# Patient Record
Sex: FEMALE | Race: WHITE | Employment: OTHER | URBAN - METROPOLITAN AREA
[De-identification: names, ages, dates, MRNs, and addresses within clinical notes are randomized per-mention and may not be internally consistent; named-entity substitution may affect disease eponyms.]

---

## 2017-01-03 PROBLEM — G62.9 PERIPHERAL POLYNEUROPATHY: Status: ACTIVE | Noted: 2017-01-03

## 2017-06-06 PROBLEM — I82.409 DEEP VEIN THROMBOSIS (DVT) OF LOWER EXTREMITY (HCC): Status: ACTIVE | Noted: 2017-06-06

## 2017-10-24 PROBLEM — N17.9 AKI (ACUTE KIDNEY INJURY) (HCC): Status: ACTIVE | Noted: 2017-10-24

## 2017-10-24 PROBLEM — Z79.01 CHRONIC ANTICOAGULATION: Status: ACTIVE | Noted: 2017-10-24

## 2017-10-24 PROBLEM — N39.0 COMPLICATED UTI (URINARY TRACT INFECTION): Chronic | Status: ACTIVE | Noted: 2017-10-24

## 2017-10-24 PROBLEM — N39.0 COMPLICATED UTI (URINARY TRACT INFECTION): Status: ACTIVE | Noted: 2017-10-24

## 2017-10-24 PROBLEM — R19.7 DIARRHEA WITH DEHYDRATION: Status: ACTIVE | Noted: 2017-10-24

## 2017-10-24 PROBLEM — E87.5 HYPERKALEMIA: Status: ACTIVE | Noted: 2017-10-24

## 2018-01-16 LAB — INR BLD: 2.1

## 2018-03-11 ENCOUNTER — HOSPITAL ENCOUNTER (OUTPATIENT)
Age: 73
Discharge: HOME | End: 2018-03-13
Payer: COMMERCIAL

## 2018-03-11 ENCOUNTER — HOSPITAL ENCOUNTER (OUTPATIENT)
Dept: GENERAL RADIOLOGY | Age: 73
Discharge: HOME | End: 2018-03-13
Payer: COMMERCIAL

## 2018-03-11 DIAGNOSIS — S42.295G OTHER CLOSED NONDISPLACED FRACTURE OF PROXIMAL END OF LEFT HUMERUS WITH DELAYED HEALING, SUBSEQUENT ENCOUNTER: ICD-10-CM

## 2018-03-11 PROCEDURE — 73060 X-RAY EXAM OF HUMERUS: CPT

## 2018-03-12 ENCOUNTER — TELEPHONE (OUTPATIENT)
Dept: FAMILY MEDICINE CLINIC | Age: 73
End: 2018-03-12

## 2018-03-12 DIAGNOSIS — I25.10 CORONARY ARTERY DISEASE INVOLVING NATIVE CORONARY ARTERY OF NATIVE HEART WITHOUT ANGINA PECTORIS: ICD-10-CM

## 2018-03-12 DIAGNOSIS — I82.5Y1 CHRONIC DEEP VEIN THROMBOSIS (DVT) OF PROXIMAL VEIN OF RIGHT LOWER EXTREMITY (HCC): ICD-10-CM

## 2018-03-12 DIAGNOSIS — I82.409 RECURRENT DEEP VEIN THROMBOSIS (DVT) (HCC): ICD-10-CM

## 2018-03-12 DIAGNOSIS — Z79.4 TYPE 2 DIABETES MELLITUS WITHOUT COMPLICATION, WITH LONG-TERM CURRENT USE OF INSULIN (HCC): ICD-10-CM

## 2018-03-12 DIAGNOSIS — E11.9 TYPE 2 DIABETES MELLITUS WITHOUT COMPLICATION, WITH LONG-TERM CURRENT USE OF INSULIN (HCC): ICD-10-CM

## 2018-03-12 DIAGNOSIS — R79.89 ELEVATED TSH: ICD-10-CM

## 2018-03-12 DIAGNOSIS — S42.295K OTHER CLOSED NONDISPLACED FRACTURE OF PROXIMAL END OF LEFT HUMERUS WITH NONUNION, SUBSEQUENT ENCOUNTER: Primary | ICD-10-CM

## 2018-03-12 PROBLEM — S42.202K CLOSED FRACTURE OF PROXIMAL END OF LEFT HUMERUS WITH NONUNION: Status: ACTIVE | Noted: 2018-03-12

## 2018-03-14 ENCOUNTER — OFFICE VISIT (OUTPATIENT)
Dept: ORTHOPEDIC SURGERY | Age: 73
End: 2018-03-14
Payer: COMMERCIAL

## 2018-03-14 VITALS
DIASTOLIC BLOOD PRESSURE: 61 MMHG | BODY MASS INDEX: 35.85 KG/M2 | WEIGHT: 210 LBS | HEART RATE: 74 BPM | HEIGHT: 64 IN | SYSTOLIC BLOOD PRESSURE: 128 MMHG

## 2018-03-14 DIAGNOSIS — S42.295K OTHER CLOSED NONDISPLACED FRACTURE OF PROXIMAL END OF LEFT HUMERUS WITH NONUNION, SUBSEQUENT ENCOUNTER: Primary | ICD-10-CM

## 2018-03-14 PROCEDURE — G8399 PT W/DXA RESULTS DOCUMENT: HCPCS | Performed by: ORTHOPAEDIC SURGERY

## 2018-03-14 PROCEDURE — 1090F PRES/ABSN URINE INCON ASSESS: CPT | Performed by: ORTHOPAEDIC SURGERY

## 2018-03-14 PROCEDURE — 1123F ACP DISCUSS/DSCN MKR DOCD: CPT | Performed by: ORTHOPAEDIC SURGERY

## 2018-03-14 PROCEDURE — G8417 CALC BMI ABV UP PARAM F/U: HCPCS | Performed by: ORTHOPAEDIC SURGERY

## 2018-03-14 PROCEDURE — G8598 ASA/ANTIPLAT THER USED: HCPCS | Performed by: ORTHOPAEDIC SURGERY

## 2018-03-14 PROCEDURE — G8482 FLU IMMUNIZE ORDER/ADMIN: HCPCS | Performed by: ORTHOPAEDIC SURGERY

## 2018-03-14 PROCEDURE — 4040F PNEUMOC VAC/ADMIN/RCVD: CPT | Performed by: ORTHOPAEDIC SURGERY

## 2018-03-14 PROCEDURE — 3014F SCREEN MAMMO DOC REV: CPT | Performed by: ORTHOPAEDIC SURGERY

## 2018-03-14 PROCEDURE — G8427 DOCREV CUR MEDS BY ELIG CLIN: HCPCS | Performed by: ORTHOPAEDIC SURGERY

## 2018-03-14 PROCEDURE — 1036F TOBACCO NON-USER: CPT | Performed by: ORTHOPAEDIC SURGERY

## 2018-03-14 PROCEDURE — 99203 OFFICE O/P NEW LOW 30 MIN: CPT | Performed by: ORTHOPAEDIC SURGERY

## 2018-03-14 PROCEDURE — 3017F COLORECTAL CA SCREEN DOC REV: CPT | Performed by: ORTHOPAEDIC SURGERY

## 2018-03-15 NOTE — PROGRESS NOTES
New Patient      Mian Nickerson is a 67 y.o. female, her YOB: 1945 with the following history as recorded in [facility]:    HPI: Patient is a very pleasant 72-year-old female who comes in with a chief complaint of left shoulder pain. She does have a history of breast cancer status post surgical treatment. She developed shoulder pain the last few weeks and was concerned that she may have a metastatic lesion in her left shoulder. 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 She did not receive any surgical treatment for this. She has had significantly limited range of motion since her recovery at that point in time. Her main concern is that she will develop some new pain and thought that it may be cancer. She denies any trauma or falls that does admit to repetitive range of motion while carrying groceries.       Review of Systems - General ROS: negative for - chills, fatigue, fever, malaise or night sweats  Ophthalmic ROS: negative for - blurry vision, decreased vision, double vision, dry eyes, excessive tearing, eye pain or loss of vision  ENT ROS: negative for - headaches, hearing change, nasal congestion, sinus pain, sore throat, tinnitus or vertigo  Allergy and Immunology ROS: negative for - itchy/watery eyes, nasal congestion, postnasal drip or seasonal allergies  Hematological and Lymphatic ROS: negative for - bleeding problems, blood clots, bruising, fatigue, jaundice, night sweats or swollen lymph nodes  Endocrine ROS: negative for - mood swings, palpitations, polydipsia/polyuria, skin changes or temperature intolerance  Respiratory ROS: no cough, shortness of breath, or wheezing  Cardiovascular ROS: no chest pain or dyspnea on exertion  Gastrointestinal ROS: no abdominal pain, change in bowel habits, or black or bloody stools  Genito-Urinary ROS: no dysuria, trouble voiding, or hematuria  Musculoskeletal ROS: Acute on chronic left shoulder pain  Neurological ROS: no TIA tablet 3    exemestane (AROMASIN) 25 MG chemo tablet Take 1 tablet by mouth every morning (before breakfast)      metFORMIN (GLUCOPHAGE) 1000 MG tablet Take 1 tablet by mouth 2 times daily (with meals) 180 tablet 3    famotidine (PEPCID) 20 MG tablet Take 1 tablet by mouth 2 times daily 180 tablet 3    insulin glargine (LANTUS SOLOSTAR) 100 UNIT/ML injection pen Inject 65 Units into the skin nightly 21 Pen 3    vitamin B-12 (CYANOCOBALAMIN) 1000 MCG tablet Take 1 tablet by mouth daily 90 tablet 3    fluticasone (FLONASE) 50 MCG/ACT nasal spray 1 spray by Nasal route daily 1 Bottle 3    warfarin (COUMADIN) 2 MG tablet Take 2 tablets orally on Sun, Tues, Thurs; and 1 tablet daily on all other days. 180 tablet 3    Multiple Vitamins-Minerals (THERAPEUTIC MULTIVITAMIN-MINERALS) tablet Take 1 tablet by mouth daily 90 tablet 3    trastuzumab (HERCEPTIN) 440 MG injection Infuse  intravenously once a week. On tuesdays       No current facility-administered medications for this visit. Allergies: Macrobid [nitrofurantoin monohydrate macrocrystals]  Past Medical History:   Diagnosis Date    Abscess of abdominal wall     Anemia     Iron deficiency and chronic disease.  Anesthesia     DIFFICULTY WAKING UP    Arthritis     Breast cancer ([facility] 2005    S/P Left Lumpectomy with Lymph Node Dissection, Chemo/Rad. Follows with Dr. Sabino Hernandez. No recurrence to date. Surgeon was Dr. Amaris Archer.  CAD (coronary artery disease) 5/2008    s/p CABG. Follows with [address] [place] [address].  CHF (congestive heart failure) (HCC)     Diabetic neuropathy (HCC)     Diabetic neuropathy (HCC)     DVT (deep venous thrombosis) (HCC)     Recurrent. On lifelong coumadin.  DVT of upper extremity (deep vein thrombosis) ([facility] [address] 4/18/2012    Humerus fracture     Chronic, on the Left.  Hyperlipidemia 8/29/2011    Hypertension     Lymph node enlargement     MI (myocardial infarction)     Nephrolithiasis     Follows with Dr. Jose Alfredo Pisano.     Pericardial effusion     Pulmonary nodule     With mediastinal lymphadenopathy. Stable. Follows with Dr. Norbert Agrawal.  Rib lesion 11/28/11    expansile lesion in one of the right ribs laterally    Sarcoidosis ([address] 07/26/11    per transbronchial needle aspiration    Subclavian vein occlusion, bilateral ([address] 4/18/2012    Type II or unspecified type diabetes mellitus without mention of complication, not stated as uncontrolled      Past Surgical History:   Procedure Laterality Date    APPENDECTOMY      ARTERIAL BYPASS SURGRY      BREAST LUMPECTOMY  9/27/2005    LEFT    CARDIAC SURGERY      CHOLECYSTECTOMY      COLONOSCOPY  12/2007    cecal arteriovenous malformation with hyperplastic polyps    COLONOSCOPY  [id number]    CORONARY ARTERY BYPASS GRAFT  05/2008    triple bypass    ECHO COMPL W DOP COLOR FLOW  10/30/2013         EYE SURGERY      clarissa cataracts    HYSTERECTOMY  1975, 1985    MAYNOR prolapse, benign conditions; BSO later for scar tissues, no CA.      OTHER SURGICAL HISTORY  11/18/11    port removal right chest wall    TOOTH EXTRACTION      Full Dental Extraction.     TUNNELED VENOUS PORT PLACEMENT      removal of port Dec. 2011- Dr. Yaz Garza [place] [address]  [id number]    RIGHT CHEST     Family History   Problem Relation Age of Onset    Adopted: Yes     Social History   Substance Use Topics    Smoking status: Never Smoker    Smokeless tobacco: Never Used    Alcohol use No     Allergies   Allergen Reactions    Macrobid [Nitrofurantoin Monohydrate Macrocrystals] Hives       ROS    Physical Examination:   Vitals:    03/14/18 1410   BP: 128/61   Pulse: 74     Physical Examination: General appearance - alert, well appearing, and in no distress, oriented to person, place, and time and overweight  Mental status - alert, oriented to person, place, and time, normal mood, behavior, speech, dress, motor activity, and thought processes  Eyes - pupils equal and reactive, extraocular eye movements intact  Back exam - full range of motion, no tenderness, palpable spasm or pain on motion, negative straight leg raise bilaterally, sacroiliac joints and sciatic notches nontender, normal reflexes and strength bilateral lower extremities, sensory exam intact bilateral lower extremities  Neurological - alert, oriented, normal speech, no focal findings or movement disorder noted, DTR's normal and symmetric, Babinski sign negative, motor and sensory grossly normal bilaterally, normal muscle tone, no tremors, strength 5/5  Musculoskeletal -   Left upper extremity   Skin intact   Compartments soft and compressible   Fires M U R nerves   2/4 radial pulse   Sensation intact   Capillary refill less than 3 seconds   Mild tenderness to palpation over left proximal humerus   Extremely limited range of motion with pain and stiffness   Patient approximate 20° of abduction 5° of external rotation and 20° of forward elevation    Extremities - peripheral pulses normal, no pedal edema, no clubbing or cyanosis, no pedal edema noted, no edema, redness or tenderness in the calves or thighs, Kamala's sign negative bilaterally, no ulcers, gangrene or atrophic changes  Skin - normal coloration and turgor, no rashes, no suspicious skin lesions noted        XR: Chronic nonunion with what appears to be a pseudoarthrosis at the anatomic neck region of the left proximal humerus, bony changes consistent with a chronic nonunion    DISCUSSION: Talked with her in detail about her chronic nonunion. She states her motion is no worse and has been since 2005 when she broke left proximal humerus. Her main concern was that she does have a history of breast cancer was worried that she had a metastatic lesion in her left shoulder. I told that we would do another x-ray in about 6 weeks to make sure that there is no further resorption at the fracture site.  She states that as she has been feeling better she was is worried because she had developed

## 2018-04-03 ENCOUNTER — OFFICE VISIT (OUTPATIENT)
Dept: FAMILY MEDICINE CLINIC | Age: 73
End: 2018-04-03
Payer: COMMERCIAL

## 2018-04-03 ENCOUNTER — ANTI-COAG VISIT (OUTPATIENT)
Dept: FAMILY MEDICINE CLINIC | Age: 73
End: 2018-04-03

## 2018-04-03 VITALS
HEART RATE: 54 BPM | TEMPERATURE: 98.1 F | OXYGEN SATURATION: 96 % | HEIGHT: 64 IN | SYSTOLIC BLOOD PRESSURE: 128 MMHG | BODY MASS INDEX: 36.02 KG/M2 | WEIGHT: 211 LBS | DIASTOLIC BLOOD PRESSURE: 62 MMHG

## 2018-04-03 DIAGNOSIS — Z79.01 CHRONIC ANTICOAGULATION: Primary | ICD-10-CM

## 2018-04-03 DIAGNOSIS — D63.0 ANEMIA IN NEOPLASTIC DISEASE: ICD-10-CM

## 2018-04-03 DIAGNOSIS — R60.0 BILATERAL EDEMA OF LOWER EXTREMITY: ICD-10-CM

## 2018-04-03 DIAGNOSIS — S42.295K OTHER CLOSED NONDISPLACED FRACTURE OF PROXIMAL END OF LEFT HUMERUS WITH NONUNION, SUBSEQUENT ENCOUNTER: ICD-10-CM

## 2018-04-03 DIAGNOSIS — E11.9 TYPE 2 DIABETES MELLITUS WITHOUT COMPLICATION, WITH LONG-TERM CURRENT USE OF INSULIN (HCC): ICD-10-CM

## 2018-04-03 DIAGNOSIS — C50.919 MALIGNANT NEOPLASM OF FEMALE BREAST, UNSPECIFIED ESTROGEN RECEPTOR STATUS, UNSPECIFIED LATERALITY, UNSPECIFIED SITE OF BREAST (HCC): ICD-10-CM

## 2018-04-03 DIAGNOSIS — I10 ESSENTIAL HYPERTENSION: ICD-10-CM

## 2018-04-03 DIAGNOSIS — Z79.4 TYPE 2 DIABETES MELLITUS WITHOUT COMPLICATION, WITH LONG-TERM CURRENT USE OF INSULIN (HCC): ICD-10-CM

## 2018-04-03 DIAGNOSIS — I82.4Z9 DEEP VEIN THROMBOSIS (DVT) OF DISTAL VEIN OF LOWER EXTREMITY, UNSPECIFIED CHRONICITY, UNSPECIFIED LATERALITY (HCC): ICD-10-CM

## 2018-04-03 DIAGNOSIS — R79.1 SUPRATHERAPEUTIC INR: ICD-10-CM

## 2018-04-03 LAB
INR BLD: 3.3
INTERNATIONAL NORMALIZATION RATIO, POC: 3.3
PROTHROMBIN TIME, POC: 40.1

## 2018-04-03 PROCEDURE — G8417 CALC BMI ABV UP PARAM F/U: HCPCS | Performed by: FAMILY MEDICINE

## 2018-04-03 PROCEDURE — 4040F PNEUMOC VAC/ADMIN/RCVD: CPT | Performed by: FAMILY MEDICINE

## 2018-04-03 PROCEDURE — G8399 PT W/DXA RESULTS DOCUMENT: HCPCS | Performed by: FAMILY MEDICINE

## 2018-04-03 PROCEDURE — 1036F TOBACCO NON-USER: CPT | Performed by: FAMILY MEDICINE

## 2018-04-03 PROCEDURE — G8598 ASA/ANTIPLAT THER USED: HCPCS | Performed by: FAMILY MEDICINE

## 2018-04-03 PROCEDURE — 3017F COLORECTAL CA SCREEN DOC REV: CPT | Performed by: FAMILY MEDICINE

## 2018-04-03 PROCEDURE — 3046F HEMOGLOBIN A1C LEVEL >9.0%: CPT | Performed by: FAMILY MEDICINE

## 2018-04-03 PROCEDURE — 99212 OFFICE O/P EST SF 10 MIN: CPT | Performed by: FAMILY MEDICINE

## 2018-04-03 PROCEDURE — 1123F ACP DISCUSS/DSCN MKR DOCD: CPT | Performed by: FAMILY MEDICINE

## 2018-04-03 PROCEDURE — G8427 DOCREV CUR MEDS BY ELIG CLIN: HCPCS | Performed by: FAMILY MEDICINE

## 2018-04-03 PROCEDURE — 99214 OFFICE O/P EST MOD 30 MIN: CPT | Performed by: FAMILY MEDICINE

## 2018-04-03 PROCEDURE — 85610 PROTHROMBIN TIME: CPT | Performed by: FAMILY MEDICINE

## 2018-04-03 PROCEDURE — 3014F SCREEN MAMMO DOC REV: CPT | Performed by: FAMILY MEDICINE

## 2018-04-03 PROCEDURE — 1090F PRES/ABSN URINE INCON ASSESS: CPT | Performed by: FAMILY MEDICINE

## 2018-04-04 PROBLEM — S42.202K CLOSED FRACTURE OF PROXIMAL END OF LEFT HUMERUS WITH NONUNION: Status: ACTIVE | Noted: 2018-04-04

## 2018-04-06 ENCOUNTER — TELEPHONE (OUTPATIENT)
Dept: FAMILY MEDICINE CLINIC | Age: 73
End: 2018-04-06

## 2018-04-10 ENCOUNTER — HOSPITAL ENCOUNTER (OUTPATIENT)
Age: 73
Discharge: HOME | End: 2018-04-12
Payer: COMMERCIAL

## 2018-04-10 ENCOUNTER — ANTI-COAG VISIT (OUTPATIENT)
Dept: FAMILY MEDICINE CLINIC | Age: 73
End: 2018-04-10

## 2018-04-10 LAB
ALBUMIN SERPL-MCNC: 3.8 G/DL (ref 3.5–5.2)
ALP BLD-CCNC: 80 U/L (ref 35–104)
ALT SERPL-CCNC: 10 U/L (ref 0–32)
ANION GAP SERPL CALCULATED.3IONS-SCNC: 16 MMOL/L (ref 7–16)
AST SERPL-CCNC: 16 U/L (ref 0–31)
BILIRUB SERPL-MCNC: 0.7 MG/DL (ref 0–1.2)
BUN BLDV-MCNC: 27 MG/DL (ref 8–23)
CALCIUM SERPL-MCNC: 9 MG/DL (ref 8.6–10.2)
CHLORIDE BLD-SCNC: 102 MMOL/L (ref 98–107)
CO2: 24 MMOL/L (ref 22–29)
CREAT SERPL-MCNC: 1.3 MG/DL (ref 0.5–1)
GFR AFRICAN AMERICAN: 49
GFR NON-AFRICAN AMERICAN: 40 ML/MIN/1.73
GLUCOSE BLD-MCNC: 137 MG/DL (ref 74–109)
HCT VFR BLD CALC: 31.5 % (ref 34–48)
HEMOGLOBIN: 9.7 G/DL (ref 11.5–15.5)
INR BLD: 1.6
MCH RBC QN AUTO: 29.7 PG (ref 26–35)
MCHC RBC AUTO-ENTMCNC: 30.8 % (ref 32–34.5)
MCV RBC AUTO: 96.3 FL (ref 80–99.9)
PDW BLD-RTO: 16.5 FL (ref 11.5–15)
PLATELET # BLD: 160 E9/L (ref 130–450)
PMV BLD AUTO: 11.8 FL (ref 7–12)
POTASSIUM SERPL-SCNC: 4.9 MMOL/L (ref 3.5–5)
RBC # BLD: 3.27 E12/L (ref 3.5–5.5)
SODIUM BLD-SCNC: 142 MMOL/L (ref 132–146)
TOTAL PROTEIN: 6.2 G/DL (ref 6.4–8.3)
WBC # BLD: 5.3 E9/L (ref 4.5–11.5)

## 2018-04-10 PROCEDURE — 85027 COMPLETE CBC AUTOMATED: CPT

## 2018-04-10 PROCEDURE — 80053 COMPREHEN METABOLIC PANEL: CPT

## 2018-04-17 ENCOUNTER — ANTI-COAG VISIT (OUTPATIENT)
Dept: FAMILY MEDICINE CLINIC | Age: 73
End: 2018-04-17
Payer: COMMERCIAL

## 2018-04-17 DIAGNOSIS — I82.5Y1 CHRONIC DEEP VEIN THROMBOSIS (DVT) OF PROXIMAL VEIN OF RIGHT LOWER EXTREMITY (HCC): ICD-10-CM

## 2018-04-17 LAB — INR BLD: 2.1

## 2018-04-17 PROCEDURE — 93793 ANTICOAG MGMT PT WARFARIN: CPT | Performed by: FAMILY MEDICINE

## 2018-04-24 ENCOUNTER — TELEPHONE (OUTPATIENT)
Dept: FAMILY MEDICINE CLINIC | Age: 73
End: 2018-04-24
Payer: COMMERCIAL

## 2018-04-24 DIAGNOSIS — Z60.2 PERSON LIVING ALONE: ICD-10-CM

## 2018-04-24 DIAGNOSIS — Z91.81 PERSONAL HISTORY OF FALL: ICD-10-CM

## 2018-04-24 DIAGNOSIS — I11.0 HYPERTENSIVE HEART DISEASE WITH CONGESTIVE HEART FAILURE (HCC): ICD-10-CM

## 2018-04-24 DIAGNOSIS — C79.52 SECONDARY MALIGNANT NEOPLASM OF BONE AND BONE MARROW (HCC): ICD-10-CM

## 2018-04-24 DIAGNOSIS — Z79.01 LONG-TERM (CURRENT) USE OF ANTICOAGULANTS: ICD-10-CM

## 2018-04-24 DIAGNOSIS — D63.0 ANEMIA IN NEOPLASTIC DISEASE: ICD-10-CM

## 2018-04-24 DIAGNOSIS — Z79.4 ENCOUNTER FOR LONG-TERM (CURRENT) USE OF INSULIN (HCC): ICD-10-CM

## 2018-04-24 DIAGNOSIS — E11.42 DIABETIC POLYNEUROPATHY ASSOCIATED WITH TYPE 2 DIABETES MELLITUS (HCC): ICD-10-CM

## 2018-04-24 DIAGNOSIS — S42.202K CLOSED FRACTURE OF PROXIMAL END OF LEFT HUMERUS WITH NONUNION, UNSPECIFIED FRACTURE MORPHOLOGY, SUBSEQUENT ENCOUNTER: ICD-10-CM

## 2018-04-24 DIAGNOSIS — M19.90 SENILE ARTHRITIS: ICD-10-CM

## 2018-04-24 DIAGNOSIS — I11.0 BENIGN HYPERTENSIVE HEART DISEASE WITH CONGESTIVE HEART FAILURE (HCC): ICD-10-CM

## 2018-04-24 DIAGNOSIS — C79.51 SECONDARY MALIGNANT NEOPLASM OF BONE AND BONE MARROW (HCC): ICD-10-CM

## 2018-04-24 DIAGNOSIS — I82.4Z1 ACUTE EMBOLISM AND THROMBOSIS OF DEEP VEIN OF RIGHT DISTAL LOWER EXTREMITY (HCC): Primary | ICD-10-CM

## 2018-04-24 DIAGNOSIS — I25.10 ATHEROSCLEROSIS OF NATIVE CORONARY ARTERY OF NATIVE HEART WITHOUT ANGINA PECTORIS: ICD-10-CM

## 2018-04-24 DIAGNOSIS — C50.919 MALIGNANT NEOPLASM OF FEMALE BREAST, UNSPECIFIED ESTROGEN RECEPTOR STATUS, UNSPECIFIED LATERALITY, UNSPECIFIED SITE OF BREAST (HCC): ICD-10-CM

## 2018-04-24 DIAGNOSIS — Z51.81 ENCOUNTER FOR THERAPEUTIC DRUG MONITORING: ICD-10-CM

## 2018-04-24 PROCEDURE — G0180 MD CERTIFICATION HHA PATIENT: HCPCS | Performed by: FAMILY MEDICINE

## 2018-04-24 SDOH — SOCIAL STABILITY - SOCIAL INSECURITY: PROBLEMS RELATED TO LIVING ALONE: Z60.2

## 2018-04-25 ENCOUNTER — ANTI-COAG VISIT (OUTPATIENT)
Dept: FAMILY MEDICINE CLINIC | Age: 73
End: 2018-04-25
Payer: COMMERCIAL

## 2018-04-25 DIAGNOSIS — Z79.01 CHRONIC ANTICOAGULATION: ICD-10-CM

## 2018-04-25 DIAGNOSIS — I82.5Y1 CHRONIC DEEP VEIN THROMBOSIS (DVT) OF PROXIMAL VEIN OF RIGHT LOWER EXTREMITY (HCC): ICD-10-CM

## 2018-04-25 LAB — INR BLD: 3.1

## 2018-04-25 PROCEDURE — 93793 ANTICOAG MGMT PT WARFARIN: CPT | Performed by: FAMILY MEDICINE

## 2018-04-30 ENCOUNTER — ANTI-COAG VISIT (OUTPATIENT)
Dept: FAMILY MEDICINE CLINIC | Age: 73
End: 2018-04-30

## 2018-04-30 ENCOUNTER — ANTI-COAG VISIT (OUTPATIENT)
Dept: FAMILY MEDICINE CLINIC | Age: 73
End: 2018-04-30
Payer: COMMERCIAL

## 2018-04-30 ENCOUNTER — HOSPITAL ENCOUNTER (OUTPATIENT)
Dept: ULTRASOUND IMAGING | Age: 73
Discharge: HOME | End: 2018-05-02
Payer: COMMERCIAL

## 2018-04-30 ENCOUNTER — OFFICE VISIT (OUTPATIENT)
Dept: FAMILY MEDICINE CLINIC | Age: 73
End: 2018-04-30
Payer: COMMERCIAL

## 2018-04-30 VITALS
HEART RATE: 51 BPM | WEIGHT: 204 LBS | HEIGHT: 64 IN | DIASTOLIC BLOOD PRESSURE: 70 MMHG | RESPIRATION RATE: 16 BRPM | TEMPERATURE: 98 F | OXYGEN SATURATION: 97 % | SYSTOLIC BLOOD PRESSURE: 136 MMHG | BODY MASS INDEX: 34.83 KG/M2

## 2018-04-30 DIAGNOSIS — I82.511 CHRONIC DEEP VEIN THROMBOSIS (DVT) OF FEMORAL VEIN OF RIGHT LOWER EXTREMITY (HCC): ICD-10-CM

## 2018-04-30 DIAGNOSIS — Z79.01 CHRONIC ANTICOAGULATION: ICD-10-CM

## 2018-04-30 DIAGNOSIS — M25.571 ACUTE RIGHT ANKLE PAIN: ICD-10-CM

## 2018-04-30 DIAGNOSIS — M79.89 RIGHT LEG SWELLING: Primary | ICD-10-CM

## 2018-04-30 DIAGNOSIS — I82.5Y1 CHRONIC DEEP VEIN THROMBOSIS (DVT) OF PROXIMAL VEIN OF RIGHT LOWER EXTREMITY (HCC): ICD-10-CM

## 2018-04-30 DIAGNOSIS — M79.89 RIGHT LEG SWELLING: ICD-10-CM

## 2018-04-30 LAB
INTERNATIONAL NORMALIZATION RATIO, POC: 2
PROTHROMBIN TIME, POC: 23.9

## 2018-04-30 PROCEDURE — 1036F TOBACCO NON-USER: CPT | Performed by: FAMILY MEDICINE

## 2018-04-30 PROCEDURE — 3017F COLORECTAL CA SCREEN DOC REV: CPT | Performed by: FAMILY MEDICINE

## 2018-04-30 PROCEDURE — 99213 OFFICE O/P EST LOW 20 MIN: CPT | Performed by: FAMILY MEDICINE

## 2018-04-30 PROCEDURE — 93793 ANTICOAG MGMT PT WARFARIN: CPT | Performed by: FAMILY MEDICINE

## 2018-04-30 PROCEDURE — G8399 PT W/DXA RESULTS DOCUMENT: HCPCS | Performed by: FAMILY MEDICINE

## 2018-04-30 PROCEDURE — 4040F PNEUMOC VAC/ADMIN/RCVD: CPT | Performed by: FAMILY MEDICINE

## 2018-04-30 PROCEDURE — G8417 CALC BMI ABV UP PARAM F/U: HCPCS | Performed by: FAMILY MEDICINE

## 2018-04-30 PROCEDURE — 1090F PRES/ABSN URINE INCON ASSESS: CPT | Performed by: FAMILY MEDICINE

## 2018-04-30 PROCEDURE — 99212 OFFICE O/P EST SF 10 MIN: CPT | Performed by: FAMILY MEDICINE

## 2018-04-30 PROCEDURE — 93971 EXTREMITY STUDY: CPT

## 2018-04-30 PROCEDURE — G8427 DOCREV CUR MEDS BY ELIG CLIN: HCPCS | Performed by: FAMILY MEDICINE

## 2018-04-30 PROCEDURE — 85610 PROTHROMBIN TIME: CPT | Performed by: FAMILY MEDICINE

## 2018-04-30 PROCEDURE — 1123F ACP DISCUSS/DSCN MKR DOCD: CPT | Performed by: FAMILY MEDICINE

## 2018-04-30 PROCEDURE — G8598 ASA/ANTIPLAT THER USED: HCPCS | Performed by: FAMILY MEDICINE

## 2018-04-30 RX ORDER — PSYLLIUM HUSK 3.4 G/7G
1 POWDER ORAL DAILY
Refills: 0 | COMMUNITY
Start: 2018-04-19 | End: 2018-04-30 | Stop reason: SDUPTHER

## 2018-04-30 ASSESSMENT — PATIENT HEALTH QUESTIONNAIRE - PHQ9
SUM OF ALL RESPONSES TO PHQ9 QUESTIONS 1 & 2: 0
2. FEELING DOWN, DEPRESSED OR HOPELESS: 0
SUM OF ALL RESPONSES TO PHQ QUESTIONS 1-9: 0
1. LITTLE INTEREST OR PLEASURE IN DOING THINGS: 0

## 2018-05-02 ENCOUNTER — ANTI-COAG VISIT (OUTPATIENT)
Dept: FAMILY MEDICINE CLINIC | Age: 73
End: 2018-05-02
Payer: COMMERCIAL

## 2018-05-02 DIAGNOSIS — I82.4Z9 DEEP VEIN THROMBOSIS (DVT) OF DISTAL VEIN OF LOWER EXTREMITY, UNSPECIFIED CHRONICITY, UNSPECIFIED LATERALITY (HCC): ICD-10-CM

## 2018-05-02 LAB — INR BLD: 2

## 2018-05-02 PROCEDURE — 93793 ANTICOAG MGMT PT WARFARIN: CPT | Performed by: FAMILY MEDICINE

## 2018-05-07 ENCOUNTER — TELEPHONE (OUTPATIENT)
Dept: FAMILY MEDICINE CLINIC | Age: 73
End: 2018-05-07

## 2018-05-11 ENCOUNTER — TELEPHONE (OUTPATIENT)
Dept: FAMILY MEDICINE CLINIC | Age: 73
End: 2018-05-11

## 2018-05-11 DIAGNOSIS — M25.551 RIGHT HIP PAIN: ICD-10-CM

## 2018-05-11 DIAGNOSIS — M79.604 RIGHT LEG PAIN: Primary | ICD-10-CM

## 2018-05-11 DIAGNOSIS — M54.50 ACUTE BILATERAL LOW BACK PAIN WITHOUT SCIATICA: ICD-10-CM

## 2018-05-11 RX ORDER — HYDROCODONE BITARTRATE AND ACETAMINOPHEN 5; 325 MG/1; MG/1
1 TABLET ORAL EVERY 6 HOURS PRN
Qty: 12 TABLET | Refills: 0 | Status: SHIPPED | OUTPATIENT
Start: 2018-05-11 | End: 2018-05-14

## 2018-05-13 ENCOUNTER — HOSPITAL ENCOUNTER (OUTPATIENT)
Age: 73
Discharge: HOME | End: 2018-05-15
Payer: COMMERCIAL

## 2018-05-13 ENCOUNTER — HOSPITAL ENCOUNTER (OUTPATIENT)
Dept: GENERAL RADIOLOGY | Age: 73
Discharge: HOME | End: 2018-05-15
Payer: COMMERCIAL

## 2018-05-13 DIAGNOSIS — M25.551 RIGHT HIP PAIN: ICD-10-CM

## 2018-05-13 DIAGNOSIS — M54.50 ACUTE BILATERAL LOW BACK PAIN WITHOUT SCIATICA: ICD-10-CM

## 2018-05-13 DIAGNOSIS — M79.604 RIGHT LEG PAIN: ICD-10-CM

## 2018-05-13 PROCEDURE — 72110 X-RAY EXAM L-2 SPINE 4/>VWS: CPT

## 2018-05-16 ENCOUNTER — HOSPITAL ENCOUNTER (OUTPATIENT)
Age: 73
Discharge: HOME | End: 2018-05-18
Payer: COMMERCIAL

## 2018-05-16 ENCOUNTER — HOSPITAL ENCOUNTER (OUTPATIENT)
Dept: GENERAL RADIOLOGY | Age: 73
Discharge: HOME | End: 2018-05-18
Payer: COMMERCIAL

## 2018-05-16 ENCOUNTER — HOSPITAL ENCOUNTER (OUTPATIENT)
Age: 73
Discharge: HOME | End: 2018-05-16
Payer: COMMERCIAL

## 2018-05-16 DIAGNOSIS — Z79.4 TYPE 2 DIABETES MELLITUS WITHOUT COMPLICATION, WITH LONG-TERM CURRENT USE OF INSULIN (HCC): ICD-10-CM

## 2018-05-16 DIAGNOSIS — R79.89 ELEVATED TSH: ICD-10-CM

## 2018-05-16 DIAGNOSIS — E11.9 TYPE 2 DIABETES MELLITUS WITHOUT COMPLICATION, WITH LONG-TERM CURRENT USE OF INSULIN (HCC): ICD-10-CM

## 2018-05-16 DIAGNOSIS — M25.571 ACUTE RIGHT ANKLE PAIN: ICD-10-CM

## 2018-05-16 LAB
CHOLESTEROL, TOTAL: 99 MG/DL (ref 0–199)
CREATININE URINE: 219 MG/DL (ref 29–226)
HBA1C MFR BLD: 7.4 % (ref 4.8–5.9)
HDLC SERPL-MCNC: 32 MG/DL
LDL CHOLESTEROL CALCULATED: 44 MG/DL (ref 0–99)
MICROALBUMIN UR-MCNC: 732.2 MG/L
MICROALBUMIN/CREAT UR-RTO: 334.3 (ref 0–30)
T4 FREE: 1 NG/DL (ref 0.93–1.7)
TRIGL SERPL-MCNC: 117 MG/DL (ref 0–149)
TSH SERPL DL<=0.05 MIU/L-ACNC: 4.26 UIU/ML (ref 0.27–4.2)
VLDLC SERPL CALC-MCNC: 23 MG/DL

## 2018-05-16 PROCEDURE — 73610 X-RAY EXAM OF ANKLE: CPT

## 2018-05-16 PROCEDURE — 82570 ASSAY OF URINE CREATININE: CPT

## 2018-05-16 PROCEDURE — 83036 HEMOGLOBIN GLYCOSYLATED A1C: CPT

## 2018-05-16 PROCEDURE — 36415 COLL VENOUS BLD VENIPUNCTURE: CPT

## 2018-05-16 PROCEDURE — 82044 UR ALBUMIN SEMIQUANTITATIVE: CPT

## 2018-05-16 PROCEDURE — 84439 ASSAY OF FREE THYROXINE: CPT

## 2018-05-16 PROCEDURE — 80061 LIPID PANEL: CPT

## 2018-05-16 PROCEDURE — 73502 X-RAY EXAM HIP UNI 2-3 VIEWS: CPT

## 2018-05-16 PROCEDURE — 84443 ASSAY THYROID STIM HORMONE: CPT

## 2018-05-17 ENCOUNTER — TELEPHONE (OUTPATIENT)
Dept: FAMILY MEDICINE CLINIC | Age: 73
End: 2018-05-17

## 2018-05-17 DIAGNOSIS — C50.919 MALIGNANT NEOPLASM OF FEMALE BREAST, UNSPECIFIED ESTROGEN RECEPTOR STATUS, UNSPECIFIED LATERALITY, UNSPECIFIED SITE OF BREAST (HCC): ICD-10-CM

## 2018-05-17 DIAGNOSIS — M79.604 RIGHT LEG PAIN: Primary | ICD-10-CM

## 2018-05-17 DIAGNOSIS — R93.6 ABNORMAL X-RAY OF LOWER EXTREMITY: ICD-10-CM

## 2018-05-23 ENCOUNTER — HOSPITAL ENCOUNTER (OUTPATIENT)
Dept: MRI IMAGING | Age: 73
Discharge: HOME | End: 2018-05-25
Payer: COMMERCIAL

## 2018-05-23 DIAGNOSIS — C50.919 MALIGNANT NEOPLASM OF FEMALE BREAST, UNSPECIFIED ESTROGEN RECEPTOR STATUS, UNSPECIFIED LATERALITY, UNSPECIFIED SITE OF BREAST (HCC): ICD-10-CM

## 2018-05-23 DIAGNOSIS — R93.6 ABNORMAL X-RAY OF LOWER EXTREMITY: ICD-10-CM

## 2018-05-23 DIAGNOSIS — M79.604 RIGHT LEG PAIN: ICD-10-CM

## 2018-05-23 PROCEDURE — 73721 MRI JNT OF LWR EXTRE W/O DYE: CPT

## 2018-05-30 ENCOUNTER — HOSPITAL ENCOUNTER (OUTPATIENT)
Dept: GENERAL RADIOLOGY | Age: 73
Discharge: HOME | End: 2018-06-01
Payer: COMMERCIAL

## 2018-05-30 ENCOUNTER — HOSPITAL ENCOUNTER (OUTPATIENT)
Age: 73
Discharge: HOME | End: 2018-06-01
Payer: COMMERCIAL

## 2018-05-30 ENCOUNTER — OFFICE VISIT (OUTPATIENT)
Dept: FAMILY MEDICINE CLINIC | Age: 73
End: 2018-05-30
Payer: COMMERCIAL

## 2018-05-30 VITALS
HEART RATE: 62 BPM | SYSTOLIC BLOOD PRESSURE: 138 MMHG | TEMPERATURE: 97.6 F | RESPIRATION RATE: 16 BRPM | DIASTOLIC BLOOD PRESSURE: 68 MMHG | BODY MASS INDEX: 34.83 KG/M2 | WEIGHT: 204 LBS | HEIGHT: 64 IN | OXYGEN SATURATION: 96 %

## 2018-05-30 DIAGNOSIS — M25.572 PAIN AND SWELLING OF ANKLE, LEFT: Primary | ICD-10-CM

## 2018-05-30 DIAGNOSIS — M25.572 LEFT ANKLE PAIN, UNSPECIFIED CHRONICITY: ICD-10-CM

## 2018-05-30 DIAGNOSIS — M25.472 PAIN AND SWELLING OF ANKLE, LEFT: Primary | ICD-10-CM

## 2018-05-30 DIAGNOSIS — T14.90XA TRAUMA: ICD-10-CM

## 2018-05-30 PROCEDURE — 99212 OFFICE O/P EST SF 10 MIN: CPT | Performed by: NURSE PRACTITIONER

## 2018-05-30 PROCEDURE — 73600 X-RAY EXAM OF ANKLE: CPT

## 2018-05-30 PROCEDURE — 99214 OFFICE O/P EST MOD 30 MIN: CPT | Performed by: NURSE PRACTITIONER

## 2018-05-30 ASSESSMENT — ENCOUNTER SYMPTOMS
CONSTIPATION: 0
DOUBLE VISION: 0
NAUSEA: 0
VOMITING: 0
DIARRHEA: 0
SHORTNESS OF BREATH: 0
BLURRED VISION: 0
COUGH: 0
WHEEZING: 0

## 2018-06-08 ENCOUNTER — TELEPHONE (OUTPATIENT)
Dept: FAMILY MEDICINE CLINIC | Age: 73
End: 2018-06-08

## 2018-07-14 ENCOUNTER — HOSPITAL ENCOUNTER (EMERGENCY)
Age: 73
Discharge: HOME | End: 2018-07-15
Attending: EMERGENCY MEDICINE
Payer: COMMERCIAL

## 2018-07-14 DIAGNOSIS — S16.1XXA ACUTE STRAIN OF NECK MUSCLE, INITIAL ENCOUNTER: ICD-10-CM

## 2018-07-14 DIAGNOSIS — M62.838 SPASM OF MUSCLE: ICD-10-CM

## 2018-07-14 DIAGNOSIS — S16.1XXA CERVICAL MUSCLE STRAIN, INITIAL ENCOUNTER: Primary | ICD-10-CM

## 2018-07-14 PROCEDURE — 99284 EMERGENCY DEPT VISIT MOD MDM: CPT

## 2018-07-14 RX ORDER — ORPHENADRINE CITRATE 30 MG/ML
60 INJECTION INTRAMUSCULAR; INTRAVENOUS ONCE
Status: COMPLETED | OUTPATIENT
Start: 2018-07-15 | End: 2018-07-15

## 2018-07-14 RX ORDER — OXYCODONE HYDROCHLORIDE AND ACETAMINOPHEN 5; 325 MG/1; MG/1
1 TABLET ORAL ONCE
Status: COMPLETED | OUTPATIENT
Start: 2018-07-15 | End: 2018-07-15

## 2018-07-14 ASSESSMENT — PAIN DESCRIPTION - LOCATION
LOCATION_3: KNEE
LOCATION: HEAD
LOCATION_2: NECK

## 2018-07-14 ASSESSMENT — PAIN DESCRIPTION - DESCRIPTORS
DESCRIPTORS_3: ACHING
DESCRIPTORS_2: ACHING
DESCRIPTORS: THROBBING

## 2018-07-14 ASSESSMENT — PAIN DESCRIPTION - ORIENTATION
ORIENTATION_3: RIGHT
ORIENTATION: POSTERIOR
ORIENTATION_2: POSTERIOR;LEFT

## 2018-07-14 ASSESSMENT — PAIN DESCRIPTION - PAIN TYPE
TYPE: ACUTE PAIN
TYPE_3: ACUTE PAIN

## 2018-07-14 ASSESSMENT — PAIN SCALES - GENERAL: PAINLEVEL_OUTOF10: 8

## 2018-07-14 ASSESSMENT — PAIN DESCRIPTION - INTENSITY
RATING_3: 10
RATING_2: 10

## 2018-07-14 ASSESSMENT — PAIN DESCRIPTION - DURATION: DURATION_2: CONTINUOUS

## 2018-07-15 ENCOUNTER — APPOINTMENT (OUTPATIENT)
Dept: CT IMAGING | Age: 73
End: 2018-07-15
Payer: COMMERCIAL

## 2018-07-15 ENCOUNTER — APPOINTMENT (OUTPATIENT)
Dept: GENERAL RADIOLOGY | Age: 73
End: 2018-07-15
Payer: COMMERCIAL

## 2018-07-15 VITALS
BODY MASS INDEX: 34.66 KG/M2 | RESPIRATION RATE: 16 BRPM | DIASTOLIC BLOOD PRESSURE: 68 MMHG | WEIGHT: 203 LBS | TEMPERATURE: 98.2 F | HEART RATE: 68 BPM | HEIGHT: 64 IN | SYSTOLIC BLOOD PRESSURE: 136 MMHG | OXYGEN SATURATION: 96 %

## 2018-07-15 PROCEDURE — 6360000002 HC RX W HCPCS: Performed by: EMERGENCY MEDICINE

## 2018-07-15 PROCEDURE — 6370000000 HC RX 637 (ALT 250 FOR IP): Performed by: EMERGENCY MEDICINE

## 2018-07-15 PROCEDURE — 96374 THER/PROPH/DIAG INJ IV PUSH: CPT

## 2018-07-15 PROCEDURE — 70450 CT HEAD/BRAIN W/O DYE: CPT

## 2018-07-15 PROCEDURE — 72125 CT NECK SPINE W/O DYE: CPT

## 2018-07-15 PROCEDURE — 73562 X-RAY EXAM OF KNEE 3: CPT

## 2018-07-15 RX ORDER — OXYCODONE HYDROCHLORIDE AND ACETAMINOPHEN 5; 325 MG/1; MG/1
1 TABLET ORAL EVERY 6 HOURS PRN
Qty: 10 TABLET | Refills: 0 | Status: SHIPPED | OUTPATIENT
Start: 2018-07-15 | End: 2018-07-18

## 2018-07-15 RX ORDER — CHLORZOXAZONE 500 MG/1
500 TABLET ORAL 3 TIMES DAILY PRN
Qty: 30 TABLET | Refills: 0 | Status: SHIPPED | OUTPATIENT
Start: 2018-07-15 | End: 2018-07-25

## 2018-07-15 RX ADMIN — OXYCODONE HYDROCHLORIDE AND ACETAMINOPHEN 1 TABLET: 5; 325 TABLET ORAL at 00:09

## 2018-07-15 RX ADMIN — ORPHENADRINE CITRATE 60 MG: 30 INJECTION INTRAMUSCULAR; INTRAVENOUS at 00:09

## 2018-07-15 ASSESSMENT — PAIN SCALES - GENERAL
PAINLEVEL_OUTOF10: 3
PAINLEVEL_OUTOF10: 8

## 2018-07-18 ENCOUNTER — OFFICE VISIT (OUTPATIENT)
Dept: FAMILY MEDICINE CLINIC | Age: 73
End: 2018-07-18
Payer: COMMERCIAL

## 2018-07-18 ENCOUNTER — ANTI-COAG VISIT (OUTPATIENT)
Dept: FAMILY MEDICINE CLINIC | Age: 73
End: 2018-07-18
Payer: COMMERCIAL

## 2018-07-18 VITALS
OXYGEN SATURATION: 97 % | WEIGHT: 200.1 LBS | HEART RATE: 72 BPM | DIASTOLIC BLOOD PRESSURE: 80 MMHG | SYSTOLIC BLOOD PRESSURE: 174 MMHG | BODY MASS INDEX: 34.35 KG/M2

## 2018-07-18 DIAGNOSIS — Z79.01 CHRONIC ANTICOAGULATION: ICD-10-CM

## 2018-07-18 DIAGNOSIS — M54.2 CERVICAL PAIN (NECK): Primary | ICD-10-CM

## 2018-07-18 DIAGNOSIS — S16.1XXD STRAIN OF NECK MUSCLE, SUBSEQUENT ENCOUNTER: ICD-10-CM

## 2018-07-18 LAB
INTERNATIONAL NORMALIZATION RATIO, POC: 2.9
PROTHROMBIN TIME, POC: 35.1

## 2018-07-18 PROCEDURE — 4040F PNEUMOC VAC/ADMIN/RCVD: CPT | Performed by: NURSE PRACTITIONER

## 2018-07-18 PROCEDURE — 99212 OFFICE O/P EST SF 10 MIN: CPT | Performed by: NURSE PRACTITIONER

## 2018-07-18 PROCEDURE — 93793 ANTICOAG MGMT PT WARFARIN: CPT | Performed by: FAMILY MEDICINE

## 2018-07-18 PROCEDURE — 1101F PT FALLS ASSESS-DOCD LE1/YR: CPT | Performed by: NURSE PRACTITIONER

## 2018-07-18 PROCEDURE — 1090F PRES/ABSN URINE INCON ASSESS: CPT | Performed by: NURSE PRACTITIONER

## 2018-07-18 PROCEDURE — 1036F TOBACCO NON-USER: CPT | Performed by: NURSE PRACTITIONER

## 2018-07-18 PROCEDURE — G8399 PT W/DXA RESULTS DOCUMENT: HCPCS | Performed by: NURSE PRACTITIONER

## 2018-07-18 PROCEDURE — G8427 DOCREV CUR MEDS BY ELIG CLIN: HCPCS | Performed by: NURSE PRACTITIONER

## 2018-07-18 PROCEDURE — G8598 ASA/ANTIPLAT THER USED: HCPCS | Performed by: NURSE PRACTITIONER

## 2018-07-18 PROCEDURE — 1123F ACP DISCUSS/DSCN MKR DOCD: CPT | Performed by: NURSE PRACTITIONER

## 2018-07-18 PROCEDURE — 3017F COLORECTAL CA SCREEN DOC REV: CPT | Performed by: NURSE PRACTITIONER

## 2018-07-18 PROCEDURE — 99214 OFFICE O/P EST MOD 30 MIN: CPT | Performed by: NURSE PRACTITIONER

## 2018-07-18 PROCEDURE — 85610 PROTHROMBIN TIME: CPT | Performed by: NURSE PRACTITIONER

## 2018-07-18 PROCEDURE — G8417 CALC BMI ABV UP PARAM F/U: HCPCS | Performed by: NURSE PRACTITIONER

## 2018-07-18 RX ORDER — TRAMADOL HYDROCHLORIDE 50 MG/1
TABLET ORAL
Refills: 0 | COMMUNITY
Start: 2018-06-06 | End: 2018-08-30 | Stop reason: ALTCHOICE

## 2018-07-18 ASSESSMENT — ENCOUNTER SYMPTOMS
CONSTIPATION: 0
BLURRED VISION: 0
DIARRHEA: 0
VOMITING: 0
COUGH: 0
SHORTNESS OF BREATH: 0
DOUBLE VISION: 0
WHEEZING: 0
NAUSEA: 0

## 2018-07-18 NOTE — PROGRESS NOTES
Patient notified to continue 4 mg Farris-Tu and Th and 2 mg all other days and have rechecked in 2 weeks.  States she will call for appointment due to ride issues

## 2018-07-19 NOTE — ED PROVIDER NOTES
HPI:  8/9/18,   Time: 11:59 PM         Conrado Herndon is a 67 y.o. female presenting to the ED for headache beginning more than 1 day ago. The complaint has been persistent, moderate in severity, and worsened by nothing. Patient is also complaining of knee pain. ROS:   Pertinent positives and negatives are stated within HPI, all other systems reviewed and are negative.  --------------------------------------------- PAST HISTORY ---------------------------------------------  Past Medical History:  has a past medical history of Abscess of abdominal wall; Anemia; Anesthesia; Arthritis; Breast cancer ([address] CAD (coronary artery disease); CHF (congestive heart failure) ([address] Diabetic neuropathy ([address] Diabetic neuropathy ([address] DVT (deep venous thrombosis) ([address] DVT of upper extremity (deep vein thrombosis) ([facility] Humerus fracture; Hyperlipidemia; Hypertension; Lymph node enlargement; MI (myocardial infarction) ([address] Nephrolithiasis; Pericardial effusion; Pulmonary nodule; Rib lesion; Sarcoidosis; Subclavian vein occlusion, bilateral (HCC); and Type II or unspecified type diabetes mellitus without mention of complication, not stated as uncontrolled. Past Surgical History:  has a past surgical history that includes Tooth Extraction; Hysterectomy (1975, 1985); Cholecystectomy; Appendectomy; other surgical history (11/18/11); Arterial bypass surgry; Coronary artery bypass graft (05/2008); Tunneled venous port placement; Cardiac surgery; eye surgery; Tunneled venous port placement ([id number]); Breast lumpectomy (9/27/2005); ECHO Compl W Dop Color Flow (10/30/2013); Colonoscopy (12/2007); and Colonoscopy ([id number]). Social History:  reports that she has never smoked. She has never used smokeless tobacco. She reports that she does not drink alcohol or use drugs. Family History: family history is not on file. She was adopted. The patients home medications have been reviewed.     Allergies: Macrobid 5-325 MG per tablet 1 tablet (1 tablet Oral Given 7/15/18 0009)   orphenadrine (NORFLEX) injection 60 mg (60 mg Intramuscular Given 7/15/18 0009)         Medical Decision Making:    Reviewed CT scans of head and neck and review of x-rays    Counseling: The emergency provider has spoken with the patient and discussed todays results, in addition to providing specific details for the plan of care and counseling regarding the diagnosis and prognosis. Questions are answered at this time and they are agreeable with the plan.      --------------------------------- IMPRESSION AND DISPOSITION ---------------------------------    IMPRESSION  1. Cervical muscle strain, initial encounter    2. Spasm of muscle    3.  Acute strain of neck muscle, initial encounter        DISPOSITION  Disposition: Discharge to home  Patient condition is good                 Kim Freeman MD  08/10/18 0001

## 2018-08-22 ENCOUNTER — TELEPHONE (OUTPATIENT)
Dept: ADMINISTRATIVE | Age: 73
End: 2018-08-22

## 2018-08-25 ENCOUNTER — APPOINTMENT (OUTPATIENT)
Dept: GENERAL RADIOLOGY | Age: 73
End: 2018-08-25
Payer: COMMERCIAL

## 2018-08-25 ENCOUNTER — HOSPITAL ENCOUNTER (EMERGENCY)
Age: 73
Discharge: HOME | End: 2018-08-25
Payer: COMMERCIAL

## 2018-08-25 VITALS
SYSTOLIC BLOOD PRESSURE: 172 MMHG | HEART RATE: 78 BPM | HEIGHT: 64 IN | WEIGHT: 205 LBS | RESPIRATION RATE: 18 BRPM | TEMPERATURE: 98.2 F | OXYGEN SATURATION: 96 % | DIASTOLIC BLOOD PRESSURE: 84 MMHG | BODY MASS INDEX: 35 KG/M2

## 2018-08-25 DIAGNOSIS — Z79.01 ANTICOAGULATED ON COUMADIN: ICD-10-CM

## 2018-08-25 DIAGNOSIS — M16.11 OSTEOARTHRITIS OF RIGHT HIP, UNSPECIFIED OSTEOARTHRITIS TYPE: ICD-10-CM
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
## 2018-08-25 DIAGNOSIS — M25.551 PAIN OF RIGHT HIP JOINT PAIN: Primary | ICD-10-CM

## 2018-08-25 DIAGNOSIS — R79.1 ELEVATED INR: ICD-10-CM

## 2018-08-25 LAB
BASOPHILS ABSOLUTE: 0.02 E9/L (ref 0–0.2)
BASOPHILS RELATIVE PERCENT: 0.3 % (ref 0–2)
EOSINOPHILS ABSOLUTE: 0.11 E9/L (ref 0.05–0.5)
EOSINOPHILS RELATIVE PERCENT: 1.4 % (ref 0–6)
HCT VFR BLD CALC: 32.6 % (ref 34–48)
HEMOGLOBIN: 10.4 G/DL (ref 11.5–15.5)
IMMATURE GRANULOCYTES #: 0.03 E9/L
IMMATURE GRANULOCYTES %: 0.4 % (ref 0–5)
INR BLD: 4.8
LYMPHOCYTES ABSOLUTE: 0.96 E9/L (ref 1.5–4)
LYMPHOCYTES RELATIVE PERCENT: 12.6 % (ref 20–42)
MCH RBC QN AUTO: 31 PG (ref 26–35)
MCHC RBC AUTO-ENTMCNC: 31.9 % (ref 32–34.5)
MCV RBC AUTO: 97.3 FL (ref 80–99.9)
MONOCYTES ABSOLUTE: 0.54 E9/L (ref 0.1–0.95)
MONOCYTES RELATIVE PERCENT: 7.1 % (ref 2–12)
NEUTROPHILS ABSOLUTE: 5.97 E9/L (ref 1.8–7.3)
NEUTROPHILS RELATIVE PERCENT: 78.2 % (ref 43–80)
PDW BLD-RTO: 14.4 FL (ref 11.5–15)
PLATELET # BLD: 163 E9/L (ref 130–450)
PMV BLD AUTO: 9.8 FL (ref 7–12)
PROTHROMBIN TIME: 54.1 SEC (ref 9.3–12.4)
RBC # BLD: 3.35 E12/L (ref 3.5–5.5)
WBC # BLD: 7.6 E9/L (ref 4.5–11.5)

## 2018-08-25 PROCEDURE — 99283 EMERGENCY DEPT VISIT LOW MDM: CPT

## 2018-08-25 PROCEDURE — 6370000000 HC RX 637 (ALT 250 FOR IP): Performed by: NURSE PRACTITIONER

## 2018-08-25 PROCEDURE — 85610 PROTHROMBIN TIME: CPT

## 2018-08-25 PROCEDURE — 72170 X-RAY EXAM OF PELVIS: CPT

## 2018-08-25 PROCEDURE — 85025 COMPLETE CBC W/AUTO DIFF WBC: CPT

## 2018-08-25 PROCEDURE — 73502 X-RAY EXAM HIP UNI 2-3 VIEWS: CPT

## 2018-08-25 PROCEDURE — 36415 COLL VENOUS BLD VENIPUNCTURE: CPT

## 2018-08-25 RX ORDER — ACETAMINOPHEN 500 MG
1000 TABLET ORAL ONCE
Status: COMPLETED | OUTPATIENT
Start: 2018-08-25 | End: 2018-08-25

## 2018-08-25 RX ORDER — HYDROCODONE BITARTRATE AND ACETAMINOPHEN 5; 325 MG/1; MG/1
1 TABLET ORAL EVERY 8 HOURS PRN
Qty: 10 TABLET | Refills: 0 | Status: SHIPPED | OUTPATIENT
Start: 2018-08-25 | End: 2018-08-28

## 2018-08-25 RX ADMIN — ACETAMINOPHEN 1000 MG: 500 TABLET, FILM COATED ORAL at 12:33

## 2018-08-25 ASSESSMENT — PAIN SCALES - GENERAL
PAINLEVEL_OUTOF10: 5
PAINLEVEL_OUTOF10: 6
PAINLEVEL_OUTOF10: 8

## 2018-08-25 ASSESSMENT — PAIN DESCRIPTION - PROGRESSION
CLINICAL_PROGRESSION: NOT CHANGED
CLINICAL_PROGRESSION: GRADUALLY IMPROVING

## 2018-08-25 ASSESSMENT — PAIN DESCRIPTION - ORIENTATION
ORIENTATION: RIGHT
ORIENTATION: RIGHT

## 2018-08-25 ASSESSMENT — PAIN DESCRIPTION - FREQUENCY
FREQUENCY: CONTINUOUS
FREQUENCY: CONTINUOUS

## 2018-08-25 ASSESSMENT — PAIN DESCRIPTION - ONSET
ONSET: GRADUAL
ONSET: ON-GOING

## 2018-08-25 ASSESSMENT — PAIN DESCRIPTION - PAIN TYPE
TYPE: ACUTE PAIN
TYPE: ACUTE PAIN

## 2018-08-25 ASSESSMENT — PAIN DESCRIPTION - LOCATION
LOCATION: HIP
LOCATION: HIP

## 2018-08-25 ASSESSMENT — PAIN DESCRIPTION - DESCRIPTORS
DESCRIPTORS: ACHING;DISCOMFORT;SORE
DESCRIPTORS: ACHING

## 2018-08-25 NOTE — ED PROVIDER NOTES
[facility]         Department of Emergency Medicine   ED  Provider Note  Admit Date/RoomTime: 8/25/2018 12:07 PM  ED Room: 10/10   Chief Complaint   Hip Pain (right hip pain x 1 month fell in bathroom )    History of Present Illness   Source of history provided by:  patient. History/Exam Limitations: none. Zach Soria is a 67 y.o. old female who  has a past medical history of Abscess of abdominal wall; Anemia; Anesthesia; Arthritis; Breast cancer ([address] CAD (coronary artery disease); CHF (congestive heart failure) ([address] Diabetic neuropathy ([address] Diabetic neuropathy ([address] DVT (deep venous thrombosis) ([address] DVT of upper extremity (deep vein thrombosis) ([address] Humerus fracture; Hyperlipidemia; Hypertension; Lymph node enlargement; MI (myocardial infarction) ([address] Nephrolithiasis; Pericardial effusion; Pulmonary nodule; Rib lesion; Sarcoidosis; Subclavian vein occlusion, bilateral (HCC); and Type II or unspecified type diabetes mellitus without mention of complication, not stated as uncontrolled. , presents to the emergency department by private vehicle with daughter, for right hip pain which occured 1 month(s) prior to arrival.  Mechanism of injury/pain was result of Falling backwards onto her right hip as she attempted to get off the toilet. She denies any head injury, neck injury, chest pain or abdominal pain. Since onset the symptoms have been moderate in degree and unable to abduct her right leg. Her pain is aggraveated by movement, use and palpation, relieved by nothing and associated with needing assistance to walk. Tetanus Status: unknown. There has been no history of head injury, loss of consciousness, neck pain, chest pain, abdominal pain, numbness or weakness. ROS    Pertinent positives and negatives are stated within HPI, all other systems reviewed and are negative.     Past Surgical History:   Procedure Laterality Date    APPENDECTOMY      ARTERIAL BYPASS SURGRY      BREAST LUMPECTOMY  9/27/2005    LEFT    CARDIAC SURGERY      CHOLECYSTECTOMY      COLONOSCOPY  12/2007    cecal arteriovenous malformation with hyperplastic polyps    COLONOSCOPY  [id number]    CORONARY ARTERY BYPASS GRAFT  05/2008    triple bypass    ECHO COMPL W DOP COLOR FLOW  10/30/2013         EYE SURGERY      clarissa cataracts    HYSTERECTOMY  1975, 1985    MAYNOR prolapse, benign conditions; BSO later for scar tissues, no CA.      OTHER SURGICAL HISTORY  11/18/11    port removal right chest wall    TOOTH EXTRACTION      Full Dental Extraction.  TUNNELED VENOUS PORT PLACEMENT      removal of port Dec. 2011- Dr. Ald[facility]  [id number]    RIGHT CHEST   Social History:  reports that she has never smoked. She has never used smokeless tobacco. She reports that she does not drink alcohol or use drugs. Family History: family history is not on file. She was adopted. Allergies: Macrobid [nitrofurantoin monohydrate macrocrystals]    Physical Exam           ED Triage Vitals [08/25/18 1211]   BP Temp Temp Source Pulse Resp SpO2 Height Weight   (!) 172/84 98.2 °F (36.8 °C) Oral 78 18 96 % 5' 4\" (1.626 m) 205 lb (93 kg)      Oxygen Saturation Interpretation: Normal.    Constitutional:  Alert. Development consistent with age. Head:  Atraumatic, without temporal or scalp tenderness. Eyes:  PERRL, EOMI. No periorbital ecchymoses. Conjunctiva: normal.  Ears:  External ears without lesions. Throat:  Pharynx without lesions. Airway patient. Neck:  Supple. Nontender. Chest:  Symmetrical without visible rash or tenderness. Respiratory:  Clear to auscultation and breath sounds equal.  CV:  Regular rate and rhythm, normal heart sounds, without pathological murmurs. GI:  Abdmen Soft, nontender. No firm or pulsatile mass. No abrasions, ecchymoses, or lacerations. Back:  No costovertebral, paravertebral, intervertebral, or vertebral tenderness or spasm.   Musculoskeletal:  Pelvis:  Bilateral. Result   Moderate degenerative changes        ED Course / Medical Decision Making     Medications   acetaminophen (TYLENOL) tablet 1,000 mg (1,000 mg Oral Given 8/25/18 1233)        Re-examination:  8/25/18       Time: 1324 Discussed results of xray and she is getting minimal relief with the        Consult(s):   None    Procedure(s):   none    MDM:   Xray of pelvis and hip are negative for acute fracture and did reveal DJD. Her INR 4.8 and was elevated and instructed to hold the next 2 doses of coumadin and to call her PCP on Monday to have a re evaluation of INR. She is afebrile; non toxic in appearance and hemodynamically stable. She does have a cane and walker to assist with her ambulation. She had no headaches or history of head trauma. She was ambulatory of discharge. Fall precautions advised due to increased INR and she verbalized an adequate understanding. Counseling: The emergency provider has spoken with the patient and discussed todays results, in addition to providing specific details for the plan of care and counseling regarding the diagnosis and prognosis. Questions are answered at this time and they are agreeable with the plan. Assessment      1. Pain of right hip joint pain    2. Osteoarthritis of right hip, unspecified osteoarthritis type    3. Fall from slip, trip, or stumble, initial encounter    4. Elevated INR    5. Anticoagulated on Coumadin      Plan   Discharge to home  Patient condition is good    New Medications     Discharge Medication List as of 8/25/2018  1:25 PM      START taking these medications    Details   HYDROcodone-acetaminophen (NORCO) 5-325 MG per tablet Take 1 tablet by mouth every 8 hours as needed for Pain for up to 3 days. ., Disp-10 tablet, R-0Print           Electronically signed by FORD Marsh CNP   DD: 8/25/18  **This report was transcribed using voice recognition software.  Every effort was made to ensure accuracy; however, inadvertent computerized transcription errors may be present.   END OF ED PROVIDER NOTE      Lizeth Loco, FORD - EPIFANIO  08/26/18 0132

## 2018-08-27 ENCOUNTER — TELEPHONE (OUTPATIENT)
Dept: FAMILY MEDICINE CLINIC | Age: 73
End: 2018-08-27

## 2018-08-27 DIAGNOSIS — I82.4Z9 DEEP VEIN THROMBOSIS (DVT) OF DISTAL VEIN OF LOWER EXTREMITY, UNSPECIFIED CHRONICITY, UNSPECIFIED LATERALITY (HCC): Primary | ICD-10-CM

## 2018-08-27 NOTE — TELEPHONE ENCOUNTER
Please call Ms. Cassi Parisi. I saw the report from the Emergency Department, and her INR was high. Please come to the office to have this rechecked today, if at all possible. Have there been any changes to her medications recently? I know she saw Janice Georges last month, who prescribed Ultram for her for pain. Has she been taking that? Anything over-the-counter? Her INR was higher than expected, as she has had stable levels for quite some time. Please let me know, and please come for a recheck today. Thank you!

## 2018-08-27 NOTE — TELEPHONE ENCOUNTER
Yes, please get the INR done as soon as possible. If done at [facility] later, we may not get the results until tomorrow, making it impossible to know the ideal dose for her coumadin tonight. I would plan for her to take 2 mg tonight, and then we will await the INR results. Please call with questions or concerns. Thank you!

## 2018-08-29 ENCOUNTER — APPOINTMENT (OUTPATIENT)
Dept: ULTRASOUND IMAGING | Age: 73
End: 2018-08-29
Payer: COMMERCIAL

## 2018-08-29 ENCOUNTER — HOSPITAL ENCOUNTER (EMERGENCY)
Age: 73
Discharge: HOME | End: 2018-08-30
Payer: COMMERCIAL

## 2018-08-29 VITALS
RESPIRATION RATE: 18 BRPM | BODY MASS INDEX: 35.34 KG/M2 | WEIGHT: 207 LBS | OXYGEN SATURATION: 97 % | DIASTOLIC BLOOD PRESSURE: 73 MMHG | SYSTOLIC BLOOD PRESSURE: 177 MMHG | TEMPERATURE: 97.6 F | HEIGHT: 64 IN | HEART RATE: 66 BPM

## 2018-08-29 DIAGNOSIS — Z79.01 SUBTHERAPEUTIC ANTICOAGULATION: ICD-10-CM

## 2018-08-29 DIAGNOSIS — I82.531 CHRONIC DEEP VEIN THROMBOSIS (DVT) OF POPLITEAL VEIN OF RIGHT LOWER EXTREMITY (HCC): ICD-10-CM

## 2018-08-29 DIAGNOSIS — M79.604 RIGHT LEG PAIN: Primary | ICD-10-CM

## 2018-08-29 DIAGNOSIS — Z51.81 SUBTHERAPEUTIC ANTICOAGULATION: ICD-10-CM

## 2018-08-29 LAB
APTT: 32.7 SEC (ref 24.5–35.1)
INR BLD: 1.8
PROTHROMBIN TIME: 20.3 SEC (ref 9.3–12.4)

## 2018-08-29 PROCEDURE — 99283 EMERGENCY DEPT VISIT LOW MDM: CPT

## 2018-08-29 PROCEDURE — 93971 EXTREMITY STUDY: CPT

## 2018-08-29 PROCEDURE — 85730 THROMBOPLASTIN TIME PARTIAL: CPT

## 2018-08-29 PROCEDURE — 85610 PROTHROMBIN TIME: CPT

## 2018-08-29 PROCEDURE — 36415 COLL VENOUS BLD VENIPUNCTURE: CPT

## 2018-08-29 ASSESSMENT — PAIN DESCRIPTION - PAIN TYPE: TYPE: ACUTE PAIN

## 2018-08-29 ASSESSMENT — PAIN DESCRIPTION - ORIENTATION: ORIENTATION: RIGHT

## 2018-08-29 ASSESSMENT — PAIN SCALES - GENERAL: PAINLEVEL_OUTOF10: 9

## 2018-08-29 ASSESSMENT — PAIN DESCRIPTION - LOCATION: LOCATION: LEG

## 2018-08-30 RX ORDER — TRAMADOL HYDROCHLORIDE 50 MG/1
50 TABLET ORAL EVERY 6 HOURS PRN
Qty: 10 TABLET | Refills: 0 | Status: SHIPPED | OUTPATIENT
Start: 2018-08-30 | End: 2018-10-11 | Stop reason: SDUPTHER

## 2018-08-30 NOTE — ED PROVIDER NOTES
thrombus in the popliteal vein, age-indeterminate. Addendum created by Dr. Alex Bustamante MD on 8/30/2018 12:46:00 AM EDT      THIS REPORT CONTAINS FINDINGS THAT MAY BE CRITICAL TO PATIENT CARE. The    findings were verbally communicated via telephone conference with Dr. Mariela Cade at    12:45 AM EDT on 8/30/2018. The findings were acknowledged and understood. This addendum has been electronically signed by Alex Bustamante MD.      Final     Nonocclusive thrombus in the popliteal vein, age-indeterminate. This report has been electronically signed by Alex Bustamante MD.          ------------------------- NURSING NOTES AND VITALS REVIEWED ---------------------------   The nursing notes within the ED encounter and vital signs as below have been reviewed. BP (!) 177/73   Pulse 66   Temp 97.6 °F (36.4 °C) (Temporal)   Resp 18   Ht 5' 4\" (1.626 m)   Wt 207 lb (93.9 kg)   SpO2 97%   BMI 35.53 kg/m²   Oxygen Saturation Interpretation: Normal      ---------------------------------------------------PHYSICAL EXAM--------------------------------------      Constitutional/General: Alert and oriented x3, well appearing, non toxic in NAD  Head: Normocephalic and atraumatic  Eyes: PERRL, EOMI  Mouth: Oropharynx clear, handling secretions, no trismus  Neck: Supple, full ROM,   Pulmonary: Lungs clear to auscultation bilaterally, no wheezes, rales, or rhonchi. Not in respiratory distress  Cardiovascular:  Regular rate and rhythm, no murmurs, gallops, or rubs. 2+ distal pulses  Abdomen: Soft, non tender, non distended,   Extremities: Moves all extremities x 4. Warm and well perfused, 1+ lower extremity edema. 2+ right dorsal pedal pulses. CMS positive. Diffuse tissue discoloration to right lower extremity brown in color which patient is reporting normal and at baseline, patient with PVD.   Skin: warm and dry without rash  Neurologic: GCS 15,  Psych: Normal Affect      ------------------------------ ED COURSE/MEDICAL

## 2018-09-07 DIAGNOSIS — I82.4Z9 DEEP VEIN THROMBOSIS (DVT) OF DISTAL VEIN OF LOWER EXTREMITY, UNSPECIFIED CHRONICITY, UNSPECIFIED LATERALITY (HCC): ICD-10-CM

## 2018-09-07 PROBLEM — E11.42 DIABETIC POLYNEUROPATHY ASSOCIATED WITH TYPE 2 DIABETES MELLITUS (HCC): Status: ACTIVE | Noted: 2018-09-07

## 2018-09-12 ENCOUNTER — TELEPHONE (OUTPATIENT)
Dept: FAMILY MEDICINE CLINIC | Age: 73
End: 2018-09-12

## 2018-09-12 DIAGNOSIS — D63.0 ANEMIA IN NEOPLASTIC DISEASE: ICD-10-CM

## 2018-09-12 DIAGNOSIS — I82.5Y1 CHRONIC DEEP VEIN THROMBOSIS (DVT) OF PROXIMAL VEIN OF RIGHT LOWER EXTREMITY (HCC): ICD-10-CM

## 2018-09-12 DIAGNOSIS — I10 ESSENTIAL HYPERTENSION: ICD-10-CM

## 2018-09-12 DIAGNOSIS — Z79.4 TYPE 2 DIABETES MELLITUS WITHOUT COMPLICATION, WITH LONG-TERM CURRENT USE OF INSULIN (HCC): ICD-10-CM

## 2018-09-12 DIAGNOSIS — I50.9 CONGESTIVE HEART FAILURE, UNSPECIFIED HF CHRONICITY, UNSPECIFIED HEART FAILURE TYPE (HCC): ICD-10-CM

## 2018-09-12 DIAGNOSIS — C50.919 MALIGNANT NEOPLASM OF FEMALE BREAST, UNSPECIFIED ESTROGEN RECEPTOR STATUS, UNSPECIFIED LATERALITY, UNSPECIFIED SITE OF BREAST (HCC): Primary | ICD-10-CM

## 2018-09-12 DIAGNOSIS — E11.9 TYPE 2 DIABETES MELLITUS WITHOUT COMPLICATION, WITH LONG-TERM CURRENT USE OF INSULIN (HCC): ICD-10-CM

## 2018-09-12 DIAGNOSIS — S42.295K OTHER CLOSED NONDISPLACED FRACTURE OF PROXIMAL END OF LEFT HUMERUS WITH NONUNION, SUBSEQUENT ENCOUNTER: ICD-10-CM

## 2018-09-12 DIAGNOSIS — E11.42 DIABETIC POLYNEUROPATHY ASSOCIATED WITH TYPE 2 DIABETES MELLITUS (HCC): ICD-10-CM

## 2018-09-12 DIAGNOSIS — I25.10 CORONARY ARTERY DISEASE INVOLVING NATIVE CORONARY ARTERY OF NATIVE HEART WITHOUT ANGINA PECTORIS: ICD-10-CM

## 2018-09-14 NOTE — TELEPHONE ENCOUNTER
Order signed in [address] Please let us know if she needs anything else! Please get INR checked asap. Thank you!

## 2018-09-17 ENCOUNTER — ANTI-COAG VISIT (OUTPATIENT)
Dept: FAMILY MEDICINE CLINIC | Age: 73
End: 2018-09-17

## 2018-09-17 ENCOUNTER — OFFICE VISIT (OUTPATIENT)
Dept: FAMILY MEDICINE CLINIC | Age: 73
End: 2018-09-17
Payer: COMMERCIAL

## 2018-09-17 VITALS
DIASTOLIC BLOOD PRESSURE: 70 MMHG | WEIGHT: 200 LBS | TEMPERATURE: 98.4 F | HEIGHT: 64 IN | SYSTOLIC BLOOD PRESSURE: 164 MMHG | BODY MASS INDEX: 34.15 KG/M2 | OXYGEN SATURATION: 96 % | HEART RATE: 75 BPM

## 2018-09-17 DIAGNOSIS — Z79.4 TYPE 2 DIABETES MELLITUS WITHOUT COMPLICATION, WITH LONG-TERM CURRENT USE OF INSULIN (HCC): ICD-10-CM

## 2018-09-17 DIAGNOSIS — G89.29 CHRONIC ANKLE PAIN, BILATERAL: ICD-10-CM

## 2018-09-17 DIAGNOSIS — I82.5Y1 CHRONIC DEEP VEIN THROMBOSIS (DVT) OF PROXIMAL VEIN OF RIGHT LOWER EXTREMITY (HCC): ICD-10-CM

## 2018-09-17 DIAGNOSIS — E11.9 TYPE 2 DIABETES MELLITUS WITHOUT COMPLICATION, WITH LONG-TERM CURRENT USE OF INSULIN (HCC): ICD-10-CM

## 2018-09-17 DIAGNOSIS — Z79.01 CHRONIC ANTICOAGULATION: ICD-10-CM

## 2018-09-17 DIAGNOSIS — M25.572 CHRONIC ANKLE PAIN, BILATERAL: ICD-10-CM

## 2018-09-17 DIAGNOSIS — M25.571 CHRONIC ANKLE PAIN, BILATERAL: ICD-10-CM

## 2018-09-17 DIAGNOSIS — R60.0 BILATERAL EDEMA OF LOWER EXTREMITY: Primary | ICD-10-CM

## 2018-09-17 LAB
HBA1C MFR BLD: 7.3 %
INTERNATIONAL NORMALIZATION RATIO, POC: 2.5
PROTHROMBIN TIME, POC: 30.5

## 2018-09-17 PROCEDURE — 1101F PT FALLS ASSESS-DOCD LE1/YR: CPT | Performed by: FAMILY MEDICINE

## 2018-09-17 PROCEDURE — 3017F COLORECTAL CA SCREEN DOC REV: CPT | Performed by: FAMILY MEDICINE

## 2018-09-17 PROCEDURE — 3045F PR MOST RECENT HEMOGLOBIN A1C LEVEL 7.0-9.0%: CPT | Performed by: FAMILY MEDICINE

## 2018-09-17 PROCEDURE — 85610 PROTHROMBIN TIME: CPT | Performed by: FAMILY MEDICINE

## 2018-09-17 PROCEDURE — 83036 HEMOGLOBIN GLYCOSYLATED A1C: CPT | Performed by: FAMILY MEDICINE

## 2018-09-17 PROCEDURE — G8399 PT W/DXA RESULTS DOCUMENT: HCPCS | Performed by: FAMILY MEDICINE

## 2018-09-17 PROCEDURE — G8417 CALC BMI ABV UP PARAM F/U: HCPCS | Performed by: FAMILY MEDICINE

## 2018-09-17 PROCEDURE — 99212 OFFICE O/P EST SF 10 MIN: CPT | Performed by: FAMILY MEDICINE

## 2018-09-17 PROCEDURE — 1090F PRES/ABSN URINE INCON ASSESS: CPT | Performed by: FAMILY MEDICINE

## 2018-09-17 PROCEDURE — G8427 DOCREV CUR MEDS BY ELIG CLIN: HCPCS | Performed by: FAMILY MEDICINE

## 2018-09-17 PROCEDURE — G8598 ASA/ANTIPLAT THER USED: HCPCS | Performed by: FAMILY MEDICINE

## 2018-09-17 PROCEDURE — 1036F TOBACCO NON-USER: CPT | Performed by: FAMILY MEDICINE

## 2018-09-17 PROCEDURE — 1123F ACP DISCUSS/DSCN MKR DOCD: CPT | Performed by: FAMILY MEDICINE

## 2018-09-17 PROCEDURE — 4040F PNEUMOC VAC/ADMIN/RCVD: CPT | Performed by: FAMILY MEDICINE

## 2018-09-17 PROCEDURE — 99213 OFFICE O/P EST LOW 20 MIN: CPT | Performed by: FAMILY MEDICINE

## 2018-09-17 PROCEDURE — 93793 ANTICOAG MGMT PT WARFARIN: CPT | Performed by: FAMILY MEDICINE

## 2018-09-17 PROCEDURE — 2022F DILAT RTA XM EVC RTNOPTHY: CPT | Performed by: FAMILY MEDICINE

## 2018-09-17 NOTE — PROGRESS NOTES
Chronic DVT. Taking Coumadin 4 mg Sun, Tues, Thurs and 2 mg on all other days. INR 2.5 today. No missed doses, no changes. Has stable diet overall. No new bleeding or bruising known. Follows with Heme/Onc weekly for breast CA treatments, and states H&H have been stable per Dr. Elnoria Sever. DM. No hypoglycemia. Feeling well. Has ophtho appointment on Wednesday. Taking the same medications. No side effects. No CP or SOB. No polydipsia or polyuria.       Patient Active Problem List    Diagnosis Date Noted    Diabetic polyneuropathy associated with type 2 diabetes mellitus ([address] 09/07/2018    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 04/04/2018    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 14/94/2107    Complicated UTI (urinary tract infection) 10/24/2017    Hyperkalemia 10/24/2017    KARISHMA (acute kidney injury) ([address] 10/24/2017    Diarrhea with dehydration 10/24/2017    Chronic anticoagulation 10/24/2017    Deep vein thrombosis (DVT) of lower extremity ([address] 06/06/2017    Peripheral polyneuropathy 01/03/2017    Bilateral edema of lower extremity 10/03/2016    Chronic deep vein thrombosis (DVT) of proximal vein of right lower extremity ([address] 09/30/2016    Type 2 diabetes mellitus without complication, with long-term current use of insulin ([address] 09/01/2016    Anemia 09/01/2016    Ventral hernia 05/14/2015    Rectal bleeding 02/23/2015    Anesthesia     Pericardial effusion     MI (myocardial infarction) ([address]     Arthritis     Lymph node enlargement     DVT of upper extremity (deep vein thrombosis) ([address] 04/18/2012    Subclavian vein occlusion, bilateral ([address] 04/18/2012    Rib lesion 02/07/2012    Hyperlipidemia 08/29/2011    Sarcoidosis 07/26/2011    B12 deficiency 06/22/2011    Shoulder pain, left 03/02/2011    Breast cancer ([address]     Hypertension     Coronary artery disease involving native coronary artery of native heart without angina pectoris     Nephrolithiasis     CHF (congestive heart failure) (HCC)     Humerus fracture        Current Outpatient Prescriptions on File Prior to Visit   Medication Sig Dispense Refill    pregabalin (LYRICA) 100 MG capsule Take 1 capsule by mouth 3 times daily for 180 days. . 270 capsule 1    simvastatin (ZOCOR) 20 MG tablet Take 1 tablet by mouth nightly 90 tablet 3    metoprolol tartrate (LOPRESSOR) 50 MG tablet Take 1 tablet by mouth 2 times daily 180 tablet 3    Calcium Citrate-Vitamin D (RA CALCIUM CIT-VIT D-3 PETITES) 200-250 MG-UNIT TABS take 1 tablet by mouth twice a day 180 tablet 3    exemestane (AROMASIN) 25 MG chemo tablet Take 1 tablet by mouth every morning (before breakfast)      metFORMIN (GLUCOPHAGE) 1000 MG tablet Take 1 tablet by mouth 2 times daily (with meals) 180 tablet 3    famotidine (PEPCID) 20 MG tablet Take 1 tablet by mouth 2 times daily 180 tablet 3    insulin glargine (LANTUS SOLOSTAR) 100 UNIT/ML injection pen Inject 65 Units into the skin nightly 21 Pen 3    vitamin B-12 (CYANOCOBALAMIN) 1000 MCG tablet Take 1 tablet by mouth daily 90 tablet 3    warfarin (COUMADIN) 2 MG tablet Take 2 tablets orally on Sun, Tues, Thurs; and 1 tablet daily on all other days. 180 tablet 3    Multiple Vitamins-Minerals (THERAPEUTIC MULTIVITAMIN-MINERALS) tablet Take 1 tablet by mouth daily 90 tablet 3    trastuzumab (HERCEPTIN) 440 MG injection Infuse  intravenously once a week. On tuesdays      fluticasone (FLONASE) 50 MCG/ACT nasal spray 1 spray by Nasal route daily 1 Bottle 3     No current facility-administered medications on file prior to visit.         Allergies   Allergen Reactions    Macrobid [Nitrofurantoin Monohydrate Macrocrystals] Hives       Social History   Substance Use Topics    Smoking status: Never Smoker    Smokeless tobacco: Never Used    Alcohol use No       ROS:   Review of Systems - History obtained from the patient  General ROS: negative for - chills or

## 2018-10-09 DIAGNOSIS — M79.604 RIGHT LEG PAIN: ICD-10-CM

## 2018-10-09 DIAGNOSIS — E11.42 DIABETIC POLYNEUROPATHY ASSOCIATED WITH TYPE 2 DIABETES MELLITUS (HCC): ICD-10-CM

## 2018-10-09 DIAGNOSIS — I82.531 CHRONIC DEEP VEIN THROMBOSIS (DVT) OF POPLITEAL VEIN OF RIGHT LOWER EXTREMITY (HCC): ICD-10-CM

## 2018-10-09 RX ORDER — PREGABALIN 100 MG/1
100 CAPSULE ORAL 3 TIMES DAILY
Qty: 270 CAPSULE | Refills: 1 | Status: CANCELLED | OUTPATIENT
Start: 2018-10-09 | End: 2019-04-07

## 2018-10-11 RX ORDER — TRAMADOL HYDROCHLORIDE 50 MG/1
50 TABLET ORAL EVERY 6 HOURS PRN
Qty: 12 TABLET | Refills: 0 | Status: SHIPPED | OUTPATIENT
Start: 2018-10-11 | End: 2018-10-14

## 2018-10-11 NOTE — TELEPHONE ENCOUNTER
Please let her know that I ordered a limited refill of this medication. It is controlled, so we cannot refill by telephone routinely, and only for a small amount. However, I know she truly needs pain medication, and she has tolerated this medication in the past.  Please get INR done as soon as possible after taking this medication, as it could interfere with her coumadin, giving her higher risk for complications from the blood thinner, such as bleeding. Please follow up with Dr. Katey Goss as scheduled. Thank you!

## 2018-10-15 ENCOUNTER — OFFICE VISIT (OUTPATIENT)
Dept: FAMILY MEDICINE CLINIC | Age: 73
End: 2018-10-15
Payer: COMMERCIAL

## 2018-10-15 ENCOUNTER — ANTI-COAG VISIT (OUTPATIENT)
Dept: FAMILY MEDICINE CLINIC | Age: 73
End: 2018-10-15

## 2018-10-15 VITALS
SYSTOLIC BLOOD PRESSURE: 125 MMHG | DIASTOLIC BLOOD PRESSURE: 67 MMHG | TEMPERATURE: 98 F | HEART RATE: 61 BPM | RESPIRATION RATE: 14 BRPM | OXYGEN SATURATION: 97 % | WEIGHT: 203 LBS | HEIGHT: 64 IN | BODY MASS INDEX: 34.66 KG/M2

## 2018-10-15 DIAGNOSIS — Z23 NEED FOR INFLUENZA VACCINATION: ICD-10-CM

## 2018-10-15 DIAGNOSIS — I82.5Y1 CHRONIC DEEP VEIN THROMBOSIS (DVT) OF PROXIMAL VEIN OF RIGHT LOWER EXTREMITY (HCC): Primary | ICD-10-CM

## 2018-10-15 DIAGNOSIS — M79.89 SWELLING OF BOTH LOWER EXTREMITIES: ICD-10-CM

## 2018-10-15 LAB
INTERNATIONAL NORMALIZATION RATIO, POC: 4.4
PROTHROMBIN TIME, POC: 52.2

## 2018-10-15 PROCEDURE — 1101F PT FALLS ASSESS-DOCD LE1/YR: CPT | Performed by: STUDENT IN AN ORGANIZED HEALTH CARE EDUCATION/TRAINING PROGRAM

## 2018-10-15 PROCEDURE — G8417 CALC BMI ABV UP PARAM F/U: HCPCS | Performed by: STUDENT IN AN ORGANIZED HEALTH CARE EDUCATION/TRAINING PROGRAM

## 2018-10-15 PROCEDURE — 90686 IIV4 VACC NO PRSV 0.5 ML IM: CPT

## 2018-10-15 PROCEDURE — G8482 FLU IMMUNIZE ORDER/ADMIN: HCPCS | Performed by: STUDENT IN AN ORGANIZED HEALTH CARE EDUCATION/TRAINING PROGRAM

## 2018-10-15 PROCEDURE — 1090F PRES/ABSN URINE INCON ASSESS: CPT | Performed by: STUDENT IN AN ORGANIZED HEALTH CARE EDUCATION/TRAINING PROGRAM

## 2018-10-15 PROCEDURE — 1036F TOBACCO NON-USER: CPT | Performed by: STUDENT IN AN ORGANIZED HEALTH CARE EDUCATION/TRAINING PROGRAM

## 2018-10-15 PROCEDURE — G8598 ASA/ANTIPLAT THER USED: HCPCS | Performed by: STUDENT IN AN ORGANIZED HEALTH CARE EDUCATION/TRAINING PROGRAM

## 2018-10-15 PROCEDURE — G8427 DOCREV CUR MEDS BY ELIG CLIN: HCPCS | Performed by: STUDENT IN AN ORGANIZED HEALTH CARE EDUCATION/TRAINING PROGRAM

## 2018-10-15 PROCEDURE — 3017F COLORECTAL CA SCREEN DOC REV: CPT | Performed by: STUDENT IN AN ORGANIZED HEALTH CARE EDUCATION/TRAINING PROGRAM

## 2018-10-15 PROCEDURE — G8399 PT W/DXA RESULTS DOCUMENT: HCPCS | Performed by: STUDENT IN AN ORGANIZED HEALTH CARE EDUCATION/TRAINING PROGRAM

## 2018-10-15 PROCEDURE — G0008 ADMIN INFLUENZA VIRUS VAC: HCPCS

## 2018-10-15 PROCEDURE — 99213 OFFICE O/P EST LOW 20 MIN: CPT | Performed by: STUDENT IN AN ORGANIZED HEALTH CARE EDUCATION/TRAINING PROGRAM

## 2018-10-15 PROCEDURE — 1123F ACP DISCUSS/DSCN MKR DOCD: CPT | Performed by: STUDENT IN AN ORGANIZED HEALTH CARE EDUCATION/TRAINING PROGRAM

## 2018-10-15 PROCEDURE — 4040F PNEUMOC VAC/ADMIN/RCVD: CPT | Performed by: STUDENT IN AN ORGANIZED HEALTH CARE EDUCATION/TRAINING PROGRAM

## 2018-10-15 PROCEDURE — 6360000002 HC RX W HCPCS

## 2018-10-15 PROCEDURE — 99212 OFFICE O/P EST SF 10 MIN: CPT | Performed by: STUDENT IN AN ORGANIZED HEALTH CARE EDUCATION/TRAINING PROGRAM

## 2018-10-15 PROCEDURE — 85610 PROTHROMBIN TIME: CPT | Performed by: STUDENT IN AN ORGANIZED HEALTH CARE EDUCATION/TRAINING PROGRAM

## 2018-10-15 NOTE — PROGRESS NOTES
[address] [facility]  Department of Family Medicine  Family Medicine Residency Program      Patient:  Phu Long 68 y.o. female     Date of Service: 10/15/18      Chief complaint:   Chief Complaint   Patient presents with    Leg Swelling     needs INR         History of Present Illness   The patient is a 68 y.o. female who presented to the clinic with right leg pain and swelling. Hx of DVT multiple times in the R leg. She currently has shooting pain almost every day. Oxycodone helps but not tramadol. At last visit with PCP, patient was referred to lymphedema clinic, however patient reports that she was never called for an appointment. Chronic DVT, on chronic anticoagulation: plan: Coumadin 4 mg Farris-Tu-Th, 2 mg all other days. INR today 4.4. It was 2.5 last time. Past Medical History:      Diagnosis Date    Abscess of abdominal wall     Anemia     Iron deficiency and chronic disease.  Anesthesia     DIFFICULTY WAKING UP    Arthritis     Breast cancer ([facility] 2005    S/P Left Lumpectomy with Lymph Node Dissection, Chemo/Rad. Follows with Dr. Smita Ng. No recurrence to date. Surgeon was Dr. Jose Luis Rome.  CAD (coronary artery disease) 5/2008    s/p CABG. Follows with [address] [place] [address].  CHF (congestive heart failure) (HCC)     Diabetic neuropathy (HCC)     Diabetic neuropathy (HCC)     DVT (deep venous thrombosis) (HCC)     Recurrent. On lifelong coumadin.  DVT of upper extremity (deep vein thrombosis) ([address] 4/18/2012    Humerus fracture     Chronic, on the Left.  Hyperlipidemia 8/29/2011    Hypertension     Lymph node enlargement     MI (myocardial infarction) ([facility]     Nephrolithiasis     Follows with Dr. Edilson Carson.  Pericardial effusion     Pulmonary nodule     With mediastinal lymphadenopathy. Stable. Follows with Dr. Lucho Roche.     Rib lesion 11/28/11    expansile lesion in one of the right ribs laterally    Sarcoidosis 07/26/11    per transbronchial needle aspiration    Subclavian vein occlusion, bilateral ([facility] 4/18/2012    Type II or unspecified type diabetes mellitus without mention of complication, not stated as uncontrolled        Past Surgical History:        Procedure Laterality Date    APPENDECTOMY      ARTERIAL BYPASS SURGRY      BREAST LUMPECTOMY  9/27/2005    LEFT    CARDIAC SURGERY      CHOLECYSTECTOMY      COLONOSCOPY  12/2007    cecal arteriovenous malformation with hyperplastic polyps    COLONOSCOPY  [id number]    CORONARY ARTERY BYPASS GRAFT  05/2008    triple bypass    ECHO COMPL W DOP COLOR FLOW  10/30/2013         EYE SURGERY      clarissa cataracts    HYSTERECTOMY  1975, 1985    MAYNOR prolapse, benign conditions; BSO later for scar tissues, no CA.      OTHER SURGICAL HISTORY  11/18/11    port removal right chest wall    TOOTH EXTRACTION      Full Dental Extraction.  TUNNELED VENOUS PORT PLACEMENT      removal [facility] port Dec. 2011- [facility]  [phone]    RIGHT CHEST       Allergies:    Macrobid [nitrofurantoin monohydrate macrocrystals]    Social History:   Social History     Social History    Marital status: Legally      Spouse name: N/A    Number of children: N/A    Years of education: N/A     Occupational History    Not on file. Social History Main Topics    Smoking status: Never Smoker    Smokeless tobacco: Never Used    Alcohol use No    Drug use: No    Sexual activity: No     Other Topics Concern    Not on file     Social History Narrative    No narrative on file       Family History:   Family History   Problem Relation Age of Onset    Adopted: Yes       Reviewof Systems:   Review of Systems   Respiratory: Negative for cough and shortness of breath. Cardiovascular: Positive for leg swelling. Negative for chest pain.          Physical Exam   Vitals: /67 (Site: Right Upper Arm, Position: Sitting, Cuff Size: Medium Adult)   Pulse 61   Temp 98 °F (36.7 °C) (Oral)   Resp 14   Ht 5' 4\" (1.626 m) Wt 203 lb (92.1 kg)   SpO2 97%   BMI 34.84 kg/m²   General Appearance: alert, oriented, no acute distress  Chest wall/Lung: Clear to auscultation bilaterally,  respirations unlabored. No ronchi/wheezing/rales  Heart: Regular rate and rhythm, S1 and S2 normal, no murmur, rub or gallop. Extremities:  Bilateral 2+ pitting edema. Signs of venous stasis bilaterally. Mildly TTP along legs, right worse than left. Neurologic: alert & oriented       Psychiatric: has a normal mood and affect. Behavior is normal.       Assessment and Plan       1. Chronic deep vein thrombosis (DVT) of proximal vein of right lower extremity (HCC)  INR 4.4 today. Patient instructed to hold her Coumadin dose tonight and resume the regular weekly plan tomorrow. Instructed to check her INR again in 7 days. Patient will go to [facility] to get it checked due to transportation limitations. Prescription for DVT ultrasound of right leg also given. I explained to patient the necessity to get it done as soon as possible, however she insists that she will get it done over the weekend around the same time she will get her INR checked. She is on pain meds by her oncologist.    2. Lower extremity swelling   I will contact the lymphedema clinic to find out the reason for the hold up.     -  POCT INR    3. Need for influenza vaccination  - INFLUENZA, QUADV, 3 YRS AND OLDER, IM, PF, PREFILL SYR OR SDV, 0.5ML (FLUZONE QUADV, PF)    Issues to address in the future appointment/s:    Return to [address] [place] [address] in about 4 weeks (around 11/12/2018) for LE edema follow up . Medication List:    Current Outpatient Prescriptions   Medication Sig Dispense Refill    pregabalin (LYRICA) 100 MG capsule Take 1 capsule by mouth 3 times daily for 180 days. . 270 capsule 1    simvastatin (ZOCOR) 20 MG tablet Take 1 tablet by mouth nightly 90 tablet 3    metoprolol tartrate (LOPRESSOR) 50 MG tablet Take 1 tablet by mouth 2 times daily 180 tablet 3    Calcium Citrate-Vitamin D (RA CALCIUM CIT-VIT D-3 PETITES) 200-250 MG-UNIT TABS take 1 tablet by mouth twice a day 180 tablet 3    exemestane (AROMASIN) 25 MG chemo tablet Take 1 tablet by mouth every morning (before breakfast)      metFORMIN (GLUCOPHAGE) 1000 MG tablet Take 1 tablet by mouth 2 times daily (with meals) 180 tablet 3    famotidine (PEPCID) 20 MG tablet Take 1 tablet by mouth 2 times daily 180 tablet 3    insulin glargine (LANTUS SOLOSTAR) 100 UNIT/ML injection pen Inject 65 Units into the skin nightly 21 Pen 3    vitamin B-12 (CYANOCOBALAMIN) 1000 MCG tablet Take 1 tablet by mouth daily 90 tablet 3    warfarin (COUMADIN) 2 MG tablet Take 2 tablets orally on Sun, Tues, Thurs; and 1 tablet daily on all other days. 180 tablet 3    Multiple Vitamins-Minerals (THERAPEUTIC MULTIVITAMIN-MINERALS) tablet Take 1 tablet by mouth daily 90 tablet 3    trastuzumab (HERCEPTIN) 440 MG injection Infuse  intravenously once a week. On tuesdays       No current facility-administered medications for this visit.          Jennifer Watters MD     Electronically signed by Jennifer Watters MD on 10/17/2018 at 7:03 PM

## 2018-10-15 NOTE — PROGRESS NOTES
Attending Physician Statement    S:   Chief Complaint   Patient presents with    Leg Swelling     needs INR      C/O right leg pain/swelling. History of recurrent DVT. On coumadin chronically. No recent changes in meds  O: Blood pressure 125/67, pulse 61, temperature 98 °F (36.7 °C), temperature source Oral, resp. rate 14, height 5' 4\" (1.626 m), weight 203 lb (92.1 kg), SpO2 97 %, not currently breastfeeding. Exam:   Heart - RRR with 2/6 systolic murmur   Lungs - clear   Ext - 2+ edema. Venous stasis changes, some tenderness in calves bilaterally, worse on right  A: Edema  P:  Symptoms and complaints are chronic without evidence of new changes   Takes narcotics through oncology already   INR elevated - will hold today and then resume at same dose and repeat one week   Should have appointment at lymphedema clinic -will reconsult   Will repeat LE US   Follow-up as ordered    I have discussed the case, including pertinent history and exam findings with the resident. I agree with the documented assessment and plan.

## 2018-10-16 ENCOUNTER — TELEPHONE (OUTPATIENT)
Dept: FAMILY MEDICINE CLINIC | Age: 73
End: 2018-10-16

## 2018-10-16 DIAGNOSIS — M79.89 PAIN AND SWELLING OF LOWER LEG, RIGHT: Primary | ICD-10-CM

## 2018-10-16 DIAGNOSIS — M79.661 PAIN AND SWELLING OF LOWER LEG, RIGHT: Primary | ICD-10-CM

## 2018-10-16 DIAGNOSIS — Z79.01 CHRONIC ANTICOAGULATION: ICD-10-CM

## 2018-10-17 ASSESSMENT — ENCOUNTER SYMPTOMS
COUGH: 0
SHORTNESS OF BREATH: 0

## 2018-10-18 ENCOUNTER — HOSPITAL ENCOUNTER (OUTPATIENT)
Age: 73
Discharge: HOME | End: 2018-10-18
Payer: COMMERCIAL

## 2018-10-18 DIAGNOSIS — I82.4Z9 DEEP VEIN THROMBOSIS (DVT) OF DISTAL VEIN OF LOWER EXTREMITY, UNSPECIFIED CHRONICITY, UNSPECIFIED LATERALITY (HCC): ICD-10-CM

## 2018-10-18 LAB
INR BLD: 2.4
INR BLD: 2.4
PROTHROMBIN TIME: 27.1 SEC (ref 9.3–12.4)

## 2018-10-18 PROCEDURE — 85610 PROTHROMBIN TIME: CPT

## 2018-10-18 PROCEDURE — 36415 COLL VENOUS BLD VENIPUNCTURE: CPT

## 2018-10-19 ENCOUNTER — TELEPHONE (OUTPATIENT)
Dept: FAMILY MEDICINE CLINIC | Age: 73
End: 2018-10-19

## 2018-10-19 ENCOUNTER — ANTI-COAG VISIT (OUTPATIENT)
Dept: FAMILY MEDICINE CLINIC | Age: 73
End: 2018-10-19
Payer: COMMERCIAL

## 2018-10-19 ENCOUNTER — ANTI-COAG VISIT (OUTPATIENT)
Dept: FAMILY MEDICINE CLINIC | Age: 73
End: 2018-10-19

## 2018-10-19 DIAGNOSIS — I82.5Y1 CHRONIC DEEP VEIN THROMBOSIS (DVT) OF PROXIMAL VEIN OF RIGHT LOWER EXTREMITY (HCC): ICD-10-CM

## 2018-10-19 LAB — INR BLD: 2.4

## 2018-10-19 PROCEDURE — 93793 ANTICOAG MGMT PT WARFARIN: CPT | Performed by: FAMILY MEDICINE

## 2018-10-19 NOTE — PROGRESS NOTES
Patient advised and verbalized understanding. She will have her INR drawn at [facility] 10/26/18.

## 2018-10-22 ENCOUNTER — HOSPITAL ENCOUNTER (OUTPATIENT)
Dept: ULTRASOUND IMAGING | Age: 73
Discharge: HOME | End: 2018-10-24
Payer: COMMERCIAL

## 2018-10-22 ENCOUNTER — TELEPHONE (OUTPATIENT)
Dept: FAMILY MEDICINE CLINIC | Age: 73
End: 2018-10-22

## 2018-10-22 ENCOUNTER — HOSPITAL ENCOUNTER (OUTPATIENT)
Age: 73
Discharge: HOME | End: 2018-10-24
Payer: COMMERCIAL

## 2018-10-22 DIAGNOSIS — I82.5Y1 CHRONIC DEEP VEIN THROMBOSIS (DVT) OF PROXIMAL VEIN OF RIGHT LOWER EXTREMITY (HCC): Primary | ICD-10-CM

## 2018-10-22 DIAGNOSIS — C50.919 MALIGNANT NEOPLASM OF FEMALE BREAST, UNSPECIFIED ESTROGEN RECEPTOR STATUS, UNSPECIFIED LATERALITY, UNSPECIFIED SITE OF BREAST (HCC): ICD-10-CM

## 2018-10-22 DIAGNOSIS — I25.10 CORONARY ARTERY DISEASE INVOLVING NATIVE CORONARY ARTERY OF NATIVE HEART WITHOUT ANGINA PECTORIS: ICD-10-CM

## 2018-10-22 DIAGNOSIS — R52 PAIN: ICD-10-CM

## 2018-10-22 DIAGNOSIS — R60.9 SWELLING: ICD-10-CM

## 2018-10-22 DIAGNOSIS — R60.0 BILATERAL EDEMA OF LOWER EXTREMITY: ICD-10-CM

## 2018-10-22 DIAGNOSIS — S42.295K OTHER CLOSED NONDISPLACED FRACTURE OF PROXIMAL END OF LEFT HUMERUS WITH NONUNION, SUBSEQUENT ENCOUNTER: ICD-10-CM

## 2018-10-22 DIAGNOSIS — G62.9 PERIPHERAL POLYNEUROPATHY: ICD-10-CM

## 2018-10-22 DIAGNOSIS — I50.9 CONGESTIVE HEART FAILURE, UNSPECIFIED HF CHRONICITY, UNSPECIFIED HEART FAILURE TYPE (HCC): ICD-10-CM

## 2018-10-22 DIAGNOSIS — E11.42 DIABETIC POLYNEUROPATHY ASSOCIATED WITH TYPE 2 DIABETES MELLITUS (HCC): ICD-10-CM

## 2018-10-22 PROCEDURE — 93971 EXTREMITY STUDY: CPT

## 2018-10-22 NOTE — TELEPHONE ENCOUNTER
Daughter would like a home health care aide for her mother. Is that an option? Daughter said she called insurance and that its covered. I told her I thought they only covered skilled care not aides. She was going to verify that she got the correct answer but would like order placed.

## 2018-10-23 ENCOUNTER — TELEPHONE (OUTPATIENT)
Dept: FAMILY MEDICINE CLINIC | Age: 73
End: 2018-10-23

## 2018-11-07 ENCOUNTER — OFFICE VISIT (OUTPATIENT)
Dept: VASCULAR SURGERY | Age: 73
End: 2018-11-07
Payer: COMMERCIAL

## 2018-11-07 DIAGNOSIS — R09.89 DECREASED DORSALIS PEDIS PULSE: ICD-10-CM

## 2018-11-07 DIAGNOSIS — I89.0 LYMPHEDEMA OF BOTH LOWER EXTREMITIES: ICD-10-CM

## 2018-11-07 DIAGNOSIS — I87.2 CHRONIC VENOUS INSUFFICIENCY: ICD-10-CM

## 2018-11-07 DIAGNOSIS — Z86.718 HISTORY OF DEEP VEIN THROMBOSIS: ICD-10-CM

## 2018-11-07 DIAGNOSIS — M79.89 LEG SWELLING: ICD-10-CM

## 2018-11-07 PROBLEM — I82.409 DEEP VEIN THROMBOSIS (DVT) OF LOWER EXTREMITY (HCC): Status: RESOLVED | Noted: 2017-06-06 | Resolved: 2018-11-07

## 2018-11-07 PROCEDURE — 3017F COLORECTAL CA SCREEN DOC REV: CPT | Performed by: SURGERY

## 2018-11-07 PROCEDURE — 1123F ACP DISCUSS/DSCN MKR DOCD: CPT | Performed by: SURGERY

## 2018-11-07 PROCEDURE — G8427 DOCREV CUR MEDS BY ELIG CLIN: HCPCS | Performed by: SURGERY

## 2018-11-07 PROCEDURE — 1036F TOBACCO NON-USER: CPT | Performed by: SURGERY

## 2018-11-07 PROCEDURE — 1101F PT FALLS ASSESS-DOCD LE1/YR: CPT | Performed by: SURGERY

## 2018-11-07 PROCEDURE — 4040F PNEUMOC VAC/ADMIN/RCVD: CPT | Performed by: SURGERY

## 2018-11-07 PROCEDURE — G8482 FLU IMMUNIZE ORDER/ADMIN: HCPCS | Performed by: SURGERY

## 2018-11-07 PROCEDURE — G8417 CALC BMI ABV UP PARAM F/U: HCPCS | Performed by: SURGERY

## 2018-11-07 PROCEDURE — G8598 ASA/ANTIPLAT THER USED: HCPCS | Performed by: SURGERY

## 2018-11-07 PROCEDURE — 99204 OFFICE O/P NEW MOD 45 MIN: CPT | Performed by: SURGERY

## 2018-11-07 PROCEDURE — 1090F PRES/ABSN URINE INCON ASSESS: CPT | Performed by: SURGERY

## 2018-11-07 PROCEDURE — G8399 PT W/DXA RESULTS DOCUMENT: HCPCS | Performed by: SURGERY

## 2018-11-26 ENCOUNTER — HOSPITAL ENCOUNTER (OUTPATIENT)
Dept: CARDIOLOGY | Age: 73
Discharge: HOME | End: 2018-11-26
Payer: COMMERCIAL

## 2018-11-26 DIAGNOSIS — I89.0 LYMPHEDEMA OF BOTH LOWER EXTREMITIES: ICD-10-CM

## 2018-11-26 DIAGNOSIS — R09.89 DECREASED DORSALIS PEDIS PULSE: ICD-10-CM

## 2018-11-26 PROCEDURE — 93923 UPR/LXTR ART STDY 3+ LVLS: CPT

## 2018-11-28 ENCOUNTER — TELEPHONE (OUTPATIENT)
Dept: VASCULAR SURGERY | Age: 73
End: 2018-11-28

## 2018-11-30 ENCOUNTER — ANTI-COAG VISIT (OUTPATIENT)
Dept: FAMILY MEDICINE CLINIC | Age: 73
End: 2018-11-30

## 2018-11-30 ENCOUNTER — HOSPITAL ENCOUNTER (OUTPATIENT)
Age: 73
Discharge: HOME | End: 2018-12-02
Payer: COMMERCIAL

## 2018-11-30 ENCOUNTER — OFFICE VISIT (OUTPATIENT)
Dept: FAMILY MEDICINE CLINIC | Age: 73
End: 2018-11-30
Payer: COMMERCIAL

## 2018-11-30 VITALS
WEIGHT: 197 LBS | TEMPERATURE: 97.8 F | SYSTOLIC BLOOD PRESSURE: 123 MMHG | OXYGEN SATURATION: 98 % | HEART RATE: 56 BPM | DIASTOLIC BLOOD PRESSURE: 45 MMHG | HEIGHT: 64 IN | BODY MASS INDEX: 33.63 KG/M2

## 2018-11-30 DIAGNOSIS — Z79.4 TYPE 2 DIABETES MELLITUS WITHOUT COMPLICATION, WITH LONG-TERM CURRENT USE OF INSULIN (HCC): ICD-10-CM

## 2018-11-30 DIAGNOSIS — C50.919 MALIGNANT NEOPLASM OF FEMALE BREAST, UNSPECIFIED ESTROGEN RECEPTOR STATUS, UNSPECIFIED LATERALITY, UNSPECIFIED SITE OF BREAST (HCC): ICD-10-CM

## 2018-11-30 DIAGNOSIS — I82.5Y1 CHRONIC DEEP VEIN THROMBOSIS (DVT) OF PROXIMAL VEIN OF RIGHT LOWER EXTREMITY (HCC): ICD-10-CM

## 2018-11-30 DIAGNOSIS — I82.5Y1 CHRONIC DEEP VEIN THROMBOSIS (DVT) OF PROXIMAL VEIN OF RIGHT LOWER EXTREMITY (HCC): Primary | ICD-10-CM

## 2018-11-30 DIAGNOSIS — Z79.01 CHRONIC ANTICOAGULATION: ICD-10-CM

## 2018-11-30 DIAGNOSIS — M79.604 LEG PAIN, POSTERIOR, RIGHT: Primary | ICD-10-CM

## 2018-11-30 DIAGNOSIS — E11.9 TYPE 2 DIABETES MELLITUS WITHOUT COMPLICATION, WITH LONG-TERM CURRENT USE OF INSULIN (HCC): ICD-10-CM

## 2018-11-30 DIAGNOSIS — Z79.4 DIABETES MELLITUS DUE TO UNDERLYING CONDITION WITH HYPOGLYCEMIA WITHOUT COMA, WITH LONG-TERM CURRENT USE OF INSULIN (HCC): ICD-10-CM

## 2018-11-30 DIAGNOSIS — E08.649 DIABETES MELLITUS DUE TO UNDERLYING CONDITION WITH HYPOGLYCEMIA WITHOUT COMA, WITH LONG-TERM CURRENT USE OF INSULIN (HCC): ICD-10-CM

## 2018-11-30 LAB
ALBUMIN SERPL-MCNC: 4.2 G/DL (ref 3.5–5.2)
ALP BLD-CCNC: 108 U/L (ref 35–104)
ALT SERPL-CCNC: 18 U/L (ref 0–32)
ANION GAP SERPL CALCULATED.3IONS-SCNC: 19 MMOL/L (ref 7–16)
AST SERPL-CCNC: 21 U/L (ref 0–31)
BILIRUB SERPL-MCNC: 0.6 MG/DL (ref 0–1.2)
BUN BLDV-MCNC: 24 MG/DL (ref 8–23)
CALCIUM SERPL-MCNC: 9.2 MG/DL (ref 8.6–10.2)
CHLORIDE BLD-SCNC: 105 MMOL/L (ref 98–107)
CHOLESTEROL, TOTAL: 91 MG/DL (ref 0–199)
CO2: 20 MMOL/L (ref 22–29)
CREAT SERPL-MCNC: 1.4 MG/DL (ref 0.5–1)
GFR AFRICAN AMERICAN: 45
GFR NON-AFRICAN AMERICAN: 37 ML/MIN/1.73
GLUCOSE BLD-MCNC: 110 MG/DL (ref 74–99)
HCT VFR BLD CALC: 32.3 % (ref 34–48)
HDLC SERPL-MCNC: 37 MG/DL
HEMOGLOBIN: 10 G/DL (ref 11.5–15.5)
INTERNATIONAL NORMALIZATION RATIO, POC: 5.6
LDL CHOLESTEROL CALCULATED: 37 MG/DL (ref 0–99)
MCH RBC QN AUTO: 30.8 PG (ref 26–35)
MCHC RBC AUTO-ENTMCNC: 31 % (ref 32–34.5)
MCV RBC AUTO: 99.4 FL (ref 80–99.9)
PDW BLD-RTO: 15.1 FL (ref 11.5–15)
PLATELET # BLD: 212 E9/L (ref 130–450)
PMV BLD AUTO: 11.6 FL (ref 7–12)
POTASSIUM SERPL-SCNC: 5.3 MMOL/L (ref 3.5–5)
PROTHROMBIN TIME, POC: 67.6
RBC # BLD: 3.25 E12/L (ref 3.5–5.5)
SODIUM BLD-SCNC: 144 MMOL/L (ref 132–146)
T4 FREE: 0.97 NG/DL (ref 0.93–1.7)
TOTAL PROTEIN: 6.7 G/DL (ref 6.4–8.3)
TRIGL SERPL-MCNC: 84 MG/DL (ref 0–149)
TSH SERPL DL<=0.05 MIU/L-ACNC: 3.54 UIU/ML (ref 0.27–4.2)
VLDLC SERPL CALC-MCNC: 17 MG/DL
WBC # BLD: 4.8 E9/L (ref 4.5–11.5)

## 2018-11-30 PROCEDURE — 2022F DILAT RTA XM EVC RTNOPTHY: CPT | Performed by: STUDENT IN AN ORGANIZED HEALTH CARE EDUCATION/TRAINING PROGRAM

## 2018-11-30 PROCEDURE — G8482 FLU IMMUNIZE ORDER/ADMIN: HCPCS | Performed by: STUDENT IN AN ORGANIZED HEALTH CARE EDUCATION/TRAINING PROGRAM

## 2018-11-30 PROCEDURE — 84439 ASSAY OF FREE THYROXINE: CPT

## 2018-11-30 PROCEDURE — G8399 PT W/DXA RESULTS DOCUMENT: HCPCS | Performed by: STUDENT IN AN ORGANIZED HEALTH CARE EDUCATION/TRAINING PROGRAM

## 2018-11-30 PROCEDURE — 1036F TOBACCO NON-USER: CPT | Performed by: STUDENT IN AN ORGANIZED HEALTH CARE EDUCATION/TRAINING PROGRAM

## 2018-11-30 PROCEDURE — 84443 ASSAY THYROID STIM HORMONE: CPT

## 2018-11-30 PROCEDURE — 4040F PNEUMOC VAC/ADMIN/RCVD: CPT | Performed by: STUDENT IN AN ORGANIZED HEALTH CARE EDUCATION/TRAINING PROGRAM

## 2018-11-30 PROCEDURE — 99213 OFFICE O/P EST LOW 20 MIN: CPT | Performed by: STUDENT IN AN ORGANIZED HEALTH CARE EDUCATION/TRAINING PROGRAM

## 2018-11-30 PROCEDURE — G8427 DOCREV CUR MEDS BY ELIG CLIN: HCPCS | Performed by: STUDENT IN AN ORGANIZED HEALTH CARE EDUCATION/TRAINING PROGRAM

## 2018-11-30 PROCEDURE — 80053 COMPREHEN METABOLIC PANEL: CPT

## 2018-11-30 PROCEDURE — G8417 CALC BMI ABV UP PARAM F/U: HCPCS | Performed by: STUDENT IN AN ORGANIZED HEALTH CARE EDUCATION/TRAINING PROGRAM

## 2018-11-30 PROCEDURE — G8598 ASA/ANTIPLAT THER USED: HCPCS | Performed by: STUDENT IN AN ORGANIZED HEALTH CARE EDUCATION/TRAINING PROGRAM

## 2018-11-30 PROCEDURE — 1090F PRES/ABSN URINE INCON ASSESS: CPT | Performed by: STUDENT IN AN ORGANIZED HEALTH CARE EDUCATION/TRAINING PROGRAM

## 2018-11-30 PROCEDURE — 99212 OFFICE O/P EST SF 10 MIN: CPT | Performed by: STUDENT IN AN ORGANIZED HEALTH CARE EDUCATION/TRAINING PROGRAM

## 2018-11-30 PROCEDURE — 1123F ACP DISCUSS/DSCN MKR DOCD: CPT | Performed by: STUDENT IN AN ORGANIZED HEALTH CARE EDUCATION/TRAINING PROGRAM

## 2018-11-30 PROCEDURE — 3045F PR MOST RECENT HEMOGLOBIN A1C LEVEL 7.0-9.0%: CPT | Performed by: STUDENT IN AN ORGANIZED HEALTH CARE EDUCATION/TRAINING PROGRAM

## 2018-11-30 PROCEDURE — 36415 COLL VENOUS BLD VENIPUNCTURE: CPT

## 2018-11-30 PROCEDURE — 36415 COLL VENOUS BLD VENIPUNCTURE: CPT | Performed by: STUDENT IN AN ORGANIZED HEALTH CARE EDUCATION/TRAINING PROGRAM

## 2018-11-30 PROCEDURE — 3017F COLORECTAL CA SCREEN DOC REV: CPT | Performed by: STUDENT IN AN ORGANIZED HEALTH CARE EDUCATION/TRAINING PROGRAM

## 2018-11-30 PROCEDURE — 1101F PT FALLS ASSESS-DOCD LE1/YR: CPT | Performed by: STUDENT IN AN ORGANIZED HEALTH CARE EDUCATION/TRAINING PROGRAM

## 2018-11-30 PROCEDURE — 85027 COMPLETE CBC AUTOMATED: CPT

## 2018-11-30 PROCEDURE — 80061 LIPID PANEL: CPT

## 2018-11-30 PROCEDURE — 85610 PROTHROMBIN TIME: CPT | Performed by: STUDENT IN AN ORGANIZED HEALTH CARE EDUCATION/TRAINING PROGRAM

## 2018-11-30 RX ORDER — PSYLLIUM HUSK 3.4 G/7G
POWDER ORAL
Refills: 0 | Status: ON HOLD | COMMUNITY
Start: 2018-11-25 | End: 2019-06-14 | Stop reason: HOSPADM

## 2018-11-30 RX ORDER — TRAMADOL HYDROCHLORIDE 50 MG/1
TABLET ORAL
Refills: 0 | COMMUNITY
Start: 2018-10-28 | End: 2019-05-17 | Stop reason: ALTCHOICE

## 2018-11-30 NOTE — PROGRESS NOTES
([address]     DVT (deep venous thrombosis) (HCC)     Recurrent. On lifelong coumadin.  DVT of upper extremity (deep vein thrombosis) ([address] 4/18/2012    History of deep vein thrombosis 11/7/2018    Humerus fracture     Chronic, on the Left.  Hyperlipidemia 8/29/2011    Hypertension     Leg swelling 11/7/2018    Lymph node enlargement     Lymphedema of both lower extremities 11/7/2018    MI (myocardial infarction) ([address]     Nephrolithiasis     Follows with Dr. Ute Varela.  Pericardial effusion     Pulmonary nodule     With mediastinal lymphadenopathy. Stable. Follows with Dr. Antonette Loo.  Rib lesion 11/28/11    expansile lesion in one of the right ribs laterally    Sarcoidosis 07/26/11    per transbronchial needle aspiration    Subclavian vein occlusion, bilateral ([address] 4/18/2012    Type II or unspecified type diabetes mellitus without mention of complication, not stated as uncontrolled        Past Surgical History:        Procedure Laterality Date    APPENDECTOMY      ARTERIAL BYPASS SURGRY      BREAST LUMPECTOMY  9/27/2005    LEFT    CARDIAC SURGERY      CHOLECYSTECTOMY      COLONOSCOPY  12/2007    cecal arteriovenous malformation with hyperplastic polyps    COLONOSCOPY  [id number]    CORONARY ARTERY BYPASS GRAFT  05/2008    triple bypass    ECHO COMPL W DOP COLOR FLOW  10/30/2013         EYE SURGERY      clarissa cataracts    HYSTERECTOMY  1975, 1985    MAYNOR prolapse, benign conditions; BSO later for scar tissues, no CA.      OTHER SURGICAL HISTORY  11/18/11    port removal right chest wall    TOOTH EXTRACTION      Full Dental Extraction.     TUNNELED VENOUS PORT PLACEMENT      removal of port Dec. 2011- Dr. Kelsie Ruggiero [place] [address]  [phone]    RIGHT CHEST       Allergies:    Macrobid [nitrofurantoin monohydrate macrocrystals]    Social History:   Social History     Social History    Marital status: Legally      Spouse name: N/A    Number of children: N/A    Years of education: N/A     Occupational History    Not on file. Social History Main Topics    Smoking status: Never Smoker    Smokeless tobacco: Never Used    Alcohol use No    Drug use: No    Sexual activity: No     Other Topics Concern    Not on file     Social History Narrative    No narrative on file       Family History:   Family History   Problem Relation Age of Onset    Adopted: Yes       Reviewof Systems:   Review of Systems   Constitutional: Negative for fever. Respiratory: Negative for shortness of breath and wheezing. Cardiovascular: Positive for leg swelling. Negative for chest pain and palpitations. Gastrointestinal: Negative for abdominal pain. Physical Exam   Vitals: BP (!) 123/45   Pulse 56   Temp 97.8 °F (36.6 °C) (Oral)   Ht 5' 4\" (1.626 m)   Wt 197 lb (89.4 kg)   SpO2 98%   BMI 33.81 kg/m²   General Appearance: alert, oriented, no acute distress, obese  Chest wall/Lung: Clear to auscultation bilaterally,  respirations unlabored. No ronchi/wheezing/rales  Heart: Regular rate and rhythm, S1 and S2 normal, no murmur, rub or gallop. Extremities:  Bilateral LE with wrapping; tenderness to palpation on the right calf  Neurologic: alert & oriented       Psychiatric: has a normal mood and affect. Behavior is normal.       Assessment and Plan       1. Leg pain, posterior, right  Advised patient to try up a cold remedy. If it does not provide pain relief we can escalate to alternate approach. Recommended to hold off on stronger pain medication due to potential side effects. Continue lymphedema clinic.  - diclofenac sodium 1 % GEL; Apply 4 g topically 4 times daily  Dispense: 4 Tube; Refill: 1    2. Chronic deep vein thrombosis (DVT) of proximal vein of right lower extremity (HCC)  Continue warfarin. INR today supratherapeutic at 5.6.  The plan will be to hold Coumadin for 2 days and afterwards decreased weekly dose by 10-20%: Patient instructed to hold Coumadin for 2 days and just

## 2018-11-30 NOTE — PROGRESS NOTES
Patient notified to hold today and tomorrow, take 4 mg on Sun and 2 mg all other days. Repeat INR in 1 week. Understanding verbalized and appt made.

## 2018-12-03 DIAGNOSIS — E87.5 HYPERKALEMIA: Primary | ICD-10-CM

## 2018-12-06 ASSESSMENT — ENCOUNTER SYMPTOMS
SHORTNESS OF BREATH: 0
WHEEZING: 0
ABDOMINAL PAIN: 0

## 2018-12-07 ENCOUNTER — HOSPITAL ENCOUNTER (OUTPATIENT)
Age: 73
Discharge: HOME | End: 2018-12-09
Payer: COMMERCIAL

## 2018-12-07 ENCOUNTER — ANTI-COAG VISIT (OUTPATIENT)
Dept: FAMILY MEDICINE CLINIC | Age: 73
End: 2018-12-07
Payer: COMMERCIAL

## 2018-12-07 DIAGNOSIS — Z79.01 CHRONIC ANTICOAGULATION: ICD-10-CM

## 2018-12-07 DIAGNOSIS — I82.5Y1 CHRONIC DEEP VEIN THROMBOSIS (DVT) OF PROXIMAL VEIN OF RIGHT LOWER EXTREMITY (HCC): ICD-10-CM

## 2018-12-07 LAB
ALBUMIN SERPL-MCNC: 3.8 G/DL (ref 3.5–5.2)
ALP BLD-CCNC: 107 U/L (ref 35–104)
ALT SERPL-CCNC: 21 U/L (ref 0–32)
ANION GAP SERPL CALCULATED.3IONS-SCNC: 13 MMOL/L (ref 7–16)
AST SERPL-CCNC: 21 U/L (ref 0–31)
BILIRUB SERPL-MCNC: 0.6 MG/DL (ref 0–1.2)
BUN BLDV-MCNC: 24 MG/DL (ref 8–23)
CALCIUM SERPL-MCNC: 8.9 MG/DL (ref 8.6–10.2)
CHLORIDE BLD-SCNC: 105 MMOL/L (ref 98–107)
CO2: 23 MMOL/L (ref 22–29)
CREAT SERPL-MCNC: 1.2 MG/DL (ref 0.5–1)
GFR AFRICAN AMERICAN: 53
GFR NON-AFRICAN AMERICAN: 44 ML/MIN/1.73
GLUCOSE BLD-MCNC: 186 MG/DL (ref 74–99)
INR BLD: 2.1
POTASSIUM SERPL-SCNC: 5.1 MMOL/L (ref 3.5–5)
SODIUM BLD-SCNC: 141 MMOL/L (ref 132–146)
TOTAL PROTEIN: 6.2 G/DL (ref 6.4–8.3)

## 2018-12-07 PROCEDURE — 93793 ANTICOAG MGMT PT WARFARIN: CPT | Performed by: FAMILY MEDICINE

## 2018-12-07 PROCEDURE — 36415 COLL VENOUS BLD VENIPUNCTURE: CPT

## 2018-12-07 PROCEDURE — 80053 COMPREHEN METABOLIC PANEL: CPT

## 2018-12-12 ENCOUNTER — TELEPHONE (OUTPATIENT)
Dept: FAMILY MEDICINE CLINIC | Age: 73
End: 2018-12-12
Payer: COMMERCIAL
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
## 2018-12-12 DIAGNOSIS — Z60.2 PERSON LIVING ALONE: ICD-10-CM
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
## 2018-12-12 DIAGNOSIS — N18.1 ANEMIA IN STAGE 1 CHRONIC KIDNEY DISEASE: ICD-10-CM

## 2018-12-12 DIAGNOSIS — C50.919 MALIGNANT NEOPLASM OF FEMALE BREAST, UNSPECIFIED ESTROGEN RECEPTOR STATUS, UNSPECIFIED LATERALITY, UNSPECIFIED SITE OF BREAST (HCC): Primary | ICD-10-CM

## 2018-12-12 DIAGNOSIS — I82.5Y1 CHRONIC EMBOLISM AND THROMBOSIS OF DEEP VEIN OF RIGHT PROXIMAL LOWER EXTREMITY (HCC): ICD-10-CM

## 2018-12-12 DIAGNOSIS — E11.42 DIABETIC POLYNEUROPATHY ASSOCIATED WITH TYPE 2 DIABETES MELLITUS (HCC): ICD-10-CM

## 2018-12-12 DIAGNOSIS — I25.10 ATHEROSCLEROSIS OF NATIVE CORONARY ARTERY OF NATIVE HEART WITHOUT ANGINA PECTORIS: ICD-10-CM

## 2018-12-12 DIAGNOSIS — Z79.01 LONG TERM (CURRENT) USE OF ANTICOAGULANTS: ICD-10-CM

## 2018-12-12 DIAGNOSIS — Z92.21 STATUS POST CHEMOTHERAPY: ICD-10-CM

## 2018-12-12 DIAGNOSIS — I13.0 MALIGNANT HYPERTENSIVE HEART AND RENAL DISEASE WITH RENAL FAILURE, STAGE 1 THROUGH STAGE 4 OR UNSPECIFIED CHRONIC KIDNEY DISEASE, WITH HEART FAILURE (HCC): ICD-10-CM

## 2018-12-12 DIAGNOSIS — M19.90 SENILE ARTHRITIS: ICD-10-CM

## 2018-12-12 DIAGNOSIS — Z87.440 HISTORY OF URINARY TRACT INFECTION: ICD-10-CM

## 2018-12-12 DIAGNOSIS — M46.1 SACROILIITIS, NOT ELSEWHERE CLASSIFIED (HCC): ICD-10-CM

## 2018-12-12 DIAGNOSIS — I87.2 PERIPHERAL VENOUS INSUFFICIENCY: ICD-10-CM

## 2018-12-12 DIAGNOSIS — S42.295K: ICD-10-CM

## 2018-12-12 DIAGNOSIS — I50.20 SYSTOLIC HEART FAILURE, UNSPECIFIED HF CHRONICITY (HCC): ICD-10-CM

## 2018-12-12 DIAGNOSIS — D63.1 ANEMIA IN STAGE 1 CHRONIC KIDNEY DISEASE: ICD-10-CM

## 2018-12-12 PROCEDURE — G0180 MD CERTIFICATION HHA PATIENT: HCPCS | Performed by: FAMILY MEDICINE

## 2018-12-12 SDOH — SOCIAL STABILITY - SOCIAL INSECURITY: PROBLEMS RELATED TO LIVING ALONE: Z60.2

## 2018-12-14 ENCOUNTER — ANTI-COAG VISIT (OUTPATIENT)
Dept: FAMILY MEDICINE CLINIC | Age: 73
End: 2018-12-14

## 2018-12-14 LAB — INR BLD: 2.6

## 2018-12-29 ENCOUNTER — TELEPHONE (OUTPATIENT)
Dept: FAMILY MEDICINE CLINIC | Age: 73
End: 2018-12-29

## 2018-12-29 DIAGNOSIS — Z86.718 HISTORY OF DEEP VEIN THROMBOSIS: Primary | ICD-10-CM

## 2018-12-31 LAB — INR BLD: 1.8

## 2019-01-02 ENCOUNTER — ANTI-COAG VISIT (OUTPATIENT)
Dept: FAMILY MEDICINE CLINIC | Age: 74
End: 2019-01-02
Payer: COMMERCIAL

## 2019-01-02 DIAGNOSIS — I82.5Y1 CHRONIC DEEP VEIN THROMBOSIS (DVT) OF PROXIMAL VEIN OF RIGHT LOWER EXTREMITY (HCC): ICD-10-CM

## 2019-01-02 PROCEDURE — 93793 ANTICOAG MGMT PT WARFARIN: CPT | Performed by: FAMILY MEDICINE

## 2019-01-07 ENCOUNTER — ANTI-COAG VISIT (OUTPATIENT)
Dept: FAMILY MEDICINE CLINIC | Age: 74
End: 2019-01-07

## 2019-01-07 LAB — INR BLD: 2.6

## 2019-01-14 DIAGNOSIS — I82.4Z9 DEEP VEIN THROMBOSIS (DVT) OF DISTAL VEIN OF LOWER EXTREMITY, UNSPECIFIED CHRONICITY, UNSPECIFIED LATERALITY (HCC): ICD-10-CM

## 2019-01-14 RX ORDER — WARFARIN SODIUM 2 MG/1
TABLET ORAL
Qty: 180 TABLET | Refills: 3 | Status: ON HOLD | OUTPATIENT
Start: 2019-01-14 | End: 2019-04-17 | Stop reason: SDUPTHER

## 2019-01-16 ENCOUNTER — ANTI-COAG VISIT (OUTPATIENT)
Dept: FAMILY MEDICINE CLINIC | Age: 74
End: 2019-01-16

## 2019-01-16 ENCOUNTER — TELEPHONE (OUTPATIENT)
Dept: FAMILY MEDICINE CLINIC | Age: 74
End: 2019-01-16

## 2019-01-18 ENCOUNTER — TELEPHONE (OUTPATIENT)
Dept: FAMILY MEDICINE CLINIC | Age: 74
End: 2019-01-18

## 2019-01-19 NOTE — TELEPHONE ENCOUNTER
Please refer patient to contact her  to request  services for housekeeping and laundry if needed or any other in home support services. Advisable for patient to also request a personal emergency response button from insurance when speaking with , if patient doesn't already have one. Patient has a [employer] plan (dual eligible Medicare/Medicaid) which provides case management services that would address such needs. If patient doesn't know , patient to call member services phone number on back of insurance card to ask for her  or request a  if told she doesn't have an assigned  already. LSW created a separate encounter as unable to reply to PCP message consulting LSW on resources for patient needing  services.

## 2019-01-30 NOTE — TELEPHONE ENCOUNTER
Finally spoke to pt and informed her of information to find out who her  is and how to get the services pt is in need for.   Pt will call us with any other info if she needs anything else from us as well

## 2019-02-12 ENCOUNTER — HOSPITAL ENCOUNTER (OUTPATIENT)
Age: 74
Discharge: HOME | End: 2019-02-14
Payer: COMMERCIAL

## 2019-02-12 DIAGNOSIS — Z79.4 TYPE 2 DIABETES MELLITUS WITHOUT COMPLICATION, WITH LONG-TERM CURRENT USE OF INSULIN (HCC): Primary | ICD-10-CM

## 2019-02-12 DIAGNOSIS — E11.9 TYPE 2 DIABETES MELLITUS WITHOUT COMPLICATION, WITH LONG-TERM CURRENT USE OF INSULIN (HCC): Primary | ICD-10-CM

## 2019-02-12 PROCEDURE — 87186 SC STD MICRODIL/AGAR DIL: CPT

## 2019-02-12 PROCEDURE — 87088 URINE BACTERIA CULTURE: CPT

## 2019-02-12 PROCEDURE — 87077 CULTURE AEROBIC IDENTIFY: CPT

## 2019-02-14 LAB
ORGANISM: ABNORMAL
URINE CULTURE, ROUTINE: ABNORMAL
URINE CULTURE, ROUTINE: ABNORMAL

## 2019-02-27 ENCOUNTER — TELEPHONE (OUTPATIENT)
Dept: FAMILY MEDICINE CLINIC | Age: 74
End: 2019-02-27

## 2019-03-04 ENCOUNTER — TELEPHONE (OUTPATIENT)
Dept: ADMINISTRATIVE | Age: 74
End: 2019-03-04

## 2019-03-04 DIAGNOSIS — I82.5Y1 CHRONIC DEEP VEIN THROMBOSIS (DVT) OF PROXIMAL VEIN OF RIGHT LOWER EXTREMITY (HCC): ICD-10-CM

## 2019-03-04 DIAGNOSIS — Z79.4 TYPE 2 DIABETES MELLITUS WITHOUT COMPLICATION, WITH LONG-TERM CURRENT USE OF INSULIN (HCC): ICD-10-CM

## 2019-03-04 DIAGNOSIS — G62.9 PERIPHERAL POLYNEUROPATHY: ICD-10-CM

## 2019-03-04 DIAGNOSIS — I50.22 CHRONIC SYSTOLIC CONGESTIVE HEART FAILURE (HCC): ICD-10-CM

## 2019-03-04 DIAGNOSIS — I25.10 CORONARY ARTERY DISEASE INVOLVING NATIVE CORONARY ARTERY OF NATIVE HEART WITHOUT ANGINA PECTORIS: ICD-10-CM

## 2019-03-04 DIAGNOSIS — Z79.01 CHRONIC ANTICOAGULATION: ICD-10-CM

## 2019-03-04 DIAGNOSIS — S42.202K CLOSED FRACTURE OF PROXIMAL END OF LEFT HUMERUS WITH NONUNION, UNSPECIFIED FRACTURE MORPHOLOGY, SUBSEQUENT ENCOUNTER: ICD-10-CM

## 2019-03-04 DIAGNOSIS — E11.9 TYPE 2 DIABETES MELLITUS WITHOUT COMPLICATION, WITH LONG-TERM CURRENT USE OF INSULIN (HCC): ICD-10-CM

## 2019-03-04 DIAGNOSIS — R60.0 BILATERAL EDEMA OF LOWER EXTREMITY: ICD-10-CM

## 2019-03-04 DIAGNOSIS — C50.912 MALIGNANT NEOPLASM OF LEFT FEMALE BREAST, UNSPECIFIED ESTROGEN RECEPTOR STATUS, UNSPECIFIED SITE OF BREAST (HCC): Primary | ICD-10-CM

## 2019-03-04 NOTE — TELEPHONE ENCOUNTER
Patient is requesting a script be3 sent to [address] [place] [address] for home health care  14 hours per week  [facility]   Please advise

## 2019-03-13 DIAGNOSIS — E11.42 DIABETIC POLYNEUROPATHY ASSOCIATED WITH TYPE 2 DIABETES MELLITUS (HCC): ICD-10-CM

## 2019-03-13 RX ORDER — PREGABALIN 100 MG/1
100 CAPSULE ORAL 3 TIMES DAILY
Qty: 270 CAPSULE | Refills: 1 | Status: ON HOLD | OUTPATIENT
Start: 2019-03-13 | End: 2019-04-17 | Stop reason: HOSPADM

## 2019-03-18 ENCOUNTER — HOSPITAL ENCOUNTER (OUTPATIENT)
Dept: NON INVASIVE DIAGNOSTICS | Age: 74
Discharge: HOME | End: 2019-03-18
Payer: COMMERCIAL

## 2019-03-18 LAB
LV EF: 60 %
LVEF MODALITY: NORMAL

## 2019-03-18 PROCEDURE — 93306 TTE W/DOPPLER COMPLETE: CPT

## 2019-04-15 ENCOUNTER — APPOINTMENT (OUTPATIENT)
Dept: GENERAL RADIOLOGY | Age: 74
DRG: 074 | End: 2019-04-15
Attending: FAMILY MEDICINE
Payer: COMMERCIAL

## 2019-04-15 ENCOUNTER — ANTI-COAG VISIT (OUTPATIENT)
Dept: FAMILY MEDICINE CLINIC | Age: 74
End: 2019-04-15

## 2019-04-15 ENCOUNTER — OFFICE VISIT (OUTPATIENT)
Dept: FAMILY MEDICINE CLINIC | Age: 74
End: 2019-04-15
Payer: COMMERCIAL

## 2019-04-15 ENCOUNTER — HOSPITAL ENCOUNTER (INPATIENT)
Age: 74
LOS: 1 days | Discharge: NURSING, REHAB OR LONG-TERM CARE | DRG: 074 | End: 2019-04-17
Attending: FAMILY MEDICINE | Admitting: FAMILY MEDICINE
Payer: COMMERCIAL

## 2019-04-15 VITALS
HEIGHT: 64 IN | RESPIRATION RATE: 20 BRPM | OXYGEN SATURATION: 96 % | DIASTOLIC BLOOD PRESSURE: 71 MMHG | TEMPERATURE: 97.7 F | BODY MASS INDEX: 33.8 KG/M2 | HEART RATE: 84 BPM | SYSTOLIC BLOOD PRESSURE: 154 MMHG | WEIGHT: 198 LBS

## 2019-04-15 DIAGNOSIS — Z79.01 CHRONIC ANTICOAGULATION: ICD-10-CM

## 2019-04-15 DIAGNOSIS — I82.4Z9 DEEP VEIN THROMBOSIS (DVT) OF DISTAL VEIN OF LOWER EXTREMITY, UNSPECIFIED CHRONICITY, UNSPECIFIED LATERALITY (HCC): ICD-10-CM

## 2019-04-15 DIAGNOSIS — E11.9 TYPE 2 DIABETES MELLITUS WITHOUT COMPLICATION, WITH LONG-TERM CURRENT USE OF INSULIN (HCC): ICD-10-CM

## 2019-04-15 DIAGNOSIS — I82.4Z9 DEEP VEIN THROMBOSIS (DVT) OF DISTAL VEIN OF LOWER EXTREMITY, UNSPECIFIED CHRONICITY, UNSPECIFIED LATERALITY (HCC): Primary | ICD-10-CM

## 2019-04-15 DIAGNOSIS — I82.5Y1 CHRONIC DEEP VEIN THROMBOSIS (DVT) OF PROXIMAL VEIN OF RIGHT LOWER EXTREMITY (HCC): ICD-10-CM

## 2019-04-15 DIAGNOSIS — G62.9 PERIPHERAL POLYNEUROPATHY: Primary | ICD-10-CM

## 2019-04-15 DIAGNOSIS — Z79.4 TYPE 2 DIABETES MELLITUS WITHOUT COMPLICATION, WITH LONG-TERM CURRENT USE OF INSULIN (HCC): ICD-10-CM

## 2019-04-15 DIAGNOSIS — R26.2 INABILITY TO AMBULATE DUE TO HIP: ICD-10-CM

## 2019-04-15 DIAGNOSIS — M79.604 RIGHT LEG PAIN: ICD-10-CM

## 2019-04-15 LAB
ALBUMIN SERPL-MCNC: 3.9 G/DL (ref 3.5–5.2)
ALP BLD-CCNC: 140 U/L (ref 35–104)
ALT SERPL-CCNC: 24 U/L (ref 0–32)
ANION GAP SERPL CALCULATED.3IONS-SCNC: 15 MMOL/L (ref 7–16)
ANION GAP SERPL CALCULATED.3IONS-SCNC: 8 MMOL/L (ref 7–16)
AST SERPL-CCNC: 28 U/L (ref 0–31)
BASOPHILS ABSOLUTE: 0.02 E9/L (ref 0–0.2)
BASOPHILS RELATIVE PERCENT: 0.4 % (ref 0–2)
BILIRUB SERPL-MCNC: 0.4 MG/DL (ref 0–1.2)
BUN BLDV-MCNC: 25 MG/DL (ref 8–23)
BUN BLDV-MCNC: 28 MG/DL (ref 8–23)
CALCIUM SERPL-MCNC: 10 MG/DL (ref 8.6–10.2)
CALCIUM SERPL-MCNC: 9.6 MG/DL (ref 8.6–10.2)
CHLORIDE BLD-SCNC: 110 MMOL/L (ref 98–107)
CHLORIDE BLD-SCNC: 112 MMOL/L (ref 98–107)
CO2: 17 MMOL/L (ref 22–29)
CO2: 23 MMOL/L (ref 22–29)
CREAT SERPL-MCNC: 1.3 MG/DL (ref 0.5–1)
CREAT SERPL-MCNC: 1.4 MG/DL (ref 0.5–1)
EOSINOPHILS ABSOLUTE: 0.18 E9/L (ref 0.05–0.5)
EOSINOPHILS RELATIVE PERCENT: 3.3 % (ref 0–6)
GFR AFRICAN AMERICAN: 45
GFR AFRICAN AMERICAN: 48
GFR NON-AFRICAN AMERICAN: 37 ML/MIN/1.73
GFR NON-AFRICAN AMERICAN: 40 ML/MIN/1.73
GLUCOSE BLD-MCNC: 113 MG/DL (ref 74–99)
GLUCOSE BLD-MCNC: 99 MG/DL (ref 74–99)
HBA1C MFR BLD: 6.9 %
HCT VFR BLD CALC: 32.1 % (ref 34–48)
HEMOGLOBIN: 9.5 G/DL (ref 11.5–15.5)
IMMATURE GRANULOCYTES #: 0.02 E9/L
IMMATURE GRANULOCYTES %: 0.4 % (ref 0–5)
INR BLD: 4.4
INR BLD: 5.4
LYMPHOCYTES ABSOLUTE: 0.59 E9/L (ref 1.5–4)
LYMPHOCYTES RELATIVE PERCENT: 10.8 % (ref 20–42)
MCH RBC QN AUTO: 28.2 PG (ref 26–35)
MCHC RBC AUTO-ENTMCNC: 29.6 % (ref 32–34.5)
MCV RBC AUTO: 95.3 FL (ref 80–99.9)
METER GLUCOSE: 78 MG/DL (ref 74–99)
METER GLUCOSE: 93 MG/DL (ref 74–99)
MONOCYTES ABSOLUTE: 0.36 E9/L (ref 0.1–0.95)
MONOCYTES RELATIVE PERCENT: 6.6 % (ref 2–12)
NEUTROPHILS ABSOLUTE: 4.28 E9/L (ref 1.8–7.3)
NEUTROPHILS RELATIVE PERCENT: 78.5 % (ref 43–80)
PDW BLD-RTO: 17.2 FL (ref 11.5–15)
PLATELET # BLD: 196 E9/L (ref 130–450)
PMV BLD AUTO: 10.8 FL (ref 7–12)
POTASSIUM REFLEX MAGNESIUM: 5.5 MMOL/L (ref 3.5–5)
POTASSIUM SERPL-SCNC: 5.8 MMOL/L (ref 3.5–5)
PROTHROMBIN TIME: 60.1 SEC (ref 9.3–12.4)
PROTIME: 52.3 SECONDS
RBC # BLD: 3.37 E12/L (ref 3.5–5.5)
SODIUM BLD-SCNC: 142 MMOL/L (ref 132–146)
SODIUM BLD-SCNC: 143 MMOL/L (ref 132–146)
TOTAL PROTEIN: 6.8 G/DL (ref 6.4–8.3)
WBC # BLD: 5.5 E9/L (ref 4.5–11.5)

## 2019-04-15 PROCEDURE — 85610 PROTHROMBIN TIME: CPT

## 2019-04-15 PROCEDURE — G0444 DEPRESSION SCREEN ANNUAL: HCPCS | Performed by: FAMILY MEDICINE

## 2019-04-15 PROCEDURE — 85025 COMPLETE CBC W/AUTO DIFF WBC: CPT

## 2019-04-15 PROCEDURE — G8428 CUR MEDS NOT DOCUMENT: HCPCS | Performed by: FAMILY MEDICINE

## 2019-04-15 PROCEDURE — 6370000000 HC RX 637 (ALT 250 FOR IP): Performed by: STUDENT IN AN ORGANIZED HEALTH CARE EDUCATION/TRAINING PROGRAM

## 2019-04-15 PROCEDURE — G8399 PT W/DXA RESULTS DOCUMENT: HCPCS | Performed by: FAMILY MEDICINE

## 2019-04-15 PROCEDURE — 36415 COLL VENOUS BLD VENIPUNCTURE: CPT

## 2019-04-15 PROCEDURE — 3017F COLORECTAL CA SCREEN DOC REV: CPT | Performed by: FAMILY MEDICINE

## 2019-04-15 PROCEDURE — G8598 ASA/ANTIPLAT THER USED: HCPCS | Performed by: FAMILY MEDICINE

## 2019-04-15 PROCEDURE — 1036F TOBACCO NON-USER: CPT | Performed by: FAMILY MEDICINE

## 2019-04-15 PROCEDURE — 73552 X-RAY EXAM OF FEMUR 2/>: CPT

## 2019-04-15 PROCEDURE — 80048 BASIC METABOLIC PNL TOTAL CA: CPT

## 2019-04-15 PROCEDURE — 1123F ACP DISCUSS/DSCN MKR DOCD: CPT | Performed by: FAMILY MEDICINE

## 2019-04-15 PROCEDURE — G8417 CALC BMI ABV UP PARAM F/U: HCPCS | Performed by: FAMILY MEDICINE

## 2019-04-15 PROCEDURE — G0378 HOSPITAL OBSERVATION PER HR: HCPCS

## 2019-04-15 PROCEDURE — 93793 ANTICOAG MGMT PT WARFARIN: CPT | Performed by: FAMILY MEDICINE

## 2019-04-15 PROCEDURE — 3044F HG A1C LEVEL LT 7.0%: CPT | Performed by: FAMILY MEDICINE

## 2019-04-15 PROCEDURE — 99212 OFFICE O/P EST SF 10 MIN: CPT | Performed by: FAMILY MEDICINE

## 2019-04-15 PROCEDURE — 2022F DILAT RTA XM EVC RTNOPTHY: CPT | Performed by: FAMILY MEDICINE

## 2019-04-15 PROCEDURE — 73502 X-RAY EXAM HIP UNI 2-3 VIEWS: CPT

## 2019-04-15 PROCEDURE — 80053 COMPREHEN METABOLIC PANEL: CPT

## 2019-04-15 PROCEDURE — 83036 HEMOGLOBIN GLYCOSYLATED A1C: CPT | Performed by: FAMILY MEDICINE

## 2019-04-15 PROCEDURE — 82962 GLUCOSE BLOOD TEST: CPT

## 2019-04-15 PROCEDURE — 72110 X-RAY EXAM L-2 SPINE 4/>VWS: CPT

## 2019-04-15 PROCEDURE — 1090F PRES/ABSN URINE INCON ASSESS: CPT | Performed by: FAMILY MEDICINE

## 2019-04-15 PROCEDURE — 4040F PNEUMOC VAC/ADMIN/RCVD: CPT | Performed by: FAMILY MEDICINE

## 2019-04-15 PROCEDURE — 99213 OFFICE O/P EST LOW 20 MIN: CPT | Performed by: FAMILY MEDICINE

## 2019-04-15 RX ORDER — OMEGA-3S/DHA/EPA/FISH OIL/D3 300MG-1000
200 CAPSULE ORAL 2 TIMES DAILY
Status: DISCONTINUED | OUTPATIENT
Start: 2019-04-15 | End: 2019-04-17 | Stop reason: HOSPADM

## 2019-04-15 RX ORDER — WARFARIN SODIUM 2 MG/1
2 TABLET ORAL DAILY
Status: DISCONTINUED | OUTPATIENT
Start: 2019-04-15 | End: 2019-04-15 | Stop reason: DRUGHIGH

## 2019-04-15 RX ORDER — DEXTROSE MONOHYDRATE 25 G/50ML
12.5 INJECTION, SOLUTION INTRAVENOUS PRN
Status: DISCONTINUED | OUTPATIENT
Start: 2019-04-15 | End: 2019-04-17 | Stop reason: HOSPADM

## 2019-04-15 RX ORDER — LANOLIN ALCOHOL/MO/W.PET/CERES
1000 CREAM (GRAM) TOPICAL DAILY
Status: DISCONTINUED | OUTPATIENT
Start: 2019-04-16 | End: 2019-04-17 | Stop reason: HOSPADM

## 2019-04-15 RX ORDER — SODIUM CHLORIDE 0.9 % (FLUSH) 0.9 %
10 SYRINGE (ML) INJECTION EVERY 12 HOURS SCHEDULED
Status: DISCONTINUED | OUTPATIENT
Start: 2019-04-15 | End: 2019-04-17 | Stop reason: HOSPADM

## 2019-04-15 RX ORDER — DEXTROSE MONOHYDRATE 25 G/50ML
25 INJECTION, SOLUTION INTRAVENOUS ONCE
Status: COMPLETED | OUTPATIENT
Start: 2019-04-15 | End: 2019-04-16

## 2019-04-15 RX ORDER — SIMVASTATIN 20 MG
20 TABLET ORAL NIGHTLY
Status: DISCONTINUED | OUTPATIENT
Start: 2019-04-15 | End: 2019-04-16

## 2019-04-15 RX ORDER — NICOTINE POLACRILEX 4 MG
15 LOZENGE BUCCAL PRN
Status: DISCONTINUED | OUTPATIENT
Start: 2019-04-15 | End: 2019-04-16 | Stop reason: SDUPTHER

## 2019-04-15 RX ORDER — M-VIT,TX,IRON,MINS/CALC/FOLIC 27MG-0.4MG
1 TABLET ORAL DAILY
Status: DISCONTINUED | OUTPATIENT
Start: 2019-04-16 | End: 2019-04-16

## 2019-04-15 RX ORDER — PSEUDOEPHEDRINE HCL 30 MG
250 TABLET ORAL 2 TIMES DAILY
Status: DISCONTINUED | OUTPATIENT
Start: 2019-04-15 | End: 2019-04-17 | Stop reason: HOSPADM

## 2019-04-15 RX ORDER — SODIUM POLYSTYRENE SULFONATE 4.1 MEQ/G
15 POWDER, FOR SUSPENSION ORAL; RECTAL ONCE
Status: COMPLETED | OUTPATIENT
Start: 2019-04-15 | End: 2019-04-16

## 2019-04-15 RX ORDER — HYDRALAZINE HYDROCHLORIDE 20 MG/ML
10 INJECTION INTRAMUSCULAR; INTRAVENOUS EVERY 6 HOURS PRN
Status: DISCONTINUED | OUTPATIENT
Start: 2019-04-15 | End: 2019-04-17 | Stop reason: HOSPADM

## 2019-04-15 RX ORDER — SODIUM CHLORIDE 9 MG/ML
INJECTION, SOLUTION INTRAVENOUS CONTINUOUS
Status: DISCONTINUED | OUTPATIENT
Start: 2019-04-15 | End: 2019-04-16

## 2019-04-15 RX ORDER — SODIUM CHLORIDE 0.9 % (FLUSH) 0.9 %
10 SYRINGE (ML) INJECTION PRN
Status: DISCONTINUED | OUTPATIENT
Start: 2019-04-15 | End: 2019-04-17 | Stop reason: HOSPADM

## 2019-04-15 RX ORDER — NICOTINE POLACRILEX 4 MG
15 LOZENGE BUCCAL PRN
Status: DISCONTINUED | OUTPATIENT
Start: 2019-04-15 | End: 2019-04-17 | Stop reason: HOSPADM

## 2019-04-15 RX ORDER — EXEMESTANE 25 MG/1
25 TABLET ORAL
Status: DISCONTINUED | OUTPATIENT
Start: 2019-04-16 | End: 2019-04-17 | Stop reason: HOSPADM

## 2019-04-15 RX ORDER — INSULIN GLARGINE 100 [IU]/ML
65 INJECTION, SOLUTION SUBCUTANEOUS NIGHTLY
Status: DISCONTINUED | OUTPATIENT
Start: 2019-04-15 | End: 2019-04-15

## 2019-04-15 RX ORDER — CALCIUM GLUCONATE 94 MG/ML
1 INJECTION, SOLUTION INTRAVENOUS ONCE
Status: COMPLETED | OUTPATIENT
Start: 2019-04-15 | End: 2019-04-16

## 2019-04-15 RX ORDER — HYDROCODONE BITARTRATE AND ACETAMINOPHEN 5; 325 MG/1; MG/1
1 TABLET ORAL EVERY 6 HOURS PRN
Status: DISCONTINUED | OUTPATIENT
Start: 2019-04-15 | End: 2019-04-17 | Stop reason: HOSPADM

## 2019-04-15 RX ORDER — ONDANSETRON 2 MG/ML
4 INJECTION INTRAMUSCULAR; INTRAVENOUS EVERY 6 HOURS PRN
Status: DISCONTINUED | OUTPATIENT
Start: 2019-04-15 | End: 2019-04-17 | Stop reason: HOSPADM

## 2019-04-15 RX ORDER — INSULIN GLARGINE 100 [IU]/ML
65 INJECTION, SOLUTION SUBCUTANEOUS NIGHTLY
Status: DISCONTINUED | OUTPATIENT
Start: 2019-04-15 | End: 2019-04-16

## 2019-04-15 RX ORDER — DEXTROSE MONOHYDRATE 50 MG/ML
100 INJECTION, SOLUTION INTRAVENOUS PRN
Status: DISCONTINUED | OUTPATIENT
Start: 2019-04-15 | End: 2019-04-17 | Stop reason: HOSPADM

## 2019-04-15 RX ORDER — PREGABALIN 100 MG/1
100 CAPSULE ORAL 3 TIMES DAILY
Status: DISCONTINUED | OUTPATIENT
Start: 2019-04-15 | End: 2019-04-16

## 2019-04-15 RX ORDER — FAMOTIDINE 20 MG/1
20 TABLET, FILM COATED ORAL DAILY
Status: DISCONTINUED | OUTPATIENT
Start: 2019-04-16 | End: 2019-04-17 | Stop reason: HOSPADM

## 2019-04-15 RX ORDER — DEXTROSE MONOHYDRATE 50 MG/ML
100 INJECTION, SOLUTION INTRAVENOUS PRN
Status: DISCONTINUED | OUTPATIENT
Start: 2019-04-15 | End: 2019-04-16 | Stop reason: SDUPTHER

## 2019-04-15 RX ORDER — DEXTROSE MONOHYDRATE 25 G/50ML
12.5 INJECTION, SOLUTION INTRAVENOUS PRN
Status: DISCONTINUED | OUTPATIENT
Start: 2019-04-15 | End: 2019-04-16 | Stop reason: SDUPTHER

## 2019-04-15 RX ADMIN — Medication 250 MG: at 20:37

## 2019-04-15 RX ADMIN — PREGABALIN 100 MG: 100 CAPSULE ORAL at 17:56

## 2019-04-15 RX ADMIN — PREGABALIN 100 MG: 100 CAPSULE ORAL at 20:37

## 2019-04-15 RX ADMIN — SIMVASTATIN 20 MG: 20 TABLET, FILM COATED ORAL at 20:37

## 2019-04-15 RX ADMIN — CHOLECALCIFEROL TAB 10 MCG (400 UNIT) 200 UNITS: 10 TAB at 20:37

## 2019-04-15 RX ADMIN — HYDROCODONE BITARTRATE AND ACETAMINOPHEN 1 TABLET: 5; 325 TABLET ORAL at 17:56

## 2019-04-15 RX ADMIN — METFORMIN HYDROCHLORIDE 1000 MG: 1000 TABLET ORAL at 17:56

## 2019-04-15 ASSESSMENT — PAIN DESCRIPTION - ONSET: ONSET: ON-GOING

## 2019-04-15 ASSESSMENT — PATIENT HEALTH QUESTIONNAIRE - PHQ9
7. TROUBLE CONCENTRATING ON THINGS, SUCH AS READING THE NEWSPAPER OR WATCHING TELEVISION: 0
8. MOVING OR SPEAKING SO SLOWLY THAT OTHER PEOPLE COULD HAVE NOTICED. OR THE OPPOSITE, BEING SO FIGETY OR RESTLESS THAT YOU HAVE BEEN MOVING AROUND A LOT MORE THAN USUAL: 0
4. FEELING TIRED OR HAVING LITTLE ENERGY: 3
2. FEELING DOWN, DEPRESSED OR HOPELESS: 3
SUM OF ALL RESPONSES TO PHQ9 QUESTIONS 1 & 2: 4
1. LITTLE INTEREST OR PLEASURE IN DOING THINGS: 1
6. FEELING BAD ABOUT YOURSELF - OR THAT YOU ARE A FAILURE OR HAVE LET YOURSELF OR YOUR FAMILY DOWN: 0
SUM OF ALL RESPONSES TO PHQ QUESTIONS 1-9: 10
SUM OF ALL RESPONSES TO PHQ QUESTIONS 1-9: 10
3. TROUBLE FALLING OR STAYING ASLEEP: 3
9. THOUGHTS THAT YOU WOULD BE BETTER OFF DEAD, OR OF HURTING YOURSELF: 0
5. POOR APPETITE OR OVEREATING: 0
10. IF YOU CHECKED OFF ANY PROBLEMS, HOW DIFFICULT HAVE THESE PROBLEMS MADE IT FOR YOU TO DO YOUR WORK, TAKE CARE OF THINGS AT HOME, OR GET ALONG WITH OTHER PEOPLE: 0

## 2019-04-15 ASSESSMENT — PAIN DESCRIPTION - PROGRESSION: CLINICAL_PROGRESSION: GRADUALLY WORSENING

## 2019-04-15 ASSESSMENT — PAIN DESCRIPTION - LOCATION: LOCATION: LEG

## 2019-04-15 ASSESSMENT — PAIN DESCRIPTION - FREQUENCY: FREQUENCY: CONTINUOUS

## 2019-04-15 ASSESSMENT — PAIN SCALES - GENERAL
PAINLEVEL_OUTOF10: 5
PAINLEVEL_OUTOF10: 9

## 2019-04-15 ASSESSMENT — PAIN DESCRIPTION - PAIN TYPE: TYPE: ACUTE PAIN

## 2019-04-15 ASSESSMENT — PAIN DESCRIPTION - DESCRIPTORS: DESCRIPTORS: ACHING;CONSTANT;BURNING

## 2019-04-15 ASSESSMENT — PAIN DESCRIPTION - ORIENTATION: ORIENTATION: RIGHT;LEFT

## 2019-04-15 ASSESSMENT — PAIN - FUNCTIONAL ASSESSMENT: PAIN_FUNCTIONAL_ASSESSMENT: PREVENTS OR INTERFERES SOME ACTIVE ACTIVITIES AND ADLS

## 2019-04-15 NOTE — H&P
Hysterectomy (1975, 1985); Cholecystectomy; Appendectomy; other surgical history (11/18/11); Arterial bypass surgry; Coronary artery bypass graft (05/2008); Tunneled venous port placement; Cardiac surgery; eye surgery; Tunneled venous port placement ([id number]); Breast lumpectomy (9/27/2005); ECHO Compl W Dop Color Flow (10/30/2013); Colonoscopy (12/2007); and Colonoscopy ([id number]). FH: family history is not on file. She was adopted. Social:  reports that she has never smoked. She has never used smokeless tobacco. She reports that she does not drink alcohol or use drugs. Allergies: Allergies   Allergen Reactions    Macrobid [Nitrofurantoin Monohydrate Macrocrystals] Hives        Home Medications:   No current facility-administered medications on file prior to encounter. Current Outpatient Medications on File Prior to Encounter   Medication Sig Dispense Refill    pregabalin (LYRICA) 100 MG capsule Take 1 capsule by mouth 3 times daily for 180 days. 270 capsule 1    simvastatin (ZOCOR) 20 MG tablet take 1 tablet by mouth at bedtime 90 tablet 3    metoprolol tartrate (LOPRESSOR) 50 MG tablet take 1 tablet by mouth twice a day 180 tablet 3    warfarin (COUMADIN) 2 MG tablet Take 2 tablets orally on Sun, Tues, Thurs; and 1 tablet daily on all other days.  180 tablet 3    metFORMIN (GLUCOPHAGE) 1000 MG tablet take 1 tablet by mouth twice a day with meals 180 tablet 1    famotidine (PEPCID) 20 MG tablet take 1 tablet by mouth twice a day 180 tablet 3    vitamin B-12 (CYANOCOBALAMIN) 1000 MCG tablet Take 1 tablet by mouth daily 90 tablet 3    Calcium Citrate-Vitamin D (RA CALCIUM CIT-VIT D-3 PETITES) 200-250 MG-UNIT TABS take 1 tablet by mouth twice a day 180 tablet 3    exemestane (AROMASIN) 25 MG chemo tablet Take 1 tablet by mouth every morning (before breakfast)      insulin glargine (LANTUS SOLOSTAR) 100 UNIT/ML injection pen Inject 65 Units into the skin nightly 21 Pen 3    Multiple Vitamins-Minerals (THERAPEUTIC MULTIVITAMIN-MINERALS) tablet Take 1 tablet by mouth daily 90 tablet 3    trastuzumab (HERCEPTIN) 440 MG injection Infuse  intravenously once a week. On tuesdays      traMADol (ULTRAM) 50 MG tablet take 1 tablet by mouth every 6 hours if needed for pain for UP TO. ..  (REFER TO PRESCRIPTION NOTES). 0    RA VITAMIN B-12 TR 1000 MCG TBCR take 1 tablet by mouth once daily  0    diclofenac sodium 1 % GEL Apply 4 g topically 4 times daily 4 Tube 1       ROS:     relevant ROS as mentioned in HPI  Const: No fever, chills, night sweats, no recent unexplained weight gain/loss  HEENT: No blurred vision, double vision; no URI symptoms  Resp: No cough, no sputum, no pleuritic chest pain, no sob  Cardio: No chest pain, no exertional dyspnea, no PND, no orthopnea, no palpitation, no leg swelling. GI: No dysphagia, no reflux; no abdominal pain, no n/v; no c/d. No hematochezia    : No dysuria, no frequency, hesitancy; no hematuria  MSK: + hip pain, no myalgia, Limited ROM in RLE and LUE  Neuro: +weakness, no slurred speech, no double vision, +numbness or tingling in LE b/l  Endo: no heat/cold intolerance, no polyphagia, polydipsia or polyuria  Hem: no increased bleeding, no bruising, no lymphadenopathy  Skin: no skin changes  Psych: no depressed mood, no suicidal ideation    PE:  Blood pressure (!) 177/73, pulse 85, temperature 97.9 °F (36.6 °C), temperature source Temporal, resp. rate 18, not currently breastfeeding. General: Alert, cooperative, no acute distress. HEENT: Normocephalic, atraumatic. PERRLA, conjunctiva/corneas clear, EOM's intact, no pallor or icterus. Neck: Supple, symmetrical, trachea midline, no JVD  Chest: No tenderness or deformity, full & symmetric excursion  Lung: Clear to auscultation bilaterally,  respirations unlabored. No rales/wheezing/rubs  Heart: RRR, S1 and S2 normal, no murmur, rub or gallop.  DP pulses 2/4  Abdomen: SNTND, no masses, no organomegaly, no guarding, rebound or rigidity. Genital/Rectal: deferred  Extremities:  Extremities normal, atraumatic, no cyanosis or edema. Distal pulses equal bilaterally  Skin: Skin color, texture, turgor normal, no rashes or lesions  Musculoskeletal: No joint swelling, no muscle tenderness. Normal ROM in extremities. Neurologic: Alert & Oriented; CNII-XII intact; Power: 3/5 in RLE 4/5 in LLE 3/5 in LUE 5/5 in RUE; DTRs 2+ and symmetric; Sensation reduced in glove and stocking distribution up to ankle in b/l LE    Labs:   No results found for this visit on 04/15/19. Imaging:  No results found. Assessment and Plan  Principal Problem:    Ambulatory dysfunction  Active Problems:    Breast cancer ([facility] [address]    Hypertension    Coronary artery disease involving native coronary artery of native heart without angina pectoris    CHF (congestive heart failure) ([facility])    Type 2 diabetes mellitus without complication, with long-term current use of insulin ([facility])    Chronic deep vein thrombosis (DVT) of proximal vein of right lower extremity ([facility])    Bilateral edema of lower extremity  Resolved Problems:    * No resolved hospital problems.  *      Ambulatory dysfunction   Chronic pains in back and LE   Norco for pain relief   Patient lying very uncomfortably in bed   PT/OT eval and treat   SW for rehab   Xray hip, back, and femur to r/o fracture   Patient is prone to pathological fracture in view of her cancer   Continue home Vit D+ Calcium   Last DEXA in 2011 shows T score of -0.6 for lumbar and 0 for hip (WNL) will likely need outpatient dexa if fracture observed     Breast Cancer   Patient in remission   Continue home medication(s)   IV herceptin once weekly     T2DM   Uses insulin and is well controlled   Last A1c 4/15/2019: 6.9   Continue home medication(s)   Hypoglycemia orders placed     Chronic DVT   On warfarin   Pharmacy to dose   Supratherapuetic   Target 2-3    B/l LE lymphedema   Would benefit from compressions  Not on diuretic      HTN  CAD   Continue home medication(s)   Lopressor 50 mh BID         DVT / GI prophylaxis: Warfarin  and Pepcid    Dispo - PT/OT, SW, Gen floor       Electronically signed by Carita Harada, MD on 4/15/19 at 2:52 PM  This case was discussed with attending physician: Dr. Alex Jones

## 2019-04-15 NOTE — PROGRESS NOTES
CC:  Follow up chronic DVT     HPI:  68 y.o. female presents for follow up. Chronic DVT. Hip arthritis as well. Having severe pain through her entire right lower extremity. Gradually getting worse, for several months. Hardly able to walk any longer. Swelling in RLE has been stable. Has been using compression stocking. Has not been following with podiatry recently, although she had bilateral concerns for ankle fractures and was kept in boot, did not follow up after treatment. Chronic neuropathy bilaterally. Right ankle very swollen, tender, painful. 10/10 pain. Difficulty walking. Possible fracture in ankles, had been following with podiatry but not for several months. Entire leg feels numb at times, comes and goes, with severe pain. History of metastatic breast cancer. Has been following with oncology. Recently was told that her kidney function had worsened. Her oncologist recommended that she see a kidney specialist, but she has not yet had an appointment scheduled. DM.  A1C 6.9 today. INR 4.4 today. Usually in range on the same dose. No change in diet, she states. Has not followed up recently for INR.        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 44/24/5837    Complicated UTI (urinary tract infection) 10/24/2017    Hyperkalemia 10/24/2017    KARISHMA (acute kidney injury) ([facility] [address] 10/24/2017    Diarrhea with dehydration 10/24/2017    Chronic anticoagulation 10/24/2017    Peripheral polyneuropathy 01/03/2017    Bilateral edema of lower extremity 10/03/2016    Chronic deep [facility] 09/30/2016    Type 2 diabetes mellitus without complication, with long-term current use of insulin ([facility] [address] 09/01/2016    Anemia 09/01/2016    Ventral hernia 05/14/2015    Rectal bleeding 02/23/2015    Anesthesia     Pericardial effusion     MI (myocardial infarction) (HCC)     Arthritis     Lymph node enlargement     Subclavian vein occlusion, bilateral ([facility] [address] 04/18/2012    Rib lesion 02/07/2012    Hyperlipidemia 08/29/2011    Sarcoidosis 07/26/2011    B12 deficiency 06/22/2011    Shoulder pain, left 03/02/2011    [facility]     Hypertension     Coronary artery disease involving native coronary artery of native heart without angina pectoris     Nephrolithiasis     CHF [facility]congest[facility] failure) [facility]     Humerus fracture        Current Outpatient Medications on File Prior to Visit   Medication Sig Dispense Refill    pregabalin (LYRICA) 100 MG capsule Take 1 capsule by mouth 3 times daily for 180 days. 270 capsule 1    simvastatin (ZOCOR) 20 MG tablet take 1 tablet by mouth at bedtime 90 tablet 3    metoprolol tartrate (LOPRESSOR) 50 MG tablet take 1 tablet by mouth twice a day 180 tablet 3    warfarin (COUMADIN) 2 MG tablet Take 2 tablets orally on Sun, Tues, Thurs; and 1 tablet daily on all other days.  180 tablet 3    RA VITAMIN B-12 TR 1000 MCG TBCR take 1 tablet by mouth once daily  0    metFORMIN (GLUCOPHAGE) 1000 MG tablet take 1 tablet by mouth twice a day with meals 180 tablet 1    famotidine (PEPCID) 20 MG tablet take 1 tablet by mouth twice a day 180 tablet 3    vitamin B-12 (CYANOCOBALAMIN) 1000 MCG tablet Take 1 tablet by mouth daily 90 tablet 3    Calcium Citrate-Vitamin D (RA CALCIUM CIT-VIT D-3 PETITES) 200-250 MG-UNIT TABS take 1 tablet by mouth twice a day 180 tablet 3    exemestane (AROMASIN) 25 MG chemo tablet Take 1 tablet by mouth every morning (before breakfast)      insulin glargine (LANTUS SOLOSTAR) 100 UNIT/ML injection pen Inject 65 Units into the skin nightly 21 Pen 3    Multiple Vitamins-Minerals (THERAPEUTIC MULTIVITAMIN-MINERALS) tablet Take 1 tablet by mouth daily 90 tablet 3    trastuzumab (HERCEPTIN) 440 MG injection Infuse  intravenously once a week. On tuesdays      traMADol (ULTRAM) 50 MG tablet take 1 tablet by mouth every 6 hours if needed for pain for UP TO. ..  (REFER TO PRESCRIPTION NOTES). 0    diclofenac sodium 1 % GEL Apply 4 g topically 4 times daily 4 Tube 1     No current facility-administered medications on file prior to visit. Allergies   Allergen Reactions    Macrobid [Nitrofurantoin Monohydrate Macrocrystals] Hives       Social History     Tobacco Use    Smoking status: Never Smoker    Smokeless tobacco: Never Used   Substance Use Topics    Alcohol use: No    Drug use: No       ROS:   Review of Systems - as above     Physical Exam:    VS:  Blood pressure (!) 154/71, pulse 84, temperature 97.7 °F (36.5 °C), temperature source Oral, resp. rate 20, height 5' 4\" (1.626 m), weight 198 lb (89.8 kg), SpO2 96 %, not currently breastfeeding. General Appearance:  awake, alert, oriented, in no acute distress, but appears uncomfortable at rest   Skin:  Pallor noted. Erythematous lesions of the skin folds of abdomen with satellite lesions. Area of erythema, blanching, near left buttock. Head/face:  NCAT  Eyes:  EOMI and Sclera nonicteric  Lungs:  Normal expansion. Clear to auscultation. No rales, rhonchi, or wheezing. Heart:  Heart regular rate and rhythm with soft systolic murmur   Abdomen:  Soft, diffusely tender mildly   Extremities: Bilateral LE edema, right more than left, with stasis changes     Most recent labs and imaging reviewed.   Findings include:     Results for POC orders placed in visit on 04/15/19   POCT glycosylated hemoglobin (Hb A1C)   Result Value Ref Range    Hemoglobin A1C 6.9 % Assessments:      Diagnosis Orders   1. Deep vein thrombosis (DVT) of distal vein of lower extremity, unspecified chronicity, unspecified laterality ([facility] [address]  PROTIME-INR   2. Type 2 diabetes mellitus without complication, with long-term current use of insulin (HCC)  POCT glycosylated hemoglobin (Hb A1C)   3. Inability to ambulate due to hip     4. Right leg pain         Plans:    As Above. Please see Patient Instructions for further counseling and information given. Will plan to admit to observation status and evaluate further inpatient. Patient is having severe pain and is no longer able to get along at home or care for herself. She is willing to consider [place]. Concerns for pathologic fractures with history of metastatic breast CA. Concerns for KARISHMA. Appears to be very uncomfortable. Ankle pain, suspect Charcot bilaterally. RTO 1 week or sooner prn. Plan to follow up after hospitalization. Hold coumadin today, recheck INR tomorrow. Admit for urgent labs. Check XR of joints of RLE. Called for bed. Transported by her son to the hospital.      Advised to please be adherent to the treatment plans discussed today, and please call with any questions or concerns, letting the office know of any reasons that the plans may not be followed. The risks of untreated conditions include worsening illness, injury, disability, and possibly, death. Please call if symptoms change in any way, worsen, or fail to completely resolve, as this could necessitate a change to treatment plans. Patient and/or caregiver expressed understanding. Indications and proper use of medication(s) reviewed. Potential side-effects and risks of medication(s) also explained. Patient and/or caregiver was instructed to call if any new symptoms develop prior to next visit.

## 2019-04-15 NOTE — PROGRESS NOTES
Pharmacy Consultation Note  (Anticoagulant Dosing and Monitoring)    Initial consult date:   Consulting physician:    Allergies:  Macrobid [nitrofurantoin monohydrate macrocrystals]    68 y.o. female; 162.6 cm, 89.8 kg    Warfarin Indication Target   INR Range Home Dose  (if applicable) Diet/Feeding Tube   Chronic RLE DVT, Hx DVTs 2-3 2 mg STR, 1 mg MWFS 4/15: carb control     Warfarin drug-drug interactions  Start  Stop Home Med? Comments                          TSH:  3.540 (11-)   Hepatic Function Panel:                            Lab Results   Component Value Date    ALKPHOS 107 12/07/2018    ALT 21 12/07/2018    AST 21 12/07/2018    PROT 6.2 12/07/2018    BILITOT 0.6 12/07/2018    LABALBU 3.8 12/07/2018    LABALBU 4.4 03/25/2012     Date Warfarin Dose INR Heparin or LMWH HBG/HCT PLT Comment   4/15 No Dose 4.4 X                                            Assessment:  · 72 yo/F admitted for worsening back, hip, and RLE pain (over months). · PMH: Breast CA in remission, HTN, CAD, Hx of chronic RLE DVT, Hx of UE DVT, DM, B/L LE lymphedema. · On warfarin PTA for DVT hx.  Home dose 2 mg on STR, 1 mg on MWFS. · 4/15: INR = 4.4    Plan:  · No warfarin tonight d/t supratherapeutic INR. · Daily PT/INR until the INR is stable within the therapeutic range. · Pharmacist will follow and monitor/adjust dosing as necessary.     Naun Choe PharmD  4/15/2019  4:32 PM  Pager: [phone]

## 2019-04-15 NOTE — PATIENT INSTRUCTIONS
you can lose your balance and fall. · Talk to your doctor if you have numbness in your feet. Preventing falls at home  · Remove raised doorway thresholds, throw rugs, and clutter. Repair loose carpet or raised areas in the floor. · Move furniture and electrical cords to keep them out of walking paths. · Use nonskid floor wax, and wipe up spills right away, especially on ceramic tile floors. · If you use a walker or cane, put rubber tips on it. If you use crutches, clean the bottoms of them regularly with an abrasive pad, such as steel wool. · Keep your house well lit, especially [facility], and outside walkways. Use night-lights in areas such as hallways and bathrooms. Add extra light switches or use remote switches (such as switches that go on or off when you clap your hands) to make it easier to turn lights on if you have to get up during the night. · Install sturdy handrails on stairways. · Move items in your cabinets so that the things you use a lot are on the lower shelves (about waist level). · Keep a cordless phone and a flashlight with new batteries by your bed. If possible, put a phone in each of the main rooms of your house, or carry a cell phone in case you fall and cannot reach a phone. Or, you can wear a device around your neck or wrist. You push a button that sends a signal for help. · Wear low-heeled shoes that fit well and give your feet good support. Use footwear with nonskid soles. Check the heels and soles of your shoes for wear. Repair or replace worn heels or soles. · Do not wear socks without shoes on wood floors. · Walk on the grass when the sidewalks are slippery. If you live in an area that gets snow and ice in the winter, sprinkle salt on slippery steps and sidewalks. Preventing falls in the bath  · Install grab bars and nonskid mats inside and outside your shower or tub and near the toilet and sinks. · Use shower chairs and bath benches.   · Use a hand-held shower head that will allow you to sit while showering. · Get into a tub or shower by putting the weaker leg in first. Get out of a tub or shower with your strong side first.  · Repair loose toilet seats and consider installing a raised toilet seat to make getting on and off the toilet easier. · Keep your bathroom door unlocked while you are in the shower. Where can you learn more? Go to https://[employer]. org and sign in to your [employer] account. Enter [phone] in the [employer] box to learn more about \"Preventing Falls: Care Instructions. \"     If you do not have an account, please click on the \"Sign Up Now\" link. Current as of: March 15, 2018  Content Version: 11.9  © 3992-7603 [employer]. Care instructions adapted under license by [facility] If you have questions about a medical condition or this instruction, always ask your healthcare professional. [place] [address] any warranty or liability for your use of this information.

## 2019-04-16 ENCOUNTER — APPOINTMENT (OUTPATIENT)
Dept: GENERAL RADIOLOGY | Age: 74
DRG: 074 | End: 2019-04-16
Attending: FAMILY MEDICINE
Payer: COMMERCIAL

## 2019-04-16 PROBLEM — N18.30 CKD (CHRONIC KIDNEY DISEASE) STAGE 3, GFR 30-59 ML/MIN (HCC): Status: ACTIVE | Noted: 2019-04-16

## 2019-04-16 LAB
ANION GAP SERPL CALCULATED.3IONS-SCNC: 8 MMOL/L (ref 7–16)
BASOPHILS ABSOLUTE: 0.02 E9/L (ref 0–0.2)
BASOPHILS RELATIVE PERCENT: 0.5 % (ref 0–2)
BUN BLDV-MCNC: 24 MG/DL (ref 8–23)
CALCIUM SERPL-MCNC: 9.8 MG/DL (ref 8.6–10.2)
CHLORIDE BLD-SCNC: 111 MMOL/L (ref 98–107)
CO2: 25 MMOL/L (ref 22–29)
CREAT SERPL-MCNC: 1.4 MG/DL (ref 0.5–1)
CREATININE URINE: 49 MG/DL (ref 29–226)
EOSINOPHILS ABSOLUTE: 0.22 E9/L (ref 0.05–0.5)
EOSINOPHILS RELATIVE PERCENT: 5.6 % (ref 0–6)
GFR AFRICAN AMERICAN: 45
GFR NON-AFRICAN AMERICAN: 37 ML/MIN/1.73
GLUCOSE BLD-MCNC: 85 MG/DL (ref 74–99)
HCT VFR BLD CALC: 31.3 % (ref 34–48)
HEMOGLOBIN: 9.2 G/DL (ref 11.5–15.5)
IMMATURE GRANULOCYTES #: 0.01 E9/L
IMMATURE GRANULOCYTES %: 0.3 % (ref 0–5)
INR BLD: 5.9
LYMPHOCYTES ABSOLUTE: 0.86 E9/L (ref 1.5–4)
LYMPHOCYTES RELATIVE PERCENT: 21.8 % (ref 20–42)
MCH RBC QN AUTO: 27.8 PG (ref 26–35)
MCHC RBC AUTO-ENTMCNC: 29.4 % (ref 32–34.5)
MCV RBC AUTO: 94.6 FL (ref 80–99.9)
METER GLUCOSE: 112 MG/DL (ref 74–99)
METER GLUCOSE: 128 MG/DL (ref 74–99)
METER GLUCOSE: 155 MG/DL (ref 74–99)
METER GLUCOSE: 66 MG/DL (ref 74–99)
METER GLUCOSE: 83 MG/DL (ref 74–99)
METER GLUCOSE: 90 MG/DL (ref 74–99)
MONOCYTES ABSOLUTE: 0.32 E9/L (ref 0.1–0.95)
MONOCYTES RELATIVE PERCENT: 8.1 % (ref 2–12)
NEUTROPHILS ABSOLUTE: 2.52 E9/L (ref 1.8–7.3)
NEUTROPHILS RELATIVE PERCENT: 63.7 % (ref 43–80)
PDW BLD-RTO: 17.3 FL (ref 11.5–15)
PLATELET # BLD: 178 E9/L (ref 130–450)
PMV BLD AUTO: 10.9 FL (ref 7–12)
POTASSIUM REFLEX MAGNESIUM: 5 MMOL/L (ref 3.5–5)
POTASSIUM SERPL-SCNC: 5 MMOL/L (ref 3.5–5)
PROTHROMBIN TIME: 65.5 SEC (ref 9.3–12.4)
RBC # BLD: 3.31 E12/L (ref 3.5–5.5)
SODIUM BLD-SCNC: 144 MMOL/L (ref 132–146)
WBC # BLD: 4 E9/L (ref 4.5–11.5)

## 2019-04-16 PROCEDURE — 99219 PR INITIAL OBSERVATION CARE/DAY 50 MINUTES: CPT | Performed by: FAMILY MEDICINE

## 2019-04-16 PROCEDURE — 82962 GLUCOSE BLOOD TEST: CPT

## 2019-04-16 PROCEDURE — 80048 BASIC METABOLIC PNL TOTAL CA: CPT

## 2019-04-16 PROCEDURE — 2580000003 HC RX 258: Performed by: STUDENT IN AN ORGANIZED HEALTH CARE EDUCATION/TRAINING PROGRAM

## 2019-04-16 PROCEDURE — 93005 ELECTROCARDIOGRAM TRACING: CPT

## 2019-04-16 PROCEDURE — 6360000002 HC RX W HCPCS: Performed by: STUDENT IN AN ORGANIZED HEALTH CARE EDUCATION/TRAINING PROGRAM

## 2019-04-16 PROCEDURE — 84132 ASSAY OF SERUM POTASSIUM: CPT

## 2019-04-16 PROCEDURE — 73610 X-RAY EXAM OF ANKLE: CPT

## 2019-04-16 PROCEDURE — 6370000000 HC RX 637 (ALT 250 FOR IP): Performed by: STUDENT IN AN ORGANIZED HEALTH CARE EDUCATION/TRAINING PROGRAM

## 2019-04-16 PROCEDURE — 97530 THERAPEUTIC ACTIVITIES: CPT | Performed by: PHYSICAL THERAPIST

## 2019-04-16 PROCEDURE — 36415 COLL VENOUS BLD VENIPUNCTURE: CPT

## 2019-04-16 PROCEDURE — 96375 TX/PRO/DX INJ NEW DRUG ADDON: CPT

## 2019-04-16 PROCEDURE — G0378 HOSPITAL OBSERVATION PER HR: HCPCS

## 2019-04-16 PROCEDURE — 96374 THER/PROPH/DIAG INJ IV PUSH: CPT

## 2019-04-16 PROCEDURE — 6370000000 HC RX 637 (ALT 250 FOR IP): Performed by: FAMILY MEDICINE

## 2019-04-16 PROCEDURE — 85610 PROTHROMBIN TIME: CPT

## 2019-04-16 PROCEDURE — 2500000003 HC RX 250 WO HCPCS: Performed by: STUDENT IN AN ORGANIZED HEALTH CARE EDUCATION/TRAINING PROGRAM

## 2019-04-16 PROCEDURE — 85025 COMPLETE CBC W/AUTO DIFF WBC: CPT

## 2019-04-16 PROCEDURE — 82570 ASSAY OF URINE CREATININE: CPT

## 2019-04-16 PROCEDURE — 97161 PT EVAL LOW COMPLEX 20 MIN: CPT | Performed by: PHYSICAL THERAPIST

## 2019-04-16 RX ORDER — PREGABALIN 50 MG/1
50 CAPSULE ORAL 3 TIMES DAILY
Status: DISCONTINUED | OUTPATIENT
Start: 2019-04-16 | End: 2019-04-17 | Stop reason: HOSPADM

## 2019-04-16 RX ORDER — ATORVASTATIN CALCIUM 10 MG/1
20 TABLET, FILM COATED ORAL NIGHTLY
Status: DISCONTINUED | OUTPATIENT
Start: 2019-04-16 | End: 2019-04-17 | Stop reason: HOSPADM

## 2019-04-16 RX ADMIN — MICONAZOLE NITRATE: 20 CREAM TOPICAL at 14:08

## 2019-04-16 RX ADMIN — MICONAZOLE NITRATE: 20 CREAM TOPICAL at 20:40

## 2019-04-16 RX ADMIN — CYANOCOBALAMIN TAB 1000 MCG 1000 MCG: 1000 TAB at 09:58

## 2019-04-16 RX ADMIN — Medication 50 MEQ: at 01:52

## 2019-04-16 RX ADMIN — Medication 10 ML: at 20:40

## 2019-04-16 RX ADMIN — PREGABALIN 50 MG: 50 CAPSULE ORAL at 20:40

## 2019-04-16 RX ADMIN — Medication 250 MG: at 20:40

## 2019-04-16 RX ADMIN — CHOLECALCIFEROL TAB 10 MCG (400 UNIT) 200 UNITS: 10 TAB at 09:57

## 2019-04-16 RX ADMIN — SODIUM CHLORIDE: 9 INJECTION, SOLUTION INTRAVENOUS at 00:45

## 2019-04-16 RX ADMIN — ATORVASTATIN CALCIUM 20 MG: 10 TABLET, FILM COATED ORAL at 20:40

## 2019-04-16 RX ADMIN — CALCIUM GLUCONATE 1 G: 98 INJECTION, SOLUTION INTRAVENOUS at 01:48

## 2019-04-16 RX ADMIN — Medication 250 MG: at 09:56

## 2019-04-16 RX ADMIN — DEXTROSE MONOHYDRATE 25 G: 25 INJECTION, SOLUTION INTRAVENOUS at 01:34

## 2019-04-16 RX ADMIN — CHOLECALCIFEROL TAB 10 MCG (400 UNIT) 200 UNITS: 10 TAB at 20:40

## 2019-04-16 RX ADMIN — FAMOTIDINE 20 MG: 20 TABLET ORAL at 09:56

## 2019-04-16 RX ADMIN — HYDROCODONE BITARTRATE AND ACETAMINOPHEN 1 TABLET: 5; 325 TABLET ORAL at 09:56

## 2019-04-16 RX ADMIN — PREGABALIN 50 MG: 50 CAPSULE ORAL at 14:08

## 2019-04-16 RX ADMIN — MULTIPLE VITAMINS W/ MINERALS TAB 1 TABLET: TAB at 09:58

## 2019-04-16 RX ADMIN — METOPROLOL TARTRATE 50 MG: 50 TABLET, FILM COATED ORAL at 20:40

## 2019-04-16 RX ADMIN — PREGABALIN 100 MG: 100 CAPSULE ORAL at 09:59

## 2019-04-16 RX ADMIN — HYDROCODONE BITARTRATE AND ACETAMINOPHEN 1 TABLET: 5; 325 TABLET ORAL at 22:14

## 2019-04-16 RX ADMIN — HYDROCODONE BITARTRATE AND ACETAMINOPHEN 1 TABLET: 5; 325 TABLET ORAL at 00:53

## 2019-04-16 RX ADMIN — INSULIN HUMAN 10 UNITS: 100 INJECTION, SOLUTION PARENTERAL at 01:35

## 2019-04-16 RX ADMIN — Medication 10 ML: at 00:43

## 2019-04-16 RX ADMIN — EXEMESTANE 25 MG: 25 TABLET, FILM COATED ORAL at 06:42

## 2019-04-16 RX ADMIN — METFORMIN HYDROCHLORIDE 500 MG: 500 TABLET ORAL at 17:38

## 2019-04-16 RX ADMIN — SODIUM POLYSTYRENE SULFONATE 15 G: 1 POWDER ORAL; RECTAL at 00:48

## 2019-04-16 ASSESSMENT — PAIN DESCRIPTION - DESCRIPTORS
DESCRIPTORS: ACHING;THROBBING
DESCRIPTORS: ACHING;THROBBING
DESCRIPTORS: ACHING;CONSTANT;DISCOMFORT
DESCRIPTORS: ACHING;THROBBING
DESCRIPTORS: ACHING;CONSTANT;BURNING

## 2019-04-16 ASSESSMENT — PAIN DESCRIPTION - LOCATION
LOCATION: LEG

## 2019-04-16 ASSESSMENT — PAIN DESCRIPTION - ORIENTATION
ORIENTATION: RIGHT;LEFT
ORIENTATION: RIGHT
ORIENTATION: RIGHT;LEFT

## 2019-04-16 ASSESSMENT — PAIN DESCRIPTION - ONSET
ONSET: ON-GOING

## 2019-04-16 ASSESSMENT — PAIN DESCRIPTION - PROGRESSION
CLINICAL_PROGRESSION: GRADUALLY WORSENING

## 2019-04-16 ASSESSMENT — PAIN DESCRIPTION - PAIN TYPE
TYPE: ACUTE PAIN

## 2019-04-16 ASSESSMENT — PAIN DESCRIPTION - FREQUENCY
FREQUENCY: CONTINUOUS

## 2019-04-16 ASSESSMENT — PAIN - FUNCTIONAL ASSESSMENT
PAIN_FUNCTIONAL_ASSESSMENT: PREVENTS OR INTERFERES SOME ACTIVE ACTIVITIES AND ADLS

## 2019-04-16 ASSESSMENT — PAIN SCALES - GENERAL
PAINLEVEL_OUTOF10: 5
PAINLEVEL_OUTOF10: 4
PAINLEVEL_OUTOF10: 8
PAINLEVEL_OUTOF10: 5
PAINLEVEL_OUTOF10: 8
PAINLEVEL_OUTOF10: 6
PAINLEVEL_OUTOF10: 3
PAINLEVEL_OUTOF10: 6
PAINLEVEL_OUTOF10: 6

## 2019-04-16 NOTE — PROGRESS NOTES
Physical Therapy    Facility/Department: [facility] SURG ONC      Flor Lock  : 1945  MRN: [id number]    Date of Service: 2019    PT order received. Chart reviewed. Discussed with RN. Per chart pt has questionable ankle fx's but no xray's completed. Await clarification on activity order and RN to see if doctor is ordering xrays of ankles. Await clarification. Will check later.      Latonya Liu PT, DPT [id number]

## 2019-04-16 NOTE — PROGRESS NOTES
[address] [facility]  Family Medicine Attending    S: 68 y.o. female with inability to ambulate at home due to severe right LE pain, history of chronic DVT, severe bilateral LE edema, bilateral ankle pain, possible Charcot deformity, supratherapeutic INR, KARISHMA on CKD, hyperkalemia, DM-2, history of metastatic breast CA, HTN, CAD. Today, feeling well. Pain is controlled in LE with pain medication. Was able to sleep last night. Somewhat hypoglycemic overnight after receiving 10 units R insulin to treat potassium; upon further questioning, she acknowledges that she has not actually been taking her insulin at home due to discomfort with injections. No CP or SOB. No bowel or bladder symptoms. Bilateral ankle pain; had not followed up as directed outpatient, she states. Previously saw Dr. Jonh Haas, had orthotics. Discussed the potential for [place]; mobility concerns at home, chronic left proximal humerus fracture. O: VS- Blood pressure (!) 157/69, pulse 71, temperature 98.1 °F (36.7 °C), temperature source Temporal, resp. rate 18, SpO2 100 %, not currently breastfeeding.   Exam is as noted by resident with the following changes, additions or corrections:  Gen NAD, A&A, sitting comfortably in bed  CV RRR, soft systolic murmur  Resp CTAB  Ext 3+ edema bilateral LE with stasis changes, tenderness and swelling near bilateral ankles, right more than left     Impressions:   Principal Problem:    Ambulatory dysfunction  Active Problems:    Breast cancer ([facility] [address]    Hypertension    Coronary artery disease involving native coronary artery of native heart without angina pectoris    CHF (congestive heart failure) (HCC)    Type 2 diabetes mellitus without complication, with long-term current use of insulin (HCC)    Chronic deep vein thrombosis (DVT) of proximal vein of right lower extremity (HCC)    Bilateral edema of lower extremity    CKD (chronic kidney disease) stage 3, GFR 30-59 ml/min ([facility])  Resolved Problems:    * No resolved hospital problems. *    Plan:   Hold insulin for now and watch glucose on carb-controlled diet. Decrease metformin due to current GFR. Decrease Lyrica dosing with current renal function. Hold Zocor, start Lipitor, with history of CAD and DM, CKD. Lipids have been well controlled, but would give consideration to titrate toward high-intensity treatment. Will start at 20 mg for now and follow. Topical miconazole to skin folds, wound care for erythema of buttocks present before admit (see office note from 4/15/19). Compression and elevation for LE edema. Hold IVF. Encouraged hydration with plain water. She understands. Watch BP and potassium; resuming metoprolol. Follow K+. Podiatry consult. Ankle XR bilaterally. PT pending initial ankle evaluation with caution for chronic humerus fracture. Follow INR, coumadin held for now, pharmacy management appreciated. Oncology consulted for medication management. DEXA will be needed outpatient. Patient is agreeable to considering [place]. SW consulted; input is appreciated. Attending Physician Statement  I have reviewed the chart and seen the patient with the resident(s). I personally reviewed images, EKG's and similar tests, if present. I personally reviewed and performed key elements of the history and exam.  I have reviewed and confirmed student and/or resident history and exam with changes as indicated above. I agree with the assessment, plan and orders as documented by the resident. Please refer to the resident and/or student note for additional information.       Brian Villeda

## 2019-04-16 NOTE — CARE COORDINATION
Social Work:  Met with patient in room. Discussed social work roles and discharge planning/transition of care. Patient lives in apartment with elevator access to enter. PCP is Dr. Emily Garza and pharmacy is [facility]. Discussed benefits of meds to beds, agreeable. Pharmacy and RN aware. Has DME oral, cane, shower chair. Has Hx HHC and Hx [place], unknown agencies. Discussed benefits of [place] vs H[place] at d/c. Patient is not agreeable to [place]. [place] [id number]. Awaiting PT/OT to assist in dc planning. Dtr to transport at d/c.   Electronically signed by DEYANIRA Menchaca on 4/16/2019 at 10:13 AM

## 2019-04-16 NOTE — PROGRESS NOTES
Spoke with Dr. Deangelo Ashley, to discontinue home med Herceptin as this will need to be ordered by Oncology and entered via a [facility] plan. Patient is seen by [facility] and [facility] and practice has been consulted. Dr. Alexandria Page aware and will reorder if needed while inpatient.     Electronically signed by Luis Malagon, [address] [place] [address] on 4/16/2019 at 9:35 AM

## 2019-04-16 NOTE — PROGRESS NOTES
[facility] - Family Medicine Inpatient   Resident Progress Note    S:  Hospital day: 1   Brief Synopsis: Elsy Carreon is a 68 y.o. female who presented with ambulation dysfunction and hip pain     No acute events overnight. Patient was seen and examined this morning. Cont meds:    dextrose      dextrose       Scheduled meds:    metFORMIN  500 mg Oral BID WC    atorvastatin  20 mg Oral Nightly    pregabalin  50 mg Oral TID    miconazole   Topical BID    exemestane  25 mg Oral QAM AC    famotidine  20 mg Oral Daily    metoprolol tartrate  50 mg Oral BID    vitamin B-12  1,000 mcg Oral Daily    sodium chloride flush  10 mL Intravenous 2 times per day    calcium citrate  250 mg Oral BID    And    vitamin D3  200 Units Oral BID    warfarin (COUMADIN) daily dosing (placeholder)   Other RX Placeholder     PRN meds: sodium chloride flush, magnesium hydroxide, ondansetron, glucose, dextrose, glucagon (rDNA), dextrose, HYDROcodone 5 mg - acetaminophen, hydrALAZINE, glucose, dextrose, glucagon (rDNA), dextrose     I reviewed the patient's past medical and surgical history, Medications and Allergies. O:  BP (!) 157/69   Pulse 71   Temp 98.1 °F (36.7 °C) (Temporal)   Resp 18   SpO2 100%   24 hour I&O: I/O last 3 completed shifts:  In: -   Out: 650 [Urine:650]  No intake/output data recorded. GENERAL: Alert, cooperative, no acute distress. HEENT: Normocephalic, atraumatic. conjunctiva/corneas clear, EOM's intact, no pallor or icterus. NECK: Supple, symmetrical, trachea midline, no cervical LAD. No carotid bruit or JVD  CHEST: No tenderness or deformity, full & symmetric excursion  LUNG: Clear to auscultation bilaterally,  respirations unlabored. No rales/wheezing/rubs  HEART: RRR, S1 and S2 normal, no murmur, rub or gallop. DP pulses 2/4  ABDOMEN: SNTND, no masses, no organomegaly, no guarding, rebound or rigidity.    GENITOURINARY: Urinary cath not present   EXTREMITIES:  Extremities normal, atraumatic, b/l 3+ edema. Distal pulses equal bilaterally  SKIN: Skin color, texture, turgor normal, no rashes or lesions  MUSCULOSKELETAL: No joint swelling, no muscle tenderness. Normal ROM in extremities. NEUROLOGIC: Alert & Oriented; CNII-XII intact; power 3/5 on RLE 4/5 LLE 3/5 on LUE 5/5 in RUE; DTRs 2+ and symmetric; reduced sensation up to ankle in b/l LE. Labs:  Na/K/Cl/CO2:  144/5.0, 5.0/111/25 (04/16 9925)  BUN/Cr/glu/ALT/AST/amyl/lip:  24/1.4/--/--/--/--/-- (04/16 0326)  WBC/Hgb/Hct/Plts:  4.0/9.2/31.3/178 (04/16 0326)  estimated creatinine clearance is 39 mL/min (A) (based on SCr of 1.4 mg/dL (H)). Other pertinent labs as noted below    Radiology:  XR FEMUR RIGHT (MIN 2 VIEWS)   Final Result       NO ACUTE FRACTURE OR DISLOCATION      Tricompartmental osteoarthropathy of the right knee      Atherosclerotic change of the right popliteal artery and superficial   femoral artery with calcified plaque. XR HIP RIGHT (2-3 VIEWS)   Final Result      NO ACUTE FRACTURE OR DISLOCATION RIGHT HIP      Osteoarthropathy of the right hip unchanged from prior study. .      XR LUMBAR SPINE (MIN 4 VIEWS)   Final Result      NO ACUTE FRACTURE OR MALALIGNMENT      Bilateral L5-S1 facet joint arthrosis      Marginal spurs suggesting of osteoarthritic changes. .          XR ANKLE LEFT (MIN 3 VIEWS)    (Results Pending)   XR ANKLE RIGHT (MIN 3 VIEWS)    (Results Pending)       A/P:  Principal Problem:    Ambulatory dysfunction  Active Problems:    Breast cancer ([facility]    Hypertension    Coronary artery disease involving native coronary artery of native heart without angina pectoris    CHF (congestive heart failure) ([facility])    Type 2 diabetes mellitus without complication, with long-term current use of insulin ([facility])    Chronic deep vein thrombosis (DVT) of proximal vein of right lower extremity ([facility])    Bilateral edema of lower extremity    CKD (chronic kidney disease) stage 3, GFR 30-59 ml/min ([facility])  Resolved

## 2019-04-16 NOTE — PROGRESS NOTES
Per Dr. Allison Bailon, Herceptin will be held while patient is inpatient. Patient is to follow-up in office once discharged.

## 2019-04-16 NOTE — CARE COORDINATION
Social work followed up with patient regarding [address]. She reports she would like [place] at [place]. Patient given [place] and [address] list for review. Awaiting PT/OT. If [place] is plan, will need precert. SW/CM will continue to follow.   Electronically signed by DEYANIRA Davis on 4/16/2019 at 3:26 PM

## 2019-04-17 VITALS
HEART RATE: 76 BPM | TEMPERATURE: 97.6 F | DIASTOLIC BLOOD PRESSURE: 56 MMHG | OXYGEN SATURATION: 98 % | SYSTOLIC BLOOD PRESSURE: 129 MMHG | RESPIRATION RATE: 18 BRPM

## 2019-04-17 PROBLEM — E11.610 CHARCOT'S JOINT OF FOOT IN TYPE 2 DIABETES MELLITUS (HCC): Status: ACTIVE | Noted: 2019-04-17

## 2019-04-17 LAB
ANION GAP SERPL CALCULATED.3IONS-SCNC: 10 MMOL/L (ref 7–16)
BASOPHILS ABSOLUTE: 0.01 E9/L (ref 0–0.2)
BASOPHILS RELATIVE PERCENT: 0.3 % (ref 0–2)
BUN BLDV-MCNC: 24 MG/DL (ref 8–23)
CALCIUM SERPL-MCNC: 8.9 MG/DL (ref 8.6–10.2)
CHLORIDE BLD-SCNC: 109 MMOL/L (ref 98–107)
CO2: 23 MMOL/L (ref 22–29)
CREAT SERPL-MCNC: 1.3 MG/DL (ref 0.5–1)
EOSINOPHILS ABSOLUTE: 0.24 E9/L (ref 0.05–0.5)
EOSINOPHILS RELATIVE PERCENT: 6.5 % (ref 0–6)
GFR AFRICAN AMERICAN: 48
GFR NON-AFRICAN AMERICAN: 40 ML/MIN/1.73
GLUCOSE BLD-MCNC: 92 MG/DL (ref 74–99)
HCT VFR BLD CALC: 28.4 % (ref 34–48)
HCT VFR BLD CALC: 29.2 % (ref 34–48)
HEMOGLOBIN: 8.5 G/DL (ref 11.5–15.5)
HEMOGLOBIN: 8.6 G/DL (ref 11.5–15.5)
IMMATURE GRANULOCYTES #: 0 E9/L
IMMATURE GRANULOCYTES %: 0 % (ref 0–5)
INR BLD: 6.3
LYMPHOCYTES ABSOLUTE: 0.74 E9/L (ref 1.5–4)
LYMPHOCYTES RELATIVE PERCENT: 20.1 % (ref 20–42)
MCH RBC QN AUTO: 28.4 PG (ref 26–35)
MCHC RBC AUTO-ENTMCNC: 29.9 % (ref 32–34.5)
MCV RBC AUTO: 95 FL (ref 80–99.9)
METER GLUCOSE: 102 MG/DL (ref 74–99)
METER GLUCOSE: 123 MG/DL (ref 74–99)
METER GLUCOSE: 126 MG/DL (ref 74–99)
MONOCYTES ABSOLUTE: 0.4 E9/L (ref 0.1–0.95)
MONOCYTES RELATIVE PERCENT: 10.9 % (ref 2–12)
NEUTROPHILS ABSOLUTE: 2.29 E9/L (ref 1.8–7.3)
NEUTROPHILS RELATIVE PERCENT: 62.2 % (ref 43–80)
PDW BLD-RTO: 17.2 FL (ref 11.5–15)
PLATELET # BLD: 161 E9/L (ref 130–450)
PMV BLD AUTO: 10.7 FL (ref 7–12)
POTASSIUM REFLEX MAGNESIUM: 5.5 MMOL/L (ref 3.5–5)
PROTHROMBIN TIME: 69.5 SEC (ref 9.3–12.4)
RBC # BLD: 2.99 E12/L (ref 3.5–5.5)
SODIUM BLD-SCNC: 142 MMOL/L (ref 132–146)
WBC # BLD: 3.7 E9/L (ref 4.5–11.5)

## 2019-04-17 PROCEDURE — G0378 HOSPITAL OBSERVATION PER HR: HCPCS

## 2019-04-17 PROCEDURE — 85025 COMPLETE CBC W/AUTO DIFF WBC: CPT

## 2019-04-17 PROCEDURE — 6370000000 HC RX 637 (ALT 250 FOR IP): Performed by: FAMILY MEDICINE

## 2019-04-17 PROCEDURE — 97165 OT EVAL LOW COMPLEX 30 MIN: CPT

## 2019-04-17 PROCEDURE — 80048 BASIC METABOLIC PNL TOTAL CA: CPT

## 2019-04-17 PROCEDURE — 85018 HEMOGLOBIN: CPT

## 2019-04-17 PROCEDURE — 99225 PR SBSQ OBSERVATION CARE/DAY 25 MINUTES: CPT | Performed by: FAMILY MEDICINE

## 2019-04-17 PROCEDURE — 6370000000 HC RX 637 (ALT 250 FOR IP): Performed by: STUDENT IN AN ORGANIZED HEALTH CARE EDUCATION/TRAINING PROGRAM

## 2019-04-17 PROCEDURE — 85610 PROTHROMBIN TIME: CPT

## 2019-04-17 PROCEDURE — 97530 THERAPEUTIC ACTIVITIES: CPT | Performed by: PHYSICAL THERAPIST

## 2019-04-17 PROCEDURE — 1200000000 HC SEMI PRIVATE

## 2019-04-17 PROCEDURE — 85014 HEMATOCRIT: CPT

## 2019-04-17 PROCEDURE — 97530 THERAPEUTIC ACTIVITIES: CPT

## 2019-04-17 PROCEDURE — 97161 PT EVAL LOW COMPLEX 20 MIN: CPT | Performed by: PHYSICAL THERAPIST

## 2019-04-17 PROCEDURE — 36415 COLL VENOUS BLD VENIPUNCTURE: CPT

## 2019-04-17 PROCEDURE — 2580000003 HC RX 258: Performed by: STUDENT IN AN ORGANIZED HEALTH CARE EDUCATION/TRAINING PROGRAM

## 2019-04-17 PROCEDURE — 82962 GLUCOSE BLOOD TEST: CPT

## 2019-04-17 RX ORDER — PREGABALIN 50 MG/1
50 CAPSULE ORAL 3 TIMES DAILY
Qty: 90 CAPSULE | Refills: 3 | Status: ON HOLD | OUTPATIENT
Start: 2019-04-17 | End: 2019-06-14 | Stop reason: HOSPADM

## 2019-04-17 RX ORDER — ATORVASTATIN CALCIUM 20 MG/1
20 TABLET, FILM COATED ORAL NIGHTLY
Qty: 30 TABLET | Refills: 3 | Status: ON HOLD | OUTPATIENT
Start: 2019-04-17 | End: 2019-06-26 | Stop reason: HOSPADM

## 2019-04-17 RX ORDER — WARFARIN SODIUM 2 MG/1
TABLET ORAL
Qty: 180 TABLET | Refills: 3 | Status: ON HOLD | OUTPATIENT
Start: 2019-04-17 | End: 2019-06-14 | Stop reason: HOSPADM

## 2019-04-17 RX ADMIN — CYANOCOBALAMIN TAB 1000 MCG 1000 MCG: 1000 TAB at 09:25

## 2019-04-17 RX ADMIN — HYDROCODONE BITARTRATE AND ACETAMINOPHEN 1 TABLET: 5; 325 TABLET ORAL at 06:41

## 2019-04-17 RX ADMIN — EXEMESTANE 25 MG: 25 TABLET, FILM COATED ORAL at 06:36

## 2019-04-17 RX ADMIN — CHOLECALCIFEROL TAB 10 MCG (400 UNIT) 200 UNITS: 10 TAB at 09:25

## 2019-04-17 RX ADMIN — PREGABALIN 50 MG: 50 CAPSULE ORAL at 13:13

## 2019-04-17 RX ADMIN — PREGABALIN 50 MG: 50 CAPSULE ORAL at 09:24

## 2019-04-17 RX ADMIN — Medication 10 ML: at 09:25

## 2019-04-17 RX ADMIN — METOPROLOL TARTRATE 50 MG: 50 TABLET, FILM COATED ORAL at 09:24

## 2019-04-17 RX ADMIN — MICONAZOLE NITRATE: 20 CREAM TOPICAL at 09:25

## 2019-04-17 RX ADMIN — Medication 250 MG: at 09:24

## 2019-04-17 RX ADMIN — FAMOTIDINE 20 MG: 20 TABLET ORAL at 09:30

## 2019-04-17 RX ADMIN — METFORMIN HYDROCHLORIDE 500 MG: 500 TABLET ORAL at 09:25

## 2019-04-17 RX ADMIN — METFORMIN HYDROCHLORIDE 500 MG: 500 TABLET ORAL at 16:01

## 2019-04-17 ASSESSMENT — PAIN DESCRIPTION - DESCRIPTORS
DESCRIPTORS: ACHING;THROBBING
DESCRIPTORS: ACHING;THROBBING

## 2019-04-17 ASSESSMENT — PAIN DESCRIPTION - ORIENTATION
ORIENTATION: RIGHT
ORIENTATION: RIGHT

## 2019-04-17 ASSESSMENT — PAIN DESCRIPTION - FREQUENCY
FREQUENCY: CONTINUOUS
FREQUENCY: CONTINUOUS

## 2019-04-17 ASSESSMENT — PAIN DESCRIPTION - ONSET
ONSET: ON-GOING
ONSET: ON-GOING

## 2019-04-17 ASSESSMENT — PAIN DESCRIPTION - LOCATION
LOCATION: LEG
LOCATION: LEG

## 2019-04-17 ASSESSMENT — PAIN DESCRIPTION - PAIN TYPE
TYPE: ACUTE PAIN
TYPE: ACUTE PAIN

## 2019-04-17 ASSESSMENT — PAIN SCALES - GENERAL
PAINLEVEL_OUTOF10: 7
PAINLEVEL_OUTOF10: 4
PAINLEVEL_OUTOF10: 0
PAINLEVEL_OUTOF10: 0

## 2019-04-17 ASSESSMENT — PAIN - FUNCTIONAL ASSESSMENT
PAIN_FUNCTIONAL_ASSESSMENT: PREVENTS OR INTERFERES SOME ACTIVE ACTIVITIES AND ADLS
PAIN_FUNCTIONAL_ASSESSMENT: PREVENTS OR INTERFERES SOME ACTIVE ACTIVITIES AND ADLS

## 2019-04-17 ASSESSMENT — PAIN DESCRIPTION - PROGRESSION
CLINICAL_PROGRESSION: NOT CHANGED
CLINICAL_PROGRESSION: NOT CHANGED

## 2019-04-17 NOTE — PLAN OF CARE
Problem: Falls - Risk of:  Goal: Will remain free from falls  Description  Will remain free from falls  4/17/2019 0939 by Lady Candida RN  Outcome: Met This Shift     Problem: Falls - Risk of:  Goal: Absence of physical injury  Description  Absence of physical injury  4/17/2019 0939 by Lady Candida RN  Outcome: Met This Shift     Problem: Mobility - Impaired:  Goal: Mobility will improve  Description  Mobility will improve  Outcome: Met This Shift

## 2019-04-17 NOTE — PROGRESS NOTES
Dr. Trey Odonnell notified via [employer] regarding patient's abnormal labs today of K+ 5.5 and INR 6.3.

## 2019-04-17 NOTE — PROGRESS NOTES
famotidine (PEPCID) tablet 20 mg  20 mg Oral Daily Susanna Newton MD   20 mg at 04/16/19 0956    metoprolol tartrate (LOPRESSOR) tablet 50 mg  50 mg Oral BID Susanna Newton MD   50 mg at 04/16/19 2040    vitamin B-12 (CYANOCOBALAMIN) tablet 1,000 mcg  1,000 mcg Oral Daily Susanna Newton MD   1,000 mcg at 04/16/19 5234    sodium chloride flush 0.9 % injection 10 mL  10 mL Intravenous 2 times per day Susanna Newton MD   10 mL at 04/16/19 2040    sodium chloride flush 0.9 % injection 10 mL  10 mL Intravenous PRN Susanna Newton MD        magnesium hydroxide (MILK OF MAGNESIA) 400 MG/5ML suspension 30 mL  30 mL Oral Daily PRN Susanna Newton MD        ondansetron [facility]) injection 4 mg  4 mg Intravenous Q6H PRN Susanna Newton MD        calcium citrate (CALCITRATE) tablet 250 mg  250 mg Oral BID Susanna Newton MD   250 mg at 04/16/19 2040    And    vitamin D3 (CHOLECALCIFEROL) tablet 200 Units  200 Units Oral BID Susanna Newton MD   200 Units at 04/16/19 2040    HYDROcodone-acetaminophen (NORCO) 5-325 MG per tablet 1 tablet  1 tablet Oral Q6H PRN Susanna Newton MD   1 tablet at 04/17/19 6096    warfarin (COUMADIN) daily dosing (placeholder)   Other RX Taigo Moeller MD        hydrALAZINE (APRESOLINE) injection 10 mg  10 mg Intravenous Q6H PRN Tawnya High MD        glucose (GLUTOSE) 40 % oral gel 15 g  15 g Oral PRN Tawnya High MD        dextrose 50 % solution 12.5 g  12.5 g Intravenous PRN Tawnya High MD        glucagon (rDNA) injection 1 mg  1 mg Intramuscular PRN Tawnya High MD        dextrose 5 % solution  100 mL/hr Intravenous PRN Tawnya High MD             IMPRESSION:  Patient Active Problem List   Diagnosis    [facility]    Hypertension    Coronary artery disease involving native coronary artery of native heart without angina pectoris    Nephrolithiasis    CHF [facility]congestive heart failure) [facility]    Humerus fracture    Shoulder pain, left    B12 deficiency    Hyperlipidemia    Rib lesion    Subclavian vein occlusion, bilateral ([facility])    Pericardial effusion    MI (myocardial infarction) ([facility])    Arthritis    Sarcoidosis    Lymph node enlargement    Anesthesia    Rectal bleeding    Ventral hernia    Type 2 diabetes mellitus without complication, with long-term current use of insulin ([facility])    Anemia    Chronic deep vein thrombosis (DVT) of proximal vein of right lower extremity ([facility])    Bilateral edema of lower extremity    Peripheral polyneuropathy    Complicated UTI (urinary tract infection)    Hyperkalemia    KARISHMA [facility]acute kidney injury) ([facility]    Diarrhea with dehydration    Chronic anticoagulation    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  No evidence of fracture  - Podiatry consulted  - No acute oncologic interventions, will continue outpt Herceptin as scheduled   - Will work up anemia with B12/folate, iron studies and smear      Shawna Ganser, MD

## 2019-04-17 NOTE — PROGRESS NOTES
OCCUPATIONAL THERAPY INITIAL EVALUATION      Date:2019  Patient Name: Laurie Michael  MRN: [id number]  : 1945  Room: [address] [place] [address]      Evaluating OT: Chelita Brownlee OTR/L #[id number]    AM-PAC Daily Activity Raw Score:     Recommended Adaptive Equipment: ww, BSC, shower bench, AE for LE ADLs- To be further assessed      Diagnosis: ambulatory dysfunction       Pertinent Medical History: arthritis, CAD, closed fx L humerus- no surgery, lymphedema BLE     Precautions:  Falls, history of Breast CA with METS, INR 6.3, bed alarm      Home Living: Pt lives alone in an apt with elevator access   Bathroom setup: tub shower with shower chair  Equipment owned: shower chair, ww  Prior Level of Function: Modified [place]  with ADLs , Modified [place]  with IADLs; using ww for ambulation. Driving: no  Occupation: na    Pain Level: 5/10 in R hip at rest, 10/10 with activity,  nursing is aware  Cognition: A&O: 4/4; Follows 2 step directions   Memory:  good    Sequencing:  fair    Problem solving:  fair    Judgement/safety:  poor+     Functional Assessment:   Initial Eval Status  Date: 19 Treatment Status  Date: Short Term Goals  Treatment frequency: PRN    Feeding Stand by Assist   Independent    Grooming Moderate Assist   Supervision    UB Dressing Moderate Assist   Stand by Assist    LB Dressing Moderate Assist   Stand by Assist    Bathing Moderate Assist  Stand by Assist    Toileting Moderate Assist   Stand by Assist    Bed Mobility  Supine to sit: Moderate Assist   Sit to supine:  Moderate Assist   Supine to sit: Stand by Assist   Sit to supine: Stand by Assist    Functional Transfers Sit to stand: Contact Guard Assist   Stand to sit: Minimal Assist   Stand pivot: Minimal Assist   Stand by Assist    Functional Mobility Min A with ww  2 steps forward/back/sidesteps  Supervision with ww  Functional distance with good balance   Balance Sitting:     Static:  Min A    Dynamic:min A  Standing: min A

## 2019-04-17 NOTE — PROGRESS NOTES
[address] [facility]  Family Medicine Attending    S: 68 y.o. female with inability to ambulate at home due to severe right LE pain, history of chronic DVT, severe bilateral LE edema, bilateral ankle pain, possible Charcot deformity, supratherapeutic INR, KARISHMA on CKD, hyperkalemia, DM-2, history of metastatic breast CA, HTN, CAD. Chronic proximal left humerus fracture. Today, feeling well. Pain is controlled in LE with pain medication. No CP or SOB. No bowel or bladder symptoms. No constipation on the pain medication so far. Has had PT evaluation, and podiatry has been to see her yesterday. Pain diffusely in hip, back, and legs; states that this is controlled with the pain medication. O: VS- Blood pressure (!) 124/58, pulse 79, temperature 97.8 °F (36.6 °C), temperature source Temporal, resp. rate 16, SpO2 94 %, not currently breastfeeding. Exam is as noted by resident with the following changes, additions or corrections:  Gen NAD, A&A, sitting comfortably in bed  CV RRR, soft systolic murmur  Resp CTAB  Ext 3+ edema bilateral LE with stasis changes, tenderness and swelling near bilateral ankles, right more than left     Impressions:   Principal Problem:    Ambulatory dysfunction  Active Problems:    Breast cancer ([facility]    Hypertension    Coronary artery disease involving native coronary artery of native heart without angina pectoris    CHF (congestive heart failure) ([facility])    Type 2 diabetes mellitus without complication, with long-term current use of insulin ([facility])    Chronic deep vein thrombosis (DVT) of proximal vein of right lower extremity ([facility])    Bilateral edema of lower extremity    CKD (chronic kidney disease) stage 3, GFR 30-59 ml/min ([facility])  Resolved Problems:    * No resolved hospital problems. *    Plan:   Hold insulin for now and watch glucose on carb-controlled diet. Glucose controlled. Decrease metformin due to current GFR. Decrease Lyrica dosing with current renal function.

## 2019-04-17 NOTE — PROGRESS NOTES
mobility. Patient response to education:   Pt verbalized understanding Pt demonstrated skill Pt requires further education in this area     x     Rehab potential is Good for reaching above PT goals. Pts/ family goals   1. None stated. Patient and or family understand(s) diagnosis, prognosis, and plan of care. PLAN  PT care will be provided in accordance with the objectives noted above. Whenever appropriate, clear delegation orders will be provided for nursing staff. Exercises and functional mobility practice will be used as well as appropriate assistive devices or modalities to obtain goals. Patient and family education will also be administered as needed. Frequency of treatments will be 2-5x/week x 4-6 days.     Time in: 0750  Time out: 0809  Evaluation + 10  minutes tx time    Cely Larson PT, DPT   License number:  [id number]

## 2019-04-17 NOTE — PROGRESS NOTES
[facility] - Family Medicine Inpatient   Resident Progress Note    S:  Hospital day: 0   Brief Synopsis: Celeste Pedroza is a 68 y.o. female who presented with RLE hip and leg pain     No acute events overnight. Patient was seen and examined this morning. Cont meds:    dextrose       Scheduled meds:    metFORMIN  500 mg Oral BID WC    atorvastatin  20 mg Oral Nightly    pregabalin  50 mg Oral TID    miconazole   Topical BID    exemestane  25 mg Oral QAM AC    famotidine  20 mg Oral Daily    metoprolol tartrate  50 mg Oral BID    vitamin B-12  1,000 mcg Oral Daily    sodium chloride flush  10 mL Intravenous 2 times per day    calcium citrate  250 mg Oral BID    And    vitamin D3  200 Units Oral BID    warfarin (COUMADIN) daily dosing (placeholder)   Other RX Placeholder     PRN meds: sodium chloride flush, magnesium hydroxide, ondansetron, HYDROcodone 5 mg - acetaminophen, hydrALAZINE, glucose, dextrose, glucagon (rDNA), dextrose     I reviewed the patient's past medical and surgical history, Medications and Allergies. O:  BP (!) 124/58   Pulse 79   Temp 97.8 °F (36.6 °C) (Temporal)   Resp 16   SpO2 94%   24 hour I&O: No intake/output data recorded. No intake/output data recorded. GENERAL: Alert, cooperative, no acute distress. HEENT: Normocephalic, atraumatic. PERRLA, conjunctiva/corneas clear, EOM's intact, no pallor or icterus. NECK: Supple, symmetrical, trachea midline, no cervical LAD. No carotid bruit or JVD  CHEST: No tenderness or deformity, full & symmetric excursion  LUNG: Clear to auscultation bilaterally,  respirations unlabored. No rales/wheezing/rubs  HEART: RRR, S1 and S2 normal, no murmur, rub or gallop. DP pulses 2/4  ABDOMEN: SNTND, no masses, no organomegaly, no guarding, rebound or rigidity. GENITOURINARY: Urinary cath not present   EXTREMITIES:  Extremities normal, atraumatic, no cyanosis or edema.  Distal pulses equal bilaterally  SKIN: Skin color, texture, turgor normal, no rashes or lesions  MUSCULOSKELETAL: No joint swelling, no muscle tenderness. Normal ROM in extremities. LYMPH NODES: no lymph node enlargement appreciated  NEUROLOGIC: Alert & Oriented; CNII-XII intact; power 3/5 on RLE 4/5 LLE 3/5 on LUE 5/5 in RUE; DTRs 2+ and symmetric; reduced sensation up to ankle in b/l LE          Labs:  Na/K/Cl/CO2:  142/5.5/109/23 (04/17 2561)  BUN/Cr/glu/ALT/AST/amyl/lip:  24/1.3/--/--/--/--/-- (04/17 5148)  WBC/Hgb/Hct/Plts:  3.7/8.5/28.4/161 (04/17 0521)  CrCl cannot be calculated (Unknown ideal weight.). Other pertinent labs as noted below    Radiology:  XR ANKLE LEFT (MIN 3 VIEWS)   Final Result   1. No definitive radiographic evidence of acute displaced bony   fracture. If there is persistent clinical pain or symptomatology, a   return to medical attention within 2-7 days and further imaging is   recommended. .   2. No midfoot collapse. 3. Left ankle joint effusion. 4. Moderate tibiotalar, talonavicular, naviculocuneiform and first   digit TMT osteoarthritic change. 4. Vascular calcifications. XR ANKLE RIGHT (MIN 3 VIEWS)   Final Result   1. No definitive acute fractures seen. If there is persistent clinical   pain or symptomatology, a return to medical attention within 2-7 days   and further imaging is recommended. .   2. No midfoot collapse identified although this study is limited by   lack of standing imaging. 3. Mild to moderate degenerative changes tibiotalar, talonavicular,   naviculocuneiform and first digit TMT joints with osteopenia and   vascular calcifications also noted. 4. Soft tissue swelling/prominence. XR FEMUR RIGHT (MIN 2 VIEWS)   Final Result       NO ACUTE FRACTURE OR DISLOCATION      Tricompartmental osteoarthropathy of the right knee      Atherosclerotic change of the right popliteal artery and superficial   femoral artery with calcified plaque.          XR HIP RIGHT (2-3 VIEWS)   Final Result      NO ACUTE FRACTURE OR DISLOCATION RIGHT HIP      Osteoarthropathy of the right hip unchanged from prior study. .      XR LUMBAR SPINE (MIN 4 VIEWS)   Final Result      NO ACUTE FRACTURE OR MALALIGNMENT      Bilateral L5-S1 facet joint arthrosis      Marginal spurs suggesting of osteoarthritic changes. .              A/P:  Principal Problem:    Ambulatory dysfunction  Active Problems:    Breast cancer ([facility]    Hypertension    Coronary artery disease involving native coronary artery of native heart without angina pectoris    CHF (congestive heart failure) ([facility])    Type 2 diabetes mellitus without complication, with long-term current use of insulin ([facility])    Chronic deep vein thrombosis (DVT) of proximal vein of right lower extremity ([facility])    Bilateral edema of lower extremity    CKD (chronic kidney disease) stage 3, GFR 30-59 ml/min ([facility])  Resolved Problems:    * No resolved hospital problems.  *      Ambulatory dysfunction   Chronic pains in back and LE   Norco for pain relief   Patient lying very uncomfortably in bed   PT/OT eval and treat   SW for rehab   Xray hip, back, and femur to r/o fracture   Patient is prone to pathological fracture in view of her cancer   Continue home Vit D+ Calcium   Last DEXA in 2011 shows T score of -0.6 for lumbar and 0 for hip (WNL) will likely need outpatient dexa if fracture observed      Breast Cancer   Patient in remission   Continue home medication(s)   IV herceptin once weekly   Oncology consulted         CKD stage III  Avoid nephrotoxic meds   Monitor kidney function   Dose adjustment for metformin, Lyrica  Will need OP nephrology   Monitor K+ may need potassium lowering agents if it rises again   For now monitor kidney fx and electrolytes   Avoid orange juice      B/l Charcot's foot with possible charcot's ankle    B/l Ankle pain   No acute fracture   Podiatry: CROW boot for Ankle and leg       T2DM   Uses insulin and is well controlled   Last A1c 4/15/2019: 6.9    Hypoglycemia orders placed   Hold insulin for now and observe sugars   Metformin decreased due to GFR     Chronic DVT   On warfarin   Pharmacy to dose   Supratherapuetic   Target 2-3     B/l LE lymphedema   Would benefit from compressions  Not on diuretic  Stop IVF, encourage PO hydration     Skin folds rash   Miconazole cream      Buttock erythema   Present before admission   Wound care consult     B/l LE neuropathy   Continue pregabalin but reduced dose     HTN  CAD   Continue home medication(s)   Lopressor 50 mh BID   Start Lipitor 20mg would benefit from higher intensity  Watch BP and K           DVT / GI prophylaxis: Warfarin  and Pepcid     Dispo - PT/OT, SW, Gen floor           Electronically signed by Princess René MD on 4/17/2019 at 12:11 PM  This case was discussed with senior resident and attending physician: Dr. Shahnaz Sadler

## 2019-04-17 NOTE — FLOWSHEET NOTE
Inpatient Wound Care(initial evaluation) 8408a    Admit Date: 4/15/2019  1:43 PM    Reason for consult:  Folds, legs    Findings:     04/17/19 1526   Skin Integrity   Skin Integrity Redness;Rash   Location left abdominal fold   Skin Integrity Site 2   Skin Integrity Location 2   (purplish red)   Location 2 medial thighs   Skin Integrity Site 3   Skin Integrity Location 3   (vascular discoloration)    Location 3 BLE     Plan:  Orders reviewed and updated  Antifungal to folds, medial thighs  Will need continued preventative care    Maggie Malone 4/17/2019 3:27 PM

## 2019-04-17 NOTE — CONSULTS
[address]                  [address] [facility],  [facility]                                  CONSULTATION    PATIENT NAME: Sy Trevino                    :        1945  MED REC NO:   [id number]                            ROOM:       [id number]  ACCOUNT NO:   [de-identified]                           ADMIT DATE: 04/15/2019  PROVIDER:     Ju Galvez DPM    CONSULT DATE:  2019    PRIMARY CARE PROVIDER:  Sri Gustafson D.O.    HISTORY OF PRESENT ILLNESS:  This 59-year-old female presents today with  painful bilateral ankles, right greater than left. Consultation again  was done on yesterday, 2019. She had this pain for quite some  time, to see if anything could be done concerning this condition. PAST MEDICAL HISTORY:  Type 2 diabetes including a vein occlusion,  sarcoidosis, rib lesion, pulmonary nodule, MI, lymphedema,  hyperlipidemia, hypertension, DVT, arthritis, anesthesia. PAST FAMILY HISTORY:  Please see chart. PAST SOCIAL HISTORY:  Does not smoke. Does not consume alcohol. MEDICATIONS:  See chart. ALLERGIES:  MACROBID. OBJECTIVE:  VITAL SIGNS:  Temperature is 97.7, respiratory rate 16, pulse 83, blood  pressure 120/62. VASCULAR:  DP and PT are 1/4 bilateral.  CFT is less than 3. Warm to  cold temperature noted. NEUROLOGIC:  Diminished in bilateral feet. DERMATOLOGIC:  Positive edema noted in bilateral ankles and bilateral  feet. No open lesions are noted. MUSCULOSKELETAL:  There is crepitus noted bilateral feet and bilateral  ankles on dorsiflexion, plantarflexion, varus, and valgus rotation. ASSESSMENT:  1. NIDDM neuropathy. 2.  Pain at the bilateral feet and ankles with edema, bilateral legs. 3.  Charcot foot, Charcot ankle, bilateral feet and ankles. PLAN:  1. Evaluation and management. 2.  Educate. 3.  Rest, elevate, offload. 4.  Went over all diabetic education.   5.  We will have the patient

## 2019-04-17 NOTE — CARE COORDINATION
Social work followed up with [facility], able to accept. Precert started. N-17 started. HENS complete. Ambulette form, facesheet, and envelope in soft chart. Went to update patient, sound asleep. Charge RN made aware.   Electronically signed by DEYANIRA Portillo on 4/17/2019 at 2:47 PM

## 2019-04-17 NOTE — DISCHARGE INSTR - COC
Continuity of Care Form    Patient Name: Alex Navas   :  1945  MRN:  [id number]    Admit date:  4/15/2019  Discharge date:  2019    Code Status Order: Full Code   Advance Directives:   Advance Care Flowsheet Documentation     Date/Time Healthcare Directive Type of Healthcare Directive Copy in [address] Agent's Name Healthcare Agent's Phone Number    04/15/19 [phone]  Yes, patient has an advance directive for healthcare treatment  Durable power of  for health care  No, copy requested from family  Adult [address] [facility]  [phone]          Admitting Physician:  Caitlyn Sol DO  PCP: Miladis Monroy DO    Discharging Nurse: [facility] Unit/Room#: 4277/0263-F  Discharging Unit Phone Number: [phone]    Emergency Contact:   Extended Emergency Contact Information  Primary Emergency Contact: Cynthia Koenig  Address: [address] Phone: [phone]  Mobile Phone: [phone]  Relation: Child  Hearing or visual needs: None  Other needs: None  Preferred language: English   needed? No    Past Surgical History:  Past Surgical History:   Procedure Laterality Date    APPENDECTOMY      ARTERIAL BYPASS SURGRY      BREAST LUMPECTOMY  2005    LEFT    CARDIAC SURGERY      CHOLECYSTECTOMY      COLONOSCOPY  2007    cecal arteriovenous malformation with hyperplastic polyps    COLONOSCOPY  [id number]    CORONARY ARTERY BYPASS GRAFT  2008    triple bypass    ECHO COMPL W DOP COLOR FLOW  10/30/2013         EYE SURGERY      clarissa cataracts    HYSTERECTOMY  ,     MAYNOR prolapse, benign conditions; BSO later for scar tissues, no CA.      OTHER SURGICAL HISTORY  11    port removal right chest wall    TOOTH EXTRACTION      Full Dental Extraction.     TUNNELED VENOUS PORT PLACEMENT      removal of port Dec. 2011- Dr. Bruce Carey TUNNELED VENOUS PORT PLACEMENT  [id number]    RIGHT CHEST       Immunization History:   Immunization History   Administered Date(s) Administered    Influenza Virus Vaccine 11/26/2008, 10/28/2011, 11/19/2012, 10/11/2013, 02/23/2015    Influenza, High Dose (Fluzone 65 yrs and older) 10/19/2015, 10/03/2016    Influenza, MDCK Quadv, IM, PF (Flucelvax 4 yrs and older) 10/23/2017    Influenza, Shayne Marshal, 3 yrs and older, IM, PF (Fluzone 3 yrs and older or Afluria 5 yrs and older) 10/15/2018    Pneumococcal 13-valent Conjugate ([id number]) 09/14/2015    Pneumococcal Polysaccharide ([id number]) 10/28/2011    Tdap (Boostrix, Adacel) 12/05/2011       Active Problems:  Patient Active Problem List   Diagnosis Code    Breast cancer ([facility] C50.919    Hypertension I10    Coronary artery disease involving native coronary artery of native heart without angina pectoris I25.10    Nephrolithiasis N20.0    CHF (congestive heart failure) ([facility]) I50.9    Humerus fracture S42.309A    Shoulder pain, left M25.512    B12 deficiency E53.8    Hyperlipidemia E78.5    Rib lesion M89.9    Subclavian vein occlusion, bilateral ([facility] I82. B13    Pericardial effusion I31.3    MI (myocardial infarction) ([facility] I21.9    Arthritis M19.90    Sarcoidosis D86.9    Lymph node enlargement R59.9    Anesthesia R20.0    Rectal bleeding K62.5    Ventral hernia K43.9    Type 2 diabetes mellitus without complication, with long-term current use of insulin ([facility]) E11.9, Z79.4    Anemia D64.9    Chronic deep vein thrombosis (DVT) of proximal vein of right lower extremity ([facility]) I82.5Y1    Bilateral edema of lower extremity R60.0    Peripheral polyneuropathy H75.9    Complicated UTI (urinary tract infection) N39.0    Hyperkalemia E87.5    KARISHMA (acute kidney injury) ([facility] N17.9    Diarrhea with dehydration R19.7    Chronic anticoagulation Z79.01    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 S42.202K    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6 °C) (Temporal)   Resp 16   SpO2 94%     Last documented pain score (0-10 scale): Pain Level: 7  Last Weight:   Wt Readings from Last 1 Encounters:   04/15/19 198 lb (89.8 kg)     Mental Status:  oriented, alert and able to concentrate and follow conversation    IV Access:  - None    Nursing Mobility/ADLs:  Walking   Assisted  Transfer  Assisted  Bathing  Assisted  Dressing  Assisted  Toileting  Assisted  Feeding  Independent  Med Admin  Assisted  Med Delivery   whole    Wound Care Documentation and Therapy:        Elimination:  Continence:   · Bowel: Yes  · Bladder: Yes  Urinary Catheter: None   Colostomy/Ileostomy/Ileal Conduit: No       Date of Last BM: 4/17  No intake or output data in the 24 hours ending 04/17/19 1439  No intake/output data recorded. Safety Concerns: At Risk for Falls    Impairments/Disabilities:      None    Nutrition Therapy:  Current Nutrition Therapy:   - Oral Diet:  Low Sodium (2gm) and low potassium dental soft    Routes of Feeding: Oral  Liquids: No Restrictions  Daily Fluid Restriction: no  Last Modified Barium Swallow with Video (Video Swallowing Test): not done    Treatments at the Time of Hospital Discharge:   Respiratory Treatments: ***  Oxygen Therapy:  is not on home oxygen therapy.   Ventilator:    - ***    Rehab Therapies: {THERAPEUTIC INTERVENTION:7441183018}  Weight Bearing Status/Restrictions: { CC Weight Bearin}  Other Medical Equipment (for information only, NOT a DME order):  {EQUIPMENT:059571138}  Other Treatments: ***    Patient's personal belongings (please select all that are sent with patient):  {CHP DME Belongings:366129391}    RN SIGNATURE:  Electronically signed by Juan C Kothari RN on 19 at 5:16 PM    CASE MANAGEMENT/SOCIAL WORK SECTION    Inpatient Status Date: 19    Readmission Risk Assessment Score:  Readmission Risk              Risk of Unplanned Readmission:        23           Discharging to Facility/ Agency   · Name: [facility]  · Address:  · Phone:  · Fax:    Dialysis Facility (if applicable)   · Name:  · Address:  · Dialysis Schedule:  · Phone:  · Fax:    / signature: Electronically signed by DEYANIRA Pelletier on 19 at 2:39 PM    PHYSICIAN SECTION    Prognosis: Good    Condition at Discharge: Stable    Rehab Potential (if transferring to Rehab): Good    Recommended Labs or Other Treatments After Discharge: Please monitor BMP and INR during [place]. Patient's warfarin is held due to increased INR, also watch for hyperkalemia. Physician Certification: I certify the above information and transfer of Tia Cruz  is necessary for the continuing treatment of the diagnosis listed and that she requires [place] for less 30 days.      Update Admission H&P: No change in H&P    PHYSICIAN SIGNATURE:  Electronically signed by Mu Coombs MD on 19 at 4:44 PM

## 2019-04-17 NOTE — CONSULTS
Podiatry Progress Note  4/17/2019   Cristian Akbar       SUBJECTIVE: This is a 68 y.o. female seen bedside for B/L ankle pain. Patient is known to service and saw Dr. Inessa Vila 6 months ago for B/L ankle fractures, which were treated non-surgically. Patient recently admitted for ambulatory dysfunction, stating she has a recent onset of R hip pain which radiates down her leg. Patient states he ankle pain has not increased since her last visit with podiatry. Patient denies any constitutional symptoms at this time. Patient denies any other pedal complaints. Past Medical History:   Diagnosis Date    Abscess of abdominal wall     Anemia     Iron deficiency and chronic disease.  Anesthesia     DIFFICULTY WAKING UP    Arthritis     Arthritis, hip     Breast cancer ([facility] 2005    S/P Left Lumpectomy with Lymph Node Dissection, Chemo/Rad. Follows with Dr. Swathi Gomez. No recurrence to date. Surgeon was Dr. Lacie Parish.  CAD (coronary artery disease) 5/2008    s/p CABG. Follows with Sachi Ortega.  CHF (congestive heart failure) (HCC)     Chronic venous insufficiency 11/7/2018    Decreased dorsalis pedis pulse 11/7/2018    Diabetic neuropathy (HCC)     Diabetic neuropathy (HCC)     DVT (deep venous thrombosis) (HCC)     Recurrent. On lifelong coumadin.  DVT of upper extremity (deep vein thrombosis) ([facility] 4/18/2012    History of deep vein thrombosis 11/7/2018    Humerus fracture     Chronic, on the Left.  Hyperlipidemia 8/29/2011    Hypertension     Leg swelling 11/7/2018    Lymph node enlargement     Lymphedema of both lower extremities 11/7/2018    MI (myocardial infarction) ([facility]     Nephrolithiasis     Follows with Dr. Clayton Rodriguez.  Pericardial effusion     Pulmonary nodule     With mediastinal lymphadenopathy. Stable. Follows with Dr. Ezekiel Gandara.     Rib lesion 11/28/11    expansile lesion in one of the right ribs laterally    Sarcoidosis 07/26/11    per transbronchial needle aspiration    Subclavian vein occlusion, bilateral ([facility] 4/18/2012    Type II or unspecified type diabetes mellitus without mention of complication, not stated as uncontrolled         Past Surgical History:   Procedure Laterality Date    APPENDECTOMY      ARTERIAL BYPASS SURGRY      BREAST LUMPECTOMY  9/27/2005    LEFT    CARDIAC SURGERY      CHOLECYSTECTOMY      COLONOSCOPY  12/2007    cecal arteriovenous malformation with hyperplastic polyps    COLONOSCOPY  [id number]    CORONARY ARTERY BYPASS GRAFT  05/2008    triple bypass    ECHO COMPL W DOP COLOR FLOW  10/30/2013         EYE SURGERY      clarissa cataracts    HYSTERECTOMY  1975, 1985    MAYNOR prolapse, benign conditions; BSO later for scar tissues, no CA.      OTHER SURGICAL HISTORY  11/18/11    port removal right chest wall    TOOTH EXTRACTION      Full Dental Extraction.  TUNNELED VENOUS PORT PLACEMENT      removal of port Dec. 2011- Dr. Abby Means [place] [id number]  [id number]    RIGHT CHEST         Family History   Adopted: Yes        Social History     Tobacco Use    Smoking status: Never Smoker    Smokeless tobacco: Never Used   Substance Use Topics    Alcohol use: No        Prior to Admission medications    Medication Sig Start Date End Date Taking? Authorizing Provider   pregabalin (LYRICA) 100 MG capsule Take 1 capsule by mouth 3 times daily for 180 days. 3/13/19 9/9/19 Yes Venu Small MD   simvastatin (ZOCOR) 20 MG tablet take 1 tablet by mouth at bedtime 2/21/19  Yes Urszula Vang DO   metoprolol tartrate (LOPRESSOR) 50 MG tablet take 1 tablet by mouth twice a day 2/21/19  Yes Urszula Vang DO   warfarin (COUMADIN) 2 MG tablet Take 2 tablets orally on Sun, Tues, Thurs; and 1 tablet daily on all other days.  1/14/19  Yes Urszula Vang DO   metFORMIN (GLUCOPHAGE) 1000 MG tablet take 1 tablet by mouth twice a day with meals 11/26/18  Yes Urszula Vang DO   famotidine (PEPCID) 20 MG tablet take 1 tablet by mouth twice a day 11/6/18 Yes Trell Pardo DO   vitamin B-12 (CYANOCOBALAMIN) 1000 MCG tablet Take 1 tablet by mouth daily 11/6/18  Yes Trell Pardo DO   Calcium Citrate-Vitamin D (ARVIND CALCIUM CIT-VIT D-3 PETITES) 200-250 MG-UNIT TABS take 1 tablet by mouth twice a day 3/6/18  Yes Trell Pardo DO   exemestane (AROMASIN) 25 MG chemo tablet Take 1 tablet by mouth every morning (before breakfast) 10/10/17  Yes Historical Provider, MD   insulin glargine (LANTUS SOLOSTAR) 100 UNIT/ML injection pen Inject 65 Units into the skin nightly 10/23/17  Yes Trell Pardo DO   Multiple Vitamins-Minerals (THERAPEUTIC MULTIVITAMIN-MINERALS) tablet Take 1 tablet by mouth daily 6/6/17  Yes Trell Pardo DO   trastuzumab (HERCEPTIN) 440 MG injection Infuse  intravenously once a week. On tuesdays   Yes Historical Provider, MD   traMADol (ULTRAM) 50 MG tablet take 1 tablet by mouth every 6 hours if needed for pain for UP TO. ..  (REFER TO PRESCRIPTION NOTES). 10/28/18   Historical Provider, MD SAMUELS VITAMIN B-12 TR 1000 MCG TBCR take 1 tablet by mouth once daily 11/25/18   Historical Provider, MD   diclofenac sodium 1 % GEL Apply 4 g topically 4 times daily 11/30/18   Roro Schmitt MD        Macrobid [nitrofurantoin monohydrate macrocrystals]         OBJECTIVE:        Vitals:    04/17/19 0845   BP: (!) 124/58   Pulse: 79   Resp: 16   Temp: 97.8 °F (36.6 °C)   SpO2: 94%              EXAM:        Pt is AAOx3    Vascular Exam:  DP and PT pulses are +1 on the right . DP and PT pulses are +1 on the left. CFT is <3 seconds bilateral lower extremity. Edema is Present +2 in B/L LE    Neuro Exam:  Light touch sensation is Absent  bilaterally Protective sensation is decreased 5/10 using Barrett-Shaggy monofilament bilaterally    Dermatologic Exam:  Skin is moist and intact with no breaks or lesions    MSK: Muscle strength 3/5 Bilateral. Pain and reduced ROM of R ankle.     Current Facility-Administered Medications   Medication Dose Route Frequency Provider Last Rate Last Dose    metFORMIN (GLUCOPHAGE) tablet 500 mg  500 mg Oral BID WC Dairl Andrew, DO   500 mg at 04/17/19 0925    atorvastatin (LIPITOR) tablet 20 mg  20 mg Oral Nightly Dairl Andrew, DO   20 mg at 04/16/19 2040    pregabalin (LYRICA) capsule 50 mg  50 mg Oral TID Dairl Andrew, DO   50 mg at 04/17/19 0924    miconazole (MICOTIN) 2 % cream   Topical BID Dairl Andrew, DO        exemestane (AROMASIN) tablet 25 mg  25 mg Oral QAM AC Laurie Urrutia MD   25 mg at 04/17/19 0636    famotidine (PEPCID) tablet 20 mg  20 mg Oral Daily Laurie Urrutia MD   20 mg at 04/17/19 0930    metoprolol tartrate (LOPRESSOR) tablet 50 mg  50 mg Oral BID Laurie Urrutia MD   50 mg at 04/17/19 7892    vitamin B-12 (CYANOCOBALAMIN) tablet 1,000 mcg  1,000 mcg Oral Daily Laurie Urrutia MD   1,000 mcg at 04/17/19 9497    sodium chloride flush 0.9 % injection 10 mL  10 mL Intravenous 2 times per day Laurie Urrutia MD   10 mL at 04/17/19 0925    sodium chloride flush 0.9 % injection 10 mL  10 mL Intravenous PRN Laurie Urrutia MD        magnesium hydroxide (MILK OF MAGNESIA) 400 MG/5ML suspension 30 mL  30 mL Oral Daily PRN Laurie Urrutia MD        ondansetron [facility]) injection 4 mg  4 mg Intravenous Q6H PRN Laurie Urrutia MD        calcium citrate (CALCITRATE) tablet 250 mg  250 mg Oral BID Laurie Urrutia MD   250 mg at 04/17/19 0136    And    vitamin D3 (CHOLECALCIFEROL) tablet 200 Units  200 Units Oral BID Laurie Urrutia MD   200 Units at 04/17/19 0925    HYDROcodone-acetaminophen (NORCO) 5-325 MG per tablet 1 tablet  1 tablet Oral Q6H PRN Laurie Urrutia MD   1 tablet at 04/17/19 1851    warfarin (COUMADIN) daily dosing (placeholder)   Other RX Placeholder Laurie Urrutia MD        hydrALAZINE (APRESOLINE) injection 10 mg  10 mg Intravenous Q6H PRN Samuel Lanza MD        glucose (GLUTOSE) 40 % oral gel 15 g  15 g Oral PRN Samuel Lanza MD        dextrose 50 % solution 12.5 g  12.5 g Intravenous PRN Keven Chambers MD        glucagon (rDNA) injection 1 mg  1 mg Intramuscular PRN Keven Chambers MD        dextrose 5 % solution  100 mL/hr Intravenous PRN Keven Chambers MD            Lab Results   Component Value Date    WBC 3.7 (L) 04/17/2019    HCT 28.4 (L) 04/17/2019    HGB 8.5 (L) 04/17/2019     04/17/2019     04/17/2019    K 5.5 (H) 04/17/2019     (H) 04/17/2019    CO2 23 04/17/2019    BUN 24 (H) 04/17/2019    CREATININE 1.3 (H) 04/17/2019    GLUCOSE 92 04/17/2019         Radiographs:    ASSESEMENT:  Patient Active Hospital Problem List:  Previous b/l ankle fracture  R ankle pain  Midfoot arthritis B/L  Ambulatory dysfunction (4/15/2019)  Bilateral edema of lower extremity (10/3/2016)  Charcot Foot/ Ankle B/L          PLAN:  -Evaluation and Management  -Charts and labs reviewed  -Ankle pain stable since previous outpatient evaluation  -B/L foot x-rays taken, read as negative for fracture but diffuse arthritis on midfoot joints present.   -Call placed to [facility] for fitting and dispensing of CROW boot for R ankle and leg.  -Recommend consult to ortho for new onset hip pain.  -Patient discussed with Dr. Garnett Mcardle, JOE Petersen   4/17/2019   11:37 AM

## 2019-04-17 NOTE — CARE COORDINATION
Social work received call from physician for update. SW followed up with [facility], awaiting [place]. If received later today, physician would like made aware via perfect serve, charge RN aware.   Electronically signed by DEYANIRA Lino on 4/17/2019 at 3:00 PM

## 2019-04-17 NOTE — PROGRESS NOTES
Pharmacy Consultation Note  (Anticoagulant Dosing and Monitoring)    Initial consult date: 4-  Consulting physician: Dr. Mooney Fearing:  Jennifer Armenta [nitrofurantoin monohydrate macrocrystals]    68 y.o. female; 162.6 cm, 89.8 kg    Warfarin Indication Target   INR [place] Dose  (if applicable) Diet/Feeding Tube   Chronic RLE DVT, Hx DVTs 2-3 2 mg STR, 1 mg MWFS 4/15: carb control     Warfarin drug-drug interactions  Start  Stop Home Med? Comments                   TSH:  3.540 (11-)   Hepatic Function Panel:                            Lab Results   Component Value Date    ALKPHOS 140 04/15/2019    ALT 24 04/15/2019    AST 28 04/15/2019    PROT 6.8 04/15/2019    BILITOT 0.4 04/15/2019    LABALBU 3.9 04/15/2019    LABALBU 4.4 03/25/2012     Date Warfarin Dose INR Heparin or LMWH HBG/HCT PLT Comment   4/15 No Dose 4.4 - 5.4 X 9.5/32.1 196    4/16 No Dose 5.9 X 9.2/31.3 178    4/17 No Dose 6.3 X 8.5/28.4 161                        Assessment:  · 74 yo/F admitted for worsening back, hip, and RLE pain (over months). · PMH: Breast CA in remission, HTN, CAD, Hx of chronic RLE DVT, Hx of UE DVT, DM, B/L LE lymphedema. · On warfarin PTA for DVT hx.  Home dose 2 mg on STR, 1 mg on MWFS. · 4/17: INR = 6.3; decrease in H/H. D/w FM on rounds; suggested consider PO vitamin K (2.5 mg) x1 dose, but FM will not order at this time d/t no signs/symptoms of active bleeding. Plan:  · No warfarin tonight d/t supratherapeutic INR. · Daily PT/INR until the INR is stable within the therapeutic range. · Pharmacist will follow and monitor/adjust dosing as necessary.     Nirmal Sprague, PharmD  4/17/2019  12:32 PM  Pager: [phone]

## 2019-04-17 NOTE — PROGRESS NOTES
[place] is unable to provide patient with ortotic at this time. Patient currently already has one. Insurance will not cover. Daughter will bring to facility tomorrow. Patient will discharge to [facility]. Dr Dadnre Del Cid was sent perfect serve.

## 2019-04-18 ENCOUNTER — CARE COORDINATION (OUTPATIENT)
Dept: CARE COORDINATION | Age: 74
End: 2019-04-18

## 2019-04-18 NOTE — DISCHARGE SUMMARY
Discharge Summary    Alex Navas  :  1945  MRN:  [id number]    Admit date:  4/15/2019  Discharge date:  2019    Admitting Physician:  Caitlyn Sol DO    Discharge Diagnoses:    Patient Active Problem List   Diagnosis    Breast cancer ([facility]    Hypertension    Coronary artery disease involving native coronary artery of native heart without angina pectoris    Nephrolithiasis    [facility]congest[facility] heart failure) ([facility]    Humerus fracture    Shoulder pain, left    B12 deficiency    Hyperlipidemia    Rib lesion    Subclavian vein occlusion, bilateral (HCC)    Pericardial effusion    MI (myocardial infarction) ([facility]    Arthritis    Sarcoidosis    Lymph node enlargement    Anesthesia    Rectal bleeding    Ventral hernia    Type 2 diabetes mellitus without complication, with long-term current use of insulin ([facility])    Anemia    Chronic deep vein thrombosis (DVT) of proximal vein of right lower extremity ([facility])    Bilateral edema of lower extremity    Peripheral polyneuropathy    Complicated UTI (urinary tract infection)    Hyperkalemia    KARISHMA (acute kidney injury) ([facility]    Diarrhea with dehydration    Chronic anticoagulation    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    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    Diabetic polyneuropathy associated with type 2 diabetes mellitus ([facility]    Leg swelling    History of deep vein thrombosis    Chronic venous insufficiency    Lymphedema of both lower extremities    Decreased dorsalis pedis pulse    Ambulatory dysfunction    CKD (chronic kidney disease) stage 3, GFR 30-59 ml/min ([facility])    Charcot's joint of foot in type 2 diabetes mellitus ([facility]       Admission Condition:  good    Discharged Condition:  good    Hospital Course:  Alex Navas is a 68 y.o. female who presented with right hip and back pain radiating to her RLE also associated with b/l ankle pain, causing debilitations in ADLs and ambulatory dysfunction. Workup has revealed severe osteoarthritis at the right hip joint, and ruled out acute fracture. PT/OT were consulted and recommended Patient needed rehabilitation. Podiatry Were also following up for b/l charcot's foot. Patient was Also found to be in CKD, so her medications were renally dosed. Her INR was supratherapuetic so her warfarin was held. Patient remained stable, recovered and was in a suitable condition to be discharged to [facility]. Discharge Medications:         Medication List      START taking these medications    atorvastatin 20 MG tablet  Commonly known as:  LIPITOR  Take 1 tablet by mouth nightly     miconazole nitrate 2 % Oint  Apply topically 2 times daily        CHANGE how you take these medications    metFORMIN 500 MG tablet  Commonly known as:  GLUCOPHAGE  Take 1 tablet by mouth 2 times daily (with meals)  What changed:    · medication strength  · See the new instructions. pregabalin 50 MG capsule  Commonly known as:  LYRICA  Take 1 capsule by mouth 3 times daily for 120 days. What changed:    · medication strength  · how much to take     warfarin 2 MG tablet  Commonly known as:  COUMADIN  Take as directed. If you are unsure how to take this medication, talk to your nurse or doctor. Original instructions: Take 2 tablets orally on Sun, Tues, Thurs; and 1 tablet daily on all other days. Currently warfarin is held. Please follow INR and dose.   What changed:  additional instructions        CONTINUE taking these medications    Calcium Citrate-Vitamin D 200-250 MG-UNIT Tabs  Commonly known as:  RA CALCIUM CIT-VIT D-3 PETITES  take 1 tablet by mouth twice a day     diclofenac sodium 1 % Gel  Apply 4 g topically 4 times daily     exemestane 25 MG tablet  Commonly known as:  AROMASIN     famotidine 20 MG tablet  Commonly known as:  PEPCID  take 1 tablet by mouth twice a day     insulin glargine 100 UNIT/ML injection pen  Commonly known as:  LANTUS SOLOSTAR  Inject 65 Units into the skin nightly     metoprolol tartrate 50 MG tablet  Commonly known as:  LOPRESSOR  take 1 tablet by mouth twice a day     therapeutic multivitamin-minerals tablet  Take 1 tablet by mouth daily     traMADol 50 MG tablet  Commonly known as:  ULTRAM     trastuzumab 440 MG chemo injection  Commonly known as:  HERCEPTIN     * vitamin B-12 1000 MCG tablet  Commonly known as:  CYANOCOBALAMIN  Take 1 tablet by mouth daily     * RA VITAMIN B-12 TR 1000 MCG Tbcr  Generic drug:  Cyanocobalamin ER         * This list has 2 medication(s) that are the same as other medications prescribed for you. Read the directions carefully, and ask your doctor or other care provider to review them with you. STOP taking these medications    simvastatin 20 MG tablet  Commonly known as:  ZOCOR           Where to Get Your Medications      These medications were sent to [facility] [address] [address]    Phone:  [phone]   · atorvastatin 20 MG tablet  · metFORMIN 500 MG tablet  · miconazole nitrate 2 % Oint  · pregabalin 50 MG capsule  · warfarin 2 MG tablet         Consults:  Podiatry, PT/OT    Significant Diagnostic Studies:  Xray: severe OA at right hip joint. Labs:  Na/K/Cl/CO2:  142/5.5/109/23 (04/17 1229)  BUN/Cr/glu/ALT/AST/amyl/lip:  24/1.3/--/--/--/--/-- (04/17 2934)  WBC/Hgb/Hct/Plts:  --/8.6/29.2/-- (04/17 1600)  [unfilled]  CrCl cannot be calculated (Unknown ideal weight.). New Imaging:  XR ANKLE LEFT (MIN 3 VIEWS)   Final Result   1. No definitive radiographic evidence of acute displaced bony   fracture. If there is persistent clinical pain or symptomatology, a   return to medical attention within 2-7 days and further imaging is   recommended. .   2. No midfoot collapse. 3. Left ankle joint effusion.    4. Moderate tibiotalar, talonavicular, naviculocuneiform and first   digit TMT osteoarthritic change. 4. Vascular calcifications. XR ANKLE RIGHT (MIN 3 VIEWS)   Final Result   1. No definitive acute fractures seen. If there is persistent clinical   pain or symptomatology, a return to medical attention within 2-7 days   and further imaging is recommended. .   2. No midfoot collapse identified although this study is limited by   lack of standing imaging. 3. Mild to moderate degenerative changes tibiotalar, talonavicular,   naviculocuneiform and first digit TMT joints with osteopenia and   vascular calcifications also noted. 4. Soft tissue swelling/prominence. XR FEMUR RIGHT (MIN 2 VIEWS)   Final Result       NO ACUTE FRACTURE OR DISLOCATION      Tricompartmental osteoarthropathy of the right knee      Atherosclerotic change of the right popliteal artery and superficial   femoral artery with calcified plaque. XR HIP RIGHT (2-3 VIEWS)   Final Result      NO ACUTE FRACTURE OR DISLOCATION RIGHT HIP      Osteoarthropathy of the right hip unchanged from prior study. .      XR LUMBAR SPINE (MIN 4 VIEWS)   Final Result      NO ACUTE FRACTURE OR MALALIGNMENT      Bilateral L5-S1 facet joint arthrosis      Marginal spurs suggesting of osteoarthritic changes. .                Treatments:   Analgesia, PT/OT    Discharge Exam:  GENERAL: Alert, cooperative, no acute distress. HEENT: Normocephalic, atraumatic. PERRLA, conjunctiva/corneas clear, EOM's intact, no pallor or icterus. NECK: Supple, symmetrical, trachea midline, no cervical LAD. No carotid bruit or JVD  CHEST: No tenderness or deformity, full & symmetric excursion  LUNG: Clear to auscultation bilaterally,  respirations unlabored. No rales/wheezing/rubs  HEART: RRR, S1 and S2 normal, no murmur, rub or gallop. DP pulses 2/4  ABDOMEN: SNTND, no masses, no organomegaly, no guarding, rebound or rigidity. GENITOURINARY: Urinary cath not present   EXTREMITIES:  Extremities normal, atraumatic, no cyanosis or edema.  Distal pulses equal

## 2019-04-19 LAB
EKG ATRIAL RATE: 73 BPM
EKG P AXIS: 68 DEGREES
EKG P-R INTERVAL: 204 MS
EKG Q-T INTERVAL: 452 MS
EKG QRS DURATION: 150 MS
EKG QTC CALCULATION (BAZETT): 497 MS
EKG R AXIS: -98 DEGREES
EKG T AXIS: 48 DEGREES
EKG VENTRICULAR RATE: 73 BPM

## 2019-05-02 ENCOUNTER — CARE COORDINATION (OUTPATIENT)
Dept: CARE COORDINATION | Age: 74
End: 2019-05-02

## 2019-05-02 NOTE — CARE COORDINATION
[facility] Update Call    2019    Patient: Leyla Potter Patient : 1945   MRN: [id number]  Reason for Admission: [facility] Problem:  Ambulatory Dysfunction  Discharge Date: 19 RARS: Readmission Risk Score: 23    Spoke with Srinivasan Hodges states patient is at the facility. Requested to speak with DEYANIRA Zavala for patient. Srinivasan Hodges informed CARLOS PAUL that Benson Lowe is the patient's LSW. Spoke with Maryann Bailon introduced myself and reason for call. She provided the following information. Care Transitions Post Acute Facility Update    Care Transitions Interventions  Post Acute Facility:  [facility]  Reported Nursing Issues:  No nursing issues. ADLs:  Contact Guard Assist - Hands on patient for balance   Transfer Assistance:  Contact Guard Assist - Hands on patient for balance   Ambulation Assistance:  Contact Guard Assist - Hands on patient for balance   How far (in feet) is the patient ambulating?:  100   Does patient use an assistive device?:  Yes (Comment: Front Wheeled Walker)   Barriers to Discharge:  Balance   Anticipated discharge services:  Medicaid Waiver Program          Patient's discharge goal is to discharge to an AL with Medicaid Waiver Program.  No discharge date at this time    Discussed with DEYANIRA Kerns RN's next tentative progress update call 19. Requested if patient would be discharged prior to this date please call CARLOS PAUL. Fela agreed.

## 2019-05-08 ENCOUNTER — TELEPHONE (OUTPATIENT)
Dept: FAMILY MEDICINE CLINIC | Age: 74
End: 2019-05-08

## 2019-05-08 NOTE — TELEPHONE ENCOUNTER
Yes, I will follow for home care. Please let them know. Please ask Ms. Cesar Pressleyting to come for an appointment as soon as possible with Dr. Alysia Silvestre or myself. She should have transportation available through her insurance, but please let us know if that will be problematic. Thank you!

## 2019-05-09 ENCOUNTER — CARE COORDINATION (OUTPATIENT)
Dept: CARE COORDINATION | Age: 74
End: 2019-05-09

## 2019-05-09 NOTE — CARE COORDINATION
[facility] Update Call    2019    Patient: Sabrina Asif Patient : 1945   MRN: [id number]  Reason for Admission: [facility] Problem:  Ambulatory Dysfunction  Discharge Date: 19 RARS: Readmission Risk Score: 23       Spoke with Piter Arroyo states patient is at the facilty. Left voice message for Angelique Sandoval requested she please return [facility]'s call this week. Provided contact number. Would like progress update on the following:   Nursing reports  Therapy progress   -ADL's   -Bed Mobility   -Transfers   -Ambulation, if so how many feet, if using an assistive device  Discharge plans or needs  Barriers to discharge    RN TCC's next tentative progress update call 19.    Care Transitions Post Acute Facility Update    Care Transitions Interventions  Post Acute Facility:  [facility]

## 2019-05-15 ENCOUNTER — TELEPHONE (OUTPATIENT)
Dept: PRIMARY CARE CLINIC | Age: 74
End: 2019-05-15

## 2019-05-15 NOTE — TELEPHONE ENCOUNTER
Please call the patient and Terressa Mixer from [facility]. Please have them check an INR tomorrow or Friday, if possible. Also, please have her be seen in the next day or two in our office (Dr. Santana Perry has openings) for evaluation of the wound on her leg, etc.  Please make an appointment. Thank you!

## 2019-05-15 NOTE — TELEPHONE ENCOUNTER
Pt cant come till Monday due to transportation issues. She really thinks she ok/safe at home at this time. SHe has more people coming into help. Moved her appt up till next Monday instead of Friday.

## 2019-05-16 ENCOUNTER — CARE COORDINATION (OUTPATIENT)
Dept: CARE COORDINATION | Age: 74
End: 2019-05-16

## 2019-05-16 NOTE — CARE COORDINATION
[facility] Update Call    2019    Patient: Mony Alfred Patient : 1945   MRN: [id number]  Reason for Admission: [address] [place] [address] Problem:  Ambulatory Dysfunction     Discharge Date: 19 RARS: Readmission Risk Score: 23       Spoke with Sahra rice patient discharged to home on 19.

## 2019-05-17 ENCOUNTER — ANTI-COAG VISIT (OUTPATIENT)
Dept: FAMILY MEDICINE CLINIC | Age: 74
End: 2019-05-17
Payer: COMMERCIAL

## 2019-05-17 ENCOUNTER — TELEPHONE (OUTPATIENT)
Dept: FAMILY MEDICINE CLINIC | Age: 74
End: 2019-05-17

## 2019-05-17 DIAGNOSIS — Z86.718 HISTORY OF DEEP VEIN THROMBOSIS: ICD-10-CM

## 2019-05-17 DIAGNOSIS — M79.604 CHRONIC PAIN OF RIGHT LOWER EXTREMITY: Primary | ICD-10-CM

## 2019-05-17 DIAGNOSIS — Z79.01 CHRONIC ANTICOAGULATION: ICD-10-CM

## 2019-05-17 DIAGNOSIS — I82.5Y1 CHRONIC DEEP VEIN THROMBOSIS (DVT) OF PROXIMAL VEIN OF RIGHT LOWER EXTREMITY (HCC): ICD-10-CM

## 2019-05-17 DIAGNOSIS — G89.29 CHRONIC PAIN OF RIGHT LOWER EXTREMITY: Primary | ICD-10-CM

## 2019-05-17 LAB — INR BLD: 6

## 2019-05-17 PROCEDURE — 93793 ANTICOAG MGMT PT WARFARIN: CPT | Performed by: FAMILY MEDICINE

## 2019-05-17 RX ORDER — HYDROCODONE BITARTRATE AND ACETAMINOPHEN 5; 325 MG/1; MG/1
1 TABLET ORAL EVERY 6 HOURS PRN
Qty: 20 TABLET | Refills: 0 | Status: SHIPPED | OUTPATIENT
Start: 2019-05-17 | End: 2019-05-22

## 2019-05-17 NOTE — PROGRESS NOTES
Home Care notified us that this patient is having severe pain in her RLE, chronic pain, currently not well controlled. Was discharged home from [facility] recently. Has appointment on Monday. INR is supratherapeutic; patient had gone back to previous dose upon discharge from [facility]. Plan to hold coumadin x 2 days, then 2 mg, then return for INR on Monday. Pain medication ordered to her pharmacy; had been given [place] in the hospital with good control of symptoms. With any new or worsening symptoms, or any bleeding, must be seen urgently in ED. Advised through our staff to home care.

## 2019-05-17 NOTE — TELEPHONE ENCOUNTER
Ordered [place] to her pharmacy, which she has tolerated before. We will reevaluate at her upcoming appointment on Monday, but please be seen urgently with any new or worsening symptoms over the weekend. Thank you!

## 2019-05-20 ENCOUNTER — ANTI-COAG VISIT (OUTPATIENT)
Dept: FAMILY MEDICINE CLINIC | Age: 74
End: 2019-05-20

## 2019-05-20 ENCOUNTER — OFFICE VISIT (OUTPATIENT)
Dept: FAMILY MEDICINE CLINIC | Age: 74
End: 2019-05-20
Payer: COMMERCIAL

## 2019-05-20 VITALS
HEIGHT: 64 IN | TEMPERATURE: 98.6 F | WEIGHT: 217 LBS | SYSTOLIC BLOOD PRESSURE: 135 MMHG | BODY MASS INDEX: 37.05 KG/M2 | DIASTOLIC BLOOD PRESSURE: 64 MMHG | HEART RATE: 76 BPM | OXYGEN SATURATION: 96 %

## 2019-05-20 DIAGNOSIS — R06.09 DYSPNEA ON EXERTION: ICD-10-CM

## 2019-05-20 DIAGNOSIS — I82.5Y1 CHRONIC DEEP VEIN THROMBOSIS (DVT) OF PROXIMAL VEIN OF RIGHT LOWER EXTREMITY (HCC): Primary | ICD-10-CM

## 2019-05-20 DIAGNOSIS — G89.29 CHRONIC PAIN OF RIGHT LOWER EXTREMITY: ICD-10-CM

## 2019-05-20 DIAGNOSIS — Z79.01 CHRONIC ANTICOAGULATION: ICD-10-CM

## 2019-05-20 DIAGNOSIS — M79.604 CHRONIC PAIN OF RIGHT LOWER EXTREMITY: ICD-10-CM

## 2019-05-20 LAB
INTERNATIONAL NORMALIZATION RATIO, POC: 4.5
PROTHROMBIN TIME, POC: 54.2

## 2019-05-20 PROCEDURE — 4040F PNEUMOC VAC/ADMIN/RCVD: CPT | Performed by: STUDENT IN AN ORGANIZED HEALTH CARE EDUCATION/TRAINING PROGRAM

## 2019-05-20 PROCEDURE — 1090F PRES/ABSN URINE INCON ASSESS: CPT | Performed by: STUDENT IN AN ORGANIZED HEALTH CARE EDUCATION/TRAINING PROGRAM

## 2019-05-20 PROCEDURE — 99212 OFFICE O/P EST SF 10 MIN: CPT | Performed by: STUDENT IN AN ORGANIZED HEALTH CARE EDUCATION/TRAINING PROGRAM

## 2019-05-20 PROCEDURE — 99213 OFFICE O/P EST LOW 20 MIN: CPT | Performed by: STUDENT IN AN ORGANIZED HEALTH CARE EDUCATION/TRAINING PROGRAM

## 2019-05-20 PROCEDURE — 1123F ACP DISCUSS/DSCN MKR DOCD: CPT | Performed by: STUDENT IN AN ORGANIZED HEALTH CARE EDUCATION/TRAINING PROGRAM

## 2019-05-20 PROCEDURE — G8598 ASA/ANTIPLAT THER USED: HCPCS | Performed by: STUDENT IN AN ORGANIZED HEALTH CARE EDUCATION/TRAINING PROGRAM

## 2019-05-20 PROCEDURE — 3017F COLORECTAL CA SCREEN DOC REV: CPT | Performed by: STUDENT IN AN ORGANIZED HEALTH CARE EDUCATION/TRAINING PROGRAM

## 2019-05-20 PROCEDURE — G8399 PT W/DXA RESULTS DOCUMENT: HCPCS | Performed by: STUDENT IN AN ORGANIZED HEALTH CARE EDUCATION/TRAINING PROGRAM

## 2019-05-20 PROCEDURE — G8417 CALC BMI ABV UP PARAM F/U: HCPCS | Performed by: STUDENT IN AN ORGANIZED HEALTH CARE EDUCATION/TRAINING PROGRAM

## 2019-05-20 PROCEDURE — 1036F TOBACCO NON-USER: CPT | Performed by: STUDENT IN AN ORGANIZED HEALTH CARE EDUCATION/TRAINING PROGRAM

## 2019-05-20 PROCEDURE — 85610 PROTHROMBIN TIME: CPT | Performed by: STUDENT IN AN ORGANIZED HEALTH CARE EDUCATION/TRAINING PROGRAM

## 2019-05-20 PROCEDURE — G8427 DOCREV CUR MEDS BY ELIG CLIN: HCPCS | Performed by: STUDENT IN AN ORGANIZED HEALTH CARE EDUCATION/TRAINING PROGRAM

## 2019-05-20 NOTE — PROGRESS NOTES
Patient's INR today remains supratherapeutic, although still elevated at 4.5. She is on Coumadin for DVT. Her previous dose was Coumadin 4 mg on Sun, Tue, Th, and 2 mg all other days. INR 3 days ago was 6.0. She was instructed to hold Coumadin for the subsequent 2 days (Fri and Sat). She took 2 mg yesterday. The plan will be to decrease the total weekly dose by 10% (from 20 to 18 mg). She will therefore continue with Coumadin 4 mg on Sun and Th and 2 mg all other days of the week. Will recheck INR on Friday 05/24/2019.      Jessica Cordoba MD

## 2019-05-20 NOTE — PROGRESS NOTES
Attending Physician Statement    S:   Chief Complaint   Patient presents with    Leg Pain     pain right    Shortness of Breath     with walking    Office Visit for Anticoagulation Management      Chronic DVT on right and lymphedema. Recently discharged from [place] [address]. INR was 6.0 last week. Off coumadin for two days, then took 2 mg yesterday. Today INR is 4.5. Chronic right ankle/leg pain. X-rays shows effusion bilateral ankles. Takes tramadol/lyrica for this, but did not yet  prescription for norco.   Lots of edema with some weeping of legs  O: Blood pressure 135/64, pulse 76, temperature 98.6 °F (37 °C), temperature source Oral, height 5' 4\" (1.626 m), weight 217 lb (98.4 kg), SpO2 96 %, not currently breastfeeding. Exam:   Heart - RRR with systolic murmur   Lungs - clear   Ext- markedly edematous (non-pitting) with seeping wounds. Slight pitting on feet. Mild tenderness to palpation  A: As above  P:  Will decrease coumadin to 18 mg/week (two days of 4 mg and the rest 2 mg)   Refer to lymphedema clinic   Follow-up as ordered    I have discussed the case, including pertinent history and exam findings with the resident. I agree with the documented assessment and plan.

## 2019-05-20 NOTE — PROGRESS NOTES
CABG.  Follows with Sachi Ortega.  CHF (congestive heart failure) (HCC)     Chronic venous insufficiency 11/7/2018    Decreased dorsalis pedis pulse 11/7/2018    Diabetic neuropathy (HCC)     Diabetic neuropathy (HCC)     DVT (deep venous thrombosis) (HCC)     Recurrent. On lifelong coumadin.  DVT of upper extremity (deep vein thrombosis) ([facility] 4/18/2012    History of deep vein thrombosis 11/7/2018    Humerus fracture     Chronic, on the Left.  Hyperlipidemia 8/29/2011    Hypertension     Leg swelling 11/7/2018    Lymph node enlargement     Lymphedema of both lower extremities 11/7/2018    MI (myocardial infarction) ([facility]     Nephrolithiasis     Follows with Dr. Bret Ferreira.  Pericardial effusion     Pulmonary nodule     With mediastinal lymphadenopathy. Stable. Follows with Dr. Mateus Nolasco.  Rib lesion 11/28/11    expansile lesion in one of the right ribs laterally    Sarcoidosis 07/26/11    per transbronchial needle aspiration    Subclavian vein occlusion, bilateral ([facility] 4/18/2012    Type II or unspecified type diabetes mellitus without mention of complication, not stated as uncontrolled        Past Surgical History:        Procedure Laterality Date    APPENDECTOMY      ARTERIAL BYPASS SURGRY      BREAST LUMPECTOMY  9/27/2005    LEFT    CARDIAC SURGERY      CHOLECYSTECTOMY      COLONOSCOPY  12/2007    cecal arteriovenous malformation with hyperplastic polyps    COLONOSCOPY  [id number]    CORONARY ARTERY BYPASS GRAFT  05/2008    triple bypass    ECHO COMPL W DOP COLOR FLOW  10/30/2013         EYE SURGERY      clarissa cataracts    HYSTERECTOMY  1975, 1985    MAYNOR prolapse, benign conditions; BSO later for scar tissues, no CA.      OTHER SURGICAL HISTORY  11/18/11    port removal right chest wall    TOOTH EXTRACTION      Full Dental Extraction.     TUNNELED VENOUS PORT PLACEMENT      removal of port Dec. 2011- Dr. Jose E Elliott [facility]  [phone]    RIGHT CHEST Allergies:    Macrobid [nitrofurantoin monohydrate macrocrystals]    Social History:   Social History     Socioeconomic History    Marital status: Legally      Spouse name: Not on file    Number of children: Not on file    Years of education: Not on file    Highest education level: Not on file   Occupational History    Not on file   Social Needs    Financial resource strain: Not on file    Food insecurity:     Worry: Not on file     Inability: Not on file    Transportation needs:     Medical: Not on file     Non-medical: Not on file   Tobacco Use    Smoking status: Never Smoker    Smokeless tobacco: Never Used   Substance and Sexual Activity    Alcohol use: No    Drug use: No    Sexual activity: Never   Lifestyle    Physical activity:     Days per week: Not on file     Minutes per session: Not on file    Stress: Not on file   Relationships    Social connections:     Talks on phone: Not on file     Gets together: Not on file     Attends Islam service: Not on file     Active member of club or organization: Not on file     Attends meetings of clubs or organizations: Not on file     Relationship status: Not on file    Intimate partner violence:     Fear of current or ex partner: Not on file     Emotionally abused: Not on file     Physically abused: Not on file     Forced sexual activity: Not on file   Other Topics Concern    Not on file   Social History Narrative    Not on file       Family History:       Adopted: Yes       Reviewof Systems:   Review of Systems   Constitutional: Negative for fever. Respiratory: Positive for shortness of breath. Negative for wheezing. Cardiovascular: Positive for leg swelling. Negative for chest pain and palpitations. Gastrointestinal: Negative for abdominal pain.    Musculoskeletal:        Leg pain         Physical Exam   Vitals: /64 (Site: Right Upper Arm, Position: Sitting, Cuff Size: Medium Adult)   Pulse 76   Temp 98.6 °F (37 °C) tablet by mouth 2 times daily (with meals) 60 tablet 3    atorvastatin (LIPITOR) 20 MG tablet Take 1 tablet by mouth nightly 30 tablet 3    miconazole nitrate 2 % OINT Apply topically 2 times daily 2 Tube 1    metoprolol tartrate (LOPRESSOR) 50 MG tablet take 1 tablet by mouth twice a day 180 tablet 3    RA VITAMIN B-12 TR 1000 MCG TBCR take 1 tablet by mouth once daily  0    diclofenac sodium 1 % GEL Apply 4 g topically 4 times daily 4 Tube 1    famotidine (PEPCID) 20 MG tablet take 1 tablet by mouth twice a day 180 tablet 3    vitamin B-12 (CYANOCOBALAMIN) 1000 MCG tablet Take 1 tablet by mouth daily 90 tablet 3    Calcium Citrate-Vitamin D (RA CALCIUM CIT-VIT D-3 PETITES) 200-250 MG-UNIT TABS take 1 tablet by mouth twice a day 180 tablet 3    exemestane (AROMASIN) 25 MG chemo tablet Take 1 tablet by mouth every morning (before breakfast)      Multiple Vitamins-Minerals (THERAPEUTIC MULTIVITAMIN-MINERALS) tablet Take 1 tablet by mouth daily 90 tablet 3    trastuzumab (HERCEPTIN) 440 MG injection Infuse  intravenously once a week. On tuesdays      insulin glargine (LANTUS SOLOSTAR) 100 UNIT/ML injection pen Inject 65 Units into the skin nightly 21 Pen 3     No current facility-administered medications for this visit.          Aida Najera MD       Electronically signed by Aida Najera MD on 5/21/2019 at 1:45 PM

## 2019-05-21 ASSESSMENT — ENCOUNTER SYMPTOMS
ABDOMINAL PAIN: 0
WHEEZING: 0
SHORTNESS OF BREATH: 1

## 2019-05-24 ENCOUNTER — ANTI-COAG VISIT (OUTPATIENT)
Dept: FAMILY MEDICINE CLINIC | Age: 74
End: 2019-05-24
Payer: COMMERCIAL

## 2019-05-24 DIAGNOSIS — I82.5Y1 CHRONIC DEEP VEIN THROMBOSIS (DVT) OF PROXIMAL VEIN OF RIGHT LOWER EXTREMITY (HCC): ICD-10-CM

## 2019-05-24 DIAGNOSIS — Z79.01 CHRONIC ANTICOAGULATION: ICD-10-CM

## 2019-05-24 LAB — INR BLD: 3.2

## 2019-05-24 PROCEDURE — 93793 ANTICOAG MGMT PT WARFARIN: CPT | Performed by: FAMILY MEDICINE

## 2019-05-24 NOTE — PROGRESS NOTES
INES notified of new dose  And will recheck  INR Tuesday 5/28/19.  She will let patient know to take 2 mg coumadin daily

## 2019-05-28 ENCOUNTER — ANTI-COAG VISIT (OUTPATIENT)
Dept: FAMILY MEDICINE CLINIC | Age: 74
End: 2019-05-28

## 2019-05-28 ENCOUNTER — HOSPITAL ENCOUNTER (OUTPATIENT)
Age: 74
Discharge: HOME | End: 2019-05-30
Payer: COMMERCIAL

## 2019-05-28 LAB — INR BLD: 3.3

## 2019-05-28 PROCEDURE — 86850 RBC ANTIBODY SCREEN: CPT

## 2019-05-28 PROCEDURE — 86901 BLOOD TYPING SEROLOGIC RH(D): CPT

## 2019-05-28 PROCEDURE — 86900 BLOOD TYPING SEROLOGIC ABO: CPT

## 2019-05-28 PROCEDURE — 86920 COMPATIBILITY TEST SPIN: CPT

## 2019-05-28 NOTE — PROGRESS NOTES
Home health nurse notified to take 1 mg on Tuesdays and 2 mg all other days. Verified that she has 2 mg tablets and can take 1/2 tablet on Tuesdays. Recheck on Friday 5-31-19. Understanding verbalized and she will notify patient.

## 2019-05-30 RX ORDER — HEPARIN SODIUM (PORCINE) LOCK FLUSH IV SOLN 100 UNIT/ML 100 UNIT/ML
500 SOLUTION INTRAVENOUS PRN
Status: CANCELLED | OUTPATIENT
Start: 2019-05-30

## 2019-05-30 RX ORDER — SODIUM CHLORIDE 0.9 % (FLUSH) 0.9 %
20 SYRINGE (ML) INJECTION PRN
Status: CANCELLED | OUTPATIENT
Start: 2019-05-30

## 2019-05-30 RX ORDER — SODIUM CHLORIDE 0.9 % (FLUSH) 0.9 %
10 SYRINGE (ML) INJECTION PRN
Status: CANCELLED | OUTPATIENT
Start: 2019-05-30

## 2019-05-31 ENCOUNTER — HOSPITAL ENCOUNTER (OUTPATIENT)
Dept: INFUSION THERAPY | Age: 74
Discharge: HOME | End: 2019-05-31
Payer: COMMERCIAL

## 2019-05-31 VITALS
HEART RATE: 75 BPM | RESPIRATION RATE: 18 BRPM | SYSTOLIC BLOOD PRESSURE: 151 MMHG | TEMPERATURE: 98.1 F | DIASTOLIC BLOOD PRESSURE: 62 MMHG | OXYGEN SATURATION: 97 %

## 2019-05-31 PROCEDURE — 2580000003 HC RX 258: Performed by: INTERNAL MEDICINE

## 2019-05-31 PROCEDURE — 36430 TRANSFUSION BLD/BLD COMPNT: CPT

## 2019-05-31 PROCEDURE — P9016 RBC LEUKOCYTES REDUCED: HCPCS

## 2019-05-31 PROCEDURE — 6370000000 HC RX 637 (ALT 250 FOR IP): Performed by: INTERNAL MEDICINE

## 2019-05-31 PROCEDURE — 6360000002 HC RX W HCPCS: Performed by: INTERNAL MEDICINE

## 2019-05-31 RX ORDER — 0.9 % SODIUM CHLORIDE 0.9 %
250 INTRAVENOUS SOLUTION INTRAVENOUS ONCE
Status: COMPLETED | OUTPATIENT
Start: 2019-05-31 | End: 2019-05-31

## 2019-05-31 RX ORDER — DIPHENHYDRAMINE HCL 25 MG
25 TABLET ORAL SEE ADMIN INSTRUCTIONS
Status: COMPLETED | OUTPATIENT
Start: 2019-05-31 | End: 2019-05-31

## 2019-05-31 RX ORDER — SODIUM CHLORIDE 0.9 % (FLUSH) 0.9 %
10 SYRINGE (ML) INJECTION PRN
Status: DISCONTINUED | OUTPATIENT
Start: 2019-05-31 | End: 2019-06-01 | Stop reason: HOSPADM

## 2019-05-31 RX ORDER — ACETAMINOPHEN 325 MG/1
650 TABLET ORAL SEE ADMIN INSTRUCTIONS
Status: COMPLETED | OUTPATIENT
Start: 2019-05-31 | End: 2019-05-31

## 2019-05-31 RX ADMIN — ACETAMINOPHEN 650 MG: 325 TABLET, FILM COATED ORAL at 09:17

## 2019-05-31 RX ADMIN — DIPHENHYDRAMINE HCL 25 MG: 25 TABLET ORAL at 09:17

## 2019-05-31 RX ADMIN — HEPARIN 500 UNITS: 100 SYRINGE at 11:48

## 2019-05-31 RX ADMIN — Medication 10 ML: at 11:48

## 2019-05-31 RX ADMIN — SODIUM CHLORIDE 250 ML: 9 INJECTION, SOLUTION INTRAVENOUS at 09:17

## 2019-05-31 ASSESSMENT — PAIN SCALES - GENERAL: PAINLEVEL_OUTOF10: 6

## 2019-05-31 NOTE — PROGRESS NOTES
(0900) Patient here for blood transfusion today with port right chest already accessed. Port dressing loose and powell needle almost completely dislodged. Port deaccessed. Reaccesssed port per protocol without difficulty. Good blood return present.

## 2019-05-31 NOTE — PROGRESS NOTES
Patient tolerating blood transfusion well thus far. Assisted patient to and from bathroom with walker.

## 2019-06-03 ENCOUNTER — ANTI-COAG VISIT (OUTPATIENT)
Dept: FAMILY MEDICINE CLINIC | Age: 74
End: 2019-06-03
Payer: COMMERCIAL

## 2019-06-03 DIAGNOSIS — I82.5Y1 CHRONIC DEEP VEIN THROMBOSIS (DVT) OF PROXIMAL VEIN OF RIGHT LOWER EXTREMITY (HCC): ICD-10-CM

## 2019-06-03 LAB — INR BLD: 1.9

## 2019-06-03 PROCEDURE — 93793 ANTICOAG MGMT PT WARFARIN: CPT | Performed by: FAMILY MEDICINE

## 2019-06-07 ENCOUNTER — ANTI-COAG VISIT (OUTPATIENT)
Dept: FAMILY MEDICINE CLINIC | Age: 74
End: 2019-06-07
Payer: COMMERCIAL

## 2019-06-07 ENCOUNTER — TELEPHONE (OUTPATIENT)
Dept: FAMILY MEDICINE CLINIC | Age: 74
End: 2019-06-07

## 2019-06-07 DIAGNOSIS — Z79.01 CHRONIC ANTICOAGULATION: ICD-10-CM

## 2019-06-07 DIAGNOSIS — I82.5Y1 CHRONIC DEEP VEIN THROMBOSIS (DVT) OF PROXIMAL VEIN OF RIGHT LOWER EXTREMITY (HCC): ICD-10-CM

## 2019-06-07 LAB — INR BLD: 3.6

## 2019-06-07 PROCEDURE — 93793 ANTICOAG MGMT PT WARFARIN: CPT | Performed by: FAMILY MEDICINE

## 2019-06-07 NOTE — TELEPHONE ENCOUNTER
[facility] called in stating she forgot to mention to us that pt had a slight fall today. Pt does not have any injuries at this time. Pt just almost missed her chair and slid down to the floor with out any casualties.

## 2019-06-10 ENCOUNTER — APPOINTMENT (OUTPATIENT)
Dept: GENERAL RADIOLOGY | Age: 74
DRG: 641 | End: 2019-06-10
Payer: COMMERCIAL

## 2019-06-10 ENCOUNTER — ANTI-COAG VISIT (OUTPATIENT)
Dept: FAMILY MEDICINE CLINIC | Age: 74
End: 2019-06-10

## 2019-06-10 ENCOUNTER — OFFICE VISIT (OUTPATIENT)
Dept: FAMILY MEDICINE CLINIC | Age: 74
End: 2019-06-10
Payer: COMMERCIAL

## 2019-06-10 ENCOUNTER — HOSPITAL ENCOUNTER (OUTPATIENT)
Age: 74
Discharge: HOME | End: 2019-06-12
Payer: COMMERCIAL

## 2019-06-10 ENCOUNTER — HOSPITAL ENCOUNTER (INPATIENT)
Age: 74
LOS: 4 days | Discharge: HOME | DRG: 641 | End: 2019-06-14
Attending: EMERGENCY MEDICINE | Admitting: FAMILY MEDICINE
Payer: COMMERCIAL

## 2019-06-10 VITALS
DIASTOLIC BLOOD PRESSURE: 72 MMHG | HEIGHT: 64 IN | BODY MASS INDEX: 37.73 KG/M2 | TEMPERATURE: 98.1 F | WEIGHT: 221 LBS | OXYGEN SATURATION: 94 % | HEART RATE: 73 BPM | SYSTOLIC BLOOD PRESSURE: 116 MMHG

## 2019-06-10 DIAGNOSIS — E53.8 B12 DEFICIENCY: ICD-10-CM

## 2019-06-10 DIAGNOSIS — I50.9 CONGESTIVE HEART FAILURE, UNSPECIFIED HF CHRONICITY, UNSPECIFIED HEART FAILURE TYPE (HCC): ICD-10-CM

## 2019-06-10 DIAGNOSIS — T45.515A WARFARIN-INDUCED COAGULOPATHY (HCC): ICD-10-CM

## 2019-06-10 DIAGNOSIS — R10.84 GENERALIZED ABDOMINAL PAIN: ICD-10-CM

## 2019-06-10 DIAGNOSIS — Z79.4 TYPE 2 DIABETES MELLITUS WITHOUT COMPLICATION, WITH LONG-TERM CURRENT USE OF INSULIN (HCC): ICD-10-CM

## 2019-06-10 DIAGNOSIS — E11.9 TYPE 2 DIABETES MELLITUS WITHOUT COMPLICATION, WITH LONG-TERM CURRENT USE OF INSULIN (HCC): ICD-10-CM

## 2019-06-10 DIAGNOSIS — R18.8 OTHER ASCITES: Primary | ICD-10-CM

## 2019-06-10 DIAGNOSIS — I10 ESSENTIAL HYPERTENSION: ICD-10-CM

## 2019-06-10 DIAGNOSIS — I82.4Z9 DEEP VEIN THROMBOSIS (DVT) OF DISTAL VEIN OF LOWER EXTREMITY, UNSPECIFIED CHRONICITY, UNSPECIFIED LATERALITY (HCC): ICD-10-CM

## 2019-06-10 DIAGNOSIS — D68.32 WARFARIN-INDUCED COAGULOPATHY (HCC): ICD-10-CM

## 2019-06-10 DIAGNOSIS — E87.5 HYPERKALEMIA: Primary | ICD-10-CM

## 2019-06-10 DIAGNOSIS — E11.9 TYPE 2 DIABETES MELLITUS WITHOUT COMPLICATION, WITHOUT LONG-TERM CURRENT USE OF INSULIN (HCC): ICD-10-CM

## 2019-06-10 DIAGNOSIS — R79.1 SUPRATHERAPEUTIC INR: ICD-10-CM

## 2019-06-10 DIAGNOSIS — R18.8 OTHER ASCITES: ICD-10-CM

## 2019-06-10 DIAGNOSIS — E78.5 HYPERLIPIDEMIA, UNSPECIFIED HYPERLIPIDEMIA TYPE: ICD-10-CM

## 2019-06-10 DIAGNOSIS — C50.919 MALIGNANT NEOPLASM OF FEMALE BREAST, UNSPECIFIED ESTROGEN RECEPTOR STATUS, UNSPECIFIED LATERALITY, UNSPECIFIED SITE OF BREAST (HCC): ICD-10-CM

## 2019-06-10 LAB
ALBUMIN SERPL-MCNC: 3.6 G/DL (ref 3.5–5.2)
ALBUMIN SERPL-MCNC: 3.6 G/DL (ref 3.5–5.2)
ALP BLD-CCNC: 179 U/L (ref 35–104)
ALP BLD-CCNC: 189 U/L (ref 35–104)
ALT SERPL-CCNC: 23 U/L (ref 0–32)
ALT SERPL-CCNC: 23 U/L (ref 0–32)
ANION GAP SERPL CALCULATED.3IONS-SCNC: 13 MMOL/L (ref 7–16)
ANION GAP SERPL CALCULATED.3IONS-SCNC: 15 MMOL/L (ref 7–16)
ANISOCYTOSIS: ABNORMAL
AST SERPL-CCNC: 34 U/L (ref 0–31)
AST SERPL-CCNC: 42 U/L (ref 0–31)
BASOPHILS ABSOLUTE: 0.02 E9/L (ref 0–0.2)
BASOPHILS RELATIVE PERCENT: 0.4 % (ref 0–2)
BILIRUB SERPL-MCNC: 0.5 MG/DL (ref 0–1.2)
BILIRUB SERPL-MCNC: 0.5 MG/DL (ref 0–1.2)
BILIRUBIN DIRECT: 0.2 MG/DL (ref 0–0.3)
BILIRUBIN, INDIRECT: 0.3 MG/DL (ref 0–1)
BUN BLDV-MCNC: 34 MG/DL (ref 8–23)
BUN BLDV-MCNC: 35 MG/DL (ref 8–23)
BURR CELLS: ABNORMAL
CALCIUM SERPL-MCNC: 9.2 MG/DL (ref 8.6–10.2)
CALCIUM SERPL-MCNC: 9.4 MG/DL (ref 8.6–10.2)
CHLORIDE BLD-SCNC: 108 MMOL/L (ref 98–107)
CHLORIDE BLD-SCNC: 109 MMOL/L (ref 98–107)
CHOLESTEROL, TOTAL: 80 MG/DL (ref 0–199)
CO2: 18 MMOL/L (ref 22–29)
CO2: 21 MMOL/L (ref 22–29)
CREAT SERPL-MCNC: 1.8 MG/DL (ref 0.5–1)
CREAT SERPL-MCNC: 2.1 MG/DL (ref 0.5–1)
EOSINOPHILS ABSOLUTE: 0.1 E9/L (ref 0.05–0.5)
EOSINOPHILS RELATIVE PERCENT: 1.8 % (ref 0–6)
GFR AFRICAN AMERICAN: 28
GFR AFRICAN AMERICAN: 33
GFR NON-AFRICAN AMERICAN: 23 ML/MIN/1.73
GFR NON-AFRICAN AMERICAN: 28 ML/MIN/1.73
GLUCOSE BLD-MCNC: 107 MG/DL (ref 74–99)
GLUCOSE BLD-MCNC: 97 MG/DL (ref 74–99)
HCT VFR BLD CALC: 29.5 % (ref 34–48)
HCT VFR BLD CALC: 30.6 % (ref 34–48)
HDLC SERPL-MCNC: 31 MG/DL
HEMOGLOBIN: 8.5 G/DL (ref 11.5–15.5)
HEMOGLOBIN: 8.9 G/DL (ref 11.5–15.5)
IMMATURE GRANULOCYTES #: 0.02 E9/L
IMMATURE GRANULOCYTES %: 0.4 % (ref 0–5)
INR BLD: 6.9
INR BLD: 7.3
INTERNATIONAL NORMALIZATION RATIO, POC: 7.5
LDL CHOLESTEROL CALCULATED: 28 MG/DL (ref 0–99)
LYMPHOCYTES ABSOLUTE: 0.37 E9/L (ref 1.5–4)
LYMPHOCYTES RELATIVE PERCENT: 6.8 % (ref 20–42)
MAGNESIUM: 1.6 MG/DL (ref 1.6–2.6)
MCH RBC QN AUTO: 27.5 PG (ref 26–35)
MCH RBC QN AUTO: 27.8 PG (ref 26–35)
MCHC RBC AUTO-ENTMCNC: 28.8 % (ref 32–34.5)
MCHC RBC AUTO-ENTMCNC: 29.1 % (ref 32–34.5)
MCV RBC AUTO: 95.5 FL (ref 80–99.9)
MCV RBC AUTO: 95.6 FL (ref 80–99.9)
METER GLUCOSE: 120 MG/DL (ref 74–99)
METER GLUCOSE: 87 MG/DL (ref 74–99)
MONOCYTES ABSOLUTE: 0.48 E9/L (ref 0.1–0.95)
MONOCYTES RELATIVE PERCENT: 8.8 % (ref 2–12)
NEUTROPHILS ABSOLUTE: 4.46 E9/L (ref 1.8–7.3)
NEUTROPHILS RELATIVE PERCENT: 81.8 % (ref 43–80)
OVALOCYTES: ABNORMAL
PDW BLD-RTO: 17.9 FL (ref 11.5–15)
PDW BLD-RTO: 17.9 FL (ref 11.5–15)
PLATELET # BLD: 259 E9/L (ref 130–450)
PLATELET # BLD: 259 E9/L (ref 130–450)
PMV BLD AUTO: 10.8 FL (ref 7–12)
PMV BLD AUTO: 10.9 FL (ref 7–12)
POIKILOCYTES: ABNORMAL
POLYCHROMASIA: ABNORMAL
POTASSIUM SERPL-SCNC: 6.2 MMOL/L (ref 3.5–5)
POTASSIUM SERPL-SCNC: 6.3 MMOL/L (ref 3.5–5)
PROTHROMBIN TIME, POC: 90.5
PROTHROMBIN TIME: 77 SEC (ref 9.3–12.4)
PROTHROMBIN TIME: 80.9 SEC (ref 9.3–12.4)
RBC # BLD: 3.09 E12/L (ref 3.5–5.5)
RBC # BLD: 3.2 E12/L (ref 3.5–5.5)
SCHISTOCYTES: ABNORMAL
SODIUM BLD-SCNC: 141 MMOL/L (ref 132–146)
SODIUM BLD-SCNC: 143 MMOL/L (ref 132–146)
TOTAL PROTEIN: 6.7 G/DL (ref 6.4–8.3)
TOTAL PROTEIN: 6.8 G/DL (ref 6.4–8.3)
TRIGL SERPL-MCNC: 104 MG/DL (ref 0–149)
TROPONIN: 0.41 NG/ML (ref 0–0.03)
TSH SERPL DL<=0.05 MIU/L-ACNC: 6.93 UIU/ML (ref 0.27–4.2)
VLDLC SERPL CALC-MCNC: 21 MG/DL
WBC # BLD: 4.4 E9/L (ref 4.5–11.5)
WBC # BLD: 5.5 E9/L (ref 4.5–11.5)

## 2019-06-10 PROCEDURE — 36415 COLL VENOUS BLD VENIPUNCTURE: CPT

## 2019-06-10 PROCEDURE — 99212 OFFICE O/P EST SF 10 MIN: CPT | Performed by: FAMILY MEDICINE

## 2019-06-10 PROCEDURE — 2580000003 HC RX 258: Performed by: STUDENT IN AN ORGANIZED HEALTH CARE EDUCATION/TRAINING PROGRAM

## 2019-06-10 PROCEDURE — G8598 ASA/ANTIPLAT THER USED: HCPCS | Performed by: FAMILY MEDICINE

## 2019-06-10 PROCEDURE — 96365 THER/PROPH/DIAG IV INF INIT: CPT

## 2019-06-10 PROCEDURE — 1123F ACP DISCUSS/DSCN MKR DOCD: CPT | Performed by: FAMILY MEDICINE

## 2019-06-10 PROCEDURE — 2500000003 HC RX 250 WO HCPCS: Performed by: STUDENT IN AN ORGANIZED HEALTH CARE EDUCATION/TRAINING PROGRAM

## 2019-06-10 PROCEDURE — 99285 EMERGENCY DEPT VISIT HI MDM: CPT

## 2019-06-10 PROCEDURE — 2022F DILAT RTA XM EVC RTNOPTHY: CPT | Performed by: FAMILY MEDICINE

## 2019-06-10 PROCEDURE — G8417 CALC BMI ABV UP PARAM F/U: HCPCS | Performed by: FAMILY MEDICINE

## 2019-06-10 PROCEDURE — 80076 HEPATIC FUNCTION PANEL: CPT

## 2019-06-10 PROCEDURE — 80061 LIPID PANEL: CPT

## 2019-06-10 PROCEDURE — 85610 PROTHROMBIN TIME: CPT

## 2019-06-10 PROCEDURE — 84484 ASSAY OF TROPONIN QUANT: CPT

## 2019-06-10 PROCEDURE — 83735 ASSAY OF MAGNESIUM: CPT

## 2019-06-10 PROCEDURE — 1090F PRES/ABSN URINE INCON ASSESS: CPT | Performed by: FAMILY MEDICINE

## 2019-06-10 PROCEDURE — 3044F HG A1C LEVEL LT 7.0%: CPT | Performed by: FAMILY MEDICINE

## 2019-06-10 PROCEDURE — 1036F TOBACCO NON-USER: CPT | Performed by: FAMILY MEDICINE

## 2019-06-10 PROCEDURE — 85025 COMPLETE CBC W/AUTO DIFF WBC: CPT

## 2019-06-10 PROCEDURE — 85610 PROTHROMBIN TIME: CPT | Performed by: FAMILY MEDICINE

## 2019-06-10 PROCEDURE — 6370000000 HC RX 637 (ALT 250 FOR IP): Performed by: STUDENT IN AN ORGANIZED HEALTH CARE EDUCATION/TRAINING PROGRAM

## 2019-06-10 PROCEDURE — 2060000000 HC ICU INTERMEDIATE R&B

## 2019-06-10 PROCEDURE — 85027 COMPLETE CBC AUTOMATED: CPT

## 2019-06-10 PROCEDURE — 80053 COMPREHEN METABOLIC PANEL: CPT

## 2019-06-10 PROCEDURE — 80048 BASIC METABOLIC PNL TOTAL CA: CPT

## 2019-06-10 PROCEDURE — 93005 ELECTROCARDIOGRAM TRACING: CPT | Performed by: PHYSICIAN ASSISTANT

## 2019-06-10 PROCEDURE — 4040F PNEUMOC VAC/ADMIN/RCVD: CPT | Performed by: FAMILY MEDICINE

## 2019-06-10 PROCEDURE — 82962 GLUCOSE BLOOD TEST: CPT

## 2019-06-10 PROCEDURE — 36415 COLL VENOUS BLD VENIPUNCTURE: CPT | Performed by: FAMILY MEDICINE

## 2019-06-10 PROCEDURE — 96375 TX/PRO/DX INJ NEW DRUG ADDON: CPT

## 2019-06-10 PROCEDURE — 3017F COLORECTAL CA SCREEN DOC REV: CPT | Performed by: FAMILY MEDICINE

## 2019-06-10 PROCEDURE — G8399 PT W/DXA RESULTS DOCUMENT: HCPCS | Performed by: FAMILY MEDICINE

## 2019-06-10 PROCEDURE — 84443 ASSAY THYROID STIM HORMONE: CPT

## 2019-06-10 PROCEDURE — 99214 OFFICE O/P EST MOD 30 MIN: CPT | Performed by: FAMILY MEDICINE

## 2019-06-10 PROCEDURE — 71045 X-RAY EXAM CHEST 1 VIEW: CPT

## 2019-06-10 PROCEDURE — G8427 DOCREV CUR MEDS BY ELIG CLIN: HCPCS | Performed by: FAMILY MEDICINE

## 2019-06-10 RX ORDER — NICOTINE POLACRILEX 4 MG
15 LOZENGE BUCCAL PRN
Status: DISCONTINUED | OUTPATIENT
Start: 2019-06-10 | End: 2019-06-14 | Stop reason: HOSPADM

## 2019-06-10 RX ORDER — DEXTROSE MONOHYDRATE 50 MG/ML
100 INJECTION, SOLUTION INTRAVENOUS PRN
Status: DISCONTINUED | OUTPATIENT
Start: 2019-06-10 | End: 2019-06-14 | Stop reason: HOSPADM

## 2019-06-10 RX ORDER — SODIUM POLYSTYRENE SULFONATE 4.1 MEQ/G
15 POWDER, FOR SUSPENSION ORAL; RECTAL ONCE
Status: COMPLETED | OUTPATIENT
Start: 2019-06-10 | End: 2019-06-10

## 2019-06-10 RX ORDER — DEXTROSE MONOHYDRATE 25 G/50ML
12.5 INJECTION, SOLUTION INTRAVENOUS PRN
Status: DISCONTINUED | OUTPATIENT
Start: 2019-06-10 | End: 2019-06-14 | Stop reason: HOSPADM

## 2019-06-10 RX ORDER — WARFARIN SODIUM 2 MG/1
TABLET ORAL
Qty: 180 TABLET | Refills: 3 | Status: CANCELLED | OUTPATIENT
Start: 2019-06-10

## 2019-06-10 RX ORDER — DEXTROSE MONOHYDRATE 25 G/50ML
25 INJECTION, SOLUTION INTRAVENOUS ONCE
Status: COMPLETED | OUTPATIENT
Start: 2019-06-10 | End: 2019-06-10

## 2019-06-10 RX ADMIN — SODIUM POLYSTYRENE SULFONATE 15 G: 1 POWDER ORAL; RECTAL at 22:32

## 2019-06-10 RX ADMIN — SODIUM BICARBONATE 50 MEQ: 84 INJECTION, SOLUTION INTRAVENOUS at 22:29

## 2019-06-10 RX ADMIN — DEXTROSE MONOHYDRATE 25 G: 500 INJECTION PARENTERAL at 22:23

## 2019-06-10 RX ADMIN — INSULIN HUMAN 10 UNITS: 100 INJECTION, SOLUTION PARENTERAL at 22:25

## 2019-06-10 RX ADMIN — Medication 1 G: at 21:57

## 2019-06-10 ASSESSMENT — ENCOUNTER SYMPTOMS
VOMITING: 0
SHORTNESS OF BREATH: 0
NAUSEA: 0
WHEEZING: 0
ABDOMINAL PAIN: 0
PHOTOPHOBIA: 0
CHEST TIGHTNESS: 0

## 2019-06-10 NOTE — PROGRESS NOTES
CC:  Follow up     HPI:  68 y.o. female presents for follow up. Abdominal pain noted. Pain after eating. No constipation. No blood in stools. Pain in ribs bilaterally. Hernia area hurts under sternum, but does not feel stuck. No F/C. No jaundice noted. Chronic diarrhea, unchanged, no increase in bleeding with BMs. Diarrhea with chemo, unchanged. Chronic venous stasis, lymphedema, with venous ulcerations, followed by wound care nurses at home. Legs improving, less swelling. Seeping from bilateral legs, currently wrapped. Yellowish drainage at times, improving. No pain or redness. Swelling is improving overall. DM, fasting between 140-150, only on metformin. No change in diet. Still with hemorrhoid bleeding on occasion, no change in amount. No CP. Stable SOB with exertion. HTN. Mild dizziness with standing rapidly, but stable overall. DVT, recurrent. INR 7.5. No change in diet. Dose has been decreased as advised.       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 04/04/2018    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 01/16/2580    Complicated UTI (urinary tract infection) 10/24/2017    Hyperkalemia 10/24/2017    KARISHMA (acute kidney injury) ([address] 10/24/2017    Diarrhea with dehydration 10/24/2017    Chronic anticoagulation 10/24/2017    Peripheral polyneuropathy 01/03/2017    Bilateral edema of lower extremity 10/03/2016    Chronic deep vein thrombosis (DVT) of proximal vein of right lower extremity ([facility] 09/30/2016    Type 2 diabetes mellitus without complication, with long-term current use of insulin ([facility] 09/01/2016    Anemia 09/01/2016    Ventral hernia 05/14/2015    Rectal bleeding 02/23/2015    Anesthesia     Pericardial effusion     MI (myocardial infarction) (HCC)     Arthritis     Lymph node enlargement     Subclavian vein occlusion, bilateral ([facility] 04/18/2012    Rib lesion 02/07/2012    Hyperlipidemia 08/29/2011    Sarcoidosis 07/26/2011    B12 deficiency 06/22/2011    Shoulder pain, left 03/02/2011    [facility]     Hypertension     Coronary artery disease involving native coronary artery of native heart without angina pectoris     Nephrolithiasis     CHF (congestive heart failure) ([facility] [facility]     Humerus fracture        Current Outpatient Medications on File Prior to Visit   Medication Sig Dispense Refill    pregabalin (LYRICA) 50 MG capsule Take 1 capsule by mouth 3 times daily for 120 days. 90 capsule 3    warfarin (COUMADIN) 2 MG tablet Take 2 tablets orally on Sun, Tues, Thurs; and 1 tablet daily on all other days. Currently warfarin is held. Please follow INR and dose.  180 tablet 3    metFORMIN (GLUCOPHAGE) 500 MG tablet Take 1 tablet by mouth 2 times daily (with meals) 60 tablet 3    atorvastatin (LIPITOR) 20 MG tablet Take 1 tablet by mouth nightly 30 tablet 3    miconazole nitrate 2 % OINT Apply topically 2 times daily 2 Tube 1    metoprolol tartrate (LOPRESSOR) 50 MG tablet take 1 tablet by mouth twice a day 180 tablet 3    RA VITAMIN B-12 TR 1000 MCG TBCR take 1 tablet by mouth once daily  0    diclofenac sodium 1 % GEL Apply 4 g topically 4 times daily 4 Tube 1    famotidine (PEPCID) 20 MG tablet take 1 tablet by mouth twice a day 180 tablet 3    vitamin B-12 (CYANOCOBALAMIN) 1000 MCG tablet Take 1 tablet by mouth daily 90 tablet 3    Calcium Citrate-Vitamin D (RA CALCIUM CIT-VIT D-3 PETITES) 200-250 MG-UNIT TABS take 1 tablet by mouth twice a day 180 tablet 3    exemestane (AROMASIN) 25 MG chemo tablet Take 1 tablet by mouth every morning (before breakfast)      insulin glargine (LANTUS SOLOSTAR) 100 UNIT/ML injection pen Inject 65 Units into the skin nightly 21 Pen 3    Multiple Vitamins-Minerals (THERAPEUTIC MULTIVITAMIN-MINERALS) tablet Take 1 tablet by mouth daily 90 tablet 3    trastuzumab (HERCEPTIN) 440 MG injection Infuse  intravenously once a week. On tuesdays       No current facility-administered medications on file prior to visit. Allergies   Allergen Reactions    Macrobid [Nitrofurantoin Monohydrate Macrocrystals] Hives       Social History     Tobacco Use    Smoking status: Never Smoker    Smokeless tobacco: Never Used   Substance Use Topics    Alcohol use: No    Drug use: No       ROS:   Review of Systems - General ROS: negative for - chills or fever  Respiratory ROS: stable shortness of breath, unchanged, chronic   Cardiovascular ROS: no chest pain or pressure   Gastrointestinal ROS: chronic diarrhea, no change   Genito-Urinary ROS: no dysuria, trouble voiding, or hematuria    Physical Exam:    VS:  Blood pressure 116/72, pulse 73, temperature 98.1 °F (36.7 °C), temperature source Oral, height 5' 4\" (1.626 m), weight 221 lb (100.2 kg), SpO2 94 %, not currently breastfeeding. General Appearance:  awake, alert, oriented, in no acute distress; general pallor, stable   Head/face:  NCAT  Eyes:  EOMI and Sclera nonicteric  Neck:  neck- supple, no mass, non-tender  Lungs:  Decreased but currently CTA   Heart:  Heart regular rate and rhythm, soft systolic murmur   Abdomen:  Distended, tense, dull to percussion; diffusely tender, no erythema, no incarcerated hernia palpable   Extremities: 2-3+ edema bilateral, tight, under kenrick pants, wrapped.   Evidence for dried drainage on bandage, serous    Most recent labs and imaging medication(s) also explained. Patient and/or caregiver was instructed to call if any new symptoms develop prior to next visit.

## 2019-06-11 ENCOUNTER — APPOINTMENT (OUTPATIENT)
Dept: GENERAL RADIOLOGY | Age: 74
DRG: 641 | End: 2019-06-11
Payer: COMMERCIAL

## 2019-06-11 ENCOUNTER — APPOINTMENT (OUTPATIENT)
Dept: ULTRASOUND IMAGING | Age: 74
DRG: 641 | End: 2019-06-11
Payer: COMMERCIAL

## 2019-06-11 ENCOUNTER — APPOINTMENT (OUTPATIENT)
Dept: CT IMAGING | Age: 74
DRG: 641 | End: 2019-06-11
Payer: COMMERCIAL

## 2019-06-11 LAB
ABO/RH: NORMAL
ALBUMIN SERPL-MCNC: 3.5 G/DL (ref 3.5–5.2)
ALP BLD-CCNC: 166 U/L (ref 35–104)
ALT SERPL-CCNC: 23 U/L (ref 0–32)
ANION GAP SERPL CALCULATED.3IONS-SCNC: 14 MMOL/L (ref 7–16)
ANION GAP SERPL CALCULATED.3IONS-SCNC: 14 MMOL/L (ref 7–16)
ANISOCYTOSIS: ABNORMAL
ANTIBODY SCREEN: NORMAL
AST SERPL-CCNC: 44 U/L (ref 0–31)
BASOPHILS ABSOLUTE: 0.04 E9/L (ref 0–0.2)
BASOPHILS RELATIVE PERCENT: 1 % (ref 0–2)
BILIRUB SERPL-MCNC: 0.5 MG/DL (ref 0–1.2)
BUN BLDV-MCNC: 34 MG/DL (ref 8–23)
BUN BLDV-MCNC: 35 MG/DL (ref 8–23)
CALCIUM SERPL-MCNC: 9.2 MG/DL (ref 8.6–10.2)
CALCIUM SERPL-MCNC: 9.3 MG/DL (ref 8.6–10.2)
CHLORIDE BLD-SCNC: 109 MMOL/L (ref 98–107)
CHLORIDE BLD-SCNC: 110 MMOL/L (ref 98–107)
CO2: 19 MMOL/L (ref 22–29)
CO2: 20 MMOL/L (ref 22–29)
CREAT SERPL-MCNC: 1.9 MG/DL (ref 0.5–1)
CREAT SERPL-MCNC: 2 MG/DL (ref 0.5–1)
CREATININE URINE: 249 MG/DL (ref 29–226)
EKG ATRIAL RATE: 83 BPM
EKG P AXIS: 61 DEGREES
EKG P-R INTERVAL: 202 MS
EKG Q-T INTERVAL: 416 MS
EKG QRS DURATION: 142 MS
EKG QTC CALCULATION (BAZETT): 488 MS
EKG R AXIS: -90 DEGREES
EKG T AXIS: 48 DEGREES
EKG VENTRICULAR RATE: 83 BPM
EOSINOPHILS ABSOLUTE: 0.16 E9/L (ref 0.05–0.5)
EOSINOPHILS RELATIVE PERCENT: 4 % (ref 0–6)
GFR AFRICAN AMERICAN: 29
GFR AFRICAN AMERICAN: 31
GFR NON-AFRICAN AMERICAN: 24 ML/MIN/1.73
GFR NON-AFRICAN AMERICAN: 26 ML/MIN/1.73
GLUCOSE BLD-MCNC: 75 MG/DL (ref 74–99)
GLUCOSE BLD-MCNC: 83 MG/DL (ref 74–99)
HCT VFR BLD CALC: 28.8 % (ref 34–48)
HCT VFR BLD CALC: 30.7 % (ref 34–48)
HEMOGLOBIN: 8.3 G/DL (ref 11.5–15.5)
HEMOGLOBIN: 8.9 G/DL (ref 11.5–15.5)
INR BLD: 1.6
INR BLD: 3.1
LACTIC ACID: 1.9 MMOL/L (ref 0.5–2.2)
LYMPHOCYTES ABSOLUTE: 0.49 E9/L (ref 1.5–4)
LYMPHOCYTES RELATIVE PERCENT: 12 % (ref 20–42)
MCH RBC QN AUTO: 27.6 PG (ref 26–35)
MCHC RBC AUTO-ENTMCNC: 28.8 % (ref 32–34.5)
MCV RBC AUTO: 95.7 FL (ref 80–99.9)
METER GLUCOSE: 121 MG/DL (ref 74–99)
METER GLUCOSE: 164 MG/DL (ref 74–99)
METER GLUCOSE: 72 MG/DL (ref 74–99)
METER GLUCOSE: 78 MG/DL (ref 74–99)
METER GLUCOSE: 78 MG/DL (ref 74–99)
METER GLUCOSE: 83 MG/DL (ref 74–99)
MICROALBUMIN UR-MCNC: 287.2 MG/L
MICROALBUMIN/CREAT UR-RTO: 115.3 (ref 0–30)
MONOCYTES ABSOLUTE: 0.41 E9/L (ref 0.1–0.95)
MONOCYTES RELATIVE PERCENT: 10 % (ref 2–12)
NEUTROPHILS ABSOLUTE: 2.99 E9/L (ref 1.8–7.3)
NEUTROPHILS RELATIVE PERCENT: 73 % (ref 43–80)
OVALOCYTES: ABNORMAL
PDW BLD-RTO: 17.9 FL (ref 11.5–15)
PLATELET # BLD: 252 E9/L (ref 130–450)
PMV BLD AUTO: 10.7 FL (ref 7–12)
POIKILOCYTES: ABNORMAL
POLYCHROMASIA: ABNORMAL
POTASSIUM REFLEX MAGNESIUM: 5.2 MMOL/L (ref 3.5–5)
POTASSIUM REFLEX MAGNESIUM: 5.4 MMOL/L (ref 3.5–5)
POTASSIUM REFLEX MAGNESIUM: 5.4 MMOL/L (ref 3.5–5)
POTASSIUM REFLEX MAGNESIUM: 5.5 MMOL/L (ref 3.5–5)
POTASSIUM REFLEX MAGNESIUM: 5.9 MMOL/L (ref 3.5–5)
POTASSIUM SERPL-SCNC: 5.4 MMOL/L (ref 3.5–5)
PRO-BNP: ABNORMAL PG/ML (ref 0–125)
PROTHROMBIN TIME: 18.3 SEC (ref 9.3–12.4)
PROTHROMBIN TIME: 35.2 SEC (ref 9.3–12.4)
RBC # BLD: 3.01 E12/L (ref 3.5–5.5)
SODIUM BLD-SCNC: 142 MMOL/L (ref 132–146)
SODIUM BLD-SCNC: 144 MMOL/L (ref 132–146)
SODIUM URINE: 43 MMOL/L
TOTAL CK: 286 U/L (ref 20–180)
TOTAL PROTEIN: 6.5 G/DL (ref 6.4–8.3)
TROPONIN: 0.43 NG/ML (ref 0–0.03)
TROPONIN: 0.5 NG/ML (ref 0–0.03)
WBC # BLD: 4.1 E9/L (ref 4.5–11.5)

## 2019-06-11 PROCEDURE — 80048 BASIC METABOLIC PNL TOTAL CA: CPT

## 2019-06-11 PROCEDURE — 80053 COMPREHEN METABOLIC PANEL: CPT

## 2019-06-11 PROCEDURE — 2580000003 HC RX 258: Performed by: FAMILY MEDICINE

## 2019-06-11 PROCEDURE — 76770 US EXAM ABDO BACK WALL COMP: CPT

## 2019-06-11 PROCEDURE — 85018 HEMOGLOBIN: CPT

## 2019-06-11 PROCEDURE — 82044 UR ALBUMIN SEMIQUANTITATIVE: CPT

## 2019-06-11 PROCEDURE — 83880 ASSAY OF NATRIURETIC PEPTIDE: CPT

## 2019-06-11 PROCEDURE — 6370000000 HC RX 637 (ALT 250 FOR IP): Performed by: STUDENT IN AN ORGANIZED HEALTH CARE EDUCATION/TRAINING PROGRAM

## 2019-06-11 PROCEDURE — 85025 COMPLETE CBC W/AUTO DIFF WBC: CPT

## 2019-06-11 PROCEDURE — 84132 ASSAY OF SERUM POTASSIUM: CPT

## 2019-06-11 PROCEDURE — 84300 ASSAY OF URINE SODIUM: CPT

## 2019-06-11 PROCEDURE — 2580000003 HC RX 258: Performed by: STUDENT IN AN ORGANIZED HEALTH CARE EDUCATION/TRAINING PROGRAM

## 2019-06-11 PROCEDURE — 74022 RADEX COMPL AQT ABD SERIES: CPT

## 2019-06-11 PROCEDURE — 36415 COLL VENOUS BLD VENIPUNCTURE: CPT

## 2019-06-11 PROCEDURE — 93005 ELECTROCARDIOGRAM TRACING: CPT | Performed by: STUDENT IN AN ORGANIZED HEALTH CARE EDUCATION/TRAINING PROGRAM

## 2019-06-11 PROCEDURE — 86901 BLOOD TYPING SEROLOGIC RH(D): CPT

## 2019-06-11 PROCEDURE — 74176 CT ABD & PELVIS W/O CONTRAST: CPT

## 2019-06-11 PROCEDURE — 6360000002 HC RX W HCPCS: Performed by: STUDENT IN AN ORGANIZED HEALTH CARE EDUCATION/TRAINING PROGRAM

## 2019-06-11 PROCEDURE — 86850 RBC ANTIBODY SCREEN: CPT

## 2019-06-11 PROCEDURE — 86900 BLOOD TYPING SEROLOGIC ABO: CPT

## 2019-06-11 PROCEDURE — 93005 ELECTROCARDIOGRAM TRACING: CPT | Performed by: FAMILY MEDICINE

## 2019-06-11 PROCEDURE — 99222 1ST HOSP IP/OBS MODERATE 55: CPT | Performed by: FAMILY MEDICINE

## 2019-06-11 PROCEDURE — 6370000000 HC RX 637 (ALT 250 FOR IP): Performed by: FAMILY MEDICINE

## 2019-06-11 PROCEDURE — 82550 ASSAY OF CK (CPK): CPT

## 2019-06-11 PROCEDURE — 84484 ASSAY OF TROPONIN QUANT: CPT

## 2019-06-11 PROCEDURE — 83605 ASSAY OF LACTIC ACID: CPT

## 2019-06-11 PROCEDURE — 93010 ELECTROCARDIOGRAM REPORT: CPT | Performed by: INTERNAL MEDICINE

## 2019-06-11 PROCEDURE — 82570 ASSAY OF URINE CREATININE: CPT

## 2019-06-11 PROCEDURE — 2060000000 HC ICU INTERMEDIATE R&B

## 2019-06-11 PROCEDURE — 85610 PROTHROMBIN TIME: CPT

## 2019-06-11 PROCEDURE — 85014 HEMATOCRIT: CPT

## 2019-06-11 PROCEDURE — 82962 GLUCOSE BLOOD TEST: CPT

## 2019-06-11 RX ORDER — LANOLIN ALCOHOL/MO/W.PET/CERES
1000 CREAM (GRAM) TOPICAL DAILY
Status: DISCONTINUED | OUTPATIENT
Start: 2019-06-11 | End: 2019-06-14 | Stop reason: HOSPADM

## 2019-06-11 RX ORDER — SODIUM CHLORIDE 0.9 % (FLUSH) 0.9 %
10 SYRINGE (ML) INJECTION EVERY 12 HOURS SCHEDULED
Status: DISCONTINUED | OUTPATIENT
Start: 2019-06-11 | End: 2019-06-14 | Stop reason: HOSPADM

## 2019-06-11 RX ORDER — ATORVASTATIN CALCIUM 10 MG/1
20 TABLET, FILM COATED ORAL NIGHTLY
Status: DISCONTINUED | OUTPATIENT
Start: 2019-06-11 | End: 2019-06-14 | Stop reason: HOSPADM

## 2019-06-11 RX ORDER — OYSTER SHELL CALCIUM WITH VITAMIN D 500; 200 MG/1; [IU]/1
1 TABLET, FILM COATED ORAL DAILY
Status: DISCONTINUED | OUTPATIENT
Start: 2019-06-11 | End: 2019-06-14 | Stop reason: HOSPADM

## 2019-06-11 RX ORDER — DOCUSATE SODIUM 100 MG/1
100 CAPSULE, LIQUID FILLED ORAL 2 TIMES DAILY
Status: DISCONTINUED | OUTPATIENT
Start: 2019-06-11 | End: 2019-06-14 | Stop reason: HOSPADM

## 2019-06-11 RX ORDER — METOPROLOL TARTRATE 50 MG/1
50 TABLET, FILM COATED ORAL 2 TIMES DAILY
Status: DISCONTINUED | OUTPATIENT
Start: 2019-06-11 | End: 2019-06-14

## 2019-06-11 RX ORDER — SODIUM CHLORIDE 9 MG/ML
INJECTION, SOLUTION INTRAVENOUS CONTINUOUS
Status: DISCONTINUED | OUTPATIENT
Start: 2019-06-11 | End: 2019-06-11

## 2019-06-11 RX ORDER — SODIUM POLYSTYRENE SULFONATE 4.1 MEQ/G
15 POWDER, FOR SUSPENSION ORAL; RECTAL ONCE
Status: COMPLETED | OUTPATIENT
Start: 2019-06-11 | End: 2019-06-11

## 2019-06-11 RX ORDER — FAMOTIDINE 20 MG/1
20 TABLET, FILM COATED ORAL DAILY
Status: DISCONTINUED | OUTPATIENT
Start: 2019-06-11 | End: 2019-06-13

## 2019-06-11 RX ORDER — WARFARIN SODIUM 1 MG/1
1 TABLET ORAL
Status: DISCONTINUED | OUTPATIENT
Start: 2019-06-11 | End: 2019-06-11

## 2019-06-11 RX ORDER — PREGABALIN 50 MG/1
50 CAPSULE ORAL 3 TIMES DAILY
Status: DISCONTINUED | OUTPATIENT
Start: 2019-06-11 | End: 2019-06-14

## 2019-06-11 RX ORDER — SODIUM CHLORIDE 0.9 % (FLUSH) 0.9 %
10 SYRINGE (ML) INJECTION PRN
Status: DISCONTINUED | OUTPATIENT
Start: 2019-06-11 | End: 2019-06-14 | Stop reason: HOSPADM

## 2019-06-11 RX ORDER — ACETAMINOPHEN 325 MG/1
650 TABLET ORAL EVERY 4 HOURS PRN
Status: DISCONTINUED | OUTPATIENT
Start: 2019-06-11 | End: 2019-06-12

## 2019-06-11 RX ORDER — M-VIT,TX,IRON,MINS/CALC/FOLIC 27MG-0.4MG
1 TABLET ORAL DAILY
Status: DISCONTINUED | OUTPATIENT
Start: 2019-06-11 | End: 2019-06-14 | Stop reason: HOSPADM

## 2019-06-11 RX ORDER — EXEMESTANE 25 MG/1
25 TABLET ORAL
Status: DISCONTINUED | OUTPATIENT
Start: 2019-06-11 | End: 2019-06-14 | Stop reason: HOSPADM

## 2019-06-11 RX ORDER — LOPERAMIDE HYDROCHLORIDE 2 MG/1
2 CAPSULE ORAL ONCE
Status: COMPLETED | OUTPATIENT
Start: 2019-06-11 | End: 2019-06-11

## 2019-06-11 RX ADMIN — Medication 10 ML: at 15:45

## 2019-06-11 RX ADMIN — FAMOTIDINE 20 MG: 20 TABLET, FILM COATED ORAL at 08:37

## 2019-06-11 RX ADMIN — METOPROLOL TARTRATE 50 MG: 50 TABLET, FILM COATED ORAL at 08:37

## 2019-06-11 RX ADMIN — METOPROLOL TARTRATE 50 MG: 50 TABLET, FILM COATED ORAL at 01:27

## 2019-06-11 RX ADMIN — SODIUM POLYSTYRENE SULFONATE 15 G: 1 POWDER ORAL; RECTAL at 22:06

## 2019-06-11 RX ADMIN — PATIROMER 8.4 G: 8.4 POWDER, FOR SUSPENSION ORAL at 15:45

## 2019-06-11 RX ADMIN — LOPERAMIDE HYDROCHLORIDE 2 MG: 2 CAPSULE ORAL at 09:55

## 2019-06-11 RX ADMIN — PREGABALIN 50 MG: 50 CAPSULE ORAL at 08:37

## 2019-06-11 RX ADMIN — CALCIUM CARBONATE-VITAMIN D TAB 500 MG-200 UNIT 1 TABLET: 500-200 TAB at 08:37

## 2019-06-11 RX ADMIN — SODIUM CHLORIDE: 9 INJECTION, SOLUTION INTRAVENOUS at 15:44

## 2019-06-11 RX ADMIN — Medication: at 08:35

## 2019-06-11 RX ADMIN — ATORVASTATIN CALCIUM 20 MG: 10 TABLET, FILM COATED ORAL at 21:24

## 2019-06-11 RX ADMIN — PREGABALIN 50 MG: 50 CAPSULE ORAL at 15:44

## 2019-06-11 RX ADMIN — ATORVASTATIN CALCIUM 20 MG: 10 TABLET, FILM COATED ORAL at 01:27

## 2019-06-11 RX ADMIN — METOPROLOL TARTRATE 50 MG: 50 TABLET, FILM COATED ORAL at 21:26

## 2019-06-11 RX ADMIN — PHYTONADIONE 10 MG: 10 INJECTION, EMULSION INTRAMUSCULAR; INTRAVENOUS; SUBCUTANEOUS at 00:53

## 2019-06-11 RX ADMIN — DOCUSATE SODIUM 100 MG: 100 CAPSULE, LIQUID FILLED ORAL at 01:27

## 2019-06-11 RX ADMIN — MULTIPLE VITAMINS W/ MINERALS TAB 1 TABLET: TAB at 08:37

## 2019-06-11 RX ADMIN — Medication 10 ML: at 08:40

## 2019-06-11 RX ADMIN — Medication 1000 MCG: at 08:37

## 2019-06-11 RX ADMIN — PREGABALIN 50 MG: 50 CAPSULE ORAL at 21:24

## 2019-06-11 RX ADMIN — EXEMESTANE 25 MG: 25 TABLET ORAL at 09:27

## 2019-06-11 ASSESSMENT — PAIN SCALES - GENERAL
PAINLEVEL_OUTOF10: 0

## 2019-06-11 NOTE — PROGRESS NOTES
trachea midline, no thyromegaly  Lungs: CTAB, respiration unlabored, normal expansion  Heart: RRR, normal C6/C7, systolic murmur noted  Abdomen: Soft, mildly tender to palpation over hernia, distended, +BS  Extremities: bilateral peripheral edema with venous stasis changes - non-tender to palpation, no clubbing, no cyanosis  Skin: Warm and dry,venous stasis changes   Neurologic: Motor functions intact. No focal deficit, no CN deficit  Psychiatric: Appropriate affect, A&O to person, place and time      SIGNIFICANT IMAGING STUDIES:    Xr Chest Portable    Result Date: 6/10/2019  Patient MRN: [id number] : 1945 Age:  68 years Gender: Female Order Date: 6/10/2019 9:15 PM Exam: XR CHEST PORTABLE Number of Images: 1 view Indication:   elevated troponin elevated troponin Comparison: Prior chest radiograph from 2016 is available Findings: The lungs are clear. There is no evidence of pulmonary infiltrate or pleural effusion. The pulmonary vascularity is unremarkable. There is cardiomegaly with median sternotomy. There is moderate mitral valve annulus calcification seen. There is uncoiling atherosclerotic change of thoracic aorta. .  The bony thorax demonstrates right fourth and fifth ribs fracture on the posterior lateral aspect with deformity. There is complete destruction of the left humeral neck with old left mid humeral neck fracture with nonunion. This findings was seen before and appears to be unchanged. . There is a right-sided Port-A-Cath with tip in superior vena cava. Cardiomegaly with median sternotomy No evidence of airspace consolidation or pulmonary venous congestion.        RECENT LABS:     Basic Labs      CBC:   Recent Labs     06/10/19  1400 06/10/19  2015 06/11/19  0430   WBC 5.5 4.4* 4.1*   RBC 3.20* 3.09* 3.01*   HGB 8.9* 8.5* 8.3*   HCT 30.6* 29.5* 28.8*   MCV 95.6 95.5 95.7   RDW 17.9* 17.9* 17.9*    259 252       BMP/CMP:   Recent Labs     06/10/19  2015  06/11/19  0118 06/11/19  0430 06/11/19  0742     --  142 144  --    K 6.2*   < > 5.4* 5.4* 5.5*   *  --  109* 110*  --    CO2 21*  --  19* 20*  --    BUN 35*  --  34* 35*  --    CREATININE 2.1*  --  2.0* 1.9*  --    MG 1.6  --   --   --   --     < > = values in this interval not displayed. Recent Labs     06/10/19  1400 06/10/19  2015 06/11/19  0430   PROT 6.8 6.7 6.5   ALKPHOS 189* 179* 166*   ALT 23 23 23   AST 34* 42* 44*   BILITOT 0.5 0.5 0.5       OTHER LABS:    Cardiac enzymes:  Lab Results   Component Value Date    CKTOTAL 286 (H) 06/11/2019    TROPONINI 0.50 (H) 06/11/2019     PT/INR:   Recent Labs     06/10/19  1400 06/10/19  2015 06/11/19  0430   INR 6.9* 7.3* 3.1     BNP: No results for input(s): BNP in the last 72 hours.   Hgb A1C:   Lab Results   Component Value Date    LABA1C 6.9 04/15/2019     No results found for: EAG  ESR: No results found for: SEDRATE  CRP: No results found for: CRP  D Dimer: No results found for: DDIMER  Folate and B12:   Lab Results   Component Value Date    [id number] 174 (L) 10/23/2017   ,   Lab Results   Component Value Date    FOLATE 17.0 06/20/2011     Lactic Acid:   Lab Results   Component Value Date    LACTA 1.9 06/11/2019     Thyroid Studies:  Lab Results   Component Value Date    TSH 6.930 (H) 06/10/2019       MICROBIOLOGY:    Urine Culture:  No components found for: CURINE  Blood Culture:  No components found for: CBLOOD, CFUNGUSBL   Lab Results   Component Value Date    BC 5 Days- no growth 10/24/2017     Blood Culture from Central Line:  No components found for: CBLOODLN  Stool Culture:  No components found for: CSTOOL  Sputum Culture:  No components found for: CSPUTUM  Sputum Culture for AFB:  No components found for: CAFBSM            ASSESSMENT & PLAN:    Principal Problem:    Hyperkalemia  Active Problems:    Coronary artery disease involving native coronary artery of native heart without angina pectoris    CHF (congestive heart failure) (HCC)    Ventral hernia

## 2019-06-11 NOTE — PROGRESS NOTES
[address] [facility]  Family Medicine Attending    S: 68 y.o. female with history of metastatic breast cancer, DM, CAD, recurrent DVT on coumadin, chronic diarrhea, chronic left humerus fracture with nonunion, CKD presents with hyperkalemia, supratherapeutic INR, and KARISHMA. Current reports abdominal pain and chronic diarrhea, perhaps more than baseline. Potassium has improved with treatment. O: VS- Blood pressure (!) 128/58, pulse 72, temperature 97.7 °F (36.5 °C), temperature source Oral, resp. rate 21, height 5' 4\" (1.626 m), weight 220 lb 8 oz (100 kg), SpO2 98 %, not currently breastfeeding. Exam is as noted by resident with the following changes, additions or corrections:  Gen NAD, A&A  CV RRR, systolic murmur 2/6 LSB  Resp decreased breath sounds   Abd distended, diffusely tender. Defect palpable ventrally inferior to sternum without hernia contents at this time. Bowel sounds hyperactive. Ext stable 2-3+ edema with stasis changes, ulcerations     Impressions:   Principal Problem:    Hyperkalemia  Active Problems:    Coronary artery disease involving native coronary artery of native heart without angina pectoris    CHF (congestive heart failure) (HCC)    Ventral hernia    Chronic anticoagulation    CKD (chronic kidney disease) stage 3, GFR 30-59 ml/min (HCC)    Supratherapeutic INR  Resolved Problems:    * No resolved hospital problems. *      Plan:   Follow potassium, improving with treatment. Evaluate KARISHMA, likely prerenal, evaluate for etiologies. INR down to 3 after Vitamin K yesterday. Will need to follow closely. Abdominal XR, US liver. Check lactate, BNP, CPK. Watch glucose, metformin held. Glucose in 70s this AM after insulin last night for hyperkalemia. Patient given orange juice, then ate breakfast.  Glucose in 80s, medications held. Advised to avoid orange juice for now. Will need recheck of TSH and free T4 going forward. Wound care, compression for LE.        Attending Physician Statement  I have reviewed the chart and seen the patient with the resident(s). I personally reviewed images, EKG's and similar tests, if present. I personally reviewed and performed key elements of the history and exam.  I have reviewed and confirmed student and/or resident history and exam with changes as indicated above. I agree with the assessment, plan and orders as documented by the resident. Please refer to the resident and/or student note for additional information.       Ann Hernandez

## 2019-06-11 NOTE — ED PROVIDER NOTES
Patient is a 68year old female that presents to the emergency department for evaluation of supratheraputic INR and hyperkalemia. Patient was having blood work drawn at her primary care for evaluation of her kidneys. They noted that her potassium was elevated at 6.3 and her INR was 7. They sent her in for further evaluation. Patient denies any complaints at this time. Patient is a history of breast cancer that metastasized to bones. She is currently on warfarin due to repeated DVTs. She has had issues with supratherapeutic INR in the past. Denies any recent changes in medications or illnesses. Denies any lightheadedness, dizziness, change in vision, hemoptysis, abdominal pain, nausea, vomiting, blood in the stool, melena, hematuria. The history is provided by the patient. Review of Systems   Constitutional: Positive for fatigue. Negative for appetite change, chills, diaphoresis and fever. Eyes: Negative for photophobia and visual disturbance. Respiratory: Negative for chest tightness, shortness of breath and wheezing. Cardiovascular: Negative for chest pain and palpitations. Gastrointestinal: Negative for abdominal pain, nausea and vomiting. Genitourinary: Negative for decreased urine volume, difficulty urinating, dysuria, flank pain, frequency, urgency and vaginal bleeding. Musculoskeletal: Negative for myalgias, neck pain and neck stiffness. Skin: Negative for pallor and rash. Neurological: Negative for dizziness, syncope, weakness, light-headedness, numbness and headaches. Physical Exam   Constitutional: She is oriented to person, place, and time. She appears well-developed and well-nourished. No distress. HENT:   Head: Normocephalic and atraumatic. Right Ear: External ear normal.   Left Ear: External ear normal.   Mouth/Throat: Oropharynx is clear and moist.   Eyes: Pupils are equal, round, and reactive to light. EOM are normal.   Neck: Normal range of motion. Neck supple. No tracheal deviation present. Cardiovascular: Normal rate, regular rhythm, normal heart sounds and intact distal pulses. No murmur heard. Pulmonary/Chest: Effort normal and breath sounds normal. No respiratory distress. She has no wheezes. Abdominal: Soft. Bowel sounds are normal. There is tenderness (Mild tenderness on the left side of the abdomen consistent with patient's history of hernia that has been chronic.). There is no rebound and no guarding. Musculoskeletal: She exhibits edema (2+ pitting edema bilaterally consistent with patient's history of lymphedema. ). Lymphadenopathy:     She has no cervical adenopathy. Neurological: She is alert and oriented to person, place, and time. Skin: Skin is warm and dry. She is not diaphoretic. Nursing note and vitals reviewed. Procedures    MDM  Number of Diagnoses or Management Options  Hyperkalemia:   Warfarin-induced coagulopathy [facility]):   Diagnosis management comments: Patient is a 72-year-old female that presents to the emergency department for evaluation of supratherapeutic INR and hyperkalemia. Patient well-appearing and in no apparent distress on exam. Patient does appear pale however daughter states that she does not appear any different than normal. Blood work showed potassium of 6.2 and hyperkalemia protocol was initiated including calcium gluconate, insulin/D50, sodium bicarbonate, Kayexalate. EKG was unchanged from previous Patient's INR 7.3 and discussed pharmacist on reversal of INR. Vitamin K ordered. Patient has no active bleeding at this time. Spoke with Dr. Jamar Lynn agreed to admit the patient.  Patient made in stable condition.         --------------------------------------------- PAST HISTORY ---------------------------------------------  Past Medical History:  has a past medical history of Abscess of abdominal wall, Anemia, Anesthesia, Arthritis, Arthritis, hip, Breast cancer ([facility] CAD (coronary artery disease), CHF (congestive heart failure) ([facility] Chronic venous insufficiency, Decreased dorsalis pedis pulse, Diabetic neuropathy ([facility] Diabetic neuropathy ([email or web address] DVT (deep venous thrombosis) ([email or web address] DVT of upper extremity (deep vein thrombosis) ([email or web address] History of deep vein thrombosis, Humerus fracture, Hyperlipidemia, Hypertension, Leg swelling, Lymph node enlargement, Lymphedema of both lower extremities, MI (myocardial infarction) ([email or web address] Nephrolithiasis, Pericardial effusion, Pulmonary nodule, Rib lesion, Sarcoidosis, Subclavian vein occlusion, bilateral ([email or web address] and Type II or unspecified type diabetes mellitus without mention of complication, not stated as uncontrolled. Past Surgical History:  has a past surgical history that includes Tooth Extraction; Hysterectomy (1975, 1985); Cholecystectomy; Appendectomy; other surgical history (11/18/11); Arterial bypass surgry; Coronary artery bypass graft (05/2008); Tunneled venous port placement; Cardiac surgery; eye surgery; Tunneled venous port placement ([id number]); Breast lumpectomy (9/27/2005); ECHO Compl W Dop Color Flow (10/30/2013); Colonoscopy (12/2007); and Colonoscopy ([id number]). Social History:  reports that she has never smoked. She has never used smokeless tobacco. She reports that she does not drink alcohol or use drugs. Family History: family history is not on file. She was adopted. The patients home medications have been reviewed.     Allergies: Macrobid [nitrofurantoin monohydrate macrocrystals]    -------------------------------------------------- RESULTS -------------------------------------------------    LABS:  Results for orders placed or performed during the hospital encounter of 06/10/19   Protime-INR   Result Value Ref Range    Protime 80.9 (H) 9.3 - 12.4 sec    INR 7.3 (HH)    Comprehensive Metabolic Panel   Result Value Ref Range    Sodium 143 132 - 146 mmol/L    Potassium 6.2 (H) 3.5 - 5.0 mmol/L    Chloride 109 (H) 98 - 107 mmol/L    CO2 21 (L) 22 - 29 mmol/L    Anion Gap 13 7 - 16 mmol/L    Glucose 97 74 - 99 mg/dL    BUN 35 (H) 8 - 23 mg/dL    CREATININE 2.1 (H) 0.5 - 1.0 mg/dL    GFR Non-African American 23 >=60 mL/min/1.73    GFR African American 28     Calcium 9.2 8.6 - 10.2 mg/dL    Total Protein 6.7 6.4 - 8.3 g/dL    Alb 3.6 3.5 - 5.2 g/dL    Total Bilirubin 0.5 0.0 - 1.2 mg/dL    Alkaline Phosphatase 179 (H) 35 - 104 U/L    ALT 23 0 - 32 U/L    AST 42 (H) 0 - 31 U/L   Troponin   Result Value Ref Range    Troponin 0.41 (H) 0.00 - 0.03 ng/mL   Magnesium   Result Value Ref Range    Magnesium 1.6 1.6 - 2.6 mg/dL   CBC   Result Value Ref Range    WBC 4.4 (L) 4.5 - 11.5 E9/L    RBC 3.09 (L) 3.50 - 5.50 E12/L    Hemoglobin 8.5 (L) 11.5 - 15.5 g/dL    Hematocrit 29.5 (L) 34.0 - 48.0 %    MCV 95.5 80.0 - 99.9 fL    MCH 27.5 26.0 - 35.0 pg    MCHC 28.8 (L) 32.0 - 34.5 %    RDW 17.9 (H) 11.5 - 15.0 fL    Platelets 552 949 - 359 E9/L    MPV 10.9 7.0 - 12.0 fL   POCT Glucose   Result Value Ref Range    Meter Glucose 87 74 - 99 mg/dL   EKG 12 Lead   Result Value Ref Range    Ventricular Rate 83 BPM    Atrial Rate 83 BPM    P-R Interval 202 ms    QRS Duration 142 ms    Q-T Interval 416 ms    QTc Calculation (Bazett) 488 ms    P Axis 61 degrees    R Axis -90 degrees    T Axis 48 degrees       RADIOLOGY:  XR CHEST PORTABLE   Final Result      Cardiomegaly with median sternotomy      No evidence of airspace consolidation or pulmonary venous congestion. EKG:  This EKG is signed and interpreted by me. Rate: 83  Rhythm: Sinus  Interpretation: Normal sinus rhythm with right bundle-branch block. Comparison: stable as compared to patient's most recent EKG      ------------------------- NURSING NOTES AND VITALS REVIEWED ---------------------------  Date / Time Roomed:  6/10/2019  8:59 PM  ED Bed Assignment:  40/40    The nursing notes within the ED encounter and vital signs as below have been reviewed.      Patient Vitals for the past 24 hrs:   BP Temp Temp src Pulse Resp SpO2 Height Weight   06/10/19 2246 -- 97.4 °F (36.3 °C) Oral -- -- -- -- --   06/10/19 2238 (!) 147/90 97.4 °F (36.3 °C) -- 82 20 98 % -- --   06/10/19 1959 138/61 96.7 °F (35.9 °C) Temporal 78 15 95 % 5' 4\" (1.626 m) 221 lb (100.2 kg)   06/10/19 1942 -- -- -- 90 -- 93 % -- --       Oxygen Saturation Interpretation: Normal    ------------------------------------------ PROGRESS NOTES ------------------------------------------  Re-evaluation(s):  Time: 2130  Patients symptoms show no change  Repeat physical examination is not changed    Counseling:  I have spoken with the patient and discussed todays results, in addition to providing specific details for the plan of care and counseling regarding the diagnosis and prognosis. Their questions are answered at this time and they are agreeable with the plan of admission.    --------------------------------- ADDITIONAL PROVIDER NOTES ---------------------------------  Consultations:  Time: 1831. Spoke with Dr. Moise Rush. Discussed case. They will admit the patient. This patient's ED course included: a personal history and physicial examination, re-evaluation prior to disposition, multiple bedside re-evaluations, IV medications, cardiac monitoring and continuous pulse oximetry    This patient has remained hemodynamically stable during their ED course. Diagnosis:  1. Hyperkalemia    2. Warfarin-induced coagulopathy (HCC)        Disposition:  Patient's disposition: Admit to telemetry  Patient's condition is stable. James Crawley,   Resident  06/10/19 0078  ATTENDING PROVIDER ATTESTATION:     I have personally performed and/or participated in the history, exam, medical decision making, and procedures and agree with all pertinent clinical information. I have also reviewed and agree with the past medical, family and social history unless otherwise noted.     I have discussed this patient in detail with the resident, and provided the

## 2019-06-12 ENCOUNTER — APPOINTMENT (OUTPATIENT)
Dept: ULTRASOUND IMAGING | Age: 74
DRG: 641 | End: 2019-06-12
Payer: COMMERCIAL

## 2019-06-12 ENCOUNTER — APPOINTMENT (OUTPATIENT)
Dept: INTERVENTIONAL RADIOLOGY/VASCULAR | Age: 74
DRG: 641 | End: 2019-06-12
Payer: COMMERCIAL

## 2019-06-12 LAB
ALBUMIN FLUID: 2 G/DL
ALBUMIN SERPL-MCNC: 3.4 G/DL (ref 3.5–5.2)
ALP BLD-CCNC: 165 U/L (ref 35–104)
ALT SERPL-CCNC: 25 U/L (ref 0–32)
AMYLASE FLUID: 10 U/L
ANION GAP SERPL CALCULATED.3IONS-SCNC: 14 MMOL/L (ref 7–16)
AST SERPL-CCNC: 45 U/L (ref 0–31)
BASOPHILS ABSOLUTE: 0.01 E9/L (ref 0–0.2)
BASOPHILS RELATIVE PERCENT: 0.2 % (ref 0–2)
BILIRUB SERPL-MCNC: 0.9 MG/DL (ref 0–1.2)
BUN BLDV-MCNC: 32 MG/DL (ref 8–23)
CALCIUM SERPL-MCNC: 9.2 MG/DL (ref 8.6–10.2)
CHLORIDE BLD-SCNC: 106 MMOL/L (ref 98–107)
CO2: 21 MMOL/L (ref 22–29)
CREAT SERPL-MCNC: 1.7 MG/DL (ref 0.5–1)
EOSINOPHILS ABSOLUTE: 0.18 E9/L (ref 0.05–0.5)
EOSINOPHILS RELATIVE PERCENT: 3.5 % (ref 0–6)
FLUID TYPE: NORMAL
GFR AFRICAN AMERICAN: 36
GFR NON-AFRICAN AMERICAN: 29 ML/MIN/1.73
GLUCOSE BLD-MCNC: 134 MG/DL (ref 74–99)
GLUCOSE, FLUID: 129 MG/DL
HCT VFR BLD CALC: 29.6 % (ref 34–48)
HEMOGLOBIN: 8.8 G/DL (ref 11.5–15.5)
IMMATURE GRANULOCYTES #: 0.01 E9/L
IMMATURE GRANULOCYTES %: 0.2 % (ref 0–5)
INR BLD: 1.5
LACTATE DEHYDROGENASE: 284 U/L (ref 135–214)
LD, FLUID: 122 U/L
LYMPHOCYTES ABSOLUTE: 0.7 E9/L (ref 1.5–4)
LYMPHOCYTES RELATIVE PERCENT: 13.7 % (ref 20–42)
MCH RBC QN AUTO: 28.1 PG (ref 26–35)
MCHC RBC AUTO-ENTMCNC: 29.7 % (ref 32–34.5)
MCV RBC AUTO: 94.6 FL (ref 80–99.9)
MONOCYTES ABSOLUTE: 0.56 E9/L (ref 0.1–0.95)
MONOCYTES RELATIVE PERCENT: 11 % (ref 2–12)
NEUTROPHILS ABSOLUTE: 3.64 E9/L (ref 1.8–7.3)
NEUTROPHILS RELATIVE PERCENT: 71.4 % (ref 43–80)
PDW BLD-RTO: 17.9 FL (ref 11.5–15)
PLATELET # BLD: 250 E9/L (ref 130–450)
PMV BLD AUTO: 10.8 FL (ref 7–12)
POTASSIUM REFLEX MAGNESIUM: 4.8 MMOL/L (ref 3.5–5)
POTASSIUM REFLEX MAGNESIUM: 5.1 MMOL/L (ref 3.5–5)
POTASSIUM REFLEX MAGNESIUM: 5.2 MMOL/L (ref 3.5–5)
POTASSIUM REFLEX MAGNESIUM: 5.4 MMOL/L (ref 3.5–5)
PROTEIN FLUID: 3.2 G/DL
PROTHROMBIN TIME: 17.2 SEC (ref 9.3–12.4)
RBC # BLD: 3.13 E12/L (ref 3.5–5.5)
SODIUM BLD-SCNC: 141 MMOL/L (ref 132–146)
TOTAL PROTEIN: 6.4 G/DL (ref 6.4–8.3)
WBC # BLD: 5.1 E9/L (ref 4.5–11.5)

## 2019-06-12 PROCEDURE — 87040 BLOOD CULTURE FOR BACTERIA: CPT

## 2019-06-12 PROCEDURE — 84157 ASSAY OF PROTEIN OTHER: CPT

## 2019-06-12 PROCEDURE — 82947 ASSAY GLUCOSE BLOOD QUANT: CPT

## 2019-06-12 PROCEDURE — 84132 ASSAY OF SERUM POTASSIUM: CPT

## 2019-06-12 PROCEDURE — 99232 SBSQ HOSP IP/OBS MODERATE 35: CPT | Performed by: FAMILY MEDICINE

## 2019-06-12 PROCEDURE — 82042 OTHER SOURCE ALBUMIN QUAN EA: CPT

## 2019-06-12 PROCEDURE — 36415 COLL VENOUS BLD VENIPUNCTURE: CPT

## 2019-06-12 PROCEDURE — 82150 ASSAY OF AMYLASE: CPT

## 2019-06-12 PROCEDURE — 82378 CARCINOEMBRYONIC ANTIGEN: CPT

## 2019-06-12 PROCEDURE — 49083 ABD PARACENTESIS W/IMAGING: CPT

## 2019-06-12 PROCEDURE — 80053 COMPREHEN METABOLIC PANEL: CPT

## 2019-06-12 PROCEDURE — C1729 CATH, DRAINAGE: HCPCS

## 2019-06-12 PROCEDURE — 6370000000 HC RX 637 (ALT 250 FOR IP): Performed by: FAMILY MEDICINE

## 2019-06-12 PROCEDURE — 76705 ECHO EXAM OF ABDOMEN: CPT

## 2019-06-12 PROCEDURE — 2060000000 HC ICU INTERMEDIATE R&B

## 2019-06-12 PROCEDURE — 83615 LACTATE (LD) (LDH) ENZYME: CPT

## 2019-06-12 PROCEDURE — 88112 CYTOPATH CELL ENHANCE TECH: CPT

## 2019-06-12 PROCEDURE — 87205 SMEAR GRAM STAIN: CPT

## 2019-06-12 PROCEDURE — 89051 BODY FLUID CELL COUNT: CPT

## 2019-06-12 PROCEDURE — 88305 TISSUE EXAM BY PATHOLOGIST: CPT

## 2019-06-12 PROCEDURE — 6370000000 HC RX 637 (ALT 250 FOR IP): Performed by: STUDENT IN AN ORGANIZED HEALTH CARE EDUCATION/TRAINING PROGRAM

## 2019-06-12 PROCEDURE — 85025 COMPLETE CBC W/AUTO DIFF WBC: CPT

## 2019-06-12 PROCEDURE — 0W9G3ZZ DRAINAGE OF PERITONEAL CAVITY, PERCUTANEOUS APPROACH: ICD-10-PCS | Performed by: PHYSICIAN ASSISTANT

## 2019-06-12 PROCEDURE — 2580000003 HC RX 258: Performed by: FAMILY MEDICINE

## 2019-06-12 PROCEDURE — 2500000003 HC RX 250 WO HCPCS: Performed by: PHYSICIAN ASSISTANT

## 2019-06-12 PROCEDURE — 85610 PROTHROMBIN TIME: CPT

## 2019-06-12 PROCEDURE — 93005 ELECTROCARDIOGRAM TRACING: CPT | Performed by: FAMILY MEDICINE

## 2019-06-12 PROCEDURE — 87070 CULTURE OTHR SPECIMN AEROBIC: CPT

## 2019-06-12 RX ORDER — OXYCODONE HYDROCHLORIDE 5 MG/1
2.5 TABLET ORAL EVERY 6 HOURS PRN
Status: DISCONTINUED | OUTPATIENT
Start: 2019-06-12 | End: 2019-06-14 | Stop reason: HOSPADM

## 2019-06-12 RX ORDER — LIDOCAINE HYDROCHLORIDE 20 MG/ML
3 INJECTION, SOLUTION INFILTRATION; PERINEURAL ONCE
Status: COMPLETED | OUTPATIENT
Start: 2019-06-12 | End: 2019-06-12

## 2019-06-12 RX ADMIN — Medication 10 ML: at 10:04

## 2019-06-12 RX ADMIN — Medication 10 ML: at 20:54

## 2019-06-12 RX ADMIN — EXEMESTANE 25 MG: 25 TABLET ORAL at 10:02

## 2019-06-12 RX ADMIN — PREGABALIN 50 MG: 50 CAPSULE ORAL at 10:02

## 2019-06-12 RX ADMIN — METOPROLOL TARTRATE 50 MG: 50 TABLET, FILM COATED ORAL at 20:54

## 2019-06-12 RX ADMIN — MULTIPLE VITAMINS W/ MINERALS TAB 1 TABLET: TAB at 10:01

## 2019-06-12 RX ADMIN — FAMOTIDINE 20 MG: 20 TABLET, FILM COATED ORAL at 10:01

## 2019-06-12 RX ADMIN — Medication: at 10:08

## 2019-06-12 RX ADMIN — PREGABALIN 50 MG: 50 CAPSULE ORAL at 20:54

## 2019-06-12 RX ADMIN — PATIROMER 8.4 G: 8.4 POWDER, FOR SUSPENSION ORAL at 11:43

## 2019-06-12 RX ADMIN — ATORVASTATIN CALCIUM 20 MG: 10 TABLET, FILM COATED ORAL at 20:54

## 2019-06-12 RX ADMIN — Medication 1000 MCG: at 10:01

## 2019-06-12 RX ADMIN — LIDOCAINE HYDROCHLORIDE 3 ML: 20 INJECTION, SOLUTION INFILTRATION; PERINEURAL at 16:28

## 2019-06-12 RX ADMIN — Medication: at 21:01

## 2019-06-12 RX ADMIN — CALCIUM CARBONATE-VITAMIN D TAB 500 MG-200 UNIT 1 TABLET: 500-200 TAB at 10:01

## 2019-06-12 RX ADMIN — METOPROLOL TARTRATE 50 MG: 50 TABLET, FILM COATED ORAL at 10:02

## 2019-06-12 RX ADMIN — PREGABALIN 50 MG: 50 CAPSULE ORAL at 16:46

## 2019-06-12 RX ADMIN — OXYCODONE HYDROCHLORIDE 2.5 MG: 5 TABLET ORAL at 12:09

## 2019-06-12 ASSESSMENT — PAIN SCALES - GENERAL
PAINLEVEL_OUTOF10: 0
PAINLEVEL_OUTOF10: 6
PAINLEVEL_OUTOF10: 0
PAINLEVEL_OUTOF10: 0

## 2019-06-12 NOTE — CONSULTS
Blood and Aliciavelma Freeman      Patient Name: Mony Alfred  YOB: 1945  PCP: Jose L Parker DO   Referring Provider:      Reason for Consultation:   Chief Complaint   Patient presents with    Other     Pt called by PCP for INR of 7 and increased K. Pt has no complaints. History of Present Illness: This pt is a very pleasant 67 yo female who follows with Dr. Eliecer Sagastume for a long history of breast cancer and anemia of CKD on Aranesp. She was initially diagnosed with limited stage ICD ER/NY+ HER2+ and was treated with AC+T and Herceptin and completed 5 years of Arimidex before developing stage IV disease to her bones and was placed on Halaven which she responded well to and was switched to Herceptin/Aromasin/Affinitor with good response before stopping affinitor due to rash. She has been maintained on Herceptin/Aromasin and Xgeva support. She now presents to the ER after being found to be hyperkalemic by her PCP and in the ER she underwent CT scan which showed ascites and multiple hepatic hypodensities and some hyperdenisities in the bowel and mesentery concerning for hemorrhage. Multiple pulmonary nodules were also noted. She has had increased abdominal discomfort and frequency of diarrhea the past week and has stable chronic fatigue    Diagnostic Data:     Past Medical History:   Diagnosis Date    Abscess of abdominal wall     Anemia     Iron deficiency and chronic disease.  Anesthesia     DIFFICULTY WAKING UP    Arthritis     Arthritis, hip     Breast cancer ([facility] [address] 2005    S/P Left Lumpectomy with Lymph Node Dissection, Chemo/Rad. Follows with Dr. Eliecer Sagastume. No recurrence to date. Surgeon was Dr. Valentina Faye.  CAD (coronary artery disease) 5/2008    s/p CABG. Follows with [address] [place] [address].     CHF (congestive heart failure) (HCC)     Chronic venous insufficiency 11/7/2018    Decreased dorsalis pedis pulse 11/7/2018    Diabetic neuropathy (HCC)  Diabetic neuropathy (HCC)     DVT (deep venous thrombosis) (HCC)     Recurrent. On lifelong coumadin.  DVT of upper extremity (deep vein thrombosis) ([address] 4/18/2012    History of deep vein thrombosis 11/7/2018    Humerus fracture     Chronic, on the Left.  Hyperlipidemia 8/29/2011    Hypertension     Leg swelling 11/7/2018    Lymph node enlargement     Lymphedema of both lower extremities 11/7/2018    MI (myocardial infarction) ([address]     Nephrolithiasis     Follows with Dr. Madeline Ledesma.  Pericardial effusion     Pulmonary nodule     With mediastinal lymphadenopathy. Stable. Follows with Dr. Radha Mckinney.     Rib lesion 11/28/11    expansile lesion in one of the right ribs laterally    Sarcoidosis 07/26/11    per transbronchial needle aspiration    Subclavian vein occlusion, bilateral ([address] 4/18/2012    Type II or unspecified type diabetes mellitus without mention of complication, not stated as uncontrolled        Patient Active Problem List    Diagnosis Date Noted    Supratherapeutic INR 06/10/2019    Charcot's joint of foot in type 2 diabetes mellitus ([address] 04/17/2019    CKD (chronic kidney disease) stage 3, GFR 30-59 ml/min ([address] 04/16/2019    Ambulatory dysfunction 04/15/2019    Leg swelling 11/07/2018    History of deep vein thrombosis 11/07/2018    Chronic venous insufficiency 11/07/2018    Lymphedema of both lower extremities 11/07/2018    Decreased dorsalis pedis pulse 11/07/2018    Diabetic polyneuropathy associated with type 2 diabetes mellitus ([address] 09/07/2018    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 04/04/2018    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 15/97/1113    Complicated UTI (urinary tract infection) 10/24/2017    Hyperkalemia 10/24/2017    KARISHMA (acute kidney injury) ([address] 10/24/2017    Diarrhea with dehydration 10/24/2017    Chronic anticoagulation 10/24/2017    Peripheral polyneuropathy 01/03/2017    Bilateral edema of lower extremity 10/03/2016    Chronic deep vein thrombosis (DVT) of proximal vein of right lower extremity ([address] 09/30/2016    Type 2 diabetes mellitus without complication, with long-term current use of insulin ([address] 09/01/2016    Anemia 09/01/2016    Ventral hernia 05/14/2015    Rectal bleeding 02/23/2015    Anesthesia     Pericardial effusion     MI (myocardial infarction) ([address]     Arthritis     Lymph node enlargement     Subclavian vein occlusion, bilateral ([address] 04/18/2012    Rib lesion 02/07/2012    Hyperlipidemia 08/29/2011    Sarcoidosis 07/26/2011    B12 deficiency 06/22/2011    Shoulder pain, left 03/02/2011    Breast cancer ([address]     Hypertension     Coronary artery disease involving native coronary artery of native heart without angina pectoris     Nephrolithiasis     CHF (congestive heart failure) ([address]     Humerus fracture         Past Surgical History:   Procedure Laterality Date    APPENDECTOMY      ARTERIAL BYPASS SURGRY      BREAST LUMPECTOMY  9/27/2005    LEFT    CARDIAC SURGERY      CHOLECYSTECTOMY      COLONOSCOPY  12/2007    cecal arteriovenous malformation with hyperplastic polyps    COLONOSCOPY  [id number]    CORONARY ARTERY BYPASS GRAFT  05/2008    triple bypass    ECHO COMPL W DOP COLOR FLOW  10/30/2013         EYE SURGERY      clarissa cataracts    HYSTERECTOMY  1975, 1985    MAYNOR prolapse, benign conditions; BSO later for scar tissues, no CA.      OTHER SURGICAL HISTORY  11/18/11    port removal right chest wall    TOOTH EXTRACTION      Full Dental Extraction.  TUNNELED VENOUS PORT PLACEMENT      removal of port Dec. 2011- Dr. Gillespie December TUNNELED [facility] 94  [id number]    RIGHT CHEST       Family History  Family History   Adopted: Yes       Social History  No illicit drugs    TOBACCO:   reports that she has never smoked. She has never used smokeless tobacco.  ETOH:   reports that she does not drink alcohol.     Home Medications  Prior to Admission medications    Medication Sig Start Date End Date Taking? Authorizing Provider   pregabalin (LYRICA) 50 MG capsule Take 1 capsule by mouth 3 times daily for 120 days. 4/17/19 8/15/19 Yes Delorise Less, DO   warfarin (COUMADIN) 2 MG tablet Take 2 tablets orally on Sun, Tues, Thurs; and 1 tablet daily on all other days. Currently warfarin is held. Please follow INR and dose. 4/17/19  Yes Nikita Montana MD   metFORMIN (GLUCOPHAGE) 500 MG tablet Take 1 tablet by mouth 2 times daily (with meals) 4/18/19  Yes Nikita Montana MD   atorvastatin (LIPITOR) 20 MG tablet Take 1 tablet by mouth nightly 4/17/19  Yes Nikita Montana MD   miconazole nitrate 2 % OINT Apply topically 2 times daily 4/17/19  Yes Nikita Montana MD   metoprolol tartrate (LOPRESSOR) 50 MG tablet take 1 tablet by mouth twice a day 2/21/19  Yes Delorise Less, DO   RA VITAMIN B-12 TR 1000 MCG TBCR take 1 tablet by mouth once daily 11/25/18  Yes Historical Provider, MD   diclofenac sodium 1 % GEL Apply 4 g topically 4 times daily 11/30/18  Yes Quirino Skinner MD   famotidine (PEPCID) 20 MG tablet take 1 tablet by mouth twice a day 11/6/18  Yes Delorise Less, DO   vitamin B-12 (CYANOCOBALAMIN) 1000 MCG tablet Take 1 tablet by mouth daily 11/6/18  Yes Delorise Less, DO   Calcium Citrate-Vitamin D (RA CALCIUM CIT-VIT D-3 PETITES) 200-250 MG-UNIT TABS take 1 tablet by mouth twice a day 3/6/18  Yes Delorise Less, DO   exemestane (AROMASIN) 25 MG chemo tablet Take 1 tablet by mouth every morning (before breakfast) 10/10/17  Yes Historical Provider, MD   Multiple Vitamins-Minerals (THERAPEUTIC MULTIVITAMIN-MINERALS) tablet Take 1 tablet by mouth daily 6/6/17  Yes Delorise Less, DO   trastuzumab (HERCEPTIN) 440 MG injection Infuse  intravenously once a week.  On tuesdays   Yes Historical Provider, MD       Allergies  Allergies   Allergen Reactions    Macrobid [Nitrofurantoin Monohydrate Macrocrystals] Hives       Review of Systems:    Constitutional:  No fever chills or rigors. + fatigue  Eyes: No changes in vision, discharge, or pain  ENT: No Headaches, hearing loss or vertigo. No mouth sores or sore throat. No change in taste or smell. Cardiovascular: No chest discomfort, dyspnea on exertion, palpitations or loss of consciousness. or phlebitis. Respiratory: Has no cough or wheezing, Has no sputum production. Has no hemoptysis, Has no pleuritic pain, . Gastrointestinal: + abdominal discomfort, appetite loss. Reports small amount of blood in stools (hemorrhoids). No change in bowel habits. No hematemesis   Genitourinary: Patient acknowledges no dysuria, trouble voiding, or hematuria. No nocturia or increased frequency. Musculoskeletal: No gait disturbance, weakness or joint complaints. Integumentary: No rash or pruritis. Neurological: No headache, diplopia, change in muscle strength, numbness or tingling. No change in gait, balance, coordination, mood, affect, memory, mentation, behavior. Psychiatric: No anxiety, or depression. Endocrine: No temperature intolerance. No excessive thirst, fluid intake, or urination. No tremor. Hematologic/Lymphatic: No abnormal bruising or bleeding, blood clots or swollen lymph nodes. Allergic/Immunologic: No nasal congestion or hives. Objective  /70   Pulse 85   Temp 97.8 °F (36.6 °C) (Temporal)   Resp 26   Ht 5' 4\" (1.626 m)   Wt 221 lb (100.2 kg)   SpO2 97%   BMI 37.93 kg/m²     Physical Exam:   Performance Status:  General: AAO to person, place, time, in no acute distress, pleasant   Head and neck : PERRLA, EOMI . Sclera non icteric. Oropharynx : Clear  Neck: no JVD,  no adenopathy  LYMPHATICS : No LAD  Heart: Regular rate and regular rhythm, no murmur  Lungs: Clear to auscultation   Extremities: No edema,no cyanosis, no clubbing. Abdomen: Soft, minimal TTP. Hernia noted  Skin:  No rash  Neurologic:Cranial nerves grossly intact.  No focal motor or sensory deficits    Recent Laboratory Data-   Lab Results   Component Value Date    WBC 5.1 06/12/2019    HGB 8.8 (L) 06/12/2019    HCT 29.6 (L) 06/12/2019    MCV 94.6 06/12/2019     06/12/2019    LYMPHOPCT 13.7 (L) 06/12/2019    RBC 3.13 (L) 06/12/2019    MCH 28.1 06/12/2019    MCHC 29.7 (L) 06/12/2019    RDW 17.9 (H) 06/12/2019    NEUTOPHILPCT 71.4 06/12/2019    MONOPCT 11.0 06/12/2019    BASOPCT 0.2 06/12/2019    NEUTROABS 3.64 06/12/2019    LYMPHSABS 0.70 (L) 06/12/2019    MONOSABS 0.56 06/12/2019    EOSABS 0.18 06/12/2019    BASOSABS 0.01 06/12/2019       Lab Results   Component Value Date     06/12/2019    K 5.2 (H) 06/12/2019     06/12/2019    CO2 21 (L) 06/12/2019    BUN 32 (H) 06/12/2019    CREATININE 1.7 (H) 06/12/2019    GLUCOSE 134 (H) 06/12/2019    CALCIUM 9.2 06/12/2019    PROT 6.4 06/12/2019    LABALBU 3.4 (L) 06/12/2019    BILITOT 0.9 06/12/2019    ALKPHOS 165 (H) 06/12/2019    AST 45 (H) 06/12/2019    ALT 25 06/12/2019    LABGLOM 29 06/12/2019    GFRAA 36 06/12/2019       Lab Results   Component Value Date    IRON 49 (L) 06/20/2011    TIBC 266 06/20/2011    FERRITIN 490.8 06/20/2011           Radiology-    US ABDOMEN LIMITED   Final Result   Moderate abdominal ascites. Multiple hepatic lesions   consistent with metastatic disease. Possible hepatic cirrhosis. Fullness to the right renal pelvis or dilatation of the proximal right   ureter. CT ABDOMEN PELVIS WO CONTRAST Additional Contrast? None   Final Result   Multiple ill-defined hypodense lesions in the liver with some punctate   areas of hyperdensity concerning for malignancy which is either   partially calcified or hemorrhagic. High-density ascites is present   which may be malignant or hemorrhagic ascites. Some areas of   hyperdensity is identified in the mesentery, stomach and bowel   concerning for hemorrhage. Further assessment is recommended.       Multiple pulmonary nodules concerning for developing pulmonary metastasis. There may be developing bone metastasis as well. Surgical changes in the left breast with  right axillary lymph nodes. Mammographic correlation is recommended. The above findings were conveyed to the clinical services. ALERT:  CRITICAL RESULT            XR Acute Abd Series Chest 1 VW   Final Result   1. No active infiltrate or vascular congestion. Cardiomegaly. Right   jugular infusion catheter in place. 2. Nonspecific abdominal series. US RETROPERITONEAL COMPLETE   Final Result   Small right renal cortical cyst. Otherwise normal   appearance to the kidneys and urinary bladder. Ascites. XR CHEST PORTABLE   Final Result      Cardiomegaly with median sternotomy      No evidence of airspace consolidation or pulmonary venous congestion. ASSESSMENT/PLAN :  67 yo female  Stage IV ER/AR+ HER2+ IDC of the breast  S/p multiple lines of therapy as above  Currently on Herceptin/Aromasin/Xgeva  Worsening metastatic disease  Ascites  Rectal bleeding  Coagulopathy  Hx of VTE on coumadin  CKD    - Her coagulopathy has improved with holding her coumadin and vit K  - S/p IR paracentesis this afternoon w/ 4.5L clear/yellow fluid removed  - Chronic anemia related to CKD, stable. Continue with outpatient Aranesp  - Surgery following for possible bleeding noted on CT (hyperdenisties), but para results seem reassuring   - Discussed CT findings with patient. Will likely need to switch to chemo+Herceptin w/ continued Xgeva as outpatient. Final decision will be made with Dr. Allison Terrell  - Continue supportive care  - Will follow    Thank you for this consult.  Please call with further questions or concerns      Electronically signed by Rene Ayala MD on 6/12/2019 at 1:23 PM

## 2019-06-12 NOTE — PROGRESS NOTES
Occupational Therapy  Attempted OT assessment, however, pt is currently off the unit for paracentesis. Will attempt OT eval next date.   Thank you for this referral.  Levon Davis, MOT, OTR/L  # [phone]

## 2019-06-12 NOTE — PROGRESS NOTES
Abdomen scanned, prepped and centesis catheter inserted LLQ w ith ultrasound guidance by LEROY Toledo @ 2:40 pm . Patient tolerated well. 4.5  Liters drained of clear yellow colored ascites fluid. Centesis catheter removed @ 3:05pm   . Puncture site cleansed and dry dressing applied. No bleeding, swelling or complications noted. Fluid sent to lab for testing.

## 2019-06-12 NOTE — CONSULTS
GENERAL SURGERY  CONSULT NOTE  6/12/2019    Physician Consulted: Dr. Sherrie Salinas  Reason for Consult: Concerning CT for internal hemorrhage  Referring Physician: Dr. Martha Lares    BRANDON Meneses is a 68 y.o. female who presents for evaluation of concerning CT for internal hemorrhage. Patient has a history of CAD with CABG, hypertension, recurrent DVTs on Coumadin, metastatic breast cancer. Patient was seen yesterday PCPs office and found to have hyperkalemia and sent into the emergency room. She endorses some generalized fatigue that has been progressing. She noted some abdominal distention for which primary ordered a CAT scan, which showed hypodense lesions within the liver concerning for metastatic disease with some areas of hyperdensity concerning for partially calcified versus hemorrhage. Ascites noted as well. Patient had a supratherapeutic INR of 7.3 and was given IV vitamin K. Patient has a history of metastatic breast cancer, status post lumpectomy with lymph node resection in 2005 by Dr. Nazia Rodriguez. Approximately 3 years ago she found to have metastatic progression at which time she was started on Herceptin and exemestane. She has a ventral hernia for which she has declined surgery. Last colonoscopy was in 2015 there are polyp in the sigmoid. Past Medical History:   Diagnosis Date    Abscess of abdominal wall     Anemia     Iron deficiency and chronic disease.  Anesthesia     DIFFICULTY WAKING UP    Arthritis     Arthritis, hip     Breast cancer ([address] 2005    S/P Left Lumpectomy with Lymph Node Dissection, Chemo/Rad. Follows with Dr. De Navas. No recurrence to date. Surgeon was Dr. Nazia Rodriguez.  CAD (coronary artery disease) 5/2008    s/p CABG. Follow[facility] with [facility].     CHF (congestive heart failure) (HCC)     Chronic venous insufficiency 11/7/2018    Decreased dorsalis pedis pulse 11/7/2018    Diabetic neuropathy (HCC)     Diabetic neuropathy (HCC)     DVT (deep venous thrombosis) ([address]     Recurrent. On lifelong coumadin.  DVT of upper extremity (deep vein thrombosis) ([address] 4/18/2012    History of deep vein thrombosis 11/7/2018    Humerus fracture     Chronic, on the Left.  Hyperlipidemia 8/29/2011    Hypertension     Leg swelling 11/7/2018    Lymph node enlargement     Lymphedema of both lower extremities 11/7/2018    MI (myocardial infarction) ([address]     Nephrolithiasis     Follows with Dr. Madeline Ledesma.  Pericardial effusion     Pulmonary nodule     With mediastinal lymphadenopathy. Stable. Follows with Dr. Radha Mckinney.  Rib lesion 11/28/11    expansile lesion in one of the right ribs laterally    Sarcoidosis 07/26/11    per transbronchial needle aspiration    Subclavian vein occlusion, bilateral ([address] 4/18/2012    Type II or unspecified type diabetes mellitus without mention of complication, not stated as uncontrolled        Past Surgical History:   Procedure Laterality Date    APPENDECTOMY      ARTERIAL BYPASS SURGRY      BREAST LUMPECTOMY  9/27/2005    LEFT    CARDIAC SURGERY      CHOLECYSTECTOMY      COLONOSCOPY  12/2007    cecal arteriovenous malformation with hyperplastic polyps    COLONOSCOPY  [id number]    CORONARY ARTERY BYPASS GRAFT  05/2008    triple bypass    ECHO COMPL W DOP COLOR FLOW  10/30/2013         EYE SURGERY      clarissa cataracts    HYSTERECTOMY  1975, 1985    MAYNOR prolapse, benign conditions; BSO later for scar tissues, no CA.      OTHER SURGICAL HISTORY  11/18/11    port removal right chest wall    TOOTH EXTRACTION      Full Dental Extraction.  TUNNELED VENOUS PORT PLACEMENT      removal of port Dec. 2011- Dr. Stella Glass TUNNELED [facility] 94  [id number]    RIGHT CHEST       Medications Prior to Admission:    Prior to Admission medications    Medication Sig Start Date End Date Taking? Authorizing Provider   pregabalin (LYRICA) 50 MG capsule Take 1 capsule by mouth 3 times daily for 120 days.  4/17/19 8/15/19 Yes Moi Villaseñor, DO warfarin (COUMADIN) 2 MG tablet Take 2 tablets orally on Sun, Tues, Thurs; and 1 tablet daily on all other days. Currently warfarin is held. Please follow INR and dose. 4/17/19  Yes Bambi Olmos MD   metFORMIN (GLUCOPHAGE) 500 MG tablet Take 1 tablet by mouth 2 times daily (with meals) 4/18/19  Yes Bambi Olmos MD   atorvastatin (LIPITOR) 20 MG tablet Take 1 tablet by mouth nightly 4/17/19  Yes Bambi Olmos MD   miconazole nitrate 2 % OINT Apply topically 2 times daily 4/17/19  Yes Bambi Olmos MD   metoprolol tartrate (LOPRESSOR) 50 MG tablet take 1 tablet by mouth twice a day 2/21/19  Yes Moi Villaseñor DO RA VITAMIN B-12 TR 1000 MCG TBCR take 1 tablet by mouth once daily 11/25/18  Yes Historical Provider, MD   diclofenac sodium 1 % GEL Apply 4 g topically 4 times daily 11/30/18  Yes Leighton Fernandez MD   famotidine (PEPCID) 20 MG tablet take 1 tablet by mouth twice a day 11/6/18  Yes Moi Villaseñor DO   vitamin B-12 (CYANOCOBALAMIN) 1000 MCG tablet Take 1 tablet by mouth daily 11/6/18  Yes Moi Villaseñor DO   Calcium Citrate-Vitamin D (RA CALCIUM CIT-VIT D-3 PETITES) 200-250 MG-UNIT TABS take 1 tablet by mouth twice a day 3/6/18  Yes Moi Villaseñor DO   exemestane (AROMASIN) 25 MG chemo tablet Take 1 tablet by mouth every morning (before breakfast) 10/10/17  Yes Historical Provider, MD   Multiple Vitamins-Minerals (THERAPEUTIC MULTIVITAMIN-MINERALS) tablet Take 1 tablet by mouth daily 6/6/17  Yes Moi Villaseñor DO   trastuzumab (HERCEPTIN) 440 MG injection Infuse  intravenously once a week.  On tuesdays   Yes Historical Provider, MD       Allergies   Allergen Reactions    Macrobid [Nitrofurantoin Monohydrate Macrocrystals] Hives       Family History   Adopted: Yes       Social History     Tobacco Use    Smoking status: Never Smoker    Smokeless tobacco: Never Used   Substance Use Topics    Alcohol use: No    Drug use: No         Review of Systems   General ROS: positive for  - fatigue  Hematological and Lymphatic ROS: positive for - blood clots  Respiratory ROS: no cough, shortness of breath, or wheezing  Cardiovascular ROS: no chest pain or dyspnea on exertion  Gastrointestinal ROS: as above  Genito-Urinary ROS: no dysuria, trouble voiding, or hematuria  Musculoskeletal ROS: negative      PHYSICAL EXAM:    Vitals:    19 0043   BP: 134/60   Pulse: 68   Resp: 18   Temp: 97.9 °F (36.6 °C)   SpO2: 93%       General Appearance:  awake, alert, oriented, in no acute distress  Skin:  Skin color, texture, turgor normal. No rashes or lesions. Head/face:  NCAT  Eyes:  No gross abnormalities. Lungs:  No increased work of breathing  Heart:  RR.  Abdomen:  Soft, non-tender, non-distended, no rebound or guarding  Extremities: Extremities warm to touch, pink, with no edema. LABS:  CBC  Recent Labs     190 19   WBC 4.1*  --    HGB 8.3* 8.9*   HCT 28.8* 30.7*     --      BMP  Recent Labs     19  0430  19  0015     --   --    K 5.4*   < > 5.4*   *  --   --    CO2 20*  --   --    BUN 35*  --   --    CREATININE 1.9*  --   --    CALCIUM 9.2  --   --     < > = values in this interval not displayed. Liver Function  Recent Labs     06/10/19  1400  19   BILITOT 0.5   < > 0.5   BILIDIR 0.2  --   --    AST 34*   < > 44*   ALT 23   < > 23   ALKPHOS 189*   < > 166*   PROT 6.8   < > 6.5   LABALBU 3.6   < > 3.5    < > = values in this interval not displayed. No results for input(s): LACTATE in the last 72 hours. Recent Labs     19   INR 1.6       RADIOLOGY  Ct Abdomen Pelvis Wo Contrast Additional Contrast? None    Result Date: 2019  Patient MRN:  [id number] : 1945 Age: 68 years Gender: Female Order Date:  2019 4:45 PM EXAM: CT ABDOMEN PELVIS WO CONTRAST number of images 405 Technique: Low-dose CT  acquisition technique included one of following options; 1 . Automated exposure control, 2.  Adjustment of MA 6/10/2019  Patient MRN: [id number] : 1945 Age:  68 years Gender: Female Order Date: 6/10/2019 9:15 PM Exam: XR CHEST PORTABLE Number of Images: 1 view Indication:   elevated troponin elevated troponin Comparison: Prior chest radiograph from 2016 is available Findings: The lungs are clear. There is no evidence of pulmonary infiltrate or pleural effusion. The pulmonary vascularity is unremarkable. There is cardiomegaly with median sternotomy. There is moderate mitral valve annulus calcification seen. There is uncoiling atherosclerotic change of thoracic aorta. .  The bony thorax demonstrates right fourth and fifth ribs fracture on the posterior lateral aspect with deformity. There is complete destruction of the left humeral neck with old left mid humeral neck fracture with nonunion. This findings was seen before and appears to be unchanged. . There is a right-sided Port-A-Cath with tip in superior vena cava. Cardiomegaly with median sternotomy No evidence of airspace consolidation or pulmonary venous congestion. Us Retroperitoneal Complete    Result Date: 2019  Patient MRN:  [id number] : 1945 Age: 68 years Gender: Female Order Date:  2019 1:00 PM EXAM: US RETROPERITONEAL COMPLETE NUMBER OF IMAGES:  47 views INDICATION:  BREAST CANCER  COMPARISON: None FINDINGS: The right kidney measures 9.7 x 4.6 x 4.6 cm in diameter. There is normal cortical thickness and echogenicity. There is a cortical cyst within the upper pole of the right kidney measuring 1.6 x 1.4 x 1.4 cm in diameter. No solid mass, calculi, or hydronephrosis is seen. The left kidney  measures 10.0 x 4.9 x 5.4 cm in diameter. There is normal cortical thickness and echogenicity. No solid or cystic mass, calculi, or hydronephrosis is seen. The urinary bladder has a smooth contour, without apparent abnormality. The distended urinary bladder has a calculated volume of 51.3 mL.  Abdominal and pelvic ascites is incidentally

## 2019-06-12 NOTE — PLAN OF CARE
Problem: Risk for Impaired Skin Integrity  Goal: Tissue integrity - skin and mucous membranes  Description  Structural intactness and normal physiological function of skin and  mucous membranes.   6/12/2019 0704 by Billie Joiner RN  Outcome: Met This Shift  6/11/2019 2310 by Billie Joiner RN  Outcome: Met This Shift     Problem: Falls - Risk of:  Goal: Will remain free from falls  Description  Will remain free from falls  6/12/2019 0704 by Billie Joiner RN  Outcome: Met This Shift  6/11/2019 2310 by Billie Joiner RN  Outcome: Met This Shift  Goal: Absence of physical injury  Description  Absence of physical injury  6/12/2019 0704 by Billie Joiner RN  Outcome: Met This Shift  6/11/2019 2310 by Billie Joiner RN  Outcome: Met This Shift

## 2019-06-12 NOTE — PROGRESS NOTES
[address] [facility]  Family Medicine Attending    S: 68 y.o. female with history of metastatic breast cancer, DM, CAD, recurrent DVT on coumadin, chronic diarrhea, chronic left humerus fracture with nonunion, CKD presents with hyperkalemia, supratherapeutic INR, and KARISHMA. Today, has diffuse pain in abdomen. Controlled overall with pain medication. No new symptoms or concerns otherwise. O: VS- Blood pressure 120/70, pulse 85, temperature 97.8 °F (36.6 °C), temperature source Temporal, resp. rate 26, height 5' 4\" (1.626 m), weight 221 lb (100.2 kg), SpO2 97 %, not currently breastfeeding. Exam is as noted by resident with the following changes, additions or corrections:  Gen NAD, A&A, pallor noted   CV RRR, systolic murmur 2/6 LSB  Resp decreased breath sounds but clear    Abd distended, diffusely tender. Impressions:   Principal Problem:    Hyperkalemia  Active Problems:    Coronary artery disease involving native coronary artery of native heart without angina pectoris    CHF (congestive heart failure) (HCC)    Ventral hernia    Chronic anticoagulation    CKD (chronic kidney disease) stage 3, GFR 30-59 ml/min (HCC)    Supratherapeutic INR  Resolved Problems:    * No resolved hospital problems. *      Plan:   Follow potassium, improving with treatment. Follow KARISHMA. INR down to 1.6 after Vitamin K yesterday. Will need to follow closely. Plan for paracentesis by IR. Discuss anticoagulation after. CT abdomen with concerns for mets. Will consult patient's oncology group. Watch glucose, metformin held. Will need recheck of TSH and free T4 going forward. Wound care, compression for LE. For pain control, had been tolerating hydrocodone 5 mg at home. Will plan for trial of oxycodone plain, 2.5 mg q 6 hours prn to start, but follow carefully. Attending Physician Statement  I have reviewed the chart and seen the patient with the resident(s).   I personally reviewed images, EKG's and similar

## 2019-06-12 NOTE — INTERVAL H&P NOTE
H&P UPDATE NOTE    Patient's History and Physical Examination were reviewed from current hospital admission records. Tasha Ordoñez appears likely to able to tolerate the requested Paracentesis procedure by the consultant. Risk and benefits were discussed with patient including ultimate complications, possibly death and consent was obtained.     LEROY Toledo II, MD.

## 2019-06-12 NOTE — PROGRESS NOTES
FAMILY MEDICINE RESIDENCY PROGRAM  - INPATIENT PROGRESS NOTE -  DATE : 2019    NAME: Mony Alfred   AGE: 68 y.o. SEX: female  : 1945    ADMIT DATE: 6/10/2019 LENGTH OF STAY: 2    ADMISSION DIAGNOSIS: Hyperkalemia [E87.5]  Hyperkalemia [E87.5]        Synopsis: Mrs. Karena Barnes is a 68year old female with a history of CAD with CABG and MI, HFpEF, hypertension, hyperlipidemia, recurrent DVTs on coumadin, type II DM, and metastatic breast cancer. She was admitted for hyperkalemia and supratherapeutic INR    Last 24 hour events: INR of 1.5 - hold coumadin, Potassium of 5.2 s/p kaxeylate and patiromer. CT abdomen with hypo and hyperdense lesions in the liver, high-density ascites, concern for hemorrhage of stomach and bowel - general surgery contacted for the latter. Multiple pulmonary nodules and lytic bone lesions suspect for metastasis. US abdomen with hepatic lesions consistent with metastatic disease and hepatic cirrhosis,     Subjective: Continued abdominal pain. Denies nausea or vomiting. ROS: denies fever, chills, nausea, vomiting, dizziness, chest pain, shortness of breath, abdominal pain.     ALLERGY: Macrobid [nitrofurantoin monohydrate macrocrystals]    CONTINUOUS MEDS:   dextrose         SCHEDULED MEDS:   atorvastatin  20 mg Oral Nightly    calcium-vitamin D  1 tablet Oral Daily    exemestane  25 mg Oral QAM AC    famotidine  20 mg Oral Daily    metoprolol tartrate  50 mg Oral BID    miconazole nitrate   Topical BID    therapeutic multivitamin-minerals  1 tablet Oral Daily    pregabalin  50 mg Oral TID    vitamin B-12  1,000 mcg Oral Daily    sodium chloride flush  10 mL Intravenous 2 times per day    docusate sodium  100 mg Oral BID    patiromer sorbitex calcium  8.4 g Oral Daily       PRN MEDS:  sodium chloride flush, acetaminophen, glucose, dextrose, glucagon (rDNA), dextrose         PHYSICAL EXAM:    VITAL SIGNS:   Vitals:    19 0730 19 1540 19 2126 19 0043   BP: (!) 128/58 (!) 131/59 (!) 141/61 134/60   Pulse: 72 68 77 68   Resp:    Temp: 97.7 °F (36.5 °C) 97.4 °F (36.3 °C)  97.9 °F (36.6 °C)   TempSrc: Oral Oral  Oral   SpO2: 98% 96%  93%   Weight:       Height:           General appearance: NAD, arousable, no pallor  Eyes: PERRL, EOM's intact, anicteric sclerae  HENT: AT/NC, no sinus tenderness, oropharynx clear  Neck: Supple, trachea midline, no thyromegaly  Lungs: CTAB, respiration unlabored, normal expansion  Heart: RRR, normal T1/T5, systolic murmur noted  Abdomen: Soft, mildly tender to palpation over hernia, distended, +BS  Extremities: bilateral peripheral edema with venous stasis changes - non-tender to palpation, no clubbing, no cyanosis  Skin: Warm and dry,venous stasis changes   Neurologic: Motor functions intact. No focal deficit, no CN deficit  Psychiatric: Appropriate affect, A&O to person, place and time      SIGNIFICANT IMAGING STUDIES:    Xr Chest Portable    Result Date: 6/10/2019  Patient MRN: [id number] : 1945 Age:  68 years Gender: Female Order Date: 6/10/2019 9:15 PM Exam: XR CHEST PORTABLE Number of Images: 1 view Indication:   elevated troponin elevated troponin Comparison: Prior chest radiograph from 2016 is available Findings: The lungs are clear. There is no evidence of pulmonary infiltrate or pleural effusion. The pulmonary vascularity is unremarkable. There is cardiomegaly with median sternotomy. There is moderate mitral valve annulus calcification seen. There is uncoiling atherosclerotic change of thoracic aorta. .  The bony thorax demonstrates right fourth and fifth ribs fracture on the posterior lateral aspect with deformity. There is complete destruction of the left humeral neck with old left mid humeral neck fracture with nonunion. This findings was seen before and appears to be unchanged. . There is a right-sided Port-A-Cath with tip in superior vena cava.      Cardiomegaly with median sternotomy found for: Rafal Bigger Results   Component Value Date    BC 5 Days- no growth 10/24/2017     Blood Culture from [facility]:  No components found for: CBLOODLN  Stool Culture:  No components found for: CSTOOL  Sputum Culture:  No components found for: CSPUTUM  Sputum Culture for AFB:  No components found for: CAFBSM            ASSESSMENT & PLAN:    Principal Problem:    Hyperkalemia  Active Problems:    Coronary artery disease involving native coronary artery of native heart without angina pectoris    CHF (congestive heart failure) (HCC)    Ventral hernia    Chronic anticoagulation    CKD (chronic kidney disease) stage 3, GFR 30-59 ml/min (HCC)    Supratherapeutic INR  Resolved Problems:    * No resolved hospital problems. *    Hyperkalemia   KARISHMA on CKD   Elevated troponins  Prerenal with FeNa of 0.2%, Secondary to dehydration vs cardiorenal  Received Kayexalate, insulin and dextrose, calcium gluconate and bicarb in the ER  Renal ultrasound within normal.   Potassium 5.6, improved from 6.2  Creatinine 1.7, improving    Supratherapeutic INR   Hold Coumadin for procedure. Check INR daily - 1.5 today  Received vitamin K 6/11     Abdominal pain and distension, Ascites  History of ventral hernia with distension  CT abdomen and US abdomen concerning for cirrhosis and metastatic lesions and moderate sized ascites  IR paracentesis earliest available Friday, as per IR department. Orders entered. Recurrent DVT, lymphedema   Continue compression stockings and wound care if continues as inpatient  INR management as above     Metastatic breast cancer  Continue Aromasin as an inpatient      Type II DM  Hold metformin in setting of KARISHMA. Check fasting blood sugars, low this morning. carb controlled diet                  Electronically signed by:  Daniela Camacho M.D., PGY-3    This case was discussed with Tishas)  Ashleigh Mcgee

## 2019-06-13 LAB
ALBUMIN SERPL-MCNC: 2.5 G/DL (ref 3.5–5.2)
ALP BLD-CCNC: 127 U/L (ref 35–104)
ALT SERPL-CCNC: 22 U/L (ref 0–32)
ANION GAP SERPL CALCULATED.3IONS-SCNC: 11 MMOL/L (ref 7–16)
ANISOCYTOSIS: ABNORMAL
APPEARANCE FLUID: NORMAL
AST SERPL-CCNC: 35 U/L (ref 0–31)
BASOPHILS ABSOLUTE: 0 E9/L (ref 0–0.2)
BASOPHILS RELATIVE PERCENT: 0.5 % (ref 0–2)
BILIRUB SERPL-MCNC: 0.7 MG/DL (ref 0–1.2)
BUN BLDV-MCNC: 30 MG/DL (ref 8–23)
CALCIUM SERPL-MCNC: 8 MG/DL (ref 8.6–10.2)
CELL COUNT FLUID TYPE: NORMAL
CHLORIDE BLD-SCNC: 107 MMOL/L (ref 98–107)
CO2: 21 MMOL/L (ref 22–29)
COLOR FLUID: YELLOW
CREAT SERPL-MCNC: 1.6 MG/DL (ref 0.5–1)
EKG ATRIAL RATE: 73 BPM
EKG P AXIS: 20 DEGREES
EKG P-R INTERVAL: 188 MS
EKG Q-T INTERVAL: 448 MS
EKG QRS DURATION: 148 MS
EKG QTC CALCULATION (BAZETT): 493 MS
EKG R AXIS: -79 DEGREES
EKG T AXIS: 25 DEGREES
EKG VENTRICULAR RATE: 73 BPM
EOSINOPHILS ABSOLUTE: 0.15 E9/L (ref 0.05–0.5)
EOSINOPHILS RELATIVE PERCENT: 3.5 % (ref 0–6)
GFR AFRICAN AMERICAN: 38
GFR NON-AFRICAN AMERICAN: 32 ML/MIN/1.73
GLUCOSE BLD-MCNC: 133 MG/DL (ref 74–99)
GRAM STAIN ORDERABLE: NORMAL
HCT VFR BLD CALC: 27.7 % (ref 34–48)
HEMOGLOBIN: 8.3 G/DL (ref 11.5–15.5)
HYPOCHROMIA: ABNORMAL
INR BLD: 1.5
LYMPHOCYTES ABSOLUTE: 0.69 E9/L (ref 1.5–4)
LYMPHOCYTES RELATIVE PERCENT: 15.7 % (ref 20–42)
MCH RBC QN AUTO: 28 PG (ref 26–35)
MCHC RBC AUTO-ENTMCNC: 30 % (ref 32–34.5)
MCV RBC AUTO: 93.6 FL (ref 80–99.9)
METER GLUCOSE: 161 MG/DL (ref 74–99)
MONOCYTE, FLUID: 92 %
MONOCYTES ABSOLUTE: 0.3 E9/L (ref 0.1–0.95)
MONOCYTES RELATIVE PERCENT: 7 % (ref 2–12)
NEUTROPHIL, FLUID: 8 %
NEUTROPHILS ABSOLUTE: 3.18 E9/L (ref 1.8–7.3)
NEUTROPHILS RELATIVE PERCENT: 73.9 % (ref 43–80)
NUCLEATED CELLS FLUID: 127 /UL
OVALOCYTES: ABNORMAL
PDW BLD-RTO: 17.7 FL (ref 11.5–15)
PLATELET # BLD: 215 E9/L (ref 130–450)
PMV BLD AUTO: 10.8 FL (ref 7–12)
POIKILOCYTES: ABNORMAL
POLYCHROMASIA: ABNORMAL
POTASSIUM REFLEX MAGNESIUM: 4.9 MMOL/L (ref 3.5–5)
POTASSIUM REFLEX MAGNESIUM: 5 MMOL/L (ref 3.5–5)
PROTHROMBIN TIME: 17.4 SEC (ref 9.3–12.4)
RBC # BLD: 2.96 E12/L (ref 3.5–5.5)
RBC FLUID: <2000 /UL
SCHISTOCYTES: ABNORMAL
SODIUM BLD-SCNC: 139 MMOL/L (ref 132–146)
TEAR DROP CELLS: ABNORMAL
TOTAL PROTEIN: 5.2 G/DL (ref 6.4–8.3)
WBC # BLD: 4.3 E9/L (ref 4.5–11.5)

## 2019-06-13 PROCEDURE — 84132 ASSAY OF SERUM POTASSIUM: CPT

## 2019-06-13 PROCEDURE — 97535 SELF CARE MNGMENT TRAINING: CPT

## 2019-06-13 PROCEDURE — 2060000000 HC ICU INTERMEDIATE R&B

## 2019-06-13 PROCEDURE — 36415 COLL VENOUS BLD VENIPUNCTURE: CPT

## 2019-06-13 PROCEDURE — 2580000003 HC RX 258: Performed by: FAMILY MEDICINE

## 2019-06-13 PROCEDURE — 6370000000 HC RX 637 (ALT 250 FOR IP): Performed by: STUDENT IN AN ORGANIZED HEALTH CARE EDUCATION/TRAINING PROGRAM

## 2019-06-13 PROCEDURE — 6370000000 HC RX 637 (ALT 250 FOR IP): Performed by: FAMILY MEDICINE

## 2019-06-13 PROCEDURE — 80053 COMPREHEN METABOLIC PANEL: CPT

## 2019-06-13 PROCEDURE — 99231 SBSQ HOSP IP/OBS SF/LOW 25: CPT | Performed by: SURGERY

## 2019-06-13 PROCEDURE — 99232 SBSQ HOSP IP/OBS MODERATE 35: CPT | Performed by: FAMILY MEDICINE

## 2019-06-13 PROCEDURE — 93010 ELECTROCARDIOGRAM REPORT: CPT | Performed by: INTERNAL MEDICINE

## 2019-06-13 PROCEDURE — 97166 OT EVAL MOD COMPLEX 45 MIN: CPT

## 2019-06-13 PROCEDURE — 97161 PT EVAL LOW COMPLEX 20 MIN: CPT

## 2019-06-13 PROCEDURE — 82962 GLUCOSE BLOOD TEST: CPT

## 2019-06-13 PROCEDURE — 97530 THERAPEUTIC ACTIVITIES: CPT

## 2019-06-13 PROCEDURE — 85610 PROTHROMBIN TIME: CPT

## 2019-06-13 PROCEDURE — 85025 COMPLETE CBC W/AUTO DIFF WBC: CPT

## 2019-06-13 RX ORDER — WARFARIN SODIUM 1 MG/1
1 TABLET ORAL DAILY
Status: DISCONTINUED | OUTPATIENT
Start: 2019-06-13 | End: 2019-06-14 | Stop reason: HOSPADM

## 2019-06-13 RX ORDER — FAMOTIDINE 20 MG/1
20 TABLET, FILM COATED ORAL 2 TIMES DAILY
Status: DISCONTINUED | OUTPATIENT
Start: 2019-06-13 | End: 2019-06-14 | Stop reason: HOSPADM

## 2019-06-13 RX ORDER — ONDANSETRON 4 MG/1
4 TABLET, ORALLY DISINTEGRATING ORAL EVERY 8 HOURS PRN
Status: DISCONTINUED | OUTPATIENT
Start: 2019-06-13 | End: 2019-06-14 | Stop reason: HOSPADM

## 2019-06-13 RX ADMIN — Medication 1000 MCG: at 10:20

## 2019-06-13 RX ADMIN — PATIROMER 8.4 G: 8.4 POWDER, FOR SUSPENSION ORAL at 10:19

## 2019-06-13 RX ADMIN — Medication 10 ML: at 21:05

## 2019-06-13 RX ADMIN — Medication: at 10:21

## 2019-06-13 RX ADMIN — ONDANSETRON 4 MG: 4 TABLET, ORALLY DISINTEGRATING ORAL at 10:27

## 2019-06-13 RX ADMIN — DOCUSATE SODIUM 100 MG: 100 CAPSULE, LIQUID FILLED ORAL at 21:02

## 2019-06-13 RX ADMIN — METOPROLOL TARTRATE 50 MG: 50 TABLET, FILM COATED ORAL at 10:20

## 2019-06-13 RX ADMIN — Medication 10 ML: at 10:19

## 2019-06-13 RX ADMIN — MULTIPLE VITAMINS W/ MINERALS TAB 1 TABLET: TAB at 10:20

## 2019-06-13 RX ADMIN — FAMOTIDINE 20 MG: 20 TABLET, FILM COATED ORAL at 10:19

## 2019-06-13 RX ADMIN — WARFARIN SODIUM 1 MG: 1 TABLET ORAL at 17:46

## 2019-06-13 RX ADMIN — CALCIUM CARBONATE-VITAMIN D TAB 500 MG-200 UNIT 1 TABLET: 500-200 TAB at 10:20

## 2019-06-13 RX ADMIN — Medication: at 21:05

## 2019-06-13 RX ADMIN — OXYCODONE HYDROCHLORIDE 2.5 MG: 5 TABLET ORAL at 05:23

## 2019-06-13 RX ADMIN — ATORVASTATIN CALCIUM 20 MG: 10 TABLET, FILM COATED ORAL at 21:02

## 2019-06-13 RX ADMIN — PREGABALIN 50 MG: 50 CAPSULE ORAL at 14:42

## 2019-06-13 RX ADMIN — FAMOTIDINE 20 MG: 20 TABLET, FILM COATED ORAL at 21:02

## 2019-06-13 RX ADMIN — PREGABALIN 50 MG: 50 CAPSULE ORAL at 21:02

## 2019-06-13 RX ADMIN — PREGABALIN 50 MG: 50 CAPSULE ORAL at 10:20

## 2019-06-13 RX ADMIN — EXEMESTANE 25 MG: 25 TABLET ORAL at 05:24

## 2019-06-13 ASSESSMENT — PAIN DESCRIPTION - ONSET
ONSET: ON-GOING

## 2019-06-13 ASSESSMENT — PAIN DESCRIPTION - DESCRIPTORS
DESCRIPTORS: CONSTANT;DISCOMFORT;SORE

## 2019-06-13 ASSESSMENT — PAIN SCALES - GENERAL
PAINLEVEL_OUTOF10: 4
PAINLEVEL_OUTOF10: 8
PAINLEVEL_OUTOF10: 5
PAINLEVEL_OUTOF10: 5
PAINLEVEL_OUTOF10: 0

## 2019-06-13 ASSESSMENT — PAIN DESCRIPTION - PROGRESSION
CLINICAL_PROGRESSION: NOT CHANGED

## 2019-06-13 ASSESSMENT — PAIN - FUNCTIONAL ASSESSMENT
PAIN_FUNCTIONAL_ASSESSMENT: PREVENTS OR INTERFERES SOME ACTIVE ACTIVITIES AND ADLS

## 2019-06-13 ASSESSMENT — PAIN DESCRIPTION - FREQUENCY
FREQUENCY: CONTINUOUS

## 2019-06-13 ASSESSMENT — PAIN DESCRIPTION - PAIN TYPE
TYPE: ACUTE PAIN

## 2019-06-13 ASSESSMENT — PAIN DESCRIPTION - LOCATION
LOCATION: ABDOMEN

## 2019-06-13 NOTE — PROGRESS NOTES
Physical Therapy    Facility/Department: [address] PICU  Initial Assessment    NAME: Reji Valero  : 1945  MRN: [id number]    Date of Service: 2019    Evaluating Therapist: Naeem Estrella PT, DPT      Room #: 5176-K  DIAGNOSIS: Hyperkalemia  PRECAUTIONS: Falls    Social:  Patient lives alone in an apartment building with no step(s) to enter. Patient lives on 7th floor of apartment, uses elevator. Laundry located on 2nd floor. Has 2 sons and 2 daughters that live nearby and are available to help when needed. Prior to admission patient walked with a walker. Patient does not drive. Transportation provided by bus or children. Initial Evaluation  Date: 2019 Treatment  Date: N/A   Short Term/ Long Term   Goals   Was pt agreeable to Eval/treatment? Yes     Does pt have pain? Yes. \"Really bad\" stomach ache. Did not quantify. Bed Mobility  Rolling: Min A  Supine to sit: Min A  Sit to supine: Min A  Scooting: Min A  Supervision   Transfers Sit to stand: Supervision  Stand to sit: Supervision  Stand pivot: Supervision  Indpendent   Ambulation   100 feet with WW with CGA  >150 feet with WW with Supervision   Stair negotiation: ascended and descended N/T  5 steps with 1 rail with CGA   AM-PAC Score 17/24       Pt is alert and Oriented x3  LLE ROM is WFL. RLE ROM is limited. LLE strength is grossly 4/5. RLE strength is grossly and 3+/5. Balance: Seated - Supervision; Standing - CGA  Sensation: patient reported neuropathy in bilateral feet  Edema: lymphedema present in bilateral LE; bandages in place  Endurance: Fair  Bed/Chair alarm: No     ASSESSMENT  Pt displays functional ability as noted in the objective portion of this evaluation. Comments/Treatment:  Patient cleared by nurse and agreeable to assessment. Yes. Patient displayed positive attitude and was very willing to participate. Patient reported pain throughout R LE during MMT. Patient denied dizziness during activity.  Patient demonstrated antalgic step-to gait pattern characterized by slow nancy, short stride length, and unilateral limp that seemed to become more noticeable as ambulation progressed. Patient required verbal cues to safely perform stand to sit transfer and reported fatigue at end of session. Patient assisted back to supine in bed with call light and tray table in reach. Patient education  Pt educated on purpose of PT for discharge planning, importance of mobility, safety with mobility, transfers, and gait. Patient response to education:   Pt verbalized understanding Pt demonstrated skill Pt requires further education in this area   Yes Yes Reinforcement     Rehab potential is Good for reaching above PT goals. Pts/ family goals   1. To get better. Patient and or family understand(s) diagnosis, prognosis, and plan of care. Yes. PLAN  PT care will be provided in accordance with the objectives noted above. Whenever appropriate, clear delegation orders will be provided for nursing staff. Exercises and functional mobility practice will be used as well as appropriate assistive devices or modalities to obtain goals. Patient and family education will also be administered as needed. Frequency of treatments will be 2-5x/week x 3-5 days.     Time in: 1038  Time out: [facility], PT, DPT   License number:  [id number]

## 2019-06-13 NOTE — PROGRESS NOTES
[address] [facility]  Family Medicine Attending    S: 68 y.o. female with history of metastatic breast cancer, DM, CAD, recurrent DVT on coumadin, chronic diarrhea, chronic left humerus fracture with nonunion, CKD presents with hyperkalemia, supratherapeutic INR, and KARISHMA. Felt better after paracentesis yesterday, but today is feeling more abd pressure       O: VS- Blood pressure (!) 122/58, pulse 67, temperature 97.9 °F (36.6 °C), temperature source Temporal, resp. rate 18, height 5' 4\" (1.626 m), weight 213 lb 0.4 oz (96.6 kg), SpO2 96 %, not currently breastfeeding. Exam is as noted by resident   Weak but in no acute distress  Neck supple  Heart RR, murmur as previously  Lungs no rales or rhonchi  abd distended, tender  Legs edematous as chronically        Impressions:   Principal Problem:    Hyperkalemia  Active Problems:    Coronary artery disease involving native coronary artery of native heart without angina pectoris    CHF (congestive heart failure) (HCC)    Ventral hernia    Chronic anticoagulation    CKD (chronic kidney disease) stage 3, GFR 30-59 ml/min (HCC)    Supratherapeutic INR      Plan:   Heme-Onc adjusting treatment  To check re restart of Coumadin  Awaiting cytology  Echo today    Continue to follow K+, INR  Manage DM  Wound care  Analgesia as ordered       Attending Physician Statement  I have reviewed the chart and seen the patient with the resident(s). I personally reviewed images, EKG's and similar tests, if present. I personally reviewed and performed key elements of the history and exam.  I have reviewed and confirmed student and/or resident history and exam with changes as indicated above. I agree with the assessment, plan and orders as documented by the resident. Please refer to the resident and/or student note for additional information.       Moise Medrano

## 2019-06-13 NOTE — PROGRESS NOTES
Occupational Therapy  OCCUPATIONAL THERAPY INITIAL EVALUATION      Date:2019  Patient Name: Alex Navas  MRN: [id number]  : 1945  Room: [address] [place], [place] [address]    Evaluating OT:  MICHAEL Lawson, OTR/L  # [id number]      AM-PAC Daily Activity Raw Score:    Recommended Adaptive Equipment:  TBD     Reason for Admission:  Pt was admitted w/ abnormal labs - Elevated K and INR. Mild SOB, (+) ascites/Abdominal Distention. CT revealed lesions in the Liver, r/o Mets    Diagnosis:  Hyperkalemia      Procedures this admission:  Paracentesis 19 - 4.5 L of Fluid Removed     Pertinent Medical History:  Breast CA/Lumptectomy, CAD/CABG, CHF, Diabetic Neuropathy, Left Humerus Fracture, DVT R LE, Lymphedema Vj LEs       Precautions:  Falls  Carb Control Diet    Home Living: Pt lives alone in a single-level apartment with level entry and elevator access. Bathroom setup:  Tub-Shower, standard commode   Equipment owned:  [address], Shower Chair, Reacher, [employer] Aid, [place] [employer]    Available Family Assist:  4 adult children lives locally and can assist PRN    Prior Level of Function:  Recently in SNF for 1 month in May, returned to apartment ~ 2 weeks ago - IND with ADLs, Light IADLs, Mod I for Transfers and Mobility using FWW for ambulation.    Driving:  No - Family or public transportation  Occupation:  None stated    Pain Level:  6/10 abdominal, R LE d/t hx of sciatica and DVT;  Nsg Notified   Additional Complaints:  None    Vitals/Lab Values:  WFL Room Air, Hgb 8.3    Cognition: A & O x 4   Able to Follow Multi-Step Commands INDly   Memory:  good    Sequencing:  good    Problem solving:  good    Judgement/safety:  good   Additional Comments:  Pt was very pleasant, cooperative, good insight, bright affect       Functional Assessment:   Initial Eval Status  Date: 19 Treatment Status  Date: Short Term Goals  Treatment frequency: PRN 2-4 x/week   Feeding IND         Grooming SUP/Set up    Declined to stand at sink at this time  Remote SUP  Standing At The Sink   UB Dressing Min A/Set up    Donning bathrobe while seated EOB  (Simulated)  Remote SUP   LB Dressing Min-Mod A/Set up    Seated/standing EOB  SUP   Bathing NT      SUP   Toileting NT    Pt declined  SUP   Bed Mobility  Rolling:  Min A  Repositioning:  Min A   Supine to Sit:  Min A    Sit to Supine:  NT       SUP   Functional Transfers Sit to stand:  Close SUP  Stand to sit:  Close SUP      Sit<>stand EOB/Chair  Mod I   Functional Mobility Min A/Close SUP w/ FWW    ~ 100', mild SOB  Mod I   Balance Sitting:  Good - SUP EOB/Chair w/ ax     Static:  SUP    Dynamic:  SUP    Standing:  CGA/Close SUP w/ FWW     Static:  Close SUP    Dynamic:  CGA     Activity Tolerance Tolerated Sitting:  EOB ~ 10-15 mins w/ ax, in chair extended time  Tolerated Standing:  ~ 8 mins w/ ax     Visual/  Perceptual WFL  Glasses:  Yes      Hearing WFL  Hearing Aids         Hand dominance: Right    UE ROM: RUE:  WFL      LUE: Hx of Humerus Fx, Shoulder flex to ~ 10* AROM, ~ 45* AAROM, Distally WFL    Strength: RUE: grossly 4/5     LUE: grossly 4/5 distally     Strength:  WFL Vj UEs    Fine Motor Coordination:  WFL Vj UEs    Sensation:  Denies numbness or tingling Vj UEs  Tone:  WFL Vj UEs  Edema:  None Noted                            Treatment:       -- Education:  Provided Pt/Family ed re: Benefits/Purpose of OT services;  OT Plan of Care;  Transfer Safety, Walker safety; Benefits of use of DME/AD/Adaptive equip/techs to increase safety/IND with Functional Ax; Techs to increase Safety/Safety Awareness w/ Functional Ax; Energy Conservation Techs/Pursed-Lip Breathing;  Benefits of Cont'd Participation in OT services at D/C      Pt and/or Family verbalized/demonstrated a good understanding of education provided. Will Review PRN.       Provided Skilled SUP/Assist w/ Pt safety, Proper Positioning, ADLs, Transfers and Functional Mobility as noted above, as well as set up and clean up for Diagnosis, Prognosis/Goals and OT Plan of Care. Demonstrated Good understanding. Evaluation Time includes thorough review of current medical information, gathering information on past medical history/social history and prior level of function, completion of standardized testing/informal observation of tasks, assessment of data and education on plan of care and goals.         Mod Evaluation + 23 timed treatment minutes  Tx Time in:  1038  Tx Time out:  [address] [place], [place], OTR/L  # [id number]

## 2019-06-13 NOTE — PROGRESS NOTES
FAMILY MEDICINE RESIDENCY PROGRAM  - INPATIENT PROGRESS NOTE -  DATE : 2019    NAME: Dawna Hobbs   AGE: 68 y.o. SEX: female  : 1945    ADMIT DATE: 6/10/2019 LENGTH OF STAY: 3    ADMISSION DIAGNOSIS: Hyperkalemia [E87.5]  Hyperkalemia [E87.5]        Synopsis: Mrs. Aubrey Henson is a 68year old female with a history of CAD with CABG and MI, HFpEF, hypertension, hyperlipidemia, recurrent DVTs on coumadin, type II DM, and metastatic breast cancer. She was admitted for hyperkalemia and supratherapeutic INR    Last 24 hour events: POD#1 IR paracentesis with removal of 4500 cc. Subjective: Continued abdominal pain with belching. She tolerated the procedure well and did feel relief at first but now feels she is back to how she felt previously. She endorses persistent LE edema and BHAKTA. ROS: denies fever, chills, nausea, vomiting, dizziness, chest pain, shortness of breath.     ALLERGY: Macrobid [nitrofurantoin monohydrate macrocrystals]    CONTINUOUS MEDS:   dextrose         SCHEDULED MEDS:   famotidine  20 mg Oral BID    atorvastatin  20 mg Oral Nightly    calcium-vitamin D  1 tablet Oral Daily    exemestane  25 mg Oral QAM AC    metoprolol tartrate  50 mg Oral BID    miconazole nitrate   Topical BID    therapeutic multivitamin-minerals  1 tablet Oral Daily    pregabalin  50 mg Oral TID    vitamin B-12  1,000 mcg Oral Daily    sodium chloride flush  10 mL Intravenous 2 times per day    docusate sodium  100 mg Oral BID    patiromer sorbitex calcium  8.4 g Oral Daily       PRN MEDS:  ondansetron, oxyCODONE, sodium chloride flush, glucose, dextrose, glucagon (rDNA), dextrose         PHYSICAL EXAM:    VITAL SIGNS:   Vitals:    19 1509 19 1949 19 2345 19 0518   BP: (!) 148/67 (!) 116/56 (!) 122/58    Pulse: 70 71 67    Resp:   18    Temp: 98.3 °F (36.8 °C) 98.2 °F (36.8 °C) 97.9 °F (36.6 °C)    TempSrc: Oral Temporal Temporal    SpO2: 98% 96% 96%    Weight:    213 lb 0.4 oz (96.6 kg)   Height:           General appearance: NAD, arousable, no pallor  Eyes: PERRL, EOM's intact, anicteric sclerae  HENT: AT/NC, no sinus tenderness, oropharynx clear  Neck: Supple, trachea midline, no thyromegaly  Lungs: CTAB, respiration unlabored, normal expansion  Heart: RRR, normal O2/M6, systolic murmur noted  Abdomen: firm, diffuse mildly tender to palpation, nontender over site of paracentesis, distended, +BS  Extremities: bilateral peripheral edema with legs in ace bandages - non-tender to palpation, no clubbing, no cyanosis  Skin: Warm and dry,venous stasis changes   Neurologic: Motor functions intact. No focal deficit, no CN deficit  Psychiatric: Appropriate affect, A&O to person, place and time      SIGNIFICANT IMAGING STUDIES:    Xr Chest Portable    Result Date: 6/10/2019  Patient MRN: [id number] : 1945 Age:  68 years Gender: Female Order Date: 6/10/2019 9:15 PM Exam: XR CHEST PORTABLE Number of Images: 1 view Indication:   elevated troponin elevated troponin Comparison: Prior chest radiograph from 2016 is available Findings: The lungs are clear. There is no evidence of pulmonary infiltrate or pleural effusion. The pulmonary vascularity is unremarkable. There is cardiomegaly with median sternotomy. There is moderate mitral valve annulus calcification seen. There is uncoiling atherosclerotic change of thoracic aorta. .  The bony thorax demonstrates right fourth and fifth ribs fracture on the posterior lateral aspect with deformity. There is complete destruction of the left humeral neck with old left mid humeral neck fracture with nonunion. This findings was seen before and appears to be unchanged. . There is a right-sided Port-A-Cath with tip in superior vena cava. Cardiomegaly with median sternotomy No evidence of airspace consolidation or pulmonary venous congestion.        RECENT LABS:     Basic Labs      CBC:   Recent Labs     19  0430 19  [id number]  042 06/13/19 0342   WBC 4.1*  --  5.1 4.3*   RBC 3.01*  --  3.13* 2.96*   HGB 8.3* 8.9* 8.8* 8.3*   HCT 28.8* 30.7* 29.6* 27.7*   MCV 95.7  --  94.6 93.6   RDW 17.9*  --  17.9* 17.7*     --  250 215       BMP/CMP:   Recent Labs     06/10/19  2015  06/11/19  0430  06/12/19  0421  06/12/19  2044 06/13/19  0021 06/13/19 0342      < > 144  --  141  --   --   --  139   K 6.2*   < > 5.4*   < > 5.1*   < > 4.8 5.0 4.9   *   < > 110*  --  106  --   --   --  107   CO2 21*   < > 20*  --  21*  --   --   --  21*   BUN 35*   < > 35*  --  32*  --   --   --  30*   CREATININE 2.1*   < > 1.9*  --  1.7*  --   --   --  1.6*   MG 1.6  --   --   --   --   --   --   --   --     < > = values in this interval not displayed. Recent Labs     06/11/19 0430 06/12/19 0421 06/13/19 0342   PROT 6.5 6.4 5.2*   ALKPHOS 166* 165* 127*   ALT 23 25 22   AST 44* 45* 35*   BILITOT 0.5 0.9 0.7       OTHER LABS:    Cardiac enzymes:  Lab Results   Component Value Date    CKTOTAL 286 (H) 06/11/2019    TROPONINI 0.50 (H) 06/11/2019     PT/INR:   Recent Labs     06/11/19 1951 06/12/19 0421 06/13/19 0342   INR 1.6 1.5 1.5     BNP: No results for input(s): BNP in the last 72 hours.   Hgb A1C:   Lab Results   Component Value Date    LABA1C 6.9 04/15/2019     No results found for: EAG  ESR: No results found for: SEDRATE  CRP: No results found for: CRP  D Dimer: No results found for: DDIMER  Folate and B12:   Lab Results   Component Value Date    ZNARYTEL02 174 (L) 10/23/2017   ,   Lab Results   Component Value Date    FOLATE 17.0 06/20/2011     Lactic Acid:   Lab Results   Component Value Date    LACTA 1.9 06/11/2019     Thyroid Studies:  Lab Results   Component Value Date    TSH 6.930 (H) 06/10/2019       MICROBIOLOGY:    Urine Culture:  No components found for: ROBERTO  Blood Culture:  No components found for: CBLOOD, CFUNGUSBL   Lab Results   Component Value Date    BC 5 Days- no growth 10/24/2017     Blood Culture from [facility]: No components found for: CBLOODLN  Stool Culture:  No components found for: CSTOOL  Sputum Culture:  No components found for: CSPUTUM  Sputum Culture for [employer]:  No components found for: CAFBSM            ASSESSMENT & PLAN:    Principal Problem:    Hyperkalemia  Active Problems:    Coronary artery disease involving native coronary artery of native heart without angina pectoris    CHF (congestive heart failure) (HCC)    Ventral hernia    Chronic anticoagulation    CKD (chronic kidney disease) stage 3, GFR 30-59 ml/min (HCC)    Supratherapeutic INR  Resolved Problems:    * No resolved hospital problems. *    Hyperkalemia, resolved   KARISHMA on CKD, improving  Elevated troponins  Prerenal with FeNa of 0.2%, Secondary to dehydration vs cardiorenal  Received Kayexalate, insulin and dextrose, calcium gluconate and bicarb in the ER  Renal ultrasound within normal.   Potassium 5.6, improved from 6.2  Creatinine 1.7, improving  S/p 12 hours of gentle fluids 6/11      Supratherapeutic INR , resolved  Check INR daily - 1.5 today  Received vitamin K 6/11  Ok to restart coumadin - 4 mg Sunday, Tuesday and Thursday. 2 mg Monday, Wednesday, Friday and Saturday       Abdominal pain and distension, Ascites  History of ventral hernia with distension  CT abdomen and US abdomen concerning for cirrhosis and metastatic lesions and moderate sized ascites  IR paracentesis with SAAG of 1.4, Ascitic fluid protein/Serum Protein of 0.5, Ascitic Fluid LDH/Serum LDH of 0.4, Ascitic Fluid . Pending cytology and culture  SAAG of 1.4 Ddx of  Heart failure (probnp in 11,000), massive hepatic metastases, cirrhosis - amongst the most likely.       Recurrent DVT, lymphedema   Continue compression stockings and wound care if continues as inpatient  [id number] Maricel Neal with hematology/oncology to restart coumadin       Metastatic breast cancer  Continue Aromasin as an inpatient   Oncology consulted, appreciate recommendations for change in medications to chemo and herception with xgeva as an outpatient.        Type II DM  Hold metformin in setting of KARISHMA. Check fasting blood sugars, low this morning. carb controlled diet                  Electronically signed by:  Tanner Joe M.D., PGY-3    This case was discussed with Dr Alex (s) Media

## 2019-06-13 NOTE — CARE COORDINATION
Spoke with patient. She feels she is at the level to return home with [facility] at the time of discharge. She does not want [place] because it will also delay chemo treatment. [facility] following, they will need orders prior to discharge. Family to transport home.

## 2019-06-13 NOTE — PROGRESS NOTES
[facility]  Dr. Vazquez Dural      Patient Name: Renée Izaguirre  YOB: 1945  PCP: Cindy Bassett DO   Referring Provider:      Reason for Consultation:   Chief Complaint   Patient presents with    Other     Pt called by PCP for INR of 7 and increased K. Pt has no complaints. Subjective:  Has some abdominal discomfort and nausea this AM. No significant pain. No fevers overnight    ROS:  No f/c/s  No cp, palp  No cough, sob  +Nausea, no emesis. +abdominal discomfort    History of Present Illness: This pt is a very pleasant 67 yo female who follows with Dr. Flores Arzate for a long history of breast cancer and anemia of CKD on Aranesp. She was initially diagnosed with limited stage ICD ER/[place]+ HER2+ and was treated with AC+T and Herceptin and completed 5 years of Arimidex before developing stage IV disease to her bones and was placed on Halaven which she responded well to and was switched to Herceptin/Aromasin/Affinitor with good response before stopping affinitor due to rash. She has been maintained on Herceptin/Aromasin and Xgeva support. She now presents to the ER after being found to be hyperkalemic by her PCP and in the ER she underwent CT scan which showed ascites and multiple hepatic hypodensities and some hyperdenisities in the bowel and mesentery concerning for hemorrhage. Multiple pulmonary nodules were also noted. She has had increased abdominal discomfort and frequency of diarrhea the past week and has stable chronic fatigue    Diagnostic Data:     Past Medical History:   Diagnosis Date    Abscess of abdominal wall     Anemia     Iron deficiency and chronic disease.  Anesthesia     DIFFICULTY WAKING UP    Arthritis     Arthritis, hip     Breast cancer ([facility] [address] 2005    S/P Left Lumpectomy with Lymph Node Dissection, Chemo/Rad. Follows with Dr. Flores Arzate. No recurrence to date. Surgeon was Dr. Kali Sierra.     CAD (coronary artery disease) 5/2008    s/p CABG. Follows with Sachi Ortega.  CHF (congestive heart failure) (HCC)     Chronic venous insufficiency 11/7/2018    Decreased dorsalis pedis pulse 11/7/2018    Diabetic neuropathy (HCC)     Diabetic neuropathy (HCC)     DVT (deep venous thrombosis) (HCC)     Recurrent. On lifelong coumadin.  DVT of upper extremity (deep vein thrombosis) ([address] 4/18/2012    History of deep vein thrombosis 11/7/2018    Humerus fracture     Chronic, on the Left.  Hyperlipidemia 8/29/2011    Hypertension     Leg swelling 11/7/2018    Lymph node enlargement     Lymphedema of both lower extremities 11/7/2018    MI (myocardial infarction) ([address]     Nephrolithiasis     Follows with Dr. Zohra Valle.  Pericardial effusion     Pulmonary nodule     With mediastinal lymphadenopathy. Stable. Follows with Dr. Nohelia Holland.     Rib lesion 11/28/11    expansile lesion in one of the right ribs laterally    Sarcoidosis 07/26/11    per transbronchial needle aspiration    Subclavian vein occlusion, bilateral ([address] 4/18/2012    Type II or unspecified type diabetes mellitus without mention of complication, not stated as uncontrolled        Patient Active Problem List    Diagnosis Date Noted    Supratherapeutic INR 06/10/2019    Charcot's joint of foot in type 2 diabetes mellitus ([address] 04/17/2019    CKD (chronic kidney disease) stage 3, GFR 30-59 ml/min ([address] 04/16/2019    Ambulatory dysfunction 04/15/2019    Leg swelling 11/07/2018    History of deep vein thrombosis 11/07/2018    Chronic venous insufficiency 11/07/2018    Lymphedema of both lower extremities 11/07/2018    Decreased dorsalis pedis pulse 11/07/2018    Diabetic polyneuropathy associated with type 2 diabetes mellitus ([address] 09/07/2018    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 04/04/2018    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 61/64/2046    Complicated UTI (urinary tract infection) 10/24/2017    Hyperkalemia 10/24/2017    KARISHMA (acute kidney injury) ([address] 10/24/2017    Diarrhea with dehydration 10/24/2017    Chronic anticoagulation 10/24/2017    Peripheral polyneuropathy 01/03/2017    Bilateral edema of lower extremity 10/03/2016    Chronic deep vein thrombosis (DVT) of proximal vein of right lower extremity ([address] 09/30/2016    Type 2 diabetes mellitus without complication, with long-term current use of insulin ([address] 09/01/2016    Anemia 09/01/2016    Ventral hernia 05/14/2015    Rectal bleeding 02/23/2015    Anesthesia     Pericardial effusion     MI (myocardial infarction) ([address]     Arthritis     Lymph node enlargement     Subclavian vein occlusion, bilateral ([address] 04/18/2012    Rib lesion 02/07/2012    Hyperlipidemia 08/29/2011    Sarcoidosis 07/26/2011    B12 deficiency 06/22/2011    Shoulder pain, left 03/02/2011    Breast cancer ([address]     Hypertension     Coronary artery disease involving native coronary artery of native heart without angina pectoris     Nephrolithiasis     CHF (congestive heart failure) ([address]     Humerus fracture         Past Surgical History:   Procedure Laterality Date    APPENDECTOMY      ARTERIAL BYPASS SURGRY      BREAST LUMPECTOMY  9/27/2005    LEFT    CARDIAC SURGERY      CHOLECYSTECTOMY      COLONOSCOPY  12/2007    cecal arteriovenous malformation with hyperplastic polyps    COLONOSCOPY  [id number]    CORONARY ARTERY BYPASS GRAFT  05/2008    triple bypass    ECHO COMPL W DOP COLOR FLOW  10/30/2013         EYE SURGERY      clarissa cataracts    HYSTERECTOMY  1975, 1985    MAYNOR prolapse, benign conditions; BSO later for scar tissues, no CA.      OTHER SURGICAL HISTORY  11/18/11    port removal right chest wall    TOOTH EXTRACTION      Full Dental Extraction.  TUNNELED VENOUS PORT PLACEMENT      removal of port Dec. 2011- Dr. Gillespie December TUNNELED [facility] 94  [phone]    RIGHT CHEST       Family History  Family History   Adopted:  Yes Social History  No illicit drugs    TOBACCO:   reports that she has never smoked. She has never used smokeless tobacco.  ETOH:   reports that she does not drink alcohol. Home Medications  Prior to Admission medications    Medication Sig Start Date End Date Taking? Authorizing Provider   pregabalin (LYRICA) 50 MG capsule Take 1 capsule by mouth 3 times daily for 120 days. 4/17/19 8/15/19 Yes Jesus Alberto Blue DO   warfarin (COUMADIN) 2 MG tablet Take 2 tablets orally on Sun, Tues, Thurs; and 1 tablet daily on all other days. Currently warfarin is held. Please follow INR and dose.  4/17/19  Yes Rosie Rodrigues MD   metFORMIN (GLUCOPHAGE) 500 MG tablet Take 1 tablet by mouth 2 times daily (with meals) 4/18/19  Yes Rosie Rodrigues MD   atorvastatin (LIPITOR) 20 MG tablet Take 1 tablet by mouth nightly 4/17/19  Yes Rosie Rodrigues MD   miconazole nitrate 2 % OINT Apply topically 2 times daily 4/17/19  Yes Rosie Rodrigues MD   metoprolol tartrate (LOPRESSOR) 50 MG tablet take 1 tablet by mouth twice a day 2/21/19  Yes Jesus Alberto Blue DO   RA VITAMIN B-12 TR 1000 MCG TBCR take 1 tablet by mouth once daily 11/25/18  Yes Historical Provider, MD   diclofenac sodium 1 % GEL Apply 4 g topically 4 times daily 11/30/18  Yes Hansa Lynn MD   famotidine (PEPCID) 20 MG tablet take 1 tablet by mouth twice a day 11/6/18  Yes Jesus Alberto Blue DO   vitamin B-12 (CYANOCOBALAMIN) 1000 MCG tablet Take 1 tablet by mouth daily 11/6/18  Yes Jesus Alberto Blue DO   Calcium Citrate-Vitamin D (RA CALCIUM CIT-VIT D-3 PETITES) 200-250 MG-UNIT TABS take 1 tablet by mouth twice a day 3/6/18  Yes Jesus Alberto Blue DO   exemestane (AROMASIN) 25 MG chemo tablet Take 1 tablet by mouth every morning (before breakfast) 10/10/17  Yes Historical Provider, MD   Multiple Vitamins-Minerals (THERAPEUTIC MULTIVITAMIN-MINERALS) tablet Take 1 tablet by mouth daily 6/6/17  Yes Jesus Alberto Blue DO   trastuzumab (HERCEPTIN) 440 MG injection Infuse  intravenously once a week. On tuesdays   Yes Historical Provider, MD       Allergies  Allergies   Allergen Reactions    Macrobid [Nitrofurantoin Monohydrate Macrocrystals] Hives       Review of Systems:    Constitutional:  No fever chills or rigors. + fatigue  Eyes: No changes in vision, discharge, or pain  ENT: No Headaches, hearing loss or vertigo. No mouth sores or sore throat. No change in taste or smell. Cardiovascular: No chest discomfort, dyspnea on exertion, palpitations or loss of consciousness. or phlebitis. Respiratory: Has no cough or wheezing, Has no sputum production. Has no hemoptysis, Has no pleuritic pain, . Gastrointestinal: + abdominal discomfort, appetite loss. Reports small amount of blood in stools (hemorrhoids). No change in bowel habits. No hematemesis   Genitourinary: Patient acknowledges no dysuria, trouble voiding, or hematuria. No nocturia or increased frequency. Musculoskeletal: No gait disturbance, weakness or joint complaints. Integumentary: No rash or pruritis. Neurological: No headache, diplopia, change in muscle strength, numbness or tingling. No change in gait, balance, coordination, mood, affect, memory, mentation, behavior. Psychiatric: No anxiety, or depression. Endocrine: No temperature intolerance. No excessive thirst, fluid intake, or urination. No tremor. Hematologic/Lymphatic: No abnormal bruising or bleeding, blood clots or swollen lymph nodes. Allergic/Immunologic: No nasal congestion or hives. Objective  BP (!) 122/58   Pulse 67   Temp 97.9 °F (36.6 °C) (Temporal)   Resp 18   Ht 5' 4\" (1.626 m)   Wt 213 lb 0.4 oz (96.6 kg)   SpO2 96%   BMI 36.57 kg/m²     Physical Exam:   Performance Status:  General: AAO to person, place, time, in no acute distress, pleasant   Head and neck : PERRLA, EOMI . Sclera non icteric.   Oropharynx : Clear  Neck: no JVD,  no adenopathy  LYMPHATICS : No LAD  Heart: Regular rate and regular rhythm, no murmur  Lungs: Clear to auscultation   Extremities: No edema,no cyanosis, no clubbing. Abdomen: Soft, minimal TTP. Hernia noted  Skin:  No rash  Neurologic:Cranial nerves grossly intact. No focal motor or sensory deficits    Recent Laboratory Data-   Lab Results   Component Value Date    WBC 4.3 (L) 06/13/2019    HGB 8.3 (L) 06/13/2019    HCT 27.7 (L) 06/13/2019    MCV 93.6 06/13/2019     06/13/2019    LYMPHOPCT 15.7 (L) 06/13/2019    RBC 2.96 (L) 06/13/2019    MCH 28.0 06/13/2019    MCHC 30.0 (L) 06/13/2019    RDW 17.7 (H) 06/13/2019    NEUTOPHILPCT 73.9 06/13/2019    MONOPCT 7.0 06/13/2019    BASOPCT 0.5 06/13/2019    NEUTROABS 3.18 06/13/2019    LYMPHSABS 0.69 (L) 06/13/2019    MONOSABS 0.30 06/13/2019    EOSABS 0.15 06/13/2019    BASOSABS 0.00 06/13/2019       Lab Results   Component Value Date     06/13/2019    K 4.9 06/13/2019     06/13/2019    CO2 21 (L) 06/13/2019    BUN 30 (H) 06/13/2019    CREATININE 1.6 (H) 06/13/2019    GLUCOSE 133 (H) 06/13/2019    CALCIUM 8.0 (L) 06/13/2019    PROT 5.2 (L) 06/13/2019    LABALBU 2.5 (L) 06/13/2019    BILITOT 0.7 06/13/2019    ALKPHOS 127 (H) 06/13/2019    AST 35 (H) 06/13/2019    ALT 22 06/13/2019    LABGLOM 32 06/13/2019    GFRAA 38 06/13/2019       Lab Results   Component Value Date    IRON 49 (L) 06/20/2011    TIBC 266 06/20/2011    FERRITIN 490.8 06/20/2011           Radiology-    IR US GUIDED PARACENTESIS   Final Result   Successful paracentesis. .         This procedure was performed by Jfef Melendez PA-C under the indirect   supervision of Sánchez Menezes MD   who was not present for the   procedure. .      The exam has been dictated and signed by Jeff Melendez PA-C. Zakia Johnson MD , Radiologist, have reviewed the images and   report and concur with these findings. US ABDOMEN LIMITED   Final Result   Moderate abdominal ascites. Multiple hepatic lesions   consistent with metastatic disease. Possible hepatic cirrhosis.    Fullness to the right renal pelvis or dilatation of the proximal right   ureter. CT ABDOMEN PELVIS WO CONTRAST Additional Contrast? None   Final Result   Multiple ill-defined hypodense lesions in the liver with some punctate   areas of hyperdensity concerning for malignancy which is either   partially calcified or hemorrhagic. High-density ascites is present   which may be malignant or hemorrhagic ascites. Some areas of   hyperdensity is identified in the mesentery, stomach and bowel   concerning for hemorrhage. Further assessment is recommended. Multiple pulmonary nodules concerning for developing pulmonary   metastasis. There may be developing bone metastasis as well. Surgical changes in the left breast with  right axillary lymph nodes. Mammographic correlation is recommended. The above findings were conveyed to the clinical services. ALERT:  CRITICAL RESULT            XR Acute Abd Series Chest 1 VW   Final Result   1. No active infiltrate or vascular congestion. Cardiomegaly. Right   jugular infusion catheter in place. 2. Nonspecific abdominal series. US RETROPERITONEAL COMPLETE   Final Result   Small right renal cortical cyst. Otherwise normal   appearance to the kidneys and urinary bladder. Ascites. XR CHEST PORTABLE   Final Result      Cardiomegaly with median sternotomy      No evidence of airspace consolidation or pulmonary venous congestion. 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  Continue with outpatient Aranesp  - Surgery following for possible bleeding noted on CT (hyperdenisties), but para results seem reassuring   - Discussed CT findings with patient. Will likely need to switch to chemo+Herceptin w/ continued Xgeva as outpatient. Final decision will be made with Dr. Veronica Irizarry  - Continue supportive care  - Will follow    - Has zofran 8 mg q8 prn. Can add compazine 10 mg PO q 6hrs prn nausea as well if zofran alone not effective  - Surgery has singed off. No bleeding noted on paracentesis  - Discussed CT findings with patient again. She expressed udnerstanding. Will likely need to switch to chemo+Herceptin w/ continued Xgeva as outpatient.  Final decision will be made with Dr. Veronica Irizarry      Electronically signed by Can Castaneda MD on 6/13/2019 at 10:39 AM

## 2019-06-14 ENCOUNTER — TELEPHONE (OUTPATIENT)
Dept: FAMILY MEDICINE CLINIC | Age: 74
End: 2019-06-14

## 2019-06-14 VITALS
WEIGHT: 213.7 LBS | SYSTOLIC BLOOD PRESSURE: 108 MMHG | BODY MASS INDEX: 36.48 KG/M2 | OXYGEN SATURATION: 94 % | DIASTOLIC BLOOD PRESSURE: 42 MMHG | HEART RATE: 74 BPM | RESPIRATION RATE: 16 BRPM | HEIGHT: 64 IN | TEMPERATURE: 99 F

## 2019-06-14 PROBLEM — R79.1 SUPRATHERAPEUTIC INR: Status: RESOLVED | Noted: 2019-06-10 | Resolved: 2019-06-14

## 2019-06-14 PROBLEM — E87.5 HYPERKALEMIA: Status: RESOLVED | Noted: 2017-10-24 | Resolved: 2019-06-14

## 2019-06-14 LAB
ALBUMIN SERPL-MCNC: 2.7 G/DL (ref 3.5–5.2)
ALP BLD-CCNC: 130 U/L (ref 35–104)
ALT SERPL-CCNC: 28 U/L (ref 0–32)
ANION GAP SERPL CALCULATED.3IONS-SCNC: 10 MMOL/L (ref 7–16)
ANISOCYTOSIS: ABNORMAL
AST SERPL-CCNC: 36 U/L (ref 0–31)
BASOPHILS ABSOLUTE: 0 E9/L (ref 0–0.2)
BASOPHILS RELATIVE PERCENT: 0.2 % (ref 0–2)
BILIRUB SERPL-MCNC: 0.6 MG/DL (ref 0–1.2)
BODY FLUID CULTURE, STERILE: NORMAL
BUN BLDV-MCNC: 34 MG/DL (ref 8–23)
CALCIUM SERPL-MCNC: 7.9 MG/DL (ref 8.6–10.2)
CEA,FLUID: 18.1 NG/ML
CHLORIDE BLD-SCNC: 105 MMOL/L (ref 98–107)
CO2: 21 MMOL/L (ref 22–29)
CREAT SERPL-MCNC: 1.8 MG/DL (ref 0.5–1)
EKG ATRIAL RATE: 74 BPM
EKG ATRIAL RATE: 80 BPM
EKG P AXIS: 63 DEGREES
EKG P AXIS: 76 DEGREES
EKG P-R INTERVAL: 190 MS
EKG P-R INTERVAL: 194 MS
EKG Q-T INTERVAL: 416 MS
EKG Q-T INTERVAL: 424 MS
EKG QRS DURATION: 148 MS
EKG QRS DURATION: 150 MS
EKG QTC CALCULATION (BAZETT): 470 MS
EKG QTC CALCULATION (BAZETT): 479 MS
EKG R AXIS: -75 DEGREES
EKG R AXIS: -83 DEGREES
EKG T AXIS: 27 DEGREES
EKG T AXIS: 38 DEGREES
EKG VENTRICULAR RATE: 74 BPM
EKG VENTRICULAR RATE: 80 BPM
EOSINOPHILS ABSOLUTE: 0.08 E9/L (ref 0.05–0.5)
EOSINOPHILS RELATIVE PERCENT: 1.8 % (ref 0–6)
FLUID TYPE: NORMAL
GFR AFRICAN AMERICAN: 33
GFR NON-AFRICAN AMERICAN: 28 ML/MIN/1.73
GLUCOSE BLD-MCNC: 138 MG/DL (ref 74–99)
GRAM STAIN RESULT: NORMAL
HCT VFR BLD CALC: 26.4 % (ref 34–48)
HEMOGLOBIN: 7.9 G/DL (ref 11.5–15.5)
INR BLD: 1.6
LV EF: 50 %
LVEF MODALITY: NORMAL
LYMPHOCYTES ABSOLUTE: 0.28 E9/L (ref 1.5–4)
LYMPHOCYTES RELATIVE PERCENT: 6.3 % (ref 20–42)
MCH RBC QN AUTO: 28.3 PG (ref 26–35)
MCHC RBC AUTO-ENTMCNC: 29.9 % (ref 32–34.5)
MCV RBC AUTO: 94.6 FL (ref 80–99.9)
MONOCYTES ABSOLUTE: 0.51 E9/L (ref 0.1–0.95)
MONOCYTES RELATIVE PERCENT: 10.7 % (ref 2–12)
NEUTROPHILS ABSOLUTE: 3.73 E9/L (ref 1.8–7.3)
NEUTROPHILS RELATIVE PERCENT: 81.3 % (ref 43–80)
OVALOCYTES: ABNORMAL
PDW BLD-RTO: 17.5 FL (ref 11.5–15)
PLATELET # BLD: 206 E9/L (ref 130–450)
PMV BLD AUTO: 10.7 FL (ref 7–12)
POIKILOCYTES: ABNORMAL
POLYCHROMASIA: ABNORMAL
POTASSIUM REFLEX MAGNESIUM: 5 MMOL/L (ref 3.5–5)
PROTHROMBIN TIME: 18 SEC (ref 9.3–12.4)
RBC # BLD: 2.79 E12/L (ref 3.5–5.5)
SCHISTOCYTES: ABNORMAL
SODIUM BLD-SCNC: 136 MMOL/L (ref 132–146)
TARGET CELLS: ABNORMAL
TEAR DROP CELLS: ABNORMAL
TOTAL PROTEIN: 4.9 G/DL (ref 6.4–8.3)
WBC # BLD: 4.6 E9/L (ref 4.5–11.5)

## 2019-06-14 PROCEDURE — 93010 ELECTROCARDIOGRAM REPORT: CPT | Performed by: FAMILY MEDICINE

## 2019-06-14 PROCEDURE — 93306 TTE W/DOPPLER COMPLETE: CPT

## 2019-06-14 PROCEDURE — 36415 COLL VENOUS BLD VENIPUNCTURE: CPT

## 2019-06-14 PROCEDURE — 80053 COMPREHEN METABOLIC PANEL: CPT

## 2019-06-14 PROCEDURE — 6370000000 HC RX 637 (ALT 250 FOR IP): Performed by: STUDENT IN AN ORGANIZED HEALTH CARE EDUCATION/TRAINING PROGRAM

## 2019-06-14 PROCEDURE — 6370000000 HC RX 637 (ALT 250 FOR IP): Performed by: FAMILY MEDICINE

## 2019-06-14 PROCEDURE — 85025 COMPLETE CBC W/AUTO DIFF WBC: CPT

## 2019-06-14 PROCEDURE — 2580000003 HC RX 258: Performed by: FAMILY MEDICINE

## 2019-06-14 PROCEDURE — 99232 SBSQ HOSP IP/OBS MODERATE 35: CPT | Performed by: FAMILY MEDICINE

## 2019-06-14 PROCEDURE — 85610 PROTHROMBIN TIME: CPT

## 2019-06-14 RX ORDER — PSEUDOEPHEDRINE HCL 30 MG
100 TABLET ORAL 2 TIMES DAILY
Qty: 30 CAPSULE | Refills: 3 | Status: ON HOLD | OUTPATIENT
Start: 2019-06-14 | End: 2019-06-18

## 2019-06-14 RX ORDER — OXYCODONE HYDROCHLORIDE 5 MG/1
2.5 TABLET ORAL EVERY 6 HOURS PRN
Qty: 56 TABLET | Refills: 0 | Status: SHIPPED | OUTPATIENT
Start: 2019-06-14 | End: 2019-06-17

## 2019-06-14 RX ORDER — PREGABALIN 25 MG/1
25 CAPSULE ORAL 3 TIMES DAILY
Status: DISCONTINUED | OUTPATIENT
Start: 2019-06-14 | End: 2019-06-14 | Stop reason: HOSPADM

## 2019-06-14 RX ORDER — ONDANSETRON 4 MG/1
4 TABLET, ORALLY DISINTEGRATING ORAL EVERY 8 HOURS PRN
Qty: 60 TABLET | Refills: 0 | Status: SHIPPED | OUTPATIENT
Start: 2019-06-14 | End: 2019-11-14 | Stop reason: ALTCHOICE

## 2019-06-14 RX ORDER — WARFARIN SODIUM 1 MG/1
TABLET ORAL
Qty: 30 TABLET | Refills: 3 | Status: SHIPPED | OUTPATIENT
Start: 2019-06-14 | End: 2019-06-17 | Stop reason: SDUPTHER

## 2019-06-14 RX ORDER — PREGABALIN 25 MG/1
25 CAPSULE ORAL 3 TIMES DAILY
Qty: 60 CAPSULE | Refills: 3 | Status: ON HOLD | OUTPATIENT
Start: 2019-06-14 | End: 2019-06-26 | Stop reason: HOSPADM

## 2019-06-14 RX ORDER — FAMOTIDINE 20 MG/1
20 TABLET, FILM COATED ORAL 2 TIMES DAILY
Qty: 60 TABLET | Refills: 3 | Status: SHIPPED | OUTPATIENT
Start: 2019-06-14 | End: 2019-06-17 | Stop reason: ALTCHOICE

## 2019-06-14 RX ADMIN — EXEMESTANE 25 MG: 25 TABLET ORAL at 06:48

## 2019-06-14 RX ADMIN — CALCIUM CARBONATE-VITAMIN D TAB 500 MG-200 UNIT 1 TABLET: 500-200 TAB at 08:36

## 2019-06-14 RX ADMIN — Medication 1000 MCG: at 08:36

## 2019-06-14 RX ADMIN — DOCUSATE SODIUM 100 MG: 100 CAPSULE, LIQUID FILLED ORAL at 08:36

## 2019-06-14 RX ADMIN — MULTIPLE VITAMINS W/ MINERALS TAB 1 TABLET: TAB at 08:36

## 2019-06-14 RX ADMIN — FAMOTIDINE 20 MG: 20 TABLET, FILM COATED ORAL at 08:35

## 2019-06-14 RX ADMIN — Medication 10 ML: at 08:36

## 2019-06-14 RX ADMIN — ONDANSETRON 4 MG: 4 TABLET, ORALLY DISINTEGRATING ORAL at 04:51

## 2019-06-14 RX ADMIN — Medication: at 08:39

## 2019-06-14 RX ADMIN — PREGABALIN 50 MG: 50 CAPSULE ORAL at 08:35

## 2019-06-14 RX ADMIN — PREGABALIN 25 MG: 25 CAPSULE ORAL at 13:47

## 2019-06-14 RX ADMIN — METOPROLOL TARTRATE 50 MG: 50 TABLET, FILM COATED ORAL at 08:35

## 2019-06-14 RX ADMIN — OXYCODONE HYDROCHLORIDE 2.5 MG: 5 TABLET ORAL at 08:35

## 2019-06-14 ASSESSMENT — PAIN DESCRIPTION - ORIENTATION: ORIENTATION: LEFT

## 2019-06-14 ASSESSMENT — PAIN DESCRIPTION - FREQUENCY: FREQUENCY: INTERMITTENT

## 2019-06-14 ASSESSMENT — PAIN SCALES - GENERAL
PAINLEVEL_OUTOF10: 4
PAINLEVEL_OUTOF10: 9
PAINLEVEL_OUTOF10: 0

## 2019-06-14 ASSESSMENT — PAIN DESCRIPTION - PAIN TYPE: TYPE: ACUTE PAIN

## 2019-06-14 ASSESSMENT — PAIN DESCRIPTION - DESCRIPTORS: DESCRIPTORS: ACHING;DISCOMFORT

## 2019-06-14 ASSESSMENT — PAIN DESCRIPTION - PROGRESSION
CLINICAL_PROGRESSION: NOT CHANGED
CLINICAL_PROGRESSION: NOT CHANGED

## 2019-06-14 ASSESSMENT — PAIN DESCRIPTION - LOCATION: LOCATION: ABDOMEN

## 2019-06-14 ASSESSMENT — PAIN - FUNCTIONAL ASSESSMENT: PAIN_FUNCTIONAL_ASSESSMENT: PREVENTS OR INTERFERES SOME ACTIVE ACTIVITIES AND ADLS

## 2019-06-14 NOTE — PROGRESS NOTES
[facility]  Family Medicine Attending    S: 68 y.o. female with history of metastatic breast cancer, DM, CAD, recurrent DVT on coumadin, chronic diarrhea, chronic left humerus fracture with nonunion, CKD presents with hyperkalemia, supratherapeutic INR, and KARISHMA. This morning, had some upper abdominal pain again toward left UQ. No new symptoms otherwise. Pain controlled overall. O: VS- Blood pressure 98/60, pulse 74, temperature 99 °F (37.2 °C), temperature source Oral, resp. rate 16, height 5' 4\" (1.626 m), weight 213 lb 11.2 oz (96.9 kg), SpO2 94 %, not currently breastfeeding. Exam is as noted by resident   Weak but in no acute distress, pallor noted   Neck supple  Heart RRR, systolic murmur   Lungs decreased but CTA   Abd distended, tender  Legs edematous, currently dressed and wrapped     Impressions:   Principal Problem:    Hyperkalemia  Active Problems:    Coronary artery disease involving native coronary artery of native heart without angina pectoris    CHF (congestive heart failure) (HCC)    Ventral hernia    Chronic anticoagulation    CKD (chronic kidney disease) stage 3, GFR 30-59 ml/min (HCC)    Supratherapeutic INR      Plan:   Heme-Onc adjusting treatment  To check re restart of Coumadin with caution at lower dose. Watch BP - somewhat lower today, consider medication adjustments. Awaiting cytology  Echo today    Continue to follow K+, INR  Manage DM - currently off medications  Wound care  Analgesia as ordered  Patient asked that we discuss her ongoing care with her daughters, asked to call Mary. Attending Physician Statement  I have reviewed the chart and seen the patient with the resident(s). I personally reviewed images, EKG's and similar tests, if present. I personally reviewed and performed key elements of the history and exam.  I have reviewed and confirmed student and/or resident history and exam with changes as indicated above.   I agree with the

## 2019-06-14 NOTE — TELEPHONE ENCOUNTER
Spoke with daughter regarding inpatient findings and procedures. Voices understanding. All questions answered.      Electronically signed by Tsering Bhakta MD on 6/14/2019 at 10:41 AM

## 2019-06-14 NOTE — PROGRESS NOTES
CLINICAL PHARMACY NOTE: MEDS TO [address] Select Patient?: No  Total # of Prescriptions Filled: 7   The following medications were delivered to the patient:  · Oxycodone 5  · Stool softner 100  · lyrica 25  · Ondansetron 4  · Warfarin 1  · Metoprolol tartrate 25  · Famotidine 20  Total # of Interventions Completed: 3  Time Spent (min): 30    Additional Documentation:

## 2019-06-14 NOTE — PROGRESS NOTES
appearance: NAD, arousable, no pallor  Eyes: PERRL, EOM's intact, anicteric sclerae  HENT: AT/NC, no sinus tenderness, oropharynx clear  Neck: Supple, trachea midline, no thyromegaly  Lungs: CTAB, respiration unlabored, normal expansion  Heart: RRR, normal I6/U2, systolic murmur noted  Abdomen: firm, diffuse mildly tender to palpation, nontender over site of paracentesis, distended, +BS  Extremities: bilateral peripheral edema with legs in ace bandages - non-tender to palpation, no clubbing, no cyanosis  Skin: Warm and dry,venous stasis changes   Neurologic: Motor functions intact. No focal deficit, no CN deficit  Psychiatric: Appropriate affect, A&O to person, place and time      SIGNIFICANT IMAGING STUDIES:    Xr Chest Portable    Result Date: 6/10/2019  Patient MRN: [id number] : 1945 Age:  68 years Gender: Female Order Date: 6/10/2019 9:15 PM Exam: XR CHEST PORTABLE Number of Images: 1 view Indication:   elevated troponin elevated troponin Comparison: Prior chest radiograph from 2016 is available Findings: The lungs are clear. There is no evidence of pulmonary infiltrate or pleural effusion. The pulmonary vascularity is unremarkable. There is cardiomegaly with median sternotomy. There is moderate mitral valve annulus calcification seen. There is uncoiling atherosclerotic change of thoracic aorta. .  The bony thorax demonstrates right fourth and fifth ribs fracture on the posterior lateral aspect with deformity. There is complete destruction of the left humeral neck with old left mid humeral neck fracture with nonunion. This findings was seen before and appears to be unchanged. . There is a right-sided Port-A-Cath with tip in superior vena cava. Cardiomegaly with median sternotomy No evidence of airspace consolidation or pulmonary venous congestion.        RECENT LABS:     Basic Labs      CBC:   Recent Labs     19  0421 19  0342 19  0345   WBC 5.1 4.3* 4.6   RBC 3.13* 2.96* 2.79* HGB 8.8* 8.3* 7.9*   HCT 29.6* 27.7* 26.4*   MCV 94.6 93.6 94.6   RDW 17.9* 17.7* 17.5*    215 206       BMP/CMP:   Recent Labs     06/12/19 0421 06/13/19  0021 06/13/19 0342 06/14/19 0345     --   --  139 136   K 5.1*   < > 5.0 4.9 5.0     --   --  107 105   CO2 21*  --   --  21* 21*   BUN 32*  --   --  30* 34*   CREATININE 1.7*  --   --  1.6* 1.8*    < > = values in this interval not displayed. Recent Labs     06/12/19 0421 06/13/19 0342 06/14/19 0345   PROT 6.4 5.2* 4.9*   ALKPHOS 165* 127* 130*   ALT 25 22 28   AST 45* 35* 36*   BILITOT 0.9 0.7 0.6       OTHER LABS:    Cardiac enzymes:  Lab Results   Component Value Date    CKTOTAL 286 (H) 06/11/2019    TROPONINI 0.50 (H) 06/11/2019     PT/INR:   Recent Labs     06/12/19 0421 06/13/19 0342 06/14/19 0345   INR 1.5 1.5 1.6     BNP: No results for input(s): BNP in the last 72 hours.   Hgb A1C:   Lab Results   Component Value Date    LABA1C 6.9 04/15/2019     No results found for: EAG  ESR: No results found for: SEDRATE  CRP: No results found for: CRP  D Dimer: No results found for: DDIMER  Folate and B12:   Lab Results   Component Value Date    NIAHJIZU05 174 (L) 10/23/2017   ,   Lab Results   Component Value Date    FOLATE 17.0 06/20/2011     Lactic Acid:   Lab Results   Component Value Date    LACTA 1.9 06/11/2019     Thyroid Studies:  Lab Results   Component Value Date    TSH 6.930 (H) 06/10/2019       MICROBIOLOGY:    Urine Culture:  No components found for: CURINE  Blood Culture:  No components found for: CBLOOD, CFUNGUSBL   Lab Results   Component Value Date    BC 24 Hours- no growth 06/12/2019    BC 5 Days- no growth 10/24/2017     Blood Culture from [facility]:  No components found for: CBLOODLN  Stool Culture:  No components found for: CSTOOL  Sputum Culture:  No components found for: CSPUTUM  Sputum Culture for [employer]:  No components found for: CAFBSM            ASSESSMENT & PLAN:    Principal Problem: by: Damon Harkins M.D., PGY-3    This case was discussed with (s)  Shahnaz Sadler

## 2019-06-14 NOTE — DISCHARGE SUMMARY
Discharge Summary    Reji Valero  :  1945  MRN:  [id number]    Admit date:  6/10/2019  Discharge date:  19    Admitting Physician:  Fely Bassett MD    Discharge Diagnoses:    Patient Active Problem List   Diagnosis    Breast cancer ([facility]    Hypertension    Coronary artery disease involving native coronary artery of native heart without angina pectoris    Nephrolithiasis    CH[facility]congestive heart failure) ([facility])    Humerus fracture    Shoulder pain, left    B12 deficiency    Hyperlipidemia    Rib lesion    Subclavian vein occlusion, bilateral ([facility])    Pericardial effusion    [facility]) [facility]    Arthritis    Sarcoidosis    Lymph node enlargement    Anesthesia    Rectal bleeding    Ventral hernia    Type 2 diabetes mellitus without complication, with long-term current use of insulin ([facility])    Anemia    Chronic deep vein thrombosis (DVT) of proximal vein of right lower extremity ([facility])    Bilateral edema of lower extremity    Peripheral polyneuropathy    Complicated UTI (urinary tract infection)    [facility]    Diarrhea with dehydration    Chronic anticoagulation    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    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    Diabetic polyneuropathy associated with type 2 diabetes mellitus ([facility])    Leg swelling    History of deep vein thrombosis    Chronic venous insufficiency    Lymphedema of both lower extremities    Decreased dorsalis pedis pulse    Ambulatory dysfunction    CKD (chronic kidney disease) stage 3, GFR 30-59 ml/min ([facility])    Charcot's joint of foot in type 2 diabetes mellitus ([facility]       Admission Condition:  fair    Discharged Condition:  stable    Hospital Course:       Mrs. Josephine Jaime is a 68year old female with a history of CAD with CABG and MI, HFpEF, hypertension, hyperlipidemia, recurrent DVTs on coumadin, type II DM, and metastatic breast cancer. She was admitted for hyperkalemia and supratherapeutic INR. She was given kayexalate, insulin, dextrose, calcium and vitamin K in the ED with improvement of potassium and INR. Investigations for abdominal pain and distension including CT abdomen, ultrasound and paracentesis were significant for multiple liver lesions suggestive of metastatic lesions. Her oncologist was consulted and recommendations for outpatient changes to medications.      Her coumadin was held on admission and restarted at low dose 1 mg following paracentesis. INR was subtherapeutic on discharge. She was treated for KARISHMA on CKD with gentle fluids in light of history of heart failure. Creatinine remained stable. On follow up, consider follow up of her echo (elevated pro-BNP, abdominal distention, leg swelling and elevated SAAG), cytology, INR and kidney function. Her abdominal pain was controlled on discharge with oxycodone.      Discharge Medications:       Guerline Barnacle   Home Medication Instructions ZUM:[id number]    Printed on:06/14/19 1142   Medication Information                      atorvastatin (LIPITOR) 20 MG tablet  Take 1 tablet by mouth nightly             Calcium Citrate-Vitamin D (RA CALCIUM CIT-VIT D-3 PETITES) 200-250 MG-UNIT TABS  take 1 tablet by mouth twice a day             diclofenac sodium 1 % GEL  Apply 4 g topically 4 times daily             docusate sodium (COLACE, DULCOLAX) 100 MG CAPS  Take 100 mg by mouth 2 times daily             exemestane (AROMASIN) 25 MG chemo tablet  Take 1 tablet by mouth every morning (before breakfast)             famotidine (PEPCID) 20 MG tablet  Take 1 tablet by mouth 2 times daily             metoprolol tartrate (LOPRESSOR) 25 MG tablet  Take 1 tablet by mouth 2 times daily             miconazole nitrate 2 % OINT  Apply topically 2 times daily             Multiple Vitamins-Minerals (THERAPEUTIC MULTIVITAMIN-MINERALS) tablet  Take 1 tablet by mouth daily ondansetron (ZOFRAN-ODT) 4 MG disintegrating tablet  Take 1 tablet by mouth every 8 hours as needed for Nausea or Vomiting             oxyCODONE (ROXICODONE) 5 MG immediate release tablet  Take 0.5 tablets by mouth every 6 hours as needed for Pain for up to 14 days. pregabalin (LYRICA) 25 MG capsule  Take 1 capsule by mouth 3 times daily for 30 days. trastuzumab (HERCEPTIN) 440 MG injection  Infuse  intravenously once a week. On tuesdays             vitamin B-12 (CYANOCOBALAMIN) 1000 MCG tablet  Take 1 tablet by mouth daily             warfarin (COUMADIN) 1 MG tablet  Take 1 mg daily                 Consults:  hematology/oncology and general surgery    Significant Diagnostic Studies:       6/12/2019 14:50   Source + CELL CT/DIFF-BF ASCITES   Appearance, Fluid Slightly Cloudy/Hazy   Color, Fluid Yellow   Fluid Type Ascites   Glucose, Fluid 129   LD, Fluid 122   RBC, Fluid <2,000   Monocyte Count, Fluid 92   Neutrophil Count, Fluid 8   Nucl Cell, Fluid 127   Protein, Fluid 3.2   Amylase, Fluid 10   Albumin, Fluid 2.0   CEA Fluid 18.1       Treatments:   IV hydration and analgesia: roxicodone    Discharge Exam:  See progress note day of discharge    Disposition:   Home, declined further rehabilitation care. More than 30 minutes was spent in preparation of this patient's discharge including, but not limited to, examination, preparation of documents, prescription preparation, counseling and coordination. Signed:   Sujit Nguyen  6/14/2019, 11:43 AM

## 2019-06-17 ENCOUNTER — OFFICE VISIT (OUTPATIENT)
Dept: FAMILY MEDICINE CLINIC | Age: 74
End: 2019-06-17
Payer: COMMERCIAL

## 2019-06-17 ENCOUNTER — ANTI-COAG VISIT (OUTPATIENT)
Dept: FAMILY MEDICINE CLINIC | Age: 74
End: 2019-06-17

## 2019-06-17 ENCOUNTER — CARE COORDINATION (OUTPATIENT)
Dept: CARE COORDINATION | Age: 74
End: 2019-06-17

## 2019-06-17 ENCOUNTER — HOSPITAL ENCOUNTER (OUTPATIENT)
Age: 74
Discharge: HOME | End: 2019-06-19
Payer: COMMERCIAL

## 2019-06-17 ENCOUNTER — TELEPHONE (OUTPATIENT)
Dept: FAMILY MEDICINE CLINIC | Age: 74
End: 2019-06-17

## 2019-06-17 VITALS
HEART RATE: 83 BPM | TEMPERATURE: 98.5 F | SYSTOLIC BLOOD PRESSURE: 117 MMHG | RESPIRATION RATE: 20 BRPM | BODY MASS INDEX: 37.05 KG/M2 | HEIGHT: 64 IN | DIASTOLIC BLOOD PRESSURE: 71 MMHG | WEIGHT: 217 LBS | OXYGEN SATURATION: 94 %

## 2019-06-17 DIAGNOSIS — R26.2 AMBULATORY DYSFUNCTION: ICD-10-CM

## 2019-06-17 DIAGNOSIS — I82.4Z9 DEEP VEIN THROMBOSIS (DVT) OF DISTAL VEIN OF LOWER EXTREMITY, UNSPECIFIED CHRONICITY, UNSPECIFIED LATERALITY (HCC): ICD-10-CM

## 2019-06-17 DIAGNOSIS — I89.0 LYMPHEDEMA OF BOTH LOWER EXTREMITIES: Primary | ICD-10-CM

## 2019-06-17 DIAGNOSIS — R18.8 OTHER ASCITES: ICD-10-CM

## 2019-06-17 DIAGNOSIS — N18.30 CKD (CHRONIC KIDNEY DISEASE) STAGE 3, GFR 30-59 ML/MIN (HCC): ICD-10-CM

## 2019-06-17 DIAGNOSIS — Z86.718 HISTORY OF DEEP VEIN THROMBOSIS: ICD-10-CM

## 2019-06-17 DIAGNOSIS — Z79.01 CHRONIC ANTICOAGULATION: ICD-10-CM

## 2019-06-17 DIAGNOSIS — E87.5 HYPERKALEMIA: ICD-10-CM

## 2019-06-17 DIAGNOSIS — N17.9 AKI (ACUTE KIDNEY INJURY) (HCC): ICD-10-CM

## 2019-06-17 DIAGNOSIS — K21.9 GASTROESOPHAGEAL REFLUX DISEASE WITHOUT ESOPHAGITIS: ICD-10-CM

## 2019-06-17 DIAGNOSIS — C50.919 MALIGNANT NEOPLASM OF FEMALE BREAST, UNSPECIFIED ESTROGEN RECEPTOR STATUS, UNSPECIFIED LATERALITY, UNSPECIFIED SITE OF BREAST (HCC): ICD-10-CM

## 2019-06-17 DIAGNOSIS — E87.5 HYPERKALEMIA: Primary | ICD-10-CM

## 2019-06-17 DIAGNOSIS — G62.9 PERIPHERAL POLYNEUROPATHY: ICD-10-CM

## 2019-06-17 DIAGNOSIS — E11.610 CHARCOT'S JOINT OF FOOT IN TYPE 2 DIABETES MELLITUS (HCC): ICD-10-CM

## 2019-06-17 DIAGNOSIS — S42.295S OTHER CLOSED NONDISPLACED FRACTURE OF PROXIMAL END OF LEFT HUMERUS, SEQUELA: ICD-10-CM

## 2019-06-17 LAB
ALBUMIN SERPL-MCNC: 3.2 G/DL (ref 3.5–5.2)
ALP BLD-CCNC: 176 U/L (ref 35–104)
ALT SERPL-CCNC: 40 U/L (ref 0–32)
ANION GAP SERPL CALCULATED.3IONS-SCNC: 16 MMOL/L (ref 7–16)
AST SERPL-CCNC: 54 U/L (ref 0–31)
BILIRUB SERPL-MCNC: 0.9 MG/DL (ref 0–1.2)
BLOOD CULTURE, ROUTINE: NORMAL
BUN BLDV-MCNC: 44 MG/DL (ref 8–23)
CALCIUM SERPL-MCNC: 8.7 MG/DL (ref 8.6–10.2)
CHLORIDE BLD-SCNC: 104 MMOL/L (ref 98–107)
CO2: 18 MMOL/L (ref 22–29)
CREAT SERPL-MCNC: 2 MG/DL (ref 0.5–1)
CULTURE, BLOOD 2: NORMAL
GFR AFRICAN AMERICAN: 29
GFR NON-AFRICAN AMERICAN: 24 ML/MIN/1.73
GLUCOSE BLD-MCNC: 140 MG/DL (ref 74–99)
INTERNATIONAL NORMALIZATION RATIO, POC: 1.4
POTASSIUM SERPL-SCNC: 6.1 MMOL/L (ref 3.5–5)
PROTHROMBIN TIME, POC: 17
SODIUM BLD-SCNC: 138 MMOL/L (ref 132–146)
TOTAL PROTEIN: 6.3 G/DL (ref 6.4–8.3)

## 2019-06-17 PROCEDURE — 1111F DSCHRG MED/CURRENT MED MERGE: CPT | Performed by: FAMILY MEDICINE

## 2019-06-17 PROCEDURE — 99212 OFFICE O/P EST SF 10 MIN: CPT | Performed by: FAMILY MEDICINE

## 2019-06-17 PROCEDURE — 36415 COLL VENOUS BLD VENIPUNCTURE: CPT | Performed by: FAMILY MEDICINE

## 2019-06-17 PROCEDURE — 80053 COMPREHEN METABOLIC PANEL: CPT

## 2019-06-17 PROCEDURE — 99495 TRANSJ CARE MGMT MOD F2F 14D: CPT | Performed by: FAMILY MEDICINE

## 2019-06-17 PROCEDURE — 85610 PROTHROMBIN TIME: CPT | Performed by: FAMILY MEDICINE

## 2019-06-17 RX ORDER — OXYCODONE HCL 5 MG/5 ML
2.5 SOLUTION, ORAL ORAL 3 TIMES DAILY PRN
Qty: 45 ML | Refills: 0 | Status: ON HOLD | OUTPATIENT
Start: 2019-06-17 | End: 2019-06-26 | Stop reason: HOSPADM

## 2019-06-17 RX ORDER — WARFARIN SODIUM 1 MG/1
TABLET ORAL
Qty: 90 TABLET | Refills: 0 | Status: ON HOLD | OUTPATIENT
Start: 2019-06-17 | End: 2019-06-26 | Stop reason: SDUPTHER

## 2019-06-17 RX ORDER — NICOTINE POLACRILEX 4 MG/1
20 GUM, CHEWING ORAL DAILY
Qty: 90 TABLET | Refills: 0 | Status: ON HOLD | OUTPATIENT
Start: 2019-06-17 | End: 2019-06-18

## 2019-06-17 NOTE — PROGRESS NOTES
supratherapeutic INR  Associated with  age chronic anticoagulation use (Coumadin) noncompliance with medications. Clinical indicators: patient has not been holding her Coumadin as instructed by PCP, her last dose of 1 gram Coumadin being yesterday evening.  She has also noted small amounts of bright red blood and bowel movements INR 7.3 PTT 80.4 on adm  Treatment: Vitamin K serial coagulation studies withholding Coumadin

## 2019-06-17 NOTE — CARE COORDINATION
Dan[facility] Transitions Initial Follow Up Call    Call within 2 business days of discharge: Yes    Patient: Chiara Smith Patient : 1945   MRN: [id number]  Reason for Admission: 5 [facility] Problem:  Hyperkalemia  Discharge Date: 19 RARS: Readmission Risk Score: 28      Last Discharge [facility]       Complaint Diagnosis Description Type Department Provider    6/10/19 Other Hyperkalemia . .. ED to Hosp-Admission (Discharged) (ADMITTED) [facility] Braulio Snyder MD; Roni Ashby ... Left voice message [phone] (H)(M) identified myself RN with [facility] ([facility]), calling to see how she is feeling after being discharged from the hospital.  Requested patient please return the call is possible today. Provided contact information.         Facility: Miya Raman RN

## 2019-06-17 NOTE — PROGRESS NOTES
Post-Discharge Transitional Care Management Services or Hospital Follow Up      Libby Fernandez   YOB: 1945    Date of Office Visit:  6/17/2019  Date of Hospital Admission: 6/10/19  Date of Hospital Discharge: 6/14/19  Risk of hospital readmission (high >=14%.  Medium >=10%) :Readmission Risk Score: 28      Care management risk score Rising risk (score 2-5) and Complex Care (Scores >=6): 4     Non face to face  following discharge, date last encounter closed (first attempt may have been earlier): 6/17/2019 11:23 AM    Call initiated 2 business days of discharge: Yes    Patient Active Problem List   Diagnosis    [facility]    Hypertension    Coronary artery disease involving native coronary artery of native heart without angina pectoris    Nephrolithiasis    CHF (congestive heart failure) ([address]    Humerus fracture    Shoulder pain, left    B12 deficiency    Hyperlipidemia    Rib lesion    Subclavian vein occlusion, bilateral (HCC)    Pericardial effusion    MI (myocardial infarction) ([address]    Arthritis    Sarcoidosis    Lymph node enlargement    Anesthesia    Rectal bleeding    Ventral hernia    Type 2 diabetes mellitus without complication, with long-term current use of insulin (HCC)    Anemia    Chronic deep vein thrombosis (DVT) of proximal vein of right lower extremity (HCC)    Bilateral edema of lower extremity    Peripheral polyneuropathy    Complicated UTI (urinary tract infection)    KARISHMA (acute kidney injury) ([address]    Diarrhea with dehydration    Chronic anticoagulation    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    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    Diabetic polyneuropathy associated with type 2 diabetes mellitus (HCC)    Leg swelling    History of deep vein thrombosis    Chronic venous insufficiency    Lymphedema of both lower extremities    Decreased dorsalis pedis pulse    Ambulatory 06/10/19 221 lb (100.2 kg)     BP Readings from Last 3 Encounters:   06/17/19 117/71   06/14/19 (!) 108/42   06/10/19 116/72        Physical Exam:  General Appearance: alert and oriented to person, place and time, well-developed and well-nourished, in no acute distress  Skin: warm and dry, no rash or erythema and possible mild jaundice, pallor noted   Head: normocephalic and atraumatic  Eyes: extraocular eye movements intact and minimal icterus   Neck: neck supple and non tender without mass   Pulmonary/Chest: clear to auscultation bilaterally- no wheezes, rales or rhonchi, normal air movement, no respiratory distress  Cardiovascular: normal rate, normal S1 and S2, no gallops, intact distal pulses and stable systolic murmur  Abdomen: distended, mildly tender, stable   Musculoskeletal: stable limited ROM at left shoulder. Bilateral LE with significant edema, 3+. Wrapped and dressed. Left knee with diffuse tenderness, no erythema or warmth. Difficult to assess effusion due to significant extracapsular swelling. ROM somewhat limited due to discomfort. Assessment/Plan:     Diagnosis Orders   1. Hyperkalemia  Comprehensive Metabolic Panel   2. KARISHMA (acute kidney injury) ([facility]  Comprehensive Metabolic Panel   3. Other ascites  Comprehensive Metabolic Panel   4. Malignant neoplasm of female breast, unspecified estrogen receptor status, unspecified laterality, unspecified site of breast ([address]  oxyCODONE (ROXICODONE) 5 MG/5ML solution    Maicol MCMILLAN ()   5. Deep vein thrombosis (DVT) of distal vein of lower extremity, unspecified chronicity, unspecified laterality (HCC)  POCT INR    warfarin (COUMADIN) 1 MG tablet    Bob José ()   6. Gastroesophageal reflux disease without esophagitis  omeprazole 20 MG EC tablet       Medical Decision Making: moderate complexity    Will attempt to order oxycodone solution to be taken 2.5 mg q 8-12 hours prn for pain.   Take with

## 2019-06-18 ENCOUNTER — APPOINTMENT (OUTPATIENT)
Dept: ULTRASOUND IMAGING | Age: 74
DRG: 436 | End: 2019-06-18
Attending: FAMILY MEDICINE
Payer: COMMERCIAL

## 2019-06-18 ENCOUNTER — HOSPITAL ENCOUNTER (INPATIENT)
Age: 74
LOS: 8 days | Discharge: HOME | DRG: 436 | End: 2019-06-26
Attending: FAMILY MEDICINE | Admitting: FAMILY MEDICINE
Payer: COMMERCIAL

## 2019-06-18 ENCOUNTER — TELEPHONE (OUTPATIENT)
Dept: FAMILY MEDICINE CLINIC | Age: 74
End: 2019-06-18

## 2019-06-18 DIAGNOSIS — E87.5 HYPERKALEMIA: Primary | ICD-10-CM

## 2019-06-18 DIAGNOSIS — C50.919 MALIGNANT NEOPLASM OF FEMALE BREAST, UNSPECIFIED ESTROGEN RECEPTOR STATUS, UNSPECIFIED LATERALITY, UNSPECIFIED SITE OF BREAST (HCC): Primary | ICD-10-CM

## 2019-06-18 DIAGNOSIS — I82.4Z9 DEEP VEIN THROMBOSIS (DVT) OF DISTAL VEIN OF LOWER EXTREMITY, UNSPECIFIED CHRONICITY, UNSPECIFIED LATERALITY (HCC): ICD-10-CM

## 2019-06-18 DIAGNOSIS — E11.42 DIABETIC POLYNEUROPATHY ASSOCIATED WITH TYPE 2 DIABETES MELLITUS (HCC): ICD-10-CM

## 2019-06-18 DIAGNOSIS — K21.9 GASTROESOPHAGEAL REFLUX DISEASE WITHOUT ESOPHAGITIS: ICD-10-CM

## 2019-06-18 PROBLEM — R18.8 ASCITES: Status: ACTIVE | Noted: 2019-06-18

## 2019-06-18 LAB
ANION GAP SERPL CALCULATED.3IONS-SCNC: 13 MMOL/L (ref 7–16)
BUN BLDV-MCNC: 48 MG/DL (ref 8–23)
CALCIUM SERPL-MCNC: 8.9 MG/DL (ref 8.6–10.2)
CEA: 17.6 NG/ML (ref 0–5.2)
CHLORIDE BLD-SCNC: 106 MMOL/L (ref 98–107)
CHLORIDE URINE RANDOM: <20 MMOL/L
CO2: 21 MMOL/L (ref 22–29)
CREAT SERPL-MCNC: 2.1 MG/DL (ref 0.5–1)
CREATININE URINE: 209 MG/DL (ref 29–226)
GFR AFRICAN AMERICAN: 28
GFR NON-AFRICAN AMERICAN: 23 ML/MIN/1.73
GLUCOSE BLD-MCNC: 122 MG/DL (ref 74–99)
HCT VFR BLD CALC: 25.8 % (ref 34–48)
HEMOGLOBIN: 7.6 G/DL (ref 11.5–15.5)
INR BLD: 1.7
LACTIC ACID: 1 MMOL/L (ref 0.5–2.2)
MCH RBC QN AUTO: 27.7 PG (ref 26–35)
MCHC RBC AUTO-ENTMCNC: 29.5 % (ref 32–34.5)
MCV RBC AUTO: 94.2 FL (ref 80–99.9)
METER GLUCOSE: 121 MG/DL (ref 74–99)
OSMOLALITY URINE: 469 MOSM/KG (ref 300–900)
PDW BLD-RTO: 17.5 FL (ref 11.5–15)
PLATELET # BLD: 250 E9/L (ref 130–450)
PMV BLD AUTO: 10.7 FL (ref 7–12)
POTASSIUM SERPL-SCNC: 5.9 MMOL/L (ref 3.5–5)
POTASSIUM, UR: 49.8 MMOL/L
PROTHROMBIN TIME: 19.3 SEC (ref 9.3–12.4)
RBC # BLD: 2.74 E12/L (ref 3.5–5.5)
SODIUM BLD-SCNC: 140 MMOL/L (ref 132–146)
SODIUM URINE: <20 MMOL/L
UREA NITROGEN, UR: 763 MG/DL (ref 800–1666)
WBC # BLD: 5.5 E9/L (ref 4.5–11.5)

## 2019-06-18 PROCEDURE — 85610 PROTHROMBIN TIME: CPT

## 2019-06-18 PROCEDURE — 76770 US EXAM ABDO BACK WALL COMP: CPT

## 2019-06-18 PROCEDURE — 6370000000 HC RX 637 (ALT 250 FOR IP): Performed by: STUDENT IN AN ORGANIZED HEALTH CARE EDUCATION/TRAINING PROGRAM

## 2019-06-18 PROCEDURE — 80048 BASIC METABOLIC PNL TOTAL CA: CPT

## 2019-06-18 PROCEDURE — 84540 ASSAY OF URINE/UREA-N: CPT

## 2019-06-18 PROCEDURE — 84133 ASSAY OF URINE POTASSIUM: CPT

## 2019-06-18 PROCEDURE — 83935 ASSAY OF URINE OSMOLALITY: CPT

## 2019-06-18 PROCEDURE — 84300 ASSAY OF URINE SODIUM: CPT

## 2019-06-18 PROCEDURE — 2580000003 HC RX 258

## 2019-06-18 PROCEDURE — 83605 ASSAY OF LACTIC ACID: CPT

## 2019-06-18 PROCEDURE — 36415 COLL VENOUS BLD VENIPUNCTURE: CPT

## 2019-06-18 PROCEDURE — 82570 ASSAY OF URINE CREATININE: CPT

## 2019-06-18 PROCEDURE — 85027 COMPLETE CBC AUTOMATED: CPT

## 2019-06-18 PROCEDURE — 93005 ELECTROCARDIOGRAM TRACING: CPT | Performed by: STUDENT IN AN ORGANIZED HEALTH CARE EDUCATION/TRAINING PROGRAM

## 2019-06-18 PROCEDURE — 82962 GLUCOSE BLOOD TEST: CPT

## 2019-06-18 PROCEDURE — 2060000000 HC ICU INTERMEDIATE R&B

## 2019-06-18 PROCEDURE — 82378 CARCINOEMBRYONIC ANTIGEN: CPT

## 2019-06-18 PROCEDURE — 82436 ASSAY OF URINE CHLORIDE: CPT

## 2019-06-18 RX ORDER — PREGABALIN 25 MG/1
25 CAPSULE ORAL 3 TIMES DAILY
Status: DISCONTINUED | OUTPATIENT
Start: 2019-06-18 | End: 2019-06-22

## 2019-06-18 RX ORDER — OXYCODONE HCL 5 MG/5 ML
2.5 SOLUTION, ORAL ORAL 3 TIMES DAILY PRN
Status: DISCONTINUED | OUTPATIENT
Start: 2019-06-18 | End: 2019-06-19

## 2019-06-18 RX ORDER — EXEMESTANE 25 MG/1
25 TABLET ORAL
Status: DISCONTINUED | OUTPATIENT
Start: 2019-06-19 | End: 2019-06-26 | Stop reason: HOSPADM

## 2019-06-18 RX ORDER — SODIUM CHLORIDE 0.9 % (FLUSH) 0.9 %
10 SYRINGE (ML) INJECTION EVERY 12 HOURS SCHEDULED
Status: DISCONTINUED | OUTPATIENT
Start: 2019-06-18 | End: 2019-06-26 | Stop reason: HOSPADM

## 2019-06-18 RX ORDER — ATORVASTATIN CALCIUM 10 MG/1
20 TABLET, FILM COATED ORAL NIGHTLY
Status: DISCONTINUED | OUTPATIENT
Start: 2019-06-18 | End: 2019-06-18

## 2019-06-18 RX ORDER — M-VIT,TX,IRON,MINS/CALC/FOLIC 27MG-0.4MG
1 TABLET ORAL DAILY
Status: DISCONTINUED | OUTPATIENT
Start: 2019-06-18 | End: 2019-06-26 | Stop reason: HOSPADM

## 2019-06-18 RX ORDER — LANOLIN ALCOHOL/MO/W.PET/CERES
1000 CREAM (GRAM) TOPICAL DAILY
Status: DISCONTINUED | OUTPATIENT
Start: 2019-06-18 | End: 2019-06-26 | Stop reason: HOSPADM

## 2019-06-18 RX ORDER — OYSTER SHELL CALCIUM WITH VITAMIN D 500; 200 MG/1; [IU]/1
1 TABLET, FILM COATED ORAL 2 TIMES DAILY
Status: DISCONTINUED | OUTPATIENT
Start: 2019-06-18 | End: 2019-06-26 | Stop reason: HOSPADM

## 2019-06-18 RX ORDER — SODIUM CHLORIDE 0.9 % (FLUSH) 0.9 %
SYRINGE (ML) INJECTION
Status: COMPLETED
Start: 2019-06-18 | End: 2019-06-18

## 2019-06-18 RX ORDER — ONDANSETRON 4 MG/1
4 TABLET, ORALLY DISINTEGRATING ORAL EVERY 8 HOURS PRN
Status: DISCONTINUED | OUTPATIENT
Start: 2019-06-18 | End: 2019-06-26 | Stop reason: HOSPADM

## 2019-06-18 RX ORDER — SODIUM CHLORIDE 0.9 % (FLUSH) 0.9 %
10 SYRINGE (ML) INJECTION PRN
Status: DISCONTINUED | OUTPATIENT
Start: 2019-06-18 | End: 2019-06-26 | Stop reason: HOSPADM

## 2019-06-18 RX ADMIN — DICLOFENAC SODIUM 4 G: 10 GEL TOPICAL at 19:52

## 2019-06-18 RX ADMIN — PREGABALIN 25 MG: 25 CAPSULE ORAL at 16:40

## 2019-06-18 RX ADMIN — Medication: at 19:53

## 2019-06-18 RX ADMIN — PREGABALIN 25 MG: 25 CAPSULE ORAL at 19:51

## 2019-06-18 RX ADMIN — CALCIUM CARBONATE-VITAMIN D TAB 500 MG-200 UNIT 1 TABLET: 500-200 TAB at 19:51

## 2019-06-18 RX ADMIN — Medication 10 ML: at 16:16

## 2019-06-18 ASSESSMENT — PAIN DESCRIPTION - ONSET: ONSET: ON-GOING

## 2019-06-18 ASSESSMENT — PAIN DESCRIPTION - PAIN TYPE: TYPE: CHRONIC PAIN

## 2019-06-18 ASSESSMENT — PAIN DESCRIPTION - DESCRIPTORS: DESCRIPTORS: ACHING;DISCOMFORT;SORE

## 2019-06-18 ASSESSMENT — PAIN DESCRIPTION - LOCATION: LOCATION: BUTTOCKS;LEG

## 2019-06-18 ASSESSMENT — PAIN - FUNCTIONAL ASSESSMENT: PAIN_FUNCTIONAL_ASSESSMENT: PREVENTS OR INTERFERES SOME ACTIVE ACTIVITIES AND ADLS

## 2019-06-18 ASSESSMENT — PAIN SCALES - GENERAL
PAINLEVEL_OUTOF10: 8
PAINLEVEL_OUTOF10: 0
PAINLEVEL_OUTOF10: 0

## 2019-06-18 ASSESSMENT — PAIN DESCRIPTION - ORIENTATION: ORIENTATION: RIGHT

## 2019-06-18 ASSESSMENT — PAIN DESCRIPTION - FREQUENCY: FREQUENCY: CONTINUOUS

## 2019-06-18 NOTE — H&P
significant edema and venous stasis  Radial pulse 2+ bilaterally, dorsalis pedis pulse 2+ bilaterally  Full ROM in all major joints. No swelling or deformity  Strength 5/5 throughout, cranial nerves intact, normal sensation, patellar reflex +2 bilaterally  A&O to person, place and time, normal behavior, normal thought content, normal affect     Recent Labs : No results found for this visit on 19. Imaging studies :  Ct Abdomen Pelvis Wo Contrast Additional Contrast? None    Result Date: 2019  Patient MRN:  [id number] : 1945 Age: 68 years Gender: Female Order Date:  2019 4:45 PM EXAM: CT ABDOMEN PELVIS WO CONTRAST number of images 405 Technique: Low-dose CT  acquisition technique included one of following options; 1 . Automated exposure control, 2. Adjustment of MA and or KV according to patient's size or 3. Use of iterative reconstruction. INDICATION:  abdominal pain, distension  COMPARISON: Previous examination 2015 FINDINGS: The lung bases demonstrate multiple 3 to 4 mm pulmonary nodules concerning for developing pulmonary metastasis. There is severe coronary artery calcification. Postsurgical changes are identified in the left breast with an ill-defined soft tissue density in the left breast concerning for breast malignancy/ scarring. Moderately enlarged lymph nodes measuring up to 1.3 cm are noted in the right axilla and smaller 1's in the upper abdomen. An expansile /lytic lesion is identified in the right lower rib laterally concerning for metastasis. The liver is heterogeneous in appearance with nodular border concerning for cirrhosis. There are multiple ill-defined infiltrative low-attenuation areas in the left and right hepatic lobe with largest one in the left hepatic lobe measuring 8 x 5.5 cm and the one in the right posterior hepatic lobe measuring 5 x 7 cm with some ill-defined hyperdensities. Multifocal malignancy with calcification or hemorrhage are considered.  A procedure, routine scanning of the abdomen was performed using real-time ultrasound and revealed moderate amount ascites detected. Images were saved into PACs. After obtaining images of the abdomen, it was determined that there is ample amount of fluid to be removed safely. . After obtaining informed consent a time out occurred at 1440 hours, ultrasound was performed demonstrating ascites. Routine ultrasound and Doppler ultrasound were used. Using direct real-time ultrasound imaging  access was obtained. An image was stored and copy was transmitted to PACS. After obtaining informed consent and after the routine sterile prep and drape and after the administration of a local anesthesia, a system of needles and cannulas was guided by ultrasound control into left lower quadrant of the peritoneal cavity. Subsequently with real-time guidance a Pig-tailed 6 Fr. Safe-T-Centesis Drainage Catheter was inserted at left lower quadrant. Peritoneal access was achieved. The catheter was removed at the end of the procedure. The patient tolerated the procedure well. There were no complications. The patient was released in stable condition. A total of 4500cc of clear yellow colored fluid was removed. Successful paracentesis. . This procedure was performed by Juan Luis Astorga PA-C under the indirect supervision of Socorro Monge MD   who was not present for the procedure. . The exam has been dictated and signed by Juan Luis Astorga PA-C. Maria Luisa Quispe MD , Radiologist, have reviewed the images and report and concur with these findings. Us Retroperitoneal Complete    Result Date: 2019  Patient MRN:  [id number] : 1945 Age: 68 years Gender: Female Order Date:  2019 1:00 PM EXAM: US RETROPERITONEAL COMPLETE NUMBER OF IMAGES:  47 views INDICATION:  BREAST CANCER  COMPARISON: None FINDINGS: The right kidney measures 9.7 x 4.6 x 4.6 cm in diameter. There is normal cortical thickness and echogenicity.  There is a cortical cyst within the upper pole of the right kidney measuring 1.6 x 1.4 x 1.4 cm in diameter. No solid mass, calculi, or hydronephrosis is seen. The left kidney  measures 10.0 x 4.9 x 5.4 cm in diameter. There is normal cortical thickness and echogenicity. No solid or cystic mass, calculi, or hydronephrosis is seen. The urinary bladder has a smooth contour, without apparent abnormality. The distended urinary bladder has a calculated volume of 51.3 mL. Abdominal and pelvic ascites is incidentally noted. Small right renal cortical cyst. Otherwise normal appearance to the kidneys and urinary bladder. Ascites.        Other Active Health Issues :  Patient Active Problem List   Diagnosis    Breast cancer ([address]    Hypertension    Coronary artery disease involving native coronary artery of native heart without angina pectoris    Nephrolithiasis    [facility]    Humerus fracture    Shoulder pain, left    B12 deficiency    Hyperlipidemia    Rib lesion    Subclavian vein occlusion, bilateral ([facility]    Pericardial effusion    MI (myocardial infarction) ([address]    Arthritis    Sarcoidosis    Lymph node enlargement    Anesthesia    Rectal bleeding    Ventral hernia    Type 2 diabetes mellitus without complication, with long-term current use of insulin ([facility])    Anemia    Chronic deep vein thrombosis (DVT) of proximal vein of right lower extremity (HCC)    Bilateral edema of lower extremity    Peripheral polyneuropathy    Complicated UTI (urinary tract infection)    KARISHMA (acute kidney injury) ([address]    Diarrhea with dehydration    Chronic anticoagulation    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    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    Diabetic polyneuropathy associated with type 2 diabetes mellitus ([facility])    Leg swelling    History of deep vein thrombosis    Chronic venous insufficiency    Lymphedema of both lower

## 2019-06-18 NOTE — TELEPHONE ENCOUNTER
I apologize for the change of plans. I spoke to Dr. Rosio Quintanilla. Will plan to admit Ms. Coco Jordan to [facility] for further evaluation and treatment. Please let Annie know that she will not be home today or tomorrow at least for labs; will be managed at the hospital.      I called patient, left message that she could call us back with questions. She should be sent through admitting on the first floor. They are preparing her room. Resident team notified. Thank you!

## 2019-06-18 NOTE — TELEPHONE ENCOUNTER
I called Dr. Hernandez Portal office to let them know about Ms. Balbir Calhoun lab abnormalities, elevated potassium and Cr. I have ordered Veltassa to her pharmacy. Will await Ms. Balbir Calhoun call after her appointment to discuss next steps.

## 2019-06-18 NOTE — TELEPHONE ENCOUNTER
I called patient to discuss lab abnormalities, potassium is high again. I advised consideration for hospital admission, but she declines, as she has an Oncology appointment this morning. She would consider admission later. I will order Dean Melara to her pharmacy for now, advised to start taking this, and we will watch her potassium levels closely, but we may need to treat this urgently again, and she understands. Also, Cr has increased, appears prerenal, but worsening. She has not been drinking much water still, but she is trying to do better with this. I discussed concerns about her liver and kidney function. She did not yet call for an appointment with nephrology, but she plans to do so today. I asked her to please call us after her oncology appointment, and we can discuss next steps. In the meantime, I will arrange for [facility] to perform repeat labs at home. Please call [facility] to find out when they will be able to perform a BMP- today is best, or tomorrow at the latest.  Thank you!

## 2019-06-18 NOTE — TELEPHONE ENCOUNTER
Spoke with ciera, from Dr. Raymundo Kumar office, Potassium level there today is 5.9. Patient agreeable for admission from their office today.

## 2019-06-19 ENCOUNTER — APPOINTMENT (OUTPATIENT)
Dept: MRI IMAGING | Age: 74
DRG: 436 | End: 2019-06-19
Attending: FAMILY MEDICINE
Payer: COMMERCIAL

## 2019-06-19 ENCOUNTER — APPOINTMENT (OUTPATIENT)
Dept: CT IMAGING | Age: 74
DRG: 436 | End: 2019-06-19
Attending: FAMILY MEDICINE
Payer: COMMERCIAL

## 2019-06-19 LAB
ANION GAP SERPL CALCULATED.3IONS-SCNC: 11 MMOL/L (ref 7–16)
BUN BLDV-MCNC: 47 MG/DL (ref 8–23)
CALCIUM SERPL-MCNC: 8.8 MG/DL (ref 8.6–10.2)
CHLORIDE BLD-SCNC: 106 MMOL/L (ref 98–107)
CO2: 23 MMOL/L (ref 22–29)
CREAT SERPL-MCNC: 2.1 MG/DL (ref 0.5–1)
GFR AFRICAN AMERICAN: 28
GFR NON-AFRICAN AMERICAN: 23 ML/MIN/1.73
GLUCOSE BLD-MCNC: 164 MG/DL (ref 74–99)
HCT VFR BLD CALC: 24.3 % (ref 34–48)
HEMOGLOBIN: 7 G/DL (ref 11.5–15.5)
INR BLD: 1.8
MCH RBC QN AUTO: 27.3 PG (ref 26–35)
MCHC RBC AUTO-ENTMCNC: 28.8 % (ref 32–34.5)
MCV RBC AUTO: 94.9 FL (ref 80–99.9)
METER GLUCOSE: 118 MG/DL (ref 74–99)
METER GLUCOSE: 126 MG/DL (ref 74–99)
METER GLUCOSE: 144 MG/DL (ref 74–99)
PDW BLD-RTO: 17.5 FL (ref 11.5–15)
PLATELET # BLD: 222 E9/L (ref 130–450)
PMV BLD AUTO: 10.9 FL (ref 7–12)
POTASSIUM SERPL-SCNC: 5.8 MMOL/L (ref 3.5–5)
PROTHROMBIN TIME: 20.5 SEC (ref 9.3–12.4)
RBC # BLD: 2.56 E12/L (ref 3.5–5.5)
SODIUM BLD-SCNC: 140 MMOL/L (ref 132–146)
WBC # BLD: 5.6 E9/L (ref 4.5–11.5)

## 2019-06-19 PROCEDURE — 6370000000 HC RX 637 (ALT 250 FOR IP): Performed by: EMERGENCY MEDICINE

## 2019-06-19 PROCEDURE — 6370000000 HC RX 637 (ALT 250 FOR IP): Performed by: STUDENT IN AN ORGANIZED HEALTH CARE EDUCATION/TRAINING PROGRAM

## 2019-06-19 PROCEDURE — 2500000003 HC RX 250 WO HCPCS: Performed by: INTERNAL MEDICINE

## 2019-06-19 PROCEDURE — 85610 PROTHROMBIN TIME: CPT

## 2019-06-19 PROCEDURE — 70551 MRI BRAIN STEM W/O DYE: CPT

## 2019-06-19 PROCEDURE — 80048 BASIC METABOLIC PNL TOTAL CA: CPT

## 2019-06-19 PROCEDURE — 71250 CT THORAX DX C-: CPT

## 2019-06-19 PROCEDURE — 85027 COMPLETE CBC AUTOMATED: CPT

## 2019-06-19 PROCEDURE — 82962 GLUCOSE BLOOD TEST: CPT

## 2019-06-19 PROCEDURE — 6360000002 HC RX W HCPCS: Performed by: INTERNAL MEDICINE

## 2019-06-19 PROCEDURE — 99222 1ST HOSP IP/OBS MODERATE 55: CPT | Performed by: FAMILY MEDICINE

## 2019-06-19 PROCEDURE — 2060000000 HC ICU INTERMEDIATE R&B

## 2019-06-19 PROCEDURE — 2580000003 HC RX 258: Performed by: STUDENT IN AN ORGANIZED HEALTH CARE EDUCATION/TRAINING PROGRAM

## 2019-06-19 PROCEDURE — 36415 COLL VENOUS BLD VENIPUNCTURE: CPT

## 2019-06-19 PROCEDURE — 6370000000 HC RX 637 (ALT 250 FOR IP): Performed by: INTERNAL MEDICINE

## 2019-06-19 PROCEDURE — 99222 1ST HOSP IP/OBS MODERATE 55: CPT | Performed by: EMERGENCY MEDICINE

## 2019-06-19 PROCEDURE — 2580000003 HC RX 258: Performed by: INTERNAL MEDICINE

## 2019-06-19 PROCEDURE — 2580000003 HC RX 258: Performed by: RADIOLOGY

## 2019-06-19 RX ORDER — FAMOTIDINE 20 MG/1
20 TABLET, FILM COATED ORAL DAILY
Status: DISCONTINUED | OUTPATIENT
Start: 2019-06-19 | End: 2019-06-20

## 2019-06-19 RX ORDER — OXYCODONE HYDROCHLORIDE 5 MG/1
5 TABLET ORAL EVERY 4 HOURS PRN
Status: DISCONTINUED | OUTPATIENT
Start: 2019-06-19 | End: 2019-06-23

## 2019-06-19 RX ORDER — SENNA AND DOCUSATE SODIUM 50; 8.6 MG/1; MG/1
2 TABLET, FILM COATED ORAL DAILY PRN
Status: DISCONTINUED | OUTPATIENT
Start: 2019-06-19 | End: 2019-06-26

## 2019-06-19 RX ORDER — OXYCODONE HYDROCHLORIDE 5 MG/1
2.5 TABLET ORAL EVERY 4 HOURS PRN
Status: DISCONTINUED | OUTPATIENT
Start: 2019-06-19 | End: 2019-06-23

## 2019-06-19 RX ORDER — FAMOTIDINE 20 MG/1
20 TABLET, FILM COATED ORAL 2 TIMES DAILY
Status: DISCONTINUED | OUTPATIENT
Start: 2019-06-19 | End: 2019-06-19 | Stop reason: DRUGHIGH

## 2019-06-19 RX ORDER — SODIUM CHLORIDE 0.9 % (FLUSH) 0.9 %
10 SYRINGE (ML) INJECTION PRN
Status: DISCONTINUED | OUTPATIENT
Start: 2019-06-19 | End: 2019-06-26 | Stop reason: HOSPADM

## 2019-06-19 RX ORDER — DEXTROSE MONOHYDRATE 25 G/50ML
50 INJECTION, SOLUTION INTRAVENOUS ONCE
Status: COMPLETED | OUTPATIENT
Start: 2019-06-19 | End: 2019-06-19

## 2019-06-19 RX ADMIN — PREGABALIN 25 MG: 25 CAPSULE ORAL at 09:53

## 2019-06-19 RX ADMIN — DICLOFENAC SODIUM 4 G: 10 GEL TOPICAL at 10:15

## 2019-06-19 RX ADMIN — PREGABALIN 25 MG: 25 CAPSULE ORAL at 13:35

## 2019-06-19 RX ADMIN — MULTIPLE VITAMINS W/ MINERALS TAB 1 TABLET: TAB at 09:53

## 2019-06-19 RX ADMIN — OXYCODONE HYDROCHLORIDE 5 MG: 5 TABLET ORAL at 12:04

## 2019-06-19 RX ADMIN — INSULIN HUMAN 10 UNITS: 100 INJECTION, SOLUTION PARENTERAL at 12:04

## 2019-06-19 RX ADMIN — DICLOFENAC SODIUM 4 G: 10 GEL TOPICAL at 13:35

## 2019-06-19 RX ADMIN — FAMOTIDINE 20 MG: 20 TABLET, FILM COATED ORAL at 12:04

## 2019-06-19 RX ADMIN — CALCIUM CARBONATE-VITAMIN D TAB 500 MG-200 UNIT 1 TABLET: 500-200 TAB at 21:09

## 2019-06-19 RX ADMIN — DEXTROSE MONOHYDRATE 50 ML: 25 INJECTION, SOLUTION INTRAVENOUS at 12:06

## 2019-06-19 RX ADMIN — DICLOFENAC SODIUM 4 G: 10 GEL TOPICAL at 21:16

## 2019-06-19 RX ADMIN — PATIROMER 8.4 G: 8.4 POWDER, FOR SUSPENSION ORAL at 09:52

## 2019-06-19 RX ADMIN — Medication 10 ML: at 12:09

## 2019-06-19 RX ADMIN — CYANOCOBALAMIN TAB 1000 MCG 1000 MCG: 1000 TAB at 09:53

## 2019-06-19 RX ADMIN — CALCIUM CARBONATE-VITAMIN D TAB 500 MG-200 UNIT 1 TABLET: 500-200 TAB at 09:52

## 2019-06-19 RX ADMIN — Medication: at 10:14

## 2019-06-19 RX ADMIN — METOPROLOL TARTRATE 25 MG: 25 TABLET, FILM COATED ORAL at 09:53

## 2019-06-19 RX ADMIN — Medication 10 ML: at 21:10

## 2019-06-19 RX ADMIN — SODIUM BICARBONATE 50 MEQ: 84 INJECTION, SOLUTION INTRAVENOUS at 12:13

## 2019-06-19 RX ADMIN — METOPROLOL TARTRATE 25 MG: 25 TABLET, FILM COATED ORAL at 21:08

## 2019-06-19 RX ADMIN — PREGABALIN 25 MG: 25 CAPSULE ORAL at 21:09

## 2019-06-19 RX ADMIN — OXYCODONE HYDROCHLORIDE 2.5 MG: 5 SOLUTION ORAL at 09:13

## 2019-06-19 RX ADMIN — CALCIUM GLUCONATE 1 G: 98 INJECTION, SOLUTION INTRAVENOUS at 12:16

## 2019-06-19 ASSESSMENT — PAIN DESCRIPTION - PAIN TYPE
TYPE: ACUTE PAIN
TYPE: ACUTE PAIN;CHRONIC PAIN

## 2019-06-19 ASSESSMENT — PAIN SCALES - GENERAL
PAINLEVEL_OUTOF10: 0
PAINLEVEL_OUTOF10: 8
PAINLEVEL_OUTOF10: 6
PAINLEVEL_OUTOF10: 6
PAINLEVEL_OUTOF10: 9

## 2019-06-19 ASSESSMENT — PAIN DESCRIPTION - DESCRIPTORS
DESCRIPTORS: ACHING;HEADACHE;DISCOMFORT
DESCRIPTORS: PRESSURE

## 2019-06-19 ASSESSMENT — PAIN DESCRIPTION - FREQUENCY
FREQUENCY: CONTINUOUS
FREQUENCY: CONTINUOUS

## 2019-06-19 ASSESSMENT — PAIN DESCRIPTION - ORIENTATION: ORIENTATION: MID;UPPER

## 2019-06-19 ASSESSMENT — PAIN DESCRIPTION - ONSET
ONSET: ON-GOING
ONSET: ON-GOING

## 2019-06-19 ASSESSMENT — PAIN DESCRIPTION - LOCATION
LOCATION: ABDOMEN;HEAD
LOCATION: ABDOMEN

## 2019-06-19 ASSESSMENT — PAIN DESCRIPTION - PROGRESSION: CLINICAL_PROGRESSION: GRADUALLY WORSENING

## 2019-06-19 NOTE — PROGRESS NOTES
FAMILY MEDICINE RESIDENCY PROGRAM  - INPATIENT PROGRESS NOTE -  DATE : 2019    NAME: Fallon Zamora   AGE: 68 y.o. SEX: female  : 1945    ADMIT DATE: 2019 LENGTH OF STAY: 1    ADMISSION DIAGNOSIS: Hyperkalemia [E87.5]        Synopsis: Mrs. Marla Cottrell is a 68year old female with a history of CAD with CABG and MI, HFpEF recent EF 50%/mild MR/mild AR/moderate TR, hypertension, hyperlipidemia, recurrent DVTs on reduced dose coumadin, type II DM, and metastatic breast cancer. She was admitted for hyperkalemia and KARISHMA on CKD. Similar admission this month significant for multiple liver lesions and ascites that are pending further work up. Last 24 hour events: INR 1.8      Subjective: Continued abdominal pain and leg swelling. ROS: denies fever, chills, nausea, vomiting, dizziness, chest pain.     ALLERGY: Macrobid [nitrofurantoin monohydrate macrocrystals]    CONTINUOUS MEDS:      SCHEDULED MEDS:   famotidine  20 mg Oral Daily    insulin regular  10 Units Intravenous Once    calcium gluconate IVPB  1 g Intravenous Once    dextrose  50 mL Intravenous Once    sodium bicarbonate  50 mEq Intravenous Once    calcium-vitamin D  1 tablet Oral BID    diclofenac sodium  4 g Topical 4x Daily    metoprolol tartrate  25 mg Oral BID    miconazole nitrate   Topical BID    therapeutic multivitamin-minerals  1 tablet Oral Daily    patiromer sorbitex calcium  8.4 g Oral Daily    pregabalin  25 mg Oral TID    vitamin B-12  1,000 mcg Oral Daily    exemestane  25 mg Oral QAM AC    sodium chloride flush  10 mL Intravenous 2 times per day       PRN MEDS:  sodium chloride flush, ondansetron, oxyCODONE, sodium chloride flush, magnesium hydroxide         PHYSICAL EXAM:    VITAL SIGNS:   Vitals:    19 141945 19 0415 19 0900   BP:  (!) 102/50 (!) 119/59 (!) 150/66   Pulse:  70 75 78   Resp:     Temp:  97.4 °F (36.3 °C) 97.3 °F (36.3 °C) 97.8 °F (36.6 °C)   TempSrc: Temporal Temporal Temporal   SpO2:  98% 95% 96%   Weight: 217 lb 3.2 oz (98.5 kg)      Height: 5' 4\" (1.626 m)          General appearance: NAD, arousable, no pallor  Eyes: PERRL, EOM's intact, anicteric sclerae  HENT: AT/NC, no sinus tenderness, oropharynx clear  Neck: Supple, trachea midline, no thyromegaly  Lungs: CTAB, respiration unlabored, normal expansion  Heart: RRR, normal Y2/G4, systolic murmur noted  Abdomen: firm, diffuse mildly tender to palpation, nontender over site of paracentesis, distended, +BS  Extremities: bilateral peripheral edema with legs in ace bandages - non-tender to palpation, no clubbing, no cyanosis  Skin: Warm and dry,venous stasis changes   Neurologic: Motor functions intact. No focal deficit, no CN deficit  Psychiatric: Appropriate affect, A&O to person, place and time      SIGNIFICANT IMAGING STUDIES:    Xr Chest Portable    Result Date: 6/10/2019  Patient MRN: [id number] : 1945 Age:  68 years Gender: Female Order Date: 6/10/2019 9:15 PM Exam: XR CHEST PORTABLE Number of Images: 1 view Indication:   elevated troponin elevated troponin Comparison: Prior chest radiograph from 2016 is available Findings: The lungs are clear. There is no evidence of pulmonary infiltrate or pleural effusion. The pulmonary vascularity is unremarkable. There is cardiomegaly with median sternotomy. There is moderate mitral valve annulus calcification seen. There is uncoiling atherosclerotic change of thoracic aorta. .  The bony thorax demonstrates right fourth and fifth ribs fracture on the posterior lateral aspect with deformity. There is complete destruction of the left humeral neck with old left mid humeral neck fracture with nonunion. This findings was seen before and appears to be unchanged. . There is a right-sided Port-A-Cath with tip in superior vena cava. Cardiomegaly with median sternotomy No evidence of airspace consolidation or pulmonary venous congestion.        RECENT LABS: native heart without angina pectoris    CHF (congestive heart failure) ([facility] [address]    B12 deficiency    Hyperlipidemia    Type 2 diabetes mellitus without complication, with long-term current use of insulin (HCC)    Chronic deep vein thrombosis (DVT) of proximal vein of right lower extremity (HCC)    Bilateral edema of lower extremity    KARISHMA (acute kidney injury) ([facility] [address]    Diabetic polyneuropathy associated with type 2 diabetes mellitus ([facility] [address]    Ascites  Resolved Problems:    * No resolved hospital problems. *       CAD, Hypertension, Hyperlipidemia  Continue home medications lipitor, lopressor 25 mg BID         HFpEF  Echo repeated on last admission similar to previous with EF 50 % and mild valvular pathology        KARISHMA on CKD  Creatinine of 2.0 as outpatient, recheck today.  Baseline of 1.3  Urine electrolytes to calculate FeNa  Renal Ultrasound  Consider hydration with caution in light of HF and abdominal distention      Hyperkalemia  6.1 as outpatient, 5.8 today  Likely multifactorial - secondary to worsening kidney dysfunction, gi losses  Telemetry  Patiromer daily, consider alternatives pending recheck        Anemia  7.0 today  Type and screen  Likely transfuse if falls below this threshold    Ascites  Ultrasound, CT and paracentesis confirmed  Suspicious liver lesions for metastasis  SAAG of 1.4, cytology negative on tap  CT liver guided biopsy pending INR below 1.5  NPO after midnight  Roxicodone for pain control         Type II DM with neuropathy  Recent A1c of 6.9 April 2019  Typically on metformin as outpatient, hold for kidney dysfunction  Monitor blood sugar  Restart home lyrica        Metastatic Breast Cancer  Follows with Dr. Rina Narvaez, received weekly Herceptin today as outpatient        Chronic DVT, Bilateral Lower extremity edema  Hold home coumadin for anticipated IR intervention  Monitor INR daily        GI & DVT prophylaxis  - Will continue home coumadin following procedure  - General diet, no prophylaxis indicated                Electronically signed by:  Mundo Haney M.D., PGY-3    This case was discussed with (s)  Kathy Diaz

## 2019-06-19 NOTE — CONSULTS
as well as possible metastasis to the lungs. The patient is currently on chemotherapy. She stated that she is thought her breast cancer since a lumpectomy in September 2005. She is currently complaining of abdominal discomfort 4/10, and she states that oxycodone does take her pain away. She is denying any headache or blurred vision, ear pain or sore throat, neck or back pain, chest pain or palpitations, shortness of breath, nausea or vomiting, diarrhea, hematemesis, hematochezia, or melena. She admits to her urine output being diminished. She also admits to crampy lower extremity pain. She is requesting a DNR CCA status at this time with no intubation, given her current use of worsening metastatic disease. She is awaiting further input from oncology regarding next steps in her treatment process. Goals of care: cure, live longer, improve or maintain function/quality of life, remain at home, preserve independence/autonomy/control and continue current management  Advance Directives: DNR-CCA with NO Intubation. Surrogate:Child  Prognosis: unknown  Spiritual assessment: No spiritual distress identified   Bereavement and grief: to be determined. Past Medical History:   Diagnosis Date    Abscess of abdominal wall     Anemia     Iron deficiency and chronic disease.  Anesthesia     DIFFICULTY WAKING UP    Arthritis     Arthritis, hip     Breast cancer ([facility] [address] 2005    S/P Left Lumpectomy with Lymph Node Dissection, Chemo/Rad. Follows with Dr. Destini Pena. No recurrence to date. Surgeon was Dr. Remy Hassan.  CAD (coronary artery disease) 5/2008    s/p CABG. Follows with [address] [place] [address].  CHF (congestive heart failure) (HCC)     Chronic venous insufficiency 11/7/2018    Decreased dorsalis pedis pulse 11/7/2018    Diabetic neuropathy (HCC)     Diabetic neuropathy (HCC)     DVT (deep venous thrombosis) (HCC)     Recurrent. On lifelong coumadin.     DVT of upper extremity (deep vein thrombosis) ([facility] [address] 4/18/2012   

## 2019-06-20 LAB
ANION GAP SERPL CALCULATED.3IONS-SCNC: 11 MMOL/L (ref 7–16)
BUN BLDV-MCNC: 44 MG/DL (ref 8–23)
CALCIUM SERPL-MCNC: 9.1 MG/DL (ref 8.6–10.2)
CHLORIDE BLD-SCNC: 105 MMOL/L (ref 98–107)
CO2: 24 MMOL/L (ref 22–29)
CORTISOL TOTAL: 8.98 MCG/DL (ref 2.68–18.4)
CREAT SERPL-MCNC: 1.9 MG/DL (ref 0.5–1)
GFR AFRICAN AMERICAN: 31
GFR NON-AFRICAN AMERICAN: 26 ML/MIN/1.73
GLUCOSE BLD-MCNC: 136 MG/DL (ref 74–99)
HCT VFR BLD CALC: 26.8 % (ref 34–48)
HEMOGLOBIN: 7.8 G/DL (ref 11.5–15.5)
INR BLD: 2
MCH RBC QN AUTO: 27.8 PG (ref 26–35)
MCHC RBC AUTO-ENTMCNC: 29.1 % (ref 32–34.5)
MCV RBC AUTO: 95.4 FL (ref 80–99.9)
PDW BLD-RTO: 17.6 FL (ref 11.5–15)
PLATELET # BLD: 249 E9/L (ref 130–450)
PMV BLD AUTO: 10.4 FL (ref 7–12)
POTASSIUM SERPL-SCNC: 6 MMOL/L (ref 3.5–5)
PROTHROMBIN TIME: 21.9 SEC (ref 9.3–12.4)
RBC # BLD: 2.81 E12/L (ref 3.5–5.5)
SODIUM BLD-SCNC: 140 MMOL/L (ref 132–146)
WBC # BLD: 5.3 E9/L (ref 4.5–11.5)

## 2019-06-20 PROCEDURE — 6370000000 HC RX 637 (ALT 250 FOR IP): Performed by: EMERGENCY MEDICINE

## 2019-06-20 PROCEDURE — 2500000003 HC RX 250 WO HCPCS: Performed by: INTERNAL MEDICINE

## 2019-06-20 PROCEDURE — 80048 BASIC METABOLIC PNL TOTAL CA: CPT

## 2019-06-20 PROCEDURE — 2580000003 HC RX 258: Performed by: STUDENT IN AN ORGANIZED HEALTH CARE EDUCATION/TRAINING PROGRAM

## 2019-06-20 PROCEDURE — 6360000002 HC RX W HCPCS: Performed by: INTERNAL MEDICINE

## 2019-06-20 PROCEDURE — 85027 COMPLETE CBC AUTOMATED: CPT

## 2019-06-20 PROCEDURE — 99232 SBSQ HOSP IP/OBS MODERATE 35: CPT | Performed by: NURSE PRACTITIONER

## 2019-06-20 PROCEDURE — 84244 ASSAY OF RENIN: CPT

## 2019-06-20 PROCEDURE — 99232 SBSQ HOSP IP/OBS MODERATE 35: CPT | Performed by: FAMILY MEDICINE

## 2019-06-20 PROCEDURE — 6360000002 HC RX W HCPCS: Performed by: FAMILY MEDICINE

## 2019-06-20 PROCEDURE — 6370000000 HC RX 637 (ALT 250 FOR IP): Performed by: FAMILY MEDICINE

## 2019-06-20 PROCEDURE — 2060000000 HC ICU INTERMEDIATE R&B

## 2019-06-20 PROCEDURE — 6370000000 HC RX 637 (ALT 250 FOR IP): Performed by: STUDENT IN AN ORGANIZED HEALTH CARE EDUCATION/TRAINING PROGRAM

## 2019-06-20 PROCEDURE — 36415 COLL VENOUS BLD VENIPUNCTURE: CPT

## 2019-06-20 PROCEDURE — 85610 PROTHROMBIN TIME: CPT

## 2019-06-20 PROCEDURE — C9113 INJ PANTOPRAZOLE SODIUM, VIA: HCPCS | Performed by: FAMILY MEDICINE

## 2019-06-20 PROCEDURE — 82533 TOTAL CORTISOL: CPT

## 2019-06-20 PROCEDURE — 2580000003 HC RX 258: Performed by: INTERNAL MEDICINE

## 2019-06-20 PROCEDURE — 82088 ASSAY OF ALDOSTERONE: CPT

## 2019-06-20 PROCEDURE — 6370000000 HC RX 637 (ALT 250 FOR IP): Performed by: INTERNAL MEDICINE

## 2019-06-20 RX ORDER — PHYTONADIONE 5 MG/1
5 TABLET ORAL ONCE
Status: COMPLETED | OUTPATIENT
Start: 2019-06-20 | End: 2019-06-20

## 2019-06-20 RX ORDER — PANTOPRAZOLE SODIUM 40 MG/1
40 TABLET, DELAYED RELEASE ORAL
Status: DISCONTINUED | OUTPATIENT
Start: 2019-06-20 | End: 2019-06-20

## 2019-06-20 RX ORDER — DEXTROSE MONOHYDRATE 25 G/50ML
50 INJECTION, SOLUTION INTRAVENOUS ONCE
Status: COMPLETED | OUTPATIENT
Start: 2019-06-20 | End: 2019-06-20

## 2019-06-20 RX ORDER — PANTOPRAZOLE SODIUM 40 MG/10ML
20 INJECTION, POWDER, LYOPHILIZED, FOR SOLUTION INTRAVENOUS DAILY
Status: DISCONTINUED | OUTPATIENT
Start: 2019-06-20 | End: 2019-06-26 | Stop reason: HOSPADM

## 2019-06-20 RX ADMIN — OXYCODONE HYDROCHLORIDE 5 MG: 5 TABLET ORAL at 09:48

## 2019-06-20 RX ADMIN — DEXTROSE MONOHYDRATE 50 ML: 25 INJECTION, SOLUTION INTRAVENOUS at 11:40

## 2019-06-20 RX ADMIN — PHYTONADIONE 5 MG: 5 TABLET ORAL at 11:39

## 2019-06-20 RX ADMIN — Medication 10 ML: at 09:52

## 2019-06-20 RX ADMIN — METOPROLOL TARTRATE 25 MG: 25 TABLET, FILM COATED ORAL at 20:42

## 2019-06-20 RX ADMIN — PREGABALIN 25 MG: 25 CAPSULE ORAL at 09:48

## 2019-06-20 RX ADMIN — Medication: at 20:42

## 2019-06-20 RX ADMIN — Medication 10 ML: at 20:42

## 2019-06-20 RX ADMIN — CALCIUM GLUCONATE 1 G: 98 INJECTION, SOLUTION INTRAVENOUS at 11:38

## 2019-06-20 RX ADMIN — PREGABALIN 25 MG: 25 CAPSULE ORAL at 14:07

## 2019-06-20 RX ADMIN — Medication: at 09:50

## 2019-06-20 RX ADMIN — METOPROLOL TARTRATE 25 MG: 25 TABLET, FILM COATED ORAL at 09:48

## 2019-06-20 RX ADMIN — PREGABALIN 25 MG: 25 CAPSULE ORAL at 20:42

## 2019-06-20 RX ADMIN — CALCIUM CARBONATE-VITAMIN D TAB 500 MG-200 UNIT 1 TABLET: 500-200 TAB at 09:48

## 2019-06-20 RX ADMIN — INSULIN HUMAN 10 UNITS: 100 INJECTION, SOLUTION PARENTERAL at 11:41

## 2019-06-20 RX ADMIN — CYANOCOBALAMIN TAB 1000 MCG 1000 MCG: 1000 TAB at 09:49

## 2019-06-20 RX ADMIN — PATIROMER 8.4 G: 8.4 POWDER, FOR SUSPENSION ORAL at 09:49

## 2019-06-20 RX ADMIN — MULTIPLE VITAMINS W/ MINERALS TAB 1 TABLET: TAB at 09:48

## 2019-06-20 RX ADMIN — SODIUM BICARBONATE 50 MEQ: 84 INJECTION INTRAVENOUS at 11:38

## 2019-06-20 RX ADMIN — PANTOPRAZOLE SODIUM 20 MG: 40 INJECTION, POWDER, FOR SOLUTION INTRAVENOUS at 11:39

## 2019-06-20 RX ADMIN — CALCIUM CARBONATE-VITAMIN D TAB 500 MG-200 UNIT 1 TABLET: 500-200 TAB at 20:41

## 2019-06-20 ASSESSMENT — PAIN DESCRIPTION - PROGRESSION: CLINICAL_PROGRESSION: GRADUALLY WORSENING

## 2019-06-20 ASSESSMENT — PAIN SCALES - GENERAL
PAINLEVEL_OUTOF10: 0
PAINLEVEL_OUTOF10: 0
PAINLEVEL_OUTOF10: 7

## 2019-06-20 ASSESSMENT — PAIN DESCRIPTION - ONSET: ONSET: ON-GOING

## 2019-06-20 ASSESSMENT — PAIN DESCRIPTION - ORIENTATION: ORIENTATION: MID;UPPER

## 2019-06-20 ASSESSMENT — PAIN DESCRIPTION - LOCATION: LOCATION: ABDOMEN

## 2019-06-20 ASSESSMENT — PAIN DESCRIPTION - PAIN TYPE: TYPE: ACUTE PAIN

## 2019-06-20 ASSESSMENT — PAIN DESCRIPTION - FREQUENCY: FREQUENCY: CONTINUOUS

## 2019-06-20 NOTE — PLAN OF CARE
Problem: Falls - Risk of:  Goal: Will remain free from falls  Description  Will remain free from falls  Outcome: Met This Shift  Goal: Absence of physical injury  Description  Absence of physical injury  Outcome: Met This Shift     Problem: Pain:  Goal: Pain level will decrease  Description  Pain level will decrease  Outcome: Met This Shift  Goal: Control of acute pain  Description  Control of acute pain  Outcome: Met This Shift  Goal: Control of chronic pain  Description  Control of chronic pain  Outcome: Met This Shift     Problem: Skin Integrity:  Goal: Will show no infection signs and symptoms  Description  Will show no infection signs and symptoms  Outcome: Met This Shift  Goal: Absence of new skin breakdown  Description  Absence of new skin breakdown  Outcome: Met This Shift     Problem: Risk for Impaired Skin Integrity  Goal: Tissue integrity - skin and mucous membranes  Description  Structural intactness and normal physiological function of skin and  mucous membranes.   Outcome: Not Met This Shift

## 2019-06-20 NOTE — PROGRESS NOTES
06/18/19  1627 06/19/19  0401 06/20/19  0545   WBC 5.5 5.6 5.3   HGB 7.6* 7.0* 7.8*   HCT 25.8* 24.3* 26.8*   MCV 94.2 94.9 95.4    222 249       Recent Labs     06/18/19  1627 06/19/19  0402 06/20/19  0545    140 140   K 5.9* 5.8* 6.0*    106 105   CO2 21* 23 24   CREATININE 2.1* 2.1* 1.9*   BUN 48* 47* 44*   LABGLOM 23 23 26   GLUCOSE 122* 164* 136*   CALCIUM 8.9 8.8 9.1       Recent Labs     06/17/19  1200   ALT 40*   AST 54*   ALKPHOS 176*   BILITOT 0.9       Lab Results   Component Value Date    FERRITIN 490.8 06/20/2011    IRON 49 (L) 06/20/2011    TIBC 266 06/20/2011       Lab Results   Component Value Date    MG 1.6 06/10/2019       Lab Results   Component Value Date    LABALBU 3.2 (L) 06/17/2019    LABALBU 2.7 (L) 06/14/2019    LABALBU 2.5 (L) 06/13/2019       Assessment and Plan:    1. arf on ckd 3  Baseline 1.2-1.7  Renal us negative for hydro  fena <1  Avoiding ivf given the ascites  ? Prerenal from ascites, decreased renal perfusion    2. Hyperkalemia  Possible due to decreased ability to secrete k distally due to decreased distal na delivery from prerenal state from ascites, third spacing. Will also check for hypoadrenalism, hypocorticolism (cortisol nl)  Order hyperkalemia protocol  [place] q day  Low k diet    3. Ascites/liver lesions  Awaits liver bx  Sp paracentesis 6/12    4. Metastatic breast cancer to bone  Per heme onc  On herceptin and aromosin    5.anemia  Per heme onc  Malignancy and ckd      Rayfield Kayser.  Kelly Diaz MD

## 2019-06-20 NOTE — PROGRESS NOTES
Actual)  · Usual Body Wt: 213 lb (96.6 kg)(6/10 Bed scale)  · % Weight Change:  ,  4# wt increase over 8 day period  · Ideal Body Wt: 120 lb (54.4 kg), % Ideal Body 180%  · BMI Classification: BMI 35.0 - 39.9 Obese Class II    Nutrition Interventions:   Continue current diet, Start ONS      Nutrition Evaluation:   · Evaluation: Goals set   · Goals: Pt to consume >75% of meals/ONS    · Monitoring: I&O, Wound Healing, Skin Integrity, Diet Tolerance, Pertinent Labs, Weight, Monitor Bowel Function, Meal Intake, Supplement Intake, Monitor Hemodynamic Status      Electronically signed by Arias Pressley on 6/20/19 at 2:07 PM    Contact Number: Ext. [phone]

## 2019-06-20 NOTE — PROGRESS NOTES
Subjective:     HPI:  Basil Dunbar is a 68 y.o. female with significant past medical history of CAD with CABG and MI, HFpEF recent EF 50%/mild MR/mild AR/moderate TR, hypertension, hyperlipidemia, recurrent DVTs on reduced dose coumadin, type II DM, and metastatic breast cancer who presented with abdominal pain. CT scan that she has continued multiple liver lesions as well as possible metastatic ascites and carcinomatosis, as well as possible metastasis to the lungs. She is for liver biopsy and awaiting oncology recommendation. Subjective/Events/Discussions:  Pain is managed. No n/v. Report she will get liver biopsy tomorrow. ROS: UNLESS STATED ABOVE PATIENT DENIES:  CONSTITUTIONAL:  fever, chill, rigors, nausea, vomiting, fatigue. RESPIRATORY: cough, shortness of breath, sputum expectoration. CARDIOVASCULAR:  Chest pain/pressure, palpitation, syncope, irregular beats  GASTROINTESTINAL:  abdominal or rectal pain, diarrhea, constipation, .   INTEGUMENTARY: rash, wound, pruritis  MUSCULOSKELETAL:  pain, edema, joint swelling or redness      Objective:     Physical Exam  BP (!) 144/72   Pulse 67   Temp 98 °F (36.7 °C) (Oral)   Resp 16   Ht 5' 4\" (1.626 m)   Wt 217 lb 3.2 oz (98.5 kg)   SpO2 97%   BMI 37.28 kg/m²     Gen:  Alert, chronically ill appearing, in no acute distress  HEENT:  Normocephalic, conjunctiva pink, no drainage, mucosa moist  Neck:  Supple  Lungs:  CTA bilaterally, no audible rhonchi or wheezes noted  Heart[de-identified]  RRR, no murmur, rub, or gallop noted during exam  Abd:  Soft, non tender, + distended, BS+  Ext:  Moving all extremities, no edema, pulses present  Skin:  Warm and dry  Neuro:  PERRL, Alert, oriented x 3; following commands      Current Medications:  Inpatient/Home medications reviewed:    Results/Verification of Data Review  Objective data reviewed: labs, images, records, medication use, vitals and chart      - Advanced Directives:    -Surrogate/Legal NOK: Child    Contacts:  Janeth Radi [phone]    - Spiritual assessment: No spiritual distress identified     - Bereavement and grief: to be determined    - Discharge planning: to be determined    - Prognosis: Guarded    - Referrals to: none today    Time/Communication  Greater than 51% of time spent, total 25 minutes in counseling and coordination of care at the bedside regarding goals of care, symptom management, diagnosis and prognosis and see above. Antony [facility]  Palliative Medicine    Discussed patient and the plan of care with the other IDT members of Palliative Med team and with patient and family. Thank you for allowing Palliative Medicine to participate in the care of [facility]. Note: This report was completed using computerize voiced recognition software. Every effort has been made to ensure accuracy; however, inadvertent computerized transcription errors may be present.

## 2019-06-20 NOTE — PROGRESS NOTES
Breast cancer ([facility] [address]    Hypertension    Coronary artery disease involving native coronary artery of native heart without angina pectoris    CHF (congestive heart failure) ([facility] [address]    B12 deficiency    Hyperlipidemia    Type 2 diabetes mellitus without complication, with long-term current use of insulin (HCC)    Chronic deep vein thrombosis (DVT) of proximal vein of right lower extremity (HCC)    Bilateral edema of lower extremity    KARISHMA (acute kidney injury) ([facility] [address]    Diabetic polyneuropathy associated with type 2 diabetes mellitus (HCC)    Ascites    Goals of care, counseling/discussion    Palliative care encounter    Cancer associated pain  Resolved Problems:    * No resolved hospital problems. *      CAD, Hypertension, Hyperlipidemia  Continue home medications lipitor, lopressor 25 mg BID         HFpEF  Echo repeated on last admission similar to previous with EF 50 % and mild valvular pathology        KARISHMA on CKD  Creatinine of 2.0 as outpatient, recheck today.  Baseline of 1.3  PreRenal after calculation of FeNa  Renal Ultrasound with renal cyst and ascites      Hyperkalemia  6.1 as outpatient, 6.0 today  Likely multifactorial - secondary to worsening kidney dysfunction, gi losses  Telemetry  Patiromer daily and hyperkalemia protocol        Anemia  7.8 today  Type and screen  Likely transfuse if falls below this threshold      Ascites  Ultrasound, CT and paracentesis confirmed  Suspicious liver lesions for metastasis  SAAG of 1.4, cytology negative on tap  CT liver guided biopsy pending INR below 1.5  Roxicodone for pain control         Type II DM with neuropathy  Recent A1c of 6.9 April 2019  Typically on metformin as outpatient, hold for kidney dysfunction  Monitor blood sugar  Restart home lyrica        Metastatic Breast Cancer  Follows with Dr. Juliana Ramirez, received weekly Herceptin today as outpatient        Chronic DVT, Bilateral Lower extremity edema  Hold home coumadin for anticipated IR

## 2019-06-21 ENCOUNTER — APPOINTMENT (OUTPATIENT)
Dept: ULTRASOUND IMAGING | Age: 74
DRG: 436 | End: 2019-06-21
Attending: FAMILY MEDICINE
Payer: COMMERCIAL

## 2019-06-21 LAB
ANION GAP SERPL CALCULATED.3IONS-SCNC: 13 MMOL/L (ref 7–16)
ANION GAP SERPL CALCULATED.3IONS-SCNC: 17 MMOL/L (ref 7–16)
BUN BLDV-MCNC: 41 MG/DL (ref 8–23)
BUN BLDV-MCNC: 44 MG/DL (ref 8–23)
CALCIUM SERPL-MCNC: 9.1 MG/DL (ref 8.6–10.2)
CALCIUM SERPL-MCNC: 9.7 MG/DL (ref 8.6–10.2)
CHLORIDE BLD-SCNC: 101 MMOL/L (ref 98–107)
CHLORIDE BLD-SCNC: 105 MMOL/L (ref 98–107)
CO2: 20 MMOL/L (ref 22–29)
CO2: 21 MMOL/L (ref 22–29)
CREAT SERPL-MCNC: 1.8 MG/DL (ref 0.5–1)
CREAT SERPL-MCNC: 1.9 MG/DL (ref 0.5–1)
EKG ATRIAL RATE: 69 BPM
EKG ATRIAL RATE: 79 BPM
EKG P AXIS: 60 DEGREES
EKG P AXIS: 76 DEGREES
EKG P-R INTERVAL: 184 MS
EKG P-R INTERVAL: 192 MS
EKG Q-T INTERVAL: 406 MS
EKG Q-T INTERVAL: 442 MS
EKG QRS DURATION: 126 MS
EKG QRS DURATION: 146 MS
EKG QTC CALCULATION (BAZETT): 465 MS
EKG QTC CALCULATION (BAZETT): 473 MS
EKG R AXIS: -113 DEGREES
EKG R AXIS: -73 DEGREES
EKG T AXIS: 33 DEGREES
EKG T AXIS: 63 DEGREES
EKG VENTRICULAR RATE: 69 BPM
EKG VENTRICULAR RATE: 79 BPM
GFR AFRICAN AMERICAN: 31
GFR AFRICAN AMERICAN: 33
GFR NON-AFRICAN AMERICAN: 26 ML/MIN/1.73
GFR NON-AFRICAN AMERICAN: 28 ML/MIN/1.73
GLUCOSE BLD-MCNC: 143 MG/DL (ref 74–99)
GLUCOSE BLD-MCNC: 159 MG/DL (ref 74–99)
HCT VFR BLD CALC: 23.3 % (ref 34–48)
HCT VFR BLD CALC: 26.2 % (ref 34–48)
HEMOGLOBIN: 6.9 G/DL (ref 11.5–15.5)
HEMOGLOBIN: 7.8 G/DL (ref 11.5–15.5)
INR BLD: 1.8
INR BLD: 1.9
MCH RBC QN AUTO: 27.3 PG (ref 26–35)
MCHC RBC AUTO-ENTMCNC: 29.6 % (ref 32–34.5)
MCV RBC AUTO: 92.1 FL (ref 80–99.9)
METER GLUCOSE: 146 MG/DL (ref 74–99)
METER GLUCOSE: 158 MG/DL (ref 74–99)
PDW BLD-RTO: 17.5 FL (ref 11.5–15)
PLATELET # BLD: 239 E9/L (ref 130–450)
PMV BLD AUTO: 10.7 FL (ref 7–12)
POTASSIUM SERPL-SCNC: 5.2 MMOL/L (ref 3.5–5)
POTASSIUM SERPL-SCNC: 5.3 MMOL/L (ref 3.5–5)
PROTHROMBIN TIME: 20.4 SEC (ref 9.3–12.4)
PROTHROMBIN TIME: 21.7 SEC (ref 9.3–12.4)
RBC # BLD: 2.53 E12/L (ref 3.5–5.5)
SODIUM BLD-SCNC: 138 MMOL/L (ref 132–146)
SODIUM BLD-SCNC: 139 MMOL/L (ref 132–146)
WBC # BLD: 6.6 E9/L (ref 4.5–11.5)

## 2019-06-21 PROCEDURE — 6370000000 HC RX 637 (ALT 250 FOR IP): Performed by: STUDENT IN AN ORGANIZED HEALTH CARE EDUCATION/TRAINING PROGRAM

## 2019-06-21 PROCEDURE — 6360000002 HC RX W HCPCS: Performed by: INTERNAL MEDICINE

## 2019-06-21 PROCEDURE — C9113 INJ PANTOPRAZOLE SODIUM, VIA: HCPCS | Performed by: FAMILY MEDICINE

## 2019-06-21 PROCEDURE — 36415 COLL VENOUS BLD VENIPUNCTURE: CPT

## 2019-06-21 PROCEDURE — 6370000000 HC RX 637 (ALT 250 FOR IP): Performed by: EMERGENCY MEDICINE

## 2019-06-21 PROCEDURE — 6360000002 HC RX W HCPCS: Performed by: FAMILY MEDICINE

## 2019-06-21 PROCEDURE — 99232 SBSQ HOSP IP/OBS MODERATE 35: CPT | Performed by: FAMILY MEDICINE

## 2019-06-21 PROCEDURE — 6370000000 HC RX 637 (ALT 250 FOR IP): Performed by: INTERNAL MEDICINE

## 2019-06-21 PROCEDURE — 76856 US EXAM PELVIC COMPLETE: CPT

## 2019-06-21 PROCEDURE — 85018 HEMOGLOBIN: CPT

## 2019-06-21 PROCEDURE — 2060000000 HC ICU INTERMEDIATE R&B

## 2019-06-21 PROCEDURE — 80048 BASIC METABOLIC PNL TOTAL CA: CPT

## 2019-06-21 PROCEDURE — 93010 ELECTROCARDIOGRAM REPORT: CPT | Performed by: FAMILY MEDICINE

## 2019-06-21 PROCEDURE — 2580000003 HC RX 258: Performed by: STUDENT IN AN ORGANIZED HEALTH CARE EDUCATION/TRAINING PROGRAM

## 2019-06-21 PROCEDURE — 85027 COMPLETE CBC AUTOMATED: CPT

## 2019-06-21 PROCEDURE — 85014 HEMATOCRIT: CPT

## 2019-06-21 PROCEDURE — 85610 PROTHROMBIN TIME: CPT

## 2019-06-21 PROCEDURE — 82962 GLUCOSE BLOOD TEST: CPT

## 2019-06-21 RX ORDER — SODIUM BICARBONATE 650 MG/1
650 TABLET ORAL 2 TIMES DAILY
Status: DISCONTINUED | OUTPATIENT
Start: 2019-06-21 | End: 2019-06-26 | Stop reason: HOSPADM

## 2019-06-21 RX ORDER — FUROSEMIDE 10 MG/ML
20 INJECTION INTRAMUSCULAR; INTRAVENOUS ONCE
Status: COMPLETED | OUTPATIENT
Start: 2019-06-21 | End: 2019-06-21

## 2019-06-21 RX ADMIN — PANTOPRAZOLE SODIUM 20 MG: 40 INJECTION, POWDER, FOR SOLUTION INTRAVENOUS at 08:59

## 2019-06-21 RX ADMIN — Medication: at 20:32

## 2019-06-21 RX ADMIN — PREGABALIN 25 MG: 25 CAPSULE ORAL at 21:04

## 2019-06-21 RX ADMIN — OXYCODONE HYDROCHLORIDE 5 MG: 5 TABLET ORAL at 13:26

## 2019-06-21 RX ADMIN — CALCIUM CARBONATE-VITAMIN D TAB 500 MG-200 UNIT 1 TABLET: 500-200 TAB at 09:00

## 2019-06-21 RX ADMIN — PREGABALIN 25 MG: 25 CAPSULE ORAL at 13:26

## 2019-06-21 RX ADMIN — FUROSEMIDE 20 MG: 10 INJECTION, SOLUTION INTRAMUSCULAR; INTRAVENOUS at 10:38

## 2019-06-21 RX ADMIN — SODIUM BICARBONATE 650 MG: 650 TABLET ORAL at 13:26

## 2019-06-21 RX ADMIN — METOPROLOL TARTRATE 25 MG: 25 TABLET, FILM COATED ORAL at 21:04

## 2019-06-21 RX ADMIN — PATIROMER 8.4 G: 8.4 POWDER, FOR SUSPENSION ORAL at 08:59

## 2019-06-21 RX ADMIN — EXEMESTANE 25 MG: 25 TABLET ORAL at 13:51

## 2019-06-21 RX ADMIN — CYANOCOBALAMIN TAB 1000 MCG 1000 MCG: 1000 TAB at 09:00

## 2019-06-21 RX ADMIN — Medication 10 ML: at 09:00

## 2019-06-21 RX ADMIN — PREGABALIN 25 MG: 25 CAPSULE ORAL at 08:59

## 2019-06-21 RX ADMIN — Medication: at 09:00

## 2019-06-21 RX ADMIN — CALCIUM CARBONATE-VITAMIN D TAB 500 MG-200 UNIT 1 TABLET: 500-200 TAB at 21:04

## 2019-06-21 RX ADMIN — SODIUM BICARBONATE 650 MG: 650 TABLET ORAL at 21:04

## 2019-06-21 RX ADMIN — MULTIPLE VITAMINS W/ MINERALS TAB 1 TABLET: TAB at 09:00

## 2019-06-21 RX ADMIN — METOPROLOL TARTRATE 25 MG: 25 TABLET, FILM COATED ORAL at 09:06

## 2019-06-21 RX ADMIN — OXYCODONE HYDROCHLORIDE 5 MG: 5 TABLET ORAL at 21:55

## 2019-06-21 RX ADMIN — OXYCODONE HYDROCHLORIDE 5 MG: 5 TABLET ORAL at 08:59

## 2019-06-21 ASSESSMENT — PAIN SCALES - GENERAL
PAINLEVEL_OUTOF10: 0
PAINLEVEL_OUTOF10: 7
PAINLEVEL_OUTOF10: 6
PAINLEVEL_OUTOF10: 5
PAINLEVEL_OUTOF10: 7
PAINLEVEL_OUTOF10: 8

## 2019-06-21 ASSESSMENT — PAIN DESCRIPTION - PAIN TYPE
TYPE: ACUTE PAIN
TYPE: ACUTE PAIN

## 2019-06-21 ASSESSMENT — PAIN DESCRIPTION - LOCATION
LOCATION: ABDOMEN
LOCATION: BREAST
LOCATION: GENERALIZED

## 2019-06-21 NOTE — PLAN OF CARE
Problem: Falls - Risk of:  Goal: Will remain free from falls  Description  Will remain free from falls  6/21/2019 1109 by Jae Carlson RN  Outcome: Met This Shift  6/20/2019 2144 by Killian Alcala RN  Outcome: Met This Shift

## 2019-06-21 NOTE — PROGRESS NOTES
[facility]  Family Medicine Attending    S: 68 y.o. female with hyperkalemia and worsening renal function. History of metastatic breast CA, maintained outpatient on Herceptin and Aromasin. Reports abdominal pain, recent paracentesis for ascites, and pain is increasing again. Exudative ascites, but negative cytology. CT abd/pelvis with lesions concerning for metastases in liver; pulm nodules noted. Today, pain is controlled overall with pain medication. No new symptoms or concerns. Bowel function has been stable, normal urination. O: VS- Blood pressure 126/72, pulse 80, temperature 97.5 °F (36.4 °C), temperature source Temporal, resp. rate 18, height 5' 4\" (1.626 m), weight 217 lb 3.2 oz (98.5 kg), SpO2 98 %, not currently breastfeeding. Exam is as noted by resident with the following changes, additions or corrections:  Gen NAD, A&A, pallor  CV RRR, systolic murmur  Resp decreased but CTA  Abd Distended, ascites, diffusely tender upper abdomen   Ext 3+ edema bilaterally, stasis dermatitis and healing ulcerations  INR 1.9    Impressions:   Principal Problem:    Hyperkalemia  Active Problems:    Breast cancer (HCC)    Hypertension    Coronary artery disease involving native coronary artery of native heart without angina pectoris    CHF (congestive heart failure) ([address]    B12 deficiency    Hyperlipidemia    Type 2 diabetes mellitus without complication, with long-term current use of insulin (HCC)    Chronic deep vein thrombosis (DVT) of proximal vein of right lower extremity (HCC)    Bilateral edema of lower extremity    KARISHMA (acute kidney injury) ([address]    Diabetic polyneuropathy associated with type 2 diabetes mellitus (HCC)    Ascites    Goals of care, counseling/discussion    Palliative care encounter    Cancer associated pain  Resolved Problems:    * No resolved hospital problems. *      Plan:   CT-guided liver biopsy, following INR.   Anticoagulation held, had Vit K yesterday, recheck INR and discuss with IR.  US abdomen pending. Nephrology and Palliative Care input appreciated. Follow potassium with treatment, improving today. Watch H&H, may need prbcs. Pain and nausea control. Glucose controlled overall off of medication. Questions answered. Attending Physician Statement  I have reviewed the chart and seen the patient with the resident(s). I personally reviewed images, EKG's and similar tests, if present. I personally reviewed and performed key elements of the history and exam.  I have reviewed and confirmed student and/or resident history and exam with changes as indicated above. I agree with the assessment, plan and orders as documented by the resident. Please refer to the resident and/or student note for additional information.       Cecelia Javed

## 2019-06-21 NOTE — CARE COORDINATION
Plan at discharge remains Home with [facility]. Awaiting liver biopsy. INR 1.9 today.  CM/SW will follow

## 2019-06-21 NOTE — PROGRESS NOTES
Nephrology Progress Note  [facility]    CC:   hyperkalemia    HPI:  The pt is a 69 yo female who was admitted for abnormal labs. She had blood work done on 6/17 showing  k of 6.1 and cr of 2. Her baseline cr is 1.2-1.7,  She has had hyperkalemia for the last few weeks. She was here for chronic back pain in April and may of 2019. She was admitted 6/10-6/14 for hyperkalemia and ascites. She had a paracentesis. There were multiple liver lesions suggestive of liver mets. She was given ivf for prerenal azotemia. She is to have a liver biopsy. She has also had elevated inr. She has a pmh of breast cancer with bone mets. She is now on herceptin and aromasin with xgeva. Prior to that she was on ac and tamoxifen and herceptin. She also has a h/o dvt on oac, cad, dm, htn, paf, hyperlipidemia. She was on veltassa at home for the last week or so and doesnt like the taste. She does not have a nephrologist.     6/20: pt without cp or sob today, has distended abdomen. 6-21 seen and examined  No chest pain, sob, nausea. No BM today. Notes abdominal pain. States she has vaginal lesion with mild bleeding         PMH:    Past Medical History:   Diagnosis Date    Abscess of abdominal wall     Anemia     Iron deficiency and chronic disease.  Anesthesia     DIFFICULTY WAKING UP    Arthritis     Arthritis, hip     Breast cancer ([facility] [address] 2005    S/P Left Lumpectomy with Lymph Node Dissection, Chemo/Rad. Follows with Dr. Isabella Irizarry. No recurrence to date. Surgeon was Dr. Donita Squires.  CAD (coronary artery disease) 5/2008    s/p CABG. Follows with [address] [place] [address].  CHF (congestive heart failure) (HCC)     Chronic venous insufficiency 11/7/2018    Decreased dorsalis pedis pulse 11/7/2018    Diabetic neuropathy (HCC)     Diabetic neuropathy (HCC)     DVT (deep venous thrombosis) (HCC)     Recurrent. On lifelong coumadin.     DVT of upper extremity (deep vein thrombosis) ([facility] [address] 4/18/2012    History of deep vein thrombosis 9.1 9.1       No results for input(s): ALT, AST, ALKPHOS, BILITOT, BILIDIR in the last 72 hours. Lab Results   Component Value Date    FERRITIN 490.8 06/20/2011    IRON 49 (L) 06/20/2011    TIBC 266 06/20/2011       Lab Results   Component Value Date    MG 1.6 06/10/2019       Lab Results   Component Value Date    LABALBU 3.2 (L) 06/17/2019    LABALBU 2.7 (L) 06/14/2019    LABALBU 2.5 (L) 06/13/2019       Assessment and Plan:    1. arf on ckd 3  Baseline 1.2-1.7  Renal us negative for hydro  Avoiding ivf given the ascites  Possible due to changes in renal perfusion    Follow renal function      2. Hyperkalemia  Possible due to decreased ability to secrete k distally due to decreased distal na delivery from prerenal state from ascites, third spacing. Will also check for hypoadrenalism (pending), hypocorticolism (cortisol nl)    K 5.3  Continue patiromer  Low k diet  Give loop diuretic, bicarbonate   Repeat K this afternoon     3. Ascites/liver lesions/Volume overload  Awaits liver bx  Sp paracentesis 6/12  Give diuretic    4. Metastatic breast cancer to bone  Per heme onc  On herceptin and aromosin    5.anemia  Per heme onc  Malignancy and ckd    6.  Metabolic acidosis  With renal failure  Give bicarbonate      Joanne Mayes MD

## 2019-06-21 NOTE — PLAN OF CARE
Problem: Falls - Risk of:  Goal: Will remain free from falls  Description  Will remain free from falls  Outcome: Met This Shift  Goal: Absence of physical injury  Description  Absence of physical injury  Outcome: Met This Shift     Problem: Risk for Impaired Skin Integrity  Goal: Tissue integrity - skin and mucous membranes  Description  Structural intactness and normal physiological function of skin and  mucous membranes. Outcome: Met This Shift     Problem: Pain:  Goal: Pain level will decrease  Description  Pain level will decrease  Outcome: Met This Shift  Goal: Control of acute pain  Description  Control of acute pain  Outcome: Met This Shift  Goal: Control of chronic pain  Description  Control of chronic pain  Outcome: Met This Shift     Problem: Skin Integrity:  Goal: Will show no infection signs and symptoms  Description  Will show no infection signs and symptoms  Outcome: Met This Shift  Goal: Absence of new skin breakdown  Description  Absence of new skin breakdown  Outcome: Met This Shift     Problem: Inadequate oral food/beverage intake (NI-2.1)  Goal: Food and/or Nutrient Delivery  Description  Individualized approach for food/nutrient provision.   6/20/2019 1621 by Ivan De Paz  Outcome: Met This Shift

## 2019-06-22 LAB
ALBUMIN SERPL-MCNC: 2.9 G/DL (ref 3.5–5.2)
ALP BLD-CCNC: 138 U/L (ref 35–104)
ALT SERPL-CCNC: 34 U/L (ref 0–32)
ANION GAP SERPL CALCULATED.3IONS-SCNC: 11 MMOL/L (ref 7–16)
ANION GAP SERPL CALCULATED.3IONS-SCNC: 13 MMOL/L (ref 7–16)
AST SERPL-CCNC: 35 U/L (ref 0–31)
BILIRUB SERPL-MCNC: 0.7 MG/DL (ref 0–1.2)
BUN BLDV-MCNC: 45 MG/DL (ref 8–23)
BUN BLDV-MCNC: 49 MG/DL (ref 8–23)
CALCIUM SERPL-MCNC: 9 MG/DL (ref 8.6–10.2)
CALCIUM SERPL-MCNC: 9.2 MG/DL (ref 8.6–10.2)
CHLORIDE BLD-SCNC: 101 MMOL/L (ref 98–107)
CHLORIDE BLD-SCNC: 103 MMOL/L (ref 98–107)
CO2: 22 MMOL/L (ref 22–29)
CO2: 24 MMOL/L (ref 22–29)
CREAT SERPL-MCNC: 2.1 MG/DL (ref 0.5–1)
CREAT SERPL-MCNC: 2.1 MG/DL (ref 0.5–1)
GFR AFRICAN AMERICAN: 28
GFR AFRICAN AMERICAN: 28
GFR NON-AFRICAN AMERICAN: 23 ML/MIN/1.73
GFR NON-AFRICAN AMERICAN: 23 ML/MIN/1.73
GLUCOSE BLD-MCNC: 149 MG/DL (ref 74–99)
GLUCOSE BLD-MCNC: 197 MG/DL (ref 74–99)
HCT VFR BLD CALC: 23.6 % (ref 34–48)
HCT VFR BLD CALC: 23.9 % (ref 34–48)
HEMOGLOBIN: 6.9 G/DL (ref 11.5–15.5)
HEMOGLOBIN: 7 G/DL (ref 11.5–15.5)
INR BLD: 1.8
MAGNESIUM: 1.7 MG/DL (ref 1.6–2.6)
MCH RBC QN AUTO: 27.9 PG (ref 26–35)
MCHC RBC AUTO-ENTMCNC: 29.3 % (ref 32–34.5)
MCV RBC AUTO: 95.2 FL (ref 80–99.9)
METER GLUCOSE: 143 MG/DL (ref 74–99)
METER GLUCOSE: 177 MG/DL (ref 74–99)
METER GLUCOSE: 200 MG/DL (ref 74–99)
PDW BLD-RTO: 17.5 FL (ref 11.5–15)
PHOSPHORUS: 4.8 MG/DL (ref 2.5–4.5)
PLATELET # BLD: 253 E9/L (ref 130–450)
PMV BLD AUTO: 10.6 FL (ref 7–12)
POTASSIUM SERPL-SCNC: 5.3 MMOL/L (ref 3.5–5)
POTASSIUM SERPL-SCNC: 5.7 MMOL/L (ref 3.5–5)
PROTHROMBIN TIME: 20.7 SEC (ref 9.3–12.4)
RBC # BLD: 2.51 E12/L (ref 3.5–5.5)
SODIUM BLD-SCNC: 136 MMOL/L (ref 132–146)
SODIUM BLD-SCNC: 138 MMOL/L (ref 132–146)
TOTAL PROTEIN: 5.8 G/DL (ref 6.4–8.3)
WBC # BLD: 6.4 E9/L (ref 4.5–11.5)

## 2019-06-22 PROCEDURE — 6360000002 HC RX W HCPCS: Performed by: FAMILY MEDICINE

## 2019-06-22 PROCEDURE — 84100 ASSAY OF PHOSPHORUS: CPT

## 2019-06-22 PROCEDURE — 36415 COLL VENOUS BLD VENIPUNCTURE: CPT

## 2019-06-22 PROCEDURE — 80048 BASIC METABOLIC PNL TOTAL CA: CPT

## 2019-06-22 PROCEDURE — 80053 COMPREHEN METABOLIC PANEL: CPT

## 2019-06-22 PROCEDURE — 85018 HEMOGLOBIN: CPT

## 2019-06-22 PROCEDURE — 99232 SBSQ HOSP IP/OBS MODERATE 35: CPT | Performed by: FAMILY MEDICINE

## 2019-06-22 PROCEDURE — 6370000000 HC RX 637 (ALT 250 FOR IP): Performed by: STUDENT IN AN ORGANIZED HEALTH CARE EDUCATION/TRAINING PROGRAM

## 2019-06-22 PROCEDURE — 6370000000 HC RX 637 (ALT 250 FOR IP): Performed by: FAMILY MEDICINE

## 2019-06-22 PROCEDURE — 6370000000 HC RX 637 (ALT 250 FOR IP): Performed by: EMERGENCY MEDICINE

## 2019-06-22 PROCEDURE — 2060000000 HC ICU INTERMEDIATE R&B

## 2019-06-22 PROCEDURE — 83735 ASSAY OF MAGNESIUM: CPT

## 2019-06-22 PROCEDURE — 85014 HEMATOCRIT: CPT

## 2019-06-22 PROCEDURE — 6360000002 HC RX W HCPCS: Performed by: INTERNAL MEDICINE

## 2019-06-22 PROCEDURE — 6370000000 HC RX 637 (ALT 250 FOR IP): Performed by: INTERNAL MEDICINE

## 2019-06-22 PROCEDURE — 85610 PROTHROMBIN TIME: CPT

## 2019-06-22 PROCEDURE — 85027 COMPLETE CBC AUTOMATED: CPT

## 2019-06-22 PROCEDURE — 2580000003 HC RX 258: Performed by: STUDENT IN AN ORGANIZED HEALTH CARE EDUCATION/TRAINING PROGRAM

## 2019-06-22 PROCEDURE — C9113 INJ PANTOPRAZOLE SODIUM, VIA: HCPCS | Performed by: FAMILY MEDICINE

## 2019-06-22 PROCEDURE — 82962 GLUCOSE BLOOD TEST: CPT

## 2019-06-22 RX ORDER — PHYTONADIONE 5 MG/1
7.5 TABLET ORAL ONCE
Status: COMPLETED | OUTPATIENT
Start: 2019-06-22 | End: 2019-06-22

## 2019-06-22 RX ORDER — PREGABALIN 25 MG/1
25 CAPSULE ORAL 2 TIMES DAILY
Status: DISCONTINUED | OUTPATIENT
Start: 2019-06-22 | End: 2019-06-26 | Stop reason: HOSPADM

## 2019-06-22 RX ORDER — FUROSEMIDE 10 MG/ML
20 INJECTION INTRAMUSCULAR; INTRAVENOUS ONCE
Status: COMPLETED | OUTPATIENT
Start: 2019-06-22 | End: 2019-06-22

## 2019-06-22 RX ADMIN — CALCIUM CARBONATE-VITAMIN D TAB 500 MG-200 UNIT 1 TABLET: 500-200 TAB at 20:46

## 2019-06-22 RX ADMIN — MULTIPLE VITAMINS W/ MINERALS TAB 1 TABLET: TAB at 10:05

## 2019-06-22 RX ADMIN — CALCIUM CARBONATE-VITAMIN D TAB 500 MG-200 UNIT 1 TABLET: 500-200 TAB at 10:05

## 2019-06-22 RX ADMIN — DICLOFENAC SODIUM 4 G: 10 GEL TOPICAL at 17:37

## 2019-06-22 RX ADMIN — OXYCODONE HYDROCHLORIDE 5 MG: 5 TABLET ORAL at 11:21

## 2019-06-22 RX ADMIN — OXYCODONE HYDROCHLORIDE 5 MG: 5 TABLET ORAL at 06:23

## 2019-06-22 RX ADMIN — CYANOCOBALAMIN TAB 1000 MCG 1000 MCG: 1000 TAB at 10:05

## 2019-06-22 RX ADMIN — Medication 10 ML: at 20:47

## 2019-06-22 RX ADMIN — METOPROLOL TARTRATE 25 MG: 25 TABLET, FILM COATED ORAL at 10:09

## 2019-06-22 RX ADMIN — SODIUM BICARBONATE 650 MG: 650 TABLET ORAL at 20:46

## 2019-06-22 RX ADMIN — Medication: at 10:16

## 2019-06-22 RX ADMIN — METOPROLOL TARTRATE 25 MG: 25 TABLET, FILM COATED ORAL at 20:46

## 2019-06-22 RX ADMIN — PATIROMER 8.4 G: 8.4 POWDER, FOR SUSPENSION ORAL at 10:03

## 2019-06-22 RX ADMIN — PHYTONADIONE 7.5 MG: 5 TABLET ORAL at 10:04

## 2019-06-22 RX ADMIN — SODIUM BICARBONATE 650 MG: 650 TABLET ORAL at 10:05

## 2019-06-22 RX ADMIN — PREGABALIN 25 MG: 25 CAPSULE ORAL at 20:46

## 2019-06-22 RX ADMIN — PREGABALIN 25 MG: 25 CAPSULE ORAL at 10:04

## 2019-06-22 RX ADMIN — Medication: at 20:46

## 2019-06-22 RX ADMIN — EXEMESTANE 25 MG: 25 TABLET ORAL at 06:20

## 2019-06-22 RX ADMIN — Medication 10 ML: at 10:05

## 2019-06-22 RX ADMIN — DICLOFENAC SODIUM 4 G: 10 GEL TOPICAL at 20:46

## 2019-06-22 RX ADMIN — FUROSEMIDE 20 MG: 10 INJECTION, SOLUTION INTRAMUSCULAR; INTRAVENOUS at 10:05

## 2019-06-22 RX ADMIN — PANTOPRAZOLE SODIUM 20 MG: 40 INJECTION, POWDER, FOR SOLUTION INTRAVENOUS at 10:04

## 2019-06-22 ASSESSMENT — PAIN DESCRIPTION - FREQUENCY
FREQUENCY: INTERMITTENT

## 2019-06-22 ASSESSMENT — PAIN SCALES - GENERAL
PAINLEVEL_OUTOF10: 0
PAINLEVEL_OUTOF10: 8
PAINLEVEL_OUTOF10: 8
PAINLEVEL_OUTOF10: 0
PAINLEVEL_OUTOF10: 9
PAINLEVEL_OUTOF10: 0

## 2019-06-22 ASSESSMENT — PAIN DESCRIPTION - DESCRIPTORS
DESCRIPTORS: ACHING;CONSTANT;DISCOMFORT
DESCRIPTORS: ACHING;DISCOMFORT
DESCRIPTORS: ACHING;DISCOMFORT;SHARP

## 2019-06-22 ASSESSMENT — PAIN DESCRIPTION - LOCATION
LOCATION: ABDOMEN

## 2019-06-22 ASSESSMENT — PAIN DESCRIPTION - ORIENTATION
ORIENTATION: RIGHT
ORIENTATION: LEFT;UPPER

## 2019-06-22 ASSESSMENT — PAIN DESCRIPTION - PAIN TYPE
TYPE: CHRONIC PAIN

## 2019-06-22 ASSESSMENT — PAIN DESCRIPTION - ONSET: ONSET: SUDDEN

## 2019-06-22 ASSESSMENT — PAIN - FUNCTIONAL ASSESSMENT: PAIN_FUNCTIONAL_ASSESSMENT: PREVENTS OR INTERFERES SOME ACTIVE ACTIVITIES AND ADLS

## 2019-06-22 NOTE — PROGRESS NOTES
[facility] Inpatient   Resident Progress Note    S:  Hospital day: 4: Brief Synopsis: Mrs. Sparkle Roldan is a 68year old female with a history of metastatic breast cancer and recurrent DVTs on dose reduced coumadin and with recent paracentesis which was exudative but with negative cytology. She was admitted for worsening abdominal pain, hyperkalemia and KARISHMA on CKD. Overnight vitals wnl. Afebrile. Pt feels well this am. No new symptoms except some continued abdominal pain. Feels she mgiht be filling with fluid again. She is not constipated. Sometimes have hard v loose stools. Understands she has PRN laxative. No new cp/sob. Cont meds:   Scheduled meds:    sodium bicarbonate  650 mg Oral BID    pantoprazole  20 mg Intravenous Daily    calcium-vitamin D  1 tablet Oral BID    diclofenac sodium  4 g Topical 4x Daily    metoprolol tartrate  25 mg Oral BID    miconazole nitrate   Topical BID    therapeutic multivitamin-minerals  1 tablet Oral Daily    patiromer sorbitex calcium  8.4 g Oral Daily    pregabalin  25 mg Oral TID    vitamin B-12  1,000 mcg Oral Daily    exemestane  25 mg Oral QAM AC    sodium chloride flush  10 mL Intravenous 2 times per day     PRN meds: sodium chloride flush, oxyCODONE **OR** oxyCODONE, sennosides-docusate sodium, ondansetron, sodium chloride flush, magnesium hydroxide     I reviewed the patient's past medical and surgical history, Medications and Allergies. O:  Blood pressure (!) 142/68, pulse 76, temperature 99.5 °F (37.5 °C), temperature source Temporal, resp. rate 20, height 5' 4\" (1.626 m), weight 217 lb 3.2 oz (98.5 kg), SpO2 96 %, not currently breastfeeding. 24 hour I&O: I/O last 3 completed shifts: In: 61 [P.O.:60]  Out: -   No intake/output data recorded. GENERAL: Alert, cooperative, no acute distress. HEENT: Normocephalic, atraumatic. Conjunctiva/corneas clear, EOM's intact, no pallor or icterus.    LUNG: Clear to auscultation bilaterally, No resolved hospital problems. *      Hyperkalemia  - 6.1 as outpatient, 5.3 today  - Likely multifactorial - secondary to worsening kidney dysfunction, gi losses  - Renal us negative for hydro  - continue telemetry  - Patiromer daily per neprho  - Low k diet  - Given loop diuretic, bicarbonate   - check for hypoadrenalism (pending) per nephro  - cortisol level normal     KARISHMA on CKD  Anemia of chronic diseease  - Creatinine of 2.0 as outpatient. Baseline of 1.2-1.7  - PreRenal likely from intravascular depletion from liver disease  - Renal Ultrasound with renal cyst and ascites  - further recs per nephro, not given fluids due to ascites  - Hg below 7 this admission; continue to monitor q12      Ascites with likely cirrhosis, concern for metastatic disease to the liver  - Ultrasound, CT and paracentesis confirmed  - Suspicious liver lesions for metastasis  - SAAG of 1.4, cytology negative on tap  - CT liver guided biopsy pending INR at or below 1.6; will give trial of vitamin K again today but may not likely adjust INR much due to likely cirrhosis  - Roxicodone for pain control   - given diuretic; consider daily loop to help with K reduction and ascites.    - consider lactulose  - hold statin  - consider liver side effect of aromasin  - consider consult to [facility]      Metastatic Breast Cancer  - Follows with Dr. Kaleb Rowan   - On herceptin and aromasin; last dose of weekly herceptin 6/18?   - palliative following  - pain control with gabby pain panel  - senokot-S PRN for constipation     Chronic DVT  Bilateral Lower extremity edema  - Hold home coumadin for anticipated IR intervention  - Vitamin K was given to reduce INR  - Monitor INR daily      HFpEF - Echo repeated on last admission similar to previous with EF 50 % and mild valvular pathology; monitor fluid balance    CAD, HTN, HLD - Continue home lopressor 25 mg BID, hold statin for now     Type II DM with neuropathy - Recent A1c of 6.9 April 2019; hold metformin due

## 2019-06-22 NOTE — PROGRESS NOTES
20 MG tablet Take 1 tablet by mouth nightly 30 tablet 3    miconazole nitrate 2 % OINT Apply topically 2 times daily 2 Tube 1    diclofenac sodium 1 % GEL Apply 4 g topically 4 times daily 4 Tube 1    vitamin B-12 (CYANOCOBALAMIN) 1000 MCG tablet Take 1 tablet by mouth daily 90 tablet 3    Calcium Citrate-Vitamin D (RA CALCIUM CIT-VIT D-3 PETITES) 200-250 MG-UNIT TABS take 1 tablet by mouth twice a day 180 tablet 3    exemestane (AROMASIN) 25 MG chemo tablet Take 1 tablet by mouth every morning (before breakfast)      Multiple Vitamins-Minerals (THERAPEUTIC MULTIVITAMIN-MINERALS) tablet Take 1 tablet by mouth daily 90 tablet 3    trastuzumab (HERCEPTIN) 440 MG injection Infuse  intravenously once a week.  On tuesdays         Allergies:    Macrobid [nitrofurantoin monohydrate macrocrystals]  Physical Exam:    Patient Vitals for the past 24 hrs:   BP Temp Temp src Pulse Resp SpO2   06/22/19 0730 (!) 142/68 99.5 °F (37.5 °C) Temporal 76 20 96 %   06/21/19 2308 (!) 144/62 98 °F (36.7 °C) Temporal 74 22 --   06/21/19 2030 (!) 148/64 -- -- 84 -- --   06/21/19 1600 (!) 140/63 -- -- 77 30 98 %         Intake/Output Summary (Last 24 hours) at 6/22/2019 0921  Last data filed at 6/22/2019 0855  Gross per 24 hour   Intake 300 ml   Output --   Net 300 ml       Constitutional: Patient in no acute distress   Eyes eomi  HEENT nc/at MMM  Neck: supple, no jvd  Cardiovascular: regular rate and rhythm, no murmurs, gallops, or rubs   Respiratory: Clear, no rales, rhochi, or wheezes,   Gastrointestinal: soft, nontender, nondistended  Ext: lymphedema  Neuro: NFD  Psych cooperative and appropriate  Skin: dry, no rash         Data:    Recent Labs     06/20/19  0545 06/21/19  0421 06/21/19  1730 06/22/19  0450   WBC 5.3 6.6  --  6.4   HGB 7.8* 6.9* 7.8* 7.0*   HCT 26.8* 23.3* 26.2* 23.9*   MCV 95.4 92.1  --  95.2    239  --  253       Recent Labs     06/21/19  0421 06/21/19  1319 06/22/19  0450    138 136   K 5.3* 5.2*

## 2019-06-23 LAB
ABO/RH: NORMAL
ALDOSTERONE: 10.8 NG/DL
AMMONIA: 45 UMOL/L (ref 11–51)
ANION GAP SERPL CALCULATED.3IONS-SCNC: 11 MMOL/L (ref 7–16)
ANION GAP SERPL CALCULATED.3IONS-SCNC: 17 MMOL/L (ref 7–16)
ANTIBODY SCREEN: NORMAL
BLOOD BANK DISPENSE STATUS: NORMAL
BLOOD BANK PRODUCT CODE: NORMAL
BPU ID: NORMAL
BUN BLDV-MCNC: 48 MG/DL (ref 8–23)
BUN BLDV-MCNC: 48 MG/DL (ref 8–23)
CALCIUM SERPL-MCNC: 8.7 MG/DL (ref 8.6–10.2)
CALCIUM SERPL-MCNC: 9.1 MG/DL (ref 8.6–10.2)
CHLORIDE BLD-SCNC: 101 MMOL/L (ref 98–107)
CHLORIDE BLD-SCNC: 104 MMOL/L (ref 98–107)
CO2: 21 MMOL/L (ref 22–29)
CO2: 25 MMOL/L (ref 22–29)
CREAT SERPL-MCNC: 2.1 MG/DL (ref 0.5–1)
CREAT SERPL-MCNC: 2.3 MG/DL (ref 0.5–1)
DESCRIPTION BLOOD BANK: NORMAL
GFR AFRICAN AMERICAN: 25
GFR AFRICAN AMERICAN: 28
GFR NON-AFRICAN AMERICAN: 21 ML/MIN/1.73
GFR NON-AFRICAN AMERICAN: 23 ML/MIN/1.73
GLUCOSE BLD-MCNC: 147 MG/DL (ref 74–99)
GLUCOSE BLD-MCNC: 241 MG/DL (ref 74–99)
HCT VFR BLD CALC: 23.1 % (ref 34–48)
HCT VFR BLD CALC: 28.8 % (ref 34–48)
HEMOGLOBIN: 6.9 G/DL (ref 11.5–15.5)
HEMOGLOBIN: 8.9 G/DL (ref 11.5–15.5)
INR BLD: 1.6
MAGNESIUM: 1.7 MG/DL (ref 1.6–2.6)
MCH RBC QN AUTO: 28.4 PG (ref 26–35)
MCHC RBC AUTO-ENTMCNC: 29.9 % (ref 32–34.5)
MCV RBC AUTO: 95.1 FL (ref 80–99.9)
METER GLUCOSE: 134 MG/DL (ref 74–99)
METER GLUCOSE: 269 MG/DL (ref 74–99)
PDW BLD-RTO: 17.6 FL (ref 11.5–15)
PHOSPHORUS: 4.5 MG/DL (ref 2.5–4.5)
PLATELET # BLD: 247 E9/L (ref 130–450)
PMV BLD AUTO: 10.4 FL (ref 7–12)
POTASSIUM SERPL-SCNC: 5.1 MMOL/L (ref 3.5–5)
POTASSIUM SERPL-SCNC: 5.5 MMOL/L (ref 3.5–5)
PROTHROMBIN TIME: 18.5 SEC (ref 9.3–12.4)
RBC # BLD: 2.43 E12/L (ref 3.5–5.5)
RENIN ACTIVITY: 0.9 NG/ML/HR
SODIUM BLD-SCNC: 139 MMOL/L (ref 132–146)
SODIUM BLD-SCNC: 140 MMOL/L (ref 132–146)
WBC # BLD: 6.4 E9/L (ref 4.5–11.5)

## 2019-06-23 PROCEDURE — 84100 ASSAY OF PHOSPHORUS: CPT

## 2019-06-23 PROCEDURE — 82140 ASSAY OF AMMONIA: CPT

## 2019-06-23 PROCEDURE — 85018 HEMOGLOBIN: CPT

## 2019-06-23 PROCEDURE — 86900 BLOOD TYPING SEROLOGIC ABO: CPT

## 2019-06-23 PROCEDURE — 86923 COMPATIBILITY TEST ELECTRIC: CPT

## 2019-06-23 PROCEDURE — 6370000000 HC RX 637 (ALT 250 FOR IP): Performed by: FAMILY MEDICINE

## 2019-06-23 PROCEDURE — 6360000002 HC RX W HCPCS: Performed by: FAMILY MEDICINE

## 2019-06-23 PROCEDURE — 85610 PROTHROMBIN TIME: CPT

## 2019-06-23 PROCEDURE — 36415 COLL VENOUS BLD VENIPUNCTURE: CPT

## 2019-06-23 PROCEDURE — C9113 INJ PANTOPRAZOLE SODIUM, VIA: HCPCS | Performed by: FAMILY MEDICINE

## 2019-06-23 PROCEDURE — 85027 COMPLETE CBC AUTOMATED: CPT

## 2019-06-23 PROCEDURE — P9016 RBC LEUKOCYTES REDUCED: HCPCS

## 2019-06-23 PROCEDURE — 99232 SBSQ HOSP IP/OBS MODERATE 35: CPT | Performed by: FAMILY MEDICINE

## 2019-06-23 PROCEDURE — 86850 RBC ANTIBODY SCREEN: CPT

## 2019-06-23 PROCEDURE — 6370000000 HC RX 637 (ALT 250 FOR IP): Performed by: INTERNAL MEDICINE

## 2019-06-23 PROCEDURE — 80048 BASIC METABOLIC PNL TOTAL CA: CPT

## 2019-06-23 PROCEDURE — 82962 GLUCOSE BLOOD TEST: CPT

## 2019-06-23 PROCEDURE — 2060000000 HC ICU INTERMEDIATE R&B

## 2019-06-23 PROCEDURE — 85014 HEMATOCRIT: CPT

## 2019-06-23 PROCEDURE — 36430 TRANSFUSION BLD/BLD COMPNT: CPT

## 2019-06-23 PROCEDURE — 2580000003 HC RX 258: Performed by: FAMILY MEDICINE

## 2019-06-23 PROCEDURE — 6370000000 HC RX 637 (ALT 250 FOR IP): Performed by: STUDENT IN AN ORGANIZED HEALTH CARE EDUCATION/TRAINING PROGRAM

## 2019-06-23 PROCEDURE — 83735 ASSAY OF MAGNESIUM: CPT

## 2019-06-23 PROCEDURE — 86901 BLOOD TYPING SEROLOGIC RH(D): CPT

## 2019-06-23 PROCEDURE — 2580000003 HC RX 258: Performed by: STUDENT IN AN ORGANIZED HEALTH CARE EDUCATION/TRAINING PROGRAM

## 2019-06-23 RX ORDER — 0.9 % SODIUM CHLORIDE 0.9 %
250 INTRAVENOUS SOLUTION INTRAVENOUS ONCE
Status: COMPLETED | OUTPATIENT
Start: 2019-06-23 | End: 2019-06-24

## 2019-06-23 RX ORDER — OXYCODONE HYDROCHLORIDE 5 MG/1
2.5 TABLET ORAL EVERY 8 HOURS PRN
Status: DISCONTINUED | OUTPATIENT
Start: 2019-06-23 | End: 2019-06-26 | Stop reason: HOSPADM

## 2019-06-23 RX ORDER — OXYCODONE HYDROCHLORIDE 5 MG/1
5 TABLET ORAL EVERY 8 HOURS PRN
Status: DISCONTINUED | OUTPATIENT
Start: 2019-06-23 | End: 2019-06-26 | Stop reason: HOSPADM

## 2019-06-23 RX ADMIN — CYANOCOBALAMIN TAB 1000 MCG 1000 MCG: 1000 TAB at 08:33

## 2019-06-23 RX ADMIN — Medication 10 ML: at 08:34

## 2019-06-23 RX ADMIN — METOPROLOL TARTRATE 25 MG: 25 TABLET, FILM COATED ORAL at 08:33

## 2019-06-23 RX ADMIN — EXEMESTANE 25 MG: 25 TABLET ORAL at 05:51

## 2019-06-23 RX ADMIN — CALCIUM CARBONATE-VITAMIN D TAB 500 MG-200 UNIT 1 TABLET: 500-200 TAB at 08:33

## 2019-06-23 RX ADMIN — PATIROMER 8.4 G: 8.4 POWDER, FOR SUSPENSION ORAL at 08:34

## 2019-06-23 RX ADMIN — Medication 10 ML: at 21:40

## 2019-06-23 RX ADMIN — PREGABALIN 25 MG: 25 CAPSULE ORAL at 08:33

## 2019-06-23 RX ADMIN — DICLOFENAC SODIUM 4 G: 10 GEL TOPICAL at 21:40

## 2019-06-23 RX ADMIN — SODIUM CHLORIDE 250 ML: 9 INJECTION, SOLUTION INTRAVENOUS at 13:42

## 2019-06-23 RX ADMIN — MULTIPLE VITAMINS W/ MINERALS TAB 1 TABLET: TAB at 08:33

## 2019-06-23 RX ADMIN — CALCIUM CARBONATE-VITAMIN D TAB 500 MG-200 UNIT 1 TABLET: 500-200 TAB at 21:41

## 2019-06-23 RX ADMIN — METOPROLOL TARTRATE 25 MG: 25 TABLET, FILM COATED ORAL at 21:39

## 2019-06-23 RX ADMIN — SODIUM BICARBONATE 650 MG: 650 TABLET ORAL at 08:33

## 2019-06-23 RX ADMIN — Medication: at 09:00

## 2019-06-23 RX ADMIN — OXYCODONE HYDROCHLORIDE 5 MG: 5 TABLET ORAL at 15:43

## 2019-06-23 RX ADMIN — Medication: at 21:40

## 2019-06-23 RX ADMIN — PREGABALIN 25 MG: 25 CAPSULE ORAL at 21:39

## 2019-06-23 RX ADMIN — PANTOPRAZOLE SODIUM 20 MG: 40 INJECTION, POWDER, FOR SOLUTION INTRAVENOUS at 08:34

## 2019-06-23 RX ADMIN — SODIUM BICARBONATE 650 MG: 650 TABLET ORAL at 21:39

## 2019-06-23 ASSESSMENT — PAIN DESCRIPTION - FREQUENCY: FREQUENCY: INTERMITTENT

## 2019-06-23 ASSESSMENT — PAIN SCALES - GENERAL
PAINLEVEL_OUTOF10: 0
PAINLEVEL_OUTOF10: 8
PAINLEVEL_OUTOF10: 0

## 2019-06-23 ASSESSMENT — PAIN DESCRIPTION - PAIN TYPE: TYPE: ACUTE PAIN

## 2019-06-23 ASSESSMENT — PAIN DESCRIPTION - DESCRIPTORS: DESCRIPTORS: ACHING;DISCOMFORT

## 2019-06-23 ASSESSMENT — PAIN - FUNCTIONAL ASSESSMENT: PAIN_FUNCTIONAL_ASSESSMENT: PREVENTS OR INTERFERES SOME ACTIVE ACTIVITIES AND ADLS

## 2019-06-23 ASSESSMENT — PAIN DESCRIPTION - LOCATION: LOCATION: ABDOMEN

## 2019-06-23 NOTE — PROGRESS NOTES
Nephrology Progress Note  [facility]    CC:   hyperkalemia    HPI:  The pt is a 67 yo female who was admitted for abnormal labs. She had blood work done on 6/17 showing  k of 6.1 and cr of 2. Her baseline cr is 1.2-1.7,  She has had hyperkalemia for the last few weeks. She was here for chronic back pain in April and may of 2019. She was admitted 6/10-6/14 for hyperkalemia and ascites. She had a paracentesis. There were multiple liver lesions suggestive of liver mets. She was given ivf for prerenal azotemia. She is to have a liver biopsy. She has also had elevated inr. She has a pmh of breast cancer with bone mets. She is now on herceptin and aromasin with xgeva. Prior to that she was on ac and tamoxifen and herceptin. She also has a h/o dvt on oac, cad, dm, htn, paf, hyperlipidemia. She was on veltassa at home for the last week or so and doesnt like the taste. She does not have a nephrologist.     6/20: pt without cp or sob today, has distended abdomen. 6-21 seen and examined  No chest pain, sob, nausea. No BM today. Notes abdominal pain. States she has vaginal lesion with mild bleeding    6-22: seen and examined  States abdominal pain unchanged - no nausea. No chest pain, shortness of breath    6-23: seen and examined  States she had \"panic attack\" earlier but feels better now  No chest pain, sob  Abdominal distention the same          PMH:    Past Medical History:   Diagnosis Date    Abscess of abdominal wall     Anemia     Iron deficiency and chronic disease.  Anesthesia     DIFFICULTY WAKING UP    Arthritis     Arthritis, hip     Breast cancer ([facility] [address] 2005    S/P Left Lumpectomy with Lymph Node Dissection, Chemo/Rad. Follows with Dr. Edmund Denver. No recurrence to date. Surgeon was Dr. Victorino Francisco.  CAD (coronary artery disease) 5/2008    s/p CABG. Follows with [address] [place] [address].     CHF (congestive heart failure) (HCC)     Chronic venous insufficiency 11/7/2018    Decreased dorsalis pedis pulse 11/7/2018    Diabetic neuropathy (HCC)     Diabetic neuropathy (HCC)     DVT (deep venous thrombosis) (HCC)     Recurrent. On lifelong coumadin.  DVT of upper extremity (deep vein thrombosis) ([address] 4/18/2012    History of deep vein thrombosis 11/7/2018    Humerus fracture     Chronic, on the Left.  Hyperlipidemia 8/29/2011    Hypertension     Leg swelling 11/7/2018    Lymph node enlargement     Lymphedema of both lower extremities 11/7/2018    MI (myocardial infarction) ([address]     Nephrolithiasis     Follows with Dr. Melissa Mendoza.  Pericardial effusion     Pulmonary nodule     With mediastinal lymphadenopathy. Stable. Follows with Dr. Norine Halsted.     Rib lesion 11/28/11    expansile lesion in one of the right ribs laterally    Sarcoidosis 07/26/11    per transbronchial needle aspiration    Subclavian vein occlusion, bilateral ([address] 4/18/2012    Type II or unspecified type diabetes mellitus without mention of complication, not stated as uncontrolled        Patient Active Problem List   Diagnosis    Breast cancer ([address]    Hypertension    Coronary artery disease involving native coronary artery of native heart without angina pectoris    Nephrolithiasis    CHF (congestive heart failure) (HCC)    Humerus fracture    Shoulder pain, left    B12 deficiency    Hyperlipidemia    Rib lesion    Subclavian vein occlusion, bilateral (HCC)    Pericardial effusion    MI (myocardial infarction) ([address]    Arthritis    Sarcoidosis    Lymph node enlargement    Anesthesia    Rectal bleeding    Ventral hernia    Type 2 diabetes mellitus without complication, with long-term current use of insulin (HCC)    Anemia    Chronic deep vein thrombosis (DVT) of proximal vein of right lower extremity (HCC)    Bilateral edema of lower extremity    Peripheral polyneuropathy    Complicated UTI (urinary tract infection)    KARISHMA (acute kidney injury) ([address]    Diarrhea with dehydration    Chronic anticoagulation appropriate  Skin: dry, no rash         Data:    Recent Labs     06/21/19  0421  06/22/19  0450 06/22/19  2150 06/23/19  0503   WBC 6.6  --  6.4  --  6.4   HGB 6.9*   < > 7.0* 6.9* 6.9*   HCT 23.3*   < > 23.9* 23.6* 23.1*   MCV 92.1  --  95.2  --  95.1     --  253  --  247    < > = values in this interval not displayed. Recent Labs     06/22/19  0450 06/22/19  1407 06/23/19  0503    138 140   K 5.3* 5.7* 5.5*    103 104   CO2 24 22 25   CREATININE 2.1* 2.1* 2.3*   BUN 45* 49* 48*   LABGLOM 23 23 21   GLUCOSE 149* 197* 147*   CALCIUM 9.0 9.2 8.7       Recent Labs     06/22/19  0450   ALT 34*   AST 35*   ALKPHOS 138*   BILITOT 0.7       Lab Results   Component Value Date    FERRITIN 490.8 06/20/2011    IRON 49 (L) 06/20/2011    TIBC 266 06/20/2011       Lab Results   Component Value Date    MG 1.7 06/23/2019    PHOS 4.5 06/23/2019       Lab Results   Component Value Date    LABALBU 2.9 (L) 06/22/2019    LABALBU 3.2 (L) 06/17/2019    LABALBU 2.7 (L) 06/14/2019       Assessment and Plan:    1. arf on ckd 3  Baseline 1.2-1.7  Renal us negative for hydro  Avoiding ivf given the ascites  Possible due to changes in renal perfusion    Follow renal function - above baseline, worsening with diuretics       2. Hyperkalemia  Possible due to decreased ability to secrete k distally due to decreased distal na delivery from prerenal state from ascites, third spacing. Will also check for hypoadrenalism (pending), hypocorticolism (cortisol nl)    K 5.5  Continue patiromer  Low k diet  On bicarbonate    Repeat K this afternoon     3. Ascites/liver lesions/Volume overload  Awaits liver bx  Sp paracentesis 6/12    Received diuretics  Hold at this time due to worsening renal function     4. Metastatic breast cancer to bone  Per heme onc  On herceptin and aromosin    5.anemia  Per heme onc  Malignancy and ckd  Transfuse as needed   For transfusion 6/23     6.  Metabolic acidosis  With renal failure  Started on

## 2019-06-23 NOTE — PROGRESS NOTES
[address] [facility]  Family Medicine Attending    S: 68 y.o. female admitted with hyperkalemia and worsening renal function. History of dm2 (a1c 6.9), ckd, sarcoid, cad s/p cabg, dvts, chf, bl subclavian stenosis, and metastatic breast CA, maintained outpatient on Herceptin and Aromasin. Reports abdominal pain, recent paracentesis for ascites which showed exudate with negative cytology, and pain. CT abd/pelvis with lesions concerning for metastases in liver; pulm nodules noted. Off of coumadin for the last several days. Today, pain is controlled overall with pain medication. Feeling tired and weak. Worried about not waking up from having the biopsy. O: VS- Blood pressure 126/60, pulse 86, temperature 99 °F (37.2 °C), temperature source Oral, resp. rate 20, height 5' 4\" (1.626 m), weight 217 lb 3.2 oz (98.5 kg), SpO2 97 %, not currently breastfeeding.   Exam is as noted by resident with the following changes, additions or corrections:  Gen NAD, A&A, pallor, sallow skin color  CV RRR, systolic murmur  Resp decreased but CTA  Abd Distended, ascites, diffusely tender upper abdomen, bs present  Ext 3+ edema bilaterally with pitting, stasis dermatitis and healing ulcerations  INR 1.6    Impressions:   Principal Problem:    Hyperkalemia  Active Problems:    Breast cancer (HCC)    Hypertension    Coronary artery disease involving native coronary artery of native heart without angina pectoris    CHF (congestive heart failure) (HCC)    B12 deficiency    Hyperlipidemia    Type 2 diabetes mellitus without complication, with long-term current use of insulin (HCC)    Chronic deep vein thrombosis (DVT) of proximal vein of right lower extremity (HCC)    Bilateral edema of lower extremity    KARISHMA (acute kidney injury) ([facility] [address]    Diabetic polyneuropathy associated with type 2 diabetes mellitus (HCC)    Ascites    Goals of care, counseling/discussion    Palliative care encounter    Cancer associated pain  Resolved Problems:    * No resolved hospital problems. *      Plan:  Patient for CT-guided liver biopsy, following INR, daughters would like her to have the biopsy and plan to come in tomorrow for it. Also discussion for goals of care given metastatic nature of disease. Anticoagulation held, had Vit K 6/20 and 6/22, follow INR. Will give vitamin k today. Nephrology and Palliative Care input appreciated. Follow potassium with treatment, start lasix. Watch H&H, will give prbcs and watch for fluid overload, adjusting diuretics as needed. Pain and nausea control. Glucose controlled overall off of medication. Noted worsening creatinine. lfts trending down. Consider heme/onc consult. Pelvic u/s did not show any source of vaginal bleeding. Using walker for ambulation. Attending Physician Statement  I have reviewed the chart and seen the patient with the resident(s). I personally reviewed images, EKG's and similar tests, if present. I personally reviewed and performed key elements of the history and exam.  I have reviewed and confirmed student and/or resident history and exam with changes as indicated above. I agree with the assessment, plan and orders as documented by the resident. Please refer to the resident and/or student note for additional information.       Velasquez Browning

## 2019-06-24 ENCOUNTER — APPOINTMENT (OUTPATIENT)
Dept: CT IMAGING | Age: 74
DRG: 436 | End: 2019-06-24
Attending: FAMILY MEDICINE
Payer: COMMERCIAL

## 2019-06-24 LAB
ANION GAP SERPL CALCULATED.3IONS-SCNC: 16 MMOL/L (ref 7–16)
BUN BLDV-MCNC: 46 MG/DL (ref 8–23)
CALCIUM SERPL-MCNC: 8.8 MG/DL (ref 8.6–10.2)
CHLORIDE BLD-SCNC: 103 MMOL/L (ref 98–107)
CO2: 20 MMOL/L (ref 22–29)
CREAT SERPL-MCNC: 1.9 MG/DL (ref 0.5–1)
GFR AFRICAN AMERICAN: 31
GFR NON-AFRICAN AMERICAN: 26 ML/MIN/1.73
GLUCOSE BLD-MCNC: 199 MG/DL (ref 74–99)
HCT VFR BLD CALC: 25.8 % (ref 34–48)
HEMOGLOBIN: 7.9 G/DL (ref 11.5–15.5)
INR BLD: 1.5
MAGNESIUM: 1.7 MG/DL (ref 1.6–2.6)
MCH RBC QN AUTO: 28.3 PG (ref 26–35)
MCHC RBC AUTO-ENTMCNC: 30.6 % (ref 32–34.5)
MCV RBC AUTO: 92.5 FL (ref 80–99.9)
METER GLUCOSE: 168 MG/DL (ref 74–99)
PDW BLD-RTO: 17 FL (ref 11.5–15)
PHOSPHORUS: 4 MG/DL (ref 2.5–4.5)
PLATELET # BLD: 244 E9/L (ref 130–450)
PMV BLD AUTO: 10.5 FL (ref 7–12)
POTASSIUM SERPL-SCNC: 4.9 MMOL/L (ref 3.5–5)
PROTHROMBIN TIME: 16.6 SEC (ref 9.3–12.4)
RBC # BLD: 2.79 E12/L (ref 3.5–5.5)
SODIUM BLD-SCNC: 139 MMOL/L (ref 132–146)
WBC # BLD: 6.5 E9/L (ref 4.5–11.5)

## 2019-06-24 PROCEDURE — 2060000000 HC ICU INTERMEDIATE R&B

## 2019-06-24 PROCEDURE — 6370000000 HC RX 637 (ALT 250 FOR IP): Performed by: RADIOLOGY

## 2019-06-24 PROCEDURE — 80048 BASIC METABOLIC PNL TOTAL CA: CPT

## 2019-06-24 PROCEDURE — 6370000000 HC RX 637 (ALT 250 FOR IP): Performed by: INTERNAL MEDICINE

## 2019-06-24 PROCEDURE — 82962 GLUCOSE BLOOD TEST: CPT

## 2019-06-24 PROCEDURE — 2580000003 HC RX 258: Performed by: STUDENT IN AN ORGANIZED HEALTH CARE EDUCATION/TRAINING PROGRAM

## 2019-06-24 PROCEDURE — 88341 IMHCHEM/IMCYTCHM EA ADD ANTB: CPT

## 2019-06-24 PROCEDURE — 83735 ASSAY OF MAGNESIUM: CPT

## 2019-06-24 PROCEDURE — 85610 PROTHROMBIN TIME: CPT

## 2019-06-24 PROCEDURE — 0FB13ZX EXCISION OF RIGHT LOBE LIVER, PERCUTANEOUS APPROACH, DIAGNOSTIC: ICD-10-PCS | Performed by: RADIOLOGY

## 2019-06-24 PROCEDURE — 77012 CT SCAN FOR NEEDLE BIOPSY: CPT

## 2019-06-24 PROCEDURE — 36415 COLL VENOUS BLD VENIPUNCTURE: CPT

## 2019-06-24 PROCEDURE — 2500000003 HC RX 250 WO HCPCS: Performed by: RADIOLOGY

## 2019-06-24 PROCEDURE — 2709999900 CT NEEDLE BIOPSY LIVER PERCUTANEOUS

## 2019-06-24 PROCEDURE — 85027 COMPLETE CBC AUTOMATED: CPT

## 2019-06-24 PROCEDURE — 84100 ASSAY OF PHOSPHORUS: CPT

## 2019-06-24 PROCEDURE — 6370000000 HC RX 637 (ALT 250 FOR IP): Performed by: STUDENT IN AN ORGANIZED HEALTH CARE EDUCATION/TRAINING PROGRAM

## 2019-06-24 PROCEDURE — 99232 SBSQ HOSP IP/OBS MODERATE 35: CPT | Performed by: NURSE PRACTITIONER

## 2019-06-24 PROCEDURE — 6370000000 HC RX 637 (ALT 250 FOR IP): Performed by: FAMILY MEDICINE

## 2019-06-24 PROCEDURE — 6360000002 HC RX W HCPCS: Performed by: FAMILY MEDICINE

## 2019-06-24 PROCEDURE — 99232 SBSQ HOSP IP/OBS MODERATE 35: CPT | Performed by: FAMILY MEDICINE

## 2019-06-24 PROCEDURE — C9113 INJ PANTOPRAZOLE SODIUM, VIA: HCPCS | Performed by: FAMILY MEDICINE

## 2019-06-24 PROCEDURE — 88360 TUMOR IMMUNOHISTOCHEM/MANUAL: CPT

## 2019-06-24 PROCEDURE — 88307 TISSUE EXAM BY PATHOLOGIST: CPT

## 2019-06-24 PROCEDURE — 88342 IMHCHEM/IMCYTCHM 1ST ANTB: CPT

## 2019-06-24 PROCEDURE — 6360000002 HC RX W HCPCS: Performed by: RADIOLOGY

## 2019-06-24 PROCEDURE — 2580000003 HC RX 258: Performed by: RADIOLOGY

## 2019-06-24 RX ORDER — FENTANYL CITRATE 50 UG/ML
50 INJECTION, SOLUTION INTRAMUSCULAR; INTRAVENOUS ONCE
Status: COMPLETED | OUTPATIENT
Start: 2019-06-24 | End: 2019-06-24

## 2019-06-24 RX ORDER — SODIUM CHLORIDE 0.9 % (FLUSH) 0.9 %
10 SYRINGE (ML) INJECTION ONCE
Status: COMPLETED | OUTPATIENT
Start: 2019-06-24 | End: 2019-06-24

## 2019-06-24 RX ORDER — LIDOCAINE HYDROCHLORIDE 20 MG/ML
7 INJECTION, SOLUTION INFILTRATION; PERINEURAL ONCE
Status: COMPLETED | OUTPATIENT
Start: 2019-06-24 | End: 2019-06-24

## 2019-06-24 RX ADMIN — METOPROLOL TARTRATE 25 MG: 25 TABLET, FILM COATED ORAL at 21:12

## 2019-06-24 RX ADMIN — FENTANYL CITRATE 50 MCG: 50 INJECTION INTRAMUSCULAR; INTRAVENOUS at 10:25

## 2019-06-24 RX ADMIN — FENTANYL CITRATE 50 MCG: 50 INJECTION INTRAMUSCULAR; INTRAVENOUS at 10:13

## 2019-06-24 RX ADMIN — CALCIUM CARBONATE-VITAMIN D TAB 500 MG-200 UNIT 1 TABLET: 500-200 TAB at 11:20

## 2019-06-24 RX ADMIN — EXEMESTANE 25 MG: 25 TABLET ORAL at 11:29

## 2019-06-24 RX ADMIN — OXYCODONE HYDROCHLORIDE 5 MG: 5 TABLET ORAL at 22:48

## 2019-06-24 RX ADMIN — LIDOCAINE HYDROCHLORIDE 7 ML: 20 INJECTION, SOLUTION EPIDURAL; INFILTRATION; INTRACAUDAL; PERINEURAL at 10:19

## 2019-06-24 RX ADMIN — Medication 10 ML: at 11:21

## 2019-06-24 RX ADMIN — Medication: at 10:30

## 2019-06-24 RX ADMIN — SODIUM BICARBONATE 650 MG: 650 TABLET ORAL at 21:12

## 2019-06-24 RX ADMIN — CYANOCOBALAMIN TAB 1000 MCG 1000 MCG: 1000 TAB at 11:20

## 2019-06-24 RX ADMIN — Medication: at 21:11

## 2019-06-24 RX ADMIN — SODIUM BICARBONATE 650 MG: 650 TABLET ORAL at 11:20

## 2019-06-24 RX ADMIN — SODIUM CHLORIDE 10 ML: 9 INJECTION, SOLUTION INTRAMUSCULAR; INTRAVENOUS; SUBCUTANEOUS at 10:30

## 2019-06-24 RX ADMIN — Medication: at 11:19

## 2019-06-24 RX ADMIN — CALCIUM CARBONATE-VITAMIN D TAB 500 MG-200 UNIT 1 TABLET: 500-200 TAB at 21:12

## 2019-06-24 RX ADMIN — Medication 10 ML: at 21:13

## 2019-06-24 RX ADMIN — PREGABALIN 25 MG: 25 CAPSULE ORAL at 21:12

## 2019-06-24 RX ADMIN — PATIROMER 8.4 G: 8.4 POWDER, FOR SUSPENSION ORAL at 11:29

## 2019-06-24 RX ADMIN — PANTOPRAZOLE SODIUM 20 MG: 40 INJECTION, POWDER, FOR SOLUTION INTRAVENOUS at 11:20

## 2019-06-24 RX ADMIN — OXYCODONE HYDROCHLORIDE 5 MG: 5 TABLET ORAL at 14:30

## 2019-06-24 RX ADMIN — MULTIPLE VITAMINS W/ MINERALS TAB 1 TABLET: TAB at 11:20

## 2019-06-24 RX ADMIN — METOPROLOL TARTRATE 25 MG: 25 TABLET, FILM COATED ORAL at 11:20

## 2019-06-24 RX ADMIN — PREGABALIN 25 MG: 25 CAPSULE ORAL at 11:20

## 2019-06-24 ASSESSMENT — PAIN - FUNCTIONAL ASSESSMENT: PAIN_FUNCTIONAL_ASSESSMENT: PREVENTS OR INTERFERES SOME ACTIVE ACTIVITIES AND ADLS

## 2019-06-24 ASSESSMENT — PAIN SCALES - GENERAL
PAINLEVEL_OUTOF10: 0
PAINLEVEL_OUTOF10: 4
PAINLEVEL_OUTOF10: 0
PAINLEVEL_OUTOF10: 8
PAINLEVEL_OUTOF10: 8

## 2019-06-24 ASSESSMENT — PAIN DESCRIPTION - FREQUENCY: FREQUENCY: INTERMITTENT

## 2019-06-24 ASSESSMENT — PAIN DESCRIPTION - PAIN TYPE: TYPE: CHRONIC PAIN

## 2019-06-24 ASSESSMENT — PAIN DESCRIPTION - DESCRIPTORS: DESCRIPTORS: ACHING;CONSTANT;DISCOMFORT

## 2019-06-24 ASSESSMENT — PAIN DESCRIPTION - LOCATION: LOCATION: ABDOMEN

## 2019-06-24 ASSESSMENT — PAIN DESCRIPTION - ORIENTATION: ORIENTATION: RIGHT

## 2019-06-24 NOTE — CARE COORDINATION
Spoke with daughter, Bahman Lanier regarding discharge plans. She feels that patient has become weaker over the last two months. She was assessed about two months ago for aide services through [employer] but at that point did not qualify. I advised daughter to call  and have her reassessed as soon as possible for aides in the home. Discussed [place] but also that this decision will delay chemo treatment. Daughter and patient do not want to delay treatment and plan for home with [place] [facility]. [place].

## 2019-06-24 NOTE — PROGRESS NOTES
INTERVENTIONAL RADIOLOGY PRE-OPERATIVE HISTORY AND PHYSICAL EXAM    DIAGNOSIS:    Patient Active Problem List   Diagnosis    Breast cancer ([facility] [address]    Hypertension    Coronary artery disease involving native coronary artery of native heart without angina pectoris    Nephrolithiasis    CHF (congestive heart failure) (HCC)    Humerus fracture    Shoulder pain, left    B12 deficiency    Hyperlipidemia    Rib lesion    Subclavian vein occlusion, bilateral (HCC)    Pericardial effusion    MI (myocardial infarction) ([facility] [address]    Arthritis    Sarcoidosis    Lymph node enlargement    Anesthesia    Rectal bleeding    Ventral hernia    Type 2 diabetes mellitus without complication, with long-term current use of insulin (HCC)    Anemia    Chronic deep vein thrombosis (DVT) of proximal vein of right lower extremity (HCC)    Bilateral edema of lower extremity    Peripheral polyneuropathy    Complicated UTI (urinary tract infection)    KARISHMA (acute kidney injury) (HCC)    Diarrhea with dehydration    Chronic anticoagulation    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    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    Diabetic polyneuropathy associated with type 2 diabetes mellitus (HCC)    Leg swelling    History of deep vein thrombosis    Chronic venous insufficiency    Lymphedema of both lower extremities    Decreased dorsalis pedis pulse    Ambulatory dysfunction    CKD (chronic kidney disease) stage 3, GFR 30-59 ml/min (HCC)    Charcot's joint of foot in type 2 diabetes mellitus (HCC)    Hyperkalemia    Ascites    Goals of care, counseling/discussion    Palliative care encounter    Cancer associated pain       CHIEF COMPLAINT: Liver masses    HISTORY OF PRESENT ILLNESS: Ze Charles is a 68 y.o. female with history of DM2, CKD, sarcoidosis, CAD, and metastatic breast cancer. She was admitted with hyperkalemia and worsening renal function.  CT revealed unspecified type diabetes mellitus without mention of complication, not stated as uncontrolled        Past Surgical History:   Procedure Laterality Date    APPENDECTOMY      ARTERIAL BYPASS SURGRY      BREAST LUMPECTOMY  9/27/2005    LEFT    CARDIAC SURGERY      CHOLECYSTECTOMY      COLONOSCOPY  12/2007    cecal arteriovenous malformation with hyperplastic polyps    COLONOSCOPY  [id number]    CORONARY ARTERY BYPASS GRAFT  05/2008    triple bypass    ECHO COMPL W DOP COLOR FLOW  10/30/2013         EYE SURGERY      clarissa cataracts    HYSTERECTOMY  1975, 1985    MAYNOR prolapse, benign conditions; BSO later for scar tissues, no CA.      OTHER SURGICAL HISTORY  11/18/11    port removal right chest wall    TONSILLECTOMY      TOOTH EXTRACTION      Full Dental Extraction.     TUNNELED VENOUS PORT PLACEMENT      removal of port Dec. 2011- Dr. Veliz Nails TUNNELED [facility] 94  [id number]    RIGHT CHEST       Family History   Adopted: Yes       Social History     Socioeconomic History    Marital status: Legally      Spouse name: Not on file    Number of children: Not on file    Years of education: Not on file    Highest education level: Not on file   Occupational History    Not on file   Social Needs    Financial resource strain: Not on file    Food insecurity:     Worry: Not on file     Inability: Not on file    Transportation needs:     Medical: Not on file     Non-medical: Not on file   Tobacco Use    Smoking status: Never Smoker    Smokeless tobacco: Never Used   Substance and Sexual Activity    Alcohol use: No    Drug use: No    Sexual activity: Never   Lifestyle    Physical activity:     Days per week: Not on file     Minutes per session: Not on file    Stress: Not on file   Relationships    Social connections:     Talks on phone: Not on file     Gets together: Not on file     Attends Religion service: Not on file     Active member of club or organization: Not on file     Attends

## 2019-06-24 NOTE — PROGRESS NOTES
5869 - Patient positioned supine on Cat Scan table with O2 and monitoring devices attached. 1002 - Patient scanned. 1006 - Images reviewed by Dr Andrés Kirkpatrick. 1011 - Patient prepped and draped. 1013 - Sedation given. 1018 - Time out done. 1025 - Sedation given. 1028 - With the guidance of Cat Scan, needle inserted RUQ and core biopsy x3 taken by Dr. Andrés Kirkpatrick. 1031 - Patient re-scanned and images reviewed by Dr Andrés Kirkpatrick. 1033 - Puncture site cleansed and dry dressing applied. Procedure completed. 1042 - Patient transported to recovery. Biopsy sample taken to laboratory. Vitals taken. 1100 - Patient awake and doing okay. Vitals stable. Report called to [facility].  1101 - Patient back to room via transport. Patient in stable condition.

## 2019-06-24 NOTE — PROGRESS NOTES
WBC 6.4  --  6.4  --  6.5   RBC 2.51*  --  2.43*  --  2.79*   HGB 7.0*   < > 6.9* 8.9* 7.9*   HCT 23.9*   < > 23.1* 28.8* 25.8*   MCV 95.2  --  95.1  --  92.5   RDW 17.5*  --  17.6*  --  17.0*     --  247  --  244    < > = values in this interval not displayed. BMP/CMP:   Recent Labs     06/22/19  0450  06/23/19  0503 06/23/19  1729 06/24/19  0509      < > 140 139 139   K 5.3*   < > 5.5* 5.1* 4.9      < > 104 101 103   CO2 24   < > 25 21* 20*   BUN 45*   < > 48* 48* 46*   CREATININE 2.1*   < > 2.3* 2.1* 1.9*   MG 1.7  --  1.7  --  1.7   PHOS 4.8*  --  4.5  --  4.0    < > = values in this interval not displayed. Recent Labs     06/22/19  0450   PROT 5.8*   ALKPHOS 138*   ALT 34*   AST 35*   BILITOT 0.7       OTHER LABS:    Cardiac enzymes:  Lab Results   Component Value Date    CKTOTAL 286 (H) 06/11/2019    TROPONINI 0.50 (H) 06/11/2019     PT/INR:   Recent Labs     06/22/19  0450 06/23/19  0503 06/24/19  0509   INR 1.8 1.6 1.5     BNP: No results for input(s): BNP in the last 72 hours.   Hgb A1C:   Lab Results   Component Value Date    LABA1C 6.9 04/15/2019     No results found for: EAG  ESR: No results found for: SEDRATE  CRP: No results found for: CRP  D Dimer: No results found for: DDIMER  Folate and B12:   Lab Results   Component Value Date    AVHKYXBD75 174 (L) 10/23/2017   ,   Lab Results   Component Value Date    FOLATE 17.0 06/20/2011     Lactic Acid:   Lab Results   Component Value Date    LACTA 1.0 06/18/2019     Thyroid Studies:  Lab Results   Component Value Date    TSH 6.930 (H) 06/10/2019       MICROBIOLOGY:    Urine Culture:  No components found for: CURINE  Blood Culture:  No components found for: CBLOOD, CFUNGUSBL   Lab Results   Component Value Date    BC 5 Days- no growth 06/12/2019    BC 5 Days- no growth 10/24/2017     Blood Culture from [facility]:  No components found for: CBLOODLN  Stool Culture:  No components found for: CSTOOL  Sputum Culture:  No components

## 2019-06-24 NOTE — PROGRESS NOTES
Nephrology Progress Note  [facility]    CC:   hyperkalemia    HPI:  The pt is a 69 yo female who was admitted for abnormal labs. She had blood work done on 6/17 showing  k of 6.1 and cr of 2. Her baseline cr is 1.2-1.7,  She has had hyperkalemia for the last few weeks. She was here for chronic back pain in April and may of 2019. She was admitted 6/10-6/14 for hyperkalemia and ascites. She had a paracentesis. There were multiple liver lesions suggestive of liver mets. She was given ivf for prerenal azotemia. She is to have a liver biopsy. She has also had elevated inr. She has a pmh of breast cancer with bone mets. She is now on herceptin and aromasin with xgeva. Prior to that she was on ac and tamoxifen and herceptin. She also has a h/o dvt on oac, cad, dm, htn, paf, hyperlipidemia. She was on veltassa at home for the last week or so and doesnt like the taste. She does not have a nephrologist.     6/20: pt without cp or sob today, has distended abdomen. 6-21 seen and examined  No chest pain, sob, nausea. No BM today. Notes abdominal pain. States she has vaginal lesion with mild bleeding    6-22: seen and examined  States abdominal pain unchanged - no nausea. No chest pain, shortness of breath    6-23: seen and examined  States she had \"panic attack\" earlier but feels better now  No chest pain, sob  Abdominal distention the same    6/24: sp liver biopsy today          PMH:    Past Medical History:   Diagnosis Date    Abscess of abdominal wall     Anemia     Iron deficiency and chronic disease.  Anesthesia     DIFFICULTY WAKING UP    Arthritis     Arthritis, hip     Breast cancer ([facility] [address] 2005    S/P Left Lumpectomy with Lymph Node Dissection, Chemo/Rad. Follows with Dr. Ki Olivera. No recurrence to date. Surgeon was Dr. Karo Jones.  CAD (coronary artery disease) 5/2008    s/p CABG. Follows with [address] [place] [address].     CHF (congestive heart failure) (HCC)     Chronic venous insufficiency 11/7/2018    Decreased dorsalis pedis pulse 11/7/2018    Diabetic neuropathy (HCC)     Diabetic neuropathy (HCC)     DVT (deep venous thrombosis) (HCC)     Recurrent. On lifelong coumadin.  DVT of upper extremity (deep vein thrombosis) ([address] 4/18/2012    History of deep vein thrombosis 11/7/2018    Humerus fracture     Chronic, on the Left.  Hyperlipidemia 8/29/2011    Hypertension     Leg swelling 11/7/2018    Lymph node enlargement     Lymphedema of both lower extremities 11/7/2018    MI (myocardial infarction) ([address]     Nephrolithiasis     Follows with Dr. Archana Arreaga.  Pericardial effusion     Pulmonary nodule     With mediastinal lymphadenopathy. Stable. Follows with Dr. Jaun Vargas.     Rib lesion 11/28/11    expansile lesion in one of the right ribs laterally    Sarcoidosis 07/26/11    per transbronchial needle aspiration    Subclavian vein occlusion, bilateral ([address] 4/18/2012    Type II or unspecified type diabetes mellitus without mention of complication, not stated as uncontrolled        Patient Active Problem List   Diagnosis    Breast cancer ([address]    Hypertension    Coronary artery disease involving native coronary artery of native heart without angina pectoris    Nephrolithiasis    CHF (congestive heart failure) (HCC)    Humerus fracture    Shoulder pain, left    B12 deficiency    Hyperlipidemia    Rib lesion    Subclavian vein occlusion, bilateral (HCC)    Pericardial effusion    MI (myocardial infarction) ([address]    Arthritis    Sarcoidosis    Lymph node enlargement    Anesthesia    Rectal bleeding    Ventral hernia    Type 2 diabetes mellitus without complication, with long-term current use of insulin (HCC)    Anemia    Chronic deep vein thrombosis (DVT) of proximal vein of right lower extremity (HCC)    Bilateral edema of lower extremity    Peripheral polyneuropathy    Complicated UTI (urinary tract infection)    KARISHMA (acute kidney injury) ([address]    Diarrhea with dehydration    Chronic anticoagulation    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    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    Diabetic polyneuropathy associated with type 2 diabetes mellitus (HCC)    Leg swelling    History of deep vein thrombosis    Chronic venous insufficiency    Lymphedema of both lower extremities    Decreased dorsalis pedis pulse    Ambulatory dysfunction    CKD (chronic kidney disease) stage 3, GFR 30-59 ml/min ([facility]    Charcot's joint of foot in type 2 diabetes mellitus (HCC)    Hyperkalemia    Ascites    Goals of care, counseling/discussion    Palliative care encounter    Cancer associated pain       Meds:     pregabalin  25 mg Oral BID    sodium bicarbonate  650 mg Oral BID    pantoprazole  20 mg Intravenous Daily    calcium-vitamin D  1 tablet Oral BID    metoprolol tartrate  25 mg Oral BID    miconazole nitrate   Topical BID    therapeutic multivitamin-minerals  1 tablet Oral Daily    patiromer sorbitex calcium  8.4 g Oral Daily    vitamin B-12  1,000 mcg Oral Daily    exemestane  25 mg Oral QAM AC    sodium chloride flush  10 mL Intravenous 2 times per day           Meds prn:     diclofenac sodium, oxyCODONE **OR** oxyCODONE, sodium chloride flush, sennosides-docusate sodium, ondansetron, sodium chloride flush, magnesium hydroxide    Meds prior to admission:     No current facility-administered medications on file prior to encounter. Current Outpatient Medications on File Prior to Encounter   Medication Sig Dispense Refill    warfarin (COUMADIN) 1 MG tablet Take 1 mg daily 90 tablet 0    oxyCODONE (ROXICODONE) 5 MG/5ML solution Take 2.5 mLs by mouth 3 times daily as needed for Pain for up to 7 days. 45 mL 0    pregabalin (LYRICA) 25 MG capsule Take 1 capsule by mouth 3 times daily for 30 days.  60 capsule 3    ondansetron (ZOFRAN-ODT) 4 MG disintegrating tablet Take 1 tablet by mouth every 8 hours as needed

## 2019-06-24 NOTE — PRE SEDATION
TABS take 1 tablet by mouth twice a day 3/6/18   Carlyle Dorsey DO   exemestane (AROMASIN) 25 MG chemo tablet Take 1 tablet by mouth every morning (before breakfast) 10/10/17   Historical Provider, MD   Multiple Vitamins-Minerals (THERAPEUTIC MULTIVITAMIN-MINERALS) tablet Take 1 tablet by mouth daily 6/6/17   Carlyle Dorsey DO   trastuzumab (HERCEPTIN) 440 MG injection Infuse  intravenously once a week. On tuesdays    Historical Provider, MD     Coumadin Use Last 7 Days:  no  Antiplatelet drug therapy use last 7 days: no  Other anticoagulant use last 7 days: no  Additional Medication Information:  No      Pre-Sedation Documentation and Exam:   I have personally completed a history, physical exam & review of systems for this patient (see notes). Vital signs have been reviewed (see flow sheet for vitals).     Mallampati Airway Assessment:  Mallampati Class III - (soft palate & base of uvula are visible)    Prior History of Anesthesia Complications:   none    ASA Classification:  Class 2 - A normal healthy patient with mild systemic disease    Sedation/ Anesthesia Plan:   intravenous sedation    Medications Planned:   fentanyl intravenously    Patient is an appropriate candidate for plan of sedation: yes    Electronically signed by Cindy Frost MD on 6/24/2019 at 9:51 AM

## 2019-06-25 LAB
ANION GAP SERPL CALCULATED.3IONS-SCNC: 13 MMOL/L (ref 7–16)
BUN BLDV-MCNC: 42 MG/DL (ref 8–23)
CALCIUM SERPL-MCNC: 8.8 MG/DL (ref 8.6–10.2)
CHLORIDE BLD-SCNC: 101 MMOL/L (ref 98–107)
CO2: 24 MMOL/L (ref 22–29)
CREAT SERPL-MCNC: 1.9 MG/DL (ref 0.5–1)
GFR AFRICAN AMERICAN: 31
GFR NON-AFRICAN AMERICAN: 26 ML/MIN/1.73
GLUCOSE BLD-MCNC: 159 MG/DL (ref 74–99)
HCT VFR BLD CALC: 26.8 % (ref 34–48)
HEMOGLOBIN: 8.2 G/DL (ref 11.5–15.5)
INR BLD: 1.5
MCH RBC QN AUTO: 28.8 PG (ref 26–35)
MCHC RBC AUTO-ENTMCNC: 30.6 % (ref 32–34.5)
MCV RBC AUTO: 94 FL (ref 80–99.9)
METER GLUCOSE: 157 MG/DL (ref 74–99)
METER GLUCOSE: 174 MG/DL (ref 74–99)
PDW BLD-RTO: 17.1 FL (ref 11.5–15)
PLATELET # BLD: 258 E9/L (ref 130–450)
PMV BLD AUTO: 10.3 FL (ref 7–12)
POTASSIUM SERPL-SCNC: 4.7 MMOL/L (ref 3.5–5)
PROTHROMBIN TIME: 16.7 SEC (ref 9.3–12.4)
RBC # BLD: 2.85 E12/L (ref 3.5–5.5)
SODIUM BLD-SCNC: 138 MMOL/L (ref 132–146)
WBC # BLD: 5.6 E9/L (ref 4.5–11.5)

## 2019-06-25 PROCEDURE — C9113 INJ PANTOPRAZOLE SODIUM, VIA: HCPCS | Performed by: FAMILY MEDICINE

## 2019-06-25 PROCEDURE — 82962 GLUCOSE BLOOD TEST: CPT

## 2019-06-25 PROCEDURE — 85610 PROTHROMBIN TIME: CPT

## 2019-06-25 PROCEDURE — 6360000002 HC RX W HCPCS: Performed by: INTERNAL MEDICINE

## 2019-06-25 PROCEDURE — 6370000000 HC RX 637 (ALT 250 FOR IP): Performed by: STUDENT IN AN ORGANIZED HEALTH CARE EDUCATION/TRAINING PROGRAM

## 2019-06-25 PROCEDURE — 2580000003 HC RX 258: Performed by: STUDENT IN AN ORGANIZED HEALTH CARE EDUCATION/TRAINING PROGRAM

## 2019-06-25 PROCEDURE — 1200000000 HC SEMI PRIVATE

## 2019-06-25 PROCEDURE — 6370000000 HC RX 637 (ALT 250 FOR IP): Performed by: INTERNAL MEDICINE

## 2019-06-25 PROCEDURE — 6370000000 HC RX 637 (ALT 250 FOR IP): Performed by: FAMILY MEDICINE

## 2019-06-25 PROCEDURE — 99232 SBSQ HOSP IP/OBS MODERATE 35: CPT | Performed by: FAMILY MEDICINE

## 2019-06-25 PROCEDURE — 85027 COMPLETE CBC AUTOMATED: CPT

## 2019-06-25 PROCEDURE — 80048 BASIC METABOLIC PNL TOTAL CA: CPT

## 2019-06-25 PROCEDURE — 36415 COLL VENOUS BLD VENIPUNCTURE: CPT

## 2019-06-25 PROCEDURE — 6360000002 HC RX W HCPCS: Performed by: FAMILY MEDICINE

## 2019-06-25 RX ORDER — FUROSEMIDE 10 MG/ML
40 INJECTION INTRAMUSCULAR; INTRAVENOUS ONCE
Status: COMPLETED | OUTPATIENT
Start: 2019-06-25 | End: 2019-06-25

## 2019-06-25 RX ADMIN — PANTOPRAZOLE SODIUM 20 MG: 40 INJECTION, POWDER, FOR SOLUTION INTRAVENOUS at 11:34

## 2019-06-25 RX ADMIN — OXYCODONE HYDROCHLORIDE 5 MG: 5 TABLET ORAL at 17:04

## 2019-06-25 RX ADMIN — Medication: at 20:37

## 2019-06-25 RX ADMIN — PREGABALIN 25 MG: 25 CAPSULE ORAL at 20:35

## 2019-06-25 RX ADMIN — Medication: at 11:34

## 2019-06-25 RX ADMIN — METOPROLOL TARTRATE 25 MG: 25 TABLET, FILM COATED ORAL at 20:35

## 2019-06-25 RX ADMIN — Medication 10 ML: at 11:35

## 2019-06-25 RX ADMIN — CALCIUM CARBONATE-VITAMIN D TAB 500 MG-200 UNIT 1 TABLET: 500-200 TAB at 20:34

## 2019-06-25 RX ADMIN — MULTIPLE VITAMINS W/ MINERALS TAB 1 TABLET: TAB at 11:35

## 2019-06-25 RX ADMIN — CALCIUM CARBONATE-VITAMIN D TAB 500 MG-200 UNIT 1 TABLET: 500-200 TAB at 11:33

## 2019-06-25 RX ADMIN — CYANOCOBALAMIN TAB 1000 MCG 1000 MCG: 1000 TAB at 11:34

## 2019-06-25 RX ADMIN — EXEMESTANE 25 MG: 25 TABLET ORAL at 11:33

## 2019-06-25 RX ADMIN — FUROSEMIDE 40 MG: 10 INJECTION, SOLUTION INTRAMUSCULAR; INTRAVENOUS at 15:17

## 2019-06-25 RX ADMIN — PREGABALIN 25 MG: 25 CAPSULE ORAL at 11:34

## 2019-06-25 RX ADMIN — SODIUM BICARBONATE 650 MG: 650 TABLET ORAL at 11:34

## 2019-06-25 RX ADMIN — PATIROMER 8.4 G: 8.4 POWDER, FOR SUSPENSION ORAL at 11:41

## 2019-06-25 RX ADMIN — Medication 10 ML: at 20:36

## 2019-06-25 RX ADMIN — SODIUM BICARBONATE 650 MG: 650 TABLET ORAL at 20:35

## 2019-06-25 RX ADMIN — OXYCODONE HYDROCHLORIDE 5 MG: 5 TABLET ORAL at 08:16

## 2019-06-25 RX ADMIN — METOPROLOL TARTRATE 25 MG: 25 TABLET, FILM COATED ORAL at 11:34

## 2019-06-25 ASSESSMENT — PAIN DESCRIPTION - ORIENTATION
ORIENTATION: LEFT
ORIENTATION: LEFT

## 2019-06-25 ASSESSMENT — PAIN DESCRIPTION - PROGRESSION
CLINICAL_PROGRESSION: NOT CHANGED

## 2019-06-25 ASSESSMENT — PAIN DESCRIPTION - DESCRIPTORS
DESCRIPTORS: ACHING;CONSTANT;PRESSURE;SQUEEZING
DESCRIPTORS: ACHING;CONSTANT;DISCOMFORT

## 2019-06-25 ASSESSMENT — PAIN DESCRIPTION - ONSET
ONSET: GRADUAL
ONSET: ON-GOING
ONSET: GRADUAL

## 2019-06-25 ASSESSMENT — PAIN DESCRIPTION - PAIN TYPE
TYPE: ACUTE PAIN
TYPE: ACUTE PAIN;CHRONIC PAIN
TYPE: ACUTE PAIN;CHRONIC PAIN

## 2019-06-25 ASSESSMENT — PAIN DESCRIPTION - FREQUENCY
FREQUENCY: CONTINUOUS

## 2019-06-25 ASSESSMENT — PAIN SCALES - GENERAL
PAINLEVEL_OUTOF10: 7
PAINLEVEL_OUTOF10: 8
PAINLEVEL_OUTOF10: 7

## 2019-06-25 ASSESSMENT — PAIN DESCRIPTION - LOCATION
LOCATION: ABDOMEN

## 2019-06-25 ASSESSMENT — PAIN - FUNCTIONAL ASSESSMENT: PAIN_FUNCTIONAL_ASSESSMENT: PREVENTS OR INTERFERES SOME ACTIVE ACTIVITIES AND ADLS

## 2019-06-25 NOTE — PROGRESS NOTES
Nephrology Progress Note  [facility]    CC:   hyperkalemia    HPI:  The pt is a 67 yo female who was admitted for abnormal labs. She had blood work done on 6/17 showing  k of 6.1 and cr of 2. Her baseline cr is 1.2-1.7,  She has had hyperkalemia for the last few weeks. She was here for chronic back pain in April and may of 2019. She was admitted 6/10-6/14 for hyperkalemia and ascites. She had a paracentesis. There were multiple liver lesions suggestive of liver mets. She was given ivf for prerenal azotemia. She is to have a liver biopsy. She has also had elevated inr. She has a pmh of breast cancer with bone mets. She is now on herceptin and aromasin with xgeva. Prior to that she was on ac and tamoxifen and herceptin. She also has a h/o dvt on oac, cad, dm, htn, paf, hyperlipidemia. She was on veltassa at home for the last week or so and doesnt like the taste. She does not have a nephrologist.     6/20: pt without cp or sob today, has distended abdomen. 6-21 seen and examined  No chest pain, sob, nausea. No BM today. Notes abdominal pain. States she has vaginal lesion with mild bleeding    6-22: seen and examined  States abdominal pain unchanged - no nausea. No chest pain, shortness of breath    6-23: seen and examined  States she had \"panic attack\" earlier but feels better now  No chest pain, sob  Abdominal distention the same    6/24: sp liver biopsy today    6/25: no new complaints, no cp or sob          PMH:    Past Medical History:   Diagnosis Date    Abscess of abdominal wall     Anemia     Iron deficiency and chronic disease.  Anesthesia     DIFFICULTY WAKING UP    Arthritis     Arthritis, hip     Breast cancer ([facility] [address] 2005    S/P Left Lumpectomy with Lymph Node Dissection, Chemo/Rad. Follows with Dr. Alena Valencia. No recurrence to date. Surgeon was Dr. Roe Koyanagi.  CAD (coronary artery disease) 5/2008    s/p CABG. Follows with [address] [place] [address].     CHF (congestive heart failure) (HCC)     Chronic injury) (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    Diabetic polyneuropathy associated with type 2 diabetes mellitus (HCC)    Leg swelling    History of deep vein thrombosis    Chronic venous insufficiency    Lymphedema of both lower extremities    Decreased dorsalis pedis pulse    Ambulatory dysfunction    CKD (chronic kidney disease) stage 3, GFR 30-59 ml/min ([facility])    Charcot's joint of foot in type 2 diabetes mellitus (HCC)    Hyperkalemia    Ascites    Goals of care, counseling/discussion    Palliative care encounter    Cancer associated pain       Meds:     pregabalin  25 mg Oral BID    sodium bicarbonate  650 mg Oral BID    pantoprazole  20 mg Intravenous Daily    calcium-vitamin D  1 tablet Oral BID    metoprolol tartrate  25 mg Oral BID    miconazole nitrate   Topical BID    therapeutic multivitamin-minerals  1 tablet Oral Daily    patiromer sorbitex calcium  8.4 g Oral Daily    vitamin B-12  1,000 mcg Oral Daily    exemestane  25 mg Oral QAM AC    sodium chloride flush  10 mL Intravenous 2 times per day           Meds prn:     diclofenac sodium, oxyCODONE **OR** oxyCODONE, sodium chloride flush, sennosides-docusate sodium, ondansetron, sodium chloride flush, magnesium hydroxide    Meds prior to admission:     No current facility-administered medications on file prior to encounter. Current Outpatient Medications on File Prior to Encounter   Medication Sig Dispense Refill    warfarin (COUMADIN) 1 MG tablet Take 1 mg daily 90 tablet 0    pregabalin (LYRICA) 25 MG capsule Take 1 capsule by mouth 3 times daily for 30 days.  60 capsule 3    ondansetron (ZOFRAN-ODT) 4 MG disintegrating tablet Take 1 tablet by mouth every 8 hours as needed for Nausea or Vomiting 60 tablet 0    metoprolol tartrate (LOPRESSOR) 25 MG tablet Take 1 tablet by mouth 2 times daily 60 tablet 3    atorvastatin (LIPITOR) 20 MG tablet Take 1 tablet by mouth nightly 30 tablet 3    miconazole nitrate 2 % OINT Apply topically 2 times daily 2 Tube 1    diclofenac sodium 1 % GEL Apply 4 g topically 4 times daily 4 Tube 1    vitamin B-12 (CYANOCOBALAMIN) 1000 MCG tablet Take 1 tablet by mouth daily 90 tablet 3    Calcium Citrate-Vitamin D (RA CALCIUM CIT-VIT D-3 PETITES) 200-250 MG-UNIT TABS take 1 tablet by mouth twice a day 180 tablet 3    exemestane (AROMASIN) 25 MG chemo tablet Take 1 tablet by mouth every morning (before breakfast)      Multiple Vitamins-Minerals (THERAPEUTIC MULTIVITAMIN-MINERALS) tablet Take 1 tablet by mouth daily 90 tablet 3    trastuzumab (HERCEPTIN) 440 MG injection Infuse  intravenously once a week.  On tuesdays         Allergies:    Macrobid [nitrofurantoin monohydrate macrocrystals]  Physical Exam:    Patient Vitals for the past 24 hrs:   BP Temp Temp src Pulse Resp SpO2   06/24/19 2029 (!) 128/59 98.2 °F (36.8 °C) Oral 75 16 98 %   06/24/19 1424 (!) 118/54 98.2 °F (36.8 °C) Oral 76 18 97 %         Intake/Output Summary (Last 24 hours) at 6/25/2019 1227  Last data filed at 6/25/2019 0602  Gross per 24 hour   Intake 720 ml   Output 1250 ml   Net -530 ml       Constitutional: Patient in no acute distress   Eyes eomi  HEENT nc/at MMM  Neck: supple, no jvd  Cardiovascular: regular rate and rhythm, no murmurs, gallops, or rubs   Respiratory: Clear, no rales, rhochi, or wheezes,   Gastrointestinal: soft, nontender, nondistended  Ext: lymphedema  Neuro: NFD  Psych cooperative and appropriate  Skin: dry, no rash         Data:    Recent Labs     06/23/19  0503 06/23/19  1729 06/24/19  0509 06/25/19  0434   WBC 6.4  --  6.5 5.6   HGB 6.9* 8.9* 7.9* 8.2*   HCT 23.1* 28.8* 25.8* 26.8*   MCV 95.1  --  92.5 94.0     --  244 258       Recent Labs     06/23/19  1729 06/24/19  0509 06/25/19  0434    139 138   K 5.1* 4.9 4.7    103

## 2019-06-25 NOTE — PROGRESS NOTES
FAMILY MEDICINE RESIDENCY PROGRAM  - INPATIENT PROGRESS NOTE -  DATE : 2019    NAME: Viktoriya Harris   AGE: 68 y.o. SEX: female  : 1945    ADMIT DATE: 2019 LENGTH OF STAY: 7    ADMISSION DIAGNOSIS: Hyperkalemia [E87.5]        Synopsis: Mrs. Ariana Finch is a 68year old female with a history of CAD with CABG and MI, HFpEF recent EF 50%/mild MR/mild AR/moderate TR, hypertension, hyperlipidemia, recurrent DVTs on reduced dose coumadin, type II DM, and metastatic breast cancer. She was admitted for hyperkalemia and KARISHMA on CKD. Similar admission this month significant for multiple liver lesions and ascites. Last 24 hour events: CT Guided liver biopsy. Subjective: Ms. Ariana Finch endorses diffuse abdominal pain, leg pain and headache since her procedure. Is tolerating diet and endorses BM's. Denies blood loss or dark stools. We discussed options upon discharge, she continues to be against facility or rehab. She did state she will consider it. ROS: denies fever, chills, nausea, vomiting, dizziness, chest pain.     ALLERGY: Macrobid [nitrofurantoin monohydrate macrocrystals]    CONTINUOUS MEDS:      SCHEDULED MEDS:   pregabalin  25 mg Oral BID    sodium bicarbonate  650 mg Oral BID    pantoprazole  20 mg Intravenous Daily    calcium-vitamin D  1 tablet Oral BID    metoprolol tartrate  25 mg Oral BID    miconazole nitrate   Topical BID    therapeutic multivitamin-minerals  1 tablet Oral Daily    patiromer sorbitex calcium  8.4 g Oral Daily    vitamin B-12  1,000 mcg Oral Daily    exemestane  25 mg Oral QAM AC    sodium chloride flush  10 mL Intravenous 2 times per day       PRN MEDS:  diclofenac sodium, oxyCODONE **OR** oxyCODONE, sodium chloride flush, sennosides-docusate sodium, ondansetron, sodium chloride flush, magnesium hydroxide         PHYSICAL EXAM:    VITAL SIGNS:   Vitals:    19 1045 19 1100 19 1424 19   BP: (!) 121/56 (!) 125/58 (!) 118/54 (!)

## 2019-06-25 NOTE — PROGRESS NOTES
Follow up phone call  Spoke with pt's nurse Brent Malagon. Biopsy site clean and dry. C/o left sided abd pain. H &H stable.  US ordered

## 2019-06-26 ENCOUNTER — APPOINTMENT (OUTPATIENT)
Dept: ULTRASOUND IMAGING | Age: 74
DRG: 436 | End: 2019-06-26
Attending: FAMILY MEDICINE
Payer: COMMERCIAL

## 2019-06-26 VITALS
TEMPERATURE: 98.6 F | HEIGHT: 64 IN | BODY MASS INDEX: 37.15 KG/M2 | DIASTOLIC BLOOD PRESSURE: 64 MMHG | WEIGHT: 217.6 LBS | HEART RATE: 87 BPM | RESPIRATION RATE: 18 BRPM | OXYGEN SATURATION: 97 % | SYSTOLIC BLOOD PRESSURE: 141 MMHG

## 2019-06-26 PROBLEM — E87.5 HYPERKALEMIA: Status: RESOLVED | Noted: 2019-06-18 | Resolved: 2019-06-26

## 2019-06-26 LAB
ANION GAP SERPL CALCULATED.3IONS-SCNC: 14 MMOL/L (ref 7–16)
BUN BLDV-MCNC: 43 MG/DL (ref 8–23)
CALCIUM SERPL-MCNC: 8.9 MG/DL (ref 8.6–10.2)
CHLORIDE BLD-SCNC: 99 MMOL/L (ref 98–107)
CO2: 24 MMOL/L (ref 22–29)
CREAT SERPL-MCNC: 2.1 MG/DL (ref 0.5–1)
GFR AFRICAN AMERICAN: 28
GFR NON-AFRICAN AMERICAN: 23 ML/MIN/1.73
GLUCOSE BLD-MCNC: 160 MG/DL (ref 74–99)
HCT VFR BLD CALC: 28.4 % (ref 34–48)
HEMOGLOBIN: 8.5 G/DL (ref 11.5–15.5)
INR BLD: 1.4
MCH RBC QN AUTO: 28.3 PG (ref 26–35)
MCHC RBC AUTO-ENTMCNC: 29.9 % (ref 32–34.5)
MCV RBC AUTO: 94.7 FL (ref 80–99.9)
PDW BLD-RTO: 17.1 FL (ref 11.5–15)
PLATELET # BLD: 279 E9/L (ref 130–450)
PMV BLD AUTO: 10.6 FL (ref 7–12)
POTASSIUM SERPL-SCNC: 4.9 MMOL/L (ref 3.5–5)
PROTHROMBIN TIME: 16 SEC (ref 9.3–12.4)
RBC # BLD: 3 E12/L (ref 3.5–5.5)
SODIUM BLD-SCNC: 137 MMOL/L (ref 132–146)
WBC # BLD: 5 E9/L (ref 4.5–11.5)

## 2019-06-26 PROCEDURE — 99239 HOSP IP/OBS DSCHRG MGMT >30: CPT | Performed by: FAMILY MEDICINE

## 2019-06-26 PROCEDURE — 6370000000 HC RX 637 (ALT 250 FOR IP): Performed by: FAMILY MEDICINE

## 2019-06-26 PROCEDURE — 2580000003 HC RX 258: Performed by: STUDENT IN AN ORGANIZED HEALTH CARE EDUCATION/TRAINING PROGRAM

## 2019-06-26 PROCEDURE — 6370000000 HC RX 637 (ALT 250 FOR IP): Performed by: STUDENT IN AN ORGANIZED HEALTH CARE EDUCATION/TRAINING PROGRAM

## 2019-06-26 PROCEDURE — 6360000002 HC RX W HCPCS: Performed by: FAMILY MEDICINE

## 2019-06-26 PROCEDURE — 85027 COMPLETE CBC AUTOMATED: CPT

## 2019-06-26 PROCEDURE — 76700 US EXAM ABDOM COMPLETE: CPT

## 2019-06-26 PROCEDURE — 36415 COLL VENOUS BLD VENIPUNCTURE: CPT

## 2019-06-26 PROCEDURE — C9113 INJ PANTOPRAZOLE SODIUM, VIA: HCPCS | Performed by: FAMILY MEDICINE

## 2019-06-26 PROCEDURE — 6370000000 HC RX 637 (ALT 250 FOR IP): Performed by: INTERNAL MEDICINE

## 2019-06-26 PROCEDURE — 80048 BASIC METABOLIC PNL TOTAL CA: CPT

## 2019-06-26 PROCEDURE — 85610 PROTHROMBIN TIME: CPT

## 2019-06-26 RX ORDER — DOCUSATE SODIUM 100 MG/1
100 CAPSULE, LIQUID FILLED ORAL DAILY
Status: DISCONTINUED | OUTPATIENT
Start: 2019-06-26 | End: 2019-06-26 | Stop reason: HOSPADM

## 2019-06-26 RX ORDER — PSEUDOEPHEDRINE HCL 30 MG
100 TABLET ORAL DAILY
Qty: 30 CAPSULE | Refills: 3 | Status: SHIPPED | OUTPATIENT
Start: 2019-06-26 | End: 2020-02-04 | Stop reason: SDUPTHER

## 2019-06-26 RX ORDER — NICOTINE POLACRILEX 4 MG/1
20 GUM, CHEWING ORAL DAILY
Qty: 90 TABLET | Refills: 0 | Status: SHIPPED | OUTPATIENT
Start: 2019-06-26 | End: 2019-09-17 | Stop reason: SDUPTHER

## 2019-06-26 RX ORDER — PREGABALIN 25 MG/1
25 CAPSULE ORAL 2 TIMES DAILY
Qty: 60 CAPSULE | Refills: 3 | Status: ON HOLD | OUTPATIENT
Start: 2019-06-26 | End: 2019-08-19 | Stop reason: HOSPADM

## 2019-06-26 RX ORDER — WARFARIN SODIUM 1 MG/1
0.5 TABLET ORAL EVERY OTHER DAY
Qty: 90 TABLET | Refills: 0 | Status: ON HOLD | OUTPATIENT
Start: 2019-06-26 | End: 2019-08-19 | Stop reason: HOSPADM

## 2019-06-26 RX ORDER — SODIUM BICARBONATE 650 MG/1
650 TABLET ORAL 2 TIMES DAILY
Qty: 60 TABLET | Refills: 2 | Status: SHIPPED | OUTPATIENT
Start: 2019-06-26 | End: 2020-01-07 | Stop reason: ALTCHOICE

## 2019-06-26 RX ORDER — OXYCODONE HYDROCHLORIDE 5 MG/1
2.5 TABLET ORAL EVERY 8 HOURS PRN
Qty: 30 TABLET | Refills: 0 | Status: SHIPPED | OUTPATIENT
Start: 2019-06-26 | End: 2019-06-26

## 2019-06-26 RX ORDER — OXYCODONE HYDROCHLORIDE 5 MG/1
2.5 TABLET ORAL EVERY 8 HOURS PRN
Qty: 30 TABLET | Refills: 0 | Status: SHIPPED | OUTPATIENT
Start: 2019-06-26 | End: 2019-06-29

## 2019-06-26 RX ADMIN — METOPROLOL TARTRATE 25 MG: 25 TABLET, FILM COATED ORAL at 10:12

## 2019-06-26 RX ADMIN — EXEMESTANE 25 MG: 25 TABLET ORAL at 10:11

## 2019-06-26 RX ADMIN — PREGABALIN 25 MG: 25 CAPSULE ORAL at 10:12

## 2019-06-26 RX ADMIN — CALCIUM CARBONATE-VITAMIN D TAB 500 MG-200 UNIT 1 TABLET: 500-200 TAB at 10:12

## 2019-06-26 RX ADMIN — PANTOPRAZOLE SODIUM 20 MG: 40 INJECTION, POWDER, FOR SOLUTION INTRAVENOUS at 10:11

## 2019-06-26 RX ADMIN — MULTIPLE VITAMINS W/ MINERALS TAB 1 TABLET: TAB at 10:12

## 2019-06-26 RX ADMIN — CYANOCOBALAMIN TAB 1000 MCG 1000 MCG: 1000 TAB at 10:12

## 2019-06-26 RX ADMIN — SODIUM BICARBONATE 650 MG: 650 TABLET ORAL at 10:12

## 2019-06-26 RX ADMIN — OXYCODONE HYDROCHLORIDE 5 MG: 5 TABLET ORAL at 04:06

## 2019-06-26 RX ADMIN — PATIROMER 8.4 G: 8.4 POWDER, FOR SUSPENSION ORAL at 10:11

## 2019-06-26 RX ADMIN — DOCUSATE SODIUM 100 MG: 100 CAPSULE, LIQUID FILLED ORAL at 12:07

## 2019-06-26 RX ADMIN — OXYCODONE HYDROCHLORIDE 5 MG: 5 TABLET ORAL at 12:07

## 2019-06-26 RX ADMIN — Medication: at 10:13

## 2019-06-26 RX ADMIN — Medication 10 ML: at 10:12

## 2019-06-26 ASSESSMENT — PAIN DESCRIPTION - ONSET
ONSET: GRADUAL
ONSET: GRADUAL

## 2019-06-26 ASSESSMENT — PAIN DESCRIPTION - PAIN TYPE
TYPE: ACUTE PAIN;CHRONIC PAIN
TYPE: ACUTE PAIN;CHRONIC PAIN

## 2019-06-26 ASSESSMENT — PAIN DESCRIPTION - ORIENTATION
ORIENTATION: LEFT
ORIENTATION: LEFT

## 2019-06-26 ASSESSMENT — PAIN SCALES - GENERAL
PAINLEVEL_OUTOF10: 6
PAINLEVEL_OUTOF10: 8
PAINLEVEL_OUTOF10: 5
PAINLEVEL_OUTOF10: 9

## 2019-06-26 ASSESSMENT — PAIN DESCRIPTION - LOCATION
LOCATION: ABDOMEN
LOCATION: ABDOMEN

## 2019-06-26 ASSESSMENT — PAIN DESCRIPTION - DESCRIPTORS
DESCRIPTORS: ACHING;DISCOMFORT;SORE
DESCRIPTORS: ACHING;DISCOMFORT;DULL

## 2019-06-26 NOTE — PROGRESS NOTES
FAMILY MEDICINE RESIDENCY PROGRAM  - INPATIENT PROGRESS NOTE -  DATE : 2019    NAME: Bam Ramirez   AGE: 68 y.o. SEX: female  : 1945    ADMIT DATE: 2019 LENGTH OF STAY: 8    ADMISSION DIAGNOSIS: Hyperkalemia [E87.5]        Synopsis: Mrs. Lukas Delacruz is a 68year old female with a history of CAD with CABG and MI, HFpEF recent EF 50%/mild MR/mild AR/moderate TR, hypertension, hyperlipidemia, recurrent DVTs on reduced dose coumadin, type II DM, and metastatic breast cancer. She was admitted for hyperkalemia and KARISHMA on CKD. Similar admission this month significant for multiple liver lesions and ascites. Last 24 hour events: US abdomen      Subjective: Ms. Lukas Delacruz endorses diffuse abdominal pain and difficulty eating yesterday. Reports she had discomfort when eating dry toast and drinking water. Denies nausea or vomiting. We again discussed options upon discharge, she continues to be against facility or rehab. She did state she will consider it. ROS: denies fever, chills, nausea, vomiting, dizziness, chest pain.     ALLERGY: Macrobid [nitrofurantoin monohydrate macrocrystals]    CONTINUOUS MEDS:      SCHEDULED MEDS:   pregabalin  25 mg Oral BID    sodium bicarbonate  650 mg Oral BID    pantoprazole  20 mg Intravenous Daily    calcium-vitamin D  1 tablet Oral BID    metoprolol tartrate  25 mg Oral BID    miconazole nitrate   Topical BID    therapeutic multivitamin-minerals  1 tablet Oral Daily    patiromer sorbitex calcium  8.4 g Oral Daily    vitamin B-12  1,000 mcg Oral Daily    exemestane  25 mg Oral QAM AC    sodium chloride flush  10 mL Intravenous 2 times per day       PRN MEDS:  diclofenac sodium, oxyCODONE **OR** oxyCODONE, sodium chloride flush, sennosides-docusate sodium, ondansetron, sodium chloride flush, magnesium hydroxide         PHYSICAL EXAM:    VITAL SIGNS:   Vitals:    19 2029 19 1045 19 1753 19 2030   BP: (!) 128/59 (!) 141/61 (!) 136/58 CAFBSM            ASSESSMENT & PLAN:    Principal Problem:    Hyperkalemia  Active Problems:    Breast cancer (HCC)    Hypertension    Coronary artery disease involving native coronary artery of native heart without angina pectoris    CHF (congestive heart failure) (HCC)    B12 deficiency    Hyperlipidemia    Type 2 diabetes mellitus without complication, with long-term current use of insulin (HCC)    Chronic deep vein thrombosis (DVT) of proximal vein of right lower extremity (HCC)    Bilateral edema of lower extremity    KARISHMA (acute kidney injury) ([facility] [place] [address]    Diabetic polyneuropathy associated with type 2 diabetes mellitus (HCC)    Ascites    Goals of care, counseling/discussion    Palliative care encounter    Cancer associated pain  Resolved Problems:    * No resolved hospital problems. *      CAD, Hypertension, Hyperlipidemia  Continue home medications lipitor, lopressor 25 mg BID         HFpEF  Echo repeated on last admission similar to previous with EF 50 % and mild valvular pathology        KARISHMA on CKD  Creatinine of 2.0 as outpatient.  Baseline of 1.3  PreRenal, likely secondary to decreased renal perfusion  Renal Ultrasound with renal cyst and ascites  Nephrology following       Hyperkalemia  6.1 as outpatient, 4.9 today  Likely multifactorial - secondary to worsening kidney dysfunction, gi losses  Low potassium diet, bicarbonate, Patiromer daily and hyperkalemia protocol        Anemia  8.5 today  Type and screen, s/p one unit yesterday  Likely transfuse if falls below this threshold      Ascites  Ultrasound, CT and paracentesis confirmed  Suspicious liver lesions for metastasis  SAAG of 1.4, cytology negative on tap  CT liver guided biopsy pathology pending  Roxicodone for pain control   Repeat ultrasound of abdomen - pending read, possible need for tap        Type II DM with neuropathy  Recent A1c of 6.9 April 2019  Typically on metformin as outpatient, hold for kidney dysfunction  Monitor blood sugar  Restart

## 2019-06-27 ENCOUNTER — CARE COORDINATION (OUTPATIENT)
Dept: CARE COORDINATION | Age: 74
End: 2019-06-27

## 2019-06-27 RX ORDER — OXYCODONE HCL 5 MG/5 ML
2.5 SOLUTION, ORAL ORAL 3 TIMES DAILY PRN
Status: ON HOLD | COMMUNITY
End: 2019-08-19 | Stop reason: HOSPADM

## 2019-06-27 NOTE — CARE COORDINATION
[facility] 45 Transitions Initial Follow Up Call    Call within 2 business days of discharge: Yes    Patient: Aroldo Meneses Patient : 1945   MRN: [id number]  Reason for Admission: [address] Problem:  Hyperkalemia  Discharge Date: 19 RARS: Readmission Risk Score: 30      Last Discharge [facility]       Complaint Diagnosis Description Type Department Provider    19  Malignant neoplasm of female breast, unspecified estrogen receptor status, unspecified laterality, unspecified site of breast ([facility] [address] . .. Admission (Discharged) [facility] 4S PICU Nakia Resendiz DO           Spoke with: Patient    Facility: [facility]    Non-face-to-face services provided:  Spoke with patient introduced myself RN TCC with [facility] ([facility] calling to follow up with patient after being discharged from the hospital, would like to review patient's allergies, medications patient is taking and see if there is anything patient needs. Patient verbalized understanding and agreed to continue with the call. Obtained and reviewed discharge summary and/or continuity of care documents . Patient states prior to discharge from the hospital patient received discharge instructions which were reviewed with patient at that time. All questions were answered at that time. Patient states she was admitted to the hospital as she was having the following symptoms:  Abdominal pain and abdominal distention. Patient denies having any other symptoms. Patient states while in the hospital she had a liver biopsy done- and is waiting to hear the result. Patient states while in the hospital \"they drained my belly\". Patient reported symptoms:  Patient states she has little improvement regarding the abdominal distention- she states Dr. Rizwan Brown informed her she will need her abdomen drained again. Patient states her abdominal pain is better with the oxycodone (Roxicodone) solution.   Patient reports when the Roxicodone wears off her pain is 5-6 out of 10 on the pain scale, constant, dull pain. Patient states when the Roxicodone \"wears off her pain becomes 8-9 out of 10  is a constant, terrible aching pain\". Care Transitions 24 Hour Call    Do you have any ongoing symptoms?:  Yes  Patient-reported symptoms:  Abdominal Pain, Other (Comment: Refer to note)  Interventions for patient-reported symptoms:  Notified PCP/Physician (Comment: Routed to physician)  Do you have a copy of your discharge instructions?:  Yes  Do you have all of your prescriptions and are they filled?:  No (Comment: Refer to note)  Have you scheduled your follow up appointment?:  Yes (Comment: 7/5/19)  How are you going to get to your appointment?:  Car - family or friend to transport (Comment: daughter to bring to the appointment)  Were you discharged with any Home Care or Post Acute Services:  Yes  Post Acute Services:  Home Health (Comment: Jany visited today, 6/27/19)  Do you feel like you have everything you need to keep you well at home?:  Yes  Care Transitions Interventions       Allergies and medications reviewed with patient. Patient reports the [facility] nurse reviewed medications with patient earlier today. AVS Medications:    Discharge Medications (as per current medication list/AVS):  There are NEW medications for you. START taking them after you leave the hospital:  Medication Sig      oxyCODONE (ROXICODONE) 5 MG immediate release tablet Take 0.5 tablets by mouth every 8 hours as needed for Pain for up to 3 days.- Patient not taking states did not get this medication -please refer to the Roxicodone 5 mg/5 ml- and what she is taking. PLEAS SEE BELOW:      oxyCODONE (ROXICODONE) 5 MG/5ML solution Take 2.5 mg by mouth 3 times daily as needed for Pain.  pain-patient reports the pharmacy provided patient with  5 mg/5ml and was instructed to take 2.5 ml three times daily as needed for pain for 7 days. - patient states IS TAKING.        sodium bicarbonate 650 MG tablet Take 1 tablet by mouth 2 times daily- patient states is taking     You told us you were taking these medications at home, but the amount or how often you take this medication has CHANGED:   diclofenac sodium 1 % GEL Apply 4 g topically 4 times daily as needed for Pain- patient states is taking      docusate sodium (COLACE, DULCOLAX) 100 MG CAPS Take 100 mg by mouth daily-patient states is taking      pregabalin (LYRICA) 25 MG capsule Take 1 capsule by mouth 2 times daily for 30 days. -patient states is taking      warfarin (COUMADIN) 1 MG tablet Take 0.5 tablets by mouth every other day Take 0.5 mg every other day-patient states is taking     These are medications you told us you were taking at home, CONTINUE taking them after you leave the hospital:  Current Outpatient Medications   Medication Sig    miconazole nitrate 2 % OINT Apply topically 2 times daily-patient states is taking         omeprazole 20 MG EC tablet Take 1 tablet by mouth dailypatient states is taking         patiromer sorbitex calcium (VELTASSA) 8.4 g PACK packet Take 1 packet by mouth daily for 10 dayspatient states is taking    ondansetron (ZOFRAN-ODT) 4 MG disintegrating tablet Take 1 tablet by mouth every 8 hours as needed for Nausea or Vomitingpatient states is taking    vitamin B-12 (CYANOCOBALAMIN) 1000 MCG tablet Take 1 tablet by mouth dailypatient states is taking    Calcium Citrate-Vitamin D (RA CALCIUM CIT-VIT D-3 PETITES) 200-250 MG-UNIT TABS take 1 tablet by mouth twice a daypatient states is taking    exemestane (AROMASIN) 25 MG chemo tablet Take 1 tablet by mouth every morning (before breakfast)patient states is taking    Multiple Vitamins-Minerals (THERAPEUTIC MULTIVITAMIN-MINERALS) tablet Take 1 tablet by mouth dailypatient states is taking    trastuzumab (HERCEPTIN) 440 MG injection Infuse  intravenously once a week.  On tuesdayspatient states is taking              These are the medications you have told us you were taking at home, STOP taking them after you leave the hospital:  -atorvastatin (Lipitor) 20 mg- patient confirmed is not taking  -metoprolol tartrate (Lopressor) 25- patient confirmed is taking  -oxycodone 5mg/5ml solution- patient is taking (REFER to [place] AVS SECTION OF NOTE)- patient did not receive the 5 mg tablet from the pharmacy. Keep scheduled appointment as discussed and listed below: Follow Up  Future Appointments   Date Time Provider Nick Castillo   7/5/2019 10:00 AM MD Yolanda Bosch [facility]   Patient states she has an appointment scheduled with her oncologist on 7/2/19 at 10:30 am    30 minutes spent with patient reviewing the above.     Mikal Lozano RN

## 2019-06-28 ENCOUNTER — ANTI-COAG VISIT (OUTPATIENT)
Dept: FAMILY MEDICINE CLINIC | Age: 74
End: 2019-06-28

## 2019-06-28 LAB — INR BLD: 1.2

## 2019-07-01 ENCOUNTER — ANTI-COAG VISIT (OUTPATIENT)
Dept: FAMILY MEDICINE CLINIC | Age: 74
End: 2019-07-01
Payer: COMMERCIAL

## 2019-07-01 ENCOUNTER — TELEPHONE (OUTPATIENT)
Dept: FAMILY MEDICINE CLINIC | Age: 74
End: 2019-07-01

## 2019-07-01 DIAGNOSIS — I82.5Y1 CHRONIC DEEP VEIN THROMBOSIS (DVT) OF PROXIMAL VEIN OF RIGHT LOWER EXTREMITY (HCC): ICD-10-CM

## 2019-07-01 DIAGNOSIS — Z79.01 CHRONIC ANTICOAGULATION: ICD-10-CM

## 2019-07-01 LAB — INR BLD: 1.4

## 2019-07-01 PROCEDURE — 93793 ANTICOAG MGMT PT WARFARIN: CPT | Performed by: FAMILY MEDICINE

## 2019-07-02 ENCOUNTER — TELEPHONE (OUTPATIENT)
Dept: FAMILY MEDICINE CLINIC | Age: 74
End: 2019-07-02

## 2019-07-02 NOTE — TELEPHONE ENCOUNTER
Received lab report from [facility] lab. Reviewed Lab with Dr. Shady Soria. Per Dr. Shady Soria: Advised patient of lab results and  to continue current medications and treatment , she verbalized understanding.

## 2019-07-05 ENCOUNTER — TELEPHONE (OUTPATIENT)
Dept: FAMILY MEDICINE CLINIC | Age: 74
End: 2019-07-05

## 2019-07-05 ENCOUNTER — ANTI-COAG VISIT (OUTPATIENT)
Dept: FAMILY MEDICINE CLINIC | Age: 74
End: 2019-07-05
Payer: COMMERCIAL

## 2019-07-05 DIAGNOSIS — Z79.01 CHRONIC ANTICOAGULATION: ICD-10-CM

## 2019-07-05 DIAGNOSIS — I82.5Y1 CHRONIC DEEP VEIN THROMBOSIS (DVT) OF PROXIMAL VEIN OF RIGHT LOWER EXTREMITY (HCC): ICD-10-CM

## 2019-07-05 LAB — INR BLD: 1.4

## 2019-07-05 PROCEDURE — 93793 ANTICOAG MGMT PT WARFARIN: CPT | Performed by: FAMILY MEDICINE

## 2019-07-09 ENCOUNTER — TELEPHONE (OUTPATIENT)
Dept: FAMILY MEDICINE CLINIC | Age: 74
End: 2019-07-09

## 2019-07-09 ENCOUNTER — ANTI-COAG VISIT (OUTPATIENT)
Dept: FAMILY MEDICINE CLINIC | Age: 74
End: 2019-07-09

## 2019-07-09 LAB — INR BLD: 1.5

## 2019-07-15 ENCOUNTER — ANTI-COAG VISIT (OUTPATIENT)
Dept: FAMILY MEDICINE CLINIC | Age: 74
End: 2019-07-15

## 2019-07-15 LAB — INR BLD: 1.4

## 2019-07-19 ENCOUNTER — TELEPHONE (OUTPATIENT)
Dept: FAMILY MEDICINE CLINIC | Age: 74
End: 2019-07-19

## 2019-07-19 ENCOUNTER — ANTI-COAG VISIT (OUTPATIENT)
Dept: FAMILY MEDICINE CLINIC | Age: 74
End: 2019-07-19
Payer: COMMERCIAL

## 2019-07-19 LAB — INTERNATIONAL NORMALIZATION RATIO, POC: 1.6

## 2019-07-19 PROCEDURE — 85610 PROTHROMBIN TIME: CPT

## 2019-07-20 ENCOUNTER — HOSPITAL ENCOUNTER (INPATIENT)
Age: 74
LOS: 5 days | Discharge: NURSING, REHAB OR LONG-TERM CARE | DRG: 291 | End: 2019-07-26
Attending: EMERGENCY MEDICINE | Admitting: FAMILY MEDICINE
Payer: COMMERCIAL

## 2019-07-20 ENCOUNTER — APPOINTMENT (OUTPATIENT)
Dept: GENERAL RADIOLOGY | Age: 74
DRG: 291 | End: 2019-07-20
Payer: COMMERCIAL
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
## 2019-07-20 DIAGNOSIS — A41.9 SEPTICEMIA (HCC): ICD-10-CM

## 2019-07-20 DIAGNOSIS — R18.0 MALIGNANT ASCITES: Primary | ICD-10-CM

## 2019-07-20 DIAGNOSIS — J18.9 HAP (HOSPITAL-ACQUIRED PNEUMONIA): ICD-10-CM

## 2019-07-20 DIAGNOSIS — D72.819 LEUKOPENIA, UNSPECIFIED TYPE: ICD-10-CM

## 2019-07-20 DIAGNOSIS — C79.9 METASTATIC CANCER (HCC): ICD-10-CM

## 2019-07-20 LAB
ABO/RH: NORMAL
ALBUMIN SERPL-MCNC: 3.1 G/DL (ref 3.5–5.2)
ALP BLD-CCNC: 127 U/L (ref 35–104)
ALT SERPL-CCNC: 34 U/L (ref 0–32)
ANION GAP SERPL CALCULATED.3IONS-SCNC: 18 MMOL/L (ref 7–16)
ANISOCYTOSIS: ABNORMAL
ANTIBODY SCREEN: NORMAL
AST SERPL-CCNC: 33 U/L (ref 0–31)
BASOPHILS ABSOLUTE: 0 E9/L (ref 0–0.2)
BASOPHILS RELATIVE PERCENT: 0.5 % (ref 0–2)
BILIRUB SERPL-MCNC: 1.2 MG/DL (ref 0–1.2)
BUN BLDV-MCNC: 25 MG/DL (ref 8–23)
CALCIUM SERPL-MCNC: 9.4 MG/DL (ref 8.6–10.2)
CHLORIDE BLD-SCNC: 98 MMOL/L (ref 98–107)
CHP ED QC CHECK: YES
CO2: 25 MMOL/L (ref 22–29)
CREAT SERPL-MCNC: 1.8 MG/DL (ref 0.5–1)
EOSINOPHILS ABSOLUTE: 0 E9/L (ref 0.05–0.5)
EOSINOPHILS RELATIVE PERCENT: 0 % (ref 0–6)
GFR AFRICAN AMERICAN: 33
GFR NON-AFRICAN AMERICAN: 28 ML/MIN/1.73
GLUCOSE BLD-MCNC: 210 MG/DL
GLUCOSE BLD-MCNC: 221 MG/DL (ref 74–99)
HCT VFR BLD CALC: 24.8 % (ref 34–48)
HEMOGLOBIN: 7.3 G/DL (ref 11.5–15.5)
HYPOCHROMIA: ABNORMAL
INR BLD: 1.8
LACTIC ACID, SEPSIS: 2.4 MMOL/L (ref 0.5–1.9)
LYMPHOCYTES ABSOLUTE: 0.23 E9/L (ref 1.5–4)
LYMPHOCYTES RELATIVE PERCENT: 11.3 % (ref 20–42)
MCH RBC QN AUTO: 28.5 PG (ref 26–35)
MCHC RBC AUTO-ENTMCNC: 29.4 % (ref 32–34.5)
MCV RBC AUTO: 96.9 FL (ref 80–99.9)
METER GLUCOSE: 210 MG/DL (ref 74–99)
MONOCYTES ABSOLUTE: 0.04 E9/L (ref 0.1–0.95)
MONOCYTES RELATIVE PERCENT: 1.7 % (ref 2–12)
NEUTROPHILS ABSOLUTE: 1.83 E9/L (ref 1.8–7.3)
NEUTROPHILS RELATIVE PERCENT: 87 % (ref 43–80)
NUCLEATED RED BLOOD CELLS: 0.9 /100 WBC
OVALOCYTES: ABNORMAL
PDW BLD-RTO: 18 FL (ref 11.5–15)
PLATELET # BLD: 80 E9/L (ref 130–450)
PLATELET CONFIRMATION: NORMAL
PMV BLD AUTO: 11 FL (ref 7–12)
POIKILOCYTES: ABNORMAL
POLYCHROMASIA: ABNORMAL
POTASSIUM REFLEX MAGNESIUM: 4.8 MMOL/L (ref 3.5–5)
PRO-BNP: ABNORMAL PG/ML (ref 0–125)
PROTHROMBIN TIME: 20 SEC (ref 9.3–12.4)
RBC # BLD: 2.56 E12/L (ref 3.5–5.5)
SODIUM BLD-SCNC: 141 MMOL/L (ref 132–146)
TOTAL PROTEIN: 7 G/DL (ref 6.4–8.3)
TROPONIN: 0.07 NG/ML (ref 0–0.03)
WBC # BLD: 2.1 E9/L (ref 4.5–11.5)

## 2019-07-20 PROCEDURE — 99285 EMERGENCY DEPT VISIT HI MDM: CPT

## 2019-07-20 PROCEDURE — 83880 ASSAY OF NATRIURETIC PEPTIDE: CPT

## 2019-07-20 PROCEDURE — 82962 GLUCOSE BLOOD TEST: CPT

## 2019-07-20 PROCEDURE — 83605 ASSAY OF LACTIC ACID: CPT

## 2019-07-20 PROCEDURE — 86900 BLOOD TYPING SEROLOGIC ABO: CPT

## 2019-07-20 PROCEDURE — 84484 ASSAY OF TROPONIN QUANT: CPT

## 2019-07-20 PROCEDURE — 71045 X-RAY EXAM CHEST 1 VIEW: CPT

## 2019-07-20 PROCEDURE — 6360000002 HC RX W HCPCS: Performed by: EMERGENCY MEDICINE

## 2019-07-20 PROCEDURE — 93005 ELECTROCARDIOGRAM TRACING: CPT | Performed by: EMERGENCY MEDICINE

## 2019-07-20 PROCEDURE — 85025 COMPLETE CBC W/AUTO DIFF WBC: CPT

## 2019-07-20 PROCEDURE — 84145 PROCALCITONIN (PCT): CPT

## 2019-07-20 PROCEDURE — 2580000003 HC RX 258: Performed by: EMERGENCY MEDICINE

## 2019-07-20 PROCEDURE — 80053 COMPREHEN METABOLIC PANEL: CPT

## 2019-07-20 PROCEDURE — 86901 BLOOD TYPING SEROLOGIC RH(D): CPT

## 2019-07-20 PROCEDURE — 85610 PROTHROMBIN TIME: CPT

## 2019-07-20 PROCEDURE — 36415 COLL VENOUS BLD VENIPUNCTURE: CPT

## 2019-07-20 PROCEDURE — 86850 RBC ANTIBODY SCREEN: CPT

## 2019-07-20 PROCEDURE — 96365 THER/PROPH/DIAG IV INF INIT: CPT

## 2019-07-20 PROCEDURE — 87040 BLOOD CULTURE FOR BACTERIA: CPT

## 2019-07-20 PROCEDURE — 96375 TX/PRO/DX INJ NEW DRUG ADDON: CPT

## 2019-07-20 RX ORDER — 0.9 % SODIUM CHLORIDE 0.9 %
500 INTRAVENOUS SOLUTION INTRAVENOUS ONCE
Status: COMPLETED | OUTPATIENT
Start: 2019-07-20 | End: 2019-07-21

## 2019-07-20 RX ORDER — ONDANSETRON 2 MG/ML
4 INJECTION INTRAMUSCULAR; INTRAVENOUS ONCE
Status: COMPLETED | OUTPATIENT
Start: 2019-07-20 | End: 2019-07-20

## 2019-07-20 RX ORDER — SODIUM CHLORIDE 0.9 % (FLUSH) 0.9 %
10 SYRINGE (ML) INJECTION PRN
Status: DISCONTINUED | OUTPATIENT
Start: 2019-07-20 | End: 2019-07-21 | Stop reason: SDUPTHER

## 2019-07-20 RX ADMIN — ONDANSETRON HYDROCHLORIDE 4 MG: 2 SOLUTION INTRAMUSCULAR; INTRAVENOUS at 22:22

## 2019-07-20 RX ADMIN — CEFEPIME HYDROCHLORIDE 2 G: 2 INJECTION, POWDER, FOR SOLUTION INTRAVENOUS at 23:05

## 2019-07-20 RX ADMIN — SODIUM CHLORIDE 500 ML: 9 INJECTION, SOLUTION INTRAVENOUS at 22:23

## 2019-07-20 ASSESSMENT — PAIN DESCRIPTION - LOCATION: LOCATION: ABDOMEN

## 2019-07-20 ASSESSMENT — ENCOUNTER SYMPTOMS
BLOOD IN STOOL: 0
ABDOMINAL DISTENTION: 1
ABDOMINAL PAIN: 0
VOMITING: 1
SHORTNESS OF BREATH: 1
COUGH: 0
NAUSEA: 1
COLOR CHANGE: 0
DIARRHEA: 0
BACK PAIN: 0
CHEST TIGHTNESS: 0

## 2019-07-20 ASSESSMENT — PAIN SCALES - GENERAL: PAINLEVEL_OUTOF10: 8

## 2019-07-20 ASSESSMENT — PAIN DESCRIPTION - PAIN TYPE: TYPE: ACUTE PAIN

## 2019-07-21 ENCOUNTER — APPOINTMENT (OUTPATIENT)
Dept: GENERAL RADIOLOGY | Age: 74
DRG: 291 | End: 2019-07-21
Payer: COMMERCIAL

## 2019-07-21 ENCOUNTER — APPOINTMENT (OUTPATIENT)
Dept: CT IMAGING | Age: 74
DRG: 291 | End: 2019-07-21
Payer: COMMERCIAL

## 2019-07-21 PROBLEM — J18.9 HAP (HOSPITAL-ACQUIRED PNEUMONIA): Status: ACTIVE | Noted: 2019-07-21

## 2019-07-21 PROBLEM — J96.01 ACUTE RESPIRATORY FAILURE WITH HYPOXIA (HCC): Status: ACTIVE | Noted: 2019-07-21
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
## 2019-07-21 LAB
ALBUMIN SERPL-MCNC: 2.7 G/DL (ref 3.5–5.2)
ALP BLD-CCNC: 104 U/L (ref 35–104)
ALT SERPL-CCNC: 28 U/L (ref 0–32)
ANION GAP SERPL CALCULATED.3IONS-SCNC: 10 MMOL/L (ref 7–16)
ANISOCYTOSIS: ABNORMAL
AST SERPL-CCNC: 26 U/L (ref 0–31)
BACTERIA: ABNORMAL /HPF
BASOPHILS ABSOLUTE: 0 E9/L (ref 0–0.2)
BASOPHILS RELATIVE PERCENT: 0 % (ref 0–2)
BILIRUB SERPL-MCNC: 0.7 MG/DL (ref 0–1.2)
BILIRUBIN URINE: NEGATIVE
BLOOD BANK DISPENSE STATUS: NORMAL
BLOOD BANK PRODUCT CODE: NORMAL
BLOOD, URINE: NEGATIVE
BPU ID: NORMAL
BUN BLDV-MCNC: 26 MG/DL (ref 8–23)
CALCIUM SERPL-MCNC: 8.8 MG/DL (ref 8.6–10.2)
CHLORIDE BLD-SCNC: 101 MMOL/L (ref 98–107)
CLARITY: CLEAR
CO2: 29 MMOL/L (ref 22–29)
COLOR: YELLOW
CREAT SERPL-MCNC: 1.7 MG/DL (ref 0.5–1)
DESCRIPTION BLOOD BANK: NORMAL
EKG ATRIAL RATE: 118 BPM
EKG P AXIS: 68 DEGREES
EKG P-R INTERVAL: 154 MS
EKG Q-T INTERVAL: 338 MS
EKG QRS DURATION: 142 MS
EKG QTC CALCULATION (BAZETT): 473 MS
EKG R AXIS: -79 DEGREES
EKG T AXIS: 65 DEGREES
EKG VENTRICULAR RATE: 118 BPM
EOSINOPHILS ABSOLUTE: 0 E9/L (ref 0.05–0.5)
EOSINOPHILS RELATIVE PERCENT: 0 % (ref 0–6)
FILM ARRAY ADENOVIRUS: NORMAL
FILM ARRAY BORDETELLA PERTUSSIS: NORMAL
FILM ARRAY CHLAMYDOPHILIA PNEUMONIAE: NORMAL
FILM ARRAY CORONAVIRUS 229E: NORMAL
FILM ARRAY CORONAVIRUS HKU1: NORMAL
FILM ARRAY CORONAVIRUS NL63: NORMAL
FILM ARRAY CORONAVIRUS OC43: NORMAL
FILM ARRAY INFLUENZA A VIRUS 09H1: NORMAL
FILM ARRAY INFLUENZA A VIRUS H1: NORMAL
FILM ARRAY INFLUENZA A VIRUS H3: NORMAL
FILM ARRAY INFLUENZA A VIRUS: NORMAL
FILM ARRAY INFLUENZA B: NORMAL
FILM ARRAY METAPNEUMOVIRUS: NORMAL
FILM ARRAY MYCOPLASMA PNEUMONIAE: NORMAL
FILM ARRAY PARAINFLUENZA VIRUS 1: NORMAL
FILM ARRAY PARAINFLUENZA VIRUS 2: NORMAL
FILM ARRAY PARAINFLUENZA VIRUS 3: NORMAL
FILM ARRAY PARAINFLUENZA VIRUS 4: NORMAL
FILM ARRAY RESPIRATORY SYNCITIAL VIRUS: NORMAL
FILM ARRAY RHINOVIRUS/ENTEROVIRUS: NORMAL
GFR AFRICAN AMERICAN: 36
GFR NON-AFRICAN AMERICAN: 29 ML/MIN/1.73
GLUCOSE BLD-MCNC: 206 MG/DL (ref 74–99)
GLUCOSE URINE: 250 MG/DL
HBA1C MFR BLD: 5.9 % (ref 4–5.6)
HBA1C MFR BLD: 6.1 % (ref 4–5.6)
HCT VFR BLD CALC: 20.8 % (ref 34–48)
HCT VFR BLD CALC: 21.4 % (ref 34–48)
HEMOGLOBIN: 6.1 G/DL (ref 11.5–15.5)
HEMOGLOBIN: 6.3 G/DL (ref 11.5–15.5)
HYPOCHROMIA: ABNORMAL
INR BLD: 1.7
KETONES, URINE: NEGATIVE MG/DL
LACTIC ACID, SEPSIS: 1.4 MMOL/L (ref 0.5–1.9)
LEUKOCYTE ESTERASE, URINE: NEGATIVE
LYMPHOCYTES ABSOLUTE: 0.34 E9/L (ref 1.5–4)
LYMPHOCYTES RELATIVE PERCENT: 18.3 % (ref 20–42)
MCH RBC QN AUTO: 28.2 PG (ref 26–35)
MCHC RBC AUTO-ENTMCNC: 29.3 % (ref 32–34.5)
MCV RBC AUTO: 96.3 FL (ref 80–99.9)
METAMYELOCYTES RELATIVE PERCENT: 0.9 % (ref 0–1)
MONOCYTES ABSOLUTE: 0.04 E9/L (ref 0.1–0.95)
MONOCYTES RELATIVE PERCENT: 1.7 % (ref 2–12)
NEUTROPHILS ABSOLUTE: 1.52 E9/L (ref 1.8–7.3)
NEUTROPHILS RELATIVE PERCENT: 79.1 % (ref 43–80)
NITRITE, URINE: NEGATIVE
PDW BLD-RTO: 18.1 FL (ref 11.5–15)
PH UA: 5.5 (ref 5–9)
PLATELET # BLD: 67 E9/L (ref 130–450)
PLATELET CONFIRMATION: NORMAL
PMV BLD AUTO: 10.7 FL (ref 7–12)
POLYCHROMASIA: ABNORMAL
POTASSIUM REFLEX MAGNESIUM: 4.9 MMOL/L (ref 3.5–5)
PROCALCITONIN: 0.52 NG/ML (ref 0–0.08)
PROCALCITONIN: 0.74 NG/ML (ref 0–0.08)
PROCALCITONIN: 0.75 NG/ML (ref 0–0.08)
PROTEIN UA: NEGATIVE MG/DL
PROTHROMBIN TIME: 19.1 SEC (ref 9.3–12.4)
RBC # BLD: 2.16 E12/L (ref 3.5–5.5)
RBC UA: ABNORMAL /HPF (ref 0–2)
SODIUM BLD-SCNC: 140 MMOL/L (ref 132–146)
SPECIFIC GRAVITY UA: <=1.005 (ref 1–1.03)
TOTAL PROTEIN: 6 G/DL (ref 6.4–8.3)
UROBILINOGEN, URINE: 0.2 E.U./DL
WBC # BLD: 1.9 E9/L (ref 4.5–11.5)
WBC UA: ABNORMAL /HPF (ref 0–5)

## 2019-07-21 PROCEDURE — 83036 HEMOGLOBIN GLYCOSYLATED A1C: CPT

## 2019-07-21 PROCEDURE — 85025 COMPLETE CBC W/AUTO DIFF WBC: CPT

## 2019-07-21 PROCEDURE — 87798 DETECT AGENT NOS DNA AMP: CPT

## 2019-07-21 PROCEDURE — P9016 RBC LEUKOCYTES REDUCED: HCPCS

## 2019-07-21 PROCEDURE — 87450 HC DIRECT STREP B ANTIGEN: CPT

## 2019-07-21 PROCEDURE — 80053 COMPREHEN METABOLIC PANEL: CPT

## 2019-07-21 PROCEDURE — 93010 ELECTROCARDIOGRAM REPORT: CPT | Performed by: INTERNAL MEDICINE

## 2019-07-21 PROCEDURE — 87633 RESP VIRUS 12-25 TARGETS: CPT

## 2019-07-21 PROCEDURE — 87088 URINE BACTERIA CULTURE: CPT

## 2019-07-21 PROCEDURE — 2700000000 HC OXYGEN THERAPY PER DAY

## 2019-07-21 PROCEDURE — 87486 CHLMYD PNEUM DNA AMP PROBE: CPT

## 2019-07-21 PROCEDURE — 85014 HEMATOCRIT: CPT

## 2019-07-21 PROCEDURE — 84145 PROCALCITONIN (PCT): CPT

## 2019-07-21 PROCEDURE — 71046 X-RAY EXAM CHEST 2 VIEWS: CPT

## 2019-07-21 PROCEDURE — 6360000002 HC RX W HCPCS: Performed by: STUDENT IN AN ORGANIZED HEALTH CARE EDUCATION/TRAINING PROGRAM

## 2019-07-21 PROCEDURE — 83605 ASSAY OF LACTIC ACID: CPT

## 2019-07-21 PROCEDURE — 36415 COLL VENOUS BLD VENIPUNCTURE: CPT

## 2019-07-21 PROCEDURE — 96366 THER/PROPH/DIAG IV INF ADDON: CPT

## 2019-07-21 PROCEDURE — 87581 M.PNEUMON DNA AMP PROBE: CPT

## 2019-07-21 PROCEDURE — 2580000003 HC RX 258: Performed by: STUDENT IN AN ORGANIZED HEALTH CARE EDUCATION/TRAINING PROGRAM

## 2019-07-21 PROCEDURE — 85610 PROTHROMBIN TIME: CPT

## 2019-07-21 PROCEDURE — 6370000000 HC RX 637 (ALT 250 FOR IP): Performed by: FAMILY MEDICINE

## 2019-07-21 PROCEDURE — 74176 CT ABD & PELVIS W/O CONTRAST: CPT

## 2019-07-21 PROCEDURE — 86923 COMPATIBILITY TEST ELECTRIC: CPT

## 2019-07-21 PROCEDURE — 2580000003 HC RX 258: Performed by: FAMILY MEDICINE

## 2019-07-21 PROCEDURE — 81001 URINALYSIS AUTO W/SCOPE: CPT

## 2019-07-21 PROCEDURE — 6360000002 HC RX W HCPCS: Performed by: FAMILY MEDICINE

## 2019-07-21 PROCEDURE — 85018 HEMOGLOBIN: CPT

## 2019-07-21 PROCEDURE — 2060000000 HC ICU INTERMEDIATE R&B

## 2019-07-21 PROCEDURE — 99222 1ST HOSP IP/OBS MODERATE 55: CPT | Performed by: FAMILY MEDICINE

## 2019-07-21 RX ORDER — ONDANSETRON 2 MG/ML
4 INJECTION INTRAMUSCULAR; INTRAVENOUS EVERY 6 HOURS PRN
Status: DISCONTINUED | OUTPATIENT
Start: 2019-07-21 | End: 2019-07-26 | Stop reason: HOSPADM

## 2019-07-21 RX ORDER — SODIUM BICARBONATE 650 MG/1
650 TABLET ORAL 2 TIMES DAILY
Status: DISCONTINUED | OUTPATIENT
Start: 2019-07-21 | End: 2019-07-25

## 2019-07-21 RX ORDER — DEXTROSE MONOHYDRATE 50 MG/ML
100 INJECTION, SOLUTION INTRAVENOUS PRN
Status: DISCONTINUED | OUTPATIENT
Start: 2019-07-21 | End: 2019-07-26 | Stop reason: HOSPADM

## 2019-07-21 RX ORDER — M-VIT,TX,IRON,MINS/CALC/FOLIC 27MG-0.4MG
1 TABLET ORAL DAILY
Status: DISCONTINUED | OUTPATIENT
Start: 2019-07-21 | End: 2019-07-26 | Stop reason: HOSPADM

## 2019-07-21 RX ORDER — WARFARIN SODIUM 1 MG/1
1 TABLET ORAL
Status: DISCONTINUED | OUTPATIENT
Start: 2019-07-21 | End: 2019-07-21

## 2019-07-21 RX ORDER — FUROSEMIDE 10 MG/ML
20 INJECTION INTRAMUSCULAR; INTRAVENOUS ONCE
Status: DISCONTINUED | OUTPATIENT
Start: 2019-07-21 | End: 2019-07-21

## 2019-07-21 RX ORDER — LANOLIN ALCOHOL/MO/W.PET/CERES
1000 CREAM (GRAM) TOPICAL DAILY
Status: DISCONTINUED | OUTPATIENT
Start: 2019-07-21 | End: 2019-07-26 | Stop reason: HOSPADM

## 2019-07-21 RX ORDER — 0.9 % SODIUM CHLORIDE 0.9 %
250 INTRAVENOUS SOLUTION INTRAVENOUS ONCE
Status: COMPLETED | OUTPATIENT
Start: 2019-07-21 | End: 2019-07-22

## 2019-07-21 RX ORDER — ONDANSETRON 4 MG/1
4 TABLET, ORALLY DISINTEGRATING ORAL EVERY 8 HOURS PRN
Status: DISCONTINUED | OUTPATIENT
Start: 2019-07-21 | End: 2019-07-26 | Stop reason: HOSPADM

## 2019-07-21 RX ORDER — OXYCODONE HYDROCHLORIDE 5 MG/1
2.5 TABLET ORAL 3 TIMES DAILY PRN
Status: DISCONTINUED | OUTPATIENT
Start: 2019-07-21 | End: 2019-07-26 | Stop reason: HOSPADM

## 2019-07-21 RX ORDER — PREGABALIN 25 MG/1
25 CAPSULE ORAL 2 TIMES DAILY
Status: DISCONTINUED | OUTPATIENT
Start: 2019-07-21 | End: 2019-07-26 | Stop reason: HOSPADM

## 2019-07-21 RX ORDER — SODIUM CHLORIDE 0.9 % (FLUSH) 0.9 %
10 SYRINGE (ML) INJECTION PRN
Status: DISCONTINUED | OUTPATIENT
Start: 2019-07-21 | End: 2019-07-26 | Stop reason: HOSPADM

## 2019-07-21 RX ORDER — DEXTROSE MONOHYDRATE 25 G/50ML
12.5 INJECTION, SOLUTION INTRAVENOUS PRN
Status: DISCONTINUED | OUTPATIENT
Start: 2019-07-21 | End: 2019-07-26 | Stop reason: HOSPADM

## 2019-07-21 RX ORDER — OYSTER SHELL CALCIUM WITH VITAMIN D 500; 200 MG/1; [IU]/1
1 TABLET, FILM COATED ORAL 2 TIMES DAILY
Status: DISCONTINUED | OUTPATIENT
Start: 2019-07-21 | End: 2019-07-26 | Stop reason: HOSPADM

## 2019-07-21 RX ORDER — DOCUSATE SODIUM 100 MG/1
100 CAPSULE, LIQUID FILLED ORAL DAILY
Status: DISCONTINUED | OUTPATIENT
Start: 2019-07-21 | End: 2019-07-26 | Stop reason: HOSPADM

## 2019-07-21 RX ORDER — SODIUM CHLORIDE 0.9 % (FLUSH) 0.9 %
10 SYRINGE (ML) INJECTION EVERY 12 HOURS SCHEDULED
Status: DISCONTINUED | OUTPATIENT
Start: 2019-07-21 | End: 2019-07-26 | Stop reason: HOSPADM

## 2019-07-21 RX ORDER — NICOTINE POLACRILEX 4 MG
15 LOZENGE BUCCAL PRN
Status: DISCONTINUED | OUTPATIENT
Start: 2019-07-21 | End: 2019-07-26 | Stop reason: HOSPADM

## 2019-07-21 RX ORDER — FUROSEMIDE 10 MG/ML
40 INJECTION INTRAMUSCULAR; INTRAVENOUS ONCE
Status: COMPLETED | OUTPATIENT
Start: 2019-07-21 | End: 2019-07-21

## 2019-07-21 RX ORDER — PANTOPRAZOLE SODIUM 40 MG/1
40 TABLET, DELAYED RELEASE ORAL
Status: DISCONTINUED | OUTPATIENT
Start: 2019-07-21 | End: 2019-07-26 | Stop reason: HOSPADM

## 2019-07-21 RX ADMIN — CALCIUM CARBONATE-VITAMIN D TAB 500 MG-200 UNIT 1 TABLET: 500-200 TAB at 22:03

## 2019-07-21 RX ADMIN — PIPERACILLIN SODIUM,TAZOBACTAM SODIUM 3.38 G: 3; .375 INJECTION, POWDER, FOR SOLUTION INTRAVENOUS at 18:34

## 2019-07-21 RX ADMIN — OXYCODONE HYDROCHLORIDE 2.5 MG: 5 TABLET ORAL at 04:03

## 2019-07-21 RX ADMIN — SODIUM BICARBONATE 650 MG: 650 TABLET ORAL at 11:44

## 2019-07-21 RX ADMIN — FUROSEMIDE 40 MG: 10 INJECTION, SOLUTION INTRAMUSCULAR; INTRAVENOUS at 16:26

## 2019-07-21 RX ADMIN — CALCIUM CARBONATE-VITAMIN D TAB 500 MG-200 UNIT 1 TABLET: 500-200 TAB at 11:45

## 2019-07-21 RX ADMIN — PANTOPRAZOLE SODIUM 40 MG: 40 TABLET, DELAYED RELEASE ORAL at 06:13

## 2019-07-21 RX ADMIN — OXYCODONE HYDROCHLORIDE 2.5 MG: 5 TABLET ORAL at 16:51

## 2019-07-21 RX ADMIN — SODIUM BICARBONATE 650 MG: 650 TABLET ORAL at 22:02

## 2019-07-21 RX ADMIN — Medication: at 11:45

## 2019-07-21 RX ADMIN — PREGABALIN 25 MG: 25 CAPSULE ORAL at 11:45

## 2019-07-21 RX ADMIN — DOCUSATE SODIUM 100 MG: 100 CAPSULE, LIQUID FILLED ORAL at 11:45

## 2019-07-21 RX ADMIN — Medication 1000 MCG: at 11:45

## 2019-07-21 RX ADMIN — MULTIPLE VITAMINS W/ MINERALS TAB 1 TABLET: TAB at 11:45

## 2019-07-21 RX ADMIN — FUROSEMIDE 40 MG: 10 INJECTION, SOLUTION INTRAMUSCULAR; INTRAVENOUS at 04:03

## 2019-07-21 RX ADMIN — Medication 10 ML: at 13:39

## 2019-07-21 RX ADMIN — Medication: at 22:03

## 2019-07-21 RX ADMIN — PREGABALIN 25 MG: 25 CAPSULE ORAL at 22:02

## 2019-07-21 RX ADMIN — VANCOMYCIN HYDROCHLORIDE 1.5 G: 10 INJECTION, POWDER, LYOPHILIZED, FOR SOLUTION INTRAVENOUS at 16:20

## 2019-07-21 RX ADMIN — SODIUM CHLORIDE 250 ML: 9 INJECTION, SOLUTION INTRAVENOUS at 13:39

## 2019-07-21 RX ADMIN — ENOXAPARIN SODIUM 40 MG: 40 INJECTION SUBCUTANEOUS at 16:27

## 2019-07-21 ASSESSMENT — PAIN SCALES - GENERAL
PAINLEVEL_OUTOF10: 8
PAINLEVEL_OUTOF10: 0
PAINLEVEL_OUTOF10: 8

## 2019-07-21 ASSESSMENT — PAIN DESCRIPTION - PAIN TYPE: TYPE: CHRONIC PAIN

## 2019-07-21 ASSESSMENT — PAIN DESCRIPTION - PROGRESSION: CLINICAL_PROGRESSION: GRADUALLY WORSENING

## 2019-07-21 ASSESSMENT — PAIN DESCRIPTION - ONSET: ONSET: ON-GOING

## 2019-07-21 ASSESSMENT — PAIN DESCRIPTION - LOCATION: LOCATION: ABDOMEN

## 2019-07-21 ASSESSMENT — PAIN DESCRIPTION - FREQUENCY: FREQUENCY: CONTINUOUS

## 2019-07-21 ASSESSMENT — PAIN DESCRIPTION - DESCRIPTORS: DESCRIPTORS: ACHING;CONSTANT;SORE

## 2019-07-21 NOTE — CONSULTS
Inpatient consult to Nephrology  Consult performed by: FORD Daniel - CNP  Consult ordered by: Geovanna Mario MD  Reason for consult: CKD and hypervolemia           Nephrology Consult Note  Patient's Name: Jay Joya  11:18 AM  7/21/2019    Reason for Consult:  CKD3 with hypervolemia  Requesting Physician:  Dr. Ayo Akbar  Chief Complaint:  Abdominal pain and swelling with SOB    History Obtained From:  patient and past medical records    History of Present Ilness:    Jay Joya is a 68 y.o. female with CKD3 with recent KARISHMA in June (Her baseline cr is 1.2-1.7). , CAD, DVTs on coumadin, Ascites, liver lesions, metastatic breast cancer to bone, anemia of chronic disease, and lymphedema. She has multiple recent hospital admissions, with last in June with paracentesis for KARISHMA. She returned to ED for abdominal pain, abd swelling, and SOB ongoing for 4-5 days. She denies dysuria, hematuria, or changes in urine output. Denies N/V/D. In ED, Na 141, K 4.8, BUN 25, Cr 1.8, CO2 25, Ag 18, lactic 2.4, procalcitonin 0.52, alb 3.1, proBNP 32,225; hgb 7.3. Ct abd and pelvis with multiple sites of mets, fluid in abd and pelvis. CXR with bibasilar opacities and pleural effusions. She was admitted for further treatment      Past Medical History:   Diagnosis Date    Abscess of abdominal wall     Anemia     Iron deficiency and chronic disease.  Anesthesia     DIFFICULTY WAKING UP    Arthritis     Arthritis, hip     Breast cancer ([facility] [address] 2005    S/P Left Lumpectomy with Lymph Node Dissection, Chemo/Rad. Follows with Dr. Cindy Bentley. No recurrence to date. Surgeon was Dr. Shiloh Garcia.  CAD (coronary artery disease) 5/2008    s/p CABG. Follows with [address] [place] [address].  CHF (congestive heart failure) (HCC)     Chronic venous insufficiency 11/7/2018    Decreased dorsalis pedis pulse 11/7/2018    Diabetic neuropathy (HCC)     Diabetic neuropathy (HCC)     DVT (deep venous thrombosis) (HCC)     Recurrent.   On lifelong coumadin.  DVT of upper extremity (deep vein thrombosis) ([address] 4/18/2012    History of deep vein thrombosis 11/7/2018    Humerus fracture     Chronic, on the Left.  Hyperlipidemia 8/29/2011    Hypertension     Leg swelling 11/7/2018    Lymph node enlargement     Lymphedema of both lower extremities 11/7/2018    MI (myocardial infarction) ([address]     Nephrolithiasis     Follows with Dr. Melissa Mendoza.  Pericardial effusion     Pulmonary nodule     With mediastinal lymphadenopathy. Stable. Follows with Dr. Norine Halsted.  Rib lesion 11/28/11    expansile lesion in one of the right ribs laterally    Sarcoidosis 07/26/11    per transbronchial needle aspiration    Subclavian vein occlusion, bilateral ([address] 4/18/2012    Type II or unspecified type diabetes mellitus without mention of complication, not stated as uncontrolled        Past Surgical History:   Procedure Laterality Date    APPENDECTOMY      ARTERIAL BYPASS SURGRY      BREAST LUMPECTOMY  9/27/2005    LEFT    CARDIAC SURGERY      CHOLECYSTECTOMY      COLONOSCOPY  12/2007    cecal arteriovenous malformation with hyperplastic polyps    COLONOSCOPY  [id number]    CORONARY ARTERY BYPASS GRAFT  05/2008    triple bypass    ECHO COMPL W DOP COLOR FLOW  10/30/2013         EYE SURGERY      clarissa cataracts    HYSTERECTOMY  1975, 1985    MAYNOR prolapse, benign conditions; BSO later for scar tissues, no CA.      OTHER SURGICAL HISTORY  11/18/11    port removal right chest wall    TONSILLECTOMY      TOOTH EXTRACTION      Full Dental Extraction.  TUNNELED VENOUS PORT PLACEMENT      removal of port Dec. 2011- Dr. Fredrik Sicard TUNNELED [facility]  [id number]    RIGHT CHEST       Family History   Adopted: Yes        reports that she has never smoked. She has never used smokeless tobacco. She reports that she does not drink alcohol or use drugs.     Allergies:  Macrobid [nitrofurantoin monohydrate macrocrystals]    Current Medications:      docusate Results   Component Value Date    ALKPHOS 104 07/21/2019    ALT 28 07/21/2019    AST 26 07/21/2019    PROT 6.0 07/21/2019    BILITOT 0.7 07/21/2019    BILIDIR 0.2 06/10/2019    IBILI 0.3 06/10/2019    LABALBU 2.7 07/21/2019    LABALBU 4.4 03/25/2012     Albumin:    Lab Results   Component Value Date    LABALBU 2.7 07/21/2019    LABALBU 4.4 03/25/2012     Calcium:    Lab Results   Component Value Date    CALCIUM 8.8 07/21/2019     Ionized Calcium:  No results found for: IONCA  Magnesium:    Lab Results   Component Value Date    MG 1.7 06/24/2019     Phosphorus:    Lab Results   Component Value Date    PHOS 4.0 06/24/2019     LDH:    Lab Results   Component Value Date     06/12/2019     Uric Acid:  No results found for: LABURIC, URICACID  PT/INR:    Lab Results   Component Value Date    PROTIME 19.1 07/21/2019    PROTIME 17.0 06/17/2019    INR 1.7 07/21/2019     PTT:    Lab Results   Component Value Date    APTT 32.7 08/29/2018   [APTT}  Troponin:    Lab Results   Component Value Date    TROPONINI 0.07 07/20/2019     Last 3 Troponin:    Lab Results   Component Value Date    TROPONINI 0.07 07/20/2019    TROPONINI 0.50 06/11/2019    TROPONINI 0.43 06/11/2019     U/A:    Lab Results   Component Value Date    NITRITE negative 05/16/2016    COLORU Yellow 07/21/2019    PROTEINU Negative 07/21/2019    PHUR 5.5 07/21/2019    WBCUA NONE 07/21/2019    WBCUA 0-1 12/05/2011    RBCUA NONE 07/21/2019    RBCUA 5-10 05/01/2013    BACTERIA NONE 07/21/2019    CLARITYU Clear 07/21/2019    SPECGRAV <=1.005 07/21/2019    LEUKOCYTESUR Negative 07/21/2019    UROBILINOGEN 0.2 07/21/2019    BILIRUBINUR Negative 07/21/2019    BILIRUBINUR small 05/16/2016    BILIRUBINUR NEGATIVE 12/05/2011    BLOODU Negative 07/21/2019    GLUCOSEU 250 07/21/2019    GLUCOSEU NEGATIVE 12/05/2011    AMORPHOUS FEW 02/11/2011     ABG:  No results found for: PH, PCO2, PO2, HCO3, BE, THGB, TCO2, O2SAT  VBG:  No results found for: ADRIANA Hdz,

## 2019-07-21 NOTE — PROGRESS NOTES
Received into [address] via cart from ED. Assessment per flowsheet. Call light with in reach. Oriented to environment.

## 2019-07-21 NOTE — ED PROVIDER NOTES
No murmur heard. Pulses:       Radial pulses are 2+ on the right side, and 2+ on the left side. Dorsalis pedis pulses are 2+ on the right side, and 2+ on the left side. Pulmonary/Chest: Effort normal. No accessory muscle usage. No respiratory distress. She has no wheezes. She has no rhonchi. She has rales. Decreased bases bilaterally with R>L. Abdominal: Soft. Normal appearance and bowel sounds are normal. She exhibits no distension. There is no tenderness. Musculoskeletal: Normal range of motion. She exhibits edema (chronic edema noted with pitting edema to thigs bilaterally). She exhibits no tenderness. Lymphadenopathy:     She has no cervical adenopathy. Neurological: She is alert and oriented to person, place, and time. Skin: Skin is warm and dry. No rash noted. She is not diaphoretic. There is pallor. Nursing note and vitals reviewed. Procedures    MDM    ED Course as of Jul 21 0334   Sat Jul 20, 2019   2247 Patient reassessed, notes improving symptoms at this time with rest. HR improving with gentle fluid rehydration. Leukopenia noted, meets sepsis criteria - antibiotics ordered.      [RU]      ED Course User Index  [RU] Sherylrodolfo Yolande, DO       --------------------------------------------- PAST HISTORY ---------------------------------------------  Past Medical History:  has a past medical history of Abscess of abdominal wall, Anemia, Anesthesia, Arthritis, Arthritis, hip, Breast cancer ([address] CAD (coronary artery disease), CHF (congestive heart failure) ([address] Chronic venous insufficiency, Decreased dorsalis pedis pulse, Diabetic neuropathy ([address] Diabetic neuropathy ([address] DVT (deep venous thrombosis) ([address] DVT of upper extremity (deep vein thrombosis) ([address] History of deep vein thrombosis, Humerus fracture, Hyperlipidemia, Hypertension, Leg swelling, Lymph node enlargement, Lymphedema of both lower extremities, MI (myocardial infarction) ([address] Nephrolithiasis, Pericardial remained hemodynamically stable during their ED course. Diagnosis:  1. Malignant ascites    2. Septicemia ([facility] [place])    3. HAP (hospital-acquired pneumonia)    4. Leukopenia, unspecified type    5. Metastatic cancer ([facility] [place])        Disposition:  Patient's disposition: Admit to telemetry  Patient's condition is stable. Kam Sepulveda DO  Resident  07/21/19 1588  ATTENDING PROVIDER ATTESTATION:     I have personally performed and/or participated in the history, exam, medical decision making, and procedures and agree with all pertinent clinical information. I have also reviewed and agree with the past medical, family and social history unless otherwise noted. I have discussed this patient in detail with the resident, and provided the instruction and education regarding sob. My findings/Plan: Presents with shortness of breath for the last several days. Patient reports history of cancer and is on chemo. Patient has had abdominal distention and swelling to abdomen. She reports no vomiting or fever she reports no diarrhea. She reports no chest pain patient awake alert mild distress pale appearing heart and lung exam within normal limits abdomen distended mildly tender patient neurologically intact and labs and EKG reviewed. Patient currently on oxygen and CT ordered and will be reviewed. Family practice consulted and will admit patient.        Monique Last MD  07/21/19 4743       Monique Last MD  07/21/19 4346

## 2019-07-21 NOTE — H&P
[facility] - Family Medicine Resident Inpatient  History and Physical    CC: Dyspnea on Exertion    HPI: History obtained from patient. Sally Lee is a 68 y.o. female with a PMH of CAD with CABG and MI, HFpEF recent EF 50%/mild MR/mild AR/moderate TR, hypertension, hyperlipidemia, recurrent DVTs on reduced dose coumadin, type II DM, and metastatic breast cancer who presents to ED for evaluation of worsening BHAKTA. Patient complains of shortness of breath with minor exertion. Symptoms include dyspnea on exertion. Symptoms began 4 days ago, gradually worsening since that time. Patient denies fevers, chills, chest pain, N/V, diaphoresis, constant cough, dyspnea at rest, hemoptysis, sputum production and wheezing. Associated symptoms include worsening BLE edema, worsening abdominal distension and bloating. Appetite has been unchanged. Symptoms are exacerbated by minimal activity. Symptoms are alleviated by rest and oxygen. Recently started on new chemotherapy agent after last admission due for thrd dose this Wednesday. ED Course: The patient remained hemodynamically stable and been closely monitored. Patient was given IV Vanc and cefepime in ED given recent hospitalization and concern for HAP. EKG Rhythm strip sinus tachycardia. The significant laboratory data obtained by ED work up was initial elevated LA while resolved with IVF bolus, BNP 03211 (54440 last admission) and worsening CXR compared to previous.     Medications   sodium chloride flush 0.9 % injection 10 mL (has no administration in time range)   vancomycin (VANCOCIN) 2,500 mg in dextrose 5 % 500 mL IVPB (has no administration in time range)   cefepime (MAXIPIME) 2 g IVPB extended (mini-bag) (2 g Intravenous New Bag 7/20/19 2305)   ondansetron (ZOFRAN) injection 4 mg (4 mg Intravenous Given 7/20/19 2222)   0.9 % sodium chloride bolus (0 mLs Intravenous Stopped 7/21/19 0025)     ED orders:   Orders Placed This Encounter   Procedures    Urine Culture    smokeless tobacco. She reports that she does not drink alcohol or use drugs. Allergies: Allergies   Allergen Reactions    Macrobid [Nitrofurantoin Monohydrate Macrocrystals] Hives        Home Medications:   No current facility-administered medications on file prior to encounter. Current Outpatient Medications on File Prior to Encounter   Medication Sig Dispense Refill    oxyCODONE (ROXICODONE) 5 MG/5ML solution Take 2.5 mg by mouth 3 times daily as needed for Pain. Take 2.5 ml three times daily as needed for pain      pregabalin (LYRICA) 25 MG capsule Take 1 capsule by mouth 2 times daily for 30 days. 60 capsule 3    warfarin (COUMADIN) 1 MG tablet Take 0.5 tablets by mouth every other day Take 0.5 mg every other day 90 tablet 0    diclofenac sodium 1 % GEL Apply 4 g topically 4 times daily as needed for Pain 1 Tube 3    miconazole nitrate 2 % OINT Apply topically 2 times daily 30 g 3    docusate sodium (COLACE, DULCOLAX) 100 MG CAPS Take 100 mg by mouth daily 30 capsule 3    omeprazole 20 MG EC tablet Take 1 tablet by mouth daily 90 tablet 0    sodium bicarbonate 650 MG tablet Take 1 tablet by mouth 2 times daily 60 tablet 2    ondansetron (ZOFRAN-ODT) 4 MG disintegrating tablet Take 1 tablet by mouth every 8 hours as needed for Nausea or Vomiting 60 tablet 0    vitamin B-12 (CYANOCOBALAMIN) 1000 MCG tablet Take 1 tablet by mouth daily 90 tablet 3    Calcium Citrate-Vitamin D (RA CALCIUM CIT-VIT D-3 PETITES) 200-250 MG-UNIT TABS take 1 tablet by mouth twice a day 180 tablet 3    exemestane (AROMASIN) 25 MG chemo tablet Take 1 tablet by mouth every morning (before breakfast)      Multiple Vitamins-Minerals (THERAPEUTIC MULTIVITAMIN-MINERALS) tablet Take 1 tablet by mouth daily 90 tablet 3    trastuzumab (HERCEPTIN) 440 MG injection Infuse  intravenously once a week.  On tuesdays         ROS:  Const: No fever, chills, night sweats, no recent unexplained weight gain/loss  HEENT: No blurred discussed with attending physician, Dr. Joellyn Bloch
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
## 2019-07-22 ENCOUNTER — TELEPHONE (OUTPATIENT)
Dept: FAMILY MEDICINE CLINIC | Age: 74
End: 2019-07-22

## 2019-07-22 ENCOUNTER — APPOINTMENT (OUTPATIENT)
Dept: INTERVENTIONAL RADIOLOGY/VASCULAR | Age: 74
DRG: 291 | End: 2019-07-22
Payer: COMMERCIAL

## 2019-07-22 LAB
ALBUMIN SERPL-MCNC: 2.9 G/DL (ref 3.5–5.2)
ALP BLD-CCNC: 102 U/L (ref 35–104)
ALT SERPL-CCNC: 30 U/L (ref 0–32)
ANION GAP SERPL CALCULATED.3IONS-SCNC: 13 MMOL/L (ref 7–16)
ANISOCYTOSIS: ABNORMAL
AST SERPL-CCNC: 22 U/L (ref 0–31)
BASOPHILS ABSOLUTE: 0.06 E9/L (ref 0–0.2)
BASOPHILS RELATIVE PERCENT: 3.5 % (ref 0–2)
BILIRUB SERPL-MCNC: 0.9 MG/DL (ref 0–1.2)
BUN BLDV-MCNC: 29 MG/DL (ref 8–23)
BURR CELLS: ABNORMAL
CALCIUM SERPL-MCNC: 8.7 MG/DL (ref 8.6–10.2)
CHLORIDE BLD-SCNC: 100 MMOL/L (ref 98–107)
CO2: 28 MMOL/L (ref 22–29)
CREAT SERPL-MCNC: 2.1 MG/DL (ref 0.5–1)
EOSINOPHILS ABSOLUTE: 0 E9/L (ref 0.05–0.5)
EOSINOPHILS RELATIVE PERCENT: 0.6 % (ref 0–6)
GFR AFRICAN AMERICAN: 28
GFR NON-AFRICAN AMERICAN: 23 ML/MIN/1.73
GLUCOSE BLD-MCNC: 203 MG/DL (ref 74–99)
HCT VFR BLD CALC: 26.8 % (ref 34–48)
HEMOGLOBIN: 7.7 G/DL (ref 11.5–15.5)
INR BLD: 1.5
L. PNEUMOPHILA SEROGP 1 UR AG: NORMAL
LYMPHOCYTES ABSOLUTE: 0.4 E9/L (ref 1.5–4)
LYMPHOCYTES RELATIVE PERCENT: 24.8 % (ref 20–42)
MAGNESIUM: 1.7 MG/DL (ref 1.6–2.6)
MCH RBC QN AUTO: 28.3 PG (ref 26–35)
MCHC RBC AUTO-ENTMCNC: 28.7 % (ref 32–34.5)
MCV RBC AUTO: 98.5 FL (ref 80–99.9)
MONOCYTES ABSOLUTE: 0.05 E9/L (ref 0.1–0.95)
MONOCYTES RELATIVE PERCENT: 2.7 % (ref 2–12)
NEUTROPHILS ABSOLUTE: 1.1 E9/L (ref 1.8–7.3)
NEUTROPHILS RELATIVE PERCENT: 69 % (ref 43–80)
OVALOCYTES: ABNORMAL
PDW BLD-RTO: 17.8 FL (ref 11.5–15)
PHOSPHORUS: 4.7 MG/DL (ref 2.5–4.5)
PLATELET # BLD: 61 E9/L (ref 130–450)
PLATELET CONFIRMATION: NORMAL
PMV BLD AUTO: 10.9 FL (ref 7–12)
POIKILOCYTES: ABNORMAL
POTASSIUM REFLEX MAGNESIUM: 4.8 MMOL/L (ref 3.5–5)
PRO-BNP: ABNORMAL PG/ML (ref 0–125)
PROCALCITONIN: 1.08 NG/ML (ref 0–0.08)
PROTHROMBIN TIME: 17.7 SEC (ref 9.3–12.4)
RBC # BLD: 2.72 E12/L (ref 3.5–5.5)
SODIUM BLD-SCNC: 141 MMOL/L (ref 132–146)
STREP PNEUMONIAE ANTIGEN, URINE: NORMAL
TOTAL PROTEIN: 6.8 G/DL (ref 6.4–8.3)
WBC # BLD: 1.6 E9/L (ref 4.5–11.5)

## 2019-07-22 PROCEDURE — 0W9G3ZZ DRAINAGE OF PERITONEAL CAVITY, PERCUTANEOUS APPROACH: ICD-10-PCS | Performed by: RADIOLOGY

## 2019-07-22 PROCEDURE — 85610 PROTHROMBIN TIME: CPT

## 2019-07-22 PROCEDURE — 99232 SBSQ HOSP IP/OBS MODERATE 35: CPT | Performed by: FAMILY MEDICINE

## 2019-07-22 PROCEDURE — APPSS180 APP SPLIT SHARED TIME > 60 MINUTES: Performed by: NURSE PRACTITIONER

## 2019-07-22 PROCEDURE — 97162 PT EVAL MOD COMPLEX 30 MIN: CPT

## 2019-07-22 PROCEDURE — 84100 ASSAY OF PHOSPHORUS: CPT

## 2019-07-22 PROCEDURE — C1729 CATH, DRAINAGE: HCPCS

## 2019-07-22 PROCEDURE — 97166 OT EVAL MOD COMPLEX 45 MIN: CPT

## 2019-07-22 PROCEDURE — 2500000003 HC RX 250 WO HCPCS: Performed by: NURSE PRACTITIONER

## 2019-07-22 PROCEDURE — 84145 PROCALCITONIN (PCT): CPT

## 2019-07-22 PROCEDURE — 6360000002 HC RX W HCPCS: Performed by: STUDENT IN AN ORGANIZED HEALTH CARE EDUCATION/TRAINING PROGRAM

## 2019-07-22 PROCEDURE — 2580000003 HC RX 258: Performed by: STUDENT IN AN ORGANIZED HEALTH CARE EDUCATION/TRAINING PROGRAM

## 2019-07-22 PROCEDURE — 6370000000 HC RX 637 (ALT 250 FOR IP): Performed by: STUDENT IN AN ORGANIZED HEALTH CARE EDUCATION/TRAINING PROGRAM

## 2019-07-22 PROCEDURE — 2060000000 HC ICU INTERMEDIATE R&B

## 2019-07-22 PROCEDURE — 36415 COLL VENOUS BLD VENIPUNCTURE: CPT

## 2019-07-22 PROCEDURE — 97535 SELF CARE MNGMENT TRAINING: CPT

## 2019-07-22 PROCEDURE — 2580000003 HC RX 258: Performed by: FAMILY MEDICINE

## 2019-07-22 PROCEDURE — 97530 THERAPEUTIC ACTIVITIES: CPT

## 2019-07-22 PROCEDURE — 83735 ASSAY OF MAGNESIUM: CPT

## 2019-07-22 PROCEDURE — 6360000002 HC RX W HCPCS: Performed by: INTERNAL MEDICINE

## 2019-07-22 PROCEDURE — 83880 ASSAY OF NATRIURETIC PEPTIDE: CPT

## 2019-07-22 PROCEDURE — 99223 1ST HOSP IP/OBS HIGH 75: CPT | Performed by: INTERNAL MEDICINE

## 2019-07-22 PROCEDURE — 85025 COMPLETE CBC W/AUTO DIFF WBC: CPT

## 2019-07-22 PROCEDURE — 2700000000 HC OXYGEN THERAPY PER DAY

## 2019-07-22 PROCEDURE — 80053 COMPREHEN METABOLIC PANEL: CPT

## 2019-07-22 PROCEDURE — 6370000000 HC RX 637 (ALT 250 FOR IP): Performed by: FAMILY MEDICINE

## 2019-07-22 RX ORDER — WARFARIN SODIUM 1 MG/1
1 TABLET ORAL DAILY
Status: DISCONTINUED | OUTPATIENT
Start: 2019-07-22 | End: 2019-07-22

## 2019-07-22 RX ORDER — FUROSEMIDE 10 MG/ML
40 INJECTION INTRAMUSCULAR; INTRAVENOUS 2 TIMES DAILY
Status: DISCONTINUED | OUTPATIENT
Start: 2019-07-22 | End: 2019-07-26

## 2019-07-22 RX ORDER — LIDOCAINE HYDROCHLORIDE 20 MG/ML
10 INJECTION, SOLUTION INFILTRATION; PERINEURAL ONCE
Status: COMPLETED | OUTPATIENT
Start: 2019-07-22 | End: 2019-07-22

## 2019-07-22 RX ORDER — WARFARIN SODIUM 1 MG/1
1 TABLET ORAL DAILY
Status: DISCONTINUED | OUTPATIENT
Start: 2019-07-23 | End: 2019-07-26 | Stop reason: HOSPADM

## 2019-07-22 RX ORDER — WARFARIN SODIUM 2 MG/1
2 TABLET ORAL
Status: COMPLETED | OUTPATIENT
Start: 2019-07-22 | End: 2019-07-22

## 2019-07-22 RX ADMIN — LIDOCAINE HYDROCHLORIDE 10 ML: 20 INJECTION, SOLUTION INFILTRATION; PERINEURAL at 09:35

## 2019-07-22 RX ADMIN — PREGABALIN 25 MG: 25 CAPSULE ORAL at 20:38

## 2019-07-22 RX ADMIN — OXYCODONE HYDROCHLORIDE 2.5 MG: 5 TABLET ORAL at 12:34

## 2019-07-22 RX ADMIN — PIPERACILLIN SODIUM,TAZOBACTAM SODIUM 3.38 G: 3; .375 INJECTION, POWDER, FOR SOLUTION INTRAVENOUS at 12:26

## 2019-07-22 RX ADMIN — MULTIPLE VITAMINS W/ MINERALS TAB 1 TABLET: TAB at 12:01

## 2019-07-22 RX ADMIN — Medication 10 ML: at 03:32

## 2019-07-22 RX ADMIN — Medication: at 21:16

## 2019-07-22 RX ADMIN — CALCIUM CARBONATE-VITAMIN D TAB 500 MG-200 UNIT 1 TABLET: 500-200 TAB at 12:01

## 2019-07-22 RX ADMIN — PIPERACILLIN SODIUM,TAZOBACTAM SODIUM 3.38 G: 3; .375 INJECTION, POWDER, FOR SOLUTION INTRAVENOUS at 03:32

## 2019-07-22 RX ADMIN — PREGABALIN 25 MG: 25 CAPSULE ORAL at 12:00

## 2019-07-22 RX ADMIN — SODIUM BICARBONATE 650 MG: 650 TABLET ORAL at 12:01

## 2019-07-22 RX ADMIN — FUROSEMIDE 40 MG: 10 INJECTION, SOLUTION INTRAMUSCULAR; INTRAVENOUS at 18:55

## 2019-07-22 RX ADMIN — FUROSEMIDE 40 MG: 10 INJECTION, SOLUTION INTRAMUSCULAR; INTRAVENOUS at 12:26

## 2019-07-22 RX ADMIN — WARFARIN SODIUM 2 MG: 2 TABLET ORAL at 18:56

## 2019-07-22 RX ADMIN — SODIUM BICARBONATE 650 MG: 650 TABLET ORAL at 20:38

## 2019-07-22 RX ADMIN — Medication 10 ML: at 12:26

## 2019-07-22 RX ADMIN — PANTOPRAZOLE SODIUM 40 MG: 40 TABLET, DELAYED RELEASE ORAL at 06:32

## 2019-07-22 RX ADMIN — DOCUSATE SODIUM 100 MG: 100 CAPSULE, LIQUID FILLED ORAL at 12:00

## 2019-07-22 RX ADMIN — Medication 10 ML: at 20:38

## 2019-07-22 RX ADMIN — PIPERACILLIN SODIUM,TAZOBACTAM SODIUM 3.38 G: 3; .375 INJECTION, POWDER, FOR SOLUTION INTRAVENOUS at 20:38

## 2019-07-22 RX ADMIN — OXYCODONE HYDROCHLORIDE 2.5 MG: 5 TABLET ORAL at 23:42

## 2019-07-22 RX ADMIN — CALCIUM CARBONATE-VITAMIN D TAB 500 MG-200 UNIT 1 TABLET: 500-200 TAB at 20:38

## 2019-07-22 RX ADMIN — Medication 1000 MCG: at 12:01

## 2019-07-22 ASSESSMENT — PAIN DESCRIPTION - LOCATION
LOCATION: GENERALIZED;LEG
LOCATION: GENERALIZED;LEG

## 2019-07-22 ASSESSMENT — PAIN DESCRIPTION - DESCRIPTORS
DESCRIPTORS: ACHING;DISCOMFORT;CONSTANT
DESCRIPTORS: ACHING;DISCOMFORT

## 2019-07-22 ASSESSMENT — PAIN SCALES - GENERAL
PAINLEVEL_OUTOF10: 8
PAINLEVEL_OUTOF10: 3
PAINLEVEL_OUTOF10: 0
PAINLEVEL_OUTOF10: 0

## 2019-07-22 ASSESSMENT — PAIN DESCRIPTION - PAIN TYPE
TYPE: CHRONIC PAIN
TYPE: CHRONIC PAIN

## 2019-07-22 NOTE — PROGRESS NOTES
OT to increase functional independence and quality of life. mod  Profile and History- med (extensive chart review)  Assessment of Occupational Performance and Identification of Deficits- med  Clinical Decision Making- med    Evaluation time includes thorough review of current medical information, gathering information on past medical history/social history and prior level of function, completion of standardized testing/informal observation of tasks, assessment of data, and development of POC/Goals. Assessment of current deficits   Functional mobility [x]  ADLs [x] Strength [x]  Cognition []  Functional transfers  [x] IADLs [] Safety Awareness [x]  Endurance [x]  Fine Motor Coordination [] Balance [x] Vision/perception [] Sensation []   Gross Motor Coordination [] ROM [x] Delirium []                  Motor Control []    Plan of Care:   ADL retraining [x]   Equipment needs [x]   Neuromuscular re-education [x] Energy Conservation Techniques [x]  Functional Transfer training [x] Patient and/or Family Education [x]  Functional Mobility training [x]  Environmental Modifications [x]  Cognitive re-training []   Compensatory techniques for ADLs [x]  Splinting Needs []   Positioning to improve overall function [x]   Therapeutic Activity [x]  Therapeutic Exercise  [x]  Visual/Perceptual: []    Delirium prevention/treatment  []   Other:  []    Rehab Potential: Good for established goals    Patient / Family Goal: Not stated     Patient and/or family were instructed on diagnosis, prognosis/goals and plan of care. Demonstrated good understanding. [] Malnutrition indicators have been identified and nursing has been notified to ensure a dietitian consult is ordered.        Mod Evaluation completed +  Timed Treatment: 15 minutes  Tx Time in: 13:55  TxTime out: 14:10        Glenys López OTR/L #[id number]

## 2019-07-22 NOTE — PROGRESS NOTES
NEPHROLOGY Attending   Progress Note  7/22/2019 1:39 PM  Subjective:   Admit Date: 7/20/2019  PCP: Janelle Acuna DO    Interval History:     7/22: Just returned from paracentesis - tolerated well    Diet: DIET LOW SODIUM 2 GM;    Data:   Scheduled Meds:   furosemide  40 mg Intravenous BID    docusate sodium  100 mg Oral Daily    miconazole nitrate   Topical BID    therapeutic multivitamin-minerals  1 tablet Oral Daily    pantoprazole  40 mg Oral QAM AC    pregabalin  25 mg Oral BID    sodium bicarbonate  650 mg Oral BID    vitamin B-12  1,000 mcg Oral Daily    calcium-vitamin D  1 tablet Oral BID    sodium chloride flush  10 mL Intravenous 2 times per day    enoxaparin  40 mg Subcutaneous Daily    piperacillin-tazobactam  3.375 g Intravenous Q8H     Continuous Infusions:   dextrose       PRN Meds:ondansetron, oxyCODONE, sodium chloride flush, magnesium hydroxide, ondansetron, glucose, dextrose, glucagon (rDNA), dextrose    Intake/Output Summary (Last 24 hours) at 7/22/2019 1339  Last data filed at 7/22/2019 1030  Gross per 24 hour   Intake 380 ml   Output 3950 ml   Net -3570 ml     CBC:   Recent Labs     07/20/19 2206 07/21/19  0544 07/21/19  0931 07/22/19  0647   WBC 2.1* 1.9*  --  1.6*   HGB 7.3* 6.1* 6.3* 7.7*   PLT 80* 67*  --  61*     BMP:    Recent Labs     07/20/19 2206 07/20/19 2222 07/21/19  0544 07/22/19  0647     --  140 141   K 4.8  --  4.9 4.8   CL 98  --  101 100   CO2 25  --  29 28   BUN 25*  --  26* 29*   CREATININE 1.8*  --  1.7* 2.1*   GLUCOSE 221* 210 206* 203*     Hepatic:   Recent Labs     07/20/19 2206 07/21/19  0544 07/22/19  0647   AST 33* 26 22   ALT 34* 28 30   BILITOT 1.2 0.7 0.9   ALKPHOS 127* 104 102     Troponin:   Recent Labs     07/20/19 2206   TROPONINI 0.07*     BNP: No results for input(s): BNP in the last 72 hours. Lipids: No results for input(s): CHOL, HDL in the last 72 hours.     Invalid input(s): LDLCALCU  ABGs: No results found for: PHART, PO2ART, [id number]  INR:   Recent Labs     07/20/19  2206 07/21/19  0544 07/22/19  0647   INR 1.8 1.7 1.5     -----------------------------------------------------------------  RAD: Ct Abdomen Pelvis Wo Contrast Additional Contrast? None    Result Date: 7/21/2019  EXAM:  CT Abdomen and Pelvis Without Contrast EXAM DATE/TIME:  7/20/2019 11:15 PM CLINICAL HISTORY:  68years old, female; Other: Distension; Additional info: Abdominal distention, ascites TECHNIQUE:  Imaging protocol: Axial computed tomography images of the abdomen and pelvis without contrast. Coronal and sagittal reformatted images were created and reviewed. Radiation optimization: All CT scans at this facility use at least one of these dose optimization techniques: automated exposure control; mA and/or kV adjustment per patient size (includes targeted exams where dose is matched to clinical indication); or iterative reconstruction. COMPARISON:  CT ABDOMEN PELVIS WO CONTRAST 6/11/2019 6:23 PM FINDINGS:  Lungs: There are at least 2 right lower lobe nodule the largest measures 6.5 mm. There is 3 mm right middle lobe nodule. Pleural space: There are pleural effusions. Heart: There is calcification of the mitral annulus. The heart is enlarged. Liver: There are multiple heterogeneous hypodense liver masses, consistent in appearance with metastatic disease. Gallbladder and bile ducts: The gallbladder appears to be absent. Pancreas: Normal. No ductal dilation. Spleen: Normal. No splenomegaly. Adrenals: Normal. No mass. Kidneys and ureters: There are small renal stones. Stomach and bowel: There is diverticulosis of the colon without evidence of diverticulitis. No dilation. There is no evidence of intestinal obstruction. Appendix: No evidence of appendicitis. Intraperitoneal space: There is fluid within the abdomen and pelvis. There is omental caking and peritoneal carcinomatosis noted anteriorly. Vasculature: There are coronary arterial calcifications.  The Order Date:  2019 6:00 AM EXAM: XR CHEST (2 VW) NUMBER OF IMAGES:   2  INDICATION:  SOB SOB COMPARISON: 2019 TECHNIQUE: Frontal and lateral views of the chest. FINDINGS: Right central venous catheter is unchanged in position. Stable cardiomediastinal silhouette and pulmonary vasculature. There are bibasilar airspace opacities and small bilateral pleural effusions. No pneumothorax seen. Degenerative changes noted in the shoulders and spine. Bibasilar airspace opacities and small bilateral pleural effusions. Differential includes atelectasis and infection. Xr Chest Portable    Result Date: 2019  Patient MRN:  [id number] : 1945 Age: 68 years Gender: Female Order Date:  2019 9:45 PM EXAM: XR CHEST PORTABLE NUMBER OF IMAGES:  One view, 1 image. INDICATION:  Dyspnea, BRCA on chemotherapy Dyspnea, BRCA on chemotherapy COMPARISON: Chest radiographs 2019, 6/10/2019. FINDINGS:  Lines, tubes, and devices: Stable appearance of the right internal jugular central venous catheter terminating within the right atrium. Lungs/pleura: Low lung volumes compared to prior examination. Increased bibasilar opacities, possibly related to atelectasis given associated shallow inspiration though infection/inflammation or edema is not excluded, especially within the right infrahilar region. There new small bilateral pleural effusions which are new compared to prior examination. No pneumothorax. Cardiomediastinal silhouette: Predominantly obscured though appears stable from prior exams. Status post median sternotomy with multiple mediastinal surgical clips noted. Other: Postsurgical/posttraumatic changes redemonstrated at the left shoulder.      Worsening bibasilar opacities with new small pleural effusions         Objective:   Vitals:   Vitals:    19 1030   BP: (!) 117/58   Pulse: 94   Resp: 20   Temp: 98.4 °F (36.9 °C)   SpO2: 100%     Patient Vitals for the past 24 hrs:   BP Temp Temp src Pulse Resp SpO2   07/22/19 1030 (!) 117/58 98.4 °F (36.9 °C) Oral 94 20 100 %   07/22/19 0800 (!) 114/54 97.7 °F (36.5 °C) Oral 94 20 99 %   07/22/19 0000 (!) 105/54 97.8 °F (36.6 °C) Oral 95 20 100 %   07/21/19 1440 130/61 97 °F (36.1 °C) Temporal 97 21 97 %   07/21/19 1402 121/63 97.9 °F (36.6 °C) Temporal 103 18 99 %   07/21/19 1346 (!) 163/69 97.4 °F (36.3 °C) Temporal 111 18 97 %     Consitutional: Alert. No acute distress  Skin: pale. Warm and dry. No rash, turgor wnl  Heent:  Atraumatic, normocephalic. MMM  Neck: trachea midline. Short, thick neck. no bruits or jvd noted  Cardiovascular: regular rate and rhythm. S1, S2 without murmur  Respiratory: diminished bilateral with crackles to bilateral posterior bases  Abdomen:  +bs, soft, nt, nd. +bowel sounds. Obese  Ext: +2 BLE edema  Neuro: alert and oriented x3. No focal deficit. Speech clear         Assessment/Plan:    1. Ascites/liver lesions/Volume overload in HFpEF  --proBNP 32,225; bilateral pleural effusions  --received lasix 40mg IV 7/2  (only 530 cc UO)  --I/O, daily weights  --Sp paracentesis 7/23 for 3.5L     2. SOB--mulitfactorial (CHF, EF 50%, mod TR)  --now with lung involvement on CT  --possible CT chest for PE  --ascites contributing.  --stable on supplemental O2     3. CKD Stage 3--( baseline cr is 1.2-1.7mg/dL)  --SrCr 1.7 mg/dL today and at baseline  --follow     4. Anemia of CKd/chronic disease  --Hgb 7.7 (previous 6.3)  --heme/onc following  --s/p transfusion 7/21 (PRBC x1)     5.  Metastatic Breast cancer (bone, liver, now with poss lung involement)  --per heme/onc       Thank you Dr. Fern Couch for allowing us to participate in care of [address], APRN - CNP     Pt seen and examined agree with above  Cr above baseline  In setting of fluid shifts after paracentesis  Kiko Failing

## 2019-07-22 NOTE — PROGRESS NOTES
COMMENT:     Consistent with the [employer] of Radiology's Incidental Findings    Committee Report (J Am Greyson Radiol 2010): Unless the patient's specific    circumstances suggest otherwise, any liver lesion 0.5 cm or less, any cystic    kidney lesion less than 1.0 cm, and/or any adrenal lesion 1.0 cm or less not    otherwise characterized in this report as possessing suspicious or    indeterminate imaging features is/are highly likely to be benign and do not    require follow-up imaging or biopsy. This report has been electronically signed by Andrew Dumont MD.      XR CHEST PORTABLE   Final Result      Worsening bibasilar opacities with new small pleural effusions               CT Chest WO Contrast    (Results Pending)       A/P:    Principle Problem:  Acute Respiratory Failure with Hypoxia  · Malignant ascites vs HAP vs CHF  · Patient currently on 4 L O2 via nasal cannula with SPO2 of 100%  · Paracentesis performed at 0935 with 3600 mL removed.  No complications noted per Interventional Radiology  · Respiratory Panel and Blood cultures taken 7/21, results pending   · Zosyn 3.375 g IV Q8H prophylaxis  · F/u fluid studies      Active Problems:  Breast Cancer with Metastasis to liver, bone, and lungs  · Hold home meds:  Exemestane 25 mg QD, Trastuzumab 440 mg IV weekly  · Hematology is following    CAD  · Home med Warfarin continued, but dose changed from home dose of 0.5 mg every other day to 1.0 mg QD  · Cardiology following    CHF  · Current med:  Furosemide 40 mg IV BID  · Cardiology following    Hx of DVT  · Home dose warfarin 1mg every other day, 0.5mg every other day  · Previously held for paracentesis  · Can restart, going to give loading dose of 2mg, then 1mg daily  · Repeat INR daily  · Hold if PLT <50k    CKD Stage 3  · BUN 29, Cr 2.1 at 0647  · Current med:  Furosemide 40 mg IV BID  · Nephrology following    Polyneuropathy secondary to DM2  · Continue home med:  Pregabalin 25 mg BID  · No home DM treatment noted    Cancer Associated Pain  · Continue home med:  Roxicodone 2.5 mg TID PRN pain  · Will continue to monitor and treat palliatively        GI/DVT ppx: Milk of Mag 30 ml suspension QD/Enoxaparin 30 mg SQ QD, Warfarin 2 mg QD loading, then Warfarin 1mg daily    Diet:  Low sodium 2 g    Pain meds: Roxicodone 2.5 mg TID prn     Antibiotics: Zosyn 3.375 g IV Q8H    Consults: PT/OT, Hematology, Nephrology, Cardiology    PT/OT Evaluation: Goal is to return to home, with exercises and functional mobility practice    Social work: None at this time    Disposition: General floor      Electronically signed by Chance Eaton MD PGY-1 on 7/22/2019 at 5:17 PM  This case was discussed with attending physician: Dr. Salvatore Good

## 2019-07-22 NOTE — PROGRESS NOTES
Physical Therapy  Initial Assessment     Name: Misael Sheikh  : 1945  MRN: [id number]    Date of Service: 2019    Evaluating PT: Yolande Chaidez, PT, DPT [id number]    Room #:  8501/8501-B    Diagnosis: HAP  Precautions: Fall risk, neutropenic precautions, O2  Procedures: Paracentesis ()    Pt lives alone in a single story apartment unit on the 7th floor with 0 stair(s) and 0 rail(s) to enter building. Elevator access to apartment level. Pt ambulated with rollator walker Mod Independent prior to admission. HPI: Pt presented to the ED on  for SOB. Initial Evaluation  Date: 19 Treatment Date: Short Term/ Long Term   Goals   AM-PAC 6 Clicks 38/32     Was pt agreeable to Eval/treatment? Yes     Does pt have pain? Abdominal pain     Bed Mobility  Rolling: NT  Supine to sit: Max A  Sit to supine: NT  Scooting: Max A toward EOB  Rolling: Min A  Supine to sit: Min A  Sit to supine: Min A  Scooting: Min A   Transfers Sit to stand: Min A  Stand to sit: Min A  Stand pivot: Min A with [employer]  Sit to stand: Supervision  Stand to sit: Supervision  Stand pivot: Supervision with [employer]   Ambulation   Sidestepped 3 feet to R with [employer] with Min A  1 step forward with [employer] with Min A  >100 feet with [employer] with Supervision   Stair negotiation: NT  NA   ROM B UE: Refer to OT note  B LE: WFL     Strength B UE: Refer to OT note  B LE: WFL     Balance Sitting EOB: SBA  Dynamic standing: Min A with [employer]  Sitting EOB: Supervision  Dynamic standing: Supervision with [employer]     Pt is A & O x: 4 to person, place, month/year, and situation. Sensation: Pt denies numbness and tingling to extremities. Coordination: NT  Edema: B LE swelling noted. ASSESSMENT    Patient education  Pt educated on hand placement during sit to stand transfer.     Patient response to education:   Pt verbalized understanding Pt demonstrated skill Pt requires further education in this area   Yes With cueing Yes     Comments:  Pt was supine in bed upon room

## 2019-07-22 NOTE — CONSULTS
complaints  · Integumentary: Denies rash, hives or pruritis   · Neurological: Denies dizziness, headaches or seizures. No numbness or tingling  · Psychiatric: Denies anxiety or depression. · Endocrine: Denies temperature intolerance. + recent weight change. .  · Hematologic/Lymphatic: Denies abnormal bruising or bleeding. No swollen lymph nodes    PHYSICAL EXAM:   BP (!) 105/54   Pulse 95   Temp 97.8 °F (36.6 °C) (Oral)   Resp 20   Ht 5' 4\" (1.626 m)   Wt 217 lb (98.4 kg)   SpO2 100%   BMI 37.25 kg/m²   CONST:  Well developed, well nourished elderly female who appears of stated age. Awake, alert and cooperative. No apparent distress. HEENT:   Head- Normocephalic, atraumatic   Eyes- Conjunctivae pink, anicteric  Throat- Oral mucosa pink and moist  Neck-  No stridor, trachea midline, + jugular venous distention. No carotid bruit. CHEST: Chest symmetrical and non-tender to palpation. No accessory muscle use or intercostal retractions  RESPIRATORY: Lung sounds - diminished breath sounds, faint expiratory wheeze. CARDIOVASCULAR:     Heart Inspection- shows no noted pulsations  Heart Palpation- no heaves or thrills; PMI is non-displaced   Heart Ausculation- Regular rate and rhythm, 1-2/6 LAILA. No s3, s4 or rub   PV: +chronic lower extremity lymphedema with venous statis changes. + varicosities. Pedal pulses palpable, no clubbing or cyanosis   ABDOMEN: Soft, obese,  non-tender to light palpation. Bowel sounds present. No palpable masses no organomegaly; no abdominal bruit  MS: Good muscle strength and tone. No atrophy or abnormal movements. : Deferred  SKIN: Warm and dry no statis dermatitis or ulcers   NEURO / PSYCH: Oriented to person, place and time. Speech clear and appropriate. Follows all commands.  Pleasant affect     DATA:    ECG / Tele strips: SR/ST  ECG: SR, RBBB/LAFB, NSSTT changes, rate 118 bpm.   Diagnostic:      Intake/Output Summary (Last 24 hours) at 7/22/2019 0741  Last data filed at 7/22/2019 0655  Gross per 24 hour   Intake 380 ml   Output 530 ml   Net -150 ml       Labs:   CBC:   Recent Labs     07/21/19  0544 07/21/19  0931 07/22/19  0647   WBC 1.9*  --  1.6*   HGB 6.1* 6.3* 7.7*   HCT 20.8* 21.4* 26.8*   PLT 67*  --  61*     BMP:   Recent Labs     07/20/19 2206 07/21/19 0544    140   K 4.8 4.9   CO2 25 29   BUN 25* 26*   CREATININE 1.8* 1.7*   LABGLOM 28 29   CALCIUM 9.4 8.8     Mag: No results for input(s): MG in the last 72 hours. Phos: No results for input(s): PHOS in the last 72 hours. TSH: No results for input(s): TSH in the last 72 hours. HgA1c:   Lab Results   Component Value Date    LABA1C 5.9 (H) 07/21/2019     No results found for: EAG  proBNP:   Recent Labs     07/20/19 2206   PROBNP 32,225*     PT/INR:   Recent Labs     07/21/19 0544 07/22/19  0647   PROTIME 19.1* 17.7*   INR 1.7 1.5     APTT:No results for input(s): APTT in the last 72 hours. CARDIAC ENZYMES:  Recent Labs     07/20/19 2206   TROPONINI 0.07*     FASTING LIPID PANEL:  Lab Results   Component Value Date    CHOL 80 06/10/2019    HDL 31 06/10/2019    LDLCALC 28 06/10/2019    TRIG 104 06/10/2019     LIVER PROFILE:  Recent Labs     07/20/19 2206 07/21/19 0544   AST 33* 26   ALT 34* 28   LABALBU 3.1* 2.7*       CT Chest: pending      CXR: 7/21/2019  Bibasilar airspace opacities and small bilateral pleural effusions. Differential includes atelectasis and infection. CT Abdomen/Pelvis: 7/21/2019  1. Left breast skin thickening and edema similar to prior study. Left breast    mass or scar, stable. 2. Right lung nodules consistent with metastasis. Fleischner chest imaging    society guidelines do not apply to this patient who has a known malignancy. 3. Hepatic metastasis. 4. Omental and peritoneal implants. 5. Large amount of fluid within the abdomen and pelvis, increased since prior    study. 6. Body wall edema also probably mildly increased.     7. Lytic metastasis to one of the right

## 2019-07-23 ENCOUNTER — APPOINTMENT (OUTPATIENT)
Dept: CT IMAGING | Age: 74
DRG: 291 | End: 2019-07-23
Payer: COMMERCIAL

## 2019-07-23 LAB
ALBUMIN SERPL-MCNC: 2.3 G/DL (ref 3.5–5.2)
ALP BLD-CCNC: 74 U/L (ref 35–104)
ALT SERPL-CCNC: 22 U/L (ref 0–32)
ANION GAP SERPL CALCULATED.3IONS-SCNC: 19 MMOL/L (ref 7–16)
ANISOCYTOSIS: ABNORMAL
AST SERPL-CCNC: 18 U/L (ref 0–31)
BASOPHILS ABSOLUTE: 0.03 E9/L (ref 0–0.2)
BASOPHILS RELATIVE PERCENT: 2.6 % (ref 0–2)
BILIRUB SERPL-MCNC: 0.6 MG/DL (ref 0–1.2)
BLOOD BANK DISPENSE STATUS: NORMAL
BLOOD BANK PRODUCT CODE: NORMAL
BPU ID: NORMAL
BUN BLDV-MCNC: 27 MG/DL (ref 8–23)
CALCIUM SERPL-MCNC: 7.1 MG/DL (ref 8.6–10.2)
CHLORIDE BLD-SCNC: 91 MMOL/L (ref 98–107)
CO2: 24 MMOL/L (ref 22–29)
CREAT SERPL-MCNC: 2 MG/DL (ref 0.5–1)
DESCRIPTION BLOOD BANK: NORMAL
EOSINOPHILS ABSOLUTE: 0.01 E9/L (ref 0.05–0.5)
EOSINOPHILS RELATIVE PERCENT: 0.9 % (ref 0–6)
GFR AFRICAN AMERICAN: 29
GFR NON-AFRICAN AMERICAN: 24 ML/MIN/1.73
GLUCOSE BLD-MCNC: 498 MG/DL (ref 74–99)
HCT VFR BLD CALC: 21.8 % (ref 34–48)
HCT VFR BLD CALC: 27.2 % (ref 34–48)
HEMOGLOBIN: 6.3 G/DL (ref 11.5–15.5)
HEMOGLOBIN: 8.5 G/DL (ref 11.5–15.5)
INR BLD: 1.6
LYMPHOCYTES ABSOLUTE: 0.34 E9/L (ref 1.5–4)
LYMPHOCYTES RELATIVE PERCENT: 30.7 % (ref 20–42)
MCH RBC QN AUTO: 28.4 PG (ref 26–35)
MCHC RBC AUTO-ENTMCNC: 28.9 % (ref 32–34.5)
MCV RBC AUTO: 98.2 FL (ref 80–99.9)
METER GLUCOSE: 187 MG/DL (ref 74–99)
METER GLUCOSE: 219 MG/DL (ref 74–99)
MONOCYTES ABSOLUTE: 0.04 E9/L (ref 0.1–0.95)
MONOCYTES RELATIVE PERCENT: 4.4 % (ref 2–12)
NEUTROPHILS ABSOLUTE: 0.67 E9/L (ref 1.8–7.3)
NEUTROPHILS RELATIVE PERCENT: 61.4 % (ref 43–80)
PDW BLD-RTO: 17.1 FL (ref 11.5–15)
PLATELET # BLD: 44 E9/L (ref 130–450)
PLATELET CONFIRMATION: NORMAL
PMV BLD AUTO: 11.3 FL (ref 7–12)
POTASSIUM REFLEX MAGNESIUM: 4.3 MMOL/L (ref 3.5–5)
PROCALCITONIN: 0.76 NG/ML (ref 0–0.08)
PROTHROMBIN TIME: 18.4 SEC (ref 9.3–12.4)
RBC # BLD: 2.22 E12/L (ref 3.5–5.5)
SODIUM BLD-SCNC: 134 MMOL/L (ref 132–146)
TOTAL PROTEIN: 5.1 G/DL (ref 6.4–8.3)
URINE CULTURE, ROUTINE: NORMAL
WBC # BLD: 1.1 E9/L (ref 4.5–11.5)

## 2019-07-23 PROCEDURE — 2060000000 HC ICU INTERMEDIATE R&B

## 2019-07-23 PROCEDURE — 6370000000 HC RX 637 (ALT 250 FOR IP): Performed by: STUDENT IN AN ORGANIZED HEALTH CARE EDUCATION/TRAINING PROGRAM

## 2019-07-23 PROCEDURE — P9016 RBC LEUKOCYTES REDUCED: HCPCS

## 2019-07-23 PROCEDURE — 6370000000 HC RX 637 (ALT 250 FOR IP): Performed by: FAMILY MEDICINE

## 2019-07-23 PROCEDURE — 2700000000 HC OXYGEN THERAPY PER DAY

## 2019-07-23 PROCEDURE — 6360000002 HC RX W HCPCS: Performed by: INTERNAL MEDICINE

## 2019-07-23 PROCEDURE — 2580000003 HC RX 258: Performed by: STUDENT IN AN ORGANIZED HEALTH CARE EDUCATION/TRAINING PROGRAM

## 2019-07-23 PROCEDURE — 6360000002 HC RX W HCPCS: Performed by: STUDENT IN AN ORGANIZED HEALTH CARE EDUCATION/TRAINING PROGRAM

## 2019-07-23 PROCEDURE — 80053 COMPREHEN METABOLIC PANEL: CPT

## 2019-07-23 PROCEDURE — 85610 PROTHROMBIN TIME: CPT

## 2019-07-23 PROCEDURE — 85025 COMPLETE CBC W/AUTO DIFF WBC: CPT

## 2019-07-23 PROCEDURE — 85018 HEMOGLOBIN: CPT

## 2019-07-23 PROCEDURE — 99232 SBSQ HOSP IP/OBS MODERATE 35: CPT | Performed by: FAMILY MEDICINE

## 2019-07-23 PROCEDURE — 2580000003 HC RX 258: Performed by: FAMILY MEDICINE

## 2019-07-23 PROCEDURE — 96523 IRRIG DRUG DELIVERY DEVICE: CPT

## 2019-07-23 PROCEDURE — 36415 COLL VENOUS BLD VENIPUNCTURE: CPT

## 2019-07-23 PROCEDURE — 99232 SBSQ HOSP IP/OBS MODERATE 35: CPT | Performed by: INTERNAL MEDICINE

## 2019-07-23 PROCEDURE — 85014 HEMATOCRIT: CPT

## 2019-07-23 PROCEDURE — 93308 TTE F-UP OR LMTD: CPT

## 2019-07-23 PROCEDURE — 86923 COMPATIBILITY TEST ELECTRIC: CPT

## 2019-07-23 PROCEDURE — 82962 GLUCOSE BLOOD TEST: CPT

## 2019-07-23 PROCEDURE — 84145 PROCALCITONIN (PCT): CPT

## 2019-07-23 RX ADMIN — DOCUSATE SODIUM 100 MG: 100 CAPSULE, LIQUID FILLED ORAL at 09:18

## 2019-07-23 RX ADMIN — PIPERACILLIN SODIUM,TAZOBACTAM SODIUM 3.38 G: 3; .375 INJECTION, POWDER, FOR SOLUTION INTRAVENOUS at 04:00

## 2019-07-23 RX ADMIN — SODIUM BICARBONATE 650 MG: 650 TABLET ORAL at 22:48

## 2019-07-23 RX ADMIN — Medication: at 22:49

## 2019-07-23 RX ADMIN — PANTOPRAZOLE SODIUM 40 MG: 40 TABLET, DELAYED RELEASE ORAL at 05:48

## 2019-07-23 RX ADMIN — OXYCODONE HYDROCHLORIDE 2.5 MG: 5 TABLET ORAL at 15:59

## 2019-07-23 RX ADMIN — FUROSEMIDE 40 MG: 10 INJECTION, SOLUTION INTRAMUSCULAR; INTRAVENOUS at 17:03

## 2019-07-23 RX ADMIN — CALCIUM CARBONATE-VITAMIN D TAB 500 MG-200 UNIT 1 TABLET: 500-200 TAB at 09:17

## 2019-07-23 RX ADMIN — CALCIUM CARBONATE-VITAMIN D TAB 500 MG-200 UNIT 1 TABLET: 500-200 TAB at 22:48

## 2019-07-23 RX ADMIN — PREGABALIN 25 MG: 25 CAPSULE ORAL at 22:48

## 2019-07-23 RX ADMIN — Medication 1000 MCG: at 09:17

## 2019-07-23 RX ADMIN — FUROSEMIDE 40 MG: 10 INJECTION, SOLUTION INTRAMUSCULAR; INTRAVENOUS at 09:18

## 2019-07-23 RX ADMIN — PREGABALIN 25 MG: 25 CAPSULE ORAL at 09:18

## 2019-07-23 RX ADMIN — PIPERACILLIN SODIUM,TAZOBACTAM SODIUM 3.38 G: 3; .375 INJECTION, POWDER, FOR SOLUTION INTRAVENOUS at 15:59

## 2019-07-23 RX ADMIN — SODIUM BICARBONATE 650 MG: 650 TABLET ORAL at 09:17

## 2019-07-23 RX ADMIN — PIPERACILLIN SODIUM,TAZOBACTAM SODIUM 3.38 G: 3; .375 INJECTION, POWDER, FOR SOLUTION INTRAVENOUS at 23:52

## 2019-07-23 RX ADMIN — Medication 10 ML: at 09:18

## 2019-07-23 RX ADMIN — TBO-FILGRASTIM 300 MCG: 300 INJECTION, SOLUTION SUBCUTANEOUS at 17:59

## 2019-07-23 RX ADMIN — Medication 10 ML: at 22:48

## 2019-07-23 RX ADMIN — Medication: at 09:22

## 2019-07-23 RX ADMIN — MULTIPLE VITAMINS W/ MINERALS TAB 1 TABLET: TAB at 09:18

## 2019-07-23 RX ADMIN — INSULIN LISPRO 2 UNITS: 100 INJECTION, SOLUTION INTRAVENOUS; SUBCUTANEOUS at 17:03

## 2019-07-23 RX ADMIN — Medication 10 ML: at 23:53

## 2019-07-23 RX ADMIN — INSULIN LISPRO 4 UNITS: 100 INJECTION, SOLUTION INTRAVENOUS; SUBCUTANEOUS at 09:18

## 2019-07-23 ASSESSMENT — PAIN SCALES - GENERAL
PAINLEVEL_OUTOF10: 0
PAINLEVEL_OUTOF10: 0
PAINLEVEL_OUTOF10: 6
PAINLEVEL_OUTOF10: 0

## 2019-07-23 ASSESSMENT — PAIN DESCRIPTION - PROGRESSION: CLINICAL_PROGRESSION: GRADUALLY WORSENING

## 2019-07-23 ASSESSMENT — PAIN DESCRIPTION - ORIENTATION: ORIENTATION: LOWER

## 2019-07-23 ASSESSMENT — PAIN DESCRIPTION - FREQUENCY: FREQUENCY: INTERMITTENT

## 2019-07-23 ASSESSMENT — PAIN DESCRIPTION - ONSET: ONSET: ON-GOING

## 2019-07-23 ASSESSMENT — PAIN DESCRIPTION - LOCATION: LOCATION: BACK

## 2019-07-23 ASSESSMENT — PAIN - FUNCTIONAL ASSESSMENT: PAIN_FUNCTIONAL_ASSESSMENT: PREVENTS OR INTERFERES SOME ACTIVE ACTIVITIES AND ADLS

## 2019-07-23 ASSESSMENT — PAIN DESCRIPTION - PAIN TYPE: TYPE: CHRONIC PAIN

## 2019-07-23 ASSESSMENT — PAIN DESCRIPTION - DESCRIPTORS: DESCRIPTORS: ACHING;DISCOMFORT

## 2019-07-23 NOTE — PROGRESS NOTES
mcg at 07/22/19 1201    calcium-vitamin D (OSCAL-500) 500-200 MG-UNIT per tablet 1 tablet  1 tablet Oral BID Delphine Burkitt, MD   1 tablet at 07/22/19 2038    sodium chloride flush 0.9 % injection 10 mL  10 mL Intravenous 2 times per day Delphine Burkitt, MD   10 mL at 07/22/19 2038    sodium chloride flush 0.9 % injection 10 mL  10 mL Intravenous PRN Delphine Burkitt, MD   10 mL at 07/22/19 0332    magnesium hydroxide (MILK OF MAGNESIA) 400 MG/5ML suspension 30 mL  30 mL Oral Daily PRN Delphine Burkitt, MD        ondansetron [facility]) injection 4 mg  4 mg Intravenous Q6H PRN Delphine Burkitt, MD        glucose (GLUTOSE) 40 % oral gel 15 g  15 g Oral PRN Delphine Burkitt, MD        dextrose 50 % IV solution  12.5 g Intravenous PRN Delphine Burkitt, MD        glucagon (rDNA) injection 1 mg  1 mg Intramuscular PRN Delphine Burkitt, MD        dextrose 5 % solution  100 mL/hr Intravenous PRN Delphine Burkitt, MD        piperacillin-tazobactam (ZOSYN) 3.375 g in dextrose 5 % 100 mL IVPB extended infusion (mini-bag)  3.375 g Intravenous Q8H Gurwinder Sue MD 25 mL/hr at 07/23/19 0400 3.375 g at 07/23/19 0400      dextrose         Physical Exam:  BP (!) 104/52   Pulse 100   Temp 98.2 °F (36.8 °C) (Temporal)   Resp 18   Ht 5' 4\" (1.626 m)   Wt 206 lb 6 oz (93.6 kg)   SpO2 100%   BMI 35.42 kg/m²   Wt Readings from Last 3 Encounters:   07/23/19 206 lb 6 oz (93.6 kg)   06/26/19 217 lb 9.6 oz (98.7 kg)   06/17/19 217 lb (98.4 kg)     Appearance: Awake, alert, no acute respiratory distress  Skin: Intact, no rash  Head: Normocephalic, atraumatic  Eyes: EOMI, no conjunctival erythema  ENMT: No pharyngeal erythema, MMM, no rhinorrhea  Neck: Supple, no elevated JVP, no carotid bruits  Lungs: Clear to auscultation bilaterally. No wheezes, rales, or rhonchi.   Cardiac: Regular rate and rhythm, +S1S2, no murmurs apparent  Abdomen: Soft, nontender, +bowel sounds  Extremities: Moves all extremities x 4, no lower extremity edema  Neurologic: No focal motor deficits apparent, normal mood and affect  Peripheral Pulses: Intact posterior tibial pulses bilaterally    Intake/Output:    Intake/Output Summary (Last 24 hours) at 7/23/2019 0802  Last data filed at 7/23/2019 0400  Gross per 24 hour   Intake 280 ml   Output 4200 ml   Net -3920 ml     No intake/output data recorded.     Laboratory Tests:  Recent Labs     07/20/19 2206 07/20/19 2222 07/21/19  0544 07/22/19  0647     --  140 141   K 4.8  --  4.9 4.8   CL 98  --  101 100   CO2 25  --  29 28   BUN 25*  --  26* 29*   CREATININE 1.8*  --  1.7* 2.1*   GLUCOSE 221* 210 206* 203*   CALCIUM 9.4  --  8.8 8.7     Lab Results   Component Value Date    MG 1.7 07/22/2019     Recent Labs     07/20/19 2206 07/21/19  0544 07/22/19  0647   ALKPHOS 127* 104 102   ALT 34* 28 30   AST 33* 26 22   PROT 7.0 6.0* 6.8   BILITOT 1.2 0.7 0.9   LABALBU 3.1* 2.7* 2.9*     Recent Labs     07/20/19 2206 07/21/19 0544 07/21/19  0931 07/22/19  0647   WBC 2.1* 1.9*  --  1.6*   RBC 2.56* 2.16*  --  2.72*   HGB 7.3* 6.1* 6.3* 7.7*   HCT 24.8* 20.8* 21.4* 26.8*   MCV 96.9 96.3  --  98.5   MCH 28.5 28.2  --  28.3   MCHC 29.4* 29.3*  --  28.7*   RDW 18.0* 18.1*  --  17.8*   PLT 80* 67*  --  61*   MPV 11.0 10.7  --  10.9     Lab Results   Component Value Date    CKTOTAL 286 (H) 06/11/2019    TROPONINI 0.07 (H) 07/20/2019    TROPONINI 0.50 (H) 06/11/2019    TROPONINI 0.43 (H) 06/11/2019     Lab Results   Component Value Date    INR 1.5 07/22/2019    INR 1.7 07/21/2019    INR 1.8 07/20/2019    PROTIME 17.7 (H) 07/22/2019    PROTIME 19.1 (H) 07/21/2019    PROTIME 20.0 (H) 07/20/2019     Lab Results   Component Value Date    TSH 6.930 (H) 06/10/2019     Lab Results   Component Value Date    LABA1C 5.9 (H) 07/21/2019     No results found for: EAG  Lab Results   Component Value Date    CHOL 80 06/10/2019    CHOL 91 11/30/2018    CHOL 99 05/16/2018     Lab Results   Component Value Date    TRIG 104 warfarin. · We will obtain  limited echo to assess LV function. · Continue rest of the current medications. Marco Antonio Lomeli MD., [facility].   [facility]) Cardiology

## 2019-07-23 NOTE — PROGRESS NOTES
abdomen and pelvis. There is omental caking and peritoneal carcinomatosis noted anteriorly. Vasculature: There are coronary arterial calcifications. The vasculature demonstrates diffuse severe atherosclerotic calcification. Lymph nodes: Normal. No enlarged lymph nodes. Bladder: Unremarkable as visualized. Reproductive: Unremarkable as visualized. Bones/joints: There are sternal wires consistent with previous sternotomy incision. Partially imaged lytic lesion noted involving one of the right ribs. Soft tissues: There are postoperative changes with surgical clips and scarring noted involving left breast. There is left breast skin thickening. There is nodule or scar within the left breast, stable measuring about 2.2 cm. There is anasarca. Other findings: There are consolidations within the posterior lower lobes, larger on the right. 1. Left breast skin thickening and edema similar to prior study. Left breast mass or scar, stable. 2. Right lung nodules consistent with metastasis. Fleischner chest imaging society guidelines do not apply to this patient who has a known malignancy. 3. Hepatic metastasis. 4. Omental and peritoneal implants. 5. Large amount of fluid within the abdomen and pelvis, increased since prior study. 6. Body wall edema also probably mildly increased. 7. Lytic metastasis to one of the right ribs, partially imaged. 8. A few diverticula of the colon. 9. Nonobstructing renal stones. COMMENT:  Consistent with the [employer] of [employer]'s Incidental Findings Committee Report (J Am Greyson Radiol 2010): Unless the patient's specific circumstances suggest otherwise, any liver lesion 0.5 cm or less, any cystic kidney lesion less than 1.0 cm, and/or any adrenal lesion 1.0 cm or less not otherwise characterized in this report as possessing suspicious or indeterminate imaging features is/are highly likely to be benign and do not require follow-up imaging or biopsy.  This report has been Vitals:    07/23/19 0815   BP: (!) 104/48   Pulse: 91   Resp: 16   Temp: 97.7 °F (36.5 °C)   SpO2: 100%     Patient Vitals for the past 24 hrs:   BP Temp Temp src Pulse Resp SpO2 Weight   07/23/19 0815 (!) 104/48 97.7 °F (36.5 °C) Temporal 91 16 100 % --   07/23/19 0525 -- -- -- -- -- -- 206 lb 6 oz (93.6 kg)   07/23/19 0355 -- -- -- 100 -- 100 % --   07/22/19 2340 (!) 104/52 98.2 °F (36.8 °C) Temporal 92 18 100 % --   07/22/19 1944 (!) 118/58 98 °F (36.7 °C) Temporal 80 20 100 % --   07/22/19 1600 112/81 98 °F (36.7 °C) Temporal 90 20 -- --   07/22/19 1030 (!) 117/58 98.4 °F (36.9 °C) Oral 94 20 100 % --     Consitutional: Alert. No acute distress  Skin: pale/jaundiced. Warm and dry. No rash, turgor wnl  Heent:  Atraumatic, normocephalic. MMM  Neck: trachea midline. Short, thick neck. no bruits or jvd noted  Cardiovascular: regular rate and rhythm. S1, S2 without murmur  Respiratory: diminished bilateral with crackles to bilateral posterior bases  Abdomen:  +bs, soft, nt, nd. +bowel sounds. Obese  Ext: +2 BLE edema  Neuro: alert and oriented x3. No focal deficit. Speech clear         Assessment/Plan:    1. Ascites/liver lesions/Volume overload in HFpEF  --proBNP 32,225; bilateral pleural effusions  --on lasix 40 mg bid,  last 24 hours. --I/O, daily weights 7/21 217 lbs- 7/23 206 lbs  --Sp paracentesis 7/22 for 3.5L     2. SOB--mulitfactorial (CHF, EF 50%, mod TR)  --now with lung involvement on CT  --possible CT chest for PE  --ascites contributing.  --stable on supplemental O2     3. CKD Stage 3--( baseline cr is 1.2-1.7mg/dL)  --SrCr 2.0 mg/dL today continues above baseline. --follow     4. Anemia of CKd/chronic disease  --Hgb 6.3  --heme/onc following- will defer transfusion to them. --s/p transfusion 7/21 (PRBC x1)     5.  Metastatic Breast cancer (bone, liver, now with poss lung involement)  --per heme/onc       Thank you Dr. Kam Lanier for allowing us to participate in care of [facility]

## 2019-07-23 NOTE — PROGRESS NOTES
no rhinorrhea  Neck: Supple, no elevated JVP, no carotid bruits  Lungs: Clear to auscultation bilaterally. No wheezes, rales, or rhonchi.   Cardiac: Regular rate and rhythm, +S1S2, no murmurs apparent  Abdomen: Soft, nontender, +bowel sounds, obese and had tenderness over the epigastric area  Extremities: Moves all extremities x 4, no lower extremity edema  Neurologic: No focal motor deficits apparent, normal mood and affect  Peripheral Pulses: Intact posterior tibial pulses bilaterally    Intake/Output:    Intake/Output Summary (Last 24 hours) at 7/23/2019 1132  Last data filed at 7/23/2019 0930  Gross per 24 hour   Intake 400 ml   Output 600 ml   Net -200 ml     I/O this shift:  In: 120 [P.O.:120]  Out: -     Laboratory Tests:  Recent Labs     07/21/19  0544 07/22/19  0647 07/23/19  0624    141 134   K 4.9 4.8 4.3    100 91*   CO2 29 28 24   BUN 26* 29* 27*   CREATININE 1.7* 2.1* 2.0*   GLUCOSE 206* 203* 498*   CALCIUM 8.8 8.7 7.1*     Lab Results   Component Value Date    MG 1.7 07/22/2019     Recent Labs     07/21/19  0544 07/22/19  0647 07/23/19  0624   ALKPHOS 104 102 74   ALT 28 30 22   AST 26 22 18   PROT 6.0* 6.8 5.1*   BILITOT 0.7 0.9 0.6   LABALBU 2.7* 2.9* 2.3*     Recent Labs     07/21/19  0544 07/21/19  0931 07/22/19  0647 07/23/19  0624   WBC 1.9*  --  1.6* 1.1*   RBC 2.16*  --  2.72* 2.22*   HGB 6.1* 6.3* 7.7* 6.3*   HCT 20.8* 21.4* 26.8* 21.8*   MCV 96.3  --  98.5 98.2   MCH 28.2  --  28.3 28.4   MCHC 29.3*  --  28.7* 28.9*   RDW 18.1*  --  17.8* 17.1*   PLT 67*  --  61* 44*   MPV 10.7  --  10.9 11.3     Lab Results   Component Value Date    CKTOTAL 286 (H) 06/11/2019    TROPONINI 0.07 (H) 07/20/2019    TROPONINI 0.50 (H) 06/11/2019    TROPONINI 0.43 (H) 06/11/2019     Lab Results   Component Value Date    INR 1.6 07/23/2019    INR 1.5 07/22/2019    INR 1.7 07/21/2019    PROTIME 18.4 (H) 07/23/2019    PROTIME 17.7 (H) 07/22/2019    PROTIME 19.1 (H) 07/21/2019     Lab Results   Component

## 2019-07-23 NOTE — PROGRESS NOTES
tomorrow considering pt is doing better and no signs of infection at this time, can dc on a few days of bactrim for need of prophylaxis at time of dc  · F/u CT of chest, possible pleural effusion noted on CT abd        Pancytopenia with chronic anemia  · WBC 1.1, RBC 2.2, plt 44  · Likely 2/2 to chemotherapy treatment  · hemonc following, possibly consider bone marrow stimulating agent? · Hold warfarin for now  · Monitor daily CBC and INR      Breast Cancer with Metastasis to liver, bone, and lungs  · Hold home meds:  Exemestane 25 mg QD, Trastuzumab 440 mg IV weekly  · Hematology is following       CHF/CAD  · Echo 6/2019 showed EF 50%, f/u repeat echo  · Current med:  Furosemide 40 mg IV BID  · Cardiology following       Hx of DVT  · Home dose warfarin 1mg every other day, 0.5mg every other day  · Hold for thrombocytopenia  · Repeat INR daily       CKD Stage 3  · BUN 27, Cr 2.0   · Continue Furosemide 40 mg IV BID  · U/o 600ml during this shift  · Nephrology following       Polyneuropathy secondary to DM2  · Pt blood sugars are >200, adding on medium dose sliding scale will watch insulin requirements, dc nightly insuline SS  · Continue home med:  Pregabalin 25 mg BID  · No home DM treatment noted, but was supposed to be on lantus in the past       Cancer Associated Pain  · Continue home med:  Roxicodone 2.5 mg TID PRN pain  · Will continue to monitor and treat palliatively      DVT / GI prophylaxis: lovenox 30mg SC for CrCl <30 and Protonix    Diet: general 2gm sodium    Disposition: med surge    Electronically signed by Elvin Hylton MD PGY-2 on 7/23/2019 at 7:03 AM  This case was discussed with attending physician: Dr. Najma Cody        This note was dictated with [address].

## 2019-07-24 ENCOUNTER — APPOINTMENT (OUTPATIENT)
Dept: GENERAL RADIOLOGY | Age: 74
DRG: 291 | End: 2019-07-24
Payer: COMMERCIAL

## 2019-07-24 LAB
ALBUMIN SERPL-MCNC: 2.7 G/DL (ref 3.5–5.2)
ALP BLD-CCNC: 93 U/L (ref 35–104)
ALT SERPL-CCNC: 25 U/L (ref 0–32)
ANION GAP SERPL CALCULATED.3IONS-SCNC: 8 MMOL/L (ref 7–16)
ANISOCYTOSIS: ABNORMAL
AST SERPL-CCNC: 20 U/L (ref 0–31)
BASOPHILS ABSOLUTE: 0 E9/L (ref 0–0.2)
BASOPHILS RELATIVE PERCENT: 0 % (ref 0–2)
BILIRUB SERPL-MCNC: 0.9 MG/DL (ref 0–1.2)
BUN BLDV-MCNC: 27 MG/DL (ref 8–23)
CALCIUM SERPL-MCNC: 8 MG/DL (ref 8.6–10.2)
CHLORIDE BLD-SCNC: 97 MMOL/L (ref 98–107)
CO2: 33 MMOL/L (ref 22–29)
CREAT SERPL-MCNC: 2 MG/DL (ref 0.5–1)
EOSINOPHILS ABSOLUTE: 0 E9/L (ref 0.05–0.5)
EOSINOPHILS RELATIVE PERCENT: 0.5 % (ref 0–6)
GFR AFRICAN AMERICAN: 29
GFR NON-AFRICAN AMERICAN: 24 ML/MIN/1.73
GLUCOSE BLD-MCNC: 175 MG/DL (ref 74–99)
HCT VFR BLD CALC: 24.8 % (ref 34–48)
HEMOGLOBIN: 7.5 G/DL (ref 11.5–15.5)
INR BLD: 1.6
LYMPHOCYTES ABSOLUTE: 0.22 E9/L (ref 1.5–4)
LYMPHOCYTES RELATIVE PERCENT: 12.4 % (ref 20–42)
MCH RBC QN AUTO: 28.6 PG (ref 26–35)
MCHC RBC AUTO-ENTMCNC: 30.2 % (ref 32–34.5)
MCV RBC AUTO: 94.7 FL (ref 80–99.9)
METER GLUCOSE: 139 MG/DL (ref 74–99)
METER GLUCOSE: 188 MG/DL (ref 74–99)
METER GLUCOSE: 203 MG/DL (ref 74–99)
METER GLUCOSE: 208 MG/DL (ref 74–99)
MONOCYTES ABSOLUTE: 0.07 E9/L (ref 0.1–0.95)
MONOCYTES RELATIVE PERCENT: 4.4 % (ref 2–12)
NEUTROPHILS ABSOLUTE: 1.49 E9/L (ref 1.8–7.3)
NEUTROPHILS RELATIVE PERCENT: 83.2 % (ref 43–80)
NUCLEATED RED BLOOD CELLS: 0.9 /100 WBC
OVALOCYTES: ABNORMAL
PDW BLD-RTO: 17.1 FL (ref 11.5–15)
PLATELET # BLD: 42 E9/L (ref 130–450)
PLATELET CONFIRMATION: NORMAL
PMV BLD AUTO: 11.7 FL (ref 7–12)
POIKILOCYTES: ABNORMAL
POLYCHROMASIA: ABNORMAL
POTASSIUM REFLEX MAGNESIUM: 4.2 MMOL/L (ref 3.5–5)
PRO-BNP: ABNORMAL PG/ML (ref 0–125)
PROCALCITONIN: 0.77 NG/ML (ref 0–0.08)
PROTHROMBIN TIME: 18.7 SEC (ref 9.3–12.4)
RBC # BLD: 2.62 E12/L (ref 3.5–5.5)
SODIUM BLD-SCNC: 138 MMOL/L (ref 132–146)
TOTAL PROTEIN: 5.7 G/DL (ref 6.4–8.3)
WBC # BLD: 1.8 E9/L (ref 4.5–11.5)

## 2019-07-24 PROCEDURE — 99233 SBSQ HOSP IP/OBS HIGH 50: CPT | Performed by: INTERNAL MEDICINE

## 2019-07-24 PROCEDURE — 1200000000 HC SEMI PRIVATE

## 2019-07-24 PROCEDURE — 80053 COMPREHEN METABOLIC PANEL: CPT

## 2019-07-24 PROCEDURE — 82962 GLUCOSE BLOOD TEST: CPT

## 2019-07-24 PROCEDURE — 6370000000 HC RX 637 (ALT 250 FOR IP): Performed by: INTERNAL MEDICINE

## 2019-07-24 PROCEDURE — 6360000002 HC RX W HCPCS: Performed by: INTERNAL MEDICINE

## 2019-07-24 PROCEDURE — 85610 PROTHROMBIN TIME: CPT

## 2019-07-24 PROCEDURE — 36415 COLL VENOUS BLD VENIPUNCTURE: CPT

## 2019-07-24 PROCEDURE — 71046 X-RAY EXAM CHEST 2 VIEWS: CPT

## 2019-07-24 PROCEDURE — 97535 SELF CARE MNGMENT TRAINING: CPT

## 2019-07-24 PROCEDURE — 6370000000 HC RX 637 (ALT 250 FOR IP): Performed by: FAMILY MEDICINE

## 2019-07-24 PROCEDURE — 2700000000 HC OXYGEN THERAPY PER DAY

## 2019-07-24 PROCEDURE — 83880 ASSAY OF NATRIURETIC PEPTIDE: CPT

## 2019-07-24 PROCEDURE — 6370000000 HC RX 637 (ALT 250 FOR IP): Performed by: STUDENT IN AN ORGANIZED HEALTH CARE EDUCATION/TRAINING PROGRAM

## 2019-07-24 PROCEDURE — 97530 THERAPEUTIC ACTIVITIES: CPT

## 2019-07-24 PROCEDURE — 99223 1ST HOSP IP/OBS HIGH 75: CPT | Performed by: INTERNAL MEDICINE

## 2019-07-24 PROCEDURE — 99232 SBSQ HOSP IP/OBS MODERATE 35: CPT | Performed by: FAMILY MEDICINE

## 2019-07-24 PROCEDURE — 85025 COMPLETE CBC W/AUTO DIFF WBC: CPT

## 2019-07-24 PROCEDURE — 84145 PROCALCITONIN (PCT): CPT

## 2019-07-24 PROCEDURE — 2580000003 HC RX 258: Performed by: STUDENT IN AN ORGANIZED HEALTH CARE EDUCATION/TRAINING PROGRAM

## 2019-07-24 PROCEDURE — 6360000002 HC RX W HCPCS: Performed by: STUDENT IN AN ORGANIZED HEALTH CARE EDUCATION/TRAINING PROGRAM

## 2019-07-24 PROCEDURE — 2580000003 HC RX 258: Performed by: FAMILY MEDICINE

## 2019-07-24 RX ORDER — ISOSORBIDE DINITRATE 10 MG/1
5 TABLET ORAL 3 TIMES DAILY
Status: DISCONTINUED | OUTPATIENT
Start: 2019-07-24 | End: 2019-07-26 | Stop reason: HOSPADM

## 2019-07-24 RX ORDER — HYDRALAZINE HYDROCHLORIDE 10 MG/1
10 TABLET, FILM COATED ORAL EVERY 8 HOURS SCHEDULED
Status: DISCONTINUED | OUTPATIENT
Start: 2019-07-24 | End: 2019-07-26 | Stop reason: HOSPADM

## 2019-07-24 RX ORDER — POLYETHYLENE GLYCOL 3350 17 G/17G
17 POWDER, FOR SOLUTION ORAL DAILY
Status: DISCONTINUED | OUTPATIENT
Start: 2019-07-24 | End: 2019-07-26 | Stop reason: HOSPADM

## 2019-07-24 RX ORDER — 0.9 % SODIUM CHLORIDE 0.9 %
250 INTRAVENOUS SOLUTION INTRAVENOUS ONCE
Status: DISCONTINUED | OUTPATIENT
Start: 2019-07-24 | End: 2019-07-26 | Stop reason: HOSPADM

## 2019-07-24 RX ADMIN — PREGABALIN 25 MG: 25 CAPSULE ORAL at 09:45

## 2019-07-24 RX ADMIN — DOCUSATE SODIUM 100 MG: 100 CAPSULE, LIQUID FILLED ORAL at 09:46

## 2019-07-24 RX ADMIN — FUROSEMIDE 40 MG: 10 INJECTION, SOLUTION INTRAMUSCULAR; INTRAVENOUS at 16:26

## 2019-07-24 RX ADMIN — Medication 10 ML: at 07:20

## 2019-07-24 RX ADMIN — INSULIN LISPRO 4 UNITS: 100 INJECTION, SOLUTION INTRAVENOUS; SUBCUTANEOUS at 09:47

## 2019-07-24 RX ADMIN — Medication 10 ML: at 09:46

## 2019-07-24 RX ADMIN — PREGABALIN 25 MG: 25 CAPSULE ORAL at 20:53

## 2019-07-24 RX ADMIN — PIPERACILLIN SODIUM,TAZOBACTAM SODIUM 3.38 G: 3; .375 INJECTION, POWDER, FOR SOLUTION INTRAVENOUS at 07:20

## 2019-07-24 RX ADMIN — OXYCODONE HYDROCHLORIDE 2.5 MG: 5 TABLET ORAL at 20:53

## 2019-07-24 RX ADMIN — CALCIUM CARBONATE-VITAMIN D TAB 500 MG-200 UNIT 1 TABLET: 500-200 TAB at 09:45

## 2019-07-24 RX ADMIN — PANTOPRAZOLE SODIUM 40 MG: 40 TABLET, DELAYED RELEASE ORAL at 05:32

## 2019-07-24 RX ADMIN — SODIUM BICARBONATE 650 MG: 650 TABLET ORAL at 09:45

## 2019-07-24 RX ADMIN — Medication 1000 MCG: at 09:45

## 2019-07-24 RX ADMIN — HYDRALAZINE HYDROCHLORIDE 10 MG: 10 TABLET, FILM COATED ORAL at 14:43

## 2019-07-24 RX ADMIN — INSULIN LISPRO 4 UNITS: 100 INJECTION, SOLUTION INTRAVENOUS; SUBCUTANEOUS at 16:26

## 2019-07-24 RX ADMIN — Medication: at 20:55

## 2019-07-24 RX ADMIN — SODIUM BICARBONATE 650 MG: 650 TABLET ORAL at 20:52

## 2019-07-24 RX ADMIN — HYDRALAZINE HYDROCHLORIDE 10 MG: 10 TABLET, FILM COATED ORAL at 20:52

## 2019-07-24 RX ADMIN — MULTIPLE VITAMINS W/ MINERALS TAB 1 TABLET: TAB at 09:45

## 2019-07-24 RX ADMIN — Medication 10 ML: at 20:54

## 2019-07-24 RX ADMIN — CALCIUM CARBONATE-VITAMIN D TAB 500 MG-200 UNIT 1 TABLET: 500-200 TAB at 20:52

## 2019-07-24 RX ADMIN — POLYETHYLENE GLYCOL 3350 17 G: 17 POWDER, FOR SOLUTION ORAL at 09:46

## 2019-07-24 RX ADMIN — TBO-FILGRASTIM 300 MCG: 300 INJECTION, SOLUTION SUBCUTANEOUS at 12:09

## 2019-07-24 RX ADMIN — Medication: at 09:46

## 2019-07-24 RX ADMIN — ISOSORBIDE DINITRATE 5 MG: 10 TABLET ORAL at 14:43

## 2019-07-24 RX ADMIN — OXYCODONE HYDROCHLORIDE 2.5 MG: 5 TABLET ORAL at 06:35

## 2019-07-24 RX ADMIN — INSULIN LISPRO 2 UNITS: 100 INJECTION, SOLUTION INTRAVENOUS; SUBCUTANEOUS at 12:47

## 2019-07-24 RX ADMIN — FUROSEMIDE 40 MG: 10 INJECTION, SOLUTION INTRAMUSCULAR; INTRAVENOUS at 09:45

## 2019-07-24 RX ADMIN — ISOSORBIDE DINITRATE 5 MG: 10 TABLET ORAL at 20:53

## 2019-07-24 ASSESSMENT — PAIN DESCRIPTION - ONSET: ONSET: ON-GOING

## 2019-07-24 ASSESSMENT — PAIN SCALES - GENERAL
PAINLEVEL_OUTOF10: 8
PAINLEVEL_OUTOF10: 6
PAINLEVEL_OUTOF10: 0

## 2019-07-24 ASSESSMENT — PAIN DESCRIPTION - LOCATION: LOCATION: BACK

## 2019-07-24 ASSESSMENT — PAIN DESCRIPTION - DESCRIPTORS: DESCRIPTORS: DULL;DISCOMFORT;CONSTANT

## 2019-07-24 ASSESSMENT — PAIN DESCRIPTION - ORIENTATION: ORIENTATION: LOWER

## 2019-07-24 ASSESSMENT — PAIN DESCRIPTION - PAIN TYPE: TYPE: CHRONIC PAIN

## 2019-07-24 ASSESSMENT — PAIN DESCRIPTION - PROGRESSION: CLINICAL_PROGRESSION: NOT CHANGED

## 2019-07-24 ASSESSMENT — PAIN DESCRIPTION - FREQUENCY: FREQUENCY: CONTINUOUS

## 2019-07-24 NOTE — CARE COORDINATION
Received message from [address] at [facility], no female beds available. Informed patient and requested more [place] choices. She is going to speak with her daughter. Await choices. Will continue to follow.   Lois Stanton RN CM

## 2019-07-24 NOTE — CARE COORDINATION
[facility] is unable to accept. Spoke with patient about alternate choices she states she will not pick any other facilities until she talks to her daughter again. She would like to give other choices tomorrow.

## 2019-07-24 NOTE — PROGRESS NOTES
report and concur with these findings. XR CHEST STANDARD (2 VW)   Final Result   Bibasilar airspace opacities and small bilateral pleural effusions. Differential includes atelectasis and infection. CT ABDOMEN PELVIS WO CONTRAST Additional Contrast? None   Final Result   1. Left breast skin thickening and edema similar to prior study. Left breast    mass or scar, stable. 2. Right lung nodules consistent with metastasis. Fleischner chest imaging    society guidelines do not apply to this patient who has a known malignancy. 3. Hepatic metastasis. 4. Omental and peritoneal implants. 5. Large amount of fluid within the abdomen and pelvis, increased since prior    study. 6. Body wall edema also probably mildly increased. 7. Lytic metastasis to one of the right ribs, partially imaged. 8. A few diverticula of the colon. 9. Nonobstructing renal stones. COMMENT:     Consistent with the [employer]'s Incidental Findings    Committee Report (J Am Greyson Radiol 2010): Unless the patient's specific    circumstances suggest otherwise, any liver lesion 0.5 cm or less, any cystic    kidney lesion less than 1.0 cm, and/or any adrenal lesion 1.0 cm or less not    otherwise characterized in this report as possessing suspicious or    indeterminate imaging features is/are highly likely to be benign and do not    require follow-up imaging or biopsy.        This report has been electronically signed by Elizabeth Quiñones MD.      XR CHEST PORTABLE   Final Result      Worsening bibasilar opacities with new small pleural effusions                       A/P:  Principal Problem:    Acute respiratory failure with hypoxia ([address]  Active Problems:    Breast cancer ([address]    Hypertension    Coronary artery disease involving native coronary artery of native heart without angina pectoris    CHF (congestive heart failure) (HCC)    Hyperlipidemia    Type 2 diabetes mellitus without

## 2019-07-24 NOTE — PROGRESS NOTES
demonstrated skill Pt requires further education in this area   Yes With cueing Yes     Comments:  Pt was supine on transport cart upon room entry, agreeable to PT treatment with OT collaboration. Pt ambulated back to EOB on RA without AD. Pt was mildly unsteady without device. Pt's O2 sat briefly dropped to 85% on RA. Pt was returned to 4 L O2/min and saturation levels recovered to 100% with seated rest after approximately 2 minutes. Pt requested to use bathroom, so pt ambulated short distance to commode with [employer]. Pt performed transfers from commode and ambulated short distance into hallway. Pt had improved steadiness with [employer] and pt reported she felt more comfortable with device vs without. Pt ambulated back to bedside chair and was seated. Pt was mildly SOB following activity but O2 sat was 100% on 4 L O2/min. Pt was left seated in bedside chair with all needs met at conclusion of session. SW notified of pt's performance. Patient is making good progress toward established physical therapy goals. Continue with physical therapy current plan of care.     Time in: 1125  Time out: [address]  [id number]

## 2019-07-24 NOTE — PROGRESS NOTES
input(s): TROPONINI in the last 72 hours. BNP: No results for input(s): BNP in the last 72 hours. Lipids: No results for input(s): CHOL, HDL in the last 72 hours. Invalid input(s): LDLCALCU  ABGs: No results found for: PHART, PO2ART, [id number]  INR:   Recent Labs     07/22/19  0647 07/23/19  0624 07/24/19  0550   INR 1.5 1.6 1.6     -----------------------------------------------------------------  RAD: Ct Abdomen Pelvis Wo Contrast Additional Contrast? None    Result Date: 7/21/2019  EXAM:  CT Abdomen and Pelvis Without Contrast EXAM DATE/TIME:  7/20/2019 11:15 PM CLINICAL HISTORY:  68years old, female; Other: Distension; Additional info: Abdominal distention, ascites TECHNIQUE:  Imaging protocol: Axial computed tomography images of the abdomen and pelvis without contrast. Coronal and sagittal reformatted images were created and reviewed. Radiation optimization: All CT scans at this facility use at least one of these dose optimization techniques: automated exposure control; mA and/or kV adjustment per patient size (includes targeted exams where dose is matched to clinical indication); or iterative reconstruction. COMPARISON:  CT ABDOMEN PELVIS WO CONTRAST 6/11/2019 6:23 PM FINDINGS:  Lungs: There are at least 2 right lower lobe nodule the largest measures 6.5 mm. There is 3 mm right middle lobe nodule. Pleural space: There are pleural effusions. Heart: There is calcification of the mitral annulus. The heart is enlarged. Liver: There are multiple heterogeneous hypodense liver masses, consistent in appearance with metastatic disease. Gallbladder and bile ducts: The gallbladder appears to be absent. Pancreas: Normal. No ductal dilation. Spleen: Normal. No splenomegaly. Adrenals: Normal. No mass. Kidneys and ureters: There are small renal stones. Stomach and bowel: There is diverticulosis of the colon without evidence of diverticulitis. No dilation. There is no evidence of intestinal obstruction.

## 2019-07-24 NOTE — CONSULTS
Non-medical: Not on file   Tobacco Use    Smoking status: Never Smoker    Smokeless tobacco: Never Used   Substance and Sexual Activity    Alcohol use: No    Drug use: No    Sexual activity: Never   Lifestyle    Physical activity:     Days per week: Not on file     Minutes per session: Not on file    Stress: Not on file   Relationships    Social connections:     Talks on phone: Not on file     Gets together: Not on file     Attends Jehovah's witness service: Not on file     Active member of club or organization: Not on file     Attends meetings of clubs or organizations: Not on file     Relationship status: Not on file    Intimate partner violence:     Fear of current or ex partner: Not on file     Emotionally abused: Not on file     Physically abused: Not on file     Forced sexual activity: Not on file   Other Topics Concern    Not on file   Social History Narrative    Not on file     Social History     Tobacco Use   Smoking Status Never Smoker   Smokeless Tobacco Never Used     Social History     Substance and Sexual Activity   Alcohol Use No     Social History     Substance and Sexual Activity   Drug Use No         HOME MEDICATIONS:  Prior to Admission medications    Medication Sig Start Date End Date Taking? Authorizing Provider   oxyCODONE (ROXICODONE) 5 MG/5ML solution Take 2.5 mg by mouth 3 times daily as needed for Pain. Take 2.5 ml three times daily as needed for pain    Historical Provider, MD   pregabalin (LYRICA) 25 MG capsule Take 1 capsule by mouth 2 times daily for 30 days.  6/26/19 7/26/19  Riya Light MD   warfarin (COUMADIN) 1 MG tablet Take 0.5 tablets by mouth every other day Take 0.5 mg every other day 6/26/19   Riya Light MD   diclofenac sodium 1 % GEL Apply 4 g topically 4 times daily as needed for Pain 6/26/19   Riya Light MD   miconazole nitrate 2 % OINT Apply topically 2 times daily 6/26/19   Riya Light MD   docusate sodium (COLACE, DULCOLAX) 100 MG CAPS Take 100 mg by mouth daily 6/26/19   Tesfaye Haro MD   omeprazole 20 MG EC tablet Take 1 tablet by mouth daily 6/26/19   Tesfaye Haro MD   sodium bicarbonate 650 MG tablet Take 1 tablet by mouth 2 times daily 6/26/19   Tesfaye Haro MD   ondansetron (ZOFRAN-ODT) 4 MG disintegrating tablet Take 1 tablet by mouth every 8 hours as needed for Nausea or Vomiting 6/14/19   Tesfaye Haro MD   vitamin B-12 (CYANOCOBALAMIN) 1000 MCG tablet Take 1 tablet by mouth daily 11/6/18   Colette Escobedo DO   Calcium Citrate-Vitamin D (RA CALCIUM CIT-VIT D-3 PETITES) 200-250 MG-UNIT TABS take 1 tablet by mouth twice a day 3/6/18   Colette Escobedo DO   exemestane (AROMASIN) 25 MG chemo tablet Take 1 tablet by mouth every morning (before breakfast) 10/10/17   Historical Provider, MD   Multiple Vitamins-Minerals (THERAPEUTIC MULTIVITAMIN-MINERALS) tablet Take 1 tablet by mouth daily 6/6/17   Colette Escobedo DO   trastuzumab (HERCEPTIN) 440 MG injection Infuse  intravenously once a week.  On tuesdays    Historical Provider, MD       CURRENT MEDICATIONS:  Current Facility-Administered Medications: polyethylene glycol (GLYCOLAX) packet 17 g, 17 g, Oral, Daily  hydrALAZINE (APRESOLINE) tablet 10 mg, 10 mg, Oral, 3 times per day  isosorbide dinitrate (ISORDIL) tablet 5 mg, 5 mg, Oral, TID  insulin lispro (HUMALOG) injection vial 0-12 Units, 0-12 Units, Subcutaneous, TID WC  Tbo-Filgrastim (GRANIX) injection 300 mcg, 300 mcg, Subcutaneous, Daily  furosemide (LASIX) injection 40 mg, 40 mg, Intravenous, BID  warfarin (COUMADIN) tablet 1 mg, 1 mg, Oral, Daily  enoxaparin (LOVENOX) injection 30 mg, 30 mg, Subcutaneous, Daily  perflutren lipid microspheres (DEFINITY) injection 1.65 mg, 1.5 mL, Intravenous, ONCE PRN  docusate sodium (COLACE) capsule 100 mg, 100 mg, Oral, Daily  miconazole nitrate 2 % ointment, , Topical, BID  therapeutic multivitamin-minerals 1 tablet, 1 tablet, Oral, Daily  pantoprazole (PROTONIX) tablet 40 mg, without complication, with long-term current use of insulin (HCC)    Anemia    Chronic deep vein thrombosis (DVT) of proximal vein of right lower extremity (HCC)    Bilateral edema of lower extremity    Peripheral polyneuropathy    Complicated UTI (urinary tract infection)    [facility]MA [facility] injury) ([facility])    Diarrhea with dehydration    Chronic anticoagulation    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    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    Diabetic polyneuropathy associated with type 2 diabetes mellitus (HCC)    Leg swelling    History of deep vein thrombosis    Chronic venous insufficiency    Lymphedema of both lower extremities    Decreased dorsalis pedis pulse    Ambulatory dysfunction    CKD (chronic kidney disease) stage 3, GFR 30-59 ml/min (HCC)    Charcot's joint of foot in type 2 diabetes mellitus (HCC)    Ascites    Palliative care encounter    Cancer associated pain    HAP (hospital-acquired pneumonia)    Acute respiratory failure with hypoxia ([facility])               ASSESSMENT:  1.)  Stage IV breast cancer  2.)  Acute hypoxic respiratory failure,  multifactorial  3.)  Pleural effusion right more than the left  4.)  Pancytopenia secondary to her breast cancer treatment  5.)  Ascites  6) granuloma in the right lower lobe      PLAN:  *-Discussed with patient risk and benefits we are going to check first ultrasound of the pleural space, she is at high risk for bleed so we will transfuse 1 unit of platelets as discussed with Dr. Keith Hannah and he will order it before procedure  *-Agree with diuresis  *-Already on antibiotics Zosyn by primary team  *-Agree and hold Coumadin for now  *_If pleural fluid reaccumulate or get worse and it was malignant she might be candidate for Pleurx    Will assess first with ultrasound before any further intervention and intervention, patient understands she is at higher risk for, so unless the fluid is large

## 2019-07-25 ENCOUNTER — APPOINTMENT (OUTPATIENT)
Dept: ULTRASOUND IMAGING | Age: 74
DRG: 291 | End: 2019-07-25
Payer: COMMERCIAL

## 2019-07-25 ENCOUNTER — APPOINTMENT (OUTPATIENT)
Dept: GENERAL RADIOLOGY | Age: 74
DRG: 291 | End: 2019-07-25
Payer: COMMERCIAL

## 2019-07-25 LAB
ALBUMIN SERPL-MCNC: 2.6 G/DL (ref 3.5–5.2)
ALP BLD-CCNC: 93 U/L (ref 35–104)
ALT SERPL-CCNC: 25 U/L (ref 0–32)
ANION GAP SERPL CALCULATED.3IONS-SCNC: 10 MMOL/L (ref 7–16)
ANISOCYTOSIS: ABNORMAL
AST SERPL-CCNC: 20 U/L (ref 0–31)
BASOPHILS ABSOLUTE: 0.02 E9/L (ref 0–0.2)
BASOPHILS RELATIVE PERCENT: 0.9 % (ref 0–2)
BILIRUB SERPL-MCNC: 0.7 MG/DL (ref 0–1.2)
BLOOD BANK DISPENSE STATUS: NORMAL
BLOOD BANK DISPENSE STATUS: NORMAL
BLOOD BANK PRODUCT CODE: NORMAL
BLOOD BANK PRODUCT CODE: NORMAL
BLOOD CULTURE, ROUTINE: NORMAL
BPU ID: NORMAL
BPU ID: NORMAL
BUN BLDV-MCNC: 26 MG/DL (ref 8–23)
CALCIUM SERPL-MCNC: 8 MG/DL (ref 8.6–10.2)
CHLORIDE BLD-SCNC: 99 MMOL/L (ref 98–107)
CO2: 33 MMOL/L (ref 22–29)
CREAT SERPL-MCNC: 1.9 MG/DL (ref 0.5–1)
CULTURE, BLOOD 2: NORMAL
DESCRIPTION BLOOD BANK: NORMAL
DESCRIPTION BLOOD BANK: NORMAL
EOSINOPHILS ABSOLUTE: 0 E9/L (ref 0.05–0.5)
EOSINOPHILS RELATIVE PERCENT: 0.5 % (ref 0–6)
FLUID TYPE: NORMAL
GFR AFRICAN AMERICAN: 31
GFR NON-AFRICAN AMERICAN: 26 ML/MIN/1.73
GLUCOSE BLD-MCNC: 159 MG/DL (ref 74–99)
GLUCOSE, FLUID: 218 MG/DL
HCT VFR BLD CALC: 24.1 % (ref 34–48)
HCT VFR BLD CALC: 25.4 % (ref 34–48)
HEMOGLOBIN: 7.3 G/DL (ref 11.5–15.5)
HEMOGLOBIN: 7.7 G/DL (ref 11.5–15.5)
HYPOCHROMIA: ABNORMAL
INR BLD: 1.6
LD, FLUID: 87 U/L
LYMPHOCYTES ABSOLUTE: 0.44 E9/L (ref 1.5–4)
LYMPHOCYTES RELATIVE PERCENT: 20.2 % (ref 20–42)
MCH RBC QN AUTO: 28.9 PG (ref 26–35)
MCH RBC QN AUTO: 29.2 PG (ref 26–35)
MCHC RBC AUTO-ENTMCNC: 30.3 % (ref 32–34.5)
MCHC RBC AUTO-ENTMCNC: 30.3 % (ref 32–34.5)
MCV RBC AUTO: 95.3 FL (ref 80–99.9)
MCV RBC AUTO: 96.2 FL (ref 80–99.9)
METER GLUCOSE: 109 MG/DL (ref 74–99)
METER GLUCOSE: 176 MG/DL (ref 74–99)
METER GLUCOSE: 223 MG/DL (ref 74–99)
MONOCYTES ABSOLUTE: 0.02 E9/L (ref 0.1–0.95)
MONOCYTES RELATIVE PERCENT: 0.9 % (ref 2–12)
NEUTROPHILS ABSOLUTE: 1.72 E9/L (ref 1.8–7.3)
NEUTROPHILS RELATIVE PERCENT: 78.1 % (ref 43–80)
PDW BLD-RTO: 16.8 FL (ref 11.5–15)
PDW BLD-RTO: 17.1 FL (ref 11.5–15)
PLATELET # BLD: 107 E9/L (ref 130–450)
PLATELET # BLD: 35 E9/L (ref 130–450)
PLATELET CONFIRMATION: NORMAL
PMV BLD AUTO: 10.3 FL (ref 7–12)
PMV BLD AUTO: 10.7 FL (ref 7–12)
POLYCHROMASIA: ABNORMAL
POTASSIUM REFLEX MAGNESIUM: 3.9 MMOL/L (ref 3.5–5)
PROCALCITONIN: 0.67 NG/ML (ref 0–0.08)
PROTEIN FLUID: 3 G/DL
PROTHROMBIN TIME: 18.4 SEC (ref 9.3–12.4)
RBC # BLD: 2.53 E12/L (ref 3.5–5.5)
RBC # BLD: 2.64 E12/L (ref 3.5–5.5)
SODIUM BLD-SCNC: 142 MMOL/L (ref 132–146)
TOTAL PROTEIN: 5.7 G/DL (ref 6.4–8.3)
WBC # BLD: 2.2 E9/L (ref 4.5–11.5)
WBC # BLD: 2.9 E9/L (ref 4.5–11.5)

## 2019-07-25 PROCEDURE — 36430 TRANSFUSION BLD/BLD COMPNT: CPT

## 2019-07-25 PROCEDURE — 0W993ZZ DRAINAGE OF RIGHT PLEURAL CAVITY, PERCUTANEOUS APPROACH: ICD-10-PCS | Performed by: INTERNAL MEDICINE

## 2019-07-25 PROCEDURE — 85610 PROTHROMBIN TIME: CPT

## 2019-07-25 PROCEDURE — 2700000000 HC OXYGEN THERAPY PER DAY

## 2019-07-25 PROCEDURE — C1729 CATH, DRAINAGE: HCPCS

## 2019-07-25 PROCEDURE — 99232 SBSQ HOSP IP/OBS MODERATE 35: CPT | Performed by: FAMILY MEDICINE

## 2019-07-25 PROCEDURE — 6370000000 HC RX 637 (ALT 250 FOR IP): Performed by: INTERNAL MEDICINE

## 2019-07-25 PROCEDURE — P9035 PLATELET PHERES LEUKOREDUCED: HCPCS

## 2019-07-25 PROCEDURE — 2580000003 HC RX 258: Performed by: FAMILY MEDICINE

## 2019-07-25 PROCEDURE — 83615 LACTATE (LD) (LDH) ENZYME: CPT

## 2019-07-25 PROCEDURE — 99233 SBSQ HOSP IP/OBS HIGH 50: CPT | Performed by: INTERNAL MEDICINE

## 2019-07-25 PROCEDURE — 6370000000 HC RX 637 (ALT 250 FOR IP): Performed by: STUDENT IN AN ORGANIZED HEALTH CARE EDUCATION/TRAINING PROGRAM

## 2019-07-25 PROCEDURE — 32555 ASPIRATE PLEURA W/ IMAGING: CPT | Performed by: INTERNAL MEDICINE

## 2019-07-25 PROCEDURE — 82962 GLUCOSE BLOOD TEST: CPT

## 2019-07-25 PROCEDURE — 6370000000 HC RX 637 (ALT 250 FOR IP): Performed by: NURSE PRACTITIONER

## 2019-07-25 PROCEDURE — 82947 ASSAY GLUCOSE BLOOD QUANT: CPT

## 2019-07-25 PROCEDURE — 6360000002 HC RX W HCPCS: Performed by: INTERNAL MEDICINE

## 2019-07-25 PROCEDURE — 36415 COLL VENOUS BLD VENIPUNCTURE: CPT

## 2019-07-25 PROCEDURE — 99232 SBSQ HOSP IP/OBS MODERATE 35: CPT | Performed by: INTERNAL MEDICINE

## 2019-07-25 PROCEDURE — 6370000000 HC RX 637 (ALT 250 FOR IP): Performed by: FAMILY MEDICINE

## 2019-07-25 PROCEDURE — 87205 SMEAR GRAM STAIN: CPT

## 2019-07-25 PROCEDURE — 84145 PROCALCITONIN (PCT): CPT

## 2019-07-25 PROCEDURE — 87070 CULTURE OTHR SPECIMN AEROBIC: CPT

## 2019-07-25 PROCEDURE — 80053 COMPREHEN METABOLIC PANEL: CPT

## 2019-07-25 PROCEDURE — 89051 BODY FLUID CELL COUNT: CPT

## 2019-07-25 PROCEDURE — 97530 THERAPEUTIC ACTIVITIES: CPT

## 2019-07-25 PROCEDURE — 85025 COMPLETE CBC W/AUTO DIFF WBC: CPT

## 2019-07-25 PROCEDURE — 1200000000 HC SEMI PRIVATE

## 2019-07-25 PROCEDURE — 6360000002 HC RX W HCPCS: Performed by: STUDENT IN AN ORGANIZED HEALTH CARE EDUCATION/TRAINING PROGRAM

## 2019-07-25 PROCEDURE — 85027 COMPLETE CBC AUTOMATED: CPT

## 2019-07-25 PROCEDURE — 88305 TISSUE EXAM BY PATHOLOGIST: CPT

## 2019-07-25 PROCEDURE — 84157 ASSAY OF PROTEIN OTHER: CPT

## 2019-07-25 PROCEDURE — 88112 CYTOPATH CELL ENHANCE TECH: CPT

## 2019-07-25 PROCEDURE — 6360000002 HC RX W HCPCS: Performed by: FAMILY MEDICINE

## 2019-07-25 PROCEDURE — 71045 X-RAY EXAM CHEST 1 VIEW: CPT

## 2019-07-25 RX ORDER — SODIUM BICARBONATE 650 MG/1
325 TABLET ORAL 2 TIMES DAILY
Status: DISCONTINUED | OUTPATIENT
Start: 2019-07-25 | End: 2019-07-26 | Stop reason: HOSPADM

## 2019-07-25 RX ORDER — 0.9 % SODIUM CHLORIDE 0.9 %
250 INTRAVENOUS SOLUTION INTRAVENOUS ONCE
Status: DISCONTINUED | OUTPATIENT
Start: 2019-07-25 | End: 2019-07-26 | Stop reason: HOSPADM

## 2019-07-25 RX ORDER — HEPARIN SODIUM (PORCINE) LOCK FLUSH IV SOLN 100 UNIT/ML 100 UNIT/ML
100 SOLUTION INTRAVENOUS PRN
Status: DISCONTINUED | OUTPATIENT
Start: 2019-07-25 | End: 2019-07-26 | Stop reason: HOSPADM

## 2019-07-25 RX ADMIN — ISOSORBIDE DINITRATE 5 MG: 10 TABLET ORAL at 14:38

## 2019-07-25 RX ADMIN — WARFARIN SODIUM 1 MG: 1 TABLET ORAL at 21:33

## 2019-07-25 RX ADMIN — OXYCODONE HYDROCHLORIDE 2.5 MG: 5 TABLET ORAL at 09:15

## 2019-07-25 RX ADMIN — CALCIUM CARBONATE-VITAMIN D TAB 500 MG-200 UNIT 1 TABLET: 500-200 TAB at 09:08

## 2019-07-25 RX ADMIN — PREGABALIN 25 MG: 25 CAPSULE ORAL at 21:35

## 2019-07-25 RX ADMIN — MULTIPLE VITAMINS W/ MINERALS TAB 1 TABLET: TAB at 09:09

## 2019-07-25 RX ADMIN — HYDRALAZINE HYDROCHLORIDE 10 MG: 10 TABLET, FILM COATED ORAL at 14:38

## 2019-07-25 RX ADMIN — Medication 1000 MCG: at 09:08

## 2019-07-25 RX ADMIN — HEPARIN 100 UNITS: 100 SYRINGE at 21:36

## 2019-07-25 RX ADMIN — FUROSEMIDE 40 MG: 10 INJECTION, SOLUTION INTRAMUSCULAR; INTRAVENOUS at 09:09

## 2019-07-25 RX ADMIN — OXYCODONE HYDROCHLORIDE 2.5 MG: 5 TABLET ORAL at 17:36

## 2019-07-25 RX ADMIN — CALCIUM CARBONATE-VITAMIN D TAB 500 MG-200 UNIT 1 TABLET: 500-200 TAB at 21:35

## 2019-07-25 RX ADMIN — INSULIN LISPRO 4 UNITS: 100 INJECTION, SOLUTION INTRAVENOUS; SUBCUTANEOUS at 13:30

## 2019-07-25 RX ADMIN — SODIUM BICARBONATE 325 MG: 650 TABLET ORAL at 21:34

## 2019-07-25 RX ADMIN — DOCUSATE SODIUM 100 MG: 100 CAPSULE, LIQUID FILLED ORAL at 09:13

## 2019-07-25 RX ADMIN — ENOXAPARIN SODIUM 30 MG: 40 INJECTION SUBCUTANEOUS at 21:32

## 2019-07-25 RX ADMIN — HYDRALAZINE HYDROCHLORIDE 10 MG: 10 TABLET, FILM COATED ORAL at 21:35

## 2019-07-25 RX ADMIN — ISOSORBIDE DINITRATE 5 MG: 10 TABLET ORAL at 21:36

## 2019-07-25 RX ADMIN — Medication 10 ML: at 18:26

## 2019-07-25 RX ADMIN — SODIUM BICARBONATE 650 MG: 650 TABLET ORAL at 09:12

## 2019-07-25 RX ADMIN — Medication: at 09:21

## 2019-07-25 RX ADMIN — Medication 10 ML: at 21:37

## 2019-07-25 RX ADMIN — PANTOPRAZOLE SODIUM 40 MG: 40 TABLET, DELAYED RELEASE ORAL at 06:24

## 2019-07-25 RX ADMIN — TBO-FILGRASTIM 300 MCG: 300 INJECTION, SOLUTION SUBCUTANEOUS at 15:39

## 2019-07-25 RX ADMIN — ISOSORBIDE DINITRATE 5 MG: 10 TABLET ORAL at 09:09

## 2019-07-25 RX ADMIN — Medication 10 ML: at 09:10

## 2019-07-25 RX ADMIN — INSULIN LISPRO 2 UNITS: 100 INJECTION, SOLUTION INTRAVENOUS; SUBCUTANEOUS at 18:26

## 2019-07-25 RX ADMIN — FUROSEMIDE 40 MG: 10 INJECTION, SOLUTION INTRAMUSCULAR; INTRAVENOUS at 18:26

## 2019-07-25 RX ADMIN — Medication: at 21:37

## 2019-07-25 RX ADMIN — HYDRALAZINE HYDROCHLORIDE 10 MG: 10 TABLET, FILM COATED ORAL at 06:22

## 2019-07-25 RX ADMIN — PREGABALIN 25 MG: 25 CAPSULE ORAL at 09:13

## 2019-07-25 ASSESSMENT — PAIN SCALES - GENERAL
PAINLEVEL_OUTOF10: 6
PAINLEVEL_OUTOF10: 8
PAINLEVEL_OUTOF10: 0
PAINLEVEL_OUTOF10: 9

## 2019-07-25 ASSESSMENT — PAIN DESCRIPTION - LOCATION: LOCATION: BACK

## 2019-07-25 ASSESSMENT — PAIN DESCRIPTION - PAIN TYPE: TYPE: ACUTE PAIN

## 2019-07-25 ASSESSMENT — PAIN DESCRIPTION - ORIENTATION: ORIENTATION: RIGHT

## 2019-07-25 NOTE — PROCEDURES
procedure well. PLAN: Care will be taken to review the post procedure radiograph for pneumothorax & testing when available.     Harshad Flores

## 2019-07-25 NOTE — PROGRESS NOTES
[facility]  Family Medicine Attending    S: 68 y.o. female with a history of dm2, ckd, sarcoid, cad s/p cabg, dvts, chf, bl subclavian stenosis, and metastatic breast CA, maintained outpatient on Faslodex and Ibrance. Recent paracentesis for ascites and liver biopsy, which showed hepatic metastases. Admitted with dyspnea on exertion for the past 4 days. Denies rectal or vaginal bleeding. Today, no new symptoms or concerns. Has had some right-sided rib pain. No increased dyspnea. Bowel movements have been normal. Pain controlled overall. O: VS- Blood pressure (!) 110/54, pulse 91, temperature 97.4 °F (36.3 °C), resp. rate 16, height 5' 4\" (1.626 m), weight 206 lb 3.2 oz (93.5 kg), SpO2 99 %, not currently breastfeeding. Exam is as noted by resident with the following changes, additions or corrections:  Gen NAD, A&A, pallor noted   CV RRR, systolic murmur left and right sternal border   Resp decreased, at right base more than left but bilaterally   Abd Softly distended, less ascites, BS present, tenderness left side near paracentesis site  Ext 3+ edema bilaterally      Impressions:   Principal Problem:    Acute respiratory failure with hypoxia (HCC)  Active Problems:    Breast cancer ([facility] [address]    Hypertension    Coronary artery disease involving native coronary artery of native heart without angina pectoris    CHF (congestive heart failure) ([facility] [address]    Hyperlipidemia    Type 2 diabetes mellitus without complication, with long-term current use of insulin (HCC)    Diabetic polyneuropathy associated with type 2 diabetes mellitus (HCC)    CKD (chronic kidney disease) stage 3, GFR 30-59 ml/min (HCC)    Cancer associated pain    HAP (hospital-acquired pneumonia)  Resolved Problems:    * No resolved hospital problems. *      Plan:  Dyspnea with multiple contributing etiologies.   There is likely a component of acute on chronic systolic and diastolic HF, with mild/mod valve disease, worsened by recent

## 2019-07-25 NOTE — PROGRESS NOTES
INPATIENT CARDIOLOGY FOLLOW-UP    Name: Jaciel Cordon    Age: 68 y.o. Date of Admission: 7/20/2019  9:25 PM    Date of Service: 7/25/2019    Chief Complaint: Follow-up for CHF    Interim History:  No new overnight cardiac complaints and is feeling much better and no symptoms at rest and she gets winded with minimal exertion. Today, she is undergoing thoracentesis this evening around 4 PM.  Currently with no complaints of CP, palpitations, dizziness, or lightheadedness. SR on telemetry.     Review of Systems:   Cardiac: As per HPI  General: No fever, chills  Pulmonary: As per HPI  HEENT: No visual disturbances, difficult swallowing  GI: No nausea, vomiting  Endocrine: No thyroid disease or DM  Musculoskeletal: BARRY x 4, no focal motor deficits  Skin: Intact, no rashes  Neuro/Psych: No headache or seizures    Problem List:  Patient Active Problem List   Diagnosis    Breast cancer ([facility] [address]    Hypertension    Coronary artery disease involving native coronary artery of native heart without angina pectoris    Nephrolithiasis    CHF (congestive heart failure) (HCC)    Humerus fracture    Shoulder pain, left    B12 deficiency    Hyperlipidemia    Rib lesion    Subclavian vein occlusion, bilateral (HCC)    Pericardial effusion    MI (myocardial infarction) ([address]    Arthritis    Sarcoidosis    Lymph node enlargement    Anesthesia    Rectal bleeding    Ventral hernia    Type 2 diabetes mellitus without complication, with long-term current use of insulin (HCC)    Anemia    Chronic deep vein thrombosis (DVT) of proximal vein of right lower extremity (HCC)    Bilateral edema of lower extremity    Peripheral polyneuropathy    Complicated UTI (urinary tract infection)    KARISHMA (acute kidney injury) ([facility] [address]    Diarrhea with dehydration    Chronic anticoagulation    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    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    Diabetic Meek Cesar MD   100 mg at 07/25/19 0913    miconazole nitrate 2 % ointment   Topical BID Carmel Alexander DO        therapeutic multivitamin-minerals 1 tablet  1 tablet Oral Daily Meek Cesar MD   1 tablet at 07/25/19 0909    pantoprazole (PROTONIX) tablet 40 mg  40 mg Oral QAM AC Meek Cesar MD   40 mg at 07/25/19 2495    ondansetron (ZOFRAN-ODT) disintegrating tablet 4 mg  4 mg Oral Q8H PRN Meek Cesar MD        oxyCODONE (ROXICODONE) immediate release tablet 2.5 mg  2.5 mg Oral TID PRN Meek Cesar MD   2.5 mg at 07/25/19 0915    pregabalin (LYRICA) capsule 25 mg  25 mg Oral BID Meek Cesar MD   25 mg at 07/25/19 0913    vitamin B-12 (CYANOCOBALAMIN) tablet 1,000 mcg  1,000 mcg Oral Daily Meek Cesar MD   1,000 mcg at 07/25/19 0908    calcium-vitamin D (OSCAL-500) 500-200 MG-UNIT per tablet 1 tablet  1 tablet Oral BID Meek Cesar MD   1 tablet at 07/25/19 0908    sodium chloride flush 0.9 % injection 10 mL  10 mL Intravenous 2 times per day Meek Cesar MD   10 mL at 07/25/19 0910    sodium chloride flush 0.9 % injection 10 mL  10 mL Intravenous PRN Meek Cesar MD   10 mL at 07/24/19 0720    magnesium hydroxide (MILK OF MAGNESIA) 400 MG/5ML suspension 30 mL  30 mL Oral Daily PRN Meek Cesar MD        [facility]dansetron [facility]) injection 4 mg  4 mg Intravenous Q6H PRN Meek Cesar MD        glucose (GLUTOSE) 40 % oral gel 15 g  15 g Oral PRN Meek Cesar MD        dextrose 50 % IV solution  12.5 g Intravenous PRN Meek Cesar MD        glucagon (rDNA) injection 1 mg  1 mg Intramuscular PRN Meek Cesar MD        dextrose 5 % solution  100 mL/hr Intravenous PRN Meek Cesar MD          dextrose         Physical Exam:  BP (!) 105/59   Pulse (!) 11   Temp 99.2 °F (37.3 °C) (Temporal)   Resp 20   Ht 5' 4\" (1.626 m)   Wt 206 lb 3.2 oz (93.5 kg)   SpO2 96%   BMI 35.39 kg/m²   Wt Readings from Last 3 Encounters:

## 2019-07-25 NOTE — PROGRESS NOTES
Trastuzumab 440 mg IV weekly  · Hematology is following      Cancer Associated Pain  · Continue home med:  Roxicodone 2.5 mg TID PRN pain  · Will continue to monitor and treat palliatively        Antibiotics Courses:  cefepime 7/20-7/20   vanc 7/21-7/21   zosyn 7/21-7/24       DVT / GI prophylaxis: lovenox 30mg SC for CrCl <30 and Protonix    Diet: cardiac, carb control    Disposition: general floor    Electronically signed by Jeffy Aggarwal MD PGY-2 on 7/25/2019 at 6:10 PM  This case was discussed with attending physician: Dr. Patricio Pizarro        This note was dictated with [address]

## 2019-07-26 VITALS
RESPIRATION RATE: 16 BRPM | BODY MASS INDEX: 33.97 KG/M2 | HEART RATE: 98 BPM | SYSTOLIC BLOOD PRESSURE: 116 MMHG | WEIGHT: 199 LBS | DIASTOLIC BLOOD PRESSURE: 68 MMHG | OXYGEN SATURATION: 100 % | HEIGHT: 64 IN | TEMPERATURE: 98.8 F

## 2019-07-26 LAB
ALBUMIN SERPL-MCNC: 2.9 G/DL (ref 3.5–5.2)
ALP BLD-CCNC: 118 U/L (ref 35–104)
ALT SERPL-CCNC: 27 U/L (ref 0–32)
ANION GAP SERPL CALCULATED.3IONS-SCNC: 8 MMOL/L (ref 7–16)
ANISOCYTOSIS: ABNORMAL
APPEARANCE FLUID: NORMAL
AST SERPL-CCNC: 24 U/L (ref 0–31)
BASOPHILS ABSOLUTE: 0 E9/L (ref 0–0.2)
BASOPHILS RELATIVE PERCENT: 0.3 % (ref 0–2)
BILIRUB SERPL-MCNC: 0.6 MG/DL (ref 0–1.2)
BUN BLDV-MCNC: 25 MG/DL (ref 8–23)
CALCIUM SERPL-MCNC: 8.2 MG/DL (ref 8.6–10.2)
CELL COUNT FLUID TYPE: NORMAL
CHLORIDE BLD-SCNC: 98 MMOL/L (ref 98–107)
CO2: 36 MMOL/L (ref 22–29)
COLOR FLUID: YELLOW
CREAT SERPL-MCNC: 1.8 MG/DL (ref 0.5–1)
EOSINOPHILS ABSOLUTE: 0.03 E9/L (ref 0.05–0.5)
EOSINOPHILS RELATIVE PERCENT: 0.9 % (ref 0–6)
FLUID PATH CONSULT: YES
GFR AFRICAN AMERICAN: 33
GFR NON-AFRICAN AMERICAN: 28 ML/MIN/1.73
GLUCOSE BLD-MCNC: 175 MG/DL (ref 74–99)
GRAM STAIN ORDERABLE: NORMAL
HCT VFR BLD CALC: 24.5 % (ref 34–48)
HEMOGLOBIN: 7.5 G/DL (ref 11.5–15.5)
HYPOCHROMIA: ABNORMAL
INR BLD: 1.6
LYMPHOCYTES ABSOLUTE: 0.42 E9/L (ref 1.5–4)
LYMPHOCYTES RELATIVE PERCENT: 13 % (ref 20–42)
MCH RBC QN AUTO: 29.2 PG (ref 26–35)
MCHC RBC AUTO-ENTMCNC: 30.6 % (ref 32–34.5)
MCV RBC AUTO: 95.3 FL (ref 80–99.9)
METER GLUCOSE: 193 MG/DL (ref 74–99)
MONOCYTE, FLUID: 97 %
MONOCYTES ABSOLUTE: 0.16 E9/L (ref 0.1–0.95)
MONOCYTES RELATIVE PERCENT: 5.2 % (ref 2–12)
NEUTROPHIL, FLUID: 3 %
NEUTROPHILS ABSOLUTE: 2.59 E9/L (ref 1.8–7.3)
NEUTROPHILS RELATIVE PERCENT: 80.9 % (ref 43–80)
NUCLEATED CELLS FLUID: 190 /UL
OVALOCYTES: ABNORMAL
PDW BLD-RTO: 16.9 FL (ref 11.5–15)
PLATELET # BLD: 103 E9/L (ref 130–450)
PMV BLD AUTO: 10.9 FL (ref 7–12)
POIKILOCYTES: ABNORMAL
POLYCHROMASIA: ABNORMAL
POTASSIUM REFLEX MAGNESIUM: 4.1 MMOL/L (ref 3.5–5)
PROCALCITONIN: 0.54 NG/ML (ref 0–0.08)
PROTHROMBIN TIME: 17.8 SEC (ref 9.3–12.4)
RBC # BLD: 2.57 E12/L (ref 3.5–5.5)
RBC FLUID: <2000 /UL
SODIUM BLD-SCNC: 142 MMOL/L (ref 132–146)
TARGET CELLS: ABNORMAL
TOTAL PROTEIN: 6.1 G/DL (ref 6.4–8.3)
WBC # BLD: 3.2 E9/L (ref 4.5–11.5)

## 2019-07-26 PROCEDURE — 36415 COLL VENOUS BLD VENIPUNCTURE: CPT

## 2019-07-26 PROCEDURE — 93005 ELECTROCARDIOGRAM TRACING: CPT | Performed by: INTERNAL MEDICINE

## 2019-07-26 PROCEDURE — 99239 HOSP IP/OBS DSCHRG MGMT >30: CPT | Performed by: FAMILY MEDICINE

## 2019-07-26 PROCEDURE — 80053 COMPREHEN METABOLIC PANEL: CPT

## 2019-07-26 PROCEDURE — 2700000000 HC OXYGEN THERAPY PER DAY

## 2019-07-26 PROCEDURE — 6370000000 HC RX 637 (ALT 250 FOR IP): Performed by: STUDENT IN AN ORGANIZED HEALTH CARE EDUCATION/TRAINING PROGRAM

## 2019-07-26 PROCEDURE — 99232 SBSQ HOSP IP/OBS MODERATE 35: CPT | Performed by: INTERNAL MEDICINE

## 2019-07-26 PROCEDURE — 6370000000 HC RX 637 (ALT 250 FOR IP): Performed by: INTERNAL MEDICINE

## 2019-07-26 PROCEDURE — 94664 DEMO&/EVAL PT USE INHALER: CPT

## 2019-07-26 PROCEDURE — 82962 GLUCOSE BLOOD TEST: CPT

## 2019-07-26 PROCEDURE — 85610 PROTHROMBIN TIME: CPT

## 2019-07-26 PROCEDURE — 6360000002 HC RX W HCPCS: Performed by: STUDENT IN AN ORGANIZED HEALTH CARE EDUCATION/TRAINING PROGRAM

## 2019-07-26 PROCEDURE — 6370000000 HC RX 637 (ALT 250 FOR IP): Performed by: FAMILY MEDICINE

## 2019-07-26 PROCEDURE — 6370000000 HC RX 637 (ALT 250 FOR IP): Performed by: NURSE PRACTITIONER

## 2019-07-26 PROCEDURE — 84145 PROCALCITONIN (PCT): CPT

## 2019-07-26 PROCEDURE — 85025 COMPLETE CBC W/AUTO DIFF WBC: CPT

## 2019-07-26 PROCEDURE — 6360000002 HC RX W HCPCS: Performed by: INTERNAL MEDICINE

## 2019-07-26 PROCEDURE — 2580000003 HC RX 258: Performed by: FAMILY MEDICINE

## 2019-07-26 RX ORDER — FUROSEMIDE 40 MG/1
40 TABLET ORAL DAILY
Qty: 60 TABLET | Refills: 3 | DISCHARGE
Start: 2019-07-27 | End: 2019-08-01 | Stop reason: ALTCHOICE

## 2019-07-26 RX ORDER — ISOSORBIDE DINITRATE 5 MG/1
5 TABLET ORAL 3 TIMES DAILY
Qty: 90 TABLET | Refills: 3 | Status: ON HOLD | OUTPATIENT
Start: 2019-07-26 | End: 2019-08-19 | Stop reason: HOSPADM

## 2019-07-26 RX ORDER — FUROSEMIDE 10 MG/ML
40 INJECTION INTRAMUSCULAR; INTRAVENOUS DAILY
Qty: 124 ML | Refills: 1 | Status: SHIPPED | OUTPATIENT
Start: 2019-07-26 | End: 2019-07-26 | Stop reason: HOSPADM

## 2019-07-26 RX ORDER — HYDRALAZINE HYDROCHLORIDE 10 MG/1
10 TABLET, FILM COATED ORAL EVERY 8 HOURS SCHEDULED
Qty: 90 TABLET | Refills: 3 | Status: ON HOLD | OUTPATIENT
Start: 2019-07-26 | End: 2019-08-19 | Stop reason: HOSPADM

## 2019-07-26 RX ORDER — LEVALBUTEROL INHALATION SOLUTION 1.25 MG/3ML
1.25 SOLUTION RESPIRATORY (INHALATION) ONCE
Status: COMPLETED | OUTPATIENT
Start: 2019-07-26 | End: 2019-07-26

## 2019-07-26 RX ORDER — POLYETHYLENE GLYCOL 3350 17 G/17G
17 POWDER, FOR SOLUTION ORAL DAILY
Qty: 527 G | Refills: 1 | Status: SHIPPED | OUTPATIENT
Start: 2019-07-27 | End: 2019-08-26

## 2019-07-26 RX ORDER — FUROSEMIDE 40 MG/1
40 TABLET ORAL DAILY
Status: DISCONTINUED | OUTPATIENT
Start: 2019-07-27 | End: 2019-07-26 | Stop reason: HOSPADM

## 2019-07-26 RX ADMIN — MULTIPLE VITAMINS W/ MINERALS TAB 1 TABLET: TAB at 09:14

## 2019-07-26 RX ADMIN — Medication 1000 MCG: at 09:14

## 2019-07-26 RX ADMIN — OXYCODONE HYDROCHLORIDE 2.5 MG: 5 TABLET ORAL at 04:16

## 2019-07-26 RX ADMIN — ENOXAPARIN SODIUM 30 MG: 40 INJECTION SUBCUTANEOUS at 09:15

## 2019-07-26 RX ADMIN — SODIUM BICARBONATE 325 MG: 650 TABLET ORAL at 09:14

## 2019-07-26 RX ADMIN — Medication 10 ML: at 09:16

## 2019-07-26 RX ADMIN — OXYCODONE HYDROCHLORIDE 2.5 MG: 5 TABLET ORAL at 13:15

## 2019-07-26 RX ADMIN — Medication: at 09:16

## 2019-07-26 RX ADMIN — ISOSORBIDE DINITRATE 5 MG: 10 TABLET ORAL at 13:38

## 2019-07-26 RX ADMIN — PREGABALIN 25 MG: 25 CAPSULE ORAL at 09:13

## 2019-07-26 RX ADMIN — HYDRALAZINE HYDROCHLORIDE 10 MG: 10 TABLET, FILM COATED ORAL at 05:32

## 2019-07-26 RX ADMIN — CALCIUM CARBONATE-VITAMIN D TAB 500 MG-200 UNIT 1 TABLET: 500-200 TAB at 09:14

## 2019-07-26 RX ADMIN — FUROSEMIDE 40 MG: 10 INJECTION, SOLUTION INTRAMUSCULAR; INTRAVENOUS at 09:14

## 2019-07-26 RX ADMIN — HYDRALAZINE HYDROCHLORIDE 10 MG: 10 TABLET, FILM COATED ORAL at 13:38

## 2019-07-26 RX ADMIN — PANTOPRAZOLE SODIUM 40 MG: 40 TABLET, DELAYED RELEASE ORAL at 05:32

## 2019-07-26 RX ADMIN — LEVALBUTEROL 1.25 MG: 1.25 SOLUTION RESPIRATORY (INHALATION) at 11:26

## 2019-07-26 RX ADMIN — DOCUSATE SODIUM 100 MG: 100 CAPSULE, LIQUID FILLED ORAL at 09:15

## 2019-07-26 RX ADMIN — INSULIN LISPRO 2 UNITS: 100 INJECTION, SOLUTION INTRAVENOUS; SUBCUTANEOUS at 09:16

## 2019-07-26 RX ADMIN — INSULIN LISPRO 2 UNITS: 100 INJECTION, SOLUTION INTRAVENOUS; SUBCUTANEOUS at 13:42

## 2019-07-26 ASSESSMENT — PAIN SCALES - GENERAL
PAINLEVEL_OUTOF10: 7
PAINLEVEL_OUTOF10: 0
PAINLEVEL_OUTOF10: 7
PAINLEVEL_OUTOF10: 9

## 2019-07-26 NOTE — PROGRESS NOTES
EKG's and similar tests, if present. I personally reviewed and performed key elements of the history and exam.  I have reviewed and confirmed student and/or resident history and exam with changes as indicated above. I agree with the assessment, plan and orders as documented by the resident. Please refer to the resident and/or student note for additional information.       Soham Winters

## 2019-07-26 NOTE — PROGRESS NOTES
and/or atelectasis. . Bilateral pleural effusions. 3. Suspected multifocal right rib fractures with callus formation   versus a expansile nonspecific bone abnormal lesion. Please see CT   chest 6/19/2019, consider repeat chest CT exam.      CT Chest WO Contrast         IR US GUIDED PARACENTESIS   Final Result   Successful left-sided paracentesis. This procedure was performed, dictated, and signed by Tomeka Nicole. FORD Campbell-EPIFANIO under the indirect supervision of Moni Stover MD, Radiologist, have reviewed the images and   report and concur with these findings. XR CHEST STANDARD (2 VW)   Final Result   Bibasilar airspace opacities and small bilateral pleural effusions. Differential includes atelectasis and infection. CT ABDOMEN PELVIS WO CONTRAST Additional Contrast? None   Final Result   1. Left breast skin thickening and edema similar to prior study. Left breast    mass or scar, stable. 2. Right lung nodules consistent with metastasis. Fleischner chest imaging    society guidelines do not apply to this patient who has a known malignancy. 3. Hepatic metastasis. 4. Omental and peritoneal implants. 5. Large amount of fluid within the abdomen and pelvis, increased since prior    study. 6. Body wall edema also probably mildly increased. 7. Lytic metastasis to one of the right ribs, partially imaged. 8. A few diverticula of the colon. 9. Nonobstructing renal stones.        COMMENT:     Consistent with the [employer] of Radiology's Incidental Findings    Committee Report (J Am Greyson Radiol 2010): Unless the patient's specific    circumstances suggest otherwise, any liver lesion 0.5 cm or less, any cystic    kidney lesion less than 1.0 cm, and/or any adrenal lesion 1.0 cm or less not    otherwise characterized in this report as possessing suspicious or    indeterminate imaging features is/are highly likely to be benign and do not

## 2019-07-26 NOTE — PROGRESS NOTES
Cardiology and Pulmonology perfect served to see if the patient is able to be discharged today. Per Dr Augustine Good, patient can be discharged from pulmonology standpoint. Per Hue Dixon CNP patient can be discharged per cardiology standpoint.

## 2019-07-26 NOTE — PROGRESS NOTES
Jeanne Bentley        SUBJECTIVE:  Dyspnea. S/p paracentesis , s/p  thoracentesis 7/25/19  --  Dr.Al Sanchez  Denies pain except discomfort  No bleeding , no fever   No new complaints. OBJECTIVE:    /68   Pulse 98   Temp 98.8 °F (37.1 °C) (Temporal)   Resp 16   Ht 5' 4\" (1.626 m)   Wt 199 lb (90.3 kg)   SpO2 100%   BMI 34.16 kg/m²   PHYSICAL:  GENERAL:  Alert, orient x 3, in no acute distress. HEENT:  No icterus  LYMPH:  No lymphadenopathy. HEART:  Regular rate and rhythm. No murmurs. LUNGS:  Clear to auscultation. ABDOMEN:  Soft. Non-tender. No mass . Ascites+  EXTREMITIES:  Warm,dry. Edema. same   NEURO:  No focal deficits.            CBC with Differential:    Lab Results   Component Value Date    WBC 3.2 07/26/2019    RBC 2.57 07/26/2019    HGB 7.5 07/26/2019    HCT 24.5 07/26/2019     07/26/2019    MCV 95.3 07/26/2019    MCH 29.2 07/26/2019    MCHC 30.6 07/26/2019    RDW 16.9 07/26/2019    NRBC 0.9 07/24/2019    SEGSPCT 65 04/20/2012    METASPCT 0.9 07/21/2019    LYMPHOPCT 13.0 07/26/2019    MONOPCT 5.2 07/26/2019    BASOPCT 0.3 07/26/2019    MONOSABS 0.16 07/26/2019    LYMPHSABS 0.42 07/26/2019    EOSABS 0.03 07/26/2019    BASOSABS 0.00 07/26/2019        CMP:    Lab Results   Component Value Date     07/26/2019    K 4.1 07/26/2019    CL 98 07/26/2019    CO2 36 07/26/2019    BUN 25 07/26/2019    CREATININE 1.8 07/26/2019    GFRAA 33 07/26/2019    LABGLOM 28 07/26/2019    GLUCOSE 175 07/26/2019    GLUCOSE 109 04/20/2012    PROT 6.1 07/26/2019    LABALBU 2.9 07/26/2019    LABALBU 4.4 03/25/2012    CALCIUM 8.2 07/26/2019    BILITOT 0.6 07/26/2019    ALKPHOS 118 07/26/2019    AST 24 07/26/2019    ALT 27 07/26/2019     LDH:    Lab Results   Component Value Date     06/12/2019     PT/INR:    Lab Results   Component Value Date    PROTIME 17.8 07/26/2019    PROTIME 17.0 06/17/2019    INR 1.6 07/26/2019     PTT:    Lab Results   Component Value Date    APTT 32.7 08/29/2018

## 2019-07-26 NOTE — PROGRESS NOTES
Pulmonary [facility]                             Pulmonary Progress Note :          Patient: Viktoriya Harris  MRN: [id number]  : 1945      Date of Admission: .2019  9:25 PM    Consulting Physician:dr Alvarez         Reason for Consultation: Pleural effusion right side  CC : Shortness of breath      HPI:  Patient still with SOB improved with Lasix with good urine OP   No fever or chills  Some cough   Still on 2-4 L o2  Negative 3.641         PHYSICAL EXAMINATION:     VITAL SIGNS:  /60   Pulse 118   Temp 98.2 °F (36.8 °C) (Temporal)   Resp 20   Ht 5' 4\" (1.626 m)   Wt 206 lb 3.2 oz (93.5 kg)   SpO2 96%   BMI 35.39 kg/m²   Wt Readings from Last 3 Encounters:   19 206 lb 3.2 oz (93.5 kg)   19 217 lb 9.6 oz (98.7 kg)   19 217 lb (98.4 kg)     Temp Readings from Last 3 Encounters:   19 98.2 °F (36.8 °C) (Temporal)   19 98.6 °F (37 °C) (Temporal)   19 98.5 °F (36.9 °C) (Oral)     TMAX:  BP Readings from Last 3 Encounters:   19 124/60   19 (!) 141/64   19 117/71     Pulse Readings from Last 3 Encounters:   19 118   19 87   19 83           INTAKE/OUTPUTS:  I/O last 3 completed shifts:   In: 456.3 [Blood:456.3]  Out: -     Intake/Output Summary (Last 24 hours) at 20193  Last data filed at 2019 1505  Gross per 24 hour   Intake 559.66 ml   Output --   Net 559.66 ml       General Appearance: alert and oriented to person, place and time, well-developed and   well-nourished, in no acute distress   Eyes: pupils equal, round, and reactive to light, extraocular eye movements intact, conjunctivae normal and sclera anicteric   Neck: neck supple and non tender without mass, no thyromegaly, no thyroid nodules and no cervical adenopathy   Pulmonary/Chest: Decreased breathing sound in the right side  Cardiovascular: normal rate, regular rhythm, normal S1 and S2, no murmurs, rubs, clicks

## 2019-07-26 NOTE — PLAN OF CARE
Problem: Falls - Risk of:  Goal: Will remain free from falls  Description  Will remain free from falls  7/26/2019 0450 by Libertad Dallas RN  Outcome: Met This Shift  7/26/2019 0006 by Shruti Del Rosario RN  Outcome: Met This Shift  Goal: Absence of physical injury  Description  Absence of physical injury  7/26/2019 0450 by Libertad Dallas RN  Outcome: Met This Shift  7/26/2019 0006 by Shruti Del Rosario RN  Outcome: Met This Shift     Problem: Risk for Impaired Skin Integrity  Goal: Tissue integrity - skin and mucous membranes  Description  Structural intactness and normal physiological function of skin and  mucous membranes.   7/26/2019 0450 by Libertad Dallas RN  Outcome: Met This Shift  7/26/2019 0006 by Shruti Del Rosario RN  Outcome: Met This Shift     Problem: Discharge Planning:  Goal: Discharged to appropriate level of care  Description  Discharged to appropriate level of care  Outcome: Met This Shift  Goal: Participates in care planning  Description  Participates in care planning  Outcome: Met This Shift     Problem: Airway Clearance - Ineffective:  Goal: Clear lung sounds  Description  Clear lung sounds  7/26/2019 0450 by Libertad Dallas RN  Outcome: Met This Shift  7/26/2019 0006 by Shruti Del Rosario RN  Outcome: Met This Shift  Goal: Ability to maintain a clear airway will improve  Description  Ability to maintain a clear airway will improve  7/26/2019 0450 by Libertad Dallas RN  Outcome: Met This Shift  7/26/2019 0006 by Shruti Del Rosario RN  Outcome: Met This Shift     Problem: Fluid Volume - Deficit:  Goal: Achieves intake and output within specified parameters  Description  Achieves intake and output within specified parameters  Outcome: Met This Shift     Problem: Gas Exchange - Impaired:  Goal: Levels of oxygenation will improve  Description  Levels of oxygenation will improve  Outcome: Met This Shift     Problem: Hyperthermia:  Goal: Ability to maintain a body temperature in the normal range will

## 2019-07-26 NOTE — DISCHARGE INSTR - COC
Fair    Condition at Discharge: Stable    Rehab Potential (if transferring to Rehab): Fair    Recommended Labs or Other Treatments After Discharge: daily INR, BMP, CBC    Physician Certification: I certify the above information and transfer of Will Ni  is necessary for the continuing treatment of the diagnosis listed and that she requires [facility] for less 30 days. Update Admission H&P: Changes in H&P as follows - pt admitted for SOB, had paracentesis and thoracocentesis. Has experienced great relief from these procedures. Likely will need repatitive therapeutic taps in the future.      PHYSICIAN SIGNATURE:  Electronically signed by Saira Bergeron MD on 7/26/19 at 11:06 AM

## 2019-07-27 LAB
EKG ATRIAL RATE: 103 BPM
EKG P AXIS: 14 DEGREES
EKG P-R INTERVAL: 130 MS
EKG Q-T INTERVAL: 394 MS
EKG QRS DURATION: 134 MS
EKG QTC CALCULATION (BAZETT): 516 MS
EKG R AXIS: -62 DEGREES
EKG T AXIS: -2 DEGREES
EKG VENTRICULAR RATE: 103 BPM

## 2019-07-27 PROCEDURE — 93010 ELECTROCARDIOGRAM REPORT: CPT | Performed by: INTERNAL MEDICINE

## 2019-07-28 LAB
BODY FLUID CULTURE, STERILE: NORMAL
GRAM STAIN RESULT: NORMAL

## 2019-07-29 NOTE — DISCHARGE SUMMARY
decreased substantially from this procedure. She had an otherwise uneventful course and progressed well. Pain was controlled with her hemonc pain panel that she also received at home. She was tolerating a cardiac/carb control diet with no nausea or vomiting, was ambulating well, and was in a suitable condition for discharge to SNF. Lab Results   Component Value Date    WBC 3.2 07/26/2019    HGB 7.5 07/26/2019     07/26/2019     07/26/2019    CL 98 07/26/2019    K 4.1 07/26/2019    BUN 25 07/26/2019    CREATININE 1.8 07/26/2019    GLUCOSE 175 07/26/2019    GLUCOSE 109 04/20/2012    LABGLOM 28 07/26/2019    PROTIME 17.8 07/26/2019    PROTIME 17.0 06/17/2019    INR 1.6 07/26/2019    LABALBU 2.9 07/26/2019    LABALBU 4.4 03/25/2012    PROT 6.1 07/26/2019    CALCIUM 8.2 07/26/2019    MG 1.7 07/22/2019    PHOS 4.7 07/22/2019    BILITOT 0.6 07/26/2019    BILIDIR 0.2 06/10/2019     06/12/2019    ALKPHOS 118 07/26/2019    AST 24 07/26/2019    ALT 27 07/26/2019       Discharge Exam:   See progress note of the day of discharge.     Disposition: [facility]       Medication List      START taking these medications    furosemide 40 MG tablet  Commonly known as:  LASIX  Take 1 tablet by mouth daily     hydrALAZINE 10 MG tablet  Commonly known as:  APRESOLINE  Take 1 tablet by mouth every 8 hours     isosorbide dinitrate 5 MG tablet  Commonly known as:  ISORDIL  Take 1 tablet by mouth 3 times daily     OXYGEN  Inhale 4 L into the lungs daily     polyethylene glycol packet  Commonly known as:  GLYCOLAX  Take 17 g by mouth daily        CONTINUE taking these medications    Calcium Citrate-Vitamin D 200-250 MG-UNIT Tabs  take 1 tablet by mouth twice a day     diclofenac sodium 1 % Gel  Apply 4 g topically 4 times daily as needed for Pain     docusate 100 MG Caps  Commonly known as:  COLACE, DULCOLAX  Take 100 mg by mouth daily     exemestane 25 MG tablet  Commonly known as:  AROMASIN     miconazole nitrate 2 % Oint  Apply topically 2 times daily     omeprazole 20 MG EC tablet  Take 1 tablet by mouth daily     ondansetron 4 MG disintegrating tablet  Commonly known as:  ZOFRAN-ODT  Take 1 tablet by mouth every 8 hours as needed for Nausea or Vomiting     oxyCODONE 5 MG/5ML solution  Commonly known as:  ROXICODONE     pregabalin 25 MG capsule  Commonly known as:  LYRICA  Take 1 capsule by mouth 2 times daily for 30 days. sodium bicarbonate 650 MG tablet  Take 1 tablet by mouth 2 times daily     therapeutic multivitamin-minerals tablet  Take 1 tablet by mouth daily     trastuzumab 440 MG chemo injection  Commonly known as:  HERCEPTIN     vitamin B-12 1000 MCG tablet  Commonly known as:  CYANOCOBALAMIN  Take 1 tablet by mouth daily     warfarin 1 MG tablet  Commonly known as:  COUMADIN  Take as directed. If you are unsure how to take this medication, talk to your nurse or doctor. Original instructions: Take 0.5 tablets by mouth every other day Take 0.5 mg every other day           Where to Get Your Medications      These medications were sent to [facility]  [facility], [address] [place] [id number]    Phone:  [phone]   · hydrALAZINE 10 MG tablet  · isosorbide dinitrate 5 MG tablet  · polyethylene glycol packet     Information about where to get these medications is not yet available    Ask your nurse or doctor about these medications  · furosemide 40 MG tablet  · OXYGEN           Patient Instructions: Activity: activity as tolerated  Diet: cardiac diet and diabetic diet  Wound Care: sacral ulcer per wound are      Follow up:   Future Appointments   Date Time Provider Nick Castillo   8/2/2019  2:40 PM Velia Mcclure MD [place] [address], [facility] [address]  [address] [facility]  [address]

## 2019-07-30 ENCOUNTER — TELEPHONE (OUTPATIENT)
Dept: CARDIOLOGY CLINIC | Age: 74
End: 2019-07-30

## 2019-08-01 ENCOUNTER — OFFICE VISIT (OUTPATIENT)
Dept: CARDIOLOGY CLINIC | Age: 74
End: 2019-08-01
Payer: COMMERCIAL

## 2019-08-01 VITALS
BODY MASS INDEX: 34.83 KG/M2 | SYSTOLIC BLOOD PRESSURE: 106 MMHG | RESPIRATION RATE: 20 BRPM | WEIGHT: 204 LBS | OXYGEN SATURATION: 99 % | DIASTOLIC BLOOD PRESSURE: 50 MMHG | HEIGHT: 64 IN | HEART RATE: 108 BPM

## 2019-08-01 DIAGNOSIS — I25.10 CORONARY ARTERY DISEASE INVOLVING NATIVE CORONARY ARTERY OF NATIVE HEART WITHOUT ANGINA PECTORIS: ICD-10-CM

## 2019-08-01 DIAGNOSIS — I50.21 ACUTE SYSTOLIC HEART FAILURE (HCC): Primary | ICD-10-CM

## 2019-08-01 DIAGNOSIS — E66.01 CLASS 2 SEVERE OBESITY DUE TO EXCESS CALORIES WITH SERIOUS COMORBIDITY AND BODY MASS INDEX (BMI) OF 35.0 TO 35.9 IN ADULT (HCC): ICD-10-CM

## 2019-08-01 DIAGNOSIS — N18.30 CKD (CHRONIC KIDNEY DISEASE) STAGE 3, GFR 30-59 ML/MIN (HCC): ICD-10-CM

## 2019-08-01 DIAGNOSIS — Z79.01 CHRONIC ANTICOAGULATION: ICD-10-CM

## 2019-08-01 DIAGNOSIS — I82.5Y1 CHRONIC DEEP VEIN THROMBOSIS (DVT) OF PROXIMAL VEIN OF RIGHT LOWER EXTREMITY (HCC): ICD-10-CM

## 2019-08-01 DIAGNOSIS — E11.65 TYPE 2 DIABETES MELLITUS WITH HYPERGLYCEMIA, UNSPECIFIED WHETHER LONG TERM INSULIN USE (HCC): ICD-10-CM

## 2019-08-01 DIAGNOSIS — I10 ESSENTIAL HYPERTENSION: ICD-10-CM

## 2019-08-01 DIAGNOSIS — C50.919 METASTATIC BREAST CANCER (HCC): ICD-10-CM

## 2019-08-01 DIAGNOSIS — D63.8 ANEMIA OF CHRONIC DISEASE: ICD-10-CM

## 2019-08-01 DIAGNOSIS — I42.8 NONISCHEMIC CARDIOMYOPATHY (HCC): ICD-10-CM

## 2019-08-01 DIAGNOSIS — I89.0 LYMPHEDEMA OF BOTH LOWER EXTREMITIES: ICD-10-CM

## 2019-08-01 DIAGNOSIS — Z95.1 STATUS POST CORONARY ARTERY BYPASS GRAFT: ICD-10-CM

## 2019-08-01 PROCEDURE — 1123F ACP DISCUSS/DSCN MKR DOCD: CPT | Performed by: NURSE PRACTITIONER

## 2019-08-01 PROCEDURE — 4040F PNEUMOC VAC/ADMIN/RCVD: CPT | Performed by: NURSE PRACTITIONER

## 2019-08-01 PROCEDURE — 3044F HG A1C LEVEL LT 7.0%: CPT | Performed by: NURSE PRACTITIONER

## 2019-08-01 PROCEDURE — G8417 CALC BMI ABV UP PARAM F/U: HCPCS | Performed by: NURSE PRACTITIONER

## 2019-08-01 PROCEDURE — 2022F DILAT RTA XM EVC RTNOPTHY: CPT | Performed by: NURSE PRACTITIONER

## 2019-08-01 PROCEDURE — 99214 OFFICE O/P EST MOD 30 MIN: CPT | Performed by: NURSE PRACTITIONER

## 2019-08-01 PROCEDURE — 1036F TOBACCO NON-USER: CPT | Performed by: NURSE PRACTITIONER

## 2019-08-01 PROCEDURE — 3017F COLORECTAL CA SCREEN DOC REV: CPT | Performed by: NURSE PRACTITIONER

## 2019-08-01 PROCEDURE — G8399 PT W/DXA RESULTS DOCUMENT: HCPCS | Performed by: NURSE PRACTITIONER

## 2019-08-01 PROCEDURE — 1111F DSCHRG MED/CURRENT MED MERGE: CPT | Performed by: NURSE PRACTITIONER

## 2019-08-01 PROCEDURE — G8427 DOCREV CUR MEDS BY ELIG CLIN: HCPCS | Performed by: NURSE PRACTITIONER

## 2019-08-01 PROCEDURE — 93000 ELECTROCARDIOGRAM COMPLETE: CPT | Performed by: INTERNAL MEDICINE

## 2019-08-01 PROCEDURE — 1090F PRES/ABSN URINE INCON ASSESS: CPT | Performed by: NURSE PRACTITIONER

## 2019-08-01 PROCEDURE — G8598 ASA/ANTIPLAT THER USED: HCPCS | Performed by: NURSE PRACTITIONER

## 2019-08-01 RX ORDER — FUROSEMIDE 10 MG/ML
INJECTION INTRAMUSCULAR; INTRAVENOUS ONCE
COMMUNITY
End: 2019-08-01 | Stop reason: ALTCHOICE

## 2019-08-01 RX ORDER — METOPROLOL SUCCINATE 25 MG
12.5 TABLET, EXTENDED RELEASE 24 HR ORAL 2 TIMES DAILY
Qty: 30 TABLET | Refills: 3 | Status: ON HOLD
Start: 2019-08-01 | End: 2019-08-19 | Stop reason: HOSPADM

## 2019-08-01 NOTE — PROGRESS NOTES
([facility] [address]     Humerus fracture      Past Medical History:    1. CAD:  ? Normal stress test with large reversible ischemia at the inferolateral apical region. ? Heart catheterization, 5/16/08, normal LV contractility, 50-55% proximal circumflex; long, diffuse disease in mid portion of the last OM, 85-90%; 95% proximal/ostial right coronary artery; 90% ostial lesion first acute marginal branch. ? Status post bypass surgery with Dr. Ernie Felder, 5/19/08. Aleksandar Handler graft to first marginal branch, vein graft to distal right coronary artery, vein graft to acute marginal branch of right coronary artery.    ? NM gated cardiac wall study: Left ventricular EF 56% and normal. (10/5/2012)   2. Hx of DVT (2007) - treated with [address] ? Recurrent LLE DVT (8/2008) - coumadin restarted. 3. HTN  4. DM with neuropathy - on lyrica   5. History of breast lumpectomy with chemotherapy/radiation. Now with liver and lung CA with mets to bone: follows with Dr. Stella Choudhury. ? maintained outpatient on Herceptin and Aromasin and Iberza. ? recent paracentesis (6/24/2019) for ascites which showed hepatic metastases. (See HPI). 6. Anemia of chronic disease  7. Arthritis   8. Hx of Lung sarcordosis (per record, patient denies history)  9. History of tonsillectomy, cholecystectomy, appendectomy  10. Chronic lymphedema   11. History of pericardial effusion   12. TTE: (Dr. Damián Espinoza): 6/10/2019:  Ejection fraction is visually estimated at 50%, Mild mitral regurgitation (AV mean gradient 3.8 mmHg), Mild aortic regurgitation, Moderate tricuspid regurgitation (PASP 38 mmHg). Indeterminate DD, Dilated right ventricle with normal right ventricular function (TAPSE 1.9 cm). Left ventricle normal in size. Dilated right atrium. 13. CKD - stage III. Baseline SCr 1.2-1.7. Past Medical History:   Diagnosis Date    Abscess of abdominal wall     Anemia     Iron deficiency and chronic disease.       Anesthesia     DIFFICULTY WAKING UP    Arthritis     (CYANOCOBALAMIN) 1000 MCG tablet Take 1 tablet by mouth daily 90 tablet 3    Calcium Citrate-Vitamin D (RA CALCIUM CIT-VIT D-3 PETITES) 200-250 MG-UNIT TABS take 1 tablet by mouth twice a day 180 tablet 3    Multiple Vitamins-Minerals (THERAPEUTIC MULTIVITAMIN-MINERALS) tablet Take 1 tablet by mouth daily 90 tablet 3       Guideline directed medical/device therapy:  ARNI/ACE I/ARB: No  Beta blocker:  No  Aldosterone antagonist:  No  ICD/CRT-P/-D:  none  QRS interval on recent ECG (personally reviewed/interpreted): <120 ms  Percentage RV pacing (personally reviewed/interpreted): %/NA        Review of Systems:   Cardiac: As per HPI  General: No fever, chills, rigors  Pulmonary: As per HPI  HEENT: No visual disturbances, difficult swallowing  GI: No nausea, vomiting, abdominal pain  : No dysuria or hematuria  Endocrine: No thyroid disease or diabetes  Musculoskeletal: BARRY x 4, no focal motor deficits  Skin: Intact, no rashes  Neuro/Psych: No headache or seizures          Weights: Wt Readings from Last 3 Encounters:   08/01/19 204 lb (92.5 kg)   07/26/19 199 lb (90.3 kg)   06/26/19 217 lb 9.6 oz (98.7 kg)             Physical Examination:     BP (!) 106/50   Pulse 108   Resp 20   Ht 5' 4\" (1.626 m)   Wt 204 lb (92.5 kg)   SpO2 99%   BMI 35.02 kg/m²     CONSTITUTIONAL: Alert and oriented times 3, no acute distress and cooperative to examination with proper mood and affect. SKIN: Skin color, texture, turgor normal. No rashes or lesions. LYMPH: no cervical nodes, no inguinal nodes  HEENT: Head is normocephalic, atraumatic. EOMI, PERRLA. NECK: Supple, symmetrical, trachea midline, no adenopathy, thyroid symmetric, not enlarged and no tenderness, skin normal.  Unable to assess JVD/HJR while patient up in wheelchair. CHEST/LUNGS: chest symmetric with normal A/P diameter, normal respiratory rate and rhythm, lungs clear to auscultation without wheezes, rales or rhonchi. No accessory muscle use.  Scars None You can access the [facility] Patient Portal, offered by [facility], by registering with the following website: [email or web address]

## 2019-08-02 ENCOUNTER — OFFICE VISIT (OUTPATIENT)
Dept: FAMILY MEDICINE CLINIC | Age: 74
End: 2019-08-02
Payer: COMMERCIAL

## 2019-08-02 ENCOUNTER — ANTI-COAG VISIT (OUTPATIENT)
Dept: FAMILY MEDICINE CLINIC | Age: 74
End: 2019-08-02

## 2019-08-02 VITALS
HEIGHT: 64 IN | BODY MASS INDEX: 35.02 KG/M2 | DIASTOLIC BLOOD PRESSURE: 59 MMHG | OXYGEN SATURATION: 99 % | TEMPERATURE: 98.2 F | SYSTOLIC BLOOD PRESSURE: 109 MMHG | HEART RATE: 102 BPM | RESPIRATION RATE: 20 BRPM

## 2019-08-02 DIAGNOSIS — J90 PLEURAL EFFUSION: ICD-10-CM

## 2019-08-02 DIAGNOSIS — I50.42 CHRONIC COMBINED SYSTOLIC AND DIASTOLIC CONGESTIVE HEART FAILURE (HCC): Primary | ICD-10-CM

## 2019-08-02 DIAGNOSIS — I82.4Z9 DEEP VEIN THROMBOSIS (DVT) OF DISTAL VEIN OF LOWER EXTREMITY, UNSPECIFIED CHRONICITY, UNSPECIFIED LATERALITY (HCC): ICD-10-CM

## 2019-08-02 LAB
INTERNATIONAL NORMALIZATION RATIO, POC: 1.6
PROTHROMBIN TIME, POC: 39

## 2019-08-02 PROCEDURE — 1123F ACP DISCUSS/DSCN MKR DOCD: CPT | Performed by: FAMILY MEDICINE

## 2019-08-02 PROCEDURE — G8598 ASA/ANTIPLAT THER USED: HCPCS | Performed by: FAMILY MEDICINE

## 2019-08-02 PROCEDURE — G8399 PT W/DXA RESULTS DOCUMENT: HCPCS | Performed by: FAMILY MEDICINE

## 2019-08-02 PROCEDURE — 3017F COLORECTAL CA SCREEN DOC REV: CPT | Performed by: FAMILY MEDICINE

## 2019-08-02 PROCEDURE — G8428 CUR MEDS NOT DOCUMENT: HCPCS | Performed by: FAMILY MEDICINE

## 2019-08-02 PROCEDURE — 1090F PRES/ABSN URINE INCON ASSESS: CPT | Performed by: FAMILY MEDICINE

## 2019-08-02 PROCEDURE — 1036F TOBACCO NON-USER: CPT | Performed by: FAMILY MEDICINE

## 2019-08-02 PROCEDURE — 99213 OFFICE O/P EST LOW 20 MIN: CPT | Performed by: STUDENT IN AN ORGANIZED HEALTH CARE EDUCATION/TRAINING PROGRAM

## 2019-08-02 PROCEDURE — G8417 CALC BMI ABV UP PARAM F/U: HCPCS | Performed by: FAMILY MEDICINE

## 2019-08-02 PROCEDURE — 4040F PNEUMOC VAC/ADMIN/RCVD: CPT | Performed by: FAMILY MEDICINE

## 2019-08-02 PROCEDURE — 1111F DSCHRG MED/CURRENT MED MERGE: CPT | Performed by: FAMILY MEDICINE

## 2019-08-02 PROCEDURE — 85610 PROTHROMBIN TIME: CPT | Performed by: STUDENT IN AN ORGANIZED HEALTH CARE EDUCATION/TRAINING PROGRAM

## 2019-08-02 PROCEDURE — 99212 OFFICE O/P EST SF 10 MIN: CPT | Performed by: STUDENT IN AN ORGANIZED HEALTH CARE EDUCATION/TRAINING PROGRAM

## 2019-08-02 ASSESSMENT — ENCOUNTER SYMPTOMS
ABDOMINAL DISTENTION: 0
SHORTNESS OF BREATH: 1
WHEEZING: 0
CHEST TIGHTNESS: 0
DIARRHEA: 0
CONSTIPATION: 0
BACK PAIN: 1
COUGH: 0
SORE THROAT: 0
ABDOMINAL PAIN: 0

## 2019-08-08 ENCOUNTER — HOSPITAL ENCOUNTER (INPATIENT)
Age: 74
LOS: 11 days | Discharge: NURSING, REHAB OR LONG-TERM CARE | DRG: 189 | End: 2019-08-19
Attending: EMERGENCY MEDICINE | Admitting: FAMILY MEDICINE
Payer: COMMERCIAL

## 2019-08-08 ENCOUNTER — TELEPHONE (OUTPATIENT)
Dept: OTHER | Facility: CLINIC | Age: 74
End: 2019-08-08

## 2019-08-08 ENCOUNTER — APPOINTMENT (OUTPATIENT)
Dept: GENERAL RADIOLOGY | Age: 74
DRG: 189 | End: 2019-08-08
Payer: COMMERCIAL

## 2019-08-08 ENCOUNTER — APPOINTMENT (OUTPATIENT)
Dept: CT IMAGING | Age: 74
DRG: 189 | End: 2019-08-08
Payer: COMMERCIAL

## 2019-08-08 DIAGNOSIS — E87.5 HYPERKALEMIA: ICD-10-CM

## 2019-08-08 DIAGNOSIS — E11.42 DIABETIC POLYNEUROPATHY ASSOCIATED WITH TYPE 2 DIABETES MELLITUS (HCC): ICD-10-CM

## 2019-08-08 DIAGNOSIS — I82.4Z9 DEEP VEIN THROMBOSIS (DVT) OF DISTAL VEIN OF LOWER EXTREMITY, UNSPECIFIED CHRONICITY, UNSPECIFIED LATERALITY (HCC): ICD-10-CM

## 2019-08-08 DIAGNOSIS — C79.9 METASTATIC DISEASE (HCC): ICD-10-CM

## 2019-08-08 DIAGNOSIS — N17.9 AKI (ACUTE KIDNEY INJURY) (HCC): ICD-10-CM

## 2019-08-08 DIAGNOSIS — R41.82 ALTERED MENTAL STATUS, UNSPECIFIED ALTERED MENTAL STATUS TYPE: Primary | ICD-10-CM

## 2019-08-08 PROBLEM — J96.02 ACUTE HYPERCAPNIC RESPIRATORY FAILURE (HCC): Status: ACTIVE | Noted: 2019-08-08

## 2019-08-08 LAB
AADO2: 166.8 MMHG
AADO2: 175.5 MMHG
ALBUMIN SERPL-MCNC: 3 G/DL (ref 3.5–5.2)
ALP BLD-CCNC: 112 U/L (ref 35–104)
ALT SERPL-CCNC: 27 U/L (ref 0–32)
AMORPHOUS: ABNORMAL
ANION GAP SERPL CALCULATED.3IONS-SCNC: 12 MMOL/L (ref 7–16)
ANION GAP SERPL CALCULATED.3IONS-SCNC: 13 MMOL/L (ref 7–16)
ANISOCYTOSIS: ABNORMAL
APTT: 34.8 SEC (ref 24.5–35.1)
AST SERPL-CCNC: 24 U/L (ref 0–31)
B.E.: 1.4 MMOL/L (ref -3–3)
B.E.: 1.9 MMOL/L (ref -3–3)
B.E.: 3.5 MMOL/L (ref -3–3)
BACTERIA: ABNORMAL /HPF
BASOPHILIC STIPPLING: ABNORMAL
BASOPHILS ABSOLUTE: 0 E9/L (ref 0–0.2)
BASOPHILS RELATIVE PERCENT: 0.2 % (ref 0–2)
BILIRUB SERPL-MCNC: 0.5 MG/DL (ref 0–1.2)
BILIRUBIN URINE: ABNORMAL
BLOOD, URINE: ABNORMAL
BUN BLDV-MCNC: 54 MG/DL (ref 8–23)
BUN BLDV-MCNC: 56 MG/DL (ref 8–23)
BURR CELLS: ABNORMAL
CALCIUM SERPL-MCNC: 7.4 MG/DL (ref 8.6–10.2)
CALCIUM SERPL-MCNC: 7.5 MG/DL (ref 8.6–10.2)
CHLORIDE BLD-SCNC: 88 MMOL/L (ref 98–107)
CHLORIDE BLD-SCNC: 89 MMOL/L (ref 98–107)
CHLORIDE URINE RANDOM: <20 MMOL/L
CHP ED QC CHECK: NORMAL
CLARITY: CLEAR
CO2: 29 MMOL/L (ref 22–29)
CO2: 31 MMOL/L (ref 22–29)
COHB: 0.4 % (ref 0–1.5)
COHB: 0.5 % (ref 0–1.5)
COHB: 1.1 % (ref 0–1.5)
COLOR: YELLOW
CREAT SERPL-MCNC: 3.1 MG/DL (ref 0.5–1)
CREAT SERPL-MCNC: 3.3 MG/DL (ref 0.5–1)
CREATININE URINE: 184 MG/DL (ref 29–226)
CRITICAL: ABNORMAL
DATE ANALYZED: ABNORMAL
DATE OF COLLECTION: ABNORMAL
EOSINOPHILS ABSOLUTE: 0 E9/L (ref 0.05–0.5)
EOSINOPHILS RELATIVE PERCENT: 0 % (ref 0–6)
FIO2: 50 %
FIO2: 50 %
GFR AFRICAN AMERICAN: 17
GFR AFRICAN AMERICAN: 18
GFR NON-AFRICAN AMERICAN: 14 ML/MIN/1.73
GFR NON-AFRICAN AMERICAN: 15 ML/MIN/1.73
GLUCOSE BLD-MCNC: 137 MG/DL (ref 74–99)
GLUCOSE BLD-MCNC: 227 MG/DL (ref 74–99)
GLUCOSE BLD-MCNC: 232 MG/DL
GLUCOSE URINE: NEGATIVE MG/DL
HCO3: 28.6 MMOL/L (ref 22–26)
HCO3: 30.1 MMOL/L (ref 22–26)
HCO3: 30.8 MMOL/L (ref 22–26)
HCT VFR BLD CALC: 24.7 % (ref 34–48)
HEMOGLOBIN: 7.3 G/DL (ref 11.5–15.5)
HHB: 1.7 % (ref 0–5)
HHB: 2.8 % (ref 0–5)
HHB: 3.7 % (ref 0–5)
INR BLD: 2.5
KETONES, URINE: NEGATIVE MG/DL
LAB: ABNORMAL
LACTIC ACID: 1.1 MMOL/L (ref 0.5–2.2)
LACTIC ACID: 2.3 MMOL/L (ref 0.5–2.2)
LEUKOCYTE ESTERASE, URINE: NEGATIVE
LYMPHOCYTES ABSOLUTE: 0.53 E9/L (ref 1.5–4)
LYMPHOCYTES RELATIVE PERCENT: 8.7 % (ref 20–42)
Lab: ABNORMAL
MAGNESIUM: 1.4 MG/DL (ref 1.6–2.6)
MCH RBC QN AUTO: 29.4 PG (ref 26–35)
MCHC RBC AUTO-ENTMCNC: 29.6 % (ref 32–34.5)
MCV RBC AUTO: 99.6 FL (ref 80–99.9)
METER GLUCOSE: 232 MG/DL (ref 74–99)
METHB: 0.2 % (ref 0–1.5)
METHB: 0.7 % (ref 0–1.5)
METHB: 0.8 % (ref 0–1.5)
MODE: ABNORMAL
MONOCYTES ABSOLUTE: 0.53 E9/L (ref 0.1–0.95)
MONOCYTES RELATIVE PERCENT: 8.7 % (ref 2–12)
NEUTROPHILS ABSOLUTE: 4.9 E9/L (ref 1.8–7.3)
NEUTROPHILS RELATIVE PERCENT: 82.6 % (ref 43–80)
NITRITE, URINE: NEGATIVE
O2 CONTENT: 10.9 ML/DL
O2 CONTENT: 11.8 ML/DL
O2 SATURATION: 96.3 % (ref 92–98.5)
O2 SATURATION: 97.2 % (ref 92–98.5)
O2 SATURATION: 98.3 % (ref 92–98.5)
O2HB: 95 % (ref 94–97)
O2HB: 95.9 % (ref 94–97)
O2HB: 97.2 % (ref 94–97)
OPERATOR ID: 1868
OPERATOR ID: 187
OPERATOR ID: 892
OSMOLALITY: 298 MOSM/KG (ref 285–310)
OVALOCYTES: ABNORMAL
PATIENT TEMP: 37 C
PCO2: 61.3 MMHG (ref 35–45)
PCO2: 65.4 MMHG (ref 35–45)
PCO2: 71.9 MMHG (ref 35–45)
PDW BLD-RTO: 19.5 FL (ref 11.5–15)
PEEP/CPAP: 6 CMH2O
PEEP/CPAP: 6 CMH2O
PFO2: 1.99 MMHG/%
PFO2: 2.07 MMHG/%
PH BLOOD GAS: 7.24 (ref 7.35–7.45)
PH BLOOD GAS: 7.29 (ref 7.35–7.45)
PH BLOOD GAS: 7.29 (ref 7.35–7.45)
PH UA: 5 (ref 5–9)
PLATELET # BLD: 315 E9/L (ref 130–450)
PMV BLD AUTO: 10.8 FL (ref 7–12)
PO2: 103.6 MMHG (ref 60–100)
PO2: 134.9 MMHG (ref 60–100)
PO2: 99.5 MMHG (ref 60–100)
POIKILOCYTES: ABNORMAL
POLYCHROMASIA: ABNORMAL
POTASSIUM SERPL-SCNC: 5.7 MMOL/L (ref 3.5–5)
POTASSIUM SERPL-SCNC: 5.89 MMOL/L (ref 3.3–5.1)
POTASSIUM SERPL-SCNC: 6 MMOL/L (ref 3.5–5)
POTASSIUM, UR: 61.1 MMOL/L
PRO-BNP: ABNORMAL PG/ML (ref 0–125)
PRO-BNP: ABNORMAL PG/ML (ref 0–125)
PROCALCITONIN: 1 NG/ML (ref 0–0.08)
PROTEIN PROTEIN: 285 MG/DL (ref 0–12)
PROTEIN UA: >=300 MG/DL
PROTEIN/CREAT RATIO: 1.5
PROTEIN/CREAT RATIO: 1.5 (ref 0–0.2)
PROTHROMBIN TIME: 28.9 SEC (ref 9.3–12.4)
PS: 12 CMH20
PS: 12 CMH20
RBC # BLD: 2.48 E12/L (ref 3.5–5.5)
RBC UA: ABNORMAL /HPF (ref 0–2)
RI(T): 1.76
RI(T): 161 %
SODIUM BLD-SCNC: 130 MMOL/L (ref 132–146)
SODIUM BLD-SCNC: 132 MMOL/L (ref 132–146)
SODIUM URINE: 22 MMOL/L
SOURCE, BLOOD GAS: ABNORMAL
SPECIFIC GRAVITY UA: >=1.03 (ref 1–1.03)
THB: 7.9 G/DL (ref 11.5–16.5)
THB: 8.4 G/DL (ref 11.5–16.5)
THB: 8.5 G/DL (ref 11.5–16.5)
TIME ANALYZED: 1440
TIME ANALYZED: 1613
TIME ANALYZED: 2027
TOTAL PROTEIN: 7.1 G/DL (ref 6.4–8.3)
TROPONIN: 0.02 NG/ML (ref 0–0.03)
UROBILINOGEN, URINE: 0.2 E.U./DL
WBC # BLD: 5.9 E9/L (ref 4.5–11.5)
WBC UA: ABNORMAL /HPF (ref 0–5)

## 2019-08-08 PROCEDURE — 87077 CULTURE AEROBIC IDENTIFY: CPT

## 2019-08-08 PROCEDURE — 83880 ASSAY OF NATRIURETIC PEPTIDE: CPT

## 2019-08-08 PROCEDURE — 74176 CT ABD & PELVIS W/O CONTRAST: CPT

## 2019-08-08 PROCEDURE — 6360000002 HC RX W HCPCS: Performed by: EMERGENCY MEDICINE

## 2019-08-08 PROCEDURE — 94640 AIRWAY INHALATION TREATMENT: CPT

## 2019-08-08 PROCEDURE — 2500000003 HC RX 250 WO HCPCS: Performed by: EMERGENCY MEDICINE

## 2019-08-08 PROCEDURE — 96374 THER/PROPH/DIAG INJ IV PUSH: CPT

## 2019-08-08 PROCEDURE — 85730 THROMBOPLASTIN TIME PARTIAL: CPT

## 2019-08-08 PROCEDURE — 84145 PROCALCITONIN (PCT): CPT

## 2019-08-08 PROCEDURE — 70450 CT HEAD/BRAIN W/O DYE: CPT

## 2019-08-08 PROCEDURE — 83930 ASSAY OF BLOOD OSMOLALITY: CPT

## 2019-08-08 PROCEDURE — 83735 ASSAY OF MAGNESIUM: CPT

## 2019-08-08 PROCEDURE — 6370000000 HC RX 637 (ALT 250 FOR IP): Performed by: EMERGENCY MEDICINE

## 2019-08-08 PROCEDURE — 87581 M.PNEUMON DNA AMP PROBE: CPT

## 2019-08-08 PROCEDURE — 85025 COMPLETE CBC W/AUTO DIFF WBC: CPT

## 2019-08-08 PROCEDURE — 84300 ASSAY OF URINE SODIUM: CPT

## 2019-08-08 PROCEDURE — 36415 COLL VENOUS BLD VENIPUNCTURE: CPT

## 2019-08-08 PROCEDURE — 94660 CPAP INITIATION&MGMT: CPT

## 2019-08-08 PROCEDURE — 71045 X-RAY EXAM CHEST 1 VIEW: CPT

## 2019-08-08 PROCEDURE — 80048 BASIC METABOLIC PNL TOTAL CA: CPT

## 2019-08-08 PROCEDURE — 87088 URINE BACTERIA CULTURE: CPT

## 2019-08-08 PROCEDURE — 87486 CHLMYD PNEUM DNA AMP PROBE: CPT

## 2019-08-08 PROCEDURE — 96375 TX/PRO/DX INJ NEW DRUG ADDON: CPT

## 2019-08-08 PROCEDURE — 6360000002 HC RX W HCPCS

## 2019-08-08 PROCEDURE — 87633 RESP VIRUS 12-25 TARGETS: CPT

## 2019-08-08 PROCEDURE — 80053 COMPREHEN METABOLIC PANEL: CPT

## 2019-08-08 PROCEDURE — 82570 ASSAY OF URINE CREATININE: CPT

## 2019-08-08 PROCEDURE — 83605 ASSAY OF LACTIC ACID: CPT

## 2019-08-08 PROCEDURE — 2000000000 HC ICU R&B

## 2019-08-08 PROCEDURE — 84484 ASSAY OF TROPONIN QUANT: CPT

## 2019-08-08 PROCEDURE — 87798 DETECT AGENT NOS DNA AMP: CPT

## 2019-08-08 PROCEDURE — 82962 GLUCOSE BLOOD TEST: CPT

## 2019-08-08 PROCEDURE — 87040 BLOOD CULTURE FOR BACTERIA: CPT

## 2019-08-08 PROCEDURE — 87186 SC STD MICRODIL/AGAR DIL: CPT

## 2019-08-08 PROCEDURE — 82436 ASSAY OF URINE CHLORIDE: CPT

## 2019-08-08 PROCEDURE — 84133 ASSAY OF URINE POTASSIUM: CPT

## 2019-08-08 PROCEDURE — 94664 DEMO&/EVAL PT USE INHALER: CPT

## 2019-08-08 PROCEDURE — 99223 1ST HOSP IP/OBS HIGH 75: CPT | Performed by: INTERNAL MEDICINE

## 2019-08-08 PROCEDURE — 84132 ASSAY OF SERUM POTASSIUM: CPT

## 2019-08-08 PROCEDURE — 82805 BLOOD GASES W/O2 SATURATION: CPT

## 2019-08-08 PROCEDURE — 84156 ASSAY OF PROTEIN URINE: CPT

## 2019-08-08 PROCEDURE — 81001 URINALYSIS AUTO W/SCOPE: CPT

## 2019-08-08 PROCEDURE — 99285 EMERGENCY DEPT VISIT HI MDM: CPT

## 2019-08-08 PROCEDURE — 93005 ELECTROCARDIOGRAM TRACING: CPT | Performed by: EMERGENCY MEDICINE

## 2019-08-08 PROCEDURE — 85610 PROTHROMBIN TIME: CPT

## 2019-08-08 PROCEDURE — 2580000003 HC RX 258: Performed by: EMERGENCY MEDICINE

## 2019-08-08 RX ORDER — IPRATROPIUM BROMIDE AND ALBUTEROL SULFATE 2.5; .5 MG/3ML; MG/3ML
1 SOLUTION RESPIRATORY (INHALATION)
Status: COMPLETED | OUTPATIENT
Start: 2019-08-08 | End: 2019-08-08

## 2019-08-08 RX ORDER — MAGNESIUM SULFATE IN WATER 40 MG/ML
INJECTION, SOLUTION INTRAVENOUS
Status: COMPLETED
Start: 2019-08-08 | End: 2019-08-09

## 2019-08-08 RX ORDER — DEXTROSE MONOHYDRATE 50 MG/ML
100 INJECTION, SOLUTION INTRAVENOUS PRN
Status: DISCONTINUED | OUTPATIENT
Start: 2019-08-08 | End: 2019-08-19 | Stop reason: HOSPADM

## 2019-08-08 RX ORDER — ONDANSETRON 2 MG/ML
4 INJECTION INTRAMUSCULAR; INTRAVENOUS EVERY 6 HOURS PRN
Status: DISCONTINUED | OUTPATIENT
Start: 2019-08-08 | End: 2019-08-19 | Stop reason: HOSPADM

## 2019-08-08 RX ORDER — DEXTROSE MONOHYDRATE 25 G/50ML
25 INJECTION, SOLUTION INTRAVENOUS ONCE
Status: COMPLETED | OUTPATIENT
Start: 2019-08-08 | End: 2019-08-08

## 2019-08-08 RX ORDER — CALCIUM GLUCONATE 94 MG/ML
1 INJECTION, SOLUTION INTRAVENOUS ONCE
Status: COMPLETED | OUTPATIENT
Start: 2019-08-08 | End: 2019-08-08

## 2019-08-08 RX ORDER — SODIUM CHLORIDE 0.9 % (FLUSH) 0.9 %
10 SYRINGE (ML) INJECTION PRN
Status: DISCONTINUED | OUTPATIENT
Start: 2019-08-08 | End: 2019-08-19 | Stop reason: HOSPADM

## 2019-08-08 RX ORDER — SODIUM CHLORIDE 9 MG/ML
INJECTION, SOLUTION INTRAVENOUS CONTINUOUS
Status: DISCONTINUED | OUTPATIENT
Start: 2019-08-08 | End: 2019-08-09

## 2019-08-08 RX ORDER — NICOTINE POLACRILEX 4 MG
15 LOZENGE BUCCAL PRN
Status: DISCONTINUED | OUTPATIENT
Start: 2019-08-08 | End: 2019-08-19 | Stop reason: HOSPADM

## 2019-08-08 RX ORDER — SERTRALINE HYDROCHLORIDE 25 MG/1
25 TABLET, FILM COATED ORAL DAILY
COMMUNITY
End: 2019-09-17 | Stop reason: SDUPTHER

## 2019-08-08 RX ORDER — MAGNESIUM SULFATE IN WATER 40 MG/ML
2 INJECTION, SOLUTION INTRAVENOUS ONCE
Status: COMPLETED | OUTPATIENT
Start: 2019-08-08 | End: 2019-08-09

## 2019-08-08 RX ORDER — SODIUM CHLORIDE 0.9 % (FLUSH) 0.9 %
10 SYRINGE (ML) INJECTION EVERY 12 HOURS SCHEDULED
Status: DISCONTINUED | OUTPATIENT
Start: 2019-08-08 | End: 2019-08-19 | Stop reason: HOSPADM

## 2019-08-08 RX ORDER — BUMETANIDE 1 MG/1
1 TABLET ORAL 2 TIMES DAILY
COMMUNITY
End: 2019-10-31 | Stop reason: SDUPTHER

## 2019-08-08 RX ORDER — DEXTROSE MONOHYDRATE 25 G/50ML
12.5 INJECTION, SOLUTION INTRAVENOUS PRN
Status: DISCONTINUED | OUTPATIENT
Start: 2019-08-08 | End: 2019-08-19 | Stop reason: HOSPADM

## 2019-08-08 RX ORDER — WARFARIN SODIUM 1 MG/1
1 TABLET ORAL
Status: ON HOLD | COMMUNITY
End: 2019-08-19 | Stop reason: SDUPTHER

## 2019-08-08 RX ORDER — FUROSEMIDE 10 MG/ML
40 INJECTION INTRAMUSCULAR; INTRAVENOUS ONCE
Status: ON HOLD | COMMUNITY
End: 2019-08-19 | Stop reason: HOSPADM

## 2019-08-08 RX ADMIN — DEXTROSE MONOHYDRATE 25 G: 25 INJECTION, SOLUTION INTRAVENOUS at 17:16

## 2019-08-08 RX ADMIN — MAGNESIUM SULFATE IN WATER 2 G: 40 INJECTION, SOLUTION INTRAVENOUS at 20:30

## 2019-08-08 RX ADMIN — Medication 50 MEQ: at 17:16

## 2019-08-08 RX ADMIN — INSULIN HUMAN 10 UNITS: 100 INJECTION, SOLUTION PARENTERAL at 17:16

## 2019-08-08 RX ADMIN — IPRATROPIUM BROMIDE AND ALBUTEROL SULFATE 1 AMPULE: .5; 3 SOLUTION RESPIRATORY (INHALATION) at 15:04

## 2019-08-08 RX ADMIN — IPRATROPIUM BROMIDE AND ALBUTEROL SULFATE 1 AMPULE: .5; 3 SOLUTION RESPIRATORY (INHALATION) at 15:00

## 2019-08-08 RX ADMIN — IPRATROPIUM BROMIDE AND ALBUTEROL SULFATE 1 AMPULE: .5; 3 SOLUTION RESPIRATORY (INHALATION) at 15:02

## 2019-08-08 RX ADMIN — CALCIUM GLUCONATE 1 G: 98 INJECTION, SOLUTION INTRAVENOUS at 17:16

## 2019-08-08 RX ADMIN — MAGNESIUM SULFATE 2 G: 2 INJECTION INTRAVENOUS at 20:30

## 2019-08-08 RX ADMIN — SODIUM CHLORIDE: 9 INJECTION, SOLUTION INTRAVENOUS at 17:19

## 2019-08-08 ASSESSMENT — PAIN DESCRIPTION - FREQUENCY: FREQUENCY: CONTINUOUS

## 2019-08-08 ASSESSMENT — PAIN DESCRIPTION - LOCATION: LOCATION: HEAD

## 2019-08-08 ASSESSMENT — PAIN DESCRIPTION - DESCRIPTORS: DESCRIPTORS: ACHING;CONSTANT;HEADACHE

## 2019-08-08 ASSESSMENT — PAIN SCALES - GENERAL: PAINLEVEL_OUTOF10: 4

## 2019-08-08 ASSESSMENT — PAIN DESCRIPTION - PAIN TYPE: TYPE: ACUTE PAIN

## 2019-08-08 NOTE — TELEPHONE ENCOUNTER
Writer contacted  ED provider, Dr Jesusa Goodell,  to inform of 30 day readmission risk. Writer's attempt to contact ED provider was unsuccessful.

## 2019-08-08 NOTE — ED PROVIDER NOTES
Ref Range    WBC 5.9 4.5 - 11.5 E9/L    RBC 2.48 (L) 3.50 - 5.50 E12/L    Hemoglobin 7.3 (L) 11.5 - 15.5 g/dL    Hematocrit 24.7 (L) 34.0 - 48.0 %    MCV 99.6 80.0 - 99.9 fL    MCH 29.4 26.0 - 35.0 pg    MCHC 29.6 (L) 32.0 - 34.5 %    RDW 19.5 (H) 11.5 - 15.0 fL    Platelets 486 831 - 402 E9/L    MPV 10.8 7.0 - 12.0 fL    Neutrophils % 82.6 (H) 43.0 - 80.0 %    Lymphocytes % 8.7 (L) 20.0 - 42.0 %    Monocytes % 8.7 2.0 - 12.0 %    Eosinophils % 0.0 0.0 - 6.0 %    Basophils % 0.2 0.0 - 2.0 %    Neutrophils # 4.90 1.80 - 7.30 E9/L    Lymphocytes # 0.53 (L) 1.50 - 4.00 E9/L    Monocytes # 0.53 0.10 - 0.95 E9/L    Eosinophils # 0.00 (L) 0.05 - 0.50 E9/L    Basophils # 0.00 0.00 - 0.20 E9/L    Anisocytosis 1+     Polychromasia 1+     Poikilocytes 1+     Richmond Cells 1+     Ovalocytes 1+     Basophilic Stippling 1+    Comprehensive Metabolic Panel   Result Value Ref Range    Sodium 130 (L) 132 - 146 mmol/L    Potassium 6.0 (H) 3.5 - 5.0 mmol/L    Chloride 88 (L) 98 - 107 mmol/L    CO2 29 22 - 29 mmol/L    Anion Gap 13 7 - 16 mmol/L    Glucose 227 (H) 74 - 99 mg/dL    BUN 54 (H) 8 - 23 mg/dL    CREATININE 3.1 (H) 0.5 - 1.0 mg/dL    GFR Non-African American 15 >=60 mL/min/1.73    GFR African American 18     Calcium 7.4 (L) 8.6 - 10.2 mg/dL    Total Protein 7.1 6.4 - 8.3 g/dL    Alb 3.0 (L) 3.5 - 5.2 g/dL    Total Bilirubin 0.5 0.0 - 1.2 mg/dL    Alkaline Phosphatase 112 (H) 35 - 104 U/L    ALT 27 0 - 32 U/L    AST 24 0 - 31 U/L   Troponin   Result Value Ref Range    Troponin 0.02 0.00 - 0.03 ng/mL   Protime-INR   Result Value Ref Range    Protime 28.9 (H) 9.3 - 12.4 sec    INR 2.5    APTT   Result Value Ref Range    aPTT 34.8 24.5 - 35.1 sec   Lactic Acid, Plasma   Result Value Ref Range    Lactic Acid 1.1 0.5 - 2.2 mmol/L   Blood Gas, Arterial   Result Value Ref Range    Date Analyzed [id number]     Time Analyzed 1440     Source: Blood Arterial     pH, Blood Gas 7.240 (L) 7.350 - 7.450    PCO2 71.9 (HH) 35.0 - 45.0 mmHg    PO2 ---------------------------   The nursing notes within the ED encounter and vital signs as below have been reviewed by myself. /71   Pulse 86   Temp 96.5 °F (35.8 °C)   Resp 18   Ht 5' 4\" (1.626 m)   Wt 204 lb (92.5 kg)   SpO2 95%   BMI 35.02 kg/m²   Oxygen Saturation Interpretation: Normal    The patients available past medical records and past encounters were reviewed. ------------------------------ ED COURSE/MEDICAL DECISION MAKING----------------------  Medications   glucose (GLUTOSE) 40 % oral gel 15 g (has no administration in time range)   dextrose 50 % IV solution (has no administration in time range)   glucagon (rDNA) injection 1 mg (has no administration in time range)   dextrose 5 % solution (has no administration in time range)   0.9 % sodium chloride infusion ( Intravenous New Bag 8/8/19 1719)   ipratropium-albuterol (DUONEB) nebulizer solution 1 ampule (1 ampule Inhalation Given 8/8/19 5362)   insulin regular (HUMULIN R;NOVOLIN R) injection 10 Units (10 Units Intravenous Given 8/8/19 1716)     And   dextrose 50 % IV solution (25 g Intravenous Given 8/8/19 1716)   sodium bicarbonate 8.4 % injection 50 mEq (50 mEq Intravenous Given 8/8/19 1716)   calcium gluconate 10 % injection 1 g (1 g Intravenous Given 8/8/19 1716)         ED COURSE:       Medical Decision Making:    Labs imaging reviewed. Patient will be admitted. Counseling: The emergency provider has spoken with the patient and discussed todays results, in addition to providing specific details for the plan of care and counseling regarding the diagnosis and prognosis. Questions are answered at this time and they are agreeable with the plan.       --------------------------------- IMPRESSION AND DISPOSITION ---------------------------------    IMPRESSION  1. Altered mental status, unspecified altered mental status type    2. KARISHMA (acute kidney injury) ([facility] [address]    3.  Hyperkalemia        DISPOSITION  Disposition: Admit to telemetry  Patient condition is stable    NOTE: This report was transcribed using voice recognition software.  Every effort was made to ensure accuracy; however, inadvertent computerized transcription errors may be present        Salvatore Centeno MD  08/12/19 5339

## 2019-08-08 NOTE — H&P
immediately  Eyes - pupils equal and reactive, extraocular eye movements intact  Ears - bilateral TM's and external ear canals normal  Nose - normal and patent, no erythema, discharge or polyps  Mouth - mucous membranes moist, pharynx normal without lesions  Neck - supple, no significant adenopathy  Chest -diminished breath sounds bilaterally more on the right than the left crackles on the right as well  Heart - normal rate and regular rhythm, S1 and S2 normal  Abdomen -soft but distended, nontender to palpation  Neurological -unable to obtain due to patient's obtundation  Extremities - peripheral pulses normal, no pedal edema, no clubbing or cyanosis, +3 pitting edema bilaterally  Skin -pale color, no wounds     Any additional physical findings:    MEDICATIONS:  Scheduled Meds:    Continuous Infusions:   dextrose      sodium chloride 100 mL/hr at 08/08/19 1719     PRN Meds:     glucose 15 g PRN   dextrose 12.5 g PRN   glucagon (rDNA) 1 mg PRN   dextrose 100 mL/hr PRN       VENT SETTINGS (Comprehensive) (if applicable):  Vent Information  Skin Assessment: Clean, dry, & intact  FiO2 : 50 %  I Time/ I Time %: 1 s  Additional Respiratory  Assessments  Pulse: 86  Resp: 18  SpO2: 95 %    ABGs:   Recent Labs     08/08/19  1613   PH 7.291*   PCO2 65.4*   PO2 103.6*   HCO3 30.8*   BE 3.5*   O2SAT 97.2       Laboratory findings:  Complete Blood Count: Recent Labs     08/08/19  1415   WBC 5.9   HGB 7.3*   HCT 24.7*           Last 3 Blood Glucose:   Recent Labs     08/08/19  1405 08/08/19  1415   GLUCOSE 232 227*        PT/INR:    Lab Results   Component Value Date    PROTIME 28.9 08/08/2019    PROTIME 39 08/02/2019    INR 2.5 08/08/2019     PTT:    Lab Results   Component Value Date    APTT 34.8 08/08/2019       Comprehensive Metabolic Profile:   Recent Labs     08/08/19  1405 08/08/19  1415 08/08/19  1440   NA  --  130*  --    K  --  6.0* 5.89*   CL  --  88*  --    CO2  --  29  --    BUN  --  54*  --    CREATININE

## 2019-08-09 ENCOUNTER — APPOINTMENT (OUTPATIENT)
Dept: GENERAL RADIOLOGY | Age: 74
DRG: 189 | End: 2019-08-09
Payer: COMMERCIAL

## 2019-08-09 LAB
AADO2: 200.8 MMHG
AADO2: 243.5 MMHG
AMMONIA: 50 UMOL/L (ref 11–51)
ANION GAP SERPL CALCULATED.3IONS-SCNC: 11 MMOL/L (ref 7–16)
ANION GAP SERPL CALCULATED.3IONS-SCNC: 15 MMOL/L (ref 7–16)
ANION GAP SERPL CALCULATED.3IONS-SCNC: 16 MMOL/L (ref 7–16)
ANION GAP SERPL CALCULATED.3IONS-SCNC: 17 MMOL/L (ref 7–16)
ANISOCYTOSIS: ABNORMAL
ATYPICAL LYMPHOCYTE RELATIVE PERCENT: 0.9 % (ref 0–4)
B.E.: 2.7 MMOL/L
B.E.: 3 MMOL/L (ref -3–3)
BASOPHILIC STIPPLING: ABNORMAL
BASOPHILS ABSOLUTE: 0 E9/L (ref 0–0.2)
BASOPHILS RELATIVE PERCENT: 0.2 % (ref 0–2)
BUN BLDV-MCNC: 56 MG/DL (ref 8–23)
BUN BLDV-MCNC: 56 MG/DL (ref 8–23)
BUN BLDV-MCNC: 57 MG/DL (ref 8–23)
BUN BLDV-MCNC: 57 MG/DL (ref 8–23)
CALCIUM SERPL-MCNC: 6.7 MG/DL (ref 8.6–10.2)
CALCIUM SERPL-MCNC: 7.2 MG/DL (ref 8.6–10.2)
CALCIUM SERPL-MCNC: 7.3 MG/DL (ref 8.6–10.2)
CALCIUM SERPL-MCNC: 7.4 MG/DL (ref 8.6–10.2)
CHLORIDE BLD-SCNC: 87 MMOL/L (ref 98–107)
CHLORIDE BLD-SCNC: 91 MMOL/L (ref 98–107)
CHLORIDE BLD-SCNC: 91 MMOL/L (ref 98–107)
CHLORIDE BLD-SCNC: 93 MMOL/L (ref 98–107)
CO2: 27 MMOL/L (ref 22–29)
CO2: 28 MMOL/L (ref 22–29)
CO2: 29 MMOL/L (ref 22–29)
CO2: 32 MMOL/L (ref 22–29)
COHB: 0.5 % (ref 0–1.5)
COHB: 0.6 % (ref 0–1.5)
COMMENT: ABNORMAL
CREAT SERPL-MCNC: 3.4 MG/DL (ref 0.5–1)
CREAT SERPL-MCNC: 3.5 MG/DL (ref 0.5–1)
CRITICAL: ABNORMAL
CRITICAL: ABNORMAL
DATE ANALYZED: ABNORMAL
DATE ANALYZED: ABNORMAL
DATE OF COLLECTION: ABNORMAL
DATE OF COLLECTION: ABNORMAL
EKG ATRIAL RATE: 89 BPM
EKG ATRIAL RATE: 92 BPM
EKG P AXIS: 54 DEGREES
EKG P AXIS: 56 DEGREES
EKG P-R INTERVAL: 160 MS
EKG P-R INTERVAL: 172 MS
EKG Q-T INTERVAL: 418 MS
EKG Q-T INTERVAL: 422 MS
EKG QRS DURATION: 148 MS
EKG QRS DURATION: 154 MS
EKG QTC CALCULATION (BAZETT): 513 MS
EKG QTC CALCULATION (BAZETT): 516 MS
EKG R AXIS: -102 DEGREES
EKG R AXIS: -122 DEGREES
EKG T AXIS: 13 DEGREES
EKG T AXIS: 27 DEGREES
EKG VENTRICULAR RATE: 89 BPM
EKG VENTRICULAR RATE: 92 BPM
EOSINOPHILS ABSOLUTE: 0 E9/L (ref 0.05–0.5)
EOSINOPHILS RELATIVE PERCENT: 0.2 % (ref 0–6)
FILM ARRAY ADENOVIRUS: NORMAL
FILM ARRAY BORDETELLA PERTUSSIS: NORMAL
FILM ARRAY CHLAMYDOPHILIA PNEUMONIAE: NORMAL
FILM ARRAY CORONAVIRUS 229E: NORMAL
FILM ARRAY CORONAVIRUS HKU1: NORMAL
FILM ARRAY CORONAVIRUS NL63: NORMAL
FILM ARRAY CORONAVIRUS OC43: NORMAL
FILM ARRAY INFLUENZA A VIRUS 09H1: NORMAL
FILM ARRAY INFLUENZA A VIRUS H1: NORMAL
FILM ARRAY INFLUENZA A VIRUS H3: NORMAL
FILM ARRAY INFLUENZA A VIRUS: NORMAL
FILM ARRAY INFLUENZA B: NORMAL
FILM ARRAY METAPNEUMOVIRUS: NORMAL
FILM ARRAY MYCOPLASMA PNEUMONIAE: NORMAL
FILM ARRAY PARAINFLUENZA VIRUS 1: NORMAL
FILM ARRAY PARAINFLUENZA VIRUS 2: NORMAL
FILM ARRAY PARAINFLUENZA VIRUS 3: NORMAL
FILM ARRAY PARAINFLUENZA VIRUS 4: NORMAL
FILM ARRAY RESPIRATORY SYNCITIAL VIRUS: NORMAL
FILM ARRAY RHINOVIRUS/ENTEROVIRUS: NORMAL
FIO2: 50 %
FIO2: 50 %
GFR AFRICAN AMERICAN: 15
GFR AFRICAN AMERICAN: 16
GFR NON-AFRICAN AMERICAN: 13 ML/MIN/1.73
GLUCOSE BLD-MCNC: 129 MG/DL (ref 74–99)
GLUCOSE BLD-MCNC: 133 MG/DL (ref 74–99)
GLUCOSE BLD-MCNC: 143 MG/DL (ref 74–99)
GLUCOSE BLD-MCNC: 166 MG/DL (ref 74–99)
HCO3: 29.2 MMOL/L
HCO3: 29.6 MMOL/L (ref 22–26)
HCT VFR BLD CALC: 24.2 % (ref 34–48)
HEMOGLOBIN: 7.1 G/DL (ref 11.5–15.5)
HHB: 35 %
HHB: 5.4 % (ref 0–5)
INR BLD: 2.6
LAB: ABNORMAL
LAB: ABNORMAL
LACTATE DEHYDROGENASE: 220 U/L (ref 135–214)
LACTIC ACID: 1.1 MMOL/L (ref 0.5–2.2)
LACTIC ACID: 1.4 MMOL/L (ref 0.5–2.2)
LYMPHOCYTES ABSOLUTE: 0.52 E9/L (ref 1.5–4)
LYMPHOCYTES RELATIVE PERCENT: 7.8 % (ref 20–42)
Lab: ABNORMAL
Lab: ABNORMAL
MAGNESIUM: 1.7 MG/DL (ref 1.6–2.6)
MAGNESIUM: 1.8 MG/DL (ref 1.6–2.6)
MCH RBC QN AUTO: 29.6 PG (ref 26–35)
MCHC RBC AUTO-ENTMCNC: 29.3 % (ref 32–34.5)
MCV RBC AUTO: 100.8 FL (ref 80–99.9)
METER GLUCOSE: 120 MG/DL (ref 74–99)
METER GLUCOSE: 149 MG/DL (ref 74–99)
METER GLUCOSE: 169 MG/DL (ref 74–99)
METER GLUCOSE: 202 MG/DL (ref 74–99)
METHB: 0.3 % (ref 0–1.5)
METHB: 0.5 % (ref 0–1.5)
MODE: ABNORMAL
MODE: ABNORMAL
MONOCYTES ABSOLUTE: 0.23 E9/L (ref 0.1–0.95)
MONOCYTES RELATIVE PERCENT: 3.5 % (ref 2–12)
NEUTROPHILS ABSOLUTE: 5.1 E9/L (ref 1.8–7.3)
NEUTROPHILS RELATIVE PERCENT: 87.8 % (ref 43–80)
O2 SATURATION: 64.7 %
O2 SATURATION: 94.5 % (ref 92–98.5)
O2HB: 64.2 %
O2HB: 93.5 % (ref 94–97)
OPERATOR ID: 1768
OPERATOR ID: 2962
OVALOCYTES: ABNORMAL
PATIENT TEMP: 37 C
PATIENT TEMP: 37 C
PCO2: 56.9 MMHG
PCO2: 57.8 MMHG (ref 35–45)
PDW BLD-RTO: 19.3 FL (ref 11.5–15)
PEEP/CPAP: 6 CMH2O
PFO2: 0.73 MMHG/%
PFO2: 1.56 MMHG/%
PH BLOOD GAS: 7.33 (ref 7.35–7.45)
PH BLOOD GAS: 7.33 (ref 7.3–7.42)
PHOSPHORUS: 6.6 MG/DL (ref 2.5–4.5)
PIP: 12 CMH2O
PLATELET # BLD: 324 E9/L (ref 130–450)
PMV BLD AUTO: 10.9 FL (ref 7–12)
PO2: 36.5 MMHG
PO2: 78.2 MMHG (ref 60–100)
POIKILOCYTES: ABNORMAL
POLYCHROMASIA: ABNORMAL
POTASSIUM SERPL-SCNC: 5.4 MMOL/L (ref 3.5–5)
POTASSIUM SERPL-SCNC: 6 MMOL/L (ref 3.5–5)
POTASSIUM SERPL-SCNC: 6 MMOL/L (ref 3.5–5)
POTASSIUM SERPL-SCNC: 6.4 MMOL/L (ref 3.5–5)
PROTHROMBIN TIME: 29 SEC (ref 9.3–12.4)
RBC # BLD: 2.4 E12/L (ref 3.5–5.5)
RI(T): 2.57
RI(T): 6.67
SODIUM BLD-SCNC: 132 MMOL/L (ref 132–146)
SODIUM BLD-SCNC: 134 MMOL/L (ref 132–146)
SODIUM BLD-SCNC: 135 MMOL/L (ref 132–146)
SODIUM BLD-SCNC: 136 MMOL/L (ref 132–146)
SOURCE, BLOOD GAS: ABNORMAL
SOURCE, BLOOD GAS: ABNORMAL
THB: 7.9 G/DL (ref 11.5–16.5)
THB: 8.3 G/DL (ref 11.5–16.5)
TIME ANALYZED: 1323
TIME ANALYZED: 429
URIC ACID, SERUM: 13.4 MG/DL (ref 2.4–5.7)
WBC # BLD: 5.8 E9/L (ref 4.5–11.5)

## 2019-08-09 PROCEDURE — 6360000002 HC RX W HCPCS: Performed by: INTERNAL MEDICINE

## 2019-08-09 PROCEDURE — 94660 CPAP INITIATION&MGMT: CPT

## 2019-08-09 PROCEDURE — 84100 ASSAY OF PHOSPHORUS: CPT

## 2019-08-09 PROCEDURE — 99233 SBSQ HOSP IP/OBS HIGH 50: CPT | Performed by: INTERNAL MEDICINE

## 2019-08-09 PROCEDURE — 2500000003 HC RX 250 WO HCPCS: Performed by: INTERNAL MEDICINE

## 2019-08-09 PROCEDURE — 2000000000 HC ICU R&B

## 2019-08-09 PROCEDURE — 2580000003 HC RX 258: Performed by: STUDENT IN AN ORGANIZED HEALTH CARE EDUCATION/TRAINING PROGRAM

## 2019-08-09 PROCEDURE — 6370000000 HC RX 637 (ALT 250 FOR IP)

## 2019-08-09 PROCEDURE — 2580000003 HC RX 258: Performed by: INTERNAL MEDICINE

## 2019-08-09 PROCEDURE — 99221 1ST HOSP IP/OBS SF/LOW 40: CPT | Performed by: EMERGENCY MEDICINE

## 2019-08-09 PROCEDURE — 85025 COMPLETE CBC W/AUTO DIFF WBC: CPT

## 2019-08-09 PROCEDURE — 71045 X-RAY EXAM CHEST 1 VIEW: CPT

## 2019-08-09 PROCEDURE — 6370000000 HC RX 637 (ALT 250 FOR IP): Performed by: INTERNAL MEDICINE

## 2019-08-09 PROCEDURE — 82140 ASSAY OF AMMONIA: CPT

## 2019-08-09 PROCEDURE — 83735 ASSAY OF MAGNESIUM: CPT

## 2019-08-09 PROCEDURE — 82962 GLUCOSE BLOOD TEST: CPT

## 2019-08-09 PROCEDURE — 93010 ELECTROCARDIOGRAM REPORT: CPT | Performed by: INTERNAL MEDICINE

## 2019-08-09 PROCEDURE — 93005 ELECTROCARDIOGRAM TRACING: CPT | Performed by: INTERNAL MEDICINE

## 2019-08-09 PROCEDURE — 85610 PROTHROMBIN TIME: CPT

## 2019-08-09 PROCEDURE — 87040 BLOOD CULTURE FOR BACTERIA: CPT

## 2019-08-09 PROCEDURE — 99222 1ST HOSP IP/OBS MODERATE 55: CPT | Performed by: FAMILY MEDICINE

## 2019-08-09 PROCEDURE — 87081 CULTURE SCREEN ONLY: CPT

## 2019-08-09 PROCEDURE — 83605 ASSAY OF LACTIC ACID: CPT

## 2019-08-09 PROCEDURE — 99223 1ST HOSP IP/OBS HIGH 75: CPT | Performed by: INTERNAL MEDICINE

## 2019-08-09 PROCEDURE — 83615 LACTATE (LD) (LDH) ENZYME: CPT

## 2019-08-09 PROCEDURE — 80048 BASIC METABOLIC PNL TOTAL CA: CPT

## 2019-08-09 PROCEDURE — 36415 COLL VENOUS BLD VENIPUNCTURE: CPT

## 2019-08-09 PROCEDURE — 84550 ASSAY OF BLOOD/URIC ACID: CPT

## 2019-08-09 PROCEDURE — 82805 BLOOD GASES W/O2 SATURATION: CPT

## 2019-08-09 RX ORDER — FUROSEMIDE 10 MG/ML
40 INJECTION INTRAMUSCULAR; INTRAVENOUS ONCE
Status: COMPLETED | OUTPATIENT
Start: 2019-08-09 | End: 2019-08-09

## 2019-08-09 RX ORDER — MAGNESIUM SULFATE IN WATER 40 MG/ML
2 INJECTION, SOLUTION INTRAVENOUS ONCE
Status: COMPLETED | OUTPATIENT
Start: 2019-08-09 | End: 2019-08-09

## 2019-08-09 RX ORDER — DEXTROSE MONOHYDRATE 25 G/50ML
25 INJECTION, SOLUTION INTRAVENOUS ONCE
Status: COMPLETED | OUTPATIENT
Start: 2019-08-09 | End: 2019-08-09

## 2019-08-09 RX ORDER — ACETAMINOPHEN 500 MG
TABLET ORAL
Status: COMPLETED
Start: 2019-08-09 | End: 2019-08-09

## 2019-08-09 RX ORDER — SODIUM POLYSTYRENE SULFONATE 4.1 MEQ/G
15 POWDER, FOR SUSPENSION ORAL; RECTAL ONCE
Status: COMPLETED | OUTPATIENT
Start: 2019-08-09 | End: 2019-08-09

## 2019-08-09 RX ORDER — ACETAMINOPHEN 500 MG
500 TABLET ORAL ONCE
Status: COMPLETED | OUTPATIENT
Start: 2019-08-09 | End: 2019-08-09

## 2019-08-09 RX ADMIN — INSULIN HUMAN 5 UNITS: 100 INJECTION, SOLUTION PARENTERAL at 13:49

## 2019-08-09 RX ADMIN — SODIUM POLYSTYRENE SULFONATE 15 G: 1 POWDER ORAL; RECTAL at 19:26

## 2019-08-09 RX ADMIN — Medication 500 MG: at 18:26

## 2019-08-09 RX ADMIN — Medication 50 MEQ: at 13:48

## 2019-08-09 RX ADMIN — Medication 10 ML: at 20:41

## 2019-08-09 RX ADMIN — CALCIUM GLUCONATE 1 G: 98 INJECTION, SOLUTION INTRAVENOUS at 08:07

## 2019-08-09 RX ADMIN — MAGNESIUM SULFATE 2 G: 2 INJECTION INTRAVENOUS at 06:48

## 2019-08-09 RX ADMIN — BUMETANIDE 1.5 MG/HR: 0.25 INJECTION INTRAMUSCULAR; INTRAVENOUS at 09:55

## 2019-08-09 RX ADMIN — DEXTROSE MONOHYDRATE 25 G: 25 INJECTION, SOLUTION INTRAVENOUS at 06:48

## 2019-08-09 RX ADMIN — VANCOMYCIN HYDROCHLORIDE 1.5 G: 10 INJECTION, POWDER, LYOPHILIZED, FOR SOLUTION INTRAVENOUS at 17:58

## 2019-08-09 RX ADMIN — DEXTROSE MONOHYDRATE 25 G: 25 INJECTION, SOLUTION INTRAVENOUS at 13:48

## 2019-08-09 RX ADMIN — INSULIN HUMAN 10 UNITS: 100 INJECTION, SOLUTION PARENTERAL at 22:21

## 2019-08-09 RX ADMIN — BUMETANIDE 2 MG/HR: 0.25 INJECTION INTRAMUSCULAR; INTRAVENOUS at 16:18

## 2019-08-09 RX ADMIN — ACETAMINOPHEN 500 MG: 500 TABLET ORAL at 18:26

## 2019-08-09 RX ADMIN — CHLOROTHIAZIDE SODIUM 125 MG: 500 INJECTION, POWDER, LYOPHILIZED, FOR SOLUTION INTRAVENOUS at 20:41

## 2019-08-09 RX ADMIN — Medication 10 ML: at 08:04

## 2019-08-09 RX ADMIN — PATIROMER 8.4 G: 8.4 POWDER, FOR SUSPENSION ORAL at 14:09

## 2019-08-09 RX ADMIN — CALCIUM GLUCONATE 1 G: 98 INJECTION, SOLUTION INTRAVENOUS at 22:21

## 2019-08-09 RX ADMIN — CALCIUM GLUCONATE 1 G: 98 INJECTION, SOLUTION INTRAVENOUS at 14:09

## 2019-08-09 RX ADMIN — INSULIN HUMAN 10 UNITS: 100 INJECTION, SOLUTION PARENTERAL at 06:47

## 2019-08-09 RX ADMIN — DEXTROSE MONOHYDRATE 25 G: 25 INJECTION, SOLUTION INTRAVENOUS at 22:21

## 2019-08-09 RX ADMIN — FUROSEMIDE 40 MG: 10 INJECTION, SOLUTION INTRAMUSCULAR; INTRAVENOUS at 06:50

## 2019-08-09 RX ADMIN — BUMETANIDE 2 MG/HR: 0.25 INJECTION INTRAMUSCULAR; INTRAVENOUS at 22:21

## 2019-08-09 ASSESSMENT — ENCOUNTER SYMPTOMS
SHORTNESS OF BREATH: 1
COUGH: 0
DIARRHEA: 0
WHEEZING: 1
ABDOMINAL PAIN: 0
SINUS PAIN: 0
CONSTIPATION: 0
NAUSEA: 0
VOMITING: 0

## 2019-08-09 ASSESSMENT — PAIN SCALES - GENERAL
PAINLEVEL_OUTOF10: 0
PAINLEVEL_OUTOF10: 5
PAINLEVEL_OUTOF10: 0

## 2019-08-09 ASSESSMENT — PAIN SCALES - WONG BAKER: WONGBAKER_NUMERICALRESPONSE: 0

## 2019-08-09 NOTE — PROGRESS NOTES
Date: 8/9/2019    Time: 9:19 AM    Patient Placed On BIPAP/CPAP/ Non-Invasive Ventilation? Yes    If no must comment. Facial area red/color change? No           If YES are Blister/Lesion present? No   If yes must notify nursing staff  BIPAP/CPAP skin barrier?   Yes    Skin barrier type:Liquicel       Comments:        Jose Alberto Barreto

## 2019-08-09 NOTE — CONSULTS
removal of port Dec. 2011- Dr. Vinod Tierney TUNNELED VENOUS PORT PLACEMENT  [id number]    RIGHT CHEST       FAMILY HISTORY:   Family History   Adopted: Yes       SOCIAL HISTORY:   Social History     Socioeconomic History    Marital status: Legally      Spouse name: Not on file    Number of children: Not on file    Years of education: Not on file    Highest education level: Not on file   Occupational History    Not on file   Social Needs    Financial resource strain: Not on file    Food insecurity:     Worry: Not on file     Inability: Not on file    Transportation needs:     Medical: Not on file     Non-medical: Not on file   Tobacco Use    Smoking status: Never Smoker    Smokeless tobacco: Never Used   Substance and Sexual Activity    Alcohol use: No    Drug use: No    Sexual activity: Never   Lifestyle    Physical activity:     Days per week: Not on file     Minutes per session: Not on file    Stress: Not on file   Relationships    Social connections:     Talks on phone: Not on file     Gets together: Not on file     Attends Faith service: Not on file     Active member of club or organization: Not on file     Attends meetings of clubs or organizations: Not on file     Relationship status: Not on file    Intimate partner violence:     Fear of current or ex partner: Not on file     Emotionally abused: Not on file     Physically abused: Not on file     Forced sexual activity: Not on file   Other Topics Concern    Not on file   Social History Narrative    Not on file     Social History     Tobacco Use   Smoking Status Never Smoker   Smokeless Tobacco Never Used     Social History     Substance and Sexual Activity   Alcohol Use No     Social History     Substance and Sexual Activity   Drug Use No         HOME MEDICATIONS:  Prior to Admission medications    Medication Sig Start Date End Date Taking?  Authorizing Provider   warfarin (COUMADIN) 1 MG tablet Take 1 mg by mouth Mon/tues/wed/fri TROPONINI 0.02 08/08/2019                   PROBLEM LIST:  Patient Active Problem List   Diagnosis    Metastatic breast cancer ([facility] [address]    Essential hypertension    Coronary artery disease involving native coronary artery of native heart without angina pectoris    Nephrolithiasis    [facility]congestive heart failure) ([facility])    Humerus fracture    Shoulder pain, left    B12 deficiency    Hyperlipidemia    Rib lesion    Subclavian vein occlusion, bilateral (HCC)    Pericardial effusion    MI (myocardial infarction) ([facility] [address]    Arthritis    Sarcoidosis    Lymph node enlargement    Anesthesia    Rectal bleeding    Ventral hernia    Type 2 diabetes mellitus without complication, with long-term current use of insulin ([facility])    Anemia of chronic disease    Chronic deep vein thrombosis (DVT) of proximal vein of right lower extremity (HCC)    Bilateral edema of lower extremity    Peripheral polyneuropathy    Complicated UTI (urinary tract infection)    KARISHMA (acute kidney injury) ([facility] [address]    Diarrhea with dehydration    Chronic anticoagulation    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    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    Diabetic polyneuropathy associated with type 2 diabetes mellitus ([facility])    Leg swelling    History of deep vein thrombosis    Chronic venous insufficiency    Lymphedema of both lower extremities    Decreased dorsalis pedis pulse    Ambulatory dysfunction    CKD (chronic kidney disease) stage 3, GFR 30-59 ml/min ([facility])    Charcot's joint of foot in type 2 diabetes mellitus ([facility] [address]    Ascites    Palliative care encounter    Cancer associated pain    HAP (hospital-acquired pneumonia)    Acute respiratory failure with hypoxia ([facility])    Acute systolic heart failure ([facility])    Nonischemic cardiomyopathy ([facility] [address]    Status post coronary artery bypass graft    Class 2 severe obesity due to excess calories with serious comorbidity and body

## 2019-08-09 NOTE — PROGRESS NOTES
[facility] Quality Flow/Interdisciplinary Rounds Progress Note        Quality Flow Rounds held on August 9, 2019    Disciplines Attending:  Bedside Nurse, , , Nursing Unit Leadership and Physical Therapy    Zion Goodman was admitted on 8/8/2019  1:31 PM    Anticipated Discharge Date:  Expected Discharge Date: 08/22/19    Disposition:    Yuri Score:  Yuri Scale Score: 11    Readmission Risk              Risk of Unplanned Readmission:        39           Discussed patient goal for the day, patient clinical progression, and barriers to discharge. The following Goal(s) of the Day/Commitment(s) have been identified:  Possible need for HD, start bumex gtt, may need pleurex cath vs thoracentesis, PT/OT to see and treat.       Mj Garcia  August 9, 2019

## 2019-08-09 NOTE — PROGRESS NOTES
[facility]   Department of Internal Medicine   Internal Medicine Residency  MICU Progress Note    Patient:  Jaguar Peralta 68 y.o. female   MRN: [id number]       Date of Service: 2019    Allergy: Macrobid [nitrofurantoin monohydrate macrocrystals]    Subjective     The patient is a 68 y.o. 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      As per family, she was in the subacute rehab facility after being discharged from the hospital on 2019. During last hospital admission, she had paracentesis and thoracentesis done and fluid analysis was negative for malignant cells. As per family, no reports of any recent cough, abdominal pain, dysuria, chest pain, diarrhea.      In the ED, she was hypoxic, saturating 80%, ABG showed hypercapnia. She was initially on NRB but was transitioned to bipap. She was disoriented and somnolent as well. Noted to have hyponatremia, hyperkalemia, KARISHMA on CKD. CT head unremarkable. CT abdomen showed bilateral pleural effusions, multi space occupying lesions in the left lobes of the of the liver which are chronic and unchanged, minimal ascites diffuse third spacing into subcutaneous tissues noted as well. NO events overnight have been reported for her. I saw and examined the patient in the am today. ROS could not be obtained. Objective     TEMPERATURE:  Current - Temp: 97 °F (36.1 °C);  Max - Temp  Av.1 °F (36.2 °C)  Min: 96 °F (35.6 °C)  Max: 97.8 °F (36.6 °C)    RESPIRATIONS RANGE: Resp  Av  Min: 9  Max: 29    PULSE RANGE: Pulse  Av.1  Min: 78  Max: 95    BLOOD PRESSURE RANGE:  Systolic (99JYL), AXC:496 , Min:90 , TQA:676   ; Diastolic (16GGM), PABLO:34, Min:43, Max:73      PULSE OXIMETRY RANGE: SpO2  Av.5 %  Min: 93 34.8       Fasting Lipid Panel:    Lab Results   Component Value Date    CHOL 80 06/10/2019    TRIG 104 06/10/2019    HDL 31 06/10/2019       Cardiac Enzymes:    Lab Results   Component Value Date    CKTOTAL 286 (H) 06/11/2019    CKTOTAL 702 (H) 08/22/2014    CKTOTAL 290 (H) 06/02/2014    TROPONINI 0.02 08/08/2019    TROPONINI 0.07 (H) 07/20/2019    TROPONINI 0.50 (H) 06/11/2019       Notable Cultures:      Blood cultures   Blood Culture, Routine   Date Value Ref Range Status   07/20/2019 5 Days- no growth  Final     Respiratory cultures No results found for: RESPCULTURE   Gram Stain Result   Date Value Ref Range Status   07/25/2019 Refer to ordered Gram stain for results  Final     Urine   Urine Culture, Routine   Date Value Ref Range Status   08/08/2019 <10,000 CFU/mL  Gram negative rods    Preliminary     Legionella No results found for: LABLEGI  C Diff PCR No results found for: CDIFPCR  Wound culture/abscess: No results for input(s): WNDABS in the last 72 hours. Tip culture:No results for input(s): CXCATHTIP in the last 72 hours. Antibiotic  Days  Day started                                Oxygen:     Vent Information  Skin Assessment: Clean, dry, & intact  FiO2 : 50 %  I Time/ I Time %: 1 s    Additional Respiratory  Assessments  Pulse: 95  Resp: 27  SpO2: 93 %     Nasal cannula L/min     Face mask %     Reservoirs mask %       ABG     PH     PCO2     PO2     HCO3     Sat%     FIO2     DATES        Lines:  Site  Day  Date inserted     TLC              PICC              Arterial line              Peripheral line              HD cath            Urethral Catheter-Output (mL): 10 mL    Imaging Studies:    Ct Abdomen Pelvis Wo Contrast Additional Contrast? None    Result Date: 8/8/2019  This examination and all examinations utilizing ionizing radiation at this facility are done so according to the ALARA (as low as reasonably achievable) principal for radiation dose reduction.  Clinical indications: Abdominal Unremarkable as visualized. Bones/joints: There are sternal wires consistent with previous sternotomy incision. Partially imaged lytic lesion noted involving one of the right ribs. Soft tissues: There are postoperative changes with surgical clips and scarring noted involving left breast. There is left breast skin thickening. There is nodule or scar within the left breast, stable measuring about 2.2 cm. There is anasarca. Other findings: There are consolidations within the posterior lower lobes, larger on the right. 1. Left breast skin thickening and edema similar to prior study. Left breast mass or scar, stable. 2. Right lung nodules consistent with metastasis. Fleischner chest imaging society guidelines do not apply to this patient who has a known malignancy. 3. Hepatic metastasis. 4. Omental and peritoneal implants. 5. Large amount of fluid within the abdomen and pelvis, increased since prior study. 6. Body wall edema also probably mildly increased. 7. Lytic metastasis to one of the right ribs, partially imaged. 8. A few diverticula of the colon. 9. Nonobstructing renal stones. COMMENT:  Consistent with the [employer] of Radiology's Incidental Findings Committee Report (J Am Greyson Radiol 2010): Unless the patient's specific circumstances suggest otherwise, any liver lesion 0.5 cm or less, any cystic kidney lesion less than 1.0 cm, and/or any adrenal lesion 1.0 cm or less not otherwise characterized in this report as possessing suspicious or indeterminate imaging features is/are highly likely to be benign and do not require follow-up imaging or biopsy. This report has been electronically signed by Rajiv Centeno MD.    Xr Chest Standard (2 Vw)    Result Date: 2019  Patient MRN: [id number] : 1945 Age:  68 years Gender: Female Order Date: 2019 9:45 AM Exam: XR CHEST (2 VW) Number of Views: 2 Indication:   Dyspnea. Shortness of breath Comparison: 2019 Findings:  There is a stable,

## 2019-08-09 NOTE — CONSULTS
08/08/2019    CL 87 (L) 08/09/2019    CO2 28 08/09/2019    CO2 31 (H) 08/08/2019    CO2 29 08/08/2019    CREATININE 3.4 (H) 08/09/2019    CREATININE 3.3 (H) 08/08/2019    CREATININE 3.1 (H) 08/08/2019    BUN 56 (H) 08/09/2019    BUN 56 (H) 08/08/2019    BUN 54 (H) 08/08/2019    GLUCOSE 143 (H) 08/09/2019    GLUCOSE 137 (H) 08/08/2019    GLUCOSE 227 (H) 08/08/2019    PHOS 6.6 (H) 08/09/2019    PHOS 4.7 (H) 07/22/2019    PHOS 4.0 06/24/2019    WBC 5.8 08/09/2019    WBC 5.9 08/08/2019    WBC 3.2 (L) 07/26/2019    HGB 7.1 (L) 08/09/2019    HGB 7.3 (L) 08/08/2019    HGB 7.5 (L) 07/26/2019    HCT 24.2 (L) 08/09/2019    HCT 24.7 (L) 08/08/2019    HCT 24.5 (L) 07/26/2019    .8 (H) 08/09/2019     08/09/2019         Imaging:  Ct Abdomen Pelvis Wo Contrast Additional Contrast? None    Result Date: 8/8/2019  This examination and all examinations utilizing ionizing radiation at this facility are done so according to the ALARA (as low as reasonably achievable) principal for radiation dose reduction. Clinical indications: Abdominal pain. COMPARISON: CT Abdomen and Pelvis July 21, 2019. CT chest June 19, 2019. Axial, sagittal, and coronal computed tomography of the abdomen and pelvis was performed without contrast. FINDINGS: Bilateral pleural effusions are increasing in volume with contiguous atelectasis. There are enlarged lymph nodes along the right lateral chest wall measuring up to 2 cm. These were present on the recent CT of the chest. There is expansion and lytic process within the right lateral fourth rib. There are prior median sternotomy wires. The heart is enlarged. Previously described lung nodules are obscured due to the increasing pleural effusions and atelectatic change. Within the abdomen, there is mild ascites most concentrated in the pelvis. The amount of ascitic fluid is diminished markedly compared to the prior CT of July 21, 2019.  Multiple space-occupying mass lesions in the right and left lobes of

## 2019-08-09 NOTE — CONSULTS
past medical history of metastatic breast cancer to her liver and lungs and bone, CKD stage III with current KARISHMA, CAD status post CABG, heart failure with reduced ejection fraction EF 30 to 35%, insulin-dependent diabetes mellitus type 2, recurrent DVTs with current warfarin therapy. She was admitted to the hospital for acute altered mental status as well as upper Respiratory failure. Patient was placed on BiPAP in the emergency department and transferred to the ICU for further management and evaluation. During the course of her evaluation she had a CT scan which showed bilateral pleural effusions. Goals of care: live longer, improve or maintain function/quality of life, continue current management and to be determined  Advance Directives: DNR-CCA with NO Intubation  Surrogate:Spouse, Child and Extended Family:  Prognosis: unknown  Spiritual assessment: No spiritual distress identified   Bereavement and grief: to be determined. Past Medical History:   Diagnosis Date    Abscess of abdominal wall     Anemia     Iron deficiency and chronic disease.  Anesthesia     DIFFICULTY WAKING UP    Arthritis     Arthritis, hip     Breast cancer ([facility] [address] 2005    S/P Left Lumpectomy with Lymph Node Dissection, Chemo/Rad. Follows with Dr. Isabella Irizarry. No recurrence to date. Surgeon was Dr. Donita Squires.  CAD (coronary artery disease) 5/2008    s/p CABG. Follows with [address] [place] [address].  CHF (congestive heart failure) (HCC)     Chronic venous insufficiency 11/7/2018    Class 2 severe obesity due to excess calories with serious comorbidity and body mass index (BMI) of 35.0 to 35.9 in adult [facility]) 8/1/2019    Decreased dorsalis pedis pulse 11/7/2018    Diabetic neuropathy (HCC)     Diabetic neuropathy (HCC)     DVT (deep venous thrombosis) (HCC)     Recurrent. On lifelong coumadin.     DVT of upper extremity (deep vein thrombosis) ([facility] [address] 4/18/2012    History of deep vein thrombosis 11/7/2018    Humerus fracture     Chronic, on

## 2019-08-09 NOTE — PROGRESS NOTES
250cc fluid nicom results                                   SVI            CI           HR          TFC           MAP           TPRI  Baseline               28             2.3          81             85             68              2127  Challenge             29             2.3          81            86.2           72              2720  Results               1. 5%         1.2%       1.0%         1.4%         5.9%          27.9%

## 2019-08-09 NOTE — PROGRESS NOTES
as from the colon. These are unchanged. Gallbladder is not visualized and is thought to be surgically absent. There is no pathologic dilation of the hepatobiliary tree. There is atrophy of the otherwise unremarkable pancreas. The spleen is negative for focal or diffuse lesion. Adrenal glands show no evidence of thickening or nodule. There are punctate calyceal calcifications within the kidneys. There is no evidence of obstructive uropathy. There is no definite solid or cystic renal mass. There is diffuse third spacing into the subcutaneous tissues. There is no evidence of free air or gastrointestinal obstruction. There is calcification of the anterior omentum compatible with omental caking from peritoneal spread of malignancy and carcinomatosis. There is no evidence of free air or gastrointestinal obstruction. Within the pelvis, a Maurer catheter has evacuated the urinary bladder. Minimal free fluid in the pelvis. Diverticulosis of sigmoid colon without evidence of diverticulitis. There is third spacing into the subcutaneous tissues of the pelvis. There is mild diffuse bone demineralization. Degenerative spondylosis, mild rotoscoliosis and facet arthropathy are identified within the spine. There is no other evidence for lytic or blastic metastatic disease to the skeletal structures. There is calcification within the abdominal aorta and its branches which are otherwise unremarkable. IMPRESSIONS: Bilateral pleural effusions are increasing in volume with contiguous atelectasis. Enlarged lymph nodes along the right lateral chest wall measuring up to 2 cm. These were present on the recent CT of the chest. Expansive and lytic process within the right lateral fourth rib. Cardiomegaly. Previously described lung nodules are obscured due to the increasing pleural effusions and atelectatic change. Within the abdomen, there is mild ascites most concentrated in the pelvis.  The amount of ascitic fluid is diminished markedly effusions. Differential includes atelectasis and infection. Ct Head Wo Contrast    Result Date: 2019  Patient MRN: [id number] : 1945 Age:  68 years Gender: Female Order Date: 2019 1:45 PM Exam: CT HEAD WO CONTRAST Number of Images: 788 views Indication:   ams  Comparison: Prior study from 07/15/2018 is available. Technique: Sequential axial CT of the head was obtained from the base of the skull to the vertex without IV contrast.  Radiation Output: CTDIvol 54.53 (mGy); DLP 1103.66 (mGy-cm) Findings: The study demonstrates the fourth ventricle to be midline. The posterior fossa appears to be normal. There is no mass, mass effect or midline shift. The ventricles, sulci and cisterns are normal in appearance for patient's age. The gray-white matter differentiation is preserved throughout. There is no acute intracranial hemorrhage. No extra-axial fluid collection is identified. There is atherosclerotic change of vertebral basilar and cavernous carotid artery with calcified plaque. The bony calvarium is intact. The visualized portion of the paranasal sinuses and the mastoid air cells are clear. There is no focal extracranial soft tissue swelling. The study is unchanged from prior CT. NO ACUTE INTRACRANIAL PROCESS     Xr Chest Portable    Result Date: 2019  Patient MRN: [id number] : 1945 Age:  68 years Gender: Female Order Date: 2019 9:15 AM Exam: XR CHEST PORTABLE Number of Images: 1 view Indication:   SOB SOB Comparison: Prior chest radiograph from 2019 is available Findings: Study demonstrate cardiomegaly with median sternotomy. There is moderate to large right-sided pleural effusion which is unchanged from prior study. There is mild pulmonary venous congestion. There is a right internal jugular catheter with tip in superior vena cava. The bony thorax demonstrate osteopenia. There is osteoarthritic changes of the thoracic spine.  Centimeters There are multiple surgical clips in recurrent DVTs, is therapeutic at this time, daily INR  -Warfarin held for Pleurx catheter placement    Anasarca       Pulmonary  Acute hypercapnic respiratory failure  -Possibly caused by a recurrent right-sided pleural effusion plus/minus recurrent ascites and metastatic cancer  -Continue AVAPs at this time  -Film array, sputum culture  -Will need thoracentesis  -MICU consulted  -Pleurx catheter to be placed when INR decreases so continue to hold warfarin     Gastrointestinal  NPO due to mental status   Ascites/Anasarca  -stable CT abdomen at this time, no worsening edema on abdomen exam  -monitor abdomen exam   -Ammonia level, possible hepatic encephalopathy but given her normal LFTs and normal bilirubin less likely     Hematology/Oncology  Metastatic breast cancer  -Hem-onc consulted  -Patient on Herceptin, will hold off for now due to n.p.o. status due to mentation  -Given her presentation and extensive metastasis prognosis is poor  -palliative care consult placed    Pancytopenia  -2/2 to breast Ca therapy     Infectious Disease  Blood culture sent, film array, sputum culture, UA, urine culture, procalcitonin  -At this time clinically not appearing septic, will panculture as above and continue to monitor vitals but presentation does not seem to be infectious in etiology      Endocrine  DM II  -on no meds currently due to renal dysfunction, will place on a low-dose SSRI, hypoglycemia protocol     Genitourinary/Renal  KARISHMA on CKD  Hyperkalemia   -We will continue maintenance IVF at this time, will do NICOM  -Nephrology consult   -urine studies, but likely due to being in hypovolemic and intravascularly depleted at this time  -Normal bicarb, lactic acid was normal      Rae St M.D., PGY-3  Family Medicine       Attending physician: Dr. Sixto Banks

## 2019-08-09 NOTE — CONSULTS
[facility]  Internal Medicine Residency Program  History and Physical    Patient:  Army Mckinney 68 y.o. female MRN: [id number]     Date of Service: 8/8/2019    Hospital Day: 1      Chief complaint: had concerns including Altered Mental Status (unknown last known well, the patient, 80 percent on 3 liters). History of Present Illness   The patient is a 68 y.o. 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 As per family, she was in the subacute rehab facility after being discharged from the hospital on 7/2019. During last hospital admission, she had paracentesis and thoracentesis done and fluid analysis was negative for malignant cells. As per family, no reports of any recent cough, abdominal pain, dysuria, chest pain, diarrhea. In the ED, she was hypoxic, saturating 80%, ABG showed hypercapnia. She was initially on NRB but was transitioned to bipap. She was disoriented and somnolent as well. Noted to have hyponatremia, hyperkalemia, KARISHMA on CKD. CT head unremarkable. CT abdomen showed bilateral pleural effusions, multi space occupying lesions in the left lobes of the of the liver which are chronic and unchanged, minimal ascites diffuse third spacing into subcutaneous tissues noted as well. Past Medical History:      Diagnosis Date    Abscess of abdominal wall     Anemia     Iron deficiency and chronic disease.  Anesthesia     DIFFICULTY WAKING UP    Arthritis     Arthritis, hip     Breast cancer ([facility] [address] 2005    S/P Left Lumpectomy with Lymph Node Dissection, Chemo/Rad. Follows with Dr. Luci Westbrook. No recurrence to date. Surgeon was Dr. Malik Weiss.  CAD (coronary artery disease) 5/2008    s/p CABG. Follows with [address] [place] [address].     CHF (congestive heart failure) [facility])     Chronic venous insufficiency 11/7/2018    Class 2 severe obesity due to excess calories with serious comorbidity and body mass index (BMI) of 35.0 to 35.9 in adult [facility]) 8/1/2019    Decreased dorsalis pedis pulse 11/7/2018    Diabetic neuropathy (HCC)     Diabetic neuropathy (HCC)     DVT (deep venous thrombosis) (HCC)     Recurrent. On lifelong coumadin.  DVT of upper extremity (deep vein thrombosis) ([address] 4/18/2012    History of deep vein thrombosis 11/7/2018    Humerus fracture     Chronic, on the Left.  Hyperlipidemia 8/29/2011    Hypertension     Leg swelling 11/7/2018    Lymph node enlargement     Lymphedema of both lower extremities 11/7/2018    MI (myocardial infarction) ([address]     Nephrolithiasis     Follows with Dr. Kaley Leyva.  Pericardial effusion     Pulmonary nodule     With mediastinal lymphadenopathy. Stable. Follows with Dr. Saratha Gottron.  Rib lesion 11/28/11    expansile lesion in one of the right ribs laterally    Sarcoidosis 07/26/11    per transbronchial needle aspiration    Subclavian vein occlusion, bilateral ([address] 4/18/2012    Type II or unspecified type diabetes mellitus without mention of complication, not stated as uncontrolled        Past Surgical History:        Procedure Laterality Date    APPENDECTOMY      ARTERIAL BYPASS SURGRY      BREAST LUMPECTOMY  9/27/2005    LEFT    CARDIAC SURGERY      CHOLECYSTECTOMY      COLONOSCOPY  12/2007    cecal arteriovenous malformation with hyperplastic polyps    COLONOSCOPY  [id number]    CORONARY ARTERY BYPASS GRAFT  05/2008    triple bypass    ECHO COMPL W DOP COLOR FLOW  10/30/2013         EYE SURGERY      clarissa cataracts    HYSTERECTOMY  1975, 1985    MAYNOR prolapse, benign conditions; BSO later for scar tissues, no CA.      OTHER SURGICAL HISTORY  11/18/11    port removal right chest wall    PARACENTESIS      TONSILLECTOMY      TOOTH EXTRACTION      Full Dental Extraction.     TUNNELED VENOUS PORT atelectasis. Enlarged lymph nodes along the right lateral chest wall measuring up to 2 cm. These were present on the recent CT of the chest. Expansive and lytic process within the right lateral fourth rib. Cardiomegaly. Previously described lung nodules are obscured due to the increasing pleural effusions and atelectatic change. Within the abdomen, there is mild ascites most concentrated in the pelvis. The amount of ascitic fluid is diminished markedly compared to the prior CT of July 21, 2019. Multiple space-occupying mass lesions in the right and left lobes of the liver contain central calcification and would be most suspicious for metastatic disease from mucinous adenocarcinoma such as from the colon. These are unchanged. Punctate nonobstructing calyceal calcifications within the kidneys. Diffuse third spacing into the subcutaneous tissues. Calcification of the anterior omentum compatible with omental caking from peritoneal spread of malignancy and carcinomatosis. Ct Abdomen Pelvis Wo Contrast Additional Contrast? None    Result Date: 7/21/2019  EXAM:  CT Abdomen and Pelvis Without Contrast EXAM DATE/TIME:  7/20/2019 11:15 PM CLINICAL HISTORY:  68years old, female; Other: Distension; Additional info: Abdominal distention, ascites TECHNIQUE:  Imaging protocol: Axial computed tomography images of the abdomen and pelvis without contrast. Coronal and sagittal reformatted images were created and reviewed. Radiation optimization: All CT scans at this facility use at least one of these dose optimization techniques: automated exposure control; mA and/or kV adjustment per patient size (includes targeted exams where dose is matched to clinical indication); or iterative reconstruction. COMPARISON:  CT ABDOMEN PELVIS WO CONTRAST 6/11/2019 6:23 PM FINDINGS:  Lungs: There are at least 2 right lower lobe nodule the largest measures 6.5 mm. There is 3 mm right middle lobe nodule. Pleural space:  There are pleural effusions. Heart: There is calcification of the mitral annulus. The heart is enlarged. Liver: There are multiple heterogeneous hypodense liver masses, consistent in appearance with metastatic disease. Gallbladder and bile ducts: The gallbladder appears to be absent. Pancreas: Normal. No ductal dilation. Spleen: Normal. No splenomegaly. Adrenals: Normal. No mass. Kidneys and ureters: There are small renal stones. Stomach and bowel: There is diverticulosis of the colon without evidence of diverticulitis. No dilation. There is no evidence of intestinal obstruction. Appendix: No evidence of appendicitis. Intraperitoneal space: There is fluid within the abdomen and pelvis. There is omental caking and peritoneal carcinomatosis noted anteriorly. Vasculature: There are coronary arterial calcifications. The vasculature demonstrates diffuse severe atherosclerotic calcification. Lymph nodes: Normal. No enlarged lymph nodes. Bladder: Unremarkable as visualized. Reproductive: Unremarkable as visualized. Bones/joints: There are sternal wires consistent with previous sternotomy incision. Partially imaged lytic lesion noted involving one of the right ribs. Soft tissues: There are postoperative changes with surgical clips and scarring noted involving left breast. There is left breast skin thickening. There is nodule or scar within the left breast, stable measuring about 2.2 cm. There is anasarca. Other findings: There are consolidations within the posterior lower lobes, larger on the right. 1. Left breast skin thickening and edema similar to prior study. Left breast mass or scar, stable. 2. Right lung nodules consistent with metastasis. Fleischner chest imaging society guidelines do not apply to this patient who has a known malignancy. 3. Hepatic metastasis. 4. Omental and peritoneal implants. 5. Large amount of fluid within the abdomen and pelvis, increased since prior study.  6. Body wall edema also probably replaced    Lactic acidosis  - BP 90s - 110s/40s - 50s, initially came in at 120/69  - Infection less likely at this time, LA may be from decreased oxygen delivery given worsening HF  - Trend LA    Hematology/Oncology  Metastatic breast cancer (bone, liver, lung)  - Diagnosed 15 years ago, S/p chemotherapy, radiotherapy, lumpectomy  - Oncology consulted by primary    Anemia of chronic disease, at baseline (7.3 - 7.5 in the last month)  - Rpt CBC    Endocrine  Diabetes Mellitus type 2  - Last HbA1C: 5.9%(7/2019)  - Monitor blood sugars, hypoglycemia protocol    Vascular  History of multiple DVT, on warfarin  - INR 2.5  - Will hold warfarin for now for possible thoracentesis  - Rpt INR eldon am     Dermatology  Stasis dermatitis  - Pitting edema and chronic skin changes noted  - Leg elevation    PT/OT evaluation: Not done  DVT prophylaxis/ GI prophylaxis: PCD  Disposition: ICU    Spoke with [facility], did not have much information from the night attendant. [facility]: [phone]    Mert Linares MD, PGY-1       Senior Resident Statement  I have seen and examined the patient with the intern. I have discussed the case, including pertinent history and exam findings with the intern. I agree with the assessment, plan and orders as documented by the intern. I have also discussed the plan with the attending on call, Dr. Marisa Caballero made as necessary. Tadeo Stiles MD PGY 3    Attending physician: Dr. Scott Gan ICU Attending Statement     Kae Son was seen, examined and discussed with the multi-disciplinary ICU team during rounds. A addendum note may be separate to the residents note. If not then, I have personally seen and examined the patient and the key elements of the encounter were performed by me (> 85 % time). The medications & laboratory data was discussed and adjusted where necessary.  The radiographic images were reviewed either as a group or with radiologist.  Any changes are document or changed if felt dis-concordant with the exam or history. The above findings were corroborated, plans confirmed and changes made if needed. Family was updated at the bedside as available. Key issues of the case were discussed among consultants. Critical Care time is documented if appropriate.       Jessie Palma DO, MPH  Professor of Medicine

## 2019-08-10 ENCOUNTER — APPOINTMENT (OUTPATIENT)
Dept: GENERAL RADIOLOGY | Age: 74
DRG: 189 | End: 2019-08-10
Payer: COMMERCIAL

## 2019-08-10 LAB
AADO2: 158.4 MMHG
ALBUMIN SERPL-MCNC: 2.7 G/DL (ref 3.5–5.2)
ANION GAP SERPL CALCULATED.3IONS-SCNC: 11 MMOL/L (ref 7–16)
ANION GAP SERPL CALCULATED.3IONS-SCNC: 11 MMOL/L (ref 7–16)
ANION GAP SERPL CALCULATED.3IONS-SCNC: 13 MMOL/L (ref 7–16)
ANION GAP SERPL CALCULATED.3IONS-SCNC: 14 MMOL/L (ref 7–16)
ANISOCYTOSIS: ABNORMAL
B.E.: 1.4 MMOL/L (ref -3–3)
BASOPHILS ABSOLUTE: 0 E9/L (ref 0–0.2)
BASOPHILS RELATIVE PERCENT: 0.3 % (ref 0–2)
BUN BLDV-MCNC: 57 MG/DL (ref 8–23)
BUN BLDV-MCNC: 58 MG/DL (ref 8–23)
BUN BLDV-MCNC: 58 MG/DL (ref 8–23)
BUN BLDV-MCNC: 59 MG/DL (ref 8–23)
CALCIUM IONIZED: 0.98 MMOL/L (ref 1.15–1.33)
CALCIUM SERPL-MCNC: 7.1 MG/DL (ref 8.6–10.2)
CALCIUM SERPL-MCNC: 7.4 MG/DL (ref 8.6–10.2)
CALCIUM SERPL-MCNC: 7.4 MG/DL (ref 8.6–10.2)
CALCIUM SERPL-MCNC: 7.5 MG/DL (ref 8.6–10.2)
CHLORIDE BLD-SCNC: 90 MMOL/L (ref 98–107)
CO2: 29 MMOL/L (ref 22–29)
CO2: 30 MMOL/L (ref 22–29)
CO2: 31 MMOL/L (ref 22–29)
CO2: 32 MMOL/L (ref 22–29)
COHB: 1 % (ref 0–1.5)
CREAT SERPL-MCNC: 3.4 MG/DL (ref 0.5–1)
CREAT SERPL-MCNC: 3.5 MG/DL (ref 0.5–1)
CRITICAL: ABNORMAL
DATE ANALYZED: ABNORMAL
DATE OF COLLECTION: ABNORMAL
EOSINOPHILS ABSOLUTE: 0.03 E9/L (ref 0.05–0.5)
EOSINOPHILS RELATIVE PERCENT: 0.9 % (ref 0–6)
FIO2: 40 %
GFR AFRICAN AMERICAN: 15
GFR AFRICAN AMERICAN: 16
GFR NON-AFRICAN AMERICAN: 13 ML/MIN/1.73
GLUCOSE BLD-MCNC: 123 MG/DL (ref 74–99)
GLUCOSE BLD-MCNC: 124 MG/DL (ref 74–99)
GLUCOSE BLD-MCNC: 154 MG/DL (ref 74–99)
GLUCOSE BLD-MCNC: 159 MG/DL (ref 74–99)
HCO3: 28 MMOL/L (ref 22–26)
HCT VFR BLD CALC: 22.4 % (ref 34–48)
HEMOGLOBIN: 6.8 G/DL (ref 11.5–15.5)
HHB: 17.8 % (ref 0–5)
HYPOCHROMIA: ABNORMAL
INR BLD: 3
LAB: ABNORMAL
LYMPHOCYTES ABSOLUTE: 0.11 E9/L (ref 1.5–4)
LYMPHOCYTES RELATIVE PERCENT: 2.6 % (ref 20–42)
Lab: ABNORMAL
MAGNESIUM: 1.9 MG/DL (ref 1.6–2.6)
MCH RBC QN AUTO: 30.4 PG (ref 26–35)
MCHC RBC AUTO-ENTMCNC: 30.4 % (ref 32–34.5)
MCV RBC AUTO: 100 FL (ref 80–99.9)
METER GLUCOSE: 128 MG/DL (ref 74–99)
METER GLUCOSE: 146 MG/DL (ref 74–99)
METER GLUCOSE: 148 MG/DL (ref 74–99)
METHB: 0.4 % (ref 0–1.5)
MODE: ABNORMAL
MONOCYTES ABSOLUTE: 0.15 E9/L (ref 0.1–0.95)
MONOCYTES RELATIVE PERCENT: 3.5 % (ref 2–12)
MRSA CULTURE ONLY: NORMAL
NEUTROPHILS ABSOLUTE: 3.53 E9/L (ref 1.8–7.3)
NEUTROPHILS RELATIVE PERCENT: 93 % (ref 43–80)
O2 SATURATION: 81.9 % (ref 92–98.5)
O2HB: 80.8 % (ref 94–97)
OPERATOR ID: 2962
OVALOCYTES: ABNORMAL
PATIENT TEMP: 37 C
PCO2: 56.4 MMHG (ref 35–45)
PDW BLD-RTO: 19.3 FL (ref 11.5–15)
PEEP/CPAP: 8 CMH2O
PFO2: 1.3 MMHG/%
PH BLOOD GAS: 7.31 (ref 7.35–7.45)
PHOSPHORUS: 6 MG/DL (ref 2.5–4.5)
PIP: 15 CMH2O
PLATELET # BLD: 291 E9/L (ref 130–450)
PMV BLD AUTO: 10.7 FL (ref 7–12)
PO2: 51.9 MMHG (ref 60–100)
POIKILOCYTES: ABNORMAL
POLYCHROMASIA: ABNORMAL
POTASSIUM SERPL-SCNC: 5.5 MMOL/L (ref 3.5–5)
POTASSIUM SERPL-SCNC: 5.5 MMOL/L (ref 3.5–5)
POTASSIUM SERPL-SCNC: 5.8 MMOL/L (ref 3.5–5)
POTASSIUM SERPL-SCNC: 5.8 MMOL/L (ref 3.5–5)
PROCALCITONIN: 0.74 NG/ML (ref 0–0.08)
PROTHROMBIN TIME: 33.9 SEC (ref 9.3–12.4)
RBC # BLD: 2.24 E12/L (ref 3.5–5.5)
RI(T): 3.05
SODIUM BLD-SCNC: 132 MMOL/L (ref 132–146)
SODIUM BLD-SCNC: 133 MMOL/L (ref 132–146)
SOURCE, BLOOD GAS: ABNORMAL
THB: 7.9 G/DL (ref 11.5–16.5)
TIME ANALYZED: 617
URIC ACID, SERUM: 13.9 MG/DL (ref 2.4–5.7)
URINE CULTURE, ROUTINE: NORMAL
WBC # BLD: 3.8 E9/L (ref 4.5–11.5)

## 2019-08-10 PROCEDURE — 6370000000 HC RX 637 (ALT 250 FOR IP): Performed by: INTERNAL MEDICINE

## 2019-08-10 PROCEDURE — 6370000000 HC RX 637 (ALT 250 FOR IP): Performed by: STUDENT IN AN ORGANIZED HEALTH CARE EDUCATION/TRAINING PROGRAM

## 2019-08-10 PROCEDURE — 83735 ASSAY OF MAGNESIUM: CPT

## 2019-08-10 PROCEDURE — 82805 BLOOD GASES W/O2 SATURATION: CPT

## 2019-08-10 PROCEDURE — 80048 BASIC METABOLIC PNL TOTAL CA: CPT

## 2019-08-10 PROCEDURE — 6360000002 HC RX W HCPCS: Performed by: INTERNAL MEDICINE

## 2019-08-10 PROCEDURE — 84145 PROCALCITONIN (PCT): CPT

## 2019-08-10 PROCEDURE — 2580000003 HC RX 258: Performed by: INTERNAL MEDICINE

## 2019-08-10 PROCEDURE — 2700000000 HC OXYGEN THERAPY PER DAY

## 2019-08-10 PROCEDURE — 85025 COMPLETE CBC W/AUTO DIFF WBC: CPT

## 2019-08-10 PROCEDURE — 84550 ASSAY OF BLOOD/URIC ACID: CPT

## 2019-08-10 PROCEDURE — 82330 ASSAY OF CALCIUM: CPT

## 2019-08-10 PROCEDURE — 99232 SBSQ HOSP IP/OBS MODERATE 35: CPT | Performed by: FAMILY MEDICINE

## 2019-08-10 PROCEDURE — 84100 ASSAY OF PHOSPHORUS: CPT

## 2019-08-10 PROCEDURE — 82962 GLUCOSE BLOOD TEST: CPT

## 2019-08-10 PROCEDURE — 2580000003 HC RX 258

## 2019-08-10 PROCEDURE — 99232 SBSQ HOSP IP/OBS MODERATE 35: CPT | Performed by: INTERNAL MEDICINE

## 2019-08-10 PROCEDURE — 6370000000 HC RX 637 (ALT 250 FOR IP)

## 2019-08-10 PROCEDURE — 85610 PROTHROMBIN TIME: CPT

## 2019-08-10 PROCEDURE — 94660 CPAP INITIATION&MGMT: CPT

## 2019-08-10 PROCEDURE — 36415 COLL VENOUS BLD VENIPUNCTURE: CPT

## 2019-08-10 PROCEDURE — 99232 SBSQ HOSP IP/OBS MODERATE 35: CPT | Performed by: CLINICAL NURSE SPECIALIST

## 2019-08-10 PROCEDURE — 2580000003 HC RX 258: Performed by: STUDENT IN AN ORGANIZED HEALTH CARE EDUCATION/TRAINING PROGRAM

## 2019-08-10 PROCEDURE — 82040 ASSAY OF SERUM ALBUMIN: CPT

## 2019-08-10 PROCEDURE — 71045 X-RAY EXAM CHEST 1 VIEW: CPT

## 2019-08-10 PROCEDURE — 2000000000 HC ICU R&B

## 2019-08-10 PROCEDURE — 2500000003 HC RX 250 WO HCPCS: Performed by: INTERNAL MEDICINE

## 2019-08-10 RX ORDER — ACETAMINOPHEN 500 MG
TABLET ORAL
Status: COMPLETED
Start: 2019-08-10 | End: 2019-08-10

## 2019-08-10 RX ORDER — ACETAMINOPHEN 500 MG
500 TABLET ORAL ONCE
Status: COMPLETED | OUTPATIENT
Start: 2019-08-10 | End: 2019-08-10

## 2019-08-10 RX ORDER — LIDOCAINE AND PRILOCAINE 25; 25 MG/G; MG/G
CREAM TOPICAL PRN
Status: DISCONTINUED | OUTPATIENT
Start: 2019-08-10 | End: 2019-08-19 | Stop reason: HOSPADM

## 2019-08-10 RX ORDER — DEXTROSE MONOHYDRATE 25 G/50ML
25 INJECTION, SOLUTION INTRAVENOUS ONCE
Status: COMPLETED | OUTPATIENT
Start: 2019-08-10 | End: 2019-08-10

## 2019-08-10 RX ADMIN — DEXTROSE MONOHYDRATE 25 G: 25 INJECTION, SOLUTION INTRAVENOUS at 02:04

## 2019-08-10 RX ADMIN — Medication 10 ML: at 21:05

## 2019-08-10 RX ADMIN — BUMETANIDE 2 MG/HR: 0.25 INJECTION INTRAMUSCULAR; INTRAVENOUS at 11:50

## 2019-08-10 RX ADMIN — Medication 10 ML: at 02:04

## 2019-08-10 RX ADMIN — INSULIN HUMAN 10 UNITS: 100 INJECTION, SOLUTION PARENTERAL at 02:03

## 2019-08-10 RX ADMIN — CHLOROTHIAZIDE SODIUM 125 MG: 500 INJECTION, POWDER, LYOPHILIZED, FOR SOLUTION INTRAVENOUS at 15:47

## 2019-08-10 RX ADMIN — Medication 500 MG: at 11:39

## 2019-08-10 RX ADMIN — BUMETANIDE 2 MG/HR: 0.25 INJECTION INTRAMUSCULAR; INTRAVENOUS at 17:54

## 2019-08-10 RX ADMIN — BUMETANIDE 2 MG/HR: 0.25 INJECTION INTRAMUSCULAR; INTRAVENOUS at 05:03

## 2019-08-10 RX ADMIN — CHLOROTHIAZIDE SODIUM 125 MG: 500 INJECTION, POWDER, LYOPHILIZED, FOR SOLUTION INTRAVENOUS at 09:03

## 2019-08-10 RX ADMIN — CALCIUM GLUCONATE 1 G: 98 INJECTION, SOLUTION INTRAVENOUS at 02:03

## 2019-08-10 RX ADMIN — WATER: 1 INJECTION INTRAMUSCULAR; INTRAVENOUS; SUBCUTANEOUS at 21:12

## 2019-08-10 RX ADMIN — LIDOCAINE AND PRILOCAINE: 25; 25 CREAM TOPICAL at 17:01

## 2019-08-10 RX ADMIN — Medication 10 ML: at 09:14

## 2019-08-10 RX ADMIN — PATIROMER 8.4 G: 8.4 POWDER, FOR SUSPENSION ORAL at 09:09

## 2019-08-10 RX ADMIN — CHLOROTHIAZIDE SODIUM 125 MG: 500 INJECTION, POWDER, LYOPHILIZED, FOR SOLUTION INTRAVENOUS at 21:02

## 2019-08-10 RX ADMIN — ACETAMINOPHEN 500 MG: 500 TABLET ORAL at 11:39

## 2019-08-10 ASSESSMENT — PAIN DESCRIPTION - FREQUENCY: FREQUENCY: CONTINUOUS

## 2019-08-10 ASSESSMENT — PAIN SCALES - GENERAL
PAINLEVEL_OUTOF10: 0
PAINLEVEL_OUTOF10: 7
PAINLEVEL_OUTOF10: 0
PAINLEVEL_OUTOF10: 0

## 2019-08-10 ASSESSMENT — PAIN DESCRIPTION - ONSET: ONSET: ON-GOING

## 2019-08-10 ASSESSMENT — PAIN DESCRIPTION - LOCATION: LOCATION: HEAD

## 2019-08-10 ASSESSMENT — PAIN DESCRIPTION - ORIENTATION: ORIENTATION: RIGHT

## 2019-08-10 ASSESSMENT — PAIN DESCRIPTION - PAIN TYPE: TYPE: ACUTE PAIN

## 2019-08-10 ASSESSMENT — PAIN DESCRIPTION - DESCRIPTORS: DESCRIPTORS: ACHING;CONSTANT;DISCOMFORT

## 2019-08-10 ASSESSMENT — PAIN DESCRIPTION - PROGRESSION: CLINICAL_PROGRESSION: GRADUALLY WORSENING

## 2019-08-10 NOTE — PROGRESS NOTES
Palliative Medicine   Progress Note  Clinical Nurse Specialist      8/10/19  Admit Date: 8/8/2019    Recommendations:  1. Psychosocial support of patient and family. Met with patient, PM  and daughter at bedside. 2. Assist with goals of care. Continue current level of care at this time; patient is open to further discussion about goals of care based on her progress. PM will continue to discuss goals of care with patient and family. 3. Symptom Management- per ICU team    4. From a Palliative Medicine standpoint, there is no need to delay discharge. CC: Respiratory failure     HPI :  This very pleasant 70-year-old  female with an extensive past medical history of metastatic breast cancer to liver, lungs, and bone, as well as stage III kidney injury that appears acute, coronary artery disease status post CABG, heart failure with reduced ejection fraction between 30 and 35%, insulin-dependent diabetes mellitus, recurrent DVTs and is currently anticoagulated on warfarin, presented to the emergency room with altered mental status and worsening respiratory distress. She was placed on BiPAP in the ED and then brought to the MICU for further evaluation. CT scan of the chest revealed what appears to be extremely large pleural effusions left side greater than right side with continued invasion of her lung cancer. She stated that she is currently on chemotherapy and would like to remain on it at this time. She is denying any significant pain or discomfort. Her only complaint at this time is that her \"arms and legs are filling with fluid \". She also stated that she is extremely tired and weak. She is not sure if she can continue chemotherapy, given her continued weakness.   The patient's 2 daughters are present in the room and discussions ensued regarding her current medical condition, and we did show them the patient's CT of her chest and abdomen, in order for them understand the patient's

## 2019-08-10 NOTE — CONSULTS
MD   vitamin B-12 (CYANOCOBALAMIN) 1000 MCG tablet Take 1 tablet by mouth daily 11/6/18  Yes Salud Patricia, DO   Calcium Citrate-Vitamin D (RA CALCIUM CIT-VIT D-3 PETITES) 200-250 MG-UNIT TABS take 1 tablet by mouth twice a day 3/6/18  Yes Salud Patricia DO   exemestane (AROMASIN) 25 MG chemo tablet Take 1 tablet by mouth every morning (before breakfast) 10/10/17  Yes Historical Provider, MD   Multiple Vitamins-Minerals (THERAPEUTIC MULTIVITAMIN-MINERALS) tablet Take 1 tablet by mouth daily 6/6/17  Yes Salud Gomez DO   trastuzumab (HERCEPTIN) 440 MG injection Infuse  intravenously once a week. On tuesdays   Yes Historical Provider, MD       Allergies:  Macrobid [nitrofurantoin monohydrate macrocrystals]    Social History:   reports that she has never smoked. She has never used smokeless tobacco. She reports that she does not drink alcohol or use drugs. Family History: family history is not on file. She was adopted. REVIEW OF SYSTEMS:    12 point ROS has been done and pertinent neg and positive has been included in HPI and rest are non contributory        PHYSICAL EXAM:    /63   Pulse 95   Temp 98.3 °F (36.8 °C) (Axillary)   Resp 27   Ht 5' 4\" (1.626 m)   Wt 210 lb 8.6 oz (95.5 kg)   SpO2 95%   BMI 36.14 kg/m²    VENT SETTINGS:   Vent Information  Skin Assessment: Clean, dry, & intact  FiO2 : 40 %  I Time/ I Time %: 1 s    General appearance: Alert, Awake, Oriented times 3, no distress  Skin: Warm and dry  Eyes: Pink palpebral conjunctivae. PERRL  Ears: External ears normal.  Nose/Sinuses: Nares normal. Septum midline. Mucosa normal. No sinus tenderness. Oropharynx: Oropharynx clear with no exudates seen  Neck: Neck supple. No jugular venous distension, lymphadenopathy or thyromegaly Trachea midline  Lungs: Lungs clear to auscultation bilaterally. No rhonchi, crackles or wheezes  Heart: S1 S2  Regular rate and rhythm. No rub, murmur or gallop  Abdomen: Abdomen soft, non-tender.  BS normal. No

## 2019-08-10 NOTE — PROGRESS NOTES
[address] [facility]   Department of Internal Medicine   Internal Medicine Residency  MICU Progress Note    Patient:  Yovany Cerda 68 y.o. female   MRN: [id number]       Date of Service: 8/10/2019    Allergy: Macrobid [nitrofurantoin monohydrate macrocrystals]    Subjective     The patient is a 68 y.o. 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      As per family, she was in the subacute rehab facility after being discharged from the hospital on 2019. During last hospital admission, she had paracentesis and thoracentesis done and fluid analysis was negative for malignant cells. As per family, no reports of any recent cough, abdominal pain, dysuria, chest pain, diarrhea.      In the ED, she was hypoxic, saturating 80%, ABG showed hypercapnia. She was initially on NRB but was transitioned to bipap. She was disoriented and somnolent as well. Noted to have hyponatremia, hyperkalemia, KARISHMA on CKD. CT head unremarkable. CT abdomen showed bilateral pleural effusions, multi space occupying lesions in the left lobes of the of the liver which are chronic and unchanged, minimal ascites diffuse third spacing into subcutaneous tissues noted as well. Overnight: K improved to 5.8, we will continue to monitor, no other events have been reported. I saw and examined the Patient in the AM today. Patient stated that she is feeling better today. Other ROS was negative. Objective     TEMPERATURE:  Current - Temp: 98.3 °F (36.8 °C);  Max - Temp  Av.4 °F (36.3 °C)  Min: 96.8 °F (36 °C)  Max: 98.3 °F (36.8 °C)    RESPIRATIONS RANGE: Resp  Av.3  Min: 14  Max: 29    PULSE RANGE: Pulse  Av.3  Min: 81  Max: 97    BLOOD PRESSURE RANGE:  Systolic (61IGD), FUJ:266 , Min:95 , Studies:    Ct Abdomen Pelvis Wo Contrast Additional Contrast? None    Result Date: 8/8/2019  This examination and all examinations utilizing ionizing radiation at this facility are done so according to the ALARA (as low as reasonably achievable) principal for radiation dose reduction. Clinical indications: Abdominal pain. COMPARISON: CT Abdomen and Pelvis July 21, 2019. CT chest June 19, 2019. Axial, sagittal, and coronal computed tomography of the abdomen and pelvis was performed without contrast. FINDINGS: Bilateral pleural effusions are increasing in volume with contiguous atelectasis. There are enlarged lymph nodes along the right lateral chest wall measuring up to 2 cm. These were present on the recent CT of the chest. There is expansion and lytic process within the right lateral fourth rib. There are prior median sternotomy wires. The heart is enlarged. Previously described lung nodules are obscured due to the increasing pleural effusions and atelectatic change. Within the abdomen, there is mild ascites most concentrated in the pelvis. The amount of ascitic fluid is diminished markedly compared to the prior CT of July 21, 2019. Multiple space-occupying mass lesions in the right and left lobes of the liver contain central calcification and would be most suspicious for metastatic disease from mucinous adenocarcinoma such as from the colon. These are unchanged. Gallbladder is not visualized and is thought to be surgically absent. There is no pathologic dilation of the hepatobiliary tree. There is atrophy of the otherwise unremarkable pancreas. The spleen is negative for focal or diffuse lesion. Adrenal glands show no evidence of thickening or nodule. There are punctate calyceal calcifications within the kidneys. There is no evidence of obstructive uropathy. There is no definite solid or cystic renal mass. There is diffuse third spacing into the subcutaneous tissues.  There is no evidence of free air or : 1945 Age:  68 years Gender: Female Order Date: 2019 1:45 PM Exam: CT HEAD WO CONTRAST Number of Images: 914 views Indication:   ams  Comparison: Prior study from 07/15/2018 is available. Technique: Sequential axial CT of the head was obtained from the base of the skull to the vertex without IV contrast.  Radiation Output: CTDIvol 54.53 (mGy); DLP 1103.66 (mGy-cm) Findings: The study demonstrates the fourth ventricle to be midline. The posterior fossa appears to be normal. There is no mass, mass effect or midline shift. The ventricles, sulci and cisterns are normal in appearance for patient's age. The gray-white matter differentiation is preserved throughout. There is no acute intracranial hemorrhage. No extra-axial fluid collection is identified. There is atherosclerotic change of vertebral basilar and cavernous carotid artery with calcified plaque. The bony calvarium is intact. The visualized portion of the paranasal sinuses and the mastoid air cells are clear. There is no focal extracranial soft tissue swelling. The study is unchanged from prior CT. NO ACUTE INTRACRANIAL PROCESS     Xr Chest Portable    Result Date: 2019  Patient MRN: [id number] : 1945 Age:  68 years Gender: Female Order Date: 2019 1:45 PM Exam: XR CHEST PORTABLE Number of Images: 1 view Indication:  Weakness, acute neurodeficits Comparison: Anterior upright chest 2019 Findings: The patient is severely lordotic on this AP image, and is morbidly obese. This limits detail. There is a large and new right-sided pleural effusion, associated with atelectasis. Density in the left lower chest is more chronic, and could include pleural fluid or atelectasis. Cardiovascular shadows are unchanged in appearance, with limited visualization due to positioning. . There is no evidence of cardiac decompensation. Skeletal structures show no evidence of acute pathology.  Degenerative changes of the spine and right shoulder are mucinous adenocarcinoma such as from the colon. These are unchanged. Punctate nonobstructing calyceal calcifications within the kidneys. Diffuse third spacing into the subcutaneous tissues. Calcification of the anterior omentum compatible with omental caking from peritoneal spread of malignancy and carcinomatosis. Ct Abdomen Pelvis Wo Contrast Additional Contrast? None    Result Date: 7/21/2019  EXAM:  CT Abdomen and Pelvis Without Contrast EXAM DATE/TIME:  7/20/2019 11:15 PM CLINICAL HISTORY:  68years old, female; Other: Distension; Additional info: Abdominal distention, ascites TECHNIQUE:  Imaging protocol: Axial computed tomography images of the abdomen and pelvis without contrast. Coronal and sagittal reformatted images were created and reviewed. Radiation optimization: All CT scans at this facility use at least one of these dose optimization techniques: automated exposure control; mA and/or kV adjustment per patient size (includes targeted exams where dose is matched to clinical indication); or iterative reconstruction. COMPARISON:  CT ABDOMEN PELVIS WO CONTRAST 6/11/2019 6:23 PM FINDINGS:  Lungs: There are at least 2 right lower lobe nodule the largest measures 6.5 mm. There is 3 mm right middle lobe nodule. Pleural space: There are pleural effusions. Heart: There is calcification of the mitral annulus. The heart is enlarged. Liver: There are multiple heterogeneous hypodense liver masses, consistent in appearance with metastatic disease. Gallbladder and bile ducts: The gallbladder appears to be absent. Pancreas: Normal. No ductal dilation. Spleen: Normal. No splenomegaly. Adrenals: Normal. No mass. Kidneys and ureters: There are small renal stones. Stomach and bowel: There is diverticulosis of the colon without evidence of diverticulitis. No dilation. There is no evidence of intestinal obstruction. Appendix: No evidence of appendicitis. Intraperitoneal space:  There is fluid within the abdomen and pelvis. There is omental caking and peritoneal carcinomatosis noted anteriorly. Vasculature: There are coronary arterial calcifications. The vasculature demonstrates diffuse severe atherosclerotic calcification. Lymph nodes: Normal. No enlarged lymph nodes. Bladder: Unremarkable as visualized. Reproductive: Unremarkable as visualized. Bones/joints: There are sternal wires consistent with previous sternotomy incision. Partially imaged lytic lesion noted involving one of the right ribs. Soft tissues: There are postoperative changes with surgical clips and scarring noted involving left breast. There is left breast skin thickening. There is nodule or scar within the left breast, stable measuring about 2.2 cm. There is anasarca. Other findings: There are consolidations within the posterior lower lobes, larger on the right. 1. Left breast skin thickening and edema similar to prior study. Left breast mass or scar, stable. 2. Right lung nodules consistent with metastasis. Fleischner chest imaging society guidelines do not apply to this patient who has a known malignancy. 3. Hepatic metastasis. 4. Omental and peritoneal implants. 5. Large amount of fluid within the abdomen and pelvis, increased since prior study. 6. Body wall edema also probably mildly increased. 7. Lytic metastasis to one of the right ribs, partially imaged. 8. A few diverticula of the colon. 9. Nonobstructing renal stones. COMMENT:  Consistent with the [employer] of Radiology's Incidental Findings Committee Report (J Am Greyson Radiol 2010): Unless the patient's specific circumstances suggest otherwise, any liver lesion 0.5 cm or less, any cystic kidney lesion less than 1.0 cm, and/or any adrenal lesion 1.0 cm or less not otherwise characterized in this report as possessing suspicious or indeterminate imaging features is/are highly likely to be benign and do not require follow-up imaging or biopsy.  This report has been electronically Exam: CT HEAD WO CONTRAST Number of Images: 114 views Indication:   ams  Comparison: Prior study from 07/15/2018 is available. Technique: Sequential axial CT of the head was obtained from the base of the skull to the vertex without IV contrast.  Radiation Output: CTDIvol 54.53 (mGy); DLP 1103.66 (mGy-cm) Findings: The study demonstrates the fourth ventricle to be midline. The posterior fossa appears to be normal. There is no mass, mass effect or midline shift. The ventricles, sulci and cisterns are normal in appearance for patient's age. The gray-white matter differentiation is preserved throughout. There is no acute intracranial hemorrhage. No extra-axial fluid collection is identified. There is atherosclerotic change of vertebral basilar and cavernous carotid artery with calcified plaque. The bony calvarium is intact. The visualized portion of the paranasal sinuses and the mastoid air cells are clear. There is no focal extracranial soft tissue swelling. The study is unchanged from prior CT. NO ACUTE INTRACRANIAL PROCESS     Xr Chest Portable    Result Date: 2019  Patient MRN: [id number] : 1945 Age:  68 years Gender: Female Order Date: 2019 1:45 PM Exam: XR CHEST PORTABLE Number of Images: 1 view Indication:  Weakness, acute neurodeficits Comparison: Anterior upright chest 2019 Findings: The patient is severely lordotic on this AP image, and is morbidly obese. This limits detail. There is a large and new right-sided pleural effusion, associated with atelectasis. Density in the left lower chest is more chronic, and could include pleural fluid or atelectasis. Cardiovascular shadows are unchanged in appearance, with limited visualization due to positioning. . There is no evidence of cardiac decompensation. Skeletal structures show no evidence of acute pathology.  Degenerative changes of the spine and right shoulder are noted, and there is a segmented appearance consistent with an ununited old fracture of the proximal left humerus. Overlying EKG leads are present, and the right jugular triple-lumen catheter extends to the upper right atrial level. Sternotomy wires are present. There is new large right-sided pleural effusion with some associated atelectasis. Other incidental findings listed above are unchanged. Xr Chest Portable    Result Date: 2019  Patient MRN: [id number] : 1945 Age:  68 years Gender: Female Order Date: 2019 5:15 PM Exam: XR CHEST PORTABLE Number of Images: 1 view Indication:   Post Thoracentesis assessment. Thoracentesis Comparison: 2019 FINDINGS: Stable central venous catheter on the right. Right pleural effusion appears slightly smaller. There is some persistent blunting of the left costophrenic angle compatible with residual pleural effusion and there is probable adjacent atelectasis as well which obscures the left hemidiaphragm. There is no visible pneumothorax. Heart is enlarged. Right pleural effusion appears slightly diminished. There does appear to be at least some residual pleural fluid on the left and there is increasing obscuration the left hemidiaphragm which may relate to increasing atelectasis. No visible pneumothorax. Xr Chest Portable    Result Date: 2019  Patient MRN:  [id number] : 1945 Age: 68 years Gender: Female Order Date:  2019 9:45 PM EXAM: XR CHEST PORTABLE NUMBER OF IMAGES:  One view, 1 image. INDICATION:  Dyspnea, BRCA on chemotherapy Dyspnea, BRCA on chemotherapy COMPARISON: Chest radiographs 2019, 6/10/2019. FINDINGS:  Lines, tubes, and devices: Stable appearance of the right internal jugular central venous catheter terminating within the right atrium. Lungs/pleura: Low lung volumes compared to prior examination.  Increased bibasilar opacities, possibly related to atelectasis given associated shallow inspiration though infection/inflammation or edema is not excluded, especially within the right

## 2019-08-10 NOTE — PROGRESS NOTES
Date: 8/10/2019    Time: 12:51 AM    Patient Placed On BIPAP/CPAP/ Non-Invasive Ventilation? Patient remains on bipap from previous shift    If no must comment. Facial area red/color change? No           If YES are Blister/Lesion present? No   If yes must notify nursing staff  BIPAP/CPAP skin barrier?   Yes    Skin barrier type:Liquicel       Comments:        Marilyn Merritt, RRT

## 2019-08-10 NOTE — PROGRESS NOTES
NODES: no lymph node enlargement appreciated  NEUROLOGIC: Alert & Oriented; Moves all 4 limbs; Sensation intact      Labs:  Na/K/Cl/CO2:  133, 132/5.5, 5.5/90, 90/32, 31 (08/10 0550)  BUN/Cr/glu/ALT/AST/amyl/lip:  58, 58/3.5, 3.5/--/--/--/--/-- (08/10 0550)  WBC/Hgb/Hct/Plts:  3.8/6.8/22.4/291 (08/10 0550)  estimated creatinine clearance is 16 mL/min (A) (based on SCr of 3.5 mg/dL (H)). Other pertinent labs as noted below    Radiology:  XR CHEST PORTABLE   Final Result   Cardiomegaly   Tortuous aorta    There are patchy infiltrates seen throughout both the lung fields. Pneumonia could give this appearance. Underlying edema could also have   this appearance. These findings are superimposed on bilateral pleural   effusions   The chest does not appear to be significantly changed in the interval                  XR CHEST PORTABLE   Final Result      Cardiomegaly      Moderate to large right-sided pleural effusion      Support lines appears to be in satisfactory position         CT Head WO Contrast   Final Result      NO ACUTE INTRACRANIAL PROCESS         CT ABDOMEN PELVIS WO CONTRAST Additional Contrast? None   Final Result      XR CHEST PORTABLE   Final Result   There is new large right-sided pleural effusion with some associated   atelectasis. Other incidental findings listed above are unchanged. XR CHEST PORTABLE    (Results Pending)       A/P:  Active Problems:    Acute hypercapnic respiratory failure (HCC)    Altered mental status    Goals of care, counseling/discussion    Palliative care by specialist  Resolved Problems:    * No resolved hospital problems.  *        Neurologic  Altered mental status   Metabolic encephalopathy   Obtunded secondary to hypercapnia  Back to baseline   -We will hold her psych meds at this time  -Monitor mentation     Cardiovascular  HFrEF NYHA III   CAD w/ CABG  Recurrent DVTs Hx   -Has had continued worsening of her heart failure, most recent echo showed an EF of 30 to 35% with severe global hypokinesis, but patient is not volume overloaded secondary to heart failure, likely etiology is continued use of chemotherapy Herceptin   -NICOM   -On Coumadin for recurrent DVTs, is therapeutic at this time, daily INR  -Warfarin held for Pleurx catheter placement          Pulmonary  Acute hypercapnic respiratory failure  -Possibly caused by a recurrent right-sided pleural effusion plus/minus recurrent ascites and metastatic cancer  -Continue AVAPs at this time  -Film array, sputum culture  -Will need thoracentesis  -MICU consulted  -Pleurx catheter to be placed when INR decreases so continue to hold warfarin     Gastrointestinal  NPO due to mental status   Ascites/Anasarca  -stable CT abdomen at this time, no worsening edema on abdomen exam  -monitor abdomen exam   -Ammonia level, WNL       Hematology/Oncology  Metastatic breast cancer  -Hem-onc consulted  -Patient on Herceptin, will hold off for now due to n.p.o. status due to mentation  -Given her presentation and extensive metastasis prognosis is poor  -palliative care consult placed     Pancytopenia  -2/2 to breast Ca therapy     Infectious Disease  Blood culture sent, film array, sputum culture, UA, urine culture, procalcitonin  -At this time clinically not appearing septic, will panculture as above and continue to monitor vitals but presentation does not seem to be infectious in etiology      Endocrine  DM II  -on no meds currently due to renal dysfunction, will place on a low-dose SSRI, hypoglycemia protocol     Genitourinary/Renal  KARISHMA on CKD  Hyperkalemia, improving    -We will continue maintenance IVF at this time, will do NICOM  -Nephrology following   -urine studies, but likely due to being in hypovolemic and intravascularly depleted at this time  -Normal bicarb, lactic acid was normal      Electronically signed by Roxann Pickering MD PGY-2 on 8/10/2019 at 3:41 PM  This case was discussed with attending physician: Dr. Thomas Navarro

## 2019-08-10 NOTE — PROGRESS NOTES
less likely infectious etiology at this time, but await results. Watch H&H and INR, now off coumadin. May use topical EMLA small amount once for relief of toe pain, follow. Less likely gout or arthritis pain at this time based on presentation, but will follow closely. Palliative following. Oncology input appreciated; plans to transfuse 1 u prbc today. Attending Physician Statement  I have reviewed the chart and seen the patient with the resident(s). I personally reviewed images, EKG's and similar tests, if present. I personally reviewed and performed key elements of the history and exam.  I have reviewed and confirmed student and/or resident history and exam with changes as indicated above. I agree with the assessment, plan and orders as documented by the resident. Please refer to the resident and/or student note for additional information.        Mention

## 2019-08-11 ENCOUNTER — APPOINTMENT (OUTPATIENT)
Dept: GENERAL RADIOLOGY | Age: 74
DRG: 189 | End: 2019-08-11
Payer: COMMERCIAL

## 2019-08-11 LAB
ABO/RH: NORMAL
ANION GAP SERPL CALCULATED.3IONS-SCNC: 10 MMOL/L (ref 7–16)
ANION GAP SERPL CALCULATED.3IONS-SCNC: 11 MMOL/L (ref 7–16)
ANION GAP SERPL CALCULATED.3IONS-SCNC: 12 MMOL/L (ref 7–16)
ANION GAP SERPL CALCULATED.3IONS-SCNC: 13 MMOL/L (ref 7–16)
ANION GAP SERPL CALCULATED.3IONS-SCNC: 13 MMOL/L (ref 7–16)
ANISOCYTOSIS: ABNORMAL
ANTIBODY SCREEN: NORMAL
BASOPHILS ABSOLUTE: 0 E9/L (ref 0–0.2)
BASOPHILS RELATIVE PERCENT: 0.5 % (ref 0–2)
BUN BLDV-MCNC: 56 MG/DL (ref 8–23)
BUN BLDV-MCNC: 56 MG/DL (ref 8–23)
BUN BLDV-MCNC: 57 MG/DL (ref 8–23)
BUN BLDV-MCNC: 57 MG/DL (ref 8–23)
BUN BLDV-MCNC: 58 MG/DL (ref 8–23)
CALCIUM IONIZED: 0.98 MMOL/L (ref 1.15–1.33)
CALCIUM SERPL-MCNC: 6.8 MG/DL (ref 8.6–10.2)
CALCIUM SERPL-MCNC: 7 MG/DL (ref 8.6–10.2)
CALCIUM SERPL-MCNC: 7.2 MG/DL (ref 8.6–10.2)
CALCIUM SERPL-MCNC: 7.2 MG/DL (ref 8.6–10.2)
CALCIUM SERPL-MCNC: 7.3 MG/DL (ref 8.6–10.2)
CHLORIDE BLD-SCNC: 91 MMOL/L (ref 98–107)
CHLORIDE BLD-SCNC: 91 MMOL/L (ref 98–107)
CHLORIDE BLD-SCNC: 92 MMOL/L (ref 98–107)
CK MB: 1.9 NG/ML (ref 0–4.3)
CO2: 33 MMOL/L (ref 22–29)
CO2: 34 MMOL/L (ref 22–29)
CO2: 35 MMOL/L (ref 22–29)
CREAT SERPL-MCNC: 2.8 MG/DL (ref 0.5–1)
CREAT SERPL-MCNC: 2.9 MG/DL (ref 0.5–1)
CREAT SERPL-MCNC: 3.1 MG/DL (ref 0.5–1)
CREAT SERPL-MCNC: 3.3 MG/DL (ref 0.5–1)
CREAT SERPL-MCNC: 3.3 MG/DL (ref 0.5–1)
EOSINOPHILS ABSOLUTE: 0 E9/L (ref 0.05–0.5)
EOSINOPHILS RELATIVE PERCENT: 0.5 % (ref 0–6)
GFR AFRICAN AMERICAN: 17
GFR AFRICAN AMERICAN: 17
GFR AFRICAN AMERICAN: 18
GFR AFRICAN AMERICAN: 19
GFR AFRICAN AMERICAN: 20
GFR NON-AFRICAN AMERICAN: 14 ML/MIN/1.73
GFR NON-AFRICAN AMERICAN: 14 ML/MIN/1.73
GFR NON-AFRICAN AMERICAN: 15 ML/MIN/1.73
GFR NON-AFRICAN AMERICAN: 16 ML/MIN/1.73
GFR NON-AFRICAN AMERICAN: 17 ML/MIN/1.73
GLUCOSE BLD-MCNC: 146 MG/DL (ref 74–99)
GLUCOSE BLD-MCNC: 147 MG/DL (ref 74–99)
GLUCOSE BLD-MCNC: 149 MG/DL (ref 74–99)
GLUCOSE BLD-MCNC: 153 MG/DL (ref 74–99)
GLUCOSE BLD-MCNC: 157 MG/DL (ref 74–99)
HCT VFR BLD CALC: 22.6 % (ref 34–48)
HCT VFR BLD CALC: 23.3 % (ref 34–48)
HCT VFR BLD CALC: 24.5 % (ref 34–48)
HEMOGLOBIN: 6.6 G/DL (ref 11.5–15.5)
HEMOGLOBIN: 6.9 G/DL (ref 11.5–15.5)
HEMOGLOBIN: 7.3 G/DL (ref 11.5–15.5)
HYPOCHROMIA: ABNORMAL
INR BLD: 3.5
LYMPHOCYTES ABSOLUTE: 0.33 E9/L (ref 1.5–4)
LYMPHOCYTES RELATIVE PERCENT: 8.7 % (ref 20–42)
MAGNESIUM: 1.7 MG/DL (ref 1.6–2.6)
MAGNESIUM: 1.7 MG/DL (ref 1.6–2.6)
MCH RBC QN AUTO: 29.7 PG (ref 26–35)
MCH RBC QN AUTO: 29.9 PG (ref 26–35)
MCHC RBC AUTO-ENTMCNC: 29.6 % (ref 32–34.5)
MCHC RBC AUTO-ENTMCNC: 29.8 % (ref 32–34.5)
MCV RBC AUTO: 100.9 FL (ref 80–99.9)
MCV RBC AUTO: 99.6 FL (ref 80–99.9)
METER GLUCOSE: 120 MG/DL (ref 74–99)
METER GLUCOSE: 141 MG/DL (ref 74–99)
METER GLUCOSE: 146 MG/DL (ref 74–99)
METER GLUCOSE: 148 MG/DL (ref 74–99)
METER GLUCOSE: 152 MG/DL (ref 74–99)
METER GLUCOSE: 170 MG/DL (ref 74–99)
MONOCYTES ABSOLUTE: 0.22 E9/L (ref 0.1–0.95)
MONOCYTES RELATIVE PERCENT: 6.1 % (ref 2–12)
NEUTROPHILS ABSOLUTE: 3.15 E9/L (ref 1.8–7.3)
NEUTROPHILS RELATIVE PERCENT: 85.2 % (ref 43–80)
OVALOCYTES: ABNORMAL
PDW BLD-RTO: 19.6 FL (ref 11.5–15)
PDW BLD-RTO: 19.8 FL (ref 11.5–15)
PHOSPHORUS: 5.5 MG/DL (ref 2.5–4.5)
PHOSPHORUS: 5.6 MG/DL (ref 2.5–4.5)
PLATELET # BLD: 296 E9/L (ref 130–450)
PLATELET # BLD: 298 E9/L (ref 130–450)
PMV BLD AUTO: 10.2 FL (ref 7–12)
PMV BLD AUTO: 10.9 FL (ref 7–12)
POIKILOCYTES: ABNORMAL
POLYCHROMASIA: ABNORMAL
POTASSIUM SERPL-SCNC: 4.4 MMOL/L (ref 3.5–5)
POTASSIUM SERPL-SCNC: 4.5 MMOL/L (ref 3.5–5)
POTASSIUM SERPL-SCNC: 4.5 MMOL/L (ref 3.5–5)
POTASSIUM SERPL-SCNC: 4.7 MMOL/L (ref 3.5–5)
POTASSIUM SERPL-SCNC: 5.3 MMOL/L (ref 3.5–5)
PROTHROMBIN TIME: 38.8 SEC (ref 9.3–12.4)
RBC # BLD: 2.31 E12/L (ref 3.5–5.5)
RBC # BLD: 2.46 E12/L (ref 3.5–5.5)
SODIUM BLD-SCNC: 136 MMOL/L (ref 132–146)
SODIUM BLD-SCNC: 136 MMOL/L (ref 132–146)
SODIUM BLD-SCNC: 138 MMOL/L (ref 132–146)
SODIUM BLD-SCNC: 138 MMOL/L (ref 132–146)
SODIUM BLD-SCNC: 139 MMOL/L (ref 132–146)
TARGET CELLS: ABNORMAL
TOTAL CK: 102 U/L (ref 20–180)
TROPONIN: 0.02 NG/ML (ref 0–0.03)
WBC # BLD: 3.3 E9/L (ref 4.5–11.5)
WBC # BLD: 3.7 E9/L (ref 4.5–11.5)

## 2019-08-11 PROCEDURE — 36592 COLLECT BLOOD FROM PICC: CPT

## 2019-08-11 PROCEDURE — 80048 BASIC METABOLIC PNL TOTAL CA: CPT

## 2019-08-11 PROCEDURE — 85014 HEMATOCRIT: CPT

## 2019-08-11 PROCEDURE — 82330 ASSAY OF CALCIUM: CPT

## 2019-08-11 PROCEDURE — 86901 BLOOD TYPING SEROLOGIC RH(D): CPT

## 2019-08-11 PROCEDURE — 84484 ASSAY OF TROPONIN QUANT: CPT

## 2019-08-11 PROCEDURE — 6370000000 HC RX 637 (ALT 250 FOR IP): Performed by: INTERNAL MEDICINE

## 2019-08-11 PROCEDURE — 82553 CREATINE MB FRACTION: CPT

## 2019-08-11 PROCEDURE — 85610 PROTHROMBIN TIME: CPT

## 2019-08-11 PROCEDURE — 36415 COLL VENOUS BLD VENIPUNCTURE: CPT

## 2019-08-11 PROCEDURE — 82550 ASSAY OF CK (CPK): CPT

## 2019-08-11 PROCEDURE — 2580000003 HC RX 258: Performed by: INTERNAL MEDICINE

## 2019-08-11 PROCEDURE — 99233 SBSQ HOSP IP/OBS HIGH 50: CPT | Performed by: INTERNAL MEDICINE

## 2019-08-11 PROCEDURE — 84100 ASSAY OF PHOSPHORUS: CPT

## 2019-08-11 PROCEDURE — 36591 DRAW BLOOD OFF VENOUS DEVICE: CPT

## 2019-08-11 PROCEDURE — 2500000003 HC RX 250 WO HCPCS

## 2019-08-11 PROCEDURE — 83735 ASSAY OF MAGNESIUM: CPT

## 2019-08-11 PROCEDURE — P9016 RBC LEUKOCYTES REDUCED: HCPCS

## 2019-08-11 PROCEDURE — 86900 BLOOD TYPING SEROLOGIC ABO: CPT

## 2019-08-11 PROCEDURE — 85027 COMPLETE CBC AUTOMATED: CPT

## 2019-08-11 PROCEDURE — 2580000003 HC RX 258

## 2019-08-11 PROCEDURE — 86850 RBC ANTIBODY SCREEN: CPT

## 2019-08-11 PROCEDURE — 2500000003 HC RX 250 WO HCPCS: Performed by: INTERNAL MEDICINE

## 2019-08-11 PROCEDURE — 6360000002 HC RX W HCPCS: Performed by: STUDENT IN AN ORGANIZED HEALTH CARE EDUCATION/TRAINING PROGRAM

## 2019-08-11 PROCEDURE — 2700000000 HC OXYGEN THERAPY PER DAY

## 2019-08-11 PROCEDURE — 86923 COMPATIBILITY TEST ELECTRIC: CPT

## 2019-08-11 PROCEDURE — 6360000002 HC RX W HCPCS: Performed by: INTERNAL MEDICINE

## 2019-08-11 PROCEDURE — 71045 X-RAY EXAM CHEST 1 VIEW: CPT

## 2019-08-11 PROCEDURE — 99232 SBSQ HOSP IP/OBS MODERATE 35: CPT | Performed by: FAMILY MEDICINE

## 2019-08-11 PROCEDURE — 2000000000 HC ICU R&B

## 2019-08-11 PROCEDURE — 85025 COMPLETE CBC W/AUTO DIFF WBC: CPT

## 2019-08-11 PROCEDURE — 82962 GLUCOSE BLOOD TEST: CPT

## 2019-08-11 PROCEDURE — 94660 CPAP INITIATION&MGMT: CPT

## 2019-08-11 PROCEDURE — 2580000003 HC RX 258: Performed by: STUDENT IN AN ORGANIZED HEALTH CARE EDUCATION/TRAINING PROGRAM

## 2019-08-11 PROCEDURE — 85018 HEMOGLOBIN: CPT

## 2019-08-11 RX ORDER — 0.9 % SODIUM CHLORIDE 0.9 %
250 INTRAVENOUS SOLUTION INTRAVENOUS ONCE
Status: COMPLETED | OUTPATIENT
Start: 2019-08-11 | End: 2019-08-12

## 2019-08-11 RX ORDER — METOPROLOL TARTRATE 5 MG/5ML
5 INJECTION INTRAVENOUS ONCE
Status: COMPLETED | OUTPATIENT
Start: 2019-08-11 | End: 2019-08-11

## 2019-08-11 RX ORDER — LORAZEPAM 0.5 MG/1
0.5 TABLET ORAL EVERY 6 HOURS PRN
Status: DISCONTINUED | OUTPATIENT
Start: 2019-08-11 | End: 2019-08-14

## 2019-08-11 RX ORDER — SODIUM POLYSTYRENE SULFONATE 15 G/60ML
15 SUSPENSION ORAL; RECTAL ONCE
Status: COMPLETED | OUTPATIENT
Start: 2019-08-11 | End: 2019-08-11

## 2019-08-11 RX ORDER — DEXTROSE MONOHYDRATE 25 G/50ML
25 INJECTION, SOLUTION INTRAVENOUS ONCE
Status: COMPLETED | OUTPATIENT
Start: 2019-08-11 | End: 2019-08-11

## 2019-08-11 RX ORDER — NITROGLYCERIN 0.4 MG/1
0.4 TABLET SUBLINGUAL EVERY 5 MIN PRN
Status: DISCONTINUED | OUTPATIENT
Start: 2019-08-11 | End: 2019-08-19 | Stop reason: HOSPADM

## 2019-08-11 RX ORDER — METOPROLOL TARTRATE 5 MG/5ML
INJECTION INTRAVENOUS
Status: COMPLETED
Start: 2019-08-11 | End: 2019-08-11

## 2019-08-11 RX ORDER — ACETAMINOPHEN 325 MG/1
650 TABLET ORAL ONCE
Status: COMPLETED | OUTPATIENT
Start: 2019-08-11 | End: 2019-08-11

## 2019-08-11 RX ORDER — MAGNESIUM SULFATE IN WATER 40 MG/ML
2 INJECTION, SOLUTION INTRAVENOUS ONCE
Status: COMPLETED | OUTPATIENT
Start: 2019-08-11 | End: 2019-08-11

## 2019-08-11 RX ADMIN — METOPROLOL TARTRATE 5 MG: 5 INJECTION INTRAVENOUS at 21:10

## 2019-08-11 RX ADMIN — Medication 10 ML: at 21:48

## 2019-08-11 RX ADMIN — SODIUM POLYSTYRENE SULFONATE 15 G: 15 SUSPENSION ORAL; RECTAL at 03:22

## 2019-08-11 RX ADMIN — PATIROMER 8.4 G: 8.4 POWDER, FOR SUSPENSION ORAL at 09:01

## 2019-08-11 RX ADMIN — BUMETANIDE 2 MG/HR: 0.25 INJECTION INTRAMUSCULAR; INTRAVENOUS at 07:56

## 2019-08-11 RX ADMIN — Medication 10 ML: at 09:01

## 2019-08-11 RX ADMIN — WATER 20 ML: 1 INJECTION INTRAMUSCULAR; INTRAVENOUS; SUBCUTANEOUS at 22:22

## 2019-08-11 RX ADMIN — LIDOCAINE AND PRILOCAINE: 25; 25 CREAM TOPICAL at 20:38

## 2019-08-11 RX ADMIN — Medication 10 ML: at 04:25

## 2019-08-11 RX ADMIN — LIDOCAINE AND PRILOCAINE: 25; 25 CREAM TOPICAL at 20:05

## 2019-08-11 RX ADMIN — INSULIN HUMAN 10 UNITS: 100 INJECTION, SOLUTION PARENTERAL at 03:30

## 2019-08-11 RX ADMIN — BUMETANIDE 2 MG/HR: 0.25 INJECTION INTRAMUSCULAR; INTRAVENOUS at 22:33

## 2019-08-11 RX ADMIN — DEXTROSE MONOHYDRATE 25 G: 25 INJECTION, SOLUTION INTRAVENOUS at 03:28

## 2019-08-11 RX ADMIN — CHLOROTHIAZIDE SODIUM 125 MG: 500 INJECTION, POWDER, LYOPHILIZED, FOR SOLUTION INTRAVENOUS at 09:01

## 2019-08-11 RX ADMIN — MAGNESIUM SULFATE 2 G: 2 INJECTION INTRAVENOUS at 12:58

## 2019-08-11 RX ADMIN — LIDOCAINE HYDROCHLORIDE: 20 SOLUTION ORAL; TOPICAL at 22:25

## 2019-08-11 RX ADMIN — CHLOROTHIAZIDE SODIUM 125 MG: 500 INJECTION, POWDER, LYOPHILIZED, FOR SOLUTION INTRAVENOUS at 22:22

## 2019-08-11 RX ADMIN — Medication 10 ML: at 21:20

## 2019-08-11 RX ADMIN — CHLOROTHIAZIDE SODIUM 125 MG: 500 INJECTION, POWDER, LYOPHILIZED, FOR SOLUTION INTRAVENOUS at 14:38

## 2019-08-11 RX ADMIN — BUMETANIDE 2 MG/HR: 0.25 INJECTION INTRAMUSCULAR; INTRAVENOUS at 00:22

## 2019-08-11 RX ADMIN — ASPIRIN 325 MG: 325 TABLET, COATED ORAL at 23:27

## 2019-08-11 RX ADMIN — ACETAMINOPHEN 650 MG: 325 TABLET ORAL at 00:10

## 2019-08-11 RX ADMIN — LORAZEPAM 0.5 MG: 0.5 TABLET ORAL at 09:31

## 2019-08-11 RX ADMIN — Medication 10 ML: at 00:27

## 2019-08-11 RX ADMIN — LORAZEPAM 0.5 MG: 0.5 TABLET ORAL at 22:35

## 2019-08-11 RX ADMIN — Medication 10 ML: at 04:24

## 2019-08-11 ASSESSMENT — PAIN DESCRIPTION - FREQUENCY: FREQUENCY: INTERMITTENT

## 2019-08-11 ASSESSMENT — PAIN SCALES - GENERAL
PAINLEVEL_OUTOF10: 4
PAINLEVEL_OUTOF10: 0
PAINLEVEL_OUTOF10: 0
PAINLEVEL_OUTOF10: 8
PAINLEVEL_OUTOF10: 0
PAINLEVEL_OUTOF10: 0
PAINLEVEL_OUTOF10: 4
PAINLEVEL_OUTOF10: 0

## 2019-08-11 ASSESSMENT — PAIN DESCRIPTION - LOCATION
LOCATION: HEAD
LOCATION: TOE (COMMENT WHICH ONE)

## 2019-08-11 ASSESSMENT — PAIN DESCRIPTION - ORIENTATION: ORIENTATION: RIGHT

## 2019-08-11 ASSESSMENT — PAIN DESCRIPTION - PAIN TYPE
TYPE: ACUTE PAIN
TYPE: CHRONIC PAIN

## 2019-08-11 ASSESSMENT — PAIN DESCRIPTION - DESCRIPTORS: DESCRIPTORS: ACHING

## 2019-08-11 NOTE — PROGRESS NOTES
severe obesity due to excess calories with serious comorbidity and body mass index (BMI) of 35.0 to 35.9 in adult [facility])    Type 2 diabetes mellitus with hyperglycemia (HCC)    Acute hypercapnic respiratory failure (HCC)    Altered mental status    Goals of care, counseling/discussion    Palliative care by specialist       Subjective:      8/10:No acute issues overnight; currently on BIPAP  : Anxious, very little rest overnight        Vitals:  VITALS:  BP (!) 136/58   Pulse 92   Temp 97.8 °F (36.6 °C) (Temporal)   Resp 21   Ht 5' 4\" (1.626 m)   Wt 205 lb 7.5 oz (93.2 kg)   SpO2 96%   BMI 35.27 kg/m²   24HR INTAKE/OUTPUT:      Intake/Output Summary (Last 24 hours) at 2019 1014  Last data filed at 2019 0600  Gross per 24 hour   Intake 526 ml   Output 3320 ml   Net -2794 ml     CURRENT PULSE OXIMETRY:  SpO2: 96 %  24HR PULSE OXIMETRY RANGE:  SpO2  Av.4 %  Min: 93 %  Max: 99 %        I/O:      I/O last 3 completed shifts: In: 526 [P.O.:50; I.V.:476]  Out: 2607 [Urine:3615]  No intake/output data recorded.     Medications:    IV:   bumetanide 0.1 mg/mL infusion 2 mg/hr (19 075)    dextrose        patiromer sorbitex calcium  8.4 g Oral Daily    chlorothiazide (DIURIL) IVPB  125 mg Intravenous TID    sodium chloride flush  10 mL Intravenous 2 times per day    insulin lispro  0-6 Units Subcutaneous TID         Current Meds:  Current Facility-Administered Medications   Medication Dose Route Frequency Provider Last Rate Last Dose    LORazepam (ATIVAN) tablet 0.5 mg  0.5 mg Oral Q6H PRN Israel Martinez MD   0.5 mg at 19 0931    lidocaine-prilocaine (EMLA) cream   Topical PRN Prieto uJles MD        bumetanide (BUMEX) 12.5 mg in sodium chloride 0.9 % 125 mL infusion  2 mg/hr Intravenous Continuous Jorge Macario MD 20 mL/hr at 19 0756 2 mg/hr at 19 0756    patiromer sorbitex calcium (VELTASSA) packet 8.4 g  8.4 g Oral Daily Jorge Macario MD   8.4 g at 5. 5*  5.5* 5.8* 4.7   CL 90*  90* 90* 91*   CO2 32*  31* 30* 34*   BUN 58*  58* 57* 58*   CREATININE 3.5*  3.5* 3.4* 3.3*   GLUCOSE 124*  123* 159* 157*       Hepatic:   Recent Labs     08/08/19  1415   AST 24   ALT 27   BILITOT 0.5   ALKPHOS 112*      Troponin:   Recent Labs     08/08/19  1415   TROPONINI 0.02      BNP: No results for input(s): BNP in the last 72 hours. Lipids: No results for input(s): CHOL, HDL in the last 72 hours. Invalid input(s): LDLCALCU   ABGs: No results found for: PHART, PO2ART, UUD4QKW   INR:   Recent Labs     08/09/19  0430 08/10/19  0550 08/11/19  0600   INR 2.6 3.0 3.5         Assessment/Plans      1. Acute kidney injury stage I hemodynamically mediated due to decreased effective renal perfusion in the setting of moderate LV dysfunction. Intermittent hypotension contributory   -continue bumex drip   -No plans for dialysis  2. Hyperkalemia to to the combination of acute kidney injury, acidosis; I suspect a component of dietary source   -Now resolved; contnue to monitor; decrease frequency of labs  3. Mixed respiratory acidosis and metabolic alkalosis; on presentation; on BIPAP mask   -intermittent BIPAP mask  4. Metastatic breast CA; refractory to chemotx    5. Acute hypoxic hypercarbic respiratory failure, continue NIV   -  6. Anemia due to malignancy and chronic kidney disease  7. Volume overload in the setting of moderate LV dysfunction. LV dysfunction adverse effect from chemotherapy   -diuresis, will continue bumex and diuril  8. Altered mental status on initial presentation due to hypercarbia. ; now back to baseline  9. Hyperphosphatemia, suspect secondary HPT due to renal disease   -check intact PTH   -start phosphate binders    Spoke with daughter, provided update    D/W Resident    Brigette Fam MD                   Care reviewed with the patient's family as available.       Rosibel Romo MD

## 2019-08-11 NOTE — PROGRESS NOTES
[facility]   Department of Internal Medicine   Internal Medicine Residency  MICU Progress Note    Patient:  Sally Lee 68 y.o. female   MRN: [id number]       Date of Service: 2019    Allergy: Macrobid [nitrofurantoin monohydrate macrocrystals]    Subjective     The patient is a 68 y.o. 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      As per family, she was in the subacute rehab facility after being discharged from the hospital on 2019. During last hospital admission, she had paracentesis and thoracentesis done and fluid analysis was negative for malignant cells. As per family, no reports of any recent cough, abdominal pain, dysuria, chest pain, diarrhea.      In the ED, she was hypoxic, saturating 80%, ABG showed hypercapnia. She was initially on NRB but was transitioned to bipap. She was disoriented and somnolent as well. Noted to have hyponatremia, hyperkalemia, KARISHMA on CKD. CT head unremarkable. CT abdomen showed bilateral pleural effusions, multi space occupying lesions in the left lobes of the of the liver which are chronic and unchanged, minimal ascites diffuse third spacing into subcutaneous tissues noted as well. Overnight: Per notes the patient was anxious and her daughter for emotional     I saw and examined the Patient in the AM today. The patient is agitated today and stated that she did not get enough sleep at night. Other ROS was negative. Objective     TEMPERATURE:  Current - Temp: 97.8 °F (36.6 °C);  Max - Temp  Av.7 °F (36.5 °C)  Min: 97.5 °F (36.4 °C)  Max: 98 °F (36.7 °C)    RESPIRATIONS RANGE: Resp  Av.9  Min: 11  Max: 35    PULSE RANGE: Pulse  Av.2  Min: 83  Max: 102    BLOOD PRESSURE RANGE:  Systolic (99SAO), DMY:559 ALKPHOS 112*  --   --   --   --   --    AST 24  --   --   --   --   --    ALT 27  --   --   --   --   --     < > = values in this interval not displayed. LIVER PROFILE:   Recent Labs     08/08/19  1415   AST 24   ALT 27   BILITOT 0.5   ALKPHOS 112*       PT/INR:   Recent Labs     08/09/19  0430 08/10/19  0550 08/11/19  0600   PROTIME 29.0* 33.9* 38.8*   INR 2.6 3.0 3.5       APTT:   Recent Labs     08/08/19  1415   APTT 34.8       Fasting Lipid Panel:    Lab Results   Component Value Date    CHOL 80 06/10/2019    TRIG 104 06/10/2019    HDL 31 06/10/2019       Cardiac Enzymes:    Lab Results   Component Value Date    CKTOTAL 286 (H) 06/11/2019    CKTOTAL 702 (H) 08/22/2014    CKTOTAL 290 (H) 06/02/2014    TROPONINI 0.02 08/08/2019    TROPONINI 0.07 (H) 07/20/2019    TROPONINI 0.50 (H) 06/11/2019       Notable Cultures:      Blood cultures   Blood Culture, Routine   Date Value Ref Range Status   08/09/2019 24 Hours- no growth  Preliminary     Respiratory cultures No results found for: RESPCULTURE   Gram Stain Result   Date Value Ref Range Status   07/25/2019 Refer to ordered Gram stain for results  Final     Urine   Urine Culture, Routine   Date Value Ref Range Status   08/08/2019 <10,000 CFU/mL  Gram negative rods    Final     Legionella No results found for: LABLEGI  C Diff PCR No results found for: CDIFPCR  Wound culture/abscess: No results for input(s): WNDABS in the last 72 hours. Tip culture:No results for input(s): CXCATHTIP in the last 72 hours.      Antibiotic  Days  Day started                                Oxygen:     Vent Information  Skin Assessment: Clean, dry, & intact  FiO2 : 40 %  I Time/ I Time %: 1 s    Additional Respiratory  Assessments  Pulse: 83  Resp: 11  SpO2: 100 %  Oral Care: Mouth swabbed     ABG     PH  7.31   PCO2  56.4   PO2  51.9   HCO3  28   Sat%     FIO2     DATES      Urethral Catheter-Output (mL): 225 mL    Imaging Studies:    Ct Abdomen Pelvis Wo Contrast Additional Contrast? None    Result Date: 8/8/2019  This examination and all examinations utilizing ionizing radiation at this facility are done so according to the ALARA (as low as reasonably achievable) principal for radiation dose reduction. Clinical indications: Abdominal pain. COMPARISON: CT Abdomen and Pelvis July 21, 2019. CT chest June 19, 2019. Axial, sagittal, and coronal computed tomography of the abdomen and pelvis was performed without contrast. FINDINGS: Bilateral pleural effusions are increasing in volume with contiguous atelectasis. There are enlarged lymph nodes along the right lateral chest wall measuring up to 2 cm. These were present on the recent CT of the chest. There is expansion and lytic process within the right lateral fourth rib. There are prior median sternotomy wires. The heart is enlarged. Previously described lung nodules are obscured due to the increasing pleural effusions and atelectatic change. Within the abdomen, there is mild ascites most concentrated in the pelvis. The amount of ascitic fluid is diminished markedly compared to the prior CT of July 21, 2019. Multiple space-occupying mass lesions in the right and left lobes of the liver contain central calcification and would be most suspicious for metastatic disease from mucinous adenocarcinoma such as from the colon. These are unchanged. Gallbladder is not visualized and is thought to be surgically absent. There is no pathologic dilation of the hepatobiliary tree. There is atrophy of the otherwise unremarkable pancreas. The spleen is negative for focal or diffuse lesion. Adrenal glands show no evidence of thickening or nodule. There are punctate calyceal calcifications within the kidneys. There is no evidence of obstructive uropathy. There is no definite solid or cystic renal mass. There is diffuse third spacing into the subcutaneous tissues. There is no evidence of free air or gastrointestinal obstruction.  There is calcification of the 2019 1:45 PM Exam: CT HEAD WO CONTRAST Number of Images: 114 views Indication:   ams  Comparison: Prior study from 07/15/2018 is available. Technique: Sequential axial CT of the head was obtained from the base of the skull to the vertex without IV contrast.  Radiation Output: CTDIvol 54.53 (mGy); DLP 1103.66 (mGy-cm) Findings: The study demonstrates the fourth ventricle to be midline. The posterior fossa appears to be normal. There is no mass, mass effect or midline shift. The ventricles, sulci and cisterns are normal in appearance for patient's age. The gray-white matter differentiation is preserved throughout. There is no acute intracranial hemorrhage. No extra-axial fluid collection is identified. There is atherosclerotic change of vertebral basilar and cavernous carotid artery with calcified plaque. The bony calvarium is intact. The visualized portion of the paranasal sinuses and the mastoid air cells are clear. There is no focal extracranial soft tissue swelling. The study is unchanged from prior CT. NO ACUTE INTRACRANIAL PROCESS     Xr Chest Portable    Result Date: 2019  Patient MRN: [id number] : 1945 Age:  68 years Gender: Female Order Date: 2019 1:45 PM Exam: XR CHEST PORTABLE Number of Images: 1 view Indication:  Weakness, acute neurodeficits Comparison: Anterior upright chest 2019 Findings: The patient is severely lordotic on this AP image, and is morbidly obese. This limits detail. There is a large and new right-sided pleural effusion, associated with atelectasis. Density in the left lower chest is more chronic, and could include pleural fluid or atelectasis. Cardiovascular shadows are unchanged in appearance, with limited visualization due to positioning. . There is no evidence of cardiac decompensation. Skeletal structures show no evidence of acute pathology.  Degenerative changes of the spine and right shoulder are noted, and there is a segmented appearance consistent with unchanged. Punctate nonobstructing calyceal calcifications within the kidneys. Diffuse third spacing into the subcutaneous tissues. Calcification of the anterior omentum compatible with omental caking from peritoneal spread of malignancy and carcinomatosis. Ct Abdomen Pelvis Wo Contrast Additional Contrast? None    Result Date: 7/21/2019  EXAM:  CT Abdomen and Pelvis Without Contrast EXAM DATE/TIME:  7/20/2019 11:15 PM CLINICAL HISTORY:  68years old, female; Other: Distension; Additional info: Abdominal distention, ascites TECHNIQUE:  Imaging protocol: Axial computed tomography images of the abdomen and pelvis without contrast. Coronal and sagittal reformatted images were created and reviewed. Radiation optimization: All CT scans at this facility use at least one of these dose optimization techniques: automated exposure control; mA and/or kV adjustment per patient size (includes targeted exams where dose is matched to clinical indication); or iterative reconstruction. COMPARISON:  CT ABDOMEN PELVIS WO CONTRAST 6/11/2019 6:23 PM FINDINGS:  Lungs: There are at least 2 right lower lobe nodule the largest measures 6.5 mm. There is 3 mm right middle lobe nodule. Pleural space: There are pleural effusions. Heart: There is calcification of the mitral annulus. The heart is enlarged. Liver: There are multiple heterogeneous hypodense liver masses, consistent in appearance with metastatic disease. Gallbladder and bile ducts: The gallbladder appears to be absent. Pancreas: Normal. No ductal dilation. Spleen: Normal. No splenomegaly. Adrenals: Normal. No mass. Kidneys and ureters: There are small renal stones. Stomach and bowel: There is diverticulosis of the colon without evidence of diverticulitis. No dilation. There is no evidence of intestinal obstruction. Appendix: No evidence of appendicitis. Intraperitoneal space: There is fluid within the abdomen and pelvis.  There is omental caking and peritoneal ordered.        Hematology/Oncology  Metastatic breast cancer (bone, liver, lung)  - Diagnosed 15 years ago, S/p chemotherapy, radiotherapy, lumpectomy  - Oncology consulted by primary     Anemia of chronic disease, at baseline (7.3 - 7.5 in the last month)  - Macrocytic MCV   - Rpt CBC with Diff q 24  - Hb 6.9 >> Transfuse, monitor.      Endocrine  Diabetes Mellitus type 2  - Last HbA1C: 5.9%(7/2019)  - Monitor blood sugars, hypoglycemia protocol   - blood glucose today is  148     Vascular  History of multiple DVT, on warfarin  - INR 2.6 (2.5 when presenting to the ICU)   - Will hold warfarin for now for possible thoracentesis ? ?        Dermatology  Stasis dermatitis  - Pitting edema and chronic skin changes noted  - Leg elevation         Core measures:  1. Code status: DNR CCA    2. Peptic ulcer prophylaxis: None     3. DVT Prophylaxis:  PCDS    4. Lines / tubes: implanted venous port (7/23)     5. Disposition: continue care     Wilver Acuña M.D. Internal Medicine Resident - PGY1    Attending Physician: Dr. Tara Bentley DO    Addendum ICU Attending Statement     Yang Brock was seen, examined and discussed with the multi-disciplinary ICU team during rounds. A addendum note may be separate to the residents note. If not then, I have personally seen and examined the patient and the key elements of the encounter were performed by me (> 85 % time). The medications & laboratory data was discussed and adjusted where necessary. The radiographic images were reviewed either as a group or with radiologist.  Any changes are document or changed if felt dis-concordant with the exam or history. The above findings were corroborated, plans confirmed and changes made if needed. Family was updated at the bedside as available. Key issues of the case were discussed among consultants. Critical Care time is documented if appropriate.       Ari Johnson DO, MPH  Professor of Medicine

## 2019-08-11 NOTE — PROGRESS NOTES
Sensation intact      Labs:  Na/K/Cl/CO2:  133/5.8/90/30 (08/10 1727)  BUN/Cr/glu/ALT/AST/amyl/lip:  57/3.4/--/--/--/--/-- (08/10 1727)  WBC/Hgb/Hct/Plts:  3.8/6.8/22.4/291 (08/10 0550)  estimated creatinine clearance is 16 mL/min (A) (based on SCr of 3.4 mg/dL (H)). Other pertinent labs as noted below    Radiology:  XR CHEST PORTABLE   Final Result   Cardiomegaly   Tortuous aorta    There are patchy infiltrates seen throughout both the lung fields. Pneumonia could give this appearance. Underlying edema could also have   this appearance, with bilateral pleural effusions   The chest does not appear to be significantly changed in the interval                  XR CHEST PORTABLE   Final Result   Cardiomegaly   Tortuous aorta    There are patchy infiltrates seen throughout both the lung fields. Pneumonia could give this appearance. Underlying edema could also have   this appearance. These findings are superimposed on bilateral pleural   effusions   The chest does not appear to be significantly changed in the interval                  XR CHEST PORTABLE   Final Result      Cardiomegaly      Moderate to large right-sided pleural effusion      Support lines appears to be in satisfactory position         CT Head WO Contrast   Final Result      NO ACUTE INTRACRANIAL PROCESS         CT ABDOMEN PELVIS WO CONTRAST Additional Contrast? None   Final Result      XR CHEST PORTABLE   Final Result   There is new large right-sided pleural effusion with some associated   atelectasis. Other incidental findings listed above are unchanged. XR CHEST PORTABLE    (Results Pending)       A/P:  Active Problems:    Acute hypercapnic respiratory failure (HCC)    Altered mental status    Goals of care, counseling/discussion    Palliative care by specialist  Resolved Problems:    * No resolved hospital problems.  *        Neurologic  Altered mental status   Metabolic encephalopathy   Obtunded secondary to hypercapnia  Back to baseline   -We

## 2019-08-11 NOTE — FLOWSHEET NOTE
Daughter, Liang Quinn, called per patient request to come in. Patient anxious \"what if I don't make it til morning? \"  Emotional support given.

## 2019-08-12 ENCOUNTER — APPOINTMENT (OUTPATIENT)
Dept: GENERAL RADIOLOGY | Age: 74
DRG: 189 | End: 2019-08-12
Payer: COMMERCIAL

## 2019-08-12 LAB
ANION GAP SERPL CALCULATED.3IONS-SCNC: 14 MMOL/L (ref 7–16)
ANISOCYTOSIS: ABNORMAL
B.E.: 9.1 MMOL/L (ref -3–3)
BASOPHILIC STIPPLING: ABNORMAL
BASOPHILS ABSOLUTE: 0.03 E9/L (ref 0–0.2)
BASOPHILS RELATIVE PERCENT: 0.9 % (ref 0–2)
BLOOD BANK DISPENSE STATUS: NORMAL
BLOOD BANK PRODUCT CODE: NORMAL
BPU ID: NORMAL
BUN BLDV-MCNC: 55 MG/DL (ref 8–23)
CALCIUM IONIZED: 0.97 MMOL/L (ref 1.15–1.33)
CALCIUM SERPL-MCNC: 7.1 MG/DL (ref 8.6–10.2)
CHLORIDE BLD-SCNC: 90 MMOL/L (ref 98–107)
CO2: 35 MMOL/L (ref 22–29)
COHB: 0.2 % (ref 0–1.5)
CREAT SERPL-MCNC: 2.7 MG/DL (ref 0.5–1)
CRITICAL: ABNORMAL
DATE ANALYZED: ABNORMAL
DATE OF COLLECTION: ABNORMAL
DESCRIPTION BLOOD BANK: NORMAL
EOSINOPHILS ABSOLUTE: 0.04 E9/L (ref 0.05–0.5)
EOSINOPHILS RELATIVE PERCENT: 1.2 % (ref 0–6)
GFR AFRICAN AMERICAN: 21
GFR NON-AFRICAN AMERICAN: 17 ML/MIN/1.73
GLUCOSE BLD-MCNC: 152 MG/DL (ref 74–99)
HCO3: 34.7 MMOL/L (ref 22–26)
HCT VFR BLD CALC: 27.3 % (ref 34–48)
HCT VFR BLD CALC: 27.4 % (ref 34–48)
HEMOGLOBIN: 8.3 G/DL (ref 11.5–15.5)
HEMOGLOBIN: 8.3 G/DL (ref 11.5–15.5)
HHB: 5.7 % (ref 0–5)
HYPOCHROMIA: ABNORMAL
IMMATURE GRANULOCYTES #: 0.03 E9/L
IMMATURE GRANULOCYTES %: 0.9 % (ref 0–5)
INR BLD: 3.5
LAB: ABNORMAL
LYMPHOCYTES ABSOLUTE: 0.29 E9/L (ref 1.5–4)
LYMPHOCYTES RELATIVE PERCENT: 8.6 % (ref 20–42)
Lab: ABNORMAL
MAGNESIUM: 1.7 MG/DL (ref 1.6–2.6)
MCH RBC QN AUTO: 29.6 PG (ref 26–35)
MCHC RBC AUTO-ENTMCNC: 30.4 % (ref 32–34.5)
MCV RBC AUTO: 97.5 FL (ref 80–99.9)
METER GLUCOSE: 133 MG/DL (ref 74–99)
METER GLUCOSE: 145 MG/DL (ref 74–99)
METER GLUCOSE: 150 MG/DL (ref 74–99)
METER GLUCOSE: 161 MG/DL (ref 74–99)
METHB: 0.5 % (ref 0–1.5)
MODE: ABNORMAL
MONOCYTES ABSOLUTE: 0.37 E9/L (ref 0.1–0.95)
MONOCYTES RELATIVE PERCENT: 10.9 % (ref 2–12)
NEUTROPHILS ABSOLUTE: 2.62 E9/L (ref 1.8–7.3)
NEUTROPHILS RELATIVE PERCENT: 77.5 % (ref 43–80)
O2 CONTENT: 12.5 ML/DL
O2 SATURATION: 94.3 % (ref 92–98.5)
O2HB: 93.6 % (ref 94–97)
OPERATOR ID: 2067
OVALOCYTES: ABNORMAL
PATIENT TEMP: 37 C
PCO2: 53.9 MMHG (ref 35–45)
PDW BLD-RTO: 18.9 FL (ref 11.5–15)
PH BLOOD GAS: 7.43 (ref 7.35–7.45)
PHOSPHORUS: 5.5 MG/DL (ref 2.5–4.5)
PLATELET # BLD: 298 E9/L (ref 130–450)
PMV BLD AUTO: 10.8 FL (ref 7–12)
PO2: 73.2 MMHG (ref 60–100)
POIKILOCYTES: ABNORMAL
POLYCHROMASIA: ABNORMAL
POTASSIUM SERPL-SCNC: 4.4 MMOL/L (ref 3.5–5)
PROTHROMBIN TIME: 38.5 SEC (ref 9.3–12.4)
RBC # BLD: 2.8 E12/L (ref 3.5–5.5)
SODIUM BLD-SCNC: 139 MMOL/L (ref 132–146)
SOURCE, BLOOD GAS: ABNORMAL
TARGET CELLS: ABNORMAL
THB: 9.4 G/DL (ref 11.5–16.5)
TIME ANALYZED: 947
TROPONIN: 0.14 NG/ML (ref 0–0.03)
WBC # BLD: 3.4 E9/L (ref 4.5–11.5)

## 2019-08-12 PROCEDURE — 85610 PROTHROMBIN TIME: CPT

## 2019-08-12 PROCEDURE — 6360000002 HC RX W HCPCS: Performed by: INTERNAL MEDICINE

## 2019-08-12 PROCEDURE — 2580000003 HC RX 258

## 2019-08-12 PROCEDURE — 97530 THERAPEUTIC ACTIVITIES: CPT

## 2019-08-12 PROCEDURE — 80048 BASIC METABOLIC PNL TOTAL CA: CPT

## 2019-08-12 PROCEDURE — 83735 ASSAY OF MAGNESIUM: CPT

## 2019-08-12 PROCEDURE — 6370000000 HC RX 637 (ALT 250 FOR IP): Performed by: INTERNAL MEDICINE

## 2019-08-12 PROCEDURE — 97162 PT EVAL MOD COMPLEX 30 MIN: CPT

## 2019-08-12 PROCEDURE — 2060000000 HC ICU INTERMEDIATE R&B

## 2019-08-12 PROCEDURE — 2580000003 HC RX 258: Performed by: INTERNAL MEDICINE

## 2019-08-12 PROCEDURE — 85014 HEMATOCRIT: CPT

## 2019-08-12 PROCEDURE — 84100 ASSAY OF PHOSPHORUS: CPT

## 2019-08-12 PROCEDURE — 2580000003 HC RX 258: Performed by: STUDENT IN AN ORGANIZED HEALTH CARE EDUCATION/TRAINING PROGRAM

## 2019-08-12 PROCEDURE — 87040 BLOOD CULTURE FOR BACTERIA: CPT

## 2019-08-12 PROCEDURE — 99232 SBSQ HOSP IP/OBS MODERATE 35: CPT | Performed by: FAMILY MEDICINE

## 2019-08-12 PROCEDURE — 36430 TRANSFUSION BLD/BLD COMPNT: CPT

## 2019-08-12 PROCEDURE — 6360000002 HC RX W HCPCS

## 2019-08-12 PROCEDURE — 85018 HEMOGLOBIN: CPT

## 2019-08-12 PROCEDURE — 85025 COMPLETE CBC W/AUTO DIFF WBC: CPT

## 2019-08-12 PROCEDURE — 99233 SBSQ HOSP IP/OBS HIGH 50: CPT | Performed by: INTERNAL MEDICINE

## 2019-08-12 PROCEDURE — 82805 BLOOD GASES W/O2 SATURATION: CPT

## 2019-08-12 PROCEDURE — 71045 X-RAY EXAM CHEST 1 VIEW: CPT

## 2019-08-12 PROCEDURE — 36591 DRAW BLOOD OFF VENOUS DEVICE: CPT

## 2019-08-12 PROCEDURE — 82330 ASSAY OF CALCIUM: CPT

## 2019-08-12 PROCEDURE — 36415 COLL VENOUS BLD VENIPUNCTURE: CPT

## 2019-08-12 PROCEDURE — 84484 ASSAY OF TROPONIN QUANT: CPT

## 2019-08-12 PROCEDURE — 99232 SBSQ HOSP IP/OBS MODERATE 35: CPT | Performed by: NURSE PRACTITIONER

## 2019-08-12 PROCEDURE — 82962 GLUCOSE BLOOD TEST: CPT

## 2019-08-12 PROCEDURE — 2700000000 HC OXYGEN THERAPY PER DAY

## 2019-08-12 PROCEDURE — 94660 CPAP INITIATION&MGMT: CPT

## 2019-08-12 PROCEDURE — 6370000000 HC RX 637 (ALT 250 FOR IP): Performed by: STUDENT IN AN ORGANIZED HEALTH CARE EDUCATION/TRAINING PROGRAM

## 2019-08-12 RX ORDER — HEPARIN SODIUM (PORCINE) LOCK FLUSH IV SOLN 100 UNIT/ML 100 UNIT/ML
SOLUTION INTRAVENOUS
Status: COMPLETED
Start: 2019-08-12 | End: 2019-08-12

## 2019-08-12 RX ORDER — HEPARIN SODIUM 1000 [USP'U]/ML
INJECTION, SOLUTION INTRAVENOUS; SUBCUTANEOUS
Status: DISPENSED
Start: 2019-08-12 | End: 2019-08-13

## 2019-08-12 RX ORDER — PHYTONADIONE 10 MG/ML
2 INJECTION, EMULSION INTRAMUSCULAR; INTRAVENOUS; SUBCUTANEOUS ONCE
Status: COMPLETED | OUTPATIENT
Start: 2019-08-12 | End: 2019-08-12

## 2019-08-12 RX ADMIN — PHYTONADIONE 2 MG: 10 INJECTION, EMULSION INTRAMUSCULAR; INTRAVENOUS; SUBCUTANEOUS at 18:21

## 2019-08-12 RX ADMIN — CHLOROTHIAZIDE SODIUM 125 MG: 500 INJECTION, POWDER, LYOPHILIZED, FOR SOLUTION INTRAVENOUS at 15:02

## 2019-08-12 RX ADMIN — INSULIN LISPRO 1 UNITS: 100 INJECTION, SOLUTION INTRAVENOUS; SUBCUTANEOUS at 18:29

## 2019-08-12 RX ADMIN — LORAZEPAM 0.5 MG: 0.5 TABLET ORAL at 23:54

## 2019-08-12 RX ADMIN — Medication 10 ML: at 04:55

## 2019-08-12 RX ADMIN — Medication 10 ML: at 02:50

## 2019-08-12 RX ADMIN — Medication 10 ML: at 00:08

## 2019-08-12 RX ADMIN — SODIUM CHLORIDE 250 ML: 9 INJECTION, SOLUTION INTRAVENOUS at 00:22

## 2019-08-12 RX ADMIN — CHLOROTHIAZIDE SODIUM 125 MG: 500 INJECTION, POWDER, LYOPHILIZED, FOR SOLUTION INTRAVENOUS at 08:35

## 2019-08-12 RX ADMIN — CHLOROTHIAZIDE SODIUM 125 MG: 500 INJECTION, POWDER, LYOPHILIZED, FOR SOLUTION INTRAVENOUS at 23:54

## 2019-08-12 RX ADMIN — Medication 10 ML: at 20:39

## 2019-08-12 RX ADMIN — SODIUM CHLORIDE, PRESERVATIVE FREE: 5 INJECTION INTRAVENOUS at 19:15

## 2019-08-12 RX ADMIN — Medication 10 ML: at 00:22

## 2019-08-12 RX ADMIN — WATER 20 ML: 1 INJECTION INTRAMUSCULAR; INTRAVENOUS; SUBCUTANEOUS at 15:03

## 2019-08-12 RX ADMIN — WATER 18 ML: 1 INJECTION INTRAMUSCULAR; INTRAVENOUS; SUBCUTANEOUS at 23:54

## 2019-08-12 RX ADMIN — PATIROMER 8.4 G: 8.4 POWDER, FOR SUSPENSION ORAL at 08:41

## 2019-08-12 RX ADMIN — SKIN PROTECTANT: 44 OINTMENT TOPICAL at 23:54

## 2019-08-12 RX ADMIN — Medication 10 ML: at 08:35

## 2019-08-12 ASSESSMENT — PAIN SCALES - GENERAL
PAINLEVEL_OUTOF10: 0

## 2019-08-12 NOTE — PROGRESS NOTES
Patient response to education:   Pt verbalized understanding Pt demonstrated skill Pt requires further education in this area   Yes  Partial  Yes      Assessment/Comments  ---Pt received supine in bed and was agreeable to session with OT collaboration. RN reported pt stable for participation prior to session. Pt limited by weakness, BHAKTA, and anxiety to move. Transferred to EOB with HOB elevated - required assist of BLEs to bring to bedside with more difficulty R than L. Pt with swelling in BLE and pain with light touch. Seated EOB x 15 minutes, able to hold self in midline posture majority of the time. Completed therex EOB LAQs, resisted LAQs, APs, and marching. L lean during sitting from shape of bed making it difficult for pt. Pt refused attempt to stand and requested return to supine. Assisted to supine and placed in upright sitting position. Pt with all needs met and call light within reach. Pt will benefit from further skilled PT to address functional deficits described above. Pts/ family goals   1. None stated     Patient and or family understand(s) diagnosis, prognosis, and plan of care. Yes      PLAN  --PT care will be provided in accordance with the objectives noted above. Whenever appropriate, clear delegation orders will be provided for nursing staff. Exercises and functional mobility practice will be used as well as appropriate assistive devices or modalities to obtain goals. Patient and family education will also be administered as needed. --Frequency of treatments: 2-5x/week x 7-10 days.     Time in  1105  Time out  [address] [facility], PT, DPT  [id number]

## 2019-08-12 NOTE — PROGRESS NOTES
[place]  Department of Pulmonary, Critical Care and Sleep Medicine  [address] [facility]  Department of Internal Medicine  Progress Note    SUBJECTIVE:    Overnight and from yesterday  Hb dropped to 6.6, I unit of blood was given and Hb went back to 8.3. She became tachycardic and hypotensive which was corrected after the transfusion. I saw and examined the Patient in the AM today. She was so  Somnolent compared to a day before. I was not able to talk to her, the nurse stated that she was anxious and could not sleep well last night. OBJECTIVE:  Vitals:    08/12/19 1007 08/12/19 1100 08/12/19 1200 08/12/19 1300   BP:  (!) 113/58 (!) 109/45 (!) 121/59   Pulse: 93 95 92 94   Resp:  18 24 12   Temp:   97.7 °F (36.5 °C) 97.1 °F (36.2 °C)   TempSrc:   Temporal Temporal   SpO2:  95% 97% 99%   Weight:       Height:         Constitutional: Alert,     EENT: EOMI PENNY. MMM. No icterus. No thrush. Neck: No thyromegaly. No elevated JVP. Trachea was midline. Respiratory: Symmetrical.  Breath sounds were clear. Cardiovascular: Regular, No murmur. No rubs. Pulses:  Equal bilaterally. Abdomen: Soft without organomegaly. No rebound, rigidity. No guarding. Lymphatic: No lymphadenopathy. Musculoskeletal: Without weakness or gross deficits  Extremities:  No lower extremity edema. Reflexes appear adequate. Skin:  Warm and dry. No skin rashes. Neurological/Psychiatric: No acute psychosis. Cranial nerves are intact. DATA:    Monitor Strips:  Reviewed & discusses with technical team. No changes noted.     RADIOLOGY:  Films were read/reviewed/discussed with radiology shows CHF,       CBC:   Lab Results   Component Value Date    WBC 3.4 08/12/2019    RBC 2.80 08/12/2019    HGB 8.3 08/12/2019    HCT 27.3 08/12/2019    MCV 97.5 08/12/2019    MCH 29.6 08/12/2019    MCHC 30.4 08/12/2019    RDW 18.9 08/12/2019     08/12/2019    MPV 10.8 08/12/2019

## 2019-08-12 NOTE — PROGRESS NOTES
[facility] PROGRESS NOTE      Chief complaint: Follow-up of coagulase-negative Staphylococcus on blood culture    Patient is a 69 y/o female with history of Stage IV metastatic breast cancer with right chest venous acces port in situ, CAD, DM, hypertension, CKD, presented on 08/01 with fluid congestion, later found to have blood cultures drawn on 08/08 growing coagulase-negative Staphylococci and Micrococcus. Blood cultures on 08/09 showed Gram-positive cocci in clusters after 72 h in 1/3 sets. Subjective: Patient was seen and examined. No chills, no abdominal pain, no diarrhea, no rash, no itching. Objective:    Vitals:    08/12/19 1300   BP: (!) 121/59   Pulse: 94   Resp: 12   Temp: 97.1 °F (36.2 °C)   SpO2: 99%     Constitutional: Alert, not in distress  Respiratory: Clear breath sounds, no crackles, no wheezes  Cardiovascular: Regular rate and rhythm, no murmurs  Gastrointestinal: Bowel sounds present, soft, nontender  Skin: Warm and dry, no active dermatoses  Musculoskeletal: No joint swelling, no joint erythema    Labs, imaging, and medical records/notes were personally reviewed. Assessment:  Coagulase-negative Staphylococcus on blood culture, suspect contaminant vs CLABSI  Stage IV metastatic breast CA with venous access port in situ    Recommendations:  Repeat blood cultures. Follow up blood cultures from 08/08 and from 08/09. If blood cultures show exactly similar strains based on identification and susceptibility testing, will consider treatment. Continue monitoring clinically off antibiotics.     Electronically signed by Omar Boudreaux MD on 8/12/2019 at 4:35 PM

## 2019-08-12 NOTE — PROGRESS NOTES
Palliative Medicine   Progress Note  Nurse practitioner      8/10/19  Admit Date: 8/8/2019    KARISHMA/CKD  Acute resp failure  pleural effusion  metastatic breast cancer to her liver and lungs and bone    Plan/Recommendations:  1. Not sure if Patient/family understood previous conversations with oncology regarding prognosis. They would like to know from oncology if continuing chemo is an option and if it would extend patient's life in a meaningful way or if should they move forward with hospice care. 2. Support given  3. Will continue to discuss goals as needed    CC: Respiratory failure     HPI :  This very pleasant 66-year-old  female with an extensive past medical history of metastatic breast cancer to liver, lungs, and bone, as well as stage III kidney injury that appears acute, coronary artery disease status post CABG, heart failure with reduced ejection fraction between 30 and 35%, insulin-dependent diabetes mellitus, recurrent DVTs and is currently anticoagulated on warfarin, presented to the emergency room with altered mental status and worsening respiratory distress. She was placed on BiPAP in the ED and then brought to the MICU for further evaluation. CT scan of the chest revealed what appears to be extremely large pleural effusions left side greater than right side with continued invasion of her lung cancer. She stated that she is currently on chemotherapy and would like to remain on it at this time. She is denying any significant pain or discomfort. Her only complaint at this time is that her \"arms and legs are filling with fluid \". She also stated that she is extremely tired and weak. She is not sure if she can continue chemotherapy, given her continued weakness.   The patient's 2 daughters are present in the room and discussions ensued regarding her current medical condition, and we did show them the patient's CT of her chest and abdomen, in order for them understand the patient's current is it time for hospice care. Physical Exam:  Vitals:    BP (!) 116/59   Pulse 93   Temp 97.8 °F (36.6 °C) (Oral)   Resp 15   Ht 5' 4\" (1.626 m)   Wt 196 lb 6.9 oz (89.1 kg)   SpO2 97%   BMI 33.72 kg/m²   · General Appearance: Alert, awake,, in no acute distress on NC oxygen  · Eyes: equal and round  · Neck: supple  · Lungs:  rhonchi  ; mo wheeze or rales, non-productive cough, NC oxygen   · Heart: Regular rate and rhythm, S1 and S2 normal, no murmur, rub or gallop heard at this time.    · Abdomen: Soft, TTP , + bowel sounds active all four quadrants  · Extremities:  Extremities normal, atraumatic, Pulses equal bilaterally; pedal pulses 2+, + trace edema   · Skin: warm and dry  · Neurologic: alert and oriented X3        ALLERGIES:Macrobid [nitrofurantoin monohydrate macrocrystals]     sodium chloride  250 mL Intravenous Once    patiromer sorbitex calcium  8.4 g Oral Daily    chlorothiazide (DIURIL) IVPB  125 mg Intravenous TID    sodium chloride flush  10 mL Intravenous 2 times per day    insulin lispro  0-6 Units Subcutaneous TID WC       LORazepam, nitroGLYCERIN, lidocaine-prilocaine, glucose, dextrose, glucagon (rDNA), dextrose, sodium chloride flush, magnesium hydroxide, ondansetron     [Held by provider] bumetanide 0.1 mg/mL infusion Stopped (08/12/19 0215)    dextrose           Labs     CBC:   Lab Results   Component Value Date    WBC 3.4 08/12/2019    RBC 2.80 08/12/2019    HGB 8.3 08/12/2019    HCT 27.3 08/12/2019     08/12/2019    MCV 97.5 08/12/2019     BMP:    Lab Results   Component Value Date     08/12/2019    K 4.4 08/12/2019    K 4.1 07/26/2019    CL 90 08/12/2019    CO2 35 08/12/2019    BUN 55 08/12/2019    CREATININE 2.7 08/12/2019    GLUCOSE 152 08/12/2019    GLUCOSE 109 04/20/2012    CALCIUM 7.1 08/12/2019     PT/INR:    Lab Results   Component Value Date    PROTIME 38.8 08/11/2019    PROTIME 39 08/02/2019    INR 3.5 08/11/2019       Notable Cultures:        Imaging

## 2019-08-12 NOTE — PROGRESS NOTES
[facility] - Family Medicine Inpatient   Resident Progress Note    S:  Hospital day: 4   Brief Synopsis: Ze Charles is a 68 y.o. female who presented with SOB, unresponsiveness     Overnight/interim:    BP was low overnight   Hgb low and Transfused RBC's   BP improved some with Transfusion   Code status discussed, and no heroic measures     Alert and oriented when in the room. No Complaints today. On 10 L O2 HFNC. Cont meds:    phenylephrine (RAUL-SYNEPHRINE) 50mg/250mL infusion      [Held by provider] bumetanide 0.1 mg/mL infusion Stopped (08/12/19 0215)    dextrose       Scheduled meds:    sodium chloride  250 mL Intravenous Once    patiromer sorbitex calcium  8.4 g Oral Daily    chlorothiazide (DIURIL) IVPB  125 mg Intravenous TID    sodium chloride flush  10 mL Intravenous 2 times per day    insulin lispro  0-6 Units Subcutaneous TID WC     PRN meds: LORazepam, nitroGLYCERIN, lidocaine-prilocaine, glucose, dextrose, glucagon (rDNA), dextrose, sodium chloride flush, magnesium hydroxide, ondansetron     I reviewed the patient's past medical and surgical history, Medications and Allergies. O:  BP (!) 97/38   Pulse 88   Temp 97.7 °F (36.5 °C) (Oral)   Resp 19   Ht 5' 4\" (1.626 m)   Wt 196 lb 6.9 oz (89.1 kg)   SpO2 100%   BMI 33.72 kg/m²   24 hour I&O: I/O last 3 completed shifts: In: 9428 [P.O.:180; I.V.:393; Blood:350; IV Piggyback:250]  Out: 4062 [Urine:5175]  No intake/output data recorded. GENERAL: Alert, cooperative, no acute distress. HEENT: Normocephalic, atraumatic. NECK: Supple, symmetrical, trachea midline  CHEST: No tenderness or deformity, full & symmetric excursion  LUNG: Mild coarse breath sounds bilaterally,  Diminished breath sounds b/l  HEART: RRR, S1 and S2 normal, systolic murmur loudest at superior right sternal border, rub or gallop. DP pulses 2/4  ABDOMEN: SNTND, no masses, no organomegaly, no guarding, rebound or rigidity.    GENITOURINARY: Urinary cath dysfunction  LDSS  hypoglycemia protocol     Genitourinary/Renal  KARISHMA on CKD, improving  Elevated Phosphate  Hyperkalemia, Resolved  -KARISHMA on ckd improved with diuresis  -Good U/O with diuresis  -Phosphate Binder per Nephro  -Nephro on Board    Code Status: Limited code for now. Palliative on Board.       Electronically signed by Cesar Peralta MD PGY-3 on 8/12/2019 at 9:50 AM  This case was discussed with attending physician: Dr. Kathy Diaz

## 2019-08-12 NOTE — PROGRESS NOTES
08/12/2019     PTT:    Lab Results   Component Value Date    APTT 34.8 08/08/2019   [APTT  VITAMIN B12: No components found for: B12  FOLATE:    Lab Results   Component Value Date    FOLATE 17.0 06/20/2011     IRON:    Lab Results   Component Value Date    IRON 49 06/20/2011     Iron Saturation:  No components found for: PERCENTFE  TIBC:    Lab Results   Component Value Date    TIBC 266 06/20/2011     FERRITIN:    Lab Results   Component Value Date    FERRITIN 490.8 06/20/2011     JOSEPH:  No results found for: ANATITER, JOSEPH    ASSESSMENT/PLAN :  69 yo female  Stage IV Breast cancer with Liver metastases ER/IL+ HER2-   IDC of the breast  S/p multiple lines of therapy as above  Previously on Herceptin/Aromasin/Xgeva, now on Faslodex/Ibrance  Worsening metastatic disease  Ascites. Pl. Effusions for Thoracentesis / pleurex catheter  CHF  Hx of VTE was on coumadin    -CKD, Dr. Mckayla Cooney following.  No plans for HD   - Reviewed diagnosis of advanced metastatic cancer and poor prognosis with pt and family   - ID following  - Pulmonary planning for thoracentesis when INR acceptable (off coumadin) and subsequent pleurX cath (scheduled 8/13)  - Leukopenia w/ preserved ANC, trend  - Hgb transfuse for goal of >/= 7  - Will follow

## 2019-08-12 NOTE — PROGRESS NOTES
[facility]   Department of Internal Medicine   Internal Medicine Residency  MICU Progress Note    Patient:  Maya Espinal 68 y.o. female   MRN: [id number]       Date of Service: 2019    Allergy: Macrobid [nitrofurantoin monohydrate macrocrystals]    Subjective     The patient is a 68 y.o. 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      As per family, she was in the subacute rehab facility after being discharged from the hospital on 2019. During last hospital admission, she had paracentesis and thoracentesis done and fluid analysis was negative for malignant cells. As per family, no reports of any recent cough, abdominal pain, dysuria, chest pain, diarrhea.      In the ED, she was hypoxic, saturating 80%, ABG showed hypercapnia. She was initially on NRB but was transitioned to bipap. She was disoriented and somnolent as well. Noted to have hyponatremia, hyperkalemia, KARISHMA on CKD. CT head unremarkable. CT abdomen showed bilateral pleural effusions, multi space occupying lesions in the left lobes of the of the liver which are chronic and unchanged, minimal ascites diffuse third spacing into subcutaneous tissues noted as well. Overnight and from yesterday  Hb dropped to 6.6, I unit of blood was given and Hb went back to 8.3. She became tachycardic and hypotensive which was corrected after the transfusion. I saw and examined the Patient in the AM today. She was so  Somnolent compared to a day before. I was not able to talk to her, the nurse stated that she was anxious and could not sleep well last night. Objective     TEMPERATURE:  Current - Temp: 97.7 °F (36.5 °C);  Max - Temp  Av.7 °F (36.5 °C)  Min: 97.6 °F (36.4 °C)  Max: 97.8 °F (36.6 malignancy and carcinomatosis. Ct Head Wo Contrast    Result Date: 2019  Patient MRN: [id number] : 1945 Age:  68 years Gender: Female Order Date: 2019 1:45 PM Exam: CT HEAD WO CONTRAST Number of Images: 257 views Indication:   ams  Comparison: Prior study from 07/15/2018 is available. Technique: Sequential axial CT of the head was obtained from the base of the skull to the vertex without IV contrast.  Radiation Output: CTDIvol 54.53 (mGy); DLP 1103.66 (mGy-cm) Findings: The study demonstrates the fourth ventricle to be midline. The posterior fossa appears to be normal. There is no mass, mass effect or midline shift. The ventricles, sulci and cisterns are normal in appearance for patient's age. The gray-white matter differentiation is preserved throughout. There is no acute intracranial hemorrhage. No extra-axial fluid collection is identified. There is atherosclerotic change of vertebral basilar and cavernous carotid artery with calcified plaque. The bony calvarium is intact. The visualized portion of the paranasal sinuses and the mastoid air cells are clear. There is no focal extracranial soft tissue swelling. The study is unchanged from prior CT. NO ACUTE INTRACRANIAL PROCESS     Xr Chest Portable    Result Date: 2019  Patient MRN: [id number] : 1945 Age:  68 years Gender: Female Order Date: 2019 1:45 PM Exam: XR CHEST PORTABLE Number of Images: 1 view Indication:  Weakness, acute neurodeficits Comparison: Anterior upright chest 2019 Findings: The patient is severely lordotic on this AP image, and is morbidly obese. This limits detail. There is a large and new right-sided pleural effusion, associated with atelectasis. Density in the left lower chest is more chronic, and could include pleural fluid or atelectasis. Cardiovascular shadows are unchanged in appearance, with limited visualization due to positioning. . There is no evidence of cardiac decompensation.  Skeletal structures show no evidence of acute pathology. Degenerative changes of the spine and right shoulder are noted, and there is a segmented appearance consistent with an ununited old fracture of the proximal left humerus. Overlying EKG leads are present, and the right jugular triple-lumen catheter extends to the upper right atrial level. Sternotomy wires are present. There is new large right-sided pleural effusion with some associated atelectasis. Other incidental findings listed above are unchanged. Ct Abdomen Pelvis Wo Contrast Additional Contrast? None    Result Date: 8/8/2019  This examination and all examinations utilizing ionizing radiation at this facility are done so according to the ALARA (as low as reasonably achievable) principal for radiation dose reduction. Clinical indications: Abdominal pain. COMPARISON: CT Abdomen and Pelvis July 21, 2019. CT chest June 19, 2019. Axial, sagittal, and coronal computed tomography of the abdomen and pelvis was performed without contrast. FINDINGS: Bilateral pleural effusions are increasing in volume with contiguous atelectasis. There are enlarged lymph nodes along the right lateral chest wall measuring up to 2 cm. These were present on the recent CT of the chest. There is expansion and lytic process within the right lateral fourth rib. There are prior median sternotomy wires. The heart is enlarged. Previously described lung nodules are obscured due to the increasing pleural effusions and atelectatic change. Within the abdomen, there is mild ascites most concentrated in the pelvis. The amount of ascitic fluid is diminished markedly compared to the prior CT of July 21, 2019. Multiple space-occupying mass lesions in the right and left lobes of the liver contain central calcification and would be most suspicious for metastatic disease from mucinous adenocarcinoma such as from the colon. These are unchanged.  Gallbladder is not visualized and is thought to be surgically hypercapnic respiratory failure likely from bilateral pleural effusion/Acute CHF exacerbation  - s/p thoracentesis last admission - transudate, negative for malignant cells  - Initial ABG: (7.29, 65, 103, 30.8) respiratory acidosis, high anion gap metabolic acidosis, metabolic alkalosis. Hypercapnia slowly improving with NIV(BiPAP),mentation could not be assessed in the AM today >> she was somnolent and worse compared to yesterday   - ABG today: 7.31, 56.4, 51.9, 28 (8/10) >> Will try to get another ABG today. - May need thoracentesis vs pleurx ? ?  - On BiPAP on/off, High Flow O2 and nasal canula. - Chest X Ray >> NO changes in CHF signs,slighly worsening in the right lower lung field infiltrates  - Respiratory culture is still pending, collected on 8/8         Infectious:      - Bacteremia of Gram +ve cocci in clusters    - One dose of vancomycin was given 8/9   - LA (slowly trending down >> 2.3 >> 1.1)   - Med Port placed 7/23/2019   - Follow on the cultures collected before starting vancomycin (8/8)   - NO further antibiotics per ID. Gastrointestinal  Liver masses, likely mets from breast cancer  - CT abdomen showed multi space occupying lesions in the left lobes of the of the liver which are chronic and unchanged, diffuse third spacing into subcutaneous tissues noted as well. - s/p paracentesis in June - no malignant cells.  Imaging now shows only minimal ascites  - Palliative consulted.   - PT/OT     Renal    KARISHMA on CKD stage 3 (baseline 1.2 - 1.7)  - Likely prerenal 2/2 decreased effective circulating volume 2/2 to CHF  - NICOM is not fluid responsive initially, fluid was DC'd  - Monitor renal function (daily BMP and I/O), Net - 6.1 L and U/ O2.4 of  in the last 24 hours (8/11-12)   - Nephrology consult   - Was ON Bumex drip (stopped 8/11) and Chlorthiazide 125 mg IV TID       Hyponatremia (Resolved)  - Na is 139       Hyperkalemia (Resolved) 2/2 KARISHMA, Acidosis (mainly respiratory), Tumor Lysis syndrome ?? Cardiorenal syndrome? ?      - KARISHMA likely contributing to hyperKalemia, BUN: 55,Cr 2.7    - K today at 4.4 (EKG did not show hyperacute T wave, insulin, glucose, Lasix and Ca Gluconate     were given) (8/10)    - Possible hemolysis, Hemodynamically mediated cardiorenal syndrome.    - ON Bumex drip (stopped) and Chlorthiazide 125 mg IV TID   - Nephrology consulted         Hypomagnesemia (1.7)     - correct       Hematology/Oncology  Metastatic breast cancer Stage IV EP/OK + HER2 - (bone, liver, lung)  - Diagnosed 15 years ago, S/p chemotherapy, radiotherapy, lumpectomy  - Oncology consulted by primary     Anemia of chronic disease, at baseline (7.3 - 7.5 in the last month)  - Macrocytic MCV   - Rpt CBC with Diff q 24  - Hb 8.3 (after a I unit transfused) >> Transfuse if <7 per Oncology, monitor.      Endocrine  Diabetes Mellitus type 2  - Last HbA1C: 5.9%(7/2019)  - Monitor blood sugars, hypoglycemia protocol   - blood glucose today is       Vascular  History of multiple DVT, on warfarin  - INR 3.5 (2.5 when presenting to the ICU), INR today is 3.5  - Will hold warfarin for now for possible thoracentesis ? ?        Dermatology  Stasis dermatitis  - Pitting edema and chronic skin changes noted  - Leg elevation         Core measures:  1. Code status: Limited: NO intubation, No Meds, NO CPR, NO Shock    2. Peptic ulcer prophylaxis: None     3. DVT Prophylaxis:  PCDS    4. Lines / tubes: implanted venous port (7/23)     5. Disposition: Transfer to telemetry floor    Aric Cruz M.D. Internal Medicine Resident - PGY1    Attending Physician: Dr. Claudia Bradford MD    I personally saw, examined and provided care for the patient. Radiographs, labs and medication list were reviewed by me independently. I spoke with bedside nursing, therapists and consultants. Critical care services and times documented are independent of procedures and multidisciplinary rounds with Residents.  Additionally comprehensive,

## 2019-08-13 ENCOUNTER — APPOINTMENT (OUTPATIENT)
Dept: GENERAL RADIOLOGY | Age: 74
DRG: 189 | End: 2019-08-13
Payer: COMMERCIAL

## 2019-08-13 ENCOUNTER — APPOINTMENT (OUTPATIENT)
Dept: ULTRASOUND IMAGING | Age: 74
DRG: 189 | End: 2019-08-13
Payer: COMMERCIAL

## 2019-08-13 LAB
AMORPHOUS: ABNORMAL
ANION GAP SERPL CALCULATED.3IONS-SCNC: 15 MMOL/L (ref 7–16)
ANISOCYTOSIS: ABNORMAL
BACTERIA: ABNORMAL /HPF
BASOPHILS ABSOLUTE: 0 E9/L (ref 0–0.2)
BASOPHILS RELATIVE PERCENT: 0.5 % (ref 0–2)
BILIRUBIN URINE: NEGATIVE
BLOOD CULTURE, ROUTINE: ABNORMAL
BLOOD CULTURE, ROUTINE: ABNORMAL
BLOOD, URINE: ABNORMAL
BUN BLDV-MCNC: 53 MG/DL (ref 8–23)
CALCIUM SERPL-MCNC: 7.4 MG/DL (ref 8.6–10.2)
CHLORIDE BLD-SCNC: 91 MMOL/L (ref 98–107)
CLARITY: CLEAR
CO2: 37 MMOL/L (ref 22–29)
COLOR: YELLOW
CREAT SERPL-MCNC: 2.2 MG/DL (ref 0.5–1)
EOSINOPHILS ABSOLUTE: 0 E9/L (ref 0.05–0.5)
EOSINOPHILS RELATIVE PERCENT: 1.1 % (ref 0–6)
GFR AFRICAN AMERICAN: 26
GFR NON-AFRICAN AMERICAN: 22 ML/MIN/1.73
GLUCOSE BLD-MCNC: 122 MG/DL (ref 74–99)
GLUCOSE URINE: NEGATIVE MG/DL
HCT VFR BLD CALC: 27.4 % (ref 34–48)
HEMOGLOBIN: 8.1 G/DL (ref 11.5–15.5)
INR BLD: 2
KETONES, URINE: NEGATIVE MG/DL
LEUKOCYTE ESTERASE, URINE: ABNORMAL
LYMPHOCYTES ABSOLUTE: 0.4 E9/L (ref 1.5–4)
LYMPHOCYTES RELATIVE PERCENT: 11.4 % (ref 20–42)
MCH RBC QN AUTO: 29 PG (ref 26–35)
MCHC RBC AUTO-ENTMCNC: 29.6 % (ref 32–34.5)
MCV RBC AUTO: 98.2 FL (ref 80–99.9)
METER GLUCOSE: 126 MG/DL (ref 74–99)
METER GLUCOSE: 166 MG/DL (ref 74–99)
MONOCYTES ABSOLUTE: 0.18 E9/L (ref 0.1–0.95)
MONOCYTES RELATIVE PERCENT: 5.3 % (ref 2–12)
NEUTROPHILS ABSOLUTE: 2.99 E9/L (ref 1.8–7.3)
NEUTROPHILS RELATIVE PERCENT: 83.3 % (ref 43–80)
NITRITE, URINE: NEGATIVE
ORGANISM: ABNORMAL
PDW BLD-RTO: 19.8 FL (ref 11.5–15)
PH UA: 7 (ref 5–9)
PHOSPHORUS: 4.2 MG/DL (ref 2.5–4.5)
PLATELET # BLD: 269 E9/L (ref 130–450)
PMV BLD AUTO: 10.6 FL (ref 7–12)
POLYCHROMASIA: ABNORMAL
POTASSIUM REFLEX MAGNESIUM: 3.8 MMOL/L (ref 3.5–5)
PROTEIN UA: NEGATIVE MG/DL
PROTHROMBIN TIME: 23 SEC (ref 9.3–12.4)
RBC # BLD: 2.79 E12/L (ref 3.5–5.5)
RBC UA: ABNORMAL /HPF (ref 0–2)
SODIUM BLD-SCNC: 143 MMOL/L (ref 132–146)
SPECIFIC GRAVITY UA: 1.01 (ref 1–1.03)
TROPONIN: 0.14 NG/ML (ref 0–0.03)
UROBILINOGEN, URINE: 0.2 E.U./DL
WBC # BLD: 3.6 E9/L (ref 4.5–11.5)
WBC UA: ABNORMAL /HPF (ref 0–5)

## 2019-08-13 PROCEDURE — 71045 X-RAY EXAM CHEST 1 VIEW: CPT

## 2019-08-13 PROCEDURE — 6360000002 HC RX W HCPCS: Performed by: INTERNAL MEDICINE

## 2019-08-13 PROCEDURE — 6370000000 HC RX 637 (ALT 250 FOR IP): Performed by: INTERNAL MEDICINE

## 2019-08-13 PROCEDURE — 87077 CULTURE AEROBIC IDENTIFY: CPT

## 2019-08-13 PROCEDURE — 2060000000 HC ICU INTERMEDIATE R&B

## 2019-08-13 PROCEDURE — 6370000000 HC RX 637 (ALT 250 FOR IP): Performed by: STUDENT IN AN ORGANIZED HEALTH CARE EDUCATION/TRAINING PROGRAM

## 2019-08-13 PROCEDURE — 2580000003 HC RX 258: Performed by: INTERNAL MEDICINE

## 2019-08-13 PROCEDURE — 97166 OT EVAL MOD COMPLEX 45 MIN: CPT

## 2019-08-13 PROCEDURE — 85025 COMPLETE CBC W/AUTO DIFF WBC: CPT

## 2019-08-13 PROCEDURE — 82962 GLUCOSE BLOOD TEST: CPT

## 2019-08-13 PROCEDURE — 84484 ASSAY OF TROPONIN QUANT: CPT

## 2019-08-13 PROCEDURE — 80048 BASIC METABOLIC PNL TOTAL CA: CPT

## 2019-08-13 PROCEDURE — 97530 THERAPEUTIC ACTIVITIES: CPT

## 2019-08-13 PROCEDURE — 81001 URINALYSIS AUTO W/SCOPE: CPT

## 2019-08-13 PROCEDURE — 84100 ASSAY OF PHOSPHORUS: CPT

## 2019-08-13 PROCEDURE — 87186 SC STD MICRODIL/AGAR DIL: CPT

## 2019-08-13 PROCEDURE — 99232 SBSQ HOSP IP/OBS MODERATE 35: CPT | Performed by: FAMILY MEDICINE

## 2019-08-13 PROCEDURE — 94660 CPAP INITIATION&MGMT: CPT

## 2019-08-13 PROCEDURE — 85610 PROTHROMBIN TIME: CPT

## 2019-08-13 PROCEDURE — 97535 SELF CARE MNGMENT TRAINING: CPT

## 2019-08-13 PROCEDURE — 2700000000 HC OXYGEN THERAPY PER DAY

## 2019-08-13 PROCEDURE — 87088 URINE BACTERIA CULTURE: CPT

## 2019-08-13 PROCEDURE — 36415 COLL VENOUS BLD VENIPUNCTURE: CPT

## 2019-08-13 RX ORDER — BUMETANIDE 1 MG/1
2 TABLET ORAL DAILY
Status: DISCONTINUED | OUTPATIENT
Start: 2019-08-13 | End: 2019-08-19 | Stop reason: HOSPADM

## 2019-08-13 RX ADMIN — Medication 10 ML: at 20:26

## 2019-08-13 RX ADMIN — PATIROMER 8.4 G: 8.4 POWDER, FOR SUSPENSION ORAL at 16:09

## 2019-08-13 RX ADMIN — LORAZEPAM 0.5 MG: 0.5 TABLET ORAL at 20:26

## 2019-08-13 RX ADMIN — SKIN PROTECTANT: 44 OINTMENT TOPICAL at 20:26

## 2019-08-13 RX ADMIN — CHLOROTHIAZIDE SODIUM 125 MG: 500 INJECTION, POWDER, LYOPHILIZED, FOR SOLUTION INTRAVENOUS at 20:25

## 2019-08-13 RX ADMIN — WATER 18 ML: 1 INJECTION INTRAMUSCULAR; INTRAVENOUS; SUBCUTANEOUS at 16:10

## 2019-08-13 RX ADMIN — LORAZEPAM 0.5 MG: 0.5 TABLET ORAL at 10:14

## 2019-08-13 RX ADMIN — CHLOROTHIAZIDE SODIUM 125 MG: 500 INJECTION, POWDER, LYOPHILIZED, FOR SOLUTION INTRAVENOUS at 16:07

## 2019-08-13 RX ADMIN — MAGNESIUM HYDROXIDE 30 ML: 400 SUSPENSION ORAL at 20:30

## 2019-08-13 RX ADMIN — WATER 18 ML: 1 INJECTION INTRAMUSCULAR; INTRAVENOUS; SUBCUTANEOUS at 20:25

## 2019-08-13 RX ADMIN — SKIN PROTECTANT: 44 OINTMENT TOPICAL at 09:38

## 2019-08-13 RX ADMIN — BUMETANIDE 2 MG: 1 TABLET ORAL at 16:07

## 2019-08-13 RX ADMIN — INSULIN LISPRO 3 UNITS: 100 INJECTION, SOLUTION INTRAVENOUS; SUBCUTANEOUS at 16:15

## 2019-08-13 RX ADMIN — CHLOROTHIAZIDE SODIUM 125 MG: 500 INJECTION, POWDER, LYOPHILIZED, FOR SOLUTION INTRAVENOUS at 09:34

## 2019-08-13 RX ADMIN — ONDANSETRON 4 MG: 2 INJECTION INTRAMUSCULAR; INTRAVENOUS at 18:48

## 2019-08-13 ASSESSMENT — PAIN SCALES - GENERAL
PAINLEVEL_OUTOF10: 0

## 2019-08-13 NOTE — PROGRESS NOTES
[facility] PROGRESS NOTE      Chief complaint: Follow-up of coagulase-negative Staphylococcus on blood culture    Patient is a 69 y/o female with history of Stage IV metastatic breast cancer with right chest venous acces port in situ, CAD, DM, hypertension, CKD, presented on 08/01 with fluid congestion, later found to have blood cultures drawn on 08/08 growing coagulase-negative Staphylococci and Micrococcus. Blood cultures on 08/09 showed Gram-positive cocci in clusters after 72 h in 1/3 sets. Subjective: Patient was seen and examined after she underwent bilateral  thoracentesis. No chills, no abdominal pain, no diarrhea, no rash, no itching. Objective:    Vitals:    08/13/19 0915   BP: 130/62   Pulse: 100   Resp: 20   Temp: 98 °F (36.7 °C)   SpO2: 96%     Constitutional: Alert, not in distress  Respiratory: Clear breath sounds, no crackles, no wheezes  Cardiovascular: Regular rate and rhythm, no murmurs  Gastrointestinal: Bowel sounds present, soft, nontender  Skin: Warm and dry, no active dermatoses  Musculoskeletal: No joint swelling, no joint erythema    Labs, imaging, and medical records/notes were personally reviewed. Assessment:  Micrococcus species on blood culture, suspect contaminant vs CLABSI  Stage IV metastatic breast CA with venous access port in situ    Recommendations: Follow up blood cultures ordered on 08/12. Follow up blood cultures from 08/08 and from 08/09. If blood cultures show exactly similar strains based on identification and susceptibility testing, will consider treatment. Continue monitoring clinically off antibiotics.     Electronically signed by Mariano Sandoval MD on 8/13/2019 at 3:03 PM

## 2019-08-13 NOTE — CARE COORDINATION
Spoke with [facility] for [facility]. They are planning to accept. Will start precert when she is medically stable to transfer. They will continue to follow. Ambulance form on soft chart. Bumex gtt on hold per resident. Palliative following. Possible thoracentisis.  CM will continue to follow

## 2019-08-13 NOTE — PROGRESS NOTES
on AVAPs. -Film array-Negative. Resp Cx-pending  -Pleurx catheter to be placed when INR decreases so continue to hold warfarin  -Monitor U/O       Gastrointestinal  NPO due to mental status   Ascites/Anasarca  -stable CT abdomen at this time, no worsening edema on abdomen exam  -emre albumin  -monitor abdomen exam   -Ammonia level, WNL        Hematology/Oncology  Metastatic breast cancer  -Hem-onc following   -Patient on Herceptin, will hold off for now due to n.p.o. status due to mentation  -Given her presentation and extensive metastasis prognosis is poor  -palliative care following      Pancytopenia  -2/2 to breast Ca therapy  -Transfuse if needed. Transfused one unit today.     Infectious Disease  -film array, sputum culture, UA, urine culture, procalcitonin all unremarkable  -blood cx shows contaminant, but blood cultures repeated, shows the same result and will treat        Endocrine  DM II  -on no meds currently due to renal dysfunction  LDSS  hypoglycemia protocol     Genitourinary/Renal  KARISHMA on CKD, improving  Elevated Phosphate-Resoved  Hyperkalemia-Resolved  -KARISHMA on ckd improved with diuresis  -Good U/O with diuresis  -Phosphate Binder per Nephro  -Nephro on Board    Code Status: Limited code for now. Palliative on Board. Continue to discuss.       Electronically signed by Aarti Thomas MD PGY-3 on 8/13/2019 at 1:07 PM  This case was discussed with attending physician: Dr. Deepika Hernandez

## 2019-08-13 NOTE — PROGRESS NOTES
Centimeters There are multiple surgical clips in the left axillary region. Cardiomegaly Moderate to large right-sided pleural effusion Support lines appears to be in satisfactory position     Xr Chest Portable    Result Date: 2019  Patient MRN: [id number] : 1945 Age:  68 years Gender: Female Order Date: 2019 1:45 PM Exam: XR CHEST PORTABLE Number of Images: 1 view Indication:  Weakness, acute neurodeficits Comparison: Anterior upright chest 2019 Findings: The patient is severely lordotic on this AP image, and is morbidly obese. This limits detail. There is a large and new right-sided pleural effusion, associated with atelectasis. Density in the left lower chest is more chronic, and could include pleural fluid or atelectasis. Cardiovascular shadows are unchanged in appearance, with limited visualization due to positioning. . There is no evidence of cardiac decompensation. Skeletal structures show no evidence of acute pathology. Degenerative changes of the spine and right shoulder are noted, and there is a segmented appearance consistent with an ununited old fracture of the proximal left humerus. Overlying EKG leads are present, and the right jugular triple-lumen catheter extends to the upper right atrial level. Sternotomy wires are present. There is new large right-sided pleural effusion with some associated atelectasis. Other incidental findings listed above are unchanged. Objective:   Vitals: /62   Pulse 100   Temp 98 °F (36.7 °C) (Temporal)   Resp 20   Ht 5' 4\" (1.626 m)   Wt 193 lb (87.5 kg)   SpO2 96%   BMI 33.13 kg/m²   General appearance: appears stated age   Skin:  No rashes or lesions  HEENT: Head: Normocephalic, no lesions, without obvious abnormality.   Neck: no adenopathy, no carotid bruit, no JVD, supple, symmetrical, trachea midline and thyroid not enlarged, symmetric, no tenderness/mass/nodules  Lungs: diminished breath sounds bibasilar  Heart: regular rate and rhythm, S1, S2 normal, no murmur, click, rub or gallop  Abdomen: soft, non-tender; bowel sounds normal; no masses,  no organomegaly  Extremities: edema present  Neurologic: Mental status: drowsy     Assessment:   Patient Active Problem List:     Metastatic breast cancer ([facility]     Essential hypertension     Coronary artery disease involving native coronary artery of native heart without angina pectoris     Nephrolithiasis     CHF (congestive heart failure) ([facility])     Humerus fracture     Shoulder pain, left     B12 deficiency     Hyperlipidemia     Rib lesion     Subclavian vein occlusion, bilateral ([facility])     Pericardial effusion     MI (myocardial infarction) ([facility]     Arthritis     Sarcoidosis     Lymph node enlargement     Anesthesia     Rectal bleeding     Ventral hernia     Type 2 diabetes mellitus without complication, with long-term current use of insulin ([facility])     Anemia of chronic disease     Chronic deep vein thrombosis (DVT) of proximal vein of right lower extremity ([facility])     Bilateral edema of lower extremity     Peripheral polyneuropathy     Complicated UTI (urinary tract infection)     KARISHMA (acute kidney injury) ([facility]     Diarrhea with dehydration     Chronic anticoagulation     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     Diabetic polyneuropathy associated with type 2 diabetes mellitus (HCC)     Leg swelling     History of deep vein thrombosis     Chronic venous insufficiency     Lymphedema of both lower extremities     Decreased dorsalis pedis pulse     Ambulatory dysfunction     CKD (chronic kidney disease) stage 3, GFR 30-59 ml/min ([facility])     Charcot's joint of foot in type 2 diabetes mellitus ([facility]     Ascites     Palliative care encounter     Cancer associated pain     HAP (hospital-acquired pneumonia)     Acute respiratory failure with hypoxia (HCC)     Acute systolic heart failure (HCC)     Nonischemic

## 2019-08-14 ENCOUNTER — APPOINTMENT (OUTPATIENT)
Dept: ULTRASOUND IMAGING | Age: 74
DRG: 189 | End: 2019-08-14
Payer: COMMERCIAL

## 2019-08-14 ENCOUNTER — APPOINTMENT (OUTPATIENT)
Dept: GENERAL RADIOLOGY | Age: 74
DRG: 189 | End: 2019-08-14
Payer: COMMERCIAL

## 2019-08-14 LAB
ANION GAP SERPL CALCULATED.3IONS-SCNC: 13 MMOL/L (ref 7–16)
ANISOCYTOSIS: ABNORMAL
BASOPHILIC STIPPLING: ABNORMAL
BASOPHILS ABSOLUTE: 0.02 E9/L (ref 0–0.2)
BASOPHILS RELATIVE PERCENT: 0.4 % (ref 0–2)
BLOOD CULTURE, ROUTINE: NORMAL
BUN BLDV-MCNC: 51 MG/DL (ref 8–23)
CALCIUM SERPL-MCNC: 7.2 MG/DL (ref 8.6–10.2)
CHLORIDE BLD-SCNC: 91 MMOL/L (ref 98–107)
CO2: 41 MMOL/L (ref 22–29)
CREAT SERPL-MCNC: 1.9 MG/DL (ref 0.5–1)
CULTURE, BLOOD 2: ABNORMAL
CULTURE, BLOOD 2: ABNORMAL
CULTURE, BLOOD 2: NORMAL
EOSINOPHILS ABSOLUTE: 0.02 E9/L (ref 0.05–0.5)
EOSINOPHILS RELATIVE PERCENT: 0.4 % (ref 0–6)
GFR AFRICAN AMERICAN: 31
GFR NON-AFRICAN AMERICAN: 26 ML/MIN/1.73
GLUCOSE BLD-MCNC: 177 MG/DL (ref 74–99)
HCT VFR BLD CALC: 28 % (ref 34–48)
HEMOGLOBIN: 8.3 G/DL (ref 11.5–15.5)
HYPOCHROMIA: ABNORMAL
IMMATURE GRANULOCYTES #: 0.02 E9/L
IMMATURE GRANULOCYTES %: 0.4 % (ref 0–5)
INR BLD: 1.4
LYMPHOCYTES ABSOLUTE: 0.23 E9/L (ref 1.5–4)
LYMPHOCYTES RELATIVE PERCENT: 4.7 % (ref 20–42)
MAGNESIUM: 1.6 MG/DL (ref 1.6–2.6)
MCH RBC QN AUTO: 30 PG (ref 26–35)
MCHC RBC AUTO-ENTMCNC: 29.6 % (ref 32–34.5)
MCV RBC AUTO: 101.1 FL (ref 80–99.9)
METER GLUCOSE: 134 MG/DL (ref 74–99)
METER GLUCOSE: 156 MG/DL (ref 74–99)
METER GLUCOSE: 164 MG/DL (ref 74–99)
MONOCYTES ABSOLUTE: 0.45 E9/L (ref 0.1–0.95)
MONOCYTES RELATIVE PERCENT: 9.3 % (ref 2–12)
NEUTROPHILS ABSOLUTE: 4.12 E9/L (ref 1.8–7.3)
NEUTROPHILS RELATIVE PERCENT: 84.8 % (ref 43–80)
ORGANISM: ABNORMAL
ORGANISM: ABNORMAL
OVALOCYTES: ABNORMAL
PDW BLD-RTO: 19.9 FL (ref 11.5–15)
PHOSPHORUS: 4 MG/DL (ref 2.5–4.5)
PLATELET # BLD: 257 E9/L (ref 130–450)
PMV BLD AUTO: 10.4 FL (ref 7–12)
POIKILOCYTES: ABNORMAL
POLYCHROMASIA: ABNORMAL
POTASSIUM SERPL-SCNC: 3.9 MMOL/L (ref 3.5–5)
PROTHROMBIN TIME: 16.2 SEC (ref 9.3–12.4)
RBC # BLD: 2.77 E12/L (ref 3.5–5.5)
SODIUM BLD-SCNC: 145 MMOL/L (ref 132–146)
WBC # BLD: 4.9 E9/L (ref 4.5–11.5)

## 2019-08-14 PROCEDURE — 0B9N30Z DRAINAGE OF RIGHT PLEURA WITH DRAINAGE DEVICE, PERCUTANEOUS APPROACH: ICD-10-PCS | Performed by: INTERNAL MEDICINE

## 2019-08-14 PROCEDURE — 71045 X-RAY EXAM CHEST 1 VIEW: CPT

## 2019-08-14 PROCEDURE — 94660 CPAP INITIATION&MGMT: CPT

## 2019-08-14 PROCEDURE — 36415 COLL VENOUS BLD VENIPUNCTURE: CPT

## 2019-08-14 PROCEDURE — 6370000000 HC RX 637 (ALT 250 FOR IP): Performed by: INTERNAL MEDICINE

## 2019-08-14 PROCEDURE — 0W9B3ZZ DRAINAGE OF LEFT PLEURAL CAVITY, PERCUTANEOUS APPROACH: ICD-10-PCS | Performed by: RADIOLOGY

## 2019-08-14 PROCEDURE — 85025 COMPLETE CBC W/AUTO DIFF WBC: CPT

## 2019-08-14 PROCEDURE — 32550 INSERT PLEURAL CATH: CPT | Performed by: INTERNAL MEDICINE

## 2019-08-14 PROCEDURE — 99233 SBSQ HOSP IP/OBS HIGH 50: CPT | Performed by: INTERNAL MEDICINE

## 2019-08-14 PROCEDURE — 99232 SBSQ HOSP IP/OBS MODERATE 35: CPT | Performed by: FAMILY MEDICINE

## 2019-08-14 PROCEDURE — 80048 BASIC METABOLIC PNL TOTAL CA: CPT

## 2019-08-14 PROCEDURE — 82962 GLUCOSE BLOOD TEST: CPT

## 2019-08-14 PROCEDURE — C1729 CATH, DRAINAGE: HCPCS

## 2019-08-14 PROCEDURE — 97110 THERAPEUTIC EXERCISES: CPT

## 2019-08-14 PROCEDURE — 0W993ZZ DRAINAGE OF RIGHT PLEURAL CAVITY, PERCUTANEOUS APPROACH: ICD-10-PCS | Performed by: RADIOLOGY

## 2019-08-14 PROCEDURE — 83735 ASSAY OF MAGNESIUM: CPT

## 2019-08-14 PROCEDURE — 6360000002 HC RX W HCPCS: Performed by: INTERNAL MEDICINE

## 2019-08-14 PROCEDURE — 97530 THERAPEUTIC ACTIVITIES: CPT

## 2019-08-14 PROCEDURE — 2060000000 HC ICU INTERMEDIATE R&B

## 2019-08-14 PROCEDURE — 2700000000 HC OXYGEN THERAPY PER DAY

## 2019-08-14 PROCEDURE — 84100 ASSAY OF PHOSPHORUS: CPT

## 2019-08-14 PROCEDURE — 2580000003 HC RX 258: Performed by: INTERNAL MEDICINE

## 2019-08-14 PROCEDURE — 85610 PROTHROMBIN TIME: CPT

## 2019-08-14 PROCEDURE — 6370000000 HC RX 637 (ALT 250 FOR IP): Performed by: STUDENT IN AN ORGANIZED HEALTH CARE EDUCATION/TRAINING PROGRAM

## 2019-08-14 PROCEDURE — 6370000000 HC RX 637 (ALT 250 FOR IP): Performed by: FAMILY MEDICINE

## 2019-08-14 RX ORDER — LORAZEPAM 0.5 MG/1
0.25 TABLET ORAL EVERY 6 HOURS PRN
Status: DISCONTINUED | OUTPATIENT
Start: 2019-08-14 | End: 2019-08-19 | Stop reason: HOSPADM

## 2019-08-14 RX ORDER — ACETAMINOPHEN 325 MG/1
650 TABLET ORAL EVERY 6 HOURS PRN
Status: DISCONTINUED | OUTPATIENT
Start: 2019-08-14 | End: 2019-08-19 | Stop reason: HOSPADM

## 2019-08-14 RX ADMIN — Medication 10 ML: at 22:00

## 2019-08-14 RX ADMIN — LORAZEPAM 0.25 MG: 0.5 TABLET ORAL at 21:52

## 2019-08-14 RX ADMIN — WATER 18 ML: 1 INJECTION INTRAMUSCULAR; INTRAVENOUS; SUBCUTANEOUS at 10:06

## 2019-08-14 RX ADMIN — LORAZEPAM 0.5 MG: 0.5 TABLET ORAL at 05:21

## 2019-08-14 RX ADMIN — ACETAMINOPHEN 650 MG: 325 TABLET, FILM COATED ORAL at 20:05

## 2019-08-14 RX ADMIN — WATER 18 ML: 1 INJECTION INTRAMUSCULAR; INTRAVENOUS; SUBCUTANEOUS at 21:51

## 2019-08-14 RX ADMIN — Medication 10 ML: at 10:06

## 2019-08-14 RX ADMIN — SKIN PROTECTANT: 44 OINTMENT TOPICAL at 22:00

## 2019-08-14 RX ADMIN — CHLOROTHIAZIDE SODIUM 125 MG: 500 INJECTION, POWDER, LYOPHILIZED, FOR SOLUTION INTRAVENOUS at 16:44

## 2019-08-14 RX ADMIN — SKIN PROTECTANT: 44 OINTMENT TOPICAL at 10:05

## 2019-08-14 RX ADMIN — BUMETANIDE 2 MG: 1 TABLET ORAL at 10:03

## 2019-08-14 RX ADMIN — CHLOROTHIAZIDE SODIUM 125 MG: 500 INJECTION, POWDER, LYOPHILIZED, FOR SOLUTION INTRAVENOUS at 10:04

## 2019-08-14 RX ADMIN — CHLOROTHIAZIDE SODIUM 125 MG: 500 INJECTION, POWDER, LYOPHILIZED, FOR SOLUTION INTRAVENOUS at 21:51

## 2019-08-14 RX ADMIN — WATER 18 ML: 1 INJECTION INTRAMUSCULAR; INTRAVENOUS; SUBCUTANEOUS at 16:52

## 2019-08-14 ASSESSMENT — PAIN DESCRIPTION - PAIN TYPE: TYPE: SURGICAL PAIN

## 2019-08-14 ASSESSMENT — PAIN SCALES - GENERAL
PAINLEVEL_OUTOF10: 0
PAINLEVEL_OUTOF10: 0
PAINLEVEL_OUTOF10: 5
PAINLEVEL_OUTOF10: 0
PAINLEVEL_OUTOF10: 8

## 2019-08-14 ASSESSMENT — PAIN DESCRIPTION - DESCRIPTORS: DESCRIPTORS: ACHING;DISCOMFORT

## 2019-08-14 ASSESSMENT — PAIN DESCRIPTION - PROGRESSION
CLINICAL_PROGRESSION: GRADUALLY WORSENING
CLINICAL_PROGRESSION: GRADUALLY WORSENING

## 2019-08-14 ASSESSMENT — PAIN DESCRIPTION - LOCATION: LOCATION: BACK

## 2019-08-14 ASSESSMENT — PAIN DESCRIPTION - ORIENTATION: ORIENTATION: RIGHT

## 2019-08-14 ASSESSMENT — PAIN DESCRIPTION - ONSET: ONSET: ON-GOING

## 2019-08-14 ASSESSMENT — PAIN DESCRIPTION - FREQUENCY: FREQUENCY: CONTINUOUS

## 2019-08-14 ASSESSMENT — PAIN - FUNCTIONAL ASSESSMENT: PAIN_FUNCTIONAL_ASSESSMENT: PREVENTS OR INTERFERES SOME ACTIVE ACTIVITIES AND ADLS

## 2019-08-14 NOTE — PROGRESS NOTES
[facility]  Family Medicine Attending    S: 68 y.o. 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 Concerns for acute on chronic HFrEF. Admitted to ICU, where she had improved. Today, patient is more awake. Was trying to remove BiPAP mask at the time of exam.  She was placed on nasal canula and tolerated this for the time of exam.  Had not been using BiPAP at night. No new symptoms or concerns. Pain under control. O: VS- Blood pressure 110/72, pulse 91, temperature 97.3 °F (36.3 °C), temperature source Temporal, resp. rate 16, height 5' 4\" (1.626 m), weight 185 lb 1.6 oz (84 kg), SpO2 99 %, not currently breastfeeding. Exam is as noted by resident with the following changes, additions or corrections:  Gen NAD, O2 by NC; pallor; A&A   CV RRR, systolic murmur  Resp coarse, decreased       Impressions: Active Problems:    Acute hypercapnic respiratory failure (HCC)    Altered mental status    Goals of care, counseling/discussion    Palliative care by specialist    Metastatic disease ([facility]  Resolved Problems:    * No resolved hospital problems. *      Plan:   Pulm, ID, and nephrology management and follow up appreciated. Pleurx planned when INR allows; follow after vitamin K, likely procedure today. Watch glucose. Diuresing as per nephrology recommendations with improving UO and symptomatic improvement; bumex now held with hypotension; did not need pressors. Mentation has improved. ID input appreciated. Positive BC, has port in place, recheck cultures and follow. Watch H&H and INR, now off coumadin. Palliative following. Oncology input appreciated. Continue to discuss goals of care.        Attending Physician Statement  I have reviewed

## 2019-08-14 NOTE — PROGRESS NOTES
Physical Therapy  Facility/Department: [address] PICU  Daily Treatment Note  NAME: Ze Charles  : 1945  MRN: [id number]    Date of Service: 2019    Evaluating PT:  Abiodun Steiner PT, DPT     Room #:  3201L  Diagnosis:  Acute hypercapnic respiratory failure   Reason for admission: unresponsive at [facility], hypoxic   Precautions:  Falls, O2  Procedures: none   Equipment recommendations:  [employer]     Pt admitted from [place] [address]. Stated she was to be starting with therapy soon but admitted to hospital before it got started. Ambulating with use of [employer]. Assist from staff for ADLs. Prior:   Pt lives alone in a 7th story apartment with 0 stair(s) to enter and 0 rail(s). Pt ambulated with rollator PTA. Pt independent for ADL performance.                Initial Evaluation  Date:  Treatment Date: 19 Short Term/ Long Term   Goals   AM-PAC 6 Clicks       Was pt agreeable to Eval/treatment? Yes  Yes       Does pt have pain?  Denies  No co pain      Bed Mobility  Rolling: NT  Supine to sit: MaxA  Sit to supine: MaxA  Scooting: MaxA Rolling: Min A  Supine to sit: mod A  Sit to supine: nt   Scooting: mod A to seated EOB  Gia   Transfers Sit to stand: NT  Stand to sit: NT  Stand pivot: NT  -pt politely refused  Sit to stand: Min A  Stand to sit: Min A  Stand pivot:   nt Gia   Ambulation    NT    6 feet fwd and bkw [employer] with Min A >50 ft with [employer] SBA   Stair negotiation: ascended and descended  NT  NT NA   ROM BUE:  See OT eval  BLE:  WFL       Strength BUE:  See OT eval  BLE grossly:  4-/5   4+/5   Balance Sitting EOB:  SBA/Gia  Dynamic Standing:  NT Sitting EOB:  SBA  Dynamic Standing: Min A with [employer] Sitting EOB:  Independent  Dynamic Standing:  SBA    Endurance  Fair    Good       Pt is alert and oriented x 3     Pt performed therapeutic exercise of the following: laq, hip flex, ankle pumps   Patient education  Pt was educated  transfers    Patient response to education:   Pt verbalized understanding Pt

## 2019-08-14 NOTE — PROGRESS NOTES
[id number]  INR:   Recent Labs     08/12/19  1059 08/13/19  1130 08/14/19  0855   INR 3.5 2.0 1.4       -----------------------------------------------------------------  RAD: Ct Abdomen Pelvis Wo Contrast Additional Contrast? None    Result Date: 8/8/2019  This examination and all examinations utilizing ionizing radiation at this facility are done so according to the ALARA (as low as reasonably achievable) principal for radiation dose reduction. Clinical indications: Abdominal pain. COMPARISON: CT Abdomen and Pelvis July 21, 2019. CT chest June 19, 2019. Axial, sagittal, and coronal computed tomography of the abdomen and pelvis was performed without contrast. FINDINGS: Bilateral pleural effusions are increasing in volume with contiguous atelectasis. There are enlarged lymph nodes along the right lateral chest wall measuring up to 2 cm. These were present on the recent CT of the chest. There is expansion and lytic process within the right lateral fourth rib. There are prior median sternotomy wires. The heart is enlarged. Previously described lung nodules are obscured due to the increasing pleural effusions and atelectatic change. Within the abdomen, there is mild ascites most concentrated in the pelvis. The amount of ascitic fluid is diminished markedly compared to the prior CT of July 21, 2019. Multiple space-occupying mass lesions in the right and left lobes of the liver contain central calcification and would be most suspicious for metastatic disease from mucinous adenocarcinoma such as from the colon. These are unchanged. Gallbladder is not visualized and is thought to be surgically absent. There is no pathologic dilation of the hepatobiliary tree. There is atrophy of the otherwise unremarkable pancreas. The spleen is negative for focal or diffuse lesion. Adrenal glands show no evidence of thickening or nodule. There are punctate calyceal calcifications within the kidneys.  There is no evidence of obstructive uropathy. There is no definite solid or cystic renal mass. There is diffuse third spacing into the subcutaneous tissues. There is no evidence of free air or gastrointestinal obstruction. There is calcification of the anterior omentum compatible with omental caking from peritoneal spread of malignancy and carcinomatosis. There is no evidence of free air or gastrointestinal obstruction. Within the pelvis, a Maurer catheter has evacuated the urinary bladder. Minimal free fluid in the pelvis. Diverticulosis of sigmoid colon without evidence of diverticulitis. There is third spacing into the subcutaneous tissues of the pelvis. There is mild diffuse bone demineralization. Degenerative spondylosis, mild rotoscoliosis and facet arthropathy are identified within the spine. There is no other evidence for lytic or blastic metastatic disease to the skeletal structures. There is calcification within the abdominal aorta and its branches which are otherwise unremarkable. IMPRESSIONS: Bilateral pleural effusions are increasing in volume with contiguous atelectasis. Enlarged lymph nodes along the right lateral chest wall measuring up to 2 cm. These were present on the recent CT of the chest. Expansive and lytic process within the right lateral fourth rib. Cardiomegaly. Previously described lung nodules are obscured due to the increasing pleural effusions and atelectatic change. Within the abdomen, there is mild ascites most concentrated in the pelvis. The amount of ascitic fluid is diminished markedly compared to the prior CT of July 21, 2019. Multiple space-occupying mass lesions in the right and left lobes of the liver contain central calcification and would be most suspicious for metastatic disease from mucinous adenocarcinoma such as from the colon. These are unchanged. Punctate nonobstructing calyceal calcifications within the kidneys. Diffuse third spacing into the subcutaneous tissues.  Calcification of the anterior omentum compatible with omental caking from peritoneal spread of malignancy and carcinomatosis. Ct Head Wo Contrast    Result Date: 2019  Patient MRN: [id number] : 1945 Age:  68 years Gender: Female Order Date: 2019 1:45 PM Exam: CT HEAD WO CONTRAST Number of Images: 495 views Indication:   ams  Comparison: Prior study from 07/15/2018 is available. Technique: Sequential axial CT of the head was obtained from the base of the skull to the vertex without IV contrast.  Radiation Output: CTDIvol 54.53 (mGy); DLP 1103.66 (mGy-cm) Findings: The study demonstrates the fourth ventricle to be midline. The posterior fossa appears to be normal. There is no mass, mass effect or midline shift. The ventricles, sulci and cisterns are normal in appearance for patient's age. The gray-white matter differentiation is preserved throughout. There is no acute intracranial hemorrhage. No extra-axial fluid collection is identified. There is atherosclerotic change of vertebral basilar and cavernous carotid artery with calcified plaque. The bony calvarium is intact. The visualized portion of the paranasal sinuses and the mastoid air cells are clear. There is no focal extracranial soft tissue swelling. The study is unchanged from prior CT. NO ACUTE INTRACRANIAL PROCESS     Xr Chest Portable    Result Date: 2019  Patient MRN: [id number] : 1945 Age:  68 years Gender: Female Order Date: 2019 9:15 AM Exam: XR CHEST PORTABLE Number of Images: 1 view Indication:   SOB SOB Comparison: Prior chest radiograph from 2019 is available Findings: Study demonstrate cardiomegaly with median sternotomy. There is moderate to large right-sided pleural effusion which is unchanged from prior study. There is mild pulmonary venous congestion. There is a right internal jugular catheter with tip in superior vena cava. The bony thorax demonstrate osteopenia. There is osteoarthritic changes of the thoracic spine.  Centimeters There are multiple surgical clips in the left axillary region. Cardiomegaly Moderate to large right-sided pleural effusion Support lines appears to be in satisfactory position     Xr Chest Portable    Result Date: 2019  Patient MRN: [id number] : 1945 Age:  68 years Gender: Female Order Date: 2019 1:45 PM Exam: XR CHEST PORTABLE Number of Images: 1 view Indication:  Weakness, acute neurodeficits Comparison: Anterior upright chest 2019 Findings: The patient is severely lordotic on this AP image, and is morbidly obese. This limits detail. There is a large and new right-sided pleural effusion, associated with atelectasis. Density in the left lower chest is more chronic, and could include pleural fluid or atelectasis. Cardiovascular shadows are unchanged in appearance, with limited visualization due to positioning. . There is no evidence of cardiac decompensation. Skeletal structures show no evidence of acute pathology. Degenerative changes of the spine and right shoulder are noted, and there is a segmented appearance consistent with an ununited old fracture of the proximal left humerus. Overlying EKG leads are present, and the right jugular triple-lumen catheter extends to the upper right atrial level. Sternotomy wires are present. There is new large right-sided pleural effusion with some associated atelectasis. Other incidental findings listed above are unchanged. Objective:   Vitals: /72   Pulse 91   Temp 97.3 °F (36.3 °C) (Temporal)   Resp 16   Ht 5' 4\" (1.626 m)   Wt 185 lb 1.6 oz (84 kg)   SpO2 99%   BMI 31.77 kg/m²   General appearance: appears stated age   Skin:  No rashes or lesions  HEENT: Head: Normocephalic, no lesions, without obvious abnormality.   Neck: no adenopathy, no carotid bruit, no JVD, supple, symmetrical, trachea midline and thyroid not enlarged, symmetric, no tenderness/mass/nodules  Lungs: diminished breath sounds bibasilar  Heart: regular rate and rhythm, Nonischemic cardiomyopathy (HCC)     Status post coronary artery bypass graft     Class 2 severe obesity due to excess calories with serious comorbidity and body mass index (BMI) of 35.0 to 35.9 in adult [facility])     Type 2 diabetes mellitus with hyperglycemia (HCC)     Acute hypercapnic respiratory failure (HCC)     Altered mental status     Goals of care, counseling/discussion     Palliative care by specialist     Metastatic disease ([facility] [address]    Plan:   1. Acute kidney injury stage I hemodynamically mediated due to decreased effective renal perfusion in the setting of moderate LV dysfunction.  Intermittent hypotension contributory              good u/o cr improving      2. Hyperkalemia due  to the combination of acute kidney injury, acidosis;  component of dietary source              -Now resolved; contnue to monitor; , on Veltassa -- will hold veltassa     3. Mixed respiratory acidosis and metabolic alkalosis; on presentation; on BIPAP mask              -intermittent BIPAP mask     4. Metastatic breast CA; refractory to chemotx     5. Acute hypoxic hypercarbic respiratory failure, continue NIV              -  6. Anemia due to malignancy and chronic kidney disease     7. Volume overload in the setting of moderate LV dysfunction.  LV dysfunction adverse effect from chemotherapy                   8. Altered mental status on initial presentation due to hypercarbia. ; now back to baseline     9.  Hyperphosphatemia, suspect secondary HPT due to renal disease - improving       HENRY pl effusions --  , on po  bumex and IV diuril u/0> 3 liters              Pedro Deal

## 2019-08-15 ENCOUNTER — APPOINTMENT (OUTPATIENT)
Dept: GENERAL RADIOLOGY | Age: 74
DRG: 189 | End: 2019-08-15
Payer: COMMERCIAL

## 2019-08-15 ENCOUNTER — APPOINTMENT (OUTPATIENT)
Dept: ULTRASOUND IMAGING | Age: 74
DRG: 189 | End: 2019-08-15
Payer: COMMERCIAL

## 2019-08-15 PROBLEM — E44.0 MODERATE PROTEIN-CALORIE MALNUTRITION (HCC): Status: ACTIVE | Noted: 2019-08-15

## 2019-08-15 LAB
ANION GAP SERPL CALCULATED.3IONS-SCNC: 9 MMOL/L (ref 7–16)
ANISOCYTOSIS: ABNORMAL
BASOPHILS ABSOLUTE: 0.06 E9/L (ref 0–0.2)
BASOPHILS RELATIVE PERCENT: 1.7 % (ref 0–2)
BUN BLDV-MCNC: 47 MG/DL (ref 8–23)
CALCIUM SERPL-MCNC: 7.1 MG/DL (ref 8.6–10.2)
CHLORIDE BLD-SCNC: 89 MMOL/L (ref 98–107)
CO2: 46 MMOL/L (ref 22–29)
CREAT SERPL-MCNC: 1.7 MG/DL (ref 0.5–1)
EOSINOPHILS ABSOLUTE: 0 E9/L (ref 0.05–0.5)
EOSINOPHILS RELATIVE PERCENT: 1.6 % (ref 0–6)
GFR AFRICAN AMERICAN: 36
GFR NON-AFRICAN AMERICAN: 29 ML/MIN/1.73
GLUCOSE BLD-MCNC: 140 MG/DL (ref 74–99)
HCT VFR BLD CALC: 27.5 % (ref 34–48)
HEMOGLOBIN: 8.1 G/DL (ref 11.5–15.5)
HYPOCHROMIA: ABNORMAL
INR BLD: 1.5
LYMPHOCYTES ABSOLUTE: 0.07 E9/L (ref 1.5–4)
LYMPHOCYTES RELATIVE PERCENT: 1.7 % (ref 20–42)
MAGNESIUM: 1.5 MG/DL (ref 1.6–2.6)
MCH RBC QN AUTO: 30 PG (ref 26–35)
MCHC RBC AUTO-ENTMCNC: 29.5 % (ref 32–34.5)
MCV RBC AUTO: 101.9 FL (ref 80–99.9)
METER GLUCOSE: 121 MG/DL (ref 74–99)
METER GLUCOSE: 126 MG/DL (ref 74–99)
METER GLUCOSE: 212 MG/DL (ref 74–99)
METER GLUCOSE: 215 MG/DL (ref 74–99)
METER GLUCOSE: 365 MG/DL (ref 74–99)
MONOCYTES ABSOLUTE: 0.15 E9/L (ref 0.1–0.95)
MONOCYTES RELATIVE PERCENT: 4.3 % (ref 2–12)
NEUTROPHILS ABSOLUTE: 3.4 E9/L (ref 1.8–7.3)
NEUTROPHILS RELATIVE PERCENT: 92.2 % (ref 43–80)
PDW BLD-RTO: 20 FL (ref 11.5–15)
PHOSPHORUS: 3.1 MG/DL (ref 2.5–4.5)
PLATELET # BLD: 214 E9/L (ref 130–450)
PMV BLD AUTO: 10.6 FL (ref 7–12)
POIKILOCYTES: ABNORMAL
POLYCHROMASIA: ABNORMAL
POTASSIUM SERPL-SCNC: 3.6 MMOL/L (ref 3.5–5)
PROTHROMBIN TIME: 16.4 SEC (ref 9.3–12.4)
RBC # BLD: 2.7 E12/L (ref 3.5–5.5)
SODIUM BLD-SCNC: 144 MMOL/L (ref 132–146)
TARGET CELLS: ABNORMAL
TROPONIN: 0.3 NG/ML (ref 0–0.03)
WBC # BLD: 3.7 E9/L (ref 4.5–11.5)

## 2019-08-15 PROCEDURE — 6360000002 HC RX W HCPCS: Performed by: INTERNAL MEDICINE

## 2019-08-15 PROCEDURE — 85025 COMPLETE CBC W/AUTO DIFF WBC: CPT

## 2019-08-15 PROCEDURE — 6370000000 HC RX 637 (ALT 250 FOR IP): Performed by: INTERNAL MEDICINE

## 2019-08-15 PROCEDURE — 93971 EXTREMITY STUDY: CPT

## 2019-08-15 PROCEDURE — 80048 BASIC METABOLIC PNL TOTAL CA: CPT

## 2019-08-15 PROCEDURE — 97112 NEUROMUSCULAR REEDUCATION: CPT

## 2019-08-15 PROCEDURE — 6370000000 HC RX 637 (ALT 250 FOR IP): Performed by: STUDENT IN AN ORGANIZED HEALTH CARE EDUCATION/TRAINING PROGRAM

## 2019-08-15 PROCEDURE — 2060000000 HC ICU INTERMEDIATE R&B

## 2019-08-15 PROCEDURE — 99231 SBSQ HOSP IP/OBS SF/LOW 25: CPT | Performed by: NURSE PRACTITIONER

## 2019-08-15 PROCEDURE — 82962 GLUCOSE BLOOD TEST: CPT

## 2019-08-15 PROCEDURE — 85610 PROTHROMBIN TIME: CPT

## 2019-08-15 PROCEDURE — 97535 SELF CARE MNGMENT TRAINING: CPT

## 2019-08-15 PROCEDURE — 2700000000 HC OXYGEN THERAPY PER DAY

## 2019-08-15 PROCEDURE — 84100 ASSAY OF PHOSPHORUS: CPT

## 2019-08-15 PROCEDURE — 6360000002 HC RX W HCPCS: Performed by: STUDENT IN AN ORGANIZED HEALTH CARE EDUCATION/TRAINING PROGRAM

## 2019-08-15 PROCEDURE — 36415 COLL VENOUS BLD VENIPUNCTURE: CPT

## 2019-08-15 PROCEDURE — 71045 X-RAY EXAM CHEST 1 VIEW: CPT

## 2019-08-15 PROCEDURE — 84484 ASSAY OF TROPONIN QUANT: CPT

## 2019-08-15 PROCEDURE — 99232 SBSQ HOSP IP/OBS MODERATE 35: CPT | Performed by: FAMILY MEDICINE

## 2019-08-15 PROCEDURE — 2580000003 HC RX 258: Performed by: INTERNAL MEDICINE

## 2019-08-15 PROCEDURE — 94660 CPAP INITIATION&MGMT: CPT

## 2019-08-15 PROCEDURE — 83735 ASSAY OF MAGNESIUM: CPT

## 2019-08-15 PROCEDURE — 99239 HOSP IP/OBS DSCHRG MGMT >30: CPT | Performed by: INTERNAL MEDICINE

## 2019-08-15 RX ORDER — MAGNESIUM SULFATE IN WATER 40 MG/ML
2 INJECTION, SOLUTION INTRAVENOUS ONCE
Status: COMPLETED | OUTPATIENT
Start: 2019-08-15 | End: 2019-08-15

## 2019-08-15 RX ORDER — PREDNISONE 20 MG/1
40 TABLET ORAL
Status: DISCONTINUED | OUTPATIENT
Start: 2019-08-15 | End: 2019-08-16

## 2019-08-15 RX ORDER — ACETAZOLAMIDE 500 MG/5ML
250 INJECTION, POWDER, LYOPHILIZED, FOR SOLUTION INTRAVENOUS EVERY 12 HOURS
Status: COMPLETED | OUTPATIENT
Start: 2019-08-15 | End: 2019-08-16

## 2019-08-15 RX ORDER — WARFARIN SODIUM 1 MG/1
1 TABLET ORAL
Status: COMPLETED | OUTPATIENT
Start: 2019-08-15 | End: 2019-08-15

## 2019-08-15 RX ORDER — INSULIN GLARGINE 100 [IU]/ML
5 INJECTION, SOLUTION SUBCUTANEOUS NIGHTLY
Status: DISCONTINUED | OUTPATIENT
Start: 2019-08-15 | End: 2019-08-16

## 2019-08-15 RX ADMIN — WATER 18 ML: 1 INJECTION INTRAMUSCULAR; INTRAVENOUS; SUBCUTANEOUS at 21:49

## 2019-08-15 RX ADMIN — PREDNISONE 40 MG: 20 TABLET ORAL at 13:36

## 2019-08-15 RX ADMIN — MAGNESIUM SULFATE 2 G: 2 INJECTION INTRAVENOUS at 08:40

## 2019-08-15 RX ADMIN — WATER 18 ML: 1 INJECTION INTRAMUSCULAR; INTRAVENOUS; SUBCUTANEOUS at 10:14

## 2019-08-15 RX ADMIN — CHLOROTHIAZIDE SODIUM 125 MG: 500 INJECTION, POWDER, LYOPHILIZED, FOR SOLUTION INTRAVENOUS at 21:49

## 2019-08-15 RX ADMIN — LORAZEPAM 0.25 MG: 0.5 TABLET ORAL at 22:10

## 2019-08-15 RX ADMIN — Medication 10 ML: at 22:10

## 2019-08-15 RX ADMIN — INSULIN LISPRO 2 UNITS: 100 INJECTION, SOLUTION INTRAVENOUS; SUBCUTANEOUS at 17:40

## 2019-08-15 RX ADMIN — LORAZEPAM 0.25 MG: 0.5 TABLET ORAL at 11:41

## 2019-08-15 RX ADMIN — LORAZEPAM 0.25 MG: 0.5 TABLET ORAL at 04:22

## 2019-08-15 RX ADMIN — Medication 10 ML: at 04:35

## 2019-08-15 RX ADMIN — INSULIN LISPRO 2 UNITS: 100 INJECTION, SOLUTION INTRAVENOUS; SUBCUTANEOUS at 11:20

## 2019-08-15 RX ADMIN — INSULIN GLARGINE 5 UNITS: 100 INJECTION, SOLUTION SUBCUTANEOUS at 22:11

## 2019-08-15 RX ADMIN — CHLOROTHIAZIDE SODIUM 125 MG: 500 INJECTION, POWDER, LYOPHILIZED, FOR SOLUTION INTRAVENOUS at 10:14

## 2019-08-15 RX ADMIN — SKIN PROTECTANT: 44 OINTMENT TOPICAL at 08:40

## 2019-08-15 RX ADMIN — PATIROMER 8.4 G: 8.4 POWDER, FOR SUSPENSION ORAL at 08:40

## 2019-08-15 RX ADMIN — SKIN PROTECTANT: 44 OINTMENT TOPICAL at 22:10

## 2019-08-15 RX ADMIN — WARFARIN SODIUM 1 MG: 1 TABLET ORAL at 18:53

## 2019-08-15 RX ADMIN — Medication 10 ML: at 08:44

## 2019-08-15 RX ADMIN — CHLOROTHIAZIDE SODIUM 125 MG: 500 INJECTION, POWDER, LYOPHILIZED, FOR SOLUTION INTRAVENOUS at 13:35

## 2019-08-15 RX ADMIN — Medication 10 ML: at 04:22

## 2019-08-15 RX ADMIN — WATER 18 ML: 1 INJECTION INTRAMUSCULAR; INTRAVENOUS; SUBCUTANEOUS at 13:36

## 2019-08-15 RX ADMIN — BUMETANIDE 2 MG: 1 TABLET ORAL at 08:40

## 2019-08-15 ASSESSMENT — PAIN SCALES - GENERAL
PAINLEVEL_OUTOF10: 0
PAINLEVEL_OUTOF10: 0

## 2019-08-15 NOTE — PROGRESS NOTES
[address] [facility]  Family Medicine Attending    S: 68 y.o. 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 Concerns for acute on chronic HFrEF. Admitted to ICU, where she had improved. Today, patient is more awake. Has not been tolerating BiPAP mask. Breathing is stable. Pain controlled overall, but left great toe pain, severe, this AM.   Has Pleurx placed yesterday; tolerated this well. O: VS- Blood pressure (!) 112/57, pulse 89, temperature 96.6 °F (35.9 °C), temperature source Temporal, resp. rate 13, height 5' 4\" (1.626 m), weight 178 lb 8 oz (81 kg), SpO2 90 %, not currently breastfeeding. Exam is as noted by resident with the following changes, additions or corrections:  Gen NAD, O2 by NC; pallor; A&A   CV RRR, harsh systolic murmur  Resp coarse, decreased    Ext 1-2+ edema, improved overall. Left great toe with redness, warmth, and tenderness with some effusion at the MTP. Last uric acid level 13.9     Impressions: Active Problems:    Acute hypercapnic respiratory failure (HCC)    Altered mental status    Goals of care, counseling/discussion    Palliative care by specialist    Metastatic disease ([facility] [address]    Moderate protein-calorie malnutrition ([facility] [address]  Resolved Problems:    * No resolved hospital problems. *      Plan:   Pulm, ID, and nephrology management and follow up appreciated. Pleurx placed; resume coumadin. Watch glucose. Diuresed as per nephrology recommendations with improvement, now oral bumex. Mentation has improved. ID input appreciated. Positive BC, has port in place, recheck cultures and follow. Watch H&H. Palliative following. Oncology input appreciated. Continue to discuss goals of care. Disposition planning.   Likely

## 2019-08-15 NOTE — PROGRESS NOTES
Palliative Medicine   Progress Note  Nurse practitioner      8/10/19  Admit Date: 8/8/2019    KARISHMA/CKD  Acute resp failure  pleural effusion  metastatic breast cancer to her liver and lungs and bone    Plan/Recommendations:  1. Not sure if Patient/family understood previous conversations with oncology regarding prognosis. They would like to know from oncology if continuing chemo is an option and if it would extend patient's life in a meaningful way or if should they move forward with hospice care. (still a concern today, they will follow up with oncology once outpatient do discuss)  2. Support given  3. Will continue to discuss goals as needed  4. D/c plan fior vs LTAC    CC: Respiratory failure     HPI :  This very pleasant 80-year-old  female with an extensive past medical history of metastatic breast cancer to liver, lungs, and bone, as well as stage III kidney injury that appears acute, coronary artery disease status post CABG, heart failure with reduced ejection fraction between 30 and 35%, insulin-dependent diabetes mellitus, recurrent DVTs and is currently anticoagulated on warfarin, presented to the emergency room with altered mental status and worsening respiratory distress. She was placed on BiPAP in the ED and then brought to the MICU for further evaluation. CT scan of the chest revealed what appears to be extremely large pleural effusions left side greater than right side with continued invasion of her lung cancer. She stated that she is currently on chemotherapy and would like to remain on it at this time. She is denying any significant pain or discomfort. Her only complaint at this time is that her \"arms and legs are filling with fluid \". She also stated that she is extremely tired and weak. She is not sure if she can continue chemotherapy, given her continued weakness.   The patient's 2 daughters are present in the room and discussions ensued regarding her current medical condition, and we Labs     CBC:   Lab Results   Component Value Date    WBC 3.7 08/15/2019    RBC 2.70 08/15/2019    HGB 8.1 08/15/2019    HCT 27.5 08/15/2019     08/15/2019    .9 08/15/2019     BMP:    Lab Results   Component Value Date     08/15/2019    K 3.6 08/15/2019    K 3.8 08/13/2019    CL 89 08/15/2019    CO2 46 08/15/2019    BUN 47 08/15/2019    CREATININE 1.7 08/15/2019    GLUCOSE 140 08/15/2019    GLUCOSE 109 04/20/2012    CALCIUM 7.1 08/15/2019     PT/INR:    Lab Results   Component Value Date    PROTIME 16.2 08/14/2019    PROTIME 39 08/02/2019    INR 1.4 08/14/2019       Notable Cultures:        Imaging Studies:      Active Problems:    Acute hypercapnic respiratory failure (HCC)    Altered mental status    Goals of care, counseling/discussion    Palliative care by specialist    Metastatic disease ([facility] [address]  Resolved Problems:    * No resolved hospital problems. Donta La RN, CNP 8/15/2019 12:51 PM    Time Spent:15  More than 50% of this interaction was related to counseling or information given regarding goals of care, code status and symptom management. Patient assessment and plan discussed with patient, family, floor nurse. Thank you for allowing us to work with you in the care of this patient.

## 2019-08-15 NOTE — PROGRESS NOTES
Result   Cardiomegaly   Tortuous aorta    There are patchy infiltrates seen throughout both the lung fields. Pneumonia could give this appearance. Underlying edema could also have   this appearance, with bilateral pleural effusions   The chest does not appear to be significantly changed in the interval                  XR CHEST PORTABLE   Final Result   Cardiomegaly   Tortuous aorta    There are patchy infiltrates seen throughout both the lung fields. Pneumonia could give this appearance. Underlying edema could also have   this appearance. These findings are superimposed on bilateral pleural   effusions   The chest does not appear to be significantly changed in the interval                  XR CHEST PORTABLE   Final Result      Cardiomegaly      Moderate to large right-sided pleural effusion      Support lines appears to be in satisfactory position         CT Head WO Contrast   Final Result      NO ACUTE INTRACRANIAL PROCESS         CT ABDOMEN PELVIS WO CONTRAST Additional Contrast? None   Final Result      XR CHEST PORTABLE   Final Result   There is new large right-sided pleural effusion with some associated   atelectasis. Other incidental findings listed above are unchanged. US THORACENTESIS    (Results Pending)   US THORACENTESIS    (Results Pending)   US THORACENTESIS    (Results Pending)       A/P:  Active Problems:    Acute hypercapnic respiratory failure ([address]    Altered mental status    Goals of care, counseling/discussion    Palliative care by specialist    Metastatic disease ([address]  Resolved Problems:    * No resolved hospital problems.  *        Neurologic  Altered mental status   Metabolic encephalopathy   Obtunded secondary to hypercapnia  - Mostly back to baseline but does wax and wane  -We will hold her psych meds at this time  -Monitor mentation  -Decrease Ativan to 0.25 as needed as with 0.5 as needed, she wiuld become lethargic for prolonged period of time     Cardiovascular  HFrEF NYHA III

## 2019-08-15 NOTE — PROGRESS NOTES
carotid bruits, no murmurs, no gallops, no carotid bruits and no JVD   Abdomen: obese, soft, non-tender, non-distended, normal bowel sounds, no masses or organomegaly   Extremities:no edema   Musculoskeletal: normal range of motion, no joint swelling, deformity or tenderness   Neurologic: reflexes normal and symmetric, no cranial nerve deficit noted    LABS/IMAGING:    CBC:  Lab Results   Component Value Date    WBC 4.9 08/14/2019    HGB 8.3 (L) 08/14/2019    HCT 28.0 (L) 08/14/2019    .1 (H) 08/14/2019     08/14/2019    LYMPHOPCT 4.7 (L) 08/14/2019    RBC 2.77 (L) 08/14/2019    MCH 30.0 08/14/2019    MCHC 29.6 (L) 08/14/2019    RDW 19.9 (H) 08/14/2019    NEUTOPHILPCT 84.8 (H) 08/14/2019    MONOPCT 9.3 08/14/2019    BASOPCT 0.4 08/14/2019    NEUTROABS 4.12 08/14/2019    LYMPHSABS 0.23 (L) 08/14/2019    MONOSABS 0.45 08/14/2019    EOSABS 0.02 (L) 08/14/2019    BASOSABS 0.02 08/14/2019       Recent Labs     08/14/19  0456 08/13/19  0816 08/12/19  1608 08/12/19  0455   WBC 4.9 3.6*  --  3.4*   HGB 8.3* 8.1* 8.3* 8.3*   HCT 28.0* 27.4* 27.4* 27.3*   .1* 98.2  --  97.5    269  --  298       BMP:   Recent Labs     08/12/19  0455 08/13/19  0816 08/14/19  0456    143 145   K 4.4 3.8 3.9   CL 90* 91* 91*   CO2 35* 37* 41*   PHOS 5.5* 4.2 4.0   BUN 55* 53* 51*   CREATININE 2.7* 2.2* 1.9*       MG:   Lab Results   Component Value Date    MG 1.6 08/14/2019     Ca/Phos:   Lab Results   Component Value Date    CALCIUM 7.2 (L) 08/14/2019    PHOS 4.0 08/14/2019     Amylase: No results found for: AMYLASE  Lipase: No results found for: LIPASE  LIVER PROFILE:   No results for input(s): AST, ALT, LIPASE, BILIDIR, BILITOT, ALKPHOS in the last 72 hours. Invalid input(s): AMYLASE,  ALB    PT/INR:   Recent Labs     08/12/19  1059 08/13/19  1130 08/14/19  0855   PROTIME 38.5* 23.0* 16.2*   INR 3.5 2.0 1.4     APTT:   No results for input(s): APTT in the last 72 hours.     Cardiac Enzymes:  Lab Results Component Value Date    CKTOTAL 102 08/11/2019    CKMB 1.9 08/11/2019    TROPONINI 0.14 (H) 08/13/2019                   PROBLEM LIST:  Patient Active Problem List   Diagnosis    Metastatic breast cancer ([address]    Essential hypertension    Coronary artery disease involving native coronary artery of native heart without angina pectoris    Nephrolithiasis    CH[facility] (congestive heart failure) ([facility])    Humerus fracture    Shoulder pain, left    B12 deficiency    Hyperlipidemia    Rib lesion    Subclavian vein occlusion, bilateral ([facility])    Pericardial effusion    MI (myocardial infarction) ([address]    Arthritis    Sarcoidosis    Lymph node enlargement    Anesthesia    Rectal bleeding    Ventral hernia    Type 2 diabetes mellitus without complication, with long-term current use of insulin ([facility])    Anemia of chronic disease    Chronic deep vein thrombosis (DVT) of proximal vein of right lower extremity ([facility])    Bilateral edema of lower extremity    Peripheral polyneuropathy    Complicated UTI (urinary tract infection)    KARISHMA (acute kidney injury) ([address]    Diarrhea with dehydration    Chronic anticoagulation    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    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    Diabetic polyneuropathy associated with type 2 diabetes mellitus ([facility])    Leg swelling    History of deep vein thrombosis    Chronic venous insufficiency    Lymphedema of both lower extremities    Decreased dorsalis pedis pulse    Ambulatory dysfunction    CKD (chronic kidney disease) stage 3, GFR 30-59 ml/min ([facility])    Charcot's joint of foot in type 2 diabetes mellitus ([address]    Ascites    Palliative care encounter    Cancer associated pain    HAP (hospital-acquired pneumonia)    Acute respiratory failure with hypoxia ([address]    Acute systolic heart failure (HCC)    Nonischemic cardiomyopathy ([address]    Status post coronary artery bypass graft   

## 2019-08-15 NOTE — PROGRESS NOTES
osteopenia. There is osteoarthritic changes of the thoracic spine. Centimeters There are multiple surgical clips in the left axillary region. Cardiomegaly Moderate to large right-sided pleural effusion Support lines appears to be in satisfactory position     Xr Chest Portable    Result Date: 2019  Patient MRN: [id number] : 1945 Age:  68 years Gender: Female Order Date: 2019 1:45 PM Exam: XR CHEST PORTABLE Number of Images: 1 view Indication:  Weakness, acute neurodeficits Comparison: Anterior upright chest 2019 Findings: The patient is severely lordotic on this AP image, and is morbidly obese. This limits detail. There is a large and new right-sided pleural effusion, associated with atelectasis. Density in the left lower chest is more chronic, and could include pleural fluid or atelectasis. Cardiovascular shadows are unchanged in appearance, with limited visualization due to positioning. . There is no evidence of cardiac decompensation. Skeletal structures show no evidence of acute pathology. Degenerative changes of the spine and right shoulder are noted, and there is a segmented appearance consistent with an ununited old fracture of the proximal left humerus. Overlying EKG leads are present, and the right jugular triple-lumen catheter extends to the upper right atrial level. Sternotomy wires are present. There is new large right-sided pleural effusion with some associated atelectasis. Other incidental findings listed above are unchanged. Objective:   Vitals: BP (!) 112/57   Pulse 89   Temp 96.6 °F (35.9 °C) (Temporal)   Resp 13   Ht 5' 4\" (1.626 m)   Wt 178 lb 8 oz (81 kg)   SpO2 90%   BMI 30.64 kg/m²   General appearance: appears stated age   Skin:  No rashes or lesions  HEENT: Head: Normocephalic, no lesions, without obvious abnormality.   Neck: no adenopathy, no carotid bruit, no JVD, supple, symmetrical, trachea midline and thyroid not enlarged, symmetric, no Acute respiratory failure with hypoxia (HCC)     Acute systolic heart failure (HCC)     Nonischemic cardiomyopathy (HCC)     Status post coronary artery bypass graft     Class 2 severe obesity due to excess calories with serious comorbidity and body mass index (BMI) of 35.0 to 35.9 in adult [facility])     Type 2 diabetes mellitus with hyperglycemia (HCC)     Acute hypercapnic respiratory failure (HCC)     Altered mental status     Goals of care, counseling/discussion     Palliative care by specialist     Metastatic disease ([address] [address]    Plan:   1. Acute kidney injury stage I hemodynamically mediated due to decreased effective renal perfusion in the setting of moderate LV dysfunction.  Intermittent hypotension contributory              good u/o cr improving      2. Hyperkalemia due  to the combination of acute kidney injury, acidosis;  component of dietary source              -Now resolved; contnue to monitor;      3. Mixed respiratory acidosis and metabolic alkalosis; on presentation; on BIPAP mask              -intermittent BIPAP mask     4. Metastatic breast CA; refractory to chemotx     5. Acute hypoxic hypercarbic respiratory failure, with recurrent R sided pleural effusion; Less O2 requirement    -had R sided Pleur X catheter insertion 8/14; yield 1 L serous fluid              -  6. Anemia due to malignancy and chronic kidney disease; stable H/H; continue to monitor     7. Volume overload in the setting of moderate LV dysfunction.  LV dysfunction adverse effect from chemotherapy; improved in responsive to aggressive diuresis; will stop diuril .                   8. Altered mental status on initial presentation due to hypercarbia. ; now back to baseline     9. Hyperphosphatemia, suspect secondary HPT due to renal disease - improving     10. Worsening metabolic alkalosis diuretic induced; still with signs of volume overload; will give few doses of acetazolamide;   11.  Hypomagnesemia, diuretic induced, supplement       Romi Infante MD

## 2019-08-15 NOTE — CARE COORDINATION
Spoke with daughter, Sierra Allen, she is agreeable to referral to [facility]. Referral initiated. They are starting precert now. [facility] Marte following, they can accept if insurance denies LTAC. Updated daughter on back up plan.

## 2019-08-15 NOTE — PROGRESS NOTES
Nutrition Assessment    Type and Reason for Visit: Initial    Nutrition Recommendations: Continue current diet, Start Magic Cup BID    Nutrition Assessment: Pt moderately malnourished w/ poor PO intake since adm w/ noted fat loss. At risk for further compromise d/t increased nutrient needs for metastatic breast CA. Will provide ONS and monitor    Malnutrition Assessment:  · Malnutrition Status: Meets the criteria for moderate malnutrition  · Context: Acute illness or injury  · Findings of the 6 clinical characteristics of malnutrition (Minimum of 2 out of 6 clinical characteristics is required to make the diagnosis of moderate or severe Protein Calorie Malnutrition based on AND/ASPEN Guidelines):  1. Energy Intake-Less than or equal to 50% of estimated energy requirement, Greater than or equal to 7 days    2. Weight Loss-Unable to assess(d/t fluids)    3. Fat Loss-Mild subcutaneous fat loss, Orbital  4. Muscle Loss-No significant muscle mass loss    5. Fluid Accumulation-No significant fluid accumulation    6.  Strength-Not measured    Nutrition Risk Level: Moderate    Nutrient Needs:  · Estimated Daily Total Kcal: (MSJ 1304 x 1.2 SF)  · Estimated Daily Protein (g): 70-80(1.3-1.5 gm/kg IBW)  · Estimated Daily Total Fluid (ml/day):     Nutrition Diagnosis:   · Problem:  Moderate malnutrition, In context of acute illness or injury  · Etiology: related to Early satiety(2/2 recurrent ascites)     Signs and symptoms:  as evidenced by Intake 0-25%, Diet history of poor intake, Mild loss of subcutaneous fat    Objective Information:  · Nutrition-Focused Physical Findings: intermittent confusion, edentulous, soft abd, active BS, closed suction drain RUQ, recurrent ascites, +1-3 edema, -I/Os  · Wound Type: None  · Current Nutrition Therapies:  · Oral Diet Orders: Cardiac   · Oral Diet intake: 1-25%(per doc flow)  · Oral Nutrition Supplement (ONS) Orders: None  · Anthropometric Measures:  · Ht: 5' 4\" (162.6 cm)   · Current Body Wt: 178 lb (80.7 kg)(bed scale 8/15)  · Admission Body Wt: 212 lb (96.2 kg)(actual 8/8)  · Usual Body Wt: (wt fluctuations per EMR hx, 197-217# over last year)  · % Weight Change:  noted wt fluctuations w/ overall loss of 24# since adm w/ current fluids -15L at this time  · Ideal Body Wt: 120 lb (54.4 kg), % Ideal Body 148%  · BMI Classification: BMI 30.0 - 34.9 Obese Class I    Nutrition Interventions:   Continued Inpatient Monitoring, Education Initiated, Coordination of Care(Reviewed ONS options/preferences)    Nutrition Evaluation:   · Evaluation: Goals set   · Goals: Consume >50% meals/ONS    · Monitoring: Meal Intake, Supplement Intake, Diet Tolerance, I&O, Mental Status/Confusion, Weight, Pertinent Labs, Monitor Bowel Function      Electronically signed by Jamie Gonzalez MS, RD, LD on 8/15/19 at 3:20 PM    Contact Number: [phone]

## 2019-08-16 LAB
ANION GAP SERPL CALCULATED.3IONS-SCNC: 15 MMOL/L (ref 7–16)
ANISOCYTOSIS: ABNORMAL
APTT: 28.2 SEC (ref 24.5–35.1)
BASOPHILS ABSOLUTE: 0 E9/L (ref 0–0.2)
BASOPHILS RELATIVE PERCENT: 0 % (ref 0–2)
BUN BLDV-MCNC: 43 MG/DL (ref 8–23)
CALCIUM SERPL-MCNC: 7.3 MG/DL (ref 8.6–10.2)
CHLORIDE BLD-SCNC: 90 MMOL/L (ref 98–107)
CO2: 39 MMOL/L (ref 22–29)
CREAT SERPL-MCNC: 1.5 MG/DL (ref 0.5–1)
EKG ATRIAL RATE: 141 BPM
EKG ATRIAL RATE: 96 BPM
EKG P AXIS: -6 DEGREES
EKG P AXIS: 56 DEGREES
EKG P-R INTERVAL: 120 MS
EKG P-R INTERVAL: 150 MS
EKG Q-T INTERVAL: 344 MS
EKG Q-T INTERVAL: 412 MS
EKG QRS DURATION: 142 MS
EKG QRS DURATION: 142 MS
EKG QTC CALCULATION (BAZETT): 520 MS
EKG QTC CALCULATION (BAZETT): 525 MS
EKG R AXIS: -111 DEGREES
EKG R AXIS: -81 DEGREES
EKG T AXIS: 12 DEGREES
EKG T AXIS: 22 DEGREES
EKG VENTRICULAR RATE: 140 BPM
EKG VENTRICULAR RATE: 96 BPM
EOSINOPHILS ABSOLUTE: 0 E9/L (ref 0.05–0.5)
EOSINOPHILS RELATIVE PERCENT: 0 % (ref 0–6)
GFR AFRICAN AMERICAN: 41
GFR NON-AFRICAN AMERICAN: 34 ML/MIN/1.73
GLUCOSE BLD-MCNC: 290 MG/DL (ref 74–99)
HCT VFR BLD CALC: 26.3 % (ref 34–48)
HEMOGLOBIN: 7.8 G/DL (ref 11.5–15.5)
HYPOCHROMIA: ABNORMAL
INR BLD: 1.4
LYMPHOCYTES ABSOLUTE: 0.16 E9/L (ref 1.5–4)
LYMPHOCYTES RELATIVE PERCENT: 3.5 % (ref 20–42)
MAGNESIUM: 1.5 MG/DL (ref 1.6–2.6)
MCH RBC QN AUTO: 30.1 PG (ref 26–35)
MCHC RBC AUTO-ENTMCNC: 29.7 % (ref 32–34.5)
MCV RBC AUTO: 101.5 FL (ref 80–99.9)
METER GLUCOSE: 222 MG/DL (ref 74–99)
METER GLUCOSE: 226 MG/DL (ref 74–99)
METER GLUCOSE: 263 MG/DL (ref 74–99)
METER GLUCOSE: 320 MG/DL (ref 74–99)
MONOCYTES ABSOLUTE: 0.12 E9/L (ref 0.1–0.95)
MONOCYTES RELATIVE PERCENT: 2.6 % (ref 2–12)
NEUTROPHILS ABSOLUTE: 3.85 E9/L (ref 1.8–7.3)
NEUTROPHILS RELATIVE PERCENT: 93.9 % (ref 43–80)
ORGANISM: ABNORMAL
OVALOCYTES: ABNORMAL
PDW BLD-RTO: 19.9 FL (ref 11.5–15)
PHOSPHORUS: 2.6 MG/DL (ref 2.5–4.5)
PLATELET # BLD: 197 E9/L (ref 130–450)
PMV BLD AUTO: 10.7 FL (ref 7–12)
POIKILOCYTES: ABNORMAL
POLYCHROMASIA: ABNORMAL
POTASSIUM SERPL-SCNC: 3.7 MMOL/L (ref 3.5–5)
PROTHROMBIN TIME: 16.2 SEC (ref 9.3–12.4)
RBC # BLD: 2.59 E12/L (ref 3.5–5.5)
SODIUM BLD-SCNC: 144 MMOL/L (ref 132–146)
TROPONIN: 0.24 NG/ML (ref 0–0.03)
URINE CULTURE, ROUTINE: ABNORMAL
WBC # BLD: 4.1 E9/L (ref 4.5–11.5)

## 2019-08-16 PROCEDURE — 85025 COMPLETE CBC W/AUTO DIFF WBC: CPT

## 2019-08-16 PROCEDURE — 6370000000 HC RX 637 (ALT 250 FOR IP): Performed by: FAMILY MEDICINE

## 2019-08-16 PROCEDURE — 6370000000 HC RX 637 (ALT 250 FOR IP): Performed by: INTERNAL MEDICINE

## 2019-08-16 PROCEDURE — 99232 SBSQ HOSP IP/OBS MODERATE 35: CPT | Performed by: FAMILY MEDICINE

## 2019-08-16 PROCEDURE — 99232 SBSQ HOSP IP/OBS MODERATE 35: CPT | Performed by: INTERNAL MEDICINE

## 2019-08-16 PROCEDURE — 2580000003 HC RX 258: Performed by: INTERNAL MEDICINE

## 2019-08-16 PROCEDURE — 6360000002 HC RX W HCPCS: Performed by: INTERNAL MEDICINE

## 2019-08-16 PROCEDURE — 80048 BASIC METABOLIC PNL TOTAL CA: CPT

## 2019-08-16 PROCEDURE — 84484 ASSAY OF TROPONIN QUANT: CPT

## 2019-08-16 PROCEDURE — 36415 COLL VENOUS BLD VENIPUNCTURE: CPT

## 2019-08-16 PROCEDURE — 94660 CPAP INITIATION&MGMT: CPT

## 2019-08-16 PROCEDURE — 83735 ASSAY OF MAGNESIUM: CPT

## 2019-08-16 PROCEDURE — 85610 PROTHROMBIN TIME: CPT

## 2019-08-16 PROCEDURE — 84100 ASSAY OF PHOSPHORUS: CPT

## 2019-08-16 PROCEDURE — 6370000000 HC RX 637 (ALT 250 FOR IP): Performed by: STUDENT IN AN ORGANIZED HEALTH CARE EDUCATION/TRAINING PROGRAM

## 2019-08-16 PROCEDURE — 2060000000 HC ICU INTERMEDIATE R&B

## 2019-08-16 PROCEDURE — 82962 GLUCOSE BLOOD TEST: CPT

## 2019-08-16 PROCEDURE — 85730 THROMBOPLASTIN TIME PARTIAL: CPT

## 2019-08-16 PROCEDURE — 2700000000 HC OXYGEN THERAPY PER DAY

## 2019-08-16 RX ORDER — PREDNISONE 20 MG/1
40 TABLET ORAL
Qty: 10 TABLET | Refills: 0 | Status: CANCELLED | OUTPATIENT
Start: 2019-08-17 | End: 2019-08-22

## 2019-08-16 RX ORDER — HEPARIN SODIUM 1000 [USP'U]/ML
40 INJECTION, SOLUTION INTRAVENOUS; SUBCUTANEOUS PRN
Status: DISCONTINUED | OUTPATIENT
Start: 2019-08-16 | End: 2019-08-16

## 2019-08-16 RX ORDER — INSULIN GLARGINE 100 [IU]/ML
10 INJECTION, SOLUTION SUBCUTANEOUS NIGHTLY
Status: DISCONTINUED | OUTPATIENT
Start: 2019-08-16 | End: 2019-08-18

## 2019-08-16 RX ORDER — HEPARIN SODIUM 10000 [USP'U]/100ML
18 INJECTION, SOLUTION INTRAVENOUS CONTINUOUS
Status: DISCONTINUED | OUTPATIENT
Start: 2019-08-16 | End: 2019-08-16

## 2019-08-16 RX ORDER — HEPARIN SODIUM 1000 [USP'U]/ML
80 INJECTION, SOLUTION INTRAVENOUS; SUBCUTANEOUS ONCE
Status: DISCONTINUED | OUTPATIENT
Start: 2019-08-16 | End: 2019-08-16

## 2019-08-16 RX ORDER — INSULIN GLARGINE 100 [IU]/ML
10 INJECTION, SOLUTION SUBCUTANEOUS NIGHTLY
Qty: 1 VIAL | Refills: 3 | Status: CANCELLED | OUTPATIENT
Start: 2019-08-16

## 2019-08-16 RX ORDER — PREDNISONE 20 MG/1
20 TABLET ORAL
Status: DISCONTINUED | OUTPATIENT
Start: 2019-08-17 | End: 2019-08-19 | Stop reason: HOSPADM

## 2019-08-16 RX ORDER — WARFARIN SODIUM 1 MG/1
0.5 TABLET ORAL DAILY
Status: DISCONTINUED | OUTPATIENT
Start: 2019-08-16 | End: 2019-08-18

## 2019-08-16 RX ORDER — HEPARIN SODIUM 1000 [USP'U]/ML
80 INJECTION, SOLUTION INTRAVENOUS; SUBCUTANEOUS PRN
Status: DISCONTINUED | OUTPATIENT
Start: 2019-08-16 | End: 2019-08-16

## 2019-08-16 RX ORDER — WARFARIN SODIUM 1 MG/1
0.5 TABLET ORAL DAILY
Qty: 30 TABLET | Refills: 3 | Status: CANCELLED | OUTPATIENT
Start: 2019-08-16

## 2019-08-16 RX ADMIN — ACETAZOLAMIDE 250 MG: 500 INJECTION, POWDER, LYOPHILIZED, FOR SOLUTION INTRAVENOUS at 09:24

## 2019-08-16 RX ADMIN — INSULIN LISPRO 2 UNITS: 100 INJECTION, SOLUTION INTRAVENOUS; SUBCUTANEOUS at 09:30

## 2019-08-16 RX ADMIN — LORAZEPAM 0.25 MG: 0.5 TABLET ORAL at 21:20

## 2019-08-16 RX ADMIN — INSULIN LISPRO 2 UNITS: 100 INJECTION, SOLUTION INTRAVENOUS; SUBCUTANEOUS at 11:42

## 2019-08-16 RX ADMIN — PATIROMER 8.4 G: 8.4 POWDER, FOR SUSPENSION ORAL at 09:25

## 2019-08-16 RX ADMIN — ACETAZOLAMIDE 250 MG: 500 INJECTION, POWDER, LYOPHILIZED, FOR SOLUTION INTRAVENOUS at 00:30

## 2019-08-16 RX ADMIN — SKIN PROTECTANT: 44 OINTMENT TOPICAL at 09:25

## 2019-08-16 RX ADMIN — Medication 10 ML: at 21:21

## 2019-08-16 RX ADMIN — INSULIN LISPRO 3 UNITS: 100 INJECTION, SOLUTION INTRAVENOUS; SUBCUTANEOUS at 17:28

## 2019-08-16 RX ADMIN — WARFARIN SODIUM 0.5 MG: 1 TABLET ORAL at 21:19

## 2019-08-16 RX ADMIN — BUMETANIDE 2 MG: 1 TABLET ORAL at 09:25

## 2019-08-16 RX ADMIN — Medication 10 ML: at 09:25

## 2019-08-16 RX ADMIN — ACETAMINOPHEN 650 MG: 325 TABLET, FILM COATED ORAL at 00:21

## 2019-08-16 RX ADMIN — CHLOROTHIAZIDE SODIUM 125 MG: 500 INJECTION, POWDER, LYOPHILIZED, FOR SOLUTION INTRAVENOUS at 09:24

## 2019-08-16 RX ADMIN — SALINE NASAL SPRAY 1 SPRAY: 1.5 SOLUTION NASAL at 11:14

## 2019-08-16 RX ADMIN — INSULIN GLARGINE 10 UNITS: 100 INJECTION, SOLUTION SUBCUTANEOUS at 21:23

## 2019-08-16 RX ADMIN — WATER 18 ML: 1 INJECTION INTRAMUSCULAR; INTRAVENOUS; SUBCUTANEOUS at 09:25

## 2019-08-16 RX ADMIN — ACETAMINOPHEN 650 MG: 325 TABLET, FILM COATED ORAL at 22:53

## 2019-08-16 RX ADMIN — PREDNISONE 40 MG: 20 TABLET ORAL at 11:42

## 2019-08-16 ASSESSMENT — PAIN SCALES - GENERAL
PAINLEVEL_OUTOF10: 5
PAINLEVEL_OUTOF10: 2
PAINLEVEL_OUTOF10: 3
PAINLEVEL_OUTOF10: 0

## 2019-08-16 NOTE — PROGRESS NOTES
distress. HEENT: Normocephalic, atraumatic. NECK: Supple, symmetrical, trachea midline  CHEST: No tenderness or deformity, full & symmetric excursion  LUNG: Mild coarse breath sounds bilaterally,  Diminished breath sounds b/l  HEART: RRR, S1 and S2 normal, systolic murmur loudest at superior right sternal border, rub or gallop. DP pulses 2/4  ABDOMEN: SNTND, no masses, no organomegaly, no guarding, rebound or rigidity. GENITOURINARY: Urinary cath present   EXTREMITIES:  B/l LE edema present, improving. Left Foot:Less swelling, and no redness noted on the first phalanges at the PIP medial side. Continues to have less Pain with passive flexion, extension. Left Arm: 2-3+ pitting noted in left arm     NEUROLOGIC: Alert & Oriented; Moves all 4 limbs; Sensation intact      Labs:  Na/K/Cl/CO2:  144/3.7/90/39 (08/16 0435)  BUN/Cr/glu/ALT/AST/amyl/lip:  43/1.5/--/--/--/--/-- (08/16 0435)  WBC/Hgb/Hct/Plts:  4.1/7.8/26.3/197 (08/16 0435)  estimated creatinine clearance is 33 mL/min (A) (based on SCr of 1.5 mg/dL (H)). Other pertinent labs as noted below    Radiology:  US DUP UPPER EXTREMITY LEFT VENOUS   Final Result      Positive examination for nonocclusive thrombus associated with left   cephalic vein. XR CHEST PORTABLE   Final Result   1. Stable, enlarged cardiomediastinal silhouette with underlying   pulmonary edema and thoracic aortic vascular calcifications. 2. Nonspecific bibasilar airspace disease, findings can be seen in   infiltrate/pneumonia and/or atelectasis. . Bilateral pleural effusions. XR CHEST PORTABLE   Final Result   Improved aeration on the right after placement of chest tube and   likely removal of pleural effusion. Persistent left pleural effusion. Additional observations as outlined above. US THORACENTESIS   Final Result      Ultrasound utilized for thoracentesis. XR CHEST PORTABLE   Final Result   1.  Stable, large cardiomediastinal silhouette Metastatic disease ([facility] [address]    Moderate protein-calorie malnutrition ([facility] [address]  Resolved Problems:    * No resolved hospital problems. *        Neurologic  Altered mental status   Metabolic encephalopathy   Obtunded secondary to hypercapnia  - Mostly back to baseline but does wax and wane  -We will hold her psych meds at this time  -Monitor mentation  -Decrease Ativan to 0.25 as needed as with 0.5 as needed, she wiuld become lethargic for prolonged period of time    Nosebleed  -Likely anterior nosebleed but will not rule out a posterior nosebleed  -Replaced nasal cannula with facemask, and applied nasal saline for now. If worsens we will order nasal saline gel.    -Can consider referral to ENT.     Cardiovascular  HFrEF NYHA III   CAD w/ CABG  Recurrent DVTs Hx   Edema  -Has had continued worsening of her heart failure, most recent echo showed an EF of 30 to 35% with severe global hypokinesis, but patient is not volume overloaded secondary to heart failure, likely etiology is continued use of chemotherapy Herceptin   -On Coumadin for recurrent DVTs  -Bumex drip held for now (stopped due to hypotension)  -Warfarin restarted today  -Monitor U/O    Sinus tachycardia-Stable  -In setting of likely intravascular dehydration, chronic anemia with decreased INR, some anxiety, hypercoagulable state due to Ca (consider PE)  -ECG and , troponins stable, d-dimer not ordered as it would likely be high anyway, did not order CTA because treatment would be unlikely. Pulmonary  Acute hypoxic hypercapnic respiratory failure->Improving  -Moderate LV dysfunction as well as low albumin 2/2 to mets to liver with pleural effusion  -Tap was transudative  -On HFNCO2 6 L.   -Does better when she is on BiPAP. Hypercapnic this morning.   We will continue to encourage BiPAP use.     Gastrointestinal  Ascites/Anasarca  -stable CT abdomen at this time, no worsening edema on abdomen exam  -emre albumin  -monitor abdomen exam   -Ammonia level, WNL        Hematology/Oncology  Metastatic breast cancer  -Hem-onc following   -Patient on Herceptin, will hold off for now due to n.p.o. status due to mentation  -Given her presentation and extensive metastasis prognosis is poor  -palliative care following      Pancytopenia  -2/2 to breast Ca therapy  -Transfuse if needed. Transfused one unit today.     Infectious Disease  -film array, sputum culture, UA, urine culture, procalcitonin all unremarkable  -blood cx shows contaminant, but blood cultures repeated, shows the same result and will treat    UTI  -Pulled Maurer catheter. Per ID.        Endocrine  DM II  -on no meds currently due to renal dysfunction  LDSS  hypoglycemia protocol     Genitourinary/Renal  KARISHMA on CKD, improving  Elevated Phosphate-Resoved  Hyperkalemia-Resolved  -KARISHMA on ckd improved with diuresis  -Good U/O with diuresis  -Phosphate Binder per Nephro  -Nephro on Board  -Folew was removed    MSK/Extremities  Gout of left fist phalange  -Uric acid has been elevated  -Prednisonestarted (Lantus increased to cover increase in glucose)  -Superficial DVT noted in left arm she is encouraged to put her left arm on a pillow, and keep it elevated     Code Status: Limited code for now. Palliative on Board. Continue to discuss. Disposition: Discharge when pre-CERT is finalized with Tatiana.     Electronically signed by Alan Nava MD PGY-3 on 8/16/2019 at 12:21 PM  This case was discussed with attending physician: Dr. Jazlyn Avina

## 2019-08-16 NOTE — PROGRESS NOTES
osteopenia. There is osteoarthritic changes of the thoracic spine. Centimeters There are multiple surgical clips in the left axillary region. Cardiomegaly Moderate to large right-sided pleural effusion Support lines appears to be in satisfactory position     Xr Chest Portable    Result Date: 2019  Patient MRN: [id number] : 1945 Age:  68 years Gender: Female Order Date: 2019 1:45 PM Exam: XR CHEST PORTABLE Number of Images: 1 view Indication:  Weakness, acute neurodeficits Comparison: Anterior upright chest 2019 Findings: The patient is severely lordotic on this AP image, and is morbidly obese. This limits detail. There is a large and new right-sided pleural effusion, associated with atelectasis. Density in the left lower chest is more chronic, and could include pleural fluid or atelectasis. Cardiovascular shadows are unchanged in appearance, with limited visualization due to positioning. . There is no evidence of cardiac decompensation. Skeletal structures show no evidence of acute pathology. Degenerative changes of the spine and right shoulder are noted, and there is a segmented appearance consistent with an ununited old fracture of the proximal left humerus. Overlying EKG leads are present, and the right jugular triple-lumen catheter extends to the upper right atrial level. Sternotomy wires are present. There is new large right-sided pleural effusion with some associated atelectasis. Other incidental findings listed above are unchanged. Objective:   Vitals: BP (!) 106/51   Pulse 99   Temp 97.5 °F (36.4 °C) (Temporal)   Resp 16   Ht 5' 4\" (1.626 m)   Wt 167 lb 12.8 oz (76.1 kg)   SpO2 96%   BMI 28.80 kg/m²   General appearance: appears stated age   Skin:  No rashes or lesions  HEENT: Head: Normocephalic, no lesions, without obvious abnormality.   Neck: no adenopathy, no carotid bruit, no JVD, supple, symmetrical, trachea midline and thyroid not enlarged, symmetric, no

## 2019-08-17 LAB
ANION GAP SERPL CALCULATED.3IONS-SCNC: 15 MMOL/L (ref 7–16)
ANISOCYTOSIS: ABNORMAL
BASOPHILS ABSOLUTE: 0 E9/L (ref 0–0.2)
BASOPHILS RELATIVE PERCENT: 0 % (ref 0–2)
BUN BLDV-MCNC: 39 MG/DL (ref 8–23)
CALCIUM SERPL-MCNC: 7.8 MG/DL (ref 8.6–10.2)
CHLORIDE BLD-SCNC: 89 MMOL/L (ref 98–107)
CO2: 38 MMOL/L (ref 22–29)
CREAT SERPL-MCNC: 1.4 MG/DL (ref 0.5–1)
EOSINOPHILS ABSOLUTE: 0.01 E9/L (ref 0.05–0.5)
EOSINOPHILS RELATIVE PERCENT: 0.2 % (ref 0–6)
GFR AFRICAN AMERICAN: 44
GFR NON-AFRICAN AMERICAN: 37 ML/MIN/1.73
GLUCOSE BLD-MCNC: 210 MG/DL (ref 74–99)
HCT VFR BLD CALC: 26.4 % (ref 34–48)
HEMOGLOBIN: 7.9 G/DL (ref 11.5–15.5)
HYPOCHROMIA: ABNORMAL
IMMATURE GRANULOCYTES #: 0.04 E9/L
IMMATURE GRANULOCYTES %: 0.8 % (ref 0–5)
LYMPHOCYTES ABSOLUTE: 0.29 E9/L (ref 1.5–4)
LYMPHOCYTES RELATIVE PERCENT: 5.7 % (ref 20–42)
MAGNESIUM: 1.4 MG/DL (ref 1.6–2.6)
MCH RBC QN AUTO: 30 PG (ref 26–35)
MCHC RBC AUTO-ENTMCNC: 29.9 % (ref 32–34.5)
MCV RBC AUTO: 100.4 FL (ref 80–99.9)
METER GLUCOSE: 148 MG/DL (ref 74–99)
METER GLUCOSE: 179 MG/DL (ref 74–99)
METER GLUCOSE: 240 MG/DL (ref 74–99)
METER GLUCOSE: 374 MG/DL (ref 74–99)
MONOCYTES ABSOLUTE: 0.48 E9/L (ref 0.1–0.95)
MONOCYTES RELATIVE PERCENT: 9.4 % (ref 2–12)
NEUTROPHILS ABSOLUTE: 4.26 E9/L (ref 1.8–7.3)
NEUTROPHILS RELATIVE PERCENT: 83.9 % (ref 43–80)
PDW BLD-RTO: 19.9 FL (ref 11.5–15)
PHOSPHORUS: 2.8 MG/DL (ref 2.5–4.5)
PLATELET # BLD: 192 E9/L (ref 130–450)
PMV BLD AUTO: 10.4 FL (ref 7–12)
POLYCHROMASIA: ABNORMAL
POTASSIUM SERPL-SCNC: 3.1 MMOL/L (ref 3.5–5)
RBC # BLD: 2.63 E12/L (ref 3.5–5.5)
SODIUM BLD-SCNC: 142 MMOL/L (ref 132–146)
WBC # BLD: 5.1 E9/L (ref 4.5–11.5)

## 2019-08-17 PROCEDURE — 94660 CPAP INITIATION&MGMT: CPT

## 2019-08-17 PROCEDURE — 6370000000 HC RX 637 (ALT 250 FOR IP): Performed by: STUDENT IN AN ORGANIZED HEALTH CARE EDUCATION/TRAINING PROGRAM

## 2019-08-17 PROCEDURE — 99232 SBSQ HOSP IP/OBS MODERATE 35: CPT | Performed by: INTERNAL MEDICINE

## 2019-08-17 PROCEDURE — 2060000000 HC ICU INTERMEDIATE R&B

## 2019-08-17 PROCEDURE — 82962 GLUCOSE BLOOD TEST: CPT

## 2019-08-17 PROCEDURE — 6370000000 HC RX 637 (ALT 250 FOR IP): Performed by: INTERNAL MEDICINE

## 2019-08-17 PROCEDURE — 83735 ASSAY OF MAGNESIUM: CPT

## 2019-08-17 PROCEDURE — 85025 COMPLETE CBC W/AUTO DIFF WBC: CPT

## 2019-08-17 PROCEDURE — 2580000003 HC RX 258: Performed by: INTERNAL MEDICINE

## 2019-08-17 PROCEDURE — 99232 SBSQ HOSP IP/OBS MODERATE 35: CPT | Performed by: FAMILY MEDICINE

## 2019-08-17 PROCEDURE — 6360000002 HC RX W HCPCS: Performed by: STUDENT IN AN ORGANIZED HEALTH CARE EDUCATION/TRAINING PROGRAM

## 2019-08-17 PROCEDURE — 84100 ASSAY OF PHOSPHORUS: CPT

## 2019-08-17 PROCEDURE — 2700000000 HC OXYGEN THERAPY PER DAY

## 2019-08-17 PROCEDURE — 80048 BASIC METABOLIC PNL TOTAL CA: CPT

## 2019-08-17 PROCEDURE — 36415 COLL VENOUS BLD VENIPUNCTURE: CPT

## 2019-08-17 PROCEDURE — 36591 DRAW BLOOD OFF VENOUS DEVICE: CPT

## 2019-08-17 RX ORDER — POTASSIUM CHLORIDE 20 MEQ/1
20 TABLET, EXTENDED RELEASE ORAL 2 TIMES DAILY WITH MEALS
Status: DISCONTINUED | OUTPATIENT
Start: 2019-08-17 | End: 2019-08-19 | Stop reason: HOSPADM

## 2019-08-17 RX ORDER — POTASSIUM CHLORIDE 7.45 MG/ML
10 INJECTION INTRAVENOUS ONCE
Status: COMPLETED | OUTPATIENT
Start: 2019-08-17 | End: 2019-08-17

## 2019-08-17 RX ADMIN — INSULIN GLARGINE 10 UNITS: 100 INJECTION, SOLUTION SUBCUTANEOUS at 21:55

## 2019-08-17 RX ADMIN — BUMETANIDE 2 MG: 1 TABLET ORAL at 08:29

## 2019-08-17 RX ADMIN — Medication 10 ML: at 08:29

## 2019-08-17 RX ADMIN — LORAZEPAM 0.25 MG: 0.5 TABLET ORAL at 23:04

## 2019-08-17 RX ADMIN — POTASSIUM CHLORIDE 20 MEQ: 20 TABLET, EXTENDED RELEASE ORAL at 11:47

## 2019-08-17 RX ADMIN — POTASSIUM CHLORIDE 20 MEQ: 20 TABLET, EXTENDED RELEASE ORAL at 17:34

## 2019-08-17 RX ADMIN — INSULIN LISPRO 1 UNITS: 100 INJECTION, SOLUTION INTRAVENOUS; SUBCUTANEOUS at 08:26

## 2019-08-17 RX ADMIN — SKIN PROTECTANT: 44 OINTMENT TOPICAL at 08:29

## 2019-08-17 RX ADMIN — SKIN PROTECTANT: 44 OINTMENT TOPICAL at 21:55

## 2019-08-17 RX ADMIN — Medication 10 ML: at 23:04

## 2019-08-17 RX ADMIN — POTASSIUM CHLORIDE 10 MEQ: 7.46 INJECTION, SOLUTION INTRAVENOUS at 11:47

## 2019-08-17 RX ADMIN — PREDNISONE 20 MG: 20 TABLET ORAL at 11:53

## 2019-08-17 RX ADMIN — WARFARIN SODIUM 0.5 MG: 1 TABLET ORAL at 17:34

## 2019-08-17 ASSESSMENT — PAIN SCALES - GENERAL
PAINLEVEL_OUTOF10: 0

## 2019-08-17 NOTE — PROGRESS NOTES
hyperglycemia ([facility] [address]    Acute hypercapnic respiratory failure (HCC)    Altered mental status    Goals of care, counseling/discussion    Palliative care by specialist    Metastatic disease ([facility] [address] [facility]    Moderate protein-calorie malnutrition ([facility] [address] [facility]               ASSESSMENT:  1.)  Stage IV breast cancer  2.)  Acute hypoxic /Hypercapnic respiratory failure,  multifactorial  3.)  Recurrent Pleural effusion right more than the left  4.)  Pancytopenia secondary to her breast cancer treatment  5.)  Ascites  6) granuloma in the right lower lobe      Plan   Drain Pleurax   Coumadin resumed ,bridge if needed  Left side looks ok ,no need to tap now   PT and OT   Decrease Prednisone 20 mg and stop in 3 days     Can be discharged from the pulmonary standpoint instruction for Pleurx is to be drained every other day and not more than 1 L daily if there is any question to contact my office     Will follow as outpatient      [address] [facility]     NOTE: This report was transcribed using voice recognition software. Every effort was made to ensure accuracy; however, inadvertent computerized transcription errors may be present.

## 2019-08-17 NOTE — PROGRESS NOTES
within the right lateral fourth rib. Cardiomegaly. Previously described lung nodules are obscured due to the increasing pleural effusions and atelectatic change. Within the abdomen, there is mild ascites most concentrated in the pelvis. The amount of ascitic fluid is diminished markedly compared to the prior CT of 2019. Multiple space-occupying mass lesions in the right and left lobes of the liver contain central calcification and would be most suspicious for metastatic disease from mucinous adenocarcinoma such as from the colon. These are unchanged. Punctate nonobstructing calyceal calcifications within the kidneys. Diffuse third spacing into the subcutaneous tissues. Calcification of the anterior omentum compatible with omental caking from peritoneal spread of malignancy and carcinomatosis. Ct Head Wo Contrast    Result Date: 2019  Patient MRN: [id number] : 1945 Age:  68 years Gender: Female Order Date: 2019 1:45 PM Exam: CT HEAD WO CONTRAST Number of Images: 771 views Indication:   ams  Comparison: Prior study from 07/15/2018 is available. Technique: Sequential axial CT of the head was obtained from the base of the skull to the vertex without IV contrast.  Radiation Output: CTDIvol 54.53 (mGy); DLP 1103.66 (mGy-cm) Findings: The study demonstrates the fourth ventricle to be midline. The posterior fossa appears to be normal. There is no mass, mass effect or midline shift. The ventricles, sulci and cisterns are normal in appearance for patient's age. The gray-white matter differentiation is preserved throughout. There is no acute intracranial hemorrhage. No extra-axial fluid collection is identified. There is atherosclerotic change of vertebral basilar and cavernous carotid artery with calcified plaque. The bony calvarium is intact. The visualized portion of the paranasal sinuses and the mastoid air cells are clear. There is no focal extracranial soft tissue swelling.  The study is unchanged from prior CT. NO ACUTE INTRACRANIAL PROCESS     Xr Chest Portable    Result Date: 2019  Patient MRN: [id number] : 1945 Age:  68 years Gender: Female Order Date: 2019 9:15 AM Exam: XR CHEST PORTABLE Number of Images: 1 view Indication:   SOB SOB Comparison: Prior chest radiograph from 2019 is available Findings: Study demonstrate cardiomegaly with median sternotomy. There is moderate to large right-sided pleural effusion which is unchanged from prior study. There is mild pulmonary venous congestion. There is a right internal jugular catheter with tip in superior vena cava. The bony thorax demonstrate osteopenia. There is osteoarthritic changes of the thoracic spine. Centimeters There are multiple surgical clips in the left axillary region. Cardiomegaly Moderate to large right-sided pleural effusion Support lines appears to be in satisfactory position     Xr Chest Portable    Result Date: 2019  Patient MRN: [id number] : 1945 Age:  68 years Gender: Female Order Date: 2019 1:45 PM Exam: XR CHEST PORTABLE Number of Images: 1 view Indication:  Weakness, acute neurodeficits Comparison: Anterior upright chest 2019 Findings: The patient is severely lordotic on this AP image, and is morbidly obese. This limits detail. There is a large and new right-sided pleural effusion, associated with atelectasis. Density in the left lower chest is more chronic, and could include pleural fluid or atelectasis. Cardiovascular shadows are unchanged in appearance, with limited visualization due to positioning. . There is no evidence of cardiac decompensation. Skeletal structures show no evidence of acute pathology. Degenerative changes of the spine and right shoulder are noted, and there is a segmented appearance consistent with an ununited old fracture of the proximal left humerus.  Overlying EKG leads are present, and the right jugular triple-lumen catheter extends to the upper

## 2019-08-17 NOTE — PROGRESS NOTES
Patient seen in room with no family present. Patient sleeping. Spoke with nursing staff; plan remains for [place] on Monday. Family will follow up with oncology about further treatment options as outpatient. PM will follow.      Jasen YOUNGBLOOD-EPIFANIO

## 2019-08-18 LAB
ANION GAP SERPL CALCULATED.3IONS-SCNC: 14 MMOL/L (ref 7–16)
ANISOCYTOSIS: ABNORMAL
BASOPHILS ABSOLUTE: 0 E9/L (ref 0–0.2)
BASOPHILS RELATIVE PERCENT: 0 % (ref 0–2)
BLOOD CULTURE, ROUTINE: NORMAL
BUN BLDV-MCNC: 39 MG/DL (ref 8–23)
CALCIUM SERPL-MCNC: 7.9 MG/DL (ref 8.6–10.2)
CHLORIDE BLD-SCNC: 91 MMOL/L (ref 98–107)
CO2: 36 MMOL/L (ref 22–29)
CREAT SERPL-MCNC: 1.4 MG/DL (ref 0.5–1)
CULTURE, BLOOD 2: NORMAL
EOSINOPHILS ABSOLUTE: 0 E9/L (ref 0.05–0.5)
EOSINOPHILS RELATIVE PERCENT: 0.2 % (ref 0–6)
GFR AFRICAN AMERICAN: 44
GFR NON-AFRICAN AMERICAN: 37 ML/MIN/1.73
GLUCOSE BLD-MCNC: 267 MG/DL (ref 74–99)
HCT VFR BLD CALC: 27 % (ref 34–48)
HEMOGLOBIN: 8.3 G/DL (ref 11.5–15.5)
HYPOCHROMIA: ABNORMAL
INR BLD: 1.4
LYMPHOCYTES ABSOLUTE: 0.32 E9/L (ref 1.5–4)
LYMPHOCYTES RELATIVE PERCENT: 6.1 % (ref 20–42)
MAGNESIUM: 1.2 MG/DL (ref 1.6–2.6)
MCH RBC QN AUTO: 30.3 PG (ref 26–35)
MCHC RBC AUTO-ENTMCNC: 30.7 % (ref 32–34.5)
MCV RBC AUTO: 98.5 FL (ref 80–99.9)
METER GLUCOSE: 166 MG/DL (ref 74–99)
METER GLUCOSE: 206 MG/DL (ref 74–99)
METER GLUCOSE: 246 MG/DL (ref 74–99)
MONOCYTES ABSOLUTE: 0.27 E9/L (ref 0.1–0.95)
MONOCYTES RELATIVE PERCENT: 5.2 % (ref 2–12)
NEUTROPHILS ABSOLUTE: 4.81 E9/L (ref 1.8–7.3)
NEUTROPHILS RELATIVE PERCENT: 88.7 % (ref 43–80)
PDW BLD-RTO: 20.1 FL (ref 11.5–15)
PHOSPHORUS: 2.6 MG/DL (ref 2.5–4.5)
PLATELET # BLD: 207 E9/L (ref 130–450)
PMV BLD AUTO: 10.9 FL (ref 7–12)
POIKILOCYTES: ABNORMAL
POLYCHROMASIA: ABNORMAL
POTASSIUM SERPL-SCNC: 3.6 MMOL/L (ref 3.5–5)
PROTHROMBIN TIME: 15.8 SEC (ref 9.3–12.4)
RBC # BLD: 2.74 E12/L (ref 3.5–5.5)
SODIUM BLD-SCNC: 141 MMOL/L (ref 132–146)
TARGET CELLS: ABNORMAL
WBC # BLD: 5.4 E9/L (ref 4.5–11.5)

## 2019-08-18 PROCEDURE — 2580000003 HC RX 258: Performed by: INTERNAL MEDICINE

## 2019-08-18 PROCEDURE — 6360000002 HC RX W HCPCS

## 2019-08-18 PROCEDURE — 97530 THERAPEUTIC ACTIVITIES: CPT

## 2019-08-18 PROCEDURE — 6370000000 HC RX 637 (ALT 250 FOR IP): Performed by: STUDENT IN AN ORGANIZED HEALTH CARE EDUCATION/TRAINING PROGRAM

## 2019-08-18 PROCEDURE — 85610 PROTHROMBIN TIME: CPT

## 2019-08-18 PROCEDURE — 83735 ASSAY OF MAGNESIUM: CPT

## 2019-08-18 PROCEDURE — 6360000002 HC RX W HCPCS: Performed by: STUDENT IN AN ORGANIZED HEALTH CARE EDUCATION/TRAINING PROGRAM

## 2019-08-18 PROCEDURE — 6360000002 HC RX W HCPCS: Performed by: INTERNAL MEDICINE

## 2019-08-18 PROCEDURE — 80048 BASIC METABOLIC PNL TOTAL CA: CPT

## 2019-08-18 PROCEDURE — 94660 CPAP INITIATION&MGMT: CPT

## 2019-08-18 PROCEDURE — 2060000000 HC ICU INTERMEDIATE R&B

## 2019-08-18 PROCEDURE — 97535 SELF CARE MNGMENT TRAINING: CPT

## 2019-08-18 PROCEDURE — 2700000000 HC OXYGEN THERAPY PER DAY

## 2019-08-18 PROCEDURE — 6370000000 HC RX 637 (ALT 250 FOR IP): Performed by: INTERNAL MEDICINE

## 2019-08-18 PROCEDURE — 84100 ASSAY OF PHOSPHORUS: CPT

## 2019-08-18 PROCEDURE — 36415 COLL VENOUS BLD VENIPUNCTURE: CPT

## 2019-08-18 PROCEDURE — 82962 GLUCOSE BLOOD TEST: CPT

## 2019-08-18 PROCEDURE — 99231 SBSQ HOSP IP/OBS SF/LOW 25: CPT | Performed by: INTERNAL MEDICINE

## 2019-08-18 PROCEDURE — 85025 COMPLETE CBC W/AUTO DIFF WBC: CPT

## 2019-08-18 PROCEDURE — 99232 SBSQ HOSP IP/OBS MODERATE 35: CPT | Performed by: FAMILY MEDICINE

## 2019-08-18 RX ORDER — HEPARIN SODIUM (PORCINE) LOCK FLUSH IV SOLN 100 UNIT/ML 100 UNIT/ML
SOLUTION INTRAVENOUS
Status: COMPLETED
Start: 2019-08-18 | End: 2019-08-18

## 2019-08-18 RX ORDER — WARFARIN SODIUM 1 MG/1
0.5 TABLET ORAL
Status: COMPLETED | OUTPATIENT
Start: 2019-08-18 | End: 2019-08-18

## 2019-08-18 RX ORDER — MAGNESIUM SULFATE IN WATER 40 MG/ML
4 INJECTION, SOLUTION INTRAVENOUS ONCE
Status: COMPLETED | OUTPATIENT
Start: 2019-08-18 | End: 2019-08-18

## 2019-08-18 RX ORDER — WARFARIN SODIUM 1 MG/1
1 TABLET ORAL DAILY
Status: DISCONTINUED | OUTPATIENT
Start: 2019-08-19 | End: 2019-08-19 | Stop reason: HOSPADM

## 2019-08-18 RX ORDER — INSULIN GLARGINE 100 [IU]/ML
13 INJECTION, SOLUTION SUBCUTANEOUS NIGHTLY
Status: DISCONTINUED | OUTPATIENT
Start: 2019-08-18 | End: 2019-08-19 | Stop reason: HOSPADM

## 2019-08-18 RX ADMIN — ONDANSETRON 4 MG: 2 INJECTION INTRAMUSCULAR; INTRAVENOUS at 08:07

## 2019-08-18 RX ADMIN — BUMETANIDE 2 MG: 1 TABLET ORAL at 08:07

## 2019-08-18 RX ADMIN — POTASSIUM CHLORIDE 20 MEQ: 20 TABLET, EXTENDED RELEASE ORAL at 17:28

## 2019-08-18 RX ADMIN — Medication 10 ML: at 08:08

## 2019-08-18 RX ADMIN — MAGNESIUM SULFATE HEPTAHYDRATE 4 G: 40 INJECTION, SOLUTION INTRAVENOUS at 09:11

## 2019-08-18 RX ADMIN — LORAZEPAM 0.25 MG: 0.5 TABLET ORAL at 08:07

## 2019-08-18 RX ADMIN — POTASSIUM CHLORIDE 20 MEQ: 20 TABLET, EXTENDED RELEASE ORAL at 08:07

## 2019-08-18 RX ADMIN — WARFARIN SODIUM 0.5 MG: 1 TABLET ORAL at 17:28

## 2019-08-18 RX ADMIN — INSULIN GLARGINE 13 UNITS: 100 INJECTION, SOLUTION SUBCUTANEOUS at 20:08

## 2019-08-18 RX ADMIN — Medication 10 ML: at 20:09

## 2019-08-18 RX ADMIN — PREDNISONE 20 MG: 20 TABLET ORAL at 11:49

## 2019-08-18 RX ADMIN — SKIN PROTECTANT: 44 OINTMENT TOPICAL at 08:08

## 2019-08-18 RX ADMIN — SKIN PROTECTANT: 44 OINTMENT TOPICAL at 20:09

## 2019-08-18 RX ADMIN — HEPARIN 500 UNITS: 100 SYRINGE at 04:29

## 2019-08-18 RX ADMIN — WARFARIN SODIUM 0.5 MG: 1 TABLET ORAL at 20:09

## 2019-08-18 ASSESSMENT — PAIN SCALES - GENERAL
PAINLEVEL_OUTOF10: 0

## 2019-08-18 NOTE — PROGRESS NOTES
cancer [facility])    Essential hypertension    Coronary artery disease involving native coronary artery of native heart without angina pectoris    Nephrolithiasis    CHF (congestive heart failure) ([facility]    Humerus fracture    Shoulder pain, left    B12 deficiency    Hyperlipidemia    Rib lesion    Subclavian vein occlusion, bilateral (HCC)    Pericardial effusion    MI (myocardial infarction) ([facility])    Arthritis    Sarcoidosis    Lymph node enlargement    Anesthesia    Rectal bleeding    Ventral hernia    Type 2 diabetes mellitus without complication, with long-term current use of insulin (HCC)    Anemia of chronic disease    Chronic deep vein thrombosis (DVT) of proximal vein of right lower extremity ([facility])    Bilateral edema of lower extremity    Peripheral polyneuropathy    Complicated UTI (urinary tract infection)    KARISHMA (acute kidney injury) ([facility])    Diarrhea with dehydration    Chronic anticoagulation    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    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    Diabetic polyneuropathy associated with type 2 diabetes mellitus ([facility])    Leg swelling    History of deep vein thrombosis    Chronic venous insufficiency    Lymphedema of both lower extremities    Decreased dorsalis pedis pulse    Ambulatory dysfunction    CKD (chronic kidney disease) stage 3, GFR 30-59 ml/min ([facility])    Charcot's joint of foot in type 2 diabetes mellitus ([facility])    Ascites    Palliative care encounter    Cancer associated pain    HAP (hospital-acquired pneumonia)    Acute respiratory failure with hypoxia (HCC)    Acute systolic heart failure (HCC)    Nonischemic cardiomyopathy ([facility] [address]    Status post coronary artery bypass graft    Class 2 severe obesity due to excess calories with serious comorbidity and body mass index (BMI) of 35.0 to 35.9 in adult [facility])    Type 2 diabetes mellitus with hyperglycemia (HCC)    Acute hypercapnic

## 2019-08-18 NOTE — PROGRESS NOTES
Temporal  Temporal   SpO2: 98% 99%  97%   Weight:   161 lb 12.8 oz (73.4 kg)    Height:           General appearance: NAD, arousable, + pallor, appears sickly  Eyes: PERRL, EOM's intact, anicteric sclerae  HENT: AT/NC, no sinus tenderness, oropharynx clear  Neck: Supple, trachea midline, no thyromegaly  Lungs: CTAB, respiration unlabored, normal expansion  Heart: RRR, normal E7/V9, systolic murmur noted  Abdomen: Soft, non-tender, minimally distended, no masses or HSM, +BS  Extremities: No peripheral edema, no clubbing, no cyanosis  Skin: Warm and dry, no lesion or erythema. Bilateral toes without erythema or tenderness or edema  Neurologic: Motor functions intact. No focal deficit, no CN deficit  Psychiatric: Appropriate affect, A&O to person, place and time      SIGNIFICANT IMAGING STUDIES:    Ct Abdomen Pelvis Wo Contrast Additional Contrast? None    Result Date: 8/8/2019  This examination and all examinations utilizing ionizing radiation at this facility are done so according to the ALARA (as low as reasonably achievable) principal for radiation dose reduction. Clinical indications: Abdominal pain. COMPARISON: CT Abdomen and Pelvis July 21, 2019. CT chest June 19, 2019. Axial, sagittal, and coronal computed tomography of the abdomen and pelvis was performed without contrast. FINDINGS: Bilateral pleural effusions are increasing in volume with contiguous atelectasis. There are enlarged lymph nodes along the right lateral chest wall measuring up to 2 cm. These were present on the recent CT of the chest. There is expansion and lytic process within the right lateral fourth rib. There are prior median sternotomy wires. The heart is enlarged. Previously described lung nodules are obscured due to the increasing pleural effusions and atelectatic change. Within the abdomen, there is mild ascites most concentrated in the pelvis. The amount of ascitic fluid is diminished markedly compared to the prior CT of July 21, 2019. due to the increasing pleural effusions and atelectatic change. Within the abdomen, there is mild ascites most concentrated in the pelvis. The amount of ascitic fluid is diminished markedly compared to the prior CT of 2019. Multiple space-occupying mass lesions in the right and left lobes of the liver contain central calcification and would be most suspicious for metastatic disease from mucinous adenocarcinoma such as from the colon. These are unchanged. Punctate nonobstructing calyceal calcifications within the kidneys. Diffuse third spacing into the subcutaneous tissues. Calcification of the anterior omentum compatible with omental caking from peritoneal spread of malignancy and carcinomatosis. Ct Head Wo Contrast    Result Date: 2019  Patient MRN: [id number] : 1945 Age:  68 years Gender: Female Order Date: 2019 1:45 PM Exam: CT HEAD WO CONTRAST Number of Images: 432 views Indication:   ams  Comparison: Prior study from 07/15/2018 is available. Technique: Sequential axial CT of the head was obtained from the base of the skull to the vertex without IV contrast.  Radiation Output: CTDIvol 54.53 (mGy); DLP 1103.66 (mGy-cm) Findings: The study demonstrates the fourth ventricle to be midline. The posterior fossa appears to be normal. There is no mass, mass effect or midline shift. The ventricles, sulci and cisterns are normal in appearance for patient's age. The gray-white matter differentiation is preserved throughout. There is no acute intracranial hemorrhage. No extra-axial fluid collection is identified. There is atherosclerotic change of vertebral basilar and cavernous carotid artery with calcified plaque. The bony calvarium is intact. The visualized portion of the paranasal sinuses and the mastoid air cells are clear. There is no focal extracranial soft tissue swelling. The study is unchanged from prior CT.      NO ACUTE INTRACRANIAL PROCESS     Xr Chest Portable    Result Date: with some associated atelectasis. Other incidental findings listed above are unchanged. RECENT LABS:     Basic Labs      CBC:   Recent Labs     08/16/19  0435 08/17/19  0630 08/18/19  0515   WBC 4.1* 5.1 5.4   RBC 2.59* 2.63* 2.74*   HGB 7.8* 7.9* 8.3*   HCT 26.3* 26.4* 27.0*   .5* 100.4* 98.5   RDW 19.9* 19.9* 20.1*    192 207       BMP/CMP:   Recent Labs     08/16/19  0435 08/17/19  0630 08/18/19  0515    142 141   K 3.7 3.1* 3.6   CL 90* 89* 91*   CO2 39* 38* 36*   BUN 43* 39* 39*   CREATININE 1.5* 1.4* 1.4*   MG 1.5* 1.4* 1.2*   PHOS 2.6 2.8 2.6     No results for input(s): PROT, ALB, ALKPHOS, ALT, AST, BILITOT, AMYLASE, LIPASE in the last 72 hours. OTHER LABS:    Cardiac enzymes:  Lab Results   Component Value Date    CKTOTAL 102 08/11/2019    CKMB 1.9 08/11/2019    TROPONINI 0.24 (H) 08/16/2019     PT/INR:   Recent Labs     08/15/19  1800 08/16/19  0700 08/18/19  0515   INR 1.5 1.4 1.4   APTT  --  28.2  --      BNP: No results for input(s): BNP in the last 72 hours. Hgb A1C:   Lab Results   Component Value Date    LABA1C 5.9 (H) 07/21/2019     No results found for: EAG  ESR: No results found for: SEDRATE  CRP: No results found for: CRP  D Dimer: No results found for: DDIMER  Folate and B12:   Lab Results   Component Value Date    SIMAEYWF10 174 (L) 10/23/2017   ,   Lab Results   Component Value Date    FOLATE 17.0 06/20/2011     Lactic Acid:   Lab Results   Component Value Date    LACTA 1.1 08/09/2019     Thyroid Studies:  Lab Results   Component Value Date    TSH 6.930 (H) 06/10/2019       MICROBIOLOGY:    Urine Culture:  No components found for: CURINE  Blood Culture:  No components found for: CBLOOD, CFUNGUSBL   Lab Results   Component Value Date    BC 5 Days- no growth 08/12/2019    BC 5 Days- no growth 08/09/2019    BC Previously positive blood culture called 08/09/2019    [facility]  08/09/2019     Validated susceptibility testing methods do not exist for  this isolate.       BC 5 Sabino Carney M.D., PGY-3    This case was discussed with (s)  Lea Brar

## 2019-08-18 NOTE — PROGRESS NOTES
in the last 72 hours. Invalid input(s): LDLCALCU  ABGs: No results found for: PHART, PO2ART, NTT9LKR  INR:   Recent Labs     08/15/19  1800 08/16/19  0700 08/18/19  0515   INR 1.5 1.4 1.4       -----------------------------------------------------------------  RAD:     Objective:   Vitals: /63   Pulse 89   Temp 98.2 °F (36.8 °C) (Temporal)   Resp 16   Ht 5' 4\" (1.626 m)   Wt 161 lb 12.8 oz (73.4 kg)   SpO2 97%   BMI 27.77 kg/m²   General appearance: appears stated age   Skin:  No rashes or lesions  HEENT: Head: Normocephalic, no lesions, without obvious abnormality.   Neck: no adenopathy, no carotid bruit, no JVD, supple, symmetrical, trachea midline and thyroid not enlarged, symmetric, no tenderness/mass/nodules  Lungs: diminished breath sounds bibasilar  Heart: regular rate and rhythm, S1, S2 normal, no murmur, click, rub or gallop  Abdomen: soft, non-tender; bowel sounds normal; no masses,  no organomegaly  Extremities: edema ++  Neurologic: Mental status: Alert, oriented, thought content appropriate       Patient Active Problem List:     Metastatic breast cancer ([facility]     Essential hypertension     Coronary artery disease involving native coronary artery of native heart without angina pectoris     Nephrolithiasis     CHF (congestive heart failure) ([facility])     Humerus fracture     Shoulder pain, left     B12 deficiency     Hyperlipidemia     Rib lesion     Subclavian vein occlusion, bilateral (HCC)     Pericardial effusion     MI (myocardial infarction) ([facility]     Arthritis     Sarcoidosis     Lymph node enlargement     Anesthesia     Rectal bleeding     Ventral hernia     Type 2 diabetes mellitus without complication, with long-term current use of insulin (HCC)     Anemia of chronic disease     Chronic deep vein thrombosis (DVT) of proximal vein of right lower extremity (HCC)     Bilateral edema of lower extremity     Peripheral polyneuropathy     Complicated UTI (urinary tract infection)     KARISHMA (acute kidney injury) ([facility]     Diarrhea with dehydration     Chronic anticoagulation     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     Diabetic polyneuropathy associated with type 2 diabetes mellitus (HCC)     Leg swelling     History of deep vein thrombosis     Chronic venous insufficiency     Lymphedema of both lower extremities     Decreased dorsalis pedis pulse     Ambulatory dysfunction     CKD (chronic kidney disease) stage 3, GFR 30-59 ml/min (HCC)     Charcot's joint of foot in type 2 diabetes mellitus ([facility]     Ascites     Palliative care encounter     Cancer associated pain     HAP (hospital-acquired pneumonia)     Acute respiratory failure with hypoxia (HCC)     Acute systolic heart failure (HCC)     Nonischemic cardiomyopathy (HCC)     Status post coronary artery bypass graft     Class 2 severe obesity due to excess calories with serious comorbidity and body mass index (BMI) of 35.0 to 35.9 in [facility])     Type 2 diabetes mellitus with hyperglycemia (HCC)     Acute hypercapnic respiratory failure (HCC)     Altered mental status     Goals of care, counseling/discussion     Palliative care by specialist     Metastatic disease ([facility]    Assessment/Plans:   1. Acute kidney injury stage I hemodynamically mediated due to decreased effective renal perfusion in the setting of moderate LV dysfunction.  Intermittent hypotension contributtory; Cr continues to improve     2. Hyperkalemia due  to the combination of acute kidney injury, acidosis;  component of dietary source              -Now resolved; contnue to monitor;      3. Mixed respiratory acidosis and metabolic alkalosis; on presentation; on BIPAP mask              -intermittent BIPAP mask     4. Metastatic breast CA; refractory to chemotx     5. Acute hypoxic hypercarbic respiratory failure, with recurrent R sided pleural effusion;  Less O2 requirement    -had R sided

## 2019-08-18 NOTE — PLAN OF CARE
Daughter asked to talk about the PLAN  I told the ICU job was to treat the infection which is improved, however she was looking for more guidance to her metastatic cancer - which I told her was the notes have portrayed. We agree on no CPR, vent. Consider pallative care in the morning to review.      JONATHAN
Discussed with patient and daughter at bedside. Patient expressed that she wanted not to feel any more pain if she should have a cardiac arrest.  I discussed the options that were involved in a code.   Patient stated that they do not want chest compressions, intubation, cardioversion and medications in the case of a cardiac arrest.
Problem: Confusion - Acute:  Goal: Mental status will be restored to baseline  Description  Mental status will be restored to baseline  8/10/2019 0224 by Angie Aggarwal RN  Outcome: Met This Shift  8/9/2019 1608 by Caleb Oliveros RN  Outcome: Met This Shift     Problem: Risk for Impaired Skin Integrity  Goal: Tissue integrity - skin and mucous membranes  Description  Structural intactness and normal physiological function of skin and  mucous membranes.   Outcome: Met This Shift
Problem: Falls - Risk of:  Goal: Will remain free from falls  Description  Will remain free from falls  8/16/2019 0752 by Josesito Bridges RN  Outcome: Met This Shift  8/16/2019 0156 by Josesito Bridges RN  Outcome: Met This Shift  Goal: Absence of physical injury  Description  Absence of physical injury  8/16/2019 0752 by Josesito Bridges RN  Outcome: Met This Shift  8/16/2019 0156 by Josesito Bridges RN  Outcome: Met This Shift     Problem: Risk for Impaired Skin Integrity  Goal: Tissue integrity - skin and mucous membranes  Description  Structural intactness and normal physiological function of skin and  mucous membranes.   8/16/2019 0752 by Josesito Bridges RN  Outcome: Met This Shift  8/16/2019 0156 by Josesito Bridges RN  Outcome: Met This Shift     Problem: Injury - Risk of, Physical Injury:  Goal: Will remain free from falls  Description  Will remain free from falls  8/16/2019 0752 by Josesito Bridges RN  Outcome: Met This Shift  8/16/2019 0156 by Josesito Bridges RN  Outcome: Met This Shift  Goal: Absence of physical injury  Description  Absence of physical injury  8/16/2019 0752 by Josesito Bridges RN  Outcome: Met This Shift  8/16/2019 0156 by Josesito Bridges RN  Outcome: Met This Shift
Problem: Falls - Risk of:  Goal: Will remain free from falls  Description  Will remain free from falls  Outcome: Met This Shift     Problem: Risk for Impaired Skin Integrity  Goal: Tissue integrity - skin and mucous membranes  Description  Structural intactness and normal physiological function of skin and  mucous membranes.   Outcome: Met This Shift
Problem: Falls - Risk of:  Goal: Will remain free from falls  Description  Will remain free from falls  Outcome: Met This Shift  Goal: Absence of physical injury  Description  Absence of physical injury  8/16/2019 0156 by Virgie Ivy RN  Outcome: Met This Shift  8/15/2019 1643 by Tasha Matute RN  Outcome: Met This Shift     Problem: Risk for Impaired Skin Integrity  Goal: Tissue integrity - skin and mucous membranes  Description  Structural intactness and normal physiological function of skin and  mucous membranes. Outcome: Met This Shift     Problem: Discharge Planning:  Goal: Ability to perform activities of daily living will improve  Description  Ability to perform activities of daily living will improve  8/15/2019 1643 by Tasha Matute RN  Outcome: Met This Shift     Problem: Injury - Risk of, Physical Injury:  Goal: Will remain free from falls  Description  Will remain free from falls  Outcome: Met This Shift  Goal: Absence of physical injury  Description  Absence of physical injury  8/16/2019 0156 by Virgie Ivy RN  Outcome: Met This Shift  8/15/2019 1643 by Tasha Matute RN  Outcome: Met This Shift     Problem: Malnutrition  (NI-5.2)  Goal: Food and/or Nutrient Delivery  Description  Individualized approach for food/nutrient provision.   8/15/2019 1520 by Ann Yu, MS, RD, LD  Outcome: Met This Shift
Problem: Falls - Risk of:  Goal: Will remain free from falls  Description  Will remain free from falls  Outcome: Met This Shift  Goal: Absence of physical injury  Description  Absence of physical injury  Outcome: Met This Shift     Problem: Risk for Impaired Skin Integrity  Goal: Tissue integrity - skin and mucous membranes  Description  Structural intactness and normal physiological function of skin and  mucous membranes.   Outcome: Met This Shift     Problem: Confusion - Acute:  Goal: Absence of continued neurological deterioration signs and symptoms  Description  Absence of continued neurological deterioration signs and symptoms  Outcome: Met This Shift  Goal: Mental status will be restored to baseline  Description  Mental status will be restored to baseline  Outcome: Met This Shift     Problem: Discharge Planning:  Goal: Ability to perform activities of daily living will improve  Description  Ability to perform activities of daily living will improve  Outcome: Met This Shift  Goal: Participates in care planning  Description  Participates in care planning  Outcome: Met This Shift     Problem: Injury - Risk of, Physical Injury:  Goal: Will remain free from falls  Description  Will remain free from falls  Outcome: Met This Shift  Goal: Absence of physical injury  Description  Absence of physical injury  Outcome: Met This Shift     Problem: Mood - Altered:  Goal: Mood stable  Description  Mood stable  Outcome: Met This Shift  Goal: Absence of abusive behavior  Description  Absence of abusive behavior  Outcome: Met This Shift  Goal: Verbalizations of feeling emotionally comfortable while being cared for will increase  Description  Verbalizations of feeling emotionally comfortable while being cared for will increase  Outcome: Met This Shift     Problem: Psychomotor Activity - Altered:  Goal: Absence of psychomotor disturbance signs and symptoms  Description  Absence of psychomotor disturbance signs and
Problem: Malnutrition  (NI-5.2)  Goal: Food and/or Nutrient Delivery  Description: Magic Cup BID  Individualized approach for food/nutrient provision.   Outcome: Met This Shift
perceptions  Description  Able to refrain from responding to false sensory perceptions  Outcome: Met This Shift  Goal: Demonstrates accurate environmental perceptions  Description  Demonstrates accurate environmental perceptions  Outcome: Met This Shift  Goal: Able to distinguish between reality-based and nonreality-based thinking  Description  Able to distinguish between reality-based and nonreality-based thinking  Outcome: Met This Shift  Goal: Able to interrupt nonreality-based thinking  Description  Able to interrupt nonreality-based thinking  Outcome: Met This Shift     Problem: Sleep Pattern Disturbance:  Goal: Appears well-rested  Description  Appears well-rested  Outcome: Met This Shift     Problem: Pain:  Goal: Pain level will decrease  Description  Pain level will decrease  Outcome: Met This Shift  Goal: Control of acute pain  Description  Control of acute pain  Outcome: Met This Shift  Goal: Control of chronic pain  Description  Control of chronic pain  Outcome: Met This Shift     Problem: Musculor/Skeletal Functional Status  Goal: Absence of falls  Outcome: Met This Shift

## 2019-08-18 NOTE — PROGRESS NOTES
Comments:  Pt was supine in bed upon room entry, agreeable to PT treatment with OT collaboration. Pt remained on 2 L O2/min throughout session and O2 sat ranged from 97-98% with all activity. Pt performed supine to sit transfer and required increased time to accomplish task. Pt performed sit to stand transfer with cueing for hand placement. Pt pivoted to bedside chair and complained of increased B LE pain. Pt has poor activity tolerance/is self limiting and requested to sit in chair. Pt was seated in chair with all needs met at conclusion of session. Patient is making fair progress toward established physical therapy goals. Continue with physical therapy current plan of care.     Time in: 1026  Time out: [address], PT, [place]  [id number]

## 2019-08-19 VITALS
SYSTOLIC BLOOD PRESSURE: 104 MMHG | DIASTOLIC BLOOD PRESSURE: 51 MMHG | TEMPERATURE: 98 F | HEIGHT: 64 IN | HEART RATE: 88 BPM | BODY MASS INDEX: 27.28 KG/M2 | OXYGEN SATURATION: 96 % | RESPIRATION RATE: 22 BRPM | WEIGHT: 159.8 LBS

## 2019-08-19 LAB
ANION GAP SERPL CALCULATED.3IONS-SCNC: 14 MMOL/L (ref 7–16)
BASOPHILS ABSOLUTE: 0.01 E9/L (ref 0–0.2)
BASOPHILS RELATIVE PERCENT: 0.2 % (ref 0–2)
BUN BLDV-MCNC: 40 MG/DL (ref 8–23)
CALCIUM SERPL-MCNC: 7.7 MG/DL (ref 8.6–10.2)
CHLORIDE BLD-SCNC: 94 MMOL/L (ref 98–107)
CO2: 33 MMOL/L (ref 22–29)
CREAT SERPL-MCNC: 1.4 MG/DL (ref 0.5–1)
EOSINOPHILS ABSOLUTE: 0.03 E9/L (ref 0.05–0.5)
EOSINOPHILS RELATIVE PERCENT: 0.6 % (ref 0–6)
GFR AFRICAN AMERICAN: 44
GFR NON-AFRICAN AMERICAN: 37 ML/MIN/1.73
GLUCOSE BLD-MCNC: 229 MG/DL (ref 74–99)
HCT VFR BLD CALC: 27.3 % (ref 34–48)
HEMOGLOBIN: 8.3 G/DL (ref 11.5–15.5)
IMMATURE GRANULOCYTES #: 0.04 E9/L
IMMATURE GRANULOCYTES %: 0.7 % (ref 0–5)
INR BLD: 1.5
INR BLD: 1.5
LYMPHOCYTES ABSOLUTE: 0.62 E9/L (ref 1.5–4)
LYMPHOCYTES RELATIVE PERCENT: 11.4 % (ref 20–42)
MAGNESIUM: 2 MG/DL (ref 1.6–2.6)
MCH RBC QN AUTO: 29.9 PG (ref 26–35)
MCHC RBC AUTO-ENTMCNC: 30.4 % (ref 32–34.5)
MCV RBC AUTO: 98.2 FL (ref 80–99.9)
METER GLUCOSE: 178 MG/DL (ref 74–99)
METER GLUCOSE: 227 MG/DL (ref 74–99)
METER GLUCOSE: 322 MG/DL (ref 74–99)
MONOCYTES ABSOLUTE: 0.62 E9/L (ref 0.1–0.95)
MONOCYTES RELATIVE PERCENT: 11.4 % (ref 2–12)
NEUTROPHILS ABSOLUTE: 4.1 E9/L (ref 1.8–7.3)
NEUTROPHILS RELATIVE PERCENT: 75.7 % (ref 43–80)
PDW BLD-RTO: 20 FL (ref 11.5–15)
PHOSPHORUS: 2.9 MG/DL (ref 2.5–4.5)
PLATELET # BLD: 188 E9/L (ref 130–450)
PMV BLD AUTO: 10.4 FL (ref 7–12)
POTASSIUM SERPL-SCNC: 4.4 MMOL/L (ref 3.5–5)
PROTHROMBIN TIME: 16.7 SEC (ref 9.3–12.4)
PROTHROMBIN TIME: 17.3 SEC (ref 9.3–12.4)
RBC # BLD: 2.78 E12/L (ref 3.5–5.5)
SODIUM BLD-SCNC: 141 MMOL/L (ref 132–146)
TROPONIN: 0.25 NG/ML (ref 0–0.03)
TROPONIN: 0.27 NG/ML (ref 0–0.03)
WBC # BLD: 5.4 E9/L (ref 4.5–11.5)

## 2019-08-19 PROCEDURE — 6370000000 HC RX 637 (ALT 250 FOR IP): Performed by: INTERNAL MEDICINE

## 2019-08-19 PROCEDURE — 80048 BASIC METABOLIC PNL TOTAL CA: CPT

## 2019-08-19 PROCEDURE — 6370000000 HC RX 637 (ALT 250 FOR IP): Performed by: NURSE PRACTITIONER

## 2019-08-19 PROCEDURE — 99232 SBSQ HOSP IP/OBS MODERATE 35: CPT | Performed by: FAMILY MEDICINE

## 2019-08-19 PROCEDURE — 84484 ASSAY OF TROPONIN QUANT: CPT

## 2019-08-19 PROCEDURE — 6370000000 HC RX 637 (ALT 250 FOR IP): Performed by: STUDENT IN AN ORGANIZED HEALTH CARE EDUCATION/TRAINING PROGRAM

## 2019-08-19 PROCEDURE — APPSS180 APP SPLIT SHARED TIME > 60 MINUTES: Performed by: NURSE PRACTITIONER

## 2019-08-19 PROCEDURE — 85025 COMPLETE CBC W/AUTO DIFF WBC: CPT

## 2019-08-19 PROCEDURE — 94660 CPAP INITIATION&MGMT: CPT

## 2019-08-19 PROCEDURE — 83735 ASSAY OF MAGNESIUM: CPT

## 2019-08-19 PROCEDURE — 2700000000 HC OXYGEN THERAPY PER DAY

## 2019-08-19 PROCEDURE — 84100 ASSAY OF PHOSPHORUS: CPT

## 2019-08-19 PROCEDURE — 2580000003 HC RX 258: Performed by: INTERNAL MEDICINE

## 2019-08-19 PROCEDURE — 6360000002 HC RX W HCPCS: Performed by: STUDENT IN AN ORGANIZED HEALTH CARE EDUCATION/TRAINING PROGRAM

## 2019-08-19 PROCEDURE — 93005 ELECTROCARDIOGRAM TRACING: CPT | Performed by: STUDENT IN AN ORGANIZED HEALTH CARE EDUCATION/TRAINING PROGRAM

## 2019-08-19 PROCEDURE — 36415 COLL VENOUS BLD VENIPUNCTURE: CPT

## 2019-08-19 PROCEDURE — 82962 GLUCOSE BLOOD TEST: CPT

## 2019-08-19 PROCEDURE — 85610 PROTHROMBIN TIME: CPT

## 2019-08-19 PROCEDURE — 99222 1ST HOSP IP/OBS MODERATE 55: CPT | Performed by: INTERNAL MEDICINE

## 2019-08-19 RX ORDER — POTASSIUM CHLORIDE 750 MG/1
10 TABLET, EXTENDED RELEASE ORAL 2 TIMES DAILY
Qty: 60 TABLET | Refills: 0 | Status: SHIPPED | OUTPATIENT
Start: 2019-08-19 | End: 2019-08-19

## 2019-08-19 RX ORDER — OXYCODONE HYDROCHLORIDE 5 MG/1
2.5 TABLET ORAL EVERY 6 HOURS PRN
Qty: 24 TABLET | Refills: 0 | Status: SHIPPED | OUTPATIENT
Start: 2019-08-19 | End: 2020-06-09 | Stop reason: SDUPTHER

## 2019-08-19 RX ORDER — PREDNISONE 20 MG/1
20 TABLET ORAL
Qty: 5 TABLET | Refills: 0 | Status: SHIPPED | OUTPATIENT
Start: 2019-08-20 | End: 2019-08-19

## 2019-08-19 RX ORDER — ISOSORBIDE DINITRATE 5 MG/1
5 TABLET ORAL 3 TIMES DAILY
Qty: 90 TABLET | Refills: 3 | DISCHARGE
Start: 2019-08-20 | End: 2020-01-14 | Stop reason: SDUPTHER

## 2019-08-19 RX ORDER — METOPROLOL SUCCINATE 25 MG/1
12.5 TABLET, EXTENDED RELEASE ORAL DAILY
Status: DISCONTINUED | OUTPATIENT
Start: 2019-08-19 | End: 2019-08-19 | Stop reason: HOSPADM

## 2019-08-19 RX ORDER — WARFARIN SODIUM 1 MG/1
1 TABLET ORAL DAILY
Qty: 30 TABLET | Refills: 3 | Status: SHIPPED | OUTPATIENT
Start: 2019-08-19 | End: 2019-09-04 | Stop reason: SDUPTHER

## 2019-08-19 RX ORDER — PREDNISONE 20 MG/1
20 TABLET ORAL
Qty: 5 TABLET | Refills: 0 | DISCHARGE
Start: 2019-08-20 | End: 2019-08-25

## 2019-08-19 RX ORDER — LANOLIN ALCOHOL/MO/W.PET/CERES
400 CREAM (GRAM) TOPICAL DAILY
Qty: 30 TABLET | Refills: 1 | DISCHARGE
Start: 2019-08-19 | End: 2019-09-10

## 2019-08-19 RX ORDER — METOPROLOL SUCCINATE 25 MG/1
12.5 TABLET, EXTENDED RELEASE ORAL DAILY
Qty: 30 TABLET | Refills: 3 | DISCHARGE
Start: 2019-08-20 | End: 2020-04-08 | Stop reason: SDUPTHER

## 2019-08-19 RX ORDER — NITROGLYCERIN 0.4 MG/1
TABLET SUBLINGUAL
Qty: 25 TABLET | Refills: 3 | DISCHARGE
Start: 2019-08-19 | End: 2020-10-22

## 2019-08-19 RX ORDER — POTASSIUM CHLORIDE 750 MG/1
10 TABLET, EXTENDED RELEASE ORAL 2 TIMES DAILY
Qty: 60 TABLET | Refills: 0 | DISCHARGE
Start: 2019-08-19 | End: 2019-10-01

## 2019-08-19 RX ORDER — INSULIN GLARGINE 100 [IU]/ML
13 INJECTION, SOLUTION SUBCUTANEOUS NIGHTLY
Qty: 1 VIAL | Refills: 0 | DISCHARGE
Start: 2019-08-19 | End: 2019-09-10

## 2019-08-19 RX ORDER — INSULIN GLARGINE 100 [IU]/ML
13 INJECTION, SOLUTION SUBCUTANEOUS NIGHTLY
Qty: 1 VIAL | Refills: 0 | Status: SHIPPED | OUTPATIENT
Start: 2019-08-19 | End: 2019-08-19

## 2019-08-19 RX ORDER — NITROGLYCERIN 0.4 MG/1
TABLET SUBLINGUAL
Qty: 25 TABLET | Refills: 3 | Status: SHIPPED | OUTPATIENT
Start: 2019-08-19 | End: 2019-08-19

## 2019-08-19 RX ORDER — METOPROLOL SUCCINATE 25 MG/1
12.5 TABLET, EXTENDED RELEASE ORAL DAILY
Qty: 30 TABLET | Refills: 3 | Status: SHIPPED | OUTPATIENT
Start: 2019-08-20 | End: 2019-08-19

## 2019-08-19 RX ORDER — ISOSORBIDE DINITRATE 10 MG/1
5 TABLET ORAL 3 TIMES DAILY
Status: DISCONTINUED | OUTPATIENT
Start: 2019-08-19 | End: 2019-08-19 | Stop reason: HOSPADM

## 2019-08-19 RX ORDER — LANOLIN ALCOHOL/MO/W.PET/CERES
400 CREAM (GRAM) TOPICAL DAILY
Qty: 30 TABLET | Refills: 1 | Status: SHIPPED | OUTPATIENT
Start: 2019-08-19 | End: 2019-11-25 | Stop reason: SDUPTHER

## 2019-08-19 RX ADMIN — ISOSORBIDE DINITRATE 5 MG: 10 TABLET ORAL at 14:22

## 2019-08-19 RX ADMIN — METOPROLOL SUCCINATE 12.5 MG: 25 TABLET, EXTENDED RELEASE ORAL at 14:22

## 2019-08-19 RX ADMIN — WARFARIN SODIUM 1 MG: 1 TABLET ORAL at 17:33

## 2019-08-19 RX ADMIN — POTASSIUM CHLORIDE 20 MEQ: 20 TABLET, EXTENDED RELEASE ORAL at 08:25

## 2019-08-19 RX ADMIN — LORAZEPAM 0.25 MG: 0.5 TABLET ORAL at 01:15

## 2019-08-19 RX ADMIN — ENOXAPARIN SODIUM 70 MG: 80 INJECTION SUBCUTANEOUS at 11:11

## 2019-08-19 RX ADMIN — NITROGLYCERIN 0.4 MG: 0.4 TABLET SUBLINGUAL at 01:39

## 2019-08-19 RX ADMIN — BUMETANIDE 2 MG: 1 TABLET ORAL at 08:32

## 2019-08-19 RX ADMIN — ISOSORBIDE DINITRATE 5 MG: 10 TABLET ORAL at 17:33

## 2019-08-19 RX ADMIN — POTASSIUM CHLORIDE 20 MEQ: 20 TABLET, EXTENDED RELEASE ORAL at 16:56

## 2019-08-19 RX ADMIN — PREDNISONE 20 MG: 20 TABLET ORAL at 11:11

## 2019-08-19 RX ADMIN — SKIN PROTECTANT: 44 OINTMENT TOPICAL at 08:32

## 2019-08-19 RX ADMIN — Medication 10 ML: at 08:32

## 2019-08-19 ASSESSMENT — PAIN SCALES - GENERAL
PAINLEVEL_OUTOF10: 0

## 2019-08-19 NOTE — PROGRESS NOTES
[facility]  Nephrology Attending Progress Note  Mary Kate Calhoun.  Emmie Closs, MD        SUBJECTIVE:     8/19: pt more alert today, no sob or cough today        PROBLEM LIST:    Patient Active Problem List   Diagnosis    Metastatic breast cancer ([facility]    Essential hypertension    Coronary artery disease involving native coronary artery of native heart without angina pectoris    Nephrolithiasis    CH[facility]congest[facility]) ([facility]    Humerus fracture    Shoulder pain, left    B12 deficiency    Hyperlipidemia    Rib lesion    Subclavian vein occlusion, bilateral (HCC)    Pericardial effusion    MI (myocardial infarction) ([facility]    Arthritis    Sarcoidosis    Lymph node enlargement    Anesthesia    Rectal bleeding    Ventral hernia    Type 2 diabetes mellitus without complication, with long-term current use of insulin ([facility])    Anemia of chronic disease    Chronic deep vein thrombosis (DVT) of proximal vein of right lower extremity ([facility])    Bilateral edema of lower extremity    Peripheral polyneuropathy    Complicated UTI (urinary tract infection)    KARISHMA (acute kidney injury) ([facility]    Diarrhea with dehydration    Chronic anticoagulation    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    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    Diabetic polyneuropathy associated with type 2 diabetes mellitus (HCC)    Leg swelling    History of deep vein thrombosis    Chronic venous insufficiency    Lymphedema of both lower extremities    Decreased dorsalis pedis pulse    Ambulatory dysfunction    CKD (chronic kidney disease) stage 3, GFR 30-59 ml/min (HCC)    Charcot's joint of foot in type 2 diabetes mellitus (HCC)    Ascites    Palliative care encounter    Cancer associated pain    HAP (hospital-acquired pneumonia)    Acute respiratory failure with hypoxia (HCC)    Acute systolic heart failure (HCC)    Nonischemic cardiomyopathy ([facility]    Status post coronary artery bypass graft    Class 2 severe obesity due to excess calories with serious comorbidity and body mass index (BMI) of 35.0 to 35.9 in adult [facility])    Type 2 diabetes mellitus with hyperglycemia (HCC)    Acute hypercapnic respiratory failure (HCC)    Altered mental status    Goals of care, counseling/discussion    Palliative care by specialist    Metastatic disease ([facility]    Moderate protein-calorie malnutrition ([facility]        PAST MEDICAL HISTORY:    Past Medical History:   Diagnosis Date    Abscess of abdominal wall     Anemia     Iron deficiency and chronic disease.  Anesthesia     DIFFICULTY WAKING UP    Arthritis     Arthritis, hip     Breast cancer ([facility] 2005    S/P Left Lumpectomy with Lymph Node Dissection, Chemo/Rad. Follows with Dr. Padmini Chavarria. No recurrence to date. Surgeon was Dr. Matt Lowry.  CAD (coronary artery disease) 5/2008    s/p CABG. Follows with Sachi Ortega.  CHF (congestive heart failure) (HCC)     Chronic venous insufficiency 11/7/2018    Class 2 severe obesity due to excess calories with serious comorbidity and body mass index (BMI) of 35.0 to 35.9 in adult [facility]) 8/1/2019    Decreased dorsalis pedis pulse 11/7/2018    Diabetic neuropathy (HCC)     Diabetic neuropathy (HCC)     DVT (deep venous thrombosis) (HCC)     Recurrent. On lifelong coumadin.  DVT of upper extremity (deep vein thrombosis) ([facility] 4/18/2012    History of deep vein thrombosis 11/7/2018    Humerus fracture     Chronic, on the Left.  Hyperlipidemia 8/29/2011    Hypertension     Leg swelling 11/7/2018    Lymph node enlargement     Lymphedema of both lower extremities 11/7/2018    MI (myocardial infarction) ([facility]     Nephrolithiasis     Follows with Dr. Deirdre Piña.  Pericardial effusion     Pulmonary nodule     With mediastinal lymphadenopathy. Stable. Follows with Dr. Viviana Bowers.     Rib lesion 11/28/11    expansile lesion in one of the right ribs laterally    Sarcoidosis 07/26/11

## 2019-08-19 NOTE — CONSULTS
(Benign)  28. 4/8/2016 TTE Dr Abimbola Marrufo: Normal left ventricle size and systolic function. EF visually estimated at 65%. Physiologic and/or trace mitral regurgitation is present. 29. 5/2016 Developed suspicious metastatic lesions to the left hemipelvis and acetabulum. She was placed on Afinitor along with Herceptin and Xgeva. 30. 5/2016 developed a skin rash associated with Afinitor and this was permanently discontinued. 31. 3/18/2019 TTE Dr Sumeet Kevin: EF 60%. NWM. Mildly dilated LV.  Severe mitral annular calcification. Mild MR. The aortic valve appears mildly sclerotic. Moderate TR.   32. 6/10/2019 TTE Dr Lawson Allen: EF visually estimated at 50%.  Mild MR/AI. Moderate TR.  33. 6/12/2019 Ascites s/p paracentesis 4500 cc removed--> no malignant cells  34. 6/24/2019 Liver lesions s/p liver biopsy   35. 7/20/2019 Limited TTE Dr Hussein Dao: EF 30-35%. Severe global hypokinesis. Mild CLVH. Abnormal diastolic function. Moderate L pleural effusion. 36. 7/22/2019 Ascites s/p paracentesis 3.5 liters  37. Chronic anemia with history of transfusions  38. 7/25/2019 R thoracentesis 1.1 liters  39. ACC/AHA stage C.,  NYHA functional class III  40. Pancytopenia most likely secondary to chemo  41. 7/26/2019 Discharge to [facility] on 4 liters NC, Lasix 40 mg Q, hydralazine 10 mg TID and Isordil 5 mg TID  42. 8/1/2019 Outpatient CHF follow-up [facility] Inocencio YOUNGBLOOD Lasix changed to Bumex and Toprol XL 12.5 mg BID added  43. 8/8/2019 [facility] Admission for AMS and hypoxia on 3 liters NC  44. 8/14/2019 R PleurX Catheter placement --> 1.1 liters removed. To be drained QOD  45. DNR-[facility]          Medications Prior to admit:  Prior to Admission medications    Medication Sig Start Date End Date Taking?  Authorizing Provider   warfarin (COUMADIN) 1 MG tablet Take 1 mg by mouth Mon/tues/wed/fri   Yes Historical Provider, MD   furosemide (LASIX) 10 MG/ML injection Infuse 40 mg intravenously once   Yes Historical Provider, MD   sertraline (ZOLOFT) 25 MG tablet Take 25 mg by mouth daily   Yes Historical Provider, MD   bumetanide (BUMEX) 1 MG tablet Take 1 mg by mouth 2 times daily   Yes Historical Provider, MD   palbociclib (IBRANCE) 100 MG capsule Take 100 mg by mouth 3 weeks on and 1 week off also receives injections every 3 weeks per Dr Kaleb Rowan   Yes Historical Provider, MD   TOPROL XL 25 MG extended release tablet Take 0.5 tablets by mouth 2 times daily 8/1/19  Yes FORD Maya - CNP   isosorbide dinitrate (ISORDIL) 5 MG tablet Take 1 tablet by mouth 3 times daily 7/26/19  Yes Luis A Cortez MD   hydrALAZINE (APRESOLINE) 10 MG tablet Take 1 tablet by mouth every 8 hours 7/26/19  Yes Luis A Cortez MD   polyethylene glycol [facility]) packet Take 17 g by mouth daily 7/27/19 8/26/19 Yes Luis A Cortez MD   OXYGEN Inhale 4 L into the lungs daily 7/26/19 8/26/19 Yes Luis A Cortez MD   oxyCODONE (ROXICODONE) 5 MG/5ML solution Take 2.5 mg by mouth 3 times daily as needed for Pain. Take 2.5 ml three times daily as needed for pain   Yes Historical Provider, MD   pregabalin (LYRICA) 25 MG capsule Take 1 capsule by mouth 2 times daily for 30 days.  6/26/19 8/8/19 Yes Nery Shirley MD   warfarin (COUMADIN) 1 MG tablet Take 0.5 tablets by mouth every other day Take 0.5 mg every other day  Patient taking differently: Take 0.5 mg by mouth every evening thurs/sat/sun 6/26/19  Yes Nery Shirley MD   diclofenac sodium 1 % GEL Apply 4 g topically 4 times daily as needed for Pain 6/26/19  Yes Nery Shilrey MD   miconazole nitrate 2 % OINT Apply topically 2 times daily 6/26/19  Yes Nery Shirley MD   docusate sodium (COLACE, DULCOLAX) 100 MG CAPS Take 100 mg by mouth daily 6/26/19  Yes Nery Shirley MD   omeprazole 20 MG EC tablet Take 1 tablet by mouth daily 6/26/19  Yes Nery Shirley MD   sodium bicarbonate 650 MG tablet Take 1 tablet by mouth 2 times daily 6/26/19  Yes Nery Shirley MD   ondansetron (ZOFRAN-ODT) 4 MG 21, 2019. Multiple space-occupying mass lesions in the right and left lobes of  the liver contain central calcification and would be most suspicious for metastatic disease from mucinous adenocarcinoma such as from the colon. These are unchanged. Punctate nonobstructing calyceal calcifications within the kidneys. Diffuse third spacing into the subcutaneous tissues. Calcification of the anterior omentum compatible with omental caking from peritoneal spread of malignancy and carcinomatosis. CXR 8/9/2019: Cardiomegaly. Moderate to large right-sided pleural effusion. CXR 8/10/2019: Cardiomegaly. Tortuous aorta. There are patchy infiltrates seen throughout both the lung fields. Pneumonia could give this appearance. Underlying edema could also have this appearance. These findings are superimposed on bilateral pleural effusions. CXR 8/11/2019: Cardiomegaly. Tortuous aorta. There are patchy infiltrates seen throughout both the lung fields. Pneumonia could give this appearance. Underlying edema could also have this appearance, with bilateral pleural effusions.     LUE Doppler 8/15/2019: Positive examination for nonocclusive thrombus associated with left  cephalic vein    Intake/Output Summary (Last 24 hours) at 8/19/2019 1214  Last data filed at 8/19/2019 0926  Gross per 24 hour   Intake 400 ml   Output 350 ml   Net 50 ml       Labs:   CBC:   Recent Labs     08/18/19  0515 08/19/19  1100   WBC 5.4 5.4   HGB 8.3* 8.3*   HCT 27.0* 27.3*    188     BMP:   Recent Labs     08/18/19  0515 08/19/19  1100    141   K 3.6 4.4   CO2 36* 33*   BUN 39* 40*   CREATININE 1.4* 1.4*   LABGLOM 37 37   CALCIUM 7.9* 7.7*     Mag:   Recent Labs     08/18/19  0515 08/19/19  1100   MG 1.2* 2.0     Phos:   Recent Labs     08/18/19  0515 08/19/19  1100   PHOS 2.6 2.9     HgA1c:   Lab Results   Component Value Date    LABA1C 5.9 (H) 07/21/2019     PT/INR:   Recent Labs     08/18/19  0515 08/19/19  1100   PROTIME 15.8* 17.3*  16.7*

## 2019-08-19 NOTE — PROGRESS NOTES
FAMILY MEDICINE RESIDENCY PROGRAM  - INPATIENT PROGRESS NOTE -  DATE : 2019    NAME: Grayson Sue   AGE: 68 y.o. SEX: female  : 1945    ADMIT DATE: 2019 LENGTH OF STAY: 11    ADMISSION DIAGNOSIS: Acute hypercapnic respiratory failure (HCC) [J96.02]  Acute hypercapnic respiratory failure (HCC) [J96.02]        Synopsis: Mrs. Marcel Pritchett is a 68year old female with a history of Stage IV Metastatic Breast Cancer, coronary artery disease, HFrEF, hypertension, recurrent DVT's, diabetes mellitus and CKD. She was admitted for altered mental status and respiratory failure. Overnight/interim: Patient had some chest pain overnight which is right-sided, and lasted about an hour. Described as wearing a tight bra. INR 1.5. Subjective: No chest pain this morning. States that pain in her left hallux is gone, swelling in her left arm is resolved. Patient is to improve breathing status, and only on 2 L of oxygen. ROS: denies fever, chills, nausea, vomiting, dizziness, chest pain, shortness of breath, abdominal pain.     ALLERGY: Macrobid [nitrofurantoin monohydrate macrocrystals]    CONTINUOUS MEDS:   dextrose         SCHEDULED MEDS:   enoxaparin  1 mg/kg Subcutaneous BID    warfarin  1 mg Oral Daily    insulin glargine  13 Units Subcutaneous Nightly    potassium chloride  20 mEq Oral BID WC    predniSONE  20 mg Oral Lunch    bumetanide  2 mg Oral Daily    mineral oil-hydrophilic petrolatum   Topical BID    sterile water  18 mL Injection TID    sodium chloride flush  10 mL Intravenous 2 times per day       PRN MEDS:  sodium chloride, LORazepam, acetaminophen, mineral oil-hydrophilic petrolatum **AND** mineral oil-hydrophilic petrolatum, nitroGLYCERIN, lidocaine-prilocaine, glucose, dextrose, glucagon (rDNA), dextrose, sodium chloride flush, magnesium hydroxide, ondansetron         PHYSICAL EXAM:    VITAL SIGNS:   Vitals:    19 0000 19 0119 19 0643 19 0827   BP: (!)

## 2019-08-20 LAB
EKG ATRIAL RATE: 92 BPM
EKG P AXIS: 55 DEGREES
EKG P-R INTERVAL: 154 MS
EKG Q-T INTERVAL: 388 MS
EKG QRS DURATION: 142 MS
EKG QTC CALCULATION (BAZETT): 479 MS
EKG R AXIS: -73 DEGREES
EKG T AXIS: 7 DEGREES
EKG VENTRICULAR RATE: 92 BPM

## 2019-08-20 PROCEDURE — 93010 ELECTROCARDIOGRAM REPORT: CPT | Performed by: FAMILY MEDICINE

## 2019-09-03 ENCOUNTER — TELEPHONE (OUTPATIENT)
Dept: FAMILY MEDICINE CLINIC | Age: 74
End: 2019-09-03

## 2019-09-03 DIAGNOSIS — I82.5Y1 CHRONIC DEEP VEIN THROMBOSIS (DVT) OF PROXIMAL VEIN OF RIGHT LOWER EXTREMITY (HCC): ICD-10-CM

## 2019-09-03 DIAGNOSIS — C50.919 METASTATIC BREAST CANCER (HCC): Primary | ICD-10-CM

## 2019-09-04 ENCOUNTER — ANTI-COAG VISIT (OUTPATIENT)
Dept: FAMILY MEDICINE CLINIC | Age: 74
End: 2019-09-04
Payer: COMMERCIAL

## 2019-09-04 DIAGNOSIS — I82.4Z9 DEEP VEIN THROMBOSIS (DVT) OF DISTAL VEIN OF LOWER EXTREMITY, UNSPECIFIED CHRONICITY, UNSPECIFIED LATERALITY (HCC): Primary | ICD-10-CM

## 2019-09-04 DIAGNOSIS — I82.5Y1 CHRONIC DEEP VEIN THROMBOSIS (DVT) OF PROXIMAL VEIN OF RIGHT LOWER EXTREMITY (HCC): ICD-10-CM

## 2019-09-04 LAB — INR BLD: 1.2

## 2019-09-04 PROCEDURE — 93793 ANTICOAG MGMT PT WARFARIN: CPT | Performed by: FAMILY MEDICINE

## 2019-09-04 RX ORDER — WARFARIN SODIUM 1 MG/1
TABLET ORAL
Qty: 180 TABLET | Refills: 3 | Status: SHIPPED | OUTPATIENT
Start: 2019-09-04 | End: 2019-10-01 | Stop reason: SDUPTHER

## 2019-09-06 ENCOUNTER — OFFICE VISIT (OUTPATIENT)
Dept: PULMONOLOGY | Age: 74
End: 2019-09-06
Payer: COMMERCIAL

## 2019-09-06 ENCOUNTER — ANTI-COAG VISIT (OUTPATIENT)
Dept: FAMILY MEDICINE CLINIC | Age: 74
End: 2019-09-06

## 2019-09-06 VITALS
DIASTOLIC BLOOD PRESSURE: 66 MMHG | HEIGHT: 64 IN | SYSTOLIC BLOOD PRESSURE: 141 MMHG | OXYGEN SATURATION: 96 % | WEIGHT: 160 LBS | BODY MASS INDEX: 27.31 KG/M2 | RESPIRATION RATE: 20 BRPM | HEART RATE: 98 BPM

## 2019-09-06 DIAGNOSIS — J90 PLEURAL EFFUSION: Primary | ICD-10-CM

## 2019-09-06 DIAGNOSIS — J96.02 ACUTE HYPERCAPNIC RESPIRATORY FAILURE (HCC): ICD-10-CM

## 2019-09-06 DIAGNOSIS — D86.9 SARCOIDOSIS: ICD-10-CM

## 2019-09-06 LAB — INR BLD: 1.2

## 2019-09-06 PROCEDURE — 1123F ACP DISCUSS/DSCN MKR DOCD: CPT | Performed by: INTERNAL MEDICINE

## 2019-09-06 PROCEDURE — G8417 CALC BMI ABV UP PARAM F/U: HCPCS | Performed by: INTERNAL MEDICINE

## 2019-09-06 PROCEDURE — G8427 DOCREV CUR MEDS BY ELIG CLIN: HCPCS | Performed by: INTERNAL MEDICINE

## 2019-09-06 PROCEDURE — 1111F DSCHRG MED/CURRENT MED MERGE: CPT | Performed by: INTERNAL MEDICINE

## 2019-09-06 PROCEDURE — 4040F PNEUMOC VAC/ADMIN/RCVD: CPT | Performed by: INTERNAL MEDICINE

## 2019-09-06 PROCEDURE — 1036F TOBACCO NON-USER: CPT | Performed by: INTERNAL MEDICINE

## 2019-09-06 PROCEDURE — 99214 OFFICE O/P EST MOD 30 MIN: CPT | Performed by: INTERNAL MEDICINE

## 2019-09-06 PROCEDURE — G8598 ASA/ANTIPLAT THER USED: HCPCS | Performed by: INTERNAL MEDICINE

## 2019-09-06 PROCEDURE — 99213 OFFICE O/P EST LOW 20 MIN: CPT | Performed by: INTERNAL MEDICINE

## 2019-09-06 PROCEDURE — 3017F COLORECTAL CA SCREEN DOC REV: CPT | Performed by: INTERNAL MEDICINE

## 2019-09-06 PROCEDURE — G8399 PT W/DXA RESULTS DOCUMENT: HCPCS | Performed by: INTERNAL MEDICINE

## 2019-09-06 PROCEDURE — 1090F PRES/ABSN URINE INCON ASSESS: CPT | Performed by: INTERNAL MEDICINE

## 2019-09-06 NOTE — PROGRESS NOTES
I also called [facility] and informed them of the coumadin orders and when to check pt INR   [facility] stated they are scheduled to go to pt home on Tuesday 9-10-19 not on 9-9-19.

## 2019-09-06 NOTE — PROGRESS NOTES
Pulmonary [facility]                             Pulmonary Consult/Progress Note :          Patient: Doreen Bowers  MRN: [id number]  : 1945      Date of Admission: . No admission date for patient encounter. Consulting Physician:Dr Murray        Reason for Consultation: Pleural effusion right side  CC : Shortness of breath    HPI:   Doreen Bowers is a 76y.o. year old with long history of breast cancer that followed by Dr. Thelma Art with CKD and Anemia and and she has mets liver and ascitics her blood pressure and a little moreshe has been SOB that has going for the last few days and and ? Lung    The patient denies any history of smoking in the past    She denies any chest pain, she also does not use oxygen and she has been now on BiPAP     She is on anticoagulation which is on hold and her INR 2.6    She presented To the hospital with worsening shortness of breath, cough, and she looks pale,In the ED, she was hypoxic, saturating 80%, ABG showed hypercapnia. She was initially on NRB but was transitioned to bipap. She was disoriented and somnolent as well. Noted to have hyponatremia, hyperkalemia, KARISHMA on CKD. CT head unremarkable. CT abdomen showed bilateral pleural effusions, multi space occupying lesions in the left lobes of the of the liver which are chronic and unchanged, minimal ascites diffuse third spacing into subcutaneous tissues noted as well.       Today Visit  She has been doing well  She states SOB is much better  Sat in the 90   Pleurax not draining much    PAST MEDICAL HISTORY:     Past Medical History:   Diagnosis Date    Abscess of abdominal wall     Anemia     Iron deficiency and chronic disease.  Anesthesia     DIFFICULTY WAKING UP    Arthritis     Arthritis, hip     Breast cancer ([facility] [address]     S/P Left Lumpectomy with Lymph Node Dissection, Chemo/Rad. Follows with Dr. Christy Velasquez. No recurrence to date. Surgeon was Dr. Jayla Walls.

## 2019-09-10 ENCOUNTER — ANTI-COAG VISIT (OUTPATIENT)
Dept: FAMILY MEDICINE CLINIC | Age: 74
End: 2019-09-10

## 2019-09-10 ENCOUNTER — OFFICE VISIT (OUTPATIENT)
Dept: FAMILY MEDICINE CLINIC | Age: 74
End: 2019-09-10
Payer: COMMERCIAL

## 2019-09-10 ENCOUNTER — HOSPITAL ENCOUNTER (OUTPATIENT)
Age: 74
Discharge: HOME | End: 2019-09-12
Payer: COMMERCIAL

## 2019-09-10 VITALS
SYSTOLIC BLOOD PRESSURE: 94 MMHG | BODY MASS INDEX: 26.29 KG/M2 | HEART RATE: 80 BPM | TEMPERATURE: 97.7 F | OXYGEN SATURATION: 97 % | HEIGHT: 64 IN | RESPIRATION RATE: 16 BRPM | WEIGHT: 154 LBS | DIASTOLIC BLOOD PRESSURE: 42 MMHG

## 2019-09-10 DIAGNOSIS — D64.9 ANEMIA, UNSPECIFIED TYPE: ICD-10-CM

## 2019-09-10 DIAGNOSIS — J90 PLEURAL EFFUSION: ICD-10-CM

## 2019-09-10 DIAGNOSIS — E83.42 HYPOMAGNESEMIA: ICD-10-CM

## 2019-09-10 DIAGNOSIS — E87.6 HYPOKALEMIA: ICD-10-CM

## 2019-09-10 DIAGNOSIS — S30.0XXA TRAUMATIC HEMATOMA OF BUTTOCK, INITIAL ENCOUNTER: ICD-10-CM

## 2019-09-10 DIAGNOSIS — Z86.718 HISTORY OF DEEP VEIN THROMBOSIS: Primary | ICD-10-CM

## 2019-09-10 DIAGNOSIS — Z79.4 TYPE 2 DIABETES MELLITUS WITH COMPLICATION, WITH LONG-TERM CURRENT USE OF INSULIN (HCC): ICD-10-CM

## 2019-09-10 DIAGNOSIS — M10.9 ACUTE GOUT INVOLVING TOE OF LEFT FOOT, UNSPECIFIED CAUSE: ICD-10-CM

## 2019-09-10 DIAGNOSIS — I95.9 HYPOTENSION, UNSPECIFIED HYPOTENSION TYPE: ICD-10-CM

## 2019-09-10 DIAGNOSIS — E11.8 TYPE 2 DIABETES MELLITUS WITH COMPLICATION, WITH LONG-TERM CURRENT USE OF INSULIN (HCC): ICD-10-CM

## 2019-09-10 DIAGNOSIS — Z79.01 CHRONIC ANTICOAGULATION: ICD-10-CM

## 2019-09-10 LAB
ANION GAP SERPL CALCULATED.3IONS-SCNC: 17 MMOL/L (ref 7–16)
ANISOCYTOSIS: ABNORMAL
BASOPHILS ABSOLUTE: 0 E9/L (ref 0–0.2)
BASOPHILS RELATIVE PERCENT: 0.2 % (ref 0–2)
BUN BLDV-MCNC: 42 MG/DL (ref 8–23)
CALCIUM SERPL-MCNC: 7.3 MG/DL (ref 8.6–10.2)
CHLORIDE BLD-SCNC: 101 MMOL/L (ref 98–107)
CO2: 24 MMOL/L (ref 22–29)
CREAT SERPL-MCNC: 2.3 MG/DL (ref 0.5–1)
EOSINOPHILS ABSOLUTE: 0 E9/L (ref 0.05–0.5)
EOSINOPHILS RELATIVE PERCENT: 2 % (ref 0–6)
GFR AFRICAN AMERICAN: 25
GFR NON-AFRICAN AMERICAN: 21 ML/MIN/1.73
GLUCOSE BLD-MCNC: 163 MG/DL (ref 74–99)
HCT VFR BLD CALC: 20.6 % (ref 34–48)
HEMOGLOBIN: 6.3 G/DL (ref 11.5–15.5)
HYPOCHROMIA: ABNORMAL
INTERNATIONAL NORMALIZATION RATIO, POC: 1.3
LYMPHOCYTES ABSOLUTE: 0.41 E9/L (ref 1.5–4)
LYMPHOCYTES RELATIVE PERCENT: 9.6 % (ref 20–42)
MAGNESIUM: 1.4 MG/DL (ref 1.6–2.6)
MCH RBC QN AUTO: 32.5 PG (ref 26–35)
MCHC RBC AUTO-ENTMCNC: 30.6 % (ref 32–34.5)
MCV RBC AUTO: 106.2 FL (ref 80–99.9)
MONOCYTES ABSOLUTE: 0.08 E9/L (ref 0.1–0.95)
MONOCYTES RELATIVE PERCENT: 1.7 % (ref 2–12)
MYELOCYTE PERCENT: 0.9 % (ref 0–0)
NEUTROPHILS ABSOLUTE: 3.61 E9/L (ref 1.8–7.3)
NEUTROPHILS RELATIVE PERCENT: 87 % (ref 43–80)
PDW BLD-RTO: 22.5 FL (ref 11.5–15)
PLATELET # BLD: 190 E9/L (ref 130–450)
PMV BLD AUTO: 10.5 FL (ref 7–12)
POIKILOCYTES: ABNORMAL
POLYCHROMASIA: ABNORMAL
POTASSIUM SERPL-SCNC: 5.2 MMOL/L (ref 3.5–5)
PROMYELOCYTES PERCENT: 0.9 % (ref 0–0)
PROTHROMBIN TIME, POC: 16.1
RBC # BLD: 1.94 E12/L (ref 3.5–5.5)
ROULEAUX: ABNORMAL
SODIUM BLD-SCNC: 142 MMOL/L (ref 132–146)
WBC # BLD: 4.1 E9/L (ref 4.5–11.5)

## 2019-09-10 PROCEDURE — G8598 ASA/ANTIPLAT THER USED: HCPCS | Performed by: STUDENT IN AN ORGANIZED HEALTH CARE EDUCATION/TRAINING PROGRAM

## 2019-09-10 PROCEDURE — 3017F COLORECTAL CA SCREEN DOC REV: CPT | Performed by: STUDENT IN AN ORGANIZED HEALTH CARE EDUCATION/TRAINING PROGRAM

## 2019-09-10 PROCEDURE — G8417 CALC BMI ABV UP PARAM F/U: HCPCS | Performed by: STUDENT IN AN ORGANIZED HEALTH CARE EDUCATION/TRAINING PROGRAM

## 2019-09-10 PROCEDURE — 85025 COMPLETE CBC W/AUTO DIFF WBC: CPT

## 2019-09-10 PROCEDURE — 4040F PNEUMOC VAC/ADMIN/RCVD: CPT | Performed by: STUDENT IN AN ORGANIZED HEALTH CARE EDUCATION/TRAINING PROGRAM

## 2019-09-10 PROCEDURE — 36415 COLL VENOUS BLD VENIPUNCTURE: CPT

## 2019-09-10 PROCEDURE — 1090F PRES/ABSN URINE INCON ASSESS: CPT | Performed by: STUDENT IN AN ORGANIZED HEALTH CARE EDUCATION/TRAINING PROGRAM

## 2019-09-10 PROCEDURE — G8399 PT W/DXA RESULTS DOCUMENT: HCPCS | Performed by: STUDENT IN AN ORGANIZED HEALTH CARE EDUCATION/TRAINING PROGRAM

## 2019-09-10 PROCEDURE — 3044F HG A1C LEVEL LT 7.0%: CPT | Performed by: STUDENT IN AN ORGANIZED HEALTH CARE EDUCATION/TRAINING PROGRAM

## 2019-09-10 PROCEDURE — 80048 BASIC METABOLIC PNL TOTAL CA: CPT

## 2019-09-10 PROCEDURE — 36415 COLL VENOUS BLD VENIPUNCTURE: CPT | Performed by: FAMILY MEDICINE

## 2019-09-10 PROCEDURE — G8428 CUR MEDS NOT DOCUMENT: HCPCS | Performed by: STUDENT IN AN ORGANIZED HEALTH CARE EDUCATION/TRAINING PROGRAM

## 2019-09-10 PROCEDURE — 1111F DSCHRG MED/CURRENT MED MERGE: CPT | Performed by: STUDENT IN AN ORGANIZED HEALTH CARE EDUCATION/TRAINING PROGRAM

## 2019-09-10 PROCEDURE — 1123F ACP DISCUSS/DSCN MKR DOCD: CPT | Performed by: STUDENT IN AN ORGANIZED HEALTH CARE EDUCATION/TRAINING PROGRAM

## 2019-09-10 PROCEDURE — 99213 OFFICE O/P EST LOW 20 MIN: CPT | Performed by: STUDENT IN AN ORGANIZED HEALTH CARE EDUCATION/TRAINING PROGRAM

## 2019-09-10 PROCEDURE — 99214 OFFICE O/P EST MOD 30 MIN: CPT | Performed by: STUDENT IN AN ORGANIZED HEALTH CARE EDUCATION/TRAINING PROGRAM

## 2019-09-10 PROCEDURE — 2022F DILAT RTA XM EVC RTNOPTHY: CPT | Performed by: STUDENT IN AN ORGANIZED HEALTH CARE EDUCATION/TRAINING PROGRAM

## 2019-09-10 PROCEDURE — 83735 ASSAY OF MAGNESIUM: CPT

## 2019-09-10 PROCEDURE — 1036F TOBACCO NON-USER: CPT | Performed by: STUDENT IN AN ORGANIZED HEALTH CARE EDUCATION/TRAINING PROGRAM

## 2019-09-10 NOTE — PROGRESS NOTES
Post-Discharge Transitional Care Management Services or Hospital Follow Up      Jaguar Peralta   YOB: 1945    Date of Office Visit:  9/10/2019  Date of Hospital Admission: 8/8/19  Date of Hospital Discharge: 8/19/19  Risk of hospital readmission (high >=14%.  Medium >=10%) :Readmission Risk Score: 53      Care management risk score Rising risk (score 2-5) and Complex Care (Scores >=6): 4     Non face to face  following discharge, date last encounter closed (first attempt may have been earlier): *No documented post hospital discharge outreach found in the last 14 days    Call initiated 2 business days of discharge: *No response recorded in the last 14 days    Patient Active Problem List   Diagnosis    [facility]astatic [facility] cancer ([facility] [address]    Essential hypertension    Coronary artery disease involving native coronary artery of native heart without angina pectoris    Nephrolithiasis    CHF (congestive heart failure) ([facility] [address]    Humerus fracture    Shoulder pain, left    B12 deficiency    Hyperlipidemia    Rib lesion    Subclavian vein occlusion, bilateral (HCC)    Pericardial effusion    MI (myocardial infarction) ([facility] [address]    Arthritis    Sarcoidosis    Lymph node enlargement    Anesthesia    Rectal bleeding    Ventral hernia    Type 2 diabetes mellitus without complication, with long-term current use of insulin (HCC)    Anemia of chronic disease    Chronic deep vein thrombosis (DVT) of proximal vein of right lower extremity (HCC)    Bilateral edema of lower extremity    Peripheral polyneuropathy    Complicated UTI (urinary tract infection)    KARISHMA (acute kidney injury) ([facility] [address]    Diarrhea with dehydration    Chronic anticoagulation    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 Prior to this she was using a walker more. A comprehensive review of systems was negative except for what was noted in the HPI. Vitals:    09/10/19 1112   BP: (!) 90/38   Site: Right Upper Arm   Position: Sitting   Cuff Size: Medium Adult   Pulse: 83   Resp: 16   Temp: 97.7 °F (36.5 °C)   TempSrc: Oral   SpO2: 97%   Weight: 154 lb (69.9 kg)   Height: 5' 4\" (1.626 m)     Body mass index is 26.43 kg/m². Wt Readings from Last 3 Encounters:   09/10/19 154 lb (69.9 kg)   09/06/19 160 lb (72.6 kg)   08/19/19 159 lb 12.8 oz (72.5 kg)     BP Readings from Last 3 Encounters:   09/10/19 (!) 90/38   09/06/19 (!) 141/66   08/19/19 (!) 104/51        Physical Exam:  General Appearance: alert and oriented to person, place and time, well developed and well- nourished, in no acute distress. Noticeable weight loss. In wheelchair. Skin: warm and dry, no rash or erythema  Head: normocephalic and atraumatic   turbinates normal without polyps  Buttocks: Tender Softball sized hematoma on her right buttocks. Bruising noted as well. Neck: supple and non-tender without mass, no thyromegaly  Pulmonary/Chest: clear to auscultation bilaterally- no wheezes, rales or rhonchi, normal air movement, no respiratory distress. Chest tube in place right lung, with no pus, redness, or signs of infection noted  Cardiovascular: normal rate, systolic murmur/gallop noted. Abdomen: soft, non-tender, non-distended, normal bowel sounds, no masses or organomegaly  Extremities: no cyanosis, clubbing or edema  Neurologic: reflexes normal and symmetric, no cranial nerve deficit, gait, coordination and speech normal    Assessment/Plan:    Hospital discharge [id number] medications  Subtherapeutic INR-INR 1.2 today, so increase warfarin to 2 mg daily. Hypokalemia/hypomagnesemia- continues on supplementation. Will check labs today. Can stop meds if needed.   Buttocks hematoma- check ultrasound, and CBC  DM2-continue insulin 13 units nightly. Pens and needles refilled. Pleural Effusions-continue to follow with pulmonology. Effusions are improving. According to pulmonology can remove the catheter soon. Edema-continue Bumex  Hypotension- asymptomatic. Continue current medications for now. If hypotension becomes symptomatic or worsens can discontinue Isordil. Gout-resolved        Chronic anticoagulation  Subtherapeutic INR   History of deep vein thrombosis  - POCT INR    Hypokalemia  - BASIC METABOLIC PANEL; Future     Hypomagnesemia  - MAGNESIUM; Future     Anemia, unspecified type  - CBC WITH AUTO DIFFERENTIAL; Future    Traumatic hematoma of buttock, initial encounter  - US SOFT TISSUE LIMITED AREA; Future  - CBC WITH AUTO DIFFERENTIAL; Future    Type 2 diabetes mellitus with complication, with long-term current use of insulin (HCC)  - insulin glargine (LANTUS SOLOSTAR) 100 UNIT/ML injection pen; Inject 13 Units into the skin nightly  Dispense: 5 pen; Refill: 3  - Insulin Pen Needle (MEIJER PEN NEEDLES) 29G X 12MM MISC; 1 each by Does not apply route daily  Dispense: 100 each;  Refill: 3    Gout of left hallux     Pleural Effusion    Hypotension    Metastatic breast cancer    Medical Decision Making: moderate complexity     Stephanie Gardiner MD PGY-3  Discussed with Dr. Charis Ordonez

## 2019-09-12 ENCOUNTER — TELEPHONE (OUTPATIENT)
Dept: FAMILY MEDICINE CLINIC | Age: 74
End: 2019-09-12

## 2019-09-12 ENCOUNTER — HOSPITAL ENCOUNTER (OUTPATIENT)
Age: 74
Discharge: HOME | End: 2019-09-12
Payer: COMMERCIAL

## 2019-09-12 DIAGNOSIS — E11.42 DIABETIC POLYNEUROPATHY ASSOCIATED WITH TYPE 2 DIABETES MELLITUS (HCC): ICD-10-CM

## 2019-09-12 DIAGNOSIS — D50.0 ANEMIA, BLOOD LOSS: ICD-10-CM

## 2019-09-12 DIAGNOSIS — D61.818 PANCYTOPENIA (HCC): ICD-10-CM

## 2019-09-12 DIAGNOSIS — E83.42 HYPOMAGNESEMIA: ICD-10-CM

## 2019-09-12 DIAGNOSIS — D50.0 ANEMIA, BLOOD LOSS: Primary | ICD-10-CM

## 2019-09-12 DIAGNOSIS — T45.1X5A ANEMIA ASSOCIATED WITH CHEMOTHERAPY: ICD-10-CM

## 2019-09-12 DIAGNOSIS — E87.5 HYPERKALEMIA: ICD-10-CM

## 2019-09-12 DIAGNOSIS — D50.0 BLOOD LOSS ANEMIA: Primary | ICD-10-CM

## 2019-09-12 DIAGNOSIS — D64.81 ANEMIA ASSOCIATED WITH CHEMOTHERAPY: ICD-10-CM

## 2019-09-12 LAB
ABO/RH: NORMAL
ANTIBODY SCREEN: NORMAL

## 2019-09-12 PROCEDURE — 36415 COLL VENOUS BLD VENIPUNCTURE: CPT

## 2019-09-12 PROCEDURE — 86923 COMPATIBILITY TEST ELECTRIC: CPT

## 2019-09-12 PROCEDURE — 86900 BLOOD TYPING SEROLOGIC ABO: CPT

## 2019-09-12 PROCEDURE — 86901 BLOOD TYPING SEROLOGIC RH(D): CPT

## 2019-09-12 PROCEDURE — 86850 RBC ANTIBODY SCREEN: CPT

## 2019-09-12 RX ORDER — PREGABALIN 25 MG/1
25 CAPSULE ORAL 2 TIMES DAILY
Qty: 60 CAPSULE | Refills: 1 | Status: CANCELLED | OUTPATIENT
Start: 2019-09-12 | End: 2019-11-11

## 2019-09-12 RX ORDER — PREGABALIN 25 MG/1
25 CAPSULE ORAL 2 TIMES DAILY
Qty: 60 CAPSULE | Refills: 1 | OUTPATIENT
Start: 2019-09-12 | End: 2019-09-17 | Stop reason: SDUPTHER

## 2019-09-13 ENCOUNTER — HOSPITAL ENCOUNTER (OUTPATIENT)
Dept: INFUSION THERAPY | Age: 74
Setting detail: INFUSION SERIES
Discharge: HOME | End: 2019-09-13
Payer: COMMERCIAL

## 2019-09-13 ENCOUNTER — ANTI-COAG VISIT (OUTPATIENT)
Dept: FAMILY MEDICINE CLINIC | Age: 74
End: 2019-09-13
Payer: COMMERCIAL

## 2019-09-13 ENCOUNTER — HOSPITAL ENCOUNTER (OUTPATIENT)
Dept: ULTRASOUND IMAGING | Age: 74
Discharge: HOME | End: 2019-09-15
Payer: COMMERCIAL

## 2019-09-13 VITALS
RESPIRATION RATE: 18 BRPM | DIASTOLIC BLOOD PRESSURE: 51 MMHG | HEART RATE: 78 BPM | TEMPERATURE: 97.8 F | SYSTOLIC BLOOD PRESSURE: 112 MMHG

## 2019-09-13 DIAGNOSIS — I82.5Y1 CHRONIC DEEP VEIN THROMBOSIS (DVT) OF PROXIMAL VEIN OF RIGHT LOWER EXTREMITY (HCC): ICD-10-CM

## 2019-09-13 DIAGNOSIS — S30.0XXA TRAUMATIC HEMATOMA OF BUTTOCK, INITIAL ENCOUNTER: ICD-10-CM

## 2019-09-13 LAB
BLOOD BANK DISPENSE STATUS: NORMAL
BLOOD BANK PRODUCT CODE: NORMAL
BPU ID: NORMAL
DESCRIPTION BLOOD BANK: NORMAL
INR BLD: 1.4

## 2019-09-13 PROCEDURE — 93793 ANTICOAG MGMT PT WARFARIN: CPT | Performed by: FAMILY MEDICINE

## 2019-09-13 PROCEDURE — 2580000003 HC RX 258

## 2019-09-13 PROCEDURE — 2580000003 HC RX 258: Performed by: STUDENT IN AN ORGANIZED HEALTH CARE EDUCATION/TRAINING PROGRAM

## 2019-09-13 PROCEDURE — P9016 RBC LEUKOCYTES REDUCED: HCPCS

## 2019-09-13 PROCEDURE — 76999 ECHO EXAMINATION PROCEDURE: CPT

## 2019-09-13 PROCEDURE — 36430 TRANSFUSION BLD/BLD COMPNT: CPT

## 2019-09-13 RX ORDER — SODIUM CHLORIDE 0.9 % (FLUSH) 0.9 %
10 SYRINGE (ML) INJECTION PRN
Status: DISCONTINUED | OUTPATIENT
Start: 2019-09-13 | End: 2019-09-14 | Stop reason: HOSPADM

## 2019-09-13 RX ORDER — SODIUM CHLORIDE 0.9 % (FLUSH) 0.9 %
SYRINGE (ML) INJECTION
Status: COMPLETED
Start: 2019-09-13 | End: 2019-09-13

## 2019-09-13 RX ORDER — SODIUM CHLORIDE 9 MG/ML
250 INJECTION, SOLUTION INTRAVENOUS ONCE
Status: COMPLETED | OUTPATIENT
Start: 2019-09-13 | End: 2019-09-13

## 2019-09-13 RX ADMIN — Medication 10 ML: at 10:24

## 2019-09-13 RX ADMIN — SODIUM CHLORIDE 250 ML: 9 INJECTION, SOLUTION INTRAVENOUS at 12:21

## 2019-09-13 ASSESSMENT — PAIN SCALES - GENERAL: PAINLEVEL_OUTOF10: 0

## 2019-09-13 NOTE — PROGRESS NOTES
Message left on Manohar's voice mail with detailed instructions  on coumadin dosing and next inr check.

## 2019-09-16 ENCOUNTER — ANTI-COAG VISIT (OUTPATIENT)
Dept: FAMILY MEDICINE CLINIC | Age: 74
End: 2019-09-16

## 2019-09-16 LAB — INR BLD: 1.7

## 2019-09-17 ENCOUNTER — OFFICE VISIT (OUTPATIENT)
Dept: FAMILY MEDICINE CLINIC | Age: 74
End: 2019-09-17
Payer: COMMERCIAL

## 2019-09-17 ENCOUNTER — HOSPITAL ENCOUNTER (OUTPATIENT)
Age: 74
Discharge: HOME | End: 2019-09-19
Payer: COMMERCIAL

## 2019-09-17 ENCOUNTER — TELEPHONE (OUTPATIENT)
Dept: PULMONOLOGY | Age: 74
End: 2019-09-17

## 2019-09-17 VITALS
SYSTOLIC BLOOD PRESSURE: 114 MMHG | TEMPERATURE: 98.1 F | RESPIRATION RATE: 18 BRPM | WEIGHT: 154 LBS | OXYGEN SATURATION: 100 % | HEART RATE: 82 BPM | DIASTOLIC BLOOD PRESSURE: 47 MMHG | BODY MASS INDEX: 26.29 KG/M2 | HEIGHT: 64 IN

## 2019-09-17 DIAGNOSIS — T45.1X5A ANEMIA ASSOCIATED WITH CHEMOTHERAPY: ICD-10-CM

## 2019-09-17 DIAGNOSIS — D64.81 ANEMIA ASSOCIATED WITH CHEMOTHERAPY: ICD-10-CM

## 2019-09-17 DIAGNOSIS — D50.0 ANEMIA, BLOOD LOSS: ICD-10-CM

## 2019-09-17 DIAGNOSIS — N18.4 CKD (CHRONIC KIDNEY DISEASE) STAGE 4, GFR 15-29 ML/MIN (HCC): ICD-10-CM

## 2019-09-17 DIAGNOSIS — K21.9 GASTROESOPHAGEAL REFLUX DISEASE WITHOUT ESOPHAGITIS: ICD-10-CM

## 2019-09-17 DIAGNOSIS — T14.8XXA HEMATOMA: Primary | ICD-10-CM

## 2019-09-17 DIAGNOSIS — Z76.0 MEDICATION REFILL: ICD-10-CM

## 2019-09-17 DIAGNOSIS — J90 PLEURAL EFFUSION: Primary | ICD-10-CM

## 2019-09-17 DIAGNOSIS — E11.42 DIABETIC POLYNEUROPATHY ASSOCIATED WITH TYPE 2 DIABETES MELLITUS (HCC): ICD-10-CM

## 2019-09-17 DIAGNOSIS — E87.5 HYPERKALEMIA: ICD-10-CM

## 2019-09-17 DIAGNOSIS — Z91.81 AT HIGH RISK FOR FALLS: ICD-10-CM

## 2019-09-17 LAB
ANION GAP SERPL CALCULATED.3IONS-SCNC: 17 MMOL/L (ref 7–16)
ANISOCYTOSIS: ABNORMAL
BASOPHILS ABSOLUTE: 0.03 E9/L (ref 0–0.2)
BASOPHILS RELATIVE PERCENT: 1.7 % (ref 0–2)
BUN BLDV-MCNC: 29 MG/DL (ref 8–23)
BURR CELLS: ABNORMAL
CALCIUM SERPL-MCNC: 7.8 MG/DL (ref 8.6–10.2)
CHLORIDE BLD-SCNC: 103 MMOL/L (ref 98–107)
CO2: 22 MMOL/L (ref 22–29)
CREAT SERPL-MCNC: 1.5 MG/DL (ref 0.5–1)
EOSINOPHILS ABSOLUTE: 0.02 E9/L (ref 0.05–0.5)
EOSINOPHILS RELATIVE PERCENT: 0.9 % (ref 0–6)
GFR AFRICAN AMERICAN: 41
GFR NON-AFRICAN AMERICAN: 34 ML/MIN/1.73
GLUCOSE BLD-MCNC: 138 MG/DL (ref 74–99)
HCT VFR BLD CALC: 24.6 % (ref 34–48)
HEMOGLOBIN: 7.4 G/DL (ref 11.5–15.5)
LYMPHOCYTES ABSOLUTE: 0.28 E9/L (ref 1.5–4)
LYMPHOCYTES RELATIVE PERCENT: 13.9 % (ref 20–42)
MCH RBC QN AUTO: 31.6 PG (ref 26–35)
MCHC RBC AUTO-ENTMCNC: 30.1 % (ref 32–34.5)
MCV RBC AUTO: 105.1 FL (ref 80–99.9)
MONOCYTES ABSOLUTE: 0.08 E9/L (ref 0.1–0.95)
MONOCYTES RELATIVE PERCENT: 4.3 % (ref 2–12)
NEUTROPHILS ABSOLUTE: 1.58 E9/L (ref 1.8–7.3)
NEUTROPHILS RELATIVE PERCENT: 79.1 % (ref 43–80)
NUCLEATED RED BLOOD CELLS: 0.9 /100 WBC
OVALOCYTES: ABNORMAL
PDW BLD-RTO: 23.5 FL (ref 11.5–15)
PLATELET # BLD: 157 E9/L (ref 130–450)
PMV BLD AUTO: 10.4 FL (ref 7–12)
POIKILOCYTES: ABNORMAL
POLYCHROMASIA: ABNORMAL
POTASSIUM SERPL-SCNC: 4 MMOL/L (ref 3.5–5)
RBC # BLD: 2.34 E12/L (ref 3.5–5.5)
SODIUM BLD-SCNC: 142 MMOL/L (ref 132–146)
TARGET CELLS: ABNORMAL
WBC # BLD: 2 E9/L (ref 4.5–11.5)

## 2019-09-17 PROCEDURE — 3017F COLORECTAL CA SCREEN DOC REV: CPT | Performed by: STUDENT IN AN ORGANIZED HEALTH CARE EDUCATION/TRAINING PROGRAM

## 2019-09-17 PROCEDURE — 2022F DILAT RTA XM EVC RTNOPTHY: CPT | Performed by: STUDENT IN AN ORGANIZED HEALTH CARE EDUCATION/TRAINING PROGRAM

## 2019-09-17 PROCEDURE — 4040F PNEUMOC VAC/ADMIN/RCVD: CPT | Performed by: STUDENT IN AN ORGANIZED HEALTH CARE EDUCATION/TRAINING PROGRAM

## 2019-09-17 PROCEDURE — G8427 DOCREV CUR MEDS BY ELIG CLIN: HCPCS | Performed by: STUDENT IN AN ORGANIZED HEALTH CARE EDUCATION/TRAINING PROGRAM

## 2019-09-17 PROCEDURE — 1123F ACP DISCUSS/DSCN MKR DOCD: CPT | Performed by: STUDENT IN AN ORGANIZED HEALTH CARE EDUCATION/TRAINING PROGRAM

## 2019-09-17 PROCEDURE — 99213 OFFICE O/P EST LOW 20 MIN: CPT | Performed by: STUDENT IN AN ORGANIZED HEALTH CARE EDUCATION/TRAINING PROGRAM

## 2019-09-17 PROCEDURE — 99212 OFFICE O/P EST SF 10 MIN: CPT | Performed by: STUDENT IN AN ORGANIZED HEALTH CARE EDUCATION/TRAINING PROGRAM

## 2019-09-17 PROCEDURE — 1111F DSCHRG MED/CURRENT MED MERGE: CPT | Performed by: STUDENT IN AN ORGANIZED HEALTH CARE EDUCATION/TRAINING PROGRAM

## 2019-09-17 PROCEDURE — 1036F TOBACCO NON-USER: CPT | Performed by: STUDENT IN AN ORGANIZED HEALTH CARE EDUCATION/TRAINING PROGRAM

## 2019-09-17 PROCEDURE — 85025 COMPLETE CBC W/AUTO DIFF WBC: CPT

## 2019-09-17 PROCEDURE — 36415 COLL VENOUS BLD VENIPUNCTURE: CPT | Performed by: FAMILY MEDICINE

## 2019-09-17 PROCEDURE — 1090F PRES/ABSN URINE INCON ASSESS: CPT | Performed by: STUDENT IN AN ORGANIZED HEALTH CARE EDUCATION/TRAINING PROGRAM

## 2019-09-17 PROCEDURE — 80048 BASIC METABOLIC PNL TOTAL CA: CPT

## 2019-09-17 PROCEDURE — G8399 PT W/DXA RESULTS DOCUMENT: HCPCS | Performed by: STUDENT IN AN ORGANIZED HEALTH CARE EDUCATION/TRAINING PROGRAM

## 2019-09-17 PROCEDURE — G8598 ASA/ANTIPLAT THER USED: HCPCS | Performed by: STUDENT IN AN ORGANIZED HEALTH CARE EDUCATION/TRAINING PROGRAM

## 2019-09-17 PROCEDURE — 3044F HG A1C LEVEL LT 7.0%: CPT | Performed by: STUDENT IN AN ORGANIZED HEALTH CARE EDUCATION/TRAINING PROGRAM

## 2019-09-17 PROCEDURE — G8417 CALC BMI ABV UP PARAM F/U: HCPCS | Performed by: STUDENT IN AN ORGANIZED HEALTH CARE EDUCATION/TRAINING PROGRAM

## 2019-09-17 RX ORDER — M-VIT,TX,IRON,MINS/CALC/FOLIC 27MG-0.4MG
1 TABLET ORAL DAILY
Qty: 90 TABLET | Refills: 3 | Status: SHIPPED
Start: 2019-09-17 | End: 2020-10-22

## 2019-09-17 RX ORDER — SERTRALINE HYDROCHLORIDE 25 MG/1
25 TABLET, FILM COATED ORAL DAILY
Qty: 30 TABLET | Refills: 2 | Status: SHIPPED | OUTPATIENT
Start: 2019-09-17 | End: 2020-01-07 | Stop reason: ALTCHOICE

## 2019-09-17 RX ORDER — NICOTINE POLACRILEX 4 MG/1
20 GUM, CHEWING ORAL DAILY
Qty: 90 TABLET | Refills: 0 | Status: SHIPPED | OUTPATIENT
Start: 2019-09-17 | End: 2020-01-07 | Stop reason: SDUPTHER

## 2019-09-17 RX ORDER — PREGABALIN 25 MG/1
25 CAPSULE ORAL 2 TIMES DAILY
Qty: 60 CAPSULE | Refills: 1 | Status: SHIPPED | OUTPATIENT
Start: 2019-09-17 | End: 2019-12-02 | Stop reason: SDUPTHER

## 2019-09-17 NOTE — PROGRESS NOTES
History:   Social History     Socioeconomic History    Marital status: Legally      Spouse name: Not on file    Number of children: Not on file    Years of education: Not on file    Highest education level: Not on file   Occupational History    Not on file   Social Needs    Financial resource strain: Not on file    Food insecurity:     Worry: Not on file     Inability: Not on file    Transportation needs:     Medical: Not on file     Non-medical: Not on file   Tobacco Use    Smoking status: Never Smoker    Smokeless tobacco: Never Used   Substance and Sexual Activity    Alcohol use: No    Drug use: No    Sexual activity: Never   Lifestyle    Physical activity:     Days per week: Not on file     Minutes per session: Not on file    Stress: Not on file   Relationships    Social connections:     Talks on phone: Not on file     Gets together: Not on file     Attends Buddhist service: Not on file     Active member of club or organization: Not on file     Attends meetings of clubs or organizations: Not on file     Relationship status: Not on file    Intimate partner violence:     Fear of current or ex partner: Not on file     Emotionally abused: Not on file     Physically abused: Not on file     Forced sexual activity: Not on file   Other Topics Concern    Not on file   Social History Narrative    Not on file        Family History:       Adopted: Yes       Review of Systems:   Review of Systems   Constitutional: Negative for fever. Respiratory: Negative for cough and shortness of breath. Cardiovascular: Negative for chest pain. Gastrointestinal: Negative for abdominal pain. Genitourinary: Negative for dysuria. Musculoskeletal:        Hematoma   Neurological: Negative for dizziness.        Physical Exam   Vitals: BP (!) 114/47   Pulse 82   Temp 98.1 °F (36.7 °C) (Oral)   Resp 18   Ht 5' 4\" (1.626 m)   Wt 154 lb (69.9 kg)   SpO2 100%   BMI 26.43 kg/m²   General Appearance: Well developed, awake,alert, oriented, no acute distress  HEENT: Normocephalic, atraumatic. Chest wall/Lung: Clear toauscultation bilaterally,  respirations unlabored. Noronchi/wheezing/rales  Heart: Regular rate andrhythm, S1 and S2 normal, no murmur, rub or gallop. Abdomen: Soft, non-tender, bowel sounds normoactive, no masses, no organomegaly  Buttocks: Tender  hematoma on her right buttocks, seems more enlarged than last week Bruising noted as well. Extremities:  . 2-3+ edema B/l (stable)  Neurologic:   Alert&Oriented. Psychiatric: has a normal mood and affect. Behavior is normal.     US Right buttock US 9/13/2019  Findings: There is complex region of echotexture seen in the soft tissues in the   right buttocks the findings suggest a hematoma this measures 2.1 x 1.6   x 1.7 cm       Impression   Findings compatible with hematoma versus seroma         Assessment and Plan       Anemia, blood loss  Anemia associated with chemotherapy  -Hgb of 6.3 last week, and she was transfused. CBC.  - CBC WITH AUTO DIFFERENTIAL; Future    CKD (chronic kidney disease) stage 4, GFR 15-29 ml/min ([facility])  Hyperkalemia  -Potassium of 5.2, and creatinine of 2.3 last week. Follow BMP.  - BASIC METABOLIC PANEL; Future    Hematoma  -Hematoma right buttocks measuring 2.1 x 1.6 x 1.7 cm. Possibly slightly enlarged from last week. Will follow-up again next week, it seems larger if not improving will consider pelvic x-ray to rule out a fracture, and consider sending to general surgery. Warfarin and Lovenox not stopped due to her propensity of getting DVTs. Due to length of time this hematoma there is less likelihood that there is actual bleeding going on in this area. Medication refill  - sertraline (ZOLOFT) 25 MG tablet; Take 1 tablet by mouth daily  Dispense: 30 tablet; Refill: 2  - Multiple Vitamins-Minerals (THERAPEUTIC MULTIVITAMIN-MINERALS) tablet; Take 1 tablet by mouth daily  Dispense: 90 tablet;  Refill: 3  Diabetic

## 2019-09-17 NOTE — TELEPHONE ENCOUNTER
VM received from [facility] RN that reports scant drainage from Pleurx cath this past Friday. Orders for CXR placed and pt to go to [facility] for CXR to evaluate Pleural Effusion and need for removal of Pleurx device.  Aby Osei RN will fax over orders to site and Dr. Laquita Kenny will review CXR results and office will contact [address] RN with plan for removal.

## 2019-09-18 ENCOUNTER — HOSPITAL ENCOUNTER (OUTPATIENT)
Age: 74
Discharge: HOME | End: 2019-09-20
Payer: COMMERCIAL

## 2019-09-18 ENCOUNTER — HOSPITAL ENCOUNTER (OUTPATIENT)
Dept: GENERAL RADIOLOGY | Age: 74
Discharge: HOME | End: 2019-09-20
Payer: COMMERCIAL

## 2019-09-18 DIAGNOSIS — J90 PLEURAL EFFUSION: ICD-10-CM

## 2019-09-18 PROCEDURE — 71046 X-RAY EXAM CHEST 2 VIEWS: CPT

## 2019-09-20 ENCOUNTER — TELEPHONE (OUTPATIENT)
Dept: FAMILY MEDICINE CLINIC | Age: 74
End: 2019-09-20

## 2019-09-20 ENCOUNTER — ANTI-COAG VISIT (OUTPATIENT)
Dept: FAMILY MEDICINE CLINIC | Age: 74
End: 2019-09-20

## 2019-09-20 LAB — INR BLD: 3

## 2019-09-20 ASSESSMENT — ENCOUNTER SYMPTOMS
ABDOMINAL PAIN: 0
COUGH: 0
SHORTNESS OF BREATH: 0

## 2019-09-24 ENCOUNTER — HOSPITAL ENCOUNTER (OUTPATIENT)
Age: 74
Discharge: HOME | End: 2019-09-26
Payer: COMMERCIAL

## 2019-09-24 LAB
ABO/RH: NORMAL
ANTIBODY SCREEN: NORMAL

## 2019-09-24 PROCEDURE — 86901 BLOOD TYPING SEROLOGIC RH(D): CPT

## 2019-09-24 PROCEDURE — 86900 BLOOD TYPING SEROLOGIC ABO: CPT

## 2019-09-24 PROCEDURE — 86923 COMPATIBILITY TEST ELECTRIC: CPT

## 2019-09-24 PROCEDURE — 86850 RBC ANTIBODY SCREEN: CPT

## 2019-09-25 ENCOUNTER — ANTI-COAG VISIT (OUTPATIENT)
Dept: FAMILY MEDICINE CLINIC | Age: 74
End: 2019-09-25

## 2019-09-25 ENCOUNTER — TELEPHONE (OUTPATIENT)
Dept: FAMILY MEDICINE CLINIC | Age: 74
End: 2019-09-25

## 2019-09-25 LAB — INR BLD: 1.7

## 2019-09-25 NOTE — TELEPHONE ENCOUNTER
Saira called with the following concerns and requests:    1. Patient with Cold like symptoms since Saturday 9/21/19 congestion and cough that is productive with green mucus, Left lower lung lobe diminished  Left upper lung lobe with expiratory wheezes. Chest xray was done Saturday 9/21/19 when patient started having symptoms  to evaluate chest tube. Tato Nunez was not sure if you would want another. 2. On 9/13/19 patient bumped LLE and on the posterior portion of her leg  Is now and opened blister that is beefy red. Tato Nunez would like to know if you would like her to cleanse with saline and apply calcium alginate ,cover with dry dressing and secure with tape. Please advise.

## 2019-09-26 ENCOUNTER — HOSPITAL ENCOUNTER (OUTPATIENT)
Dept: INFUSION THERAPY | Age: 74
Discharge: HOME | End: 2019-09-26
Payer: COMMERCIAL

## 2019-09-26 VITALS
TEMPERATURE: 98.6 F | RESPIRATION RATE: 18 BRPM | HEART RATE: 86 BPM | SYSTOLIC BLOOD PRESSURE: 132 MMHG | DIASTOLIC BLOOD PRESSURE: 59 MMHG

## 2019-09-26 LAB
BLOOD BANK DISPENSE STATUS: NORMAL
BLOOD BANK DISPENSE STATUS: NORMAL
BLOOD BANK PRODUCT CODE: NORMAL
BLOOD BANK PRODUCT CODE: NORMAL
BPU ID: NORMAL
BPU ID: NORMAL
DESCRIPTION BLOOD BANK: NORMAL
DESCRIPTION BLOOD BANK: NORMAL

## 2019-09-26 PROCEDURE — 2580000003 HC RX 258: Performed by: INTERNAL MEDICINE

## 2019-09-26 PROCEDURE — 96374 THER/PROPH/DIAG INJ IV PUSH: CPT

## 2019-09-26 PROCEDURE — 6360000002 HC RX W HCPCS: Performed by: INTERNAL MEDICINE

## 2019-09-26 PROCEDURE — P9016 RBC LEUKOCYTES REDUCED: HCPCS

## 2019-09-26 PROCEDURE — 36430 TRANSFUSION BLD/BLD COMPNT: CPT

## 2019-09-26 PROCEDURE — 6370000000 HC RX 637 (ALT 250 FOR IP): Performed by: INTERNAL MEDICINE

## 2019-09-26 RX ORDER — FUROSEMIDE 10 MG/ML
20 INJECTION INTRAMUSCULAR; INTRAVENOUS ONCE
Status: COMPLETED | OUTPATIENT
Start: 2019-09-26 | End: 2019-09-26

## 2019-09-26 RX ORDER — DIPHENHYDRAMINE HCL 25 MG
25 TABLET ORAL ONCE
Status: COMPLETED | OUTPATIENT
Start: 2019-09-26 | End: 2019-09-26

## 2019-09-26 RX ORDER — HEPARIN SODIUM (PORCINE) LOCK FLUSH IV SOLN 100 UNIT/ML 100 UNIT/ML
500 SOLUTION INTRAVENOUS PRN
Status: DISCONTINUED | OUTPATIENT
Start: 2019-09-26 | End: 2019-09-27 | Stop reason: HOSPADM

## 2019-09-26 RX ORDER — 0.9 % SODIUM CHLORIDE 0.9 %
250 INTRAVENOUS SOLUTION INTRAVENOUS ONCE
Status: COMPLETED | OUTPATIENT
Start: 2019-09-26 | End: 2019-09-26

## 2019-09-26 RX ORDER — ACETAMINOPHEN 325 MG/1
650 TABLET ORAL ONCE
Status: DISCONTINUED | OUTPATIENT
Start: 2019-09-26 | End: 2019-09-27 | Stop reason: HOSPADM

## 2019-09-26 RX ORDER — SODIUM CHLORIDE 0.9 % (FLUSH) 0.9 %
10 SYRINGE (ML) INJECTION PRN
Status: DISCONTINUED | OUTPATIENT
Start: 2019-09-26 | End: 2019-09-27 | Stop reason: HOSPADM

## 2019-09-26 RX ADMIN — SODIUM CHLORIDE 250 ML: 9 INJECTION, SOLUTION INTRAVENOUS at 08:32

## 2019-09-26 RX ADMIN — DIPHENHYDRAMINE HCL 25 MG: 25 TABLET ORAL at 08:31

## 2019-09-26 RX ADMIN — Medication 500 UNITS: at 12:14

## 2019-09-26 RX ADMIN — Medication 10 ML: at 12:13

## 2019-09-26 RX ADMIN — FUROSEMIDE 20 MG: 10 INJECTION, SOLUTION INTRAVENOUS at 10:36

## 2019-09-26 ASSESSMENT — PAIN SCALES - GENERAL: PAINLEVEL_OUTOF10: 0

## 2019-09-26 NOTE — PROGRESS NOTES
Pt presents to clinic for 2 units of PRBC. Pt tolerated treatment well without complications. Pt discharge via wheelchair in stable condition.

## 2019-09-27 ENCOUNTER — TELEPHONE (OUTPATIENT)
Dept: PULMONOLOGY | Age: 74
End: 2019-09-27

## 2019-09-27 NOTE — TELEPHONE ENCOUNTER
Per home care breath sounds reveal exp wheeze in all posterior lung fields and cough with green mucus continues today. Continues to take cough medication.

## 2019-09-27 NOTE — TELEPHONE ENCOUNTER
Call to pt after discussing recent Pleurx drainage decrease with Home Care Nurse [address] [place] [address] and Dr. Nida Sanchez's review of recent chest Xray. Plan for removal of Pleurx drain. Pt agreeable to 1120am appt on Monday Sept 30th in office. Home Care RN Saira notified of appt on Monday as well.

## 2019-09-30 ENCOUNTER — TELEPHONE (OUTPATIENT)
Dept: PULMONOLOGY | Age: 74
End: 2019-09-30

## 2019-09-30 ENCOUNTER — HOSPITAL ENCOUNTER (OUTPATIENT)
Dept: GENERAL RADIOLOGY | Age: 74
Discharge: HOME | End: 2019-10-02
Payer: COMMERCIAL

## 2019-09-30 ENCOUNTER — OFFICE VISIT (OUTPATIENT)
Dept: PULMONOLOGY | Age: 74
End: 2019-09-30
Payer: COMMERCIAL

## 2019-09-30 ENCOUNTER — HOSPITAL ENCOUNTER (OUTPATIENT)
Age: 74
Discharge: HOME | End: 2019-10-02
Payer: COMMERCIAL

## 2019-09-30 VITALS
SYSTOLIC BLOOD PRESSURE: 133 MMHG | BODY MASS INDEX: 29.02 KG/M2 | WEIGHT: 170 LBS | HEART RATE: 89 BPM | TEMPERATURE: 97.5 F | OXYGEN SATURATION: 95 % | HEIGHT: 64 IN | DIASTOLIC BLOOD PRESSURE: 60 MMHG | RESPIRATION RATE: 18 BRPM

## 2019-09-30 DIAGNOSIS — J90 PLEURAL EFFUSION: ICD-10-CM

## 2019-09-30 DIAGNOSIS — D86.9 SARCOIDOSIS: ICD-10-CM

## 2019-09-30 DIAGNOSIS — J90 PLEURAL EFFUSION: Primary | ICD-10-CM

## 2019-09-30 PROCEDURE — 71045 X-RAY EXAM CHEST 1 VIEW: CPT

## 2019-09-30 PROCEDURE — 1090F PRES/ABSN URINE INCON ASSESS: CPT | Performed by: INTERNAL MEDICINE

## 2019-09-30 PROCEDURE — 32552 REMOVE LUNG CATHETER: CPT | Performed by: INTERNAL MEDICINE

## 2019-09-30 PROCEDURE — G8428 CUR MEDS NOT DOCUMENT: HCPCS | Performed by: INTERNAL MEDICINE

## 2019-09-30 PROCEDURE — G8417 CALC BMI ABV UP PARAM F/U: HCPCS | Performed by: INTERNAL MEDICINE

## 2019-09-30 PROCEDURE — 3017F COLORECTAL CA SCREEN DOC REV: CPT | Performed by: INTERNAL MEDICINE

## 2019-09-30 PROCEDURE — 99213 OFFICE O/P EST LOW 20 MIN: CPT | Performed by: INTERNAL MEDICINE

## 2019-09-30 PROCEDURE — 1036F TOBACCO NON-USER: CPT | Performed by: INTERNAL MEDICINE

## 2019-09-30 PROCEDURE — 1123F ACP DISCUSS/DSCN MKR DOCD: CPT | Performed by: INTERNAL MEDICINE

## 2019-09-30 PROCEDURE — 4040F PNEUMOC VAC/ADMIN/RCVD: CPT | Performed by: INTERNAL MEDICINE

## 2019-09-30 PROCEDURE — G8399 PT W/DXA RESULTS DOCUMENT: HCPCS | Performed by: INTERNAL MEDICINE

## 2019-09-30 PROCEDURE — G8598 ASA/ANTIPLAT THER USED: HCPCS | Performed by: INTERNAL MEDICINE

## 2019-09-30 NOTE — PROGRESS NOTES
Pulmonary [address] [facility]                             Pulmonary Consult/Progress Note :          Patient: Sedrick Fuller  MRN: [id number]  : 1945          Reason for Consultation: Pleural effusion right side  CC : Shortness of breath    HPI:   Sedrick Fuller is a 76y.o. year old with long history of breast cancer that followed by Dr. Abbey Leary with CKD and Anemia and and she has mets liver and ascitics her blood pressure and a little moreshe has been SOB that has going for the last few days and and ? Lung    The patient denies any history of smoking in the past    She denies any chest pain, she also does not use oxygen and she has been now on BiPAP     She is on anticoagulation which is on hold and her INR 2.6    She presented To the hospital with worsening shortness of breath, cough, and she looks pale,In the ED, she was hypoxic, saturating 80%, ABG showed hypercapnia. She was initially on NRB but was transitioned to bipap. She was disoriented and somnolent as well. Noted to have hyponatremia, hyperkalemia, KARISHMA on CKD. CT head unremarkable. CT abdomen showed bilateral pleural effusions, multi space occupying lesions in the left lobes of the of the liver which are chronic and unchanged, minimal ascites diffuse third spacing into subcutaneous tissues noted as well.       Today Visit    She came for removal of her pleurax  She has not drain it for few days  She states that it was draining drops  CXR on  shows no effusion       PAST MEDICAL HISTORY:     Past Medical History:   Diagnosis Date    Abscess of abdominal wall     Anemia     Iron deficiency and chronic disease.  Anesthesia     DIFFICULTY WAKING UP    Arthritis     Arthritis, hip     Breast cancer ([facility] [address]     S/P Left Lumpectomy with Lymph Node Dissection, Chemo/Rad. Follows with Dr. Rosy Price. No recurrence to date. Surgeon was Dr. Abhi Jones.     CAD (coronary artery disease) 2008    s/p 09/17/2019    MONOPCT 4.3 09/17/2019    BASOPCT 1.7 09/17/2019    NEUTROABS 1.58 (L) 09/17/2019    LYMPHSABS 0.28 (L) 09/17/2019    MONOSABS 0.08 (L) 09/17/2019    EOSABS 0.02 (L) 09/17/2019    BASOSABS 0.03 09/17/2019       Recent Labs     09/17/19  1215 09/10/19  1200   WBC 2.0* 4.1*   HGB 7.4* 6.3*   HCT 24.6* 20.6*   .1* 106.2*    190       BMP:   No results for input(s): NA, K, CL, CO2, PHOS, BUN, CREATININE in the last 72 hours. Invalid input(s): CA    MG:   Lab Results   Component Value Date    MG 1.4 09/10/2019     Ca/Phos:   Lab Results   Component Value Date    CALCIUM 7.8 (L) 09/17/2019    PHOS 2.9 08/19/2019     Amylase: No results found for: AMYLASE  Lipase: No results found for: LIPASE  LIVER PROFILE:   No results for input(s): AST, ALT, LIPASE, BILIDIR, BILITOT, ALKPHOS in the last 72 hours. Invalid input(s): AMYLASE,  ALB    PT/INR:   No results for input(s): PROTIME, INR in the last 72 hours. APTT:   No results for input(s): APTT in the last 72 hours.     Cardiac Enzymes:  Lab Results   Component Value Date    CKTOTAL 102 08/11/2019    CKMB 1.9 08/11/2019    TROPONINI 0.25 (H) 08/19/2019                   PROBLEM LIST:  Patient Active Problem List   Diagnosis    Metastatic breast cancer ([facility] [address]    Essential hypertension    Coronary artery disease involving native coronary artery of native heart without angina pectoris    Nephrolithiasis    CHF (congestive heart failure) (HCC)    Humerus fracture    Shoulder pain, left    B12 deficiency    Hyperlipidemia    Rib lesion    Subclavian vein occlusion, bilateral (HCC)    Pericardial effusion    MI (myocardial infarction) ([facility] [address]    Arthritis    Sarcoidosis    Lymph node enlargement    Anesthesia    Rectal bleeding    Ventral hernia    Type 2 diabetes mellitus without complication, with long-term current use of insulin (HCC)    Anemia of chronic disease    Chronic deep vein thrombosis (DVT) of proximal vein of right lower extremity (HCC)    Bilateral edema of lower extremity    Peripheral polyneuropathy    Complicated UTI (urinary tract infection)    KARISHMA [facility]acute kidney injury) ([facility] [address]    Diarrhea with dehydration    Chronic anticoagulation    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    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    Diabetic polyneuropathy associated with type 2 diabetes mellitus (HCC)    Leg swelling    History of deep vein thrombosis    Chronic venous insufficiency    Lymphedema of both lower extremities    Decreased dorsalis pedis pulse    Ambulatory dysfunction    CKD (chronic kidney disease) stage 3, GFR 30-59 ml/min (HCC)    Charcot's joint of foot in type 2 diabetes mellitus (HCC)    Ascites    Palliative care encounter    Cancer associated pain    HAP (hospital-acquired pneumonia)    Acute respiratory failure with hypoxia (HCC)    Acute systolic heart failure (HCC)    Nonischemic cardiomyopathy ([facility] [address]    Status post coronary artery bypass graft    Class 2 severe obesity due to excess calories with serious comorbidity and body mass index (BMI) of 35.0 to 35.9 in [facility])    Type 2 diabetes mellitus with hyperglycemia (HCC)    Acute hypercapnic respiratory failure (HCC)    Altered mental status    Goals of care, counseling/discussion    Palliative care by specialist    Metastatic disease ([facility] [address]    Moderate protein-calorie malnutrition (HCC)    Precordial pain    Chronic systolic heart failure (HCC)    Pleural effusion               ASSESSMENT:  1.)  Stage IV breast cancer  2.)  Acute hypoxic /Hypercapnic respiratory failure,  multifactorial  3.)  Recurrent Pleural effusion right more than the left  4.)  Pancytopenia secondary to her breast cancer treatment  5.)  Ascites  6) granuloma in the right lower lobe      PLAN:  *-For Pleurax removal  CXR show no effusion       Procedure note  Pleurax cathter was removed under local anaesthesia

## 2019-09-30 NOTE — PROGRESS NOTES
Office visit today for removal of Pleurx catheter. Drain removed without issue; pt tolerated removal well. Some straw fluid draining from site after tube removal; dressing applied and advised pt to keep dressing clean and dry over sight. Pt taken to CXR dept for CXR Stat per orders. Pt states her dtr is a nurse and can help with dressing change. Follow up in office as needed.

## 2019-10-01 ENCOUNTER — OFFICE VISIT (OUTPATIENT)
Dept: FAMILY MEDICINE CLINIC | Age: 74
End: 2019-10-01
Payer: COMMERCIAL

## 2019-10-01 ENCOUNTER — ANTI-COAG VISIT (OUTPATIENT)
Dept: FAMILY MEDICINE CLINIC | Age: 74
End: 2019-10-01

## 2019-10-01 VITALS
WEIGHT: 170 LBS | HEIGHT: 64 IN | DIASTOLIC BLOOD PRESSURE: 64 MMHG | TEMPERATURE: 98 F | BODY MASS INDEX: 29.02 KG/M2 | OXYGEN SATURATION: 96 % | HEART RATE: 80 BPM | SYSTOLIC BLOOD PRESSURE: 115 MMHG

## 2019-10-01 DIAGNOSIS — T14.8XXA HEMATOMA: ICD-10-CM

## 2019-10-01 DIAGNOSIS — D50.0 ANEMIA, BLOOD LOSS: ICD-10-CM

## 2019-10-01 DIAGNOSIS — J06.9 VIRAL URI: ICD-10-CM

## 2019-10-01 DIAGNOSIS — I82.4Z9 DEEP VEIN THROMBOSIS (DVT) OF DISTAL VEIN OF LOWER EXTREMITY, UNSPECIFIED CHRONICITY, UNSPECIFIED LATERALITY (HCC): ICD-10-CM

## 2019-10-01 DIAGNOSIS — M79.89 LEG SWELLING: ICD-10-CM

## 2019-10-01 DIAGNOSIS — I82.5Y1 CHRONIC DEEP VEIN THROMBOSIS (DVT) OF PROXIMAL VEIN OF RIGHT LOWER EXTREMITY (HCC): Primary | ICD-10-CM

## 2019-10-01 DIAGNOSIS — E87.5 HYPERKALEMIA: ICD-10-CM

## 2019-10-01 DIAGNOSIS — T14.90XA TRAUMA: ICD-10-CM

## 2019-10-01 DIAGNOSIS — R79.1 SUBTHERAPEUTIC INTERNATIONAL NORMALIZED RATIO (INR): ICD-10-CM

## 2019-10-01 LAB
INTERNATIONAL NORMALIZATION RATIO, POC: 1.7
PROTHROMBIN TIME, POC: 20.3

## 2019-10-01 PROCEDURE — G8399 PT W/DXA RESULTS DOCUMENT: HCPCS | Performed by: STUDENT IN AN ORGANIZED HEALTH CARE EDUCATION/TRAINING PROGRAM

## 2019-10-01 PROCEDURE — 1036F TOBACCO NON-USER: CPT | Performed by: STUDENT IN AN ORGANIZED HEALTH CARE EDUCATION/TRAINING PROGRAM

## 2019-10-01 PROCEDURE — 1090F PRES/ABSN URINE INCON ASSESS: CPT | Performed by: STUDENT IN AN ORGANIZED HEALTH CARE EDUCATION/TRAINING PROGRAM

## 2019-10-01 PROCEDURE — G8598 ASA/ANTIPLAT THER USED: HCPCS | Performed by: STUDENT IN AN ORGANIZED HEALTH CARE EDUCATION/TRAINING PROGRAM

## 2019-10-01 PROCEDURE — 99214 OFFICE O/P EST MOD 30 MIN: CPT | Performed by: STUDENT IN AN ORGANIZED HEALTH CARE EDUCATION/TRAINING PROGRAM

## 2019-10-01 PROCEDURE — G8484 FLU IMMUNIZE NO ADMIN: HCPCS | Performed by: STUDENT IN AN ORGANIZED HEALTH CARE EDUCATION/TRAINING PROGRAM

## 2019-10-01 PROCEDURE — 4040F PNEUMOC VAC/ADMIN/RCVD: CPT | Performed by: STUDENT IN AN ORGANIZED HEALTH CARE EDUCATION/TRAINING PROGRAM

## 2019-10-01 PROCEDURE — G8427 DOCREV CUR MEDS BY ELIG CLIN: HCPCS | Performed by: STUDENT IN AN ORGANIZED HEALTH CARE EDUCATION/TRAINING PROGRAM

## 2019-10-01 PROCEDURE — 99212 OFFICE O/P EST SF 10 MIN: CPT | Performed by: STUDENT IN AN ORGANIZED HEALTH CARE EDUCATION/TRAINING PROGRAM

## 2019-10-01 PROCEDURE — 3017F COLORECTAL CA SCREEN DOC REV: CPT | Performed by: STUDENT IN AN ORGANIZED HEALTH CARE EDUCATION/TRAINING PROGRAM

## 2019-10-01 PROCEDURE — 1123F ACP DISCUSS/DSCN MKR DOCD: CPT | Performed by: STUDENT IN AN ORGANIZED HEALTH CARE EDUCATION/TRAINING PROGRAM

## 2019-10-01 PROCEDURE — G8417 CALC BMI ABV UP PARAM F/U: HCPCS | Performed by: STUDENT IN AN ORGANIZED HEALTH CARE EDUCATION/TRAINING PROGRAM

## 2019-10-01 RX ORDER — WARFARIN SODIUM 1 MG/1
TABLET ORAL
Qty: 180 TABLET | Refills: 3
Start: 2019-10-01 | End: 2019-11-25 | Stop reason: SDUPTHER

## 2019-10-01 ASSESSMENT — ENCOUNTER SYMPTOMS
ABDOMINAL PAIN: 0
SHORTNESS OF BREATH: 0
COUGH: 0

## 2019-10-02 ENCOUNTER — HOSPITAL ENCOUNTER (OUTPATIENT)
Dept: ULTRASOUND IMAGING | Age: 74
Discharge: HOME | End: 2019-10-04
Payer: COMMERCIAL

## 2019-10-02 ENCOUNTER — HOSPITAL ENCOUNTER (OUTPATIENT)
Age: 74
Discharge: HOME | End: 2019-10-04
Payer: COMMERCIAL

## 2019-10-02 ENCOUNTER — TELEPHONE (OUTPATIENT)
Dept: FAMILY MEDICINE CLINIC | Age: 74
End: 2019-10-02

## 2019-10-02 ENCOUNTER — HOSPITAL ENCOUNTER (OUTPATIENT)
Dept: GENERAL RADIOLOGY | Age: 74
Discharge: HOME | End: 2019-10-04
Payer: COMMERCIAL

## 2019-10-02 ENCOUNTER — HOSPITAL ENCOUNTER (EMERGENCY)
Age: 74
Discharge: LEFT BEFORE DISCHARGE | End: 2019-10-02
Payer: COMMERCIAL

## 2019-10-02 ENCOUNTER — HOSPITAL ENCOUNTER (OUTPATIENT)
Age: 74
Discharge: HOME | End: 2019-10-02
Payer: COMMERCIAL

## 2019-10-02 ENCOUNTER — HOSPITAL ENCOUNTER (EMERGENCY)
Dept: GENERAL RADIOLOGY | Age: 74
Discharge: HOME | End: 2019-10-04
Payer: COMMERCIAL

## 2019-10-02 VITALS
RESPIRATION RATE: 16 BRPM | HEART RATE: 92 BPM | SYSTOLIC BLOOD PRESSURE: 135 MMHG | TEMPERATURE: 98 F | DIASTOLIC BLOOD PRESSURE: 71 MMHG | OXYGEN SATURATION: 98 %

## 2019-10-02 DIAGNOSIS — E87.5 HYPERKALEMIA: ICD-10-CM

## 2019-10-02 DIAGNOSIS — I82.5Y1 CHRONIC DEEP VEIN THROMBOSIS (DVT) OF PROXIMAL VEIN OF RIGHT LOWER EXTREMITY (HCC): ICD-10-CM

## 2019-10-02 DIAGNOSIS — D50.0 ANEMIA, BLOOD LOSS: ICD-10-CM

## 2019-10-02 DIAGNOSIS — T14.90XA TRAUMA: ICD-10-CM

## 2019-10-02 DIAGNOSIS — T14.8XXA HEMATOMA: ICD-10-CM

## 2019-10-02 DIAGNOSIS — M79.89 LEG SWELLING: ICD-10-CM

## 2019-10-02 LAB
ALBUMIN SERPL-MCNC: 3.3 G/DL (ref 3.5–5.2)
ALP BLD-CCNC: 325 U/L (ref 35–104)
ALT SERPL-CCNC: 40 U/L (ref 0–32)
ANION GAP SERPL CALCULATED.3IONS-SCNC: 15 MMOL/L (ref 7–16)
ANISOCYTOSIS: ABNORMAL
APTT: 38.2 SEC (ref 24.5–35.1)
AST SERPL-CCNC: 44 U/L (ref 0–31)
BASOPHILS ABSOLUTE: 0.07 E9/L (ref 0–0.2)
BASOPHILS RELATIVE PERCENT: 1.8 % (ref 0–2)
BILIRUB SERPL-MCNC: 0.5 MG/DL (ref 0–1.2)
BUN BLDV-MCNC: 21 MG/DL (ref 8–23)
CALCIUM SERPL-MCNC: 8.9 MG/DL (ref 8.6–10.2)
CHLORIDE BLD-SCNC: 104 MMOL/L (ref 98–107)
CO2: 22 MMOL/L (ref 22–29)
CREAT SERPL-MCNC: 1.3 MG/DL (ref 0.5–1)
EOSINOPHILS ABSOLUTE: 0.07 E9/L (ref 0.05–0.5)
EOSINOPHILS RELATIVE PERCENT: 1.8 % (ref 0–6)
GFR AFRICAN AMERICAN: 48
GFR NON-AFRICAN AMERICAN: 40 ML/MIN/1.73
GLUCOSE BLD-MCNC: 82 MG/DL (ref 74–99)
HCT VFR BLD CALC: 32.9 % (ref 34–48)
HEMOGLOBIN: 9.9 G/DL (ref 11.5–15.5)
INR BLD: 1.9
LYMPHOCYTES ABSOLUTE: 0.59 E9/L (ref 1.5–4)
LYMPHOCYTES RELATIVE PERCENT: 14.9 % (ref 20–42)
MCH RBC QN AUTO: 31 PG (ref 26–35)
MCHC RBC AUTO-ENTMCNC: 30.1 % (ref 32–34.5)
MCV RBC AUTO: 103.1 FL (ref 80–99.9)
MONOCYTES ABSOLUTE: 0.08 E9/L (ref 0.1–0.95)
MONOCYTES RELATIVE PERCENT: 1.8 % (ref 2–12)
NEUTROPHILS ABSOLUTE: 3.12 E9/L (ref 1.8–7.3)
NEUTROPHILS RELATIVE PERCENT: 79.8 % (ref 43–80)
PDW BLD-RTO: 21 FL (ref 11.5–15)
PLATELET # BLD: 338 E9/L (ref 130–450)
PMV BLD AUTO: 10.3 FL (ref 7–12)
POLYCHROMASIA: ABNORMAL
POTASSIUM SERPL-SCNC: 4.5 MMOL/L (ref 3.5–5)
PROTHROMBIN TIME: 21.7 SEC (ref 9.3–12.4)
RBC # BLD: 3.19 E12/L (ref 3.5–5.5)
SODIUM BLD-SCNC: 141 MMOL/L (ref 132–146)
TOTAL PROTEIN: 6.8 G/DL (ref 6.4–8.3)
WBC # BLD: 3.9 E9/L (ref 4.5–11.5)

## 2019-10-02 PROCEDURE — 36415 COLL VENOUS BLD VENIPUNCTURE: CPT

## 2019-10-02 PROCEDURE — 72170 X-RAY EXAM OF PELVIS: CPT

## 2019-10-02 PROCEDURE — 85025 COMPLETE CBC W/AUTO DIFF WBC: CPT

## 2019-10-02 PROCEDURE — 85610 PROTHROMBIN TIME: CPT

## 2019-10-02 PROCEDURE — 93971 EXTREMITY STUDY: CPT

## 2019-10-02 PROCEDURE — 85730 THROMBOPLASTIN TIME PARTIAL: CPT

## 2019-10-02 PROCEDURE — 80053 COMPREHEN METABOLIC PANEL: CPT

## 2019-10-02 PROCEDURE — 4500000002 HC ER NO CHARGE

## 2019-10-03 ENCOUNTER — TELEPHONE (OUTPATIENT)
Dept: FAMILY MEDICINE CLINIC | Age: 74
End: 2019-10-03

## 2019-10-07 ENCOUNTER — ANTI-COAG VISIT (OUTPATIENT)
Dept: FAMILY MEDICINE CLINIC | Age: 74
End: 2019-10-07

## 2019-10-07 LAB — INR BLD: 1.5

## 2019-10-14 ENCOUNTER — TELEPHONE (OUTPATIENT)
Dept: ADMINISTRATIVE | Age: 74
End: 2019-10-14

## 2019-10-14 ENCOUNTER — ANTI-COAG VISIT (OUTPATIENT)
Dept: FAMILY MEDICINE CLINIC | Age: 74
End: 2019-10-14
Payer: COMMERCIAL

## 2019-10-14 DIAGNOSIS — I82.5Y1 CHRONIC DEEP VEIN THROMBOSIS (DVT) OF PROXIMAL VEIN OF RIGHT LOWER EXTREMITY (HCC): ICD-10-CM

## 2019-10-14 LAB — INR BLD: 1.3

## 2019-10-14 PROCEDURE — 93793 ANTICOAG MGMT PT WARFARIN: CPT | Performed by: FAMILY MEDICINE

## 2019-10-22 ENCOUNTER — HOSPITAL ENCOUNTER (OUTPATIENT)
Dept: GENERAL RADIOLOGY | Age: 74
Discharge: HOME | End: 2019-10-24
Payer: COMMERCIAL

## 2019-10-22 ENCOUNTER — HOSPITAL ENCOUNTER (OUTPATIENT)
Age: 74
Discharge: HOME | End: 2019-10-24
Payer: COMMERCIAL

## 2019-10-22 DIAGNOSIS — C50.212 MALIGNANT NEOPLASM OF UPPER-INNER QUADRANT OF LEFT FEMALE BREAST, UNSPECIFIED ESTROGEN RECEPTOR STATUS (HCC): ICD-10-CM

## 2019-10-22 PROCEDURE — 71046 X-RAY EXAM CHEST 2 VIEWS: CPT

## 2019-10-25 ENCOUNTER — OFFICE VISIT (OUTPATIENT)
Dept: SURGERY | Age: 74
End: 2019-10-25
Payer: COMMERCIAL

## 2019-10-25 VITALS
OXYGEN SATURATION: 94 % | HEART RATE: 94 BPM | HEIGHT: 64 IN | WEIGHT: 170 LBS | RESPIRATION RATE: 16 BRPM | DIASTOLIC BLOOD PRESSURE: 86 MMHG | BODY MASS INDEX: 29.02 KG/M2 | SYSTOLIC BLOOD PRESSURE: 120 MMHG

## 2019-10-25 DIAGNOSIS — S70.00XA CONTUSION OF HIP AND THIGH, INITIAL ENCOUNTER: Primary | ICD-10-CM

## 2019-10-25 DIAGNOSIS — S70.10XA CONTUSION OF HIP AND THIGH, INITIAL ENCOUNTER: Primary | ICD-10-CM

## 2019-10-25 PROCEDURE — G8598 ASA/ANTIPLAT THER USED: HCPCS | Performed by: SURGERY

## 2019-10-25 PROCEDURE — 3017F COLORECTAL CA SCREEN DOC REV: CPT | Performed by: SURGERY

## 2019-10-25 PROCEDURE — 99213 OFFICE O/P EST LOW 20 MIN: CPT | Performed by: SURGERY

## 2019-10-25 PROCEDURE — G8417 CALC BMI ABV UP PARAM F/U: HCPCS | Performed by: SURGERY

## 2019-10-25 PROCEDURE — 4040F PNEUMOC VAC/ADMIN/RCVD: CPT | Performed by: SURGERY

## 2019-10-25 PROCEDURE — G8399 PT W/DXA RESULTS DOCUMENT: HCPCS | Performed by: SURGERY

## 2019-10-25 PROCEDURE — G8484 FLU IMMUNIZE NO ADMIN: HCPCS | Performed by: SURGERY

## 2019-10-25 PROCEDURE — G8427 DOCREV CUR MEDS BY ELIG CLIN: HCPCS | Performed by: SURGERY

## 2019-10-25 PROCEDURE — 1090F PRES/ABSN URINE INCON ASSESS: CPT | Performed by: SURGERY

## 2019-10-25 PROCEDURE — 1123F ACP DISCUSS/DSCN MKR DOCD: CPT | Performed by: SURGERY

## 2019-10-25 PROCEDURE — 1036F TOBACCO NON-USER: CPT | Performed by: SURGERY

## 2019-10-31 ENCOUNTER — OFFICE VISIT (OUTPATIENT)
Dept: FAMILY MEDICINE CLINIC | Age: 74
End: 2019-10-31
Payer: COMMERCIAL

## 2019-10-31 ENCOUNTER — HOSPITAL ENCOUNTER (OUTPATIENT)
Age: 74
Discharge: HOME | End: 2019-11-02
Payer: COMMERCIAL

## 2019-10-31 ENCOUNTER — ANTI-COAG VISIT (OUTPATIENT)
Dept: FAMILY MEDICINE CLINIC | Age: 74
End: 2019-10-31

## 2019-10-31 VITALS
BODY MASS INDEX: 29.19 KG/M2 | RESPIRATION RATE: 18 BRPM | HEART RATE: 101 BPM | SYSTOLIC BLOOD PRESSURE: 129 MMHG | DIASTOLIC BLOOD PRESSURE: 56 MMHG | HEIGHT: 64 IN | OXYGEN SATURATION: 90 % | WEIGHT: 171 LBS | TEMPERATURE: 98.6 F

## 2019-10-31 DIAGNOSIS — J90 BILATERAL PLEURAL EFFUSION: ICD-10-CM

## 2019-10-31 DIAGNOSIS — I82.5Y1 CHRONIC DEEP VEIN THROMBOSIS (DVT) OF PROXIMAL VEIN OF RIGHT LOWER EXTREMITY (HCC): ICD-10-CM

## 2019-10-31 DIAGNOSIS — J90 BILATERAL PLEURAL EFFUSION: Primary | ICD-10-CM

## 2019-10-31 DIAGNOSIS — D64.9 ANEMIA, UNSPECIFIED TYPE: ICD-10-CM

## 2019-10-31 DIAGNOSIS — Z79.01 CHRONIC ANTICOAGULATION: ICD-10-CM

## 2019-10-31 DIAGNOSIS — J90 PLEURAL EFFUSION: ICD-10-CM

## 2019-10-31 DIAGNOSIS — R60.0 LOWER EXTREMITY EDEMA: ICD-10-CM

## 2019-10-31 LAB
ANION GAP SERPL CALCULATED.3IONS-SCNC: 13 MMOL/L (ref 7–16)
BUN BLDV-MCNC: 24 MG/DL (ref 8–23)
CALCIUM SERPL-MCNC: 8.4 MG/DL (ref 8.6–10.2)
CHLORIDE BLD-SCNC: 99 MMOL/L (ref 98–107)
CO2: 30 MMOL/L (ref 22–29)
CREAT SERPL-MCNC: 1.3 MG/DL (ref 0.5–1)
GFR AFRICAN AMERICAN: 48
GFR NON-AFRICAN AMERICAN: 40 ML/MIN/1.73
GLUCOSE BLD-MCNC: 186 MG/DL (ref 74–99)
HCT VFR BLD CALC: 28.9 % (ref 34–48)
HEMOGLOBIN: 8.6 G/DL (ref 11.5–15.5)
INTERNATIONAL NORMALIZATION RATIO, POC: 2.9
MCH RBC QN AUTO: 31.6 PG (ref 26–35)
MCHC RBC AUTO-ENTMCNC: 29.8 % (ref 32–34.5)
MCV RBC AUTO: 106.3 FL (ref 80–99.9)
PDW BLD-RTO: 20.6 FL (ref 11.5–15)
PLATELET # BLD: 214 E9/L (ref 130–450)
PMV BLD AUTO: 11.1 FL (ref 7–12)
POTASSIUM SERPL-SCNC: 4 MMOL/L (ref 3.5–5)
PROTHROMBIN TIME, POC: 17
RBC # BLD: 2.72 E12/L (ref 3.5–5.5)
SODIUM BLD-SCNC: 142 MMOL/L (ref 132–146)
WBC # BLD: 4.2 E9/L (ref 4.5–11.5)

## 2019-10-31 PROCEDURE — G8598 ASA/ANTIPLAT THER USED: HCPCS | Performed by: STUDENT IN AN ORGANIZED HEALTH CARE EDUCATION/TRAINING PROGRAM

## 2019-10-31 PROCEDURE — 1090F PRES/ABSN URINE INCON ASSESS: CPT | Performed by: STUDENT IN AN ORGANIZED HEALTH CARE EDUCATION/TRAINING PROGRAM

## 2019-10-31 PROCEDURE — 85027 COMPLETE CBC AUTOMATED: CPT

## 2019-10-31 PROCEDURE — 36415 COLL VENOUS BLD VENIPUNCTURE: CPT | Performed by: FAMILY MEDICINE

## 2019-10-31 PROCEDURE — G8427 DOCREV CUR MEDS BY ELIG CLIN: HCPCS | Performed by: STUDENT IN AN ORGANIZED HEALTH CARE EDUCATION/TRAINING PROGRAM

## 2019-10-31 PROCEDURE — 80048 BASIC METABOLIC PNL TOTAL CA: CPT

## 2019-10-31 PROCEDURE — 3017F COLORECTAL CA SCREEN DOC REV: CPT | Performed by: STUDENT IN AN ORGANIZED HEALTH CARE EDUCATION/TRAINING PROGRAM

## 2019-10-31 PROCEDURE — G8417 CALC BMI ABV UP PARAM F/U: HCPCS | Performed by: STUDENT IN AN ORGANIZED HEALTH CARE EDUCATION/TRAINING PROGRAM

## 2019-10-31 PROCEDURE — G8484 FLU IMMUNIZE NO ADMIN: HCPCS | Performed by: STUDENT IN AN ORGANIZED HEALTH CARE EDUCATION/TRAINING PROGRAM

## 2019-10-31 PROCEDURE — 1036F TOBACCO NON-USER: CPT | Performed by: STUDENT IN AN ORGANIZED HEALTH CARE EDUCATION/TRAINING PROGRAM

## 2019-10-31 PROCEDURE — 36415 COLL VENOUS BLD VENIPUNCTURE: CPT

## 2019-10-31 PROCEDURE — 1123F ACP DISCUSS/DSCN MKR DOCD: CPT | Performed by: STUDENT IN AN ORGANIZED HEALTH CARE EDUCATION/TRAINING PROGRAM

## 2019-10-31 PROCEDURE — 99212 OFFICE O/P EST SF 10 MIN: CPT | Performed by: STUDENT IN AN ORGANIZED HEALTH CARE EDUCATION/TRAINING PROGRAM

## 2019-10-31 PROCEDURE — 99213 OFFICE O/P EST LOW 20 MIN: CPT | Performed by: STUDENT IN AN ORGANIZED HEALTH CARE EDUCATION/TRAINING PROGRAM

## 2019-10-31 PROCEDURE — 4040F PNEUMOC VAC/ADMIN/RCVD: CPT | Performed by: STUDENT IN AN ORGANIZED HEALTH CARE EDUCATION/TRAINING PROGRAM

## 2019-10-31 PROCEDURE — G8399 PT W/DXA RESULTS DOCUMENT: HCPCS | Performed by: STUDENT IN AN ORGANIZED HEALTH CARE EDUCATION/TRAINING PROGRAM

## 2019-10-31 RX ORDER — BUMETANIDE 1 MG/1
1 TABLET ORAL 2 TIMES DAILY
Qty: 60 TABLET | Refills: 2 | Status: SHIPPED | OUTPATIENT
Start: 2019-10-31 | End: 2020-01-08 | Stop reason: DRUGHIGH

## 2019-11-01 ENCOUNTER — TELEPHONE (OUTPATIENT)
Dept: FAMILY MEDICINE CLINIC | Age: 74
End: 2019-11-01

## 2019-11-02 ASSESSMENT — ENCOUNTER SYMPTOMS: ABDOMINAL PAIN: 0

## 2019-11-07 ENCOUNTER — TELEPHONE (OUTPATIENT)
Dept: FAMILY MEDICINE CLINIC | Age: 74
End: 2019-11-07

## 2019-11-07 DIAGNOSIS — Z79.01 CHRONIC ANTICOAGULATION: Primary | ICD-10-CM

## 2019-11-08 ENCOUNTER — TELEPHONE (OUTPATIENT)
Dept: FAMILY MEDICINE CLINIC | Age: 74
End: 2019-11-08

## 2019-11-08 DIAGNOSIS — I82.5Y1 CHRONIC DEEP VEIN THROMBOSIS (DVT) OF PROXIMAL VEIN OF RIGHT LOWER EXTREMITY (HCC): ICD-10-CM

## 2019-11-08 DIAGNOSIS — I50.43 ACUTE ON CHRONIC COMBINED SYSTOLIC AND DIASTOLIC CONGESTIVE HEART FAILURE (HCC): ICD-10-CM

## 2019-11-08 DIAGNOSIS — M25.512 CHRONIC LEFT SHOULDER PAIN: ICD-10-CM

## 2019-11-08 DIAGNOSIS — J90 PLEURAL EFFUSION: ICD-10-CM

## 2019-11-08 DIAGNOSIS — C50.919 METASTATIC BREAST CANCER (HCC): Primary | ICD-10-CM

## 2019-11-08 DIAGNOSIS — G89.29 CHRONIC LEFT SHOULDER PAIN: ICD-10-CM

## 2019-11-12 ENCOUNTER — ANTI-COAG VISIT (OUTPATIENT)
Dept: FAMILY MEDICINE CLINIC | Age: 74
End: 2019-11-12

## 2019-11-12 ENCOUNTER — TELEPHONE (OUTPATIENT)
Dept: FAMILY MEDICINE CLINIC | Age: 74
End: 2019-11-12

## 2019-11-12 LAB — INR BLD: 4.7

## 2019-11-12 NOTE — PROGRESS NOTES
Notified patient  [facility] of her coumadin therapy   Hold coumadin today home care will check pt/inr tomorrow   Notify office if any bleeding.

## 2019-11-13 ENCOUNTER — ANTI-COAG VISIT (OUTPATIENT)
Dept: FAMILY MEDICINE CLINIC | Age: 74
End: 2019-11-13

## 2019-11-13 LAB — INR BLD: 4

## 2019-11-14 ENCOUNTER — OFFICE VISIT (OUTPATIENT)
Dept: CARDIOLOGY CLINIC | Age: 74
End: 2019-11-14
Payer: COMMERCIAL

## 2019-11-14 VITALS
HEART RATE: 76 BPM | DIASTOLIC BLOOD PRESSURE: 60 MMHG | SYSTOLIC BLOOD PRESSURE: 90 MMHG | WEIGHT: 170 LBS | HEIGHT: 64 IN | BODY MASS INDEX: 29.02 KG/M2

## 2019-11-14 DIAGNOSIS — I42.7 CARDIOMYOPATHY SECONDARY TO DRUG (HCC): ICD-10-CM

## 2019-11-14 DIAGNOSIS — I25.10 CORONARY ARTERY DISEASE INVOLVING NATIVE CORONARY ARTERY OF NATIVE HEART WITHOUT ANGINA PECTORIS: Primary | Chronic | ICD-10-CM

## 2019-11-14 PROCEDURE — G8417 CALC BMI ABV UP PARAM F/U: HCPCS | Performed by: INTERNAL MEDICINE

## 2019-11-14 PROCEDURE — 93000 ELECTROCARDIOGRAM COMPLETE: CPT | Performed by: INTERNAL MEDICINE

## 2019-11-14 PROCEDURE — 3017F COLORECTAL CA SCREEN DOC REV: CPT | Performed by: INTERNAL MEDICINE

## 2019-11-14 PROCEDURE — 1036F TOBACCO NON-USER: CPT | Performed by: INTERNAL MEDICINE

## 2019-11-14 PROCEDURE — 1090F PRES/ABSN URINE INCON ASSESS: CPT | Performed by: INTERNAL MEDICINE

## 2019-11-14 PROCEDURE — 1123F ACP DISCUSS/DSCN MKR DOCD: CPT | Performed by: INTERNAL MEDICINE

## 2019-11-14 PROCEDURE — 99214 OFFICE O/P EST MOD 30 MIN: CPT | Performed by: INTERNAL MEDICINE

## 2019-11-14 PROCEDURE — G8427 DOCREV CUR MEDS BY ELIG CLIN: HCPCS | Performed by: INTERNAL MEDICINE

## 2019-11-14 PROCEDURE — G8399 PT W/DXA RESULTS DOCUMENT: HCPCS | Performed by: INTERNAL MEDICINE

## 2019-11-14 PROCEDURE — G8598 ASA/ANTIPLAT THER USED: HCPCS | Performed by: INTERNAL MEDICINE

## 2019-11-14 PROCEDURE — 4040F PNEUMOC VAC/ADMIN/RCVD: CPT | Performed by: INTERNAL MEDICINE

## 2019-11-14 PROCEDURE — G8484 FLU IMMUNIZE NO ADMIN: HCPCS | Performed by: INTERNAL MEDICINE

## 2019-11-14 RX ORDER — POTASSIUM CHLORIDE 1500 MG/1
TABLET, FILM COATED, EXTENDED RELEASE ORAL
Refills: 0 | COMMUNITY
Start: 2019-11-01 | End: 2019-11-25 | Stop reason: ALTCHOICE

## 2019-11-15 ENCOUNTER — ANTI-COAG VISIT (OUTPATIENT)
Dept: FAMILY MEDICINE CLINIC | Age: 74
End: 2019-11-15

## 2019-11-15 LAB — INR BLD: 3.4

## 2019-11-18 ENCOUNTER — ANTI-COAG VISIT (OUTPATIENT)
Dept: FAMILY MEDICINE CLINIC | Age: 74
End: 2019-11-18
Payer: COMMERCIAL

## 2019-11-18 DIAGNOSIS — Z79.01 CHRONIC ANTICOAGULATION: ICD-10-CM

## 2019-11-18 DIAGNOSIS — I82.5Y1 CHRONIC DEEP VEIN THROMBOSIS (DVT) OF PROXIMAL VEIN OF RIGHT LOWER EXTREMITY (HCC): ICD-10-CM

## 2019-11-18 LAB — INR BLD: 3.1

## 2019-11-18 PROCEDURE — 93793 ANTICOAG MGMT PT WARFARIN: CPT | Performed by: FAMILY MEDICINE

## 2019-11-20 ENCOUNTER — ANTI-COAG VISIT (OUTPATIENT)
Dept: FAMILY MEDICINE CLINIC | Age: 74
End: 2019-11-20

## 2019-11-20 LAB — INR BLD: 2.2

## 2019-11-25 ENCOUNTER — ANTI-COAG VISIT (OUTPATIENT)
Dept: FAMILY MEDICINE CLINIC | Age: 74
End: 2019-11-25

## 2019-11-25 ENCOUNTER — OFFICE VISIT (OUTPATIENT)
Dept: FAMILY MEDICINE CLINIC | Age: 74
End: 2019-11-25
Payer: COMMERCIAL

## 2019-11-25 ENCOUNTER — HOSPITAL ENCOUNTER (OUTPATIENT)
Age: 74
Discharge: HOME | End: 2019-11-27
Payer: COMMERCIAL

## 2019-11-25 VITALS
WEIGHT: 150 LBS | OXYGEN SATURATION: 94 % | DIASTOLIC BLOOD PRESSURE: 63 MMHG | HEART RATE: 78 BPM | BODY MASS INDEX: 25.61 KG/M2 | TEMPERATURE: 97.9 F | SYSTOLIC BLOOD PRESSURE: 111 MMHG | HEIGHT: 64 IN | RESPIRATION RATE: 18 BRPM

## 2019-11-25 DIAGNOSIS — I82.5Y1 CHRONIC DEEP VEIN THROMBOSIS (DVT) OF PROXIMAL VEIN OF RIGHT LOWER EXTREMITY (HCC): ICD-10-CM

## 2019-11-25 DIAGNOSIS — D64.9 ANEMIA, UNSPECIFIED TYPE: ICD-10-CM

## 2019-11-25 DIAGNOSIS — T14.8XXA HEMATOMA: ICD-10-CM

## 2019-11-25 DIAGNOSIS — M10.9 ACUTE GOUT INVOLVING TOE OF LEFT FOOT, UNSPECIFIED CAUSE: ICD-10-CM

## 2019-11-25 DIAGNOSIS — E83.42 HYPOMAGNESEMIA: ICD-10-CM

## 2019-11-25 DIAGNOSIS — Z23 NEED FOR INFLUENZA VACCINATION: ICD-10-CM

## 2019-11-25 DIAGNOSIS — E87.6 HYPOKALEMIA: ICD-10-CM

## 2019-11-25 DIAGNOSIS — I82.4Z9 DEEP VEIN THROMBOSIS (DVT) OF DISTAL VEIN OF LOWER EXTREMITY, UNSPECIFIED CHRONICITY, UNSPECIFIED LATERALITY (HCC): ICD-10-CM

## 2019-11-25 DIAGNOSIS — M10.9 ACUTE GOUT INVOLVING TOE OF LEFT FOOT, UNSPECIFIED CAUSE: Primary | ICD-10-CM

## 2019-11-25 LAB
ANION GAP SERPL CALCULATED.3IONS-SCNC: 23 MMOL/L (ref 7–16)
ANISOCYTOSIS: ABNORMAL
BASOPHILS ABSOLUTE: 0.05 E9/L (ref 0–0.2)
BASOPHILS RELATIVE PERCENT: 1.8 % (ref 0–2)
BUN BLDV-MCNC: 23 MG/DL (ref 8–23)
CALCIUM SERPL-MCNC: 9.3 MG/DL (ref 8.6–10.2)
CHLORIDE BLD-SCNC: 95 MMOL/L (ref 98–107)
CO2: 24 MMOL/L (ref 22–29)
CREAT SERPL-MCNC: 1.7 MG/DL (ref 0.5–1)
EOSINOPHILS ABSOLUTE: 0.26 E9/L (ref 0.05–0.5)
EOSINOPHILS RELATIVE PERCENT: 8.8 % (ref 0–6)
GFR AFRICAN AMERICAN: 36
GFR NON-AFRICAN AMERICAN: 29 ML/MIN/1.73
GLUCOSE BLD-MCNC: 136 MG/DL (ref 74–99)
HCT VFR BLD CALC: 31 % (ref 34–48)
HEMOGLOBIN: 9.2 G/DL (ref 11.5–15.5)
INTERNATIONAL NORMALIZATION RATIO, POC: 2.3
LYMPHOCYTES ABSOLUTE: 0.45 E9/L (ref 1.5–4)
LYMPHOCYTES RELATIVE PERCENT: 14.9 % (ref 20–42)
MAGNESIUM: 1.5 MG/DL (ref 1.6–2.6)
MCH RBC QN AUTO: 32.7 PG (ref 26–35)
MCHC RBC AUTO-ENTMCNC: 29.7 % (ref 32–34.5)
MCV RBC AUTO: 110.3 FL (ref 80–99.9)
MONOCYTES ABSOLUTE: 0.18 E9/L (ref 0.1–0.95)
MONOCYTES RELATIVE PERCENT: 6.1 % (ref 2–12)
NEUTROPHILS ABSOLUTE: 2.04 E9/L (ref 1.8–7.3)
NEUTROPHILS RELATIVE PERCENT: 68.4 % (ref 43–80)
OVALOCYTES: ABNORMAL
PDW BLD-RTO: 19.7 FL (ref 11.5–15)
PLATELET # BLD: 191 E9/L (ref 130–450)
PMV BLD AUTO: 11.2 FL (ref 7–12)
POIKILOCYTES: ABNORMAL
POLYCHROMASIA: ABNORMAL
POTASSIUM SERPL-SCNC: 3.8 MMOL/L (ref 3.5–5)
PROTHROMBIN TIME, POC: 27.2
RBC # BLD: 2.81 E12/L (ref 3.5–5.5)
SODIUM BLD-SCNC: 142 MMOL/L (ref 132–146)
URIC ACID, SERUM: 13.2 MG/DL (ref 2.4–5.7)
WBC # BLD: 3 E9/L (ref 4.5–11.5)

## 2019-11-25 PROCEDURE — 1090F PRES/ABSN URINE INCON ASSESS: CPT | Performed by: STUDENT IN AN ORGANIZED HEALTH CARE EDUCATION/TRAINING PROGRAM

## 2019-11-25 PROCEDURE — G8482 FLU IMMUNIZE ORDER/ADMIN: HCPCS | Performed by: STUDENT IN AN ORGANIZED HEALTH CARE EDUCATION/TRAINING PROGRAM

## 2019-11-25 PROCEDURE — 36415 COLL VENOUS BLD VENIPUNCTURE: CPT | Performed by: FAMILY MEDICINE

## 2019-11-25 PROCEDURE — 4040F PNEUMOC VAC/ADMIN/RCVD: CPT | Performed by: STUDENT IN AN ORGANIZED HEALTH CARE EDUCATION/TRAINING PROGRAM

## 2019-11-25 PROCEDURE — 99212 OFFICE O/P EST SF 10 MIN: CPT | Performed by: STUDENT IN AN ORGANIZED HEALTH CARE EDUCATION/TRAINING PROGRAM

## 2019-11-25 PROCEDURE — 1036F TOBACCO NON-USER: CPT | Performed by: STUDENT IN AN ORGANIZED HEALTH CARE EDUCATION/TRAINING PROGRAM

## 2019-11-25 PROCEDURE — 85025 COMPLETE CBC W/AUTO DIFF WBC: CPT

## 2019-11-25 PROCEDURE — G0008 ADMIN INFLUENZA VIRUS VAC: HCPCS

## 2019-11-25 PROCEDURE — 80048 BASIC METABOLIC PNL TOTAL CA: CPT

## 2019-11-25 PROCEDURE — G8399 PT W/DXA RESULTS DOCUMENT: HCPCS | Performed by: STUDENT IN AN ORGANIZED HEALTH CARE EDUCATION/TRAINING PROGRAM

## 2019-11-25 PROCEDURE — 84550 ASSAY OF BLOOD/URIC ACID: CPT

## 2019-11-25 PROCEDURE — 1123F ACP DISCUSS/DSCN MKR DOCD: CPT | Performed by: STUDENT IN AN ORGANIZED HEALTH CARE EDUCATION/TRAINING PROGRAM

## 2019-11-25 PROCEDURE — G8427 DOCREV CUR MEDS BY ELIG CLIN: HCPCS | Performed by: STUDENT IN AN ORGANIZED HEALTH CARE EDUCATION/TRAINING PROGRAM

## 2019-11-25 PROCEDURE — 3017F COLORECTAL CA SCREEN DOC REV: CPT | Performed by: STUDENT IN AN ORGANIZED HEALTH CARE EDUCATION/TRAINING PROGRAM

## 2019-11-25 PROCEDURE — G8417 CALC BMI ABV UP PARAM F/U: HCPCS | Performed by: STUDENT IN AN ORGANIZED HEALTH CARE EDUCATION/TRAINING PROGRAM

## 2019-11-25 PROCEDURE — 90686 IIV4 VACC NO PRSV 0.5 ML IM: CPT

## 2019-11-25 PROCEDURE — 99213 OFFICE O/P EST LOW 20 MIN: CPT | Performed by: STUDENT IN AN ORGANIZED HEALTH CARE EDUCATION/TRAINING PROGRAM

## 2019-11-25 PROCEDURE — G8598 ASA/ANTIPLAT THER USED: HCPCS | Performed by: STUDENT IN AN ORGANIZED HEALTH CARE EDUCATION/TRAINING PROGRAM

## 2019-11-25 PROCEDURE — 83735 ASSAY OF MAGNESIUM: CPT

## 2019-11-25 PROCEDURE — 6360000002 HC RX W HCPCS

## 2019-11-25 RX ORDER — LANOLIN ALCOHOL/MO/W.PET/CERES
400 CREAM (GRAM) TOPICAL DAILY
Qty: 30 TABLET | Refills: 1 | Status: SHIPPED | OUTPATIENT
Start: 2019-11-25 | End: 2020-02-04 | Stop reason: SDUPTHER

## 2019-11-25 RX ORDER — PREDNISONE 20 MG/1
40 TABLET ORAL DAILY
Qty: 10 TABLET | Refills: 0 | Status: SHIPPED | OUTPATIENT
Start: 2019-11-25 | End: 2020-01-07 | Stop reason: ALTCHOICE

## 2019-11-25 RX ORDER — WARFARIN SODIUM 1 MG/1
TABLET ORAL
Qty: 180 TABLET | Refills: 0 | Status: ON HOLD
Start: 2019-11-25 | End: 2020-07-15 | Stop reason: HOSPADM

## 2019-11-29 ASSESSMENT — ENCOUNTER SYMPTOMS
SHORTNESS OF BREATH: 0
ABDOMINAL PAIN: 0
COUGH: 0

## 2019-12-02 RX ORDER — PREGABALIN 25 MG/1
25 CAPSULE ORAL 2 TIMES DAILY
Qty: 60 CAPSULE | Refills: 2 | Status: SHIPPED | OUTPATIENT
Start: 2019-12-02 | End: 2019-12-10

## 2019-12-10 ENCOUNTER — TELEPHONE (OUTPATIENT)
Dept: FAMILY MEDICINE CLINIC | Age: 74
End: 2019-12-10

## 2019-12-10 DIAGNOSIS — N18.4 CKD (CHRONIC KIDNEY DISEASE) STAGE 4, GFR 15-29 ML/MIN (HCC): ICD-10-CM

## 2019-12-10 DIAGNOSIS — E11.42 DIABETIC POLYNEUROPATHY ASSOCIATED WITH TYPE 2 DIABETES MELLITUS (HCC): Primary | ICD-10-CM

## 2019-12-10 RX ORDER — PREGABALIN 50 MG/1
50 CAPSULE ORAL 2 TIMES DAILY
Qty: 60 CAPSULE | Refills: 3 | Status: SHIPPED | OUTPATIENT
Start: 2019-12-10 | End: 2020-02-04 | Stop reason: SDUPTHER

## 2019-12-12 ENCOUNTER — TELEPHONE (OUTPATIENT)
Dept: FAMILY MEDICINE CLINIC | Age: 74
End: 2019-12-12

## 2019-12-12 ENCOUNTER — HOSPITAL ENCOUNTER (OUTPATIENT)
Age: 74
Discharge: HOME | End: 2019-12-12
Payer: COMMERCIAL

## 2019-12-12 DIAGNOSIS — I82.5Y1 CHRONIC DEEP VEIN THROMBOSIS (DVT) OF PROXIMAL VEIN OF RIGHT LOWER EXTREMITY (HCC): Primary | ICD-10-CM

## 2019-12-12 DIAGNOSIS — N18.4 CKD (CHRONIC KIDNEY DISEASE) STAGE 4, GFR 15-29 ML/MIN (HCC): ICD-10-CM

## 2019-12-12 LAB
ANION GAP SERPL CALCULATED.3IONS-SCNC: 15 MMOL/L (ref 7–16)
BUN BLDV-MCNC: 29 MG/DL (ref 8–23)
CALCIUM SERPL-MCNC: 7.9 MG/DL (ref 8.6–10.2)
CHLORIDE BLD-SCNC: 99 MMOL/L (ref 98–107)
CO2: 27 MMOL/L (ref 22–29)
CREAT SERPL-MCNC: 1.4 MG/DL (ref 0.5–1)
GFR AFRICAN AMERICAN: 44
GFR NON-AFRICAN AMERICAN: 37 ML/MIN/1.73
GLUCOSE BLD-MCNC: 204 MG/DL (ref 74–99)
INR BLD: 1.5
POTASSIUM SERPL-SCNC: 3.9 MMOL/L (ref 3.5–5)
PROTHROMBIN TIME: 17 SEC (ref 9.3–12.4)
SODIUM BLD-SCNC: 141 MMOL/L (ref 132–146)

## 2019-12-12 PROCEDURE — 80048 BASIC METABOLIC PNL TOTAL CA: CPT

## 2019-12-12 PROCEDURE — 85610 PROTHROMBIN TIME: CPT

## 2019-12-12 PROCEDURE — 36415 COLL VENOUS BLD VENIPUNCTURE: CPT

## 2020-01-03 ENCOUNTER — TELEPHONE (OUTPATIENT)
Dept: FAMILY MEDICINE CLINIC | Age: 75
End: 2020-01-03

## 2020-01-03 ENCOUNTER — HOSPITAL ENCOUNTER (OUTPATIENT)
Dept: CARDIOLOGY | Age: 75
Discharge: HOME | End: 2020-01-03
Payer: COMMERCIAL

## 2020-01-03 LAB
LV EF: 53 %
LVEF MODALITY: NORMAL

## 2020-01-03 PROCEDURE — 93306 TTE W/DOPPLER COMPLETE: CPT | Performed by: PSYCHIATRY & NEUROLOGY

## 2020-01-07 ENCOUNTER — HOSPITAL ENCOUNTER (OUTPATIENT)
Age: 75
Discharge: HOME | End: 2020-01-09
Payer: COMMERCIAL

## 2020-01-07 ENCOUNTER — ANTI-COAG VISIT (OUTPATIENT)
Dept: FAMILY MEDICINE CLINIC | Age: 75
End: 2020-01-07

## 2020-01-07 ENCOUNTER — TELEPHONE (OUTPATIENT)
Dept: CARDIOLOGY CLINIC | Age: 75
End: 2020-01-07

## 2020-01-07 ENCOUNTER — OFFICE VISIT (OUTPATIENT)
Dept: FAMILY MEDICINE CLINIC | Age: 75
End: 2020-01-07
Payer: COMMERCIAL

## 2020-01-07 VITALS
BODY MASS INDEX: 24.59 KG/M2 | OXYGEN SATURATION: 99 % | SYSTOLIC BLOOD PRESSURE: 98 MMHG | TEMPERATURE: 98 F | RESPIRATION RATE: 18 BRPM | HEIGHT: 64 IN | HEART RATE: 73 BPM | DIASTOLIC BLOOD PRESSURE: 44 MMHG | WEIGHT: 144 LBS

## 2020-01-07 LAB
INTERNATIONAL NORMALIZATION RATIO, POC: 3.4
PROTHROMBIN TIME, POC: 41.2

## 2020-01-07 PROCEDURE — 85027 COMPLETE CBC AUTOMATED: CPT

## 2020-01-07 PROCEDURE — 93793 ANTICOAG MGMT PT WARFARIN: CPT | Performed by: FAMILY MEDICINE

## 2020-01-07 PROCEDURE — 36415 COLL VENOUS BLD VENIPUNCTURE: CPT

## 2020-01-07 PROCEDURE — 1036F TOBACCO NON-USER: CPT | Performed by: FAMILY MEDICINE

## 2020-01-07 PROCEDURE — 3046F HEMOGLOBIN A1C LEVEL >9.0%: CPT | Performed by: FAMILY MEDICINE

## 2020-01-07 PROCEDURE — G8482 FLU IMMUNIZE ORDER/ADMIN: HCPCS | Performed by: FAMILY MEDICINE

## 2020-01-07 PROCEDURE — 1090F PRES/ABSN URINE INCON ASSESS: CPT | Performed by: FAMILY MEDICINE

## 2020-01-07 PROCEDURE — 80053 COMPREHEN METABOLIC PANEL: CPT

## 2020-01-07 PROCEDURE — 99212 OFFICE O/P EST SF 10 MIN: CPT | Performed by: FAMILY MEDICINE

## 2020-01-07 PROCEDURE — 1123F ACP DISCUSS/DSCN MKR DOCD: CPT | Performed by: FAMILY MEDICINE

## 2020-01-07 PROCEDURE — 2022F DILAT RTA XM EVC RTNOPTHY: CPT | Performed by: FAMILY MEDICINE

## 2020-01-07 PROCEDURE — G8427 DOCREV CUR MEDS BY ELIG CLIN: HCPCS | Performed by: FAMILY MEDICINE

## 2020-01-07 PROCEDURE — G8420 CALC BMI NORM PARAMETERS: HCPCS | Performed by: FAMILY MEDICINE

## 2020-01-07 PROCEDURE — 99214 OFFICE O/P EST MOD 30 MIN: CPT | Performed by: FAMILY MEDICINE

## 2020-01-07 PROCEDURE — G8399 PT W/DXA RESULTS DOCUMENT: HCPCS | Performed by: FAMILY MEDICINE

## 2020-01-07 PROCEDURE — 3017F COLORECTAL CA SCREEN DOC REV: CPT | Performed by: FAMILY MEDICINE

## 2020-01-07 PROCEDURE — 4040F PNEUMOC VAC/ADMIN/RCVD: CPT | Performed by: FAMILY MEDICINE

## 2020-01-07 RX ORDER — NICOTINE POLACRILEX 4 MG/1
20 GUM, CHEWING ORAL DAILY
Qty: 90 TABLET | Refills: 0 | Status: SHIPPED | OUTPATIENT
Start: 2020-01-07 | End: 2020-04-08 | Stop reason: SDUPTHER

## 2020-01-07 RX ORDER — LANOLIN ALCOHOL/MO/W.PET/CERES
1000 CREAM (GRAM) TOPICAL DAILY
Qty: 90 TABLET | Refills: 3 | Status: ON HOLD
Start: 2020-01-07 | End: 2020-07-15 | Stop reason: HOSPADM

## 2020-01-07 RX ORDER — AMOXICILLIN AND CLAVULANATE POTASSIUM 500; 125 MG/1; MG/1
1 TABLET, FILM COATED ORAL 2 TIMES DAILY
Qty: 10 TABLET | Refills: 0 | Status: SHIPPED | OUTPATIENT
Start: 2020-01-07 | End: 2020-01-12

## 2020-01-07 ASSESSMENT — PATIENT HEALTH QUESTIONNAIRE - PHQ9
2. FEELING DOWN, DEPRESSED OR HOPELESS: 0
SUM OF ALL RESPONSES TO PHQ QUESTIONS 1-9: 0
SUM OF ALL RESPONSES TO PHQ QUESTIONS 1-9: 0
SUM OF ALL RESPONSES TO PHQ9 QUESTIONS 1 & 2: 0
1. LITTLE INTEREST OR PLEASURE IN DOING THINGS: 0

## 2020-01-07 NOTE — PATIENT INSTRUCTIONS
Patient Education        Preventing Falls: Care Instructions  Your Care Instructions    Getting around your home safely can be a challenge if you have injuries or health problems that make it easy for you to fall. Loose rugs and furniture in walkways are among the dangers for many older people who have problems walking or who have poor eyesight. People who have conditions such as arthritis, osteoporosis, or dementia also have to be careful not to fall. You can make your home safer with a few simple measures. Follow-up care is a key part of your treatment and safety. Be sure to make and go to all appointments, and call your doctor if you are having problems. It's also a good idea to know your test results and keep a list of the medicines you take. How can you care for yourself at home? Taking care of yourself  · You may get dizzy if you do not drink enough water. To prevent dehydration, drink plenty of fluids, enough so that your urine is light yellow or clear like water. Choose water and other caffeine-free clear liquids. If you have kidney, heart, or liver disease and have to limit fluids, talk with your doctor before you increase the amount of fluids you drink. · Exercise regularly to improve your strength, muscle tone, and balance. Walk if you can. Swimming may be a good choice if you cannot walk easily. · Have your vision and hearing checked each year or any time you notice a change. If you have trouble seeing and hearing, you might not be able to avoid objects and could lose your balance. · Know the side effects of the medicines you take. Ask your doctor or pharmacist whether the medicines you take can affect your balance. Sleeping pills or sedatives can affect your balance. · Limit the amount of alcohol you drink. Alcohol can impair your balance and other senses. · Ask your doctor whether calluses or corns on your feet need to be removed.  If you wear loose-fitting shoes because of calluses or corns, you can lose your balance and fall. · Talk to your doctor if you have numbness in your feet. Preventing falls at home  · Remove raised doorway thresholds, throw rugs, and clutter. Repair loose carpet or raised areas in the floor. · Move furniture and electrical cords to keep them out of walking paths. · Use nonskid floor wax, and wipe up spills right away, especially on ceramic tile floors. · If you use a walker or cane, put rubber tips on it. If you use crutches, clean the bottoms of them regularly with an abrasive pad, such as steel wool. · Keep your house well lit, especially [facility], and outside walkways. Use night-lights in areas such as hallways and bathrooms. Add extra light switches or use remote switches (such as switches that go on or off when you clap your hands) to make it easier to turn lights on if you have to get up during the night. · Install sturdy handrails on stairways. · Move items in your cabinets so that the things you use a lot are on the lower shelves (about waist level). · Keep a cordless phone and a flashlight with new batteries by your bed. If possible, put a phone in each of the main rooms of your house, or carry a cell phone in case you fall and cannot reach a phone. Or, you can wear a device around your neck or wrist. You push a button that sends a signal for help. · Wear low-heeled shoes that fit well and give your feet good support. Use footwear with nonskid soles. Check the heels and soles of your shoes for wear. Repair or replace worn heels or soles. · Do not wear socks without shoes on wood floors. · Walk on the grass when the sidewalks are slippery. If you live in an area that gets snow and ice in the winter, sprinkle salt on slippery steps and sidewalks. Preventing falls in the bath  · Install grab bars and nonskid mats inside and outside your shower or tub and near the toilet and sinks. · Use shower chairs and bath benches.   · Use a hand-held shower head that will allow you to sit while showering. · Get into a tub or shower by putting the weaker leg in first. Get out of a tub or shower with your strong side first.  · Repair loose toilet seats and consider installing a raised toilet seat to make getting on and off the toilet easier. · Keep your bathroom door unlocked while you are in the shower. Where can you learn more? Go to https://StudyTubepeMezzobit.[employer]. org and sign in to your [employer] account. Enter [phone] in the Mones[employer] box to learn more about \"Preventing Falls: Care Instructions. \"     If you do not have an account, please click on the \"Sign Up Now\" link. Current as of: November 7, 2018  Content Version: 12.1  © 1506-3064 [employer]. Care instructions adapted under license by [facility] ([facility]). If you have questions about a medical condition or this instruction, always ask your healthcare professional. [address] any warranty or liability for your use of this information. Please note change in dose:  Please take coumadin 2 mg on Mondays, Wednesdays, Fridays, and Saturdays. Please take 3 mg on all other days. Recheck INR in one week.

## 2020-01-07 NOTE — PROGRESS NOTES
CC:  Cough and cold    HPI:  76 y.o. female presents with cough and cold. Cough and cold for past 1.5 weeks, coughing up sputum. Taking some Tylenol. No F/C. No pain, but sinus pressure, worsening. Coughing a lot at night and in the morning. Sugar-free cough drops help. Not going out much, stays at home. Kids had been sick, daughter and granddaughter. Stable SOB, maybe a little more prominent when up and active. No new or worsening edema; recent echo, which she had been told showed an improvement in her EF to normal.  Symptoms have been stable or worsening over the past 1.5 weeks. Never smoked. Brought medications from the hospital; may need refills. No new symptoms or concerns. Medications were reconciled today. Had not been taking bicarbonate lately. Continues to take vitamins and mineral tablets. Had been out of PPI, but requests a refill. Treated with sertraline for depression, but she ran out. Does not feel depressed at this time. Would like to try stopping medication. Advised on risks. She will call with any symptoms or concerns. States she has been coping well; occasionally feels down, but these feelings are brief. She usually feels well. Has had albuterol in the past, but ran out of this. Recurrent DVT. INR 3.4 today. Recently taking 3 mg most days, but 2 mg on M,W,F. No extra doses, no bleeding. No changes in medications. No longer has home care and has difficulty getting to the office for INR tests. DM. Not taking insulin recently. Sugars were higher when on steroids, but better now. No longer taking steroids for gout. Has not been eating much recently, losing weight. Sugars running in the range of 120-140, sometimes up to about 200 in evenings, but less than that recently. Gets meals delivered. Eating all right, less appetite than before, but states she is maintaining her nutrition overall.       Metastatic breast cancer, taking Ibrance, 3 weeks on, 1 week off.  Follows with oncology once monthly. Gets shots on Tuesday. Appointment next week. History of heart failure, chronic systolic. Peripheral edema. Bumex 1 mg twice per day. Stable. Hematoma right buttocks, and has a firm prominence near left sacrum. Did not yet have imaging.       Patient Active Problem List    Diagnosis Date Noted    Pleural effusion 09/17/2019    Precordial pain     Chronic systolic heart failure (HCC)     Moderate protein-calorie malnutrition ([address] 08/15/2019    Metastatic disease ([address]     Palliative care by specialist     Altered mental status     Goals of care, counseling/discussion     Acute hypercapnic respiratory failure ([address] 22/20/5318    Acute systolic heart failure ([address] 08/01/2019    Nonischemic cardiomyopathy ([address] 08/01/2019    Status post coronary artery bypass graft 08/01/2019    Class 2 severe obesity due to excess calories with serious comorbidity and body mass index (BMI) of 35.0 to 35.9 in [facility]) 08/01/2019    Type 2 diabetes mellitus with hyperglycemia ([address] 08/01/2019    HAP (hospital-acquired pneumonia) 07/21/2019    Acute respiratory failure with hypoxia ([address] 07/21/2019    Palliative care encounter     Cancer associated pain     Ascites 06/18/2019    Charcot's joint of foot in type 2 diabetes mellitus ([address] 04/17/2019    CKD (chronic kidney disease) stage 3, GFR 30-59 ml/min ([facility]) 04/16/2019    Ambulatory dysfunction 04/15/2019    Leg swelling 11/07/2018    History of deep vein thrombosis 11/07/2018    Chronic venous insufficiency 11/07/2018    Lymphedema of both lower extremities 11/07/2018    Decreased dorsalis pedis pulse 11/07/2018    Diabetic polyneuropathy associated with type 2 diabetes mellitus ([address] 09/07/2018    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 04/04/2018    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 54/36/1292    Complicated UTI (urinary tract infection) 10/24/2017    KARISHMA (acute kidney injury) ([facility] [address] 10/24/2017    Diarrhea with dehydration 10/24/2017    Chronic anticoagulation 10/24/2017    Peripheral polyneuropathy 01/03/2017    Bilateral edema of lower extremity 10/03/2016    Chronic deep vein thrombosis (DVT) of proximal vein of right lower extremity ([facility] [address] 09/30/2016    Type 2 diabetes mellitus without complication, with long-term current use of insulin ([facility] [address] 09/01/2016    Anemia of chronic disease 09/01/2016    Ventral hernia 05/14/2015    Rectal bleeding 02/23/2015    Anesthesia     Pericardial effusion     MI (myocardial infarction) ([facility] [address]     Arthritis     Lymph node enlargement     Subclavian vein occlusion, bilateral ([facility] [address] 04/18/2012    Rib lesion 02/07/2012    Hyperlipidemia 08/29/2011    Sarcoidosis 07/26/2011    B12 deficiency 06/22/2011    Shoulder pain, left 03/02/2011    Metastatic breast cancer ([facility] [address]     Essential hypertension     Coronary artery disease involving native coronary artery of native heart without angina pectoris     Nephrolithiasis     CHF (congestive heart failure) (HCC)     Humerus fracture        Current Outpatient Medications on File Prior to Visit   Medication Sig Dispense Refill    pregabalin (LYRICA) 50 MG capsule Take 1 capsule by mouth 2 times daily for 120 days. 60 capsule 3    magnesium oxide (MAG-OX) 400 (240 Mg) MG tablet Take 1 tablet by mouth daily 30 tablet 1    warfarin (COUMADIN) 1 MG tablet Please take 2 mg by mouth most days, but 3 mg by mouth on Mondays and Wednesdays and Friday.  180 tablet 0    bumetanide (BUMEX) 1 MG tablet Take 1 tablet by mouth 2 times daily 60 tablet 2    Multiple Vitamins-Minerals (THERAPEUTIC MULTIVITAMIN-MINERALS) tablet Take 1 tablet by mouth daily 90 tablet 3    insulin glargine (LANTUS SOLOSTAR) 100 UNIT/ML injection pen Inject 13 Units into the skin nightly 5 pen 3    Insulin Pen Needle (MEIJER PEN NEEDLES) 29G X 12MM MISC 1 each by Does not apply route

## 2020-01-08 LAB
ALBUMIN SERPL-MCNC: 3.3 G/DL (ref 3.5–5.2)
ALP BLD-CCNC: 304 U/L (ref 35–104)
ALT SERPL-CCNC: 28 U/L (ref 0–32)
ANION GAP SERPL CALCULATED.3IONS-SCNC: 18 MMOL/L (ref 7–16)
AST SERPL-CCNC: 46 U/L (ref 0–31)
BILIRUB SERPL-MCNC: 0.6 MG/DL (ref 0–1.2)
BUN BLDV-MCNC: 38 MG/DL (ref 8–23)
CALCIUM SERPL-MCNC: 9.1 MG/DL (ref 8.6–10.2)
CHLORIDE BLD-SCNC: 99 MMOL/L (ref 98–107)
CO2: 22 MMOL/L (ref 22–29)
CREAT SERPL-MCNC: 1.8 MG/DL (ref 0.5–1)
GFR AFRICAN AMERICAN: 33
GFR NON-AFRICAN AMERICAN: 27 ML/MIN/1.73
GLUCOSE BLD-MCNC: 147 MG/DL (ref 74–99)
HCT VFR BLD CALC: 28.4 % (ref 34–48)
HEMOGLOBIN: 8.6 G/DL (ref 11.5–15.5)
MCH RBC QN AUTO: 34.7 PG (ref 26–35)
MCHC RBC AUTO-ENTMCNC: 30.3 % (ref 32–34.5)
MCV RBC AUTO: 114.5 FL (ref 80–99.9)
PDW BLD-RTO: 16.3 FL (ref 11.5–15)
PLATELET # BLD: 157 E9/L (ref 130–450)
PMV BLD AUTO: 10.9 FL (ref 7–12)
POTASSIUM SERPL-SCNC: 4.7 MMOL/L (ref 3.5–5)
RBC # BLD: 2.48 E12/L (ref 3.5–5.5)
SODIUM BLD-SCNC: 139 MMOL/L (ref 132–146)
TOTAL PROTEIN: 7.2 G/DL (ref 6.4–8.3)
WBC # BLD: 2.7 E9/L (ref 4.5–11.5)

## 2020-01-08 RX ORDER — BUMETANIDE 1 MG/1
1 TABLET ORAL DAILY
Qty: 60 TABLET | Refills: 2 | Status: SHIPPED
Start: 2020-01-08 | End: 2020-03-16 | Stop reason: SDUPTHER

## 2020-01-12 ENCOUNTER — HOSPITAL ENCOUNTER (OUTPATIENT)
Dept: GENERAL RADIOLOGY | Age: 75
Discharge: HOME | End: 2020-01-14
Payer: COMMERCIAL

## 2020-01-12 ENCOUNTER — HOSPITAL ENCOUNTER (OUTPATIENT)
Age: 75
Discharge: HOME | End: 2020-01-14
Payer: COMMERCIAL

## 2020-01-12 PROCEDURE — 72220 X-RAY EXAM SACRUM TAILBONE: CPT

## 2020-01-12 PROCEDURE — 71046 X-RAY EXAM CHEST 2 VIEWS: CPT

## 2020-01-14 ENCOUNTER — HOSPITAL ENCOUNTER (OUTPATIENT)
Age: 75
Discharge: HOME | End: 2020-01-16
Payer: COMMERCIAL

## 2020-01-14 ENCOUNTER — OFFICE VISIT (OUTPATIENT)
Dept: CARDIOLOGY CLINIC | Age: 75
End: 2020-01-14
Payer: COMMERCIAL

## 2020-01-14 VITALS
HEIGHT: 64 IN | SYSTOLIC BLOOD PRESSURE: 106 MMHG | HEART RATE: 74 BPM | RESPIRATION RATE: 16 BRPM | DIASTOLIC BLOOD PRESSURE: 60 MMHG | WEIGHT: 145 LBS | BODY MASS INDEX: 24.75 KG/M2

## 2020-01-14 LAB
ABO/RH: NORMAL
ANTIBODY SCREEN: NORMAL

## 2020-01-14 PROCEDURE — G8482 FLU IMMUNIZE ORDER/ADMIN: HCPCS | Performed by: INTERNAL MEDICINE

## 2020-01-14 PROCEDURE — 99214 OFFICE O/P EST MOD 30 MIN: CPT | Performed by: INTERNAL MEDICINE

## 2020-01-14 PROCEDURE — G8420 CALC BMI NORM PARAMETERS: HCPCS | Performed by: INTERNAL MEDICINE

## 2020-01-14 PROCEDURE — 86901 BLOOD TYPING SEROLOGIC RH(D): CPT

## 2020-01-14 PROCEDURE — G8427 DOCREV CUR MEDS BY ELIG CLIN: HCPCS | Performed by: INTERNAL MEDICINE

## 2020-01-14 PROCEDURE — 86850 RBC ANTIBODY SCREEN: CPT

## 2020-01-14 PROCEDURE — 93000 ELECTROCARDIOGRAM COMPLETE: CPT | Performed by: INTERNAL MEDICINE

## 2020-01-14 PROCEDURE — 3017F COLORECTAL CA SCREEN DOC REV: CPT | Performed by: INTERNAL MEDICINE

## 2020-01-14 PROCEDURE — 86923 COMPATIBILITY TEST ELECTRIC: CPT

## 2020-01-14 PROCEDURE — 1090F PRES/ABSN URINE INCON ASSESS: CPT | Performed by: INTERNAL MEDICINE

## 2020-01-14 PROCEDURE — 4040F PNEUMOC VAC/ADMIN/RCVD: CPT | Performed by: INTERNAL MEDICINE

## 2020-01-14 PROCEDURE — G8399 PT W/DXA RESULTS DOCUMENT: HCPCS | Performed by: INTERNAL MEDICINE

## 2020-01-14 PROCEDURE — 1036F TOBACCO NON-USER: CPT | Performed by: INTERNAL MEDICINE

## 2020-01-14 PROCEDURE — 1123F ACP DISCUSS/DSCN MKR DOCD: CPT | Performed by: INTERNAL MEDICINE

## 2020-01-14 PROCEDURE — 86900 BLOOD TYPING SEROLOGIC ABO: CPT

## 2020-01-14 RX ORDER — DIPHENHYDRAMINE HCL 25 MG
25 TABLET ORAL ONCE
Status: CANCELLED | OUTPATIENT
Start: 2020-01-14

## 2020-01-14 RX ORDER — METHYLPREDNISOLONE SODIUM SUCCINATE 125 MG/2ML
125 INJECTION, POWDER, LYOPHILIZED, FOR SOLUTION INTRAMUSCULAR; INTRAVENOUS ONCE
Status: CANCELLED | OUTPATIENT
Start: 2020-01-14

## 2020-01-14 RX ORDER — EPINEPHRINE 1 MG/ML
0.3 INJECTION, SOLUTION, CONCENTRATE INTRAVENOUS PRN
Status: CANCELLED | OUTPATIENT
Start: 2020-01-14

## 2020-01-14 RX ORDER — ISOSORBIDE DINITRATE 5 MG/1
5 TABLET ORAL 3 TIMES DAILY
Qty: 90 TABLET | Refills: 3 | Status: ON HOLD
Start: 2020-01-14 | End: 2020-11-04 | Stop reason: HOSPADM

## 2020-01-14 RX ORDER — DIPHENHYDRAMINE HYDROCHLORIDE 50 MG/ML
50 INJECTION INTRAMUSCULAR; INTRAVENOUS ONCE
Status: CANCELLED | OUTPATIENT
Start: 2020-01-14

## 2020-01-14 RX ORDER — SODIUM CHLORIDE 9 MG/ML
INJECTION, SOLUTION INTRAVENOUS CONTINUOUS
Status: CANCELLED | OUTPATIENT
Start: 2020-01-14

## 2020-01-14 RX ORDER — ACETAMINOPHEN 325 MG/1
650 TABLET ORAL ONCE
Status: CANCELLED | OUTPATIENT
Start: 2020-01-14

## 2020-01-14 RX ORDER — SODIUM CHLORIDE 0.9 % (FLUSH) 0.9 %
10 SYRINGE (ML) INJECTION PRN
Status: CANCELLED | OUTPATIENT
Start: 2020-01-14

## 2020-01-14 RX ORDER — FUROSEMIDE 10 MG/ML
20 INJECTION INTRAMUSCULAR; INTRAVENOUS ONCE
Status: CANCELLED | OUTPATIENT
Start: 2020-01-14

## 2020-01-14 RX ORDER — 0.9 % SODIUM CHLORIDE 0.9 %
250 INTRAVENOUS SOLUTION INTRAVENOUS ONCE
Status: CANCELLED | OUTPATIENT
Start: 2020-01-14

## 2020-01-14 RX ORDER — HEPARIN SODIUM (PORCINE) LOCK FLUSH IV SOLN 100 UNIT/ML 100 UNIT/ML
5 SOLUTION INTRAVENOUS PRN
Status: CANCELLED
Start: 2020-01-14

## 2020-01-14 NOTE — PROGRESS NOTES
orthopnea and paroxysmal nocturnal dyspnea. She recently had an echocardiogram which showed improved LV function and her EF improved from 35% to 50-55%. She is still having significant edema of the legs. She was recently hospitalized again on 8/8/2019 to 8/19/2019 with acute respiratory failure and altered mental status. She also had a hyperkalemia which was treated with Veltassa and had acute kidney injury which also improved. She was discharged to rehab facility and currently she is back home. She denies any further hospitalizations or ER visits since she was discharged from the rehab facility. She was seen in the office on 11/14/2019, since her last visit, she has not had any further hospitalizations or ER visits. He is compliant with medications, as well as salt and fluid intake. She does not take any over-the-counter arthritis medications. No new cardiac complaints since last cardiology evaluation. She denies recent chest pain,, palpitations, lightheadedness, dizziness, syncope, or PND, SR on EKG. Review of Systems:   Cardiac: As per HPI  General: No fever, chills  Pulmonary: As per HPI  HEENT: No visual disturbances, difficult swallowing  GI: No nausea, vomiting  Endocrine: No thyroid disease or DM  Musculoskeletal: BARRY x 4, no focal motor deficits  Skin: Intact, no rashes  Neuro/Psych: No headache or seizures    Past Medical History:  Past Medical History:   Diagnosis Date    Abscess of abdominal wall     Anemia     Iron deficiency and chronic disease.  Anesthesia     DIFFICULTY WAKING UP    Arthritis     Arthritis, hip     Breast cancer ([facility] 2005    S/P Left Lumpectomy with Lymph Node Dissection, Chemo/Rad. Follows with Dr. Cecilia Lock. No recurrence to date. Surgeon was Dr. Jose Diallo.  CAD (coronary artery disease) 5/2008    s/p CABG. Follows with [address] [place] [address].     CH[facility]congestive heart failure) ([facility])     Chronic venous insufficiency 11/7/2018    Class 2 severe obesity due to excess calories with serious comorbidity and body mass index (BMI) of 35.0 to 35.9 in adult [facility]) 8/1/2019    Decreased dorsalis pedis pulse 11/7/2018    Diabetic neuropathy (HCC)     Diabetic neuropathy (HCC)     DVT (deep venous thrombosis) (HCC)     Recurrent. On lifelong coumadin.  DVT of upper extremity (deep vein thrombosis) ([address] 4/18/2012    History of deep vein thrombosis 11/7/2018    Humerus fracture     Chronic, on the Left.  Hyperlipidemia 8/29/2011    Hypertension     Leg swelling 11/7/2018    Lymph node enlargement     Lymphedema of both lower extremities 11/7/2018    MI (myocardial infarction) ([address]     Nephrolithiasis     Follows with Dr. Iam Fischer.  Pericardial effusion     Pulmonary nodule     With mediastinal lymphadenopathy. Stable. Follows with Dr. Clarence Olivares.  Rib lesion 11/28/11    expansile lesion in one of the right ribs laterally    Sarcoidosis 07/26/11    per transbronchial needle aspiration    Subclavian vein occlusion, bilateral ([address] 4/18/2012    Type II or unspecified type diabetes mellitus without mention of complication, not stated as uncontrolled        Past Surgical History:  Past Surgical History:   Procedure Laterality Date    APPENDECTOMY      ARTERIAL BYPASS SURGRY      BREAST LUMPECTOMY  9/27/2005    LEFT    CARDIAC SURGERY      CHOLECYSTECTOMY      COLONOSCOPY  12/2007    cecal arteriovenous malformation with hyperplastic polyps    COLONOSCOPY  [id number]    CORONARY ARTERY BYPASS GRAFT  05/2008    triple bypass    ECHO COMPL W DOP COLOR FLOW  10/30/2013         EYE SURGERY      clarissa cataracts    HYSTERECTOMY  1975, 1985    MAYNOR prolapse, benign conditions; BSO later for scar tissues, no CA.      OTHER SURGICAL HISTORY  11/18/11    port removal right chest wall    PARACENTESIS      TONSILLECTOMY      TOOTH EXTRACTION      Full Dental Extraction.     TUNNELED VENOUS PORT PLACEMENT      removal of port Dec. 2011- Dr. Minh Dumont TUNNELED VENOUS PORT PLACEMENT  [id number]    RIGHT CHEST       Family History:  Family History   Adopted: Yes       Social History:  Social History     Socioeconomic History    Marital status: Legally      Spouse name: Not on file    Number of children: Not on file    Years of education: Not on file    Highest education level: Not on file   Occupational History    Not on file   Social Needs    Financial resource strain: Not on file    Food insecurity:     Worry: Not on file     Inability: Not on file    Transportation needs:     Medical: Not on file     Non-medical: Not on file   Tobacco Use    Smoking status: Never Smoker    Smokeless tobacco: Never Used   Substance and Sexual Activity    Alcohol use: No    Drug use: No    Sexual activity: Never   Lifestyle    Physical activity:     Days per week: Not on file     Minutes per session: Not on file    Stress: Not on file   Relationships    Social connections:     Talks on phone: Not on file     Gets together: Not on file     Attends [employer] service: Not on file     Active member of club or organization: Not on file     Attends meetings of clubs or organizations: Not on file     Relationship status: Not on file    Intimate partner violence:     Fear of current or ex partner: Not on file     Emotionally abused: Not on file     Physically abused: Not on file     Forced sexual activity: Not on file   Other Topics Concern    Not on file   Social History Narrative    Not on file       Allergies:   Allergies   Allergen Reactions    Macrobid [Nitrofurantoin Monohydrate Macrocrystals] Hives       Current Medications:  Current Outpatient Medications   Medication Sig Dispense Refill    bumetanide (BUMEX) 1 MG tablet Take 1 tablet by mouth daily 60 tablet 2    omeprazole 20 MG EC tablet Take 1 tablet by mouth daily 90 tablet 0    vitamin B-12 (CYANOCOBALAMIN) 1000 MCG tablet Take 1 tablet by mouth daily 90 tablet 3    Calcium Citrate-Vitamin D (RA CALCIUM CIT-VIT D-3 PETITES) 200-250 MG-UNIT TABS take 1 tablet by mouth twice a day 180 tablet 3    albuterol sulfate (PROAIR RESPICLICK) 089 (90 Base) MCG/ACT aerosol powder inhalation Inhale 2 puffs into the lungs every 6 hours as needed for Wheezing or Shortness of Breath 1 Inhaler 2    pregabalin (LYRICA) 50 MG capsule Take 1 capsule by mouth 2 times daily for 120 days. 60 capsule 3    magnesium oxide (MAG-OX) 400 (240 Mg) MG tablet Take 1 tablet by mouth daily 30 tablet 1    warfarin (COUMADIN) 1 MG tablet Please take 2 mg by mouth most days, but 3 mg by mouth on Mondays and Wednesdays and Friday. (Patient taking differently: Please take 2 mg by mouth most days, but 3 mg by mouth on sun, tues, thurs.) 180 tablet 0    Multiple Vitamins-Minerals (THERAPEUTIC MULTIVITAMIN-MINERALS) tablet Take 1 tablet by mouth daily 90 tablet 3    insulin glargine (LANTUS SOLOSTAR) 100 UNIT/ML injection pen Inject 13 Units into the skin nightly 5 pen 3    Insulin Pen Needle (MEIJER PEN NEEDLES) 29G X 12MM MISC 1 each by Does not apply route daily 100 each 3    nitroGLYCERIN (NITROSTAT) 0.4 MG SL tablet up to max of 3 total doses.  If no relief after 1 dose, call 911. 25 tablet 3    isosorbide dinitrate (ISORDIL) 5 MG tablet Take 1 tablet by mouth 3 times daily 90 tablet 3    metoprolol succinate (TOPROL XL) 25 MG extended release tablet Take 0.5 tablets by mouth daily 30 tablet 3    sodium chloride (OCEAN, BABY AYR) 0.65 % nasal spray 1 spray by Nasal route as needed for Congestion (dryness) 60 mL 0    palbociclib (IBRANCE) 100 MG capsule Take 100 mg by mouth 3 weeks on and 1 week off also receives injections every 3 weeks per Dr Sanchez Boehringer      docusate sodium (COLACE, DULCOLAX) 100 MG CAPS Take 100 mg by mouth daily 30 capsule 3     Current Facility-Administered Medications   Medication Dose Route Frequency Provider Last Rate Last Dose    perflutren lipid microspheres (DEFINITY) injection 1.65 mg  1.5 mL Intravenous ONCE PRN Sahadev T Tiago Arguello MD           Physical Exam:  /60   Pulse 74   Resp 16   Ht 5' 4\" (1.626 m)   Wt 145 lb (65.8 kg)   BMI 24.89 kg/m²   Wt Readings from Last 3 Encounters:   01/14/20 145 lb (65.8 kg)   01/07/20 144 lb (65.3 kg)   11/25/19 150 lb (68 kg)     Appearance: Awake, alert and oriented x 3, no acute respiratory distress. Mucosa appears pale. Skin: Intact, no rash  Head: Normocephalic, atraumatic  Eyes: EOMI, no conjunctival erythema  ENMT: No pharyngeal erythema, MMM, no rhinorrhea  Neck: Supple, no elevated JVP, no carotid bruits  Lungs: breath sounds are diminished over the bases, Left >right with few rales.    Cardiac: Regular rate and rhythm, +S1S2, no murmurs apparent  Abdomen: Soft, nontender, +bowel sounds  Extremities: Moves all extremities x 4, 2+lower extremity edema  Neurologic: No focal motor deficits apparent, normal mood and affect, alert and oriented x 3  Peripheral Pulses: Intact posterior tibial pulses bilaterally    Laboratory Tests:  Lab Results   Component Value Date    CREATININE 1.8 (H) 01/07/2020    BUN 38 (H) 01/07/2020     01/07/2020    K 4.7 01/07/2020    CL 99 01/07/2020    CO2 22 01/07/2020     Lab Results   Component Value Date    MG 1.5 11/25/2019     Lab Results   Component Value Date    WBC 2.7 (L) 01/07/2020    HGB 8.6 (L) 01/07/2020    HCT 28.4 (L) 01/07/2020    .5 (H) 01/07/2020     01/07/2020     Lab Results   Component Value Date    ALT 28 01/07/2020    AST 46 (H) 01/07/2020    ALKPHOS 304 (H) 01/07/2020    BILITOT 0.6 01/07/2020     Lab Results   Component Value Date    CKTOTAL 102 08/11/2019    CKMB 1.9 08/11/2019    TROPONINI 0.25 (H) 08/19/2019    TROPONINI 0.27 (H) 08/19/2019    TROPONINI 0.24 (H) 08/16/2019     Lab Results   Component Value Date    INR 3.4 01/07/2020    INR 1.5 12/12/2019    INR 2.3 11/25/2019    PROTIME 41.2 01/07/2020    PROTIME 17.0 (H) 12/12/2019    PROTIME 27.2 11/25/2019     Lab Results   Component Value Date    TSH 6.930 (H) 06/10/2019     Lab Results   Component Value Date    LABA1C 5.9 (H) 07/21/2019     No results found for: EAG  Lab Results   Component Value Date    CHOL 80 06/10/2019    CHOL 91 11/30/2018    CHOL 99 05/16/2018     Lab Results   Component Value Date    TRIG 104 06/10/2019    TRIG 84 11/30/2018    TRIG 117 05/16/2018     Lab Results   Component Value Date    HDL 31 06/10/2019    HDL 37 11/30/2018    HDL 32 05/16/2018     Lab Results   Component Value Date    LDLCALC 28 06/10/2019    LDLCALC 37 11/30/2018    LDLCALC 44 05/16/2018     Lab Results   Component Value Date    LABVLDL 21 06/10/2019    LABVLDL 17 11/30/2018    LABVLDL 23 05/16/2018     No results found for: CHOLHDLRATIO    Cardiac Tests:  ECG: NSR, RBBB, nonspecific T wave changes. Abnormal EKG. Echocardiogram:   6/10/2019:  Ejection fraction is visually estimated at 50%, Mild mitral regurgitation (AV mean gradient 3.8 mmHg), Mild aortic regurgitation, Moderate tricuspid regurgitation (PASP 38 mmHg). Indeterminate DD, Dilated right ventricle with normal right ventricular function (TAPSE 1.9 cm). Left ventricle normal in size. Dilated right atrium. TTE-7/20/2019: EF 30 to 35%, severe global hypokinesis, mild concentric LVH, abnormal diastolic function, mildly reduced RV systolic function, moderate MAC, moderate left pleural effusion. TTE-1/3/2020: LVEF 50-55%, inferolateral wall is hypokinetic, global longitudinal strain rate -16%, RV global systolic function is normal, RVSP 30, her LV function has improved from 30-30 5-50-55%. Stress test:        Cardiac catheterization:   5/16/08, normal LV contractility, 50-55% proximal circumflex; long, diffuse disease in mid portion of the last OM, 85-90%; 95% proximal/ostial right coronary artery; 90% ostial lesion first acute marginal branch.      Status post bypass surgery with Dr. Angela Healy, 5/19/08. Clevester Jumper graft to first marginal branch, vein graft to distal right coronary artery, vein graft to acute

## 2020-01-15 ENCOUNTER — TELEPHONE (OUTPATIENT)
Dept: FAMILY MEDICINE CLINIC | Age: 75
End: 2020-01-15

## 2020-01-16 NOTE — TELEPHONE ENCOUNTER
I phoned [employer] [phone]  They are going to call back on the status of the pt/inr kit order.   Called on 01/16/20

## 2020-01-17 ENCOUNTER — HOSPITAL ENCOUNTER (OUTPATIENT)
Dept: INFUSION THERAPY | Age: 75
Discharge: HOME | End: 2020-01-17
Payer: COMMERCIAL

## 2020-01-17 VITALS
DIASTOLIC BLOOD PRESSURE: 63 MMHG | SYSTOLIC BLOOD PRESSURE: 139 MMHG | TEMPERATURE: 97.4 F | OXYGEN SATURATION: 98 % | RESPIRATION RATE: 18 BRPM | HEART RATE: 72 BPM

## 2020-01-17 DIAGNOSIS — C50.919 METASTATIC BREAST CANCER (HCC): Primary | ICD-10-CM

## 2020-01-17 LAB
BLOOD BANK DISPENSE STATUS: NORMAL
BLOOD BANK PRODUCT CODE: NORMAL
BPU ID: NORMAL
DESCRIPTION BLOOD BANK: NORMAL

## 2020-01-17 PROCEDURE — 6360000002 HC RX W HCPCS: Performed by: INTERNAL MEDICINE

## 2020-01-17 PROCEDURE — 96374 THER/PROPH/DIAG INJ IV PUSH: CPT

## 2020-01-17 PROCEDURE — 36430 TRANSFUSION BLD/BLD COMPNT: CPT

## 2020-01-17 PROCEDURE — P9016 RBC LEUKOCYTES REDUCED: HCPCS

## 2020-01-17 PROCEDURE — 2580000003 HC RX 258: Performed by: INTERNAL MEDICINE

## 2020-01-17 PROCEDURE — 6370000000 HC RX 637 (ALT 250 FOR IP): Performed by: INTERNAL MEDICINE

## 2020-01-17 RX ORDER — FUROSEMIDE 10 MG/ML
20 INJECTION INTRAMUSCULAR; INTRAVENOUS ONCE
Status: COMPLETED | OUTPATIENT
Start: 2020-01-17 | End: 2020-01-17

## 2020-01-17 RX ORDER — SODIUM CHLORIDE 0.9 % (FLUSH) 0.9 %
10 SYRINGE (ML) INJECTION PRN
Status: CANCELLED | OUTPATIENT
Start: 2020-01-17

## 2020-01-17 RX ORDER — DIPHENHYDRAMINE HCL 25 MG
25 TABLET ORAL ONCE
Status: CANCELLED | OUTPATIENT
Start: 2020-01-17

## 2020-01-17 RX ORDER — HEPARIN SODIUM (PORCINE) LOCK FLUSH IV SOLN 100 UNIT/ML 100 UNIT/ML
5 SOLUTION INTRAVENOUS PRN
Status: CANCELLED
Start: 2020-01-17

## 2020-01-17 RX ORDER — 0.9 % SODIUM CHLORIDE 0.9 %
250 INTRAVENOUS SOLUTION INTRAVENOUS ONCE
Status: COMPLETED | OUTPATIENT
Start: 2020-01-17 | End: 2020-01-17

## 2020-01-17 RX ORDER — FUROSEMIDE 10 MG/ML
20 INJECTION INTRAMUSCULAR; INTRAVENOUS ONCE
Status: CANCELLED | OUTPATIENT
Start: 2020-01-17

## 2020-01-17 RX ORDER — SODIUM CHLORIDE 9 MG/ML
INJECTION, SOLUTION INTRAVENOUS CONTINUOUS
Status: CANCELLED | OUTPATIENT
Start: 2020-01-17

## 2020-01-17 RX ORDER — ACETAMINOPHEN 325 MG/1
650 TABLET ORAL ONCE
Status: CANCELLED | OUTPATIENT
Start: 2020-01-17

## 2020-01-17 RX ORDER — EPINEPHRINE 1 MG/ML
0.3 INJECTION, SOLUTION, CONCENTRATE INTRAVENOUS PRN
Status: CANCELLED | OUTPATIENT
Start: 2020-01-17

## 2020-01-17 RX ORDER — DIPHENHYDRAMINE HYDROCHLORIDE 50 MG/ML
50 INJECTION INTRAMUSCULAR; INTRAVENOUS ONCE
Status: CANCELLED | OUTPATIENT
Start: 2020-01-17

## 2020-01-17 RX ORDER — 0.9 % SODIUM CHLORIDE 0.9 %
250 INTRAVENOUS SOLUTION INTRAVENOUS ONCE
Status: CANCELLED | OUTPATIENT
Start: 2020-01-17

## 2020-01-17 RX ORDER — SODIUM CHLORIDE 0.9 % (FLUSH) 0.9 %
10 SYRINGE (ML) INJECTION PRN
Status: DISCONTINUED | OUTPATIENT
Start: 2020-01-17 | End: 2020-01-18 | Stop reason: HOSPADM

## 2020-01-17 RX ORDER — HEPARIN SODIUM (PORCINE) LOCK FLUSH IV SOLN 100 UNIT/ML 100 UNIT/ML
5 SOLUTION INTRAVENOUS PRN
Status: DISCONTINUED | OUTPATIENT
Start: 2020-01-17 | End: 2020-01-18 | Stop reason: HOSPADM

## 2020-01-17 RX ORDER — METHYLPREDNISOLONE SODIUM SUCCINATE 125 MG/2ML
125 INJECTION, POWDER, LYOPHILIZED, FOR SOLUTION INTRAMUSCULAR; INTRAVENOUS ONCE
Status: CANCELLED | OUTPATIENT
Start: 2020-01-17

## 2020-01-17 RX ORDER — ACETAMINOPHEN 325 MG/1
650 TABLET ORAL ONCE
Status: COMPLETED | OUTPATIENT
Start: 2020-01-17 | End: 2020-01-17

## 2020-01-17 RX ORDER — DIPHENHYDRAMINE HCL 25 MG
25 TABLET ORAL ONCE
Status: COMPLETED | OUTPATIENT
Start: 2020-01-17 | End: 2020-01-17

## 2020-01-17 RX ADMIN — FUROSEMIDE 20 MG: 10 INJECTION, SOLUTION INTRAMUSCULAR; INTRAVENOUS at 10:59

## 2020-01-17 RX ADMIN — SODIUM CHLORIDE, PRESERVATIVE FREE 20 ML: 5 INJECTION INTRAVENOUS at 13:34

## 2020-01-17 RX ADMIN — ACETAMINOPHEN 650 MG: 325 TABLET, FILM COATED ORAL at 08:38

## 2020-01-17 RX ADMIN — HEPARIN 500 UNITS: 100 SYRINGE at 13:34

## 2020-01-17 RX ADMIN — SODIUM CHLORIDE 250 ML: 9 INJECTION, SOLUTION INTRAVENOUS at 08:38

## 2020-01-17 RX ADMIN — SODIUM CHLORIDE, PRESERVATIVE FREE 10 ML: 5 INJECTION INTRAVENOUS at 10:59

## 2020-01-17 RX ADMIN — DIPHENHYDRAMINE HCL 25 MG: 25 TABLET ORAL at 08:38

## 2020-01-17 ASSESSMENT — PAIN SCALES - GENERAL: PAINLEVEL_OUTOF10: 0

## 2020-02-04 ENCOUNTER — ANTI-COAG VISIT (OUTPATIENT)
Dept: FAMILY MEDICINE CLINIC | Age: 75
End: 2020-02-04

## 2020-02-04 ENCOUNTER — HOSPITAL ENCOUNTER (OUTPATIENT)
Age: 75
Discharge: HOME | End: 2020-02-06
Payer: COMMERCIAL

## 2020-02-04 ENCOUNTER — OFFICE VISIT (OUTPATIENT)
Dept: FAMILY MEDICINE CLINIC | Age: 75
End: 2020-02-04
Payer: COMMERCIAL

## 2020-02-04 VITALS
SYSTOLIC BLOOD PRESSURE: 132 MMHG | RESPIRATION RATE: 16 BRPM | WEIGHT: 150 LBS | OXYGEN SATURATION: 99 % | HEIGHT: 64 IN | HEART RATE: 83 BPM | TEMPERATURE: 98.3 F | BODY MASS INDEX: 25.61 KG/M2 | DIASTOLIC BLOOD PRESSURE: 71 MMHG

## 2020-02-04 LAB
ALBUMIN SERPL-MCNC: 3.4 G/DL (ref 3.5–5.2)
ALP BLD-CCNC: 261 U/L (ref 35–104)
ALT SERPL-CCNC: 26 U/L (ref 0–32)
ANION GAP SERPL CALCULATED.3IONS-SCNC: 15 MMOL/L (ref 7–16)
AST SERPL-CCNC: 36 U/L (ref 0–31)
BILIRUB SERPL-MCNC: 0.8 MG/DL (ref 0–1.2)
BUN BLDV-MCNC: 27 MG/DL (ref 8–23)
CALCIUM SERPL-MCNC: 9.1 MG/DL (ref 8.6–10.2)
CHLORIDE BLD-SCNC: 102 MMOL/L (ref 98–107)
CO2: 23 MMOL/L (ref 22–29)
CREAT SERPL-MCNC: 1.7 MG/DL (ref 0.5–1)
GFR AFRICAN AMERICAN: 36
GFR NON-AFRICAN AMERICAN: 29 ML/MIN/1.73
GLUCOSE BLD-MCNC: 150 MG/DL (ref 74–99)
HBA1C MFR BLD: 7.2 % (ref 4–5.6)
HCT VFR BLD CALC: 30.2 % (ref 34–48)
HEMOGLOBIN: 9.3 G/DL (ref 11.5–15.5)
INTERNATIONAL NORMALIZATION RATIO, POC: 2.8
MCH RBC QN AUTO: 32.9 PG (ref 26–35)
MCHC RBC AUTO-ENTMCNC: 30.8 % (ref 32–34.5)
MCV RBC AUTO: 106.7 FL (ref 80–99.9)
PDW BLD-RTO: 20.6 FL (ref 11.5–15)
PLATELET # BLD: 105 E9/L (ref 130–450)
PMV BLD AUTO: 11.3 FL (ref 7–12)
POTASSIUM SERPL-SCNC: 4.7 MMOL/L (ref 3.5–5)
PROTHROMBIN TIME, POC: 33.7
RBC # BLD: 2.83 E12/L (ref 3.5–5.5)
SODIUM BLD-SCNC: 140 MMOL/L (ref 132–146)
TOTAL PROTEIN: 7.9 G/DL (ref 6.4–8.3)
WBC # BLD: 1.8 E9/L (ref 4.5–11.5)

## 2020-02-04 PROCEDURE — 2022F DILAT RTA XM EVC RTNOPTHY: CPT | Performed by: FAMILY MEDICINE

## 2020-02-04 PROCEDURE — 99212 OFFICE O/P EST SF 10 MIN: CPT | Performed by: FAMILY MEDICINE

## 2020-02-04 PROCEDURE — 85027 COMPLETE CBC AUTOMATED: CPT

## 2020-02-04 PROCEDURE — 1036F TOBACCO NON-USER: CPT | Performed by: FAMILY MEDICINE

## 2020-02-04 PROCEDURE — G8399 PT W/DXA RESULTS DOCUMENT: HCPCS | Performed by: FAMILY MEDICINE

## 2020-02-04 PROCEDURE — 36415 COLL VENOUS BLD VENIPUNCTURE: CPT | Performed by: FAMILY MEDICINE

## 2020-02-04 PROCEDURE — 80053 COMPREHEN METABOLIC PANEL: CPT

## 2020-02-04 PROCEDURE — G8417 CALC BMI ABV UP PARAM F/U: HCPCS | Performed by: FAMILY MEDICINE

## 2020-02-04 PROCEDURE — 4040F PNEUMOC VAC/ADMIN/RCVD: CPT | Performed by: FAMILY MEDICINE

## 2020-02-04 PROCEDURE — 3046F HEMOGLOBIN A1C LEVEL >9.0%: CPT | Performed by: FAMILY MEDICINE

## 2020-02-04 PROCEDURE — 1090F PRES/ABSN URINE INCON ASSESS: CPT | Performed by: FAMILY MEDICINE

## 2020-02-04 PROCEDURE — G8482 FLU IMMUNIZE ORDER/ADMIN: HCPCS | Performed by: FAMILY MEDICINE

## 2020-02-04 PROCEDURE — 99213 OFFICE O/P EST LOW 20 MIN: CPT | Performed by: FAMILY MEDICINE

## 2020-02-04 PROCEDURE — 1123F ACP DISCUSS/DSCN MKR DOCD: CPT | Performed by: FAMILY MEDICINE

## 2020-02-04 PROCEDURE — G8427 DOCREV CUR MEDS BY ELIG CLIN: HCPCS | Performed by: FAMILY MEDICINE

## 2020-02-04 PROCEDURE — 3017F COLORECTAL CA SCREEN DOC REV: CPT | Performed by: FAMILY MEDICINE

## 2020-02-04 PROCEDURE — 83036 HEMOGLOBIN GLYCOSYLATED A1C: CPT

## 2020-02-04 RX ORDER — LANOLIN ALCOHOL/MO/W.PET/CERES
400 CREAM (GRAM) TOPICAL DAILY
Qty: 90 TABLET | Refills: 1 | Status: ON HOLD
Start: 2020-02-04 | End: 2020-07-15 | Stop reason: HOSPADM

## 2020-02-04 RX ORDER — PSEUDOEPHEDRINE HCL 30 MG
100 TABLET ORAL DAILY
Qty: 90 CAPSULE | Refills: 3 | Status: SHIPPED | OUTPATIENT
Start: 2020-02-04

## 2020-02-04 RX ORDER — PREGABALIN 50 MG/1
50 CAPSULE ORAL 3 TIMES DAILY
Qty: 90 CAPSULE | Refills: 3 | Status: SHIPPED | OUTPATIENT
Start: 2020-02-04 | End: 2020-10-22

## 2020-02-04 NOTE — PROGRESS NOTES
CC:  Follow up DM, chronic DVT    HPI:  76 y.o. female presents for follow up. DVT. INR 2.8. 2 mg most days, M,W,F,S; otherwise 3 mg. No bleeding or bruising. Edema has been worse bilaterally. Now has weeping from bilateral legs anteriorly after blister was opened by her podiatrist.  Has been referred to [facility] for lymphedema therapy. Taking Bumex once daily. Had been to podiatry for ingrown toenails. Had nails cut. DM. Sugars are running 120-140 in daytime. In the evenings, up to about 200. Willing to take some insulin, but the long needles were too long. Wants to take higher dose of Pregabalin again. Neuropathy pain has been worse. Check Cr first.  Check A1C.        Patient Active Problem List    Diagnosis Date Noted    Pleural effusion 09/17/2019    Precordial pain     Chronic systolic heart failure (HCC)     Moderate protein-calorie malnutrition ([address] 08/15/2019    Metastatic disease ([address]     Palliative care by specialist     Altered mental status     Goals of care, counseling/discussion     Acute hypercapnic respiratory failure ([address] 08/96/2493    Acute systolic heart failure ([address] 08/01/2019    Nonischemic cardiomyopathy ([address] 08/01/2019    Status post coronary artery bypass graft 08/01/2019    Class 2 severe obesity due to excess calories with serious comorbidity and body mass index (BMI) of 35.0 to 35.9 in adult [facility]) 08/01/2019    Type 2 diabetes mellitus with hyperglycemia ([address] 08/01/2019    HAP (hospital-acquired pneumonia) 07/21/2019    Acute respiratory failure with hypoxia ([address] 07/21/2019    Palliative care encounter     Cancer associated pain     Ascites 06/18/2019    Charcot's joint of foot in type 2 diabetes mellitus ([address] 04/17/2019    CKD (chronic kidney disease) stage 3, GFR 30-59 ml/min ([address] 04/16/2019    Ambulatory dysfunction 04/15/2019    Leg swelling 11/07/2018    History of deep vein thrombosis 11/07/2018    Chronic venous insufficiency Citrate-Vitamin D (RA CALCIUM CIT-VIT D-3 PETITES) 200-250 MG-UNIT TABS take 1 tablet by mouth twice a day 180 tablet 3    albuterol sulfate (PROAIR RESPICLICK) 094 (90 Base) MCG/ACT aerosol powder inhalation Inhale 2 puffs into the lungs every 6 hours as needed for Wheezing or Shortness of Breath 1 Inhaler 2    pregabalin (LYRICA) 50 MG capsule Take 1 capsule by mouth 2 times daily for 120 days. 60 capsule 3    magnesium oxide (MAG-OX) 400 (240 Mg) MG tablet Take 1 tablet by mouth daily 30 tablet 1    warfarin (COUMADIN) 1 MG tablet Please take 2 mg by mouth most days, but 3 mg by mouth on Mondays and Wednesdays and Friday. (Patient taking differently: Please take 2 mg by mouth most days, but 3 mg by mouth on sun, tues, thurs.) 180 tablet 0    Multiple Vitamins-Minerals (THERAPEUTIC MULTIVITAMIN-MINERALS) tablet Take 1 tablet by mouth daily 90 tablet 3    insulin glargine (LANTUS SOLOSTAR) 100 UNIT/ML injection pen Inject 13 Units into the skin nightly 5 pen 3    Insulin Pen Needle (MEIJER PEN NEEDLES) 29G X 12MM MISC 1 each by Does not apply route daily 100 each 3    nitroGLYCERIN (NITROSTAT) 0.4 MG SL tablet up to max of 3 total doses. If no relief after 1 dose, call 911. 25 tablet 3    metoprolol succinate (TOPROL XL) 25 MG extended release tablet Take 0.5 tablets by mouth daily 30 tablet 3    sodium chloride (OCEAN, BABY AYR) 0.65 % nasal spray 1 spray by Nasal route as needed for Congestion (dryness) 60 mL 0    palbociclib (IBRANCE) 100 MG capsule Take 100 mg by mouth 3 weeks on and 1 week off also receives injections every 3 weeks per Dr Ramos Luclaudia      docusate sodium (COLACE, DULCOLAX) 100 MG CAPS Take 100 mg by mouth daily 30 capsule 3    PT/INR Testing Monitor KIT 1 each by Does not apply route once a week Please use to test INR as directed by physician (Patient not taking: Reported on 2/4/2020) 1 kit 1     No current facility-administered medications on file prior to visit.         Allergies Allergen Reactions    Macrobid [Nitrofurantoin Monohydrate Macrocrystals] Hives       Social History     Tobacco Use    Smoking status: Never Smoker    Smokeless tobacco: Never Used   Substance Use Topics    Alcohol use: No    Drug use: No       ROS:   Review of Systems - as above      Physical Exam:    VS:  Blood pressure 132/71, pulse 83, temperature 98.3 °F (36.8 °C), temperature source Oral, resp. rate 16, height 5' 4\" (1.626 m), weight 150 lb (68 kg), SpO2 99 %, not currently breastfeeding. General Appearance:  awake, alert, oriented, in no acute distress; appears somewhat pale. Skin:  Bilateral LE with significant stasis dermatitis. Anterior shallow ulcerations with drainage of serous fluid. No erythema, no purulence. Head/face:  NCAT  Eyes:  EOMI and Sclera nonicteric  Lungs:  Decreased but CTA  Heart:  Heart regular rate and rhythm   Abdomen:  Mildly tender diffusely   Extremities: 3+ edema bilaterally, as above     Most recent labs and imaging reviewed. Findings include:     Lab Results   Component Value Date    INR 2.8 02/04/2020    INR 3.4 01/07/2020    INR 1.5 12/12/2019    PROTIME 33.7 02/04/2020    PROTIME 41.2 01/07/2020    PROTIME 17.0 (H) 12/12/2019       Assessments:      Diagnosis Orders   1. Diabetic polyneuropathy associated with type 2 diabetes mellitus (HCC)  pregabalin (LYRICA) 50 MG capsule    CBC    Comprehensive Metabolic Panel    Hemoglobin A1C   2. Chronic deep vein thrombosis (DVT) of proximal vein of right lower extremity (HCC)  POCT INR   3. Lymphedema of both lower extremities  External Referral To Home Health   4. Venous ulcers of both lower extremities [facility])  External Referral To Home Health       Plans:    As Above. Please see Patient Instructions for further counseling and information given. RTO 4 weeks or sooner prn. Same coumadin dose, recheck INR two weeks. Working for home INR machine. Follow with [facility] for lymphedema.   Follow with podiatry

## 2020-03-10 ENCOUNTER — ANTI-COAG VISIT (OUTPATIENT)
Dept: FAMILY MEDICINE CLINIC | Age: 75
End: 2020-03-10

## 2020-03-10 ENCOUNTER — OFFICE VISIT (OUTPATIENT)
Dept: FAMILY MEDICINE CLINIC | Age: 75
End: 2020-03-10
Payer: COMMERCIAL

## 2020-03-10 VITALS
DIASTOLIC BLOOD PRESSURE: 60 MMHG | TEMPERATURE: 98.1 F | HEART RATE: 87 BPM | WEIGHT: 151 LBS | SYSTOLIC BLOOD PRESSURE: 138 MMHG | BODY MASS INDEX: 25.78 KG/M2 | OXYGEN SATURATION: 99 % | HEIGHT: 64 IN

## 2020-03-10 LAB
INTERNATIONAL NORMALIZATION RATIO, POC: 1.5
PROTHROMBIN TIME, POC: 17.7

## 2020-03-10 PROCEDURE — G8399 PT W/DXA RESULTS DOCUMENT: HCPCS | Performed by: FAMILY MEDICINE

## 2020-03-10 PROCEDURE — 1090F PRES/ABSN URINE INCON ASSESS: CPT | Performed by: FAMILY MEDICINE

## 2020-03-10 PROCEDURE — G8417 CALC BMI ABV UP PARAM F/U: HCPCS | Performed by: FAMILY MEDICINE

## 2020-03-10 PROCEDURE — 99212 OFFICE O/P EST SF 10 MIN: CPT | Performed by: FAMILY MEDICINE

## 2020-03-10 PROCEDURE — 2022F DILAT RTA XM EVC RTNOPTHY: CPT | Performed by: FAMILY MEDICINE

## 2020-03-10 PROCEDURE — 1036F TOBACCO NON-USER: CPT | Performed by: FAMILY MEDICINE

## 2020-03-10 PROCEDURE — 1123F ACP DISCUSS/DSCN MKR DOCD: CPT | Performed by: FAMILY MEDICINE

## 2020-03-10 PROCEDURE — G8427 DOCREV CUR MEDS BY ELIG CLIN: HCPCS | Performed by: FAMILY MEDICINE

## 2020-03-10 PROCEDURE — 99214 OFFICE O/P EST MOD 30 MIN: CPT | Performed by: FAMILY MEDICINE

## 2020-03-10 PROCEDURE — 3017F COLORECTAL CA SCREEN DOC REV: CPT | Performed by: FAMILY MEDICINE

## 2020-03-10 PROCEDURE — G8482 FLU IMMUNIZE ORDER/ADMIN: HCPCS | Performed by: FAMILY MEDICINE

## 2020-03-10 PROCEDURE — 3051F HG A1C>EQUAL 7.0%<8.0%: CPT | Performed by: FAMILY MEDICINE

## 2020-03-10 PROCEDURE — 4040F PNEUMOC VAC/ADMIN/RCVD: CPT | Performed by: FAMILY MEDICINE

## 2020-03-10 NOTE — PROGRESS NOTES
 Metastatic breast cancer ([facility] [address]     Essential hypertension     Coronary artery disease involving native coronary artery of native heart without angina pectoris     Nephrolithiasis     CHF (congestive heart failure) (HCC)     Humerus fracture        Current Outpatient Medications on File Prior to Visit   Medication Sig Dispense Refill    pregabalin (LYRICA) 50 MG capsule Take 1 capsule by mouth 3 times daily for 120 days. 90 capsule 3    magnesium oxide (MAG-OX) 400 (240 Mg) MG tablet Take 1 tablet by mouth daily 90 tablet 1    docusate (COLACE, DULCOLAX) 100 MG CAPS Take 100 mg by mouth daily 90 capsule 3    PT/INR Testing Monitor KIT 1 each by Does not apply route once a week Please use to test INR as directed by physician 1 kit 1    isosorbide dinitrate (ISORDIL) 5 MG tablet Take 1 tablet by mouth 3 times daily 90 tablet 3    bumetanide (BUMEX) 1 MG tablet Take 1 tablet by mouth daily 60 tablet 2    omeprazole 20 MG EC tablet Take 1 tablet by mouth daily 90 tablet 0    vitamin B-12 (CYANOCOBALAMIN) 1000 MCG tablet Take 1 tablet by mouth daily 90 tablet 3    Calcium Citrate-Vitamin D (RA CALCIUM CIT-VIT D-3 PETITES) 200-250 MG-UNIT TABS take 1 tablet by mouth twice a day 180 tablet 3    albuterol sulfate (PROAIR RESPICLICK) 545 (90 Base) MCG/ACT aerosol powder inhalation Inhale 2 puffs into the lungs every 6 hours as needed for Wheezing or Shortness of Breath 1 Inhaler 2    warfarin (COUMADIN) 1 MG tablet Please take 2 mg by mouth most days, but 3 mg by mouth on Mondays and Wednesdays and Friday.  (Patient taking differently: Please take 2 mg by mouth most days, but 3 mg by mouth on sun, tues, thurs.) 180 tablet 0    Multiple Vitamins-Minerals (THERAPEUTIC MULTIVITAMIN-MINERALS) tablet Take 1 tablet by mouth daily 90 tablet 3    insulin glargine (LANTUS SOLOSTAR) 100 UNIT/ML injection pen Inject 13 Units into the skin nightly 5 pen 3    Insulin Pen Needle (MEIJER PEN NEEDLES) 29G X 12MM MISC 1 each by Does not apply route daily 100 each 3    nitroGLYCERIN (NITROSTAT) 0.4 MG SL tablet up to max of 3 total doses. If no relief after 1 dose, call 911. 25 tablet 3    metoprolol succinate (TOPROL XL) 25 MG extended release tablet Take 0.5 tablets by mouth daily 30 tablet 3    sodium chloride (OCEAN, BABY AYR) 0.65 % nasal spray 1 spray by Nasal route as needed for Congestion (dryness) 60 mL 0    palbociclib (IBRANCE) 100 MG capsule Take 100 mg by mouth 3 weeks on and 1 week off also receives injections every 3 weeks per Dr Anat Flores       No current facility-administered medications on file prior to visit. Allergies   Allergen Reactions    Macrobid [Nitrofurantoin Monohydrate Macrocrystals] Hives       Social History     Tobacco Use    Smoking status: Never Smoker    Smokeless tobacco: Never Used   Substance Use Topics    Alcohol use: No    Drug use: No       ROS:   Review of Systems - as above    No F/C   No CP or SOB   No changes in bowel habits   No urinary symptoms    Physical Exam:    VS:  Blood pressure (!) 158/66, pulse 87, temperature 98.1 °F (36.7 °C), temperature source Oral, height 5' 4\" (1.626 m), weight 151 lb (68.5 kg), SpO2 99 %, not currently breastfeeding. Recheck BP:  /60   Pulse 87   Temp 98.1 °F (36.7 °C) (Oral)   Ht 5' 4\" (1.626 m)   Wt 151 lb (68.5 kg)   SpO2 99%   BMI 25.92 kg/m²     General Appearance:  Appears frail, general pallor. NAD. Head/face:  NCAT  Eyes:  EOMI and Sclera nonicteric  Neck:  neck- supple, no mass, non-tender  Back:  no pain to palpation. Cystic soft tissue swelling diffusely, small areas of 1-2 cm, without overlying skin changes. No significant tenderness. Lateral aspect with skin intact but some diffuse induration and areas of erythema without warmth or tenderness, chronic friction/pressure sores suspected.     Lungs:  Decreased, coarse, but clear   Heart:  Heart regular rate and rhythm  Murmur(s)-  3/6 systolic at 2nd left intercostal space, at 2nd right intercostal space, at lower left sternal border  Abdomen:  Soft, NT   Extremities: Bilateral LE with 3+ edema, stockings in place     Most recent labs and imaging reviewed. Findings include:     Results for POC orders placed in visit on 03/10/20   POCT INR   Result Value Ref Range    INR 1.5     Protime 17.7        Assessments:      Diagnosis Orders   1. Left hip pain  XR HIP LEFT (2-3 VIEWS)    XR LUMBAR SPINE (MIN 4 VIEWS)   2. Soft tissue swelling  US SOFT TISSUE LIMITED AREA   3. Type 2 diabetes mellitus without complication, with long-term current use of insulin (HCC)  CBC    Comprehensive Metabolic Panel   4. Chronic deep vein thrombosis (DVT) of proximal vein of right lower extremity (HCC)  POCT INR    PROTIME-INR   5. Leg swelling     6. Metastatic breast cancer ([facility] [address]         Plans:    As Above. Please see Patient Instructions for further counseling and information given. RTO 4 weeks or sooner prn. Labs, INR, and imaging in one week. New coumadin dose: 3 mg on T, Th, Sat, Sun; 2 mg on M, W, F. Advised in person. Recheck labs and INR in one week. Given paper instructions as well. Advised to call for US and to present for labs and XR. Advised to please check fasting glucose and call with readings occasionally. She agrees. Has insulin at home, and we could consider this if need be for glucose control. Try to eat potatoes/carbohydrates in moderation. Advised avoidance of pressure to areas on left hip. Change position frequently. Call with any new/worsening symptoms, new pain, or fevers/chills. Currently without evidence of infection or inflammation, but please call if symptoms change. Will obtain X-rays and US as above. Advised to please be adherent to the treatment plans discussed today, and please call with any questions or concerns, letting the office know of any reasons that the plans may not be followed.   The risks of untreated conditions include worsening illness, injury, disability, and possibly, death. Please call if symptoms change in any way, worsen, or fail to completely resolve, as this could necessitate a change to treatment plans. Patient and/or caregiver expressed understanding. Indications and proper use of medication(s) reviewed. Potential side-effects and risks of medication(s) also explained. Patient and/or caregiver was instructed to call if any new symptoms develop prior to next visit.

## 2020-03-16 RX ORDER — BUMETANIDE 1 MG/1
1 TABLET ORAL DAILY
Qty: 90 TABLET | Refills: 1 | Status: SHIPPED
Start: 2020-03-16 | End: 2020-09-28 | Stop reason: ALTCHOICE

## 2020-03-16 NOTE — TELEPHONE ENCOUNTER
Last Appointment:  3/10/2020  Future Appointments   Date Time Provider Nick Anna   3/17/2020 12:00  [address] SEYZ US/AUS [facility] [place]   3/26/2020 11:20 AM Debbie Rodriguez MD Morgan Card [facility]   3/31/2020  1:40 PM DO Chrystal Medina [facility]

## 2020-03-17 ENCOUNTER — HOSPITAL ENCOUNTER (OUTPATIENT)
Dept: GENERAL RADIOLOGY | Age: 75
Discharge: HOME | End: 2020-03-19
Payer: COMMERCIAL

## 2020-03-17 ENCOUNTER — HOSPITAL ENCOUNTER (OUTPATIENT)
Dept: ULTRASOUND IMAGING | Age: 75
Discharge: HOME | End: 2020-03-19
Payer: COMMERCIAL

## 2020-03-17 ENCOUNTER — HOSPITAL ENCOUNTER (OUTPATIENT)
Age: 75
Discharge: HOME | End: 2020-03-17
Payer: COMMERCIAL

## 2020-03-17 ENCOUNTER — HOSPITAL ENCOUNTER (OUTPATIENT)
Age: 75
Discharge: HOME | End: 2020-03-19
Payer: COMMERCIAL

## 2020-03-17 ENCOUNTER — ANTI-COAG VISIT (OUTPATIENT)
Dept: FAMILY MEDICINE CLINIC | Age: 75
End: 2020-03-17

## 2020-03-17 LAB
ALBUMIN SERPL-MCNC: 3.2 G/DL (ref 3.5–5.2)
ALP BLD-CCNC: 241 U/L (ref 35–104)
ALT SERPL-CCNC: 34 U/L (ref 0–32)
ANION GAP SERPL CALCULATED.3IONS-SCNC: 13 MMOL/L (ref 7–16)
AST SERPL-CCNC: 36 U/L (ref 0–31)
BILIRUB SERPL-MCNC: 0.6 MG/DL (ref 0–1.2)
BUN BLDV-MCNC: 39 MG/DL (ref 8–23)
CALCIUM SERPL-MCNC: 9.7 MG/DL (ref 8.6–10.2)
CHLORIDE BLD-SCNC: 103 MMOL/L (ref 98–107)
CO2: 27 MMOL/L (ref 22–29)
CREAT SERPL-MCNC: 1.9 MG/DL (ref 0.5–1)
GFR AFRICAN AMERICAN: 31
GFR NON-AFRICAN AMERICAN: 26 ML/MIN/1.73
GLUCOSE BLD-MCNC: 152 MG/DL (ref 74–99)
HCT VFR BLD CALC: 27.2 % (ref 34–48)
HEMOGLOBIN: 8.7 G/DL (ref 11.5–15.5)
INR BLD: 3.1
MCH RBC QN AUTO: 36.3 PG (ref 26–35)
MCHC RBC AUTO-ENTMCNC: 32 % (ref 32–34.5)
MCV RBC AUTO: 113.3 FL (ref 80–99.9)
PDW BLD-RTO: 18.9 FL (ref 11.5–15)
PLATELET # BLD: 131 E9/L (ref 130–450)
PMV BLD AUTO: 10.8 FL (ref 7–12)
POTASSIUM SERPL-SCNC: 4.9 MMOL/L (ref 3.5–5)
PROTHROMBIN TIME: 35.5 SEC (ref 9.3–12.4)
RBC # BLD: 2.4 E12/L (ref 3.5–5.5)
SODIUM BLD-SCNC: 143 MMOL/L (ref 132–146)
TOTAL PROTEIN: 7.4 G/DL (ref 6.4–8.3)
WBC # BLD: 2.4 E9/L (ref 4.5–11.5)

## 2020-03-17 PROCEDURE — 85027 COMPLETE CBC AUTOMATED: CPT

## 2020-03-17 PROCEDURE — 76999 ECHO EXAMINATION PROCEDURE: CPT

## 2020-03-17 PROCEDURE — 36415 COLL VENOUS BLD VENIPUNCTURE: CPT

## 2020-03-17 PROCEDURE — 80053 COMPREHEN METABOLIC PANEL: CPT

## 2020-03-17 PROCEDURE — 73502 X-RAY EXAM HIP UNI 2-3 VIEWS: CPT

## 2020-03-17 PROCEDURE — 85610 PROTHROMBIN TIME: CPT

## 2020-03-17 PROCEDURE — 72110 X-RAY EXAM L-2 SPINE 4/>VWS: CPT

## 2020-03-17 NOTE — PROGRESS NOTES
Please call her to advise:      Please note change in dose:  Please take coumadin 3 mg on Tuesdays, Thursdays, and Saturdays only. Please take 2 mg on all other days. Recheck INR in one week. Thank you! Please also let her know that her labs were stable overall. However, her kidney numbers are a little higher than in the past.  This may be due to dehydration, such as from her water pills, but can have other causes. Is she more or less swollen in the legs than before, or about the same? Any new symptoms? Any shortness of breath? Please drink enough plain water and avoid extra salt. Please avoid high-potassium foods, such as potatoes. We need to plan to recheck labs in the next week or so, although her oncologist can check them as well when she is at his office. Her liver numbers were just a little bit higher as well. Please let us know if she has any questions or concerns. Thank you!

## 2020-03-18 ENCOUNTER — TELEPHONE (OUTPATIENT)
Dept: FAMILY MEDICINE CLINIC | Age: 75
End: 2020-03-18

## 2020-03-18 NOTE — TELEPHONE ENCOUNTER
I called patient back. Discussed results. She mentioned having a left lower quadrant abdominal pain, \"where the ovaries used to be\". No dysuria. Constant pain. Has pain medication at home. Advised appointment and evaluation, but she declines for now, not wanting to leave the house. Offered CT abdomen and pelvis, but she declines. Advised to please call us if she changes her mind, and I will order the test.  Discussed limitations of the X-rays she had taken and the possible etiologies for pain that cannot be seen on XR. She expressed understanding. Soft tissue cysts/lesions have not changed, no increase in size, no increase in pain, no inflammation, wants to follow these for now. Questions answered. Asked her to please call us with any new, persistent, or worsening symptoms. She agreed.

## 2020-03-25 PROBLEM — S42.202K CLOSED FRACTURE OF PROXIMAL END OF LEFT HUMERUS WITH NONUNION: Status: RESOLVED | Noted: 2018-03-12 | Resolved: 2020-03-24

## 2020-04-01 ENCOUNTER — TELEPHONE (OUTPATIENT)
Dept: CARDIOLOGY CLINIC | Age: 75
End: 2020-04-01

## 2020-04-08 RX ORDER — METOPROLOL SUCCINATE 25 MG/1
12.5 TABLET, EXTENDED RELEASE ORAL DAILY
Qty: 60 TABLET | Refills: 3 | Status: SHIPPED
Start: 2020-04-08 | End: 2020-08-11 | Stop reason: SDUPTHER

## 2020-04-08 RX ORDER — NICOTINE POLACRILEX 4 MG/1
20 GUM, CHEWING ORAL DAILY
Qty: 90 TABLET | Refills: 0 | Status: SHIPPED
Start: 2020-04-08 | End: 2020-09-29 | Stop reason: SDUPTHER

## 2020-04-09 ENCOUNTER — TELEPHONE (OUTPATIENT)
Dept: FAMILY MEDICINE CLINIC | Age: 75
End: 2020-04-09

## 2020-04-15 ENCOUNTER — TELEPHONE (OUTPATIENT)
Dept: FAMILY MEDICINE CLINIC | Age: 75
End: 2020-04-15

## 2020-04-15 NOTE — TELEPHONE ENCOUNTER
Hi Dr. Mora Form,   I checked, and the paperwork is scanned in to . I called [employer] they did receive the completed paperwork. It is entered into there system and has been sent to patients insurance comp. For verification. When this is done they will contact patient and set up appointment to come and teach patient how to use. They did state that it could take up to 8 days to get this all done, they advised if patient need an INR before this to let you know.   Thanks, Denisa FREED

## 2020-04-29 ENCOUNTER — ANTI-COAG VISIT (OUTPATIENT)
Dept: FAMILY MEDICINE CLINIC | Age: 75
End: 2020-04-29

## 2020-04-29 LAB — INR BLD: 2.6

## 2020-04-29 NOTE — PROGRESS NOTES
Please call her. Did this INR come from a home reading? Did she get the machine? I hope so! The INR is in perfect range at 2.6. She can plan to continue the same dose of coumadin (Please take coumadin 3 mg on Tuesdays, Thursdays, and Saturdays only. Please take 2 mg on all other days.) Please recheck INR in one week. Pradip Freeman, can she do a virtual visit or telephone visit at this time? Please let me know. Thank you!

## 2020-05-06 ENCOUNTER — ANTI-COAG VISIT (OUTPATIENT)
Dept: FAMILY MEDICINE CLINIC | Age: 75
End: 2020-05-06

## 2020-05-06 LAB — INR BLD: 2.2

## 2020-05-06 NOTE — PROGRESS NOTES
Please call her:      Please continue the same dose of coumadin:  Take 3 mg on Tues, Thurs, and Sat. Take 2 mg on all other days. Please recheck INR 1 week. Thank you! Thank you so much for calling with the readings! Please let us know if you need anything or have questions, symptoms, or concerns. Thanks!

## 2020-05-11 ENCOUNTER — VIRTUAL VISIT (OUTPATIENT)
Dept: FAMILY MEDICINE CLINIC | Age: 75
End: 2020-05-11
Payer: COMMERCIAL

## 2020-05-11 VITALS — WEIGHT: 153 LBS | BODY MASS INDEX: 26.12 KG/M2 | HEIGHT: 64 IN

## 2020-05-11 NOTE — PROGRESS NOTES
Sakshi Nicholas is a 76 y.o. female evaluated via telephone on 5/11/2020. Consent:  She and/or health care decision maker is aware that that she may receive a bill for this telephone service, depending on her insurance coverage, and has provided verbal consent to proceed: Yes    Documentation:  I communicated with the patient and/or health care decision maker about Left hip pain. Details of this discussion including any medical advice provided:      Since last visit, has developed a skin sore. Previously had a site of bruising/friction from clothing on the lateral aspect of left hip. Sore on side of left hip, about size of quarter total in redness and center, covers with a large band-aid, seeping some purulent material, stable, no better or worse over past few days. No fevers or chills but has no thermometer at home. Using Neosporin topically, changing band-aid twice per day. No blood, but has some dark drainage. Cannot see it well. Using pain pill due to pain, and states she has enough pain pills at home. Entire left side is very sore and tender. Changed the type of underwear that she was using so that there would be less friction, but this was after the sore developed. Advised against lying on that side or friction/pressure. I advised that she be seen in our office as soon as possible for evaluation of this lesion. She declines an appointment, citing transportation concerns, needs a ride from her children. I encouraged her to discuss transportation options with her insurance, but she declines at this time. She will make an appointment in our office for as soon as she can arrange transportation. I encouraged that she seek care urgently/emergently in the meantime with any worsening symptoms. Will also order Bactroban rather than Neosporin to apply topically; ordered to her pharmacy. Cysts on buttocks are stable. No changes. Somewhat tender.   No growth, no new lesions, no redness, pain,

## 2020-05-13 ENCOUNTER — ANTI-COAG VISIT (OUTPATIENT)
Dept: FAMILY MEDICINE CLINIC | Age: 75
End: 2020-05-13

## 2020-05-13 LAB — INR BLD: 3.7

## 2020-05-15 ENCOUNTER — ANTI-COAG VISIT (OUTPATIENT)
Dept: FAMILY MEDICINE CLINIC | Age: 75
End: 2020-05-15

## 2020-05-15 LAB — INR BLD: 3.3

## 2020-05-15 NOTE — PROGRESS NOTES
Please call her. Thank you for calling with your reading! The INR is coming down. Please continue the dose we discussed yesterday, taking 3 mg on Tuesdays and Saturdays only, and taking 2 mg on all other days. Please recheck INR on Monday and call us with the readings. Thank you!

## 2020-05-19 ENCOUNTER — ANTI-COAG VISIT (OUTPATIENT)
Dept: FAMILY MEDICINE CLINIC | Age: 75
End: 2020-05-19

## 2020-05-19 ENCOUNTER — OFFICE VISIT (OUTPATIENT)
Dept: FAMILY MEDICINE CLINIC | Age: 75
End: 2020-05-19
Payer: COMMERCIAL

## 2020-05-19 VITALS
BODY MASS INDEX: 26.8 KG/M2 | DIASTOLIC BLOOD PRESSURE: 64 MMHG | HEART RATE: 73 BPM | SYSTOLIC BLOOD PRESSURE: 138 MMHG | WEIGHT: 157 LBS | OXYGEN SATURATION: 96 % | HEIGHT: 64 IN | RESPIRATION RATE: 16 BRPM | TEMPERATURE: 98.7 F

## 2020-05-19 LAB
INTERNATIONAL NORMALIZATION RATIO, POC: 2.2
PROTHROMBIN TIME, POC: 26.2

## 2020-05-19 PROCEDURE — 1036F TOBACCO NON-USER: CPT | Performed by: FAMILY MEDICINE

## 2020-05-19 PROCEDURE — G8417 CALC BMI ABV UP PARAM F/U: HCPCS | Performed by: FAMILY MEDICINE

## 2020-05-19 PROCEDURE — 99214 OFFICE O/P EST MOD 30 MIN: CPT | Performed by: FAMILY MEDICINE

## 2020-05-19 PROCEDURE — 99212 OFFICE O/P EST SF 10 MIN: CPT | Performed by: FAMILY MEDICINE

## 2020-05-19 PROCEDURE — G8427 DOCREV CUR MEDS BY ELIG CLIN: HCPCS | Performed by: FAMILY MEDICINE

## 2020-05-19 PROCEDURE — 3051F HG A1C>EQUAL 7.0%<8.0%: CPT | Performed by: FAMILY MEDICINE

## 2020-05-19 PROCEDURE — 2022F DILAT RTA XM EVC RTNOPTHY: CPT | Performed by: FAMILY MEDICINE

## 2020-05-19 PROCEDURE — 1123F ACP DISCUSS/DSCN MKR DOCD: CPT | Performed by: FAMILY MEDICINE

## 2020-05-19 PROCEDURE — G8399 PT W/DXA RESULTS DOCUMENT: HCPCS | Performed by: FAMILY MEDICINE

## 2020-05-19 PROCEDURE — 3017F COLORECTAL CA SCREEN DOC REV: CPT | Performed by: FAMILY MEDICINE

## 2020-05-19 PROCEDURE — 1090F PRES/ABSN URINE INCON ASSESS: CPT | Performed by: FAMILY MEDICINE

## 2020-05-19 PROCEDURE — 4040F PNEUMOC VAC/ADMIN/RCVD: CPT | Performed by: FAMILY MEDICINE

## 2020-05-19 RX ORDER — DOXYCYCLINE HYCLATE 100 MG
100 TABLET ORAL 2 TIMES DAILY
Qty: 20 TABLET | Refills: 0 | Status: SHIPPED | OUTPATIENT
Start: 2020-05-19 | End: 2020-05-29

## 2020-05-19 RX ORDER — CEPHALEXIN 250 MG/1
250 CAPSULE ORAL 2 TIMES DAILY
Qty: 20 CAPSULE | Refills: 0 | Status: SHIPPED | OUTPATIENT
Start: 2020-05-19 | End: 2020-05-29

## 2020-05-19 NOTE — PROGRESS NOTES
Please continue same dose.  Take 3 mg coumadin on Tuesdays and Saturdays; take 2 mg on all other days.  I advised her to recheck INR every 1-2 days on home monitor, as she will be taking antibiotics for the next 10 days.  She was advised on goal INR and will call with readings, questions, and concerns.  Thank you!      I advised patient about coumadin dosing during her appointment today.

## 2020-05-19 NOTE — PROGRESS NOTES
07/21/2019    Palliative care encounter     Cancer associated pain     Ascites 06/18/2019    Charcot's joint of foot in type 2 diabetes mellitus ([facility] [address] 04/17/2019    CKD (chronic kidney disease) stage 3, GFR 30-59 ml/min ([facility]) 04/16/2019    Ambulatory dysfunction 04/15/2019    Leg swelling 11/07/2018    History of deep vein thrombosis 11/07/2018    Chronic venous insufficiency 11/07/2018    Lymphedema of both lower extremities 11/07/2018    Decreased dorsalis pedis pulse 11/07/2018    Diabetic polyneuropathy associated with type 2 diabetes mellitus ([address] 09/07/2018    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 09/36/8603    Complicated UTI (urinary tract infection) 10/24/2017    KARISHMA (acute kidney injury) ([address] 10/24/2017    Diarrhea with dehydration 10/24/2017    Chronic anticoagulation 10/24/2017    Peripheral polyneuropathy 01/03/2017    Bilateral edema of lower extremity 10/03/2016    Chronic deep vein thrombosis (DVT) of proximal vein of right lower extremity ([address] 09/30/2016    Type 2 diabetes mellitus without complication, with long-term current use of insulin ([address] 09/01/2016    Anemia of chronic disease 09/01/2016    Ventral hernia 05/14/2015    Rectal bleeding 02/23/2015    Anesthesia     Pericardial effusion     MI (myocardial infarction) ([address]     Arthritis     Lymph node enlargement     Subclavian vein occlusion, bilateral ([address] 04/18/2012    Rib lesion 02/07/2012    Hyperlipidemia 08/29/2011    Sarcoidosis 07/26/2011    B12 deficiency 06/22/2011    Shoulder pain, left 03/02/2011    Metastatic breast cancer ([address]     Essential hypertension     Coronary artery disease involving native coronary artery of native heart without angina pectoris     Nephrolithiasis     CHF (congestive heart failure) ([facility])     Humerus fracture        Current Outpatient Medications on File Prior to Visit   Medication Sig Dispense Refill    PT/INR Testing Monitor KIT 1 each by Does not apply route once a week Use to test INR at home once weekly and as directed to monitor INR. 1 kit 0    PT/INR Test STRP 1 each by In Vitro route once a week Use to check INR at least once weekly and more often as directed. 48 strip 1    metoprolol succinate (TOPROL XL) 25 MG extended release tablet Take 0.5 tablets by mouth daily 60 tablet 3    omeprazole 20 MG EC tablet Take 1 tablet by mouth daily 90 tablet 0    bumetanide (BUMEX) 1 MG tablet Take 1 tablet by mouth daily 90 tablet 1    pregabalin (LYRICA) 50 MG capsule Take 1 capsule by mouth 3 times daily for 120 days. 90 capsule 3    magnesium oxide (MAG-OX) 400 (240 Mg) MG tablet Take 1 tablet by mouth daily 90 tablet 1    docusate (COLACE, DULCOLAX) 100 MG CAPS Take 100 mg by mouth daily 90 capsule 3    PT/INR Testing Monitor KIT 1 each by Does not apply route once a week Please use to test INR as directed by physician 1 kit 1    isosorbide dinitrate (ISORDIL) 5 MG tablet Take 1 tablet by mouth 3 times daily 90 tablet 3    vitamin B-12 (CYANOCOBALAMIN) 1000 MCG tablet Take 1 tablet by mouth daily 90 tablet 3    Calcium Citrate-Vitamin D (RA CALCIUM CIT-VIT D-3 PETITES) 200-250 MG-UNIT TABS take 1 tablet by mouth twice a day 180 tablet 3    albuterol sulfate (PROAIR RESPICLICK) 812 (90 Base) MCG/ACT aerosol powder inhalation Inhale 2 puffs into the lungs every 6 hours as needed for Wheezing or Shortness of Breath 1 Inhaler 2    warfarin (COUMADIN) 1 MG tablet Please take 2 mg by mouth most days, but 3 mg by mouth on Mondays and Wednesdays and Friday.  (Patient taking differently: Please take 2 mg by mouth most days, but 3 mg by mouth on sun, tues, thurs.) 180 tablet 0    Multiple Vitamins-Minerals (THERAPEUTIC MULTIVITAMIN-MINERALS) tablet Take 1 tablet by mouth daily 90 tablet 3    Insulin Pen Needle (MEIJER PEN NEEDLES) 29G X 12MM MISC 1 each by Does not apply route daily 100 each 3    nitroGLYCERIN (NITROSTAT) 0.4 MG SL tablet up to max of 3 total doses. If no relief after 1 dose, call 911. 25 tablet 3    sodium chloride (OCEAN, BABY AYR) 0.65 % nasal spray 1 spray by Nasal route as needed for Congestion (dryness) 60 mL 0    palbociclib (IBRANCE) 100 MG capsule Take 100 mg by mouth 3 weeks on and 1 week off also receives injections every 3 weeks per Dr Kathryn Burrows       No current facility-administered medications on file prior to visit. Allergies   Allergen Reactions    Macrobid [Nitrofurantoin Monohydrate Macrocrystals] Hives       Social History     Tobacco Use    Smoking status: Never Smoker    Smokeless tobacco: Never Used   Substance Use Topics    Alcohol use: No    Drug use: No       ROS:   Review of Systems - General ROS: negative for - chills or fever  Respiratory ROS: no cough, shortness of breath, or wheezing  Cardiovascular ROS: no chest pain or dyspnea on exertion  Gastrointestinal ROS: no abdominal pain, change in bowel habits, or black or bloody stools  Genito-Urinary ROS: no dysuria, trouble voiding, or hematuria    Physical Exam:    VS:  Blood pressure 138/64, pulse 73, temperature 98.7 °F (37.1 °C), temperature source Oral, resp. rate 16, height 5' 4\" (1.626 m), weight 157 lb (71.2 kg), SpO2 96 %, not currently breastfeeding. General Appearance:  awake, alert, oriented, in no acute distress and appears pale and somewhat cachectic, stable overall  Head/face:  NCAT  Eyes:  EOMI and Sclera nonicteric  Neck:  neck- supple, no mass, non-tender  Lungs:  Normal expansion. Clear to auscultation. No rales, rhonchi, or wheezing. Heart:  Heart regular rate and rhythm  Murmur(s)-  3/6 systolic at 2nd left intercostal space, at 2nd right intercostal space  Abdomen:  Soft, NT, ND  Extremities: stable edema bilaterally   Skin:  Bilateral lateral hip areas with ulceration, left greater than right. Left with shallow ulceration, approximately 3-4 cm, scab formation. No purulence expressed. No palpable abscess.   Minimal surrounding erythema, no surrounding induration. Smaller lesion right hip laterally, 1 cm, no erythema. Most recent labs and imaging reviewed. Findings include:     Results for POC orders placed in visit on 05/19/20   POCT INR   Result Value Ref Range    INR 2.2     Protime 26.2        Assessments:      Diagnosis Orders   1. Cellulitis, unspecified cellulitis site  doxycycline hyclate (VIBRA-TABS) 100 MG tablet    cephALEXin (KEFLEX) 250 MG capsule    External Referral To Home Health   2. Pressure injury of left hip, stage 2 [facility])  External Referral To Home Health   3. Type 2 diabetes mellitus with hyperglycemia, without long-term current use of insulin ([facility] [address]     4. Chronic deep vein thrombosis (DVT) of proximal vein of right lower extremity (HCC)  POCT INR   5. Metastatic disease ([facility] [address]         Plans:    As Above. Please see Patient Instructions for further counseling and information given. RTO 1 month or sooner prn. I advised patient that I was concerned about her wounds and possible beginning of cellulitis. Advised on the risks of worsening infections if not adequately treated. Discussed options for increased care, including placement, [place] or SNF, and/or hospital admission to evaluate and treat. She declined. I also advised formal wound care referral, but she declined, stating she will not leave her house for appointments or placement. Discussed risks, etc., but she wishes to remain home. Will attempt to have [facility] for wound care. Avoid pressure on the sites, loose clothing, etc.  Wound care, topical Bactroban. Will also order antibiotics to treat for possible component of cellulitis starting on the left. Call with any new or worsening symptoms, and she understands. Advised to change position at least every hour. Will order Home Care for Wound Care, to be sent to [facility], which she has used before.       Same dose of coumadin (see anti coag encounter), but advised on the need to check INR every 1-2 days while on the antibiotics, and she understands. She will call us with readings. Advised that the antibiotics may interfere and raise her INR, so dosage adjustments may be necessary. Ordered renally-dosed cephalexin and Doxy. Will avoid Bactrim due to higher anticipated interactions with warfarin. Take as directed. Only has allergy to Macrobid. Follow up with specialists, including cardiology. Follow with Heme/onc. Discussed pain control. She has oxycodone at home from last August, which she uses very sparingly for severe pain. She also takes Lyrica, currently optimally-dosed, but dose may need to be reduced further, following renal function. Pain is burning is quality; she wishes to continue current Lyrica dose, as we cannot increase this further. Will continue same dose for now but consider decreasing dose frequency in the near future. She will also continue oxycodone as directed, calling for refills when need be. Advised to please be adherent to the treatment plans discussed today, and please call with any questions or concerns, letting the office know of any reasons that the plans may not be followed. The risks of untreated conditions include worsening illness, injury, disability, and possibly, death. Please call if symptoms change in any way, worsen, or fail to completely resolve, as this could necessitate a change to treatment plans. Patient and/or caregiver expressed understanding. Indications and proper use of medication(s) reviewed. Potential side-effects and risks of medication(s) also explained. Patient and/or caregiver was instructed to call if any new symptoms develop prior to next visit.

## 2020-05-20 ENCOUNTER — ANTI-COAG VISIT (OUTPATIENT)
Dept: FAMILY MEDICINE CLINIC | Age: 75
End: 2020-05-20
Payer: COMMERCIAL

## 2020-05-20 LAB — INR BLD: 2.5

## 2020-05-20 PROCEDURE — 93793 ANTICOAG MGMT PT WARFARIN: CPT | Performed by: FAMILY MEDICINE

## 2020-05-20 NOTE — PROGRESS NOTES
Please let her know: Thank you for calling! Please continue same dose, 3 mg on Tues and Sat, and 2 mg on all other days. Recheck INR on Friday.  Thank you!

## 2020-05-28 ENCOUNTER — ANTI-COAG VISIT (OUTPATIENT)
Dept: FAMILY MEDICINE CLINIC | Age: 75
End: 2020-05-28

## 2020-05-28 LAB — INR BLD: 2.3

## 2020-05-28 NOTE — PROGRESS NOTES
Spoke to pt today and gave her instructions on her Coumadin and when to have it done again and call with results. Regarding her wounds, pt says they are not getting better and now she has a wound on both sides. Home care only helps her change the dressing per pt.

## 2020-05-28 NOTE — PROGRESS NOTES
Please let her know that changing the dressings and using the right materials will help the wounds to heal.  However, they may not heal on their own, and we may need to consider other options. Something is causing pressure to, and/or destruction of, the tissues in those areas. Please consider the activities and positions during the day that could be making the symptoms worse, although I know we had already discussed this. We may need a specialist's opinion, either at the [address] [place] [address] or, as we have discussed before, we could evaluate her in the hospital if need be. If her symptoms are getting worse, we should also consider that she could have an infection that the antibiotics have not been able to treat, and to address that, we would need to give her IV antibiotics, although this is less likely because she has the same thing happening on both sides of her hips. Please let me know if she is willing to consider a wound care referral and/or a hospital admission to get this under control. Thank you!

## 2020-06-04 ENCOUNTER — TELEPHONE (OUTPATIENT)
Dept: FAMILY MEDICINE CLINIC | Age: 75
End: 2020-06-04

## 2020-06-04 ENCOUNTER — ANTI-COAG VISIT (OUTPATIENT)
Dept: FAMILY MEDICINE CLINIC | Age: 75
End: 2020-06-04
Payer: COMMERCIAL

## 2020-06-04 LAB — INR BLD: 2.3

## 2020-06-04 PROCEDURE — 93793 ANTICOAG MGMT PT WARFARIN: CPT | Performed by: FAMILY MEDICINE

## 2020-06-04 NOTE — TELEPHONE ENCOUNTER
Patient finished ABxs Saturday, wound is the same size and sore. Patient states that both hips are still sore with pain running down her legs. The Wound Care Nurse is coming tomorrow to redress the wound.

## 2020-06-08 ENCOUNTER — TELEPHONE (OUTPATIENT)
Dept: FAMILY MEDICINE CLINIC | Age: 75
End: 2020-06-08

## 2020-06-09 ENCOUNTER — OFFICE VISIT (OUTPATIENT)
Dept: FAMILY MEDICINE CLINIC | Age: 75
End: 2020-06-09
Payer: COMMERCIAL

## 2020-06-09 ENCOUNTER — HOSPITAL ENCOUNTER (OUTPATIENT)
Age: 75
Discharge: HOME | End: 2020-06-11
Payer: COMMERCIAL

## 2020-06-09 VITALS
WEIGHT: 162 LBS | RESPIRATION RATE: 20 BRPM | SYSTOLIC BLOOD PRESSURE: 120 MMHG | DIASTOLIC BLOOD PRESSURE: 54 MMHG | HEIGHT: 64 IN | BODY MASS INDEX: 27.66 KG/M2 | OXYGEN SATURATION: 93 % | HEART RATE: 86 BPM | TEMPERATURE: 98 F

## 2020-06-09 PROCEDURE — 1123F ACP DISCUSS/DSCN MKR DOCD: CPT | Performed by: STUDENT IN AN ORGANIZED HEALTH CARE EDUCATION/TRAINING PROGRAM

## 2020-06-09 PROCEDURE — 3017F COLORECTAL CA SCREEN DOC REV: CPT | Performed by: STUDENT IN AN ORGANIZED HEALTH CARE EDUCATION/TRAINING PROGRAM

## 2020-06-09 PROCEDURE — 99213 OFFICE O/P EST LOW 20 MIN: CPT | Performed by: STUDENT IN AN ORGANIZED HEALTH CARE EDUCATION/TRAINING PROGRAM

## 2020-06-09 PROCEDURE — 87070 CULTURE OTHR SPECIMN AEROBIC: CPT

## 2020-06-09 PROCEDURE — 1090F PRES/ABSN URINE INCON ASSESS: CPT | Performed by: STUDENT IN AN ORGANIZED HEALTH CARE EDUCATION/TRAINING PROGRAM

## 2020-06-09 PROCEDURE — 1036F TOBACCO NON-USER: CPT | Performed by: STUDENT IN AN ORGANIZED HEALTH CARE EDUCATION/TRAINING PROGRAM

## 2020-06-09 PROCEDURE — G8427 DOCREV CUR MEDS BY ELIG CLIN: HCPCS | Performed by: STUDENT IN AN ORGANIZED HEALTH CARE EDUCATION/TRAINING PROGRAM

## 2020-06-09 PROCEDURE — 99212 OFFICE O/P EST SF 10 MIN: CPT | Performed by: STUDENT IN AN ORGANIZED HEALTH CARE EDUCATION/TRAINING PROGRAM

## 2020-06-09 PROCEDURE — 87186 SC STD MICRODIL/AGAR DIL: CPT

## 2020-06-09 PROCEDURE — 87205 SMEAR GRAM STAIN: CPT

## 2020-06-09 PROCEDURE — 4040F PNEUMOC VAC/ADMIN/RCVD: CPT | Performed by: STUDENT IN AN ORGANIZED HEALTH CARE EDUCATION/TRAINING PROGRAM

## 2020-06-09 PROCEDURE — G8399 PT W/DXA RESULTS DOCUMENT: HCPCS | Performed by: STUDENT IN AN ORGANIZED HEALTH CARE EDUCATION/TRAINING PROGRAM

## 2020-06-09 PROCEDURE — G8417 CALC BMI ABV UP PARAM F/U: HCPCS | Performed by: STUDENT IN AN ORGANIZED HEALTH CARE EDUCATION/TRAINING PROGRAM

## 2020-06-09 RX ORDER — CLINDAMYCIN HYDROCHLORIDE 150 MG/1
450 CAPSULE ORAL 3 TIMES DAILY
Qty: 126 CAPSULE | Refills: 0 | Status: ON HOLD
Start: 2020-06-09 | End: 2020-06-22 | Stop reason: HOSPADM

## 2020-06-09 RX ORDER — OXYCODONE HYDROCHLORIDE 5 MG/1
2.5 TABLET ORAL EVERY 6 HOURS PRN
Qty: 24 TABLET | Refills: 0 | Status: SHIPPED | OUTPATIENT
Start: 2020-06-09 | End: 2020-06-23

## 2020-06-10 ASSESSMENT — ENCOUNTER SYMPTOMS
ABDOMINAL PAIN: 0
SHORTNESS OF BREATH: 0
WHEEZING: 0

## 2020-06-10 NOTE — PROGRESS NOTES
abused: Not on file     Forced sexual activity: Not on file   Other Topics Concern    Not on file   Social History Narrative    Not on file       Family History:       Adopted: Yes       Reviewof Systems:   Review of Systems   Constitutional: Negative for fever. Respiratory: Negative for shortness of breath and wheezing. Cardiovascular: Negative for chest pain and palpitations. Gastrointestinal: Negative for abdominal pain. Musculoskeletal:        Bilateral hip pain   Skin: Positive for rash (Wounds in bilateral hips). Physical Exam   Vitals: BP (!) 120/54 (Site: Right Upper Arm, Position: Sitting, Cuff Size: Medium Adult)   Pulse 86   Temp 98 °F (36.7 °C)   Resp 20   Ht 5' 4\" (1.626 m)   Wt 162 lb (73.5 kg)   SpO2 93%   BMI 27.81 kg/m²   General Appearance: alert, oriented, no acute distress  Chest wall/Lung: Clear to auscultation bilaterally,  respirations unlabored. No ronchi/wheezing/rales  Heart: Regular rate and rhythm, S1 and S2 normal, systolic murmur present, rub or gallop. Abdomen: Soft, non-tender, bowel sounds normoactive, no masses, no organomegaly  Extremities:  Extremities normal, atraumatic, no cyanosis, bilateral lower extremity edema, 2+. Skin: Erythematous induration in the right hip area measuring roughly 11 cm in diameter with small nondraining ulceration, left hip with also indurated area, some erythema, small yellow drainage from central ulceration, both areas tender to palpation  Neurologic: alert & oriented       Psychiatric: has a normal mood and affect. Behavior is normal.       Assessment and Plan       1. Cellulitis, unspecified cellulitis site  Bilateral hip wounds could be secondary to pressure ulcers but cannot rule out cellulitis due to presence of induration bilaterally. Patient would probably benefit from imaging to assess the wounds and may be consider IV antibiotics, however she refuses to go to the hospital at this time.   Patient understands that not going to the ED for further work-up and management might only delay the inevitable admission. Patient requesting for another trial of antibiotics before considering inpatient management. Patient will be started on a trial of clindamycin with close follow-up. Patient will also be prescribed more pain medication as she almost ran out off it. Patient advised again to keep pressure off her hips. Follow-up in 1 week. Patient instructed to go to the ED or call our line if she experiences any worsening of symptoms, including fever or chills. - oxyCODONE (ROXICODONE) 5 MG immediate release tablet; Take 0.5 tablets by mouth every 6 hours as needed for Pain for up to 14 days. Intended supply: 5 days. Take lowest dose possible to manage pain  Dispense: 24 tablet; Refill: 0  - Culture, Wound; Future  - clindamycin (CLEOCIN) 150 MG capsule; Take 3 capsules by mouth 3 times daily for 14 days  Dispense: 126 capsule; Refill: 0    2. Bilateral hip pain  Refer to #1    3. Chronic deep vein thrombosis (DVT) of lower extremity, unspecified laterality, unspecified vein (HCC)  Patient instructed to resume frequent INR checks at home now that she is starting on a new antibiotics. Patient instructed to report INR check results before the end of this week. She endorses understanding. Patient was also examined by attending physician, who agrees with this plan. Issues to address at future visit/s:    Return to [address] [place] [address] in about 1 week (around 6/16/2020) for cellulitis/ wounds follow up . Medication List:    Current Outpatient Medications   Medication Sig Dispense Refill    oxyCODONE (ROXICODONE) 5 MG immediate release tablet Take 0.5 tablets by mouth every 6 hours as needed for Pain for up to 14 days. Intended supply: 5 days.  Take lowest dose possible to manage pain 24 tablet 0    clindamycin (CLEOCIN) 150 MG capsule Take 3 capsules by mouth 3 times daily for 14 days 126 capsule 0    PT/INR Testing Monitor KIT 1 each by Does not apply route once a week Use to test INR at home once weekly and as directed to monitor INR. 1 kit 0    PT/INR Test STRP 1 each by In Vitro route once a week Use to check INR at least once weekly and more often as directed. 48 strip 1    metoprolol succinate (TOPROL XL) 25 MG extended release tablet Take 0.5 tablets by mouth daily 60 tablet 3    omeprazole 20 MG EC tablet Take 1 tablet by mouth daily 90 tablet 0    bumetanide (BUMEX) 1 MG tablet Take 1 tablet by mouth daily 90 tablet 1    pregabalin (LYRICA) 50 MG capsule Take 1 capsule by mouth 3 times daily for 120 days. 90 capsule 3    magnesium oxide (MAG-OX) 400 (240 Mg) MG tablet Take 1 tablet by mouth daily 90 tablet 1    docusate (COLACE, DULCOLAX) 100 MG CAPS Take 100 mg by mouth daily 90 capsule 3    PT/INR Testing Monitor KIT 1 each by Does not apply route once a week Please use to test INR as directed by physician 1 kit 1    isosorbide dinitrate (ISORDIL) 5 MG tablet Take 1 tablet by mouth 3 times daily 90 tablet 3    vitamin B-12 (CYANOCOBALAMIN) 1000 MCG tablet Take 1 tablet by mouth daily 90 tablet 3    Calcium Citrate-Vitamin D (RA CALCIUM CIT-VIT D-3 PETITES) 200-250 MG-UNIT TABS take 1 tablet by mouth twice a day 180 tablet 3    albuterol sulfate (PROAIR RESPICLICK) 124 (90 Base) MCG/ACT aerosol powder inhalation Inhale 2 puffs into the lungs every 6 hours as needed for Wheezing or Shortness of Breath 1 Inhaler 2    warfarin (COUMADIN) 1 MG tablet Please take 2 mg by mouth most days, but 3 mg by mouth on Mondays and Wednesdays and Friday.  (Patient taking differently: Please take 2 mg by mouth most days, but 3 mg by mouth on sun, tues, thurs.) 180 tablet 0    Multiple Vitamins-Minerals (THERAPEUTIC MULTIVITAMIN-MINERALS) tablet Take 1 tablet by mouth daily 90 tablet 3    Insulin Pen Needle (MEIJER PEN NEEDLES) 29G X 12MM MISC 1 each by Does not apply route daily 100 each 3    nitroGLYCERIN (NITROSTAT) 0.4 MG SL tablet

## 2020-06-13 LAB
GRAM STAIN RESULT: ABNORMAL
ORGANISM: ABNORMAL
ORGANISM: ABNORMAL
WOUND/ABSCESS: ABNORMAL
WOUND/ABSCESS: ABNORMAL

## 2020-06-16 LAB — INR BLD: 2

## 2020-06-17 ENCOUNTER — ANTI-COAG VISIT (OUTPATIENT)
Dept: FAMILY MEDICINE CLINIC | Age: 75
End: 2020-06-17

## 2020-06-17 NOTE — PROGRESS NOTES
Pt was called and given instructions on her INR today  I offered further evaluation for pt as doctor requested but she said she is coming tomorrow so she will see what we tell her tomorrow at her visit.

## 2020-06-18 ENCOUNTER — OFFICE VISIT (OUTPATIENT)
Dept: FAMILY MEDICINE CLINIC | Age: 75
End: 2020-06-18
Payer: COMMERCIAL

## 2020-06-18 ENCOUNTER — TELEPHONE (OUTPATIENT)
Dept: ADMINISTRATIVE | Age: 75
End: 2020-06-18

## 2020-06-18 ENCOUNTER — HOSPITAL ENCOUNTER (INPATIENT)
Age: 75
LOS: 4 days | Discharge: HOME HEALTH | DRG: 571 | End: 2020-06-22
Attending: FAMILY MEDICINE | Admitting: FAMILY MEDICINE
Payer: COMMERCIAL

## 2020-06-18 VITALS
DIASTOLIC BLOOD PRESSURE: 64 MMHG | HEART RATE: 81 BPM | WEIGHT: 163 LBS | TEMPERATURE: 97.6 F | SYSTOLIC BLOOD PRESSURE: 149 MMHG | BODY MASS INDEX: 27.83 KG/M2 | OXYGEN SATURATION: 95 % | HEIGHT: 64 IN

## 2020-06-18 PROBLEM — L03.317 CELLULITIS AND ABSCESS OF BUTTOCK: Status: ACTIVE | Noted: 2020-06-18

## 2020-06-18 PROBLEM — L02.31 CELLULITIS AND ABSCESS OF BUTTOCK: Status: ACTIVE | Noted: 2020-06-18

## 2020-06-18 PROBLEM — J96.01 ACUTE RESPIRATORY FAILURE WITH HYPOXIA (HCC): Status: RESOLVED | Noted: 2019-07-21 | Resolved: 2020-06-18

## 2020-06-18 PROCEDURE — G8417 CALC BMI ABV UP PARAM F/U: HCPCS | Performed by: STUDENT IN AN ORGANIZED HEALTH CARE EDUCATION/TRAINING PROGRAM

## 2020-06-18 PROCEDURE — G8427 DOCREV CUR MEDS BY ELIG CLIN: HCPCS | Performed by: STUDENT IN AN ORGANIZED HEALTH CARE EDUCATION/TRAINING PROGRAM

## 2020-06-18 PROCEDURE — 99212 OFFICE O/P EST SF 10 MIN: CPT | Performed by: STUDENT IN AN ORGANIZED HEALTH CARE EDUCATION/TRAINING PROGRAM

## 2020-06-18 PROCEDURE — 1090F PRES/ABSN URINE INCON ASSESS: CPT | Performed by: STUDENT IN AN ORGANIZED HEALTH CARE EDUCATION/TRAINING PROGRAM

## 2020-06-18 PROCEDURE — 3017F COLORECTAL CA SCREEN DOC REV: CPT | Performed by: STUDENT IN AN ORGANIZED HEALTH CARE EDUCATION/TRAINING PROGRAM

## 2020-06-18 PROCEDURE — G8399 PT W/DXA RESULTS DOCUMENT: HCPCS | Performed by: STUDENT IN AN ORGANIZED HEALTH CARE EDUCATION/TRAINING PROGRAM

## 2020-06-18 PROCEDURE — 2580000003 HC RX 258: Performed by: STUDENT IN AN ORGANIZED HEALTH CARE EDUCATION/TRAINING PROGRAM

## 2020-06-18 PROCEDURE — 6360000002 HC RX W HCPCS: Performed by: STUDENT IN AN ORGANIZED HEALTH CARE EDUCATION/TRAINING PROGRAM

## 2020-06-18 PROCEDURE — 4040F PNEUMOC VAC/ADMIN/RCVD: CPT | Performed by: STUDENT IN AN ORGANIZED HEALTH CARE EDUCATION/TRAINING PROGRAM

## 2020-06-18 PROCEDURE — 1036F TOBACCO NON-USER: CPT | Performed by: STUDENT IN AN ORGANIZED HEALTH CARE EDUCATION/TRAINING PROGRAM

## 2020-06-18 PROCEDURE — 1200000000 HC SEMI PRIVATE

## 2020-06-18 PROCEDURE — 1123F ACP DISCUSS/DSCN MKR DOCD: CPT | Performed by: STUDENT IN AN ORGANIZED HEALTH CARE EDUCATION/TRAINING PROGRAM

## 2020-06-18 PROCEDURE — 99214 OFFICE O/P EST MOD 30 MIN: CPT | Performed by: STUDENT IN AN ORGANIZED HEALTH CARE EDUCATION/TRAINING PROGRAM

## 2020-06-18 PROCEDURE — 6370000000 HC RX 637 (ALT 250 FOR IP): Performed by: STUDENT IN AN ORGANIZED HEALTH CARE EDUCATION/TRAINING PROGRAM

## 2020-06-18 RX ORDER — DOCUSATE SODIUM 100 MG/1
100 CAPSULE, LIQUID FILLED ORAL DAILY
Status: DISCONTINUED | OUTPATIENT
Start: 2020-06-18 | End: 2020-06-22 | Stop reason: HOSPADM

## 2020-06-18 RX ORDER — ACETAMINOPHEN 650 MG/1
650 SUPPOSITORY RECTAL EVERY 6 HOURS PRN
Status: DISCONTINUED | OUTPATIENT
Start: 2020-06-18 | End: 2020-06-22 | Stop reason: HOSPADM

## 2020-06-18 RX ORDER — ALBUTEROL SULFATE 2.5 MG/3ML
2.5 SOLUTION RESPIRATORY (INHALATION) EVERY 6 HOURS PRN
Status: DISCONTINUED | OUTPATIENT
Start: 2020-06-18 | End: 2020-06-22 | Stop reason: HOSPADM

## 2020-06-18 RX ORDER — ONDANSETRON 2 MG/ML
4 INJECTION INTRAMUSCULAR; INTRAVENOUS EVERY 6 HOURS PRN
Status: DISCONTINUED | OUTPATIENT
Start: 2020-06-18 | End: 2020-06-22 | Stop reason: HOSPADM

## 2020-06-18 RX ORDER — SODIUM BICARBONATE 650 MG/1
650 TABLET ORAL 2 TIMES DAILY
COMMUNITY
End: 2020-09-29 | Stop reason: SDUPTHER

## 2020-06-18 RX ORDER — BUMETANIDE 1 MG/1
1 TABLET ORAL DAILY
Status: DISCONTINUED | OUTPATIENT
Start: 2020-06-19 | End: 2020-06-22 | Stop reason: HOSPADM

## 2020-06-18 RX ORDER — SODIUM CHLORIDE 0.9 % (FLUSH) 0.9 %
10 SYRINGE (ML) INJECTION PRN
Status: DISCONTINUED | OUTPATIENT
Start: 2020-06-18 | End: 2020-06-22 | Stop reason: HOSPADM

## 2020-06-18 RX ORDER — PROMETHAZINE HYDROCHLORIDE 25 MG/1
12.5 TABLET ORAL EVERY 6 HOURS PRN
Status: DISCONTINUED | OUTPATIENT
Start: 2020-06-18 | End: 2020-06-22 | Stop reason: HOSPADM

## 2020-06-18 RX ORDER — ACETAMINOPHEN 325 MG/1
650 TABLET ORAL EVERY 6 HOURS PRN
Status: DISCONTINUED | OUTPATIENT
Start: 2020-06-18 | End: 2020-06-22 | Stop reason: HOSPADM

## 2020-06-18 RX ORDER — METOPROLOL SUCCINATE 25 MG/1
12.5 TABLET, EXTENDED RELEASE ORAL DAILY
Status: DISCONTINUED | OUTPATIENT
Start: 2020-06-18 | End: 2020-06-22 | Stop reason: HOSPADM

## 2020-06-18 RX ORDER — ISOSORBIDE DINITRATE 10 MG/1
5 TABLET ORAL 3 TIMES DAILY
Status: DISCONTINUED | OUTPATIENT
Start: 2020-06-18 | End: 2020-06-22 | Stop reason: HOSPADM

## 2020-06-18 RX ORDER — SODIUM CHLORIDE 0.9 % (FLUSH) 0.9 %
10 SYRINGE (ML) INJECTION EVERY 12 HOURS SCHEDULED
Status: DISCONTINUED | OUTPATIENT
Start: 2020-06-18 | End: 2020-06-22 | Stop reason: HOSPADM

## 2020-06-18 RX ORDER — PANTOPRAZOLE SODIUM 40 MG/1
40 TABLET, DELAYED RELEASE ORAL
Status: DISCONTINUED | OUTPATIENT
Start: 2020-06-19 | End: 2020-06-22 | Stop reason: HOSPADM

## 2020-06-18 RX ORDER — POLYETHYLENE GLYCOL 3350 17 G/17G
17 POWDER, FOR SOLUTION ORAL DAILY PRN
Status: DISCONTINUED | OUTPATIENT
Start: 2020-06-18 | End: 2020-06-22 | Stop reason: HOSPADM

## 2020-06-18 RX ORDER — OXYCODONE HYDROCHLORIDE 5 MG/1
2.5 TABLET ORAL EVERY 6 HOURS PRN
Status: DISCONTINUED | OUTPATIENT
Start: 2020-06-18 | End: 2020-06-21

## 2020-06-18 RX ADMIN — SODIUM CHLORIDE, PRESERVATIVE FREE 10 ML: 5 INJECTION INTRAVENOUS at 21:31

## 2020-06-18 RX ADMIN — ISOSORBIDE DINITRATE 5 MG: 10 TABLET ORAL at 21:31

## 2020-06-18 RX ADMIN — MEROPENEM 1 G: 1 INJECTION, POWDER, FOR SOLUTION INTRAVENOUS at 22:52

## 2020-06-18 RX ADMIN — OXYCODONE 2.5 MG: 5 TABLET ORAL at 21:31

## 2020-06-18 RX ADMIN — VANCOMYCIN HYDROCHLORIDE 1000 MG: 1 INJECTION, POWDER, LYOPHILIZED, FOR SOLUTION INTRAVENOUS at 21:32

## 2020-06-18 ASSESSMENT — ENCOUNTER SYMPTOMS
COUGH: 0
NAUSEA: 0
VOMITING: 0
COLOR CHANGE: 0
BLOOD IN STOOL: 0
EYE DISCHARGE: 0
DIARRHEA: 0
ABDOMINAL DISTENTION: 0
ABDOMINAL PAIN: 0
SHORTNESS OF BREATH: 0

## 2020-06-18 ASSESSMENT — PAIN SCALES - GENERAL
PAINLEVEL_OUTOF10: 8
PAINLEVEL_OUTOF10: 0

## 2020-06-18 ASSESSMENT — PAIN DESCRIPTION - FREQUENCY: FREQUENCY: CONTINUOUS

## 2020-06-18 ASSESSMENT — PAIN DESCRIPTION - DESCRIPTORS: DESCRIPTORS: ACHING;CONSTANT;DISCOMFORT

## 2020-06-18 ASSESSMENT — PAIN DESCRIPTION - PAIN TYPE: TYPE: ACUTE PAIN

## 2020-06-18 ASSESSMENT — PAIN DESCRIPTION - ORIENTATION: ORIENTATION: RIGHT

## 2020-06-18 ASSESSMENT — PAIN DESCRIPTION - ONSET: ONSET: ON-GOING

## 2020-06-18 ASSESSMENT — PAIN DESCRIPTION - LOCATION: LOCATION: HIP

## 2020-06-18 NOTE — PROGRESS NOTES
Left.      Hyperlipidemia 8/29/2011    Hypertension     Leg swelling 11/7/2018    Lymph node enlargement     Lymphedema of both lower extremities 11/7/2018    MI (myocardial infarction) ([facility] [address]     Nephrolithiasis     Follows with Dr. Constantine Garcia.  Pericardial effusion     Pulmonary nodule     With mediastinal lymphadenopathy. Stable. Follows with Dr. Lawson Aguirre.  Rib lesion 11/28/11    expansile lesion in one of the right ribs laterally    Sarcoidosis 07/26/11    per transbronchial needle aspiration    Subclavian vein occlusion, bilateral ([address] 4/18/2012    Type II or unspecified type diabetes mellitus without mention of complication, not stated as uncontrolled      Past Surgical History:   Procedure Laterality Date    APPENDECTOMY      ARTERIAL BYPASS SURGRY      BREAST LUMPECTOMY  9/27/2005    LEFT    CARDIAC SURGERY      CHOLECYSTECTOMY      COLONOSCOPY  12/2007    cecal arteriovenous malformation with hyperplastic polyps    COLONOSCOPY  [id number]    CORONARY ARTERY BYPASS GRAFT  05/2008    triple bypass    ECHO COMPL W DOP COLOR FLOW  10/30/2013         EYE SURGERY      clarissa cataracts    HYSTERECTOMY  1975, 1985    MAYNOR prolapse, benign conditions; BSO later for scar tissues, no CA.      OTHER SURGICAL HISTORY  11/18/11    port removal right chest wall    PARACENTESIS      TONSILLECTOMY      TOOTH EXTRACTION      Full Dental Extraction.  TUNNELED VENOUS PORT PLACEMENT      removal of port Dec. 2011- Dr. Erik Hernandez TUNNELED [place] [id number]  [id number]    RIGHT CHEST       Allergies : Allergies   Allergen Reactions    Macrobid [Nitrofurantoin Monohydrate Macrocrystals] Hives       Medication List :    Current Outpatient Medications   Medication Sig Dispense Refill    oxyCODONE (ROXICODONE) 5 MG immediate release tablet Take 0.5 tablets by mouth every 6 hours as needed for Pain for up to 14 days. Intended supply: 5 days.  Take lowest dose possible to manage pain 24 tablet 0    clindamycin (CLEOCIN) 150 MG capsule Take 3 capsules by mouth 3 times daily for 14 days 126 capsule 0    PT/INR Testing Monitor KIT 1 each by Does not apply route once a week Use to test INR at home once weekly and as directed to monitor INR. 1 kit 0    PT/INR Test STRP 1 each by In Vitro route once a week Use to check INR at least once weekly and more often as directed. 48 strip 1    metoprolol succinate (TOPROL XL) 25 MG extended release tablet Take 0.5 tablets by mouth daily 60 tablet 3    omeprazole 20 MG EC tablet Take 1 tablet by mouth daily 90 tablet 0    bumetanide (BUMEX) 1 MG tablet Take 1 tablet by mouth daily 90 tablet 1    pregabalin (LYRICA) 50 MG capsule Take 1 capsule by mouth 3 times daily for 120 days. 90 capsule 3    magnesium oxide (MAG-OX) 400 (240 Mg) MG tablet Take 1 tablet by mouth daily 90 tablet 1    docusate (COLACE, DULCOLAX) 100 MG CAPS Take 100 mg by mouth daily 90 capsule 3    PT/INR Testing Monitor KIT 1 each by Does not apply route once a week Please use to test INR as directed by physician 1 kit 1    isosorbide dinitrate (ISORDIL) 5 MG tablet Take 1 tablet by mouth 3 times daily 90 tablet 3    vitamin B-12 (CYANOCOBALAMIN) 1000 MCG tablet Take 1 tablet by mouth daily 90 tablet 3    Calcium Citrate-Vitamin D (RA CALCIUM CIT-VIT D-3 PETITES) 200-250 MG-UNIT TABS take 1 tablet by mouth twice a day 180 tablet 3    albuterol sulfate (PROAIR RESPICLICK) 836 (90 Base) MCG/ACT aerosol powder inhalation Inhale 2 puffs into the lungs every 6 hours as needed for Wheezing or Shortness of Breath 1 Inhaler 2    warfarin (COUMADIN) 1 MG tablet Please take 2 mg by mouth most days, but 3 mg by mouth on Mondays and Wednesdays and Friday.  (Patient taking differently: Please take 2 mg by mouth most days, but 3 mg by mouth on sun, tues, thurs.) 180 tablet 0    Multiple Vitamins-Minerals (THERAPEUTIC MULTIVITAMIN-MINERALS) tablet Take 1 tablet by mouth daily 90 tablet 3    Insulin Pen Needle (MEIJER PEN NEEDLES) 29G X 12MM MISC 1 each by Does not apply route daily 100 each 3    nitroGLYCERIN (NITROSTAT) 0.4 MG SL tablet up to max of 3 total doses. If no relief after 1 dose, call 911. 25 tablet 3    sodium chloride (OCEAN, BABY AYR) 0.65 % nasal spray 1 spray by Nasal route as needed for Congestion (dryness) 60 mL 0    palbociclib (IBRANCE) 100 MG capsule Take 100 mg by mouth 3 weeks on and 1 week off also receives injections every 3 weeks per Dr Mil Ochoa       No current facility-administered medications for this visit. Review of Systems :  Review of Systems   Constitutional: Negative for activity change and fever. HENT: Negative for congestion. Eyes: Negative for discharge and visual disturbance. Respiratory: Negative for cough and shortness of breath. Cardiovascular: Negative for chest pain, palpitations and leg swelling. Gastrointestinal: Negative for abdominal distention, abdominal pain, blood in stool, diarrhea, nausea and vomiting. Genitourinary: Negative for dyspareunia. Musculoskeletal: Positive for myalgias. Negative for arthralgias. Skin: Negative for color change. Neurological: Negative for dizziness. Hematological: Negative for adenopathy. Physical Exam :  Vitals: BP (!) 149/64 (Site: Right Upper Arm, Position: Sitting, Cuff Size: Medium Adult)   Pulse 81   Temp 97.6 °F (36.4 °C) (Temporal)   Ht 5' 4\" (1.626 m)   Wt 163 lb (73.9 kg)   SpO2 95%   BMI 27.98 kg/m²     General Appearance: Well developed, awake, alert, oriented, and in NAD. Neck: Supple, symmetrical, trachea midline. No JVD or carotid bruits. Chest wall/Lung: CTAB, respirations unlabored. No ronchi/wheezing/rales  Heart[de-identified] RRR, normal S1 and S2, no murmurs, rubs or gallops. Abdomen: Soft, non tender, not distended, normal BS  Extremities: Extremities normal, atraumatic, no cyanosis, clubbing or edema.   Skin: Erythematous induration in the right hip measuring 11cm    left hip

## 2020-06-19 ENCOUNTER — APPOINTMENT (OUTPATIENT)
Dept: MRI IMAGING | Age: 75
DRG: 571 | End: 2020-06-19
Attending: FAMILY MEDICINE
Payer: COMMERCIAL

## 2020-06-19 ENCOUNTER — APPOINTMENT (OUTPATIENT)
Dept: ULTRASOUND IMAGING | Age: 75
DRG: 571 | End: 2020-06-19
Attending: FAMILY MEDICINE
Payer: COMMERCIAL

## 2020-06-19 LAB
ABO/RH: NORMAL
ANION GAP SERPL CALCULATED.3IONS-SCNC: 10 MMOL/L (ref 7–16)
ANISOCYTOSIS: ABNORMAL
ANTIBODY SCREEN: NORMAL
BASOPHILS ABSOLUTE: 0 E9/L (ref 0–0.2)
BASOPHILS RELATIVE PERCENT: 0.7 % (ref 0–2)
BLOOD BANK DISPENSE STATUS: NORMAL
BLOOD BANK PRODUCT CODE: NORMAL
BPU ID: NORMAL
BUN BLDV-MCNC: 30 MG/DL (ref 8–23)
C-REACTIVE PROTEIN: 4.2 MG/DL (ref 0–0.4)
CALCIUM SERPL-MCNC: 8.2 MG/DL (ref 8.6–10.2)
CHLORIDE BLD-SCNC: 103 MMOL/L (ref 98–107)
CO2: 25 MMOL/L (ref 22–29)
CREAT SERPL-MCNC: 1.8 MG/DL (ref 0.5–1)
DESCRIPTION BLOOD BANK: NORMAL
EOSINOPHILS ABSOLUTE: 0.24 E9/L (ref 0.05–0.5)
EOSINOPHILS RELATIVE PERCENT: 7.9 % (ref 0–6)
GFR AFRICAN AMERICAN: 33
GFR NON-AFRICAN AMERICAN: 27 ML/MIN/1.73
GLUCOSE BLD-MCNC: 92 MG/DL (ref 74–99)
HBA1C MFR BLD: 8.1 % (ref 4–5.6)
HCT VFR BLD CALC: 21.6 % (ref 34–48)
HEMOGLOBIN: 6.9 G/DL (ref 11.5–15.5)
HYPOCHROMIA: ABNORMAL
INR BLD: 2.1
LYMPHOCYTES ABSOLUTE: 0.45 E9/L (ref 1.5–4)
LYMPHOCYTES RELATIVE PERCENT: 14.9 % (ref 20–42)
MCH RBC QN AUTO: 38.8 PG (ref 26–35)
MCHC RBC AUTO-ENTMCNC: 31.9 % (ref 32–34.5)
MCV RBC AUTO: 121.3 FL (ref 80–99.9)
MONOCYTES ABSOLUTE: 0.09 E9/L (ref 0.1–0.95)
MONOCYTES RELATIVE PERCENT: 2.6 % (ref 2–12)
NEUTROPHILS ABSOLUTE: 2.25 E9/L (ref 1.8–7.3)
NEUTROPHILS RELATIVE PERCENT: 74.6 % (ref 43–80)
OVALOCYTES: ABNORMAL
PDW BLD-RTO: 15.3 FL (ref 11.5–15)
PLATELET # BLD: 79 E9/L (ref 130–450)
PLATELET CONFIRMATION: NORMAL
PMV BLD AUTO: 11.3 FL (ref 7–12)
POIKILOCYTES: ABNORMAL
POLYCHROMASIA: ABNORMAL
POTASSIUM REFLEX MAGNESIUM: 4.3 MMOL/L (ref 3.5–5)
PROTHROMBIN TIME: 23.7 SEC (ref 9.3–12.4)
RBC # BLD: 1.78 E12/L (ref 3.5–5.5)
SCHISTOCYTES: ABNORMAL
SEDIMENTATION RATE, ERYTHROCYTE: 130 MM/HR (ref 0–20)
SODIUM BLD-SCNC: 138 MMOL/L (ref 132–146)
TARGET CELLS: ABNORMAL
TEAR DROP CELLS: ABNORMAL
WBC # BLD: 3 E9/L (ref 4.5–11.5)

## 2020-06-19 PROCEDURE — P9016 RBC LEUKOCYTES REDUCED: HCPCS

## 2020-06-19 PROCEDURE — 36415 COLL VENOUS BLD VENIPUNCTURE: CPT

## 2020-06-19 PROCEDURE — 99232 SBSQ HOSP IP/OBS MODERATE 35: CPT | Performed by: SURGERY

## 2020-06-19 PROCEDURE — 85651 RBC SED RATE NONAUTOMATED: CPT

## 2020-06-19 PROCEDURE — 6360000002 HC RX W HCPCS: Performed by: STUDENT IN AN ORGANIZED HEALTH CARE EDUCATION/TRAINING PROGRAM

## 2020-06-19 PROCEDURE — 76705 ECHO EXAM OF ABDOMEN: CPT

## 2020-06-19 PROCEDURE — 86900 BLOOD TYPING SEROLOGIC ABO: CPT

## 2020-06-19 PROCEDURE — 2580000003 HC RX 258: Performed by: STUDENT IN AN ORGANIZED HEALTH CARE EDUCATION/TRAINING PROGRAM

## 2020-06-19 PROCEDURE — 86850 RBC ANTIBODY SCREEN: CPT

## 2020-06-19 PROCEDURE — 83036 HEMOGLOBIN GLYCOSYLATED A1C: CPT

## 2020-06-19 PROCEDURE — 85025 COMPLETE CBC W/AUTO DIFF WBC: CPT

## 2020-06-19 PROCEDURE — 85610 PROTHROMBIN TIME: CPT

## 2020-06-19 PROCEDURE — 6370000000 HC RX 637 (ALT 250 FOR IP): Performed by: STUDENT IN AN ORGANIZED HEALTH CARE EDUCATION/TRAINING PROGRAM

## 2020-06-19 PROCEDURE — 73721 MRI JNT OF LWR EXTRE W/O DYE: CPT

## 2020-06-19 PROCEDURE — 86923 COMPATIBILITY TEST ELECTRIC: CPT

## 2020-06-19 PROCEDURE — 99222 1ST HOSP IP/OBS MODERATE 55: CPT | Performed by: FAMILY MEDICINE

## 2020-06-19 PROCEDURE — 86901 BLOOD TYPING SEROLOGIC RH(D): CPT

## 2020-06-19 PROCEDURE — 1200000000 HC SEMI PRIVATE

## 2020-06-19 PROCEDURE — 99221 1ST HOSP IP/OBS SF/LOW 40: CPT | Performed by: ORTHOPAEDIC SURGERY

## 2020-06-19 PROCEDURE — 86140 C-REACTIVE PROTEIN: CPT

## 2020-06-19 PROCEDURE — 80048 BASIC METABOLIC PNL TOTAL CA: CPT

## 2020-06-19 PROCEDURE — 36430 TRANSFUSION BLD/BLD COMPNT: CPT

## 2020-06-19 RX ORDER — 0.9 % SODIUM CHLORIDE 0.9 %
20 INTRAVENOUS SOLUTION INTRAVENOUS ONCE
Status: COMPLETED | OUTPATIENT
Start: 2020-06-19 | End: 2020-06-19

## 2020-06-19 RX ORDER — DIPHENHYDRAMINE HCL 25 MG
12.5 TABLET ORAL ONCE
Status: COMPLETED | OUTPATIENT
Start: 2020-06-19 | End: 2020-06-19

## 2020-06-19 RX ADMIN — BUMETANIDE 1 MG: 1 TABLET ORAL at 08:33

## 2020-06-19 RX ADMIN — SODIUM CHLORIDE, PRESERVATIVE FREE 10 ML: 5 INJECTION INTRAVENOUS at 19:59

## 2020-06-19 RX ADMIN — DIPHENHYDRAMINE HYDROCHLORIDE 12.5 MG: 25 TABLET ORAL at 08:57

## 2020-06-19 RX ADMIN — MEROPENEM 1 G: 1 INJECTION, POWDER, FOR SOLUTION INTRAVENOUS at 22:42

## 2020-06-19 RX ADMIN — MEROPENEM 1 G: 1 INJECTION, POWDER, FOR SOLUTION INTRAVENOUS at 05:08

## 2020-06-19 RX ADMIN — OXYCODONE 2.5 MG: 5 TABLET ORAL at 03:56

## 2020-06-19 RX ADMIN — VANCOMYCIN HYDROCHLORIDE 1000 MG: 1 INJECTION, POWDER, LYOPHILIZED, FOR SOLUTION INTRAVENOUS at 08:33

## 2020-06-19 RX ADMIN — METOPROLOL SUCCINATE 12.5 MG: 25 TABLET, EXTENDED RELEASE ORAL at 08:34

## 2020-06-19 RX ADMIN — OXYCODONE 2.5 MG: 5 TABLET ORAL at 19:57

## 2020-06-19 RX ADMIN — ISOSORBIDE DINITRATE 5 MG: 10 TABLET ORAL at 14:51

## 2020-06-19 RX ADMIN — MEROPENEM 1 G: 1 INJECTION, POWDER, FOR SOLUTION INTRAVENOUS at 14:51

## 2020-06-19 RX ADMIN — ISOSORBIDE DINITRATE 5 MG: 10 TABLET ORAL at 08:33

## 2020-06-19 RX ADMIN — MAGNESIUM GLUCONATE 500 MG ORAL TABLET 400 MG: 500 TABLET ORAL at 08:33

## 2020-06-19 RX ADMIN — ISOSORBIDE DINITRATE 5 MG: 10 TABLET ORAL at 19:58

## 2020-06-19 RX ADMIN — SODIUM CHLORIDE 20 ML: 9 INJECTION, SOLUTION INTRAVENOUS at 18:03

## 2020-06-19 RX ADMIN — DOCUSATE SODIUM 100 MG: 100 CAPSULE, LIQUID FILLED ORAL at 08:34

## 2020-06-19 ASSESSMENT — PAIN DESCRIPTION - PAIN TYPE
TYPE: ACUTE PAIN;CHRONIC PAIN
TYPE: ACUTE PAIN

## 2020-06-19 ASSESSMENT — PAIN SCALES - GENERAL
PAINLEVEL_OUTOF10: 9
PAINLEVEL_OUTOF10: 7
PAINLEVEL_OUTOF10: 0

## 2020-06-19 ASSESSMENT — PAIN DESCRIPTION - ONSET
ONSET: ON-GOING
ONSET: ON-GOING

## 2020-06-19 ASSESSMENT — PAIN DESCRIPTION - DESCRIPTORS
DESCRIPTORS: ACHING;CONSTANT;DISCOMFORT
DESCRIPTORS: ACHING;DISCOMFORT;SORE

## 2020-06-19 ASSESSMENT — PAIN DESCRIPTION - FREQUENCY
FREQUENCY: CONTINUOUS
FREQUENCY: CONTINUOUS

## 2020-06-19 ASSESSMENT — PAIN DESCRIPTION - ORIENTATION
ORIENTATION: RIGHT
ORIENTATION: RIGHT

## 2020-06-19 ASSESSMENT — PAIN DESCRIPTION - LOCATION
LOCATION: HIP
LOCATION: HIP

## 2020-06-19 NOTE — CARE COORDINATION
6/19/2020 social work transition of care planning  Pt is from home alone has w/w,w/c,and s/c. Pt has hx of snf at [facility] will request they follow and is active with [facility]-will need jeremiah orders at discharge. Pt Pcp is dr Aiden Muir and pharmacy is [facility] on [address]. Explained sw role in transition of care planning. Pt would like to return home if appropriate. Will await further evals and recs to assist with transition of care. Iveth Calhoun will follow.   Electronically signed by DEYANIRA Borja on 6/19/2020 at 3:16 PM

## 2020-06-19 NOTE — PROGRESS NOTES
Nutrition Assessment    Type and Reason for Visit: Initial, Positive Nutrition Screen    Nutrition Recommendations: Continue current diet as ordered. Will add ONS to promote optimal PO intake/healing. Nutrition Assessment: Pt w/ increased nutrient needs 2/2 wounds increasing demand. WIll add Robin BID, Ensure BID. Noted past hx of breast ca w/ mets     Malnutrition Assessment:  · Malnutrition Status: Insufficient data  · Context: Acute illness or injury  · Findings of the 6 clinical characteristics of malnutrition (Minimum of 2 out of 6 clinical characteristics is required to make the diagnosis of moderate or severe Protein Calorie Malnutrition based on AND/ASPEN Guidelines):  1. Energy Intake-Unable to assess(d/t none documented),      2. Weight Loss-Unable to assess(d/t no updated wt ),    3. Fat Loss-Unable to assess(d/t Iso precautions for patient. ),    4. Muscle Loss-Unable to assess,    5. Fluid Accumulation-No significant fluid accumulation,    6.  Strength-Not measured    Nutrition Risk Level:  Moderate    Nutrient Needs:  · Estimated Daily Total Kcal: 4934-2423(20-25)  · Estimated Daily Protein (g): 80-90(1.3-1.5)  · Estimated Daily Total Fluid (ml/day): 1600ml     Nutrition Diagnosis:   · Problem: Increased nutrient needs  · Etiology: related to Increased demand for energy/nutrients     Signs and symptoms:  as evidenced by Presence of wounds    Objective Information:  · Nutrition-Focused Physical Findings: +I/Os, abd WNL, +2 BLE edema noted,   · Wound Type: Multiple  · Current Nutrition Therapies:  · Oral Diet Orders: General   · Oral Diet intake: (none documented)  · Oral Nutrition Supplement (ONS) Orders: None  · Anthropometric Measures:  · Ht: 5' 4\" (162.6 cm)   · Current Body Wt: 150 lb (68 kg)(no method noted )  · Usual Body Wt: 199 lb (90.3 kg)(7/2019 actual, 159# noted 8/2019 )  · Ideal Body Wt: 120 lb (54.4 kg), % [place] Body BERNY d/t no actual wt taken this admit- however note possibel 40# wt loss over 1 month last year per EMR? unable to verify   · BMI Classification: BMI 25.0 - 29.9 Overweight    Nutrition Interventions:   Continue current diet, Start ONS  Continued Inpatient Monitoring    Nutrition Evaluation:   · Evaluation: Goals set   · Goals: consume 75% or more of most meals/ONS    · Monitoring: Meal Intake, Supplement Intake, Diet Tolerance, Skin Integrity, Wound Healing, I&O, Weight, Pertinent Labs, Monitor Bowel Function      Electronically signed by Javier Steven RD, LD on 6/19/20 at 2:49 PM EDT    Contact Number: [phone]

## 2020-06-19 NOTE — PROGRESS NOTES
[facility]  Family Medicine Attending    S: 76 y.o. female with history of HTN, DVT, breast cancer s/p lumpectomy with mets who was sent for direct admission for further evaluation of bilateral chronic non healing pressure ulcers of hips due to concern for osteomyelitis and failure of outpatient treatment. Patient seen and examined on rounds. Patient is having mild pain in her bilateral hips. Also has some abdominal discomfort. She reports her abdomen has been feeling more tense and in the past has required paracentensis due to fluid accumulation. Denies N/V, fevers, chills, diarrhea. O: VS- Blood pressure (!) 124/58, pulse 80, temperature 98.2 °F (36.8 °C), temperature source Temporal, resp. rate 16, height 5' 4\" (1.626 m), weight 150 lb (68 kg), SpO2 92 %, not currently breastfeeding. Exam is as noted by resident with the following changes, additions or corrections:  Gen - lying in bed, NAD  Cardio - RRR , no murmur   Resp - lungs CTAB , no wheeze    Abd- BS+, abdomen tense and diffusely tender    Ext - 1-2+ pitting edema    Skin - right hip with eschar no drainage , left hip wound with area of necrotic fibrinous tissue and granulation tissue with surrounding erythema and mild induration. Impressions: Active Problems:    Cellulitis and abscess of buttock  Resolved Problems:    * No resolved hospital problems. *      Plan:   Wound care, surgery , ID consult. Check MRI bilateral hips to rule out osteo. Transfuse 1u pRBC for Hb of 6.9. US of abdomen to assess for ascites. Continue vanc and merrem. Monitor labs. Follow H&H. Attending Physician Statement  I have reviewed the chart, including any radiology or labs, and seen the patient with the resident(s). I personally reviewed and performed key elements of the history and exam.  I agree with the assessment, plan and orders as documented by the resident. Please refer to the resident note for additional information.       Shwetha Weeks LOURDES Faust

## 2020-06-19 NOTE — PROGRESS NOTES
[facility] - Family Medicine Inpatient   Resident Progress Note    S:  Hospital day: 1   Antibiotic: Vancomycin and Meropenem day 2    Overnight/interim: Vitals are stable. No acute events happened overnight    Patient seen and examined today morning. She is complaining of itching over left gluteal region. She has pain and tenderness around ulcer. Denies fever, cough, chest pain, sob, palpitations, nausea, vomiting. Cont meds:   Scheduled meds:    bumetanide  1 mg Oral Daily    docusate sodium  100 mg Oral Daily    isosorbide dinitrate  5 mg Oral TID    magnesium oxide  400 mg Oral Daily    metoprolol succinate  12.5 mg Oral Daily    pantoprazole  40 mg Oral QAM AC    sodium chloride flush  10 mL Intravenous 2 times per day    vancomycin  15 mg/kg Intravenous Q12H    meropenem  1 g Intravenous Q8H     I reviewed the patient's past medical and surgical history, Medications and Allergies. O:  BP (!) 121/57   Pulse 77   Temp 97.3 °F (36.3 °C) (Temporal)   Resp 16   Ht 5' 4\" (1.626 m)   Wt 150 lb (68 kg)   SpO2 93%   BMI 25.75 kg/m²   24 hour I&O: I/O last 3 completed shifts: In: 18 [P.O.:120; IV Piggyback:450]  Out: -   No intake/output data recorded. General: Alert, cooperative, no acute distress. Chest: No tenderness or deformity, full & symmetric excursion  Lung: Clear to auscultation bilaterally,  respirations unlabored. No rales/wheezing/rubs  Heart: RRR, S1 and S2 normal, no murmur, rub or gallop. DP pulses 2/4  Abdomen: Soft, non tender, not distended  Extremities:  Extremities normal, atraumatic, no cyanosis or edema. Distal pulses equal bilateral  Skin:   Right gluteal region: Healed wound with eschar, mild erythematous induration in the right hip, tender to palpation    left gluteal region: necrotic, fibrinous granulation tissue covering central ulceration, tender to palpation, mild induration  Neurologic: Alert & Oriented;; Normal and symmetric strength in UEs and LEs;  Sensation

## 2020-06-19 NOTE — CONSULTS
[address] [place] [address] [facility]    Consultation Note     Admit Date: 6/18/2020  6:41 PM    Reason for Consult:   Decubitus wounds    Attending Physician:  Yadira Tuttle MD     Chief Complaint: she has no complaints at this time    HISTORY OF PRESENT ILLNESS:   The patient is a 76 y.o.  female known to the Infectious Diseases service. The patient has the below past medical history. She was admitted with bialteral hip/gluteal wound and coccyx wound. She has a history of breast cancer with a left lumpectomy. She was seen as an outpt by Dr. Carter Back and recommended inpatient evaluation for wound  To rule out osteomyelitis   She is getting MRI of both hips. She was seen by surgery who will evaluate the best way for debridement. She has been treated multiple times with oral antibiotics. She has refused hospital care in the past according to family medicine. Past Medical History:        Diagnosis Date    Abscess of abdominal wall     Anemia     Iron deficiency and chronic disease.  Anesthesia     DIFFICULTY WAKING UP    Arthritis     Arthritis, hip     Breast cancer ([facility] [address] 2005    S/P Left Lumpectomy with Lymph Node Dissection, Chemo/Rad. Follows with Dr. Gaston Campbell. No recurrence to date. Surgeon was Dr. Roque Montaño.  CAD (coronary artery disease) 5/2008    s/p CABG. Follows with [address] [place] [address].  CHF (congestive heart failure) (HCC)     Chronic venous insufficiency 11/7/2018    Class 2 severe obesity due to excess calories with serious comorbidity and body mass index (BMI) of 35.0 to 35.9 in adult [facility]) 8/1/2019    Decreased dorsalis pedis pulse 11/7/2018    Diabetic neuropathy (HCC)     Diabetic neuropathy (HCC)     DVT (deep venous thrombosis) (HCC)     Recurrent. On lifelong coumadin.  DVT of upper extremity (deep vein thrombosis) ([facility] [address] 4/18/2012    History of deep vein thrombosis 11/7/2018    Humerus fracture     Chronic, on the Left.       Hyperlipidemia 8/29/2011    times per day    meropenem  1 g Intravenous Q8H     Continuous Infusions:  PRN Meds:sodium chloride flush, acetaminophen **OR** acetaminophen, polyethylene glycol, promethazine **OR** ondansetron, oxyCODONE, albuterol    Allergies:  Macrobid [nitrofurantoin monohydrate macrocrystals]    Social History:   Social History     Socioeconomic History    Marital status:      Spouse name: Not on file    Number of children: Not on file    Years of education: Not on file    Highest education level: Not on file   Occupational History    Not on file   Social Needs    Financial resource strain: Not on file    Food insecurity     Worry: Not on file     Inability: Not on file    Transportation needs     Medical: Not on file     Non-medical: Not on file   Tobacco Use    Smoking status: Never Smoker    Smokeless tobacco: Never Used   Substance and Sexual Activity    Alcohol use: No    Drug use: No    Sexual activity: Never   Lifestyle    Physical activity     Days per week: Not on file     Minutes per session: Not on file    Stress: Not on file   Relationships    Social connections     Talks on phone: Not on file     Gets together: Not on file     Attends Jainism service: Not on file     Active member of club or organization: Not on file     Attends meetings of clubs or organizations: Not on file     Relationship status: Not on file    Intimate partner violence     Fear of current or ex partner: Not on file     Emotionally abused: Not on file     Physically abused: Not on file     Forced sexual activity: Not on file   Other Topics Concern    Not on file   Social History Narrative    Not on file     Tobacco: No  Alcohol: No  Pets: No  Travel: No    Family History:       Adopted: Yes   . Otherwise non-pertinent to the chief complaint. REVIEW OF SYSTEMS:    CONSTITUTIONAL:  No chills, fevers or night sweats. No loss of weight. EYES:  No double vision or drainage from eyes, ears or throat.   HEENT:  No

## 2020-06-19 NOTE — PROGRESS NOTES
[facility] - WHITE TEAM  TRAUMA/GENERAL SURGERY  ATTENDING PROGRESS NOTE      CC:   Bilateral hip wounds    Subjective:  Admitted yesterday for evaluation of chronic hip wounds and coccygeal wound      Objective:  Vitals:    06/1945 06/18/20 2304 06/19/20 0715 06/19/20 1515   BP:  (!) 121/57 (!) 124/58 (!) 112/56   Pulse:  77 80 65   Resp:  16 16 16   Temp:  97.3 °F (36.3 °C) 98.2 °F (36.8 °C) 98.3 °F (36.8 °C)   TempSrc:  Temporal Temporal Temporal   SpO2:  93% 92% 95%   Weight: 150 lb (68 kg)      Height: 5' 4\" (1.626 m)           I/O last 3 completed shifts: In: 36 [P.O.:120; IV Piggyback:700]  Out: -     General - no apparent distress   Neuro - Awake, alert, attentive   Ext -   right hip and sacral wounds have small dry eschars without fluctuance or erythema or tenderness. Left hip has area of erythema induration, tenderness and full-thickness skin necrosis with underlying firmness and induration      Assessment:    Bilateral chronic hip wounds and sacral wound-possible involvement of left hip bones on MRI  Chronic anticoagulation-on Coumadin  Diabetes mellitus-poorly controlled    Plan:  Hold anticoagulation  Recommend orthopedic consult to evaluate for osteomyelitis of hip- if no osteomyelitis appreciated then we will perform bedside debridement of left hip wound otherwise orthopedic surgery may need to consider bone debridement      NOTE: This report was transcribed using voice recognition software. Every effort was made to ensure accuracy; however, inadvertent computerized transcription errors may be present.

## 2020-06-19 NOTE — CONSULTS
Department of Orthopedic Surgery  Resident Consult Note          Reason for Consult: Left OM vs. Metastatic lesions, Hip Pain    HISTORY OF PRESENT ILLNESS:       Patient is a 76 y.o. female with extensive past medical history who originally comes to the hospital due to bilateral hip and sacral wounds. She is currently being treated with antibiotics and being follow by general surgery for these wounds. MRI was obtained that revealed multiple lesions including the head, proximal femoral neck and acetabulum. She has known metastatic breast cancer to the ribs. She states she has had breast cancer since 2005. Currently the patient states that these wounds have been present for roughly 3 months. She denies any fevers or chills currently. She states that she has had Left>> right hip pain for multiple months as well. She ambulates with a walker. Patient has a sig past medical history of metastatic breast cancer, diabetes, CHF, MI, among others. She denies any new numbness or tingling. She denies any other orthopedic complaints at this time. Past Medical History:        Diagnosis Date    Abscess of abdominal wall     Anemia     Iron deficiency and chronic disease.  Anesthesia     DIFFICULTY WAKING UP    Arthritis     Arthritis, hip     Breast cancer ([facility] [address] 2005    S/P Left Lumpectomy with Lymph Node Dissection, Chemo/Rad. Follows with Dr. Reji Rodriguez. No recurrence to date. Surgeon was Dr. Gianna Allen.  CAD (coronary artery disease) 5/2008    s/p CABG. Follows with [address] [place] [address].  CHF (congestive heart failure) (HCC)     Chronic venous insufficiency 11/7/2018    Class 2 severe obesity due to excess calories with serious comorbidity and body mass index (BMI) of 35.0 to 35.9 in adult [facility]) 8/1/2019    Decreased dorsalis pedis pulse 11/7/2018    Diabetic neuropathy (HCC)     Diabetic neuropathy (HCC)     DVT (deep venous thrombosis) (HCC)     Recurrent. On lifelong coumadin.     DVT of upper extremity (deep vein thrombosis) ([facility] [address] 4/18/2012    History of deep vein thrombosis 11/7/2018    Humerus fracture     Chronic, on the Left.  Hyperlipidemia 8/29/2011    Hypertension     Leg swelling 11/7/2018    Lymph node enlargement     Lymphedema of both lower extremities 11/7/2018    MI (myocardial infarction) ([facility] [address]     Nephrolithiasis     Follows with Dr. Cipriano Anthony.  Pericardial effusion     Pulmonary nodule     With mediastinal lymphadenopathy. Stable. Follows with Dr. Gaye Yanez.  Rib lesion 11/28/11    expansile lesion in one of the right ribs laterally    Sarcoidosis 07/26/11    per transbronchial needle aspiration    Subclavian vein occlusion, bilateral ([facility] [address] 4/18/2012    Type II or unspecified type diabetes mellitus without mention of complication, not stated as uncontrolled      Past Surgical History:        Procedure Laterality Date    APPENDECTOMY      ARTERIAL BYPASS SURGRY      BREAST LUMPECTOMY  9/27/2005    LEFT    CARDIAC SURGERY      CHOLECYSTECTOMY      COLONOSCOPY  12/2007    cecal arteriovenous malformation with hyperplastic polyps    COLONOSCOPY  [id number]    CORONARY ARTERY BYPASS GRAFT  05/2008    triple bypass    ECHO COMPL W DOP COLOR FLOW  10/30/2013         EYE SURGERY      clarissa cataracts    HYSTERECTOMY  1975, 1985    MAYNOR prolapse, benign conditions; BSO later for scar tissues, no CA.      OTHER SURGICAL HISTORY  11/18/11    port removal right chest wall    PARACENTESIS      TONSILLECTOMY      TOOTH EXTRACTION      Full Dental Extraction.     TUNNELED VENOUS PORT PLACEMENT      removal of port Dec. 2011- Dr. Adina Kebede TUNNELED VENOUS PORT PLACEMENT  [id number]    RIGHT CHEST     Current Medications:   Current Facility-Administered Medications: palbociclib (IBRANCE) capsule CAPS 100 mg, 100 mg, Oral, Daily  0.9 % sodium chloride bolus, 20 mL, Intravenous, Once  [START ON 6/20/2020] vancomycin 1000 mg IVPB in 250 mL D5W addavial, 1,000 mg, Intravenous, Q36H  bumetanide (BUMEX) tablet 1 mg, 1 mg, Oral, Daily  docusate sodium (COLACE) capsule 100 mg, 100 mg, Oral, Daily  isosorbide dinitrate (ISORDIL) tablet 5 mg, 5 mg, Oral, TID  magnesium oxide (MAG-OX) tablet 400 mg, 400 mg, Oral, Daily  metoprolol succinate (TOPROL XL) extended release tablet 12.5 mg, 12.5 mg, Oral, Daily  pantoprazole (PROTONIX) tablet 40 mg, 40 mg, Oral, QAM AC  sodium chloride flush 0.9 % injection 10 mL, 10 mL, Intravenous, 2 times per day  sodium chloride flush 0.9 % injection 10 mL, 10 mL, Intravenous, PRN  acetaminophen (TYLENOL) tablet 650 mg, 650 mg, Oral, Q6H PRN **OR** acetaminophen (TYLENOL) suppository 650 mg, 650 mg, Rectal, Q6H PRN  polyethylene glycol (GLYCOLAX) packet 17 g, 17 g, Oral, Daily PRN  promethazine (PHENERGAN) tablet 12.5 mg, 12.5 mg, Oral, Q6H PRN **OR** ondansetron (ZOFRAN) injection 4 mg, 4 mg, Intravenous, Q6H PRN  meropenem (MERREM) 1 g in sodium chloride 0.9 % 100 mL IVPB (mini-bag), 1 g, Intravenous, Q8H  oxyCODONE (ROXICODONE) immediate release tablet 2.5 mg, 2.5 mg, Oral, Q6H PRN  albuterol (PROVENTIL) nebulizer solution 2.5 mg, 2.5 mg, Nebulization, Q6H PRN  Allergies:  Macrobid [nitrofurantoin monohydrate macrocrystals]    Social History:   TOBACCO:   reports that she has never smoked. She has never used smokeless tobacco.  ETOH:   reports no history of alcohol use. DRUGS:   reports no history of drug use.   ACTIVITIES OF DAILY LIVING:    OCCUPATION:    Family History:       Adopted: Yes       REVIEW OF SYSTEMS:  CONSTITUTIONAL:  negative for current fevers, chills  EYES:  negative for acute vision changes  HEENT:  negative for  Acute hearing changes  RESPIRATORY:  + for chronic SOB  CARDIOVASCULAR:  negative for  Current chest pain  GASTROINTESTINAL:  negative for nausea, vomiting  GENITOURINARY:  + for urinary issues  HEMATOLOGIC/LYMPHATIC:  + for wounds, lymphedema  MUSCULOSKELETAL:  positive for  Left>>right hip pain  NEUROLOGICAL:  negative for headaches, dizziness  BEHAVIOR/PSYCH:  negative for increased agitation and anxiety    PHYSICAL EXAM:    VITALS:  BP (!) 159/70   Pulse 84   Temp 98.1 °F (36.7 °C) (Temporal)   Resp 16   Ht 5' 4\" (1.626 m)   Wt 150 lb (68 kg)   SpO2 95%   BMI 25.75 kg/m²   CONSTITUTIONAL:  Lethargic, alert, cooperative, pleasant  MUSCULOSKELETAL:  Left lower Extremity:  Decubitus ulcer with overlying tissue as well as redness. This decubitus ulcer is roughly 3 x 3 cm. There is no bone exposed, does seem to involve the subcutaneous tissue on exam.  -Log roll  - Heel Strike  -TTP over Knee and Ankle  Patient able to actively flex and extend hip with minimal pain. PROM also elicits minimal pain. Large movements are obtainable without guarding. Compartments soft and compressible, calf non-tender  Lymphedema and stasis dermatitis to bilateral lower extremities. Foot is warm and perfused. Sensation at baseline in superficial/deep peroneal,saphenous,sural,tibial n. distributions  · +GS/TA/EHL. Secondary Exam:     · rightLE: healing decubitus ulceration on right side measuring roughly 2x2 cm.  -heel strike, - log roll. -TTP to foot, ankle, leg, knee, thigh, hip. · Pelvis: -TTP, -Log roll, -Heel strike     DATA:    CBC:   Lab Results   Component Value Date    WBC 3.0 06/19/2020    RBC 1.78 06/19/2020    HGB 6.9 06/19/2020    HCT 21.6 06/19/2020    .3 06/19/2020    MCH 38.8 06/19/2020    MCHC 31.9 06/19/2020    RDW 15.3 06/19/2020    PLT 79 06/19/2020    MPV 11.3 06/19/2020     PT/INR:    Lab Results   Component Value Date    PROTIME 23.7 06/19/2020    PROTIME 26.2 05/19/2020    INR 2.1 06/19/2020     Lactic Acid :   Lab Results   Component Value Date    LACTA 1.1 08/09/2019       Radiology Review:  06/19/20 - MRI Pelvis and hips  There are multiple well demarcated lesions located in the proximal femoral head, proximal femoral neck and acetabulum. This is consistent with metastatic lesions.   There is soft tissue signal change in the area lateral to the greater trochanter consistent with gluteal wound. IMPRESSION:   · Closed, Left sided metastatic lesions to the hip, acetabulum. · Soft tissue infection    PLAN:  Currently patient status most consistent with metastasis to bone from her previous primary breast cancer. Her exam is also negative for infection concerning the hip joint itself. There is signal change adjacent to the greater trochanter that does not appear to involve bone at this time. Okay from orthopedic perspective for debridement of left sided ulceration. We will continue to follow. LLE WBAT  Pain control per primary  At this time, no urgent orthopedic interventions. Spoke with patient and daughter. Discussed with Dr. Kaylee Barrera. Electronically signed by Bandar Tapia DO on 6/19/20 at 6:32 PM EDT    Orthopaedic Attending    I have seen and evaluated the patient with the resident and agree with the above assessments on today's visit. I have performed the key components of the history and physical examination and concur completely with the findings and plans as documented above. Pleasant 57-year-old female with extensive past medical history including metastatic breast CA. Recent MRI demonstrated lytic lesions specifically about the left proximal femur and the femoral head and acetabulum. She also has ulceration on the left hip which is been present for approximately 3 months. Orthopedics was consulted regarding potential osteomyelitis of the left femur. General surgery is also involved with her care planning for I&D of the left hip wound. I have reviewed the MRI myself, do not feel the patient has osteomyelitis of the femur and this is more metastatic in origin, the edema about the greater trochanter is reactive and associated from her ulceration.   I do not feel that any aggressive debridement of her bone is indicated at this time, would recommend for wound care which general surgery is already providing. If concerned, could consider bone biopsy of the proximal femur. Orthopedics to follow peripherally if her condition or indications were to change.     Electronically signed by   Prasanth Workman DO  6/20/2020

## 2020-06-19 NOTE — PROGRESS NOTES
Med list provided by patient. This RN updated med rec. Family med resident notified. Will continue to monitor.

## 2020-06-19 NOTE — PROGRESS NOTES
Pharmacy Consultation Note  (Antibiotic Dosing and Monitoring)    Initial consult date: 2020  Consulting physician: Dr. Randi Dubin  Drug(s): vancomycin  Indication: CSSSI (B/L hips and coccyx)    Age/Gender IBW DW  Allergy Information   74 y.o./F 55.2 kg 68 kg  Macrobid [nitrofurantoin monohydrate macrocrystals]             Date  WBC BUN/CR Drug/Dose Time   Given Level(s)   (Time) Comments    X X vancomycin 1000 mg x12h 2132      3 30/1.8 vanco 1000 mg q12h 0833        vancomycin 1000 mg q36h <2000>                Estimated Creatinine Clearance: 26 mL/min (A) (based on SCr of 1.8 mg/dL (H)). Intake/Output Summary (Last 24 hours) at 2020 1521  Last data filed at 2020 0926  Gross per 24 hour   Intake 820 ml   Output --   Net 820 ml       Temp max: Temp (24hrs), Av.8 °F (36.6 °C), Min:97.3 °F (36.3 °C), Max:98.2 °F (36.8 °C)      Cultures:  available culture and sensitivity results were reviewed in [facility]      Assessment:  · Consulted by Dr. Macie Nicole to dose/monitor vancomycin. · Goal trough level:  15-20 mCg/mL. · 73 yo/F admitted for chronic B/L hip and coccyx wounds/infection. On numerous courses of OP PO ATBs PTA. · Cr = 1.8 (baseline = 1.3-2.3, most 1.7-1.9). · Empiric vanco and meropenem started. Received vancomycin 1000 mg q12h (x2 doses) starting . · ID has been contsulted. Plan:  · Change to vancomycin 1000 mg q36h starting on . · Will check vancomycin trough level when steady state is attained if vanco continues and if Pharmacy is to continue the consult. · Pharmacist will follow and monitor/adjust dosing as necessary.     Tiffanie Payan, PharmIVANNA  2020  3:25 PM  Pager: [phone]

## 2020-06-19 NOTE — CONSULTS
GENERAL SURGERY  CONSULT NOTE  6/19/2020    Physician Consulted: Dr. Zana Agarwal  Reason for Consult: ?buttock abscess  Referring Physician: Dr. Sanjeev Brizuela    HPI  Marlon Otto is a 76 y.o. female who presents for evaluation of chronic bilateral hip/gluteal wounds and coccyx wound x 5 mos. PMH of HTN,  Breast cancer s/p left lumpectomy, CAD s/p CABG, CHF, chronic venous insufficiency, hx of recurrent DVT on chronic coumadin. She was seen as an outpatient today by Dr. Ester Gray and recommended inpatient evaluation for her wounds to r/o possible osteomyelitis due to non-healing wounds after many months. She c/o b/l hip pain. States she does not ambulate much. Reports occasional drainage from L hip wound. She last took coumadin 6/18, INR 2.0. (goal 2-3). Am labs pending. She is afebrile and has otherwise been feeling her usual state of health. PSxHx:  Appendectomy, cholecystectomy, hysterectomy, CABG 2008 with saphenous vein graft, L lumpectomy, mediport and removal.  Hx of paracentesis. Denies  Tobacco use, EtOH use. Past Medical History:   Diagnosis Date    Abscess of abdominal wall     Anemia     Iron deficiency and chronic disease.  Anesthesia     DIFFICULTY WAKING UP    Arthritis     Arthritis, hip     Breast cancer ([address] 2005    S/P Left Lumpectomy with Lymph Node Dissection, Chemo/Rad. Follows with Dr. Shaan Julio. No recurrence to date. Surgeon was Dr. Linn Saldaña.  CAD (coronary artery disease) 5/2008    s/p CABG. Follows with [address] [place] [address].  CHF (congestive heart failure) (HCC)     Chronic venous insufficiency 11/7/2018    Class 2 severe obesity due to excess calories with serious comorbidity and body mass index (BMI) of 35.0 to 35.9 in adult [facility]) 8/1/2019    Decreased dorsalis pedis pulse 11/7/2018    Diabetic neuropathy (HCC)     Diabetic neuropathy (HCC)     DVT (deep venous thrombosis) (HCC)     Recurrent. On lifelong coumadin.     DVT of upper extremity (deep vein thrombosis) ([address] 4/18/2012    History of deep vein thrombosis 11/7/2018    Humerus fracture     Chronic, on the Left.  Hyperlipidemia 8/29/2011    Hypertension     Leg swelling 11/7/2018    Lymph node enlargement     Lymphedema of both lower extremities 11/7/2018    [facility]) [facility] [address]     Nephrolithiasis     Follows with Dr. Migel Peters.  Pericardial effusion     Pulmonary nodule     With mediastinal lymphadenopathy. Stable. Follows with Dr. Erica Magana.  Rib lesion 11/28/11    expansile lesion in one of the right ribs laterally    Sarcoidosis 07/26/11    per transbronchial needle aspiration    Subclavian vein occlusion, bilateral ([address] 4/18/2012    Type II or unspecified type diabetes mellitus without mention of complication, not stated as uncontrolled        Past Surgical History:   Procedure Laterality Date    APPENDECTOMY      ARTERIAL BYPASS SURGRY      BREAST LUMPECTOMY  9/27/2005    LEFT    CARDIAC SURGERY      CHOLECYSTECTOMY      COLONOSCOPY  12/2007    cecal arteriovenous malformation with hyperplastic polyps    COLONOSCOPY  [id number]    CORONARY ARTERY BYPASS GRAFT  05/2008    triple bypass    ECHO COMPL W DOP COLOR FLOW  10/30/2013         EYE SURGERY      clarissa cataracts    HYSTERECTOMY  1975, 1985    MAYNOR prolapse, benign conditions; BSO later for scar tissues, no CA.      OTHER SURGICAL HISTORY  11/18/11    port removal right chest wall    PARACENTESIS      TONSILLECTOMY      TOOTH EXTRACTION      Full Dental Extraction.  TUNNELED VENOUS PORT PLACEMENT      removal of port Dec. 2011- Dr. Roldan Fails [place] [id number]  [id number]    RIGHT CHEST       Medications Prior to Admission    Prior to Admission medications    Medication Sig Start Date End Date Taking?  Authorizing Provider   sodium bicarbonate 650 MG tablet Take 650 mg by mouth 2 times daily   Yes Historical Provider, MD   oxyCODONE (ROXICODONE) 5 MG immediate release tablet Take 0.5 tablets by mouth every 6 hours as needed for Pain for up to 14 days. Intended supply: 5 days. Take lowest dose possible to manage pain 6/9/20 6/23/20 Yes Gela Monae MD   metoprolol succinate (TOPROL XL) 25 MG extended release tablet Take 0.5 tablets by mouth daily 4/8/20  Yes Madison Lay, DO   omeprazole 20 MG EC tablet Take 1 tablet by mouth daily 4/8/20  Yes Madison Lay, DO   bumetanide (BUMEX) 1 MG tablet Take 1 tablet by mouth daily  Patient taking differently: Take 1 mg by mouth 2 times daily  3/16/20  Yes Madison Lay, DO   pregabalin (LYRICA) 50 MG capsule Take 1 capsule by mouth 3 times daily for 120 days. 2/4/20 6/18/20 Yes Madison Lay, DO   magnesium oxide (MAG-OX) 400 (240 Mg) MG tablet Take 1 tablet by mouth daily 2/4/20  Yes Madison Lay, DO   docusate (COLACE, DULCOLAX) 100 MG CAPS Take 100 mg by mouth daily 2/4/20  Yes Madison Lay, DO   isosorbide dinitrate (ISORDIL) 5 MG tablet Take 1 tablet by mouth 3 times daily 1/14/20  Yes Sabrina Juárez MD   vitamin B-12 (CYANOCOBALAMIN) 1000 MCG tablet Take 1 tablet by mouth daily 1/7/20  Yes Madisonjaida Chun, DO   Calcium Citrate-Vitamin D (RA CALCIUM CIT-VIT D-3 PETITES) 200-250 MG-UNIT TABS take 1 tablet by mouth twice a day 1/7/20  Yes Madison Chun, DO   warfarin (COUMADIN) 1 MG tablet Please take 2 mg by mouth most days, but 3 mg by mouth on Mondays and Wednesdays and Friday. Patient taking differently: Please take 2 mg by mouth most days, but 3 mg by mouth on sun, tues, thurs.  11/25/19  Yes Sangeetha Tsang MD   Multiple Vitamins-Minerals (THERAPEUTIC MULTIVITAMIN-MINERALS) tablet Take 1 tablet by mouth daily 9/17/19  Yes Sangeetha Tsang MD   palbociclib [facility]) 100 MG capsule Take 100 mg by mouth 3 weeks on and 1 week off also receives injections every 3 weeks per Dr Jean-Claude Phillips MD   clindamycin (CLEOCIN) 150 MG capsule Take 3 capsules by mouth 3 times daily for 14 days 6/9/20 6/23/20  Gela Monae MD   PT/INR Testing Monitor KIT 1 each by Does not apply route once a week Use to test INR at home once weekly and as directed to monitor INR. 4/9/20   Wash Renzo, DO   PT/INR Test STRP 1 each by In Vitro route once a week Use to check INR at least once weekly and more often as directed. 4/9/20   Wash Renzo, DO   PT/INR Testing Monitor KIT 1 each by Does not apply route once a week Please use to test INR as directed by physician 1/15/20   Wash Renzo, DO   albuterol sulfate (PROAIR RESPICLICK) 968 (90 Base) MCG/ACT aerosol powder inhalation Inhale 2 puffs into the lungs every 6 hours as needed for Wheezing or Shortness of Breath 1/7/20   Minh Renzo, DO   Insulin Pen Needle (MEIJER PEN NEEDLES) 29G X 12MM MISC 1 each by Does not apply route daily 9/10/19   Leonard Clarke MD   nitroGLYCERIN (NITROSTAT) 0.4 MG SL tablet up to max of 3 total doses.  If no relief after 1 dose, call 911. 8/19/19   Leonard Clarke MD   sodium chloride (OCEAN, BABY AYR) 0.65 % nasal spray 1 spray by Nasal route as needed for Congestion (dryness) 8/19/19   Leonard Clarke MD       Allergies   Allergen Reactions    Macrobid [Nitrofurantoin Monohydrate Macrocrystals] Hives       Family History   Adopted: Yes       Social History     Tobacco Use    Smoking status: Never Smoker    Smokeless tobacco: Never Used   Substance Use Topics    Alcohol use: No    Drug use: No         Review of Systems:   General ROS: negative  Hematological and Lymphatic ROS: chronic lymphedema, Hx recurrent DVT on coumadin  Respiratory ROS: no cough, shortness of breath, or wheezing  Cardiovascular ROS: no chest pain or dyspnea on exertion  Gastrointestinal ROS: no abdominal pain, change in bowel habits, or black or bloody stools  Genito-Urinary ROS: no dysuria, trouble voiding, or hematuria  Musculoskeletal ROS: b/l hip pain  Skin:  Chronic wounds      PHYSICAL EXAM:    Vitals:    06/18/20 2304   BP: (!) 121/57   Pulse: 77   Resp: 16   Temp: 97.3 °F (36.3 °C)   SpO2: 93% GENERAL:  NAD. A&Ox3. HEAD:  Normocephalic. Atraumatic. EYES:   No scleral icterus. PERRL. LUNGS:  No increased work of breathing. CARDIOVASCULAR: RR  ABDOMEN:  Soft, non-distended, non-tender. No guarding, rigidity, rebound. EXTREMITIES:   MAEx4. Atraumatic. No LE edema. SKIN:  Warm and dry. R hip wound with eschar, no cellulitis or drainage. L hip wound with necrotic fibrinous tissue without surrounding cellulitis. Coccyx wound without drainage expressed, no fluctuance. Skin is blanchable. COCCYX WOUND      R HIP WOUND      L HIP WOUND            LABS:    CBC  No results for input(s): WBC, HGB, HCT, PLT in the last 72 hours. BMP  No results for input(s): NA, K, CL, CO2, BUN, CREATININE, CALCIUM in the last 72 hours. Invalid input(s): GLU  Liver Function  No results for input(s): AMYLASE, LIPASE, BILITOT, BILIDIR, AST, ALT, ALKPHOS, PROT, LABALBU in the last 72 hours. No results for input(s): LACTATE in the last 72 hours. No results for input(s): INR, PTT in the last 72 hours. Invalid input(s): PT    RADIOLOGY    No results found. ASSESSMENT/PLAN:  76 y.o. female with chronic pressure ulcers on coccyx and bilateral hips, not appearing infected    Overall care per primary  MRIs hips pending to eval for osteomyelitis  Hold coumadin  Frequent turning of patient  Mipilex to coccyx pressure ulcer  L hip wound needs debrided - bedside vs OR pending INR and bleeding risk  NPOMN  Repeat INR in am    Plan discussed with Dr. Ike Merlin.     Linnea Goode DO  Resident, PGY-1  6/19/2020  1:33 AM

## 2020-06-19 NOTE — PROGRESS NOTES
Infectious disease consult called to answering service. Dr. Sadi Sauceda taking calls at this time.       Call received from Dr. Sadi Sauceda, he will see patient tomorrow 6/19

## 2020-06-19 NOTE — H&P
[facility] - Family Medicine Resident Inpatient  History and Physical      CC: bilateral gluteal wound check    HPI: History obtained from patient and electronic medical records. Patient is oriented to time, place, person      Leanne Ruiz is a 76 y.o. female with a PMH of HTN,  Breast cancer s/p left lumpectomy, CAD s/p CABG, CHF, chronic venous insufficiency, hx of DVT who presented to the clinic for wound follow up. Patient reports chronic bilateral gluteal wound for approximately for 5 months. Patient was seen multiple times in clinic for same problem and prescribed oral antibiotic(Doxyxyclin, keflex and Clindamycin). Despite of multiple completed course of oral antibiotic, her wound is not healing. Jered Navarrete nurse has been helping with wound dressing and application of some topical remedies. She denies any fever, chills, shortness of breath, palpitaions, nausea, vomiting. History of recurrent DVT: On lifelong Coumadin reports compliance with medication. Patient was instructed to check INR every couple of days during the time she was taking antibiotics due to risks of interference with INR. Her INR in that period remained more or less therapeutic. She has been repeatedly recommended for inpatient managements but she refused going to the ED or being directly admitted in past. Today patient and her daughter agreed for inpatient managements.        PMH:  has a past medical history of Abscess of abdominal wall, Anemia, Anesthesia, Arthritis, Arthritis, hip, Breast cancer ([address] CAD (coronary artery disease), CHF (congestive heart failure) ([address] Chronic venous insufficiency, Class 2 severe obesity due to excess calories with serious comorbidity and body mass index (BMI) of 35.0 to 35.9 in [facility]), Decreased dorsalis pedis pulse, Diabetic neuropathy ([address] Diabetic neuropathy ([address] DVT (deep venous thrombosis) ([address] DVT of upper extremity (deep vein thrombosis) ([address] History of deep vein thrombosis, Humerus fracture, Hyperlipidemia, Hypertension, Leg swelling, Lymph node enlargement, Lymphedema of both lower extremities, MI (myocardial infarction) ([facility] Nephrolithiasis, Pericardial effusion, Pulmonary nodule, Rib lesion, Sarcoidosis, Subclavian vein occlusion, bilateral ([facility] [address] [facility] and Type II or unspecified type diabetes mellitus without mention of complication, not stated as uncontrolled. PSH:  has a past surgical history that includes Tooth Extraction; Hysterectomy (1975, 1985); Cholecystectomy; Appendectomy; other surgical history (11/18/11); Arterial bypass surgry; Coronary artery bypass graft (05/2008); Tunneled venous port placement; Cardiac surgery; eye surgery; Tunneled venous port placement ([id number]); Breast lumpectomy (9/27/2005); ECHO Compl W Dop Color Flow (10/30/2013); Colonoscopy (12/2007); Colonoscopy ([id number]); Tonsillectomy; and Paracentesis. FH: family history is not on file. She was adopted. Social:  reports that she has never smoked. She has never used smokeless tobacco. She reports that she does not drink alcohol or use drugs. Allergies: Allergies   Allergen Reactions    Macrobid [Nitrofurantoin Monohydrate Macrocrystals] Hives        Home Medications:   No current facility-administered medications on file prior to encounter. Current Outpatient Medications on File Prior to Encounter   Medication Sig Dispense Refill    oxyCODONE (ROXICODONE) 5 MG immediate release tablet Take 0.5 tablets by mouth every 6 hours as needed for Pain for up to 14 days. Intended supply: 5 days. Take lowest dose possible to manage pain 24 tablet 0    clindamycin (CLEOCIN) 150 MG capsule Take 3 capsules by mouth 3 times daily for 14 days 126 capsule 0    PT/INR Testing Monitor KIT 1 each by Does not apply route once a week Use to test INR at home once weekly and as directed to monitor INR.  1 kit 0    PT/INR Test STRP 1 each by In Vitro route once a week Use to check INR at least once weekly and more often as directed. 48 strip 1    metoprolol succinate (TOPROL XL) 25 MG extended release tablet Take 0.5 tablets by mouth daily 60 tablet 3    omeprazole 20 MG EC tablet Take 1 tablet by mouth daily 90 tablet 0    bumetanide (BUMEX) 1 MG tablet Take 1 tablet by mouth daily 90 tablet 1    pregabalin (LYRICA) 50 MG capsule Take 1 capsule by mouth 3 times daily for 120 days. 90 capsule 3    magnesium oxide (MAG-OX) 400 (240 Mg) MG tablet Take 1 tablet by mouth daily 90 tablet 1    docusate (COLACE, DULCOLAX) 100 MG CAPS Take 100 mg by mouth daily 90 capsule 3    PT/INR Testing Monitor KIT 1 each by Does not apply route once a week Please use to test INR as directed by physician 1 kit 1    isosorbide dinitrate (ISORDIL) 5 MG tablet Take 1 tablet by mouth 3 times daily 90 tablet 3    vitamin B-12 (CYANOCOBALAMIN) 1000 MCG tablet Take 1 tablet by mouth daily 90 tablet 3    Calcium Citrate-Vitamin D (RA CALCIUM CIT-VIT D-3 PETITES) 200-250 MG-UNIT TABS take 1 tablet by mouth twice a day 180 tablet 3    albuterol sulfate (PROAIR RESPICLICK) 328 (90 Base) MCG/ACT aerosol powder inhalation Inhale 2 puffs into the lungs every 6 hours as needed for Wheezing or Shortness of Breath 1 Inhaler 2    warfarin (COUMADIN) 1 MG tablet Please take 2 mg by mouth most days, but 3 mg by mouth on Mondays and Wednesdays and Friday. (Patient taking differently: Please take 2 mg by mouth most days, but 3 mg by mouth on sun, tues, thurs.) 180 tablet 0    Multiple Vitamins-Minerals (THERAPEUTIC MULTIVITAMIN-MINERALS) tablet Take 1 tablet by mouth daily 90 tablet 3    Insulin Pen Needle (MEIJER PEN NEEDLES) 29G X 12MM MISC 1 each by Does not apply route daily 100 each 3    nitroGLYCERIN (NITROSTAT) 0.4 MG SL tablet up to max of 3 total doses.  If no relief after 1 dose, call 911. 25 tablet 3    sodium chloride (OCEAN, BABY AYR) 0.65 % nasal spray 1 spray by Nasal route as needed for Congestion (dryness) 60 mL 0    palbociclib (IBRANCE) 100 MG capsule Take 100 mg by mouth 3 weeks on and 1 week off also receives injections every 3 weeks per Dr Alejandra Huang:   Const: No fever, chills, night sweats, no recent unexplained weight gain/loss  HEENT: No blurred vision, double vision; no URI symptoms  Resp: No cough, no sputum, no pleuritic chest pain, no sob  Cardio: No chest pain, no exertional dyspnea, no PND, no orthopnea, no palpitation, no leg swelling. GI: No dysphagia, no reflux; no abdominal pain, no n/v; no c/d. No hematochezia    : No dysuria, no frequency, hesitancy; no hematuria  MSK: bilateral hip pain  Neuro: no focal weakness, no slurred speech, no double vision, no numbness or tingling in extremities  Endo: no heat/cold intolerance, no polyphagia, polydipsia or polyuria  Hem: no increased bleeding, no bruising, no lymphadenopathy  Psych: no depressed mood, no suicidal ideation    PE:  Blood pressure 127/60, pulse 76, temperature 98 °F (36.7 °C), resp. rate 18, height 5' 4\" (1.626 m), weight 150 lb (68 kg), SpO2 96 %, not currently breastfeeding. General: Alert, cooperative, no acute distress. HEENT: Normocephalic, atraumatic. Neck: Supple, symmetrical No cervical lymphadenopathy. Chest: No tenderness or deformity, full & symmetric excursion  Lung: Clear to auscultation bilaterally,  respirations unlabored. No rales/wheezing/rubs  Heart: RRR, S1 and S2 normal, no murmur, rub or gallop. DP pulses 2/4  Abdomen: Soft, non tender, not distended  Extremities:  Extremities normal, atraumatic, no cyanosis or edema. Distal pulses equal bilateral  Skin:   Right gluteal region: Healed wound with eschar, mild erythematous induration in the right hip, tender to palpation    left gluteal region: necrotic, fibrinous granulation tissue covering central ulceration, tender to palpation, mild induration  Neurologic: Alert & Oriented;; Normal and symmetric strength in UEs and LEs; Sensation intact  Psychiatric: appropriate affect. Intact judgment and insight. Labs:   No results found for this visit on 06/18/20. Imaging:  No orders to display       Assessment and Plan: Grecia Mar is a 76 y.o. female with a PMH of HTN,  Breast cancer s/p left lumpectomy, CAD s/p CABG, CHF, chronic venous insufficiency, hx of DVT who is admitted for non healing gluteal wound    Chronic bilateral gluteal pressure ulcers  Chronic bilateral hip pain  Cellulitis  - patient is hemodynamically stable  - afebrile  - will start broad spectrum antibiotic Vancomycin and meropenem pending blood and wound culture  - MRI bilateral hips including femur without contrast to r/o osteomyelitis   - inpatient surgery consult for wound check up.  Patient might need wound debridement  - inpatient consult to ID for antibiotic  - cbc, cmp daily  - wound dressing daily    CKD  - Creatinine is 1.8(baseline 1.9)  - vancomycin renal dose    Chronic deep vein thrombosis (DVT) of lower extremity,   - will hold Coumadin at this time(Patient might need surgical intervention)  - SCD  - INR    HTN  CHF  COPD  CAD S/P CABG   Hx of breast cancer s/p left lumpectomy with bone mets  - stable  - resume home meds Bumex, Isosdril,  Proventil, Tropol XL   - patient is following hemonc and on chemo  - continue Palbociclib      DVT / GI prophylaxis: SCD and Protonix    Dispo - Admit to the standard floor    Electronically signed by Lisy Gilliland MD on 6/18/20 at 8:39 PM EDT  This case was discussed with attending physician: Dr. Nancy Bright

## 2020-06-20 LAB
ALBUMIN SERPL-MCNC: 3 G/DL (ref 3.5–5.2)
ALP BLD-CCNC: 141 U/L (ref 35–104)
ALT SERPL-CCNC: 27 U/L (ref 0–32)
ANION GAP SERPL CALCULATED.3IONS-SCNC: 11 MMOL/L (ref 7–16)
ANISOCYTOSIS: ABNORMAL
AST SERPL-CCNC: 45 U/L (ref 0–31)
BASOPHILS ABSOLUTE: 0 E9/L (ref 0–0.2)
BASOPHILS RELATIVE PERCENT: 0 % (ref 0–2)
BILIRUB SERPL-MCNC: 0.6 MG/DL (ref 0–1.2)
BUN BLDV-MCNC: 30 MG/DL (ref 8–23)
CALCIUM SERPL-MCNC: 8.5 MG/DL (ref 8.6–10.2)
CHLORIDE BLD-SCNC: 102 MMOL/L (ref 98–107)
CO2: 27 MMOL/L (ref 22–29)
CREAT SERPL-MCNC: 1.9 MG/DL (ref 0.5–1)
EOSINOPHILS ABSOLUTE: 0.07 E9/L (ref 0.05–0.5)
EOSINOPHILS RELATIVE PERCENT: 3 % (ref 0–6)
GFR AFRICAN AMERICAN: 31
GFR NON-AFRICAN AMERICAN: 26 ML/MIN/1.73
GLUCOSE BLD-MCNC: 118 MG/DL (ref 74–99)
HCT VFR BLD CALC: 25.8 % (ref 34–48)
HEMOGLOBIN: 8.1 G/DL (ref 11.5–15.5)
INR BLD: 1.7
LYMPHOCYTES ABSOLUTE: 0.48 E9/L (ref 1.5–4)
LYMPHOCYTES RELATIVE PERCENT: 21 % (ref 20–42)
MCH RBC QN AUTO: 37 PG (ref 26–35)
MCHC RBC AUTO-ENTMCNC: 31.4 % (ref 32–34.5)
MCV RBC AUTO: 117.8 FL (ref 80–99.9)
MONOCYTES ABSOLUTE: 0.16 E9/L (ref 0.1–0.95)
MONOCYTES RELATIVE PERCENT: 7 % (ref 2–12)
NEUTROPHILS ABSOLUTE: 1.59 E9/L (ref 1.8–7.3)
NEUTROPHILS RELATIVE PERCENT: 69 % (ref 43–80)
OVALOCYTES: ABNORMAL
PDW BLD-RTO: 19 FL (ref 11.5–15)
PLATELET # BLD: 73 E9/L (ref 130–450)
PLATELET CONFIRMATION: NORMAL
PMV BLD AUTO: 11.7 FL (ref 7–12)
POIKILOCYTES: ABNORMAL
POLYCHROMASIA: ABNORMAL
POTASSIUM SERPL-SCNC: 4.9 MMOL/L (ref 3.5–5)
PROTHROMBIN TIME: 19.7 SEC (ref 9.3–12.4)
RBC # BLD: 2.19 E12/L (ref 3.5–5.5)
SODIUM BLD-SCNC: 140 MMOL/L (ref 132–146)
TOTAL PROTEIN: 6.6 G/DL (ref 6.4–8.3)
WBC # BLD: 2.3 E9/L (ref 4.5–11.5)

## 2020-06-20 PROCEDURE — 36415 COLL VENOUS BLD VENIPUNCTURE: CPT

## 2020-06-20 PROCEDURE — 6370000000 HC RX 637 (ALT 250 FOR IP): Performed by: CLINICAL NURSE SPECIALIST

## 2020-06-20 PROCEDURE — 85610 PROTHROMBIN TIME: CPT

## 2020-06-20 PROCEDURE — 6360000002 HC RX W HCPCS: Performed by: STUDENT IN AN ORGANIZED HEALTH CARE EDUCATION/TRAINING PROGRAM

## 2020-06-20 PROCEDURE — 2580000003 HC RX 258: Performed by: STUDENT IN AN ORGANIZED HEALTH CARE EDUCATION/TRAINING PROGRAM

## 2020-06-20 PROCEDURE — 6370000000 HC RX 637 (ALT 250 FOR IP): Performed by: STUDENT IN AN ORGANIZED HEALTH CARE EDUCATION/TRAINING PROGRAM

## 2020-06-20 PROCEDURE — 99232 SBSQ HOSP IP/OBS MODERATE 35: CPT | Performed by: FAMILY MEDICINE

## 2020-06-20 PROCEDURE — 1200000000 HC SEMI PRIVATE

## 2020-06-20 PROCEDURE — 85025 COMPLETE CBC W/AUTO DIFF WBC: CPT

## 2020-06-20 PROCEDURE — 80053 COMPREHEN METABOLIC PANEL: CPT

## 2020-06-20 RX ORDER — TRIAMCINOLONE ACETONIDE 1 MG/G
CREAM TOPICAL 2 TIMES DAILY
Status: DISCONTINUED | OUTPATIENT
Start: 2020-06-20 | End: 2020-06-22 | Stop reason: HOSPADM

## 2020-06-20 RX ADMIN — ISOSORBIDE DINITRATE 5 MG: 10 TABLET ORAL at 14:33

## 2020-06-20 RX ADMIN — PANTOPRAZOLE SODIUM 40 MG: 40 TABLET, DELAYED RELEASE ORAL at 09:53

## 2020-06-20 RX ADMIN — ONDANSETRON 4 MG: 2 INJECTION INTRAMUSCULAR; INTRAVENOUS at 19:44

## 2020-06-20 RX ADMIN — SODIUM CHLORIDE, PRESERVATIVE FREE 10 ML: 5 INJECTION INTRAVENOUS at 09:54

## 2020-06-20 RX ADMIN — SODIUM CHLORIDE, PRESERVATIVE FREE 10 ML: 5 INJECTION INTRAVENOUS at 05:44

## 2020-06-20 RX ADMIN — SODIUM CHLORIDE, PRESERVATIVE FREE 10 ML: 5 INJECTION INTRAVENOUS at 20:20

## 2020-06-20 RX ADMIN — TRIAMCINOLONE ACETONIDE: 1 CREAM TOPICAL at 21:37

## 2020-06-20 RX ADMIN — OXYCODONE 2.5 MG: 5 TABLET ORAL at 05:56

## 2020-06-20 RX ADMIN — METOPROLOL SUCCINATE 12.5 MG: 25 TABLET, EXTENDED RELEASE ORAL at 09:53

## 2020-06-20 RX ADMIN — VANCOMYCIN HYDROCHLORIDE 1000 MG: 1 INJECTION, POWDER, LYOPHILIZED, FOR SOLUTION INTRAVENOUS at 20:18

## 2020-06-20 RX ADMIN — BUMETANIDE 1 MG: 1 TABLET ORAL at 09:53

## 2020-06-20 RX ADMIN — DOCUSATE SODIUM 100 MG: 100 CAPSULE, LIQUID FILLED ORAL at 09:54

## 2020-06-20 RX ADMIN — MAGNESIUM GLUCONATE 500 MG ORAL TABLET 400 MG: 500 TABLET ORAL at 09:54

## 2020-06-20 RX ADMIN — MEROPENEM 1 G: 1 INJECTION, POWDER, FOR SOLUTION INTRAVENOUS at 14:33

## 2020-06-20 RX ADMIN — MEROPENEM 1 G: 1 INJECTION, POWDER, FOR SOLUTION INTRAVENOUS at 05:44

## 2020-06-20 RX ADMIN — MEROPENEM 1 G: 1 INJECTION, POWDER, FOR SOLUTION INTRAVENOUS at 21:36

## 2020-06-20 RX ADMIN — OXYCODONE 2.5 MG: 5 TABLET ORAL at 12:26

## 2020-06-20 RX ADMIN — ISOSORBIDE DINITRATE 5 MG: 10 TABLET ORAL at 09:53

## 2020-06-20 RX ADMIN — ISOSORBIDE DINITRATE 5 MG: 10 TABLET ORAL at 20:19

## 2020-06-20 ASSESSMENT — PAIN SCALES - GENERAL
PAINLEVEL_OUTOF10: 8
PAINLEVEL_OUTOF10: 2
PAINLEVEL_OUTOF10: 8
PAINLEVEL_OUTOF10: 0

## 2020-06-20 ASSESSMENT — PAIN DESCRIPTION - FREQUENCY: FREQUENCY: CONTINUOUS

## 2020-06-20 ASSESSMENT — PAIN DESCRIPTION - ONSET: ONSET: ON-GOING

## 2020-06-20 ASSESSMENT — PAIN DESCRIPTION - LOCATION: LOCATION: HIP

## 2020-06-20 ASSESSMENT — PAIN DESCRIPTION - PAIN TYPE: TYPE: ACUTE PAIN;CHRONIC PAIN

## 2020-06-20 ASSESSMENT — PAIN DESCRIPTION - ORIENTATION: ORIENTATION: RIGHT;LEFT

## 2020-06-20 ASSESSMENT — PAIN DESCRIPTION - DESCRIPTORS: DESCRIPTORS: ACHING;DISCOMFORT;SORE

## 2020-06-20 NOTE — PROGRESS NOTES
[address] [facility]  Family Medicine Attending    S: 76 y.o. female with history of HTN, DVT, breast cancer s/p lumpectomy with mets who was sent for direct admission for further evaluation of bilateral chronic non healing pressure ulcers of hips due to concern for osteomyelitis and failure of outpatient treatment. Patient seen and examined on rounds. Patient is having mild pain in her bilateral hips. Also has some abdominal discomfort. She reports her abdomen has been feeling more tense and in the past has required paracentensis due to fluid accumulation. Denies N/V, fevers, chills, diarrhea. O: VS- Blood pressure 128/62, pulse 77, temperature 98 °F (36.7 °C), temperature source Temporal, resp. rate 17, height 5' 4\" (1.626 m), weight 150 lb (68 kg), SpO2 95 %, not currently breastfeeding. Exam is as noted by resident with the following changes, additions or corrections:  Gen - lying in bed, NAD  Cardio - RRR , no murmur   Resp - lungs CTAB , no wheeze    Abd- BS+, abdomen tense and diffusely tender    Ext - 1-2+ pitting edema    Skin - right hip with eschar no drainage , left hip wound with area of necrotic fibrinous tissue and granulation tissue with surrounding erythema and mild induration. Received 1 unit for hemoglobin 6.9    Impressions: Active Problems:  Metastatic breast cancer,    Cellulitis and abscess of buttock  Hepatic masses on CT and ultrasound  Bone metastases  Hypertension  CHF  COPD  CAD status post CABG  CKD IV      Plan:   Wound care, surgery , ID consult. Check MRI bilateral hips to rule out osteo. Continue vanc and merrem. Monitor labs. Follow H&H. General surgery for debridement   Daily wound dressing  Follow lab parameters  Holding Coumadin until surgical evaluation    Continue with heme-onc and chemo     Attending Physician Statement  I have reviewed the chart, including any radiology or labs, and seen the patient with the resident(s).   I personally reviewed and performed key elements of the history and exam.  I agree with the assessment, plan and orders as documented by the resident. Please refer to the resident note for additional information.       Liang Rodriguez

## 2020-06-20 NOTE — PROGRESS NOTES
Pharmacy Consultation Note  (Antibiotic Dosing and Monitoring)    Initial consult date: 2020  Consulting physician: Dr. Carol Waddell  Drug(s): vancomycin  Indication: CSSSI (B/L hips and coccyx)    Age/Gender IBW DW  Allergy Information   74 y.o./F 55.2 kg 68 kg  Macrobid [nitrofurantoin monohydrate macrocrystals]             Date  WBC BUN/CR Drug/Dose Time   Given Level(s)   (Time) Comments    X X vancomycin 1000 mg x12h 2132      3 30/1.8 vanco 1000 mg q12h 0833       30/1.9 vancomycin 1000 mg q36h <2000>                Estimated Creatinine Clearance: 25 mL/min (A) (based on SCr of 1.9 mg/dL (H)). Intake/Output Summary (Last 24 hours) at 2020 1011  Last data filed at 2020 0600  Gross per 24 hour   Intake 1046.67 ml   Output --   Net 1046.67 ml   UO not documented. Temp max: Temp (24hrs), Av.1 °F (36.7 °C), Min:97.8 °F (36.6 °C), Max:98.4 °F (36.9 °C)      Cultures:  available culture and sensitivity results were reviewed in [facility]      Assessment:  · Consulted by Dr. George Kaur to dose/monitor vancomycin. · Goal trough level:  15-20 mCg/mL. · 75 yo/F admitted for chronic B/L hip and coccyx wounds/infection. On numerous courses of OP PO ATBs PTA. · Cr = 1.8 (baseline = 1.3-2.3, most 1.7-1.9). · Empiric vanco and meropenem started. Received vancomycin 1000 mg q12h (x2 doses) starting . · ID has been contsulted. · : day #3 vanco and meropenem, continued per ID. Plan for bedside I&D today (no Ortho intervention planned). Plan:  · Continue vancomycin 1000 mg q36h starting tonight. · Will check vancomycin trough level when steady state is attained if vanco continues and if Pharmacy is to continue the consult. · Pharmacist will follow and monitor/adjust dosing as necessary.     Leny Austin, PharmD  2020  10:11 AM  Pager: [phone]

## 2020-06-20 NOTE — PROGRESS NOTES
may allow   further characterization of the lesion as well as pinpoint potential   biopsy targets. 3. Pelvic ascites. A/P: Nancy Solorio is a 76 y.o. female with a PMH of HTN,  Breast cancer s/p left lumpectomy, CAD s/p CABG, CHF, chronic venous insufficiency, hx of DVT who is admitted for non healing gluteal wound     Chronic bilateral gluteal pressure ulcers  Chronic bilateral hip pain  - no s/s of cellulitis  - patient is hemodynamically stable  - afebrile  - will continue broad spectrum antibiotic Vancomycin and meropenem  - MRI bilateral hips without contrast has done. Result reviewed by orthopedic team. No urgent urgent intervention. Recommended debridement of left sided ulceration  - general surgery is following patient  - Id consulted. Agree for vancomycin and meropenem now  - cbc, cmp daily  - wound dressing daily     Anemia  - asymptomatic  - Hb was 6. 9(baselien 9). Transfused 1 unit of blood  - follow H/H    Abdominal distention  - US abdomen showed minimal ascitics with hepatic mass, compatible with hepatic masses on CT from 2019.     CKD  - Creatinine is 1.8(baseline 1.9)  - vancomycin renal dose     Chronic deep vein thrombosis (DVT) of lower extremity,   - will hold Coumadin at this time(Patient might need surgical intervention)  - SCD  - INR     HTN  CHF  COPD  CAD S/P CABG   Hx of breast cancer s/p left lumpectomy with bone mets  - stable  - resume home meds Bumex, Isosdril,  Proventil, Tropol XL   - patient is following hemonc and on chemo  - continue Palbociclib        DVT / GI prophylaxis: SCD and Protonix     Dispo - Admit to the standard floor         Electronically signed by Veda Yarbrough MD PGY-2 on 6/20/2020 at 7:11 AM  This case was discussed with attending physician: Dr. Lowell Rock.

## 2020-06-20 NOTE — PROGRESS NOTES
[address] [place] [address] [facility]  Progress Note  CC: itching to back and left arm area  Face to face encounter     SUBJECTIVE:  Patient is tolerating medications. No reported adverse drug reactions. ROS: No nausea, vomiting, diarrhea. Itching to back/ left arm/ neck- no visible rash. Medications:  Scheduled Meds:   palbociclib  100 mg Oral Daily    vancomycin  1,000 mg Intravenous Q36H    bumetanide  1 mg Oral Daily    docusate sodium  100 mg Oral Daily    isosorbide dinitrate  5 mg Oral TID    magnesium oxide  400 mg Oral Daily    metoprolol succinate  12.5 mg Oral Daily    pantoprazole  40 mg Oral QAM AC    sodium chloride flush  10 mL Intravenous 2 times per day    meropenem  1 g Intravenous Q8H     Continuous Infusions:  PRN Meds:sodium chloride flush, acetaminophen **OR** acetaminophen, polyethylene glycol, promethazine **OR** ondansetron, oxyCODONE, albuterol  OBJECTIVE:  Patient Vitals for the past 24 hrs:   BP Temp Temp src Pulse Resp SpO2   06/20/20 0800 (!) 130/58 98.3 °F (36.8 °C) Temporal 81 18 96 %   06/20/20 0300 128/62 98 °F (36.7 °C) Temporal 77 17 95 %   06/19/20 2356 (!) 137/93 98.4 °F (36.9 °C) Temporal 73 16 94 %   06/19/20 2130 136/62 98.3 °F (36.8 °C) -- 80 18 95 %   06/19/20 2105 (!) 126/58 97.8 °F (36.6 °C) -- 78 18 95 %   06/19/20 1809 (!) 142/64 98.1 °F (36.7 °C) Temporal 80 16 95 %   06/19/20 1713 (!) 159/70 98.1 °F (36.7 °C) Temporal 84 16 95 %     Constitutional: The patient is awake, alert, sitting up at bedside  Skin: Warm and dry. No rashes were noted- however itchy areas to back/ neck/ left upper arm. Wounds dressed. Head: Eyes show round, and reactive pupils. No jaundice. Mouth: Moist mucous membranes, no ulcerations, no thrush. Neck: Supple to movements. No lymphadenopathy. Chest: No use of accessory muscles to breathe. Symmetrical expansion. Auscultation reveals no wheezing, crackles, or rhonchi.    Cardiovascular: S1 and S2 are rhythmic and regular. Abdomen: Positive bowel sounds to auscultation. Benign to palpation.    Extremities: No clubbing, no cyanosis, some edema  PIV            Laboratory and Tests Review:  Lab Results   Component Value Date    WBC 2.3 (L) 06/20/2020    WBC 3.0 (L) 06/19/2020    WBC 2.4 (L) 03/17/2020    HGB 8.1 (L) 06/20/2020    HCT 25.8 (L) 06/20/2020    .8 (H) 06/20/2020    PLT 73 (L) 06/20/2020     Lab Results   Component Value Date    NEUTROABS 1.59 (L) 06/20/2020    NEUTROABS 2.25 06/19/2020    NEUTROABS 2.04 11/25/2019     Lab Results   Component Value Date    CRP 4.2 (H) 06/19/2020     Lab Results   Component Value Date    SEDRATE 130 (H) 06/19/2020     Lab Results   Component Value Date    ALT 27 06/20/2020    AST 45 (H) 06/20/2020    ALKPHOS 141 (H) 06/20/2020    BILITOT 0.6 06/20/2020     Lab Results   Component Value Date     06/20/2020    K 4.9 06/20/2020    K 4.3 06/19/2020     06/20/2020    CO2 27 06/20/2020    BUN 30 06/20/2020    CREATININE 1.9 06/20/2020    GFRAA 31 06/20/2020    LABGLOM 26 06/20/2020    GLUCOSE 118 06/20/2020    GLUCOSE 109 04/20/2012    PROT 6.6 06/20/2020    LABALBU 3.0 06/20/2020    LABALBU 4.4 03/25/2012    CALCIUM 8.5 06/20/2020    BILITOT 0.6 06/20/2020    ALKPHOS 141 06/20/2020    AST 45 06/20/2020    ALT 27 06/20/2020     Radiology:  MRI reviewed     Microbiology:   No new cultures    ASSESSMENT:  · Bilateral hip wounds- eschar  · Myositis   · MRI- rule out OM    PLAN:  · Continue Vancomycin and meropenem for now  · Pharmacy dosing vancomycin   · Surgery following - for bedside debridement   · Ortho saw patient   · Check cultures  · Will order triamcinolone cream to back/ neck  · Monitor labs-- sed rate 130    [facility]  [facility]  3:38 PM  6/20/2020

## 2020-06-21 LAB
ALBUMIN SERPL-MCNC: 2.9 G/DL (ref 3.5–5.2)
ALP BLD-CCNC: 145 U/L (ref 35–104)
ALT SERPL-CCNC: 26 U/L (ref 0–32)
ANION GAP SERPL CALCULATED.3IONS-SCNC: 10 MMOL/L (ref 7–16)
ANISOCYTOSIS: ABNORMAL
AST SERPL-CCNC: 39 U/L (ref 0–31)
BASOPHILS ABSOLUTE: 0.05 E9/L (ref 0–0.2)
BASOPHILS RELATIVE PERCENT: 2.6 % (ref 0–2)
BILIRUB SERPL-MCNC: 0.6 MG/DL (ref 0–1.2)
BUN BLDV-MCNC: 40 MG/DL (ref 8–23)
CALCIUM SERPL-MCNC: 9.1 MG/DL (ref 8.6–10.2)
CHLORIDE BLD-SCNC: 102 MMOL/L (ref 98–107)
CO2: 28 MMOL/L (ref 22–29)
CREAT SERPL-MCNC: 1.8 MG/DL (ref 0.5–1)
EOSINOPHILS ABSOLUTE: 0.15 E9/L (ref 0.05–0.5)
EOSINOPHILS RELATIVE PERCENT: 7 % (ref 0–6)
GFR AFRICAN AMERICAN: 33
GFR NON-AFRICAN AMERICAN: 27 ML/MIN/1.73
GLUCOSE BLD-MCNC: 152 MG/DL (ref 74–99)
HCT VFR BLD CALC: 25.2 % (ref 34–48)
HEMOGLOBIN: 7.9 G/DL (ref 11.5–15.5)
INR BLD: 1.5
LYMPHOCYTES ABSOLUTE: 0.44 E9/L (ref 1.5–4)
LYMPHOCYTES RELATIVE PERCENT: 20.9 % (ref 20–42)
MCH RBC QN AUTO: 36.6 PG (ref 26–35)
MCHC RBC AUTO-ENTMCNC: 31.3 % (ref 32–34.5)
MCV RBC AUTO: 116.7 FL (ref 80–99.9)
MONOCYTES ABSOLUTE: 0.15 E9/L (ref 0.1–0.95)
MONOCYTES RELATIVE PERCENT: 7 % (ref 2–12)
NEUTROPHILS ABSOLUTE: 1.32 E9/L (ref 1.8–7.3)
NEUTROPHILS RELATIVE PERCENT: 62.6 % (ref 43–80)
PDW BLD-RTO: 18.5 FL (ref 11.5–15)
PLATELET # BLD: 74 E9/L (ref 130–450)
PLATELET CONFIRMATION: NORMAL
PMV BLD AUTO: 11.4 FL (ref 7–12)
POLYCHROMASIA: ABNORMAL
POTASSIUM SERPL-SCNC: 5.2 MMOL/L (ref 3.5–5)
PROTHROMBIN TIME: 17.3 SEC (ref 9.3–12.4)
RBC # BLD: 2.16 E12/L (ref 3.5–5.5)
SEDIMENTATION RATE, ERYTHROCYTE: 102 MM/HR (ref 0–20)
SODIUM BLD-SCNC: 140 MMOL/L (ref 132–146)
TOTAL PROTEIN: 6.7 G/DL (ref 6.4–8.3)
WBC # BLD: 2.1 E9/L (ref 4.5–11.5)

## 2020-06-21 PROCEDURE — 6370000000 HC RX 637 (ALT 250 FOR IP): Performed by: STUDENT IN AN ORGANIZED HEALTH CARE EDUCATION/TRAINING PROGRAM

## 2020-06-21 PROCEDURE — 99238 HOSP IP/OBS DSCHRG MGMT 30/<: CPT | Performed by: FAMILY MEDICINE

## 2020-06-21 PROCEDURE — 2580000003 HC RX 258: Performed by: STUDENT IN AN ORGANIZED HEALTH CARE EDUCATION/TRAINING PROGRAM

## 2020-06-21 PROCEDURE — 0JB90ZZ EXCISION OF BUTTOCK SUBCUTANEOUS TISSUE AND FASCIA, OPEN APPROACH: ICD-10-PCS | Performed by: SURGERY

## 2020-06-21 PROCEDURE — 6360000002 HC RX W HCPCS: Performed by: STUDENT IN AN ORGANIZED HEALTH CARE EDUCATION/TRAINING PROGRAM

## 2020-06-21 PROCEDURE — 85651 RBC SED RATE NONAUTOMATED: CPT

## 2020-06-21 PROCEDURE — 85025 COMPLETE CBC W/AUTO DIFF WBC: CPT

## 2020-06-21 PROCEDURE — 36415 COLL VENOUS BLD VENIPUNCTURE: CPT

## 2020-06-21 PROCEDURE — 1200000000 HC SEMI PRIVATE

## 2020-06-21 PROCEDURE — 6370000000 HC RX 637 (ALT 250 FOR IP): Performed by: CLINICAL NURSE SPECIALIST

## 2020-06-21 PROCEDURE — 85610 PROTHROMBIN TIME: CPT

## 2020-06-21 PROCEDURE — 2500000003 HC RX 250 WO HCPCS: Performed by: SURGERY

## 2020-06-21 PROCEDURE — 80053 COMPREHEN METABOLIC PANEL: CPT

## 2020-06-21 PROCEDURE — 87186 SC STD MICRODIL/AGAR DIL: CPT

## 2020-06-21 PROCEDURE — 87070 CULTURE OTHR SPECIMN AEROBIC: CPT

## 2020-06-21 RX ORDER — DIPHENHYDRAMINE HCL 25 MG
12.5 TABLET ORAL ONCE
Status: COMPLETED | OUTPATIENT
Start: 2020-06-21 | End: 2020-06-21

## 2020-06-21 RX ORDER — OXYCODONE HYDROCHLORIDE 5 MG/1
5 TABLET ORAL EVERY 4 HOURS PRN
Status: DISCONTINUED | OUTPATIENT
Start: 2020-06-21 | End: 2020-06-22 | Stop reason: HOSPADM

## 2020-06-21 RX ORDER — ACETAMINOPHEN 650 MG
TABLET, EXTENDED RELEASE ORAL
Status: COMPLETED
Start: 2020-06-21 | End: 2020-06-21

## 2020-06-21 RX ORDER — LIDOCAINE HYDROCHLORIDE AND EPINEPHRINE 10; 10 MG/ML; UG/ML
20 INJECTION, SOLUTION INFILTRATION; PERINEURAL ONCE
Status: COMPLETED | OUTPATIENT
Start: 2020-06-21 | End: 2020-06-21

## 2020-06-21 RX ORDER — OXYCODONE HYDROCHLORIDE 10 MG/1
10 TABLET ORAL EVERY 4 HOURS PRN
Status: DISCONTINUED | OUTPATIENT
Start: 2020-06-21 | End: 2020-06-22 | Stop reason: HOSPADM

## 2020-06-21 RX ADMIN — SODIUM CHLORIDE, PRESERVATIVE FREE 10 ML: 5 INJECTION INTRAVENOUS at 21:15

## 2020-06-21 RX ADMIN — MEROPENEM 1 G: 1 INJECTION, POWDER, FOR SOLUTION INTRAVENOUS at 15:19

## 2020-06-21 RX ADMIN — PANTOPRAZOLE SODIUM 40 MG: 40 TABLET, DELAYED RELEASE ORAL at 06:34

## 2020-06-21 RX ADMIN — OXYCODONE HYDROCHLORIDE 10 MG: 10 TABLET ORAL at 21:24

## 2020-06-21 RX ADMIN — BUMETANIDE 1 MG: 1 TABLET ORAL at 08:38

## 2020-06-21 RX ADMIN — MEROPENEM 1 G: 1 INJECTION, POWDER, FOR SOLUTION INTRAVENOUS at 06:34

## 2020-06-21 RX ADMIN — TRIAMCINOLONE ACETONIDE: 1 CREAM TOPICAL at 08:39

## 2020-06-21 RX ADMIN — MEROPENEM 1 G: 1 INJECTION, POWDER, FOR SOLUTION INTRAVENOUS at 21:14

## 2020-06-21 RX ADMIN — OXYCODONE HYDROCHLORIDE 10 MG: 10 TABLET ORAL at 06:34

## 2020-06-21 RX ADMIN — Medication: at 15:18

## 2020-06-21 RX ADMIN — LIDOCAINE HYDROCHLORIDE,EPINEPHRINE BITARTRATE 20 ML: 10; .01 INJECTION, SOLUTION INFILTRATION; PERINEURAL at 15:17

## 2020-06-21 RX ADMIN — DOCUSATE SODIUM 100 MG: 100 CAPSULE, LIQUID FILLED ORAL at 10:17

## 2020-06-21 RX ADMIN — DIPHENHYDRAMINE HYDROCHLORIDE 12.5 MG: 25 TABLET ORAL at 10:16

## 2020-06-21 RX ADMIN — MAGNESIUM GLUCONATE 500 MG ORAL TABLET 400 MG: 500 TABLET ORAL at 08:38

## 2020-06-21 RX ADMIN — TRIAMCINOLONE ACETONIDE: 1 CREAM TOPICAL at 21:15

## 2020-06-21 RX ADMIN — ISOSORBIDE DINITRATE 5 MG: 10 TABLET ORAL at 08:38

## 2020-06-21 RX ADMIN — ISOSORBIDE DINITRATE 5 MG: 10 TABLET ORAL at 15:19

## 2020-06-21 RX ADMIN — METOPROLOL SUCCINATE 12.5 MG: 25 TABLET, EXTENDED RELEASE ORAL at 08:38

## 2020-06-21 RX ADMIN — ISOSORBIDE DINITRATE 5 MG: 10 TABLET ORAL at 21:14

## 2020-06-21 RX ADMIN — SODIUM CHLORIDE, PRESERVATIVE FREE 10 ML: 5 INJECTION INTRAVENOUS at 08:40

## 2020-06-21 RX ADMIN — OXYCODONE 5 MG: 5 TABLET ORAL at 01:15

## 2020-06-21 ASSESSMENT — PAIN DESCRIPTION - PROGRESSION
CLINICAL_PROGRESSION: NOT CHANGED
CLINICAL_PROGRESSION: NOT CHANGED

## 2020-06-21 ASSESSMENT — PAIN SCALES - GENERAL
PAINLEVEL_OUTOF10: 8
PAINLEVEL_OUTOF10: 9
PAINLEVEL_OUTOF10: 5
PAINLEVEL_OUTOF10: 10

## 2020-06-21 ASSESSMENT — PAIN DESCRIPTION - LOCATION
LOCATION: HIP

## 2020-06-21 ASSESSMENT — PAIN DESCRIPTION - FREQUENCY
FREQUENCY: CONTINUOUS
FREQUENCY: INTERMITTENT
FREQUENCY: INTERMITTENT

## 2020-06-21 ASSESSMENT — PAIN DESCRIPTION - PAIN TYPE
TYPE: CHRONIC PAIN
TYPE: CHRONIC PAIN
TYPE: ACUTE PAIN;CHRONIC PAIN

## 2020-06-21 ASSESSMENT — PAIN DESCRIPTION - DESCRIPTORS
DESCRIPTORS: CONSTANT;DISCOMFORT;PINS AND NEEDLES
DESCRIPTORS: ACHING;DISCOMFORT;SORE
DESCRIPTORS: ACHING;DISCOMFORT;SORE

## 2020-06-21 ASSESSMENT — PAIN DESCRIPTION - ORIENTATION
ORIENTATION: RIGHT;LEFT

## 2020-06-21 ASSESSMENT — PAIN DESCRIPTION - ONSET
ONSET: GRADUAL
ONSET: GRADUAL

## 2020-06-21 NOTE — PROCEDURES
Procedure Note - Excisional Debridement      Patient: Severo Agrawal  YOB: 1945  MRN: [id number]    Date of Procedure:     Pre-Op Diagnosis: stage 1 decubitus ulcer of the left hip    Post-Op Diagnosis: same    Findings: nonviable epidermis, no purulence       Procedure(s): bedside sharp excisional debridement of decubitus ulcer    Attending Surgeon: Dr Didier Marion    Assistant: Dr Grace Ordaz    Anesthesia: Local, lidocaine with epi, 10mL    Estimated Blood Loss (EBL): 2 mL    Complications: none    Specimens sent to pathology: tissue culture obtained in sterile fashion from wound bed after debridement      History and Indications for Procedure:   Nonviable tissue at risk for infection. Possible abscess. Debridement necessary to facilitate wound healing. Detailed Description of Procedure:   Written consent was obtained. Area was prepped with betadine and draped in sterile fashion. Adequate anesthesia was achieved with local injection. 10 Blade was used to debride necrotic tissue from the epidermis. Cruciate incision along with some pieces of excision for tissue culture was performed down to  subcutaneous fat. Minimal loculations were broken up with hemostat/15blade and small amount of serosanguinous blood came out but no pus. Remaining wound bed appeared healthy and perfused with punctate bleeding. Pressure provided adequate hemostasis. Wound was irrigated with sterile water and moist kerlix was placed into wound, secured with dry 4x4 & ABD and paper tape. Patient tolerated procedure.     Final wound dimensions:  3x3 cm, with incision depth of 2.5 cm    Electronically signed by Gretchen Sheikh MD on 6/21/20 at 3:10 PM EDT

## 2020-06-21 NOTE — PROGRESS NOTES
[address] [place] [address] [facility]  Progress Note  CC: no distress- sitting at bed side  Face to face encounter     SUBJECTIVE:  Patient is tolerating medications. No reported adverse drug reactions. ROS: No nausea, vomiting, diarrhea. Itching -better  Eating lunch- no issues no distress     Medications:  Scheduled Meds:   lidocaine-EPINEPHrine  20 mL Intradermal Once    povidone-iodine        triamcinolone   Topical BID    palbociclib  100 mg Oral Daily    vancomycin  1,000 mg Intravenous Q36H    bumetanide  1 mg Oral Daily    docusate sodium  100 mg Oral Daily    isosorbide dinitrate  5 mg Oral TID    magnesium oxide  400 mg Oral Daily    metoprolol succinate  12.5 mg Oral Daily    pantoprazole  40 mg Oral QAM AC    sodium chloride flush  10 mL Intravenous 2 times per day    meropenem  1 g Intravenous Q8H     Continuous Infusions:  PRN Meds:oxyCODONE **OR** oxyCODONE, sodium chloride flush, acetaminophen **OR** acetaminophen, polyethylene glycol, promethazine **OR** ondansetron, albuterol  OBJECTIVE:  Patient Vitals for the past 24 hrs:   BP Temp Temp src Pulse Resp SpO2   06/21/20 0745 (!) 118/52 98.4 °F (36.9 °C) Temporal 81 18 95 %   06/21/20 0115 (!) 145/55 99 °F (37.2 °C) Temporal 78 18 --   06/20/20 1630 (!) 108/51 97.8 °F (36.6 °C) Temporal 74 16 98 %     Constitutional: The patient is awake, alert, sitting up at bedside  Skin: Warm and dry. No rashes were noted-itchy area to back- improved   Wounds dressed. Head: Eyes show round, and reactive pupils. No jaundice. Mouth: Moist mucous membranes, no ulcerations, no thrush. Neck: Supple to movements. No lymphadenopathy. Chest: No use of accessory muscles to breathe. Symmetrical expansion. Auscultation reveals no wheezing, crackles, or rhonchi. Cardiovascular: S1 and S2 are rhythmic and regular. Abdomen: Positive bowel sounds to auscultation. Benign to palpation.    Extremities: No clubbing, no cyanosis, some

## 2020-06-21 NOTE — PROGRESS NOTES
Pharmacy Consultation Note  (Antibiotic Dosing and Monitoring)    Initial consult date: 2020  Consulting physician: Dr. Mona Sigala (Dr. Louise العلي, ID)  Drug(s): vancomycin  Indication: CSSSI (B/L hips and coccyx)    Age/Gender IBW DW  Allergy Information   74 y.o./F 55.2 kg 68 kg  Macrobid [nitrofurantoin monohydrate macrocrystals]             Date  WBC BUN/CR Drug/Dose Time   Given Level(s)   (Time) Comments    X X vancomycin 1000 mg x12h  3 30/1.8 vanco 1000 mg q12h 0833      2.3 30/1.9 vancomycin 1000 mg q36h 2018      2.1 40/1.8 vanco 1000 mg q36h X          vanc Tr @ 0730               Estimated Creatinine Clearance: 26 mL/min (A) (based on SCr of 1.8 mg/dL (H)). Intake/Output Summary (Last 24 hours) at 2020 0902  Last data filed at 2020 0152  Gross per 24 hour   Intake 240 ml   Output --   Net 240 ml   UO not documented. Temp max: Temp (24hrs), Av.4 °F (36.9 °C), Min:97.8 °F (36.6 °C), Max:99 °F (37.2 °C)      Cultures:  available culture and sensitivity results were reviewed in EPIC      Assessment:  · Consulted by Dr. Claudia López to dose/monitor vancomycin. · Goal trough level:  15-20 mCg/mL. · 75 yo/F admitted for chronic B/L hip and coccyx wounds/infection. On numerous courses of OP PO ATBs PTA. · Cr = 1.8 (baseline = 1.3-2.3, most 1.7-1.9). · Empiric vanco and meropenem started. Received vancomycin 1000 mg q12h (x2 doses) starting . · ID has been contsulted. · : day #3 vanco and meropenem, continued per ID. Plan for bedside I&D today (no Ortho intervention planned). · : day #4 ATBs. Plan:  · Continue vancomycin 1000 mg q36h. · Will check vancomycin trough level on . · Pharmacist will follow and monitor/adjust dosing as necessary.     Susanna Vallejo PharmD  2020  9:02 AM  Pager: [phone]

## 2020-06-21 NOTE — PROGRESS NOTES
[facility] - Family Medicine Inpatient   Resident Progress Note    S:  Hospital day: 3   Antibiotic: Vancomycin and Meropenem day 3    Overnight/interim: Vitals are stable. No acute events happened overnight    Patient seen and examined today morning. She has bilateral hip pain. No other concerns. Cont meds:   Scheduled meds:    triamcinolone   Topical BID    palbociclib  100 mg Oral Daily    vancomycin  1,000 mg Intravenous Q36H    bumetanide  1 mg Oral Daily    docusate sodium  100 mg Oral Daily    isosorbide dinitrate  5 mg Oral TID    magnesium oxide  400 mg Oral Daily    metoprolol succinate  12.5 mg Oral Daily    pantoprazole  40 mg Oral QAM AC    sodium chloride flush  10 mL Intravenous 2 times per day    meropenem  1 g Intravenous Q8H     I reviewed the patient's past medical and surgical history, Medications and Allergies. O:  BP (!) 145/55   Pulse 78   Temp 99 °F (37.2 °C) (Temporal)   Resp 18   Ht 5' 4\" (1.626 m)   Wt 150 lb (68 kg)   SpO2 98%   BMI 25.75 kg/m²   24 hour I&O: I/O last 3 completed shifts: In: 240 [P.O.:240]  Out: -   No intake/output data recorded. General: Alert, cooperative, no acute distress. Chest: No tenderness or deformity, full & symmetric excursion  Lung: Clear to auscultation bilaterally,  respirations unlabored. No rales/wheezing/rubs  Heart: RRR, S1 and S2 normal, no murmur, rub or gallop. DP pulses 2/4  Abdomen: Soft, non tender, not distended  Extremities:  Extremities normal, atraumatic, no cyanosis or edema. Distal pulses equal bilateral  Skin:   Right gluteal region: Healed wound with eschar, mild erythematous induration in the right hip, tender to palpation    left gluteal region: necrotic, fibrinous granulation tissue covering central ulceration, tender to palpation, mild induration  Neurologic: Alert & Oriented;; Normal and symmetric strength in UEs and LEs;  Sensation intact    Labs:  Na/K/Cl/CO2:  140/5.2/102/28 (06/21 5785)  BUN/Cr/glu/ALT/AST/amyl/lip:  40/1.8/--/26/39/--/-- (06/21 0880)  WBC/Hgb/Hct/Plts:  2.1/7.9/25.2/74 (06/21 0519)  estimated creatinine clearance is 26 mL/min (A) (based on SCr of 1.8 mg/dL (H)). Other pertinent labs as noted below    Radiology:  US ABDOMEN LIMITED   Final Result   Minimal ascites. Hepatic masses, compatible with findings of hepatic masses on CT from   2019 and consistent with the patient's history of biopsy-proven   metastatic breast cancer to the liver. Contrast-enhanced   cross-sectional imaging should be considered to reevaluate the extent   of metastatic disease and to further evaluate the patient's subjective   abdominal distention. ALERT:  THIS IS AN ABNORMAL REPORT         MRI HIP RIGHT WO CONTRAST   Final Result   1. Bilateral gluteal soft tissue wounds. Soft tissue edema present in   the adjacent fat bilaterally, left greater than right. Additional soft   tissue edema in the left gluteus medius muscle which may relate to   underlying myositis. 2. Extensive abnormal bone marrow signal involving the left femoral   head, neck, left iliac bone, acetabulum and inferior pubic ramus. Differential diagnostic considerations include infection as well as   neoplasia. Consider correlation with noncontrast CT which may allow   further characterization of the lesion as well as pinpoint potential   biopsy targets. 3. Pelvic ascites. MRI HIP LEFT WO CONTRAST   Final Result   1. Bilateral gluteal soft tissue wounds. Soft tissue edema present in   the adjacent fat bilaterally, left greater than right. Additional soft   tissue edema in the left gluteus medius muscle which may relate to   underlying myositis. 2. Extensive abnormal bone marrow signal involving the left femoral   head, neck, left iliac bone, acetabulum and inferior pubic ramus. Differential diagnostic considerations include infection as well as   neoplasia.  Consider correlation with noncontrast CT which rehab.         Electronically signed by [address], MD PGY-2 on 6/21/2020 at 7:11 AM  This case was discussed with attending physician: Dr. Ceci Kumar.

## 2020-06-21 NOTE — PROGRESS NOTES
[facility]  Family Medicine Attending    S: 76 y.o. female with history of HTN, DVT, breast cancer s/p lumpectomy with mets who was sent for direct admission for further evaluation of bilateral chronic non healing pressure ulcers of hips  Has been seen by Ortho  . Patient seen and examined on rounds. Patient is having mild pain in her bilateral hips. Is up and about in room   Wants to go home   Declines [place]         O: VS- Blood pressure (!) 118/52, pulse 81, temperature 98.4 °F (36.9 °C), temperature source Temporal, resp. rate 18, height 5' 4\" (1.626 m), weight 150 lb (68 kg), SpO2 95 %, not currently breastfeeding. Exam is as noted by resident  Ambulating in room  Neck supple, no bruit  Heart regular rhythm, no gallop  Lungs clear to auscultation  Abdomen moderately distended, nontender, bowel sounds present  Skin - right hip with eschar no drainage , left hip wound with area of necrotic fibrinous tissue and granulation tissue with surrounding erythema improving        Impressions: Active Problems:  Metastatic breast cancer,    Cellulitis and abscess of buttock  Hepatic masses on CT and ultrasound  Bone metastases  Hypertension  CHF  COPD  CAD status post CABG  CKD IV      Plan:   Wound care, surgery , ID on board     General surgery for debridement today,  Then stable for discharge     Continue with heme-onc and chemo     Attending Physician Statement  I have reviewed the chart, including any radiology or labs, and seen the patient with the resident(s). I personally reviewed and performed key elements of the history and exam.  I agree with the assessment, plan and orders as documented by the resident. Please refer to the resident note for additional information.       Keith Savage

## 2020-06-22 VITALS
TEMPERATURE: 98.3 F | HEART RATE: 76 BPM | HEIGHT: 64 IN | DIASTOLIC BLOOD PRESSURE: 51 MMHG | BODY MASS INDEX: 25.61 KG/M2 | SYSTOLIC BLOOD PRESSURE: 106 MMHG | OXYGEN SATURATION: 94 % | RESPIRATION RATE: 17 BRPM | WEIGHT: 150 LBS

## 2020-06-22 LAB
ALBUMIN SERPL-MCNC: 3 G/DL (ref 3.5–5.2)
ALP BLD-CCNC: 151 U/L (ref 35–104)
ALT SERPL-CCNC: 28 U/L (ref 0–32)
ANION GAP SERPL CALCULATED.3IONS-SCNC: 11 MMOL/L (ref 7–16)
ANISOCYTOSIS: ABNORMAL
AST SERPL-CCNC: 38 U/L (ref 0–31)
BASOPHILS ABSOLUTE: 0.02 E9/L (ref 0–0.2)
BASOPHILS RELATIVE PERCENT: 0.9 % (ref 0–2)
BILIRUB SERPL-MCNC: 0.6 MG/DL (ref 0–1.2)
BLASTS RELATIVE PERCENT: 1.8 % (ref 0–0)
BUN BLDV-MCNC: 46 MG/DL (ref 8–23)
CALCIUM SERPL-MCNC: 9.4 MG/DL (ref 8.6–10.2)
CHLORIDE BLD-SCNC: 102 MMOL/L (ref 98–107)
CO2: 27 MMOL/L (ref 22–29)
CREAT SERPL-MCNC: 2 MG/DL (ref 0.5–1)
EOSINOPHILS ABSOLUTE: 0.06 E9/L (ref 0.05–0.5)
EOSINOPHILS RELATIVE PERCENT: 2.6 % (ref 0–6)
GFR AFRICAN AMERICAN: 29
GFR NON-AFRICAN AMERICAN: 24 ML/MIN/1.73
GLUCOSE BLD-MCNC: 118 MG/DL (ref 74–99)
HCT VFR BLD CALC: 26.6 % (ref 34–48)
HEMOGLOBIN: 8.4 G/DL (ref 11.5–15.5)
HYPOCHROMIA: ABNORMAL
INR BLD: 1.3
LYMPHOCYTES ABSOLUTE: 0.58 E9/L (ref 1.5–4)
LYMPHOCYTES RELATIVE PERCENT: 24.6 % (ref 20–42)
MCH RBC QN AUTO: 36.7 PG (ref 26–35)
MCHC RBC AUTO-ENTMCNC: 31.6 % (ref 32–34.5)
MCV RBC AUTO: 116.2 FL (ref 80–99.9)
MONOCYTES ABSOLUTE: 0.14 E9/L (ref 0.1–0.95)
MONOCYTES RELATIVE PERCENT: 6.1 % (ref 2–12)
NEUTROPHILS ABSOLUTE: 1.45 E9/L (ref 1.8–7.3)
NEUTROPHILS RELATIVE PERCENT: 63.2 % (ref 43–80)
NUCLEATED RED BLOOD CELLS: 0.9 /100 WBC
OVALOCYTES: ABNORMAL
PDW BLD-RTO: 18 FL (ref 11.5–15)
PLATELET # BLD: 75 E9/L (ref 130–450)
PLATELET CONFIRMATION: NORMAL
PMV BLD AUTO: 11.1 FL (ref 7–12)
POIKILOCYTES: ABNORMAL
POLYCHROMASIA: ABNORMAL
POTASSIUM SERPL-SCNC: 5.3 MMOL/L (ref 3.5–5)
PROMYELOCYTES PERCENT: 0.9 % (ref 0–0)
PROTHROMBIN TIME: 14.9 SEC (ref 9.3–12.4)
RBC # BLD: 2.29 E12/L (ref 3.5–5.5)
SODIUM BLD-SCNC: 140 MMOL/L (ref 132–146)
TARGET CELLS: ABNORMAL
TOTAL PROTEIN: 6.9 G/DL (ref 6.4–8.3)
VANCOMYCIN TROUGH: 15.9 MCG/ML (ref 5–16)
WBC # BLD: 2.3 E9/L (ref 4.5–11.5)

## 2020-06-22 PROCEDURE — 97165 OT EVAL LOW COMPLEX 30 MIN: CPT

## 2020-06-22 PROCEDURE — 85025 COMPLETE CBC W/AUTO DIFF WBC: CPT

## 2020-06-22 PROCEDURE — 2580000003 HC RX 258: Performed by: STUDENT IN AN ORGANIZED HEALTH CARE EDUCATION/TRAINING PROGRAM

## 2020-06-22 PROCEDURE — 80202 ASSAY OF VANCOMYCIN: CPT

## 2020-06-22 PROCEDURE — 97161 PT EVAL LOW COMPLEX 20 MIN: CPT

## 2020-06-22 PROCEDURE — 6360000002 HC RX W HCPCS: Performed by: STUDENT IN AN ORGANIZED HEALTH CARE EDUCATION/TRAINING PROGRAM

## 2020-06-22 PROCEDURE — 6370000000 HC RX 637 (ALT 250 FOR IP): Performed by: STUDENT IN AN ORGANIZED HEALTH CARE EDUCATION/TRAINING PROGRAM

## 2020-06-22 PROCEDURE — 99232 SBSQ HOSP IP/OBS MODERATE 35: CPT | Performed by: SURGERY

## 2020-06-22 PROCEDURE — 6370000000 HC RX 637 (ALT 250 FOR IP): Performed by: NURSE PRACTITIONER

## 2020-06-22 PROCEDURE — 85610 PROTHROMBIN TIME: CPT

## 2020-06-22 PROCEDURE — 99232 SBSQ HOSP IP/OBS MODERATE 35: CPT | Performed by: FAMILY MEDICINE

## 2020-06-22 PROCEDURE — 80053 COMPREHEN METABOLIC PANEL: CPT

## 2020-06-22 PROCEDURE — 36415 COLL VENOUS BLD VENIPUNCTURE: CPT

## 2020-06-22 RX ORDER — TRIAMCINOLONE ACETONIDE 1 MG/G
CREAM TOPICAL
Qty: 80 G | Refills: 0 | Status: SHIPPED | OUTPATIENT
Start: 2020-06-22 | End: 2020-06-29 | Stop reason: ALTCHOICE

## 2020-06-22 RX ORDER — DIPHENHYDRAMINE HCL 25 MG
12.5 TABLET ORAL EVERY 6 HOURS PRN
Status: DISCONTINUED | OUTPATIENT
Start: 2020-06-22 | End: 2020-06-22 | Stop reason: HOSPADM

## 2020-06-22 RX ADMIN — COLLAGENASE SANTYL: 250 OINTMENT TOPICAL at 14:02

## 2020-06-22 RX ADMIN — PANTOPRAZOLE SODIUM 40 MG: 40 TABLET, DELAYED RELEASE ORAL at 06:26

## 2020-06-22 RX ADMIN — OXYCODONE HYDROCHLORIDE 10 MG: 10 TABLET ORAL at 06:26

## 2020-06-22 RX ADMIN — MAGNESIUM GLUCONATE 500 MG ORAL TABLET 400 MG: 500 TABLET ORAL at 08:59

## 2020-06-22 RX ADMIN — VANCOMYCIN HYDROCHLORIDE 1000 MG: 1 INJECTION, POWDER, LYOPHILIZED, FOR SOLUTION INTRAVENOUS at 09:00

## 2020-06-22 RX ADMIN — DOCUSATE SODIUM 100 MG: 100 CAPSULE, LIQUID FILLED ORAL at 08:59

## 2020-06-22 RX ADMIN — ISOSORBIDE DINITRATE 5 MG: 10 TABLET ORAL at 08:59

## 2020-06-22 RX ADMIN — DIPHENHYDRAMINE HCL 12.5 MG: 25 TABLET ORAL at 09:59

## 2020-06-22 RX ADMIN — BUMETANIDE 1 MG: 1 TABLET ORAL at 08:59

## 2020-06-22 RX ADMIN — MEROPENEM 1 G: 1 INJECTION, POWDER, FOR SOLUTION INTRAVENOUS at 06:26

## 2020-06-22 RX ADMIN — ISOSORBIDE DINITRATE 5 MG: 10 TABLET ORAL at 14:02

## 2020-06-22 RX ADMIN — SODIUM CHLORIDE, PRESERVATIVE FREE 10 ML: 5 INJECTION INTRAVENOUS at 09:00

## 2020-06-22 RX ADMIN — METOPROLOL SUCCINATE 12.5 MG: 25 TABLET, EXTENDED RELEASE ORAL at 08:59

## 2020-06-22 ASSESSMENT — PAIN DESCRIPTION - ONSET: ONSET: GRADUAL

## 2020-06-22 ASSESSMENT — PAIN SCALES - GENERAL
PAINLEVEL_OUTOF10: 3
PAINLEVEL_OUTOF10: 8

## 2020-06-22 ASSESSMENT — PAIN DESCRIPTION - LOCATION: LOCATION: HIP

## 2020-06-22 ASSESSMENT — PAIN - FUNCTIONAL ASSESSMENT: PAIN_FUNCTIONAL_ASSESSMENT: PREVENTS OR INTERFERES SOME ACTIVE ACTIVITIES AND ADLS

## 2020-06-22 ASSESSMENT — PAIN DESCRIPTION - FREQUENCY: FREQUENCY: INTERMITTENT

## 2020-06-22 ASSESSMENT — PAIN DESCRIPTION - ORIENTATION: ORIENTATION: LEFT

## 2020-06-22 ASSESSMENT — PAIN DESCRIPTION - DESCRIPTORS: DESCRIPTORS: ACHING;CONSTANT;SORE

## 2020-06-22 ASSESSMENT — PAIN DESCRIPTION - PAIN TYPE: TYPE: CHRONIC PAIN

## 2020-06-22 ASSESSMENT — PAIN DESCRIPTION - PROGRESSION: CLINICAL_PROGRESSION: NOT CHANGED

## 2020-06-22 NOTE — DISCHARGE SUMMARY
Discharge Summary    Manfred Rodrigues  :  1945  MRN:  [id number]    Admit date:  2020  Discharge date:  2020    Admitting Physician:  Jodie Mcclendon MD    Discharge Diagnoses:  Bilateral chronic hip pressure ulcer,  failure to outpatient therapy multiple times    Patient Active Problem List   Diagnosis    Metastatic breast cancer [facility])    Essential hypertension    Coronary artery disease involving native coronary artery of native heart without angina pectoris    Nephrolithiasis    CHF (congestive heart failure) ([facility]    Humerus fracture    Shoulder pain, left    B12 deficiency    Hyperlipidemia    Rib lesion    Subclavian vein occlusion, bilateral ([facility])    Pericardial effusion    MI [facility]ocardial infarction) ([facility]    Arthritis    Sarcoidosis    Lymph node enlargement    Anesthesia    Rectal bleeding    Ventral hernia    Type 2 diabetes mellitus without complication, with long-term current use of insulin ([facility])    Anemia of chronic disease    Chronic deep vein thrombosis (DVT) of proximal vein of right lower extremity ([facility])    Bilateral edema of lower extremity    Peripheral polyneuropathy    Complicated UTI (urinary tract infection)    KARISHMA (acute kidney injury) ([facility]    Diarrhea with dehydration    Chronic anticoagulation    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    Diabetic polyneuropathy associated with type 2 diabetes mellitus (HCC)    Leg swelling    History of deep vein thrombosis    Chronic venous insufficiency    Lymphedema of both lower extremities    Decreased dorsalis pedis pulse    Ambulatory dysfunction    CKD (chronic kidney disease) stage 3, GFR 30-59 ml/min (HCC)    Charcot's joint of foot in type 2 diabetes mellitus (HCC)    Ascites    Palliative care encounter    Cancer associated pain    HAP (hospital-acquired pneumonia)    Acute systolic heart failure (HCC)    Nonischemic cardiomyopathy ([facility]    Status post coronary artery bypass graft    Class 2 severe obesity due to excess calories with serious comorbidity and body mass index (BMI) of 35.0 to 35.9 in adult [facility])    Type 2 diabetes mellitus with hyperglycemia (HCC)    Acute hypercapnic respiratory failure (HCC)    Altered mental status    Goals of care, counseling/discussion    Palliative care by specialist    Metastatic disease ([facility]    Moderate protein-calorie malnutrition ([facility]    Precordial pain    Chronic systolic heart failure (HCC)    Pleural effusion    Cellulitis and abscess of buttock       Admission Condition:  fair    Discharged Condition:  good    Hospital Course:  Raymond Bhagat is a 76 y.o. female with a PMH of HTN,  Breast cancer s/p left lumpectomy with bone mets,  CAD s/p CABG, CHF, chronic venous insufficiency, hx of DVT who was admitted for chronic bilateral non healing pressure ulcer after failure to outpatient antibiotic therapy for inpatient managements. Bilateral MRI of hip was done to rule out osteomyelitis. Orthopedic and general surgery teams were consulted. MRI report reviewed by orthopedic team and mentioned- no suspicious for osteomyelitis of the femur and this is more metastatic in origin, the edema about the greater trochanter is reactive and associated from her ulceration and recommended for wound debridement . Patient had a left sided bedside wound debridement on 6/21/2020 and advised for santyl dressing to wound, and local wound care. Patient was treated with broad spectrum antibiotic IV Vancomycin and Meropenem pending wound culture. We consulted ID. Cultures grew corynebacteria and staphylococcus aureus. ID  Recommended no antibiotic on discharge since patient had a debridement which presumably cleaned wound. Patient remained stable, recovered and was in a suitable condition to be discharged to home with home health care and wound dressing instructions. We resumed her Coumadin 24 hours after the procedure.        Discharge daily     nitroGLYCERIN 0.4 MG SL tablet  Commonly known as:  NITROSTAT  up to max of 3 total doses. If no relief after 1 dose, call 911. omeprazole 20 MG EC tablet  Take 1 tablet by mouth daily     oxyCODONE 5 MG immediate release tablet  Commonly known as:  Roxicodone  Take 0.5 tablets by mouth every 6 hours as needed for Pain for up to 14 days. Intended supply: 5 days. Take lowest dose possible to manage pain     pregabalin 50 MG capsule  Commonly known as:  Lyrica  Take 1 capsule by mouth 3 times daily for 120 days. PT/INR Test Strp  1 each by In Vitro route once a week Use to check INR at least once weekly and more often as directed. * PT/INR Testing Monitor Kit  1 each by Does not apply route once a week Please use to test INR as directed by physician     * PT/INR Testing Monitor Kit  1 each by Does not apply route once a week Use to test INR at home once weekly and as directed to monitor INR. sodium bicarbonate 650 MG tablet     sodium chloride 0.65 % nasal spray  Commonly known as:  OCEAN, BABY AYR  1 spray by Nasal route as needed for Congestion (dryness)     therapeutic multivitamin-minerals tablet  Take 1 tablet by mouth daily     vitamin B-12 1000 MCG tablet  Commonly known as:  CYANOCOBALAMIN  Take 1 tablet by mouth daily         * This list has 2 medication(s) that are the same as other medications prescribed for you. Read the directions carefully, and ask your doctor or other care provider to review them with you.             STOP taking these medications    clindamycin 150 MG capsule  Commonly known as:  CLEOCIN           Where to Get Your Medications      These medications were sent to [address], [address] [facility] [phone]  [address], [address] [facility] [address]    Phone:  [phone]   · collagenase 250 UNIT/GM ointment  · triamcinolone 0.1 % cream         Consults:  Infectious Diseases     Significant Diagnostic Studies:    Labs:  Na/K/Cl/CO2:  140/5.3/102/27 (06/22 2799)  BUN/Cr/glu/ALT/AST/amyl/lip:  46/2.0/--/28/38/--/-- (06/22 1993)  WBC/Hgb/Hct/Plts:  2.3/8.4/26.6/75 (06/22 7000)  [unfilled]  estimated creatinine clearance is 23 mL/min (A) (based on SCr of 2 mg/dL (H)). New Imaging:  US ABDOMEN LIMITED   Final Result   Minimal ascites. Hepatic masses, compatible with findings of hepatic masses on CT from   2019 and consistent with the patient's history of biopsy-proven   metastatic breast cancer to the liver. Contrast-enhanced   cross-sectional imaging should be considered to reevaluate the extent   of metastatic disease and to further evaluate the patient's subjective   abdominal distention. ALERT:  THIS IS AN ABNORMAL REPORT         MRI HIP RIGHT WO CONTRAST   Final Result   1. Bilateral gluteal soft tissue wounds. Soft tissue edema present in   the adjacent fat bilaterally, left greater than right. Additional soft   tissue edema in the left gluteus medius muscle which may relate to   underlying myositis. 2. Extensive abnormal bone marrow signal involving the left femoral   head, neck, left iliac bone, acetabulum and inferior pubic ramus. Differential diagnostic considerations include infection as well as   neoplasia. Consider correlation with noncontrast CT which may allow   further characterization of the lesion as well as pinpoint potential   biopsy targets. 3. Pelvic ascites. MRI HIP LEFT WO CONTRAST   Final Result   1. Bilateral gluteal soft tissue wounds. Soft tissue edema present in   the adjacent fat bilaterally, left greater than right. Additional soft   tissue edema in the left gluteus medius muscle which may relate to   underlying myositis. 2. Extensive abnormal bone marrow signal involving the left femoral   head, neck, left iliac bone, acetabulum and inferior pubic ramus. Differential diagnostic considerations include infection as well as   neoplasia.  Consider correlation with noncontrast CT which may allow   further characterization of the lesion as well as pinpoint potential   biopsy targets. 3. Pelvic ascites. Treatments:   Vancomycin, Meropenem, wound debridement, Benadryl, held Coumadin, resumed other home meds    Discharge Exam:  General: Alert, cooperative, no acute distress. Chest: No tenderness or deformity, full & symmetric excursion  Lung: Clear to auscultation bilaterally,  respirations unlabored. No rales/wheezing/rubs  Heart: RRR, S1 and S2 normal, no murmur, rub or gallop. DP pulses 2/4  Abdomen: Soft, non tender, not distended  Extremities:  Extremities normal, atraumatic, no cyanosis or edema. Distal pulses equal bilateral  Skin:   Right gluteal region: Healed wound with eschar, mild erythematous induration in the right hip, tender to palpation    left gluteal region: necrotic, fibrinous granulation tissue covering central ulceration, tender to palpation, mild induration  Neurologic: Alert & Oriented;; Normal and symmetric strength in UEs and LEs; Sensation intact    Disposition: home    Future Appointments   Date Time Provider Nick Anna   6/25/2020  2:00 PM Caren Moon MD [facility]   6/29/2020 10:00 AM Jenny Burch DO [facility]       More than 30 minutes was spent in preparation of this patient's discharge including, but not limited to, examination, preparation of documents, prescription preparation, counseling and coordination.     Signed:  Erasmo Mar MD  6/22/2020, 2:24 PM

## 2020-06-22 NOTE — PLAN OF CARE
Problem: Falls - Risk of:  Goal: Will remain free from falls  Description: Will remain free from falls  6/22/2020 1552 by Jeferson Crews RN  Outcome: Completed  6/22/2020 1334 by Rosio Gordon RN  Outcome: Met This Shift  Goal: Absence of physical injury  Description: Absence of physical injury  6/22/2020 1552 by Jeferson Crews RN  Outcome: Completed  6/22/2020 1334 by Rosio Gordon RN  Outcome: Met This Shift     Problem: Pain:  Goal: Pain level will decrease  Description: Pain level will decrease  6/22/2020 1552 by Jeferson Crews RN  Outcome: Completed  6/22/2020 1334 by Rosio Gordon RN  Outcome: Met This Shift  Goal: Control of acute pain  Description: Control of acute pain  6/22/2020 1552 by Jeferson Crews RN  Outcome: Completed  6/22/2020 1334 by Rosio Gordon RN  Outcome: Met This Shift  Goal: Control of chronic pain  Description: Control of chronic pain  6/22/2020 1552 by Jeferson Crews RN  Outcome: Completed  6/22/2020 1334 by Rosio Gordon RN  Outcome: Met This Shift

## 2020-06-22 NOTE — CARE COORDINATION
6/22/2020 social work transition of care planning  Pt hopes to return home, but [employer] is following if snf needed. Awaiting ID final atb plan. Sw will follow. Sw received call from [facility] supervisor for [facility]. Pt receives personal car eservices 6 hours per week (MWF). Pt receives nursing care form [facility]. Sw will follow.   Electronically signed by DEYANIRA Borja on 6/22/2020 at 12:34 PM yes...

## 2020-06-22 NOTE — CARE COORDINATION
6/22/2020 social work transition of care planning  Pt's dtr to transport pt home at 5:30pm. Per Id note, no atb at this time. Pt stated that she has a hospital bed at home. [facility] notified Madalyn Christian from [facility] and [place] of pt discharge today. [facility] notified kassandra moody liaison as well.   Electronically signed by DEYANIRA Gibson on 6/22/2020 at 3:32 PM

## 2020-06-22 NOTE — PROGRESS NOTES
[facility] - Family Medicine Inpatient   Resident Progress Note    S:  Hospital day: 4   Antibiotic: Vancomycin and Meropenem day 4    Overnight/interim: Vitals are stable. No acute events happened overnight    Patient seen and examined today morning. No new complaints. She wants to go home. Cont meds:   Scheduled meds:    triamcinolone   Topical BID    palbociclib  100 mg Oral Daily    vancomycin  1,000 mg Intravenous Q36H    bumetanide  1 mg Oral Daily    docusate sodium  100 mg Oral Daily    isosorbide dinitrate  5 mg Oral TID    magnesium oxide  400 mg Oral Daily    metoprolol succinate  12.5 mg Oral Daily    pantoprazole  40 mg Oral QAM AC    sodium chloride flush  10 mL Intravenous 2 times per day    meropenem  1 g Intravenous Q8H     I reviewed the patient's past medical and surgical history, Medications and Allergies. O:  BP (!) 106/46   Pulse 76   Temp 98.3 °F (36.8 °C) (Temporal)   Resp 16   Ht 5' 4\" (1.626 m)   Wt 150 lb (68 kg)   SpO2 95%   BMI 25.75 kg/m²   24 hour I&O: I/O last 3 completed shifts: In: 240 [P.O.:240]  Out: -   No intake/output data recorded. General: Alert, cooperative, no acute distress. Chest: No tenderness or deformity, full & symmetric excursion  Lung: Clear to auscultation bilaterally,  respirations unlabored. No rales/wheezing/rubs  Heart: RRR, S1 and S2 normal, no murmur, rub or gallop. DP pulses 2/4  Abdomen: Soft, non tender, not distended  Extremities:  Extremities normal, atraumatic, no cyanosis or edema. Distal pulses equal bilateral  Skin:   Right gluteal region: Healed wound with eschar, mild erythematous induration in the right hip, tender to palpation    left gluteal region: necrotic, fibrinous granulation tissue covering central ulceration, tender to palpation, mild induration  Neurologic: Alert & Oriented;; Normal and symmetric strength in UEs and LEs;  Sensation intact    Labs:  Na/K/Cl/CO2:  140/5.2/102/28 (06/21 4212)  BUN/Cr/glu/ALT/AST/amyl/lip:  40/1.8/--/26/39/--/-- (06/21 0255)  WBC/Hgb/Hct/Plts:  2.1/7.9/25.2/74 (06/21 6419)  estimated creatinine clearance is 26 mL/min (A) (based on SCr of 1.8 mg/dL (H)). Other pertinent labs as noted below    Radiology:  US ABDOMEN LIMITED   Final Result   Minimal ascites. Hepatic masses, compatible with findings of hepatic masses on CT from   2019 and consistent with the patient's history of biopsy-proven   metastatic breast cancer to the liver. Contrast-enhanced   cross-sectional imaging should be considered to reevaluate the extent   of metastatic disease and to further evaluate the patient's subjective   abdominal distention. ALERT:  THIS IS AN ABNORMAL REPORT         MRI HIP RIGHT WO CONTRAST   Final Result   1. Bilateral gluteal soft tissue wounds. Soft tissue edema present in   the adjacent fat bilaterally, left greater than right. Additional soft   tissue edema in the left gluteus medius muscle which may relate to   underlying myositis. 2. Extensive abnormal bone marrow signal involving the left femoral   head, neck, left iliac bone, acetabulum and inferior pubic ramus. Differential diagnostic considerations include infection as well as   neoplasia. Consider correlation with noncontrast CT which may allow   further characterization of the lesion as well as pinpoint potential   biopsy targets. 3. Pelvic ascites. MRI HIP LEFT WO CONTRAST   Final Result   1. Bilateral gluteal soft tissue wounds. Soft tissue edema present in   the adjacent fat bilaterally, left greater than right. Additional soft   tissue edema in the left gluteus medius muscle which may relate to   underlying myositis. 2. Extensive abnormal bone marrow signal involving the left femoral   head, neck, left iliac bone, acetabulum and inferior pubic ramus. Differential diagnostic considerations include infection as well as   neoplasia.  Consider correlation with noncontrast CT which  for home health care. Patient refuses for nursing home or inpatient rehab.         Electronically signed by Judge Jorge MD PGY-2 on 6/22/2020 at 7:05 AM  This case was discussed with attending physician: Dr. Corita Jeans.

## 2020-06-22 NOTE — PROGRESS NOTES
[address] [place] [address] [facility]  Progress Note      SUBJECTIVE:  Patient is tolerating medications. No reported adverse drug reactions. ROS: No nausea, vomiting, diarrhea. Itching -better      Medications:  Scheduled Meds:   collagenase   Topical BID    triamcinolone   Topical BID    palbociclib  100 mg Oral Daily    vancomycin  1,000 mg Intravenous Q36H    bumetanide  1 mg Oral Daily    docusate sodium  100 mg Oral Daily    isosorbide dinitrate  5 mg Oral TID    magnesium oxide  400 mg Oral Daily    metoprolol succinate  12.5 mg Oral Daily    pantoprazole  40 mg Oral QAM AC    sodium chloride flush  10 mL Intravenous 2 times per day    meropenem  1 g Intravenous Q8H     Continuous Infusions:  PRN Meds:diphenhydrAMINE, oxyCODONE **OR** oxyCODONE, sodium chloride flush, acetaminophen **OR** acetaminophen, polyethylene glycol, promethazine **OR** ondansetron, albuterol  OBJECTIVE:  Patient Vitals for the past 24 hrs:   BP Temp Temp src Pulse Resp SpO2   06/22/20 0745 (!) 120/56 98.6 °F (37 °C) Temporal 80 18 93 %   06/22/20 0108 (!) 106/46 98.3 °F (36.8 °C) Temporal 76 16 95 %   06/21/20 1615 (!) 119/57 97.9 °F (36.6 °C) Temporal 76 16 96 %     Constitutional: The patient is awake, alert, sitting up at bedside  Skin: Warm and dry. No rashes were noted-itchy area to back- improved   Wounds dressed. Head: Eyes show round, and reactive pupils. No jaundice. Mouth: Moist mucous membranes, no ulcerations, no thrush. Neck: Supple to movements. No lymphadenopathy. Chest: No use of accessory muscles to breathe. Symmetrical expansion. Auscultation reveals no wheezing, crackles, or rhonchi. Cardiovascular: S1 and S2 are rhythmic and regular. Abdomen: Positive bowel sounds to auscultation. Benign to palpation.    Extremities: No clubbing, no cyanosis, some edema, dressed with dressing  PIV            Laboratory and Tests Review:  Lab Results   Component Value Date    WBC 2.3 (L) 06/22/2020    WBC 2.1 (L) 06/21/2020    WBC 2.3 (L) 06/20/2020    HGB 8.4 (L) 06/22/2020    HCT 26.6 (L) 06/22/2020    .2 (H) 06/22/2020    PLT 75 (L) 06/22/2020     Lab Results   Component Value Date    NEUTROABS 1.45 (L) 06/22/2020    NEUTROABS 1.32 (L) 06/21/2020    NEUTROABS 1.59 (L) 06/20/2020     Lab Results   Component Value Date    CRP 4.2 (H) 06/19/2020     Lab Results   Component Value Date    SEDRATE 102 (H) 06/21/2020    SEDRATE 130 (H) 06/19/2020     Lab Results   Component Value Date    ALT 28 06/22/2020    AST 38 (H) 06/22/2020    ALKPHOS 151 (H) 06/22/2020    BILITOT 0.6 06/22/2020     Lab Results   Component Value Date     06/22/2020    K 5.3 06/22/2020    K 4.3 06/19/2020     06/22/2020    CO2 27 06/22/2020    BUN 46 06/22/2020    CREATININE 2.0 06/22/2020    GFRAA 29 06/22/2020    LABGLOM 24 06/22/2020    GLUCOSE 118 06/22/2020    GLUCOSE 109 04/20/2012    PROT 6.9 06/22/2020    LABALBU 3.0 06/22/2020    LABALBU 4.4 03/25/2012    CALCIUM 9.4 06/22/2020    BILITOT 0.6 06/22/2020    ALKPHOS 151 06/22/2020    AST 38 06/22/2020    ALT 28 06/22/2020     Radiology:  MRI reviewed     Microbiology:   Wound culture left hip from decubitus 6/21/2020-pending    ASSESSMENT:  · Bilateral hip wounds- eschar  · Myositis   · MRI- rule out OM--- demonstrated lytic lesions specifically about the left proximal femur and the femoral head and acetabulum plus ulceration on the left hip. · History of metastatic breast cancer    PLAN:  · Continue Vancomycin and meropenem for now  · Meropenem 1 gram iv every 8 hours to every 12 hour (adjusted for renal)   · Pharmacy dosing vancomycin   · Surgery following   · Orthopedic consult appreciated- no suspicious for osteomyelitis of the femur and this is more metastatic in origin, the edema about the greater trochanter is reactive and associated from her ulceration.   · Check cultures  · Continue triamcinolone cream to back/ neck  · Monitor labs-- sed rate 130 Wbc-2.3  · Cultures are growing corynebacteria and staph aureus   I was called by [facility] practice    Suggest no antibiotcs at this time   She had a debridement which presumably cleaned the wound   I reviewed the notes from ortho   No osteo  · followup with wound care and ID     Vika Browne  FNP- BC  10:06 AM  6/22/2020   Pt seen and examined. Above discussed agree with advanced practice nurse. Labs, cultures, and radiographs reviewed. Face to Face encounter occurred. Changes made as necessary.      Marilyn Luther MD

## 2020-06-22 NOTE — PROGRESS NOTES
[address] [facility]  Family Medicine Attending    S: 76 y.o. female with history of HTN, DVT, breast cancer s/p lumpectomy with mets who was sent for direct admission for further evaluation of bilateral chronic non healing pressure ulcers of hips, stage 2. Has been seen by Ortho. Patient is having mild pain in her bilateral hips. Is up and about in room Wants to go home. Declines [place]. Today, reports pain is not well controlled at the moment, but is due for a dose of pain pill. Usually better controlled. No new symptoms or concerns. Discussed the need for wound care and for frequent position changes. Discussed hospital bed, as she has been sleeping in a recliner at home, but she states she already has a hospital bed at home but finds it uncomfortable. Discussed the need to avoid prolong sitting or lying in any position and to move every hour at least while awake. O: VS- Blood pressure (!) 120/56, pulse 80, temperature 98.6 °F (37 °C), temperature source Temporal, resp. rate 18, height 5' 4\" (1.626 m), weight 150 lb (68 kg), SpO2 93 %, not currently breastfeeding. Exam is as noted by resident  Gen QUIROGA, A&A, pallor noted  Neck supple   Heart regular rhythm, systolic murmur  Lungs clear to auscultation  Abdomen moderately distended, diffusely tender     Impressions: Active Problems:  Metastatic breast cancer,    Cellulitis and abscess of buttock  Hepatic masses on CT and ultrasound  Bone metastases  Hypertension  CHF  COPD  CAD status post CABG  CKD IV      Plan:   Wound care, surgery , ID on board; their management is appreciated. Stage 2 ulcerations present on admission, left more than right hip. Disposition planning. Will need home care and wound care. Continue with heme-onc and chemo; will call oncology to let them know about admission and imaging studies. Watch K+; mildly elevated. Advised avoiding bananas (she had been eating several of these lately) and recheck. Attending Physician Statement  I have reviewed the chart, including any radiology or labs, and seen the patient with the resident(s). I personally reviewed and performed key elements of the history and exam.  I agree with the assessment, plan and orders as documented by the resident. Please refer to the resident note for additional information.       Zully Alejandra

## 2020-06-22 NOTE — DISCHARGE INSTR - COC
Continuity of Care Form    Patient Name: Nancy Magana   :  1945  MRN:  [id number]    Admit date:  2020  Discharge date:  ***    Code Status Order: Full Code   Advance Directives:   Advance Care Flowsheet Documentation     Date/Time Healthcare Directive Type of Healthcare Directive Copy in [address] Agent's Name Healthcare Agent's Phone Number    20  No, patient does not have an advance directive for healthcare treatment -- -- -- -- --          Admitting Physician:  Elsa Sanders MD  PCP: Sam Melvin DO    Discharging Nurse: [facility] Unit/Room#: 1641/0198-R  Discharging Unit Phone Number: ***    Emergency Contact:   Extended Emergency Contact Information  Primary Emergency Contact: Cynthia Koenig  Address: [place] Cohen [address] Phone: [phone]  Mobile Phone: [phone]  Relation: Child  Hearing or visual needs: None  Other needs: None  Preferred language: English   needed? No  Secondary Emergency Contact: Naima Ha  [place] Phone: [phone]  Mobile Phone: [phone]  Relation: Child    Past Surgical History:  Past Surgical History:   Procedure Laterality Date    APPENDECTOMY      ARTERIAL BYPASS SURGRY      BREAST LUMPECTOMY  2005    LEFT    CARDIAC SURGERY      CHOLECYSTECTOMY      COLONOSCOPY  2007    cecal arteriovenous malformation with hyperplastic polyps    COLONOSCOPY  [id number]    CORONARY ARTERY BYPASS GRAFT  2008    triple bypass    ECHO COMPL W DOP COLOR FLOW  10/30/2013         EYE SURGERY      clarissa cataracts    HYSTERECTOMY  ,     MAYNOR prolapse, benign conditions; BSO later for scar tissues, no CA.      OTHER SURGICAL HISTORY  11    port removal right chest wall    PARACENTESIS      TONSILLECTOMY      TOOTH EXTRACTION      Full Dental Extraction.     TUNNELED VENOUS PORT PLACEMENT      removal of port Dec. 2011- Dr. Gagandeep Hudson TUNNELED VENOUS PORT PLACEMENT  [id number]    RIGHT CHEST       Immunization History:   Immunization History   Administered Date(s) Administered    Influenza 10/11/2013    Influenza Virus Vaccine 11/26/2008, 10/28/2011, 11/19/2012, 02/23/2015    Influenza, High Dose (Fluzone 65 yrs and older) 10/19/2015, 10/03/2016    Influenza, MDCK Quadv, IM, PF (Flucelvax 4 yrs and older) 10/23/2017    Influenza, Quadv, IM, PF (6 mo and older Fluzone, Flulaval, Fluarix, and 3 yrs and older Afluria) 10/15/2018, 11/25/2019    Pneumococcal Conjugate 13-valent ([id number]) 09/14/2015    Pneumococcal Polysaccharide (Iyuniiqyw50) 10/28/2011    Tdap (Boostrix, Adacel) 12/05/2011       Active Problems:  Patient Active Problem List   Diagnosis Code    Metastatic breast cancer ([facility] C50.919    Essential hypertension I10    Coronary artery disease involving native coronary artery of native heart without angina pectoris I25.10    Nephrolithiasis N20.0    CHF (congestive heart failure) ([facility]) I50.9    Humerus fracture S42.309A    Shoulder pain, left M25.512    B12 deficiency E53.8    Hyperlipidemia E78.5    Rib lesion M89.9    Subclavian vein occlusion, bilateral ([facility] I82. B13    Pericardial effusion I31.3    MI (myocardial infarction) ([facility] I21.9    Arthritis M19.90    Sarcoidosis D86.9    Lymph node enlargement R59.9    Anesthesia R20.0    Rectal bleeding K62.5    Ventral hernia K43.9    Type 2 diabetes mellitus without complication, with long-term current use of insulin ([facility]) E11.9, Z79.4    Anemia of chronic disease D63.8    Chronic deep vein thrombosis (DVT) of proximal vein of right lower extremity ([facility]) I82.5Y1    Bilateral edema of lower extremity R60.0    Peripheral polyneuropathy C40.0    Complicated UTI (urinary tract infection) N39.0    KARISHMA (acute kidney injury) ([facility]) N17.9    Diarrhea with dehydration R19.7    Chronic anticoagulation Z79.01    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 S42.202K    Diabetic polyneuropathy associated with type 2 diabetes mellitus (HCC) E11.42    Leg swelling M79.89    History of deep vein thrombosis Z86.718    Chronic venous insufficiency I87.2    Lymphedema of both lower extremities I89.0    Decreased dorsalis pedis pulse R09.89    Ambulatory dysfunction R26.2    CKD (chronic kidney disease) stage 3, GFR 30-59 ml/min ([facility]) N18.3    Charcot's joint of foot in type 2 diabetes mellitus ([facility] E11.610    Ascites R18.8    Palliative care encounter Z51.5    Cancer associated pain G89.3    HAP (hospital-acquired pneumonia) J18.9, P32    Acute systolic heart failure (HCC) I50.21    Nonischemic cardiomyopathy ([facility]) I42.8    Status post coronary artery bypass graft Z95.1    Class 2 severe obesity due to excess calories with serious comorbidity and body mass index (BMI) of 35.0 to 35.9 in adult ([facility]) E66.01, Z68.35    Type 2 diabetes mellitus with hyperglycemia ([facility]) E11.65    Acute hypercapnic respiratory failure ([facility]) J96.02    Altered mental status R41.82    Goals of care, counseling/discussion Z71.89    Palliative care by specialist Z51.5    Metastatic disease ([facility] C79.9    Moderate protein-calorie malnutrition (HCC) E44.0    Precordial pain R07.2    Chronic systolic heart failure (HCC) I50.22    Pleural effusion J90    Cellulitis and abscess of buttock L02.31, L03.317       Isolation/Infection:   Isolation          Contact        Patient Infection Status     Infection Onset Added Last Indicated Last Indicated By Review Planned Expiration Resolved Resolved By    MDRO (multi-drug resistant organism) 08/13/19 08/16/19 08/13/19 Urine Culture              Nurse Assessment:  Last Vital Signs: BP (!) 120/56   Pulse 80   Temp 98.6 °F (37 °C) (Temporal)   Resp 18   Ht 5' 4\" (1.626 m)   Wt 150 lb (68 kg)   SpO2 93%   BMI 25.75 kg/m²     Last documented pain score (0-10 scale): Pain Level: 8  Last Weight:   Wt Readings from Last 1 Encounters:   06/18/20 150 lb (68 kg)     Mental Status:  {IP PT MENTAL STATUS:20030}    IV Access:  { LUPE IV ACCESS:115172177}    Nursing Mobility/ADLs:  Walking   {CHP DME HMWN:902015817}  Transfer  {CHP DME BWHS:774603998}  Bathing  {CHP DME TWKX:782311325}  Dressing  {CHP DME QSBR:967436947}  Toileting  {CHP DME COFM:460354065}  Feeding  {P DME ZQVM:480519756}  Med Admin  {P DME ORLZ:477924224}  Med Delivery   { LUPE MED Delivery:830428638}    Wound Care Documentation and Therapy:  Puncture 07/25/19 Back Right (Active)   Number of days: 332       Wound 06/18/20 Coccyx Mid (Active)   Dressing Status Clean;Dry; Intact 6/22/2020 12:30 AM   Dressing/Treatment Foam 6/22/2020 12:30 AM   Wound Length (cm) 2 cm 6/18/2020  8:58 PM   Wound Width (cm) 2 cm 6/18/2020  8:58 PM   Wound Depth (cm) 0.1 cm 6/18/2020  8:58 PM   Wound Surface Area (cm^2) 4 cm^2 6/18/2020  8:58 PM   Wound Volume (cm^3) 0.4 cm^3 6/18/2020  8:58 PM   Number of days: 3       Wound 06/18/20 Femoral Left;Lateral (Active)   Dressing Status New drainage 6/22/2020  9:00 AM   Dressing Changed Changed/New 6/22/2020  9:00 AM   Wound Length (cm) 7 cm 6/18/2020  8:58 PM   Wound Width (cm) 3 cm 6/18/2020  8:58 PM   Wound Depth (cm) 0.1 cm 6/18/2020  8:58 PM   Wound Surface Area (cm^2) 21 cm^2 6/18/2020  8:58 PM   Wound Volume (cm^3) 2.1 cm^3 6/18/2020  8:58 PM   Number of days: 3       Wound 06/18/20 Femoral Right;Lateral (Active)   Dressing/Treatment Open to air 6/22/2020  9:00 AM   Wound Length (cm) 5 cm 6/18/2020  8:58 PM   Wound Width (cm) 2 cm 6/18/2020  8:58 PM   Wound Depth (cm) 0.1 cm 6/18/2020  8:58 PM   Wound Surface Area (cm^2) 10 cm^2 6/18/2020  8:58 PM   Wound Volume (cm^3) 1 cm^3 6/18/2020  8:58 PM   Wound Assessment Dry; Intact 6/22/2020 12:30 AM   Number of days: 3        Elimination:  Continence:   · Bowel: {YES / TY:45564}  · Bladder: {YES / GA:64191}  Urinary Catheter: {Urinary Catheter:459529641}   Colostomy/Ileostomy/Ileal

## 2020-06-22 NOTE — PROGRESS NOTES
General Surgery Progress Note:    CC: hip wounds     S: doing well not much pain       Objective:  @BP (!) 120/56   Pulse 80   Temp 98.6 °F (37 °C) (Temporal)   Resp 18   Ht 5' 4\" (1.626 m)   Wt 150 lb (68 kg)   SpO2 93%   BMI 25.75 kg/m² @    Physical -     Gen: NAD  Resp: Breathing Comfortably, no resp distress  CV: Reg Rate  Abd: nad  EXT L hip wound with some fibrinous exudate with area of recently debrided wound about 1.5 cm deep R hip and sacral wounds are very superficial     Assessment/Plan: hip wound     - santyl dressing to wound, and local wound care, abx based on cx sensitivities. - surgery will s/o, don't hesitate to call if there are any issues.        Issa Cerda MD FACS     12:49 PM

## 2020-06-22 NOTE — PROGRESS NOTES
Pharmacy Consultation Note  (Antibiotic Dosing and Monitoring)    Initial consult date: 2020  Consulting physician: Dr. Cely Jones (Dr. Pj Greenberg, ID)  Drug(s): vancomycin  Indication: CSSSI (B/L hips and coccyx)    Age/Gender IBW DW  Allergy Information   74 y.o./F 55.2 kg 68 kg  Macrobid [nitrofurantoin monohydrate macrocrystals]             Date  WBC BUN/CR Drug/Dose Time   Given Level(s)   (Time) Comments    X X vancomycin 1000 mg x12h 2      3 30/1.8 vanco 1000 mg q12h 0833      2.3 30/1.9 vancomycin 1000 mg q36h 2018      2.1 40/1.8 vanco 1000 mg q36h X       46/2 vanco 1000 mg q36h X vanc Tr = 15.5 mCg/mL @ 0552 obtained 1.5 hr early              Estimated Creatinine Clearance: 23 mL/min (A) (based on SCr of 2 mg/dL (H)). No intake or output data in the 24 hours ending 20 0824UO not documented. Temp max: Temp (24hrs), Av.3 °F (36.8 °C), Min:97.9 °F (36.6 °C), Max:98.6 °F (37 °C)      Cultures:  available culture and sensitivity results were reviewed in [facility]      Assessment:  · Consulted by Dr. Madelyn Mcneil to dose/monitor vancomycin. · Goal trough level:  15-20 mCg/mL. · 73 yo/F admitted for chronic B/L hip and coccyx wounds/infection. On numerous courses of OP PO ATBs PTA. · Cr = 1.8 (baseline = 1.3-2.3, most 1.7-1.9). · Empiric vanco and meropenem started. Received vancomycin 1000 mg q12h (x2 doses) starting . · ID has been contsulted. · : day #3 vanco and meropenem, continued per ID. Plan for bedside I&D today (no Ortho intervention planned). · : day #4 ATBs. · : day #5 vanco and meropenem. Slight increase in Cr today; vanc Tr obtained 1.5 hours earlier than ordered. Plan:  · Continue vancomycin 1000 mg q36h d/t increase in Cr. · Will check vancomycin trough level on . · Pharmacist will follow and monitor/adjust dosing as necessary.     Ambrose Guajardo, PharmD  2020  8:24 AM  Pager: [phone]

## 2020-06-22 NOTE — PROGRESS NOTES
Physical Therapy  Physical Therapy Initial Assessment     Name: Ang Julio  : 1945  MRN: [id number]    Referring Provider:  Jane Lewis MD    Date of Service: 2020    Evaluating PT:  Zaria Lino PT   Room #:  5226/0543-K  Diagnosis: Cellulitis and abcess of buttocks. PMHx/PSHx:   has a past medical history of Abscess of abdominal wall, Anemia, Anesthesia, Arthritis, Arthritis, hip, Breast cancer ([address] CAD (coronary artery disease), CHF (congestive heart failure) ([address] Chronic venous insufficiency, Class 2 severe obesity due to excess calories with serious comorbidity and body mass index (BMI) of 35.0 to 35.9 in adult ([address] Decreased dorsalis pedis pulse, Diabetic neuropathy ([address] Diabetic neuropathy ([address] DVT (deep venous thrombosis) ([address] DVT of upper extremity (deep vein thrombosis) ([address] History of deep vein thrombosis, Humerus fracture, Hyperlipidemia, Hypertension, Leg swelling, Lymph node enlargement, Lymphedema of both lower extremities, MI (myocardial infarction) ([address] Nephrolithiasis, Pericardial effusion, Pulmonary nodule, Rib lesion, Sarcoidosis, Subclavian vein occlusion, bilateral ([address] and Type II or unspecified type diabetes mellitus without mention of complication, not stated as uncontrolled. Procedure/Surgery:   has a past surgical history that includes Tooth Extraction; Hysterectomy (, ); Cholecystectomy; Appendectomy; other surgical history (11); Arterial bypass surgry; Coronary artery bypass graft (2008); Tunneled venous port placement; Cardiac surgery; eye surgery; Tunneled venous port placement ([id number]); Breast lumpectomy (2005); ECHO Compl W Dop Color Flow (10/30/2013); Colonoscopy (2007); Colonoscopy ([id number]); Tonsillectomy; and Paracentesis. Precautions:  Falls, WBAT   Equipment Needs:  None has her rollator here in the facility. SUBJECTIVE:    Patient lives alone  in a apartment  with No steps to enter.   Bed is on 1 floor and bath is on 1 floor. Patient ambulated independently  PTA. Equipment owned: rollator. OBJECTIVE:   Initial Evaluation  Date: 6/22/20 Treatment Short Term/ Long Term   Goals   AM-PAC 6 Clicks 99/50     Was pt agreeable to Eval/treatment? yes     Does pt have pain? Minimal.      Bed Mobility  Rolling: Ind  Supine to sit: Ind  Sit to supine: Ind  Scooting: Ind  Rolling: Ind  Supine to sit: Ind  Sit to supine: Ind  Scooting: Ind   Transfers Sit to stand: Mod Ind  Stand to sit: Mod Ind  Stand pivot: Mod Ind  Sit to stand: Mod Ind  Stand to sit: -Mod Ind  Stand pivot: -Mod Ind   Ambulation    40 feet with rollator Sup   150 feet with rollator Mod Ind   Stair negotiation: ascended and descended  NT  Not barrier to D/C.    ROM BUE:  See OT eval  BLE:  wfl     Strength BUE: See OT eval 4  RLE:  4/5  LLE:   4/5  4+/5   Balance Sitting EOB:  Ind  Dynamic Standing: Sup  Sitting EOB:  Ind  Dynamic Standing: Mod Ind     Pt is A & O x 3  Sensation:  Pt denies numbness and tingling to extremities  Edema:  none    Therapeutic Exercises:  AROM for BLE. Patient education  Pt educated on role of PT eval.     Patient response to education:   Pt verbalized understanding Pt demonstrated skill Pt requires further education in this area   yes       ASSESSMENT:    Comments/Traetment:  Patient was seen this date for PT evaluation. Patient was agreeable to evaluation. Results of the functional assessment are noted above. Upon entering the room patient was found supine in bed. . Sat EOB x 5 minutes to increase dynamic sitting balance and activity tolerance. Gait completed in the room to commode. Minimal cues required for sequencing. Patient states she been ambulating in her room. At end of session, patient in bed with  call light and phone within reach,  all lines and tubes intact, nursing notified. This patient can benefit from the continuation of skilled PT in home setting to maximize functional level and return to PLOF. Pt's/ family goals   1. Return to home. Patient and or family understand(s) diagnosis, prognosis, and plan of care. yes    PLAN:    PT care will be provided in accordance with the objectives noted above. Exercises and functional mobility practice will be used as well as appropriate assistive devices or modalities to obtain goals. Patient and family education will also be administered as needed. Gait Team added to POC. Frequency of treatments: 2-5x/week x 1-2 weeks. Time in  1130  Time out  1150    Total Treatment Time  20minutes     Evaluation Time includes thorough review of current medical information, gathering information on past medical history/social history and prior level of function, completion of standardized testing/informal observation of tasks, assessment of data and education on plan of care and goals.     CPT codes:  [x] Low Complexity PT evaluation 55670  [] Moderate Complexity PT evaluation 12194  [] High Complexity PT evaluation 40462  [] PT Re-evaluation 50759  [] Gait training 86531 - minutes  [] Manual therapy [id number] - minutes  [] Therapeutic activities 33368 - minutes  [] Therapeutic exercises 49462 - minutes  [] Neuromuscular reeducation 76068 - minutes     Jolly Vasquez, [address] [facility]

## 2020-06-22 NOTE — PROGRESS NOTES
Occupational Therapy  OCCUPATIONAL THERAPY INITIAL EVALUATION      Date:2020  Patient Name: Russ Rouse  MRN: [id number]  : 1945  Room: [address] [place] [address]    Evaluating OT: Paolo Will OTR/L #[id number]    Referring physician:  Judge Jorge MD  AM-PAC Daily Activity Raw Score: 22/24  Recommended Adaptive Equipment: none     Diagnosis: cellulitis abscess/ buttock   Surgery:    Past Surgical History:   Procedure Laterality Date    APPENDECTOMY      ARTERIAL BYPASS SURGRY      BREAST LUMPECTOMY  2005    LEFT    CARDIAC SURGERY      CHOLECYSTECTOMY      COLONOSCOPY  2007    cecal arteriovenous malformation with hyperplastic polyps    COLONOSCOPY  [id number]    CORONARY ARTERY BYPASS GRAFT  2008    triple bypass    ECHO COMPL W DOP COLOR FLOW  10/30/2013         EYE SURGERY      clarissa cataracts    HYSTERECTOMY  ,     MAYNOR prolapse, benign conditions; BSO later for scar tissues, no CA.      OTHER SURGICAL HISTORY  11    port removal right chest wall    PARACENTESIS      TONSILLECTOMY      TOOTH EXTRACTION      Full Dental Extraction.  TUNNELED VENOUS PORT PLACEMENT      removal of port Dec. 2011- Dr. Nadege Muir TUNNELED [address]  [id number]    RIGHT CHEST       Pertinent Medical History:   Past Medical History:   Diagnosis Date    Abscess of abdominal wall     Anemia     Iron deficiency and chronic disease.  Anesthesia     DIFFICULTY WAKING UP    Arthritis     Arthritis, hip     Breast cancer ([facility]     S/P Left Lumpectomy with Lymph Node Dissection, Chemo/Rad. Follows with Dr. Dorothea Stover. No recurrence to date. Surgeon was Dr. Darrell Sheth.  CAD (coronary artery disease) 2008    s/p CABG. Follows with [address] [place] [address].     CHF (congestive heart failure) (HCC)     Chronic venous insufficiency 2018    Class 2 severe obesity due to excess calories with serious comorbidity and body mass index (BMI) of 35.0 to 35.9 in adult ([address] 2019    Decreased dorsalis pedis pulse 11/7/2018    Diabetic neuropathy (HCC)     Diabetic neuropathy (HCC)     DVT (deep venous thrombosis) (HCC)     Recurrent. On lifelong coumadin.  DVT of upper extremity (deep vein thrombosis) ([facility] 4/18/2012    History of deep vein thrombosis 11/7/2018    Humerus fracture     Chronic, on the Left.  Hyperlipidemia 8/29/2011    Hypertension     Leg swelling 11/7/2018    Lymph node enlargement     Lymphedema of both lower extremities 11/7/2018    MI (myocardial infarction) ([address]     Nephrolithiasis     Follows with Dr. Gena Salazar.  Pericardial effusion     Pulmonary nodule     With mediastinal lymphadenopathy. Stable. Follows with Dr. Kandis Yarbrough.  Rib lesion 11/28/11    expansile lesion in one of the right ribs laterally    Sarcoidosis 07/26/11    per transbronchial needle aspiration    Subclavian vein occlusion, bilateral ([facility] 4/18/2012    Type II or unspecified type diabetes mellitus without mention of complication, not stated as uncontrolled         Precautions:  Falls, contact isolation , B hip wounds     Home Living: Pt lives alone in a 7th floor high rise with no step(s) to enter and no rail(s); bed/bath on main floor   Bathroom setup: tub shower , elevated commode   Equipment owned: TTB and 3:1, reacher  Prior Level of Function:   Min A with bathing by aide 1x/week with ADLs ,  assisted with IADLs; using rollator for ambulation.    Driving: no                           Medication Management setup  Occupation: enjoys games on phone    Pain Level: 7/10 hip pain - wound  Cognition: A&O: 3/3; Follows multi step directions   Memory:  good    Sequencing:  good    Problem solving:  good    Judgement/safety:  good      Functional Assessment:   Initial Eval Status  Date: 6/22/20 Treatment Status  Date:    Feeding Independent     Grooming Independent at sink      UB Dressing Independent     LB Dressing SBA for socks     Bathing Min  A     Toileting Mod I with ww     Bed Mobility  Supine to sit: Independent   Sit to supine: Independent        Functional Transfers Sit to stand: mod I with rollator  Stand to sit:  Mod I  Stand pivot: mod I      Functional Mobility Mod I in room with rollator bed<>bathroom     Balance Sitting:     Static:  good    Dynamic:good-  Standing: good-      Activity Tolerance Good-     Visual/  Perceptual Glasses: reading WFL         Safety good                      Hand dominance: R   UE ROM: RUE:  WFL  LUE:  WFL  Strength: RUE:  4+/5 LUE:  4+/5   Strength: B WFL  Fine Motor Coordination:  WFL    Hearing: WFL  Sensation:  No c/o numbness or tingling  Tone:  WFL  Edema: none noted                            Comments: Nursing approval to work with patient given. Upon arrival, patient supine and agreeable to evaluation. OT evaluation performed with  education on benefits of mobility and safety with ADL completion. At end of session, patient supine with HOB elevated and  with call light and phone within reach, all lines and tubes intact. Nursing notified. OT treatment: OT for functional assessment of  ADL, Functional Transfer/Mobility Training, Equipment Needs, Energy Conservation Techniques, Pt/Family Education, Ther Ex- deep breathing for edema and pain control., OT role and POC reviewed. Patient  demo good understanding of functional limitations and safety with ADL completion  Skilled occupational therapy services provided include instruction/training on safety and adapted techniques for completion of therapeutic activities- LE dressing, ADLs/IADLs. Therapist educated pt on contact isolation  precautions. Therapist facilitated bed mobility, functional transfers, graded functional activities (static/dynamic sitting, static/dynamic standing with rollator, functional reaching), and functional ambulation - providing no cuing on AD management, energy conservation, compensatory strategies, and safety.  Therapist facilitated self-care retraining: UB dressing, LB dressing, grooming, toileting,in room . Skilled monitoring of O2 sats, HR, and pt response throughout treatment. Patient at baseline for functional activities and recieves Assistance from aides 3x/week. No skilled OT services needed at this time. Eval Complexity: low    Assessment of current deficits   Functional mobility []  ADLs [x] Strength []  Cognition []  Functional transfers  [] IADLs [x] Safety Awareness []  Endurance []  Fine Motor Coordination [] Balance [] Vision/perception [] Sensation []   Gross Motor Coordination [] ROM [] Delirium []                  Motor Control []     low OT Evaluation only        Michael Rios.  [address], [address]

## 2020-06-23 LAB
ORGANISM: ABNORMAL
ORGANISM: ABNORMAL
WOUND/ABSCESS: ABNORMAL
WOUND/ABSCESS: ABNORMAL

## 2020-06-24 ENCOUNTER — ANTI-COAG VISIT (OUTPATIENT)
Dept: FAMILY MEDICINE CLINIC | Age: 75
End: 2020-06-24
Payer: COMMERCIAL

## 2020-06-24 LAB — INR BLD: 1.1

## 2020-06-24 PROCEDURE — 93793 ANTICOAG MGMT PT WARFARIN: CPT | Performed by: FAMILY MEDICINE

## 2020-06-29 ENCOUNTER — ANTI-COAG VISIT (OUTPATIENT)
Dept: FAMILY MEDICINE CLINIC | Age: 75
End: 2020-06-29

## 2020-06-29 ENCOUNTER — OFFICE VISIT (OUTPATIENT)
Dept: FAMILY MEDICINE CLINIC | Age: 75
End: 2020-06-29
Payer: COMMERCIAL

## 2020-06-29 ENCOUNTER — HOSPITAL ENCOUNTER (OUTPATIENT)
Age: 75
Discharge: HOME | End: 2020-07-01
Payer: COMMERCIAL

## 2020-06-29 VITALS
WEIGHT: 157 LBS | BODY MASS INDEX: 26.8 KG/M2 | RESPIRATION RATE: 16 BRPM | OXYGEN SATURATION: 97 % | DIASTOLIC BLOOD PRESSURE: 80 MMHG | TEMPERATURE: 98.2 F | HEART RATE: 87 BPM | SYSTOLIC BLOOD PRESSURE: 126 MMHG | HEIGHT: 64 IN

## 2020-06-29 LAB
ALBUMIN SERPL-MCNC: 3.4 G/DL (ref 3.5–5.2)
ALP BLD-CCNC: 222 U/L (ref 35–104)
ALT SERPL-CCNC: 34 U/L (ref 0–32)
ANION GAP SERPL CALCULATED.3IONS-SCNC: 11 MMOL/L (ref 7–16)
AST SERPL-CCNC: 46 U/L (ref 0–31)
BILIRUB SERPL-MCNC: 0.7 MG/DL (ref 0–1.2)
BUN BLDV-MCNC: 31 MG/DL (ref 8–23)
CALCIUM SERPL-MCNC: 8.8 MG/DL (ref 8.6–10.2)
CHLORIDE BLD-SCNC: 99 MMOL/L (ref 98–107)
CO2: 29 MMOL/L (ref 22–29)
CREAT SERPL-MCNC: 1.5 MG/DL (ref 0.5–1)
GFR AFRICAN AMERICAN: 41
GFR NON-AFRICAN AMERICAN: 34 ML/MIN/1.73
GLUCOSE BLD-MCNC: 170 MG/DL (ref 74–99)
INTERNATIONAL NORMALIZATION RATIO, POC: 1.3
POTASSIUM SERPL-SCNC: 4.5 MMOL/L (ref 3.5–5)
PROTHROMBIN TIME, POC: 15.5
SODIUM BLD-SCNC: 139 MMOL/L (ref 132–146)
TOTAL PROTEIN: 7.6 G/DL (ref 6.4–8.3)

## 2020-06-29 PROCEDURE — 36415 COLL VENOUS BLD VENIPUNCTURE: CPT | Performed by: FAMILY MEDICINE

## 2020-06-29 PROCEDURE — 80053 COMPREHEN METABOLIC PANEL: CPT

## 2020-06-29 PROCEDURE — 1036F TOBACCO NON-USER: CPT | Performed by: FAMILY MEDICINE

## 2020-06-29 PROCEDURE — 1090F PRES/ABSN URINE INCON ASSESS: CPT | Performed by: FAMILY MEDICINE

## 2020-06-29 PROCEDURE — 2022F DILAT RTA XM EVC RTNOPTHY: CPT | Performed by: FAMILY MEDICINE

## 2020-06-29 PROCEDURE — 4040F PNEUMOC VAC/ADMIN/RCVD: CPT | Performed by: FAMILY MEDICINE

## 2020-06-29 PROCEDURE — 1123F ACP DISCUSS/DSCN MKR DOCD: CPT | Performed by: FAMILY MEDICINE

## 2020-06-29 PROCEDURE — 99212 OFFICE O/P EST SF 10 MIN: CPT | Performed by: FAMILY MEDICINE

## 2020-06-29 PROCEDURE — 3017F COLORECTAL CA SCREEN DOC REV: CPT | Performed by: FAMILY MEDICINE

## 2020-06-29 PROCEDURE — 1111F DSCHRG MED/CURRENT MED MERGE: CPT | Performed by: FAMILY MEDICINE

## 2020-06-29 PROCEDURE — G8427 DOCREV CUR MEDS BY ELIG CLIN: HCPCS | Performed by: FAMILY MEDICINE

## 2020-06-29 PROCEDURE — G8417 CALC BMI ABV UP PARAM F/U: HCPCS | Performed by: FAMILY MEDICINE

## 2020-06-29 PROCEDURE — G8399 PT W/DXA RESULTS DOCUMENT: HCPCS | Performed by: FAMILY MEDICINE

## 2020-06-29 PROCEDURE — 99213 OFFICE O/P EST LOW 20 MIN: CPT | Performed by: FAMILY MEDICINE

## 2020-06-29 PROCEDURE — 3052F HG A1C>EQUAL 8.0%<EQUAL 9.0%: CPT | Performed by: FAMILY MEDICINE

## 2020-06-29 NOTE — PROGRESS NOTES
CC:  Follow up from hospitalization, wounds of bilateral hips, metastatic breast cancer    HPI:  76 y.o. female presents for follow up. Admitted with concerns for bilateral hip wounds. Evaluated by general surgery, orthopedics, and infectious disease. Receiving wound care from home health nurses. No new symptoms or concerns. Pain is controlled overall. Avoiding pressure on wounds, but underwear tends to rub the area of the wounds. Left hip wounds covered. Right side now open. Seems to be healing somewhat. Chronic anticoagulation, recurrent DVTs, metastatic malignancy. Needs test strips for home INR machine ordered (order will be sent to [address]). INR labile, liver mets. Has been low since resuming anticoagulation on discharge, slowly improving. Has pain pills at home. No need for refills, controlled overall. DM. Not checking glucose at home, now diet controlled. Had mild hypokalemia, due for recheck today. Avoiding high-potassium foods. Following with oncology, will see Dr. Ami Auguste tomorrow.       Patient Active Problem List    Diagnosis Date Noted    Cellulitis and abscess of buttock 06/18/2020    Pleural effusion 09/17/2019    Precordial pain     Chronic systolic heart failure (HCC)     Moderate protein-calorie malnutrition ([address] 08/15/2019    Metastatic disease ([address]     Palliative care by specialist     Altered mental status     Goals of care, counseling/discussion     Acute hypercapnic respiratory failure ([address] 09/95/6680    Acute systolic heart failure ([address] 08/01/2019    Nonischemic cardiomyopathy ([address] 08/01/2019    Status post coronary artery bypass graft 08/01/2019    Class 2 severe obesity due to excess calories with serious comorbidity and body mass index (BMI) of 35.0 to 35.9 in adult [facility]) 08/01/2019    Type 2 diabetes mellitus with hyperglycemia ([address] 08/01/2019    HAP (hospital-acquired pneumonia) 07/21/2019    Palliative care encounter     Cancer associated pain     Ascites 06/18/2019    Charcot's joint of foot in type 2 diabetes mellitus ([facility] 04/17/2019    CKD (chronic kidney disease) stage 3, GFR 30-59 ml/min ([facility] 04/16/2019    Ambulatory dysfunction 04/15/2019    Leg swelling 11/07/2018    History of deep vein thrombosis 11/07/2018    Chronic venous insufficiency 11/07/2018    Lymphedema of both lower extremities 11/07/2018    Decreased dorsalis pedis pulse 11/07/2018    Diabetic polyneuropathy associated with type 2 diabetes mellitus ([address] 09/07/2018    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 39/88/5014    Complicated UTI (urinary tract infection) 10/24/2017    KARISHMA (acute kidney injury) ([address] 10/24/2017    Diarrhea with dehydration 10/24/2017    Chronic anticoagulation 10/24/2017    Peripheral polyneuropathy 01/03/2017    Bilateral edema of lower extremity 10/03/2016    Chronic deep vein thrombosis (DVT) of proximal vein of right lower extremity ([address] 09/30/2016    Type 2 diabetes mellitus without complication, with long-term current use of insulin ([address] 09/01/2016    Anemia of chronic disease 09/01/2016    Ventral hernia 05/14/2015    Rectal bleeding 02/23/2015    Anesthesia     Pericardial effusion     MI (myocardial infarction) ([address]     Arthritis     Lymph node enlargement     Subclavian vein occlusion, bilateral ([address] 04/18/2012    Rib lesion 02/07/2012    Hyperlipidemia 08/29/2011    Sarcoidosis 07/26/2011    B12 deficiency 06/22/2011    Shoulder pain, left 03/02/2011    Metastatic breast cancer ([address]     Essential hypertension     Coronary artery disease involving native coronary artery of native heart without angina pectoris     Nephrolithiasis     CHF (congestive heart failure) ([facility])     Humerus fracture        Current Outpatient Medications on File Prior to Visit   Medication Sig Dispense Refill    collagenase 250 UNIT/GM ointment Apply topically daily.  90 g 0    sodium

## 2020-07-06 ENCOUNTER — ANTI-COAG VISIT (OUTPATIENT)
Dept: FAMILY MEDICINE CLINIC | Age: 75
End: 2020-07-06

## 2020-07-06 LAB — INR BLD: 1.3

## 2020-07-06 NOTE — PROGRESS NOTES
Thank you! As we previously discussed, she may start taking 3 mg on three days per week (Tues, Thurs, Sat), and she may also take 3 mg today. Take 2 mg on all other days. Please recheck this again in about 3 days and let us know the results. Thank you!

## 2020-07-07 ENCOUNTER — APPOINTMENT (OUTPATIENT)
Dept: GENERAL RADIOLOGY | Age: 75
DRG: 264 | End: 2020-07-07
Payer: COMMERCIAL

## 2020-07-07 ENCOUNTER — APPOINTMENT (OUTPATIENT)
Dept: ULTRASOUND IMAGING | Age: 75
DRG: 264 | End: 2020-07-07
Payer: COMMERCIAL

## 2020-07-07 ENCOUNTER — HOSPITAL ENCOUNTER (INPATIENT)
Age: 75
LOS: 7 days | Discharge: NURSING, REHAB OR LONG-TERM CARE | DRG: 264 | End: 2020-07-15
Attending: EMERGENCY MEDICINE | Admitting: FAMILY MEDICINE
Payer: COMMERCIAL

## 2020-07-07 LAB
ALBUMIN SERPL-MCNC: 3.6 G/DL (ref 3.5–5.2)
ALP BLD-CCNC: 144 U/L (ref 35–104)
ALT SERPL-CCNC: 28 U/L (ref 0–32)
ANION GAP SERPL CALCULATED.3IONS-SCNC: 16 MMOL/L (ref 7–16)
AST SERPL-CCNC: 34 U/L (ref 0–31)
BILIRUB SERPL-MCNC: 1 MG/DL (ref 0–1.2)
BUN BLDV-MCNC: 57 MG/DL (ref 8–23)
C-REACTIVE PROTEIN: 16.2 MG/DL (ref 0–0.4)
CALCIUM SERPL-MCNC: 9.2 MG/DL (ref 8.6–10.2)
CHLORIDE BLD-SCNC: 97 MMOL/L (ref 98–107)
CO2: 27 MMOL/L (ref 22–29)
CREAT SERPL-MCNC: 3.4 MG/DL (ref 0.5–1)
D DIMER: 2601 NG/ML DDU
GFR AFRICAN AMERICAN: 16
GFR NON-AFRICAN AMERICAN: 13 ML/MIN/1.73
GLUCOSE BLD-MCNC: 171 MG/DL (ref 74–99)
HCT VFR BLD CALC: 27.2 % (ref 34–48)
HEMOGLOBIN: 8.8 G/DL (ref 11.5–15.5)
LACTIC ACID: 2.3 MMOL/L (ref 0.5–2.2)
MCH RBC QN AUTO: 37.6 PG (ref 26–35)
MCHC RBC AUTO-ENTMCNC: 32.4 % (ref 32–34.5)
MCV RBC AUTO: 116.2 FL (ref 80–99.9)
PDW BLD-RTO: 17 FL (ref 11.5–15)
PLATELET # BLD: 118 E9/L (ref 130–450)
PMV BLD AUTO: 10.3 FL (ref 7–12)
POTASSIUM SERPL-SCNC: 4.4 MMOL/L (ref 3.5–5)
PRO-BNP: 2884 PG/ML (ref 0–450)
RBC # BLD: 2.34 E12/L (ref 3.5–5.5)
SEDIMENTATION RATE, ERYTHROCYTE: 134 MM/HR (ref 0–20)
SODIUM BLD-SCNC: 140 MMOL/L (ref 132–146)
TOTAL PROTEIN: 7.5 G/DL (ref 6.4–8.3)
TROPONIN: 0.03 NG/ML (ref 0–0.03)
WBC # BLD: 3.3 E9/L (ref 4.5–11.5)

## 2020-07-07 PROCEDURE — 73630 X-RAY EXAM OF FOOT: CPT

## 2020-07-07 PROCEDURE — 93970 EXTREMITY STUDY: CPT

## 2020-07-07 PROCEDURE — 87040 BLOOD CULTURE FOR BACTERIA: CPT

## 2020-07-07 PROCEDURE — 84540 ASSAY OF URINE/UREA-N: CPT

## 2020-07-07 PROCEDURE — 85610 PROTHROMBIN TIME: CPT

## 2020-07-07 PROCEDURE — 86140 C-REACTIVE PROTEIN: CPT

## 2020-07-07 PROCEDURE — 93005 ELECTROCARDIOGRAM TRACING: CPT | Performed by: EMERGENCY MEDICINE

## 2020-07-07 PROCEDURE — 84484 ASSAY OF TROPONIN QUANT: CPT

## 2020-07-07 PROCEDURE — 85378 FIBRIN DEGRADE SEMIQUANT: CPT

## 2020-07-07 PROCEDURE — 73590 X-RAY EXAM OF LOWER LEG: CPT

## 2020-07-07 PROCEDURE — 83880 ASSAY OF NATRIURETIC PEPTIDE: CPT

## 2020-07-07 PROCEDURE — 87088 URINE BACTERIA CULTURE: CPT

## 2020-07-07 PROCEDURE — 82436 ASSAY OF URINE CHLORIDE: CPT

## 2020-07-07 PROCEDURE — 83605 ASSAY OF LACTIC ACID: CPT

## 2020-07-07 PROCEDURE — 82570 ASSAY OF URINE CREATININE: CPT

## 2020-07-07 PROCEDURE — 71045 X-RAY EXAM CHEST 1 VIEW: CPT

## 2020-07-07 PROCEDURE — 85027 COMPLETE CBC AUTOMATED: CPT

## 2020-07-07 PROCEDURE — 84300 ASSAY OF URINE SODIUM: CPT

## 2020-07-07 PROCEDURE — 81001 URINALYSIS AUTO W/SCOPE: CPT

## 2020-07-07 PROCEDURE — 84133 ASSAY OF URINE POTASSIUM: CPT

## 2020-07-07 PROCEDURE — 99285 EMERGENCY DEPT VISIT HI MDM: CPT

## 2020-07-07 PROCEDURE — 80053 COMPREHEN METABOLIC PANEL: CPT

## 2020-07-07 PROCEDURE — 85651 RBC SED RATE NONAUTOMATED: CPT

## 2020-07-07 ASSESSMENT — PAIN DESCRIPTION - ORIENTATION: ORIENTATION: RIGHT;LEFT

## 2020-07-07 ASSESSMENT — ENCOUNTER SYMPTOMS
NAUSEA: 0
BLOOD IN STOOL: 0
VOMITING: 0
COUGH: 0
SHORTNESS OF BREATH: 0
BACK PAIN: 0
ABDOMINAL PAIN: 0

## 2020-07-07 ASSESSMENT — PAIN DESCRIPTION - LOCATION: LOCATION: LEG

## 2020-07-07 ASSESSMENT — PAIN DESCRIPTION - PAIN TYPE: TYPE: ACUTE PAIN

## 2020-07-07 ASSESSMENT — PAIN SCALES - GENERAL: PAINLEVEL_OUTOF10: 8

## 2020-07-08 ENCOUNTER — APPOINTMENT (OUTPATIENT)
Dept: ULTRASOUND IMAGING | Age: 75
DRG: 264 | End: 2020-07-08
Payer: COMMERCIAL

## 2020-07-08 PROBLEM — L02.419 CELLULITIS AND ABSCESS OF LEG: Status: ACTIVE | Noted: 2020-06-18

## 2020-07-08 PROBLEM — I82.401 DEEP VEIN THROMBOSIS (DVT) OF RIGHT LOWER EXTREMITY (HCC): Status: ACTIVE | Noted: 2017-06-06

## 2020-07-08 PROBLEM — C79.51 BONY METASTASIS (HCC): Status: ACTIVE | Noted: 2020-07-08

## 2020-07-08 PROBLEM — L89.209 DECUBITUS ULCER OF HIP: Status: ACTIVE | Noted: 2020-07-08

## 2020-07-08 PROBLEM — L03.90 CELLULITIS: Status: ACTIVE | Noted: 2020-07-08

## 2020-07-08 PROBLEM — L03.119 CELLULITIS AND ABSCESS OF LEG: Status: ACTIVE | Noted: 2020-06-18

## 2020-07-08 LAB
ANION GAP SERPL CALCULATED.3IONS-SCNC: 17 MMOL/L (ref 7–16)
ANISOCYTOSIS: ABNORMAL
ANISOCYTOSIS: ABNORMAL
BACTERIA: ABNORMAL /HPF
BASOPHILS ABSOLUTE: 0 E9/L (ref 0–0.2)
BASOPHILS ABSOLUTE: 0.05 E9/L (ref 0–0.2)
BASOPHILS RELATIVE PERCENT: 0.8 % (ref 0–2)
BASOPHILS RELATIVE PERCENT: 1.7 % (ref 0–2)
BILIRUBIN URINE: NEGATIVE
BLOOD, URINE: NORMAL
BUN BLDV-MCNC: 56 MG/DL (ref 8–23)
CALCIUM SERPL-MCNC: 8.5 MG/DL (ref 8.6–10.2)
CHLORIDE BLD-SCNC: 96 MMOL/L (ref 98–107)
CHLORIDE URINE RANDOM: <20 MMOL/L
CLARITY: CLEAR
CO2: 24 MMOL/L (ref 22–29)
COLOR: YELLOW
CREAT SERPL-MCNC: 3 MG/DL (ref 0.5–1)
CREATININE URINE: 107 MG/DL (ref 29–226)
EKG ATRIAL RATE: 96 BPM
EKG P AXIS: 81 DEGREES
EKG P-R INTERVAL: 200 MS
EKG Q-T INTERVAL: 456 MS
EKG QRS DURATION: 148 MS
EKG QTC CALCULATION (BAZETT): 576 MS
EKG R AXIS: -86 DEGREES
EKG T AXIS: 88 DEGREES
EKG VENTRICULAR RATE: 96 BPM
EOSINOPHILS ABSOLUTE: 0.07 E9/L (ref 0.05–0.5)
EOSINOPHILS ABSOLUTE: 0.09 E9/L (ref 0.05–0.5)
EOSINOPHILS RELATIVE PERCENT: 2.6 % (ref 0–6)
EOSINOPHILS RELATIVE PERCENT: 2.6 % (ref 0–6)
EPITHELIAL CELLS, UA: ABNORMAL /HPF
FERRITIN: 2375 NG/ML
FOLATE: >20 NG/ML (ref 4.8–24.2)
GFR AFRICAN AMERICAN: 18
GFR NON-AFRICAN AMERICAN: 15 ML/MIN/1.73
GLUCOSE BLD-MCNC: 219 MG/DL (ref 74–99)
GLUCOSE URINE: NEGATIVE MG/DL
HCT VFR BLD CALC: 25.4 % (ref 34–48)
HCT VFR BLD CALC: 26.6 % (ref 34–48)
HEMOGLOBIN: 8.2 G/DL (ref 11.5–15.5)
HEMOGLOBIN: 8.5 G/DL (ref 11.5–15.5)
HYALINE CASTS: ABNORMAL /LPF (ref 0–2)
IMMATURE RETIC FRACT: 17.5 % (ref 3–15.9)
INR BLD: 1.7
INR BLD: 1.8
KETONES, URINE: NEGATIVE MG/DL
LACTATE DEHYDROGENASE: 255 U/L (ref 135–214)
LACTIC ACID: 1.7 MMOL/L (ref 0.5–2.2)
LACTIC ACID: 1.7 MMOL/L (ref 0.5–2.2)
LACTIC ACID: 2.5 MMOL/L (ref 0.5–2.2)
LEUKOCYTE ESTERASE, URINE: NEGATIVE
LYMPHOCYTES ABSOLUTE: 0.27 E9/L (ref 1.5–4)
LYMPHOCYTES ABSOLUTE: 0.36 E9/L (ref 1.5–4)
LYMPHOCYTES RELATIVE PERCENT: 10.4 % (ref 20–42)
LYMPHOCYTES RELATIVE PERCENT: 9.6 % (ref 20–42)
MCH RBC QN AUTO: 37.3 PG (ref 26–35)
MCH RBC QN AUTO: 38.1 PG (ref 26–35)
MCHC RBC AUTO-ENTMCNC: 32 % (ref 32–34.5)
MCHC RBC AUTO-ENTMCNC: 32.7 % (ref 32–34.5)
MCV RBC AUTO: 116.7 FL (ref 80–99.9)
MCV RBC AUTO: 116.7 FL (ref 80–99.9)
MONOCYTES ABSOLUTE: 0 E9/L (ref 0.1–0.95)
MONOCYTES ABSOLUTE: 0.11 E9/L (ref 0.1–0.95)
MONOCYTES RELATIVE PERCENT: 3.5 % (ref 2–12)
MONOCYTES RELATIVE PERCENT: 7.3 % (ref 2–12)
NEUTROPHILS ABSOLUTE: 2.24 E9/L (ref 1.8–7.3)
NEUTROPHILS ABSOLUTE: 3.1 E9/L (ref 1.8–7.3)
NEUTROPHILS RELATIVE PERCENT: 82.6 % (ref 43–80)
NEUTROPHILS RELATIVE PERCENT: 86.1 % (ref 43–80)
NITRITE, URINE: NEGATIVE
PATHOLOGIST REVIEW: NORMAL
PDW BLD-RTO: 17.2 FL (ref 11.5–15)
PDW BLD-RTO: 17.2 FL (ref 11.5–15)
PH UA: 5.5 (ref 5–9)
PLATELET # BLD: 108 E9/L (ref 130–450)
PLATELET # BLD: 121 E9/L (ref 130–450)
PMV BLD AUTO: 10.5 FL (ref 7–12)
PMV BLD AUTO: 10.6 FL (ref 7–12)
POIKILOCYTES: ABNORMAL
POLYCHROMASIA: ABNORMAL
POTASSIUM REFLEX MAGNESIUM: 4.2 MMOL/L (ref 3.5–5)
POTASSIUM, UR: 38.7 MMOL/L
PROMYELOCYTES PERCENT: 0.9 % (ref 0–0)
PROTEIN UA: NEGATIVE MG/DL
PROTHROMBIN TIME: 19.5 SEC (ref 9.3–12.4)
PROTHROMBIN TIME: 21 SEC (ref 9.3–12.4)
RBC # BLD: 2.15 E12/L (ref 3.5–5.5)
RBC # BLD: 2.28 E12/L (ref 3.5–5.5)
RBC UA: ABNORMAL /HPF (ref 0–2)
RETIC HGB EQUIVALENT: 34.7 PG (ref 28.2–36.6)
RETICULOCYTE ABSOLUTE COUNT: 0.05 E12/L
RETICULOCYTE COUNT PCT: 2.1 % (ref 0.4–1.9)
SODIUM BLD-SCNC: 137 MMOL/L (ref 132–146)
SODIUM URINE: 43 MMOL/L
SPECIFIC GRAVITY UA: 1.02 (ref 1–1.03)
UREA NITROGEN, UR: 572 MG/DL (ref 800–1666)
UROBILINOGEN, URINE: 0.2 E.U./DL
VITAMIN B-12: >2000 PG/ML (ref 211–946)
WBC # BLD: 2.7 E9/L (ref 4.5–11.5)
WBC # BLD: 3.6 E9/L (ref 4.5–11.5)
WBC UA: ABNORMAL /HPF (ref 0–5)

## 2020-07-08 PROCEDURE — 85045 AUTOMATED RETICULOCYTE COUNT: CPT

## 2020-07-08 PROCEDURE — 2580000003 HC RX 258: Performed by: EMERGENCY MEDICINE

## 2020-07-08 PROCEDURE — 6360000002 HC RX W HCPCS: Performed by: STUDENT IN AN ORGANIZED HEALTH CARE EDUCATION/TRAINING PROGRAM

## 2020-07-08 PROCEDURE — 6370000000 HC RX 637 (ALT 250 FOR IP): Performed by: STUDENT IN AN ORGANIZED HEALTH CARE EDUCATION/TRAINING PROGRAM

## 2020-07-08 PROCEDURE — 51798 US URINE CAPACITY MEASURE: CPT

## 2020-07-08 PROCEDURE — 97530 THERAPEUTIC ACTIVITIES: CPT

## 2020-07-08 PROCEDURE — 85025 COMPLETE CBC W/AUTO DIFF WBC: CPT

## 2020-07-08 PROCEDURE — 80048 BASIC METABOLIC PNL TOTAL CA: CPT

## 2020-07-08 PROCEDURE — 82728 ASSAY OF FERRITIN: CPT

## 2020-07-08 PROCEDURE — 87070 CULTURE OTHR SPECIMN AEROBIC: CPT

## 2020-07-08 PROCEDURE — 87205 SMEAR GRAM STAIN: CPT

## 2020-07-08 PROCEDURE — 93010 ELECTROCARDIOGRAM REPORT: CPT | Performed by: INTERNAL MEDICINE

## 2020-07-08 PROCEDURE — 6370000000 HC RX 637 (ALT 250 FOR IP): Performed by: EMERGENCY MEDICINE

## 2020-07-08 PROCEDURE — 97162 PT EVAL MOD COMPLEX 30 MIN: CPT

## 2020-07-08 PROCEDURE — 2580000003 HC RX 258: Performed by: STUDENT IN AN ORGANIZED HEALTH CARE EDUCATION/TRAINING PROGRAM

## 2020-07-08 PROCEDURE — 83615 LACTATE (LD) (LDH) ENZYME: CPT

## 2020-07-08 PROCEDURE — 83605 ASSAY OF LACTIC ACID: CPT

## 2020-07-08 PROCEDURE — 2060000000 HC ICU INTERMEDIATE R&B

## 2020-07-08 PROCEDURE — 96366 THER/PROPH/DIAG IV INF ADDON: CPT

## 2020-07-08 PROCEDURE — 82746 ASSAY OF FOLIC ACID SERUM: CPT

## 2020-07-08 PROCEDURE — 82607 VITAMIN B-12: CPT

## 2020-07-08 PROCEDURE — 99222 1ST HOSP IP/OBS MODERATE 55: CPT | Performed by: FAMILY MEDICINE

## 2020-07-08 PROCEDURE — 85610 PROTHROMBIN TIME: CPT

## 2020-07-08 PROCEDURE — 96365 THER/PROPH/DIAG IV INF INIT: CPT

## 2020-07-08 PROCEDURE — 76770 US EXAM ABDO BACK WALL COMP: CPT

## 2020-07-08 PROCEDURE — 87186 SC STD MICRODIL/AGAR DIL: CPT

## 2020-07-08 PROCEDURE — 36415 COLL VENOUS BLD VENIPUNCTURE: CPT

## 2020-07-08 PROCEDURE — 97166 OT EVAL MOD COMPLEX 45 MIN: CPT

## 2020-07-08 PROCEDURE — 6360000002 HC RX W HCPCS: Performed by: EMERGENCY MEDICINE

## 2020-07-08 RX ORDER — ACETAMINOPHEN 325 MG/1
650 TABLET ORAL EVERY 6 HOURS PRN
Status: DISCONTINUED | OUTPATIENT
Start: 2020-07-08 | End: 2020-07-15 | Stop reason: HOSPADM

## 2020-07-08 RX ORDER — SODIUM CHLORIDE 0.9 % (FLUSH) 0.9 %
10 SYRINGE (ML) INJECTION PRN
Status: DISCONTINUED | OUTPATIENT
Start: 2020-07-08 | End: 2020-07-15 | Stop reason: HOSPADM

## 2020-07-08 RX ORDER — OXYCODONE HYDROCHLORIDE AND ACETAMINOPHEN 5; 325 MG/1; MG/1
1 TABLET ORAL ONCE
Status: COMPLETED | OUTPATIENT
Start: 2020-07-08 | End: 2020-07-08

## 2020-07-08 RX ORDER — BUMETANIDE 0.25 MG/ML
1 INJECTION, SOLUTION INTRAMUSCULAR; INTRAVENOUS DAILY
Status: DISCONTINUED | OUTPATIENT
Start: 2020-07-08 | End: 2020-07-15

## 2020-07-08 RX ORDER — ALBUTEROL SULFATE 2.5 MG/3ML
2.5 SOLUTION RESPIRATORY (INHALATION) EVERY 4 HOURS PRN
Status: DISCONTINUED | OUTPATIENT
Start: 2020-07-08 | End: 2020-07-15 | Stop reason: HOSPADM

## 2020-07-08 RX ORDER — SODIUM CHLORIDE 9 MG/ML
INJECTION, SOLUTION INTRAVENOUS CONTINUOUS
Status: DISCONTINUED | OUTPATIENT
Start: 2020-07-08 | End: 2020-07-11

## 2020-07-08 RX ORDER — WARFARIN SODIUM 3 MG/1
3 TABLET ORAL
Status: COMPLETED | OUTPATIENT
Start: 2020-07-08 | End: 2020-07-08

## 2020-07-08 RX ORDER — ACETAMINOPHEN 650 MG/1
650 SUPPOSITORY RECTAL EVERY 6 HOURS PRN
Status: DISCONTINUED | OUTPATIENT
Start: 2020-07-08 | End: 2020-07-15 | Stop reason: HOSPADM

## 2020-07-08 RX ORDER — ONDANSETRON 2 MG/ML
4 INJECTION INTRAMUSCULAR; INTRAVENOUS EVERY 6 HOURS PRN
Status: DISCONTINUED | OUTPATIENT
Start: 2020-07-08 | End: 2020-07-10

## 2020-07-08 RX ORDER — LANOLIN ALCOHOL/MO/W.PET/CERES
1000 CREAM (GRAM) TOPICAL DAILY
Status: DISCONTINUED | OUTPATIENT
Start: 2020-07-08 | End: 2020-07-15 | Stop reason: HOSPADM

## 2020-07-08 RX ORDER — SODIUM CHLORIDE 0.9 % (FLUSH) 0.9 %
10 SYRINGE (ML) INJECTION EVERY 12 HOURS SCHEDULED
Status: DISCONTINUED | OUTPATIENT
Start: 2020-07-08 | End: 2020-07-15 | Stop reason: HOSPADM

## 2020-07-08 RX ORDER — OXYCODONE HYDROCHLORIDE AND ACETAMINOPHEN 5; 325 MG/1; MG/1
1 TABLET ORAL EVERY 6 HOURS PRN
Status: DISCONTINUED | OUTPATIENT
Start: 2020-07-08 | End: 2020-07-08

## 2020-07-08 RX ORDER — PROMETHAZINE HYDROCHLORIDE 25 MG/1
12.5 TABLET ORAL EVERY 6 HOURS PRN
Status: DISCONTINUED | OUTPATIENT
Start: 2020-07-08 | End: 2020-07-15 | Stop reason: HOSPADM

## 2020-07-08 RX ORDER — OYSTER SHELL CALCIUM WITH VITAMIN D 500; 200 MG/1; [IU]/1
1 TABLET, FILM COATED ORAL DAILY
Status: DISCONTINUED | OUTPATIENT
Start: 2020-07-08 | End: 2020-07-15 | Stop reason: HOSPADM

## 2020-07-08 RX ORDER — PANTOPRAZOLE SODIUM 40 MG/1
40 TABLET, DELAYED RELEASE ORAL
Status: DISCONTINUED | OUTPATIENT
Start: 2020-07-08 | End: 2020-07-15 | Stop reason: HOSPADM

## 2020-07-08 RX ORDER — M-VIT,TX,IRON,MINS/CALC/FOLIC 27MG-0.4MG
1 TABLET ORAL DAILY
Status: DISCONTINUED | OUTPATIENT
Start: 2020-07-08 | End: 2020-07-15 | Stop reason: HOSPADM

## 2020-07-08 RX ORDER — METOPROLOL SUCCINATE 25 MG/1
12.5 TABLET, EXTENDED RELEASE ORAL DAILY
Status: DISCONTINUED | OUTPATIENT
Start: 2020-07-08 | End: 2020-07-15 | Stop reason: HOSPADM

## 2020-07-08 RX ORDER — PREGABALIN 50 MG/1
50 CAPSULE ORAL 3 TIMES DAILY
Status: DISCONTINUED | OUTPATIENT
Start: 2020-07-08 | End: 2020-07-15 | Stop reason: HOSPADM

## 2020-07-08 RX ORDER — DOCUSATE SODIUM 100 MG/1
100 CAPSULE, LIQUID FILLED ORAL DAILY
Status: DISCONTINUED | OUTPATIENT
Start: 2020-07-08 | End: 2020-07-13

## 2020-07-08 RX ORDER — ISOSORBIDE DINITRATE 10 MG/1
5 TABLET ORAL 3 TIMES DAILY
Status: DISCONTINUED | OUTPATIENT
Start: 2020-07-08 | End: 2020-07-15 | Stop reason: HOSPADM

## 2020-07-08 RX ORDER — OXYCODONE HYDROCHLORIDE 5 MG/1
5 TABLET ORAL EVERY 4 HOURS PRN
Status: DISCONTINUED | OUTPATIENT
Start: 2020-07-08 | End: 2020-07-10

## 2020-07-08 RX ORDER — WARFARIN SODIUM 2 MG/1
2 TABLET ORAL DAILY
Status: DISCONTINUED | OUTPATIENT
Start: 2020-07-08 | End: 2020-07-08

## 2020-07-08 RX ADMIN — MULTIPLE VITAMINS W/ MINERALS TAB 1 TABLET: TAB at 09:01

## 2020-07-08 RX ADMIN — PREGABALIN 50 MG: 50 CAPSULE ORAL at 20:42

## 2020-07-08 RX ADMIN — PREGABALIN 50 MG: 50 CAPSULE ORAL at 13:53

## 2020-07-08 RX ADMIN — PANTOPRAZOLE SODIUM 40 MG: 40 TABLET, DELAYED RELEASE ORAL at 05:41

## 2020-07-08 RX ADMIN — Medication 10 ML: at 09:03

## 2020-07-08 RX ADMIN — ACETAMINOPHEN 650 MG: 325 TABLET ORAL at 05:42

## 2020-07-08 RX ADMIN — METOPROLOL SUCCINATE 12.5 MG: 25 TABLET, EXTENDED RELEASE ORAL at 09:02

## 2020-07-08 RX ADMIN — OXYCODONE AND ACETAMINOPHEN 1 TABLET: 5; 325 TABLET ORAL at 02:00

## 2020-07-08 RX ADMIN — WARFARIN SODIUM 3 MG: 3 TABLET ORAL at 17:35

## 2020-07-08 RX ADMIN — PIPERACILLIN AND TAZOBACTAM 3.38 G: 3; .375 INJECTION, POWDER, FOR SOLUTION INTRAVENOUS at 13:27

## 2020-07-08 RX ADMIN — OXYCODONE HYDROCHLORIDE 5 MG: 5 TABLET ORAL at 11:08

## 2020-07-08 RX ADMIN — ISOSORBIDE DINITRATE 5 MG: 10 TABLET ORAL at 17:35

## 2020-07-08 RX ADMIN — VANCOMYCIN HYDROCHLORIDE 1000 MG: 1 INJECTION, POWDER, LYOPHILIZED, FOR SOLUTION INTRAVENOUS at 02:03

## 2020-07-08 RX ADMIN — Medication 1000 MCG: at 09:01

## 2020-07-08 RX ADMIN — ISOSORBIDE DINITRATE 5 MG: 10 TABLET ORAL at 09:02

## 2020-07-08 RX ADMIN — PIPERACILLIN AND TAZOBACTAM 4.5 G: 4; .5 INJECTION, POWDER, FOR SOLUTION INTRAVENOUS at 00:01

## 2020-07-08 RX ADMIN — SODIUM CHLORIDE: 9 INJECTION, SOLUTION INTRAVENOUS at 09:03

## 2020-07-08 RX ADMIN — OXYCODONE HYDROCHLORIDE 5 MG: 5 TABLET ORAL at 17:42

## 2020-07-08 RX ADMIN — OXYCODONE HYDROCHLORIDE 5 MG: 5 TABLET ORAL at 22:03

## 2020-07-08 RX ADMIN — SODIUM CHLORIDE: 9 INJECTION, SOLUTION INTRAVENOUS at 17:34

## 2020-07-08 RX ADMIN — ISOSORBIDE DINITRATE 5 MG: 10 TABLET ORAL at 13:53

## 2020-07-08 RX ADMIN — MAGNESIUM GLUCONATE 500 MG ORAL TABLET 400 MG: 500 TABLET ORAL at 09:01

## 2020-07-08 RX ADMIN — PREGABALIN 50 MG: 50 CAPSULE ORAL at 09:15

## 2020-07-08 RX ADMIN — Medication 1 TABLET: at 17:37

## 2020-07-08 RX ADMIN — DOCUSATE SODIUM 100 MG: 100 CAPSULE, LIQUID FILLED ORAL at 09:01

## 2020-07-08 ASSESSMENT — PAIN DESCRIPTION - PAIN TYPE
TYPE: ACUTE PAIN

## 2020-07-08 ASSESSMENT — PAIN SCALES - GENERAL
PAINLEVEL_OUTOF10: 5
PAINLEVEL_OUTOF10: 9
PAINLEVEL_OUTOF10: 6
PAINLEVEL_OUTOF10: 7
PAINLEVEL_OUTOF10: 7
PAINLEVEL_OUTOF10: 6
PAINLEVEL_OUTOF10: 8
PAINLEVEL_OUTOF10: 8
PAINLEVEL_OUTOF10: 9
PAINLEVEL_OUTOF10: 8
PAINLEVEL_OUTOF10: 9
PAINLEVEL_OUTOF10: 9

## 2020-07-08 ASSESSMENT — PAIN DESCRIPTION - ORIENTATION
ORIENTATION: LEFT
ORIENTATION: LEFT
ORIENTATION: RIGHT;LEFT
ORIENTATION: LEFT
ORIENTATION: LEFT

## 2020-07-08 ASSESSMENT — PAIN DESCRIPTION - FREQUENCY
FREQUENCY: CONTINUOUS

## 2020-07-08 ASSESSMENT — PAIN DESCRIPTION - LOCATION
LOCATION: FOOT
LOCATION: LEG

## 2020-07-08 ASSESSMENT — PAIN DESCRIPTION - PROGRESSION
CLINICAL_PROGRESSION: GRADUALLY IMPROVING
CLINICAL_PROGRESSION: GRADUALLY IMPROVING

## 2020-07-08 ASSESSMENT — PAIN DESCRIPTION - DESCRIPTORS
DESCRIPTORS: SHARP
DESCRIPTORS: ACHING;SHARP

## 2020-07-08 ASSESSMENT — PAIN DESCRIPTION - ONSET: ONSET: ON-GOING

## 2020-07-08 NOTE — PROGRESS NOTES
Occupational Therapy  OCCUPATIONAL THERAPY INITIAL EVALUATION      Date:2020  Patient Name: Severo Agrawal  MRN: [id number]  : 1945  Room: [address] [place], [place] [address]      Evaluating OT: Medhat Roy OTR/L #[id number]    AM-PAC Daily Activity Raw Score:     Recommended Adaptive Equipment: AE for LB dressing/bathing, TBD for additional equipment     Diagnosis: Cellulitis [L03.90]  Cellulitis [L03.90]  Referring Provider: Berta Richmond MD  Patient presented to ED for bilateral leg pain    Surgery: none  Pertinent Medical History: abscess of abdominal wall, anemia, arthritis, hx of breast cancer, CAD, CHF, diabetic neuropathy, hx of DVT, HLD, HTN, sarcoidosis, type 2 DM     Precautions:  Falls     Home Living: Pt lives alone in apartment with few steps to enter and elevator access within building   Bathroom setup: ?   Equipment owned: rollator    Prior Level of Function: Assistance with ADLs , Assistance with IADLs; ambulated w/ rollator  Aide comes in 3 days a week to assist with ADLs/IADLs  Driving: No  Occupation: n/a    Pain Level: Pt c/o 6/10 L hip pain and 8/10 L leg pain, RN aware  Cognition: A&O: oriented to self, place and year; Follows 1 step directions   Memory:  fair    Sequencing:  fair    Problem solving: fair    Judgement/safety: fair      Functional Assessment:   Initial Eval Status  Date: 20 Treatment Status  Date: STGs/LTGs  Treatment frequency: 1-4x/wk   Feeding Set-up  Modified [place]     Grooming Minimal Assist   Modified [place]    UB Dressing Minimal Assist   Modified [place]    LB Dressing Maximal Assist   Modified [place] w/ AE   Bathing Maximal Assist (simulated)  Stand by Assist w/ AE   Toileting Dependent (simulated)  Minimal Assist    Bed Mobility  Supine to sit: Moderate Assist   Sit to supine: Moderate Assist     HOB elevated  Supine to sit: Supervision   Sit to supine: Supervision    Functional Transfers NT (d/t safety/weakness)  Min.  A   Functional Mobility NT (d/t safety/weakness)  Minimal Assist w/ ww   Balance Sitting:     Static: Sup    Dynamic:SBA  Standing: NT         Activity Tolerance Fair-  good   Visual/  Perceptual Glasses: readers                  Hand dominance: R     Strength ROM Additional Info:    RUE  4/5  WFL good  and wfl FMC/dexterity noted during ADL tasks       LUE Proximally: 2/5  Distally: 3+/5  Proximally: limited to slight shoulder flexion  Distally: WFL fair  and fair FMC/dexterity noted during ADL tasks     Comments: pt reports breast cancer spread to her arm and ever since she has had limited ROM and strength in LUE    Hearing: WFL   Sensation:  Pt c/o numbness/tingling in legs (chronic)  Tone: WFL   Edema: BLEs noted            Treatment: Upon arrival, patient lying in bed and agreeable to OT session at this time. RN approved. Therapist facilitated bed mobility(educated pt on technique to increase independence and hand placement--increased time noted to complete task) and sitting tolerance task(EOB ~5 mins to increase independence for ADLs). Therapist facilitated self-care retraining: UB/LB self-care tasks(donned socks), simulated toileting task and grooming task(combed hair seated) and feeding task(set-up tray to increase independence for task) while educating pt on modified techniques, posture, safety and energy conservation techniques. Skilled monitoring of HR, O2 sats and pts response to treatment. At end of session, patient lying in bed with call light and phone within reach, all lines and tubes intact. Comments:  Overall pt demonstrated decreased independence and safety during completion of ADL/functional transfers/mobility tasks. Pt demonstrating fair understanding of education/techniques, requiring additional training / education. Pt would benefit from continued skilled OT to increase functional independence and quality of life.       Eval Complexity: Mod  mod  Profile and History- med (extensive chart

## 2020-07-08 NOTE — CONSULTS
11/7/2018    MI (myocardial infarction) ([facility])     Nephrolithiasis     Follows with Dr. Daniel Chisholm.  Pericardial effusion     Pulmonary nodule     With mediastinal lymphadenopathy. Stable. Follows with Dr. Jonny Rodríguez.  Rib lesion 11/28/11    expansile lesion in one of the right ribs laterally    Sarcoidosis 07/26/11    per transbronchial needle aspiration    Subclavian vein occlusion, bilateral ([facility]) 4/18/2012    Type II or unspecified type diabetes mellitus without mention of complication, not stated as uncontrolled      Past Surgical History:        Procedure Laterality Date    APPENDECTOMY      ARTERIAL BYPASS SURGRY      BREAST LUMPECTOMY  9/27/2005    LEFT    CARDIAC SURGERY      CHOLECYSTECTOMY      COLONOSCOPY  12/2007    cecal arteriovenous malformation with hyperplastic polyps    COLONOSCOPY  [id number]    CORONARY ARTERY BYPASS GRAFT  05/2008    triple bypass    ECHO COMPL W DOP COLOR FLOW  10/30/2013         EYE SURGERY      clarissa cataracts    HYSTERECTOMY  1975, 1985    MAYNOR prolapse, benign conditions; BSO later for scar tissues, no CA.      OTHER SURGICAL HISTORY  11/18/11    port removal right chest wall    PARACENTESIS      TONSILLECTOMY      TOOTH EXTRACTION      Full Dental Extraction.     TUNNELED VENOUS PORT PLACEMENT      removal of port Dec. 2011- Dr. Josesito Hernandez [place] [address]  [id number]    RIGHT CHEST     Current Medications:   Scheduled Meds:   [Held by provider] bumetanide  1 mg Intravenous Daily    calcium-vitamin D  1 tablet Oral Daily    docusate sodium  100 mg Oral Daily    isosorbide dinitrate  5 mg Oral TID    magnesium oxide  400 mg Oral Daily    metoprolol succinate  12.5 mg Oral Daily    therapeutic multivitamin-minerals  1 tablet Oral Daily    pantoprazole  40 mg Oral QAM AC    pregabalin  50 mg Oral TID    vitamin B-12  1,000 mcg Oral Daily    sodium chloride flush  10 mL Intravenous 2 times per day    piperacillin-tazobactam  3.375 g Intravenous Q12H    sodium chloride   Intravenous Q12H    warfarin  3 mg Oral Once    [START ON 7/9/2020] warfarin (COUMADIN) daily dosing (placeholder)   Other RX Placeholder     Continuous Infusions:   sodium chloride 75 mL/hr at 07/08/20 0903     PRN Meds:albuterol, sodium chloride flush, acetaminophen **OR** acetaminophen, magnesium hydroxide, promethazine **OR** ondansetron, oxyCODONE    Allergies:  Macrobid [nitrofurantoin monohydrate macrocrystals]    Social History:   Social History     Socioeconomic History    Marital status:      Spouse name: None    Number of children: None    Years of education: None    Highest education level: None   Occupational History    None   Social Needs    Financial resource strain: None    Food insecurity     Worry: None     Inability: None    Transportation needs     Medical: None     Non-medical: None   Tobacco Use    Smoking status: Never Smoker    Smokeless tobacco: Never Used   Substance and Sexual Activity    Alcohol use: No    Drug use: No    Sexual activity: Never   Lifestyle    Physical activity     Days per week: None     Minutes per session: None    Stress: None   Relationships    Social connections     Talks on phone: None     Gets together: None     Attends [employer] service: None     Active member of club or organization: None     Attends meetings of clubs or organizations: None     Relationship status: None    Intimate partner violence     Fear of current or ex partner: None     Emotionally abused: None     Physically abused: None     Forced sexual activity: None   Other Topics Concern    None   Social History Narrative    None     Tobacco: No  Alcohol: No  Pets: No  Travel: No    Family History:       Adopted: Yes   . Otherwise non-pertinent to the chief complaint. REVIEW OF SYSTEMS:    CONSTITUTIONAL:  No chills, fevers or night sweats. No loss of weight. EYES:  No double vision or drainage from eyes, ears or throat.   HEENT:  No found for: [facility]  Lab Results   Component Value Date    SEDRATE 134 (H) 07/07/2020    SEDRATE 102 (H) 06/21/2020    SEDRATE 130 (H) 06/19/2020     Lab Results   Component Value Date    ALT 28 07/07/2020    AST 34 (H) 07/07/2020    ALKPHOS 144 (H) 07/07/2020    BILITOT 1.0 07/07/2020     Lab Results   Component Value Date     07/08/2020    K 4.2 07/08/2020    CL 96 07/08/2020    CO2 24 07/08/2020    BUN 56 07/08/2020    CREATININE 3.0 07/08/2020    GFRAA 18 07/08/2020    LABGLOM 15 07/08/2020    GLUCOSE 219 07/08/2020    GLUCOSE 109 04/20/2012    PROT 7.5 07/07/2020    LABALBU 3.6 07/07/2020    LABALBU 4.4 03/25/2012    CALCIUM 8.5 07/08/2020    BILITOT 1.0 07/07/2020    ALKPHOS 144 07/07/2020    AST 34 07/07/2020    ALT 28 07/07/2020       Lab Results   Component Value Date    PROTIME 21.0 07/08/2020    PROTIME 15.5 06/29/2020    INR 1.8 07/08/2020       Lab Results   Component Value Date    TSH 6.930 06/10/2019       Lab Results   Component Value Date    NITRITE negative 05/16/2016    COLORU Yellow 07/07/2020    PHUR 5.5 07/07/2020    WBCUA 0-1 07/07/2020    WBCUA 0-1 12/05/2011    RBCUA 1-3 07/07/2020    RBCUA 5-10 05/01/2013    BACTERIA RARE 07/07/2020    CLARITYU Clear 07/07/2020    SPECGRAV 1.020 07/07/2020    LEUKOCYTESUR Negative 07/07/2020    UROBILINOGEN 0.2 07/07/2020    BILIRUBINUR Negative 07/07/2020    BILIRUBINUR small 05/16/2016    BILIRUBINUR NEGATIVE 12/05/2011    BLOODU TRACE-INTACT 07/07/2020    GLUCOSEU Negative 07/07/2020    GLUCOSEU NEGATIVE 12/05/2011    AMORPHOUS FEW 08/13/2019       No results found for: GYX8XBW, BEART, V6YQOQJD, PHART, THGBART, DNE7GKP, PO2ART, TYW0RDC  Radiology:  XR FOOT LEFT (MIN 3 VIEWS)   Final Result      No acute fracture is identified. Osteoarthritis. Osteopenia. XR FOOT RIGHT (MIN 3 VIEWS)   Final Result      No acute fracture is identified. Osteoarthritis. Osteopenia.          XR TIBIA FIBULA RIGHT (2 VIEWS)   Final Result      No

## 2020-07-08 NOTE — PROGRESS NOTES
[address] [facility]  Family Medicine Attending    S: 76 y.o. female with PMH of HTN, breast cancer s/p lumpectomy with bone mets, CAD, CABG, CHF EF:50-55 RVSP 30 mmHg , chronic venous insufficiency, DVT, B/l hip wounds who presented to ER with 3 day history of  left leg pain. Had difficulty walking 2/2 pain and yesterday became unbearable so came to ER. Denies no fevers or chills, loss of sensation or falls. Patient reports that she has severe left pain in her leg that started acutely on Saturday. Denies any injury . Has this pain even to light touch. Denies trouble breathing or chest pain. O: VS- Blood pressure (!) 135/57, pulse 79, temperature 97.4 °F (36.3 °C), temperature source Temporal, resp. rate 18, height 5' 4\" (1.626 m), weight 154 lb (69.9 kg), SpO2 98 %, not currently breastfeeding. Exam is as noted by resident with the following changes, additions or corrections:  Gen - lying in bed, NAD  Cardio - RRR , no murmurs noted    Lungs - Lungs CTAB , no wheeze    Ext- 2+ pitting edema with chronic venous stasis skin changes. Peripheral pulses palpated on the right, was found with doppler on left. Skin - bilateral lateral hip wounds . L>R . Bilateral wounds with black eschar, Left with some purulent drainage with surrounding erythema and induration. Impressions: Active Problems:    Deep vein thrombosis (DVT) of right lower extremity (HCC)    Cellulitis and abscess of leg    Cellulitis  Resolved Problems:    * No resolved hospital problems. *      Plan:   Consults placed to ID for concern of infection, nephro for KARISHMA  , heme/onc for malignancy as well as anemia and right lower extremity DVT, general surgery for chronic wounds and need for debridement. .   Continue zosyn. Pain control   Continue IVF      Attending Physician Statement  I have reviewed the chart, including any radiology or labs, and seen the patient with the resident(s).   I personally reviewed and performed key

## 2020-07-08 NOTE — ED NOTES
Per patient's request called and notified her daughter Yelena Lopez [phone], of bed assignment [id number].       Harinder Bauer RN  07/08/20 3260

## 2020-07-08 NOTE — CONSULTS
recently hospitalized and treated for bilateral hip pressure ulcer wounds and had been seen by infectious disease and had debridement by general surgery and MRI of the hip was negative for osteomyelitis. Seen by ID again who recommended no further antibiotics at this time. Diagnostic Data:     Past Medical History:   Diagnosis Date    Abscess of abdominal wall     Anemia     Iron deficiency and chronic disease.  Anesthesia     DIFFICULTY WAKING UP    Arthritis     Arthritis, hip     Breast cancer ([facility] 2005    S/P Left Lumpectomy with Lymph Node Dissection, Chemo/Rad. Follows with Dr. Jon Tarango. No recurrence to date. Surgeon was Dr. Greg Parks.  CAD (coronary artery disease) 5/2008    s/p CABG. Follows with [address] [place] [address].  CHF (congestive heart failure) ([facility])     Chronic venous insufficiency 11/7/2018    Class 2 severe obesity due to excess calories with serious comorbidity and body mass index (BMI) of 35.0 to 35.9 in adult [facility]) 8/1/2019    Decreased dorsalis pedis pulse 11/7/2018    Diabetic neuropathy (HCC)     Diabetic neuropathy (HCC)     DVT (deep venous thrombosis) ([facility])     Recurrent. On lifelong coumadin.  DVT of upper extremity (deep vein thrombosis) ([address] 4/18/2012    History of deep vein thrombosis 11/7/2018    Humerus fracture     Chronic, on the Left.  Hyperlipidemia 8/29/2011    Hypertension     Leg swelling 11/7/2018    Lymph node enlargement     Lymphedema of both lower extremities 11/7/2018    MI (myocardial infarction) ([facility]     Nephrolithiasis     Follows with Dr. Heron Zapata.  Pericardial effusion     Pulmonary nodule     With mediastinal lymphadenopathy. Stable. Follows with Dr. Noreen Cabot.     Rib lesion 11/28/11    expansile lesion in one of the right ribs laterally    Sarcoidosis 07/26/11    per transbronchial needle aspiration    Subclavian vein occlusion, bilateral ([address] 4/18/2012    Type II or unspecified type diabetes mellitus without mention of complication, not stated as uncontrolled        Patient Active Problem List    Diagnosis Date Noted    Cellulitis 07/08/2020    Bony metastasis ([address] left leg 07/08/2020    Decubitus ulcer of both hip 07/08/2020    Cellulitis and abscess of leg 06/18/2020    Pleural effusion 09/17/2019    Precordial pain     Chronic systolic heart failure (HCC)     Moderate protein-calorie malnutrition ([address] 08/15/2019    Metastatic disease ([address]     Palliative care by specialist     Altered mental status     Goals of care, counseling/discussion     Acute hypercapnic respiratory failure ([address] 86/17/8023    Acute systolic heart failure ([address] 08/01/2019    Nonischemic cardiomyopathy ([address] 08/01/2019    Status post coronary artery bypass graft 08/01/2019    Class 2 severe obesity due to excess calories with serious comorbidity and body mass index (BMI) of 35.0 to 35.9 in adult [facility]) 08/01/2019    Type 2 diabetes mellitus with hyperglycemia ([address] 08/01/2019    HAP (hospital-acquired pneumonia) 07/21/2019    Palliative care encounter     Cancer associated pain     Ascites 06/18/2019    Charcot's joint of foot in type 2 diabetes mellitus ([address] 04/17/2019    CKD (chronic kidney disease) stage 3, GFR 30-59 ml/min ([address] 04/16/2019    Ambulatory dysfunction 04/15/2019    Leg swelling 11/07/2018    History of deep vein thrombosis 11/07/2018    Chronic venous insufficiency 11/07/2018    Lymphedema of both lower extremities 11/07/2018    Decreased dorsalis pedis pulse 11/07/2018    Diabetic polyneuropathy associated with type 2 diabetes mellitus ([address] 09/07/2018    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 02/32/7378    Complicated UTI (urinary tract infection) 10/24/2017    KARISHMA (acute kidney injury) ([address] 10/24/2017    Diarrhea with dehydration 10/24/2017    Chronic anticoagulation 10/24/2017    Deep vein thrombosis (DVT) of right lower extremity ([address] 06/06/2017    Peripheral polyneuropathy 01/03/2017    Bilateral edema of lower extremity 10/03/2016    Chronic deep vein thrombosis (DVT) of proximal vein of right lower extremity ([address] 09/30/2016    Type 2 diabetes mellitus without complication, with long-term current use of insulin ([address] 09/01/2016    Anemia of chronic disease 09/01/2016    Ventral hernia 05/14/2015    Rectal bleeding 02/23/2015    Anesthesia     Pericardial effusion     MI (myocardial infarction) ([address]     Arthritis     Lymph node enlargement     Subclavian vein occlusion, bilateral ([address] 04/18/2012    Rib lesion 02/07/2012    Hyperlipidemia 08/29/2011    Sarcoidosis 07/26/2011    B12 deficiency 06/22/2011    Shoulder pain, left 03/02/2011    Metastatic breast cancer ([address]     Essential hypertension     Coronary artery disease involving native coronary artery of native heart without angina pectoris     Nephrolithiasis     CHF (congestive heart failure) ([address]     Humerus fracture         Past Surgical History:   Procedure Laterality Date    APPENDECTOMY      ARTERIAL BYPASS SURGRY      BREAST LUMPECTOMY  9/27/2005    LEFT    CARDIAC SURGERY      CHOLECYSTECTOMY      COLONOSCOPY  12/2007    cecal arteriovenous malformation with hyperplastic polyps    COLONOSCOPY  [id number]    CORONARY ARTERY BYPASS GRAFT  05/2008    triple bypass    ECHO COMPL W DOP COLOR FLOW  10/30/2013         EYE SURGERY      clarissa cataracts    HYSTERECTOMY  1975, 1985    MAYNOR prolapse, benign conditions; BSO later for scar tissues, no CA.      OTHER SURGICAL HISTORY  11/18/11    port removal right chest wall    PARACENTESIS      TONSILLECTOMY      TOOTH EXTRACTION      Full Dental Extraction.  TUNNELED VENOUS PORT PLACEMENT      removal of port Dec. 2011- Dr. Aleah Dowd [place] [address]  [id number]    RIGHT CHEST       Family History  Family History   Adopted: Yes       Social History    TOBACCO:   reports that she has never smoked.  She has never used smokeless tobacco.  ETOH:   reports no history of alcohol use. Home Medications  Prior to Admission medications    Medication Sig Start Date End Date Taking? Authorizing Provider   PT/INR Test STRP 1 each by In Vitro route Twice a Week 6/29/20   Jane Reed DO   sodium bicarbonate 650 MG tablet Take 650 mg by mouth 2 times daily    Historical Provider, MD   PT/INR Testing Monitor KIT 1 each by Does not apply route once a week Use to test INR at home once weekly and as directed to monitor INR. 4/9/20   Jane Reed, DO   PT/INR Test STRP 1 each by In Vitro route once a week Use to check INR at least once weekly and more often as directed. 4/9/20   Jane Reed, DO   metoprolol succinate (TOPROL XL) 25 MG extended release tablet Take 0.5 tablets by mouth daily 4/8/20   Jane Reed, DO   omeprazole 20 MG EC tablet Take 1 tablet by mouth daily 4/8/20   Jane Reed, DO   bumetanide (BUMEX) 1 MG tablet Take 1 tablet by mouth daily 3/16/20   Jane Reed, DO   pregabalin (LYRICA) 50 MG capsule Take 1 capsule by mouth 3 times daily for 120 days.  2/4/20 6/29/20  Jane Reed, DO   magnesium oxide (MAG-OX) 400 (240 Mg) MG tablet Take 1 tablet by mouth daily 2/4/20   Jane Reed, DO   docusate (COLACE, DULCOLAX) 100 MG CAPS Take 100 mg by mouth daily 2/4/20   Jane Reed, DO   PT/INR Testing Monitor KIT 1 each by Does not apply route once a week Please use to test INR as directed by physician 1/15/20   Jane Reed DO   isosorbide dinitrate (ISORDIL) 5 MG tablet Take 1 tablet by mouth 3 times daily 1/14/20   Rashawn Mathews MD   vitamin B-12 (CYANOCOBALAMIN) 1000 MCG tablet Take 1 tablet by mouth daily 1/7/20   Jane Reed, DO   Calcium Citrate-Vitamin D (RA CALCIUM CIT-VIT D-3 PETITES) 200-250 MG-UNIT TABS take 1 tablet by mouth twice a day 1/7/20   Jane Reed, DO   albuterol sulfate (PROAIR RESPICLICK) 025 (90 Base) MCG/ACT aerosol powder inhalation Inhale 2 puffs into the lungs every 6 Laboratory Data-   Lab Results   Component Value Date    WBC 2.7 (L) 07/08/2020    HGB 8.2 (L) 07/08/2020    HCT 25.4 (L) 07/08/2020    .7 (H) 07/08/2020     (L) 07/08/2020    LYMPHOPCT 9.6 (L) 07/08/2020    RBC 2.15 (L) 07/08/2020    MCH 38.1 (H) 07/08/2020    MCHC 32.7 07/08/2020    RDW 17.2 (H) 07/08/2020    NEUTOPHILPCT 82.6 (H) 07/08/2020    MONOPCT 3.5 07/08/2020    BASOPCT 1.7 07/08/2020    NEUTROABS 2.24 07/08/2020    LYMPHSABS 0.27 (L) 07/08/2020    MONOSABS 0.11 07/08/2020    EOSABS 0.07 07/08/2020    BASOSABS 0.05 07/08/2020       Lab Results   Component Value Date     07/08/2020    K 4.2 07/08/2020    CL 96 (L) 07/08/2020    CO2 24 07/08/2020    BUN 56 (H) 07/08/2020    CREATININE 3.0 (H) 07/08/2020    GLUCOSE 219 (H) 07/08/2020    CALCIUM 8.5 (L) 07/08/2020    PROT 7.5 07/07/2020    LABALBU 3.6 07/07/2020    BILITOT 1.0 07/07/2020    ALKPHOS 144 (H) 07/07/2020    AST 34 (H) 07/07/2020    ALT 28 07/07/2020    LABGLOM 15 07/08/2020    GFRAA 18 07/08/2020       Lab Results   Component Value Date    IRON 49 (L) 06/20/2011    TIBC 266 06/20/2011    FERRITIN 2,375 07/08/2020           Radiology-    Xr Tibia Fibula Left (2 Views)    Result Date: 7/7/2020  3 views of the left knee, tibia, fibula and ankle. 3 views of the left foot. There is no evidence of acute displaced cortical disruption or dislocation. There is diffuse bone demineralization. There are rather severe degenerative changes of the knee, ankle, midfoot and toes. Arterial calcifications are present. Metallic clips overlie the medial soft tissues of the knee. No acute fracture is identified. Osteoarthritis. Osteopenia. Xr Tibia Fibula Right (2 Views)    Result Date: 7/7/2020  3 views of the right knee, tibia, fibula and ankle. 3 views of the left foot. There is no evidence of acute displaced cortical disruption or dislocation. There is diffuse bone demineralization.  There are rather severe degenerative changes of the region. The lung fields are clear. There is small right-sided pleural effusion     Cardiomegaly with median sternotomy Small right-sided pleural effusion Other findings are unchanged from prior study. Mri Hip Right Wo Contrast    Result Date: 2020  Patient MRN: [id number] : 1945 Age:  76 years Gender: Female Order Date: 2020 10:45 AM Exam: MRI HIP LEFT WO CONTRAST, MRI HIP RIGHT WO CONTRAST Number of Images: 761 views Indication:  Bilateral open wounds to both glutei. to rule out osteomyelitis Patient GFR only 27 cannot have contrast. To rule out osteomyelitis Comparison: Pelvic CT dated 2019 Technique: Noncontrast MRI both hips. Findings: There is extensive abnormal T1 weighted bone marrow signal involving the left femoral head, neck and left iliac bone extending into the acetabulum and left ischium and inferior pubic ramus. There is a soft tissue wound in the left gluteal region with soft tissue edema in the adjacent fat as well as within the gluteus medius muscle compatible with myositis. There is a soft tissue wound in the right gluteal region which extends to the margin of gluteus medius and gluteus janny muscles. There is soft tissue edema in some of the adjacent fat but none immediately surrounding the tract extending to the gluteal muscles. There is pelvic ascites. 1. Bilateral gluteal soft tissue wounds. Soft tissue edema present in the adjacent fat bilaterally, left greater than right. Additional soft tissue edema in the left gluteus medius muscle which may relate to underlying myositis. 2. Extensive abnormal bone marrow signal involving the left femoral head, neck, left iliac bone, acetabulum and inferior pubic ramus. Differential diagnostic considerations include infection as well as neoplasia. Consider correlation with noncontrast CT which may allow further characterization of the lesion as well as pinpoint potential biopsy targets. 3. Pelvic ascites.      Mri Hip Left Wo Contrast    Result Date: 2020  Patient MRN: [id number] : 1945 Age:  76 years Gender: Female Order Date: 2020 10:45 AM Exam: MRI HIP LEFT WO CONTRAST, MRI HIP RIGHT WO CONTRAST Number of Images: 073 views Indication:  Bilateral open wounds to both glutei. to rule out osteomyelitis Patient GFR only 27 cannot have contrast. To rule out osteomyelitis Comparison: Pelvic CT dated 2019 Technique: Noncontrast MRI both hips. Findings: There is extensive abnormal T1 weighted bone marrow signal involving the left femoral head, neck and left iliac bone extending into the acetabulum and left ischium and inferior pubic ramus. There is a soft tissue wound in the left gluteal region with soft tissue edema in the adjacent fat as well as within the gluteus medius muscle compatible with myositis. There is a soft tissue wound in the right gluteal region which extends to the margin of gluteus medius and gluteus janny muscles. There is soft tissue edema in some of the adjacent fat but none immediately surrounding the tract extending to the gluteal muscles. There is pelvic ascites. 1. Bilateral gluteal soft tissue wounds. Soft tissue edema present in the adjacent fat bilaterally, left greater than right. Additional soft tissue edema in the left gluteus medius muscle which may relate to underlying myositis. 2. Extensive abnormal bone marrow signal involving the left femoral head, neck, left iliac bone, acetabulum and inferior pubic ramus. Differential diagnostic considerations include infection as well as neoplasia. Consider correlation with noncontrast CT which may allow further characterization of the lesion as well as pinpoint potential biopsy targets. 3. Pelvic ascites.      Us Abdomen Limited    Result Date: 2020  Patient MRN:  [id number] : 1945 Age: 76 years Gender: Female Order Date:  2020 12:00 PM Exam: US ABDOMEN LIMITED Number of images:  21 Indication: Ascites Patient has bone femoral, popliteal, posterior tibial, anterior tibial and peroneal veins. Extensive deep vein thrombosis throughout the right lower extremity. ASSESSMENT/PLAN : 51-year-old woman    Remote history of breast cancer since 2005 with evidence of metastatic disease since 2012 with interval development of pulmonary and hepatic metastasis in 2019    Has done well with stable disease on Ibrance/Faslodex for recurrent ER positive HER-2 negative metastatic breast cancer to liver. Acute renal failure superimposed on chronic kidney disease and she had been on Bumex for her severe peripheral edema    Cellulitis of lower extremities    Bilateral hip pressure ulcers being managed by ID and wound clinic    Nephrology has been consulted for her acute renal injury  Seen by ID with recommendation for no further antibiotics. Anemia is multifactorial and related to her metastatic breast cancer, myelosuppression by Shanti Savage as well as anemia of CKD stage IV  ANC adequate at 2.24 and she has chronic lymphopenia  We will follow. Sal Kelly. Naseem Bolton M.D., F.A.C.P.   Electronically signed 7/8/2020 at 3:18 PM

## 2020-07-08 NOTE — PROGRESS NOTES
Diarrhea/Dehydration (Increases)  History of stroke    Other: __________________  Other:___________________       Vitamin K or Blood product  Administration Date                    TSH:    Lab Results   Component Value Date    TSH 6.930 06/10/2019        Hepatic Function Panel:                            Lab Results   Component Value Date    ALKPHOS 144 07/07/2020    ALT 28 07/07/2020    AST 34 07/07/2020    PROT 7.5 07/07/2020    BILITOT 1.0 07/07/2020    BILIDIR 0.2 06/10/2019    IBILI 0.3 06/10/2019    LABALBU 3.6 07/07/2020    LABALBU 4.4 03/25/2012       Date Warfarin Dose INR Heparin or LMWH HGB/HCT PLT Comment   7/8 3mg 1.8 -- 8.5/25.4 121                                          Assessment and Plan:  · Pt is a 77 yo female with history of RLE DVT on warfarin PTA   · Pt has been subtherapeutic on warfarin most recently for the past 2 weeks after being on hold at last admission  · Goal INR 2-3  · INR 1.8 today  · Warfarin 3mg tonight  · Daily PT/INR until the INR is stable within the therapeutic range  · Pharmacist will follow and monitor/adjust dosing as necessary    Thank you for this consult,    ANJELICA Dean [facility] PharmD, [facility] 7/8/2020 11:39 AM   Ext: [phone]

## 2020-07-08 NOTE — PROGRESS NOTES
Hem/Onc consult placed to Dr Erin Pereira.   Patient information given to  [facility]

## 2020-07-08 NOTE — CONSULTS
GENERAL SURGERY  CONSULT NOTE  7/8/2020    hospitals  Stefania Lemos is a 76 y.o. female who is being evaluated for bilateral hip wounds. She reports that the wound on the left has been present for 5 months and the right for 3 months. She does not know what caused the wounds. Past Medical History:   Diagnosis Date    Abscess of abdominal wall     Anemia     Iron deficiency and chronic disease.  Anesthesia     DIFFICULTY WAKING UP    Arthritis     Arthritis, hip     Breast cancer ([address] 2005    S/P Left Lumpectomy with Lymph Node Dissection, Chemo/Rad. Follows with Dr. Chuckie Cao. No recurrence to date. Surgeon was Dr. Marzena Mcconnell.  CAD (coronary artery disease) 5/2008    s/p CABG. Follows with [address] [place] [address].  CHF (congestive heart failure) (HCC)     Chronic venous insufficiency 11/7/2018    Class 2 severe obesity due to excess calories with serious comorbidity and body mass index (BMI) of 35.0 to 35.9 in adult [facility]) 8/1/2019    Decreased dorsalis pedis pulse 11/7/2018    Diabetic neuropathy (HCC)     Diabetic neuropathy (HCC)     DVT (deep venous thrombosis) (HCC)     Recurrent. On lifelong coumadin.  DVT of upper extremity (deep vein thrombosis) ([address] 4/18/2012    History of deep vein thrombosis 11/7/2018    Humerus fracture     Chronic, on the Left.  Hyperlipidemia 8/29/2011    Hypertension     Leg swelling 11/7/2018    Lymph node enlargement     Lymphedema of both lower extremities 11/7/2018    MI (myocardial infarction) ([address]     Nephrolithiasis     Follows with Dr. Sally Silver.  Pericardial effusion     Pulmonary nodule     With mediastinal lymphadenopathy. Stable. Follows with Dr. Chestine Dubin.     Rib lesion 11/28/11    expansile lesion in one of the right ribs laterally    Sarcoidosis 07/26/11    per transbronchial needle aspiration    Subclavian vein occlusion, bilateral ([address] 4/18/2012    Type II or unspecified type diabetes mellitus without mention of complication, not stated as uncontrolled        Past Surgical History:   Procedure Laterality Date    APPENDECTOMY      ARTERIAL BYPASS SURGRY      BREAST LUMPECTOMY  9/27/2005    LEFT    CARDIAC SURGERY      CHOLECYSTECTOMY      COLONOSCOPY  12/2007    cecal arteriovenous malformation with hyperplastic polyps    COLONOSCOPY  [id number]    CORONARY ARTERY BYPASS GRAFT  05/2008    triple bypass    ECHO COMPL W DOP COLOR FLOW  10/30/2013         EYE SURGERY      clarissa cataracts    HYSTERECTOMY  1975, 1985    MAYNOR prolapse, benign conditions; BSO later for scar tissues, no CA.      OTHER SURGICAL HISTORY  11/18/11    port removal right chest wall    PARACENTESIS      TONSILLECTOMY      TOOTH EXTRACTION      Full Dental Extraction.  TUNNELED VENOUS PORT PLACEMENT      removal of port Dec. 2011- Dr. Vignesh Patel TUNNELED [facility] 94  [phone]    RIGHT CHEST       Medications Prior to Admission:    Prior to Admission medications    Medication Sig Start Date End Date Taking? Authorizing Provider   PT/INR Test STRP 1 each by In Vitro route Twice a Week 6/29/20   Vishal Buenrostro, DO   sodium bicarbonate 650 MG tablet Take 650 mg by mouth 2 times daily    Historical Provider, MD   PT/INR Testing Monitor KIT 1 each by Does not apply route once a week Use to test INR at home once weekly and as directed to monitor INR. 4/9/20   Vishal Buenrostro DO   PT/INR Test STRP 1 each by In Vitro route once a week Use to check INR at least once weekly and more often as directed. 4/9/20   Vishal Buenrostro, DO   metoprolol succinate (TOPROL XL) 25 MG extended release tablet Take 0.5 tablets by mouth daily 4/8/20   Vishal Buenrostro, DO   omeprazole 20 MG EC tablet Take 1 tablet by mouth daily 4/8/20   Vishal Buenrostro DO   bumetanide (BUMEX) 1 MG tablet Take 1 tablet by mouth daily 3/16/20   Vishal Buenrostro DO   pregabalin (LYRICA) 50 MG capsule Take 1 capsule by mouth 3 times daily for 120 days.  2/4/20 6/29/20  Vishal Buenrostro, DO   magnesium oxide (MAG-OX) Hives       Family History   Adopted: Yes       Social History     Tobacco Use    Smoking status: Never Smoker    Smokeless tobacco: Never Used   Substance Use Topics    Alcohol use: No    Drug use: No         Review of Systems   General ROS: negative  Hematological and Lymphatic ROS: negative  Respiratory ROS: no cough, shortness of breath, or wheezing  Cardiovascular ROS: no chest pain or dyspnea on exertion  Gastrointestinal ROS: negative  Genito-Urinary ROS: negative  Musculoskeletal ROS: positive for - joint pain and pain in bilateral hip and left leg      PHYSICAL EXAM:    Vitals:    07/08/20 1353   BP: (!) 109/51   Pulse: 67   Resp:    Temp:    SpO2:        General Appearance:  in no acute distress  Skin:  Skin color, texture, turgor normal. No rashes or lesions. Large wound about the left hip with soiled packing and drainage present. Right hip wound is dry with no discharge. Head/face:  NCAT  Eyes:  PERRL  Lungs:  No chest wall tenderness. Heart:  Heart regular rate and rhythm  Abdomen:  Soft, non-tender  Extremities: Extremities warm to touch, pink, with trace edema. LABS:  CBC  Recent Labs     07/08/20  1115   WBC 2.7*   HGB 8.2*   HCT 25.4*   *     BMP  Recent Labs     07/08/20  0337      K 4.2   CL 96*   CO2 24   BUN 56*   CREATININE 3.0*   CALCIUM 8.5*     Liver Function  Recent Labs     07/07/20  2125   BILITOT 1.0   AST 34*   ALT 28   ALKPHOS 144*   PROT 7.5   LABALBU 3.6     No results for input(s): LACTATE in the last 72 hours. Recent Labs     07/08/20  0337   INR 1.8       RADIOLOGY    Xr Tibia Fibula Left (2 Views)    Result Date: 7/7/2020  3 views of the left knee, tibia, fibula and ankle. 3 views of the left foot. There is no evidence of acute displaced cortical disruption or dislocation. There is diffuse bone demineralization. There are rather severe degenerative changes of the knee, ankle, midfoot and toes. Arterial calcifications are present.  Metallic clips overlie the medial soft tissues of the knee. No acute fracture is identified. Osteoarthritis. Osteopenia. Xr Tibia Fibula Right (2 Views)    Result Date: 2020  3 views of the right knee, tibia, fibula and ankle. 3 views of the left foot. There is no evidence of acute displaced cortical disruption or dislocation. There is diffuse bone demineralization. There are rather severe degenerative changes of the knee, ankle, midfoot and toes. Arterial calcifications are present. Metallic clips overlie the medial soft tissues of the knee. No acute fracture is identified. Osteoarthritis. Osteopenia. Xr Foot Left (min 3 Views)    Result Date: 2020  3 views of the left knee, tibia, fibula and ankle. 3 views of the left foot. There is no evidence of acute displaced cortical disruption or dislocation. There is diffuse bone demineralization. There are rather severe degenerative changes of the knee, ankle, midfoot and toes. Arterial calcifications are present. Metallic clips overlie the medial soft tissues of the knee. No acute fracture is identified. Osteoarthritis. Osteopenia. Xr Foot Right (min 3 Views)    Result Date: 2020  3 views of the right knee, tibia, fibula and ankle. 3 views of the left foot. There is no evidence of acute displaced cortical disruption or dislocation. There is diffuse bone demineralization. There are rather severe degenerative changes of the knee, ankle, midfoot and toes. Arterial calcifications are present. Metallic clips overlie the medial soft tissues of the knee. No acute fracture is identified. Osteoarthritis. Osteopenia. Xr Chest Portable    Result Date: 2020  Patient MRN: [id number] : 1945 Age:  76 years Gender: Female Order Date: 2020 9:30 PM Exam: XR CHEST PORTABLE Number of Images: 1 view Indication:   sob sob Comparison: Prior chest radiograph from 2020 is available.  Findings: Study demonstrate cardiomegaly with median sternotomy with left ventricular contour. There is a right-sided portacatheter tip is in superior vena cava. There is uncoiling atherosclerotic change of thoracic aorta. There is old right upper fourth and fifth rib fracture with callus formation. 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 There is a comminuted humeral head fracture seen in the left glenoid fossa. There are multiple surgical clips seen in the left axillary region. The lung fields are clear. There is small right-sided pleural effusion     Cardiomegaly with median sternotomy Small right-sided pleural effusion Other findings are unchanged from prior study. Us Retroperitoneal Complete    Result Date: 2020  Patient MRN:  [id number] : 1945 Age: 76 years Gender: Female Order Date:  2020 11:45 AM EXAM: US RETROPERITONEAL COMPLETE NUMBER OF IMAGES:  77 views INDICATION:  acute kidney injury acute kidney injury COMPARISON: None The right kidney is 10.5 x 4.3 x 4.3 cm The left kidney  is 10.2 x 4.3 x 5 cm There is complex lesion in the right kidney measuring 1.5 x 1.3 x 1.2 cm compatible with a cyst There is no hydronephrosis identified There is no calculus identified The bladder is unremarkable     Findings compatible with a right renal cyst     Us Dup Lower Extremities Bilateral Venous    Result Date: 2020  Real-time and Doppler sonography of the deep venous structures of the lower extremities. Grayscale, color Doppler, and spectral Doppler analysis. Occlusive and nonocclusive intraluminal substance is identified in the right common femoral, superficial femoral, popliteal, posterior tibial and peroneal veins. Baker's cyst on the left. There is adequate and spontaneous blood flow, compressibility and augmentation within the left common femoral, superficial femoral, popliteal, posterior tibial, anterior tibial and peroneal veins. Extensive deep vein thrombosis throughout the right lower extremity.         ASSESSMENT:  76 y.o. female with bilateral hip wounds L worse than right.     PLAN:  Bedside debridement      Electronically signed by Danni Sauer DO on 7/8/20 at 3:59 PM ED

## 2020-07-08 NOTE — PROGRESS NOTES
Renal consult placed via perfect serve to Dr Macario    ID consult placed. Patient information given to  Chavez.

## 2020-07-08 NOTE — PROGRESS NOTES
General surgery consult placed via perfect serve to Dr Ashli Cooper.   Dr Pio Vergara taking new consults    [facility] messaged via perfect serve per Dr Mariam Jackson request.  Dr Michael Daugherty taking messages

## 2020-07-08 NOTE — ED PROVIDER NOTES
Bilateral leg pan and redness and fever several days no trauma no chest pain no sob     The history is provided by the patient. Review of Systems   Constitutional: Negative for chills and fever. Respiratory: Negative for cough and shortness of breath. Cardiovascular: Negative for chest pain. Gastrointestinal: Negative for abdominal pain, blood in stool, nausea and vomiting. Genitourinary: Negative for dysuria and frequency. Musculoskeletal: Negative for back pain. Skin: Positive for wound. Neurological: Negative for weakness and headaches. All other systems reviewed and are negative. Physical Exam  Vitals signs and nursing note reviewed. Constitutional:       Appearance: She is well-developed. She is ill-appearing. HENT:      Head: Normocephalic and atraumatic. Eyes:      Pupils: Pupils are equal, round, and reactive to light. Neck:      Musculoskeletal: Normal range of motion and neck supple. Cardiovascular:      Rate and Rhythm: Normal rate and regular rhythm. Pulmonary:      Effort: Pulmonary effort is normal. No respiratory distress. Breath sounds: Normal breath sounds. No wheezing or rales. Abdominal:      General: Bowel sounds are normal.      Palpations: Abdomen is soft. Tenderness: There is no abdominal tenderness. There is no guarding or rebound. Musculoskeletal:         General: Swelling and tenderness present. Right lower leg: Edema present. Left lower leg: Edema present. Skin:     General: Skin is warm and dry. Neurological:      Mental Status: She is alert and oriented to person, place, and time. Cranial Nerves: No cranial nerve deficit.       Coordination: Coordination normal.          Procedures     [facility]         --------------------------------------------- PAST HISTORY ---------------------------------------------  Past Medical History:  has a past medical history of Abscess of abdominal wall, Anemia, Anesthesia, Arthritis, 3.3 (L) 4.5 - 11.5 E9/L    RBC 2.34 (L) 3.50 - 5.50 E12/L    Hemoglobin 8.8 (L) 11.5 - 15.5 g/dL    Hematocrit 27.2 (L) 34.0 - 48.0 %    .2 (H) 80.0 - 99.9 fL    MCH 37.6 (H) 26.0 - 35.0 pg    MCHC 32.4 32.0 - 34.5 %    RDW 17.0 (H) 11.5 - 15.0 fL    Platelets 421 (L) 862 - 450 E9/L    MPV 10.3 7.0 - 12.0 fL   Comprehensive Metabolic Panel   Result Value Ref Range    Sodium 140 132 - 146 mmol/L    Potassium 4.4 3.5 - 5.0 mmol/L    Chloride 97 (L) 98 - 107 mmol/L    CO2 27 22 - 29 mmol/L    Anion Gap 16 7 - 16 mmol/L    Glucose 171 (H) 74 - 99 mg/dL    BUN 57 (H) 8 - 23 mg/dL    CREATININE 3.4 (H) 0.5 - 1.0 mg/dL    GFR Non-African American 13 >=60 mL/min/1.73    GFR African American 16     Calcium 9.2 8.6 - 10.2 mg/dL    Total Protein 7.5 6.4 - 8.3 g/dL    Alb 3.6 3.5 - 5.2 g/dL    Total Bilirubin 1.0 0.0 - 1.2 mg/dL    Alkaline Phosphatase 144 (H) 35 - 104 U/L    ALT 28 0 - 32 U/L    AST 34 (H) 0 - 31 U/L   D-Dimer, Quantitative   Result Value Ref Range    D-Dimer, Quant 2601 ng/mL DDU   Troponin   Result Value Ref Range    Troponin 0.03 0.00 - 0.03 ng/mL   Brain Natriuretic Peptide   Result Value Ref Range    Pro-BNP 2,884 (H) 0 - 450 pg/mL   Lactic Acid, Plasma   Result Value Ref Range    Lactic Acid 2.3 (H) 0.5 - 2.2 mmol/L   Sedimentation Rate   Result Value Ref Range    Sed Rate 134 (H) 0 - 20 mm/Hr   C-Reactive Protein   Result Value Ref Range    CRP 16.2 (H) 0.0 - 0.4 mg/dL   Urinalysis   Result Value Ref Range    Color, UA Yellow Straw/Yellow    Clarity, UA Clear Clear    Glucose, Ur Negative Negative mg/dL    Bilirubin Urine Negative Negative    Ketones, Urine Negative Negative mg/dL    Specific Gravity, UA 1.020 1.005 - 1.030    Blood, Urine TRACE-INTACT Negative    pH, UA 5.5 5.0 - 9.0    Protein, UA Negative Negative mg/dL    Urobilinogen, Urine 0.2 <2.0 E.U./dL    Nitrite, Urine Negative Negative    Leukocyte Esterase, Urine Negative Negative   Microscopic Urinalysis   Result Value Ref Range Osteoarthritis. Osteopenia. XR FOOT RIGHT (MIN 3 VIEWS)   Final Result      No acute fracture is identified. Osteoarthritis. Osteopenia. XR TIBIA FIBULA RIGHT (2 VIEWS)   Final Result      No acute fracture is identified. Osteoarthritis. Osteopenia. XR TIBIA FIBULA LEFT (2 VIEWS)   Final Result      No acute fracture is identified. Osteoarthritis. Osteopenia. US DUP LOWER EXTREMITIES BILATERAL VENOUS   Final Result      Extensive deep vein thrombosis throughout the right lower extremity. XR CHEST PORTABLE   Final Result      Cardiomegaly with median sternotomy      Small right-sided pleural effusion      Other findings are unchanged from prior study. EKG:  This EKG is signed and interpreted by me. Rate: NORMAL  Rhythm: Sinus  Interpretation: no acute changes  Comparison: stable as compared to patient's most recent EKG      ------------------------- NURSING NOTES AND VITALS REVIEWED ---------------------------   The nursing notes within the ED encounter and vital signs as below have been reviewed. BP (!) 139/58   Pulse 96   Temp 96.9 °F (36.1 °C) (Temporal)   Resp 20   Ht 5' 4\" (1.626 m)   Wt 154 lb (69.9 kg)   SpO2 96%   BMI 26.43 kg/m²   Oxygen Saturation Interpretation: Normal      ------------------------------------------ PROGRESS NOTES ------------------------------------------     Consultations:  MEDICINE    Counseling:   I have spoken with the patient and discussed todays results, in addition to providing specific details for the plan of care and counseling regarding the diagnosis and prognosis.   Their questions are answered at this time and they are agreeable with the plan.      --------------------------------- ADDITIONAL PROVIDER NOTES ---------------------------------         This patient's ED course included: a personal history and physicial examination, re-evaluation prior to disposition and multiple bedside re-evaluations    This patient has remained hemodynamically stable during their ED course. Critical Care:          --------------------------------- IMPRESSION AND DISPOSITION ---------------------------------    IMPRESSION  1. Leg swelling    2. Cellulitis and abscess of leg    3.  Deep vein thrombosis (DVT) of right lower extremity, unspecified chronicity, unspecified vein ([facility]        DISPOSITION  Disposition: Admit to med/surg floor  Patient condition is stable               Ginny Ellison MD  07/08/20 [address] YOLY MD  07/08/20 8823

## 2020-07-08 NOTE — CARE COORDINATION
Met with pt at bedside to discuss discharge / transition of care plan. Pt reports from home alone (apartment with elevator access, no steps to get in), owns wheeled walker, and lift chair, denies further DME, reports home health aides 3 times a week for 2 hours a day through [facility] in addition they provide skilled nursing for wound care, will need a AYESHA order at time of discharge (verified pt is active per Linnea Valencia). Pt's plan is to return home with [facility], daughter Amalia Jaeger to provide transportation at time of discharge via phone [phone]. I verified pt's PCP however, does appear to be our Infectious Disease physician? Pharmacy verified.

## 2020-07-08 NOTE — PROGRESS NOTES
Physical Therapy Initial Assessment     Name: Sierra Acharya  : 1945  MRN: [id number]    Referring Provider:  Sanna Burciaga MD    Date of Service: 2020    Evaluating PT:  Jean-Paul Kent PT, DPT    Room #:  7185/7755-T  Diagnosis:  Cellulitis  PMHx/PSHx:  HTN, CAD, DM, Neuropathy, DVT, MI, Arthritis, Sarcoidosis, CHF, Breast cancer, Lymphedema BLEs, CABG , Bilateral hip wounds  Procedure/Surgery:  NA  Precautions:  Falls, Bilateral hip wounds, Contact ISO  Equipment Needs:  Has Rollator [employer]    SUBJECTIVE:    Pt lives alone in a 7th floor apt with no stairs to enter and elevator access to apt level. Pt reported sleeping in a recliner/lift chair. Pt ambulated with a Rollator [employer] PTA. Pt reported having assist with ADLs. Pt is not actively driving. Pt has home health aide 3x/wk for 2h/day to assist with laundry, groceries, and bathing as needed. OBJECTIVE:   Initial Evaluation  Date: 2020 Treatment  NA Short Term/ Long Term   Goals   AM-PAC 6 Clicks      Was pt agreeable to Eval/treatment? Yes      Does pt have pain? Reported 9/10 pain in LLE, L foot, bilateral hips. RN aware. Bed Mobility  Rolling: Min A  Supine to sit: Mod A  Sit to supine: Min A  Scooting: Min A  Supervision   Transfers Sit to stand: Mod A  Stand to sit: Mod A  Stand pivot: NT  SBA   Ambulation    2 feet laterally with WW with Mod A  >50 feet with WW with SBA   Stair negotiation: ascended and descended  NT  NA   ROM BUE:  WFL  BLE:  WFL, but full range limited by pain     Strength BUE:  Per OT  BLE:  RLE 4-/5, LLE 3-/5  Increase by 1/3 MMT grade   Balance Sitting EOB:  SBA  Dynamic Standing: Mod A with [employer]  Sitting EOB:  Supervision  Dynamic Standing:  SBA with [employer]     Pt is A & O x 3  Sensation:  Pt reported numbness and tingling to extremities  Edema:   Moderate edema noted in BLEs    Therapeutic Exercises:  LAQs, APs 10x ea    Patient education  Pt educated on purpose of PT assessment, importance of mobility, safety with mobility, hand placement with transfers, walker safety, gait    Patient response to education:   Pt verbalized understanding Pt demonstrated skill Pt requires further education in this area   Yes  Partially with verbal cues and assist Yes      ASSESSMENT:    Comments:  Patient cleared by RN and agreeable to treatment. Patient found in semi Smith's and reported high levels of pain in BLEs but willing to participate. RN in room and aware of c/o pain. Patient required increased time and assist to achieve seated EOB. Patient denied dizziness with positional change. Patient voiced fear of standing due to the pain in plantar surface of L foot. Patient sat EOB for over 10 minutes while providing history and performing exercises. Patient assisted to standing and required increased assist due to pain with WB. Patient only able to side step to the [facility] with pain in B hips and feet being barrier to further gait at this time. Patient asked to sit EOB for a while stating \"it feels good to be up out of the bed\". Patient not able to transfer to a chair at this time and sat EOB an additional 10 minutes. Patient assisted back to supine with call light and tray table in reach. Treatment:  Patient practiced and was instructed in the following treatment:     Bed mobility: Verbal/tactile cues for sequencing BUEs/BLEs for safe technique with rolling/supine<>sit task. HOB elevated and use of bed rail with assist.   Transfer training: Verbal/tactile cues to facilitate proper hand placement, technique and safety during sit to stand task.  Gait training: Verbal and tactile cues to facilitate upright posture and safety as well as provided with physical assistance to complete task.  Therapeutic exercises: As noted above   Neuromuscular education: NA    Pt's/ family goals   1. To have less pain. Patient and or family understand(s) diagnosis, prognosis, and plan of care.   Yes     PLAN:    PT care will be provided in accordance with the objectives noted above. Exercises and functional mobility practice will be used as well as appropriate assistive devices or modalities to obtain goals. Patient and family education will also be administered as needed. Frequency of treatments: 2-5x/week x 1-2 weeks. Time in  0910  Time out  0939    Total Treatment Time  28 minutes     Evaluation Time includes thorough review of current medical information, gathering information on past medical history/social history and prior level of function, completion of standardized testing/informal observation of tasks, assessment of data and education on plan of care and goals.     CPT codes:  [] Low Complexity PT evaluation 38487  [x] Moderate Complexity PT evaluation 84528  [] High Complexity PT evaluation 89752  [] PT Re-evaluation 05886  [] Gait training 61236 - minutes  [] Manual therapy 85592 - minutes  [x] Therapeutic activities 31227 28 minutes  [] Therapeutic exercises 11921 - minutes  [] Neuromuscular reeducation 85474 - minutes     Carmen Clinton, PT, DPT  License [id number]

## 2020-07-08 NOTE — H&P
[facility] Resident Inpatient  History and Physical      CC: ambulation dysfunction, BL LE swelling     HPI: History obtained from patient, family member - daughter, electronic medical record, Quality of history:  good historian. Patient is oriented to time, place, person   Nancy Magana is a 76 y.o. female with a PMH of HTN, breast cancer s/p lumpectomy with bone mets, CAD, CABG, CHF EF:50-55 RVSP 30 mmHg , chronic venous insufficiency, DVT, B/l hip wounds, recently admitted for hip wounds that grew corynebacterium and S. Aureus who presents to ED for leg swelling that started x1 day ago, the swelling was so bad that she couldn't walk anymore. Patient denies having increased shortness of breath, chest pain, redness, loss of sensation in her legs, incontinence, focal neurological deficit, asymmetrical swelling of the legs, denies warmth, pus discharge, new wounds on the legs. She denies cough, fever, chills, rigors, changes in bowel habits change in weight. No smoking no drinking no drugs. Of note patient was recently discharged for bilateral hip pressure ulcer wounds that were debrided by general surgery, seen by infectious disease and given initially Vanco and meropenem which was then later stopped, and MRI of her hips were done to look for osteomyelitis which showed abnormal signal in the bone however orthopedics reviewed patient's case and identified that patient does not have osteomyelitis. ED Course: The patient remained hemodynamically stable. Labs elevated creatinine elevated CRP elevated proBNP  Imaging was negative for acute fractures in the lower extremities, however she has extensive right lower extremity DVT   EKG: LAD, RBBB  Patient was given broad-spectrum antibiotics, fluids.    Pt admitted for bilateral leg swelling, right lower extremity DVT, concern for sepsis      PMH:  has a past medical history of Abscess of abdominal wall, Anemia, Anesthesia, Arthritis, Arthritis, hip, Breast cancer ([address] CAD (coronary artery disease), CHF (congestive heart failure) (HCC), Chronic venous insufficiency, Class 2 severe obesity due to excess calories with serious comorbidity and body mass index (BMI) of 35.0 to 35.9 in adult [facility]), Decreased dorsalis pedis pulse, Diabetic neuropathy ([address] Diabetic neuropathy ([address] DVT (deep venous thrombosis) ([address] DVT of upper extremity (deep vein thrombosis) ([address] History of deep vein thrombosis, Humerus fracture, Hyperlipidemia, Hypertension, Leg swelling, Lymph node enlargement, Lymphedema of both lower extremities, MI (myocardial infarction) ([address] Nephrolithiasis, Pericardial effusion, Pulmonary nodule, Rib lesion, Sarcoidosis, Subclavian vein occlusion, bilateral ([address] and Type II or unspecified type diabetes mellitus without mention of complication, not stated as uncontrolled. PSH:  has a past surgical history that includes Tooth Extraction; Hysterectomy (1975, 1985); Cholecystectomy; Appendectomy; other surgical history (11/18/11); Arterial bypass surgry; Coronary artery bypass graft (05/2008); Tunneled venous port placement; Cardiac surgery; eye surgery; Tunneled venous port placement ([id number]); Breast lumpectomy (9/27/2005); ECHO Compl W Dop Color Flow (10/30/2013); Colonoscopy (12/2007); Colonoscopy ([id number]); Tonsillectomy; and Paracentesis. FH: family history is not on file. She was adopted. Social:  reports that she has never smoked. She has never used smokeless tobacco. She reports that she does not drink alcohol or use drugs. Allergies: Allergies   Allergen Reactions    Macrobid [Nitrofurantoin Monohydrate Macrocrystals] Hives        Home Medications:   No current facility-administered medications on file prior to encounter.       Current Outpatient Medications on File Prior to Encounter   Medication Sig Dispense Refill    PT/INR Test STRP 1 each by In Vitro route Twice a Week 48 strip 1    sodium bicarbonate 650 MG tablet Take 650 mg by mouth 2 times daily      PT/INR Testing Monitor KIT 1 each by Does not apply route once a week Use to test INR at home once weekly and as directed to monitor INR. 1 kit 0    PT/INR Test STRP 1 each by In Vitro route once a week Use to check INR at least once weekly and more often as directed. 48 strip 1    metoprolol succinate (TOPROL XL) 25 MG extended release tablet Take 0.5 tablets by mouth daily 60 tablet 3    omeprazole 20 MG EC tablet Take 1 tablet by mouth daily 90 tablet 0    bumetanide (BUMEX) 1 MG tablet Take 1 tablet by mouth daily 90 tablet 1    pregabalin (LYRICA) 50 MG capsule Take 1 capsule by mouth 3 times daily for 120 days. 90 capsule 3    magnesium oxide (MAG-OX) 400 (240 Mg) MG tablet Take 1 tablet by mouth daily 90 tablet 1    docusate (COLACE, DULCOLAX) 100 MG CAPS Take 100 mg by mouth daily 90 capsule 3    PT/INR Testing Monitor KIT 1 each by Does not apply route once a week Please use to test INR as directed by physician 1 kit 1    isosorbide dinitrate (ISORDIL) 5 MG tablet Take 1 tablet by mouth 3 times daily 90 tablet 3    vitamin B-12 (CYANOCOBALAMIN) 1000 MCG tablet Take 1 tablet by mouth daily 90 tablet 3    Calcium Citrate-Vitamin D (RA CALCIUM CIT-VIT D-3 PETITES) 200-250 MG-UNIT TABS take 1 tablet by mouth twice a day 180 tablet 3    albuterol sulfate (PROAIR RESPICLICK) 937 (90 Base) MCG/ACT aerosol powder inhalation Inhale 2 puffs into the lungs every 6 hours as needed for Wheezing or Shortness of Breath 1 Inhaler 2    warfarin (COUMADIN) 1 MG tablet Please take 2 mg by mouth most days, but 3 mg by mouth on Mondays and Wednesdays and Friday.  (Patient taking differently: Please take 2 mg by mouth most days, but 3 mg by mouth on sun, tues, thurs.) 180 tablet 0    Multiple Vitamins-Minerals (THERAPEUTIC MULTIVITAMIN-MINERALS) tablet Take 1 tablet by mouth daily 90 tablet 3    Insulin Pen Needle (MEIJER PEN NEEDLES) 29G X 12MM MISC 1 each by Does normal, rub or gallop. DP pulses 2/4. Systolic murmur  Abdomen: SNTND, no masses, no organomegaly, no guarding, rebound or rigidity. Genital/Rectal: deferred  Extremities:  Extremities normal, atraumatic, no cyanosis or edema. Distal pulses equal bilaterally  Skin: Skin color, texture, turgor normal, no rashes or lesions. x2 b/l hip pressure ulcers present, bandaged   Musculoskeletal: No joint swelling, no muscle tenderness. Normal ROM in extremities. Neurologic: Alert & Oriented; CNII-XII intact; Normal and symmetric strength in UEs and LEs; Sensation intact  Psychiatric: appropriate affect. Intact judgment and insight.      Labs:   Results for orders placed or performed during the hospital encounter of 07/07/20   CBC   Result Value Ref Range    WBC 3.3 (L) 4.5 - 11.5 E9/L    RBC 2.34 (L) 3.50 - 5.50 E12/L    Hemoglobin 8.8 (L) 11.5 - 15.5 g/dL    Hematocrit 27.2 (L) 34.0 - 48.0 %    .2 (H) 80.0 - 99.9 fL    MCH 37.6 (H) 26.0 - 35.0 pg    MCHC 32.4 32.0 - 34.5 %    RDW 17.0 (H) 11.5 - 15.0 fL    Platelets 852 (L) 401 - 450 E9/L    MPV 10.3 7.0 - 12.0 fL   Comprehensive Metabolic Panel   Result Value Ref Range    Sodium 140 132 - 146 mmol/L    Potassium 4.4 3.5 - 5.0 mmol/L    Chloride 97 (L) 98 - 107 mmol/L    CO2 27 22 - 29 mmol/L    Anion Gap 16 7 - 16 mmol/L    Glucose 171 (H) 74 - 99 mg/dL    BUN 57 (H) 8 - 23 mg/dL    CREATININE 3.4 (H) 0.5 - 1.0 mg/dL    GFR Non-African American 13 >=60 mL/min/1.73    GFR African American 16     Calcium 9.2 8.6 - 10.2 mg/dL    Total Protein 7.5 6.4 - 8.3 g/dL    Alb 3.6 3.5 - 5.2 g/dL    Total Bilirubin 1.0 0.0 - 1.2 mg/dL    Alkaline Phosphatase 144 (H) 35 - 104 U/L    ALT 28 0 - 32 U/L    AST 34 (H) 0 - 31 U/L   D-Dimer, Quantitative   Result Value Ref Range    D-Dimer, Quant 2601 ng/mL DDU   Troponin   Result Value Ref Range    Troponin 0.03 0.00 - 0.03 ng/mL   Brain Natriuretic Peptide   Result Value Ref Range    Pro-BNP 2,884 (H) 0 - 450 pg/mL   Lactic Acid, Plasma   Result Value Ref Range    Lactic Acid 2.3 (H) 0.5 - 2.2 mmol/L   Sedimentation Rate   Result Value Ref Range    Sed Rate 134 (H) 0 - 20 mm/Hr   C-Reactive Protein   Result Value Ref Range    CRP 16.2 (H) 0.0 - 0.4 mg/dL   Urinalysis   Result Value Ref Range    Color, UA Yellow Straw/Yellow    Clarity, UA Clear Clear    Glucose, Ur Negative Negative mg/dL    Bilirubin Urine Negative Negative    Ketones, Urine Negative Negative mg/dL    Specific Gravity, UA 1.020 1.005 - 1.030    Blood, Urine TRACE-INTACT Negative    pH, UA 5.5 5.0 - 9.0    Protein, UA Negative Negative mg/dL    Urobilinogen, Urine 0.2 <2.0 E.U./dL    Nitrite, Urine Negative Negative    Leukocyte Esterase, Urine Negative Negative   Microscopic Urinalysis   Result Value Ref Range    Hyaline Casts, UA 0-2 0 - 2 /LPF    WBC, UA 0-1 0 - 5 /HPF    RBC, UA 1-3 0 - 2 /HPF    Epithelial Cells, UA FEW /HPF    Bacteria, UA RARE (A) None Seen /HPF   EKG 12 Lead   Result Value Ref Range    Ventricular Rate 96 BPM    Atrial Rate 96 BPM    P-R Interval 200 ms    QRS Duration 148 ms    Q-T Interval 456 ms    QTc Calculation (Bazett) 576 ms    P Axis 81 degrees    R Axis -86 degrees    T Axis 88 degrees       Imaging:  XR FOOT LEFT (MIN 3 VIEWS)   Final Result      No acute fracture is identified. Osteoarthritis. Osteopenia. XR FOOT RIGHT (MIN 3 VIEWS)   Final Result      No acute fracture is identified. Osteoarthritis. Osteopenia. XR TIBIA FIBULA RIGHT (2 VIEWS)   Final Result      No acute fracture is identified. Osteoarthritis. Osteopenia. XR TIBIA FIBULA LEFT (2 VIEWS)   Final Result      No acute fracture is identified. Osteoarthritis. Osteopenia. US DUP LOWER EXTREMITIES BILATERAL VENOUS   Final Result      Extensive deep vein thrombosis throughout the right lower extremity.       XR CHEST PORTABLE   Final Result      Cardiomegaly with median sternotomy      Small right-sided pleural effusion      Other findings are unchanged from prior study. Assessment and Plan  Active Problems:    Cellulitis  Resolved Problems:    * No resolved hospital problems.  *      Possible Sepsis   however patient does not look septic of this moment   Wound vs blood vs urine vs chest vs skin   Patient given Vanco and Zosyn in the ER for possible sepsis from cellulitis  However patient has other sources that may be contributing, of high-priority the bilateral pressure ulcers of the hips  ID consult consider restarting meropenem  Wound care consulted  Consider further debridement by general surgery  Consider orthopedic consult for concern of osteomyelitis  Continue Zosyn for now until switched by ID stop vancomycin in view of KARISHMA  Follow cultures  IV fluids at 75 mils per hour      Lactic acidosis, resolved   IVF, trend until normalized     KARISHMA/CKD stage IV  Etiology likely due to reduced oral intake and vancomycin  Cr: 3.4, baseline of 1.5 to  Obtain urine studies to calculate FENa/FEUrea  IVF at 75 mL/hr pending urine electrolyte results  Hold bumex, but patient will likely need diureses in view of swelling of LEs    Chronic Pancytopenia  2/2 to malignancy (breast cancer with mets) and bone marrow failure   - CBC w/diff shows: decrease in all cell lines    - Obtain Peripheral smear   - Retic count    - r/o infections, cultures pending    - Obtain Ferritin, B12, Folate  - consider heme consult   - consider BM biopsy     Extensive RLE DVT   continue warfarin   Pharm to dose   Consider lovenox in view of failure of warfarin   Could be also causing the ambulation dysfunction and the swelling  PT/OT consulted     Ambulation dysfunction   likely 2/2 to swelling   Xrays in ED r/o fractures  PT/OT   Refused [place] in the past   SW consulted     Chronic Bilateral pressure ulcers   likely source of infection   Wound care consult     HTN/COPD/ CAD s/p CABG/ breast cancer/ HFrEF   Continue present management     DVT / GI prophylaxis: Warfarin and protonix     Dispo - per ID, per nephro       Electronically signed by Laurie Corrigan MD on 7/8/20 at 12:58 AM EDT  This case was discussed with attending physician: Dr. Elena Ojeda

## 2020-07-08 NOTE — CONSULTS
Consult Note  NEPHROLOGY    Reason for Consult:  KARISHMA on CKD    Requesting Physician:  Dr. Parag Amaral    Chief Complaint:  Pain in legs and inability to walk    History Obtained From:  patient, electronic medical record    History of Present Ilness: This is a 76 y.o.  female with a H/O CKD stage IV with baseline creatinine of 1.5-2.0 over the past year, whom we have followed in the hospital in the past for KARISHMA most recently in August 2019. She had been seen a little over a year ago in the office by Dr. Queenie Villeda but has not been followed up since. Additional PMH: HTN, breast cancer s/p lumpectomy with bone mets, CAD, CABG, CHF EF:50-55 RVSP 30 mmHg , chronic venous insufficiency, DVT, B/l hip wounds, recently admitted for hip wounds that grew corynebacterium and S. Aureus and was discharged about 3 weeks ago. denies any recent NSAID use, no ACE/ARB. She was on merrem and vancomycin last admission in June her creatinine ranged 1.8-2.0 during that admission. She had a dose of vancomycin in the ED. She states she has been eating and drinking, she was on bumex PTA    She is awake and alert and is having LE pain as well as pain left hip from a chronic wound         Past Medical History:        Diagnosis Date    Abscess of abdominal wall     Anemia     Iron deficiency and chronic disease.  Anesthesia     DIFFICULTY WAKING UP    Arthritis     Arthritis, hip     Breast cancer ([facility] 2005    S/P Left Lumpectomy with Lymph Node Dissection, Chemo/Rad. Follows with Dr. Joann Mccormick. No recurrence to date. Surgeon was Dr. Eve Prado.  CAD (coronary artery disease) 5/2008    s/p CABG. Follows with [address] [place] [address].     CHF (congestive heart failure) (HCC)     Chronic venous insufficiency 11/7/2018    Class 2 severe obesity due to excess calories with serious comorbidity and body mass index (BMI) of 35.0 to 35.9 in adult [facility]) 8/1/2019    Decreased dorsalis pedis pulse 11/7/2018    Diabetic neuropathy (HCC)     Diabetic neuropathy ([address]     DVT (deep venous thrombosis) (HCC)     Recurrent. On lifelong coumadin.  DVT of upper extremity (deep vein thrombosis) ([address] 4/18/2012    History of deep vein thrombosis 11/7/2018    Humerus fracture     Chronic, on the Left.  Hyperlipidemia 8/29/2011    Hypertension     Leg swelling 11/7/2018    Lymph node enlargement     Lymphedema of both lower extremities 11/7/2018    MI (myocardial infarction) ([address]     Nephrolithiasis     Follows with Dr. Radha Mcmahon.  Pericardial effusion     Pulmonary nodule     With mediastinal lymphadenopathy. Stable. Follows with Dr. Marina Amor.  Rib lesion 11/28/11    expansile lesion in one of the right ribs laterally    Sarcoidosis 07/26/11    per transbronchial needle aspiration    Subclavian vein occlusion, bilateral ([address] 4/18/2012    Type II or unspecified type diabetes mellitus without mention of complication, not stated as uncontrolled        Past Surgical History:        Procedure Laterality Date    APPENDECTOMY      ARTERIAL BYPASS SURGRY      BREAST LUMPECTOMY  9/27/2005    LEFT    CARDIAC SURGERY      CHOLECYSTECTOMY      COLONOSCOPY  12/2007    cecal arteriovenous malformation with hyperplastic polyps    COLONOSCOPY  [id number]    CORONARY ARTERY BYPASS GRAFT  05/2008    triple bypass    ECHO COMPL W DOP COLOR FLOW  10/30/2013         EYE SURGERY      clarissa cataracts    HYSTERECTOMY  1975, 1985    MAYNOR prolapse, benign conditions; BSO later for scar tissues, no CA.      OTHER SURGICAL HISTORY  11/18/11    port removal right chest wall    PARACENTESIS      TONSILLECTOMY      TOOTH EXTRACTION      Full Dental Extraction.     TUNNELED VENOUS PORT PLACEMENT      removal of port Dec. 2011- Dr. Baxter Door TUNNELED [facility] 94  [id number]    RIGHT CHEST       Current Medications:    Current Facility-Administered Medications: albuterol (PROVENTIL) nebulizer solution 2.5 mg, 2.5 mg, Nebulization, Q4H PRN  [Held by provider] bumetanide (BUMEX) injection 1 mg, 1 mg, Intravenous, Daily  calcium-vitamin D (OSCAL-500) 500-200 MG-UNIT per tablet 1 tablet, 1 tablet, Oral, Daily  docusate sodium (COLACE) capsule 100 mg, 100 mg, Oral, Daily  isosorbide dinitrate (ISORDIL) tablet 5 mg, 5 mg, Oral, TID  magnesium oxide (MAG-OX) tablet 400 mg, 400 mg, Oral, Daily  metoprolol succinate (TOPROL XL) extended release tablet 12.5 mg, 12.5 mg, Oral, Daily  therapeutic multivitamin-minerals 1 tablet, 1 tablet, Oral, Daily  pantoprazole (PROTONIX) tablet 40 mg, 40 mg, Oral, QAM AC  pregabalin (LYRICA) capsule 50 mg, 50 mg, Oral, TID  vitamin B-12 (CYANOCOBALAMIN) tablet 1,000 mcg, 1,000 mcg, Oral, Daily  warfarin (COUMADIN) tablet 2 mg, 2 mg, Oral, Daily  sodium chloride flush 0.9 % injection 10 mL, 10 mL, Intravenous, 2 times per day  sodium chloride flush 0.9 % injection 10 mL, 10 mL, Intravenous, PRN  acetaminophen (TYLENOL) tablet 650 mg, 650 mg, Oral, Q6H PRN **OR** acetaminophen (TYLENOL) suppository 650 mg, 650 mg, Rectal, Q6H PRN  magnesium hydroxide (MILK OF MAGNESIA) 400 MG/5ML suspension 30 mL, 30 mL, Oral, Daily PRN  promethazine (PHENERGAN) tablet 12.5 mg, 12.5 mg, Oral, Q6H PRN **OR** ondansetron (ZOFRAN) injection 4 mg, 4 mg, Intravenous, Q6H PRN  piperacillin-tazobactam (ZOSYN) 3.375 g in dextrose 5 % 100 mL IVPB extended infusion (mini-bag), 3.375 g, Intravenous, Q12H  0.9 % sodium chloride infusion admixture, , Intravenous, Q12H  0.9 % sodium chloride infusion, , Intravenous, Continuous  oxyCODONE (ROXICODONE) immediate release tablet 5 mg, 5 mg, Oral, Q4H PRN    Allergies:  Macrobid [nitrofurantoin monohydrate macrocrystals]    Social History:      Smoking: Never Smoker   Smokeless Tobacco: Never Used   Alcohol: No       Family History:     Family History   Adopted: Yes         Review of Systems:       Pertinent positives stated above in HPI. All other systems were reviewed and were negative.     Physical exam:   Constitutional:  VITALS: BP (!) 135/57   Pulse 79   Temp 97.4 °F (36.3 °C) (Temporal)   Resp 18   Ht 5' 4\" (1.626 m)   Wt 154 lb (69.9 kg)   SpO2 98%   BMI 26.43 kg/m²   CURRENT TEMPERATURE:  Temp: 97.4 °F (36.3 °C)  CURRENT RESPIRATORY RATE:  Resp: 18  CURRENT PULSE:  Pulse: 79  CURRENT BLOOD PRESSURE:  BP: (!) 135/57  24HR BLOOD PRESSURE RANGE:  Systolic (31IPU), ETE:834 , Min:130 , CFN:491   ; Diastolic (90EIV), OE, Min:46, Max:59    24HR INTAKE/OUTPUT:      Intake/Output Summary (Last 24 hours) at 2020 1040  Last data filed at 2020 0140  Gross per 24 hour   Intake 10 ml   Output 125 ml   Net -115 ml     Gen: alert, awake, nad  Skin: no rash, turgor wnl  Heent:  eomi, mmm  Neck: No jvd noted  Cardiovascular:  S1, S2 no S3 or rub  Respiratory: clear upper, diminished in bases with equal expansion  Abdomen:  +bs, soft, nt, nd  Ext: ++ bilateral edema, tender to touch  Psychiatric: mood and affect appropriate  Musculoskeletal:  Rom, muscular strength intact    DATA:      CBC with Differential:    Lab Results   Component Value Date    WBC 3.6 2020    RBC 2.28 2020    HGB 8.5 2020    HCT 26.6 2020     2020    .7 2020    MCH 37.3 2020    MCHC 32.0 2020    RDW 17.2 2020    NRBC 0.9 2020    SEGSPCT 65 2012    BLASTSPCT 1.8 2020    METASPCT 0.9 2019    LYMPHOPCT 10.4 2020    PROMYELOPCT 0.9 2020    MONOPCT 7.3 2020    MYELOPCT 0.9 09/10/2019    BASOPCT 0.8 2020    MONOSABS 0.00 2020    LYMPHSABS 0.36 2020    EOSABS 0.09 2020    BASOSABS 0.00 2020     CMP:    Lab Results   Component Value Date     2020    K 4.2 2020    CL 96 2020    CO2 24 2020    BUN 56 2020    CREATININE 3.0 2020    GFRAA 18 2020    LABGLOM 15 2020    GLUCOSE 219 2020    GLUCOSE 109 2012    PROT 7.5 2020    LABALBU 3.6 2020    LABALBU 4.4 03/25/2012    CALCIUM 8.5 07/08/2020    BILITOT 1.0 07/07/2020    ALKPHOS 144 07/07/2020    AST 34 07/07/2020    ALT 28 07/07/2020       RAD:  Xr Tibia Fibula Left (2 Views)    Result Date: 7/7/2020  3 views of the left knee, tibia, fibula and ankle. 3 views of the left foot. There is no evidence of acute displaced cortical disruption or dislocation. There is diffuse bone demineralization. There are rather severe degenerative changes of the knee, ankle, midfoot and toes. Arterial calcifications are present. Metallic clips overlie the medial soft tissues of the knee. No acute fracture is identified. Osteoarthritis. Osteopenia. Xr Tibia Fibula Right (2 Views)    Result Date: 7/7/2020  3 views of the right knee, tibia, fibula and ankle. 3 views of the left foot. There is no evidence of acute displaced cortical disruption or dislocation. There is diffuse bone demineralization. There are rather severe degenerative changes of the knee, ankle, midfoot and toes. Arterial calcifications are present. Metallic clips overlie the medial soft tissues of the knee. No acute fracture is identified. Osteoarthritis. Osteopenia. Xr Foot Left (min 3 Views)    Result Date: 7/7/2020  3 views of the left knee, tibia, fibula and ankle. 3 views of the left foot. There is no evidence of acute displaced cortical disruption or dislocation. There is diffuse bone demineralization. There are rather severe degenerative changes of the knee, ankle, midfoot and toes. Arterial calcifications are present. Metallic clips overlie the medial soft tissues of the knee. No acute fracture is identified. Osteoarthritis. Osteopenia. Xr Foot Right (min 3 Views)    Result Date: 7/7/2020  3 views of the right knee, tibia, fibula and ankle. 3 views of the left foot. There is no evidence of acute displaced cortical disruption or dislocation. There is diffuse bone demineralization.  There are rather severe degenerative changes of the knee, ankle, midfoot and toes. Arterial calcifications are present. Metallic clips overlie the medial soft tissues of the knee. No acute fracture is identified. Osteoarthritis. Osteopenia. Xr Chest Portable    Result Date: 2020  Patient MRN: [id number] : 1945 Age:  76 years Gender: Female Order Date: 2020 9:30 PM Exam: XR CHEST PORTABLE Number of Images: 1 view Indication:   sob sob Comparison: Prior chest radiograph from 2020 is available. Findings: Study demonstrate cardiomegaly with median sternotomy with left ventricular contour. There is a right-sided portacatheter tip is in superior vena cava. There is uncoiling atherosclerotic change of thoracic aorta. There is old right upper fourth and fifth rib fracture with callus formation. 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 There is a comminuted humeral head fracture seen in the left glenoid fossa. There are multiple surgical clips seen in the left axillary region. The lung fields are clear. There is small right-sided pleural effusion     Cardiomegaly with median sternotomy Small right-sided pleural effusion Other findings are unchanged from prior study. Us Dup Lower Extremities Bilateral Venous    Result Date: 2020  Real-time and Doppler sonography of the deep venous structures of the lower extremities. Grayscale, color Doppler, and spectral Doppler analysis. Occlusive and nonocclusive intraluminal substance is identified in the right common femoral, superficial femoral, popliteal, posterior tibial and peroneal veins. Baker's cyst on the left. There is adequate and spontaneous blood flow, compressibility and augmentation within the left common femoral, superficial femoral, popliteal, posterior tibial, anterior tibial and peroneal veins. Extensive deep vein thrombosis throughout the right lower extremity.         Assessment/Plan    · Acute kidney injury-  Likely pre-renal in the setting of diuretics and  poor po intake with FeNa <1%

## 2020-07-09 ENCOUNTER — APPOINTMENT (OUTPATIENT)
Dept: MRI IMAGING | Age: 75
DRG: 264 | End: 2020-07-09
Payer: COMMERCIAL

## 2020-07-09 PROBLEM — D72.810 LYMPHOPENIA: Status: ACTIVE | Noted: 2020-07-09

## 2020-07-09 PROBLEM — M79.605 LEFT LEG PAIN: Status: ACTIVE | Noted: 2020-07-09

## 2020-07-09 LAB
ANION GAP SERPL CALCULATED.3IONS-SCNC: 15 MMOL/L (ref 7–16)
ANISOCYTOSIS: ABNORMAL
BASOPHILIC STIPPLING: ABNORMAL
BASOPHILS ABSOLUTE: 0.07 E9/L (ref 0–0.2)
BASOPHILS RELATIVE PERCENT: 2.6 % (ref 0–2)
BUN BLDV-MCNC: 57 MG/DL (ref 8–23)
CALCIUM SERPL-MCNC: 7.8 MG/DL (ref 8.6–10.2)
CHLORIDE BLD-SCNC: 100 MMOL/L (ref 98–107)
CO2: 25 MMOL/L (ref 22–29)
CREAT SERPL-MCNC: 2.8 MG/DL (ref 0.5–1)
EOSINOPHILS ABSOLUTE: 0.04 E9/L (ref 0.05–0.5)
EOSINOPHILS RELATIVE PERCENT: 1.7 % (ref 0–6)
GFR AFRICAN AMERICAN: 20
GFR NON-AFRICAN AMERICAN: 16 ML/MIN/1.73
GLUCOSE BLD-MCNC: 154 MG/DL (ref 74–99)
HCT VFR BLD CALC: 23 % (ref 34–48)
HCT VFR BLD CALC: 24.5 % (ref 34–48)
HEMOGLOBIN: 7.2 G/DL (ref 11.5–15.5)
HEMOGLOBIN: 7.7 G/DL (ref 11.5–15.5)
HYPOCHROMIA: ABNORMAL
INR BLD: 2.1
LACTIC ACID: 1.3 MMOL/L (ref 0.5–2.2)
LACTIC ACID: 1.3 MMOL/L (ref 0.5–2.2)
LACTIC ACID: 1.4 MMOL/L (ref 0.5–2.2)
LACTIC ACID: 1.4 MMOL/L (ref 0.5–2.2)
LYMPHOCYTES ABSOLUTE: 0.34 E9/L (ref 1.5–4)
LYMPHOCYTES RELATIVE PERCENT: 12.9 % (ref 20–42)
MCH RBC QN AUTO: 37.1 PG (ref 26–35)
MCHC RBC AUTO-ENTMCNC: 31.3 % (ref 32–34.5)
MCV RBC AUTO: 118.6 FL (ref 80–99.9)
MONOCYTES ABSOLUTE: 0.18 E9/L (ref 0.1–0.95)
MONOCYTES RELATIVE PERCENT: 6.9 % (ref 2–12)
MYELOCYTE PERCENT: 0.9 % (ref 0–0)
NEUTROPHILS ABSOLUTE: 1.98 E9/L (ref 1.8–7.3)
NEUTROPHILS RELATIVE PERCENT: 75 % (ref 43–80)
OVALOCYTES: ABNORMAL
PDW BLD-RTO: 17.2 FL (ref 11.5–15)
PLATELET # BLD: 106 E9/L (ref 130–450)
PMV BLD AUTO: 10.7 FL (ref 7–12)
POIKILOCYTES: ABNORMAL
POLYCHROMASIA: ABNORMAL
POTASSIUM REFLEX MAGNESIUM: 4.2 MMOL/L (ref 3.5–5)
PROTHROMBIN TIME: 24.2 SEC (ref 9.3–12.4)
RBC # BLD: 1.94 E12/L (ref 3.5–5.5)
SODIUM BLD-SCNC: 140 MMOL/L (ref 132–146)
WBC # BLD: 2.6 E9/L (ref 4.5–11.5)

## 2020-07-09 PROCEDURE — 2580000003 HC RX 258: Performed by: STUDENT IN AN ORGANIZED HEALTH CARE EDUCATION/TRAINING PROGRAM

## 2020-07-09 PROCEDURE — 72148 MRI LUMBAR SPINE W/O DYE: CPT

## 2020-07-09 PROCEDURE — 85610 PROTHROMBIN TIME: CPT

## 2020-07-09 PROCEDURE — 99232 SBSQ HOSP IP/OBS MODERATE 35: CPT | Performed by: FAMILY MEDICINE

## 2020-07-09 PROCEDURE — 6370000000 HC RX 637 (ALT 250 FOR IP): Performed by: FAMILY MEDICINE

## 2020-07-09 PROCEDURE — 80048 BASIC METABOLIC PNL TOTAL CA: CPT

## 2020-07-09 PROCEDURE — 36415 COLL VENOUS BLD VENIPUNCTURE: CPT

## 2020-07-09 PROCEDURE — 2060000000 HC ICU INTERMEDIATE R&B

## 2020-07-09 PROCEDURE — 6370000000 HC RX 637 (ALT 250 FOR IP): Performed by: STUDENT IN AN ORGANIZED HEALTH CARE EDUCATION/TRAINING PROGRAM

## 2020-07-09 PROCEDURE — 85025 COMPLETE CBC W/AUTO DIFF WBC: CPT

## 2020-07-09 PROCEDURE — 6360000002 HC RX W HCPCS: Performed by: STUDENT IN AN ORGANIZED HEALTH CARE EDUCATION/TRAINING PROGRAM

## 2020-07-09 PROCEDURE — 0JBM0ZZ EXCISION OF LEFT UPPER LEG SUBCUTANEOUS TISSUE AND FASCIA, OPEN APPROACH: ICD-10-PCS | Performed by: FAMILY MEDICINE

## 2020-07-09 PROCEDURE — 93005 ELECTROCARDIOGRAM TRACING: CPT | Performed by: STUDENT IN AN ORGANIZED HEALTH CARE EDUCATION/TRAINING PROGRAM

## 2020-07-09 PROCEDURE — 99232 SBSQ HOSP IP/OBS MODERATE 35: CPT | Performed by: SURGERY

## 2020-07-09 PROCEDURE — 83605 ASSAY OF LACTIC ACID: CPT

## 2020-07-09 PROCEDURE — 85014 HEMATOCRIT: CPT

## 2020-07-09 PROCEDURE — 85018 HEMOGLOBIN: CPT

## 2020-07-09 RX ORDER — PETROLATUM 42 G/100G
OINTMENT TOPICAL 2 TIMES DAILY PRN
Status: DISCONTINUED | OUTPATIENT
Start: 2020-07-09 | End: 2020-07-15 | Stop reason: HOSPADM

## 2020-07-09 RX ORDER — WARFARIN SODIUM 3 MG/1
3 TABLET ORAL
Status: COMPLETED | OUTPATIENT
Start: 2020-07-09 | End: 2020-07-09

## 2020-07-09 RX ORDER — PETROLATUM 42 G/100G
OINTMENT TOPICAL 2 TIMES DAILY
Status: DISCONTINUED | OUTPATIENT
Start: 2020-07-09 | End: 2020-07-15 | Stop reason: HOSPADM

## 2020-07-09 RX ORDER — LIDOCAINE HYDROCHLORIDE AND EPINEPHRINE 10; 10 MG/ML; UG/ML
20 INJECTION, SOLUTION INFILTRATION; PERINEURAL ONCE
Status: DISCONTINUED | OUTPATIENT
Start: 2020-07-09 | End: 2020-07-15 | Stop reason: HOSPADM

## 2020-07-09 RX ORDER — ACETAMINOPHEN 650 MG
TABLET, EXTENDED RELEASE ORAL PRN
Status: DISCONTINUED | OUTPATIENT
Start: 2020-07-09 | End: 2020-07-15 | Stop reason: HOSPADM

## 2020-07-09 RX ADMIN — OXYCODONE HYDROCHLORIDE 5 MG: 5 TABLET ORAL at 05:34

## 2020-07-09 RX ADMIN — ISOSORBIDE DINITRATE 5 MG: 10 TABLET ORAL at 08:33

## 2020-07-09 RX ADMIN — MULTIPLE VITAMINS W/ MINERALS TAB 1 TABLET: TAB at 08:33

## 2020-07-09 RX ADMIN — PREGABALIN 50 MG: 50 CAPSULE ORAL at 08:32

## 2020-07-09 RX ADMIN — SKIN PROTECTANT: 44 OINTMENT TOPICAL at 21:18

## 2020-07-09 RX ADMIN — OXYCODONE HYDROCHLORIDE 5 MG: 5 TABLET ORAL at 11:00

## 2020-07-09 RX ADMIN — DOCUSATE SODIUM 100 MG: 100 CAPSULE, LIQUID FILLED ORAL at 08:32

## 2020-07-09 RX ADMIN — PIPERACILLIN AND TAZOBACTAM 3.38 G: 3; .375 INJECTION, POWDER, FOR SOLUTION INTRAVENOUS at 00:50

## 2020-07-09 RX ADMIN — SODIUM CHLORIDE: 9 INJECTION, SOLUTION INTRAVENOUS at 17:52

## 2020-07-09 RX ADMIN — SODIUM CHLORIDE: 9 INJECTION, SOLUTION INTRAVENOUS at 02:31

## 2020-07-09 RX ADMIN — WARFARIN SODIUM 3 MG: 3 TABLET ORAL at 17:53

## 2020-07-09 RX ADMIN — PANTOPRAZOLE SODIUM 40 MG: 40 TABLET, DELAYED RELEASE ORAL at 05:31

## 2020-07-09 RX ADMIN — PREGABALIN 50 MG: 50 CAPSULE ORAL at 21:17

## 2020-07-09 RX ADMIN — Medication 1 TABLET: at 08:34

## 2020-07-09 RX ADMIN — MAGNESIUM GLUCONATE 500 MG ORAL TABLET 400 MG: 500 TABLET ORAL at 08:32

## 2020-07-09 RX ADMIN — PREGABALIN 50 MG: 50 CAPSULE ORAL at 16:09

## 2020-07-09 RX ADMIN — METOPROLOL SUCCINATE 12.5 MG: 25 TABLET, EXTENDED RELEASE ORAL at 08:32

## 2020-07-09 RX ADMIN — ISOSORBIDE DINITRATE 5 MG: 10 TABLET ORAL at 17:49

## 2020-07-09 RX ADMIN — PIPERACILLIN AND TAZOBACTAM 3.38 G: 3; .375 INJECTION, POWDER, FOR SOLUTION INTRAVENOUS at 12:58

## 2020-07-09 RX ADMIN — ISOSORBIDE DINITRATE 5 MG: 10 TABLET ORAL at 12:58

## 2020-07-09 RX ADMIN — SODIUM CHLORIDE: 9 INJECTION, SOLUTION INTRAVENOUS at 17:47

## 2020-07-09 RX ADMIN — SKIN PROTECTANT: 44 OINTMENT TOPICAL at 12:57

## 2020-07-09 ASSESSMENT — PAIN DESCRIPTION - DESCRIPTORS
DESCRIPTORS: ACHING;SHARP
DESCRIPTORS: ACHING;SHARP
DESCRIPTORS: ACHING;DISCOMFORT

## 2020-07-09 ASSESSMENT — PAIN SCALES - GENERAL
PAINLEVEL_OUTOF10: 0
PAINLEVEL_OUTOF10: 8
PAINLEVEL_OUTOF10: 8
PAINLEVEL_OUTOF10: 0
PAINLEVEL_OUTOF10: 8
PAINLEVEL_OUTOF10: 4
PAINLEVEL_OUTOF10: 0

## 2020-07-09 ASSESSMENT — PAIN - FUNCTIONAL ASSESSMENT
PAIN_FUNCTIONAL_ASSESSMENT: PREVENTS OR INTERFERES SOME ACTIVE ACTIVITIES AND ADLS

## 2020-07-09 ASSESSMENT — PAIN DESCRIPTION - ORIENTATION
ORIENTATION: LEFT

## 2020-07-09 ASSESSMENT — PAIN DESCRIPTION - PAIN TYPE
TYPE: ACUTE PAIN

## 2020-07-09 ASSESSMENT — PAIN DESCRIPTION - FREQUENCY
FREQUENCY: CONTINUOUS

## 2020-07-09 ASSESSMENT — PAIN DESCRIPTION - LOCATION
LOCATION: HIP
LOCATION: HIP;LEG
LOCATION: HIP

## 2020-07-09 ASSESSMENT — PAIN DESCRIPTION - PROGRESSION
CLINICAL_PROGRESSION: GRADUALLY IMPROVING
CLINICAL_PROGRESSION: GRADUALLY IMPROVING

## 2020-07-09 NOTE — PROGRESS NOTES
[facility]  Hematology/Oncology  Consult      Patient Name: Jarret Choudhury  YOB: 1945  PCP: Nawaf Merritt MD   Referring Provider:      Reason for Consultation:   Chief Complaint   Patient presents with    Leg Swelling     BLE SWELLING X1 DAY, hx CHF        Subjective:  Denies any pain. Underwent bedside debridement today    History of Present Illness:  80-year-old woman with longstanding history of breast cancer dating back to 2005. She was diagnosed with T1C N1aM0, grade 2 with invasive ductal carcinoma, positive ER/NC receptors and amplified Her-2-Nova by FISH . She received adjuvant chemo with AC x6 followed by Taxol X6 as per SWOG 0221 study and 1 year of adjuvant Herceptin. This was complicated by peripheral neuropathy. She then completed 5 yr of adjuvant hormonal therapy with Arimidex. She then recurred with expansile large lytic rib metastasis. Her PET SCAN in [address] [place] [address] [address] 2012 showed disease progression with new right cervical, hilar and mediastinal lymphadenopathy . She has adequate LVEF. She responded well to [place] and Herceptin and follow up PET scan done JULY 2013 showed complete resolution of lymphadenopathy with no evidence of any visceral metastasis. She was maintained on single agent Herceptin along with AI. In 2019 she developed new onset ascites and paracentesis was done and was negative for malignant cells and she had changes suggestive of liver cirrhosis. Repeat imaging in 2019 showed interval development of multifocal liver metastasis as well as pulmonary metastasis.     Repeat liver biopsy was consistent with metastatic adenocarcinoma of breast primary but interestingly it was different from her original tumor in 2005 and this time it was estrogen positive and HER-2 negative    She was then initiated on Ibrance Faslodex combination which she has been tolerating very well with stable disease  She is now hospitalized with worsening peripheral edema with erythema and cellulitis which has impaired her mobility. She had been recently hospitalized and treated for bilateral hip pressure ulcer wounds and had been seen by infectious disease and had debridement by general surgery and MRI of the hip was negative for osteomyelitis. Seen by ID again who recommended no further antibiotics at this time. Diagnostic Data:     Past Medical History:   Diagnosis Date    Abscess of abdominal wall     Anemia     Iron deficiency and chronic disease.  Anesthesia     DIFFICULTY WAKING UP    Arthritis     Arthritis, hip     Breast cancer ([facility] 2005    S/P Left Lumpectomy with Lymph Node Dissection, Chemo/Rad. Follows with Dr. Matthew Oro. No recurrence to date. Surgeon was Dr. Lisy Shirley.  CAD (coronary artery disease) 5/2008    s/p CABG. Follows with [address] [place] [address].  CHF (congestive heart failure) (HCC)     Chronic venous insufficiency 11/7/2018    Class 2 severe obesity due to excess calories with serious comorbidity and body mass index (BMI) of 35.0 to 35.9 in [facility]) 8/1/2019    Decreased dorsalis pedis pulse 11/7/2018    Diabetic neuropathy (HCC)     Diabetic neuropathy (HCC)     DVT (deep venous thrombosis) (HCC)     Recurrent. On lifelong coumadin.  DVT of upper extremity (deep vein thrombosis) ([address] 4/18/2012    History of deep vein thrombosis 11/7/2018    Humerus fracture     Chronic, on the Left.  Hyperlipidemia 8/29/2011    Hypertension     Left leg pain 7/9/2020    Leg swelling 11/7/2018    Lymph node enlargement     Lymphedema of both lower extremities 11/7/2018    Lymphopenia 7/9/2020    MI (myocardial infarction) ([facility]     Nephrolithiasis     Follows with Dr. Del Pereira.  Pericardial effusion     Pulmonary nodule     With mediastinal lymphadenopathy. Stable. Follows with Dr. Marisela Sinclair.     Rib lesion 11/28/11    expansile lesion in one of the right ribs laterally    Sarcoidosis 07/26/11    per transbronchial needle aspiration    Subclavian vein occlusion, bilateral ([address] 4/18/2012    Type II or unspecified type diabetes mellitus without mention of complication, not stated as uncontrolled        Patient Active Problem List    Diagnosis Date Noted    Lymphopenia 07/09/2020    Left leg pain 07/09/2020    Cellulitis 07/08/2020    Bony metastasis ([address] left leg 07/08/2020    Decubitus ulcer of both hip 07/08/2020    Cellulitis and abscess of leg 06/18/2020    Pleural effusion 09/17/2019    Precordial pain     Chronic systolic heart failure (HCC)     Moderate protein-calorie malnutrition ([address] 08/15/2019    Metastatic disease ([address]     Palliative care by specialist     Altered mental status     Goals of care, counseling/discussion     Acute hypercapnic respiratory failure ([address] 39/78/8967    Acute systolic heart failure ([address] 08/01/2019    Nonischemic cardiomyopathy ([address] 08/01/2019    Status post coronary artery bypass graft 08/01/2019    Class 2 severe obesity due to excess calories with serious comorbidity and body mass index (BMI) of 35.0 to 35.9 in adult [facility]) 08/01/2019    Type 2 diabetes mellitus with hyperglycemia ([address] 08/01/2019    HAP (hospital-acquired pneumonia) 07/21/2019    Palliative care encounter     Cancer associated pain     Ascites 06/18/2019    Charcot's joint of foot in type 2 diabetes mellitus ([address] 04/17/2019    CKD (chronic kidney disease) stage 3, GFR 30-59 ml/min ([address] 04/16/2019    Ambulatory dysfunction 04/15/2019    Leg swelling 11/07/2018    History of deep vein thrombosis 11/07/2018    Chronic venous insufficiency 11/07/2018    Lymphedema of both lower extremities 11/07/2018    Decreased dorsalis pedis pulse 11/07/2018    Diabetic polyneuropathy associated with type 2 diabetes mellitus ([address] 09/07/2018    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 56/84/9574    Complicated UTI (urinary tract infection) 10/24/2017    KARISHMA (acute kidney injury) ([address] 10/24/2017    Diarrhea with dehydration 10/24/2017    Chronic anticoagulation 10/24/2017    Deep vein thrombosis (DVT) of right lower extremity ([address] 06/06/2017    Peripheral polyneuropathy 01/03/2017    Bilateral edema of lower extremity 10/03/2016    Chronic deep vein thrombosis (DVT) of proximal vein of right lower extremity ([address] 09/30/2016    Type 2 diabetes mellitus without complication, with long-term current use of insulin ([address] 09/01/2016    Anemia of chronic disease 09/01/2016    Ventral hernia 05/14/2015    Rectal bleeding 02/23/2015    Anesthesia     Pericardial effusion     MI (myocardial infarction) ([address]     Arthritis     Lymph node enlargement     Subclavian vein occlusion, bilateral ([address] 04/18/2012    Rib lesion 02/07/2012    Hyperlipidemia 08/29/2011    Sarcoidosis 07/26/2011    B12 deficiency 06/22/2011    Shoulder pain, left 03/02/2011    Metastatic breast cancer ([address]     Essential hypertension     Coronary artery disease involving native coronary artery of native heart without angina pectoris     Nephrolithiasis     CHF (congestive heart failure) ([address]     Humerus fracture         Past Surgical History:   Procedure Laterality Date    APPENDECTOMY      ARTERIAL BYPASS SURGRY      BREAST LUMPECTOMY  9/27/2005    LEFT    CARDIAC SURGERY      CHOLECYSTECTOMY      COLONOSCOPY  12/2007    cecal arteriovenous malformation with hyperplastic polyps    COLONOSCOPY  [id number]    CORONARY ARTERY BYPASS GRAFT  05/2008    triple bypass    ECHO COMPL W DOP COLOR FLOW  10/30/2013         EYE SURGERY      clarissa cataracts    HYSTERECTOMY  1975, 1985    MAYNOR prolapse, benign conditions; BSO later for scar tissues, no CA.      OTHER SURGICAL HISTORY  11/18/11    port removal right chest wall    PARACENTESIS      TONSILLECTOMY      TOOTH EXTRACTION      Full Dental Extraction.     TUNNELED VENOUS PORT PLACEMENT      [facility] 2011[facility] [place] [address]  [id number] RIGHT CHEST       Family History  Family History   Adopted: Yes       Social History    TOBACCO:   reports that she has never smoked. She has never used smokeless tobacco.  ETOH:   reports no history of alcohol use. Home Medications  Prior to Admission medications    Medication Sig Start Date End Date Taking? Authorizing Provider   PT/INR Test STRP 1 each by In Vitro route Twice a Week 6/29/20   Rebecca Chacon DO   sodium bicarbonate 650 MG tablet Take 650 mg by mouth 2 times daily    Historical Provider, MD   PT/INR Testing Monitor KIT 1 each by Does not apply route once a week Use to test INR at home once weekly and as directed to monitor INR. 4/9/20   Rebecca Chacon DO   PT/INR Test STRP 1 each by In Vitro route once a week Use to check INR at least once weekly and more often as directed. 4/9/20   Rebecca Chacon DO   metoprolol succinate (TOPROL XL) 25 MG extended release tablet Take 0.5 tablets by mouth daily 4/8/20   Rebecca Chacon DO   omeprazole 20 MG EC tablet Take 1 tablet by mouth daily 4/8/20   Rebecca Chacon DO   bumetanide (BUMEX) 1 MG tablet Take 1 tablet by mouth daily 3/16/20   Rebecca Chacon DO   pregabalin (LYRICA) 50 MG capsule Take 1 capsule by mouth 3 times daily for 120 days.  2/4/20 6/29/20  Rebecca Chacon DO   magnesium oxide (MAG-OX) 400 (240 Mg) MG tablet Take 1 tablet by mouth daily 2/4/20   Rebecca Chacon DO   docusate (COLACE, DULCOLAX) 100 MG CAPS Take 100 mg by mouth daily 2/4/20   Rebecca Chacon DO   PT/INR Testing Monitor KIT 1 each by Does not apply route once a week Please use to test INR as directed by physician 1/15/20   Rebecca Chacon DO   isosorbide dinitrate (ISORDIL) 5 MG tablet Take 1 tablet by mouth 3 times daily 1/14/20   Kaykay Kincaid MD   vitamin B-12 (CYANOCOBALAMIN) 1000 MCG tablet Take 1 tablet by mouth daily 1/7/20   Rebecca Chacon DO   Calcium Citrate-Vitamin D (RA CALCIUM CIT-VIT D-3 PETITES) 200-250 MG-UNIT TABS take 1 tablet by mouth twice a day 1/7/20   Radha López DO   albuterol sulfate (PROAIR RESPICLICK) 803 (90 Base) MCG/ACT aerosol powder inhalation Inhale 2 puffs into the lungs every 6 hours as needed for Wheezing or Shortness of Breath 1/7/20   Radha López DO   warfarin (COUMADIN) 1 MG tablet Please take 2 mg by mouth most days, but 3 mg by mouth on Mondays and Wednesdays and Friday. Patient taking differently: Please take 2 mg by mouth most days, but 3 mg by mouth on sun, tues, thurs. 11/25/19   Daniele Liang MD   Multiple Vitamins-Minerals (THERAPEUTIC MULTIVITAMIN-MINERALS) tablet Take 1 tablet by mouth daily 9/17/19   Daniele Liang MD   Insulin Pen Needle (MEIJER PEN NEEDLES) 29G X 12MM MISC 1 each by Does not apply route daily 9/10/19   Daniele Liang MD   nitroGLYCERIN (NITROSTAT) 0.4 MG SL tablet up to max of 3 total doses. If no relief after 1 dose, call 911. 8/19/19   Daniele Liang MD   sodium chloride (OCEAN, BABY AYR) 0.65 % nasal spray 1 spray by Nasal route as needed for Congestion (dryness) 8/19/19   Daniele Liang MD   palbociclib [facility]) 100 MG capsule Take 100 mg by mouth 3 weeks on and 1 week off also receives injections every 3 weeks per Dr Thony Phillips MD       Allergies  Allergies   Allergen Reactions    Macrobid [Nitrofurantoin Monohydrate Macrocrystals] Hives       Review of Systems: Relevant for chronic hip pain as well as worsening edema and cellulitis of lower extremities      Objective  BP (!) 108/51   Pulse 75   Temp 98.5 °F (36.9 °C) (Temporal)   Resp 18   Ht 5' 4\" (1.626 m)   Wt 154 lb (69.9 kg)   SpO2 94%   BMI 26.43 kg/m²     Physical Exam:     General: AAO to person, place, time, in no acute distress,   Head and neck : PERRLA, EOMI . Sclera non icteric.   Oropharynx : Clear  Neck: no JVD,  no adenopathy,   Heart: Regular rate and regular rhythm, no murmur  Lungs: Clear to auscultation   Extremities: 3+ edema with overlying erythema and tenderness  Abdomen: Soft, non-tender;no masses, no organomegaly  Skin: Bilateral hip pressure ulcers  Neurologic:Cranial nerves grossly intact. No focal motor or sensory deficits . Recent Laboratory Data-   Lab Results   Component Value Date    WBC 2.6 (L) 07/09/2020    HGB 7.7 (L) 07/09/2020    HCT 24.5 (L) 07/09/2020    .6 (H) 07/09/2020     (L) 07/09/2020    LYMPHOPCT 12.9 (L) 07/09/2020    RBC 1.94 (L) 07/09/2020    MCH 37.1 (H) 07/09/2020    MCHC 31.3 (L) 07/09/2020    RDW 17.2 (H) 07/09/2020    NEUTOPHILPCT 75.0 07/09/2020    MONOPCT 6.9 07/09/2020    BASOPCT 2.6 (H) 07/09/2020    NEUTROABS 1.98 07/09/2020    LYMPHSABS 0.34 (L) 07/09/2020    MONOSABS 0.18 07/09/2020    EOSABS 0.04 (L) 07/09/2020    BASOSABS 0.07 07/09/2020       Lab Results   Component Value Date     07/09/2020    K 4.2 07/09/2020     07/09/2020    CO2 25 07/09/2020    BUN 57 (H) 07/09/2020    CREATININE 2.8 (H) 07/09/2020    GLUCOSE 154 (H) 07/09/2020    CALCIUM 7.8 (L) 07/09/2020    PROT 7.5 07/07/2020    LABALBU 3.6 07/07/2020    BILITOT 1.0 07/07/2020    ALKPHOS 144 (H) 07/07/2020    AST 34 (H) 07/07/2020    ALT 28 07/07/2020    LABGLOM 16 07/09/2020    GFRAA 20 07/09/2020       Lab Results   Component Value Date    IRON 49 (L) 06/20/2011    TIBC 266 06/20/2011    FERRITIN 2,375 07/08/2020           Radiology-    Xr Tibia Fibula Left (2 Views)    Result Date: 7/7/2020  3 views of the left knee, tibia, fibula and ankle. 3 views of the left foot. There is no evidence of acute displaced cortical disruption or dislocation. There is diffuse bone demineralization. There are rather severe degenerative changes of the knee, ankle, midfoot and toes. Arterial calcifications are present. Metallic clips overlie the medial soft tissues of the knee. No acute fracture is identified. Osteoarthritis. Osteopenia. Xr Tibia Fibula Right (2 Views)    Result Date: 7/7/2020  3 views of the right knee, tibia, fibula and ankle. 3 views of the left foot.  There is no evidence of acute displaced cortical disruption or dislocation. There is diffuse bone demineralization. There are rather severe degenerative changes of the knee, ankle, midfoot and toes. Arterial calcifications are present. Metallic clips overlie the medial soft tissues of the knee. No acute fracture is identified. Osteoarthritis. Osteopenia. Xr Foot Left (min 3 Views)    Result Date: 2020  3 views of the left knee, tibia, fibula and ankle. 3 views of the left foot. There is no evidence of acute displaced cortical disruption or dislocation. There is diffuse bone demineralization. There are rather severe degenerative changes of the knee, ankle, midfoot and toes. Arterial calcifications are present. Metallic clips overlie the medial soft tissues of the knee. No acute fracture is identified. Osteoarthritis. Osteopenia. Xr Foot Right (min 3 Views)    Result Date: 2020  3 views of the right knee, tibia, fibula and ankle. 3 views of the left foot. There is no evidence of acute displaced cortical disruption or dislocation. There is diffuse bone demineralization. There are rather severe degenerative changes of the knee, ankle, midfoot and toes. Arterial calcifications are present. Metallic clips overlie the medial soft tissues of the knee. No acute fracture is identified. Osteoarthritis. Osteopenia. Xr Chest Portable    Result Date: 2020  Patient MRN: [id number] : 1945 Age:  76 years Gender: Female Order Date: 2020 9:30 PM Exam: XR CHEST PORTABLE Number of Images: 1 view Indication:   sob sob Comparison: Prior chest radiograph from 2020 is available. Findings: Study demonstrate cardiomegaly with median sternotomy with left ventricular contour. There is a right-sided portacatheter tip is in superior vena cava. There is uncoiling atherosclerotic change of thoracic aorta. There is old right upper fourth and fifth rib fracture with callus formation.  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 There is a comminuted humeral head fracture seen in the left glenoid fossa. There are multiple surgical clips seen in the left axillary region. The lung fields are clear. There is small right-sided pleural effusion     Cardiomegaly with median sternotomy Small right-sided pleural effusion Other findings are unchanged from prior study. Mri Hip Right Wo Contrast    Result Date: 2020  Patient MRN: [id number] : 1945 Age:  76 years Gender: Female Order Date: 2020 10:45 AM Exam: MRI HIP LEFT WO CONTRAST, MRI HIP RIGHT WO CONTRAST Number of Images: 760 views Indication:  Bilateral open wounds to both glutei. to rule out osteomyelitis Patient GFR only 27 cannot have contrast. To rule out osteomyelitis Comparison: Pelvic CT dated 2019 Technique: Noncontrast MRI both hips. Findings: There is extensive abnormal T1 weighted bone marrow signal involving the left femoral head, neck and left iliac bone extending into the acetabulum and left ischium and inferior pubic ramus. There is a soft tissue wound in the left gluteal region with soft tissue edema in the adjacent fat as well as within the gluteus medius muscle compatible with myositis. There is a soft tissue wound in the right gluteal region which extends to the margin of gluteus medius and gluteus janny muscles. There is soft tissue edema in some of the adjacent fat but none immediately surrounding the tract extending to the gluteal muscles. There is pelvic ascites. 1. Bilateral gluteal soft tissue wounds. Soft tissue edema present in the adjacent fat bilaterally, left greater than right. Additional soft tissue edema in the left gluteus medius muscle which may relate to underlying myositis. 2. Extensive abnormal bone marrow signal involving the left femoral head, neck, left iliac bone, acetabulum and inferior pubic ramus. Differential diagnostic considerations include infection as well as neoplasia.  Consider correlation with noncontrast CT which may allow further characterization of the lesion as well as pinpoint potential biopsy targets. 3. Pelvic ascites. Mri Hip Left Wo Contrast    Result Date: 2020  Patient MRN: [id number] : 1945 Age:  76 years Gender: Female Order Date: 2020 10:45 AM Exam: MRI HIP LEFT WO CONTRAST, MRI HIP RIGHT WO CONTRAST Number of Images: 238 views Indication:  Bilateral open wounds to both glutei. to rule out osteomyelitis Patient GFR only 27 cannot have contrast. To rule out osteomyelitis Comparison: Pelvic CT dated 2019 Technique: Noncontrast MRI both hips. Findings: There is extensive abnormal T1 weighted bone marrow signal involving the left femoral head, neck and left iliac bone extending into the acetabulum and left ischium and inferior pubic ramus. There is a soft tissue wound in the left gluteal region with soft tissue edema in the adjacent fat as well as within the gluteus medius muscle compatible with myositis. There is a soft tissue wound in the right gluteal region which extends to the margin of gluteus medius and gluteus janny muscles. There is soft tissue edema in some of the adjacent fat but none immediately surrounding the tract extending to the gluteal muscles. There is pelvic ascites. 1. Bilateral gluteal soft tissue wounds. Soft tissue edema present in the adjacent fat bilaterally, left greater than right. Additional soft tissue edema in the left gluteus medius muscle which may relate to underlying myositis. 2. Extensive abnormal bone marrow signal involving the left femoral head, neck, left iliac bone, acetabulum and inferior pubic ramus. Differential diagnostic considerations include infection as well as neoplasia. Consider correlation with noncontrast CT which may allow further characterization of the lesion as well as pinpoint potential biopsy targets. 3. Pelvic ascites.      Us Abdomen Limited    Result Date: 2020  Patient MRN:  [id number] : 1945 Age: 76 years Gender: Female Order Date:  2020 12:00 PM Exam: US ABDOMEN LIMITED Number of images:  21 Indication: Ascites Patient has bone metastases on chemo. Symptoms are abdominal distention. Comparison: Ultrasound dated 2019. Correlation with CT dated 2019, 2019. Findings: Grayscale sonographic images of the abdomen were obtained by the ultrasound technologist to assess for ascites. A small volume of fluid is seen in the left lower quadrant. Heterogeneously hyperechoic masses are seen in the right hepatic lobe. Minimal ascites. Hepatic masses, compatible with findings of hepatic masses on CT from 2019 and consistent with the patient's history of biopsy-proven metastatic breast cancer to the liver. Contrast-enhanced cross-sectional imaging should be considered to reevaluate the extent of metastatic disease and to further evaluate the patient's subjective abdominal distention. ALERT:  THIS IS AN ABNORMAL REPORT     Us Retroperitoneal Complete    Result Date: 2020  Patient MRN:  [id number] : 1945 Age: 76 years Gender: Female Order Date:  2020 11:45 AM EXAM: US RETROPERITONEAL COMPLETE NUMBER OF IMAGES:  77 views INDICATION:  acute kidney injury acute kidney injury COMPARISON: None The right kidney is 10.5 x 4.3 x 4.3 cm The left kidney  is 10.2 x 4.3 x 5 cm There is complex lesion in the right kidney measuring 1.5 x 1.3 x 1.2 cm compatible with a cyst There is no hydronephrosis identified There is no calculus identified The bladder is unremarkable     Findings compatible with a right renal cyst     Us Dup Lower Extremities Bilateral Venous    Result Date: 2020  Real-time and Doppler sonography of the deep venous structures of the lower extremities. Grayscale, color Doppler, and spectral Doppler analysis. Occlusive and nonocclusive intraluminal substance is identified in the right common femoral, superficial femoral, popliteal, posterior tibial and peroneal veins. Baker's cyst on the left. There is adequate and spontaneous blood flow, compressibility and augmentation within the left common femoral, superficial femoral, popliteal, posterior tibial, anterior tibial and peroneal veins. Extensive deep vein thrombosis throughout the right lower extremity. ASSESSMENT/PLAN : 80-year-old woman    Remote history of breast cancer since 2005 with evidence of metastatic disease since 2012 with interval development of pulmonary and hepatic metastasis in 2019    Has done well with stable disease on Ibrance/Faslodex for recurrent ER positive HER-2 negative metastatic breast cancer to liver. Acute renal failure superimposed on chronic kidney disease and she had been on Bumex for her severe peripheral edema    Cellulitis of lower extremities    Bilateral hip pressure ulcers being managed by ID and wound clinic    Nephrology has been consulted for her acute renal injury  Seen by ID with recommendation for no further antibiotics. Anemia is multifactorial and related to her metastatic breast cancer, myelosuppression by Radha Hunt as well as anemia of CKD stage IV  ANC adequate at 2.24 and she has chronic lymphopenia    - Hgb stable at 7.7  - RLE DVT on warfarin  - On zosyn  - Hgb goal of >7.  Transfuse as needed      Diana Rodrigues MD  Electronically signed 7/9/2020 at 5:18 PM

## 2020-07-09 NOTE — PROGRESS NOTES
[facility] Inpatient   Resident Progress Note    S:  Hospital day: 1   Brief Synopsis: 70-year-old woman with history of breast cancer status post lumpectomy with bony meta stasis, liver meta stasis, coronary artery disease, CHF ejection fraction 50 to 55%, previous DVT, and recent admission for hip wounds presented for increasing left leg pain. Pain started on Saturday prior to admission, worsened as the days went on, no trauma,falls. Acute events over the last 24 hours- some improvement of left leg pain with Percocet every 4 as needed. No chest pain, shortness of breath, abdominal pain. Cont meds:    sodium chloride 75 mL/hr at 07/09/20 0231     Scheduled meds:    lidocaine-EPINEPHrine  20 mL Intradermal Once    [Held by provider] bumetanide  1 mg Intravenous Daily    calcium-vitamin D  1 tablet Oral Daily    docusate sodium  100 mg Oral Daily    isosorbide dinitrate  5 mg Oral TID    magnesium oxide  400 mg Oral Daily    metoprolol succinate  12.5 mg Oral Daily    therapeutic multivitamin-minerals  1 tablet Oral Daily    pantoprazole  40 mg Oral QAM AC    pregabalin  50 mg Oral TID    vitamin B-12  1,000 mcg Oral Daily    sodium chloride flush  10 mL Intravenous 2 times per day    piperacillin-tazobactam  3.375 g Intravenous Q12H    sodium chloride   Intravenous Q12H    warfarin (COUMADIN) daily dosing (placeholder)   Other RX Placeholder     PRN meds: povidone-iodine, albuterol, sodium chloride flush, acetaminophen **OR** acetaminophen, magnesium hydroxide, promethazine **OR** ondansetron, oxyCODONE     I reviewed the patient's past medical and surgical history, Medications and Allergies. O:  BP (!) 114/53   Pulse 82   Temp 98.5 °F (36.9 °C) (Temporal)   Resp 18   Ht 5' 4\" (1.626 m)   Wt 154 lb (69.9 kg)   SpO2 94%   BMI 26.43 kg/m²   24 hour I&O: I/O last 3 completed shifts: In: 5116 [P.O.:240; I.V.:916]  Out: 700 [Urine:700]  No intake/output data recorded. Physical Exam   Constitutional: She is oriented to person, place, and time. She appears well-nourished. No distress. HENT:   Head: Normocephalic and atraumatic. Cardiovascular: Normal rate and regular rhythm. Exam reveals no gallop and no friction rub. No murmur heard. Dorsalis pedis pulse- palpable with doppler   Pulmonary/Chest: Breath sounds normal. No respiratory distress. She has no wheezes. Abdominal: Bowel sounds are normal. There is no abdominal tenderness. Musculoskeletal:         General: Tenderness present. Comments: Left leg tender to palpation- diffuse. Strength 5/5 lower extremities. Neurological: She is alert and oriented to person, place, and time. Skin:   Wounds on hips- as seen in media    Dry discolored skin bilateral lower extremities. No erythema. Mild edema lower extremity (non-pitting)       Labs:  Na/K/Cl/CO2:  137/4.2/96/24 (07/08 1466)  BUN/Cr/glu/ALT/AST/amyl/lip:  56/3.0/--/--/--/--/-- (07/08 1842)  WBC/Hgb/Hct/Plts:  2.6/7.2/23.0/106 (07/09 0723)  estimated creatinine clearance is 16 mL/min (A) (based on SCr of 3 mg/dL (H)). Other pertinent labs as noted below    Radiology:  US RETROPERITONEAL COMPLETE   Final Result   Findings compatible with a right renal cyst         XR FOOT LEFT (MIN 3 VIEWS)   Final Result      No acute fracture is identified. Osteoarthritis. Osteopenia. XR FOOT RIGHT (MIN 3 VIEWS)   Final Result      No acute fracture is identified. Osteoarthritis. Osteopenia. XR TIBIA FIBULA RIGHT (2 VIEWS)   Final Result      No acute fracture is identified. Osteoarthritis. Osteopenia. XR TIBIA FIBULA LEFT (2 VIEWS)   Final Result      No acute fracture is identified. Osteoarthritis. Osteopenia. US DUP LOWER EXTREMITIES BILATERAL VENOUS   Final Result      Extensive deep vein thrombosis throughout the right lower extremity.       XR CHEST PORTABLE   Final Result      Cardiomegaly with median sternotomy      Small right-sided pleural effusion      Other findings are unchanged from prior study. A/P:  Active Problems:    Deep vein thrombosis (DVT) of right lower extremity (HCC)    KARISHMA (acute kidney injury) ([address]    Cellulitis    Decubitus ulcer of both hip    Lymphopenia    Left leg pain  Resolved Problems:    * No resolved hospital problems. *    Left leg pain could be 2/2 to cellulitis vs nerve compressions  ID is on board- held on starting vancomycin. Zosyn every 12  Blood culture 24 hours no growth  MRI lumbar spine today- to evaluate for an degenerative/mets/nerve compression  If MRI nonconclusive-consider a vascular consult to evaluate for arterial occlusive sources of left leg pain. Oxycodone every 4 for pain    Decubitus Ulcer Hips  General surgery-to do bedside debridement today  Wound care has seen patient today, dressed wounds  She was recently admitted 6/18 to 6/22 for failure of outpatient therapy for her bilateral chronic pressure ulcers. Her wound grew Corynebacterium and staph aureus at that time-ID did not recommend antibiotics at discharge. KARISHMA-prerenal  Nephrology, Dr. Morgan Hampton, is consulted thank you for your recommendations  Bladder scan to be done  Creatinine 2.8 today-has improved  Bumex held continue with IV saline at 75 mL/h  Continue to monitor I's and O's    DVT Right Lower Extremity  Continued home meds of Coumadin, dosed by pharmacy  INR 2.1 today  Ultrasound lower extremity on this visit revealed extensive deep vein thrombosis through the right lower extremity. Anemia Hb: 7.2- likely multifactorial related to metastatic breast cancer, Ibrance, CKD stage IV  Repeat hemoglobin check at 3 PM today  Hematology oncology is consulted-we will discuss threshold for transfusion if needed    Lymphopenia  ANC 2.24-chronic lymphopenia  Otology oncology is following.     HTN/CAD s/p CABG/ Breast Cancer/ HFrEF  Held Bumex secondary to KARISHMA  Isosorbide dinitrate, Toprol-XL continued      GI/DVT ppx: Protonix/Coumadin       Electronically signed by Cristobal Souza  PGY-1 on 7/9/2020 at 8:46 AM  This case was discussed with attending physician: Dr. Wellington Chery

## 2020-07-09 NOTE — PROGRESS NOTES
Nutrition Assessment (Low Risk)    Type and Reason for Visit: Initial    Nutrition Recommendations: Continue current diet, Start ONS  Low Calorie, High Protein ONS BID; Robin BID    Nutrition Assessment:  Patient assessed for nutritional risk. Deemed to be at low risk at this time. Will continue to monitor for changes in status. Pt admit w/ cellulitis. H/o breast ca w/ mets. PO diet advanced w/ poor intake.      Malnutrition Assessment:  · Malnutrition Status: No malnutrition    Nutrition Risk Level   Risk Level: Low    Nutrition Diagnosis:   ·  No nutrition diagnosis at this time    Nutrition Intervention:  Nutrition Education/Counseling/Coordination of Care:  Continued Inpatient Monitoring, Coordination of Care      Electronically signed by Loyda Ayala, MS, RD, LD on 7/9/20 at 2:25 PM EDT

## 2020-07-09 NOTE — FLOWSHEET NOTE
Inpatient Wound Care (Initial consult) 7410A    Admit Date: 7/7/2020  9:09 PM    Reason for consult: Hip wounds    Significant history: Per H&P     CC: ambulation dysfunction, BL LE swelling      HPI: History obtained from patient, family member - daughter, electronic medical record, Quality of history:  good historian. Patient is oriented to time, place, person   Magalis Guzman is a 76 y.o. female with a PMH of HTN, breast cancer s/p lumpectomy with bone mets, CAD, CABG, CHF EF:50-55 RVSP 30 mmHg , chronic venous insufficiency, DVT, B/l hip wounds, recently admitted for hip wounds that grew corynebacterium and S. Aureus who presents to ED for leg swelling that started x1 day ago, the swelling was so bad that she couldn't walk anymore. Patient denies having increased shortness of breath, chest pain, redness, loss of sensation in her legs, incontinence, focal neurological deficit, asymmetrical swelling of the legs, denies warmth, pus discharge, new wounds on the legs. She denies cough, fever, chills, rigors, changes in bowel habits change in weight.        Findings:     07/09/20 0957   Skin Integrity   Skin Integrity   (bruising)   Location   (BUE)   Skin Integrity Site 2   Skin Integrity Location 2   (pink, blanchable, boggy)   Location 2   (bilateral heels)   Skin Integrity Site 3   Skin Integrity Location 3   (vascular discoloration, healed incisions, redness)    Location 3   (bilateral legs)   Skin Integrity Site 4   Skin Integrity Location 4   (pink, moist)   Location 4 clara-area   Wound 06/18/20 Hip Right   Date First Assessed/Time First Assessed: 06/18/20 2102   Present on Hospital Admission: Yes  Primary Wound Type: Pressure Injury  Location: Hip  Wound Location Orientation: Right   Dressing Status Intact   Wound 06/18/20 Hip Left   Date First Assessed/Time First Assessed: 06/18/20 2102   Present on Hospital Admission: Yes  Primary Wound Type: Pressure Injury  Location: Hip  Wound Location Orientation: Left Dressing Status Intact   Wound 07/08/20 Buttocks Right   Date First Assessed/Time First Assessed: 07/08/20 0700   Present on Hospital Admission: Yes  Location: Buttocks  Wound Location Orientation: Right   Wound Image    Wound Pressure Stage  1   Dressing/Treatment Pharmaceutical agent (see MAR)   Wound Length (cm) 0.8 cm   Wound Width (cm) 0.4 cm   Wound Depth (cm) 0.1 cm   Wound Surface Area (cm^2) 0.32 cm^2   Change in Wound Size % (l*w) 20   Wound Volume (cm^3) 0.03 cm^3   Wound Healing % 25   Wound Assessment Red   Drainage Amount None   Ana Lilia-wound Assessment Intact       **Informed Consent**    The patient has given verbal consent to have photos taken of woundand inserted into their chart as part of their permanent medical record for purposes of documentation, treatment management and/or medical review. All Images taken on 7/9/20 of patient name: Maira Del Cid were transmitted and stored on secured [employer] located [facility] by a registered [employer] Mobile Application Device. Impression: Right buttock Stage 1 pressure  Bilateral hip wound to be debrided by surgery at bedside today    Plan: Aquaphor buttocks and ana lilia-area  Heel protectors  Patient will need continued preventative care.   Will follow if needed after debridement per surgery      Samina Maldonado 7/9/2020 10:04 AM

## 2020-07-09 NOTE — PROGRESS NOTES
[address] [facility]  Family Medicine Attending    S: 76 y.o. female with PMH of HTN, breast cancer s/p lumpectomy with bone mets, CAD, CABG, CHF EF:50-55 RVSP 30 mmHg , chronic venous insufficiency, DVT, B/l hip wounds who presented to ER with 3 day history of  left leg pain. Had difficulty walking 2/2 pain and yesterday became unbearable so came to ER. Denies no fevers or chills, loss of sensation or falls. Patient reports that she has severe left pain in her leg that started acutely on Saturday. Denies any injury . Has this pain even to light touch. Denies trouble breathing or chest pain. Entire leg, does not localize. Difficult to describe how it feels. O: VS- Blood pressure (!) 114/53, pulse 82, temperature 98.5 °F (36.9 °C), temperature source Temporal, resp. rate 18, height 5' 4\" (1.626 m), weight 154 lb (69.9 kg), SpO2 94 %, not currently breastfeeding. Exam is as noted by resident with the following changes, additions or corrections:  Gen - lying in bed, NAD  Cardio - RRR, stable systolic murmur  Lungs - Lungs CTAB , no wheeze    Ext- 2+ pitting edema with chronic venous stasis skin changes. Peripheral pulses palpated on the right, diminished on left (present by doppler), bilateral feet warm and well perfused. No joint erythema or effusion. Impressions: Active Problems:    Deep vein thrombosis (DVT) of right lower extremity (HCC)    KARISHMA (acute kidney injury) ([facility] [address]    Cellulitis and abscess of leg    Cellulitis    Decubitus ulcer of both hip    Lymphopenia  Resolved Problems:    * No resolved hospital problems. *      Plan:   Appreciate management of ID for concern of infection, nephro for KARISHMA, heme/onc for malignancy as well as anemia and right lower extremity DVT, general surgery for chronic wounds and need for debridement. INR 2.1 today. Pain control, oxycodone. Continue IVF. Evaluate lumbar spine for acute nerve compression contributing to pain.   Considering

## 2020-07-09 NOTE — PROCEDURES
Need MRI screening form filled out before MRI can be scheduled. Thank you. Spoke with Mitchel Delacruz to relay to [facility].

## 2020-07-09 NOTE — PROGRESS NOTES
Pharmacy Consultation Note  (Warfarin Dosing and Monitoring)    Initial consult date: 7/8  Consulting physician: April Diss    Allergies:  Macrobid [nitrofurantoin monohydrate macrocrystals]    76 y.o. female    Ht Readings from Last 1 Encounters:   07/07/20 5' 4\" (1.626 m)     Wt Readings from Last 1 Encounters:   07/07/20 154 lb (69.9 kg)         Warfarin Indication Target   INR [place] Dose  (if applicable) Diet/Feeding Tube   (Enteral feeds, nutritional drinks, increased Vitamin K in diet can decrease INR)   RLE DVT 2-3 3mg Tues, Thurs, Sat; 2mg all other days General       x Home Med? Meds Increasing INR x Home Med?  Meds Decreasing INR     Allopurinol    Azathioprine     Amiodarone/Propafenone/Dronedarone   Carbamazepine     Androgens   Cholestyramine     Chemotherapy (BBW: Capecitabine)   Estrogen     Ciprofloxacin/Levofloxacin   Nafcillin/Dicloxacillin     Clarithromycin/Erythromycin/Azithromycin   Barbiturates      Fluconazole/Itraconazole/Voriconazole/Ketoconazole   Phenytoin (Variable)     Metronidazole   Rifampin     Phenytoin (Variable)   Steroids (Variable/Dose Dependent)      Statins/Fenofibrate/Gemfibrozil   Sucralfate     Steroids (Variable/Dose Dependent)   Other:     Sulfamethoxazole/Trimethoprim        Tramadol         Other:        Comments regarding medication interactions:      x Diseases Affecting INR x Increased Bleeding Risk    CHF Exacerbation (Increases)  History GI Bleed/PUD    Liver Disease (Increases)  Chronic NSAID Use    Thyroid: Hyper (Increases)  Hypo (Decreases)  Chronic ASA/Antiplatelet Use (Clopidogrel/ Dipyridamole/Prasugrel/Ticagrelor)     X Malignancy (Increases) Xx Abnormal Renal Function (dialysis, renal transplant, SCr ? 2.3 mg/dL)     History of EtOH Abuse: Acute (Increases)   Chronic (Decreases)  Liver Function (cirrhosis, bilirubin >2x ULN with AST/ALT/AP >3x ULN)    Fever (Increases) X Age > 65 years   X Acute infection (Increases)  Hypertension/Uncontrolled BP Diarrhea/Dehydration (Increases)  History of stroke    Other: __________________  Other:___________________       Vitamin K or Blood product  Administration Date                    TSH:    Lab Results   Component Value Date    TSH 6.930 06/10/2019        Hepatic Function Panel:                            Lab Results   Component Value Date    ALKPHOS 144 07/07/2020    ALT 28 07/07/2020    AST 34 07/07/2020    PROT 7.5 07/07/2020    BILITOT 1.0 07/07/2020    BILIDIR 0.2 06/10/2019    IBILI 0.3 06/10/2019    LABALBU 3.6 07/07/2020    LABALBU 4.4 03/25/2012       Date Warfarin Dose INR Heparin or LMWH HGB/HCT PLT Comment   7/8 3mg 1.8 -- 8.5/25.4 121    7/9 3mg 2.1 -- 7.2/23 106                                 Assessment and Plan:  · Pt is a 75 yo female with history of RLE DVT on warfarin PTA   · Pt has been subtherapeutic on warfarin most recently for the past 2 weeks after being on hold at last admission  · Goal INR 2-3  · INR 2.1 today; will need to closely monitor H/H as has decreased today  · Warfarin 3mg tonight  · Daily PT/INR until the INR is stable within the therapeutic range  · Pharmacist will follow and monitor/adjust dosing as necessary    Thank you for this consult,    Damon Romberg, [facility] PharmD, BCPS 7/9/2020 2:00 PM   Ext: [phone]

## 2020-07-09 NOTE — PROGRESS NOTES
General Surgery Progress Note:    CC: wounds     S: doing well, has chronic b/l hip wounds       Objective:  @BP (!) 108/51   Pulse 75   Temp 98.5 °F (36.9 °C) (Temporal)   Resp 18   Ht 5' 4\" (1.626 m)   Wt 154 lb (69.9 kg)   SpO2 94%   BMI 26.43 kg/m² @    Physical -     Gen: NAD  Resp: non labored,   CV: Reg Rate  Abd: nad  EXT b/l hips L worse than right, eschar present b/l 3-4 cm on R side and 6-8 cm R over greater trochs     Assessment/Plan: hip wounds     Debridement at bedside. Discussed with pt she is in agreement.        Braulio Alcala MD FACS     3:41 PM

## 2020-07-09 NOTE — CARE COORDINATION
Discussed in morning rounds, request transfer to general med/surg unit. Charge RN Linnea was messaged back via [facility] [place] resident will address on rounds.

## 2020-07-09 NOTE — PROGRESS NOTES
[address] [place] [address] [facility]  Progress Note    SUBJECTIVE:  Chief Complaint   Patient presents with    Leg Swelling     BLE SWELLING X1 DAY, hx CHF     Patient is seen and examined at bedside. She is awake and alert. Denies any pain at this time. Patient is tolerating medications. No reported adverse drug reactions. No nausea, vomiting, diarrhea. Afebrile    Review of systems:  As stated above in the chief complaint, otherwise negative. Medications:  Scheduled Meds:   lidocaine-EPINEPHrine  20 mL Intradermal Once    mineral oil-hydrophilic petrolatum   Topical BID    [Held by provider] bumetanide  1 mg Intravenous Daily    calcium-vitamin D  1 tablet Oral Daily    docusate sodium  100 mg Oral Daily    isosorbide dinitrate  5 mg Oral TID    magnesium oxide  400 mg Oral Daily    metoprolol succinate  12.5 mg Oral Daily    therapeutic multivitamin-minerals  1 tablet Oral Daily    pantoprazole  40 mg Oral QAM AC    pregabalin  50 mg Oral TID    vitamin B-12  1,000 mcg Oral Daily    sodium chloride flush  10 mL Intravenous 2 times per day    piperacillin-tazobactam  3.375 g Intravenous Q12H    sodium chloride   Intravenous Q12H    warfarin (COUMADIN) daily dosing (placeholder)   Other RX Placeholder     Continuous Infusions:   sodium chloride 75 mL/hr at 20 0231     PRN Meds:povidone-iodine, mineral oil-hydrophilic petrolatum **AND** mineral oil-hydrophilic petrolatum, albuterol, sodium chloride flush, acetaminophen **OR** acetaminophen, magnesium hydroxide, promethazine **OR** ondansetron, oxyCODONE    OBJECTIVE:  BP (!) 114/53   Pulse 82   Temp 98.5 °F (36.9 °C) (Temporal)   Resp 18   Ht 5' 4\" (1.626 m)   Wt 154 lb (69.9 kg)   SpO2 94%   BMI 26.43 kg/m²   Temp  Av.4 °F (36.9 °C)  Min: 98.2 °F (36.8 °C)  Max: 98.5 °F (36.9 °C)  Constitutional: The patient is awake, alert, and oriented. Skin: Warm and dry. No rashes were noted.    HEENT: Round and 2012 with interval development of pulmonary and hepatic metastasis in 2019       Plan:    · Continue Zosyn IV for now  · General surgery plan for debridement at bedside today  · Check cultures  · Monitor labs  · Will follow with you  · Wound care following  · General surgery following  · Hematology oncology following    FORD Driver CNP   7/9/2020   Pt seen and examined. Above discussed agree with advanced practice nurse. Labs, cultures, and radiographs reviewed. Face to Face encounter occurred. Changes made as necessary.      Annella Epley, MD

## 2020-07-10 PROBLEM — M51.37 DEGENERATIVE DISC DISEASE AT L5-S1 LEVEL: Status: ACTIVE | Noted: 2020-07-10

## 2020-07-10 LAB
ANION GAP SERPL CALCULATED.3IONS-SCNC: 11 MMOL/L (ref 7–16)
ANISOCYTOSIS: ABNORMAL
BASOPHILS ABSOLUTE: 0.07 E9/L (ref 0–0.2)
BASOPHILS RELATIVE PERCENT: 2.6 % (ref 0–2)
BUN BLDV-MCNC: 48 MG/DL (ref 8–23)
CALCIUM SERPL-MCNC: 7.7 MG/DL (ref 8.6–10.2)
CHLORIDE BLD-SCNC: 103 MMOL/L (ref 98–107)
CO2: 24 MMOL/L (ref 22–29)
CREAT SERPL-MCNC: 2.4 MG/DL (ref 0.5–1)
EKG ATRIAL RATE: 86 BPM
EKG P AXIS: 61 DEGREES
EKG P-R INTERVAL: 182 MS
EKG Q-T INTERVAL: 430 MS
EKG QRS DURATION: 146 MS
EKG QTC CALCULATION (BAZETT): 514 MS
EKG R AXIS: -87 DEGREES
EKG T AXIS: 62 DEGREES
EKG VENTRICULAR RATE: 86 BPM
EOSINOPHILS ABSOLUTE: 0.11 E9/L (ref 0.05–0.5)
EOSINOPHILS RELATIVE PERCENT: 4.3 % (ref 0–6)
GFR AFRICAN AMERICAN: 24
GFR NON-AFRICAN AMERICAN: 20 ML/MIN/1.73
GLUCOSE BLD-MCNC: 175 MG/DL (ref 74–99)
HCT VFR BLD CALC: 23.7 % (ref 34–48)
HEMOGLOBIN: 7.4 G/DL (ref 11.5–15.5)
INR BLD: 2.3
LACTIC ACID: 1.1 MMOL/L (ref 0.5–2.2)
LACTIC ACID: 1.1 MMOL/L (ref 0.5–2.2)
LACTIC ACID: 1.3 MMOL/L (ref 0.5–2.2)
LYMPHOCYTES ABSOLUTE: 0.28 E9/L (ref 1.5–4)
LYMPHOCYTES RELATIVE PERCENT: 11.3 % (ref 20–42)
MCH RBC QN AUTO: 37.2 PG (ref 26–35)
MCHC RBC AUTO-ENTMCNC: 31.2 % (ref 32–34.5)
MCV RBC AUTO: 119.1 FL (ref 80–99.9)
MONOCYTES ABSOLUTE: 0.05 E9/L (ref 0.1–0.95)
MONOCYTES RELATIVE PERCENT: 1.7 % (ref 2–12)
NEUTROPHILS ABSOLUTE: 2 E9/L (ref 1.8–7.3)
NEUTROPHILS RELATIVE PERCENT: 80 % (ref 43–80)
OVALOCYTES: ABNORMAL
PDW BLD-RTO: 17.1 FL (ref 11.5–15)
PLATELET # BLD: 99 E9/L (ref 130–450)
PLATELET CONFIRMATION: NORMAL
PMV BLD AUTO: 10.1 FL (ref 7–12)
POIKILOCYTES: ABNORMAL
POLYCHROMASIA: ABNORMAL
POTASSIUM REFLEX MAGNESIUM: 4.1 MMOL/L (ref 3.5–5)
PROTHROMBIN TIME: 26.8 SEC (ref 9.3–12.4)
RBC # BLD: 1.99 E12/L (ref 3.5–5.5)
SODIUM BLD-SCNC: 138 MMOL/L (ref 132–146)
URINE CULTURE, ROUTINE: NORMAL
WBC # BLD: 2.5 E9/L (ref 4.5–11.5)

## 2020-07-10 PROCEDURE — 6360000002 HC RX W HCPCS: Performed by: INTERNAL MEDICINE

## 2020-07-10 PROCEDURE — 2060000000 HC ICU INTERMEDIATE R&B

## 2020-07-10 PROCEDURE — 85610 PROTHROMBIN TIME: CPT

## 2020-07-10 PROCEDURE — 6360000002 HC RX W HCPCS: Performed by: STUDENT IN AN ORGANIZED HEALTH CARE EDUCATION/TRAINING PROGRAM

## 2020-07-10 PROCEDURE — 83605 ASSAY OF LACTIC ACID: CPT

## 2020-07-10 PROCEDURE — 97530 THERAPEUTIC ACTIVITIES: CPT | Performed by: PHYSICAL THERAPIST

## 2020-07-10 PROCEDURE — 80048 BASIC METABOLIC PNL TOTAL CA: CPT

## 2020-07-10 PROCEDURE — 2580000003 HC RX 258: Performed by: STUDENT IN AN ORGANIZED HEALTH CARE EDUCATION/TRAINING PROGRAM

## 2020-07-10 PROCEDURE — 99232 SBSQ HOSP IP/OBS MODERATE 35: CPT | Performed by: FAMILY MEDICINE

## 2020-07-10 PROCEDURE — 97116 GAIT TRAINING THERAPY: CPT | Performed by: PHYSICAL THERAPIST

## 2020-07-10 PROCEDURE — 36415 COLL VENOUS BLD VENIPUNCTURE: CPT

## 2020-07-10 PROCEDURE — 97530 THERAPEUTIC ACTIVITIES: CPT

## 2020-07-10 PROCEDURE — 2580000003 HC RX 258: Performed by: INTERNAL MEDICINE

## 2020-07-10 PROCEDURE — 6370000000 HC RX 637 (ALT 250 FOR IP): Performed by: STUDENT IN AN ORGANIZED HEALTH CARE EDUCATION/TRAINING PROGRAM

## 2020-07-10 PROCEDURE — 93010 ELECTROCARDIOGRAM REPORT: CPT | Performed by: FAMILY MEDICINE

## 2020-07-10 PROCEDURE — 99232 SBSQ HOSP IP/OBS MODERATE 35: CPT | Performed by: SURGERY

## 2020-07-10 PROCEDURE — 85025 COMPLETE CBC W/AUTO DIFF WBC: CPT

## 2020-07-10 PROCEDURE — 97535 SELF CARE MNGMENT TRAINING: CPT

## 2020-07-10 RX ORDER — WARFARIN SODIUM 3 MG/1
3 TABLET ORAL
Status: COMPLETED | OUTPATIENT
Start: 2020-07-10 | End: 2020-07-10

## 2020-07-10 RX ORDER — OXYCODONE HYDROCHLORIDE 5 MG/1
5 TABLET ORAL EVERY 6 HOURS PRN
Status: DISCONTINUED | OUTPATIENT
Start: 2020-07-10 | End: 2020-07-15 | Stop reason: HOSPADM

## 2020-07-10 RX ADMIN — SODIUM CHLORIDE: 9 INJECTION, SOLUTION INTRAVENOUS at 14:20

## 2020-07-10 RX ADMIN — PREGABALIN 50 MG: 50 CAPSULE ORAL at 21:16

## 2020-07-10 RX ADMIN — OXYCODONE HYDROCHLORIDE 5 MG: 5 TABLET ORAL at 09:04

## 2020-07-10 RX ADMIN — Medication 1 TABLET: at 08:59

## 2020-07-10 RX ADMIN — OXYCODONE HYDROCHLORIDE 5 MG: 5 TABLET ORAL at 15:56

## 2020-07-10 RX ADMIN — ISOSORBIDE DINITRATE: 10 TABLET ORAL at 21:15

## 2020-07-10 RX ADMIN — MULTIPLE VITAMINS W/ MINERALS TAB 1 TABLET: TAB at 08:53

## 2020-07-10 RX ADMIN — WARFARIN SODIUM 3 MG: 3 TABLET ORAL at 18:01

## 2020-07-10 RX ADMIN — Medication 10 ML: at 08:54

## 2020-07-10 RX ADMIN — OXYCODONE HYDROCHLORIDE 5 MG: 5 TABLET ORAL at 03:46

## 2020-07-10 RX ADMIN — MAGNESIUM GLUCONATE 500 MG ORAL TABLET 400 MG: 500 TABLET ORAL at 08:53

## 2020-07-10 RX ADMIN — ISOSORBIDE DINITRATE 5 MG: 10 TABLET ORAL at 08:53

## 2020-07-10 RX ADMIN — DAPTOMYCIN 350 MG: 500 INJECTION, POWDER, LYOPHILIZED, FOR SOLUTION INTRAVENOUS at 13:20

## 2020-07-10 RX ADMIN — PANTOPRAZOLE SODIUM 40 MG: 40 TABLET, DELAYED RELEASE ORAL at 05:35

## 2020-07-10 RX ADMIN — PREGABALIN 50 MG: 50 CAPSULE ORAL at 14:20

## 2020-07-10 RX ADMIN — Medication 10 ML: at 21:30

## 2020-07-10 RX ADMIN — METOPROLOL SUCCINATE 12.5 MG: 25 TABLET, EXTENDED RELEASE ORAL at 08:53

## 2020-07-10 RX ADMIN — PREGABALIN 50 MG: 50 CAPSULE ORAL at 08:53

## 2020-07-10 RX ADMIN — DOCUSATE SODIUM 100 MG: 100 CAPSULE, LIQUID FILLED ORAL at 08:54

## 2020-07-10 RX ADMIN — PIPERACILLIN AND TAZOBACTAM 3.38 G: 3; .375 INJECTION, POWDER, FOR SOLUTION INTRAVENOUS at 02:18

## 2020-07-10 RX ADMIN — ISOSORBIDE DINITRATE 5 MG: 10 TABLET ORAL at 12:20

## 2020-07-10 RX ADMIN — SKIN PROTECTANT: 44 OINTMENT TOPICAL at 09:00

## 2020-07-10 RX ADMIN — SODIUM CHLORIDE: 9 INJECTION, SOLUTION INTRAVENOUS at 19:04

## 2020-07-10 ASSESSMENT — PAIN SCALES - GENERAL
PAINLEVEL_OUTOF10: 8
PAINLEVEL_OUTOF10: 8
PAINLEVEL_OUTOF10: 9
PAINLEVEL_OUTOF10: 8

## 2020-07-10 ASSESSMENT — PAIN DESCRIPTION - ORIENTATION
ORIENTATION: LEFT;RIGHT
ORIENTATION: RIGHT;LEFT
ORIENTATION: RIGHT;LEFT

## 2020-07-10 ASSESSMENT — PAIN DESCRIPTION - DESCRIPTORS
DESCRIPTORS: CONSTANT
DESCRIPTORS: BURNING;DISCOMFORT

## 2020-07-10 ASSESSMENT — PAIN DESCRIPTION - FREQUENCY
FREQUENCY: CONTINUOUS

## 2020-07-10 ASSESSMENT — PAIN DESCRIPTION - LOCATION
LOCATION: HIP;LEG
LOCATION: HIP;LEG
LOCATION: HIP
LOCATION: HIP;LEG

## 2020-07-10 ASSESSMENT — PAIN DESCRIPTION - PROGRESSION
CLINICAL_PROGRESSION: RAPIDLY WORSENING
CLINICAL_PROGRESSION: GRADUALLY WORSENING
CLINICAL_PROGRESSION: GRADUALLY WORSENING

## 2020-07-10 ASSESSMENT — PAIN DESCRIPTION - ONSET
ONSET: ON-GOING

## 2020-07-10 ASSESSMENT — PAIN DESCRIPTION - PAIN TYPE
TYPE: CHRONIC PAIN

## 2020-07-10 NOTE — PLAN OF CARE
Problem: Falls - Risk of:  Goal: Will remain free from falls  Description: Will remain free from falls  7/10/2020 1028 by Raymundo Bowie  Outcome: Met This Shift  7/10/2020 0533 by Callum Hilario RN  Outcome: Met This Shift  Goal: Absence of physical injury  Description: Absence of physical injury  7/10/2020 1028 by Raymundo Bowie  Outcome: Met This Shift  7/10/2020 0533 by Callum Hilario RN  Outcome: Met This Shift     Problem: Pain:  Goal: Control of acute pain  Description: Control of acute pain  7/10/2020 0533 by Callum Hilario RN  Outcome: Met This Shift  Goal: Control of chronic pain  Description: Control of chronic pain  7/10/2020 0533 by Callum Hilario RN  Outcome: Met This Shift     Problem: Skin Integrity:  Goal: Will show no infection signs and symptoms  Description: Will show no infection signs and symptoms  7/10/2020 1028 by Raymundo Bowie  Outcome: Met This Shift  7/10/2020 0533 by Callum Hilario RN  Outcome: Met This Shift

## 2020-07-10 NOTE — PROGRESS NOTES
[address] [facility]  Family Medicine Attending    S: 76 y.o. female with PMH of HTN, breast cancer s/p lumpectomy with bone mets, CAD, CABG, CHF EF:50-55 RVSP 30 mmHg , chronic venous insufficiency, DVT, B/l hip wounds who presented to ER with 3 day history of  left leg pain. Had difficulty walking 2/2 pain and yesterday became unbearable so came to ER. Denies no fevers or chills, loss of sensation or falls. Patient reports that she has severe left pain in her leg that started acutely on Saturday. Denies any injury . Has this pain even to light touch. Denies trouble breathing or chest pain. Entire leg, does not localize. Difficult to describe how it feels. Says that sometimes the pain medication is satisfactory, and sometimes not because of the timing of it. We discussed the MRI findings of her low back, namely the lumbar disc herniation. She agrees to a neurosurgery consult, to find out the options of her pain management, whether it be intervention, or ongoing adjustment of her pain medications. O: VS- Blood pressure (!) 114/54, pulse 78, temperature 98.5 °F (36.9 °C), temperature source Temporal, resp. rate 16, height 5' 4\" (1.626 m), weight 154 lb (69.9 kg), SpO2 93 %, not currently breastfeeding. Exam is as noted by resident   Gen - lying in bed, NAD  Cardio - RRR, stable systolic murmur  Lungs - Lungs CTAB , no wheeze    Ext-venous stasis and edema as previously.   No tenderness to palpation    Lab Results   Component Value Date    WBC 2.5 (L) 07/10/2020    WBC 2.6 (L) 07/09/2020    WBC 2.7 (L) 07/08/2020    HGB 7.4 (L) 07/10/2020    HCT 23.7 (L) 07/10/2020    .1 (H) 07/10/2020    PLT 99 (L) 07/10/2020       Lab Results   Component Value Date    CRP 16.2 (H) 07/07/2020    CRP 4.2 (H) 06/19/2020     Lab Results   Component Value Date    SEDRATE 134 (H) 07/07/2020    SEDRATE 102 (H) 06/21/2020    SEDRATE 130 (H) 06/19/2020       Lab Results   Component Value Date     07/10/2020 K 4.1 07/10/2020     07/10/2020    CO2 24 07/10/2020    BUN 48 07/10/2020    CREATININE 2.4 07/10/2020    GFRAA 24 07/10/2020    LABGLOM 20 07/10/2020    GLUCOSE 175 07/10/2020    GLUCOSE 109 04/20/2012    PROT 7.5 07/07/2020    LABALBU 3.6 07/07/2020    LABALBU 4.4 03/25/2012    CALCIUM 7.7 07/10/2020    BILITOT 1.0 07/07/2020    ALKPHOS 144 07/07/2020    AST 34 07/07/2020    ALT 28 07/07/2020         Impressions:   Principal Problem:    Left leg pain  Active Problems:    Deep vein thrombosis (DVT) of right lower extremity (HCC)    KARISHMA (acute kidney injury) ([facility] [address]    Cellulitis    Decubitus ulcer of both hip,s/p debridement    Lymphopenia    Degenerative disc disease at L5-S1 level        Plan:   Appreciate management of ID for concern of infection, nephro for KARISHMA, heme/onc for malignancy as well as anemia and right lower extremity DVT, general surgery for chronic wounds and need for debridement. Pain control, oxycodone. We discussed the rationale of the analgesia being PRN, in order to alleviate insomnia and falls. Continue IVF. Neurosurgical consult, to elucidate further options of pain control, and the possibility of surgical intervention. Initiation of daptomycin and discontinuation of Zosyn per ID.       Current Facility-Administered Medications   Medication Dose Route Frequency Provider Last Rate Last Dose    oxyCODONE (ROXICODONE) immediate release tablet 5 mg  5 mg Oral Q6H PRN Jennifer Clarke MD        DAPTOmycin (CUBICIN) 350 mg in sodium chloride 0.9 % 50 mL IVPB  6 mg/kg (Adjusted) Intravenous Q48H Windy Rowan MD        warfarin (COUMADIN) tablet 3 mg  3 mg Oral Once Kaur Matthews MD        lidocaine-EPINEPHrine 1 percent-[id number] injection 20 mL  20 mL Intradermal Once Marv Willard DO        povidone-iodine (BETADINE) 10 % external solution   Topical PRN Marv Willard DO        mineral oil-hydrophilic petrolatum (HYDROPHOR) ointment   Topical BID Dannis Vince Chata Gray MD        And    mineral oil-hydrophilic petrolatum (HYDROPHOR) ointment   Topical BID PRN Fabián Rosas MD        albuterol (PROVENTIL) nebulizer solution 2.5 mg  2.5 mg Nebulization Q4H PRN Filippo Lassiter MD        [Held by provider] bumetanide (BUMEX) injection 1 mg  1 mg Intravenous Daily Filippo Lassiter MD        calcium-vitamin D ([place] Safe) 500-200 MG-UNIT per tablet 1 tablet  1 tablet Oral Daily Filippo Lassiter MD   1 tablet at 07/10/20 0859    docusate sodium (COLACE) capsule 100 mg  100 mg Oral Daily Filippo Lassiter MD   100 mg at 07/10/20 0854    isosorbide dinitrate (ISORDIL) tablet 5 mg  5 mg Oral TID Filippo Lassiter MD   5 mg at 07/10/20 1220    magnesium oxide (MAG-OX) tablet 400 mg  400 mg Oral Daily Filippo Lassiter MD   400 mg at 07/10/20 0853    [Held by provider] metoprolol succinate (TOPROL XL) extended release tablet 12.5 mg  12.5 mg Oral Daily Filippo Lassiter MD   12.5 mg at 07/10/20 4346    therapeutic multivitamin-minerals 1 tablet  1 tablet Oral Daily Filippo Lassiter MD   1 tablet at 07/10/20 0853    pantoprazole (PROTONIX) tablet 40 mg  40 mg Oral QAM AC Filippo Lassiter MD   40 mg at 07/10/20 0535    pregabalin (LYRICA) capsule 50 mg  50 mg Oral TID Filippo Lassiter MD   50 mg at 07/10/20 0853    [Held by provider] vitamin B-12 (CYANOCOBALAMIN) tablet 1,000 mcg  1,000 mcg Oral Daily Filippo Lassiter MD   1,000 mcg at 07/08/20 0901    sodium chloride flush 0.9 % injection 10 mL  10 mL Intravenous 2 times per day Filippo Lassiter MD   10 mL at 07/10/20 0854    sodium chloride flush 0.9 % injection 10 mL  10 mL Intravenous PRN Filippo Lassiter MD        acetaminophen (TYLENOL) tablet 650 mg  650 mg Oral Q6H PRN Filippo Lassiter MD   650 mg at 07/08/20 0542    Or    acetaminophen (TYLENOL) suppository 650 mg  650 mg Rectal Q6H PRN Filippo Lassiter MD        magnesium hydroxide (MILK OF MAGNESIA) 400 MG/5ML suspension 30 mL  30 mL Oral Daily PRN Filippo Lassiter MD        promethazine (PHENERGAN) tablet 12.5 mg  12.5 mg Oral Q6H PRN Lieutenant Sherri MD        Or    ondansetron [facility]) injection 4 mg  4 mg Intravenous Q6H PRN Lieutenant Sherri MD        0.9 % sodium chloride infusion admixture   Intravenous Q12H Lieutenant Sherri MD   Stopped at 07/10/20 0023    0.9 % sodium chloride infusion   Intravenous Continuous Lieutenant Sherri MD 75 mL/hr at 07/10/20 0531      warfarin (COUMADIN) daily dosing (placeholder)   Other Mary Elam MD              Attending Physician Statement  I have reviewed the chart, including any radiology or labs, and seen the patient with the resident(s). I personally reviewed and performed key elements of the history and exam.  I agree with the assessment, plan and orders as documented by the resident. Please refer to the resident note for additional information.       Maris Frances

## 2020-07-10 NOTE — PLAN OF CARE
Problem: Falls - Risk of:  Goal: Will remain free from falls  Description: Will remain free from falls  Outcome: Met This Shift  Goal: Absence of physical injury  Description: Absence of physical injury  Outcome: Met This Shift     Problem: Pain:  Goal: Control of acute pain  Description: Control of acute pain  Outcome: Met This Shift  Goal: Control of chronic pain  Description: Control of chronic pain  Outcome: Met This Shift     Problem: Skin Integrity:  Goal: Will show no infection signs and symptoms  Description: Will show no infection signs and symptoms  Outcome: Met This Shift

## 2020-07-10 NOTE — CARE COORDINATION
Reinforced what social work discussed, and attempted to illicit choices however, pt remained adamant that she really can take care of herself. I reiterated what social work had said previously, \"you have to be able to transfer independently,\" pt was tearful and therapy was at the doorway to update her treatments. Pt wants to discuss choices with her daughter, list left at bedside, pt has a [facility] of [facility] ([facility] not [facility]

## 2020-07-10 NOTE — PROGRESS NOTES
Physical Therapy  Facility/Department: [facility]  Daily Treatment Note  NAME: Fabiola Miller  : 1945  MRN: [id number]    Date of Service: 7/10/2020    Physical therapy orders received/treatment attempted and chart review completed. Patient refused due to being in too much pain. Education provided regarding the importance of mobility and out of bed activity, but patient politely declined. Will attempt at a later time/date as able. Thank you for the opportunity to assist in the care of this patient.     Conrado Hyatt, PT, DPT  License [id number]

## 2020-07-10 NOTE — PROGRESS NOTES
Ht 5' 4\" (1.626 m)   Wt 154 lb (69.9 kg)   SpO2 92%   BMI 26.43 kg/m²   24 hour I&O: I/O last 3 completed shifts: In: 2994 [P.O.:540; I.V.:2454]  Out: 900 [Urine:900]  No intake/output data recorded. Physical Exam   Constitutional: She is oriented to person, place, and time. She appears well-nourished. No distress. HENT:   Head: Normocephalic and atraumatic. Cardiovascular: Normal rate and regular rhythm. Exam reveals no gallop and no friction rub. No murmur heard. Dorsalis pedis pulse- palpable with doppler   Pulmonary/Chest: Breath sounds normal. No respiratory distress. She has no wheezes. Abdominal: Bowel sounds are normal. There is no abdominal tenderness. Musculoskeletal:         General: Tenderness present. Comments: Left leg tender to palpation- diffuse. Strength 5/5 lower extremities. Neurological: She is alert and oriented to person, place, and time. Skin:   Wounds on hips- as seen in media    Dry discolored skin bilateral lower extremities. No erythema. Mild edema lower extremity (non-pitting)       Labs:  Na/K/Cl/CO2:  140/4.2/100/25 (07/09 2110)  BUN/Cr/glu/ALT/AST/amyl/lip:  57/2.8/--/--/--/--/-- (07/09 8643)  WBC/Hgb/Hct/Plts:  --/7.7/24.5/-- (07/09 1519)  estimated creatinine clearance is 17 mL/min (A) (based on SCr of 2.8 mg/dL (H)). Other pertinent labs as noted below    Radiology:  MRI LUMBAR SPINE WO CONTRAST   Final Result   Findings compatible with degenerative changes   No convincing evidence for metastatic disease in the spine   There are multiple lesions in the liver seen on the margin of this   study, compatible with metastatic disease      ALERT:  THIS IS AN ABNORMAL REPORT      US RETROPERITONEAL COMPLETE   Final Result   Findings compatible with a right renal cyst         XR FOOT LEFT (MIN 3 VIEWS)   Final Result      No acute fracture is identified. Osteoarthritis. Osteopenia.          XR FOOT RIGHT (MIN 3 VIEWS)   Final Result      No acute Hb: 7.2- likely multifactorial related to metastatic breast cancer, Ibrance, CKD stage IV  Threshold Hb <7.  Repeat hemoglobin check at 3 PM day prior- 7.7 went up from AM  Hematology-oncology. Lymphopenia  ANC 2.24-chronic lymphopenia  Otology oncology is following.     HTN/CAD s/p CABG/ Breast Cancer/ HFrEF  Held Bumex secondary to KARISHMA  Isosorbide dinitrate, Toprol-XL continued      GI/DVT ppx: Protonix/Coumadin       Electronically signed by Jakob Bertrand  PGY-2 on 7/10/2020 at 7:35 AM  This case was discussed with attending physician: Dr. Violetta Mccabe

## 2020-07-10 NOTE — PROGRESS NOTES
Pharmacy Consultation Note  (Warfarin Dosing and Monitoring)    Initial consult date: 7/8  Consulting physician: Vero Abarca    Allergies:  Macrobid [nitrofurantoin monohydrate macrocrystals]    76 y.o. female    Ht Readings from Last 1 Encounters:   07/07/20 5' 4\" (1.626 m)     Wt Readings from Last 1 Encounters:   07/07/20 154 lb (69.9 kg)         Warfarin Indication Target   INR [place] Dose  (if applicable) Diet/Feeding Tube   (Enteral feeds, nutritional drinks, increased Vitamin K in diet can decrease INR)   RLE DVT 2-3 3mg Tues, Thurs, Sat; 2mg all other days General       x Home Med? Meds Increasing INR x Home Med?  Meds Decreasing INR     Allopurinol    Azathioprine     Amiodarone/Propafenone/Dronedarone   Carbamazepine     Androgens   Cholestyramine     Chemotherapy (BBW: Capecitabine)   Estrogen     Ciprofloxacin/Levofloxacin   Nafcillin/Dicloxacillin     Clarithromycin/Erythromycin/Azithromycin   Barbiturates      Fluconazole/Itraconazole/Voriconazole/Ketoconazole   Phenytoin (Variable)     Metronidazole   Rifampin     Phenytoin (Variable)   Steroids (Variable/Dose Dependent)      Statins/Fenofibrate/Gemfibrozil   Sucralfate     Steroids (Variable/Dose Dependent)   Other:     Sulfamethoxazole/Trimethoprim        Tramadol         Other:        Comments regarding medication interactions:      x Diseases Affecting INR x Increased Bleeding Risk    CHF Exacerbation (Increases)  History GI Bleed/PUD    Liver Disease (Increases)  Chronic NSAID Use    Thyroid: Hyper (Increases)  Hypo (Decreases)  Chronic ASA/Antiplatelet Use (Clopidogrel/ Dipyridamole/Prasugrel/Ticagrelor)     X Malignancy (Increases) Xx Abnormal Renal Function (dialysis, renal transplant, SCr ? 2.3 mg/dL)     History of EtOH Abuse: Acute (Increases)   Chronic (Decreases)  Liver Function (cirrhosis, bilirubin >2x ULN with AST/ALT/AP >3x ULN)    Fever (Increases) X Age > 65 years   X Acute infection (Increases)  Hypertension/Uncontrolled BP

## 2020-07-10 NOTE — PROGRESS NOTES
[address] [place] [address] [facility]  Progress Note      Chief Complaint   Patient presents with    Leg Swelling     BLE SWELLING X1 DAY, hx CHF       SUBJECTIVE:      Patient is tolerating medications. No reported adverse drug reactions. No problems overnight. Review of systems:    As stated above in the chief complaint, otherwise negative. Medications:    Scheduled Meds:   daptomycin (CUBICIN) IVPB  6 mg/kg (Adjusted) Intravenous Q24H    lidocaine-EPINEPHrine  20 mL Intradermal Once    mineral oil-hydrophilic petrolatum   Topical BID    [Held by provider] bumetanide  1 mg Intravenous Daily    calcium-vitamin D  1 tablet Oral Daily    docusate sodium  100 mg Oral Daily    isosorbide dinitrate  5 mg Oral TID    magnesium oxide  400 mg Oral Daily    [Held by provider] metoprolol succinate  12.5 mg Oral Daily    therapeutic multivitamin-minerals  1 tablet Oral Daily    pantoprazole  40 mg Oral QAM AC    pregabalin  50 mg Oral TID    [Held by provider] vitamin B-12  1,000 mcg Oral Daily    sodium chloride flush  10 mL Intravenous 2 times per day    sodium chloride   Intravenous Q12H    warfarin (COUMADIN) daily dosing (placeholder)   Other RX Placeholder     Continuous Infusions:   sodium chloride 75 mL/hr at 07/10/20 0531     PRN Meds:oxyCODONE, povidone-iodine, mineral oil-hydrophilic petrolatum **AND** mineral oil-hydrophilic petrolatum, albuterol, sodium chloride flush, acetaminophen **OR** acetaminophen, magnesium hydroxide, promethazine **OR** ondansetron  Prior to Admission medications    Medication Sig Start Date End Date Taking? Authorizing Provider   PT/INR Test STRP 1 each by In Vitro route Twice a Week 6/29/20   Silvia Villavicencio DO   sodium bicarbonate 650 MG tablet Take 650 mg by mouth 2 times daily    Historical Provider, MD   PT/INR Testing Monitor KIT 1 each by Does not apply route once a week Use to test INR at home once weekly and as directed to monitor INR. 4/9/20   [facility], DO   PT/INR Test STRP 1 each by In Vitro route once a week Use to check INR at least once weekly and more often as directed. 4/9/20   [facility] DO   metoprolol succinate (TOPROL XL) 25 MG extended release tablet Take 0.5 tablets by mouth daily 4/8/20   [facility], DO   omeprazole 20 MG EC tablet Take 1 tablet by mouth daily 4/8/20   [facility], DO   bumetanide (BUMEX) 1 MG tablet Take 1 tablet by mouth daily 3/16/20   [facility], DO   pregabalin (LYRICA) 50 MG capsule Take 1 capsule by mouth 3 times daily for 120 days. 2/4/20 6/29/20  [facility], DO   magnesium oxide (MAG-OX) 400 (240 Mg) MG tablet Take 1 tablet by mouth daily 2/4/20   [facility], DO   docusate (COLACE, DULCOLAX) 100 MG CAPS Take 100 mg by mouth daily 2/4/20   [facility], DO   PT/INR Testing Monitor KIT 1 each by Does not apply route once a week Please use to test INR as directed by physician 1/15/20   [facility] DO   isosorbide dinitrate (ISORDIL) 5 MG tablet Take 1 tablet by mouth 3 times daily 1/14/20   Jelly Saenz MD   vitamin B-12 (CYANOCOBALAMIN) 1000 MCG tablet Take 1 tablet by mouth daily 1/7/20   [facility]   Calcium Citrate-Vitamin D (RA CALCIUM CIT-VIT D-3 PETITES) 200-250 MG-UNIT TABS take 1 tablet by mouth twice a day 1/7/20   [facility] DO   albuterol sulfate (PROAIR RESPICLICK) 198 (90 Base) MCG/ACT aerosol powder inhalation Inhale 2 puffs into the lungs every 6 hours as needed for Wheezing or Shortness of Breath 1/7/20   [facility] DO   warfarin (COUMADIN) 1 MG tablet Please take 2 mg by mouth most days, but 3 mg by mouth on Mondays and Wednesdays and Friday. Patient taking differently: Please take 2 mg by mouth most days, but 3 mg by mouth on sun, tues, thurs.  11/25/19   Fariba Noel MD   Multiple Vitamins-Minerals (THERAPEUTIC MULTIVITAMIN-MINERALS) tablet Take 1 tablet by mouth daily 9/17/19   Fariba Noel MD   Insulin Pen Needle [facility] PEN NEEDLES) 29G X 12MM MISC 1 each by Does not apply route daily 9/10/19   Maria Elena Azul MD   nitroGLYCERIN (NITROSTAT) 0.4 MG SL tablet up to max of 3 total doses. If no relief after 1 dose, call 911. 19   Maria Elena Azul MD   sodium chloride (OCEAN, BABY AYR) 0.65 % nasal spray 1 spray by Nasal route as needed for Congestion (dryness) 19   Maria Elena Azul MD   palbociclib [facility]) 100 MG capsule Take 100 mg by mouth 3 weeks on and 1 week off also receives injections every 3 weeks per Dr Daryle Rubinstein Provider, MD       OBJECTIVE:  BP (!) 104/53   Pulse 80   Temp 98.5 °F (36.9 °C) (Temporal)   Resp 16   Ht 5' 4\" (1.626 m)   Wt 154 lb (69.9 kg)   SpO2 93%   BMI 26.43 kg/m²   Temp  Av.9 °F (37.2 °C)  Min: 98.5 °F (36.9 °C)  Max: 99.3 °F (37.4 °C)  General appearance: Resting in bed in no apparent distress. Skin: Warm and dry. No rashes were noted. HEENT: Round and reactive pupils. Moist mucous membranes. No ulcerations or thrush. Neck: Supple to movements. Chest: No use of accessory muscles to breathe. Symmetrical expansion. No wheezing, crackles or rhonchi. Cardiovascular: Reguar rate and rhythm. No murmurs gallops, or rubs appreciated. Abdomen: Bowel sounds present, nontender, nondistended, no masses or hepatosplenomegaly. Extremities: No clubbing, no cyanosis, no edema. Lines: peripheral    I/O last 3 completed shifts:   In: 2994 [P.O.:540; I.V.:2454]  Out: 900 [Urine:900]      Laboratory and Tests Review:      Lab Results   Component Value Date    WBC 2.5 (L) 07/10/2020    WBC 2.6 (L) 2020    WBC 2.7 (L) 2020    HGB 7.4 (L) 07/10/2020    HCT 23.7 (L) 07/10/2020    .1 (H) 07/10/2020    PLT 99 (L) 07/10/2020     Lab Results   Component Value Date    NEUTROABS 2.00 07/10/2020    NEUTROABS 1.98 2020    NEUTROABS 2.24 2020     No results found for: [facility]  Lab Results   Component Value Date    ALT 28 2020    AST 34 (H) 2020    ALKPHOS 144 (H) 07/07/2020    BILITOT 1.0 07/07/2020     Lab Results   Component Value Date     07/10/2020    K 4.1 07/10/2020     07/10/2020    CO2 24 07/10/2020    BUN 48 07/10/2020    CREATININE 2.4 07/10/2020    CREATININE 2.8 07/09/2020    CREATININE 3.0 07/08/2020    GFRAA 24 07/10/2020    LABGLOM 20 07/10/2020    GLUCOSE 175 07/10/2020    GLUCOSE 109 04/20/2012    PROT 7.5 07/07/2020    LABALBU 3.6 07/07/2020    LABALBU 4.4 03/25/2012    CALCIUM 7.7 07/10/2020    BILITOT 1.0 07/07/2020    ALKPHOS 144 07/07/2020    AST 34 07/07/2020    ALT 28 07/07/2020     Lab Results   Component Value Date    CRP 16.2 (H) 07/07/2020    CRP 4.2 (H) 06/19/2020     Lab Results   Component Value Date    SEDRATE 134 (H) 07/07/2020    SEDRATE 102 (H) 06/21/2020    SEDRATE 130 (H) 06/19/2020       Radiology:    MRI LUMBAR SPINE WO CONTRAST   Final Result   Findings compatible with degenerative changes   No convincing evidence for metastatic disease in the spine   There are multiple lesions in the liver seen on the margin of this   study, compatible with metastatic disease      ALERT:  THIS IS AN ABNORMAL REPORT      US RETROPERITONEAL COMPLETE   Final Result   Findings compatible with a right renal cyst         XR FOOT LEFT (MIN 3 VIEWS)   Final Result      No acute fracture is identified. Osteoarthritis. Osteopenia. XR FOOT RIGHT (MIN 3 VIEWS)   Final Result      No acute fracture is identified. Osteoarthritis. Osteopenia. XR TIBIA FIBULA RIGHT (2 VIEWS)   Final Result      No acute fracture is identified. Osteoarthritis. Osteopenia. XR TIBIA FIBULA LEFT (2 VIEWS)   Final Result      No acute fracture is identified. Osteoarthritis. Osteopenia. US DUP LOWER EXTREMITIES BILATERAL VENOUS   Final Result      Extensive deep vein thrombosis throughout the right lower extremity.       XR CHEST PORTABLE   Final Result      Cardiomegaly with median sternotomy Small right-sided pleural effusion      Other findings are unchanged from prior study. Microbiology:   Lab Results   Component Value Date    BC 24 Hours no growth 07/07/2020    BC 5 Days- no growth 08/12/2019    BC 5 Days- no growth 08/09/2019    ORG Staphylococcus aureus 07/08/2020    ORG Corynebacterium species 07/08/2020    ORG Staphylococcus aureus 06/21/2020    ORG Corynebacterium species 06/21/2020     Lab Results   Component Value Date    BLOODCULT2 24 Hours no growth 07/07/2020    BLOODCULT2 5 Days- no growth 08/12/2019    BLOODCULT2 5 Days- no growth 08/09/2019    ORG Staphylococcus aureus 07/08/2020    ORG Corynebacterium species 07/08/2020    ORG Staphylococcus aureus 06/21/2020    ORG Corynebacterium species 06/21/2020     WOUND/ABSCESS   Date Value Ref Range Status   07/08/2020 Growth present, evaluating for:  Staph aureus   (A)  Preliminary   07/08/2020 Moderate growth  Sensitivity to follow    Preliminary   07/08/2020 Heavy growth  Preliminary     No results found for: RESPSMEAR  No results found for: MPNEUMO, CLAMYDCU, LABLEGI, AFBCX, FUNGSM, LABFUNG  No results found for: CULTRESP  No results found for: CXCATHTIP  Body Fluid Culture, Sterile   Date Value Ref Range Status   07/25/2019 Growth not present  Final     No results found for: CXSURG  Urine Culture, Routine   Date Value Ref Range Status   07/07/2020 Growth not present  Final   08/13/2019 >100,000 CFU/ml  Final   08/08/2019 <10,000 CFU/mL  Gram negative rods    Final     MRSA Culture Only   Date Value Ref Range Status   08/09/2019 Methicillin resistant Staph aureus not isolated  Final       Problem list:    Principal Problem:    Left leg pain  Active Problems:    Deep vein thrombosis (DVT) of right lower extremity ([address]    KARISHMA (acute kidney injury) ([address]    Cellulitis    Decubitus ulcer of both hip    Lymphopenia    Degenerative disc disease at L5-S1 level  Resolved Problems:    * No resolved hospital problems. *      ASSESSMENT:  Bilateral hip wounds--- stage I pressure ulcers   Remote history of breast cancer since 2005 with evidence of metastatic disease since 2012 with interval development of pulmonary and hepatic metastasis in 2019  debridment yesterday   Now we have surgical tissue cultures growing staph  Most likely mrsa   Will stop zosyn and start dapto    She has renal insufficincy and heme issues   dont want to use zyvox or vanco   Plan:   Stop zosyn and start daptomycin  Check cultures   Monitor labs   Will follow with you   Wound care following   General surgery following   Hematology oncology following          [address]    10:58 AM  7/10/2020

## 2020-07-10 NOTE — PROGRESS NOTES
Progress Note  NEPHROLOGY    Reason for Consult:  KARISHMA on CKD    Requesting Physician:  Dr. Fareed Nicholas    Chief Complaint:  Pain in legs and inability to walk    History Obtained From:  patient, electronic medical record    History of Present Ilness: This is a 76 y.o.  female with a H/O CKD stage IV with baseline creatinine of 1.5-2.0 over the past year, whom we have followed in the hospital in the past for KARISHMA most recently in August 2019. She had been seen a little over a year ago in the office by Dr. Nadege Pritchett but has not been followed up since. Additional PMH: HTN, breast cancer s/p lumpectomy with bone mets, CAD, CABG, CHF EF:50-55 RVSP 30 mmHg , chronic venous insufficiency, DVT, B/l hip wounds, recently admitted for hip wounds that grew corynebacterium and S. Aureus and was discharged about 3 weeks ago. denies any recent NSAID use, no ACE/ARB. She was on merrem and vancomycin last admission in June her creatinine ranged 1.8-2.0 during that admission. She had a dose of vancomycin in the ED. She states she has been eating and drinking, she was on bumex PTA    She is awake and alert and is having LE pain as well as pain left hip from a chronic wound     7/9/20: seen & examined-had fallen asleep holding her sandwich. States she feels alright and has improved appetite. 7/10/2020: pt seen & examined, in no acute distress. Appetite is improving. No complaints. Past Medical History:        Diagnosis Date    Abscess of abdominal wall     Anemia     Iron deficiency and chronic disease.  Anesthesia     DIFFICULTY WAKING UP    Arthritis     Arthritis, hip     Breast cancer ([facility] [address] 2005    S/P Left Lumpectomy with Lymph Node Dissection, Chemo/Rad. Follows with Dr. Nikki Shelton. No recurrence to date. Surgeon was Dr. Olivia Cassidy.  CAD (coronary artery disease) 5/2008    s/p CABG. Follows with [address] [place] [address].     CHF (congestive heart failure) (HCC)     Chronic venous insufficiency 11/7/2018    Class 2 severe obesity due to excess calories with serious comorbidity and body mass index (BMI) of 35.0 to 35.9 in adult [facility]) 8/1/2019    Decreased dorsalis pedis pulse 11/7/2018    Degenerative disc disease at L5-S1 level 7/10/2020    Diabetic neuropathy (HCC)     Diabetic neuropathy (HCC)     DVT (deep venous thrombosis) (HCC)     Recurrent. On lifelong coumadin.  DVT of upper extremity (deep vein thrombosis) ([address] 4/18/2012    History of deep vein thrombosis 11/7/2018    Humerus fracture     Chronic, on the Left.  Hyperlipidemia 8/29/2011    Hypertension     Left leg pain 7/9/2020    Leg swelling 11/7/2018    Lymph node enlargement     Lymphedema of both lower extremities 11/7/2018    Lymphopenia 7/9/2020    MI (myocardial infarction) ([address]     Nephrolithiasis     Follows with Dr. Cory Gomes.  Pericardial effusion     Pulmonary nodule     With mediastinal lymphadenopathy. Stable. Follows with Dr. Bautista.     Rib lesion 11/28/11    expansile lesion in one of the right ribs laterally    Sarcoidosis 07/26/11    per transbronchial needle aspiration    Subclavian vein occlusion, bilateral ([address] 4/18/2012    Type II or unspecified type diabetes mellitus without mention of complication, not stated as uncontrolled        Past Surgical History:        Procedure Laterality Date    APPENDECTOMY      ARTERIAL BYPASS SURGRY      BREAST LUMPECTOMY  9/27/2005    LEFT    CARDIAC SURGERY      CHOLECYSTECTOMY      COLONOSCOPY  12/2007    cecal arteriovenous malformation with hyperplastic polyps    COLONOSCOPY  [id number]    CORONARY ARTERY BYPASS GRAFT  05/2008    triple bypass    ECHO COMPL W DOP COLOR FLOW  10/30/2013         EYE SURGERY      clarissa cataracts    HYSTERECTOMY  1975, 1985    MAYNOR prolapse, benign conditions; BSO later for scar tissues, no CA.      OTHER SURGICAL HISTORY  11/18/11    port removal right chest wall    PARACENTESIS      TONSILLECTOMY      TOOTH EXTRACTION      Full Dental PRN  promethazine (PHENERGAN) tablet 12.5 mg, 12.5 mg, Oral, Q6H PRN **OR** ondansetron (ZOFRAN) injection 4 mg, 4 mg, Intravenous, Q6H PRN  0.9 % sodium chloride infusion admixture, , Intravenous, Q12H  0.9 % sodium chloride infusion, , Intravenous, Continuous  warfarin (COUMADIN) daily dosing (placeholder), , Other, RX Placeholder    Allergies:  Macrobid [nitrofurantoin monohydrate macrocrystals]    Social History:      Smoking: Never Smoker   Smokeless Tobacco: Never Used   Alcohol: No       Family History:     Family History   Adopted: Yes         Review of Systems:       Pertinent positives stated above in HPI. All other systems were reviewed and were negative.     Physical exam:   Constitutional:  VITALS:  BP (!) 114/54   Pulse 78   Temp 98.5 °F (36.9 °C) (Temporal)   Resp 16   Ht 5' 4\" (1.626 m)   Wt 154 lb (69.9 kg)   SpO2 93%   BMI 26.43 kg/m²   CURRENT TEMPERATURE:  Temp: 98.5 °F (36.9 °C)  CURRENT RESPIRATORY RATE:  Resp: 16  CURRENT PULSE:  Pulse: 78  CURRENT BLOOD PRESSURE:  BP: (!) 114/54  24HR BLOOD PRESSURE RANGE:  Systolic (90QPR), VWB:780 , Min:98 , VOE:253   ; Diastolic (43ARK), BDY:60, Min:50, Max:64    24HR INTAKE/OUTPUT:      Intake/Output Summary (Last 24 hours) at 7/10/2020 1328  Last data filed at 7/10/2020 0950  Gross per 24 hour   Intake 1695 ml   Output 750 ml   Net 945 ml     Gen: alert, awake, nad  Skin: no rash, turgor wnl  Heent:  eomi, mmm  Neck: No jvd noted  Cardiovascular:  S1, S2 no S3 or rub  Respiratory: clear upper, diminished in bases with equal expansion  Abdomen:  +bs, soft, nt, nd  Ext: ++ bilateral edema, tender to touch  Psychiatric: mood and affect appropriate  Musculoskeletal:  Rom, muscular strength intact    DATA:      CBC with Differential:    Lab Results   Component Value Date    WBC 2.5 07/10/2020    RBC 1.99 07/10/2020    HGB 7.4 07/10/2020    HCT 23.7 07/10/2020    PLT 99 07/10/2020    .1 07/10/2020    MCH 37.2 07/10/2020    MCHC 31.2 07/10/2020 RDW 17.1 07/10/2020    NRBC 0.9 06/22/2020    SEGSPCT 65 04/20/2012    BLASTSPCT 1.8 06/22/2020    METASPCT 0.9 07/21/2019    LYMPHOPCT 11.3 07/10/2020    PROMYELOPCT 0.9 07/08/2020    MONOPCT 1.7 07/10/2020    MYELOPCT 0.9 07/09/2020    BASOPCT 2.6 07/10/2020    MONOSABS 0.05 07/10/2020    LYMPHSABS 0.28 07/10/2020    EOSABS 0.11 07/10/2020    BASOSABS 0.07 07/10/2020     CMP:    Lab Results   Component Value Date     07/10/2020    K 4.1 07/10/2020     07/10/2020    CO2 24 07/10/2020    BUN 48 07/10/2020    CREATININE 2.4 07/10/2020    GFRAA 24 07/10/2020    LABGLOM 20 07/10/2020    GLUCOSE 175 07/10/2020    GLUCOSE 109 04/20/2012    PROT 7.5 07/07/2020    LABALBU 3.6 07/07/2020    LABALBU 4.4 03/25/2012    CALCIUM 7.7 07/10/2020    BILITOT 1.0 07/07/2020    ALKPHOS 144 07/07/2020    AST 34 07/07/2020    ALT 28 07/07/2020       RAD:  Xr Tibia Fibula Left (2 Views)    Result Date: 7/7/2020  3 views of the left knee, tibia, fibula and ankle. 3 views of the left foot. There is no evidence of acute displaced cortical disruption or dislocation. There is diffuse bone demineralization. There are rather severe degenerative changes of the knee, ankle, midfoot and toes. Arterial calcifications are present. Metallic clips overlie the medial soft tissues of the knee. No acute fracture is identified. Osteoarthritis. Osteopenia. Xr Tibia Fibula Right (2 Views)    Result Date: 7/7/2020  3 views of the right knee, tibia, fibula and ankle. 3 views of the left foot. There is no evidence of acute displaced cortical disruption or dislocation. There is diffuse bone demineralization. There are rather severe degenerative changes of the knee, ankle, midfoot and toes. Arterial calcifications are present. Metallic clips overlie the medial soft tissues of the knee. No acute fracture is identified. Osteoarthritis. Osteopenia.      Xr Foot Left (min 3 Views)    Result Date: 7/7/2020  3 views of the left knee, tibia, fibula and ankle. 3 views of the left foot. There is no evidence of acute displaced cortical disruption or dislocation. There is diffuse bone demineralization. There are rather severe degenerative changes of the knee, ankle, midfoot and toes. Arterial calcifications are present. Metallic clips overlie the medial soft tissues of the knee. No acute fracture is identified. Osteoarthritis. Osteopenia. Xr Foot Right (min 3 Views)    Result Date: 2020  3 views of the right knee, tibia, fibula and ankle. 3 views of the left foot. There is no evidence of acute displaced cortical disruption or dislocation. There is diffuse bone demineralization. There are rather severe degenerative changes of the knee, ankle, midfoot and toes. Arterial calcifications are present. Metallic clips overlie the medial soft tissues of the knee. No acute fracture is identified. Osteoarthritis. Osteopenia. Xr Chest Portable    Result Date: 2020  Patient MRN: [id number] : 1945 Age:  76 years Gender: Female Order Date: 2020 9:30 PM Exam: XR CHEST PORTABLE Number of Images: 1 view Indication:   sob sob Comparison: Prior chest radiograph from 2020 is available. Findings: Study demonstrate cardiomegaly with median sternotomy with left ventricular contour. There is a right-sided portacatheter tip is in superior vena cava. There is uncoiling atherosclerotic change of thoracic aorta. There is old right upper fourth and fifth rib fracture with callus formation. 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 There is a comminuted humeral head fracture seen in the left glenoid fossa. There are multiple surgical clips seen in the left axillary region. The lung fields are clear. There is small right-sided pleural effusion     Cardiomegaly with median sternotomy Small right-sided pleural effusion Other findings are unchanged from prior study.      Us Dup Lower Extremities Bilateral Venous    Result Date: 2020  Real-time and Doppler sonography of the deep venous structures of the lower extremities. Grayscale, color Doppler, and spectral Doppler analysis. Occlusive and nonocclusive intraluminal substance is identified in the right common femoral, superficial femoral, popliteal, posterior tibial and peroneal veins. Baker's cyst on the left. There is adequate and spontaneous blood flow, compressibility and augmentation within the left common femoral, superficial femoral, popliteal, posterior tibial, anterior tibial and peroneal veins. Extensive deep vein thrombosis throughout the right lower extremity. Assessment/Plan    · Acute kidney injury-  Likely pre-renal in the setting of diuretics and  poor po intake with FeNa <1% and low urine urea  Not on ACE/ARB- was on diuretics  She has been started on IVF and will follow on same. R U/S No hydro, no calculus, right renal cyst noted  S Cr 3.4-->3.0->2.8-->2.4 with IVF (0.9NS @ 75cc/hr)    · Chronic kidney disease stage IV-  Baseline Scr 1.5-2.0 over the past year    · Hypertension with CKD I-IV-  Well controlled on current medications    · Bilateral hip wounds  Seen by ID;no plans for antibiotics now     · Anemia with metastatic breast cancer-  Will defer to Hematology      Thank you for allowing us to participate in care of FORD Arora CNP  7/10/2020  1:28 PM     Patient seen and examined all key components of the physical performed independently , case discussed with NP, all pertinent labs and radiologic tests personally reviewed agree with above.       Angelica Babcock MD

## 2020-07-10 NOTE — PROGRESS NOTES
OT BEDSIDE TREATMENT NOTE      Date:7/10/2020  Patient Name: Elizabeth Lovell  MRN: [id number]  : 1945  Room: [address] [place] [address]-A     Per OT Eval:    Evaluating OT: Marryabby Locket OTR/L #[id number]     AM-PAC Daily Activity Raw Score: 15/24     Recommended Adaptive Equipment: AE for LB dressing/bathing, TBD for additional equipment      Diagnosis: Cellulitis [L03.90]  Cellulitis [L03.90]  Referring Provider: Omid Cote MD  Patient presented to ED for bilateral leg pain     Surgery: none  Pertinent Medical History: abscess of abdominal wall, anemia, arthritis, hx of breast cancer, CAD, CHF, diabetic neuropathy, hx of DVT, HLD, HTN, sarcoidosis, type 2 DM     Precautions:  Falls     Home Living: Pt lives alone in apartment with few steps to enter and elevator access within building   Bathroom setup: ?   Equipment owned: rollator     Prior Level of Function: Assistance with ADLs , Assistance with IADLs; ambulated w/ rollator  Aide comes in 3 days a week to assist with ADLs/IADLs  Driving: No  Occupation: n/a     Pain Level: Pt complained of pain in LLE  Cognition: A&O: oriented to self, place and year;  Follows 1 step directions              Memory:  fair               Sequencing:  fair               Problem solving: fair               Judgement/safety: fair                 Functional Assessment:    Initial Eval Status  Date: 20 Treatment Status  Date: 7/10/20 STGs/LTGs  Treatment frequency: 1-4x/wk   Feeding Set-up  Set-Up Modified [place]     Grooming Minimal Assist   Julisa  Pt washed face, applied deodorant combed hair, with assistance to reach back of head seated upright in chair Modified [place]    UB Dressing Minimal Assist   DNT  Julisa per previous level Modified [place]    LB Dressing Maximal Assist   modA  Pt able to suyapa/doff R sock, requiring assistance with L sock, with pt having poor safety throughout task Modified [place] w/ AE   Bathing Maximal Assist (simulated)  maxA  Simulated

## 2020-07-10 NOTE — PROGRESS NOTES
[employer] Sitting EOB:  SBA  Dynamic Standing: Max A with [facility] Sitting EOB:  Supervision  Dynamic Standing:  SBA with [employer]      Pt is A & O x 3  Sensation:  Pt denies numbness and tingling to extremities  Edema:  unremarkable    Patient education  Pt educated on safety with mobility, use of walker, gait mechanics    Patient response to education:   Pt verbalized understanding Pt demonstrated skill Pt requires further education in this area   yes yes yes     ASSESSMENT:    Comments:  Pt resting semi-supine upon arrival, agreeable to PT session this PM. Pt demonstrates very slow pace of movement with all bed mobility requiring assist for trunk lift to achieve upright sitting. She demonstrates very limited carryover of B UE placement with sit<>stand transfers with lift/lower assistance. Pt has forward flexed posture with posterior listing during all ambulation, presenting with very short steps and step-to pattern. Pt experienced LOB requiring increased assist and cues to avoid pulling backwards on walker during attempts to take 2-3 steps backwards away from sink following use of restroom. Pt completed turn with increased assist for balance and walker management. Upon reaching doorway of restroom pt reporting increased dizziness and partial buckling of B knees, pt was assisted to be seated in chair for rest break to allow recovery and dizziness to subside. Pt was able to continue to amb to chair at bedside after 5 min rest break. Pt seated in chair upon completion of session with all needs in reach. She verbalized understanding of safety concerns regarding mobility and returning to home at this level.     Treatment:  Patient practiced and was instructed in the following treatment:     Bed mobility: verbal/tactile cues for sequencing and technique, manual assist for trunk lift as noted above   Transfer training: cues for hand placement and sequencing, manual assist for lift/lower, limited carryover of hand placement demonstrated   Gait training: cues for upright posture and walker management, cues for increased step height and length, manual assist for balance and walker management   Therapeutic activities: sitting EOB x5 minutes, completed dynamic sitting to don socks with Min A for balance, extensive education regarding safety awareness and current level of function. PLAN:    Patient is making continued progress towards established goals. Will continue with current POC.         PLAN:    Time in  1456  Time out  1536    Total Treatment Time  40    CPT codes:  [x] Gait training 09094 15 minutes  [] Manual therapy 92019 0 minutes  [x] Therapeutic activities 86702 25 minutes  [] Therapeutic exercises 43784 0 minutes  [] Neuromuscular reeducation 81585 0 minutes      Pricilla Bailey PT, DPT  [id number]

## 2020-07-10 NOTE — CARE COORDINATION
Spoke with pt about therapy maldonado PT 10/24, OT 14/24. Pt very strong about going home, explained to her that it is not safe, she is not very strong physically. Pt agreeable to participate with therapy to see how she does. Called #[phone] for updates. Bibiana Jett [facility] Chest wall injury, initial encounter

## 2020-07-10 NOTE — PROGRESS NOTES
Progress Note  NEPHROLOGY    Reason for Consult:  KARISHMA on CKD    Requesting Physician:  Dr. De La Vega    Chief Complaint:  Pain in legs and inability to walk    History Obtained From:  patient, electronic medical record    History of Present Ilness: This is a 76 y.o.  female with a H/O CKD stage IV with baseline creatinine of 1.5-2.0 over the past year, whom we have followed in the hospital in the past for KARISHMA most recently in August 2019. She had been seen a little over a year ago in the office by Dr. Angeles [place] but has not been followed up since. Additional PMH: HTN, breast cancer s/p lumpectomy with bone mets, CAD, CABG, CHF EF:50-55 RVSP 30 mmHg , chronic venous insufficiency, DVT, B/l hip wounds, recently admitted for hip wounds that grew corynebacterium and S. Aureus and was discharged about 3 weeks ago. denies any recent NSAID use, no ACE/ARB. She was on merrem and vancomycin last admission in June her creatinine ranged 1.8-2.0 during that admission. She had a dose of vancomycin in the ED. She states she has been eating and drinking, she was on bumex PTA    She is awake and alert and is having LE pain as well as pain left hip from a chronic wound     7/9/20: seen & examined-had fallen asleep holding her sandwich. States she feels alright and has improved appetite. 7/10/2020 pt was asleep but awakens easily to voice. She reports fair appetite today. No c/o. Past Medical History:        Diagnosis Date    Abscess of abdominal wall     Anemia     Iron deficiency and chronic disease.  Anesthesia     DIFFICULTY WAKING UP    Arthritis     Arthritis, hip     Breast cancer ([facility] [address] 2005    S/P Left Lumpectomy with Lymph Node Dissection, Chemo/Rad. Follows with Dr. Chasity Velazquez. No recurrence to date. Surgeon was Dr. Adri Bruner.  CAD (coronary artery disease) 5/2008    s/p CABG. Follows with [address] [place] [address].     CHF (congestive heart failure) (HCC)     Chronic venous insufficiency 11/7/2018    Class 2 severe Extraction.     TUNNELED VENOUS PORT PLACEMENT      removal of port Dec. 2011- Dr. Mendez Joshua TUNNELED [address]  [id number]    RIGHT CHEST       Current Medications:    Current Facility-Administered Medications: oxyCODONE (ROXICODONE) immediate release tablet 5 mg, 5 mg, Oral, Q6H PRN  DAPTOmycin (CUBICIN) 350 mg in sodium chloride 0.9 % 50 mL IVPB, 6 mg/kg (Adjusted), Intravenous, Q48H  warfarin (COUMADIN) tablet 3 mg, 3 mg, Oral, Once  lidocaine-EPINEPHrine 1 percent-1:[id number] injection 20 mL, 20 mL, Intradermal, Once  povidone-iodine (BETADINE) 10 % external solution, , Topical, PRN  mineral oil-hydrophilic petrolatum (HYDROPHOR) ointment, , Topical, BID **AND** mineral oil-hydrophilic petrolatum (HYDROPHOR) ointment, , Topical, BID PRN  albuterol (PROVENTIL) nebulizer solution 2.5 mg, 2.5 mg, Nebulization, Q4H PRN  [Held by provider] bumetanide (BUMEX) injection 1 mg, 1 mg, Intravenous, Daily  calcium-vitamin D (OSCAL-500) 500-200 MG-UNIT per tablet 1 tablet, 1 tablet, Oral, Daily  docusate sodium (COLACE) capsule 100 mg, 100 mg, Oral, Daily  isosorbide dinitrate (ISORDIL) tablet 5 mg, 5 mg, Oral, TID  magnesium oxide (MAG-OX) tablet 400 mg, 400 mg, Oral, Daily  [Held by provider] metoprolol succinate (TOPROL XL) extended release tablet 12.5 mg, 12.5 mg, Oral, Daily  therapeutic multivitamin-minerals 1 tablet, 1 tablet, Oral, Daily  pantoprazole (PROTONIX) tablet 40 mg, 40 mg, Oral, QAM AC  pregabalin (LYRICA) capsule 50 mg, 50 mg, Oral, TID  [Held by provider] vitamin B-12 (CYANOCOBALAMIN) tablet 1,000 mcg, 1,000 mcg, Oral, Daily  sodium chloride flush 0.9 % injection 10 mL, 10 mL, Intravenous, 2 times per day  sodium chloride flush 0.9 % injection 10 mL, 10 mL, Intravenous, PRN  acetaminophen (TYLENOL) tablet 650 mg, 650 mg, Oral, Q6H PRN **OR** acetaminophen (TYLENOL) suppository 650 mg, 650 mg, Rectal, Q6H PRN  magnesium hydroxide (MILK OF MAGNESIA) 400 MG/5ML suspension 30 mL, 30 mL, Oral, Daily PRN  promethazine (PHENERGAN) tablet 12.5 mg, 12.5 mg, Oral, Q6H PRN **OR** ondansetron (ZOFRAN) injection 4 mg, 4 mg, Intravenous, Q6H PRN  0.9 % sodium chloride infusion admixture, , Intravenous, Q12H  0.9 % sodium chloride infusion, , Intravenous, Continuous  warfarin (COUMADIN) daily dosing (placeholder), , Other, RX Placeholder    Allergies:  Macrobid [nitrofurantoin monohydrate macrocrystals]    Social History:      Smoking: Never Smoker   Smokeless Tobacco: Never Used   Alcohol: No       Family History:     Family History   Adopted: Yes         Review of Systems:       Pertinent positives stated above in HPI. All other systems were reviewed and were negative.     Physical exam:   Constitutional:  VITALS:  BP (!) 114/54   Pulse 78   Temp 98.5 °F (36.9 °C) (Temporal)   Resp 16   Ht 5' 4\" (1.626 m)   Wt 154 lb (69.9 kg)   SpO2 93%   BMI 26.43 kg/m²   CURRENT TEMPERATURE:  Temp: 98.5 °F (36.9 °C)  CURRENT RESPIRATORY RATE:  Resp: 16  CURRENT PULSE:  Pulse: 78  CURRENT BLOOD PRESSURE:  BP: (!) 114/54  24HR BLOOD PRESSURE RANGE:  Systolic (10TQS), NTU:717 , Min:98 , TCT:116   ; Diastolic (42PNZ), AYB:27, Min:50, Max:64    24HR INTAKE/OUTPUT:      Intake/Output Summary (Last 24 hours) at 7/10/2020 1402  Last data filed at 7/10/2020 0950  Gross per 24 hour   Intake 1695 ml   Output 750 ml   Net 945 ml     Gen: alert, awake, nad  Skin: no rash, turgor wnl  Heent:  eomi, mmm  Neck: No jvd noted  Cardiovascular:  S1, S2 no S3 or rub  Respiratory: clear upper, diminished in bases with equal expansion  Abdomen:  +bs, soft, nt, nd  Ext: ++ bilateral edema, tender to touch  Psychiatric: mood and affect appropriate  Musculoskeletal:  Rom, muscular strength intact    DATA:      CBC with Differential:    Lab Results   Component Value Date    WBC 2.5 07/10/2020    RBC 1.99 07/10/2020    HGB 7.4 07/10/2020    HCT 23.7 07/10/2020    PLT 99 07/10/2020    .1 07/10/2020    MCH 37.2 07/10/2020    MCHC 31.2 07/10/2020 RDW 17.1 07/10/2020    NRBC 0.9 06/22/2020    SEGSPCT 65 04/20/2012    BLASTSPCT 1.8 06/22/2020    METASPCT 0.9 07/21/2019    LYMPHOPCT 11.3 07/10/2020    PROMYELOPCT 0.9 07/08/2020    MONOPCT 1.7 07/10/2020    MYELOPCT 0.9 07/09/2020    BASOPCT 2.6 07/10/2020    MONOSABS 0.05 07/10/2020    LYMPHSABS 0.28 07/10/2020    EOSABS 0.11 07/10/2020    BASOSABS 0.07 07/10/2020     CMP:    Lab Results   Component Value Date     07/10/2020    K 4.1 07/10/2020     07/10/2020    CO2 24 07/10/2020    BUN 48 07/10/2020    CREATININE 2.4 07/10/2020    GFRAA 24 07/10/2020    LABGLOM 20 07/10/2020    GLUCOSE 175 07/10/2020    GLUCOSE 109 04/20/2012    PROT 7.5 07/07/2020    LABALBU 3.6 07/07/2020    LABALBU 4.4 03/25/2012    CALCIUM 7.7 07/10/2020    BILITOT 1.0 07/07/2020    ALKPHOS 144 07/07/2020    AST 34 07/07/2020    ALT 28 07/07/2020       RAD:  Xr Tibia Fibula Left (2 Views)    Result Date: 7/7/2020  3 views of the left knee, tibia, fibula and ankle. 3 views of the left foot. There is no evidence of acute displaced cortical disruption or dislocation. There is diffuse bone demineralization. There are rather severe degenerative changes of the knee, ankle, midfoot and toes. Arterial calcifications are present. Metallic clips overlie the medial soft tissues of the knee. No acute fracture is identified. Osteoarthritis. Osteopenia. Xr Tibia Fibula Right (2 Views)    Result Date: 7/7/2020  3 views of the right knee, tibia, fibula and ankle. 3 views of the left foot. There is no evidence of acute displaced cortical disruption or dislocation. There is diffuse bone demineralization. There are rather severe degenerative changes of the knee, ankle, midfoot and toes. Arterial calcifications are present. Metallic clips overlie the medial soft tissues of the knee. No acute fracture is identified. Osteoarthritis. Osteopenia.      Xr Foot Left (min 3 Views)    Result Date: 7/7/2020  3 views of the left knee, tibia, fibula and ankle. 3 views of the left foot. There is no evidence of acute displaced cortical disruption or dislocation. There is diffuse bone demineralization. There are rather severe degenerative changes of the knee, ankle, midfoot and toes. Arterial calcifications are present. Metallic clips overlie the medial soft tissues of the knee. No acute fracture is identified. Osteoarthritis. Osteopenia. Xr Foot Right (min 3 Views)    Result Date: 2020  3 views of the right knee, tibia, fibula and ankle. 3 views of the left foot. There is no evidence of acute displaced cortical disruption or dislocation. There is diffuse bone demineralization. There are rather severe degenerative changes of the knee, ankle, midfoot and toes. Arterial calcifications are present. Metallic clips overlie the medial soft tissues of the knee. No acute fracture is identified. Osteoarthritis. Osteopenia. Xr Chest Portable    Result Date: 2020  Patient MRN: [id number] : 1945 Age:  76 years Gender: Female Order Date: 2020 9:30 PM Exam: XR CHEST PORTABLE Number of Images: 1 view Indication:   sob sob Comparison: Prior chest radiograph from 2020 is available. Findings: Study demonstrate cardiomegaly with median sternotomy with left ventricular contour. There is a right-sided portacatheter tip is in superior vena cava. There is uncoiling atherosclerotic change of thoracic aorta. There is old right upper fourth and fifth rib fracture with callus formation. 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 There is a comminuted humeral head fracture seen in the left glenoid fossa. There are multiple surgical clips seen in the left axillary region. The lung fields are clear. There is small right-sided pleural effusion     Cardiomegaly with median sternotomy Small right-sided pleural effusion Other findings are unchanged from prior study.      Us Dup Lower Extremities Bilateral Venous    Result Date: 2020  Real-time

## 2020-07-10 NOTE — CONSULTS
[id number] RN NOTES



RECEIVED PATIENT AWAKE, RESTING COMFORTABLY, REPOSITIONED FOR COMFORT, SAFETY MEASURES IN 
PLACE, ASPIRATION PRECAUTION EMPHASIZED, NO SIGNS OF ACUTE DISTRESS NOTED, CALL LIGHT WITHIN 
EASY REACH. IV ACCESS INTACT AND PATENT, NO SIGNS AND SYMPTOMS OF INFECTION NOTED, ALL NEEDS 
ANTICIPATED, WILL CONTINUE TO MONITOR ACCORDINGLY.

## 2020-07-11 PROBLEM — M10.9 ACUTE GOUT INVOLVING TOE OF LEFT FOOT: Status: ACTIVE | Noted: 2020-07-11

## 2020-07-11 LAB
ANION GAP SERPL CALCULATED.3IONS-SCNC: 11 MMOL/L (ref 7–16)
ANISOCYTOSIS: ABNORMAL
BASOPHILS ABSOLUTE: 0 E9/L (ref 0–0.2)
BASOPHILS RELATIVE PERCENT: 0.8 % (ref 0–2)
BLASTS RELATIVE PERCENT: 3.5 % (ref 0–0)
BUN BLDV-MCNC: 51 MG/DL (ref 8–23)
CALCIUM SERPL-MCNC: 8.3 MG/DL (ref 8.6–10.2)
CHLORIDE BLD-SCNC: 103 MMOL/L (ref 98–107)
CO2: 25 MMOL/L (ref 22–29)
CREAT SERPL-MCNC: 2.2 MG/DL (ref 0.5–1)
EOSINOPHILS ABSOLUTE: 0.15 E9/L (ref 0.05–0.5)
EOSINOPHILS RELATIVE PERCENT: 6.1 % (ref 0–6)
GFR AFRICAN AMERICAN: 26
GFR NON-AFRICAN AMERICAN: 22 ML/MIN/1.73
GLUCOSE BLD-MCNC: 160 MG/DL (ref 74–99)
GRAM STAIN RESULT: ABNORMAL
HCT VFR BLD CALC: 23.9 % (ref 34–48)
HEMOGLOBIN: 7.5 G/DL (ref 11.5–15.5)
INR BLD: 2.9
LYMPHOCYTES ABSOLUTE: 0.38 E9/L (ref 1.5–4)
LYMPHOCYTES RELATIVE PERCENT: 14.9 % (ref 20–42)
MCH RBC QN AUTO: 36.9 PG (ref 26–35)
MCHC RBC AUTO-ENTMCNC: 31.4 % (ref 32–34.5)
MCV RBC AUTO: 117.7 FL (ref 80–99.9)
MONOCYTES ABSOLUTE: 0.05 E9/L (ref 0.1–0.95)
MONOCYTES RELATIVE PERCENT: 1.8 % (ref 2–12)
NEUTROPHILS ABSOLUTE: 1.85 E9/L (ref 1.8–7.3)
NEUTROPHILS RELATIVE PERCENT: 73.7 % (ref 43–80)
ORGANISM: ABNORMAL
ORGANISM: ABNORMAL
PDW BLD-RTO: 16.9 FL (ref 11.5–15)
PLATELET # BLD: 107 E9/L (ref 130–450)
PMV BLD AUTO: 10.4 FL (ref 7–12)
POLYCHROMASIA: ABNORMAL
POTASSIUM REFLEX MAGNESIUM: 4.8 MMOL/L (ref 3.5–5)
PROTHROMBIN TIME: 32.8 SEC (ref 9.3–12.4)
RBC # BLD: 2.03 E12/L (ref 3.5–5.5)
SODIUM BLD-SCNC: 139 MMOL/L (ref 132–146)
WBC # BLD: 2.5 E9/L (ref 4.5–11.5)
WOUND/ABSCESS: ABNORMAL
WOUND/ABSCESS: ABNORMAL

## 2020-07-11 PROCEDURE — 6370000000 HC RX 637 (ALT 250 FOR IP): Performed by: STUDENT IN AN ORGANIZED HEALTH CARE EDUCATION/TRAINING PROGRAM

## 2020-07-11 PROCEDURE — 2060000000 HC ICU INTERMEDIATE R&B

## 2020-07-11 PROCEDURE — 36415 COLL VENOUS BLD VENIPUNCTURE: CPT

## 2020-07-11 PROCEDURE — 85025 COMPLETE CBC W/AUTO DIFF WBC: CPT

## 2020-07-11 PROCEDURE — 2580000003 HC RX 258: Performed by: STUDENT IN AN ORGANIZED HEALTH CARE EDUCATION/TRAINING PROGRAM

## 2020-07-11 PROCEDURE — 80048 BASIC METABOLIC PNL TOTAL CA: CPT

## 2020-07-11 PROCEDURE — 99232 SBSQ HOSP IP/OBS MODERATE 35: CPT | Performed by: FAMILY MEDICINE

## 2020-07-11 PROCEDURE — 85610 PROTHROMBIN TIME: CPT

## 2020-07-11 RX ORDER — PREDNISONE 20 MG/1
40 TABLET ORAL DAILY
Status: DISCONTINUED | OUTPATIENT
Start: 2020-07-11 | End: 2020-07-14

## 2020-07-11 RX ORDER — PREDNISONE 1 MG/1
5 TABLET ORAL DAILY
Status: DISCONTINUED | OUTPATIENT
Start: 2020-07-11 | End: 2020-07-11

## 2020-07-11 RX ORDER — WARFARIN SODIUM 2 MG/1
2 TABLET ORAL
Status: COMPLETED | OUTPATIENT
Start: 2020-07-11 | End: 2020-07-11

## 2020-07-11 RX ADMIN — ISOSORBIDE DINITRATE 5 MG: 10 TABLET ORAL at 09:34

## 2020-07-11 RX ADMIN — SKIN PROTECTANT: 44 OINTMENT TOPICAL at 21:12

## 2020-07-11 RX ADMIN — ISOSORBIDE DINITRATE 5 MG: 10 TABLET ORAL at 18:50

## 2020-07-11 RX ADMIN — DOCUSATE SODIUM 100 MG: 100 CAPSULE, LIQUID FILLED ORAL at 09:34

## 2020-07-11 RX ADMIN — WARFARIN SODIUM 2 MG: 2 TABLET ORAL at 18:49

## 2020-07-11 RX ADMIN — PREGABALIN 50 MG: 50 CAPSULE ORAL at 09:36

## 2020-07-11 RX ADMIN — PREGABALIN 50 MG: 50 CAPSULE ORAL at 14:33

## 2020-07-11 RX ADMIN — PREGABALIN 50 MG: 50 CAPSULE ORAL at 21:12

## 2020-07-11 RX ADMIN — PREDNISONE 40 MG: 20 TABLET ORAL at 12:20

## 2020-07-11 RX ADMIN — Medication 10 ML: at 21:12

## 2020-07-11 RX ADMIN — SKIN PROTECTANT: 44 OINTMENT TOPICAL at 09:37

## 2020-07-11 RX ADMIN — MAGNESIUM GLUCONATE 500 MG ORAL TABLET 400 MG: 500 TABLET ORAL at 09:34

## 2020-07-11 RX ADMIN — MULTIPLE VITAMINS W/ MINERALS TAB 1 TABLET: TAB at 09:34

## 2020-07-11 RX ADMIN — Medication 10 ML: at 09:37

## 2020-07-11 RX ADMIN — Medication 1 TABLET: at 09:34

## 2020-07-11 RX ADMIN — PANTOPRAZOLE SODIUM 40 MG: 40 TABLET, DELAYED RELEASE ORAL at 06:31

## 2020-07-11 RX ADMIN — OXYCODONE HYDROCHLORIDE 5 MG: 5 TABLET ORAL at 11:12

## 2020-07-11 ASSESSMENT — PAIN DESCRIPTION - ORIENTATION
ORIENTATION: RIGHT;LEFT
ORIENTATION: RIGHT;LEFT
ORIENTATION: LEFT;RIGHT

## 2020-07-11 ASSESSMENT — PAIN DESCRIPTION - ONSET
ONSET: ON-GOING

## 2020-07-11 ASSESSMENT — PAIN DESCRIPTION - PAIN TYPE
TYPE: CHRONIC PAIN

## 2020-07-11 ASSESSMENT — PAIN SCALES - GENERAL
PAINLEVEL_OUTOF10: 8
PAINLEVEL_OUTOF10: 8
PAINLEVEL_OUTOF10: 7
PAINLEVEL_OUTOF10: 7
PAINLEVEL_OUTOF10: 0

## 2020-07-11 ASSESSMENT — PAIN - FUNCTIONAL ASSESSMENT
PAIN_FUNCTIONAL_ASSESSMENT: PREVENTS OR INTERFERES SOME ACTIVE ACTIVITIES AND ADLS

## 2020-07-11 ASSESSMENT — PAIN DESCRIPTION - LOCATION
LOCATION: HIP;FOOT;LEG
LOCATION: HIP;LEG;FOOT
LOCATION: HIP

## 2020-07-11 ASSESSMENT — PAIN DESCRIPTION - FREQUENCY
FREQUENCY: CONTINUOUS

## 2020-07-11 ASSESSMENT — PAIN DESCRIPTION - PROGRESSION
CLINICAL_PROGRESSION: GRADUALLY WORSENING
CLINICAL_PROGRESSION: NOT CHANGED
CLINICAL_PROGRESSION: GRADUALLY WORSENING

## 2020-07-11 ASSESSMENT — PAIN DESCRIPTION - DESCRIPTORS
DESCRIPTORS: CONSTANT;ACHING
DESCRIPTORS: CONSTANT
DESCRIPTORS: CONSTANT

## 2020-07-11 NOTE — PROGRESS NOTES
edema with erythema and cellulitis which has impaired her mobility. She had been recently hospitalized and treated for bilateral hip pressure ulcer wounds and had been seen by infectious disease and had debridement by general surgery and MRI of the hip was negative for osteomyelitis. Seen by ID again who recommended no further antibiotics at this time. Diagnostic Data:     Past Medical History:   Diagnosis Date    Abscess of abdominal wall     Anemia     Iron deficiency and chronic disease.  Anesthesia     DIFFICULTY WAKING UP    Arthritis     Arthritis, hip     Breast cancer ([facility] [address] 2005    S/P Left Lumpectomy with Lymph Node Dissection, Chemo/Rad. Follows with Dr. Nabil Vo. No recurrence to date. Surgeon was Dr. Cele Kyle.  CAD (coronary artery disease) 5/2008    s/p CABG. Follows with [address] [place] [address].  CH[facility] (congestive heart failure) ([facility])     Chronic venous insufficiency 11/7/2018    Class 2 severe obesity due to excess calories with serious comorbidity and body mass index (BMI) of 35.0 to 35.9 in adult ([facility] 8/1/2019    Decreased dorsalis pedis pulse 11/7/2018    Degenerative disc disease at L5-S1 level 7/10/2020    Diabetic neuropathy (HCC)     Diabetic neuropathy (HCC)     DVT (deep venous thrombosis) (HCC)     Recurrent. On lifelong coumadin.  DVT of upper extremity (deep vein thrombosis) ([facility] 4/18/2012    History of deep vein thrombosis 11/7/2018    Humerus fracture     Chronic, on the Left.  Hyperlipidemia 8/29/2011    Hypertension     Left leg pain 7/9/2020    Leg swelling 11/7/2018    Lymph node enlargement     Lymphedema of both lower extremities 11/7/2018    Lymphopenia 7/9/2020    MI (myocardial infarction) ([facility]     Nephrolithiasis     Follows with Dr. Sumeet San.  Pericardial effusion     Pulmonary nodule     With mediastinal lymphadenopathy. Stable. Follows with Dr. Annette Willett.     Rib lesion 11/28/11    expansile lesion in one of the right ribs laterally    nonunion 39/93/4547    Complicated UTI (urinary tract infection) 10/24/2017    KARISHMA (acute kidney injury) ([address] 10/24/2017    Diarrhea with dehydration 10/24/2017    Chronic anticoagulation 10/24/2017    Deep vein thrombosis (DVT) of right lower extremity (HCC) 06/06/2017    Peripheral polyneuropathy 01/03/2017    Bilateral edema of lower extremity 10/03/2016    Chronic deep vein thrombosis (DVT) of proximal vein of right lower extremity ([address] 09/30/2016    Type 2 diabetes mellitus without complication, with long-term current use of insulin ([address] 09/01/2016    Anemia of chronic disease 09/01/2016    Ventral hernia 05/14/2015    Rectal bleeding 02/23/2015    Anesthesia     Pericardial effusion     MI (myocardial infarction) ([address]     Arthritis     Lymph node enlargement     Subclavian vein occlusion, bilateral ([address] 04/18/2012    Rib lesion 02/07/2012    Hyperlipidemia 08/29/2011    Sarcoidosis 07/26/2011    B12 deficiency 06/22/2011    Shoulder pain, left 03/02/2011    Metastatic breast cancer ([address]     Essential hypertension     Coronary artery disease involving native coronary artery of native heart without angina pectoris     Nephrolithiasis     CHF (congestive heart failure) ([address]     Humerus fracture         Past Surgical History:   Procedure Laterality Date    APPENDECTOMY      ARTERIAL BYPASS SURGRY      BREAST LUMPECTOMY  9/27/2005    LEFT    CARDIAC SURGERY      CHOLECYSTECTOMY      COLONOSCOPY  12/2007    cecal arteriovenous malformation with hyperplastic polyps    COLONOSCOPY  [id number]    CORONARY ARTERY BYPASS GRAFT  05/2008    triple bypass    ECHO COMPL W DOP COLOR FLOW  10/30/2013         EYE SURGERY      clarissa cataracts    HYSTERECTOMY  1975, 1985    MAYNOR prolapse, benign conditions; BSO later for scar tissues, no CA.      OTHER SURGICAL HISTORY  11/18/11    port removal right chest wall    PARACENTESIS      TONSILLECTOMY      TOOTH EXTRACTION      Full Dental Extraction.  TUNNELED VENOUS PORT PLACEMENT      removal of port Dec. 2011- Dr. Zenaida Farr TUNNELED [address]  [id number]    RIGHT CHEST       Family History  Family History   Adopted: Yes       Social History    TOBACCO:   reports that she has never smoked. She has never used smokeless tobacco.  ETOH:   reports no history of alcohol use. Home Medications  Prior to Admission medications    Medication Sig Start Date End Date Taking? Authorizing Provider   PT/INR Test STRP 1 each by In Vitro route Twice a Week 6/29/20   Jane Reed DO   sodium bicarbonate 650 MG tablet Take 650 mg by mouth 2 times daily    Historical Provider, MD   PT/INR Testing Monitor KIT 1 each by Does not apply route once a week Use to test INR at home once weekly and as directed to monitor INR. 4/9/20   Jane Reed DO   PT/INR Test STRP 1 each by In Vitro route once a week Use to check INR at least once weekly and more often as directed. 4/9/20   Jane Reed, DO   metoprolol succinate (TOPROL XL) 25 MG extended release tablet Take 0.5 tablets by mouth daily 4/8/20   Jane Reed, DO   omeprazole 20 MG EC tablet Take 1 tablet by mouth daily 4/8/20   Jane Reed, DO   bumetanide (BUMEX) 1 MG tablet Take 1 tablet by mouth daily 3/16/20   Jane Reed, DO   pregabalin (LYRICA) 50 MG capsule Take 1 capsule by mouth 3 times daily for 120 days.  2/4/20 6/29/20  Jane Reed, DO   magnesium oxide (MAG-OX) 400 (240 Mg) MG tablet Take 1 tablet by mouth daily 2/4/20   Jane Reed, DO   docusate (COLACE, DULCOLAX) 100 MG CAPS Take 100 mg by mouth daily 2/4/20   Jane Reed,    PT/INR Testing Monitor KIT 1 each by Does not apply route once a week Please use to test INR as directed by physician 1/15/20   Jane Reed DO   isosorbide dinitrate (ISORDIL) 5 MG tablet Take 1 tablet by mouth 3 times daily 1/14/20   Rashawn Mathews MD   vitamin B-12 (CYANOCOBALAMIN) 1000 MCG tablet Take 1 tablet by mouth daily 1/7/20 Trygve Vamsi, DO   Calcium Citrate-Vitamin D (RA CALCIUM CIT-VIT D-3 PETITES) 200-250 MG-UNIT TABS take 1 tablet by mouth twice a day 1/7/20   Trygve Vamsi, DO   albuterol sulfate (PROAIR RESPICLICK) 840 (90 Base) MCG/ACT aerosol powder inhalation Inhale 2 puffs into the lungs every 6 hours as needed for Wheezing or Shortness of Breath 1/7/20   Trygve Vamsi, DO   warfarin (COUMADIN) 1 MG tablet Please take 2 mg by mouth most days, but 3 mg by mouth on Mondays and Wednesdays and Friday. Patient taking differently: Please take 2 mg by mouth most days, but 3 mg by mouth on sun, tues, thurs. 11/25/19   Charity Rios MD   Multiple Vitamins-Minerals (THERAPEUTIC MULTIVITAMIN-MINERALS) tablet Take 1 tablet by mouth daily 9/17/19   Charity Rios MD   Insulin Pen Needle (MEIJER PEN NEEDLES) 29G X 12MM MISC 1 each by Does not apply route daily 9/10/19   Charity Rios MD   nitroGLYCERIN (NITROSTAT) 0.4 MG SL tablet up to max of 3 total doses. If no relief after 1 dose, call 911. 8/19/19   Charity Rios MD   sodium chloride (OCEAN, BABY AYR) 0.65 % nasal spray 1 spray by Nasal route as needed for Congestion (dryness) 8/19/19   Charity Rios MD   palbociclib [facility]) 100 MG capsule Take 100 mg by mouth 3 weeks on and 1 week off also receives injections every 3 weeks per Dr Beronica Phillips MD       Allergies  Allergies   Allergen Reactions    Macrobid [Nitrofurantoin Monohydrate Macrocrystals] Hives       Review of Systems: Relevant for chronic hip pain as well as worsening edema and cellulitis of lower extremities      Objective  BP (!) 108/59   Pulse 73   Temp 97.5 °F (36.4 °C) (Temporal)   Resp 16   Ht 5' 4\" (1.626 m)   Wt 154 lb (69.9 kg)   SpO2 94%   BMI 26.43 kg/m²     Physical Exam:     General: AAO to person, place, time, in no acute distress,   Head and neck : PERRLA, EOMI . Sclera non icteric.   Oropharynx : Clear  Neck: no JVD,  no adenopathy,   Heart: Regular rate and regular rhythm, no murmur  Lungs: Clear to auscultation   Extremities: 3+ edema with overlying erythema and tenderness  Abdomen: Soft, non-tender;no masses, no organomegaly  Skin: Bilateral hip pressure ulcers  Neurologic:Cranial nerves grossly intact. No focal motor or sensory deficits . Recent Laboratory Data-   Lab Results   Component Value Date    WBC 2.5 (L) 07/10/2020    HGB 7.4 (L) 07/10/2020    HCT 23.7 (L) 07/10/2020    .1 (H) 07/10/2020    PLT 99 (L) 07/10/2020    LYMPHOPCT 11.3 (L) 07/10/2020    RBC 1.99 (L) 07/10/2020    MCH 37.2 (H) 07/10/2020    MCHC 31.2 (L) 07/10/2020    RDW 17.1 (H) 07/10/2020    NEUTOPHILPCT 80.0 07/10/2020    MONOPCT 1.7 (L) 07/10/2020    BASOPCT 2.6 (H) 07/10/2020    NEUTROABS 2.00 07/10/2020    LYMPHSABS 0.28 (L) 07/10/2020    MONOSABS 0.05 (L) 07/10/2020    EOSABS 0.11 07/10/2020    BASOSABS 0.07 07/10/2020       Lab Results   Component Value Date     07/10/2020    K 4.1 07/10/2020     07/10/2020    CO2 24 07/10/2020    BUN 48 (H) 07/10/2020    CREATININE 2.4 (H) 07/10/2020    GLUCOSE 175 (H) 07/10/2020    CALCIUM 7.7 (L) 07/10/2020    PROT 7.5 07/07/2020    LABALBU 3.6 07/07/2020    BILITOT 1.0 07/07/2020    ALKPHOS 144 (H) 07/07/2020    AST 34 (H) 07/07/2020    ALT 28 07/07/2020    LABGLOM 20 07/10/2020    GFRAA 24 07/10/2020       Lab Results   Component Value Date    IRON 49 (L) 06/20/2011    TIBC 266 06/20/2011    FERRITIN 2,375 07/08/2020           Radiology-    Xr Tibia Fibula Left (2 Views)    Result Date: 7/7/2020  3 views of the left knee, tibia, fibula and ankle. 3 views of the left foot. There is no evidence of acute displaced cortical disruption or dislocation. There is diffuse bone demineralization. There are rather severe degenerative changes of the knee, ankle, midfoot and toes. Arterial calcifications are present. Metallic clips overlie the medial soft tissues of the knee. No acute fracture is identified. Osteoarthritis. Osteopenia.     Manohar De La Torre atherosclerotic change of thoracic aorta. There is old right upper fourth and fifth rib fracture with callus formation. 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 There is a comminuted humeral head fracture seen in the left glenoid fossa. There are multiple surgical clips seen in the left axillary region. The lung fields are clear. There is small right-sided pleural effusion     Cardiomegaly with median sternotomy Small right-sided pleural effusion Other findings are unchanged from prior study. Mri Hip Right Wo Contrast    Result Date: 2020  Patient MRN: [id number] : 1945 Age:  76 years Gender: Female Order Date: 2020 10:45 AM Exam: MRI HIP LEFT WO CONTRAST, MRI HIP RIGHT WO CONTRAST Number of Images: 453 views Indication:  Bilateral open wounds to both glutei. to rule out osteomyelitis Patient GFR only 27 cannot have contrast. To rule out osteomyelitis Comparison: Pelvic CT dated 2019 Technique: Noncontrast MRI both hips. Findings: There is extensive abnormal T1 weighted bone marrow signal involving the left femoral head, neck and left iliac bone extending into the acetabulum and left ischium and inferior pubic ramus. There is a soft tissue wound in the left gluteal region with soft tissue edema in the adjacent fat as well as within the gluteus medius muscle compatible with myositis. There is a soft tissue wound in the right gluteal region which extends to the margin of gluteus medius and gluteus janny muscles. There is soft tissue edema in some of the adjacent fat but none immediately surrounding the tract extending to the gluteal muscles. There is pelvic ascites. 1. Bilateral gluteal soft tissue wounds. Soft tissue edema present in the adjacent fat bilaterally, left greater than right. Additional soft tissue edema in the left gluteus medius muscle which may relate to underlying myositis.  2. Extensive abnormal bone marrow signal involving the left femoral head, neck, left iliac bone, acetabulum and inferior pubic ramus. Differential diagnostic considerations include infection as well as neoplasia. Consider correlation with noncontrast CT which may allow further characterization of the lesion as well as pinpoint potential biopsy targets. 3. Pelvic ascites. Mri Hip Left Wo Contrast    Result Date: 2020  Patient MRN: [id number] : 1945 Age:  76 years Gender: Female Order Date: 2020 10:45 AM Exam: MRI HIP LEFT WO CONTRAST, MRI HIP RIGHT WO CONTRAST Number of Images: 878 views Indication:  Bilateral open wounds to both glutei. to rule out osteomyelitis Patient GFR only 27 cannot have contrast. To rule out osteomyelitis Comparison: Pelvic CT dated 2019 Technique: Noncontrast MRI both hips. Findings: There is extensive abnormal T1 weighted bone marrow signal involving the left femoral head, neck and left iliac bone extending into the acetabulum and left ischium and inferior pubic ramus. There is a soft tissue wound in the left gluteal region with soft tissue edema in the adjacent fat as well as within the gluteus medius muscle compatible with myositis. There is a soft tissue wound in the right gluteal region which extends to the margin of gluteus medius and gluteus janny muscles. There is soft tissue edema in some of the adjacent fat but none immediately surrounding the tract extending to the gluteal muscles. There is pelvic ascites. 1. Bilateral gluteal soft tissue wounds. Soft tissue edema present in the adjacent fat bilaterally, left greater than right. Additional soft tissue edema in the left gluteus medius muscle which may relate to underlying myositis. 2. Extensive abnormal bone marrow signal involving the left femoral head, neck, left iliac bone, acetabulum and inferior pubic ramus. Differential diagnostic considerations include infection as well as neoplasia.  Consider correlation with noncontrast CT which may allow further characterization of the lesion as well as pinpoint potential biopsy targets. 3. Pelvic ascites. Us Abdomen Limited    Result Date: 2020  Patient MRN:  [id number] : 1945 Age: 76 years Gender: Female Order Date:  2020 12:00 PM Exam: US ABDOMEN LIMITED Number of images:  21 Indication: Ascites Patient has bone metastases on chemo. Symptoms are abdominal distention. Comparison: Ultrasound dated 2019. Correlation with CT dated 2019, 2019. Findings: Grayscale sonographic images of the abdomen were obtained by the ultrasound technologist to assess for ascites. A small volume of fluid is seen in the left lower quadrant. Heterogeneously hyperechoic masses are seen in the right hepatic lobe. Minimal ascites. Hepatic masses, compatible with findings of hepatic masses on CT from  and consistent with the patient's history of biopsy-proven metastatic breast cancer to the liver. Contrast-enhanced cross-sectional imaging should be considered to reevaluate the extent of metastatic disease and to further evaluate the patient's subjective abdominal distention. ALERT:  THIS IS AN ABNORMAL REPORT     Us Retroperitoneal Complete    Result Date: 2020  Patient MRN:  [id number] : 1945 Age: 76 years Gender: Female Order Date:  2020 11:45 AM EXAM: US RETROPERITONEAL COMPLETE NUMBER OF IMAGES:  77 views INDICATION:  acute kidney injury acute kidney injury COMPARISON: None The right kidney is 10.5 x 4.3 x 4.3 cm The left kidney  is 10.2 x 4.3 x 5 cm There is complex lesion in the right kidney measuring 1.5 x 1.3 x 1.2 cm compatible with a cyst There is no hydronephrosis identified There is no calculus identified The bladder is unremarkable     Findings compatible with a right renal cyst     Us Dup Lower Extremities Bilateral Venous    Result Date: 2020  Real-time and Doppler sonography of the deep venous structures of the lower extremities. Grayscale, color Doppler, and spectral Doppler analysis.  Occlusive and nonocclusive intraluminal substance is identified in the right common femoral, superficial femoral, popliteal, posterior tibial and peroneal veins. Baker's cyst on the left. There is adequate and spontaneous blood flow, compressibility and augmentation within the left common femoral, superficial femoral, popliteal, posterior tibial, anterior tibial and peroneal veins. Extensive deep vein thrombosis throughout the right lower extremity. ASSESSMENT/PLAN : 70-year-old woman    Remote history of breast cancer since 2005 with evidence of metastatic disease since 2012 with interval development of pulmonary and hepatic metastasis in 2019    Has done well with stable disease on Ibrance/Faslodex for recurrent ER positive HER-2 negative metastatic breast cancer to liver. Acute renal failure superimposed on chronic kidney disease and she had been on Bumex for her severe peripheral edema    Cellulitis of lower extremities    Bilateral hip pressure ulcers being managed by ID and wound clinic    Nephrology has been consulted for her acute renal injury  Seen by ID with recommendation for no further antibiotics. Anemia is multifactorial and related to her metastatic breast cancer, myelosuppression by Janae Rico as well as anemia of CKD stage IV  ANC adequate at 2.24 and she has chronic lymphopenia    - Hgb stable at 7.5, Cr 2.4  - RLE DVT on warfarin  - Now on Dapto per ID, ID input appreciated  - Hgb goal of >7.  Transfuse as needed    Betty Nava MD  Electronically signed 7/11/2020 at 9:17 AM

## 2020-07-11 NOTE — PROGRESS NOTES
[facility] - Family Medicine Inpatient   Resident Progress Note    S:  Hospital day: 3   Brief Synopsis: 26-year-old woman with history of breast cancer status post lumpectomy with bony meta stasis, liver meta stasis, coronary artery disease, CHF ejection fraction 50 to 55%, previous DVT, and recent admission for hip wounds presented for increasing left leg pain. Pain started on Saturday prior to admission, worsened as the days went on, no trauma,falls. Acute events over the last 24 hours- ID changed antibiotic to Daptomycin. Wound culture grew +Staph Aureus. NSG reccomends pain management consult for patient. Seen at bedside AM- improved pain left leg. Some right toe pain, feels its gout related.  No CP SOB Abdominal Pain N/V      Cont meds:    sodium chloride 75 mL/hr at 07/10/20 1904     Scheduled meds:    warfarin  2 mg Oral Once    predniSONE  40 mg Oral Daily    daptomycin (CUBICIN) IVPB  6 mg/kg (Adjusted) Intravenous Q48H    lidocaine-EPINEPHrine  20 mL Intradermal Once    mineral oil-hydrophilic petrolatum   Topical BID    [Held by provider] bumetanide  1 mg Intravenous Daily    calcium-vitamin D  1 tablet Oral Daily    docusate sodium  100 mg Oral Daily    isosorbide dinitrate  5 mg Oral TID    magnesium oxide  400 mg Oral Daily    [Held by provider] metoprolol succinate  12.5 mg Oral Daily    therapeutic multivitamin-minerals  1 tablet Oral Daily    pantoprazole  40 mg Oral QAM AC    pregabalin  50 mg Oral TID    [Held by provider] vitamin B-12  1,000 mcg Oral Daily    sodium chloride flush  10 mL Intravenous 2 times per day    sodium chloride   Intravenous Q12H    warfarin (COUMADIN) daily dosing (placeholder)   Other RX Placeholder     PRN meds: oxyCODONE, povidone-iodine, mineral oil-hydrophilic petrolatum **AND** mineral oil-hydrophilic petrolatum, albuterol, sodium chloride flush, acetaminophen **OR** acetaminophen, magnesium hydroxide, promethazine **OR** [DISCONTINUED] ondansetron I reviewed the patient's past medical and surgical history, Medications and Allergies. O:  BP (!) 114/47   Pulse 80   Temp 98.6 °F (37 °C) (Temporal)   Resp 14   Ht 5' 4\" (1.626 m)   Wt 154 lb (69.9 kg)   SpO2 96%   BMI 26.43 kg/m²   24 hour I&O: I/O last 3 completed shifts: In: 360 [P.O.:360]  Out: 475 [Urine:475]  I/O this shift:  In: -   Out: 350 [Urine:350]      Physical Exam   Constitutional: She is oriented to person, place, and time. She appears well-nourished. No distress. HENT:   Head: Normocephalic and atraumatic. Eyes: Scleral icterus is present. Cardiovascular: Normal rate, regular rhythm and normal heart sounds. Exam reveals no gallop and no friction rub. No murmur heard. Dorsalis pedis pulse- palpable with doppler   Pulmonary/Chest: Breath sounds normal. No respiratory distress. She has no wheezes. Abdominal: Bowel sounds are normal. There is no abdominal tenderness. Musculoskeletal:         General: Tenderness present. Comments: Left leg tender to palpation- diffuse. Strength 5/5 lower extremities. Left toe PIP TTP. No erythema warmth swelling     Neurological: She is alert and oriented to person, place, and time. Skin:   Wounds on hips- as seen in media    Dry discolored skin bilateral lower extremities. No erythema. Mild edema lower extremity (non-pitting)       Labs:  Na/K/Cl/CO2:  139/4.8/103/25 (07/11 7153)  BUN/Cr/glu/ALT/AST/amyl/lip:  51/2.2/--/--/--/--/-- (07/11 2139)  WBC/Hgb/Hct/Plts:  2.5/7.5/23.9/107 (07/11 9728)  estimated creatinine clearance is 22 mL/min (A) (based on SCr of 2.2 mg/dL (H)).   Other pertinent labs as noted below    Radiology:  MRI LUMBAR SPINE WO CONTRAST   Final Result   Findings compatible with degenerative changes   No convincing evidence for metastatic disease in the spine   There are multiple lesions in the liver seen on the margin of this   study, compatible with metastatic disease      ALERT:  THIS IS AN ABNORMAL REPORT US RETROPERITONEAL COMPLETE   Final Result   Findings compatible with a right renal cyst         XR FOOT LEFT (MIN 3 VIEWS)   Final Result      No acute fracture is identified. Osteoarthritis. Osteopenia. XR FOOT RIGHT (MIN 3 VIEWS)   Final Result      No acute fracture is identified. Osteoarthritis. Osteopenia. XR TIBIA FIBULA RIGHT (2 VIEWS)   Final Result      No acute fracture is identified. Osteoarthritis. Osteopenia. XR TIBIA FIBULA LEFT (2 VIEWS)   Final Result      No acute fracture is identified. Osteoarthritis. Osteopenia. US DUP LOWER EXTREMITIES BILATERAL VENOUS   Final Result      Extensive deep vein thrombosis throughout the right lower extremity. XR CHEST PORTABLE   Final Result      Cardiomegaly with median sternotomy      Small right-sided pleural effusion      Other findings are unchanged from prior study. A/P:  Principal Problem:    Left leg pain  Active Problems:    Deep vein thrombosis (DVT) of right lower extremity (HCC)    KARISHMA (acute kidney injury) ([address]    Cellulitis    Decubitus ulcer of both hip    Lymphopenia    Degenerative disc disease at L5-S1 level    Acute gout involving toe of left foot  Resolved Problems:    * No resolved hospital problems. *    Left leg pain likely 2/2 to Lumbar Radicular Pain on Left Side  Pain Management consulted. NSG feels pain is bc deep wound in left hip and infection hip area- affecting sciatic nerve  NSG Dr. Simone Lynne evaluated patient- no nerve block/intervention at this time  MRI LS 7/9: Degen changes/ L1-L5 broad-base disc herniation, mild central canal stenosis. Oxycodone q 8 for pain  Held on vascular consult at this time. Decubitus Ulcer Hips + Staph Aureus  ID started patient on Daptomycin. WC + staph aureus  General surgery did debridement 7/9. Wound care on board  Previous admit 6/18-6/22 for failure of o/p tx for hip ulcers.  WC + Corynebacterium /staph aureus at that time-did not need antibiotics at d/c    Gout Left Toe  5 day course prednisone started    KARISHMA-prerenal  Nephrology, Dr. Keven Mendez, is consulted. Creatinine trending downward with IV NaCl @ 75 ml/hour  Baseline Scr 1.5-2.0 last year  Continue to monitor I's and O's    DVT Right Lower Extremity  Coumadin, pharmacy dosing. INR 2.9  US lower extremity: extensive dvt through the right lower extremity. Hx of RLE DVT    Anemia- likely multifactorial related to metastatic breast cancer, Ibrance, CKD stage IV  Threshold transfusion Hb<7.  Hematology-oncology consulted. Lymphopenia  ANC 2.24-chronic lymphopenia  Hematology oncology consulted    HTN/CAD s/p CABG/ Breast Cancer/ HFrEF  Held Bumex secondary to KARISHMA, Held Toprol 2/2 to low pressures. Isosorbide dinitrate      GI/DVT ppx: Protonix/Coumadin   Dispo: needs HHC with PT/OT on discharge. SW seen pt.  Declined [place] at this time      Electronically signed by Washington Jaeger  PGY-2 on 7/11/2020 at 11:19 AM  This case was discussed with attending physician: Dr. King Araujo

## 2020-07-11 NOTE — PROGRESS NOTES
[facility]  Family Medicine Attending    S: 76 y.o. female with PMH of HTN, breast cancer s/p lumpectomy with bone mets, CAD, CABG, CHF EF:50-55 RVSP 30 mmHg , chronic venous insufficiency, DVT, B/l hip wounds who presented to ER with 3 day history of  left leg pain. Had difficulty walking 2/2 pain and yesterday became unbearable so came to ER. Denies no fevers or chills, loss of sensation or falls. Patient reports that she has severe left pain in her leg that started acutely on Saturday- overall is improved but continues to have severe pain even to light touch in left big toe that she says feels like her previous gout as well as her left knee. Denies trouble breathing or chest pain. O: VS- Blood pressure (!) 108/59, pulse 73, temperature 97.5 °F (36.4 °C), temperature source Temporal, resp. rate 16, height 5' 4\" (1.626 m), weight 154 lb (69.9 kg), SpO2 94 %, not currently breastfeeding. Exam is as noted by resident with the following changes, additions or corrections:  Gen - lying in bed, NAD  Cardio - RRR, stable systolic murmur  Lungs - Lungs CTAB , no wheeze    Ext- 2+ pitting edema with chronic venous stasis skin changes. Peripheral pulses palpated on the right, diminished on left (present by doppler), bilateral feet warm and well perfused. No joint erythema or effusion. Left big toe ttp even to light touch         Impressions:   Principal Problem:    Left leg pain  Active Problems:    Deep vein thrombosis (DVT) of right lower extremity (HCC)    KARISHMA (acute kidney injury) ([facility] [address]    Cellulitis    Decubitus ulcer of both hip    Lymphopenia    Degenerative disc disease at L5-S1 level  Resolved Problems:    * No resolved hospital problems. *      Plan:   Appreciate management of ID for concern of infection, nephro for KARISHMA, heme/onc for malignancy as well as anemia and right lower extremity DVT, general surgery for chronic wounds and need for debridement. INR 2.9 today.     Pain control, oxycodone. Continue IVF. Neurosurgery consulted - did not recommend epidural nerve block. Pain management consulted. Will trial prednisone burst 40mg x 5 days for possible gout. Appreciate consultant recs. Attending Physician Statement  I have reviewed the chart, including any radiology or labs, and seen the patient with the resident(s). I personally reviewed and performed key elements of the history and exam.  I agree with the assessment, plan and orders as documented by the resident. Please refer to the resident note for additional information. Tanya Cheng.  G[place]

## 2020-07-11 NOTE — PROGRESS NOTES
Pharmacy Consultation Note  (Warfarin Dosing and Monitoring)    Initial consult date: 7/8  Consulting physician: Kaur Matthews    Allergies:  Macrobid [nitrofurantoin monohydrate macrocrystals]    76 y.o. female    Ht Readings from Last 1 Encounters:   07/07/20 5' 4\" (1.626 m)     Wt Readings from Last 1 Encounters:   07/07/20 154 lb (69.9 kg)         Warfarin Indication Target   INR [place] Dose  (if applicable) Diet/Feeding Tube   (Enteral feeds, nutritional drinks, increased Vitamin K in diet can decrease INR)   RLE DVT 2-3 3mg Tues, Thurs, Sat; 2mg all other days General       x Home Med? Meds Increasing INR x Home Med?  Meds Decreasing INR     Allopurinol    Azathioprine     Amiodarone/Propafenone/Dronedarone   Carbamazepine     Androgens   Cholestyramine     Chemotherapy (BBW: Capecitabine)   Estrogen     Ciprofloxacin/Levofloxacin   Nafcillin/Dicloxacillin     Clarithromycin/Erythromycin/Azithromycin   Barbiturates      Fluconazole/Itraconazole/Voriconazole/Ketoconazole   Phenytoin (Variable)     Metronidazole   Rifampin     Phenytoin (Variable)   Steroids (Variable/Dose Dependent)      Statins/Fenofibrate/Gemfibrozil   Sucralfate     Steroids (Variable/Dose Dependent)   Other:     Sulfamethoxazole/Trimethoprim        Tramadol         Other:        Comments regarding medication interactions:      x Diseases Affecting INR x Increased Bleeding Risk    CHF Exacerbation (Increases)  History GI Bleed/PUD    Liver Disease (Increases)  Chronic NSAID Use    Thyroid: Hyper (Increases)  Hypo (Decreases)  Chronic ASA/Antiplatelet Use (Clopidogrel/ Dipyridamole/Prasugrel/Ticagrelor)     X Malignancy (Increases) Xx Abnormal Renal Function (dialysis, renal transplant, SCr ? 2.3 mg/dL)     History of EtOH Abuse: Acute (Increases)   Chronic (Decreases)  Liver Function (cirrhosis, bilirubin >2x ULN with AST/ALT/AP >3x ULN)    Fever (Increases) X Age > 65 years   X Acute infection (Increases)  Hypertension/Uncontrolled BP Diarrhea/Dehydration (Increases)  History of stroke    Other: __________________  Other:___________________       Vitamin K or Blood product  Administration Date                    TSH:    Lab Results   Component Value Date    TSH 6.930 06/10/2019        Hepatic Function Panel:                            Lab Results   Component Value Date    ALKPHOS 144 07/07/2020    ALT 28 07/07/2020    AST 34 07/07/2020    PROT 7.5 07/07/2020    BILITOT 1.0 07/07/2020    BILIDIR 0.2 06/10/2019    IBILI 0.3 06/10/2019    LABALBU 3.6 07/07/2020    LABALBU 4.4 03/25/2012       Date Warfarin Dose INR Heparin or LMWH HGB/HCT PLT Comment   7/8 3mg 1.8 -- 8.5/25.4 121    7/9 3mg 2.1 -- 7.2/23 106    7/10 3mg 2.3 -- 7.4/23.7 99    7//11 <2 mg> 2.9 --- 7.5/23.9 107               Assessment and Plan:  · Pt is a 77 yo female with history of RLE DVT on warfarin PTA   · Pt has been subtherapeutic on warfarin most recently for the past 2 weeks after being on hold at last admission  · Goal INR 2-3  · INR 2.9   · Warfarin 2 mg tonight  · Daily PT/INR until the INR is stable within the therapeutic range  · Pharmacist will follow and monitor/adjust dosing as necessary    Thank you for this consult,    Lanny Lion PharmD 7/11/2020 9:29 AM  [phone]

## 2020-07-11 NOTE — PROGRESS NOTES
Progress Note  NEPHROLOGY    Reason for Consult:  KARISHMA on CKD    Requesting Physician:  Dr. Iza Fowler    Chief Complaint:  Pain in legs and inability to walk    History Obtained From:  patient, electronic medical record    History of Present Ilness: This is a 76 y.o.  female with a H/O CKD stage IV with baseline creatinine of 1.5-2.0 over the past year, whom we have followed in the hospital in the past for KARISHMA most recently in August 2019. She had been seen a little over a year ago in the office by Dr. Maribel Galaviz but has not been followed up since. Additional PMH: HTN, breast cancer s/p lumpectomy with bone mets, CAD, CABG, CHF EF:50-55 RVSP 30 mmHg , chronic venous insufficiency, DVT, B/l hip wounds, recently admitted for hip wounds that grew corynebacterium and S. Aureus and was discharged about 3 weeks ago. denies any recent NSAID use, no ACE/ARB. She was on merrem and vancomycin last admission in June her creatinine ranged 1.8-2.0 during that admission. She had a dose of vancomycin in the ED. She states she has been eating and drinking, she was on bumex PTA    She is awake and alert and is having LE pain as well as pain left hip from a chronic wound     7/9/20: seen & examined-had fallen asleep holding her sandwich. States she feels alright and has improved appetite. 7/10: Sitting up in chair, offers no new c/o  7/11: seen in room, no cp or sob    Past Medical History:        Diagnosis Date    Abscess of abdominal wall     Anemia     Iron deficiency and chronic disease.  Anesthesia     DIFFICULTY WAKING UP    Arthritis     Arthritis, hip     Breast cancer ([facility] [address] 2005    S/P Left Lumpectomy with Lymph Node Dissection, Chemo/Rad. Follows with Dr. Earlyne Schlatter. No recurrence to date. Surgeon was Dr. Emily Snider.  CAD (coronary artery disease) 5/2008    s/p CABG. Follows with [address] [place] [address].     CHF (congestive heart failure) (HCC)     Chronic venous insufficiency 11/7/2018    Class 2 severe obesity due to excess calories with serious comorbidity and body mass index (BMI) of 35.0 to 35.9 in adult [facility]) 8/1/2019    Decreased dorsalis pedis pulse 11/7/2018    Degenerative disc disease at L5-S1 level 7/10/2020    Diabetic neuropathy (HCC)     Diabetic neuropathy (HCC)     DVT (deep venous thrombosis) (HCC)     Recurrent. On lifelong coumadin.  DVT of upper extremity (deep vein thrombosis) ([address] 4/18/2012    History of deep vein thrombosis 11/7/2018    Humerus fracture     Chronic, on the Left.  Hyperlipidemia 8/29/2011    Hypertension     Left leg pain 7/9/2020    Leg swelling 11/7/2018    Lymph node enlargement     Lymphedema of both lower extremities 11/7/2018    Lymphopenia 7/9/2020    MI (myocardial infarction) ([address]     Nephrolithiasis     Follows with Dr. Mian Martinez.  Pericardial effusion     Pulmonary nodule     With mediastinal lymphadenopathy. Stable. Follows with Dr. Alba Villegas.  Rib lesion 11/28/11    expansile lesion in one of the right ribs laterally    Sarcoidosis 07/26/11    per transbronchial needle aspiration    Subclavian vein occlusion, bilateral ([address] 4/18/2012    Type II or unspecified type diabetes mellitus without mention of complication, not stated as uncontrolled        Past Surgical History:        Procedure Laterality Date    APPENDECTOMY      ARTERIAL BYPASS SURGRY      BREAST LUMPECTOMY  9/27/2005    LEFT    CARDIAC SURGERY      CHOLECYSTECTOMY      COLONOSCOPY  12/2007    cecal arteriovenous malformation with hyperplastic polyps    COLONOSCOPY  [id number]    CORONARY ARTERY BYPASS GRAFT  05/2008    triple bypass    ECHO COMPL W DOP COLOR FLOW  10/30/2013         EYE SURGERY      clarissa cataracts    HYSTERECTOMY  1975, 1985    MAYNOR prolapse, benign conditions; BSO later for scar tissues, no CA.      OTHER SURGICAL HISTORY  11/18/11    port removal right chest wall    PARACENTESIS      TONSILLECTOMY      TOOTH EXTRACTION      Full Dental Extraction.     MG/5ML suspension 30 mL, 30 mL, Oral, Daily PRN  promethazine (PHENERGAN) tablet 12.5 mg, 12.5 mg, Oral, Q6H PRN **OR** [DISCONTINUED] ondansetron (ZOFRAN) injection 4 mg, 4 mg, Intravenous, Q6H PRN  0.9 % sodium chloride infusion admixture, , Intravenous, Q12H  0.9 % sodium chloride infusion, , Intravenous, Continuous  warfarin (COUMADIN) daily dosing (placeholder), , Other, RX Placeholder    Allergies:  Macrobid [nitrofurantoin monohydrate macrocrystals]    Social History:      Smoking: Never Smoker   Smokeless Tobacco: Never Used   Alcohol: No       Family History:     Family History   Adopted: Yes         Review of Systems:       Pertinent positives stated above in HPI. All other systems were reviewed and were negative.     Physical exam:   Constitutional:  VITALS:  BP (!) 114/47   Pulse 80   Temp 98.6 °F (37 °C) (Temporal)   Resp 14   Ht 5' 4\" (1.626 m)   Wt 154 lb (69.9 kg)   SpO2 96%   BMI 26.43 kg/m²   CURRENT TEMPERATURE:  Temp: 98.6 °F (37 °C)  CURRENT RESPIRATORY RATE:  Resp: 14  CURRENT PULSE:  Pulse: 80  CURRENT BLOOD PRESSURE:  BP: (!) 114/47  24HR BLOOD PRESSURE RANGE:  Systolic (06XHE), AFE:266 , Min:106 , TY   ; Diastolic (56KUN), SDL:92, Min:47, Max:59    24HR INTAKE/OUTPUT:      Intake/Output Summary (Last 24 hours) at 2020 1254  Last data filed at 2020 0816  Gross per 24 hour   Intake 240 ml   Output 825 ml   Net -585 ml     Gen: alert, awake, nad  Skin: no rash, turgor wnl  Heent:  eomi, mmm  Neck: No jvd noted  Cardiovascular:  S1, S2 no S3 or rub  Respiratory: clear upper, diminished in bases with equal expansion  Abdomen:  +bs, soft, nt, nd  Ext: ++ bilateral edema, tender to touch  Psychiatric: mood and affect appropriate  Musculoskeletal:  Rom, muscular strength intact    DATA:      CBC with Differential:    Lab Results   Component Value Date    WBC 2.5 2020    RBC 2.03 2020    HGB 7.5 2020    HCT 23.9 2020     2020    .7 07/11/2020    MCH 36.9 07/11/2020    MCHC 31.4 07/11/2020    RDW 16.9 07/11/2020    NRBC 0.9 06/22/2020    SEGSPCT 65 04/20/2012    BLASTSPCT 3.5 07/11/2020    METASPCT 0.9 07/21/2019    LYMPHOPCT 14.9 07/11/2020    PROMYELOPCT 0.9 07/08/2020    MONOPCT 1.8 07/11/2020    MYELOPCT 0.9 07/09/2020    BASOPCT 0.8 07/11/2020    MONOSABS 0.05 07/11/2020    LYMPHSABS 0.38 07/11/2020    EOSABS 0.15 07/11/2020    BASOSABS 0.00 07/11/2020     CMP:    Lab Results   Component Value Date     07/11/2020    K 4.8 07/11/2020     07/11/2020    CO2 25 07/11/2020    BUN 51 07/11/2020    CREATININE 2.2 07/11/2020    GFRAA 26 07/11/2020    LABGLOM 22 07/11/2020    GLUCOSE 160 07/11/2020    GLUCOSE 109 04/20/2012    PROT 7.5 07/07/2020    LABALBU 3.6 07/07/2020    LABALBU 4.4 03/25/2012    CALCIUM 8.3 07/11/2020    BILITOT 1.0 07/07/2020    ALKPHOS 144 07/07/2020    AST 34 07/07/2020    ALT 28 07/07/2020       RAD:  Xr Tibia Fibula Left (2 Views)    Result Date: 7/7/2020  3 views of the left knee, tibia, fibula and ankle. 3 views of the left foot. There is no evidence of acute displaced cortical disruption or dislocation. There is diffuse bone demineralization. There are rather severe degenerative changes of the knee, ankle, midfoot and toes. Arterial calcifications are present. Metallic clips overlie the medial soft tissues of the knee. No acute fracture is identified. Osteoarthritis. Osteopenia. Xr Tibia Fibula Right (2 Views)    Result Date: 7/7/2020  3 views of the right knee, tibia, fibula and ankle. 3 views of the left foot. There is no evidence of acute displaced cortical disruption or dislocation. There is diffuse bone demineralization. There are rather severe degenerative changes of the knee, ankle, midfoot and toes. Arterial calcifications are present. Metallic clips overlie the medial soft tissues of the knee. No acute fracture is identified. Osteoarthritis. Osteopenia.      Xr Foot Left (min 3 Views)    Result Date: 2020  3 views of the left knee, tibia, fibula and ankle. 3 views of the left foot. There is no evidence of acute displaced cortical disruption or dislocation. There is diffuse bone demineralization. There are rather severe degenerative changes of the knee, ankle, midfoot and toes. Arterial calcifications are present. Metallic clips overlie the medial soft tissues of the knee. No acute fracture is identified. Osteoarthritis. Osteopenia. Xr Foot Right (min 3 Views)    Result Date: 2020  3 views of the right knee, tibia, fibula and ankle. 3 views of the left foot. There is no evidence of acute displaced cortical disruption or dislocation. There is diffuse bone demineralization. There are rather severe degenerative changes of the knee, ankle, midfoot and toes. Arterial calcifications are present. Metallic clips overlie the medial soft tissues of the knee. No acute fracture is identified. Osteoarthritis. Osteopenia. Xr Chest Portable    Result Date: 2020  Patient MRN: [id number] : 1945 Age:  76 years Gender: Female Order Date: 2020 9:30 PM Exam: XR CHEST PORTABLE Number of Images: 1 view Indication:   sob sob Comparison: Prior chest radiograph from 2020 is available. Findings: Study demonstrate cardiomegaly with median sternotomy with left ventricular contour. There is a right-sided portacatheter tip is in superior vena cava. There is uncoiling atherosclerotic change of thoracic aorta. There is old right upper fourth and fifth rib fracture with callus formation. 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 There is a comminuted humeral head fracture seen in the left glenoid fossa. There are multiple surgical clips seen in the left axillary region. The lung fields are clear. There is small right-sided pleural effusion     Cardiomegaly with median sternotomy Small right-sided pleural effusion Other findings are unchanged from prior study.      Us Dup Lower Extremities Bilateral Venous    Result Date: 7/7/2020  Real-time and Doppler sonography of the deep venous structures of the lower extremities. Grayscale, color Doppler, and spectral Doppler analysis. Occlusive and nonocclusive intraluminal substance is identified in the right common femoral, superficial femoral, popliteal, posterior tibial and peroneal veins. Baker's cyst on the left. There is adequate and spontaneous blood flow, compressibility and augmentation within the left common femoral, superficial femoral, popliteal, posterior tibial, anterior tibial and peroneal veins. Extensive deep vein thrombosis throughout the right lower extremity. Assessment/Plan    · Acute kidney injury  Likely pre-renal in the setting of diuretics and  poor po intake with FeNa <1% and low urine urea  Not on ACE/ARB- was on diuretics  She has been started on IVF and will follow on same.    R U/S No hydro, no calculus, right renal cyst noted  Cr with improving remand in response to IVF; will plan to stop IVF 7/11    · Chronic kidney disease stage IV  Baseline Scr 1.5-2.0 over the past year    · Hypertension with CKD I-IV  Well controlled on current medications    · Bilateral hip wounds  Seen by ID;no plans for antibiotics now     · Anemia with metastatic breast cancer  Will defer to Hematology        Yessy Mckinney MD  7/11/2020  12:54 PM

## 2020-07-11 NOTE — PROGRESS NOTES
IMPRESSION:  Lumbar radicular pain on the left side secondary to deep  wound in the left hip and infection of the hip area and probably  affecting the sciatic nerve on that side. There is no indication for  any neurosurgical interventions on the basis of MRI scan of the lumbar  Spine. Nothing new to add from neurosurgical stand point at this time.

## 2020-07-11 NOTE — PROGRESS NOTES
Pt able to stand and transfer back to bed with 2 assists. SStates \"my hips and legs hurt - but hurt all the time.   Call bell within reach

## 2020-07-11 NOTE — PROGRESS NOTES
[facility] - Family Medicine Inpatient   Resident Progress Note    S:  Hospital day: 3   Brief Synopsis: 77-year-old woman with history of breast cancer status post lumpectomy with bony meta stasis, liver meta stasis, coronary artery disease, CHF ejection fraction 50 to 55%, previous DVT, and recent admission for hip wounds presented for increasing left leg pain. Pain started on Saturday prior to admission, worsened as the days went on, no trauma,falls. Acute events over the last 24 hours- ID changed antibiotic to Daptomycin. Wound culture grew +Staph Aureus. NSG reccomends pain management consult for patient. Seen at bedside AM- improved pain left leg. Some right toe pain, feels its gout related.  No CP SOB Abdominal Pain N/V      Cont meds:    sodium chloride 75 mL/hr at 07/10/20 1904     Scheduled meds:    warfarin  2 mg Oral Once    daptomycin (CUBICIN) IVPB  6 mg/kg (Adjusted) Intravenous Q48H    lidocaine-EPINEPHrine  20 mL Intradermal Once    mineral oil-hydrophilic petrolatum   Topical BID    [Held by provider] bumetanide  1 mg Intravenous Daily    calcium-vitamin D  1 tablet Oral Daily    docusate sodium  100 mg Oral Daily    isosorbide dinitrate  5 mg Oral TID    magnesium oxide  400 mg Oral Daily    [Held by provider] metoprolol succinate  12.5 mg Oral Daily    therapeutic multivitamin-minerals  1 tablet Oral Daily    pantoprazole  40 mg Oral QAM AC    pregabalin  50 mg Oral TID    [Held by provider] vitamin B-12  1,000 mcg Oral Daily    sodium chloride flush  10 mL Intravenous 2 times per day    sodium chloride   Intravenous Q12H    warfarin (COUMADIN) daily dosing (placeholder)   Other RX Placeholder     PRN meds: oxyCODONE, povidone-iodine, mineral oil-hydrophilic petrolatum **AND** mineral oil-hydrophilic petrolatum, albuterol, sodium chloride flush, acetaminophen **OR** acetaminophen, magnesium hydroxide, promethazine **OR** [DISCONTINUED] ondansetron     I reviewed the patient's past compatible with a right renal cyst         XR FOOT LEFT (MIN 3 VIEWS)   Final Result      No acute fracture is identified. Osteoarthritis. Osteopenia. XR FOOT RIGHT (MIN 3 VIEWS)   Final Result      No acute fracture is identified. Osteoarthritis. Osteopenia. XR TIBIA FIBULA RIGHT (2 VIEWS)   Final Result      No acute fracture is identified. Osteoarthritis. Osteopenia. XR TIBIA FIBULA LEFT (2 VIEWS)   Final Result      No acute fracture is identified. Osteoarthritis. Osteopenia. US DUP LOWER EXTREMITIES BILATERAL VENOUS   Final Result      Extensive deep vein thrombosis throughout the right lower extremity. XR CHEST PORTABLE   Final Result      Cardiomegaly with median sternotomy      Small right-sided pleural effusion      Other findings are unchanged from prior study. A/P:  Principal Problem:    Left leg pain  Active Problems:    Deep vein thrombosis (DVT) of right lower extremity (HCC)    KARISHMA (acute kidney injury) ([address]    Cellulitis    Decubitus ulcer of both hip    Lymphopenia    Degenerative disc disease at L5-S1 level  Resolved Problems:    * No resolved hospital problems. *    Left leg pain likely 2/2 to Lumbar Radicular Pain on Left Side  Pain Management consulted. NSG feels pain is bc deep wound in left hip and infection hip area- affecting sciatic nerve  NSG Dr. Imelda Alegria evaluated patient- no nerve block/intervention at this time  MRI LS 7/9: Degen changes/ L1-L5 broad-base disc herniation, mild central canal stenosis. Oxycodone q 8 for pain  Held on vascular consult at this time. Decubitus Ulcer Hips + Staph Aureus  ID started patient on Daptomycin. WC + staph aureus  General surgery did debridement 7/9. Wound care on board  Previous admit 6/18-6/22 for failure of o/p tx for hip ulcers.  WC + Corynebacterium /staph aureus at that time-did not need antibiotics at d/c    KARISHMA-prerenal  Nephrology, Dr. Oliverio Dunham, is consulted. Creatinine trending downward with IV NaCl @ 75 ml/hour  Baseline Scr 1.5-2.0 last year  Continue to monitor I's and O's    DVT Right Lower Extremity  Coumadin, pharmacy dosing. INR 2.9  US lower extremity: extensive dvt through the right lower extremity. Hx of RLE DVT    Anemia- likely multifactorial related to metastatic breast cancer, Ibrance, CKD stage IV  Threshold transfusion Hb<7.  Hematology-oncology consulted. Lymphopenia  ANC 2.24-chronic lymphopenia  Hematology oncology consulted    HTN/CAD s/p CABG/ Breast Cancer/ HFrEF  Held Bumex secondary to KARISHMA, Held Toprol 2/2 to low pressures. Isosorbide dinitrate      GI/DVT ppx: Protonix/Coumadin   Dispo: needs HHC with PT/OT on discharge. SW seen pt.  Declined [place] at this time      Electronically signed by Aditya Saleem  PGY-2 on 7/11/2020 at 9:45 AM  This case was discussed with attending physician: Dr. Barkley Cornea

## 2020-07-11 NOTE — PROGRESS NOTES
[address] [place] [address] [facility]  Progress Note      Chief Complaint   Patient presents with    Leg Swelling     BLE SWELLING X1 DAY, hx CHF       SUBJECTIVE:      Patient is tolerating medications. No reported adverse drug reactions. No problems overnight. Review of systems:    As stated above in the chief complaint, otherwise negative. Medications:    Scheduled Meds:   warfarin  2 mg Oral Once    predniSONE  40 mg Oral Daily    daptomycin (CUBICIN) IVPB  6 mg/kg (Adjusted) Intravenous Q48H    lidocaine-EPINEPHrine  20 mL Intradermal Once    mineral oil-hydrophilic petrolatum   Topical BID    [Held by provider] bumetanide  1 mg Intravenous Daily    calcium-vitamin D  1 tablet Oral Daily    docusate sodium  100 mg Oral Daily    isosorbide dinitrate  5 mg Oral TID    magnesium oxide  400 mg Oral Daily    [Held by provider] metoprolol succinate  12.5 mg Oral Daily    therapeutic multivitamin-minerals  1 tablet Oral Daily    pantoprazole  40 mg Oral QAM AC    pregabalin  50 mg Oral TID    [Held by provider] vitamin B-12  1,000 mcg Oral Daily    sodium chloride flush  10 mL Intravenous 2 times per day    sodium chloride   Intravenous Q12H    warfarin (COUMADIN) daily dosing (placeholder)   Other RX Placeholder     Continuous Infusions:   sodium chloride 75 mL/hr at 07/10/20 1904     PRN Meds:oxyCODONE, povidone-iodine, mineral oil-hydrophilic petrolatum **AND** mineral oil-hydrophilic petrolatum, albuterol, sodium chloride flush, acetaminophen **OR** acetaminophen, magnesium hydroxide, promethazine **OR** [DISCONTINUED] ondansetron  Prior to Admission medications    Medication Sig Start Date End Date Taking?  Authorizing Provider   PT/INR Test STRP 1 each by In Vitro route Twice a Week 6/29/20   Rosalind Kumar,    sodium bicarbonate 650 MG tablet Take 650 mg by mouth 2 times daily    Historical Provider, MD   PT/INR Testing Monitor KIT 1 each by Does not apply route once a week Use to test INR at home once weekly and as directed to monitor INR. 4/9/20   Buck Arevalo, DO   PT/INR Test STRP 1 each by In Vitro route once a week Use to check INR at least once weekly and more often as directed. 4/9/20   Buck Arevalo, DO   metoprolol succinate (TOPROL XL) 25 MG extended release tablet Take 0.5 tablets by mouth daily 4/8/20   Buck Arevalo, DO   omeprazole 20 MG EC tablet Take 1 tablet by mouth daily 4/8/20   Buck Arevalo, DO   bumetanide (BUMEX) 1 MG tablet Take 1 tablet by mouth daily 3/16/20   Buck Arevalo, DO   pregabalin (LYRICA) 50 MG capsule Take 1 capsule by mouth 3 times daily for 120 days. 2/4/20 6/29/20  Buck Arevalo, DO   magnesium oxide (MAG-OX) 400 (240 Mg) MG tablet Take 1 tablet by mouth daily 2/4/20   Buck Arevalo, DO   docusate (COLACE, DULCOLAX) 100 MG CAPS Take 100 mg by mouth daily 2/4/20   Buck Arevalo, DO   PT/INR Testing Monitor KIT 1 each by Does not apply route once a week Please use to test INR as directed by physician 1/15/20   Buck Arevalo, DO   isosorbide dinitrate (ISORDIL) 5 MG tablet Take 1 tablet by mouth 3 times daily 1/14/20   Claudene Iles, MD   vitamin B-12 (CYANOCOBALAMIN) 1000 MCG tablet Take 1 tablet by mouth daily 1/7/20   Buck Arevalo, DO   Calcium Citrate-Vitamin D (RA CALCIUM CIT-VIT D-3 PETITES) 200-250 MG-UNIT TABS take 1 tablet by mouth twice a day 1/7/20   Buck Arevalo, DO   albuterol sulfate (PROAIR RESPICLICK) 531 (90 Base) MCG/ACT aerosol powder inhalation Inhale 2 puffs into the lungs every 6 hours as needed for Wheezing or Shortness of Breath 1/7/20   Buck Arevalo, DO   warfarin (COUMADIN) 1 MG tablet Please take 2 mg by mouth most days, but 3 mg by mouth on Mondays and Wednesdays and Friday. Patient taking differently: Please take 2 mg by mouth most days, but 3 mg by mouth on sun, tues, thurs.  11/25/19   Zachary Terrell MD   Multiple Vitamins-Minerals (THERAPEUTIC MULTIVITAMIN-MINERALS) tablet Take 1 Value Date    ALT 28 07/07/2020    AST 34 (H) 07/07/2020    ALKPHOS 144 (H) 07/07/2020    BILITOT 1.0 07/07/2020     Lab Results   Component Value Date     07/11/2020    K 4.8 07/11/2020     07/11/2020    CO2 25 07/11/2020    BUN 51 07/11/2020    CREATININE 2.2 07/11/2020    CREATININE 2.4 07/10/2020    CREATININE 2.8 07/09/2020    GFRAA 26 07/11/2020    LABGLOM 22 07/11/2020    GLUCOSE 160 07/11/2020    GLUCOSE 109 04/20/2012    PROT 7.5 07/07/2020    LABALBU 3.6 07/07/2020    LABALBU 4.4 03/25/2012    CALCIUM 8.3 07/11/2020    BILITOT 1.0 07/07/2020    ALKPHOS 144 07/07/2020    AST 34 07/07/2020    ALT 28 07/07/2020     Lab Results   Component Value Date    CRP 16.2 (H) 07/07/2020    CRP 4.2 (H) 06/19/2020     Lab Results   Component Value Date    SEDRATE 134 (H) 07/07/2020    SEDRATE 102 (H) 06/21/2020    SEDRATE 130 (H) 06/19/2020       Radiology:    MRI LUMBAR SPINE WO CONTRAST   Final Result   Findings compatible with degenerative changes   No convincing evidence for metastatic disease in the spine   There are multiple lesions in the liver seen on the margin of this   study, compatible with metastatic disease      ALERT:  THIS IS AN ABNORMAL REPORT      US RETROPERITONEAL COMPLETE   Final Result   Findings compatible with a right renal cyst         XR FOOT LEFT (MIN 3 VIEWS)   Final Result      No acute fracture is identified. Osteoarthritis. Osteopenia. XR FOOT RIGHT (MIN 3 VIEWS)   Final Result      No acute fracture is identified. Osteoarthritis. Osteopenia. XR TIBIA FIBULA RIGHT (2 VIEWS)   Final Result      No acute fracture is identified. Osteoarthritis. Osteopenia. XR TIBIA FIBULA LEFT (2 VIEWS)   Final Result      No acute fracture is identified. Osteoarthritis. Osteopenia. US DUP LOWER EXTREMITIES BILATERAL VENOUS   Final Result      Extensive deep vein thrombosis throughout the right lower extremity.       XR CHEST PORTABLE   Final Result      Cardiomegaly with median sternotomy      Small right-sided pleural effusion      Other findings are unchanged from prior study. Microbiology:   Lab Results   Component Value Date    BC 24 Hours no growth 07/07/2020    BC 5 Days- no growth 08/12/2019    BC 5 Days- no growth 08/09/2019    ORG Staphylococcus aureus 07/08/2020    ORG Corynebacterium species 07/08/2020    ORG Staphylococcus aureus 06/21/2020    ORG Corynebacterium species 06/21/2020     Lab Results   Component Value Date    BLOODCULT2 24 Hours no growth 07/07/2020    BLOODCULT2 5 Days- no growth 08/12/2019    BLOODCULT2 5 Days- no growth 08/09/2019    ORG Staphylococcus aureus 07/08/2020    ORG Corynebacterium species 07/08/2020    ORG Staphylococcus aureus 06/21/2020    ORG Corynebacterium species 06/21/2020     WOUND/ABSCESS   Date Value Ref Range Status   07/08/2020   Final    Moderate growth  Methicillin resistant Staph aureus isolated. Most Methicillin  resistant Staphylococcus are usually resistant to multiple  antibiotics including other B-Lactams, Aminoglycosides,  Macrolides, Clindamycin and Tetracycline. Contact isolation  is indicated. 07/08/2020 Heavy growth  Final   06/21/2020   Final    Moderate growth  Methicillin resistant Staph aureus isolated. Most Methicillin  resistant Staphylococcus are usually resistant to multiple  antibiotics including other B-Lactams, Aminoglycosides,  Macrolides, Clindamycin and Tetracycline. Contact isolation  is indicated.      06/21/2020 Light growth  Final     No results found for: RESPSMEAR  No results found for: MPNEUMO, CLAMYDCU, LABLEGI, AFBCX, FUNGSM, LABFUNG  No results found for: CULTRESP  No results found for: CXCATHTIP  Body Fluid Culture, Sterile   Date Value Ref Range Status   07/25/2019 Growth not present  Final     No results found for: CXSURG  Urine Culture, Routine   Date Value Ref Range Status   07/07/2020 Growth not present  Final   08/13/2019 >100,000 CFU/ml  Final   08/08/2019 <10,000 CFU/mL  Gram negative rods    Final     MRSA Culture Only   Date Value Ref Range Status   08/09/2019 Methicillin resistant Staph aureus not isolated  Final       Problem list:    Principal Problem:    Left leg pain  Active Problems:    Deep vein thrombosis (DVT) of right lower extremity (HCC)    KARISHMA (acute kidney injury) ([facility] [address]    Cellulitis    Decubitus ulcer of both hip    Lymphopenia    Degenerative disc disease at L5-S1 level    Acute gout involving toe of left foot  Resolved Problems:    * No resolved hospital problems.  *      ASSESSMENT:    Bilateral hip wounds--- stage I pressure ulcers   Remote history of breast cancer since 2005 with evidence of metastatic disease since 2012 with interval development of pulmonary and hepatic metastasis in 2019   debridment yesterday Now we have surgical tissue cultures growing staph Most likely mrsa Will stop zosyn and start dapto She has renal insufficincy and heme issues dont want to use zyvox or vanco   Plan:   Stop zosyn and start daptomycin   Pharmacy help appreciated   Check cultures   Monitor labs   Will follow with you   Wound care following   General surgery following   Hematology oncology following      Ashley Pate    11:03 AM  7/11/2020

## 2020-07-11 NOTE — PROGRESS NOTES
Progress Note  NEPHROLOGY    Reason for Consult:  KARISHMA on CKD    Requesting Physician:  Dr. Iza Fowler    Chief Complaint:  Pain in legs and inability to walk    History Obtained From:  patient, electronic medical record    History of Present Ilness: This is a 76 y.o.  female with a H/O CKD stage IV with baseline creatinine of 1.5-2.0 over the past year, whom we have followed in the hospital in the past for KARISHMA most recently in August 2019. She had been seen a little over a year ago in the office by Dr. Maribel Galaviz but has not been followed up since. Additional PMH: HTN, breast cancer s/p lumpectomy with bone mets, CAD, CABG, CHF EF:50-55 RVSP 30 mmHg , chronic venous insufficiency, DVT, B/l hip wounds, recently admitted for hip wounds that grew corynebacterium and S. Aureus and was discharged about 3 weeks ago. denies any recent NSAID use, no ACE/ARB. She was on merrem and vancomycin last admission in June her creatinine ranged 1.8-2.0 during that admission. She had a dose of vancomycin in the ED. She states she has been eating and drinking, she was on bumex PTA    She is awake and alert and is having LE pain as well as pain left hip from a chronic wound     7/9/20: seen & examined-had fallen asleep holding her sandwich. States she feels alright and has improved appetite. 7/10: Sitting up in chair, offers no new c/o    Past Medical History:        Diagnosis Date    Abscess of abdominal wall     Anemia     Iron deficiency and chronic disease.  Anesthesia     DIFFICULTY WAKING UP    Arthritis     Arthritis, hip     Breast cancer ([facility] [address] 2005    S/P Left Lumpectomy with Lymph Node Dissection, Chemo/Rad. Follows with Dr. Earlyne Schlatter. No recurrence to date. Surgeon was Dr. Emily Snider.  CAD (coronary artery disease) 5/2008    s/p CABG. Follows with [address] [place] [address].     CHF (congestive heart failure) (HCC)     Chronic venous insufficiency 11/7/2018    Class 2 severe obesity due to excess calories with serious comorbidity and body mass index (BMI) of 35.0 to 35.9 in adult [facility]) 8/1/2019    Decreased dorsalis pedis pulse 11/7/2018    Degenerative disc disease at L5-S1 level 7/10/2020    Diabetic neuropathy (HCC)     Diabetic neuropathy (HCC)     DVT (deep venous thrombosis) (HCC)     Recurrent. On lifelong coumadin.  DVT of upper extremity (deep vein thrombosis) ([address] 4/18/2012    History of deep vein thrombosis 11/7/2018    Humerus fracture     Chronic, on the Left.  Hyperlipidemia 8/29/2011    Hypertension     Left leg pain 7/9/2020    Leg swelling 11/7/2018    Lymph node enlargement     Lymphedema of both lower extremities 11/7/2018    Lymphopenia 7/9/2020    MI (myocardial infarction) ([address]     Nephrolithiasis     Follows with Dr. Mian Martinez.  Pericardial effusion     Pulmonary nodule     With mediastinal lymphadenopathy. Stable. Follows with Dr. Alba Villegas.  Rib lesion 11/28/11    expansile lesion in one of the right ribs laterally    Sarcoidosis 07/26/11    per transbronchial needle aspiration    Subclavian vein occlusion, bilateral ([address] 4/18/2012    Type II or unspecified type diabetes mellitus without mention of complication, not stated as uncontrolled        Past Surgical History:        Procedure Laterality Date    APPENDECTOMY      ARTERIAL BYPASS SURGRY      BREAST LUMPECTOMY  9/27/2005    LEFT    CARDIAC SURGERY      CHOLECYSTECTOMY      COLONOSCOPY  12/2007    cecal arteriovenous malformation with hyperplastic polyps    COLONOSCOPY  [id number]    CORONARY ARTERY BYPASS GRAFT  05/2008    triple bypass    ECHO COMPL W DOP COLOR FLOW  10/30/2013         EYE SURGERY      clarissa cataracts    HYSTERECTOMY  1975, 1985    MAYNOR prolapse, benign conditions; BSO later for scar tissues, no CA.      OTHER SURGICAL HISTORY  11/18/11    port removal right chest wall    PARACENTESIS      TONSILLECTOMY      TOOTH EXTRACTION      Full Dental Extraction.     TUNNELED VENOUS PORT PLACEMENT removal of port Dec. 2011- Dr. Nadege Muir TUNNELED [address]  [id number]    RIGHT CHEST       Current Medications:    Current Facility-Administered Medications: oxyCODONE (ROXICODONE) immediate release tablet 5 mg, 5 mg, Oral, Q6H PRN  DAPTOmycin (CUBICIN) 350 mg in sodium chloride 0.9 % 50 mL IVPB, 6 mg/kg (Adjusted), Intravenous, Q48H  lidocaine-EPINEPHrine 1 percent-1:251224 injection 20 mL, 20 mL, Intradermal, Once  povidone-iodine (BETADINE) 10 % external solution, , Topical, PRN  mineral oil-hydrophilic petrolatum (HYDROPHOR) ointment, , Topical, BID **AND** mineral oil-hydrophilic petrolatum (HYDROPHOR) ointment, , Topical, BID PRN  albuterol (PROVENTIL) nebulizer solution 2.5 mg, 2.5 mg, Nebulization, Q4H PRN  [Held by provider] bumetanide (BUMEX) injection 1 mg, 1 mg, Intravenous, Daily  calcium-vitamin D (OSCAL-500) 500-200 MG-UNIT per tablet 1 tablet, 1 tablet, Oral, Daily  docusate sodium (COLACE) capsule 100 mg, 100 mg, Oral, Daily  isosorbide dinitrate (ISORDIL) tablet 5 mg, 5 mg, Oral, TID  magnesium oxide (MAG-OX) tablet 400 mg, 400 mg, Oral, Daily  [Held by provider] metoprolol succinate (TOPROL XL) extended release tablet 12.5 mg, 12.5 mg, Oral, Daily  therapeutic multivitamin-minerals 1 tablet, 1 tablet, Oral, Daily  pantoprazole (PROTONIX) tablet 40 mg, 40 mg, Oral, QAM AC  pregabalin (LYRICA) capsule 50 mg, 50 mg, Oral, TID  [Held by provider] vitamin B-12 (CYANOCOBALAMIN) tablet 1,000 mcg, 1,000 mcg, Oral, Daily  sodium chloride flush 0.9 % injection 10 mL, 10 mL, Intravenous, 2 times per day  sodium chloride flush 0.9 % injection 10 mL, 10 mL, Intravenous, PRN  acetaminophen (TYLENOL) tablet 650 mg, 650 mg, Oral, Q6H PRN **OR** acetaminophen (TYLENOL) suppository 650 mg, 650 mg, Rectal, Q6H PRN  magnesium hydroxide (MILK OF MAGNESIA) 400 MG/5ML suspension 30 mL, 30 mL, Oral, Daily PRN  promethazine (PHENERGAN) tablet 12.5 mg, 12.5 mg, Oral, Q6H PRN **OR** [DISCONTINUED] ondansetron (ZOFRAN) injection 4 mg, 4 mg, Intravenous, Q6H PRN  0.9 % sodium chloride infusion admixture, , Intravenous, Q12H  0.9 % sodium chloride infusion, , Intravenous, Continuous  warfarin (COUMADIN) daily dosing (placeholder), , Other, RX Placeholder    Allergies:  Macrobid [nitrofurantoin monohydrate macrocrystals]    Social History:      Smoking: Never Smoker   Smokeless Tobacco: Never Used   Alcohol: No       Family History:     Family History   Adopted: Yes         Review of Systems:       Pertinent positives stated above in HPI. All other systems were reviewed and were negative.     Physical exam:   Constitutional:  VITALS:  BP (!) 108/59   Pulse 73   Temp 97.5 °F (36.4 °C) (Temporal)   Resp 16   Ht 5' 4\" (1.626 m)   Wt 154 lb (69.9 kg)   SpO2 94%   BMI 26.43 kg/m²   CURRENT TEMPERATURE:  Temp: 97.5 °F (36.4 °C)  CURRENT RESPIRATORY RATE:  Resp: 16  CURRENT PULSE:  Pulse: 73  CURRENT BLOOD PRESSURE:  BP: (!) 108/59  24HR BLOOD PRESSURE RANGE:  Systolic (66FBE), EQN:079 , Min:104 , HIY:171   ; Diastolic (35TJT), ELL:10, Min:51, Max:59    24HR INTAKE/OUTPUT:      Intake/Output Summary (Last 24 hours) at 7/11/2020 0802  Last data filed at 7/10/2020 2136  Gross per 24 hour   Intake 360 ml   Output 475 ml   Net -115 ml     Gen: alert, awake, nad  Skin: no rash, turgor wnl  Heent:  eomi, mmm  Neck: No jvd noted  Cardiovascular:  S1, S2 no S3 or rub  Respiratory: clear upper, diminished in bases with equal expansion  Abdomen:  +bs, soft, nt, nd  Ext: ++ bilateral edema, tender to touch  Psychiatric: mood and affect appropriate  Musculoskeletal:  Rom, muscular strength intact    DATA:      CBC with Differential:    Lab Results   Component Value Date    WBC 2.5 07/10/2020    RBC 1.99 07/10/2020    HGB 7.4 07/10/2020    HCT 23.7 07/10/2020    PLT 99 07/10/2020    .1 07/10/2020    MCH 37.2 07/10/2020    MCHC 31.2 07/10/2020    RDW 17.1 07/10/2020    NRBC 0.9 06/22/2020    SEGSPCT 65 04/20/2012    BLASTSPCT 1.8 06/22/2020    METASPCT 0.9 07/21/2019    LYMPHOPCT 11.3 07/10/2020    PROMYELOPCT 0.9 07/08/2020    MONOPCT 1.7 07/10/2020    MYELOPCT 0.9 07/09/2020    BASOPCT 2.6 07/10/2020    MONOSABS 0.05 07/10/2020    LYMPHSABS 0.28 07/10/2020    EOSABS 0.11 07/10/2020    BASOSABS 0.07 07/10/2020     CMP:    Lab Results   Component Value Date     07/10/2020    K 4.1 07/10/2020     07/10/2020    CO2 24 07/10/2020    BUN 48 07/10/2020    CREATININE 2.4 07/10/2020    GFRAA 24 07/10/2020    LABGLOM 20 07/10/2020    GLUCOSE 175 07/10/2020    GLUCOSE 109 04/20/2012    PROT 7.5 07/07/2020    LABALBU 3.6 07/07/2020    LABALBU 4.4 03/25/2012    CALCIUM 7.7 07/10/2020    BILITOT 1.0 07/07/2020    ALKPHOS 144 07/07/2020    AST 34 07/07/2020    ALT 28 07/07/2020       RAD:  Xr Tibia Fibula Left (2 Views)    Result Date: 7/7/2020  3 views of the left knee, tibia, fibula and ankle. 3 views of the left foot. There is no evidence of acute displaced cortical disruption or dislocation. There is diffuse bone demineralization. There are rather severe degenerative changes of the knee, ankle, midfoot and toes. Arterial calcifications are present. Metallic clips overlie the medial soft tissues of the knee. No acute fracture is identified. Osteoarthritis. Osteopenia. Xr Tibia Fibula Right (2 Views)    Result Date: 7/7/2020  3 views of the right knee, tibia, fibula and ankle. 3 views of the left foot. There is no evidence of acute displaced cortical disruption or dislocation. There is diffuse bone demineralization. There are rather severe degenerative changes of the knee, ankle, midfoot and toes. Arterial calcifications are present. Metallic clips overlie the medial soft tissues of the knee. No acute fracture is identified. Osteoarthritis. Osteopenia. Xr Foot Left (min 3 Views)    Result Date: 7/7/2020  3 views of the left knee, tibia, fibula and ankle. 3 views of the left foot.  There is no evidence of acute displaced Grayscale, color Doppler, and spectral Doppler analysis. Occlusive and nonocclusive intraluminal substance is identified in the right common femoral, superficial femoral, popliteal, posterior tibial and peroneal veins. Baker's cyst on the left. There is adequate and spontaneous blood flow, compressibility and augmentation within the left common femoral, superficial femoral, popliteal, posterior tibial, anterior tibial and peroneal veins. Extensive deep vein thrombosis throughout the right lower extremity. Assessment/Plan    · Acute kidney injury-  Likely pre-renal in the setting of diuretics and  poor po intake with FeNa <1% and low urine urea  Not on ACE/ARB- was on diuretics  She has been started on IVF and will follow on same.    R U/S No hydro, no calculus, right renal cyst noted  Cr with improving remand in response to IVF; will plan to stop IVF 7/11    · Chronic kidney disease stage IV-  Baseline Scr 1.5-2.0 over the past year    · Hypertension with CKD I-IV-  Well controlled on current medications    · Bilateral hip wounds  Seen by ID;no plans for antibiotics now     · Anemia with metastatic breast cancer-  Will defer to Hematology        Christian Hannah MD  7/11/2020  8:02 AM

## 2020-07-12 ENCOUNTER — APPOINTMENT (OUTPATIENT)
Dept: ULTRASOUND IMAGING | Age: 75
DRG: 264 | End: 2020-07-12
Payer: COMMERCIAL

## 2020-07-12 PROBLEM — R09.89 DECREASED DORSALIS PEDIS PULSE: Status: RESOLVED | Noted: 2018-11-07 | Resolved: 2020-07-12

## 2020-07-12 LAB
ANION GAP SERPL CALCULATED.3IONS-SCNC: 11 MMOL/L (ref 7–16)
ANISOCYTOSIS: ABNORMAL
BASOPHILS ABSOLUTE: 0.01 E9/L (ref 0–0.2)
BASOPHILS RELATIVE PERCENT: 0.4 % (ref 0–2)
BLOOD CULTURE, ROUTINE: NORMAL
BUN BLDV-MCNC: 52 MG/DL (ref 8–23)
CALCIUM SERPL-MCNC: 8.7 MG/DL (ref 8.6–10.2)
CHLORIDE BLD-SCNC: 101 MMOL/L (ref 98–107)
CO2: 24 MMOL/L (ref 22–29)
CREAT SERPL-MCNC: 1.8 MG/DL (ref 0.5–1)
CULTURE, BLOOD 2: NORMAL
EOSINOPHILS ABSOLUTE: 0 E9/L (ref 0.05–0.5)
EOSINOPHILS RELATIVE PERCENT: 0 % (ref 0–6)
GFR AFRICAN AMERICAN: 33
GFR NON-AFRICAN AMERICAN: 27 ML/MIN/1.73
GLUCOSE BLD-MCNC: 335 MG/DL (ref 74–99)
HCT VFR BLD CALC: 24.6 % (ref 34–48)
HEMOGLOBIN: 7.9 G/DL (ref 11.5–15.5)
IMMATURE GRANULOCYTES #: 0.01 E9/L
IMMATURE GRANULOCYTES %: 0.4 % (ref 0–5)
INR BLD: 3.5
LYMPHOCYTES ABSOLUTE: 0.19 E9/L (ref 1.5–4)
LYMPHOCYTES RELATIVE PERCENT: 7.9 % (ref 20–42)
MCH RBC QN AUTO: 36.7 PG (ref 26–35)
MCHC RBC AUTO-ENTMCNC: 32.1 % (ref 32–34.5)
MCV RBC AUTO: 114.4 FL (ref 80–99.9)
MONOCYTES ABSOLUTE: 0.09 E9/L (ref 0.1–0.95)
MONOCYTES RELATIVE PERCENT: 3.7 % (ref 2–12)
NEUTROPHILS ABSOLUTE: 2.11 E9/L (ref 1.8–7.3)
NEUTROPHILS RELATIVE PERCENT: 87.6 % (ref 43–80)
PDW BLD-RTO: 16.3 FL (ref 11.5–15)
PLATELET # BLD: 116 E9/L (ref 130–450)
PMV BLD AUTO: 10.5 FL (ref 7–12)
POLYCHROMASIA: ABNORMAL
POTASSIUM REFLEX MAGNESIUM: 5.3 MMOL/L (ref 3.5–5)
POTASSIUM SERPL-SCNC: 5.1 MMOL/L (ref 3.5–5)
PROTHROMBIN TIME: 40.2 SEC (ref 9.3–12.4)
RBC # BLD: 2.15 E12/L (ref 3.5–5.5)
SODIUM BLD-SCNC: 136 MMOL/L (ref 132–146)
WBC # BLD: 2.4 E9/L (ref 4.5–11.5)

## 2020-07-12 PROCEDURE — 97535 SELF CARE MNGMENT TRAINING: CPT

## 2020-07-12 PROCEDURE — 85610 PROTHROMBIN TIME: CPT

## 2020-07-12 PROCEDURE — 2060000000 HC ICU INTERMEDIATE R&B

## 2020-07-12 PROCEDURE — 6360000002 HC RX W HCPCS: Performed by: INTERNAL MEDICINE

## 2020-07-12 PROCEDURE — 84132 ASSAY OF SERUM POTASSIUM: CPT

## 2020-07-12 PROCEDURE — 6370000000 HC RX 637 (ALT 250 FOR IP): Performed by: STUDENT IN AN ORGANIZED HEALTH CARE EDUCATION/TRAINING PROGRAM

## 2020-07-12 PROCEDURE — 99232 SBSQ HOSP IP/OBS MODERATE 35: CPT | Performed by: FAMILY MEDICINE

## 2020-07-12 PROCEDURE — 76705 ECHO EXAM OF ABDOMEN: CPT

## 2020-07-12 PROCEDURE — 2580000003 HC RX 258: Performed by: INTERNAL MEDICINE

## 2020-07-12 PROCEDURE — 97530 THERAPEUTIC ACTIVITIES: CPT

## 2020-07-12 PROCEDURE — 2580000003 HC RX 258: Performed by: STUDENT IN AN ORGANIZED HEALTH CARE EDUCATION/TRAINING PROGRAM

## 2020-07-12 PROCEDURE — 36415 COLL VENOUS BLD VENIPUNCTURE: CPT

## 2020-07-12 PROCEDURE — 80048 BASIC METABOLIC PNL TOTAL CA: CPT

## 2020-07-12 PROCEDURE — 85025 COMPLETE CBC W/AUTO DIFF WBC: CPT

## 2020-07-12 RX ORDER — POLYETHYLENE GLYCOL 3350 17 G/17G
17 POWDER, FOR SOLUTION ORAL DAILY
Status: DISCONTINUED | OUTPATIENT
Start: 2020-07-12 | End: 2020-07-15 | Stop reason: HOSPADM

## 2020-07-12 RX ADMIN — Medication 10 ML: at 20:11

## 2020-07-12 RX ADMIN — PREGABALIN 50 MG: 50 CAPSULE ORAL at 20:11

## 2020-07-12 RX ADMIN — ISOSORBIDE DINITRATE 5 MG: 10 TABLET ORAL at 13:08

## 2020-07-12 RX ADMIN — POLYETHYLENE GLYCOL 3350 17 G: 17 POWDER, FOR SOLUTION ORAL at 13:08

## 2020-07-12 RX ADMIN — PREGABALIN 50 MG: 50 CAPSULE ORAL at 14:49

## 2020-07-12 RX ADMIN — SKIN PROTECTANT: 44 OINTMENT TOPICAL at 20:12

## 2020-07-12 RX ADMIN — PANTOPRAZOLE SODIUM 40 MG: 40 TABLET, DELAYED RELEASE ORAL at 05:44

## 2020-07-12 RX ADMIN — DOCUSATE SODIUM 100 MG: 100 CAPSULE, LIQUID FILLED ORAL at 08:51

## 2020-07-12 RX ADMIN — ISOSORBIDE DINITRATE 5 MG: 10 TABLET ORAL at 17:18

## 2020-07-12 RX ADMIN — SODIUM CHLORIDE, PRESERVATIVE FREE 10 ML: 5 INJECTION INTRAVENOUS at 14:49

## 2020-07-12 RX ADMIN — MULTIPLE VITAMINS W/ MINERALS TAB 1 TABLET: TAB at 08:50

## 2020-07-12 RX ADMIN — OXYCODONE HYDROCHLORIDE 5 MG: 5 TABLET ORAL at 05:44

## 2020-07-12 RX ADMIN — OXYCODONE HYDROCHLORIDE 5 MG: 5 TABLET ORAL at 12:33

## 2020-07-12 RX ADMIN — ISOSORBIDE DINITRATE 5 MG: 10 TABLET ORAL at 08:50

## 2020-07-12 RX ADMIN — Medication 10 ML: at 08:51

## 2020-07-12 RX ADMIN — DAPTOMYCIN 350 MG: 500 INJECTION, POWDER, LYOPHILIZED, FOR SOLUTION INTRAVENOUS at 13:08

## 2020-07-12 RX ADMIN — PREDNISONE 40 MG: 20 TABLET ORAL at 08:51

## 2020-07-12 RX ADMIN — Medication 1 TABLET: at 08:50

## 2020-07-12 RX ADMIN — PREGABALIN 50 MG: 50 CAPSULE ORAL at 08:52

## 2020-07-12 RX ADMIN — SKIN PROTECTANT: 44 OINTMENT TOPICAL at 08:52

## 2020-07-12 RX ADMIN — MAGNESIUM GLUCONATE 500 MG ORAL TABLET 400 MG: 500 TABLET ORAL at 08:51

## 2020-07-12 RX ADMIN — OXYCODONE HYDROCHLORIDE 5 MG: 5 TABLET ORAL at 18:36

## 2020-07-12 ASSESSMENT — PAIN DESCRIPTION - DESCRIPTORS
DESCRIPTORS: CONSTANT;ACHING
DESCRIPTORS: ACHING
DESCRIPTORS: ACHING;CONSTANT

## 2020-07-12 ASSESSMENT — PAIN DESCRIPTION - ORIENTATION
ORIENTATION: LEFT;RIGHT
ORIENTATION: LEFT;UPPER

## 2020-07-12 ASSESSMENT — PAIN SCALES - GENERAL
PAINLEVEL_OUTOF10: 9
PAINLEVEL_OUTOF10: 9
PAINLEVEL_OUTOF10: 8
PAINLEVEL_OUTOF10: 9
PAINLEVEL_OUTOF10: 8
PAINLEVEL_OUTOF10: 8
PAINLEVEL_OUTOF10: 6

## 2020-07-12 ASSESSMENT — PAIN DESCRIPTION - LOCATION
LOCATION: HIP;LEG
LOCATION: HIP
LOCATION: ABDOMEN

## 2020-07-12 ASSESSMENT — PAIN DESCRIPTION - PAIN TYPE
TYPE: CHRONIC PAIN
TYPE: ACUTE PAIN
TYPE: CHRONIC PAIN

## 2020-07-12 ASSESSMENT — PAIN DESCRIPTION - FREQUENCY
FREQUENCY: INTERMITTENT
FREQUENCY: CONTINUOUS
FREQUENCY: CONTINUOUS

## 2020-07-12 ASSESSMENT — PAIN DESCRIPTION - PROGRESSION
CLINICAL_PROGRESSION: NOT CHANGED

## 2020-07-12 ASSESSMENT — PAIN - FUNCTIONAL ASSESSMENT
PAIN_FUNCTIONAL_ASSESSMENT: PREVENTS OR INTERFERES SOME ACTIVE ACTIVITIES AND ADLS

## 2020-07-12 ASSESSMENT — PAIN DESCRIPTION - ONSET
ONSET: ON-GOING

## 2020-07-12 NOTE — PROGRESS NOTES
[facility]  Nephrology Attending Progress Note  Denisse Villeda. Freedom Santiago MD        SUBJECTIVE:     This is a 76 y.o.  female with a H/O CKD stage IV with baseline creatinine of 1.5-2.0 over the past year, whom we have followed in the hospital in the past for KARISHMA most recently in August 2019. She had been seen a little over a year ago in the office by Dr. Mark Pierson but has not been followed up since. Additional PMH: HTN, breast cancer s/p lumpectomy with bone mets, CAD, CABG, CHF EF:50-55 RVSP 30 mmHg , chronic venous insufficiency, DVT, B/l hip wounds, recently admitted for hip wounds that grew corynebacterium and S. Aureus and was discharged about 3 weeks ago. Javier Paynetis any recent NSAID use, no ACE/ARB. She was on merrem and vancomycin last admission in June her creatinine ranged 1.8-2.0 during that admission. She had a dose of vancomycin in the ED. She states she has been eating and drinking, she was on bumex PTA     She is awake and alert and is having LE pain as well as pain left hip from a chronic wound      7/9/20: seen & examined-had fallen asleep holding her sandwich. States she feels alright and has improved appetite.     7/10: Sitting up in chair, offers no new c/o  7/11: seen in room, no cp or sob  7/12: seen in room, no complaints      PROBLEM LIST:    Patient Active Problem List   Diagnosis    Metastatic breast cancer ([address]    Essential hypertension    Coronary artery disease involving native coronary artery of native heart without angina pectoris    Nephrolithiasis    CHF (congestive heart failure) ([address]    Humerus fracture    Shoulder pain, left    B12 deficiency    Hyperlipidemia    Rib lesion    Subclavian vein occlusion, bilateral (HCC)    Pericardial effusion    MI (myocardial infarction) ([address]    Arthritis    Sarcoidosis    Lymph node enlargement    Anesthesia    Rectal bleeding    Ventral hernia    Type 2 diabetes mellitus without complication, with long-term current use deficiency and chronic disease.  Anesthesia     DIFFICULTY WAKING UP    Arthritis     Arthritis, hip     Breast cancer ([address] 2005    S/P Left Lumpectomy with Lymph Node Dissection, Chemo/Rad. Follows with Dr. Carmen Catalan. No recurrence to date. Surgeon was Dr. Mariely Cohen.  CAD (coronary artery disease) 5/2008    s/p CABG. Follows with [address] [place] [address].  CHF (congestive heart failure) (HCC)     Chronic venous insufficiency 11/7/2018    Class 2 severe obesity due to excess calories with serious comorbidity and body mass index (BMI) of 35.0 to 35.9 in adult ([address] 8/1/2019    Decreased dorsalis pedis pulse 11/7/2018    Degenerative disc disease at L5-S1 level 7/10/2020    Diabetic neuropathy (HCC)     Diabetic neuropathy (HCC)     DVT (deep venous thrombosis) (HCC)     Recurrent. On lifelong coumadin.  DVT of upper extremity (deep vein thrombosis) ([address] 4/18/2012    History of deep vein thrombosis 11/7/2018    Humerus fracture     Chronic, on the Left.  Hyperlipidemia 8/29/2011    Hypertension     Left leg pain 7/9/2020    Leg swelling 11/7/2018    Lymph node enlargement     Lymphedema of both lower extremities 11/7/2018    Lymphopenia 7/9/2020    MI (myocardial infarction) ([address]     Nephrolithiasis     Follows with Dr. Bc Petersen.  Pericardial effusion     Pulmonary nodule     With mediastinal lymphadenopathy. Stable. Follows with Dr. Karli Daniel.     Rib lesion 11/28/11    expansile lesion in one of the right ribs laterally    Sarcoidosis 07/26/11    per transbronchial needle aspiration    Subclavian vein occlusion, bilateral ([address] 4/18/2012    Type II or unspecified type diabetes mellitus without mention of complication, not stated as uncontrolled        DIET:    DIET GENERAL;  Dietary Nutrition Supplements: Low Calorie High Protein Supplement  Dietary Nutrition Supplements: Wound Healing Oral Supplement     PHYSICAL EXAM:     Patient Vitals for the past 24 hrs:   BP Temp Temp src Pulse Resp SpO2 Weight   07/12/20 1230 (!) 140/65 -- -- -- -- -- --   07/12/20 0845 (!) 159/63 97.5 °F (36.4 °C) Temporal 107 16 94 % 164 lb 5 oz (74.5 kg)   07/11/20 2345 139/63 97.5 °F (36.4 °C) Temporal 80 16 96 % --   07/11/20 1845 (!) 119/42 -- -- -- -- -- --   07/11/20 1430 95/61 98.7 °F (37.1 °C) Temporal 86 16 96 % --   @      Intake/Output Summary (Last 24 hours) at 7/12/2020 1244  Last data filed at 7/12/2020 9887  Gross per 24 hour   Intake 1507 ml   Output 1100 ml   Net 407 ml         Wt Readings from Last 3 Encounters:   07/12/20 164 lb 5 oz (74.5 kg)   06/29/20 157 lb (71.2 kg)   06/18/20 150 lb (68 kg)       Constitutional:  Pt is in no acute distress  Head: normocephalic, atraumatic  Neck: no JVD  Cardiovascular: regular rate and rhythm, no murmurs, gallops, or rubs  Respiratory:  No rales, rhochi, or wheezes  Gastrointestinal:  Soft, nontender, nondistended, bowel sounds x 4  Ext: no edema  Skin: dry, no rash  Neuro: aaox3    MEDS (scheduled):    polyethylene glycol  17 g Oral Daily    predniSONE  40 mg Oral Daily    daptomycin (CUBICIN) IVPB  6 mg/kg (Adjusted) Intravenous Q48H    lidocaine-EPINEPHrine  20 mL Intradermal Once    mineral oil-hydrophilic petrolatum   Topical BID    [Held by provider] bumetanide  1 mg Intravenous Daily    calcium-vitamin D  1 tablet Oral Daily    docusate sodium  100 mg Oral Daily    isosorbide dinitrate  5 mg Oral TID    magnesium oxide  400 mg Oral Daily    [Held by provider] metoprolol succinate  12.5 mg Oral Daily    therapeutic multivitamin-minerals  1 tablet Oral Daily    pantoprazole  40 mg Oral QAM AC    pregabalin  50 mg Oral TID    [Held by provider] vitamin B-12  1,000 mcg Oral Daily    sodium chloride flush  10 mL Intravenous 2 times per day    sodium chloride   Intravenous Q12H    warfarin (COUMADIN) daily dosing (placeholder)   Other RX Placeholder       MEDS (infusions):      MEDS (prn):  oxyCODONE, povidone-iodine, mineral oil-hydrophilic petrolatum **AND** mineral oil-hydrophilic petrolatum, albuterol, sodium chloride flush, acetaminophen **OR** acetaminophen, magnesium hydroxide, promethazine **OR** [DISCONTINUED] ondansetron    DATA:    Recent Labs     07/10/20  0634 07/11/20  0838 07/12/20  0756   WBC 2.5* 2.5* 2.4*   HGB 7.4* 7.5* 7.9*   HCT 23.7* 23.9* 24.6*   .1* 117.7* 114.4*   PLT 99* 107* 116*     Recent Labs     07/10/20  0634 07/11/20  0838 07/12/20  0756    139 136   K 4.1 4.8 5.3*    103 101   CO2 24 25 24   BUN 48* 51* 52*   CREATININE 2.4* 2.2* 1.8*   LABGLOM 20 22 27   GLUCOSE 175* 160* 335*   CALCIUM 7.7* 8.3* 8.7       Lab Results   Component Value Date    LABALBU 3.6 07/07/2020    LABALBU 3.4 (L) 06/29/2020    LABALBU 3.0 (L) 06/22/2020     Lab Results   Component Value Date    TSH 6.930 (H) 06/10/2019       Iron Studies  Lab Results   Component Value Date    IRON 49 (L) 06/20/2011    TIBC 266 06/20/2011    FERRITIN 2,375 07/08/2020     Vitamin B-12   Date Value Ref Range Status   07/08/2020 >2000 (H) 211 - 946 pg/mL Final     Folate   Date Value Ref Range Status   07/08/2020 >20.0 4.8 - 24.2 ng/mL Final       Vit D, 25-Hydroxy   Date Value Ref Range Status   10/23/2017 21 (L) 30 - 100 ng/mL Final     Comment:     <20 ng/mL. ........... [place]aks Deficient  20-30 ng/mL. ......... [place]aks Insufficient   ng/mL. ........ Con[place] Peaks Sufficient  >100 ng/mL. .......... Conchetta Peaks Toxic       No results found for: PTH    No components found for: URIC    Lab Results   Component Value Date    COLORU Yellow 07/07/2020    NITRU Negative 07/07/2020    GLUCOSEU Negative 07/07/2020    GLUCOSEU NEGATIVE 12/05/2011    KETUA Negative 07/07/2020    UROBILINOGEN 0.2 07/07/2020    BILIRUBINUR Negative 07/07/2020    BILIRUBINUR small 05/16/2016    BILIRUBINUR NEGATIVE 12/05/2011       No results found for: Rah Dorantes      IMPRESSION/RECOMMENDATIONS:      · Acute kidney injury  Likely pre-renal in the setting of diuretics and  poor po intake with FeNa <1% and low urine urea  Not on ACE/ARB- was on diuretics  She has been started on IVF and will follow on same.    R U/S No hydro, no calculus, right renal cyst noted  Cr with improving remand in response to IVF  Cr  At 1.8     · Chronic kidney disease stage IV  Baseline Scr 1.5-2.0 over the past year     · Hypertension with CKD I-IV  Well controlled on current medications     · Bilateral hip wounds  Seen by ID;no plans for antibiotics now      · Anemia with metastatic breast cancer  Will defer to Hematology    Eleni Hancock

## 2020-07-12 NOTE — PROGRESS NOTES
Pharmacy Consultation Note  (Warfarin Dosing and Monitoring)    Initial consult date: 7/8  Consulting physician: Chema Scruggs    Allergies:  Macrobid [nitrofurantoin monohydrate macrocrystals]    76 y.o. female    Ht Readings from Last 1 Encounters:   07/07/20 5' 4\" (1.626 m)     Wt Readings from Last 1 Encounters:   07/07/20 154 lb (69.9 kg)         Warfarin Indication Target   INR Range Home Dose  (if applicable) Diet/Feeding Tube   (Enteral feeds, nutritional drinks, increased Vitamin K in diet can decrease INR)   RLE DVT 2-3 3mg Tues, Thurs, Sat; 2mg all other days General       x Home Med? Meds Increasing INR x Home Med?  Meds Decreasing INR     Allopurinol    Azathioprine     Amiodarone/Propafenone/Dronedarone   Carbamazepine     Androgens   Cholestyramine     Chemotherapy (BBW: Capecitabine)   Estrogen     Ciprofloxacin/Levofloxacin   Nafcillin/Dicloxacillin     Clarithromycin/Erythromycin/Azithromycin   Barbiturates      Fluconazole/Itraconazole/Voriconazole/Ketoconazole   Phenytoin (Variable)     Metronidazole   Rifampin     Phenytoin (Variable)   Steroids (Variable/Dose Dependent)      Statins/Fenofibrate/Gemfibrozil   Sucralfate     Steroids (Variable/Dose Dependent)   Other:     Sulfamethoxazole/Trimethoprim        Tramadol         Other:        Comments regarding medication interactions:      x Diseases Affecting INR x Increased Bleeding Risk    CHF Exacerbation (Increases)  History GI Bleed/PUD    Liver Disease (Increases)  Chronic NSAID Use    Thyroid: Hyper (Increases)  Hypo (Decreases)  Chronic ASA/Antiplatelet Use (Clopidogrel/ Dipyridamole/Prasugrel/Ticagrelor)     X Malignancy (Increases) Xx Abnormal Renal Function (dialysis, renal transplant, SCr ? 2.3 mg/dL)     History of EtOH Abuse: Acute (Increases)   Chronic (Decreases)  Liver Function (cirrhosis, bilirubin >2x ULN with AST/ALT/AP >3x ULN)    Fever (Increases) X Age > 65 years   X Acute infection (Increases)  Hypertension/Uncontrolled BP Diarrhea/Dehydration (Increases)  History of stroke    Other: __________________  Other:___________________       Vitamin K or Blood product  Administration Date                    TSH:    Lab Results   Component Value Date    TSH 6.930 06/10/2019        Hepatic Function Panel:                            Lab Results   Component Value Date    ALKPHOS 144 07/07/2020    ALT 28 07/07/2020    AST 34 07/07/2020    PROT 7.5 07/07/2020    BILITOT 1.0 07/07/2020    BILIDIR 0.2 06/10/2019    IBILI 0.3 06/10/2019    LABALBU 3.6 07/07/2020    LABALBU 4.4 03/25/2012       Date Warfarin Dose INR Heparin or LMWH HGB/HCT PLT Comment   7/8 3mg 1.8 -- 8.5/25.4 121    7/9 3mg 2.1 -- 7.2/23 106    7/10 3mg 2.3 -- 7.4/23.7 99    7//11 2 mg 2.9 --- 7.5/23.9 107    7/12 No dose 3.5 --- 7.9/24.6 116      Assessment and Plan:  · Pt is a 77 yo female with history of RLE DVT on warfarin PTA   · Pt has been subtherapeutic on warfarin most recently for the past 2 weeks after being on hold at last admission  · Goal INR 2-3  · INR jump to 3.6 today, no warfarin  · Daily PT/INR until the INR is stable within the therapeutic range  · Pharmacist will follow and monitor/adjust dosing as necessary    Thank you for this consult,    Cristian Peralta, PharmD 7/12/2020 9:38 AM  [phone]

## 2020-07-12 NOTE — CONSULTS
exertion [] Increased Thirst   Gastrointestinal    [] Abdominal Pain    [] Melena   [] Hematochezia         Past Medical History:   Diagnosis Date    Abscess of abdominal wall     Anemia     Iron deficiency and chronic disease.  Anesthesia     DIFFICULTY WAKING UP    Arthritis     Arthritis, hip     Breast cancer ([address] 2005    S/P Left Lumpectomy with Lymph Node Dissection, Chemo/Rad. Follows with Dr. Earlyne Schlatter. No recurrence to date. Surgeon was Dr. Emily Snider.  CAD (coronary artery disease) 5/2008    s/p CABG. Follows with [facility].  CHF (congestive heart failure) (HCC)     Chronic venous insufficiency 11/7/2018    Class 2 severe obesity due to excess calories with serious comorbidity and body mass index (BMI) of 35.0 to 35.9 in adult ([address] 8/1/2019    Decreased dorsalis pedis pulse 11/7/2018    Degenerative disc disease at L5-S1 level 7/10/2020    Diabetic neuropathy (HCC)     Diabetic neuropathy (HCC)     DVT (deep venous thrombosis) (HCC)     Recurrent. On lifelong coumadin.  DVT of upper extremity (deep vein thrombosis) ([address] 4/18/2012    History of deep vein thrombosis 11/7/2018    Humerus fracture     Chronic, on the Left.  Hyperlipidemia 8/29/2011    Hypertension     Left leg pain 7/9/2020    Leg swelling 11/7/2018    Lymph node enlargement     Lymphedema of both lower extremities 11/7/2018    Lymphopenia 7/9/2020    MI (myocardial infarction) ([address]     Nephrolithiasis     Follows with Dr. Ralph Worley.  Pericardial effusion     Pulmonary nodule     With mediastinal lymphadenopathy. Stable. Follows with Dr. Howard Silva.     Rib lesion 11/28/11    expansile lesion in one of the right ribs laterally    Sarcoidosis 07/26/11    per transbronchial needle aspiration    Subclavian vein occlusion, bilateral ([address] 4/18/2012    Type II or unspecified type diabetes mellitus without mention of complication, not stated as uncontrolled         Past Surgical History:   Procedure Laterality Date    APPENDECTOMY      ARTERIAL BYPASS SURGRY      BREAST LUMPECTOMY  9/27/2005    LEFT    CARDIAC SURGERY      CHOLECYSTECTOMY      COLONOSCOPY  12/2007    cecal arteriovenous malformation with hyperplastic polyps    COLONOSCOPY  [id number]    CORONARY ARTERY BYPASS GRAFT  05/2008    triple bypass    ECHO COMPL W DOP COLOR FLOW  10/30/2013         EYE SURGERY      clarissa cataracts    HYSTERECTOMY  1975, 1985    MAYNOR prolapse, benign conditions; BSO later for scar tissues, no CA.      OTHER SURGICAL HISTORY  11/18/11    port removal right chest wall    PARACENTESIS      TONSILLECTOMY      TOOTH EXTRACTION      Full Dental Extraction.     TUNNELED VENOUS PORT PLACEMENT      removal of port Dec. 2011- Dr. Ayse Kaufman TUNNELED [facility]  [id number]    RIGHT CHEST     Current Medications:      oxyCODONE, povidone-iodine, mineral oil-hydrophilic petrolatum **AND** mineral oil-hydrophilic petrolatum, albuterol, sodium chloride flush, acetaminophen **OR** acetaminophen, magnesium hydroxide, promethazine **OR** [DISCONTINUED] ondansetron    polyethylene glycol  17 g Oral Daily    predniSONE  40 mg Oral Daily    daptomycin (CUBICIN) IVPB  6 mg/kg (Adjusted) Intravenous Q48H    lidocaine-EPINEPHrine  20 mL Intradermal Once    mineral oil-hydrophilic petrolatum   Topical BID    [Held by provider] bumetanide  1 mg Intravenous Daily    calcium-vitamin D  1 tablet Oral Daily    docusate sodium  100 mg Oral Daily    isosorbide dinitrate  5 mg Oral TID    magnesium oxide  400 mg Oral Daily    [Held by provider] metoprolol succinate  12.5 mg Oral Daily    therapeutic multivitamin-minerals  1 tablet Oral Daily    pantoprazole  40 mg Oral QAM AC    pregabalin  50 mg Oral TID    [Held by provider] vitamin B-12  1,000 mcg Oral Daily    sodium chloride flush  10 mL Intravenous 2 times per day    sodium chloride   Intravenous Q12H    warfarin (COUMADIN) daily dosing (placeholder)   Other RX Placeholder      Allergies:  Macrobid [nitrofurantoin monohydrate macrocrystals]  Social History     Socioeconomic History    Marital status:       Spouse name: Not on file    Number of children: Not on file    Years of education: Not on file    Highest education level: Not on file   Occupational History    Not on file   Social Needs    Financial resource strain: Not on file    Food insecurity     Worry: Not on file     Inability: Not on file    Transportation needs     Medical: Not on file     Non-medical: Not on file   Tobacco Use    Smoking status: Never Smoker    Smokeless tobacco: Never Used   Substance and Sexual Activity    Alcohol use: No    Drug use: No    Sexual activity: Never   Lifestyle    Physical activity     Days per week: Not on file     Minutes per session: Not on file    Stress: Not on file   Relationships    Social connections     Talks on phone: Not on file     Gets together: Not on file     Attends Bahai service: Not on file     Active member of club or organization: Not on file     Attends meetings of clubs or organizations: Not on file     Relationship status: Not on file    Intimate partner violence     Fear of current or ex partner: Not on file     Emotionally abused: Not on file     Physically abused: Not on file     Forced sexual activity: Not on file   Other Topics Concern    Not on file   Social History Narrative    Not on file     Family History   Adopted: Yes     PHYSICAL EXAM:    BP (!) 140/65   Pulse 107   Temp 97.5 °F (36.4 °C) (Temporal)   Resp 16   Ht 5' 4\" (1.626 m)   Wt 164 lb 5 oz (74.5 kg)   SpO2 94%   BMI 28.20 kg/m²   CONSTITUTIONAL:   Awake, alert, cooperative  PSYCHIATRIC :  Oriented to time, place and person      Appropriate insight to disease process  EYES: Lids and lashes normal  ENT:  External ears and nose without lesions   Hearing deficits present  NECK: Supple, symmetrical, trachea midline   Thyroid goiter not appreciated  Right

## 2020-07-12 NOTE — PROGRESS NOTES
4/9/20   Vero Warner, DO   PT/INR Test STRP 1 each by In Vitro route once a week Use to check INR at least once weekly and more often as directed. 4/9/20   Vero Warner, DO   metoprolol succinate (TOPROL XL) 25 MG extended release tablet Take 0.5 tablets by mouth daily 4/8/20   Vero Warner, DO   omeprazole 20 MG EC tablet Take 1 tablet by mouth daily 4/8/20   Vero Warner, DO   bumetanide (BUMEX) 1 MG tablet Take 1 tablet by mouth daily 3/16/20   Vero Warner, DO   pregabalin (LYRICA) 50 MG capsule Take 1 capsule by mouth 3 times daily for 120 days. 2/4/20 6/29/20  Vero Warner, DO   magnesium oxide (MAG-OX) 400 (240 Mg) MG tablet Take 1 tablet by mouth daily 2/4/20   Vero Warner, DO   docusate (COLACE, DULCOLAX) 100 MG CAPS Take 100 mg by mouth daily 2/4/20   Vero Warner, DO   PT/INR Testing Monitor KIT 1 each by Does not apply route once a week Please use to test INR as directed by physician 1/15/20   Vero Warner, DO   isosorbide dinitrate (ISORDIL) 5 MG tablet Take 1 tablet by mouth 3 times daily 1/14/20   Huma Hoffman MD   vitamin B-12 (CYANOCOBALAMIN) 1000 MCG tablet Take 1 tablet by mouth daily 1/7/20   Vero Warner, DO   Calcium Citrate-Vitamin D (RA CALCIUM CIT-VIT D-3 PETITES) 200-250 MG-UNIT TABS take 1 tablet by mouth twice a day 1/7/20   Vero Warner, DO   albuterol sulfate (PROAIR RESPICLICK) 658 (90 Base) MCG/ACT aerosol powder inhalation Inhale 2 puffs into the lungs every 6 hours as needed for Wheezing or Shortness of Breath 1/7/20   Vero Warner, DO   warfarin (COUMADIN) 1 MG tablet Please take 2 mg by mouth most days, but 3 mg by mouth on Mondays and Wednesdays and Friday. Patient taking differently: Please take 2 mg by mouth most days, but 3 mg by mouth on sun, tues, thurs.  11/25/19   Alysa Medina MD   Multiple Vitamins-Minerals (THERAPEUTIC MULTIVITAMIN-MINERALS) tablet Take 1 tablet by mouth daily 9/17/19   Alysa Medina MD   Insulin Pen Needle [facility] PEN NEEDLES) 29G X 12MM MISC 1 each by Does not apply route daily 9/10/19   Kendell Badillo MD   nitroGLYCERIN (NITROSTAT) 0.4 MG SL tablet up to max of 3 total doses. If no relief after 1 dose, call 911. 19   Kendell Badillo MD   sodium chloride (OCEAN, BABY AYR) 0.65 % nasal spray 1 spray by Nasal route as needed for Congestion (dryness) 19   Kendell Badillo MD   palbociclib [facility]) 100 MG capsule Take 100 mg by mouth 3 weeks on and 1 week off also receives injections every 3 weeks per Dr John Phillips MD       OBJECTIVE:  /63   Pulse 80   Temp 97.5 °F (36.4 °C) (Temporal)   Resp 16   Ht 5' 4\" (1.626 m)   Wt 154 lb (69.9 kg)   SpO2 96%   BMI 26.43 kg/m²   Temp  Av.3 °F (36.8 °C)  Min: 97.5 °F (36.4 °C)  Max: 98.7 °F (37.1 °C)  General appearance: Resting in bed in no apparent distress. Skin: Warm and dry. No rashes were noted. HEENT: Round and reactive pupils. Moist mucous membranes. No ulcerations or thrush. Neck: Supple to movements. Chest: No use of accessory muscles to breathe. Symmetrical expansion. No wheezing, crackles or rhonchi. Cardiovascular: Reguar rate and rhythm. No murmurs gallops, or rubs appreciated. Abdomen: Bowel sounds present, nontender, nondistended, no masses or hepatosplenomegaly. Extremities: No clubbing, no cyanosis, no edema. Lines: peripheral    I/O last 3 completed shifts: In: 1627 [P.O.:120;  I.V.:1457; IV Piggyback:50]  Out: 1250 [Urine:1250]      Laboratory and Tests Review:      Lab Results   Component Value Date    WBC 2.4 (L) 2020    WBC 2.5 (L) 2020    WBC 2.5 (L) 07/10/2020    HGB 7.9 (L) 2020    HCT 24.6 (L) 2020    .4 (H) 2020     (L) 2020     Lab Results   Component Value Date    NEUTROABS 1.85 2020    NEUTROABS 2.00 07/10/2020    NEUTROABS 1.98 2020     No results found for: [facility]  Lab Results   Component Value Date    ALT 28 2020    AST 34 (H) 07/07/2020    ALKPHOS 144 (H) 07/07/2020    BILITOT 1.0 07/07/2020     Lab Results   Component Value Date     07/11/2020    K 4.8 07/11/2020     07/11/2020    CO2 25 07/11/2020    BUN 51 07/11/2020    CREATININE 2.2 07/11/2020    CREATININE 2.4 07/10/2020    CREATININE 2.8 07/09/2020    GFRAA 26 07/11/2020    LABGLOM 22 07/11/2020    GLUCOSE 160 07/11/2020    GLUCOSE 109 04/20/2012    PROT 7.5 07/07/2020    LABALBU 3.6 07/07/2020    LABALBU 4.4 03/25/2012    CALCIUM 8.3 07/11/2020    BILITOT 1.0 07/07/2020    ALKPHOS 144 07/07/2020    AST 34 07/07/2020    ALT 28 07/07/2020     Lab Results   Component Value Date    CRP 16.2 (H) 07/07/2020    CRP 4.2 (H) 06/19/2020     Lab Results   Component Value Date    SEDRATE 134 (H) 07/07/2020    SEDRATE 102 (H) 06/21/2020    SEDRATE 130 (H) 06/19/2020       Radiology:    MRI LUMBAR SPINE WO CONTRAST   Final Result   Findings compatible with degenerative changes   No convincing evidence for metastatic disease in the spine   There are multiple lesions in the liver seen on the margin of this   study, compatible with metastatic disease      ALERT:  THIS IS AN ABNORMAL REPORT      US RETROPERITONEAL COMPLETE   Final Result   Findings compatible with a right renal cyst         XR FOOT LEFT (MIN 3 VIEWS)   Final Result      No acute fracture is identified. Osteoarthritis. Osteopenia. XR FOOT RIGHT (MIN 3 VIEWS)   Final Result      No acute fracture is identified. Osteoarthritis. Osteopenia. XR TIBIA FIBULA RIGHT (2 VIEWS)   Final Result      No acute fracture is identified. Osteoarthritis. Osteopenia. XR TIBIA FIBULA LEFT (2 VIEWS)   Final Result      No acute fracture is identified. Osteoarthritis. Osteopenia. US DUP LOWER EXTREMITIES BILATERAL VENOUS   Final Result      Extensive deep vein thrombosis throughout the right lower extremity.       XR CHEST PORTABLE   Final Result      Cardiomegaly with median sternotomy      Small right-sided pleural effusion      Other findings are unchanged from prior study. Microbiology:   Lab Results   Component Value Date    BC 24 Hours no growth 07/07/2020    BC 5 Days- no growth 08/12/2019    BC 5 Days- no growth 08/09/2019    ORG Staphylococcus aureus 07/08/2020    ORG Corynebacterium species 07/08/2020    ORG Staphylococcus aureus 06/21/2020    ORG Corynebacterium species 06/21/2020     Lab Results   Component Value Date    BLOODCULT2 24 Hours no growth 07/07/2020    BLOODCULT2 5 Days- no growth 08/12/2019    BLOODCULT2 5 Days- no growth 08/09/2019    ORG Staphylococcus aureus 07/08/2020    ORG Corynebacterium species 07/08/2020    ORG Staphylococcus aureus 06/21/2020    ORG Corynebacterium species 06/21/2020     WOUND/ABSCESS   Date Value Ref Range Status   07/08/2020   Final    Moderate growth  Methicillin resistant Staph aureus isolated. Most Methicillin  resistant Staphylococcus are usually resistant to multiple  antibiotics including other B-Lactams, Aminoglycosides,  Macrolides, Clindamycin and Tetracycline. Contact isolation  is indicated. 07/08/2020 Heavy growth  Final   06/21/2020   Final    Moderate growth  Methicillin resistant Staph aureus isolated. Most Methicillin  resistant Staphylococcus are usually resistant to multiple  antibiotics including other B-Lactams, Aminoglycosides,  Macrolides, Clindamycin and Tetracycline. Contact isolation  is indicated.      06/21/2020 Light growth  Final     No results found for: RESPSMEAR  No results found for: MPNEUMO, CLAMYDCU, LABLEGI, AFBCX, FUNGSM, LABFUNG  No results found for: CULTRESP  No results found for: CXCATHTIP  Body Fluid Culture, Sterile   Date Value Ref Range Status   07/25/2019 Growth not present  Final     No results found for: CXSURG  Urine Culture, Routine   Date Value Ref Range Status   07/07/2020 Growth not present  Final   08/13/2019 >100,000 CFU/ml  Final   08/08/2019 <10,000

## 2020-07-12 NOTE — PLAN OF CARE
Problem: Falls - Risk of:  Goal: Will remain free from falls  Description: Will remain free from falls  7/11/2020 2229 by Manjeet Avila RN  Outcome: Met This Shift  7/11/2020 1011 by Kerry Hollingsworth  Outcome: Met This Shift  Goal: Absence of physical injury  Description: Absence of physical injury  7/11/2020 2229 by Manjeet Avila RN  Outcome: Met This Shift  7/11/2020 1011 by Kerry Hollingsworth  Outcome: Met This Shift     Problem: Pain:  Goal: Pain level will decrease  Description: Pain level will decrease  Outcome: Met This Shift     Problem: Skin Integrity:  Goal: Will show no infection signs and symptoms  Description: Will show no infection signs and symptoms  7/11/2020 1011 by Kerry Hollingsworth  Outcome: Met This Shift  Goal: Absence of new skin breakdown  Description: Absence of new skin breakdown  Outcome: Met This Shift

## 2020-07-12 NOTE — PROGRESS NOTES
[facility] - Family Medicine Inpatient   Resident Progress Note    S:  Hospital day: 4   Brief Synopsis: 70-year-old woman with history of breast cancer status post lumpectomy with bony meta stasis, liver meta stasis, coronary artery disease, CHF ejection fraction 50 to 55%, previous DVT, and recent admission for hip wounds presented for increasing left leg pain. Pain started on Saturday prior to admission, worsened as the days went on, no trauma,falls. WC of decubitus hip wounds +staph aureus, has been started on daptomycin. MRI showed degenerative changes of lumbar spine, NSG evaluated patient no intervention at this time, likely that her deep hip wound is aggravating sciatic nerve pain. Acute events over the last 24 hours- Nephrology stopped IVF. Patient was hypotensive overnight, resolved. Seen at bedside AM: left toe gout pain improving today. Left leg pain is more localized to a soreness on inner thigh then entire leg. Appetite good. Some abdomen swelling. No CP, SOB, N/V, fever, chills.       Cont meds:     Scheduled meds:    predniSONE  40 mg Oral Daily    daptomycin (CUBICIN) IVPB  6 mg/kg (Adjusted) Intravenous Q48H    lidocaine-EPINEPHrine  20 mL Intradermal Once    mineral oil-hydrophilic petrolatum   Topical BID    [Held by provider] bumetanide  1 mg Intravenous Daily    calcium-vitamin D  1 tablet Oral Daily    docusate sodium  100 mg Oral Daily    isosorbide dinitrate  5 mg Oral TID    magnesium oxide  400 mg Oral Daily    [Held by provider] metoprolol succinate  12.5 mg Oral Daily    therapeutic multivitamin-minerals  1 tablet Oral Daily    pantoprazole  40 mg Oral QAM AC    pregabalin  50 mg Oral TID    [Held by provider] vitamin B-12  1,000 mcg Oral Daily    sodium chloride flush  10 mL Intravenous 2 times per day    sodium chloride   Intravenous Q12H    warfarin (COUMADIN) daily dosing (placeholder)   Other RX Placeholder     PRN meds: oxyCODONE, povidone-iodine, mineral oil-hydrophilic petrolatum **AND** mineral oil-hydrophilic petrolatum, albuterol, sodium chloride flush, acetaminophen **OR** acetaminophen, magnesium hydroxide, promethazine **OR** [DISCONTINUED] ondansetron     I reviewed the patient's past medical and surgical history, Medications and Allergies. O:  /63   Pulse 80   Temp 97.5 °F (36.4 °C) (Temporal)   Resp 16   Ht 5' 4\" (1.626 m)   Wt 154 lb (69.9 kg)   SpO2 96%   BMI 26.43 kg/m²   24 hour I&O: I/O last 3 completed shifts: In: 1627 [P.O.:120; I.V.:1457; IV Piggyback:50]  Out: 1250 [Urine:1250]  No intake/output data recorded. Physical Exam   Constitutional: She is oriented to person, place, and time. She appears well-nourished. No distress. HENT:   Head: Normocephalic and atraumatic. Eyes: Scleral icterus is present. Cardiovascular: Normal rate and regular rhythm. Exam reveals no gallop and no friction rub. No murmur heard. Dorsalis pedis pulse- palpable with doppler   Pulmonary/Chest: Breath sounds normal. No respiratory distress. She has no wheezes. Abdominal: Bowel sounds are normal. There is no abdominal tenderness. Some dullness to percussion diffuse. Musculoskeletal:         General: Tenderness present. Comments: Left leg tender to palpation- diffuse. Strength 5/5 lower extremities. Left toe PIP TTP. No erythema warmth swelling     Neurological: She is alert and oriented to person, place, and time. Skin:   Wounds on hips- as seen in media    Dry discolored skin bilateral lower extremities. No erythema. Mild edema lower extremity (non-pitting)       Labs:  Na/K/Cl/CO2:  139/4.8/103/25 (07/11 7369)  BUN/Cr/glu/ALT/AST/amyl/lip:  51/2.2/--/--/--/--/-- (07/11 6036)  WBC/Hgb/Hct/Plts:  2.5/7.5/23.9/107 (07/11 7502)  estimated creatinine clearance is 22 mL/min (A) (based on SCr of 2.2 mg/dL (H)).   Other pertinent labs as noted below    Radiology:  MRI LUMBAR SPINE WO CONTRAST   Final Result   Findings compatible with degenerative changes   No convincing evidence for metastatic disease in the spine   There are multiple lesions in the liver seen on the margin of this   study, compatible with metastatic disease      ALERT:  THIS IS AN ABNORMAL REPORT      US RETROPERITONEAL COMPLETE   Final Result   Findings compatible with a right renal cyst         XR FOOT LEFT (MIN 3 VIEWS)   Final Result      No acute fracture is identified. Osteoarthritis. Osteopenia. XR FOOT RIGHT (MIN 3 VIEWS)   Final Result      No acute fracture is identified. Osteoarthritis. Osteopenia. XR TIBIA FIBULA RIGHT (2 VIEWS)   Final Result      No acute fracture is identified. Osteoarthritis. Osteopenia. XR TIBIA FIBULA LEFT (2 VIEWS)   Final Result      No acute fracture is identified. Osteoarthritis. Osteopenia. US DUP LOWER EXTREMITIES BILATERAL VENOUS   Final Result      Extensive deep vein thrombosis throughout the right lower extremity. XR CHEST PORTABLE   Final Result      Cardiomegaly with median sternotomy      Small right-sided pleural effusion      Other findings are unchanged from prior study. A/P:  Principal Problem:    Left leg pain  Active Problems:    Deep vein thrombosis (DVT) of right lower extremity (HCC)    KARISHMA (acute kidney injury) ([address]    Cellulitis    Decubitus ulcer of both hip    Lymphopenia    Degenerative disc disease at L5-S1 level    Acute gout involving toe of left foot  Resolved Problems:    * No resolved hospital problems. *    Left leg pain likely 2/2 to Lumbar Radicular Pain on Left Side  Pain Management consulted, awaiting recommendations. NSG feels pain is bc deep wound in left hip and infection hip area- affecting sciatic nerve  NSG Dr. Yamilka Thompson evaluated patient- no nerve block/intervention at this time  MRI LS 7/9: Degen changes/ L1-L5 broad-base disc herniation, mild central canal stenosis.   Oxycodone q 8 for pain  Held on vascular consult at this time. Decubitus Ulcer Hips + Staph Aureus  ID started patient on Daptomycin. WC + staph aureus  General surgery did debridement 7/9. Wound care on board  Previous admit 6/18-6/22 for failure of o/p tx for hip ulcers. WC + Corynebacterium /staph aureus at that time-did not need antibiotics at d/c    KARISHMA-prerenal  Nephrology stopped IV Fluids. Renal US negative. Creatinine down to 2.2 today (from 2.4)  Baseline Scr 1.5-2.0  Continue to monitor I's and O's    DVT Right Lower Extremity  Coumadin, pharmacy dosing. INR 2.9  US lower extremity: extensive dvt through the right lower extremity. Hx of RLE DVT    Anemia- likely multifactorial related to metastatic breast cancer, Ibrance, CKD stage IV  Threshold transfusion Hb<7.  Hematology-oncology consulted. Lymphopenia  ANC 2.24-chronic lymphopenia  Hematology oncology consulted    HTN/CAD s/p CABG/ Breast Cancer/ HFrEF  Held Bumex secondary to KARISHMA, Held Toprol 2/2 to low pressures. Isosorbide dinitrate      GI/DVT ppx: Protonix/Coumadin   Dispo: needs HHC with PT/OT on discharge. SW seen pt.  Declined [place] at this time      Electronically signed by Jakob Bertrand  PGY-2 on 7/12/2020 at 8:20 AM  This case was discussed with attending physician: Dr. Quinn Parry

## 2020-07-12 NOTE — PLAN OF CARE
Problem: Falls - Risk of:  Goal: Will remain free from falls  Description: Will remain free from falls  7/12/2020 1007 by Makeda Dubois  Outcome: Met This Shift  7/11/2020 2229 by Olesya Klein RN  Outcome: Met This Shift  Goal: Absence of physical injury  Description: Absence of physical injury  7/12/2020 1007 by Makeda Dubois  Outcome: Met This Shift  7/11/2020 2229 by Olesya Klein RN  Outcome: Met This Shift     Problem: Pain:  Goal: Pain level will decrease  Description: Pain level will decrease  7/11/2020 2229 by Olesya Klein RN  Outcome: Met This Shift     Problem: Skin Integrity:  Goal: Will show no infection signs and symptoms  Description: Will show no infection signs and symptoms  Outcome: Met This Shift  Goal: Absence of new skin breakdown  Description: Absence of new skin breakdown  7/11/2020 2229 by Olesya Klein RN  Outcome: Met This Shift

## 2020-07-12 NOTE — PROGRESS NOTES
Physical Therapy  Facility/Department: [facility]  Daily Treatment Note  NAME: Marlon Otto  : 1945  MRN: [id number]    Date of Service: 2020    Referring Provider:  Erika Latham MD     Date of Service: 2020     Evaluating PT:  Bruce Negrete, PT, DPT     Room #:  3337/2776-E  Diagnosis:  Cellulitis  PMHx/PSHx:  HTN, CAD, DM, Neuropathy, DVT, MI, Arthritis, Sarcoidosis, CHF, Breast cancer, Lymphedema BLEs, CABG , Bilateral hip wounds  Procedure/Surgery:  NA  Precautions:  Falls, Bilateral hip wounds, Contact ISO  Equipment Needs:  Has Rollator [employer]     SUBJECTIVE:     Pt lives alone in a 7th floor apt with no stairs to enter and elevator access to apt level. Pt reported sleeping in a recliner/lift chair. Pt ambulated with a Rollator [employer] PTA. Pt reported having assist with ADLs. Pt is not actively driving. Pt has home health aide 3x/wk for 2h/day to assist with laundry, groceries, and bathing as needed.     OBJECTIVE:    Initial Evaluation  Date: 2020 Treatment  2020 Short Term/ Long Term   Goals   AM-PAC 6 Clicks      Was pt agreeable to Eval/treatment? Yes   yes     Does pt have pain? Reported 9/10 pain in LLE, L foot, bilateral hips. RN aware. 8/10 pain LLE     Bed Mobility  Rolling: Min A  Supine to sit: Mod A  Sit to supine: Min A  Scooting: Min A  Rolling: NT  Supine to sit: Julisa  Sit to supine: NT  Scooting: SBA Supervision   Transfers Sit to stand: Mod A  Stand to sit: Mod A  Stand pivot: NT  Sit to stand: Min A  Stand to sit: Min A  Stand pivot: Min A with FWW SBA   Ambulation    2 feet laterally with [employer] with Mod A  25 feet with front [employer] Julisa >50 feet with [employer] with SBA   Stair negotiation: ascended and descended  NT NT  NA   ROM BUE:  WFL  BLE:  WFL, but full range limited by pain       Strength BUE:  Per OT  BLE:  RLE 4-/5, LLE 3-/5   Increase by 1/3 MMT grade   Balance Sitting EOB:  SBA  Dynamic Standing:   Mod A with [employer] Sitting EOB:  Independent  Dynamic Standing:  Julisa with FWW Sitting EOB:  Supervision  Dynamic Standing:  SBA with [employer]      Pt is A & O x 4  Sensation:  Pt denies numbness and tingling to extremities  Edema:  unremarkable      Patient education  Pt educated on role of PT intervention. Pt educated on safety in room with utilization of call light for assistance with mobility. Patient response to education:   Pt verbalized understanding Pt demonstrated skill Pt requires further education in this area   yes yes yes     ASSESSMENT:    Comments:  RN cleared pt for activity prior to session. Pt received supine in bed and agreeable to PT intervention with OT collaboration at this time. Pt performed all functional mobility as noted above. Pt continues to be limited by LLE pain but demonstrated significant improvement in functional mobility on this date. During session, pt was assisted to bathroom for BM. Pt requesting privacy at this time and demonstrated good seated balance and verbalized good understanding of use of call light for assistance when done utilizing commode. Pt left with call light in reach and RN notified that pt was utilizing commode. Pt would benefit from continued skilled PT intervention during acute care stay to maximize functional mobility and independence prior to return to home to ensure pt safety. Treatment:  Patient practiced and was instructed in the following treatment:     Therapeutic Activities Completed:  o Functional mobility as noted above:   - Bed mobility: Julisa to complete with HOB elevated. Pt with excellent static sitting balance at independent level. - Transfer training: Julisa to complete with front [employer]. Min VC for proper hand placement. - Ambulation: 25 feet with front [employer] Julisa. Min VC for proper cueing to accommodate for LLE pain (walker> LLE> RLE). o Pt education as noted above. PLAN:    Patient is making fair progress towards established goals. Will continue with current POC.       Time in 1225  Time out  1250    Total Treatment Time  25 minutes     CPT codes:  [] Gait training 26631 0 minutes  [] Manual therapy 15758 0 minutes  [x] Therapeutic activities 63816 25 minutes  [] Therapeutic exercises 73786 0 minutes  [] Neuromuscular reeducation 00328 0 minutes    Sussy Amaya, PT, DPT  [id number]

## 2020-07-12 NOTE — PROGRESS NOTES
[facility] Inpatient   Resident Progress Note    S:  Hospital day: 4   Brief Synopsis: 77-year-old woman with history of breast cancer status post lumpectomy with bony meta stasis, liver meta stasis, coronary artery disease, CHF ejection fraction 50 to 55%, previous DVT, and recent admission for hip wounds presented for increasing left leg pain. Pain started on Saturday prior to admission, worsened as the days went on, no trauma,falls. WC of decubitus hip wounds +staph aureus, has been started on daptomycin. MRI showed degenerative changes of lumbar spine, NSG evaluated patient no intervention at this time, likely that her deep hip wound is aggravating sciatic nerve pain. Acute events over the last 24 hours- Nephrology stopped IVF. Patient was hypotensive overnight, resolved. Seen at bedside AM: left toe gout pain improving today. Left leg pain is more localized to a soreness on inner thigh then entire leg. Appetite good. Some abdomen swelling. No CP, SOB, N/V, fever, chills.       Cont meds:     Scheduled meds:    predniSONE  40 mg Oral Daily    daptomycin (CUBICIN) IVPB  6 mg/kg (Adjusted) Intravenous Q48H    lidocaine-EPINEPHrine  20 mL Intradermal Once    mineral oil-hydrophilic petrolatum   Topical BID    [Held by provider] bumetanide  1 mg Intravenous Daily    calcium-vitamin D  1 tablet Oral Daily    docusate sodium  100 mg Oral Daily    isosorbide dinitrate  5 mg Oral TID    magnesium oxide  400 mg Oral Daily    [Held by provider] metoprolol succinate  12.5 mg Oral Daily    therapeutic multivitamin-minerals  1 tablet Oral Daily    pantoprazole  40 mg Oral QAM AC    pregabalin  50 mg Oral TID    [Held by provider] vitamin B-12  1,000 mcg Oral Daily    sodium chloride flush  10 mL Intravenous 2 times per day    sodium chloride   Intravenous Q12H    warfarin (COUMADIN) daily dosing (placeholder)   Other RX Placeholder     PRN meds: oxyCODONE, povidone-iodine, mineral mg  Held on vascular consult at this time. Decubitus Ulcer Hips + Staph Aureus  ID started patient on Daptomycin. WC + staph aureus  General surgery did debridement 7/9. Wound care on board  Previous admit 6/18-6/22 for failure of o/p tx for hip ulcers. WC + Corynebacterium /staph aureus at that time-did not need antibiotics at d/c    KARISHMA-prerenal  Nephrology stopped IV Fluids. Renal US negative. Creatinine down to 2.2 today (from 2.4)  Baseline Scr 1.5-2.0  Continue to monitor I's and O's    Acute Gout Left Toe  40 mg prednisone 5 day course. Day 2 today    DVT Right Lower Extremity  Coumadin, pharmacy dosing. INR 2.9  US lower extremity: extensive dvt through the right lower extremity. Hx of RLE DVT    Anemia- likely multifactorial related to metastatic breast cancer, Ibrance, CKD stage IV  Threshold transfusion Hb<7.  Hematology-oncology consulted. Lymphopenia  ANC 2.24-chronic lymphopenia  Hematology oncology consulted    HTN/CAD s/p CABG/ Breast Cancer/ HFrEF  Held Bumex secondary to KARISHMA, Held Toprol 2/2 to low pressures. Isosorbide dinitrate      GI/DVT ppx: Protonix/Coumadin   Dispo: needs HHC with PT/OT on discharge. SW seen pt.  Declined [place] at this time      Electronically signed by Ryan Thompson  PGY-2 on 7/12/2020 at 8:25 AM  This case was discussed with attending physician: Dr. Azael Chanel

## 2020-07-12 NOTE — PROGRESS NOTES
OT BEDSIDE TREATMENT NOTE      Date:2020  Patient Name: Arturo Rasheed  MRN: [id number]  : 1945  Room: [address] [place] [address]A     Per OT Eval:    Evaluating OT: Lay ROBERSON/L #[id number]     AM-PAC Daily Activity Raw Score: 15/24     Recommended Adaptive Equipment: AE for LB dressing/bathing, TBD for additional equipment      Diagnosis: Cellulitis [L03.90]  Cellulitis [L03.90]  Referring Provider: Cornelio Lares MD  Patient presented to ED for bilateral leg pain     Surgery: none  Pertinent Medical History: abscess of abdominal wall, anemia, arthritis, hx of breast cancer, CAD, CHF, diabetic neuropathy, hx of DVT, HLD, HTN, sarcoidosis, type 2 DM     Precautions:  Falls     Home Living: Pt lives alone in apartment with few steps to enter and elevator access within building   Bathroom setup: ?   Equipment owned: rollator     Prior Level of Function: Assistance with ADLs , Assistance with IADLs; ambulated w/ rollator  Aide comes in 3 days a week to assist with ADLs/IADLs  Driving: No  Occupation: n/a     Pain Level: Pt complained of pain in LLE & belly, did not rate her pain, nsg provided pain medication before therapy  Cognition: A&O: x 4, pleasant & cooperative, motivated to return home               Memory:  fair               Sequencing:  fair               Problem solving: fair               Judgement/safety: fair                 Functional Assessment:    Initial Eval Status  Date: 20 Treatment Status  Date:   20 STGs/LTGs  Treatment frequency: 1-4x/wk   Feeding Set-up  Set-Up Modified [place]     Grooming Minimal Assist  Min A  Seated at EOB Modified [place]    UB Dressing Minimal Assist  Min A  Assist with gown in the back    Modified [place]    LB Dressing Maximal Assist  Mod A  Simulated  Did not complete due to dizziness Modified [place] w/ AE   Bathing Maximal Assist (simulated) Mod A  Simulated task  Stand by Assist w/ AE   Toileting Dependent (simulated) Mod A  (simulated)   Min A for safe commode transfer, cued to use grab bar, assist with gown, requesting to stay on commode for a while, informed nsg  Minimal Assist    Bed Mobility  Supine to sit: Moderate Assist   Sit to supine: Moderate Assist      HOB elevated  SBA  Supine<>EOB with use of bed rail  Supine to sit: Supervision   Sit to supine: Supervision    Functional Transfers NT (d/t safety/weakness) Min A  Cueing for hand placement  Min. A   Functional Mobility NT (d/t safety/weakness) Min A  With walker, slow but steady pace, able to manage walker this date with cues  Minimal Assist w/ ww   Balance Sitting:     Static: Sup    Dynamic:SBA  Standing: NT     Sitting:  SBA  Standing:  Min A  With walker        Activity Tolerance Fair-  Fair  Seated at EOB 10 minutes  Dizziness slowly diminished  nsg aware, BP was [facility] prior to mobility  good   Visual/  Perceptual Glasses: readers                          Hand dominance: R       Strength ROM Additional Info:    RUE  4/5  WFL good  and wfl FMC/dexterity noted during ADL tasks         LUE Proximally: 2/5  Distally: 3+/5  Proximally: limited to slight shoulder flexion  Distally: WFL fair  and fair FMC/dexterity noted during ADL tasks      Comments: pt reports breast cancer spread to her arm and ever since she has had limited ROM and strength in LUE     Hearing: WFL   Sensation:  Pt c/o numbness/tingling in legs (chronic)  Tone: WFL   Edema: BLEs noted        Education:  Pt was educated through out treatment regarding proper technique & safety with bed mobility, functional transfers & mobility, ADL compensatory strategies to ease tasks to improve safety & prevent falls and allow pt to return home safely. Comments: Upon arrival pt supine in bed, agreeable to therapy. Pt completed of bed mobility, functional mobility, transfers and ADL tasks this session.  At end of session, pt requesting to stay on commode, nsg informed, all lines and tubes intact, call light within reach. Chair set up for pt requesting for pt to stay up in chair for lunch with pt in agreement. · Pt has made Good progress towards set goals. · Continue with current plan of care      Treatment Time In: 12:25pm            Treatment Time Out: 12:50pm            Treatment Charges: Mins Units   Ther Ex  [id number]     Manual Therapy Jermaine Aguilera [phone]   ADL/Home Mgt [id number] 10 1   Neuro Re-ed [id number]     Group Therapy      Orthotic manage/training  42106     Non-Billable Time     Total Timed Treatment 25 2        Za MAJANO  [address] [place] [address], [address] [place] [address]

## 2020-07-12 NOTE — PROGRESS NOTES
[facility]  Family Medicine Attending    S: 76 y.o. female with PMH of HTN, breast cancer s/p lumpectomy with bone mets, CAD, CABG, CHF EF:50-55 RVSP 30 mmHg , chronic venous insufficiency, DVT, B/l hip wounds who presented to ER with 3 day history of  left leg pain. Had difficulty walking 2/2 pain and yesterday became unbearable so came to ER. Denies no fevers or chills, loss of sensation or falls. Patient reports left lower extremity pain is worse today. Toe pain is improved. Had not had bowel movement which is bothersome to her. No chest pain or shortness of breath. O: VS- Blood pressure (!) 159/63, pulse 107, temperature 97.5 °F (36.4 °C), temperature source Temporal, resp. rate 16, height 5' 4\" (1.626 m), weight 164 lb 5 oz (74.5 kg), SpO2 94 %, not currently breastfeeding. Exam is as noted by resident with the following changes, additions or corrections:  Gen - lying in bed, NAD  Cardio - RRR, stable systolic murmur  Lungs - Lungs CTAB , no wheeze    Ext- 2+ pitting edema with chronic venous stasis skin changes. Peripheral pulses palpated on the right, diminished on left (present by doppler), bilateral feet warm and well perfused. No joint erythema or effusion. Left big toe ttp even to light touch - improved today. Impressions:   Principal Problem:    Left leg pain  Active Problems:    Deep vein thrombosis (DVT) of right lower extremity (HCC)    KARISHMA (acute kidney injury) ([address]    Cellulitis    Decubitus ulcer of both hip    Lymphopenia    Degenerative disc disease at L5-S1 level    Acute gout involving toe of left foot  Resolved Problems:    * No resolved hospital problems. *      Plan:   Appreciate management of ID for concern of infection, nephro for KARISHMA, heme/onc for malignancy as well as anemia and right lower extremity DVT, general surgery for chronic wounds and need for debridement. INR 3.5 today. Dosing per pharmacy. Pain control, oxycodone.     Neurosurgery consulted - did not recommend epidural nerve block. Pain management consulted. Will trial prednisone burst 40mg x 5 days for possible gout. Appreciate consultant recs. Will get vascular consult regarding severe pain in left lower extremity and diminished pulses. US of abdomen due to increased pain and distention. Start miralax as patient has not had bowel movement since admission. Attending Physician Statement  I have reviewed the chart, including any radiology or labs, and seen the patient with the resident(s). I personally reviewed and performed key elements of the history and exam.  I agree with the assessment, plan and orders as documented by the resident. Please refer to the resident note for additional information. Tanya Cheng.  Govind

## 2020-07-13 LAB
ANION GAP SERPL CALCULATED.3IONS-SCNC: 11 MMOL/L (ref 7–16)
ANISOCYTOSIS: ABNORMAL
BASOPHILS ABSOLUTE: 0 E9/L (ref 0–0.2)
BASOPHILS RELATIVE PERCENT: 0 % (ref 0–2)
BUN BLDV-MCNC: 65 MG/DL (ref 8–23)
CALCIUM SERPL-MCNC: 9 MG/DL (ref 8.6–10.2)
CHLORIDE BLD-SCNC: 99 MMOL/L (ref 98–107)
CO2: 24 MMOL/L (ref 22–29)
CREAT SERPL-MCNC: 1.8 MG/DL (ref 0.5–1)
EOSINOPHILS ABSOLUTE: 0 E9/L (ref 0.05–0.5)
EOSINOPHILS RELATIVE PERCENT: 0 % (ref 0–6)
GFR AFRICAN AMERICAN: 33
GFR NON-AFRICAN AMERICAN: 27 ML/MIN/1.73
GLUCOSE BLD-MCNC: 389 MG/DL (ref 74–99)
HCT VFR BLD CALC: 21.5 % (ref 34–48)
HCT VFR BLD CALC: 23.3 % (ref 34–48)
HEMOGLOBIN: 6.9 G/DL (ref 11.5–15.5)
HEMOGLOBIN: 7.5 G/DL (ref 11.5–15.5)
HYPOCHROMIA: ABNORMAL
INR BLD: 4.1
LYMPHOCYTES ABSOLUTE: 0.26 E9/L (ref 1.5–4)
LYMPHOCYTES RELATIVE PERCENT: 8.7 % (ref 20–42)
MCH RBC QN AUTO: 37.3 PG (ref 26–35)
MCHC RBC AUTO-ENTMCNC: 32.1 % (ref 32–34.5)
MCV RBC AUTO: 116.2 FL (ref 80–99.9)
MONOCYTES ABSOLUTE: 0 E9/L (ref 0.1–0.95)
MONOCYTES RELATIVE PERCENT: 7.2 % (ref 2–12)
NEUTROPHILS ABSOLUTE: 2.64 E9/L (ref 1.8–7.3)
NEUTROPHILS RELATIVE PERCENT: 91.3 % (ref 43–80)
PDW BLD-RTO: 16.4 FL (ref 11.5–15)
PLATELET # BLD: 109 E9/L (ref 130–450)
PMV BLD AUTO: 11.3 FL (ref 7–12)
POIKILOCYTES: ABNORMAL
POLYCHROMASIA: ABNORMAL
POTASSIUM REFLEX MAGNESIUM: 5.2 MMOL/L (ref 3.5–5)
PROTHROMBIN TIME: 47.7 SEC (ref 9.3–12.4)
RBC # BLD: 1.85 E12/L (ref 3.5–5.5)
SODIUM BLD-SCNC: 134 MMOL/L (ref 132–146)
TARGET CELLS: ABNORMAL
WBC # BLD: 2.9 E9/L (ref 4.5–11.5)

## 2020-07-13 PROCEDURE — 99232 SBSQ HOSP IP/OBS MODERATE 35: CPT | Performed by: FAMILY MEDICINE

## 2020-07-13 PROCEDURE — 6370000000 HC RX 637 (ALT 250 FOR IP): Performed by: STUDENT IN AN ORGANIZED HEALTH CARE EDUCATION/TRAINING PROGRAM

## 2020-07-13 PROCEDURE — 80048 BASIC METABOLIC PNL TOTAL CA: CPT

## 2020-07-13 PROCEDURE — 1200000000 HC SEMI PRIVATE

## 2020-07-13 PROCEDURE — 85018 HEMOGLOBIN: CPT

## 2020-07-13 PROCEDURE — 2580000003 HC RX 258: Performed by: STUDENT IN AN ORGANIZED HEALTH CARE EDUCATION/TRAINING PROGRAM

## 2020-07-13 PROCEDURE — 85610 PROTHROMBIN TIME: CPT

## 2020-07-13 PROCEDURE — 85014 HEMATOCRIT: CPT

## 2020-07-13 PROCEDURE — 85025 COMPLETE CBC W/AUTO DIFF WBC: CPT

## 2020-07-13 PROCEDURE — 36415 COLL VENOUS BLD VENIPUNCTURE: CPT

## 2020-07-13 RX ORDER — SENNA AND DOCUSATE SODIUM 50; 8.6 MG/1; MG/1
2 TABLET, FILM COATED ORAL DAILY PRN
Status: DISCONTINUED | OUTPATIENT
Start: 2020-07-13 | End: 2020-07-14

## 2020-07-13 RX ORDER — LIDOCAINE 4 G/G
1 PATCH TOPICAL DAILY
Status: DISCONTINUED | OUTPATIENT
Start: 2020-07-13 | End: 2020-07-15 | Stop reason: HOSPADM

## 2020-07-13 RX ADMIN — Medication 1 TABLET: at 09:08

## 2020-07-13 RX ADMIN — MULTIPLE VITAMINS W/ MINERALS TAB 1 TABLET: TAB at 09:08

## 2020-07-13 RX ADMIN — ISOSORBIDE DINITRATE 5 MG: 10 TABLET ORAL at 17:29

## 2020-07-13 RX ADMIN — ISOSORBIDE DINITRATE 5 MG: 10 TABLET ORAL at 13:27

## 2020-07-13 RX ADMIN — PANTOPRAZOLE SODIUM 40 MG: 40 TABLET, DELAYED RELEASE ORAL at 05:45

## 2020-07-13 RX ADMIN — POLYETHYLENE GLYCOL 3350 17 G: 17 POWDER, FOR SOLUTION ORAL at 09:09

## 2020-07-13 RX ADMIN — Medication 10 ML: at 20:07

## 2020-07-13 RX ADMIN — DOCUSATE SODIUM 100 MG: 100 CAPSULE, LIQUID FILLED ORAL at 09:08

## 2020-07-13 RX ADMIN — OXYCODONE HYDROCHLORIDE 5 MG: 5 TABLET ORAL at 19:01

## 2020-07-13 RX ADMIN — OXYCODONE HYDROCHLORIDE 5 MG: 5 TABLET ORAL at 07:22

## 2020-07-13 RX ADMIN — OXYCODONE HYDROCHLORIDE 5 MG: 5 TABLET ORAL at 00:36

## 2020-07-13 RX ADMIN — SKIN PROTECTANT: 44 OINTMENT TOPICAL at 20:07

## 2020-07-13 RX ADMIN — PREDNISONE 40 MG: 20 TABLET ORAL at 09:07

## 2020-07-13 RX ADMIN — PREGABALIN 50 MG: 50 CAPSULE ORAL at 20:07

## 2020-07-13 RX ADMIN — SKIN PROTECTANT: 44 OINTMENT TOPICAL at 09:09

## 2020-07-13 RX ADMIN — Medication 10 ML: at 09:09

## 2020-07-13 RX ADMIN — PREGABALIN 50 MG: 50 CAPSULE ORAL at 09:08

## 2020-07-13 RX ADMIN — MAGNESIUM GLUCONATE 500 MG ORAL TABLET 400 MG: 500 TABLET ORAL at 09:08

## 2020-07-13 RX ADMIN — PREGABALIN 50 MG: 50 CAPSULE ORAL at 13:27

## 2020-07-13 RX ADMIN — ISOSORBIDE DINITRATE 5 MG: 10 TABLET ORAL at 09:08

## 2020-07-13 ASSESSMENT — PAIN SCALES - GENERAL
PAINLEVEL_OUTOF10: 9
PAINLEVEL_OUTOF10: 9
PAINLEVEL_OUTOF10: 0
PAINLEVEL_OUTOF10: 9
PAINLEVEL_OUTOF10: 0
PAINLEVEL_OUTOF10: 0

## 2020-07-13 NOTE — CARE COORDINATION
Pt accepted to [facility], will need pre-cert, Carlos Meneses starting today 7/13. Discharge plan is [facility] pending pre-cert.

## 2020-07-13 NOTE — PROGRESS NOTES
[facility] - Family Medicine Inpatient   Resident Progress Note    S:  Hospital day: 5   Brief Synopsis: 45-year-old woman with history of breast cancer status post lumpectomy with bony and liver mets, coronary artery disease, CHF ejection fraction 50 to 55%, previous DVT, and recent admission for hip wounds presented for increasing left leg pain. Pain started on Saturday prior to admission, worsened as the days went on, no trauma,falls. WC of decubitus hip wounds +staph aureus, has been started on daptomycin. MRI showed degenerative changes of lumbar spine, NSG evaluated patient no intervention at this time, likely that her deep hip wound is aggravating sciatic nerve pain. Acute events over the last 24 hours- Had BM yesterday evening, mildly alleviated abdominal pain. Does not feel bloated. Continues to have left lower hip pain that radiates down left leg. No loss sensation, weakness, erythema, swelling, sob, chest pain.        Cont meds:     Scheduled meds:    polyethylene glycol  17 g Oral Daily    predniSONE  40 mg Oral Daily    daptomycin (CUBICIN) IVPB  6 mg/kg (Adjusted) Intravenous Q48H    lidocaine-EPINEPHrine  20 mL Intradermal Once    mineral oil-hydrophilic petrolatum   Topical BID    [Held by provider] bumetanide  1 mg Intravenous Daily    calcium-vitamin D  1 tablet Oral Daily    docusate sodium  100 mg Oral Daily    isosorbide dinitrate  5 mg Oral TID    magnesium oxide  400 mg Oral Daily    [Held by provider] metoprolol succinate  12.5 mg Oral Daily    therapeutic multivitamin-minerals  1 tablet Oral Daily    pantoprazole  40 mg Oral QAM AC    pregabalin  50 mg Oral TID    [Held by provider] vitamin B-12  1,000 mcg Oral Daily    sodium chloride flush  10 mL Intravenous 2 times per day    sodium chloride   Intravenous Q12H    warfarin (COUMADIN) daily dosing (placeholder)   Other RX Placeholder     PRN meds: oxyCODONE, povidone-iodine, mineral oil-hydrophilic petrolatum **AND** mineral oil-hydrophilic petrolatum, albuterol, sodium chloride flush, acetaminophen **OR** acetaminophen, magnesium hydroxide, promethazine **OR** [DISCONTINUED] ondansetron     I reviewed the patient's past medical and surgical history, Medications and Allergies. O:  BP (!) 120/55   Pulse 84   Temp 96.9 °F (36.1 °C) (Temporal)   Resp 17   Ht 5' 4\" (1.626 m)   Wt 164 lb 5 oz (74.5 kg)   SpO2 97%   BMI 28.20 kg/m²   24 hour I&O: I/O last 3 completed shifts: In: 760 [P.O.:360; IV Piggyback:400]  Out: 1350 [Urine:1350]  No intake/output data recorded. Physical Exam   Constitutional: She is oriented to person, place, and time. She appears well-nourished. No distress. HENT:   Head: Normocephalic and atraumatic. Eyes: No scleral icterus. Cardiovascular: Normal rate and regular rhythm. Exam reveals no gallop and no friction rub. No murmur heard. Dorsalis pedis pulse- palpable with doppler   Pulmonary/Chest: Breath sounds normal. No respiratory distress. She has no wheezes. Abdominal: Bowel sounds are normal. There is no abdominal tenderness. Some dullness to percussion diffuse. Musculoskeletal:         General: Tenderness present. Comments: Left leg tender to palpation- diffuse. Strength 5/5 lower extremities. Left toe PIP TTP. No erythema warmth swelling     Neurological: She is alert and oriented to person, place, and time. Skin:   Wounds on hips- as seen in media    Dry discolored skin bilateral lower extremities. No erythema. Mild edema lower extremity (non-pitting)       Labs:  Na/K/Cl/CO2:  134/5.2/99/24 (07/13 0530)  BUN/Cr/glu/ALT/AST/amyl/lip:  65/1.8/--/--/--/--/-- (07/13 0530)  WBC/Hgb/Hct/Plts:  2.9/6.9/21.5/109 (07/13 0530)  estimated creatinine clearance is 27 mL/min (A) (based on SCr of 1.8 mg/dL (H)). Other pertinent labs as noted below    Radiology:  US ABDOMEN LIMITED   Final Result   Minimal ascites.          MRI LUMBAR SPINE WO CONTRAST   Final Result   Findings compatible with degenerative changes   No convincing evidence for metastatic disease in the spine   There are multiple lesions in the liver seen on the margin of this   study, compatible with metastatic disease      ALERT:  THIS IS AN ABNORMAL REPORT      US RETROPERITONEAL COMPLETE   Final Result   Findings compatible with a right renal cyst         XR FOOT LEFT (MIN 3 VIEWS)   Final Result      No acute fracture is identified. Osteoarthritis. Osteopenia. XR FOOT RIGHT (MIN 3 VIEWS)   Final Result      No acute fracture is identified. Osteoarthritis. Osteopenia. XR TIBIA FIBULA RIGHT (2 VIEWS)   Final Result      No acute fracture is identified. Osteoarthritis. Osteopenia. XR TIBIA FIBULA LEFT (2 VIEWS)   Final Result      No acute fracture is identified. Osteoarthritis. Osteopenia. US DUP LOWER EXTREMITIES BILATERAL VENOUS   Final Result      Extensive deep vein thrombosis throughout the right lower extremity. XR CHEST PORTABLE   Final Result      Cardiomegaly with median sternotomy      Small right-sided pleural effusion      Other findings are unchanged from prior study. A/P:  Principal Problem:    Left leg pain  Active Problems:    Chronic deep vein thrombosis (DVT) of proximal vein of right lower extremity (HCC)    Deep vein thrombosis (DVT) of right lower extremity (HCC)    KARISHMA (acute kidney injury) ([address]    Cellulitis    Decubitus ulcer of both hip    Lymphopenia    Degenerative disc disease at L5-S1 level    Acute gout involving toe of left foot  Resolved Problems:    * No resolved hospital problems. *    Left leg pain likely 2/2 to Lumbar Radicular Pain on Left Side  [facility] Pending. [facility] PT/OT scores were poor and she will need [place] to improve mobility. Lidocaine path placed today. PRN Roxicodone q6 hours.  Lyrica 50 mg TID  Out-patient pain management follow-up 7/17  MRI Lumbar Spine- Degen changes/ L1-L5 broad-base disc herniation, mild central canal stenosis. NSG, Vascular signed off. Decubitus Ulcer Hips + Staph Aureus  ID recommends IV Daptomycin longer term. General surgery did debridement 7/9. Wound care on board  Previous admit 6/18-6/22 for failure of o/p tx for hip ulcers. WC + Corynebacterium /staph aureus at that time-did not need antibiotics at d/c    KARISHMA-prerenal  Creatinine down to 1.8 Baseline Scr 1.5-2.0  Nephrology consulted  Continue to monitor I's and O's    Acute Gout Left Toe  40 mg prednisone 5 day course. Day 3 today    DVT Right Lower Extremity  Coumadin, pharmacy dosing. INR supratherapeutic today  US lower extremity: extensive dvt through the right lower extremity. Hx of RLE DVT    Anemia- likely multifactorial related to metastatic breast cancer, Ibrance, CKD stage IV  Repeat Hb today >7. Initially was lower. No transfusion at this time, threshold remains Hb<7  Hematology-oncology consulted. Lymphopenia  ANC 2.24-chronic lymphopenia  Hematology oncology consulted    HTN/CAD s/p CABG/ Breast Cancer/ HFrEF  Held Bumex secondary to KARISHMA, Held Toprol 2/2 to low pressures.   Isosorbide dinitrate      GI/DVT ppx: Protonix/Coumadin   Dispo: [facility]    Electronically signed by Cristobal Souza  PGY-2 on 7/13/2020 at 8:54 AM  This case was discussed with attending physician: Dr. Aceves

## 2020-07-13 NOTE — PLAN OF CARE
Problem: Falls - Risk of:  Goal: Will remain free from falls  Description: Will remain free from falls  7/12/2020 2303 by Gaye Cardoza RN  Outcome: Met This Shift  7/12/2020 1007 by Bhargavi Saleem  Outcome: Met This Shift     Problem: Pain:  Goal: Control of chronic pain  Description: Control of chronic pain  Outcome: Met This Shift     Problem: Skin Integrity:  Goal: Will show no infection signs and symptoms  Description: Will show no infection signs and symptoms  7/12/2020 1007 by Bhargavi Saleem  Outcome: Met This Shift

## 2020-07-13 NOTE — PROGRESS NOTES
[address] [place] [address] [facility]  Progress Note      Chief Complaint   Patient presents with    Leg Swelling     BLE SWELLING X1 DAY, hx CHF       SUBJECTIVE:      Patient is tolerating medications. No reported adverse drug reactions. No problems overnight. Review of systems:    As stated above in the chief complaint, otherwise negative. Medications:    Scheduled Meds:   lidocaine  1 patch Transdermal Daily    polyethylene glycol  17 g Oral Daily    predniSONE  40 mg Oral Daily    daptomycin (CUBICIN) IVPB  6 mg/kg (Adjusted) Intravenous Q48H    lidocaine-EPINEPHrine  20 mL Intradermal Once    mineral oil-hydrophilic petrolatum   Topical BID    [Held by provider] bumetanide  1 mg Intravenous Daily    calcium-vitamin D  1 tablet Oral Daily    docusate sodium  100 mg Oral Daily    isosorbide dinitrate  5 mg Oral TID    magnesium oxide  400 mg Oral Daily    [Held by provider] metoprolol succinate  12.5 mg Oral Daily    therapeutic multivitamin-minerals  1 tablet Oral Daily    pantoprazole  40 mg Oral QAM AC    pregabalin  50 mg Oral TID    [Held by provider] vitamin B-12  1,000 mcg Oral Daily    sodium chloride flush  10 mL Intravenous 2 times per day    sodium chloride   Intravenous Q12H    warfarin (COUMADIN) daily dosing (placeholder)   Other RX Placeholder     Continuous Infusions:  PRN Meds:oxyCODONE, povidone-iodine, mineral oil-hydrophilic petrolatum **AND** mineral oil-hydrophilic petrolatum, albuterol, sodium chloride flush, acetaminophen **OR** acetaminophen, magnesium hydroxide, promethazine **OR** [DISCONTINUED] ondansetron  Prior to Admission medications    Medication Sig Start Date End Date Taking?  Authorizing Provider   PT/INR Test STRP 1 each by In Vitro route Twice a Week 6/29/20   Tricia La DO   sodium bicarbonate 650 MG tablet Take 650 mg by mouth 2 times daily    Historical Provider, MD   PT/INR Testing Monitor KIT 1 each by Does not apply route once a week Use to test INR at home once weekly and as directed to monitor INR. 4/9/20   Vishal Buenrostro, DO   PT/INR Test STRP 1 each by In Vitro route once a week Use to check INR at least once weekly and more often as directed. 4/9/20   Vishal Buenrostro, DO   metoprolol succinate (TOPROL XL) 25 MG extended release tablet Take 0.5 tablets by mouth daily 4/8/20   Vishal Buenrostro, DO   omeprazole 20 MG EC tablet Take 1 tablet by mouth daily 4/8/20   Vishal Buenrostro, DO   bumetanide (BUMEX) 1 MG tablet Take 1 tablet by mouth daily 3/16/20   Vishal Buenrostro, DO   pregabalin (LYRICA) 50 MG capsule Take 1 capsule by mouth 3 times daily for 120 days. 2/4/20 6/29/20  Vishal Buenrostro, DO   magnesium oxide (MAG-OX) 400 (240 Mg) MG tablet Take 1 tablet by mouth daily 2/4/20   Vishal Buenrostro, DO   docusate (COLACE, DULCOLAX) 100 MG CAPS Take 100 mg by mouth daily 2/4/20   Vishal Buenrostro, DO   PT/INR Testing Monitor KIT 1 each by Does not apply route once a week Please use to test INR as directed by physician 1/15/20   Vishal Buenrostro DO   isosorbide dinitrate (ISORDIL) 5 MG tablet Take 1 tablet by mouth 3 times daily 1/14/20   Yuni Christensen MD   vitamin B-12 (CYANOCOBALAMIN) 1000 MCG tablet Take 1 tablet by mouth daily 1/7/20   Vishal Buenrostro DO   Calcium Citrate-Vitamin D (RA CALCIUM CIT-VIT D-3 PETITES) 200-250 MG-UNIT TABS take 1 tablet by mouth twice a day 1/7/20   Vishal Buenrostro DO   albuterol sulfate (PROAIR RESPICLICK) 998 (90 Base) MCG/ACT aerosol powder inhalation Inhale 2 puffs into the lungs every 6 hours as needed for Wheezing or Shortness of Breath 1/7/20   Vishal Buenrostro DO   warfarin (COUMADIN) 1 MG tablet Please take 2 mg by mouth most days, but 3 mg by mouth on Mondays and Wednesdays and Friday. Patient taking differently: Please take 2 mg by mouth most days, but 3 mg by mouth on sun, tues, thurs.  11/25/19   Kavita Lawson MD   Multiple Vitamins-Minerals (THERAPEUTIC MULTIVITAMIN-MINERALS) tablet Take 1 tablet by mouth daily 19   Maria Elena Azul MD   Insulin Pen Needle (MEIJER PEN NEEDLES) 29G X 12MM MISC 1 each by Does not apply route daily 9/10/19   Maria Elena Azul MD   nitroGLYCERIN (NITROSTAT) 0.4 MG SL tablet up to max of 3 total doses. If no relief after 1 dose, call 911. 19   Maria Elena Azul MD   sodium chloride (OCEAN, BABY AYR) 0.65 % nasal spray 1 spray by Nasal route as needed for Congestion (dryness) 19   Maria Elena Azul MD   palbociclib [facility]) 100 MG capsule Take 100 mg by mouth 3 weeks on and 1 week off also receives injections every 3 weeks per Dr Daryle Rubinstein Provider, MD       OBJECTIVE:  /77   Pulse 87   Temp 98.7 °F (37.1 °C) (Temporal)   Resp 16   Ht 5' 4\" (1.626 m)   Wt 164 lb 5 oz (74.5 kg)   SpO2 96%   BMI 28.20 kg/m²   Temp  Av.7 °F (36.5 °C)  Min: 96.9 °F (36.1 °C)  Max: 98.7 °F (37.1 °C)  General appearance: Resting in bed in no apparent distress. Skin: Warm and dry. No rashes were noted. HEENT: Round and reactive pupils. Moist mucous membranes. No ulcerations or thrush. Neck: Supple to movements. Chest: No use of accessory muscles to breathe. Symmetrical expansion. No wheezing, crackles or rhonchi. Cardiovascular: Reguar rate and rhythm. No murmurs gallops, or rubs appreciated. Abdomen: Bowel sounds present, nontender, nondistended, no masses or hepatosplenomegaly. Extremities: No clubbing, no cyanosis, no edema. Lines: peripheral    I/O last 3 completed shifts:   In: 36 [P.O.:360; IV Piggyback:400]  Out: 1350 [Urine:1350]      Laboratory and Tests Review:      Lab Results   Component Value Date    WBC 2.9 (L) 2020    WBC 2.4 (L) 2020    WBC 2.5 (L) 2020    HGB 6.9 (L) 2020    HCT 21.5 (L) 2020    .2 (H) 2020     (L) 2020     Lab Results   Component Value Date    NEUTROABS 2.64 2020    NEUTROABS 2.11 2020    NEUTROABS 1.85 2020     No results found for: [facility]  Lab Results   Component Value Date    ALT 28 07/07/2020    AST 34 (H) 07/07/2020    ALKPHOS 144 (H) 07/07/2020    BILITOT 1.0 07/07/2020     Lab Results   Component Value Date     07/13/2020    K 5.2 07/13/2020    CL 99 07/13/2020    CO2 24 07/13/2020    BUN 65 07/13/2020    CREATININE 1.8 07/13/2020    CREATININE 1.8 07/12/2020    CREATININE 2.2 07/11/2020    GFRAA 33 07/13/2020    LABGLOM 27 07/13/2020    GLUCOSE 389 07/13/2020    GLUCOSE 109 04/20/2012    PROT 7.5 07/07/2020    LABALBU 3.6 07/07/2020    LABALBU 4.4 03/25/2012    CALCIUM 9.0 07/13/2020    BILITOT 1.0 07/07/2020    ALKPHOS 144 07/07/2020    AST 34 07/07/2020    ALT 28 07/07/2020     Lab Results   Component Value Date    CRP 16.2 (H) 07/07/2020    CRP 4.2 (H) 06/19/2020     Lab Results   Component Value Date    SEDRATE 134 (H) 07/07/2020    SEDRATE 102 (H) 06/21/2020    SEDRATE 130 (H) 06/19/2020       Radiology:    US ABDOMEN LIMITED   Final Result   Minimal ascites. MRI LUMBAR SPINE WO CONTRAST   Final Result   Findings compatible with degenerative changes   No convincing evidence for metastatic disease in the spine   There are multiple lesions in the liver seen on the margin of this   study, compatible with metastatic disease      ALERT:  THIS IS AN ABNORMAL REPORT      US RETROPERITONEAL COMPLETE   Final Result   Findings compatible with a right renal cyst         XR FOOT LEFT (MIN 3 VIEWS)   Final Result      No acute fracture is identified. Osteoarthritis. Osteopenia. XR FOOT RIGHT (MIN 3 VIEWS)   Final Result      No acute fracture is identified. Osteoarthritis. Osteopenia. XR TIBIA FIBULA RIGHT (2 VIEWS)   Final Result      No acute fracture is identified. Osteoarthritis. Osteopenia. XR TIBIA FIBULA LEFT (2 VIEWS)   Final Result      No acute fracture is identified. Osteoarthritis. Osteopenia.       US DUP LOWER EXTREMITIES BILATERAL VENOUS   Final Result Extensive deep vein thrombosis throughout the right lower extremity. XR CHEST PORTABLE   Final Result      Cardiomegaly with median sternotomy      Small right-sided pleural effusion      Other findings are unchanged from prior study. Microbiology:   Lab Results   Component Value Date    BC 5 Days no growth 07/07/2020    BC 5 Days- no growth 08/12/2019    BC 5 Days- no growth 08/09/2019    ORG Staphylococcus aureus 07/08/2020    ORG Corynebacterium species 07/08/2020    ORG Staphylococcus aureus 06/21/2020    ORG Corynebacterium species 06/21/2020     Lab Results   Component Value Date    BLOODCULT2 5 Days no growth 07/07/2020    BLOODCULT2 5 Days- no growth 08/12/2019    BLOODCULT2 5 Days- no growth 08/09/2019    ORG Staphylococcus aureus 07/08/2020    ORG Corynebacterium species 07/08/2020    ORG Staphylococcus aureus 06/21/2020    ORG Corynebacterium species 06/21/2020     WOUND/ABSCESS   Date Value Ref Range Status   07/08/2020   Final    Moderate growth  Methicillin resistant Staph aureus isolated. Most Methicillin  resistant Staphylococcus are usually resistant to multiple  antibiotics including other B-Lactams, Aminoglycosides,  Macrolides, Clindamycin and Tetracycline. Contact isolation  is indicated. 07/08/2020 Heavy growth  Final   06/21/2020   Final    Moderate growth  Methicillin resistant Staph aureus isolated. Most Methicillin  resistant Staphylococcus are usually resistant to multiple  antibiotics including other B-Lactams, Aminoglycosides,  Macrolides, Clindamycin and Tetracycline. Contact isolation  is indicated.      06/21/2020 Light growth  Final     No results found for: RESPSMEAR  No results found for: MPNEUMO, CLAMYDCU, LABLEGI, AFBCX, FUNGSM, LABFUNG  No results found for: CULTRESP  No results found for: CXCATHTIP  Body Fluid Culture, Sterile   Date Value Ref Range Status   07/25/2019 Growth not present  Final     No results found for: CXSURG  Urine Culture, Routine

## 2020-07-13 NOTE — PROGRESS NOTES
[address] [facility]  Family Medicine Attending    S: 76 y.o. female with PMH of HTN, breast cancer s/p lumpectomy with bone and liver mets, CAD s/p 3V CABG, CHF EF 50-55% RVSP 30 mmHg , DM2 (a1c 8.2), sarcoidosis, chronic venous insufficiency, DVT, and recent admission for B/l hip wounds who presented to ER with 3 day history of  left leg pain. Had difficulty walking 2/2 pain and yesterday became unbearable so came to ER. Denies no fevers or chills, loss of sensation or falls. Today, patient reports pain in the left hip and groin, but no leg pain. Toe pain is improved. Had 1 bm yesterday after taking miralax. Still with mild abdominal discomfort. Has concerns about going to a [place] site and christi COVID-19 there. O: VS- Blood pressure 125/77, pulse 87, temperature 98.7 °F (37.1 °C), temperature source Temporal, resp. rate 16, height 5' 4\" (1.626 m), weight 164 lb 5 oz (74.5 kg), SpO2 96 %, not currently breastfeeding. Exam is as noted by resident with the following changes, additions or corrections:  Gen - lying in bed, NAD  Cardio - RRR, stable systolic murmur  Lungs - Lungs CTAB , no wheeze    abd-increased bs, mild ttp llq no r/g, firm  Ext- 1+ pitting edema with chronic venous stasis skin changes.), bilateral feet warm and well perfused. No joint erythema or effusion. Pain of left groin with internal hip rotation and with palpation of greater trocanter  Hip wounds with dressing c/d/i    Impressions:   Principal Problem:    Left leg pain  Active Problems:    Chronic deep vein thrombosis (DVT) of proximal vein of right lower extremity (HCC)    Deep vein thrombosis (DVT) of right lower extremity (HCC)    KARISHMA (acute kidney injury) ([address]    Cellulitis    Decubitus ulcer of both hip    Lymphopenia    Degenerative disc disease at L5-S1 level    Acute gout involving toe of left foot  Resolved Problems:    * No resolved hospital problems.  *      Plan:   Appreciate ID, general surgery(s/p

## 2020-07-13 NOTE — CARE COORDINATION
PT 16/24, OT 15/24. Met with pt to discuss [place], pt wants home and continue with [address] with Dr. Marvin Moody, who would like to go to [place]. Cristiane(RNCM) made referral to Var[facility]([place]), await assessment. Herb Sifuentes [facility]

## 2020-07-13 NOTE — PROGRESS NOTES
Pharmacy Consultation Note  (Warfarin Dosing and Monitoring)    Initial consult date: 7/8  Consulting physician: Jose Hannah    Allergies:  Macrobid [nitrofurantoin monohydrate macrocrystals]    76 y.o. female    Ht Readings from Last 1 Encounters:   07/07/20 5' 4\" (1.626 m)     Wt Readings from Last 1 Encounters:   07/12/20 164 lb 5 oz (74.5 kg)         Warfarin Indication Target   INR [place] Dose  (if applicable) Diet/Feeding Tube   (Enteral feeds, nutritional drinks, increased Vitamin K in diet can decrease INR)   RLE DVT 2-3 3mg Tues, Thurs, Sat; 2mg all other days General       x Home Med? Meds Increasing INR x Home Med?  Meds Decreasing INR     Allopurinol    Azathioprine     Amiodarone/Propafenone/Dronedarone   Carbamazepine     Androgens   Cholestyramine     Chemotherapy (BBW: Capecitabine)   Estrogen     Ciprofloxacin/Levofloxacin   Nafcillin/Dicloxacillin     Clarithromycin/Erythromycin/Azithromycin   Barbiturates      Fluconazole/Itraconazole/Voriconazole/Ketoconazole   Phenytoin (Variable)     Metronidazole   Rifampin     Phenytoin (Variable)   Steroids (Variable/Dose Dependent)      Statins/Fenofibrate/Gemfibrozil   Sucralfate     Steroids (Variable/Dose Dependent)   Other:     Sulfamethoxazole/Trimethoprim        Tramadol         Other:        Comments regarding medication interactions:      x Diseases Affecting INR x Increased Bleeding Risk    CHF Exacerbation (Increases)  History GI Bleed/PUD    Liver Disease (Increases)  Chronic NSAID Use    Thyroid: Hyper (Increases)  Hypo (Decreases)  Chronic ASA/Antiplatelet Use (Clopidogrel/ Dipyridamole/Prasugrel/Ticagrelor)     X Malignancy (Increases) Xx Abnormal Renal Function (dialysis, renal transplant, SCr ? 2.3 mg/dL)     History of EtOH Abuse: Acute (Increases)   Chronic (Decreases)  Liver Function (cirrhosis, bilirubin >2x ULN with AST/ALT/AP >3x ULN)    Fever (Increases) X Age > 65 years   X Acute infection (Increases)  Hypertension/Uncontrolled

## 2020-07-13 NOTE — PROGRESS NOTES
enlargement    Anesthesia    Rectal bleeding    Ventral hernia    Type 2 diabetes mellitus without complication, with long-term current use of insulin (HCC)    Anemia of chronic disease    Chronic deep vein thrombosis (DVT) of proximal vein of right lower extremity (HCC)    Bilateral edema of lower extremity    Peripheral polyneuropathy    Deep vein thrombosis (DVT) of right lower extremity (HCC)    Complicated UTI (urinary tract infection)    KARISHMA (acute kidney injury) (HCC)    Diarrhea with dehydration    Chronic anticoagulation    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    Diabetic polyneuropathy associated with type 2 diabetes mellitus (HCC)    Leg swelling    History of deep vein thrombosis    Chronic venous insufficiency    Lymphedema of both lower extremities    Ambulatory dysfunction    CKD (chronic kidney disease) stage 3, GFR 30-59 ml/min (HCC)    Charcot's joint of foot in type 2 diabetes mellitus ([facility] [address]    Ascites    Palliative care encounter    Cancer associated pain    HAP (hospital-acquired pneumonia)    Acute systolic heart failure (HCC)    Nonischemic cardiomyopathy ([facility] [address]    Status post coronary artery bypass graft    Class 2 severe obesity due to excess calories with serious comorbidity and body mass index (BMI) of 35.0 to 35.9 in adult [facility])    Type 2 diabetes mellitus with hyperglycemia (HCC)    Acute hypercapnic respiratory failure (HCC)    Altered mental status    Goals of care, counseling/discussion    Palliative care by specialist    Metastatic disease ([facility] [address]    Moderate protein-calorie malnutrition (HCC)    Precordial pain    Chronic systolic heart failure (HCC)    Pleural effusion    Cellulitis and abscess of leg    Cellulitis    Bony metastasis (HCC) left leg    Decubitus ulcer of both hip    Lymphopenia    Left leg pain    Degenerative disc disease at L5-S1 level    Acute gout involving toe of left foot        PAST MEDICAL HISTORY:    Past Medical History:   Diagnosis Date    Abscess of abdominal wall     Anemia     Iron deficiency and chronic disease.  Anesthesia     DIFFICULTY WAKING UP    Arthritis     Arthritis, hip     Breast cancer ([address] 2005    S/P Left Lumpectomy with Lymph Node Dissection, Chemo/Rad. Follows with Dr. Reji Rodriguez. No recurrence to date. Surgeon was Dr. Gianna Allen.  CAD (coronary artery disease) 5/2008    s/p CABG. Follows with [address] [place] [address].  CHF (congestive heart failure) (HCC)     Chronic venous insufficiency 11/7/2018    Class 2 severe obesity due to excess calories with serious comorbidity and body mass index (BMI) of 35.0 to 35.9 in adult ([address] 8/1/2019    Decreased dorsalis pedis pulse 11/7/2018    Degenerative disc disease at L5-S1 level 7/10/2020    Diabetic neuropathy (HCC)     Diabetic neuropathy (HCC)     DVT (deep venous thrombosis) (HCC)     Recurrent. On lifelong coumadin.  DVT of upper extremity (deep vein thrombosis) ([address] 4/18/2012    History of deep vein thrombosis 11/7/2018    Humerus fracture     Chronic, on the Left.  Hyperlipidemia 8/29/2011    Hypertension     Left leg pain 7/9/2020    Leg swelling 11/7/2018    Lymph node enlargement     Lymphedema of both lower extremities 11/7/2018    Lymphopenia 7/9/2020    MI (myocardial infarction) ([address]     Nephrolithiasis     Follows with Dr. Barby Hagan.  Pericardial effusion     Pulmonary nodule     With mediastinal lymphadenopathy. Stable. Follows with Dr. Guerline Andrew.     Rib lesion 11/28/11    expansile lesion in one of the right ribs laterally    Sarcoidosis 07/26/11    per transbronchial needle aspiration    Subclavian vein occlusion, bilateral ([address] 4/18/2012    Type II or unspecified type diabetes mellitus without mention of complication, not stated as uncontrolled        DIET:    DIET GENERAL;  Dietary Nutrition Supplements: Low Calorie High Protein Supplement  Dietary Nutrition Supplements: Wound Healing Oral (prn):  oxyCODONE, povidone-iodine, mineral oil-hydrophilic petrolatum **AND** mineral oil-hydrophilic petrolatum, albuterol, sodium chloride flush, acetaminophen **OR** acetaminophen, magnesium hydroxide, promethazine **OR** [DISCONTINUED] ondansetron    DATA:    Recent Labs     07/11/20  0838 07/12/20  0756 07/13/20  0530   WBC 2.5* 2.4* 2.9*   HGB 7.5* 7.9* 6.9*   HCT 23.9* 24.6* 21.5*   .7* 114.4* 116.2*   * 116* 109*     Recent Labs     07/11/20  0838 07/12/20 0756 07/12/20  1730 07/13/20  0530    136  --  134   K 4.8 5.3* 5.1* 5.2*    101  --  99   CO2 25 24  --  24   BUN 51* 52*  --  65*   CREATININE 2.2* 1.8*  --  1.8*   LABGLOM 22 27  --  27   GLUCOSE 160* 335*  --  389*   CALCIUM 8.3* 8.7  --  9.0       Lab Results   Component Value Date    LABALBU 3.6 07/07/2020    LABALBU 3.4 (L) 06/29/2020    LABALBU 3.0 (L) 06/22/2020     Lab Results   Component Value Date    TSH 6.930 (H) 06/10/2019       Iron Studies  Lab Results   Component Value Date    IRON 49 (L) 06/20/2011    TIBC 266 06/20/2011    FERRITIN 2,375 07/08/2020     Vitamin B-12   Date Value Ref Range Status   07/08/2020 >2000 (H) 211 - 946 pg/mL Final     Folate   Date Value Ref Range Status   07/08/2020 >20.0 4.8 - 24.2 ng/mL Final       Vit D, 25-Hydroxy   Date Value Ref Range Status   10/23/2017 21 (L) 30 - 100 ng/mL Final     Comment:     <20 ng/mL. ........... [place] Deficient  20-30 ng/mL. ......... [place] Insufficient   ng/mL. ........ [place] Sufficient  >100 ng/mL. .......... [place] Toxic       No results found for: PTH    No components found for: URIC    Lab Results   Component Value Date    COLORU Yellow 07/07/2020    NITRU Negative 07/07/2020    GLUCOSEU Negative 07/07/2020    GLUCOSEU NEGATIVE 12/05/2011    KETUA Negative 07/07/2020    UROBILINOGEN 0.2 07/07/2020    BILIRUBINUR Negative 07/07/2020    BILIRUBINUR small 05/16/2016    BILIRUBINUR NEGATIVE 12/05/2011       No results found for: [facility]      IMPRESSION/RECOMMENDATIONS: 1. Acute kidney injury  Likely pre-renal in the setting of diuretics and poor po intake with FeNa <1% and low urine urea  She has been started on IVF and will follow on same. R U/S No hydro, no calculus, right renal cyst noted  Cr improving with IVF  Cr 3.4 on admission, at baseline of 1.8 today     2. Chronic kidney disease stage IV  Baseline Scr 1.5-2.0 over the past year     3. Hypertension with CKD I-IV  At target of <130/80 on isosorbide     4. Bilateral hip wounds  On daptomycin per ID     5.  Anemia with metastatic breast cancer  Will defer to Hematology    Thank you for the opportunity to participated in the care of [facility]    Pt seen and examined agree with above  Cr improved  Holding diuretics  Maddie Teixeira

## 2020-07-14 PROBLEM — N17.9 AKI (ACUTE KIDNEY INJURY) (HCC): Status: RESOLVED | Noted: 2017-10-24 | Resolved: 2020-07-14

## 2020-07-14 PROBLEM — I82.401 DEEP VEIN THROMBOSIS (DVT) OF RIGHT LOWER EXTREMITY (HCC): Status: RESOLVED | Noted: 2017-06-06 | Resolved: 2020-07-14

## 2020-07-14 PROBLEM — M10.9 ACUTE GOUT INVOLVING TOE OF LEFT FOOT: Status: RESOLVED | Noted: 2020-07-11 | Resolved: 2020-07-14

## 2020-07-14 PROBLEM — D72.810 LYMPHOPENIA: Status: RESOLVED | Noted: 2020-07-09 | Resolved: 2020-07-14

## 2020-07-14 PROBLEM — L03.90 CELLULITIS: Status: RESOLVED | Noted: 2020-07-08 | Resolved: 2020-07-14

## 2020-07-14 LAB
ANION GAP SERPL CALCULATED.3IONS-SCNC: 10 MMOL/L (ref 7–16)
ANION GAP SERPL CALCULATED.3IONS-SCNC: 12 MMOL/L (ref 7–16)
ANISOCYTOSIS: ABNORMAL
BASOPHILS ABSOLUTE: 0 E9/L (ref 0–0.2)
BASOPHILS RELATIVE PERCENT: 0 % (ref 0–2)
BUN BLDV-MCNC: 71 MG/DL (ref 8–23)
BUN BLDV-MCNC: 75 MG/DL (ref 8–23)
CALCIUM SERPL-MCNC: 10.4 MG/DL (ref 8.6–10.2)
CALCIUM SERPL-MCNC: 9.9 MG/DL (ref 8.6–10.2)
CHLORIDE BLD-SCNC: 98 MMOL/L (ref 98–107)
CHLORIDE BLD-SCNC: 99 MMOL/L (ref 98–107)
CO2: 25 MMOL/L (ref 22–29)
CO2: 26 MMOL/L (ref 22–29)
CREAT SERPL-MCNC: 1.7 MG/DL (ref 0.5–1)
CREAT SERPL-MCNC: 1.7 MG/DL (ref 0.5–1)
EOSINOPHILS ABSOLUTE: 0 E9/L (ref 0.05–0.5)
EOSINOPHILS RELATIVE PERCENT: 0 % (ref 0–6)
GFR AFRICAN AMERICAN: 35
GFR AFRICAN AMERICAN: 35
GFR NON-AFRICAN AMERICAN: 29 ML/MIN/1.73
GFR NON-AFRICAN AMERICAN: 29 ML/MIN/1.73
GLUCOSE BLD-MCNC: 449 MG/DL (ref 74–99)
GLUCOSE BLD-MCNC: 518 MG/DL (ref 74–99)
HCT VFR BLD CALC: 23.9 % (ref 34–48)
HEMOGLOBIN: 7.6 G/DL (ref 11.5–15.5)
INR BLD: 3.9
LYMPHOCYTES ABSOLUTE: 0.26 E9/L (ref 1.5–4)
LYMPHOCYTES RELATIVE PERCENT: 7.9 % (ref 20–42)
MCH RBC QN AUTO: 37.6 PG (ref 26–35)
MCHC RBC AUTO-ENTMCNC: 31.8 % (ref 32–34.5)
MCV RBC AUTO: 118.3 FL (ref 80–99.9)
METER GLUCOSE: 316 MG/DL (ref 74–99)
METER GLUCOSE: 322 MG/DL (ref 74–99)
METER GLUCOSE: 428 MG/DL (ref 74–99)
MONOCYTES ABSOLUTE: 0.07 E9/L (ref 0.1–0.95)
MONOCYTES RELATIVE PERCENT: 1.8 % (ref 2–12)
NEUTROPHILS ABSOLUTE: 2.97 E9/L (ref 1.8–7.3)
NEUTROPHILS RELATIVE PERCENT: 90.4 % (ref 43–80)
PDW BLD-RTO: 16.4 FL (ref 11.5–15)
PLATELET # BLD: 127 E9/L (ref 130–450)
PMV BLD AUTO: 11 FL (ref 7–12)
POLYCHROMASIA: ABNORMAL
POTASSIUM REFLEX MAGNESIUM: 5.8 MMOL/L (ref 3.5–5)
POTASSIUM SERPL-SCNC: 5 MMOL/L (ref 3.5–5)
PROTHROMBIN TIME: 45.7 SEC (ref 9.3–12.4)
RBC # BLD: 2.02 E12/L (ref 3.5–5.5)
SODIUM BLD-SCNC: 134 MMOL/L (ref 132–146)
SODIUM BLD-SCNC: 136 MMOL/L (ref 132–146)
TOTAL CK: 133 U/L (ref 20–180)
WBC # BLD: 3.3 E9/L (ref 4.5–11.5)

## 2020-07-14 PROCEDURE — 2580000003 HC RX 258: Performed by: STUDENT IN AN ORGANIZED HEALTH CARE EDUCATION/TRAINING PROGRAM

## 2020-07-14 PROCEDURE — 80048 BASIC METABOLIC PNL TOTAL CA: CPT

## 2020-07-14 PROCEDURE — 85025 COMPLETE CBC W/AUTO DIFF WBC: CPT

## 2020-07-14 PROCEDURE — 82962 GLUCOSE BLOOD TEST: CPT

## 2020-07-14 PROCEDURE — 6360000002 HC RX W HCPCS: Performed by: INTERNAL MEDICINE

## 2020-07-14 PROCEDURE — 85610 PROTHROMBIN TIME: CPT

## 2020-07-14 PROCEDURE — 1200000000 HC SEMI PRIVATE

## 2020-07-14 PROCEDURE — 2580000003 HC RX 258: Performed by: INTERNAL MEDICINE

## 2020-07-14 PROCEDURE — 6370000000 HC RX 637 (ALT 250 FOR IP): Performed by: STUDENT IN AN ORGANIZED HEALTH CARE EDUCATION/TRAINING PROGRAM

## 2020-07-14 PROCEDURE — 82550 ASSAY OF CK (CPK): CPT

## 2020-07-14 PROCEDURE — 6370000000 HC RX 637 (ALT 250 FOR IP): Performed by: FAMILY MEDICINE

## 2020-07-14 PROCEDURE — 93005 ELECTROCARDIOGRAM TRACING: CPT | Performed by: STUDENT IN AN ORGANIZED HEALTH CARE EDUCATION/TRAINING PROGRAM

## 2020-07-14 PROCEDURE — 99232 SBSQ HOSP IP/OBS MODERATE 35: CPT | Performed by: FAMILY MEDICINE

## 2020-07-14 PROCEDURE — 36415 COLL VENOUS BLD VENIPUNCTURE: CPT

## 2020-07-14 RX ORDER — SENNA AND DOCUSATE SODIUM 50; 8.6 MG/1; MG/1
2 TABLET, FILM COATED ORAL DAILY
Status: DISCONTINUED | OUTPATIENT
Start: 2020-07-14 | End: 2020-07-15 | Stop reason: HOSPADM

## 2020-07-14 RX ORDER — SODIUM CHLORIDE 9 MG/ML
INJECTION, SOLUTION INTRAVENOUS CONTINUOUS
Status: ACTIVE | OUTPATIENT
Start: 2020-07-14 | End: 2020-07-14

## 2020-07-14 RX ORDER — NICOTINE POLACRILEX 4 MG
15 LOZENGE BUCCAL PRN
Status: DISCONTINUED | OUTPATIENT
Start: 2020-07-14 | End: 2020-07-15 | Stop reason: HOSPADM

## 2020-07-14 RX ORDER — INSULIN GLARGINE 100 [IU]/ML
5 INJECTION, SOLUTION SUBCUTANEOUS ONCE
Status: COMPLETED | OUTPATIENT
Start: 2020-07-14 | End: 2020-07-14

## 2020-07-14 RX ORDER — DEXTROSE MONOHYDRATE 50 MG/ML
100 INJECTION, SOLUTION INTRAVENOUS PRN
Status: DISCONTINUED | OUTPATIENT
Start: 2020-07-14 | End: 2020-07-15 | Stop reason: HOSPADM

## 2020-07-14 RX ORDER — DEXTROSE MONOHYDRATE 25 G/50ML
12.5 INJECTION, SOLUTION INTRAVENOUS PRN
Status: DISCONTINUED | OUTPATIENT
Start: 2020-07-14 | End: 2020-07-15 | Stop reason: HOSPADM

## 2020-07-14 RX ADMIN — PREGABALIN 50 MG: 50 CAPSULE ORAL at 21:20

## 2020-07-14 RX ADMIN — PREGABALIN 50 MG: 50 CAPSULE ORAL at 09:37

## 2020-07-14 RX ADMIN — Medication 1 TABLET: at 09:31

## 2020-07-14 RX ADMIN — MAGNESIUM GLUCONATE 500 MG ORAL TABLET 400 MG: 500 TABLET ORAL at 09:31

## 2020-07-14 RX ADMIN — INSULIN GLARGINE 5 UNITS: 100 INJECTION, SOLUTION SUBCUTANEOUS at 15:15

## 2020-07-14 RX ADMIN — INSULIN LISPRO 4 UNITS: 100 INJECTION, SOLUTION INTRAVENOUS; SUBCUTANEOUS at 21:21

## 2020-07-14 RX ADMIN — OXYCODONE HYDROCHLORIDE 5 MG: 5 TABLET ORAL at 15:14

## 2020-07-14 RX ADMIN — ISOSORBIDE DINITRATE 5 MG: 10 TABLET ORAL at 16:41

## 2020-07-14 RX ADMIN — INSULIN LISPRO 8 UNITS: 100 INJECTION, SOLUTION INTRAVENOUS; SUBCUTANEOUS at 16:42

## 2020-07-14 RX ADMIN — SODIUM CHLORIDE: 9 INJECTION, SOLUTION INTRAVENOUS at 08:15

## 2020-07-14 RX ADMIN — SKIN PROTECTANT: 44 OINTMENT TOPICAL at 09:35

## 2020-07-14 RX ADMIN — ISOSORBIDE DINITRATE 5 MG: 10 TABLET ORAL at 12:40

## 2020-07-14 RX ADMIN — PREGABALIN 50 MG: 50 CAPSULE ORAL at 15:10

## 2020-07-14 RX ADMIN — SKIN PROTECTANT: 44 OINTMENT TOPICAL at 21:41

## 2020-07-14 RX ADMIN — MULTIPLE VITAMINS W/ MINERALS TAB 1 TABLET: TAB at 09:31

## 2020-07-14 RX ADMIN — Medication 10 ML: at 21:20

## 2020-07-14 RX ADMIN — POLYETHYLENE GLYCOL 3350 17 G: 17 POWDER, FOR SOLUTION ORAL at 09:30

## 2020-07-14 RX ADMIN — DOCUSATE SODIUM 50 MG AND SENNOSIDES 8.6 MG 2 TABLET: 8.6; 5 TABLET, FILM COATED ORAL at 12:40

## 2020-07-14 RX ADMIN — Medication 10 ML: at 09:31

## 2020-07-14 RX ADMIN — PREDNISONE 40 MG: 20 TABLET ORAL at 09:31

## 2020-07-14 RX ADMIN — DAPTOMYCIN 350 MG: 500 INJECTION, POWDER, LYOPHILIZED, FOR SOLUTION INTRAVENOUS at 15:25

## 2020-07-14 RX ADMIN — INSULIN LISPRO 12 UNITS: 100 INJECTION, SOLUTION INTRAVENOUS; SUBCUTANEOUS at 12:41

## 2020-07-14 RX ADMIN — INSULIN HUMAN 3 UNITS: 100 INJECTION, SOLUTION PARENTERAL at 08:13

## 2020-07-14 RX ADMIN — ISOSORBIDE DINITRATE 5 MG: 10 TABLET ORAL at 09:31

## 2020-07-14 RX ADMIN — PANTOPRAZOLE SODIUM 40 MG: 40 TABLET, DELAYED RELEASE ORAL at 06:40

## 2020-07-14 RX ADMIN — OXYCODONE HYDROCHLORIDE 5 MG: 5 TABLET ORAL at 06:40

## 2020-07-14 ASSESSMENT — PAIN DESCRIPTION - DESCRIPTORS: DESCRIPTORS: ACHING;CONSTANT;DISCOMFORT

## 2020-07-14 ASSESSMENT — PAIN DESCRIPTION - PAIN TYPE: TYPE: SURGICAL PAIN

## 2020-07-14 ASSESSMENT — PAIN DESCRIPTION - ORIENTATION: ORIENTATION: RIGHT

## 2020-07-14 ASSESSMENT — PAIN SCALES - GENERAL
PAINLEVEL_OUTOF10: 6
PAINLEVEL_OUTOF10: 0
PAINLEVEL_OUTOF10: 8
PAINLEVEL_OUTOF10: 0
PAINLEVEL_OUTOF10: 0

## 2020-07-14 ASSESSMENT — PAIN DESCRIPTION - FREQUENCY: FREQUENCY: INTERMITTENT

## 2020-07-14 ASSESSMENT — PAIN DESCRIPTION - PROGRESSION: CLINICAL_PROGRESSION: NOT CHANGED

## 2020-07-14 ASSESSMENT — PAIN - FUNCTIONAL ASSESSMENT: PAIN_FUNCTIONAL_ASSESSMENT: ACTIVITIES ARE NOT PREVENTED

## 2020-07-14 ASSESSMENT — PAIN DESCRIPTION - ONSET: ONSET: ON-GOING

## 2020-07-14 ASSESSMENT — PAIN DESCRIPTION - LOCATION: LOCATION: HIP

## 2020-07-14 NOTE — CARE COORDINATION
Discharge plan is [employer], awaiting insurance precert (initiated 3/06).  Doug Lacy RN [phone]

## 2020-07-14 NOTE — DISCHARGE INSTR - COC
Continuity of Care Form    Patient Name: Renata Louis   :  1945  MRN:  [id number]    Admit date:  2020  Discharge date: 7/15/2020    Code Status Order: Full Code   Advance Directives:   [facility] Documentation     Date/Time Healthcare Directive Type of Healthcare Directive Copy in [facility] [address] Agent's Name Healthcare Agent's Phone Number    [phone]  No, patient does not have an advance directive for healthcare treatment -- -- -- -- --          Admitting Physician:  Maris Frances MD  PCP: Anam Mcneil MD    Discharging Nurse: [facility] Unit/Room#: 7746/3364-M  Discharging Unit Phone Number: ***    Emergency Contact:   Extended Emergency Contact Information  Primary Emergency Contact: Cynthia Koenig  Address: [place] Phone: [phone]  Mobile Phone: [phone]  Relation: Child  Hearing or visual needs: None  Other needs: None  Preferred language: English   needed? No  Secondary Emergency Contact: HaNaima  [place] Phone: [phone]  Mobile Phone: [phone]  Relation: Child    Past Surgical History:  Past Surgical History:   Procedure Laterality Date    APPENDECTOMY      ARTERIAL BYPASS SURGRY      BREAST LUMPECTOMY  2005    LEFT    CARDIAC SURGERY      CHOLECYSTECTOMY      COLONOSCOPY  2007    cecal arteriovenous malformation with hyperplastic polyps    COLONOSCOPY  [id number]    CORONARY ARTERY BYPASS GRAFT  2008    triple bypass    ECHO COMPL W DOP COLOR FLOW  10/30/2013         EYE SURGERY      clarissa cataracts    HYSTERECTOMY  ,     MAYNOR prolapse, benign conditions; BSO later for scar tissues, no CA.      OTHER SURGICAL HISTORY  11    port removal right chest wall    PARACENTESIS      TONSILLECTOMY      TOOTH EXTRACTION      Full Dental Extraction.     TUNNELED VENOUS PORT PLACEMENT      removal of port Dec. Ana Lilia-wound Assessment Dry;Fragile 07/11/20 2153   Non-staged Wound Description Partial thickness 07/11/20 2153   Number of days: 25       Wound 06/18/20 Hip Right (Active)   Wound Other 07/08/20 0344   Offloading for Diabetic Foot Ulcers Other (comment) 07/08/20 0344   Dressing Status Intact; Clean;Dry 07/14/20 0745   Dressing Changed Other (Comment) 07/14/20 0745   Dressing/Treatment ABD; Moist to dry 07/13/20 2000   Wound Cleansed Rinsed/Irrigated with saline 07/13/20 2000   Dressing Change Due 07/14/20 07/13/20 2000   Wound Length (cm) 5 cm 06/18/20 2058   Wound Width (cm) 2 cm 06/18/20 2058   Wound Depth (cm) 0.1 cm 06/18/20 2058   Wound Surface Area (cm^2) 10 cm^2 06/18/20 2058   Wound Volume (cm^3) 1 cm^3 06/18/20 2058   Wound Assessment Clean;Dry 07/11/20 2153   Drainage Amount None 07/14/20 0745   Ana Lilia-wound Assessment Blanchable erythema 07/11/20 2153   Number of days: 25       Wound 07/08/20 Buttocks Right (Active)   Wound Image   07/09/20 0957   Wound Pressure Stage  1 07/09/20 0957   Dressing Status Clean;Dry; Intact 07/14/20 0745   Dressing Changed Other (Comment) 07/14/20 0745   Dressing/Treatment ABD;4x4 07/13/20 0602   Wound Cleansed Rinsed/Irrigated with saline 07/13/20 0602   Wound Length (cm) 0.8 cm 07/09/20 0957   Wound Width (cm) 0.4 cm 07/09/20 0957   Wound Depth (cm) 0.1 cm 07/09/20 0957   Wound Surface Area (cm^2) 0.32 cm^2 07/09/20 0957   Change in Wound Size % (l*w) 20 07/09/20 0957   Wound Volume (cm^3) 0.03 cm^3 07/09/20 0957   Wound Healing % 25 07/09/20 0957   Wound Assessment Red;Pink 07/11/20 0915   Drainage Amount None 07/11/20 0915   Ana Lilia-wound Assessment Intact 07/11/20 0915   Number of days: 6        Elimination:  Continence:   · Bowel: {YES / UH:66404}  · Bladder: {YES / AO:15739}  Urinary Catheter: None   Colostomy/Ileostomy/Ileal Conduit: {YES / CH:12746}       Date of Last BM: 7/15/2020      Intake/Output Summary (Last 24 hours) at 7/14/2020 1149  Last data filed at 7/14/2020 1109  Gross per 24 hour   Intake 240 ml   Output --   Net 240 ml     I/O last 3 completed shifts: In: 120 [P.O.:120]  Out: -     Safety Concerns: At Risk for Falls    Impairments/Disabilities:      ***    Nutrition Therapy:  Current Nutrition Therapy:   - Oral Diet:  Carb Control 3 carbs/meal (1500kcals/day) and Low Sodium (3-4gm)    Routes of Feeding: Oral  Liquids: Thin Liquids  Daily Fluid Restriction: no  Last Modified Barium Swallow with Video (Video Swallowing Test): not done    Treatments at the Time of Hospital Discharge:   Respiratory Treatments: none  Oxygen Therapy:  is not on home oxygen therapy.   Ventilator:    - No ventilator support    Rehab Therapies: Physical Therapy and Occupational Therapy  Weight Bearing Status/Restrictions: No weight bearing restirctions  Other Medical Equipment (for information only, NOT a DME order):  wheelchair and walker  Other Treatments: Bid wet to dry dressings to bilateral hip wounds with kerlix and water; cover with ABD     Patient's personal belongings (please select all that are sent with patient):  cellphone, , purse, tote, tablet w/    RN SIGNATURE:  Electronically signed by Olya Leon RN on 7/15/20 at 12:50 PM EDT    CASE MANAGEMENT/SOCIAL WORK SECTION    Inpatient Status Date: ***    Readmission Risk Assessment Score:  Readmission Risk              Risk of Unplanned Readmission:        40           Discharging to Facility/ Agency   · Name: [facility]  · Address:  · Phone:  · Fax:    Dialysis Facility (if applicable)   · Name:  · Address:  · Dialysis Schedule:  · Phone:  · Fax:    / signature: Electronically signed by DEYANIRA Borja on 7/15/2020 at 10:32 AM      PHYSICIAN SECTION    Prognosis: Fair    Condition at Discharge: Stable    Rehab Potential (if transferring to Rehab): Good    Recommended Labs or Other Treatments After Discharge:   · Home Dose Coumadin is usually: 2 mg most days//3 mg by mouth Monday, Wednesday Friday  · Coumadin has been held 7/12-7/15 - due to INR >3. INR on 7/15: 3.9. Pharmacy was dosing coumadin  · Check INR daily, and start coumadin as appropriate. · Glucose checks TID (with meals) and nightly for patient. · Sent to [facility] with Low-Dose Insulin Sliding Scale. · Not on insulin at home. Try to wean/taper LDSS as tolerated. Likely prednisone course during hospitalization and lack mobility contributed to higher glucose readings. Last A1C 6/2020: 8.1  · Creatinine check daily        Physician Certification: I certify the above information and transfer of Russ Rouse  is necessary for the continuing treatment of the diagnosis listed and that she requires LTAC for less 30 days. Update Admission H&P: Changes in H&P as follows - Infectious Disease continued patient on Daptomycin for her wound. Nephrology started fluids for her KARISHMA which resolved, continue to monitor creatinine function. Initially toprol and lasix were held but as her creatinine and BP resumed we restarted both of them.     PHYSICIAN SIGNATURE:  Electronically signed by Karen Wolff MD on 7/14/20 at 11:50 AM EDT

## 2020-07-14 NOTE — PROGRESS NOTES
[facility] - Family Medicine Inpatient   Resident Progress Note    S:  Hospital day: 6   Brief Synopsis: 70-year-old woman with history of breast cancer status post lumpectomy with bony and liver mets, coronary artery disease, CHF ejection fraction 50 to 55%, previous DVT, and recent admission for hip wounds presented for increasing left leg pain. Pain started on Saturday prior to admission, worsened as the days went on, no trauma,falls. WC of decubitus hip wounds +staph aureus, has been started on daptomycin. MRI showed degenerative changes of lumbar spine, NSG evaluated patient no intervention at this time, likely that her deep hip wound is aggravating sciatic nerve pain. Acute events over the last 24 hours- Glucose elevated AM today. K elevated. Got insulin novolin 2 units IV. Started on MDSS. Has been eating more overnight and was not on diabetic diet, mobility limited usually uses walker not had one. Feels constipated no BM last 24 hours. Left lower leg pain persists. Left toe gouty pain has resolved. No CP, SOB, fevers/chills.     Cont meds:    dextrose      sodium chloride 75 mL/hr at 07/14/20 0815     Scheduled meds:    insulin lispro  0-12 Units Subcutaneous TID WC    insulin lispro  0-6 Units Subcutaneous Nightly    lidocaine  1 patch Transdermal Daily    palbociclib  100 mg Oral Daily    polyethylene glycol  17 g Oral Daily    predniSONE  40 mg Oral Daily    daptomycin (CUBICIN) IVPB  6 mg/kg (Adjusted) Intravenous Q48H    lidocaine-EPINEPHrine  20 mL Intradermal Once    mineral oil-hydrophilic petrolatum   Topical BID    bumetanide  1 mg Intravenous Daily    calcium-vitamin D  1 tablet Oral Daily    isosorbide dinitrate  5 mg Oral TID    magnesium oxide  400 mg Oral Daily    metoprolol succinate  12.5 mg Oral Daily    therapeutic multivitamin-minerals  1 tablet Oral Daily    pantoprazole  40 mg Oral QAM AC    pregabalin  50 mg Oral TID    [Held by provider] vitamin B-12  1,000 mcg Oral Daily    sodium chloride flush  10 mL Intravenous 2 times per day    sodium chloride   Intravenous Q12H    warfarin (COUMADIN) daily dosing (placeholder)   Other RX Placeholder     PRN meds: glucose, dextrose, glucagon (rDNA), dextrose, sennosides-docusate sodium, oxyCODONE, povidone-iodine, mineral oil-hydrophilic petrolatum **AND** mineral oil-hydrophilic petrolatum, albuterol, sodium chloride flush, acetaminophen **OR** acetaminophen, magnesium hydroxide, promethazine **OR** [DISCONTINUED] ondansetron     I reviewed the patient's past medical and surgical history, Medications and Allergies. O:  BP (!) 135/58   Pulse 85   Temp 97.1 °F (36.2 °C) (Temporal)   Resp 18   Ht 5' 4\" (1.626 m)   Wt 164 lb 5 oz (74.5 kg)   SpO2 96%   BMI 28.20 kg/m²   24 hour I&O: I/O last 3 completed shifts: In: 120 [P.O.:120]  Out: -   No intake/output data recorded. Physical Exam   Constitutional: She is oriented to person, place, and time. She appears well-nourished. No distress. HENT:   Head: Normocephalic and atraumatic. Eyes: No scleral icterus. Cardiovascular: Normal rate and regular rhythm. Exam reveals no gallop and no friction rub. No murmur heard. Dorsalis pedis pulse- palpable with doppler   Pulmonary/Chest: Breath sounds normal. No respiratory distress. She has no wheezes. Abdominal: Bowel sounds are normal. There is no abdominal tenderness. Some dullness to percussion diffuse. Musculoskeletal:         General: Tenderness present. Comments: Left leg tender to palpation- diffuse. Strength 5/5 lower extremities. Left toe PIP TTP. No erythema warmth swelling     Neurological: She is alert and oriented to person, place, and time. Skin:   Wounds on hips- as seen in media    Dry discolored skin bilateral lower extremities. No erythema.  Mild edema lower extremity (non-pitting)       Labs:  Na/K/Cl/CO2:  136/5.8/99/25 (07/14 0550)  BUN/Cr/glu/ALT/AST/amyl/lip:  71/1.7/--/--/--/--/-- (07/14 8681)  WBC/Hgb/Hct/Plts:  3.3/7.6/23.9/127 (07/14 0550)  estimated creatinine clearance is 29 mL/min (A) (based on SCr of 1.7 mg/dL (H)). Other pertinent labs as noted below    Radiology:  US ABDOMEN LIMITED   Final Result   Minimal ascites. MRI LUMBAR SPINE WO CONTRAST   Final Result   Findings compatible with degenerative changes   No convincing evidence for metastatic disease in the spine   There are multiple lesions in the liver seen on the margin of this   study, compatible with metastatic disease      ALERT:  THIS IS AN ABNORMAL REPORT      US RETROPERITONEAL COMPLETE   Final Result   Findings compatible with a right renal cyst         XR FOOT LEFT (MIN 3 VIEWS)   Final Result      No acute fracture is identified. Osteoarthritis. Osteopenia. XR FOOT RIGHT (MIN 3 VIEWS)   Final Result      No acute fracture is identified. Osteoarthritis. Osteopenia. XR TIBIA FIBULA RIGHT (2 VIEWS)   Final Result      No acute fracture is identified. Osteoarthritis. Osteopenia. XR TIBIA FIBULA LEFT (2 VIEWS)   Final Result      No acute fracture is identified. Osteoarthritis. Osteopenia. US DUP LOWER EXTREMITIES BILATERAL VENOUS   Final Result      Extensive deep vein thrombosis throughout the right lower extremity. XR CHEST PORTABLE   Final Result      Cardiomegaly with median sternotomy      Small right-sided pleural effusion      Other findings are unchanged from prior study.              A/P:  Principal Problem:    Left leg pain  Active Problems:    Chronic deep vein thrombosis (DVT) of proximal vein of right lower extremity (HCC)    Decubitus ulcer of both hip    Degenerative disc disease at L5-S1 level  Resolved Problems:    Deep vein thrombosis (DVT) of right lower extremity (HCC)    KARISHMA (acute kidney injury) (HCC)    Cellulitis    Lymphopenia    Acute gout involving toe of left foot    Left leg pain likely 2/2 to Lumbar Radicular Pain on Left Side  [facility] Pending. AMPAK PT/OT scores were poor and she will need NJ to improve mobility. PRN Roxicodone q6 hours. Lyrica 50 mg TID  Out-patient pain management follow-up 7/17  MRI Lumbar Spine- Degen changes/ L1-L5 broad-base disc herniation, mild central canal stenosis. NSG, Vascular signed off. Diabetes Type II  Glucose high today- likely 2/2 to prednisone taking for gout, and increased oral food intake, and lack of mobility. MDSS started. A1C: >7 in 6/2020  Not on insulin since August 2019, or any diabetic meds  Continue to monitor. Repeat BMP afternoon today shows improvement    Decubitus Ulcer Hips + Staph Aureus  ID recommends IV Daptomycin longer term. General surgery did debridement 7/9. Wound care on board  Previous admit 6/18-6/22 for failure of o/p tx for hip ulcers. WC + Corynebacterium /staph aureus at that time-did not need antibiotics at d/c    KARISHMA-prerenal  Creatinine down to 1.7. Lasix resumed today, metoprlol  Nephrology consulted  Continue to monitor I's and O's    Acute Gout Left Toe- resolved  Stopped prednisone- got 4 days worth    DVT Right Lower Extremity  Coumadin, pharmacy dosing. INR supratherapeutic today  US lower extremity: extensive dvt through the right lower extremity. Hx of RLE DVT    Anemia- likely multifactorial related to metastatic breast cancer, Ibrance, CKD stage IV  Repeat Hb today >7. Initially was lower. No transfusion at this time, threshold remains Hb<7  Hematology-oncology consulted. Lymphopenia  ANC 2.24-chronic lymphopenia  Hematology oncology consulted    HTN/CAD s/p CABG/ Breast Cancer/ HFrEF  Held Bumex secondary to KARISHMA, Held Toprol 2/2 to low pressures.   Isosorbide dinitrate      GI/DVT ppx: Protonix/Coumadin   Dispo: [facility]    Electronically signed by Ana Enriquez  PGY-2 on 7/14/2020 at 9:53 AM  This case was discussed with attending physician: Dr. Tierra Orlando

## 2020-07-14 NOTE — PROGRESS NOTES
Pharmacy Consultation Note  (Warfarin Dosing and Monitoring)    Initial consult date: 7/8  Consulting physician: Romelia Brown    Allergies:  Macrobid [nitrofurantoin monohydrate macrocrystals]    76 y.o. female    Ht Readings from Last 1 Encounters:   07/07/20 5' 4\" (1.626 m)     Wt Readings from Last 1 Encounters:   07/12/20 164 lb 5 oz (74.5 kg)         Warfarin Indication Target   INR [place] Dose  (if applicable) Diet/Feeding Tube   (Enteral feeds, nutritional drinks, increased Vitamin K in diet can decrease INR)   RLE DVT 2-3 3mg Tues, Thurs, Sat; 2mg all other days General       x Home Med? Meds Increasing INR x Home Med?  Meds Decreasing INR     Allopurinol    Azathioprine     Amiodarone/Propafenone/Dronedarone   Carbamazepine     Androgens   Cholestyramine     Chemotherapy (BBW: Capecitabine)   Estrogen     Ciprofloxacin/Levofloxacin   Nafcillin/Dicloxacillin     Clarithromycin/Erythromycin/Azithromycin   Barbiturates      Fluconazole/Itraconazole/Voriconazole/Ketoconazole   Phenytoin (Variable)     Metronidazole   Rifampin     Phenytoin (Variable)   Steroids (Variable/Dose Dependent)      Statins/Fenofibrate/Gemfibrozil   Sucralfate     Steroids (Variable/Dose Dependent)   Other:     Sulfamethoxazole/Trimethoprim        Tramadol         Other:        Comments regarding medication interactions:      x Diseases Affecting INR x Increased Bleeding Risk    CHF Exacerbation (Increases)  History GI Bleed/PUD    Liver Disease (Increases)  Chronic NSAID Use    Thyroid: Hyper (Increases)  Hypo (Decreases)  Chronic ASA/Antiplatelet Use (Clopidogrel/ Dipyridamole/Prasugrel/Ticagrelor)     X Malignancy (Increases) Xx Abnormal Renal Function (dialysis, renal transplant, SCr ? 2.3 mg/dL)     History of EtOH Abuse: Acute (Increases)   Chronic (Decreases)  Liver Function (cirrhosis, bilirubin >2x ULN with AST/ALT/AP >3x ULN)    Fever (Increases) X Age > 65 years   X Acute infection (Increases)  Hypertension/Uncontrolled BP    Diarrhea/Dehydration (Increases)  History of stroke    Other: __________________  Other:___________________       Vitamin K or Blood product  Administration Date                    TSH:    Lab Results   Component Value Date    TSH 6.930 06/10/2019        Hepatic Function Panel:                            Lab Results   Component Value Date    ALKPHOS 144 07/07/2020    ALT 28 07/07/2020    AST 34 07/07/2020    PROT 7.5 07/07/2020    BILITOT 1.0 07/07/2020    BILIDIR 0.2 06/10/2019    IBILI 0.3 06/10/2019    LABALBU 3.6 07/07/2020    LABALBU 4.4 03/25/2012       Date Warfarin Dose INR Heparin or LMWH HGB/HCT PLT Comment   7/8 3mg 1.8 -- 8.5/25.4 121    7/9 3mg 2.1 -- 7.2/23 106    7/10 3mg 2.3 -- 7.4/23.7 99    7//11 2 mg 2.9 --- 7.5/23.9 107    7/12 No dose 3.5 --- 7.9/24.6 116    7/13 No dose 4.1 -- 7.5/23.3 109    7/14 No dose 3.9 -- 7.6/23.9 127      Assessment and Plan:  · Pt is a 75 yo female with history of RLE DVT on warfarin PTA   · Pt has been subtherapeutic on warfarin most recently for the past 2 weeks after being on hold at last admission  · Goal INR 2-3  · INR 3.9 today, no warfarin  · Daily PT/INR until the INR is stable within the therapeutic range  · Pharmacist will follow and monitor/adjust dosing as necessary    Thank you for this consult,    Shane Spence, PharmD, BCPS 7/14/2020 4:07 PM

## 2020-07-14 NOTE — PROGRESS NOTES
[facility]  Nephrology Attending Progress Note  Rogelio Marrufo. Linn Richmond MD        SUBJECTIVE:     This is a 76 y.o.  female with a H/O CKD stage IV with baseline creatinine of 1.5-2.0 over the past year, whom we have followed in the hospital in the past for KARISHMA most recently in August 2019. She had been seen a little over a year ago in the office by Dr. Edna Elizabeth but has not been followed up since. Additional PMH: HTN, breast cancer s/p lumpectomy with bone mets, CAD, CABG, CHF EF:50-55 RVSP 30 mmHg , chronic venous insufficiency, DVT, B/l hip wounds, recently admitted for hip wounds that grew corynebacterium and S. Aureus and was discharged about 3 weeks ago. Kaela Gosselin any recent NSAID use, no ACE/ARB. She was on merrem and vancomycin last admission in June her creatinine ranged 1.8-2.0 during that admission. She had a dose of vancomycin in the ED. She states she has been eating and drinking, she was on bumex PTA     She is awake and alert and is having LE pain as well as pain left hip from a chronic wound      7/9/20: seen & examined-had fallen asleep holding her sandwich. States she feels alright and has improved appetite. 7/10: Sitting up in chair, offers no new c/o  7/11: seen in room, no cp or sob  7/12: seen in room, no complaints  7/13: Alert, in bed. States she feels ok, is having heartburn after eating. Denies sob. Has US of abd yesterday which showed minimal ascites.   7/14: pt without complaints      PROBLEM LIST:    Patient Active Problem List   Diagnosis    Metastatic breast cancer ([facility]    Essential hypertension    Coronary artery disease involving native coronary artery of native heart without angina pectoris    Nephrolithiasis    CHF (congestive heart failure) (HCC)    Humerus fracture    Shoulder pain, left    B12 deficiency    Hyperlipidemia    Rib lesion    Subclavian vein occlusion, bilateral (HCC)    Pericardial effusion    MI (myocardial infarction) ([address]    Arthritis of left foot        PAST MEDICAL HISTORY:    Past Medical History:   Diagnosis Date    Abscess of abdominal wall     Anemia     Iron deficiency and chronic disease.  Anesthesia     DIFFICULTY WAKING UP    Arthritis     Arthritis, hip     Breast cancer ([address] 2005    S/P Left Lumpectomy with Lymph Node Dissection, Chemo/Rad. Follows with Dr. Oswaldo Harris. No recurrence to date. Surgeon was Dr. Julio Johnson.  CAD (coronary artery disease) 5/2008    s/p CABG. Follows with [address] [place], [place] [address].  CHF (congestive heart failure) (HCC)     Chronic venous insufficiency 11/7/2018    Class 2 severe obesity due to excess calories with serious comorbidity and body mass index (BMI) of 35.0 to 35.9 in adult ([address] 8/1/2019    Decreased dorsalis pedis pulse 11/7/2018    Degenerative disc disease at L5-S1 level 7/10/2020    Diabetic neuropathy (HCC)     Diabetic neuropathy (HCC)     DVT (deep venous thrombosis) (HCC)     Recurrent. On lifelong coumadin.  DVT of upper extremity (deep vein thrombosis) ([address] 4/18/2012    History of deep vein thrombosis 11/7/2018    Humerus fracture     Chronic, on the Left.  Hyperlipidemia 8/29/2011    Hypertension     Left leg pain 7/9/2020    Leg swelling 11/7/2018    Lymph node enlargement     Lymphedema of both lower extremities 11/7/2018    Lymphopenia 7/9/2020    MI (myocardial infarction) ([address]     Nephrolithiasis     Follows with Dr. Christy Ormond.  Pericardial effusion     Pulmonary nodule     With mediastinal lymphadenopathy. Stable. Follows with Dr. Rossy Sandoval.     Rib lesion 11/28/11    expansile lesion in one of the right ribs laterally    Sarcoidosis 07/26/11    per transbronchial needle aspiration    Subclavian vein occlusion, bilateral ([address] 4/18/2012    Type II or unspecified type diabetes mellitus without mention of complication, not stated as uncontrolled        DIET:    Dietary Nutrition Supplements: Low Calorie High Protein Supplement  Dietary Nutrition Supplements: chloride   Intravenous Q12H    warfarin (COUMADIN) daily dosing (placeholder)   Other RX Placeholder       MEDS (infusions):   dextrose      sodium chloride 75 mL/hr at 07/14/20 0815       MEDS (prn):  glucose, dextrose, glucagon (rDNA), dextrose, sennosides-docusate sodium, oxyCODONE, povidone-iodine, mineral oil-hydrophilic petrolatum **AND** mineral oil-hydrophilic petrolatum, albuterol, sodium chloride flush, acetaminophen **OR** acetaminophen, magnesium hydroxide, promethazine **OR** [DISCONTINUED] ondansetron    DATA:    Recent Labs     07/12/20  0756 07/13/20  0530 07/13/20  1210 07/14/20  0550   WBC 2.4* 2.9*  --  3.3*   HGB 7.9* 6.9* 7.5* 7.6*   HCT 24.6* 21.5* 23.3* 23.9*   .4* 116.2*  --  118.3*   * 109*  --  127*     Recent Labs     07/13/20  0530 07/14/20  0550 07/14/20  0958    136 134   K 5.2* 5.8* 5.0   CL 99 99 98   CO2 24 25 26   BUN 65* 71* 75*   CREATININE 1.8* 1.7* 1.7*   LABGLOM 27 29 29   GLUCOSE 389* 518* 449*   CALCIUM 9.0 9.9 10.4*       Lab Results   Component Value Date    LABALBU 3.6 07/07/2020    LABALBU 3.4 (L) 06/29/2020    LABALBU 3.0 (L) 06/22/2020     Lab Results   Component Value Date    TSH 6.930 (H) 06/10/2019       Iron Studies  Lab Results   Component Value Date    IRON 49 (L) 06/20/2011    TIBC 266 06/20/2011    FERRITIN 2,375 07/08/2020     Vitamin B-12   Date Value Ref Range Status   07/08/2020 >2000 (H) 211 - 946 pg/mL Final     Folate   Date Value Ref Range Status   07/08/2020 >20.0 4.8 - 24.2 ng/mL Final       Vit D, 25-Hydroxy   Date Value Ref Range Status   10/23/2017 21 (L) 30 - 100 ng/mL Final     Comment:     <20 ng/mL. ........... Sara Sorrow Deficient  20-30 ng/mL. ......... Sara Sorrow Insufficient   ng/mL. ........ Easton Sorrow Sufficient  >100 ng/mL. .......... Easton Sorrow Toxic       No results found for: PTH    No components found for: URIC    Lab Results   Component Value Date    COLORU Yellow 07/07/2020    NITRU Negative 07/07/2020    GLUCOSEU Negative 07/07/2020    GLUCOSEU NEGATIVE 12/05/2011    KETUA Negative 07/07/2020    UROBILINOGEN 0.2 07/07/2020    BILIRUBINUR Negative 07/07/2020    BILIRUBINUR small 05/16/2016    BILIRUBINUR NEGATIVE 12/05/2011       No results found for: Graham Briscoe      IMPRESSION/RECOMMENDATIONS:      1. Acute kidney injury  Likely pre-renal in the setting of diuretics and poor po intake with FeNa <1% and low urine urea  She has been started on IVF and will follow on same. R U/S No hydro, no calculus, right renal cyst noted  Cr improving with IVF  Cr 3.4 on admission, at baseline of 1.7 today     2. Chronic kidney disease stage IV  Baseline Scr 1.5-2.0 over the past year     3. Hypertension with CKD I-IV  At target of <130/80 on isosorbide     4. Bilateral hip wounds  On daptomycin per ID     5.  Anemia with metastatic breast cancer  Will defer to Hematology    Thank you for the opportunity to participated in the care of GENNY Max

## 2020-07-14 NOTE — PROGRESS NOTES
[address] [place] [address] [facility]  Progress Note      Chief Complaint   Patient presents with    Leg Swelling     BLE SWELLING X1 DAY, hx CHF       SUBJECTIVE:      Patient is tolerating medications. No reported adverse drug reactions. No problems overnight. Review of systems:    As stated above in the chief complaint, otherwise negative. Medications:    Scheduled Meds:   insulin lispro  0-12 Units Subcutaneous TID WC    insulin lispro  0-6 Units Subcutaneous Nightly    lidocaine  1 patch Transdermal Daily    palbociclib  100 mg Oral Daily    polyethylene glycol  17 g Oral Daily    daptomycin (CUBICIN) IVPB  6 mg/kg (Adjusted) Intravenous Q48H    lidocaine-EPINEPHrine  20 mL Intradermal Once    mineral oil-hydrophilic petrolatum   Topical BID    bumetanide  1 mg Intravenous Daily    calcium-vitamin D  1 tablet Oral Daily    isosorbide dinitrate  5 mg Oral TID    magnesium oxide  400 mg Oral Daily    metoprolol succinate  12.5 mg Oral Daily    therapeutic multivitamin-minerals  1 tablet Oral Daily    pantoprazole  40 mg Oral QAM AC    pregabalin  50 mg Oral TID    [Held by provider] vitamin B-12  1,000 mcg Oral Daily    sodium chloride flush  10 mL Intravenous 2 times per day    sodium chloride   Intravenous Q12H    warfarin (COUMADIN) daily dosing (placeholder)   Other RX Placeholder     Continuous Infusions:   dextrose      sodium chloride 75 mL/hr at 07/14/20 0815     PRN Meds:glucose, dextrose, glucagon (rDNA), dextrose, sennosides-docusate sodium, oxyCODONE, povidone-iodine, mineral oil-hydrophilic petrolatum **AND** mineral oil-hydrophilic petrolatum, albuterol, sodium chloride flush, acetaminophen **OR** acetaminophen, magnesium hydroxide, promethazine **OR** [DISCONTINUED] ondansetron  Prior to Admission medications    Medication Sig Start Date End Date Taking?  Authorizing Provider   PT/INR Test STRP 1 each by In Vitro route Twice a Week 6/29/20   Leilani Smallwood mouth most days, but 3 mg by mouth on sun, tues, thurs. 19   Leonard Clarke MD   Multiple Vitamins-Minerals (THERAPEUTIC MULTIVITAMIN-MINERALS) tablet Take 1 tablet by mouth daily 19   Leonard Clarke MD   Insulin Pen Needle (MEIJER PEN NEEDLES) 29G X 12MM MISC 1 each by Does not apply route daily 9/10/19   Leonard Clarke MD   nitroGLYCERIN (NITROSTAT) 0.4 MG SL tablet up to max of 3 total doses. If no relief after 1 dose, call 911. 19   Leonard Clarke MD   sodium chloride (OCEAN, BABY AYR) 0.65 % nasal spray 1 spray by Nasal route as needed for Congestion (dryness) 19   Leonard Clarke MD   [facility] 100 MG capsule Take 100 mg by mouth 3 weeks on and 1 week off also receives injections every 3 weeks per Dr Karen Phillips MD       OBJECTIVE:  BP (!) 135/58   Pulse 85   Temp 97.1 °F (36.2 °C) (Temporal)   Resp 18   Ht 5' 4\" (1.626 m)   Wt 164 lb 5 oz (74.5 kg)   SpO2 96%   BMI 28.20 kg/m²   Temp  Av.8 °F (36.6 °C)  Min: 97.1 °F (36.2 °C)  Max: 98.6 °F (37 °C)  General appearance: Resting in bed in no apparent distress. Skin: Warm and dry. No rashes were noted. HEENT: Round and reactive pupils. Moist mucous membranes. No ulcerations or thrush. Neck: Supple to movements. Chest: No use of accessory muscles to breathe. Symmetrical expansion. No wheezing, crackles or rhonchi. Cardiovascular: Reguar rate and rhythm. No murmurs gallops, or rubs appreciated. Abdomen: Bowel sounds present, nontender, nondistended, no masses or hepatosplenomegaly. Extremities: No clubbing, no cyanosis, no edema. Lines: peripheral    I/O last 3 completed shifts:   In: 120 [P.O.:120]  Out: -       Laboratory and Tests Review:      Lab Results   Component Value Date    WBC 3.3 (L) 2020    WBC 2.9 (L) 2020    WBC 2.4 (L) 2020    HGB 7.6 (L) 2020    HCT 23.9 (L) 2020    .3 (H) 2020     (L) 2020     Lab Results   Component Result      No acute fracture is identified. Osteoarthritis. Osteopenia. US DUP LOWER EXTREMITIES BILATERAL VENOUS   Final Result      Extensive deep vein thrombosis throughout the right lower extremity. XR CHEST PORTABLE   Final Result      Cardiomegaly with median sternotomy      Small right-sided pleural effusion      Other findings are unchanged from prior study. Microbiology:   Lab Results   Component Value Date    BC 5 Days no growth 07/07/2020    BC 5 Days- no growth 08/12/2019    BC 5 Days- no growth 08/09/2019    ORG Staphylococcus aureus 07/08/2020    ORG Corynebacterium species 07/08/2020    ORG Staphylococcus aureus 06/21/2020    ORG Corynebacterium species 06/21/2020     Lab Results   Component Value Date    BLOODCULT2 5 Days no growth 07/07/2020    BLOODCULT2 5 Days- no growth 08/12/2019    BLOODCULT2 5 Days- no growth 08/09/2019    ORG Staphylococcus aureus 07/08/2020    ORG Corynebacterium species 07/08/2020    ORG Staphylococcus aureus 06/21/2020    ORG Corynebacterium species 06/21/2020     WOUND/ABSCESS   Date Value Ref Range Status   07/08/2020   Final    Moderate growth  Methicillin resistant Staph aureus isolated. Most Methicillin  resistant Staphylococcus are usually resistant to multiple  antibiotics including other B-Lactams, Aminoglycosides,  Macrolides, Clindamycin and Tetracycline. Contact isolation  is indicated. 07/08/2020 Heavy growth  Final   06/21/2020   Final    Moderate growth  Methicillin resistant Staph aureus isolated. Most Methicillin  resistant Staphylococcus are usually resistant to multiple  antibiotics including other B-Lactams, Aminoglycosides,  Macrolides, Clindamycin and Tetracycline. Contact isolation  is indicated.      06/21/2020 Light growth  Final     No results found for: RESPSMEAR  No results found for: MPNEUMO, CLAMYDCU, LABLEGI, AFBCX, FUNGSM, LABFUNG  No results found for: CULTRESP  No results found for: CXCATHTIP  Body Fluid Culture, Sterile   Date Value Ref Range Status   07/25/2019 Growth not present  Final     No results found for: CXSURG  Urine Culture, Routine   Date Value Ref Range Status   07/07/2020 Growth not present  Final   08/13/2019 >100,000 CFU/ml  Final   08/08/2019 <10,000 CFU/mL  Gram negative rods    Final     MRSA Culture Only   Date Value Ref Range Status   08/09/2019 Methicillin resistant Staph aureus not isolated  Final       Problem list:    Principal Problem:    Left leg pain  Active Problems:    Chronic deep vein thrombosis (DVT) of proximal vein of right lower extremity (HCC)    Deep vein thrombosis (DVT) of right lower extremity (HCC)    KARISHMA (acute kidney injury) ([facility] [address]    Cellulitis    Decubitus ulcer of both hip    Lymphopenia    Degenerative disc disease at L5-S1 level    Acute gout involving toe of left foot  Resolved Problems:    * No resolved hospital problems.  *      ASSESSMENT:    Bilateral hip wounds--- stage I pressure ulcers   Remote history of breast cancer since 2005 with evidence of metastatic disease since 2012 with interval development of pulmonary and hepatic metastasis in 2019   debridment yesterday Now we have surgical tissue cultures growing staph Most likely mrsa Will stop zosyn and start dapto She has renal insufficincy and heme issues dont want to use zyvox or vanco   She really needs extensive wound care She has demonstrated failure recently   I have talked to  about [facility] can watch her there as well   Plan:   Stop zosyn and start daptomycin   Pharmacy help appreciated   Check cultures   Monitor labs   Will follow with you   Wound care following  Talked to family medicine today and   Check cpks   Approved for [facility]  Waiting on insurance approval        [address] [place] [address]    11:12 AM  7/14/2020

## 2020-07-14 NOTE — PROGRESS NOTES
[address] [facility]  Family Medicine Attending    S: 76 y.o. female with PMH of HTN, breast cancer s/p lumpectomy with bone and liver mets, CAD s/p 3V CABG, CHF EF 50-55% RVSP 30 mmHg , DM2 (a1c 8.2), sarcoidosis, chronic venous insufficiency, DVT, and recent admission for B/l hip wounds who presented to ER with 3 day history of  left leg pain. Had difficulty walking 2/2 pain and yesterday became unbearable so came to ER. Denies no fevers or chills, loss of sensation or falls. Today, patient reports pain in the left hip and groin, but no leg pain. Toe pain is improved. No bm. Really feeling better today. Ate carbs yesterday. O: VS- Blood pressure (!) 135/58, pulse 85, temperature 97.1 °F (36.2 °C), temperature source Temporal, resp. rate 18, height 5' 4\" (1.626 m), weight 164 lb 5 oz (74.5 kg), SpO2 96 %, not currently breastfeeding. Exam is as noted by resident with the following changes, additions or corrections:  Gen - lying in bed, NAD  Cardio - RRR, stable systolic murmur  Lungs - Lungs CTAB , no wheeze    abd-increased bs, nonttp, firm  Ext- 1+ pitting edema with chronic venous stasis skin changes.), bilateral feet warm and well perfused. No joint erythema or effusion. Pain of left groin with internal hip rotation and with palpation of greater trocanter, left toes nonttp  Hip wounds with dressing c/d/i    Impressions:   Principal Problem:    Left leg pain  Active Problems:    Chronic deep vein thrombosis (DVT) of proximal vein of right lower extremity (HCC)    Deep vein thrombosis (DVT) of right lower extremity (HCC)    KARISHMA (acute kidney injury) ([facility] [address]    Cellulitis    Decubitus ulcer of both hip    Lymphopenia    Degenerative disc disease at L5-S1 level    Acute gout involving toe of left foot  Resolved Problems:    * No resolved hospital problems. *      Plan:   Appreciate ID, general surgery(s/p debridement), renal, h/o vascular, nsg, pharmacy, and wound care consultation.     Continue daptomycin for hip wounds. D/w Dr. Selene Ordonez. Patient on neutropenic precautions, anc is improving now. Follow h/h and recheck today as hemoglobin is 6.9. INR 3.9 today. Dosing per pharmacy. Pain control, oxycodone, lyrica, and lidocaine patch. Mri showed metastases to left hip and suspect this is the cause of her pain. Neurosurgery consulted -advised Pain management (has outpatient appmt (7/17). Elevated bg, will change diet and stop prednisone for gout. Start ssi and lantus 8 if needed  . Constipation. Continue miralax and adjust pericolace. Restart bumex and metoprolol on hold at this time. Kidney fxn at baseline after barbara this admission. Patient would benefit from additional care-ltac. Disposition planning. Attending Physician Statement  I have reviewed the chart, including any radiology or labs, and seen the patient with the resident(s). I personally reviewed and performed key elements of the history and exam.  I agree with the assessment, plan and orders as documented by the resident. Please refer to the resident note for additional information.       Tracy Calhoun

## 2020-07-15 VITALS
HEIGHT: 64 IN | DIASTOLIC BLOOD PRESSURE: 68 MMHG | BODY MASS INDEX: 29.02 KG/M2 | TEMPERATURE: 97.1 F | WEIGHT: 170 LBS | RESPIRATION RATE: 18 BRPM | OXYGEN SATURATION: 96 % | HEART RATE: 85 BPM | SYSTOLIC BLOOD PRESSURE: 142 MMHG

## 2020-07-15 LAB
ANION GAP SERPL CALCULATED.3IONS-SCNC: 11 MMOL/L (ref 7–16)
ANISOCYTOSIS: ABNORMAL
BASOPHILS ABSOLUTE: 0 E9/L (ref 0–0.2)
BASOPHILS RELATIVE PERCENT: 0 % (ref 0–2)
BUN BLDV-MCNC: 74 MG/DL (ref 8–23)
CALCIUM SERPL-MCNC: 10.1 MG/DL (ref 8.6–10.2)
CHLORIDE BLD-SCNC: 100 MMOL/L (ref 98–107)
CO2: 26 MMOL/L (ref 22–29)
CREAT SERPL-MCNC: 1.6 MG/DL (ref 0.5–1)
EOSINOPHILS ABSOLUTE: 0 E9/L (ref 0.05–0.5)
EOSINOPHILS RELATIVE PERCENT: 0 % (ref 0–6)
GFR AFRICAN AMERICAN: 38
GFR NON-AFRICAN AMERICAN: 31 ML/MIN/1.73
GLUCOSE BLD-MCNC: 303 MG/DL (ref 74–99)
HCT VFR BLD CALC: 22.1 % (ref 34–48)
HEMOGLOBIN: 7.2 G/DL (ref 11.5–15.5)
IMMATURE GRANULOCYTES #: 0.02 E9/L
IMMATURE GRANULOCYTES %: 0.5 % (ref 0–5)
INR BLD: 3.9
LYMPHOCYTES ABSOLUTE: 0.37 E9/L (ref 1.5–4)
LYMPHOCYTES RELATIVE PERCENT: 9.7 % (ref 20–42)
MCH RBC QN AUTO: 37.1 PG (ref 26–35)
MCHC RBC AUTO-ENTMCNC: 32.6 % (ref 32–34.5)
MCV RBC AUTO: 113.9 FL (ref 80–99.9)
METER GLUCOSE: 269 MG/DL (ref 74–99)
METER GLUCOSE: 313 MG/DL (ref 74–99)
MONOCYTES ABSOLUTE: 0.37 E9/L (ref 0.1–0.95)
MONOCYTES RELATIVE PERCENT: 9.7 % (ref 2–12)
NEUTROPHILS ABSOLUTE: 3.04 E9/L (ref 1.8–7.3)
NEUTROPHILS RELATIVE PERCENT: 80.1 % (ref 43–80)
OVALOCYTES: ABNORMAL
PDW BLD-RTO: 16.3 FL (ref 11.5–15)
PLATELET # BLD: 123 E9/L (ref 130–450)
PMV BLD AUTO: 10.8 FL (ref 7–12)
POIKILOCYTES: ABNORMAL
POLYCHROMASIA: ABNORMAL
POTASSIUM REFLEX MAGNESIUM: 5.4 MMOL/L (ref 3.5–5)
PROTHROMBIN TIME: 44.7 SEC (ref 9.3–12.4)
RBC # BLD: 1.94 E12/L (ref 3.5–5.5)
SODIUM BLD-SCNC: 137 MMOL/L (ref 132–146)
WBC # BLD: 3.8 E9/L (ref 4.5–11.5)

## 2020-07-15 PROCEDURE — 2500000003 HC RX 250 WO HCPCS: Performed by: STUDENT IN AN ORGANIZED HEALTH CARE EDUCATION/TRAINING PROGRAM

## 2020-07-15 PROCEDURE — 85025 COMPLETE CBC W/AUTO DIFF WBC: CPT

## 2020-07-15 PROCEDURE — 85610 PROTHROMBIN TIME: CPT

## 2020-07-15 PROCEDURE — 80048 BASIC METABOLIC PNL TOTAL CA: CPT

## 2020-07-15 PROCEDURE — 6370000000 HC RX 637 (ALT 250 FOR IP): Performed by: STUDENT IN AN ORGANIZED HEALTH CARE EDUCATION/TRAINING PROGRAM

## 2020-07-15 PROCEDURE — 36415 COLL VENOUS BLD VENIPUNCTURE: CPT

## 2020-07-15 PROCEDURE — 99222 1ST HOSP IP/OBS MODERATE 55: CPT | Performed by: PHYSICIAN ASSISTANT

## 2020-07-15 PROCEDURE — 6370000000 HC RX 637 (ALT 250 FOR IP): Performed by: FAMILY MEDICINE

## 2020-07-15 PROCEDURE — 6370000000 HC RX 637 (ALT 250 FOR IP): Performed by: INTERNAL MEDICINE

## 2020-07-15 PROCEDURE — 82962 GLUCOSE BLOOD TEST: CPT

## 2020-07-15 PROCEDURE — 2580000003 HC RX 258: Performed by: STUDENT IN AN ORGANIZED HEALTH CARE EDUCATION/TRAINING PROGRAM

## 2020-07-15 PROCEDURE — 99239 HOSP IP/OBS DSCHRG MGMT >30: CPT | Performed by: FAMILY MEDICINE

## 2020-07-15 RX ORDER — BUMETANIDE 1 MG/1
1 TABLET ORAL DAILY
Status: DISCONTINUED | OUTPATIENT
Start: 2020-07-15 | End: 2020-07-15 | Stop reason: HOSPADM

## 2020-07-15 RX ORDER — SENNA AND DOCUSATE SODIUM 50; 8.6 MG/1; MG/1
2 TABLET, FILM COATED ORAL DAILY
Qty: 10 TABLET | Refills: 0 | Status: CANCELLED | OUTPATIENT
Start: 2020-07-15 | End: 2020-07-20

## 2020-07-15 RX ORDER — INSULIN GLARGINE 100 [IU]/ML
10 INJECTION, SOLUTION SUBCUTANEOUS DAILY
Status: DISCONTINUED | OUTPATIENT
Start: 2020-07-15 | End: 2020-07-15 | Stop reason: HOSPADM

## 2020-07-15 RX ORDER — DAPTOMYCIN 350 MG/7ML
350 INJECTION, POWDER, LYOPHILIZED, FOR SOLUTION INTRAVENOUS
Qty: 11 EACH | Refills: 0 | Status: CANCELLED | OUTPATIENT
Start: 2020-07-15

## 2020-07-15 RX ORDER — INSULIN GLARGINE 100 [IU]/ML
13 INJECTION, SOLUTION SUBCUTANEOUS NIGHTLY
Status: DISCONTINUED | OUTPATIENT
Start: 2020-07-15 | End: 2020-07-15

## 2020-07-15 RX ORDER — SODIUM POLYSTYRENE SULFONATE 15 G/60ML
15 SUSPENSION ORAL; RECTAL ONCE
Status: COMPLETED | OUTPATIENT
Start: 2020-07-15 | End: 2020-07-15

## 2020-07-15 RX ADMIN — OXYCODONE HYDROCHLORIDE 5 MG: 5 TABLET ORAL at 14:37

## 2020-07-15 RX ADMIN — SKIN PROTECTANT: 44 OINTMENT TOPICAL at 08:13

## 2020-07-15 RX ADMIN — OXYCODONE HYDROCHLORIDE 5 MG: 5 TABLET ORAL at 08:17

## 2020-07-15 RX ADMIN — ISOSORBIDE DINITRATE 5 MG: 10 TABLET ORAL at 08:12

## 2020-07-15 RX ADMIN — PANTOPRAZOLE SODIUM 40 MG: 40 TABLET, DELAYED RELEASE ORAL at 08:12

## 2020-07-15 RX ADMIN — INSULIN LISPRO 3 UNITS: 100 INJECTION, SOLUTION INTRAVENOUS; SUBCUTANEOUS at 12:51

## 2020-07-15 RX ADMIN — MULTIPLE VITAMINS W/ MINERALS TAB 1 TABLET: TAB at 08:13

## 2020-07-15 RX ADMIN — POLYETHYLENE GLYCOL 3350 17 G: 17 POWDER, FOR SOLUTION ORAL at 08:26

## 2020-07-15 RX ADMIN — MAGNESIUM GLUCONATE 500 MG ORAL TABLET 400 MG: 500 TABLET ORAL at 08:12

## 2020-07-15 RX ADMIN — PREGABALIN 50 MG: 50 CAPSULE ORAL at 08:12

## 2020-07-15 RX ADMIN — BUMETANIDE 1 MG: 1 TABLET ORAL at 14:36

## 2020-07-15 RX ADMIN — INSULIN GLARGINE 10 UNITS: 100 INJECTION, SOLUTION SUBCUTANEOUS at 09:38

## 2020-07-15 RX ADMIN — DOCUSATE SODIUM 50 MG AND SENNOSIDES 8.6 MG 2 TABLET: 8.6; 5 TABLET, FILM COATED ORAL at 08:11

## 2020-07-15 RX ADMIN — INSULIN LISPRO 8 UNITS: 100 INJECTION, SOLUTION INTRAVENOUS; SUBCUTANEOUS at 08:25

## 2020-07-15 RX ADMIN — ISOSORBIDE DINITRATE 5 MG: 10 TABLET ORAL at 13:04

## 2020-07-15 RX ADMIN — Medication 10 ML: at 08:12

## 2020-07-15 RX ADMIN — PREGABALIN 50 MG: 50 CAPSULE ORAL at 13:05

## 2020-07-15 RX ADMIN — BUMETANIDE 1 MG: 0.25 INJECTION INTRAMUSCULAR; INTRAVENOUS at 08:11

## 2020-07-15 RX ADMIN — Medication 1 TABLET: at 08:12

## 2020-07-15 RX ADMIN — SODIUM POLYSTYRENE SULFONATE 15 G: 15 SUSPENSION ORAL; RECTAL at 09:36

## 2020-07-15 RX ADMIN — METOPROLOL SUCCINATE 12.5 MG: 25 TABLET, EXTENDED RELEASE ORAL at 08:17

## 2020-07-15 ASSESSMENT — PAIN SCALES - GENERAL
PAINLEVEL_OUTOF10: 6
PAINLEVEL_OUTOF10: 7
PAINLEVEL_OUTOF10: 6

## 2020-07-15 NOTE — PROGRESS NOTES
[facility] - Family Medicine Inpatient   Resident Progress Note    S:  Hospital day: 7   Brief Synopsis: 77-year-old woman with history of breast cancer status post lumpectomy with bony and liver mets, coronary artery disease, CHF ejection fraction 50 to 55%, previous DVT, and recent admission for hip wounds presented for increasing left leg pain. Pain started on Saturday prior to admission, worsened as the days went on, no trauma,falls. WC of decubitus hip wounds +staph aureus, has been started on daptomycin. MRI showed degenerative changes of lumbar spine, NSG evaluated patient no intervention at this time, likely that her deep hip wound is aggravating sciatic nerve pain. Acute events over the last 24 hours-  No CP, SOB, Fevers, chills.  Continues to await precert-insurance acceptance to select    Cont meds:    dextrose       Scheduled meds:    sodium polystyrene  15 g Oral Once    insulin glargine  13 Units Subcutaneous Nightly    insulin lispro  0-12 Units Subcutaneous TID WC    insulin lispro  0-6 Units Subcutaneous Nightly    sennosides-docusate sodium  2 tablet Oral Daily    lidocaine  1 patch Transdermal Daily    palbociclib  100 mg Oral Daily    polyethylene glycol  17 g Oral Daily    daptomycin (CUBICIN) IVPB  6 mg/kg (Adjusted) Intravenous Q48H    lidocaine-EPINEPHrine  20 mL Intradermal Once    mineral oil-hydrophilic petrolatum   Topical BID    bumetanide  1 mg Intravenous Daily    calcium-vitamin D  1 tablet Oral Daily    isosorbide dinitrate  5 mg Oral TID    magnesium oxide  400 mg Oral Daily    metoprolol succinate  12.5 mg Oral Daily    therapeutic multivitamin-minerals  1 tablet Oral Daily    pantoprazole  40 mg Oral QAM AC    pregabalin  50 mg Oral TID    [Held by provider] vitamin B-12  1,000 mcg Oral Daily    sodium chloride flush  10 mL Intravenous 2 times per day    sodium chloride   Intravenous Q12H    warfarin (COUMADIN) daily dosing (placeholder)   Other RX Placeholder     PRN meds: glucose, dextrose, glucagon (rDNA), dextrose, oxyCODONE, povidone-iodine, mineral oil-hydrophilic petrolatum **AND** mineral oil-hydrophilic petrolatum, albuterol, sodium chloride flush, acetaminophen **OR** acetaminophen, magnesium hydroxide, promethazine **OR** [DISCONTINUED] ondansetron     I reviewed the patient's past medical and surgical history, Medications and Allergies. O:  BP (!) 162/68   Pulse 85   Temp 96.9 °F (36.1 °C) (Temporal)   Resp 18   Ht 5' 4\" (1.626 m)   Wt 170 lb (77.1 kg)   SpO2 99%   BMI 29.18 kg/m²   24 hour I&O: I/O last 3 completed shifts: In: 240 [P.O.:240]  Out: -   No intake/output data recorded. Physical Exam   Constitutional: She is oriented to person, place, and time. She appears well-nourished. No distress. HENT:   Head: Normocephalic and atraumatic. Eyes: No scleral icterus. Cardiovascular: Normal rate and regular rhythm. Exam reveals no gallop and no friction rub. No murmur heard. Dorsalis pedis pulse- palpable with doppler   Pulmonary/Chest: Breath sounds normal. No respiratory distress. She has no wheezes. Abdominal: Bowel sounds are normal. There is no abdominal tenderness. Some dullness to percussion diffuse. Musculoskeletal:         General: Tenderness present. Comments: Left leg tender to palpation- diffuse. Strength 5/5 lower extremities. Left toe PIP TTP. No erythema warmth swelling     Neurological: She is alert and oriented to person, place, and time. Skin:   Wounds on hips- as seen in media    Dry discolored skin bilateral lower extremities. No erythema. Mild edema lower extremity (non-pitting)       Labs:  Na/K/Cl/CO2:  137/5.4/100/26 (07/15 0604)  BUN/Cr/glu/ALT/AST/amyl/lip:  74/1.6/--/--/--/--/-- (07/15 0604)  WBC/Hgb/Hct/Plts:  3.8/7.2/22.1/123 (07/15 0604)  estimated creatinine clearance is 31 mL/min (A) (based on SCr of 1.6 mg/dL (H)).   Other pertinent labs as noted below    Radiology:  US ABDOMEN LIMITED   Final Result   Minimal ascites. MRI LUMBAR SPINE WO CONTRAST   Final Result   Findings compatible with degenerative changes   No convincing evidence for metastatic disease in the spine   There are multiple lesions in the liver seen on the margin of this   study, compatible with metastatic disease      ALERT:  THIS IS AN ABNORMAL REPORT      US RETROPERITONEAL COMPLETE   Final Result   Findings compatible with a right renal cyst         XR FOOT LEFT (MIN 3 VIEWS)   Final Result      No acute fracture is identified. Osteoarthritis. Osteopenia. XR FOOT RIGHT (MIN 3 VIEWS)   Final Result      No acute fracture is identified. Osteoarthritis. Osteopenia. XR TIBIA FIBULA RIGHT (2 VIEWS)   Final Result      No acute fracture is identified. Osteoarthritis. Osteopenia. XR TIBIA FIBULA LEFT (2 VIEWS)   Final Result      No acute fracture is identified. Osteoarthritis. Osteopenia. US DUP LOWER EXTREMITIES BILATERAL VENOUS   Final Result      Extensive deep vein thrombosis throughout the right lower extremity. XR CHEST PORTABLE   Final Result      Cardiomegaly with median sternotomy      Small right-sided pleural effusion      Other findings are unchanged from prior study. A/P:  Principal Problem:    Left leg pain  Active Problems:    Chronic deep vein thrombosis (DVT) of proximal vein of right lower extremity (HCC)    Decubitus ulcer of both hip    Degenerative disc disease at L5-S1 level  Resolved Problems:    Deep vein thrombosis (DVT) of right lower extremity (HCC)    KARISHMA (acute kidney injury) (HCC)    Cellulitis    Lymphopenia    Acute gout involving toe of left foot    Left leg pain likely 2/2 to Lumbar Radicular Pain on Left Side  [facility] Pre-Cert Pending. AMPAK PT/OT scores were poor and she will need NJ to improve mobility. PRN Roxicodone q6 hours.  Lyrica 50 mg TID  Out-patient pain management follow-up 7/17  MRI Lumbar Spine- Degen changes/ L1-L5 broad-base disc herniation, mild central canal stenosis. NSG, Vascular signed off. Diabetes Type II  Glucose high today- likely 2/2 to prednisone taking for gout, and increased oral food intake, and lack of mobility. MDSS started. A1C: >7 in 6/2020  Not on insulin since August 2019, or any diabetic meds  Continue to monitor. Repeat BMP afternoon today shows improvement    Decubitus Ulcer Hips + Staph Aureus  ID recommends IV Daptomycin longer term. General surgery did debridement 7/9. Wound care on board  Previous admit 6/18-6/22 for failure of o/p tx for hip ulcers. WC + Corynebacterium /staph aureus at that time-did not need antibiotics at d/c    KARISHMA-prerenal  Creatinine down to 1.7. Lasix resumed today, metoprlol  Nephrology consulted  Continue to monitor I's and O's    Acute Gout Left Toe- resolved  Stopped prednisone- got 4 days worth    DVT Right Lower Extremity  Coumadin, pharmacy dosing. INR supratherapeutic today  US lower extremity: extensive dvt through the right lower extremity. Hx of RLE DVT    Anemia- likely multifactorial related to metastatic breast cancer, Ibrance, CKD stage IV  Repeat Hb today >7. Initially was lower. No transfusion at this time, threshold remains Hb<7  Hematology-oncology consulted. Lymphopenia  ANC 2.24-chronic lymphopenia  Hematology oncology consulted    HTN/CAD s/p CABG/ Breast Cancer/ HFrEF  Held Bumex secondary to KARISHMA, Held Toprol 2/2 to low pressures.   Isosorbide dinitrate      GI/DVT ppx: Protonix/Coumadin   Dispo: [facility] [place] [facility]    Electronically signed by Nabeel Delacruz  PGY-2 on 7/15/2020 at 9:07 AM  This case was discussed with attending physician: Dr. Michelle Strong

## 2020-07-15 NOTE — PROGRESS NOTES
[address] [facility]  Family Medicine Attending    S: 76 y.o. female with PMH of HTN, breast cancer s/p lumpectomy with bone and liver mets, CAD s/p 3V CABG, CHF EF 50-55% RVSP 30 mmHg , DM2 (a1c 8.2), sarcoidosis, chronic venous insufficiency, DVT, and recent admission for B/l hip wounds who presented to ER with 3 day history of  left leg pain. Had difficulty walking due to pain and yesterday became unbearable so came to ER. Denies no fevers or chills, loss of sensation or falls. Today,patient feeling a little better. Had bm last night and this morning. O: VS- Blood pressure (!) 162/68, pulse 85, temperature 96.9 °F (36.1 °C), temperature source Temporal, resp. rate 18, height 5' 4\" (1.626 m), weight 170 lb (77.1 kg), SpO2 96 %, not currently breastfeeding. Exam is as noted by resident with the following changes, additions or corrections:  Gen -NAD, sitting upright on the edge of the bed  Cardio - RRR, stable systolic murmur  Lungs - Lungs CTAB , no wheeze    abd-increased bs, nonttp, firm  Ext- , bilateral feet warm and well perfused. No joint erythema or effusion. Impressions:   Principal Problem:    Left leg pain  Active Problems:    Chronic deep vein thrombosis (DVT) of proximal vein of right lower extremity (HCC)    Decubitus ulcer of both hip    Degenerative disc disease at L5-S1 level  Resolved Problems:    Deep vein thrombosis (DVT) of right lower extremity (HCC)    KARISHMA (acute kidney injury) (HCC)    Cellulitis    Lymphopenia    Acute gout involving toe of left foot      Plan:   Appreciate ID, general surgery(s/p debridement), renal, h/o vascular, nsg, pharmacy, and wound care consultation. Continue daptomycin for hip wounds. Patient was on neutropenic precautions, anc is improving now. Follow h/h. INR 3.9 today. Coumadin has been held the last few days. Pain control, oxycodone, lyrica, and lidocaine patch.  Mri showed metastases to left hip and suspect this is the cause of

## 2020-07-15 NOTE — CONSULTS
Pain Management Consult          Maira Del Cid is a 76 y.o. female, her YOB: 1945 with the following history as recorded in [facility]:    CC:  Right and left hip pain    HPI:  This is a 76year old female who was admitted for inability to ambulate and bilateral leg swelling over a week ago. Patient with history of breast CA with bone metastases. She has bilateral hip wounds that she is being treated for with IV antibiotics. Known hx of right LE DVT. Patient reports pain is specifically located in the areas of her b/l hip wounds. She denies any lower back pain which radiates down her legs. Reports that her pain is mostly tolerable with roxicodone q 6 hours prn, however, sometimes it does not hold hr pain until the next dose. Reports lyrica is helpful for her neuropathic pain.         Patient Active Problem List    Diagnosis Date Noted    Degenerative disc disease at L5-S1 level 07/10/2020    Left leg pain 07/09/2020    Bony metastasis ([address] left leg 07/08/2020    Decubitus ulcer of both hip 07/08/2020    Cellulitis and abscess of leg 06/18/2020    Pleural effusion 09/17/2019    Precordial pain     Chronic systolic heart failure (HCC)     Moderate protein-calorie malnutrition ([address] 08/15/2019    Metastatic disease ([address]     Palliative care by specialist     Altered mental status     Goals of care, counseling/discussion     Acute hypercapnic respiratory failure ([address] 91/85/6253    Acute systolic heart failure ([address] 08/01/2019    Nonischemic cardiomyopathy ([address] 08/01/2019    Status post coronary artery bypass graft 08/01/2019    Class 2 severe obesity due to excess calories with serious comorbidity and body mass index (BMI) of 35.0 to 35.9 in adult [facility]) 08/01/2019    Type 2 diabetes mellitus with hyperglycemia ([address] 08/01/2019    HAP (hospital-acquired pneumonia) 07/21/2019    Palliative care encounter     Cancer associated pain     Ascites 06/18/2019    Charcot's joint of foot in type 2 diabetes mellitus ([address] 04/17/2019    CKD (chronic kidney disease) stage 3, GFR 30-59 ml/min ([address] 04/16/2019    Ambulatory dysfunction 04/15/2019    Leg swelling 11/07/2018    History of deep vein thrombosis 11/07/2018    Chronic venous insufficiency 11/07/2018    Lymphedema of both lower extremities 11/07/2018    Diabetic polyneuropathy associated with type 2 diabetes mellitus ([address] 09/07/2018    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 27/98/1176    Complicated UTI (urinary tract infection) 10/24/2017    Diarrhea with dehydration 10/24/2017    Chronic anticoagulation 10/24/2017    Peripheral polyneuropathy 01/03/2017    Bilateral edema of lower extremity 10/03/2016    Chronic deep vein thrombosis (DVT) of proximal vein of right lower extremity ([address] 09/30/2016    Type 2 diabetes mellitus without complication, with long-term current use of insulin ([address] 09/01/2016    Anemia of chronic disease 09/01/2016    Ventral hernia 05/14/2015    Rectal bleeding 02/23/2015    Anesthesia     Pericardial effusion     MI (myocardial infarction) ([address]     Arthritis     Lymph node enlargement     Subclavian vein occlusion, bilateral ([address] 04/18/2012    Rib lesion 02/07/2012    Hyperlipidemia 08/29/2011    Sarcoidosis 07/26/2011    B12 deficiency 06/22/2011    Shoulder pain, left 03/02/2011    Metastatic breast cancer ([address]     Essential hypertension     Coronary artery disease involving native coronary artery of native heart without angina pectoris     Nephrolithiasis     CHF (congestive heart failure) (HCC)     Humerus fracture      Current Facility-Administered Medications   Medication Dose Route Frequency Provider Last Rate Last Dose    sodium polystyrene (KAYEXALATE) 15 GM/60ML suspension 15 g  15 g Oral Once Destiney Sprague MD        insulin glargine (LANTUS) injection vial 13 Units  13 Units Subcutaneous Nightly Destiney Sprague MD        glucose calcium-vitamin D (OSCAL-500) 500-200 MG-UNIT per tablet 1 tablet  1 tablet Oral Daily Romina Muniz MD   1 tablet at 07/15/20 4082    isosorbide dinitrate (ISORDIL) tablet 5 mg  5 mg Oral TID Romina Muniz MD   5 mg at 07/15/20 6221    magnesium oxide (MAG-OX) tablet 400 mg  400 mg Oral Daily Romina Muniz MD   400 mg at 07/15/20 1290    metoprolol succinate (TOPROL XL) extended release tablet 12.5 mg  12.5 mg Oral Daily Romina Muniz MD   12.5 mg at 07/15/20 8360    therapeutic multivitamin-minerals 1 tablet  1 tablet Oral Daily Romina Muniz MD   1 tablet at 07/15/20 0813    pantoprazole (PROTONIX) tablet 40 mg  40 mg Oral QAM AC Romina Muniz MD   40 mg at 07/15/20 3794    pregabalin (LYRICA) capsule 50 mg  50 mg Oral TID Romina Muniz MD   50 mg at 07/15/20 1262    [Held by provider] vitamin B-12 (CYANOCOBALAMIN) tablet 1,000 mcg  1,000 mcg Oral Daily Romina Muniz MD   1,000 mcg at 07/08/20 0901    sodium chloride flush 0.9 % injection 10 mL  10 mL Intravenous 2 times per day Romina Muniz MD   10 mL at 07/15/20 1358    sodium chloride flush 0.9 % injection 10 mL  10 mL Intravenous PRN Romina Muniz MD   10 mL at 07/12/20 1449    acetaminophen (TYLENOL) tablet 650 mg  650 mg Oral Q6H PRN Romina Muniz MD   650 mg at 07/08/20 0542    Or    acetaminophen (TYLENOL) suppository 650 mg  650 mg Rectal Q6H PRN Romina Muniz MD        magnesium hydroxide (MILK OF MAGNESIA) 400 MG/5ML suspension 30 mL  30 mL Oral Daily PRN Romina Muniz MD        promethazine (PHENERGAN) tablet 12.5 mg  12.5 mg Oral Q6H PRN Romina Muniz MD        0.9 % sodium chloride infusion admixture   Intravenous Q12H Romina Muniz MD   Stopped at 07/10/20 1635    warfarin (COUMADIN) daily dosing (placeholder)   Other RX Tobin Lloyd MD         Allergies: Macrobid [nitrofurantoin monohydrate macrocrystals]  Past Medical History:   Diagnosis Date    Abscess of abdominal wall     Anemia     Iron deficiency and chronic disease.  Anesthesia     DIFFICULTY WAKING UP    Arthritis     Arthritis, hip     Breast cancer ([address] 2005    S/P Left Lumpectomy with Lymph Node Dissection, Chemo/Rad. Follows with Dr. Mikayla Mahoney. No recurrence to date. Surgeon was Dr. Lyn Jordan.  CAD (coronary artery disease) 5/2008    s/p CABG. Follows with [address] [place] [address].  CHF (congestive heart failure) (HCC)     Chronic venous insufficiency 11/7/2018    Class 2 severe obesity due to excess calories with serious comorbidity and body mass index (BMI) of 35.0 to 35.9 in adult ([address] 8/1/2019    Decreased dorsalis pedis pulse 11/7/2018    Degenerative disc disease at L5-S1 level 7/10/2020    Diabetic neuropathy (HCC)     Diabetic neuropathy (HCC)     DVT (deep venous thrombosis) (HCC)     Recurrent. On lifelong coumadin.  DVT of upper extremity (deep vein thrombosis) ([address] 4/18/2012    History of deep vein thrombosis 11/7/2018    Humerus fracture     Chronic, on the Left.  Hyperlipidemia 8/29/2011    Hypertension     Left leg pain 7/9/2020    Leg swelling 11/7/2018    Lymph node enlargement     Lymphedema of both lower extremities 11/7/2018    Lymphopenia 7/9/2020    MI (myocardial infarction) ([address]     Nephrolithiasis     Follows with Dr. Kassidy Kaufman.  Pericardial effusion     Pulmonary nodule     With mediastinal lymphadenopathy. Stable. Follows with Dr. Oliverio Phillips.     Rib lesion 11/28/11    expansile lesion in one of the right ribs laterally    Sarcoidosis 07/26/11    per transbronchial needle aspiration    Subclavian vein occlusion, bilateral ([address] 4/18/2012    Type II or unspecified type diabetes mellitus without mention of complication, not stated as uncontrolled      Past Surgical History:   Procedure Laterality Date    APPENDECTOMY      ARTERIAL BYPASS SURGRY      BREAST LUMPECTOMY  9/27/2005    LEFT    CARDIAC SURGERY      CHOLECYSTECTOMY      COLONOSCOPY  12/2007    cecal arteriovenous malformation with hyperplastic polyps    COLONOSCOPY  [id number]    CORONARY ARTERY BYPASS GRAFT  05/2008    triple bypass    ECHO COMPL W DOP COLOR FLOW  10/30/2013         EYE SURGERY      clarissa cataracts    HYSTERECTOMY  1975, 1985    MAYNOR prolapse, benign conditions; BSO later for scar tissues, no CA.      OTHER SURGICAL HISTORY  11/18/11    port removal right chest wall    PARACENTESIS      TONSILLECTOMY      TOOTH EXTRACTION      Full Dental Extraction.  TUNNELED VENOUS PORT PLACEMENT      removal of port Dec. 2011- Dr. Josesito Hernandez TUNNELED [place] [address]  [id number]    RIGHT CHEST     Family History   Adopted: Yes     Social History     Tobacco Use    Smoking status: Never Smoker    Smokeless tobacco: Never Used   Substance Use Topics    Alcohol use: No               Imaging studies:    07/09/2020 MRI Lumbar Spine    Findings:  There are numerous lesions seen within the liver    incompletely evaluated on this study, most compatible with metastatic    disease    Changes seen throughout the discs are abnormal. There are findings to    suggest  degenerative disc disease.         Changes seen throughout the bone marrow are abnormal. Findings are    compatible with degenerative disc disease         Changes within the facets are abnormal. Findings are compatible with    degenerative facet disease.              At L5-S1: There is a mild broad-based disc herniation, there is mild    canal stenosis         At L4-5: There is a mild broad-based disc herniation, there is mild    canal stenosis         At L3-4: There is a mild broad-based disc herniation, there is mild    canal stenosis         At L2-3: There is a mild broad-based disc herniation, there is mild    canal stenosis         At L1-2: There is a mild broad-based disc herniation, there is mild    canal stenosis                   Impression    Findings compatible with degenerative changes    No convincing evidence for metastatic disease in the spine    There are multiple lesions in the liver seen on the margin of this    study, compatible with metastatic disease         ALERT:  THIS IS AN ABNORMAL REPORT      06/19/2020 MRI Right Hip    Findings: There is extensive abnormal T1 weighted bone marrow signal involving    the left femoral head, neck and left iliac bone extending into the    acetabulum and left ischium and inferior pubic ramus. There is a soft    tissue wound in the left gluteal region with soft tissue edema in the    adjacent fat as well as within the gluteus medius muscle compatible    with myositis. There is a soft tissue wound in the right gluteal    region which extends to the margin of gluteus medius and gluteus    janny muscles. There is soft tissue edema in some of the adjacent    fat but none immediately surrounding the tract extending to the    gluteal muscles. There is pelvic ascites.              Impression    1. Bilateral gluteal soft tissue wounds. Soft tissue edema present in    the adjacent fat bilaterally, left greater than right. Additional soft    tissue edema in the left gluteus medius muscle which may relate to    underlying myositis. 2. Extensive abnormal bone marrow signal involving the left femoral    head, neck, left iliac bone, acetabulum and inferior pubic ramus. Differential diagnostic considerations include infection as well as    neoplasia. Consider correlation with noncontrast CT which may allow    further characterization of the lesion as well as pinpoint potential    biopsy targets. 3. Pelvic ascites. REVIEW OF SYSTEMS:     Leticia Valladares denies fever/chills, chest pain, shortness of breath, new bowel or bladder complaints. All other review of systems was negative. PHYSICAL EXAMINATION:      BP (!) 162/68   Pulse 85   Temp 96.9 °F (36.1 °C) (Temporal)   Resp 18   Ht 5' 4\" (1.626 m)   Wt 170 lb (77.1 kg)   SpO2 99%   BMI 29.18 kg/m²     General:      General appearance:   pleasant and well-hydrated. , in no discomfort and A & O x3  Build:Overweight    HEENT:    Head:normocephalic and atraumatic  Sclera: icterus absent,    Lungs:    Breathing:Normal expansion. No rales, rhonchi, or wheezing. Extremities:    Tremors:None bilaterally upper and lower  Range of motion:Generally normal shoulders, pain with internal rotation of hips negative. Intact:Yes  Edema:Normal      Neurological:      Gait: not observed. Able to move to sitting position from supine without difficulty. Dermatology:    Skin: B/L hip dressings clean and dry. Impression:    Inability to ambulate  B/L hip wounds  Hx of breast CA with metastases  CKD stage IV    Recommendations:     1. Patient reports that roxicodone 5 mg Q 6 hours prn is helping but sometimes wears off before her next dose. Consider every 4-6 hour prn dosing if necessary. Patient quite comfortable during our visit today. 2.  Currently on lyrica 50 mg TID with good relief of neuropathic pain. 3.  Continue lidoderm patch    4. Not appropriate for interventional procedures due to wounds, current anti-coagulation, and increased BS.         LEROY Sutton

## 2020-07-15 NOTE — PROGRESS NOTES
[address] [place] [address] [facility]  Progress Note      Chief Complaint   Patient presents with    Leg Swelling     BLE SWELLING X1 DAY, hx CHF       SUBJECTIVE:      Patient is tolerating medications. No reported adverse drug reactions. No problems overnight. Review of systems:    As stated above in the chief complaint, otherwise negative. Medications:    Scheduled Meds:   insulin glargine  10 Units Subcutaneous Daily    insulin lispro  0-12 Units Subcutaneous TID WC    insulin lispro  0-6 Units Subcutaneous Nightly    sennosides-docusate sodium  2 tablet Oral Daily    lidocaine  1 patch Transdermal Daily    palbociclib  100 mg Oral Daily    polyethylene glycol  17 g Oral Daily    daptomycin (CUBICIN) IVPB  6 mg/kg (Adjusted) Intravenous Q48H    lidocaine-EPINEPHrine  20 mL Intradermal Once    mineral oil-hydrophilic petrolatum   Topical BID    bumetanide  1 mg Intravenous Daily    calcium-vitamin D  1 tablet Oral Daily    isosorbide dinitrate  5 mg Oral TID    magnesium oxide  400 mg Oral Daily    metoprolol succinate  12.5 mg Oral Daily    therapeutic multivitamin-minerals  1 tablet Oral Daily    pantoprazole  40 mg Oral QAM AC    pregabalin  50 mg Oral TID    [Held by provider] vitamin B-12  1,000 mcg Oral Daily    sodium chloride flush  10 mL Intravenous 2 times per day    sodium chloride   Intravenous Q12H    warfarin (COUMADIN) daily dosing (placeholder)   Other RX Placeholder     Continuous Infusions:   dextrose       PRN Meds:glucose, dextrose, glucagon (rDNA), dextrose, oxyCODONE, povidone-iodine, mineral oil-hydrophilic petrolatum **AND** mineral oil-hydrophilic petrolatum, albuterol, sodium chloride flush, acetaminophen **OR** acetaminophen, magnesium hydroxide, promethazine **OR** [DISCONTINUED] ondansetron  Prior to Admission medications    Medication Sig Start Date End Date Taking?  Authorizing Provider   PT/INR Test STRP 1 each by In Vitro route Twice a Week 6/29/20   Radha López DO   sodium bicarbonate 650 MG tablet Take 650 mg by mouth 2 times daily    Laina Phillips MD   PT/INR Testing Monitor KIT 1 each by Does not apply route once a week Use to test INR at home once weekly and as directed to monitor INR. 4/9/20   Radha López DO   PT/INR Test STRP 1 each by In Vitro route once a week Use to check INR at least once weekly and more often as directed. 4/9/20   Radha López DO   metoprolol succinate (TOPROL XL) 25 MG extended release tablet Take 0.5 tablets by mouth daily 4/8/20   Radha López, DO   omeprazole 20 MG EC tablet Take 1 tablet by mouth daily 4/8/20   Radha López DO   bumetanide (BUMEX) 1 MG tablet Take 1 tablet by mouth daily 3/16/20   Radha López, DO   pregabalin (LYRICA) 50 MG capsule Take 1 capsule by mouth 3 times daily for 120 days. 2/4/20 6/29/20  Radha López DO   magnesium oxide (MAG-OX) 400 (240 Mg) MG tablet Take 1 tablet by mouth daily 2/4/20   Radha López DO   docusate (COLACE, DULCOLAX) 100 MG CAPS Take 100 mg by mouth daily 2/4/20   Radha López DO   PT/INR Testing Monitor KIT 1 each by Does not apply route once a week Please use to test INR as directed by physician 1/15/20   Radha López DO   isosorbide dinitrate (ISORDIL) 5 MG tablet Take 1 tablet by mouth 3 times daily 1/14/20   Andreina Zhou MD   vitamin B-12 (CYANOCOBALAMIN) 1000 MCG tablet Take 1 tablet by mouth daily 1/7/20   Radha López DO   Calcium Citrate-Vitamin D (RA CALCIUM CIT-VIT D-3 PETITES) 200-250 MG-UNIT TABS take 1 tablet by mouth twice a day 1/7/20   Radha López DO   albuterol sulfate (PROAIR RESPICLICK) 389 (90 Base) MCG/ACT aerosol powder inhalation Inhale 2 puffs into the lungs every 6 hours as needed for Wheezing or Shortness of Breath 1/7/20   Radha López DO   warfarin (COUMADIN) 1 MG tablet Please take 2 mg by mouth most days, but 3 mg by mouth on Mondays and Wednesdays and Friday.   Patient taking 07/15/2020     Lab Results   Component Value Date    NEUTROABS 2.97 07/14/2020    NEUTROABS 2.64 07/13/2020    NEUTROABS 2.11 07/12/2020     No results found for: CRPHS  Lab Results   Component Value Date    ALT 28 07/07/2020    AST 34 (H) 07/07/2020    ALKPHOS 144 (H) 07/07/2020    BILITOT 1.0 07/07/2020     Lab Results   Component Value Date     07/15/2020    K 5.4 07/15/2020     07/15/2020    CO2 26 07/15/2020    BUN 74 07/15/2020    CREATININE 1.6 07/15/2020    CREATININE 1.7 07/14/2020    CREATININE 1.7 07/14/2020    GFRAA 38 07/15/2020    LABGLOM 31 07/15/2020    GLUCOSE 303 07/15/2020    GLUCOSE 109 04/20/2012    PROT 7.5 07/07/2020    LABALBU 3.6 07/07/2020    LABALBU 4.4 03/25/2012    CALCIUM 10.1 07/15/2020    BILITOT 1.0 07/07/2020    ALKPHOS 144 07/07/2020    AST 34 07/07/2020    ALT 28 07/07/2020     Lab Results   Component Value Date    CRP 16.2 (H) 07/07/2020    CRP 4.2 (H) 06/19/2020     Lab Results   Component Value Date    SEDRATE 134 (H) 07/07/2020    SEDRATE 102 (H) 06/21/2020    SEDRATE 130 (H) 06/19/2020       Radiology:    US ABDOMEN LIMITED   Final Result   Minimal ascites. MRI LUMBAR SPINE WO CONTRAST   Final Result   Findings compatible with degenerative changes   No convincing evidence for metastatic disease in the spine   There are multiple lesions in the liver seen on the margin of this   study, compatible with metastatic disease      ALERT:  THIS IS AN ABNORMAL REPORT      US RETROPERITONEAL COMPLETE   Final Result   Findings compatible with a right renal cyst         XR FOOT LEFT (MIN 3 VIEWS)   Final Result      No acute fracture is identified. Osteoarthritis. Osteopenia. XR FOOT RIGHT (MIN 3 VIEWS)   Final Result      No acute fracture is identified. Osteoarthritis. Osteopenia. XR TIBIA FIBULA RIGHT (2 VIEWS)   Final Result      No acute fracture is identified. Osteoarthritis. Osteopenia.          XR TIBIA FIBULA LEFT

## 2020-07-15 NOTE — PROGRESS NOTES
[facility]  Nephrology Attending Progress Note  Albert Avila. Michelle Vogel MD        SUBJECTIVE:     This is a 76 y.o.  female with a H/O CKD stage IV with baseline creatinine of 1.5-2.0 over the past year, whom we have followed in the hospital in the past for KARISHMA most recently in August 2019. She had been seen a little over a year ago in the office by Dr. Madina Casas but has not been followed up since. Additional PMH: HTN, breast cancer s/p lumpectomy with bone mets, CAD, CABG, CHF EF:50-55 RVSP 30 mmHg , chronic venous insufficiency, DVT, B/l hip wounds, recently admitted for hip wounds that grew corynebacterium and S. Aureus and was discharged about 3 weeks ago. Azalea Aponte any recent NSAID use, no ACE/ARB. She was on merrem and vancomycin last admission in June her creatinine ranged 1.8-2.0 during that admission. She had a dose of vancomycin in the ED. She states she has been eating and drinking, she was on bumex PTA     She is awake and alert and is having LE pain as well as pain left hip from a chronic wound      7/9/20: seen & examined-had fallen asleep holding her sandwich. States she feels alright and has improved appetite. 7/10: Sitting up in chair, offers no new c/o  7/11: seen in room, no cp or sob  7/12: seen in room, no complaints  7/13: Alert, in bed. States she feels ok, is having heartburn after eating. Denies sob. Has US of abd yesterday which showed minimal ascites.   7/14: pt without complaints  7/15: pt seen in room, no complaints    PROBLEM LIST:    Patient Active Problem List   Diagnosis    Metastatic breast cancer ([facility]    Essential hypertension    Coronary artery disease involving native coronary artery of native heart without angina pectoris    Nephrolithiasis    CHF (congestive heart failure) (HCC)    Humerus fracture    Shoulder pain, left    B12 deficiency    Hyperlipidemia    Rib lesion    Subclavian vein occlusion, bilateral (HCC)    Pericardial effusion    MI (myocardial infarction) ([address]    Arthritis    Sarcoidosis    Lymph node enlargement    Anesthesia    Rectal bleeding    Ventral hernia    Type 2 diabetes mellitus without complication, with long-term current use of insulin (HCC)    Anemia of chronic disease    Chronic deep vein thrombosis (DVT) of proximal vein of right lower extremity (HCC)    Bilateral edema of lower extremity    Peripheral polyneuropathy    Complicated UTI (urinary tract infection)    Diarrhea with dehydration    Chronic anticoagulation    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    Diabetic polyneuropathy associated with type 2 diabetes mellitus (HCC)    Leg swelling    History of deep vein thrombosis    Chronic venous insufficiency    Lymphedema of both lower extremities    Ambulatory dysfunction    CKD (chronic kidney disease) stage 3, GFR 30-59 ml/min (HCC)    Charcot's joint of foot in type 2 diabetes mellitus ([address]    Ascites    Palliative care encounter    Cancer associated pain    HAP (hospital-acquired pneumonia)    Acute systolic heart failure (HCC)    Nonischemic cardiomyopathy ([address]    Status post coronary artery bypass graft    Class 2 severe obesity due to excess calories with serious comorbidity and body mass index (BMI) of 35.0 to 35.9 in adult [facility])    Type 2 diabetes mellitus with hyperglycemia (HCC)    Acute hypercapnic respiratory failure (HCC)    Altered mental status    Goals of care, counseling/discussion    Palliative care by specialist    Met[facility]atic disease ([facility]    Moderate protein-calorie malnutrition ([address]    Precordial pain    Chronic systolic heart failure (HCC)    Pleural effusion    Cellulitis and abscess of leg    Bony metastasis (HCC) left leg    Decubitus ulcer of both hip    Left leg pain    Degenerative disc disease at L5-S1 level        PAST MEDICAL HISTORY:    Past Medical History:   Diagnosis Date    Abscess of abdominal wall     Anemia Iron deficiency and chronic disease.  Anesthesia     DIFFICULTY WAKING UP    Arthritis     Arthritis, hip     Breast cancer ([address] 2005    S/P Left Lumpectomy with Lymph Node Dissection, Chemo/Rad. Follows with Dr. Tiago Fallon. No recurrence to date. Surgeon was Dr. Dmitri Tovar.  CAD (coronary artery disease) 5/2008    s/p CABG. Follows with [address] [place] [address].  CHF (congestive heart failure) (HCC)     Chronic venous insufficiency 11/7/2018    Class 2 severe obesity due to excess calories with serious comorbidity and body mass index (BMI) of 35.0 to 35.9 in adult ([address] 8/1/2019    Decreased dorsalis pedis pulse 11/7/2018    Degenerative disc disease at L5-S1 level 7/10/2020    Diabetic neuropathy (HCC)     Diabetic neuropathy (HCC)     DVT (deep venous thrombosis) (HCC)     Recurrent. On lifelong coumadin.  DVT of upper extremity (deep vein thrombosis) ([address] 4/18/2012    History of deep vein thrombosis 11/7/2018    Humerus fracture     Chronic, on the Left.  Hyperlipidemia 8/29/2011    Hypertension     Left leg pain 7/9/2020    Leg swelling 11/7/2018    Lymph node enlargement     Lymphedema of both lower extremities 11/7/2018    Lymphopenia 7/9/2020    MI (myocardial infarction) ([address]     Nephrolithiasis     Follows with Dr. Mian Martinez.  Pericardial effusion     Pulmonary nodule     With mediastinal lymphadenopathy. Stable. Follows with Dr. Alba Villegas.     Rib lesion 11/28/11    expansile lesion in one of the right ribs laterally    Sarcoidosis 07/26/11    per transbronchial needle aspiration    Subclavian vein occlusion, bilateral ([address] 4/18/2012    Type II or unspecified type diabetes mellitus without mention of complication, not stated as uncontrolled        DIET:    Dietary Nutrition Supplements: Low Calorie High Protein Supplement  Dietary Nutrition Supplements: Wound Healing Oral Supplement  DIET CARB CONTROL; Carb Control: 3 carb choices (45 gms)/meal; No Added Salt (3-4 GM)     PHYSICAL EXAM:     Patient Vitals for the past 24 hrs:   BP Temp Temp src Pulse Resp SpO2 Weight   07/15/20 0808 (!) 162/68 96.9 °F (36.1 °C) Temporal 85 18 96 % --   07/15/20 0625 -- -- -- -- -- -- 170 lb (77.1 kg)   07/14/20 2318 (!) 141/63 97.4 °F (36.3 °C) Temporal 84 16 99 % --   07/14/20 1514 (!) 126/52 97.4 °F (36.3 °C) Temporal 85 18 -- --   @      Intake/Output Summary (Last 24 hours) at 7/15/2020 1148  Last data filed at 7/14/2020 2207  Gross per 24 hour   Intake 120 ml   Output --   Net 120 ml         Wt Readings from Last 3 Encounters:   07/15/20 170 lb (77.1 kg)   06/29/20 157 lb (71.2 kg)   06/18/20 150 lb (68 kg)       Constitutional:  Pt is in no acute distress  Head: normocephalic, atraumatic  Neck: no JVD  Cardiovascular: RRR, no rubs  Respiratory:  Lung sounds clear, no adventitious sounds  Gastrointestinal:  Soft, nontender, nondistended, bowel sounds x 4  Ext: trace BLE edema  Skin: dry, no rash on exposed skin  Neuro: aaox3    MEDS (scheduled):    insulin glargine  10 Units Subcutaneous Daily    insulin lispro  0-6 Units Subcutaneous TID WC    insulin lispro  0-3 Units Subcutaneous Nightly    sennosides-docusate sodium  2 tablet Oral Daily    lidocaine  1 patch Transdermal Daily    palbociclib  100 mg Oral Daily    polyethylene glycol  17 g Oral Daily    daptomycin (CUBICIN) IVPB  6 mg/kg (Adjusted) Intravenous Q48H    lidocaine-EPINEPHrine  20 mL Intradermal Once    mineral oil-hydrophilic petrolatum   Topical BID    bumetanide  1 mg Intravenous Daily    calcium-vitamin D  1 tablet Oral Daily    isosorbide dinitrate  5 mg Oral TID    magnesium oxide  400 mg Oral Daily    metoprolol succinate  12.5 mg Oral Daily    therapeutic multivitamin-minerals  1 tablet Oral Daily    pantoprazole  40 mg Oral QAM AC    pregabalin  50 mg Oral TID    [Held by provider] vitamin B-12  1,000 mcg Oral Daily    sodium chloride flush  10 mL Intravenous 2 times per day    sodium chloride   Intravenous Q12H    warfarin (COUMADIN) daily dosing (placeholder)   Other RX Placeholder       MEDS (infusions):   dextrose         MEDS (prn):  glucose, dextrose, glucagon (rDNA), dextrose, oxyCODONE, povidone-iodine, mineral oil-hydrophilic petrolatum **AND** mineral oil-hydrophilic petrolatum, albuterol, sodium chloride flush, acetaminophen **OR** acetaminophen, magnesium hydroxide, promethazine **OR** [DISCONTINUED] ondansetron    DATA:    Recent Labs     07/13/20  0530 07/13/20  1210 07/14/20  0550 07/15/20  0604   WBC 2.9*  --  3.3* 3.8*   HGB 6.9* 7.5* 7.6* 7.2*   HCT 21.5* 23.3* 23.9* 22.1*   .2*  --  118.3* 113.9*   *  --  127* 123*     Recent Labs     07/14/20  0550 07/14/20  0958 07/15/20  0604    134 137   K 5.8* 5.0 5.4*   CL 99 98 100   CO2 25 26 26   BUN 71* 75* 74*   CREATININE 1.7* 1.7* 1.6*   LABGLOM 29 29 31   GLUCOSE 518* 449* 303*   CALCIUM 9.9 10.4* 10.1       Lab Results   Component Value Date    LABALBU 3.6 07/07/2020    LABALBU 3.4 (L) 06/29/2020    LABALBU 3.0 (L) 06/22/2020     Lab Results   Component Value Date    TSH 6.930 (H) 06/10/2019       Iron Studies  Lab Results   Component Value Date    IRON 49 (L) 06/20/2011    TIBC 266 06/20/2011    FERRITIN 2,375 07/08/2020     Vitamin B-12   Date Value Ref Range Status   07/08/2020 >2000 (H) 211 - 946 pg/mL Final     Folate   Date Value Ref Range Status   07/08/2020 >20.0 4.8 - 24.2 ng/mL Final       Vit D, 25-Hydroxy   Date Value Ref Range Status   10/23/2017 21 (L) 30 - 100 ng/mL Final     Comment:     <20 ng/mL. ........... [place] Deficient  20-30 ng/mL. ......... [place] Insufficient   ng/mL. ........ [place] Sufficient  >100 ng/mL. .......... [place] Toxic       No results found for: PTH    No components found for: URIC    Lab Results   Component Value Date    COLORU Yellow 07/07/2020    NITRU Negative 07/07/2020    GLUCOSEU Negative 07/07/2020    GLUCOSEU NEGATIVE 12/05/2011    KETUA Negative 07/07/2020    UROBILINOGEN 0.2 07/07/2020    BILIRUBINUR Negative 07/07/2020    BILIRUBINUR small 05/16/2016    BILIRUBINUR NEGATIVE 12/05/2011       No results found for: Haley Wardilla      IMPRESSION/RECOMMENDATIONS:      1. Acute kidney injury  Likely pre-renal in the setting of diuretics and poor po intake with FeNa <1% and low urine urea  She has been started on IVF and will follow on same. R U/S No hydro, no calculus, right renal cyst noted  Cr improving with IVF now off  Cr 3.4 on admission, at baseline of 1.6 today  Change bumex to 1 mg po qd     2. Chronic kidney disease stage IV  Baseline Scr 1.5-2.0 over the past year     3. Hypertension with CKD I-IV  At target of <130/80 on isosorbide     4. Bilateral hip wounds  On daptomycin per ID     5.  Anemia with metastatic breast cancer  Will defer to Hematology    Thank you for the opportunity to participated in the care of GENNY Bauer

## 2020-07-15 NOTE — DISCHARGE SUMMARY
Discharge Summary    Armin Wilcox  :  1945  MRN:  [id number]    Admit date:  2020  Discharge date:   7/15/2020      Admitting Physician:  Jennifer Sheets MD    Discharge Diagnoses:    Patient Active Problem List   Diagnosis    Metastatic breast cancer [facility])    Essential hypertension    Coronary artery disease involving native coronary artery of native heart without angina pectoris    Nephrolithiasis    CHF (congestive heart failure) ([facility]    Humerus fracture    Shoulder pain, left    B12 deficiency    Hyperlipidemia    Rib lesion    Subclavian vein occlusion, bilateral (HCC)    Pericardial effusion    MI (myocardial infarction) ([facility]    Arthritis    Sarcoidosis    Lymph node enlargement    Anesthesia    Rectal bleeding    Ventral hernia    Type 2 diabetes mellitus without complication, with long-term current use of insulin ([facility])    Anemia of chronic disease    Chronic deep vein thrombosis (DVT) of proximal vein of right lower extremity ([facility])    Bilateral edema of lower extremity    Peripheral polyneuropathy    Complicated UTI (urinary tract infection)    Diarrhea with dehydration    Chronic anticoagulation    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    Diabetic polyneuropathy associated with type 2 diabetes mellitus ([facility])    Leg swelling    History of deep vein thrombosis    Chronic venous insufficiency    Lymphedema of both lower extremities    Ambulatory dysfunction    CKD (chronic kidney disease) stage 3, GFR 30-59 ml/min ([facility])    Charcot's joint of foot in type 2 diabetes mellitus ([facility]    Ascites    Palliative care encounter    Cancer associated pain    HAP (hospital-acquired pneumonia)    Acute systolic heart failure (HCC)    Nonischemic cardiomyopathy ([facility]    Status post coronary artery bypass graft    Class 2 severe obesity due to excess calories with serious comorbidity and body mass index (BMI) of 35.0 to 35.9 in adult ([facility]    Type 2 diabetes mellitus with hyperglycemia (HCC)    Acute hypercapnic respiratory failure (HCC)    Altered mental status    Goals of care, counseling/discussion    Palliative care by specialist    Metastatic disease [facility]    Moderate protein-calorie malnutrition ([facility]    Precordial pain    Chronic systolic heart failure (HCC)    Pleural effusion    Cellulitis and abscess of leg    Bony metastasis (HCC) left leg    Decubitus ulcer of both hip    Left leg pain    Degenerative disc disease at L5-S1 level       Admission Condition:  stable    Discharged Condition:  fair    Hospital Course & Things to Follow Up:    Recommended things to follow-up  -Monitor INR. INR on day of discharge: 3.9  -Coumadin was held from 7/12- 7/15 as patients INR was supratherapeutic  -Pharmacy dosed Coumadin, her home dose is usually 3 mg M, W, F.  2 mg other days  -Monitor glucose. Was started on LDSS on discharge. Likely will not need insulin moving forward, was not on home insulin prior to the admission. 77-year-old woman with breast cancer presented for 4-day history of left lower leg pain and swelling. Of note she recently presented to the ER in June 2020, for decubitus ulcers of her bilateral hips-these were also assessed during this admission. Her work-up in the ER was remarkable for US lower extremity-chronic DVT in her right lower extremity-pharmacy was consulted to dose Coumadin for this. X-rays were negative for acute fracture. She was also found to have anemia, lymphopenia, and hematology oncology was consulted-who deemed this was 2/2 to her home breast cancer medication- Ibrance, chronic kidney disease. IV Zosyn was started for her left leg pain. nephrology managed her KARISHMA with IV fluids, and later stopped her fluids and restarted her home Bumex dose-Cr successfully trended downwards with this treatment.     Left lower leg pain was controlled with PRN oxycodone, and an MRI of her lumbar spine was done-revealing mild central canal stenosis of L5-S1. NSG was consulted, who ultimately felt her left leg pain was 2/2 lumbar radicular pain as seen on MRI. NSG also felt her bilateral hip wounds were deep and attributing to compression on her nerve roots. NSG did not recommend any interventions during this hospital course. Vascular surgery was also consulted, who did not feel there was any vascular etiology for the patient's left leg pain. She is to call and follow-up outpatient for this with Pain Management. Bilateral decubitus ulcers of both hips were managed by taking wound cultures, general surgery, wound care, infectious disease were consulted. She had an debridement by general surgery on 7/9, wound care also evaluated her wounds throughout her stay. Wound culture returned as positive for staph aureus, infectious disease switched the patient's antibiotic regimen to IV daptomycin. Infectious disease will continue to follow patient as [facility] goes [facility] [place], and IV daptomycin was contd. On 7/11, patient complained of left toe warmth and pain. She endorsed some previous history of gout-and physical exam correlated with this was started on a 5-day course of prednisone 40 mg.  Received 4/5 days of the course for this. Patient progressed well from the above treatment, she did have elevated glucose and was started on an MDS S and had need to have some doses of Lantus. At home she was not currently on home insulin, and it is likely 2/2 prednisone started for her gout and immobility that contributed to her elevated glucose. She was sent to [facility] with a LDSS, and it is advised that this insulin is weaned as her prednisone is stopped and she will be moving more. Discharge Medications:         Medication List      START taking these medications    daptomycin  infusion  Commonly known as:  CUBICIN  Infuse 350 mg intravenously every 48 hours for 7 days Compound per protocol. medications were sent to [facility], [address] [facility] [phone]  [address], [address] [place] [id number]    Phone:  [phone]   · insulin lispro 100 UNIT/ML injection vial     You can get these medications from any pharmacy    Bring a paper prescription for each of these medications  · daptomycin  infusion         Consults: Hematology oncology, vascular surgery, neurosurgery, nephrology, infectious disease    Significant Diagnostic Studies: MRI lumbar spine, MRI right hip, MRI left hip ultrasound abdomen  Labs:  Na/K/Cl/CO2:  137/5.4/100/26 (07/15 0604)  BUN/Cr/glu/ALT/AST/amyl/lip:  74/1.6/--/--/--/--/-- (07/15 0604)  WBC/Hgb/Hct/Plts:  3.8/7.2/22.1/123 (07/15 0604)  [unfilled]  estimated creatinine clearance is 31 mL/min (A) (based on SCr of 1.6 mg/dL (H)). New Imaging:  Xr Tibia Fibula Left (2 Views)  No acute fracture is identified. Osteoarthritis. Osteopenia. Xr Tibia Fibula Right (2 Views)  No acute fracture is identified. Osteoarthritis. Osteopenia. Xr Foot Left (min 3 Views)    Result Date: 7/7/2020  No acute fracture is identified. Osteoarthritis. Osteopenia. Xr Foot Right (min 3 Views)    Result Date: 7/7/2020    No acute fracture is identified. Osteoarthritis. Osteopenia. Mri Lumbar Spine Wo Contrast  Findings are compatible with degenerative disc disease Changes within the facets are abnormal. Findings are compatible with degenerative facet disease.  At L5-S1: There is a mild broad-based disc herniation, there is mild canal stenosis At L4-5: There is a mild broad-based disc herniation, there is mild canal stenosis At L3-4: There is a mild broad-based disc herniation, there is mild canal stenosis At L2-3: There is a mild broad-based disc herniation, there is mild canal stenosis At L1-2: There is a mild broad-based disc herniation, there is mild canal stenosis     Findings compatible with degenerative changes No convincing evidence for metastatic disease in the spine There are multiple lesions in the liver seen on the margin of this study, compatible with metastatic disease ALERT:  THIS IS AN ABNORMAL REPORT    Xr Chest Portable    Result Date: 7/7/2020  Cardiomegaly with median sternotomy Small right-sided pleural effusion Other findings are unchanged from prior study. Mri Hip Right Wo Contrast    1. Bilateral gluteal soft tissue wounds. Soft tissue edema present in the adjacent fat bilaterally, left greater than right. Additional soft tissue edema in the left gluteus medius muscle which may relate to underlying myositis. 2. Extensive abnormal bone marrow signal involving the left femoral head, neck, left iliac bone, acetabulum and inferior pubic ramus. Differential diagnostic considerations include infection as well as neoplasia. Consider correlation with noncontrast CT which may allow further characterization of the lesion as well as pinpoint potential biopsy targets. 3. Pelvic ascites. Mri Hip Left Wo Contrast    1. Bilateral gluteal soft tissue wounds. Soft tissue edema present in the adjacent fat bilaterally, left greater than right. Additional soft tissue edema in the left gluteus medius muscle which may relate to underlying myositis. 2. Extensive abnormal bone marrow signal involving the left femoral head, neck, left iliac bone, acetabulum and inferior pubic ramus. Differential diagnostic considerations include infection as well as neoplasia. Consider correlation with noncontrast CT which may allow further characterization of the lesion as well as pinpoint potential biopsy targets. 3. Pelvic ascites. Us Abdomen Limited    Minimal ascites. Us Abdomen Limited  Minimal ascites. Hepatic masses, compatible with findings of hepatic masses on CT from 2019 and consistent with the patient's history of biopsy-proven metastatic breast cancer to the liver.  Contrast-enhanced cross-sectional imaging should be considered to reevaluate the extent of metastatic disease and to further evaluate the patient's subjective abdominal distention. ALERT:  THIS IS AN ABNORMAL REPORT     Us Dup Lower Extremities Bilateral Venous    Result Date: 7/7/2020     Extensive deep vein thrombosis throughout the right lower extremity. Treatments: IV antibiotics    Discharge Exam:  Gen -NAD, sitting upright on the edge of the bed  Cardio - RRR, stable systolic murmur  Lungs - Lungs CTAB , no wheeze    abd-increased bs, nonttp, firm  Ext- , bilateral feet warm and well perfused. No joint erythema or effusion. Disposition:   [facility] Appointments   Date Time Provider Nick Castillo   7/24/2020  1:00 PM DO Ajit Maki [facility]       More than 30 minutes was spent in preparation of this patient's discharge including, but not limited to, examination, preparation of documents, prescription preparation, counseling and coordination.     Signed:  Dyan Quezada MD  7/15/2020, 11:38 AM

## 2020-07-17 LAB
EKG ATRIAL RATE: 92 BPM
EKG P AXIS: 49 DEGREES
EKG P-R INTERVAL: 176 MS
EKG Q-T INTERVAL: 404 MS
EKG QRS DURATION: 148 MS
EKG QTC CALCULATION (BAZETT): 499 MS
EKG R AXIS: -80 DEGREES
EKG T AXIS: 29 DEGREES
EKG VENTRICULAR RATE: 92 BPM

## 2020-07-17 PROCEDURE — 93010 ELECTROCARDIOGRAM REPORT: CPT | Performed by: FAMILY MEDICINE

## 2020-08-11 ENCOUNTER — ANTI-COAG VISIT (OUTPATIENT)
Dept: FAMILY MEDICINE CLINIC | Age: 75
End: 2020-08-11

## 2020-08-11 ENCOUNTER — OFFICE VISIT (OUTPATIENT)
Dept: FAMILY MEDICINE CLINIC | Age: 75
End: 2020-08-11
Payer: COMMERCIAL

## 2020-08-11 VITALS
WEIGHT: 157 LBS | TEMPERATURE: 97.3 F | DIASTOLIC BLOOD PRESSURE: 67 MMHG | HEART RATE: 80 BPM | HEIGHT: 64 IN | SYSTOLIC BLOOD PRESSURE: 152 MMHG | OXYGEN SATURATION: 95 % | BODY MASS INDEX: 26.8 KG/M2

## 2020-08-11 LAB
INTERNATIONAL NORMALIZATION RATIO, POC: 1.6
PROTHROMBIN TIME, POC: 19.4

## 2020-08-11 PROCEDURE — 1111F DSCHRG MED/CURRENT MED MERGE: CPT | Performed by: FAMILY MEDICINE

## 2020-08-11 PROCEDURE — 4040F PNEUMOC VAC/ADMIN/RCVD: CPT | Performed by: FAMILY MEDICINE

## 2020-08-11 PROCEDURE — 1111F DSCHRG MED/CURRENT MED MERGE: CPT | Performed by: STUDENT IN AN ORGANIZED HEALTH CARE EDUCATION/TRAINING PROGRAM

## 2020-08-11 PROCEDURE — 2022F DILAT RTA XM EVC RTNOPTHY: CPT | Performed by: FAMILY MEDICINE

## 2020-08-11 PROCEDURE — 99212 OFFICE O/P EST SF 10 MIN: CPT | Performed by: STUDENT IN AN ORGANIZED HEALTH CARE EDUCATION/TRAINING PROGRAM

## 2020-08-11 PROCEDURE — 3052F HG A1C>EQUAL 8.0%<EQUAL 9.0%: CPT | Performed by: STUDENT IN AN ORGANIZED HEALTH CARE EDUCATION/TRAINING PROGRAM

## 2020-08-11 PROCEDURE — 3017F COLORECTAL CA SCREEN DOC REV: CPT | Performed by: FAMILY MEDICINE

## 2020-08-11 PROCEDURE — G8427 DOCREV CUR MEDS BY ELIG CLIN: HCPCS | Performed by: FAMILY MEDICINE

## 2020-08-11 PROCEDURE — G8399 PT W/DXA RESULTS DOCUMENT: HCPCS | Performed by: FAMILY MEDICINE

## 2020-08-11 PROCEDURE — 99213 OFFICE O/P EST LOW 20 MIN: CPT | Performed by: STUDENT IN AN ORGANIZED HEALTH CARE EDUCATION/TRAINING PROGRAM

## 2020-08-11 PROCEDURE — 1036F TOBACCO NON-USER: CPT | Performed by: FAMILY MEDICINE

## 2020-08-11 PROCEDURE — 1090F PRES/ABSN URINE INCON ASSESS: CPT | Performed by: FAMILY MEDICINE

## 2020-08-11 PROCEDURE — 1123F ACP DISCUSS/DSCN MKR DOCD: CPT | Performed by: FAMILY MEDICINE

## 2020-08-11 PROCEDURE — G8417 CALC BMI ABV UP PARAM F/U: HCPCS | Performed by: FAMILY MEDICINE

## 2020-08-11 RX ORDER — INSULIN GLARGINE 100 [IU]/ML
10 INJECTION, SOLUTION SUBCUTANEOUS EVERY MORNING
COMMUNITY
Start: 2020-07-15 | End: 2020-08-11 | Stop reason: SDUPTHER

## 2020-08-11 RX ORDER — ACETAMINOPHEN 325 MG/1
650 TABLET ORAL EVERY 6 HOURS PRN
COMMUNITY
Start: 2020-07-08 | End: 2020-10-27

## 2020-08-11 RX ORDER — INSULIN GLARGINE 100 [IU]/ML
10 INJECTION, SOLUTION SUBCUTANEOUS EVERY MORNING
Qty: 1 VIAL | Refills: 2 | Status: SHIPPED
Start: 2020-08-11 | End: 2020-08-13 | Stop reason: ALTCHOICE

## 2020-08-11 RX ORDER — POLYETHYLENE GLYCOL 3350 17 G/17G
17 POWDER, FOR SOLUTION ORAL DAILY
COMMUNITY
Start: 2020-07-12 | End: 2020-09-21 | Stop reason: ALTCHOICE

## 2020-08-11 RX ORDER — LIDOCAINE 50 MG/G
1 PATCH TOPICAL DAILY
Qty: 30 PATCH | Status: CANCELLED | OUTPATIENT
Start: 2020-08-11

## 2020-08-11 RX ORDER — INSULIN GLARGINE 100 [IU]/ML
10 INJECTION, SOLUTION SUBCUTANEOUS EVERY MORNING
Qty: 1 VIAL | Status: CANCELLED | OUTPATIENT
Start: 2020-08-11

## 2020-08-11 RX ORDER — PANTOPRAZOLE SODIUM 40 MG/1
40 TABLET, DELAYED RELEASE ORAL
COMMUNITY
Start: 2020-07-08 | End: 2020-08-18 | Stop reason: SDUPTHER

## 2020-08-11 RX ORDER — SENNA PLUS 8.6 MG/1
2 TABLET ORAL DAILY
COMMUNITY
Start: 2020-07-14 | End: 2020-09-21 | Stop reason: ALTCHOICE

## 2020-08-11 RX ORDER — PANTOPRAZOLE SODIUM 40 MG/1
40 TABLET, DELAYED RELEASE ORAL
Qty: 30 TABLET | Status: CANCELLED | OUTPATIENT
Start: 2020-08-11

## 2020-08-11 RX ORDER — OXYCODONE HYDROCHLORIDE 5 MG/1
5 TABLET ORAL EVERY 6 HOURS PRN
COMMUNITY
Start: 2020-08-07 | End: 2020-09-21 | Stop reason: SDUPTHER

## 2020-08-11 RX ORDER — LIDOCAINE 50 MG/G
1 PATCH TOPICAL DAILY
Qty: 10 PATCH | Refills: 2 | Status: SHIPPED | OUTPATIENT
Start: 2020-08-11 | End: 2020-08-21

## 2020-08-11 RX ORDER — ACETAMINOPHEN 650 MG/1
650 SUPPOSITORY RECTAL EVERY 4 HOURS PRN
Status: ON HOLD | COMMUNITY
Start: 2020-07-08 | End: 2020-09-27 | Stop reason: HOSPADM

## 2020-08-11 RX ORDER — METOPROLOL SUCCINATE 25 MG/1
12.5 TABLET, EXTENDED RELEASE ORAL DAILY
Qty: 60 TABLET | Refills: 3 | Status: ON HOLD
Start: 2020-08-11 | End: 2020-11-04 | Stop reason: HOSPADM

## 2020-08-11 RX ORDER — ALLOPURINOL 100 MG/1
100 TABLET ORAL DAILY
COMMUNITY
Start: 2020-08-07 | End: 2020-09-14 | Stop reason: SDUPTHER

## 2020-08-11 RX ORDER — NICOTINE POLACRILEX 4 MG/1
20 GUM, CHEWING ORAL DAILY
Qty: 90 TABLET | Refills: 0 | Status: CANCELLED | OUTPATIENT
Start: 2020-08-11

## 2020-08-11 RX ORDER — LIDOCAINE 50 MG/G
1 PATCH TOPICAL EVERY 24 HOURS
COMMUNITY
Start: 2020-07-13

## 2020-08-11 RX ORDER — CYANOCOBALAMIN (VITAMIN B-12) 500 MCG
1 TABLET ORAL DAILY
COMMUNITY
End: 2020-10-22

## 2020-08-11 RX ORDER — MAGNESIUM OXIDE 400 MG/1
400 TABLET ORAL DAILY
COMMUNITY
Start: 2020-07-08 | End: 2020-09-14 | Stop reason: SDUPTHER

## 2020-08-11 NOTE — PROGRESS NOTES
S: 76 y.o. female here for hospital followup. She was admitted in July for left leg pain and a sacral ulcer was found and thought to be aggravating her sciatica. She finished antibiotics on discharge at [facility]. She was discharged from [facility] 4 days ago. On admission, her INR was elevated. Has home health coming out for wound care to hip. Was discharged with roxicodone ( #20 filled on 8/9) and referral to pain mgmt numbers. Discharge instructions stated take 3 mg 8/7. She has known left hip breast cancer metastases. O: VS: BP (!) 152/67 (Site: Right Upper Arm, Position: Sitting, Cuff Size: Medium Adult)   Pulse 80   Temp 97.3 °F (36.3 °C) (Temporal)   Ht 5' 4\" (1.626 m)   Wt 157 lb (71.2 kg)   SpO2 95%   BMI 26.95 kg/m²    General: NAD   CV:  RRR, no gallops, rubs, or murmurs   Resp: CTAB no R/R/W   Abd:  Soft, nontender, no masses    Ext:  no C/C/E   Left hip wound is dressed, c/d/i  Impression/Plan:   1. HTN-nursing care recheck   2. Left leg/hip pain/wound-continue wound care and dressing    3. Afib-inr 1.6m 3 mg tonight, recheck 1 week; find out about recent dosing and give instructions from there for the next week. Get records from [facility] 1 month    Attending Physician Statement  I have discussed the case, including pertinent history and exam findings with the resident. I agree with the documented assessment and plan.         Jeremias Siddiqi MD

## 2020-08-11 NOTE — PROGRESS NOTES
CC:  Hospital follow-up: left leg pain    HPI:  20-year-old woman who presents after leaving [facility] on 8/7/2020, her daughter is present with her for this follow-up visit. She was recently hospitalized for left leg pain, supratherapeutic INR and was sent to [facility]. Decubitus Ulcer Left Hip  ID consulted during hospitalization for decubitus ulcer on her left hip- she completed a course of IV daptomycin. Needs to follow with ID outpatient. Nursing aides comes to home 3-4x a week to dress ulcer- able to sit in walker/ambulate with ease. No fevers, chills, discharge from ulcer, increased pain from site    Left Leg Pain  NSG consulted hospital course and felt pain 2/2 nerve pain - worsened by progression of decubitus ulcer(work-up arterial US, venous US both negative, imaging). Heme-onc felt pain in hip is unlikely 2/2 to mets to hip. Seen today left leg pain is stable. Roxicodone and lidocaine patch- needs refill of patch. Interested in pain management referral.    Recurrent DVT  INR: 3.0 (8/7/2020) wnl. Taking warfarin 3 mg daily. Requests INR strips and wants to check INR herself at home. Nursing aids visit 2-3x week and help with INR checks and taking coumadin. Diabetes Mellitus Type 2  Glucose was elevated during her hospitalization, and was put on a long-acting insulin. Continued at [facility]- she requests refill of this today. NA that come to home check her glucose.       Patient Active Problem List    Diagnosis Date Noted    Degenerative disc disease at L5-S1 level 07/10/2020    Left leg pain 07/09/2020    Bony metastasis ([address] left leg 07/08/2020    Decubitus ulcer of both hip 07/08/2020    Cellulitis and abscess of leg 06/18/2020    Pleural effusion 09/17/2019    Precordial pain     Chronic systolic heart failure (HCC)     Moderate protein-calorie malnutrition ([address] 08/15/2019    Metastatic disease ([address]     Palliative care by specialist     Altered mental status     Goals of care, counseling/discussion     Acute hypercapnic respiratory failure ([address] 50/24/4614    Acute systolic heart failure ([address] 08/01/2019    Nonischemic cardiomyopathy ([address] 08/01/2019    Status post coronary artery bypass graft 08/01/2019    Class 2 severe obesity due to excess calories with serious comorbidity and body mass index (BMI) of 35.0 to 35.9 in adult [facility]) 08/01/2019    Type 2 diabetes mellitus with hyperglycemia ([address] 08/01/2019    HAP (hospital-acquired pneumonia) 07/21/2019    Palliative care encounter     Cancer associated pain     Ascites 06/18/2019    Charcot's joint of foot in type 2 diabetes mellitus ([address] 04/17/2019    CKD (chronic kidney disease) stage 3, GFR 30-59 ml/min ([address] 04/16/2019    Ambulatory dysfunction 04/15/2019    Leg swelling 11/07/2018    History of deep vein thrombosis 11/07/2018    Chronic venous insufficiency 11/07/2018    Lymphedema of both lower extremities 11/07/2018    Diabetic polyneuropathy associated with type 2 diabetes mellitus ([address] 09/07/2018    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 69/63/4300    Complicated UTI (urinary tract infection) 10/24/2017    Diarrhea with dehydration 10/24/2017    Chronic anticoagulation 10/24/2017    Peripheral polyneuropathy 01/03/2017    Bilateral edema of lower extremity 10/03/2016    Chronic deep vein thrombosis (DVT) of proximal vein of right lower extremity ([address] 09/30/2016    Type 2 diabetes mellitus without complication, with long-term current use of insulin ([address] 09/01/2016    Anemia of chronic disease 09/01/2016    Ventral hernia 05/14/2015    Rectal bleeding 02/23/2015    Anesthesia     Pericardial effusion     MI (myocardial infarction) ([address]     Arthritis     Lymph node enlargement     Subclavian vein occlusion, bilateral ([address] 04/18/2012    Rib lesion 02/07/2012    Hyperlipidemia 08/29/2011    Sarcoidosis 07/26/2011    B12 deficiency 06/22/2011    Shoulder pain, left 03/02/2011    Metastatic [facility] cancer ([facility]     Essential hypertension     Coronary artery disease involving native coronary artery of native heart without angina pectoris     Nephrolithiasis     CHF (congestive heart failure) (HCC)     Humerus fracture        Past Medical History:   Diagnosis Date    Abscess of abdominal wall     Anemia     Iron deficiency and chronic disease.  Anesthesia     DIFFICULTY WAKING UP    Arthritis     Arthritis, hip     Breast cancer ([address] 2005    S/P Left Lumpectomy with Lymph Node Dissection, Chemo/Rad. Follows with Dr. Thien Wolf. No recurrence to date. Surgeon was Dr. Ellen Reynaga.  CAD (coronary artery disease) 5/2008    s/p CABG. Follows with [address] [place] [address].  CHF (congestive heart failure) (HCC)     Chronic venous insufficiency 11/7/2018    Class 2 severe obesity due to excess calories with serious comorbidity and body mass index (BMI) of 35.0 to 35.9 in adult ([address] 8/1/2019    Decreased dorsalis pedis pulse 11/7/2018    Degenerative disc disease at L5-S1 level 7/10/2020    Diabetic neuropathy (HCC)     Diabetic neuropathy (HCC)     DVT (deep venous thrombosis) (HCC)     Recurrent. On lifelong coumadin.  DVT of upper extremity (deep vein thrombosis) ([address] 4/18/2012    History of deep vein thrombosis 11/7/2018    Humerus fracture     Chronic, on the Left.  Hyperlipidemia 8/29/2011    Hypertension     Left leg pain 7/9/2020    Leg swelling 11/7/2018    Lymph node enlargement     Lymphedema of both lower extremities 11/7/2018    Lymphopenia 7/9/2020    MI (myocardial infarction) ([facility]     Nephrolithiasis     Follows with Dr. Delgado Lund.  Pericardial effusion     Pulmonary nodule     With mediastinal lymphadenopathy. Stable. Follows with Dr. Christina Blanco.     Rib lesion 11/28/11    expansile lesion in one of the right ribs laterally    Sarcoidosis 07/26/11    per transbronchial needle aspiration    Subclavian vein occlusion, bilateral ([facility] 4/18/2012    Type II or unspecified type diabetes mellitus without mention of complication, not stated as uncontrolled        Current Outpatient Medications on File Prior to Visit   Medication Sig Dispense Refill    lidocaine (LIDODERM) 5 % Apply 1 patch topically daily      acetaminophen (TYLENOL) 325 MG tablet Take 650 mg by mouth every 6 hours as needed      acetaminophen (TYLENOL) 650 MG suppository 650 mg every 4 hours as needed      Cyanocobalamin (VITAMIN B 12) 500 MCG TABS Take 1 tablet by mouth daily       bumetanide (BUMEX) 1 MG tablet Take 1 tablet by mouth daily 90 tablet 1    pregabalin (LYRICA) 50 MG capsule Take 1 capsule by mouth 3 times daily for 120 days. 90 capsule 3    docusate (COLACE, DULCOLAX) 100 MG CAPS Take 100 mg by mouth daily 90 capsule 3    isosorbide dinitrate (ISORDIL) 5 MG tablet Take 1 tablet by mouth 3 times daily 90 tablet 3    Calcium Citrate-Vitamin D (RA CALCIUM CIT-VIT D-3 PETITES) 200-250 MG-UNIT TABS take 1 tablet by mouth twice a day 180 tablet 3    Multiple Vitamins-Minerals (THERAPEUTIC MULTIVITAMIN-MINERALS) tablet Take 1 tablet by mouth daily 90 tablet 3    Insulin Pen Needle (MEIJER PEN NEEDLES) 29G X 12MM MISC 1 each by Does not apply route daily 100 each 3    nitroGLYCERIN (NITROSTAT) 0.4 MG SL tablet up to max of 3 total doses.  If no relief after 1 dose, call 911. 25 tablet 3    palbociclib (IBRANCE) 100 MG capsule Take 100 mg by mouth 3 weeks on and 1 week off also receives injections every 3 weeks per Dr Navya Davis insulin lispro (HUMALOG) 100 UNIT/ML injection vial Inject 0-3 Units into the skin nightly (Patient not taking: Reported on 9/21/2020) 1 vial 3    sodium bicarbonate 650 MG tablet Take 650 mg by mouth 2 times daily      omeprazole 20 MG EC tablet Take 1 tablet by mouth daily 90 tablet 0    albuterol sulfate (PROAIR RESPICLICK) 790 (90 Base) MCG/ACT aerosol powder inhalation Inhale 2 puffs into the lungs Neurological: Negative for light-headedness and headaches. Psychiatric/Behavioral: Negative for agitation and confusion. Objective:    BP Readings from Last 3 Encounters:   08/11/20 (!) 152/67   07/15/20 (!) 142/68   06/29/20 126/80       Physical Exam   Constitutional: She is oriented to person, place, and time. No distress. Sitting in a walker chair. HENT:   Head: Normocephalic and atraumatic. Eyes: EOM are normal. No scleral icterus. Cardiovascular: Normal rate, regular rhythm, normal heart sounds and intact distal pulses. Pulmonary/Chest: Effort normal and breath sounds normal. She has no wheezes. Abdominal: Soft. Bowel sounds are normal. She exhibits no distension. There is no abdominal tenderness. Neurological: She is alert and oriented to person, place, and time. Skin: Skin is warm and dry. No rash noted. She is not diaphoretic. Left hip- wound dressed, clean, dry. No erythema discharge. Some tenderness to palpation of area around the wound. Psychiatric: She has a normal mood and affect. Assessment:     Diagnosis Orders   1. Chronic deep vein thrombosis (DVT) of lower extremity, unspecified laterality, unspecified vein (HCC)  POCT INR    PT/INR Test STRP  INR: 3.0 today. Recheck INR in 1 week and will adjust warfarin accordingly. 3 mg warfarin q daily right now. 2. Type 2 diabetes mellitus without complication, unspecified whether long term insulin use ([facility]  DME Order for Glucometer as OP    insulin glargine (LANTUS) 100 UNIT/ML injection vial   3. Pain of lower extremity, unspecified laterality  lidocaine (LIDODERM) 5 %    lidocaine (LIDODERM) 5 %    GA DISCHARGE MEDS RECONCILED W/ CURRENT OUTPATIENT MED LIST    Irina Jimenez MD, Pain Medicine, [facility]   4. Gastroesophageal reflux disease without esophagitis     5. Hyperglycemia  DISCONTINUED: insulin glargine (LANTUS) 100 UNIT/ML injection vial   6.  Medication refill  acetaminophen (TYLENOL) 325 MG tablet

## 2020-08-13 ENCOUNTER — TELEPHONE (OUTPATIENT)
Dept: FAMILY MEDICINE CLINIC | Age: 75
End: 2020-08-13

## 2020-08-13 RX ORDER — LANCETS 30 GAUGE
1 EACH MISCELLANEOUS 3 TIMES DAILY
Qty: 100 EACH | Refills: 3 | Status: SHIPPED
Start: 2020-08-13 | End: 2020-10-22

## 2020-08-13 RX ORDER — GLUCOSAMINE HCL/CHONDROITIN SU 500-400 MG
1 CAPSULE ORAL 3 TIMES DAILY
Qty: 100 STRIP | Refills: 3 | Status: SHIPPED
Start: 2020-08-13 | End: 2020-10-22

## 2020-08-13 RX ORDER — INSULIN GLARGINE 100 [IU]/ML
10 INJECTION, SOLUTION SUBCUTANEOUS DAILY
Qty: 5 PEN | Refills: 0 | Status: SHIPPED | OUTPATIENT
Start: 2020-08-13

## 2020-08-13 NOTE — TELEPHONE ENCOUNTER
Pt called in today stating she was prescribed the wrong insulin. Pt does not use the vials only the pens. Pt needs new scripts for the right insulin. Thank you.

## 2020-08-17 ENCOUNTER — TELEPHONE (OUTPATIENT)
Dept: FAMILY MEDICINE CLINIC | Age: 75
End: 2020-08-17

## 2020-08-17 NOTE — TELEPHONE ENCOUNTER
Brook Cavazos from [facility] home care called in today with pt sugar number of [phone] but pt also had her first 10 units of Insulin today. Lantus is what pt has. I did call pt and advised her to call us back tomorrow with her glucose number.

## 2020-08-18 ENCOUNTER — TELEPHONE (OUTPATIENT)
Dept: FAMILY MEDICINE CLINIC | Age: 75
End: 2020-08-18

## 2020-08-18 RX ORDER — PANTOPRAZOLE SODIUM 40 MG/1
40 TABLET, DELAYED RELEASE ORAL
Qty: 90 TABLET | Refills: 1 | Status: ON HOLD
Start: 2020-08-18 | End: 2020-09-25

## 2020-08-18 RX ORDER — WARFARIN SODIUM 1 MG/1
TABLET ORAL
Qty: 180 TABLET | Refills: 1 | Status: SHIPPED
Start: 2020-08-18 | End: 2020-10-22

## 2020-08-18 NOTE — TELEPHONE ENCOUNTER
Last Appointment:  6/29/2020  Future Appointments   Date Time Provider Nick Sini   8/28/2020 10:30 AM Rafal Hudson MD [facility]   9/1/2020  2:40 PM Rosy Ferguson MD [id number]   9/8/2020  2:00 PM DO Gertrude Osorio [facility]

## 2020-08-22 ENCOUNTER — TELEPHONE (OUTPATIENT)
Dept: FAMILY MEDICINE CLINIC | Age: 75
End: 2020-08-22

## 2020-08-22 NOTE — TELEPHONE ENCOUNTER
Called patient to discuss glucose on Lantus dose. Sugars are improving gradually, yesterday was 293, did not check yet today. She will check and let us know. Tolerating insulin at 15 units per day. Received strips for INR machine yesterday. Tried to take a reading yesterday, but it was causing an error on machine. She will try again today. She was advised on goal INR and to call if her reading is outside the goal range, or with any concerns, could have INR checked in our office or [facility] lab. No further questions or concerns today.

## 2020-08-25 ENCOUNTER — ANTI-COAG VISIT (OUTPATIENT)
Dept: FAMILY MEDICINE CLINIC | Age: 75
End: 2020-08-25

## 2020-08-25 LAB — INR BLD: 3.5

## 2020-08-25 NOTE — PROGRESS NOTES
Please call her to advise -     Please note change in coumadin dose:  Please take 3 mg on Sundays and Thursdays only. Please take 2 mg on all other days (including today). Please recheck INR in one week, and call us with results, and call sooner with any symptoms, concerns, bleeding, or bruising. Thank you!

## 2020-08-26 NOTE — ED NOTES
Bed: 20  Expected date:   Expected time:   Means of arrival:   Comments:     Eryn Prince RN  07/20/19 7981 Detail Level: Zone Render In Strict Bullet Format?: No Continue Regimen: Triancinolone, vistril, and OTC Zyrtec

## 2020-09-14 RX ORDER — MAGNESIUM OXIDE 400 MG/1
400 TABLET ORAL DAILY
Qty: 90 TABLET | Refills: 0 | Status: SHIPPED | OUTPATIENT
Start: 2020-09-14

## 2020-09-14 RX ORDER — ALLOPURINOL 100 MG/1
100 TABLET ORAL DAILY
Qty: 90 TABLET | Refills: 1 | Status: SHIPPED | OUTPATIENT
Start: 2020-09-14

## 2020-09-14 RX ORDER — CEPHALEXIN 250 MG/1
250 CAPSULE ORAL 2 TIMES DAILY
Qty: 20 CAPSULE | Refills: 0 | OUTPATIENT
Start: 2020-09-14 | End: 2020-09-24

## 2020-09-14 NOTE — TELEPHONE ENCOUNTER
Last Appointment:  6/29/2020  Future Appointments   Date Time Provider Nick Anna   9/21/2020 11:00 AM DO Evita Alas [facility]   9/21/2020  1:40 PM Rashawn Mathews MD [address] [facility]   9/29/2020  9:30 AM Anayeli Patino, APRN - CNP [facility]

## 2020-09-14 NOTE — TELEPHONE ENCOUNTER
Last Appointment:  6/29/2020  Future Appointments   Date Time Provider Nick Sini   9/21/2020 11:00 AM DO Deyanira Panchal [facility]   9/21/2020  1:40 PM Andreina Zhou MD [address] [facility]   9/29/2020  9:30 AM Alberto Juárez APRN - CNP [facility]

## 2020-09-14 NOTE — TELEPHONE ENCOUNTER
Please let her know that I ordered a refill of Magnesium oxide as requested. However, the cephalexin was an antibiotic that was not intended to be refilled. Please let us know of any new symptoms, questions, or concerns. We will see her soon, but if she could do an INR in the next 1-2 days, that would be wonderful. Thank you!

## 2020-09-15 ENCOUNTER — TELEPHONE (OUTPATIENT)
Dept: FAMILY MEDICINE CLINIC | Age: 75
End: 2020-09-15

## 2020-09-15 ENCOUNTER — ANTI-COAG VISIT (OUTPATIENT)
Dept: FAMILY MEDICINE CLINIC | Age: 75
End: 2020-09-15
Payer: COMMERCIAL

## 2020-09-15 LAB — INR BLD: 3.3

## 2020-09-15 PROCEDURE — 93793 ANTICOAG MGMT PT WARFARIN: CPT | Performed by: FAMILY MEDICINE

## 2020-09-15 NOTE — PROGRESS NOTES
Patient advised of coumadin dosing, she verbalized understanding. Patient is scheduled for OV with Dr. Villalta Seen 9/21/2020, advised that if symptoms worsen to please call office for a sooner appointment or if she develops any SOB  To go immediately to the ED, she verbalized understanding.

## 2020-09-15 NOTE — TELEPHONE ENCOUNTER
Left msg. Informed patient that the magnesium oxide was refilled and the cephalexin was not. I also asked she do a INR within the next couple of days.

## 2020-09-15 NOTE — PROGRESS NOTES
Thank you! Please advise of change in dose:  Please take coumadin 3 mg on Sundays only, and take 2 mg on all other days. Recheck INR in one week, but please call sooner with any questions, concerns, or symptoms. Thank you!

## 2020-09-21 ENCOUNTER — OFFICE VISIT (OUTPATIENT)
Dept: FAMILY MEDICINE CLINIC | Age: 75
End: 2020-09-21
Payer: COMMERCIAL

## 2020-09-21 ENCOUNTER — ANTI-COAG VISIT (OUTPATIENT)
Dept: FAMILY MEDICINE CLINIC | Age: 75
End: 2020-09-21

## 2020-09-21 VITALS
RESPIRATION RATE: 16 BRPM | DIASTOLIC BLOOD PRESSURE: 66 MMHG | WEIGHT: 161 LBS | SYSTOLIC BLOOD PRESSURE: 120 MMHG | BODY MASS INDEX: 27.49 KG/M2 | TEMPERATURE: 97.2 F | OXYGEN SATURATION: 98 % | HEART RATE: 85 BPM | HEIGHT: 64 IN

## 2020-09-21 LAB
INTERNATIONAL NORMALIZATION RATIO, POC: 7.1
PROTHROMBIN TIME, POC: 85.3

## 2020-09-21 PROCEDURE — 1123F ACP DISCUSS/DSCN MKR DOCD: CPT | Performed by: FAMILY MEDICINE

## 2020-09-21 PROCEDURE — 99213 OFFICE O/P EST LOW 20 MIN: CPT | Performed by: FAMILY MEDICINE

## 2020-09-21 PROCEDURE — G8427 DOCREV CUR MEDS BY ELIG CLIN: HCPCS | Performed by: FAMILY MEDICINE

## 2020-09-21 PROCEDURE — 1090F PRES/ABSN URINE INCON ASSESS: CPT | Performed by: FAMILY MEDICINE

## 2020-09-21 PROCEDURE — G8399 PT W/DXA RESULTS DOCUMENT: HCPCS | Performed by: FAMILY MEDICINE

## 2020-09-21 PROCEDURE — 3017F COLORECTAL CA SCREEN DOC REV: CPT | Performed by: FAMILY MEDICINE

## 2020-09-21 PROCEDURE — G8417 CALC BMI ABV UP PARAM F/U: HCPCS | Performed by: FAMILY MEDICINE

## 2020-09-21 PROCEDURE — 3052F HG A1C>EQUAL 8.0%<EQUAL 9.0%: CPT | Performed by: FAMILY MEDICINE

## 2020-09-21 PROCEDURE — 1036F TOBACCO NON-USER: CPT | Performed by: FAMILY MEDICINE

## 2020-09-21 PROCEDURE — 99212 OFFICE O/P EST SF 10 MIN: CPT | Performed by: FAMILY MEDICINE

## 2020-09-21 PROCEDURE — 4040F PNEUMOC VAC/ADMIN/RCVD: CPT | Performed by: FAMILY MEDICINE

## 2020-09-21 PROCEDURE — 2022F DILAT RTA XM EVC RTNOPTHY: CPT | Performed by: FAMILY MEDICINE

## 2020-09-21 RX ORDER — OXYCODONE HYDROCHLORIDE 5 MG/1
5 TABLET ORAL EVERY 4 HOURS PRN
Qty: 30 TABLET | Refills: 0 | Status: ON HOLD
Start: 2020-09-21 | End: 2020-09-27

## 2020-09-21 ASSESSMENT — ENCOUNTER SYMPTOMS
EYE DISCHARGE: 0
SHORTNESS OF BREATH: 0
NAUSEA: 0
CHEST TIGHTNESS: 0
DIARRHEA: 0
ABDOMINAL PAIN: 0
EYE PAIN: 0
SORE THROAT: 0

## 2020-09-21 NOTE — PROGRESS NOTES
(hospital-acquired pneumonia) 07/21/2019    Palliative care encounter     Cancer associated pain     Ascites 06/18/2019    Charcot's joint of foot in type 2 diabetes mellitus ([facility] [address] 04/17/2019    CKD (chronic kidney disease) stage 3, GFR 30-59 ml/min ([facility]) 04/16/2019    Ambulatory dysfunction 04/15/2019    Leg swelling 11/07/2018    History of deep vein thrombosis 11/07/2018    Chronic venous insufficiency 11/07/2018    Lymphedema of both lower extremities 11/07/2018    Diabetic polyneuropathy associated with type 2 diabetes mellitus ([address] 09/07/2018    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 87/43/3918    Complicated UTI (urinary tract infection) 10/24/2017    Diarrhea with dehydration 10/24/2017    Chronic anticoagulation 10/24/2017    Peripheral polyneuropathy 01/03/2017    Bilateral edema of lower extremity 10/03/2016    Chronic deep vein thrombosis (DVT) of proximal vein of right lower extremity ([address] 09/30/2016    Type 2 diabetes mellitus without complication, with long-term current use of insulin ([address] 09/01/2016    Anemia of chronic disease 09/01/2016    Ventral hernia 05/14/2015    Rectal bleeding 02/23/2015    Anesthesia     Pericardial effusion     MI (myocardial infarction) ([address]     Arthritis     Lymph node enlargement     Subclavian vein occlusion, bilateral ([address] 04/18/2012    Rib lesion 02/07/2012    Hyperlipidemia 08/29/2011    Sarcoidosis 07/26/2011    B12 deficiency 06/22/2011    Shoulder pain, left 03/02/2011    Metastatic breast cancer ([address]     Essential hypertension     Coronary artery disease involving native coronary artery of native heart without angina pectoris     Nephrolithiasis     CHF (congestive heart failure) ([facility])     Humerus fracture        Current Outpatient Medications on File Prior to Visit   Medication Sig Dispense Refill    allopurinol (ZYLOPRIM) 100 MG tablet Take 1 tablet by mouth daily 90 tablet 1    magnesium left hip is dressed with adherent dressing, which was left in place to avoid disruption of the tissue. Head/face:  NCAT and Temporal wasting  Eyes:  EOMI and Sclera nonicteric  Neck:  neck- supple, no mass, non-tender  Lungs:  Normal expansion. Clear to auscultation. No rales, rhonchi, or wheezing. Heart:  Heart regular rate and rhythm, 3/6 systolic murmur  Abdomen:  Soft, diffusely tender to palpation of abdominal panniculus without lesions, erythema, or inflammation of that region. Extremities: pulses present in all extremities and 2-3+ edema, stable     Most recent labs and imaging reviewed. Findings include:     Lab Results   Component Value Date    LABA1C 8.1 (H) 06/19/2020     No results found for: EAG    Lab Results   Component Value Date    INR 7.1 09/21/2020    INR 3.30 09/15/2020    INR 3.50 08/25/2020    PROTIME 85.3 09/21/2020    PROTIME 19.4 08/11/2020    PROTIME 34.3 (H) 08/07/2020       Assessments:      Diagnosis Orders   1. Supratherapeutic INR  External Referral To Home Health   2. Chronic deep vein thrombosis (DVT) of proximal vein of right lower extremity (HCC)     3. Metastatic breast cancer (HCC)  oxyCODONE (ROXICODONE) 5 MG immediate release tablet   4. Type 2 diabetes mellitus with hyperglycemia, with long-term current use of insulin ([facility] [address]           Plans:    As Above. Please see Patient Instructions for further counseling and information given. RTO 4 weeks or sooner prn. Refilled oxycodone, which she uses sparingly to control pain. OARRS consistent. Call home care for labs - [facility] is following. Orders placed. Concerns for hepatic and renal function, as medications have been stable overall, but INR increasing. Hold coumadin today, recheck INR tomorrow, call with results. With any falls/trauma/bruising/bleeding, will need to be seen urgently in ED, and she understands. Will check INR tomorrow with CBC and CMP.   Will await INR tomorrow for further dosing instructions. Advised to consider checking home meter with current readings. Same medications otherwise. Continue to follow glucose. Call with any questions or concerns. Continue wound care through [facility]; wounds are improving. Avoid pressure as advised. Follow leg lesions for resolution. Advised to please be adherent to the treatment plans discussed today, and please call with any questions or concerns, letting the office know of any reasons that the plans may not be followed. The risks of untreated conditions include worsening illness, injury, disability, and possibly, death. Please call if symptoms change in any way, worsen, or fail to completely resolve, as this could necessitate a change to treatment plans. Patient and/or caregiver expressed understanding. Indications and proper use of medication(s) reviewed. Potential side-effects and risks of medication(s) also explained. Patient and/or caregiver was instructed to call if any new symptoms develop prior to next visit.

## 2020-09-22 ENCOUNTER — HOSPITAL ENCOUNTER (OUTPATIENT)
Age: 75
Discharge: HOME | End: 2020-09-24
Payer: COMMERCIAL

## 2020-09-22 ENCOUNTER — TELEPHONE (OUTPATIENT)
Dept: FAMILY MEDICINE CLINIC | Age: 75
End: 2020-09-22

## 2020-09-22 LAB
ABO/RH: NORMAL
ANTIBODY SCREEN: NORMAL

## 2020-09-22 PROCEDURE — 86923 COMPATIBILITY TEST ELECTRIC: CPT

## 2020-09-22 PROCEDURE — 86850 RBC ANTIBODY SCREEN: CPT

## 2020-09-22 PROCEDURE — 86901 BLOOD TYPING SEROLOGIC RH(D): CPT

## 2020-09-22 PROCEDURE — 86900 BLOOD TYPING SEROLOGIC ABO: CPT

## 2020-09-22 PROCEDURE — P9016 RBC LEUKOCYTES REDUCED: HCPCS

## 2020-09-22 RX ORDER — SODIUM CHLORIDE 9 MG/ML
INJECTION, SOLUTION INTRAVENOUS CONTINUOUS
Status: CANCELLED | OUTPATIENT
Start: 2020-09-22

## 2020-09-22 RX ORDER — DIPHENHYDRAMINE HYDROCHLORIDE 50 MG/ML
50 INJECTION INTRAMUSCULAR; INTRAVENOUS ONCE
Status: CANCELLED | OUTPATIENT
Start: 2020-09-22

## 2020-09-22 RX ORDER — METHYLPREDNISOLONE SODIUM SUCCINATE 125 MG/2ML
125 INJECTION, POWDER, LYOPHILIZED, FOR SOLUTION INTRAMUSCULAR; INTRAVENOUS ONCE
Status: CANCELLED | OUTPATIENT
Start: 2020-09-23

## 2020-09-22 RX ORDER — EPINEPHRINE 1 MG/ML
0.3 INJECTION, SOLUTION, CONCENTRATE INTRAVENOUS PRN
Status: CANCELLED | OUTPATIENT
Start: 2020-09-23

## 2020-09-22 RX ORDER — DIPHENHYDRAMINE HCL 25 MG
25 TABLET ORAL ONCE
Status: CANCELLED | OUTPATIENT
Start: 2020-09-23

## 2020-09-22 RX ORDER — SODIUM CHLORIDE 9 MG/ML
INJECTION, SOLUTION INTRAVENOUS CONTINUOUS
Status: CANCELLED | OUTPATIENT
Start: 2020-09-23

## 2020-09-22 RX ORDER — METHYLPREDNISOLONE SODIUM SUCCINATE 125 MG/2ML
125 INJECTION, POWDER, LYOPHILIZED, FOR SOLUTION INTRAMUSCULAR; INTRAVENOUS ONCE
Status: CANCELLED | OUTPATIENT
Start: 2020-09-22

## 2020-09-22 RX ORDER — 0.9 % SODIUM CHLORIDE 0.9 %
250 INTRAVENOUS SOLUTION INTRAVENOUS ONCE
Status: CANCELLED | OUTPATIENT
Start: 2020-09-23

## 2020-09-22 RX ORDER — EPINEPHRINE 1 MG/ML
0.3 INJECTION, SOLUTION, CONCENTRATE INTRAVENOUS PRN
Status: CANCELLED | OUTPATIENT
Start: 2020-09-22

## 2020-09-22 RX ORDER — ACETAMINOPHEN 325 MG/1
650 TABLET ORAL ONCE
Status: CANCELLED | OUTPATIENT
Start: 2020-09-23

## 2020-09-22 RX ORDER — SODIUM CHLORIDE 0.9 % (FLUSH) 0.9 %
20 SYRINGE (ML) INJECTION PRN
Status: CANCELLED | OUTPATIENT
Start: 2020-09-23

## 2020-09-22 RX ORDER — DIPHENHYDRAMINE HYDROCHLORIDE 50 MG/ML
50 INJECTION INTRAMUSCULAR; INTRAVENOUS ONCE
Status: CANCELLED | OUTPATIENT
Start: 2020-09-23

## 2020-09-22 RX ORDER — FUROSEMIDE 10 MG/ML
20 INJECTION INTRAMUSCULAR; INTRAVENOUS ONCE
Status: CANCELLED | OUTPATIENT
Start: 2020-09-23

## 2020-09-22 NOTE — TELEPHONE ENCOUNTER
Thank you so much! I appreciate your following up with her! Yes, she can use her home machine, but I also would like at least one check to compare on another machine to verify that her home readings are accurate, as there is a concern that her home machine could be reading low. Thank you!

## 2020-09-23 ENCOUNTER — HOSPITAL ENCOUNTER (OUTPATIENT)
Dept: INFUSION THERAPY | Age: 75
Discharge: HOME | End: 2020-09-23
Payer: COMMERCIAL

## 2020-09-23 VITALS
RESPIRATION RATE: 18 BRPM | SYSTOLIC BLOOD PRESSURE: 104 MMHG | DIASTOLIC BLOOD PRESSURE: 55 MMHG | HEART RATE: 92 BPM | TEMPERATURE: 98.4 F | OXYGEN SATURATION: 99 %

## 2020-09-23 DIAGNOSIS — C50.919 METASTATIC BREAST CANCER (HCC): ICD-10-CM

## 2020-09-23 DIAGNOSIS — D63.8 ANEMIA OF CHRONIC DISEASE: Primary | ICD-10-CM

## 2020-09-23 PROCEDURE — 2580000003 HC RX 258: Performed by: INTERNAL MEDICINE

## 2020-09-23 PROCEDURE — 96374 THER/PROPH/DIAG INJ IV PUSH: CPT

## 2020-09-23 PROCEDURE — 36430 TRANSFUSION BLD/BLD COMPNT: CPT

## 2020-09-23 PROCEDURE — P9016 RBC LEUKOCYTES REDUCED: HCPCS

## 2020-09-23 PROCEDURE — 6370000000 HC RX 637 (ALT 250 FOR IP): Performed by: INTERNAL MEDICINE

## 2020-09-23 PROCEDURE — 6360000002 HC RX W HCPCS: Performed by: INTERNAL MEDICINE

## 2020-09-23 RX ORDER — DIPHENHYDRAMINE HCL 25 MG
25 TABLET ORAL ONCE
Status: CANCELLED | OUTPATIENT
Start: 2020-09-23

## 2020-09-23 RX ORDER — EPINEPHRINE 1 MG/ML
0.3 INJECTION, SOLUTION, CONCENTRATE INTRAVENOUS PRN
Status: CANCELLED | OUTPATIENT
Start: 2020-09-23

## 2020-09-23 RX ORDER — FUROSEMIDE 10 MG/ML
20 INJECTION INTRAMUSCULAR; INTRAVENOUS ONCE
Status: DISCONTINUED | OUTPATIENT
Start: 2020-09-23 | End: 2020-09-24 | Stop reason: HOSPADM

## 2020-09-23 RX ORDER — DIPHENHYDRAMINE HCL 25 MG
25 TABLET ORAL ONCE
Status: COMPLETED | OUTPATIENT
Start: 2020-09-23 | End: 2020-09-23

## 2020-09-23 RX ORDER — ACETAMINOPHEN 325 MG/1
650 TABLET ORAL ONCE
Status: COMPLETED | OUTPATIENT
Start: 2020-09-23 | End: 2020-09-23

## 2020-09-23 RX ORDER — DIPHENHYDRAMINE HYDROCHLORIDE 50 MG/ML
50 INJECTION INTRAMUSCULAR; INTRAVENOUS ONCE
Status: CANCELLED | OUTPATIENT
Start: 2020-09-23

## 2020-09-23 RX ORDER — 0.9 % SODIUM CHLORIDE 0.9 %
250 INTRAVENOUS SOLUTION INTRAVENOUS ONCE
Status: COMPLETED | OUTPATIENT
Start: 2020-09-23 | End: 2020-09-23

## 2020-09-23 RX ORDER — ACETAMINOPHEN 325 MG/1
650 TABLET ORAL ONCE
Status: CANCELLED | OUTPATIENT
Start: 2020-09-23

## 2020-09-23 RX ORDER — SODIUM CHLORIDE 0.9 % (FLUSH) 0.9 %
20 SYRINGE (ML) INJECTION PRN
Status: CANCELLED | OUTPATIENT
Start: 2020-09-23

## 2020-09-23 RX ORDER — METHYLPREDNISOLONE SODIUM SUCCINATE 125 MG/2ML
125 INJECTION, POWDER, LYOPHILIZED, FOR SOLUTION INTRAMUSCULAR; INTRAVENOUS ONCE
Status: CANCELLED | OUTPATIENT
Start: 2020-09-23

## 2020-09-23 RX ORDER — FUROSEMIDE 10 MG/ML
20 INJECTION INTRAMUSCULAR; INTRAVENOUS ONCE
Status: CANCELLED | OUTPATIENT
Start: 2020-09-23

## 2020-09-23 RX ORDER — 0.9 % SODIUM CHLORIDE 0.9 %
250 INTRAVENOUS SOLUTION INTRAVENOUS ONCE
Status: CANCELLED | OUTPATIENT
Start: 2020-09-23

## 2020-09-23 RX ORDER — SODIUM CHLORIDE 9 MG/ML
INJECTION, SOLUTION INTRAVENOUS CONTINUOUS
Status: CANCELLED | OUTPATIENT
Start: 2020-09-23

## 2020-09-23 RX ORDER — SODIUM CHLORIDE 0.9 % (FLUSH) 0.9 %
20 SYRINGE (ML) INJECTION PRN
Status: DISCONTINUED | OUTPATIENT
Start: 2020-09-23 | End: 2020-09-24 | Stop reason: HOSPADM

## 2020-09-23 RX ADMIN — HYDROCORTISONE SODIUM SUCCINATE 25 MG: 100 INJECTION, POWDER, FOR SOLUTION INTRAMUSCULAR; INTRAVENOUS at 08:35

## 2020-09-23 RX ADMIN — SODIUM CHLORIDE 250 ML: 9 INJECTION, SOLUTION INTRAVENOUS at 08:34

## 2020-09-23 RX ADMIN — ACETAMINOPHEN 650 MG: 325 TABLET ORAL at 08:35

## 2020-09-23 RX ADMIN — DIPHENHYDRAMINE HYDROCHLORIDE 25 MG: 25 TABLET ORAL at 08:35

## 2020-09-23 ASSESSMENT — PAIN SCALES - GENERAL: PAINLEVEL_OUTOF10: 0

## 2020-09-23 NOTE — TELEPHONE ENCOUNTER
Would you please call Ms. Gabriela Hong and [facility]. I have not yet heard about her INR. Thank you so much!

## 2020-09-24 ENCOUNTER — APPOINTMENT (OUTPATIENT)
Dept: GENERAL RADIOLOGY | Age: 75
End: 2020-09-24
Payer: COMMERCIAL

## 2020-09-24 ENCOUNTER — ANTI-COAG VISIT (OUTPATIENT)
Dept: FAMILY MEDICINE CLINIC | Age: 75
End: 2020-09-24

## 2020-09-24 ENCOUNTER — HOSPITAL ENCOUNTER (OUTPATIENT)
Age: 75
Discharge: HOME | End: 2020-09-26
Payer: COMMERCIAL

## 2020-09-24 ENCOUNTER — TELEPHONE (OUTPATIENT)
Dept: FAMILY MEDICINE CLINIC | Age: 75
End: 2020-09-24

## 2020-09-24 ENCOUNTER — HOSPITAL ENCOUNTER (OUTPATIENT)
Age: 75
Setting detail: OBSERVATION
Discharge: HOME HEALTH | End: 2020-09-27
Attending: EMERGENCY MEDICINE | Admitting: FAMILY MEDICINE
Payer: COMMERCIAL

## 2020-09-24 LAB
ABO/RH: NORMAL
ALBUMIN SERPL-MCNC: 2.9 G/DL (ref 3.5–5.2)
ALBUMIN SERPL-MCNC: 3 G/DL (ref 3.5–5.2)
ALP BLD-CCNC: 181 U/L (ref 35–104)
ALP BLD-CCNC: 199 U/L (ref 35–104)
ALT SERPL-CCNC: 35 U/L (ref 0–32)
ALT SERPL-CCNC: 35 U/L (ref 0–32)
ANION GAP SERPL CALCULATED.3IONS-SCNC: 13 MMOL/L (ref 7–16)
ANION GAP SERPL CALCULATED.3IONS-SCNC: 17 MMOL/L (ref 7–16)
ANISOCYTOSIS: ABNORMAL
ANTIBODY SCREEN: NORMAL
AST SERPL-CCNC: 30 U/L (ref 0–31)
AST SERPL-CCNC: 37 U/L (ref 0–31)
ATYPICAL LYMPHOCYTE RELATIVE PERCENT: 0.9 % (ref 0–4)
BASOPHILIC STIPPLING: ABNORMAL
BASOPHILS ABSOLUTE: 0 E9/L (ref 0–0.2)
BASOPHILS RELATIVE PERCENT: 0.4 % (ref 0–2)
BILIRUB SERPL-MCNC: 0.7 MG/DL (ref 0–1.2)
BILIRUB SERPL-MCNC: 0.8 MG/DL (ref 0–1.2)
BUN BLDV-MCNC: 70 MG/DL (ref 8–23)
BUN BLDV-MCNC: 71 MG/DL (ref 8–23)
BURR CELLS: ABNORMAL
CALCIUM SERPL-MCNC: 8.7 MG/DL (ref 8.6–10.2)
CALCIUM SERPL-MCNC: 8.7 MG/DL (ref 8.6–10.2)
CHLORIDE BLD-SCNC: 101 MMOL/L (ref 98–107)
CHLORIDE BLD-SCNC: 98 MMOL/L (ref 98–107)
CO2: 22 MMOL/L (ref 22–29)
CO2: 23 MMOL/L (ref 22–29)
CREAT SERPL-MCNC: 2.5 MG/DL (ref 0.5–1)
CREAT SERPL-MCNC: 2.6 MG/DL (ref 0.5–1)
EOSINOPHILS ABSOLUTE: 0.07 E9/L (ref 0.05–0.5)
EOSINOPHILS RELATIVE PERCENT: 0.9 % (ref 0–6)
GFR AFRICAN AMERICAN: 22
GFR AFRICAN AMERICAN: 23
GFR NON-AFRICAN AMERICAN: 18 ML/MIN/1.73
GFR NON-AFRICAN AMERICAN: 19 ML/MIN/1.73
GLUCOSE BLD-MCNC: 223 MG/DL (ref 74–99)
GLUCOSE BLD-MCNC: 266 MG/DL (ref 74–99)
HCT VFR BLD CALC: 22.3 % (ref 34–48)
HEMOGLOBIN: 7.1 G/DL (ref 11.5–15.5)
HYPOCHROMIA: ABNORMAL
INR BLD: 5.9
INR BLD: 7.1
INR BLD: 9
LYMPHOCYTES ABSOLUTE: 0.83 E9/L (ref 1.5–4)
LYMPHOCYTES RELATIVE PERCENT: 8.6 % (ref 20–42)
MAGNESIUM: 1.5 MG/DL (ref 1.6–2.6)
MCH RBC QN AUTO: 35.7 PG (ref 26–35)
MCHC RBC AUTO-ENTMCNC: 31.8 % (ref 32–34.5)
MCV RBC AUTO: 112.1 FL (ref 80–99.9)
MONOCYTES ABSOLUTE: 0.17 E9/L (ref 0.1–0.95)
MONOCYTES RELATIVE PERCENT: 1.7 % (ref 2–12)
NEUTROPHILS ABSOLUTE: 7.3 E9/L (ref 1.8–7.3)
NEUTROPHILS RELATIVE PERCENT: 87.9 % (ref 43–80)
OVALOCYTES: ABNORMAL
PDW BLD-RTO: 26.4 FL (ref 11.5–15)
PLATELET # BLD: 66 E9/L (ref 130–450)
PLATELET CONFIRMATION: NORMAL
PMV BLD AUTO: 11.8 FL (ref 7–12)
POIKILOCYTES: ABNORMAL
POLYCHROMASIA: ABNORMAL
POTASSIUM SERPL-SCNC: 3.9 MMOL/L (ref 3.5–5)
POTASSIUM SERPL-SCNC: 3.9 MMOL/L (ref 3.5–5)
PROTHROMBIN TIME: 105.8 SEC (ref 9.3–12.4)
PROTHROMBIN TIME: 83.1 SEC (ref 9.3–12.4)
RBC # BLD: 1.99 E12/L (ref 3.5–5.5)
SODIUM BLD-SCNC: 137 MMOL/L (ref 132–146)
SODIUM BLD-SCNC: 137 MMOL/L (ref 132–146)
TARGET CELLS: ABNORMAL
TOTAL PROTEIN: 6.1 G/DL (ref 6.4–8.3)
TOTAL PROTEIN: 6.3 G/DL (ref 6.4–8.3)
TOXIC GRANULATION: ABNORMAL
TROPONIN: 0.28 NG/ML (ref 0–0.03)
WBC # BLD: 8.3 E9/L (ref 4.5–11.5)

## 2020-09-24 PROCEDURE — 86850 RBC ANTIBODY SCREEN: CPT

## 2020-09-24 PROCEDURE — 80053 COMPREHEN METABOLIC PANEL: CPT

## 2020-09-24 PROCEDURE — 71045 X-RAY EXAM CHEST 1 VIEW: CPT

## 2020-09-24 PROCEDURE — 86900 BLOOD TYPING SEROLOGIC ABO: CPT

## 2020-09-24 PROCEDURE — 93005 ELECTROCARDIOGRAM TRACING: CPT | Performed by: EMERGENCY MEDICINE

## 2020-09-24 PROCEDURE — 99283 EMERGENCY DEPT VISIT LOW MDM: CPT

## 2020-09-24 PROCEDURE — 85610 PROTHROMBIN TIME: CPT

## 2020-09-24 PROCEDURE — 85025 COMPLETE CBC W/AUTO DIFF WBC: CPT

## 2020-09-24 PROCEDURE — 84484 ASSAY OF TROPONIN QUANT: CPT

## 2020-09-24 PROCEDURE — 86901 BLOOD TYPING SEROLOGIC RH(D): CPT

## 2020-09-24 PROCEDURE — 83735 ASSAY OF MAGNESIUM: CPT

## 2020-09-24 PROCEDURE — 99284 EMERGENCY DEPT VISIT MOD MDM: CPT

## 2020-09-24 PROCEDURE — 36415 COLL VENOUS BLD VENIPUNCTURE: CPT

## 2020-09-24 PROCEDURE — P9016 RBC LEUKOCYTES REDUCED: HCPCS

## 2020-09-24 PROCEDURE — 86923 COMPATIBILITY TEST ELECTRIC: CPT

## 2020-09-24 RX ORDER — PHYTONADIONE 5 MG/1
5 TABLET ORAL ONCE
Status: COMPLETED | OUTPATIENT
Start: 2020-09-25 | End: 2020-09-25

## 2020-09-24 NOTE — TELEPHONE ENCOUNTER
Spoke with [facility] yesterday they will see patient today for labs and will also check INR as requested to compare with patient home monitoring machine. I did call patient her INR today it was 5.9 the company also call the result to us because it was high. Would you like me to do an anti coag result  Or wait for the result from [facility]? Patient had a blood transfusion yesterday. She is feeling \"OK\" today. No complaints.

## 2020-09-24 NOTE — TELEPHONE ENCOUNTER
Annie [facility] called in about critical lab value- patients INR: 7.1, PT: 3.1. Marito Martínez has been holding coumadin for 1 week and continuing to do so. No active sources of bleeding/darkening of stool right now. [facility] will recheck INR in 3 days. Creatinine: 2.5 and will also monitor, repeat CMP in 3 days. Precepted with Dr. Nehemias Lee.

## 2020-09-24 NOTE — TELEPHONE ENCOUNTER
I called Ms. Tyler Dalton and discussed results. As further labs were reported, additional abnormalities are present. Supratherapeutic INR, KARISHMA on CKD, low PLT, anemia to 7.1 s/p prbcs yesterday. We discussed that, with serious abnormalities in blood work that need evaluated and addressed, that hospital admission would be best at this time. Patient and her daughter agreed. Direct admissions not allowed due to pandemic. She will have to come in through ED. I let patient and daughter know, and I called the ED to give report. Notified [facility] for FM.

## 2020-09-24 NOTE — TELEPHONE ENCOUNTER
I called Ms. Jose Alberto Perez. She had not checked her INR until today, 5.9 on home machine, and she already took 2 mg of coumadin today. She had taken no coumadin on Monday, Tuesday, or Wednesday this week. I advised her to hold coumadin tomorrow, and recheck INR on Saturday on home machine before taking any coumadin. She will call AYESHA with results on Saturday by calling our office number, advised. Will then provide further management. Home care has not yet been out to draw labs or to verify INR; they plan to do this this afternoon. Will await results. Patient feeling well in general.  No new symptoms or concerns. Discussed chronic right hip pain, no recent changes, multifactorial.  Has pain medications at home with good relief overall. Offered office visit and advised urgent/emergent evaluation for bleeding and bruising. She declines at this time but will let us know of any changes, and she has an appointment to follow up in our office in the next few weeks. She can be seen sooner if need be, and she understands. Thank you!

## 2020-09-25 ENCOUNTER — APPOINTMENT (OUTPATIENT)
Dept: ULTRASOUND IMAGING | Age: 75
End: 2020-09-25
Payer: COMMERCIAL

## 2020-09-25 PROBLEM — N17.9 AKI (ACUTE KIDNEY INJURY) (HCC): Status: ACTIVE | Noted: 2020-09-25

## 2020-09-25 PROBLEM — L89.90 DECUBITUS ULCER: Status: ACTIVE | Noted: 2020-07-08

## 2020-09-25 LAB
ALBUMIN SERPL-MCNC: 2.7 G/DL (ref 3.5–5.2)
ALP BLD-CCNC: 175 U/L (ref 35–104)
ALT SERPL-CCNC: 35 U/L (ref 0–32)
ANION GAP SERPL CALCULATED.3IONS-SCNC: 16 MMOL/L (ref 7–16)
ANISOCYTOSIS: ABNORMAL
ANISOCYTOSIS: ABNORMAL
AST SERPL-CCNC: 34 U/L (ref 0–31)
B.E.: -1.4 MMOL/L (ref -3–3)
BASOPHILS ABSOLUTE: 0 E9/L (ref 0–0.2)
BASOPHILS ABSOLUTE: 0.14 E9/L (ref 0–0.2)
BASOPHILS RELATIVE PERCENT: 0.3 % (ref 0–2)
BASOPHILS RELATIVE PERCENT: 1.8 % (ref 0–2)
BILIRUB SERPL-MCNC: 0.8 MG/DL (ref 0–1.2)
BLOOD BANK DISPENSE STATUS: NORMAL
BLOOD BANK PRODUCT CODE: NORMAL
BPU ID: NORMAL
BUN BLDV-MCNC: 70 MG/DL (ref 8–23)
CALCIUM SERPL-MCNC: 8.9 MG/DL (ref 8.6–10.2)
CHLORIDE BLD-SCNC: 102 MMOL/L (ref 98–107)
CHLORIDE URINE RANDOM: <20 MMOL/L
CO2: 22 MMOL/L (ref 22–29)
COHB: 0.8 % (ref 0–1.5)
CREAT SERPL-MCNC: 2.4 MG/DL (ref 0.5–1)
CREATININE URINE: 97 MG/DL (ref 29–226)
CRITICAL: ABNORMAL
DATE ANALYZED: ABNORMAL
DATE OF COLLECTION: ABNORMAL
DESCRIPTION BLOOD BANK: NORMAL
EKG ATRIAL RATE: 85 BPM
EKG P AXIS: 47 DEGREES
EKG P-R INTERVAL: 186 MS
EKG Q-T INTERVAL: 404 MS
EKG QRS DURATION: 148 MS
EKG QTC CALCULATION (BAZETT): 480 MS
EKG R AXIS: -87 DEGREES
EKG T AXIS: 86 DEGREES
EKG VENTRICULAR RATE: 85 BPM
EOSINOPHILS ABSOLUTE: 0.21 E9/L (ref 0.05–0.5)
EOSINOPHILS ABSOLUTE: 0.3 E9/L (ref 0.05–0.5)
EOSINOPHILS RELATIVE PERCENT: 2.6 % (ref 0–6)
EOSINOPHILS RELATIVE PERCENT: 3.5 % (ref 0–6)
GFR AFRICAN AMERICAN: 24
GFR NON-AFRICAN AMERICAN: 20 ML/MIN/1.73
GLUCOSE BLD-MCNC: 232 MG/DL (ref 74–99)
HCO3: 23.4 MMOL/L (ref 22–26)
HCT VFR BLD CALC: 21.3 % (ref 34–48)
HCT VFR BLD CALC: 21.9 % (ref 34–48)
HCT VFR BLD CALC: 24.1 % (ref 34–48)
HEMOGLOBIN: 6.8 G/DL (ref 11.5–15.5)
HEMOGLOBIN: 7.2 G/DL (ref 11.5–15.5)
HEMOGLOBIN: 7.7 G/DL (ref 11.5–15.5)
HHB: 6.2 % (ref 0–5)
INR BLD: 8.4
LAB: ABNORMAL
LYMPHOCYTES ABSOLUTE: 0.86 E9/L (ref 1.5–4)
LYMPHOCYTES ABSOLUTE: 0.88 E9/L (ref 1.5–4)
LYMPHOCYTES RELATIVE PERCENT: 10.5 % (ref 20–42)
LYMPHOCYTES RELATIVE PERCENT: 9.6 % (ref 20–42)
Lab: ABNORMAL
MCH RBC QN AUTO: 35.8 PG (ref 26–35)
MCH RBC QN AUTO: 36.5 PG (ref 26–35)
MCHC RBC AUTO-ENTMCNC: 31.9 % (ref 32–34.5)
MCHC RBC AUTO-ENTMCNC: 32.9 % (ref 32–34.5)
MCV RBC AUTO: 111.2 FL (ref 80–99.9)
MCV RBC AUTO: 112.1 FL (ref 80–99.9)
METER GLUCOSE: 184 MG/DL (ref 74–99)
METER GLUCOSE: 220 MG/DL (ref 74–99)
METHB: 0.4 % (ref 0–1.5)
MODE: ABNORMAL
MONOCYTES ABSOLUTE: 0.09 E9/L (ref 0.1–0.95)
MONOCYTES ABSOLUTE: 0.4 E9/L (ref 0.1–0.95)
MONOCYTES RELATIVE PERCENT: 0.9 % (ref 2–12)
MONOCYTES RELATIVE PERCENT: 5.3 % (ref 2–12)
MYELOCYTE PERCENT: 0.9 % (ref 0–0)
NEUTROPHILS ABSOLUTE: 6.4 E9/L (ref 1.8–7.3)
NEUTROPHILS ABSOLUTE: 7.4 E9/L (ref 1.8–7.3)
NEUTROPHILS RELATIVE PERCENT: 79.8 % (ref 43–80)
NEUTROPHILS RELATIVE PERCENT: 85.2 % (ref 43–80)
NUCLEATED RED BLOOD CELLS: 0.9 /100 WBC
NUCLEATED RED BLOOD CELLS: 1.7 /100 WBC
O2 CONTENT: 14.6 ML/DL
O2 SATURATION: 93.7 % (ref 92–98.5)
O2HB: 92.6 % (ref 94–97)
OPERATOR ID: 459
OVALOCYTES: ABNORMAL
PATIENT TEMP: 37 C
PCO2: 39.8 MMHG (ref 35–45)
PDW BLD-RTO: 25.3 FL (ref 11.5–15)
PDW BLD-RTO: ABNORMAL FL (ref 11.5–15)
PH BLOOD GAS: 7.39 (ref 7.35–7.45)
PLATELET # BLD: 58 E9/L (ref 130–450)
PLATELET # BLD: 68 E9/L (ref 130–450)
PLATELET CONFIRMATION: NORMAL
PLATELET CONFIRMATION: NORMAL
PMV BLD AUTO: 11.7 FL (ref 7–12)
PMV BLD AUTO: 12 FL (ref 7–12)
PO2: 71.4 MMHG (ref 75–100)
POIKILOCYTES: ABNORMAL
POLYCHROMASIA: ABNORMAL
POLYCHROMASIA: ABNORMAL
POTASSIUM REFLEX MAGNESIUM: 4.2 MMOL/L (ref 3.5–5)
POTASSIUM, UR: 22.9 MMOL/L
PROTHROMBIN TIME: 98.1 SEC (ref 9.3–12.4)
RBC # BLD: 1.9 E12/L (ref 3.5–5.5)
RBC # BLD: 1.97 E12/L (ref 3.5–5.5)
SODIUM BLD-SCNC: 140 MMOL/L (ref 132–146)
SODIUM URINE: 35 MMOL/L
SOURCE, BLOOD GAS: ABNORMAL
TARGET CELLS: ABNORMAL
THB: 11.2 G/DL (ref 11.5–16.5)
TIME ANALYZED: 516
TOTAL PROTEIN: 6.1 G/DL (ref 6.4–8.3)
UREA NITROGEN, UR: 613 MG/DL (ref 800–1666)
WBC # BLD: 8 E9/L (ref 4.5–11.5)
WBC # BLD: 8.6 E9/L (ref 4.5–11.5)

## 2020-09-25 PROCEDURE — 84300 ASSAY OF URINE SODIUM: CPT

## 2020-09-25 PROCEDURE — 84133 ASSAY OF URINE POTASSIUM: CPT

## 2020-09-25 PROCEDURE — 85014 HEMATOCRIT: CPT

## 2020-09-25 PROCEDURE — 99222 1ST HOSP IP/OBS MODERATE 55: CPT | Performed by: FAMILY MEDICINE

## 2020-09-25 PROCEDURE — 85025 COMPLETE CBC W/AUTO DIFF WBC: CPT

## 2020-09-25 PROCEDURE — G0378 HOSPITAL OBSERVATION PER HR: HCPCS

## 2020-09-25 PROCEDURE — 6370000000 HC RX 637 (ALT 250 FOR IP): Performed by: FAMILY MEDICINE

## 2020-09-25 PROCEDURE — 87040 BLOOD CULTURE FOR BACTERIA: CPT

## 2020-09-25 PROCEDURE — 76770 US EXAM ABDO BACK WALL COMP: CPT

## 2020-09-25 PROCEDURE — 82962 GLUCOSE BLOOD TEST: CPT

## 2020-09-25 PROCEDURE — 85610 PROTHROMBIN TIME: CPT

## 2020-09-25 PROCEDURE — 6370000000 HC RX 637 (ALT 250 FOR IP): Performed by: STUDENT IN AN ORGANIZED HEALTH CARE EDUCATION/TRAINING PROGRAM

## 2020-09-25 PROCEDURE — 82805 BLOOD GASES W/O2 SATURATION: CPT

## 2020-09-25 PROCEDURE — 6370000000 HC RX 637 (ALT 250 FOR IP): Performed by: EMERGENCY MEDICINE

## 2020-09-25 PROCEDURE — 84540 ASSAY OF URINE/UREA-N: CPT

## 2020-09-25 PROCEDURE — 36415 COLL VENOUS BLD VENIPUNCTURE: CPT

## 2020-09-25 PROCEDURE — 2060000000 HC ICU INTERMEDIATE R&B

## 2020-09-25 PROCEDURE — 85018 HEMOGLOBIN: CPT

## 2020-09-25 PROCEDURE — 80053 COMPREHEN METABOLIC PANEL: CPT

## 2020-09-25 PROCEDURE — 82570 ASSAY OF URINE CREATININE: CPT

## 2020-09-25 PROCEDURE — 2580000003 HC RX 258: Performed by: STUDENT IN AN ORGANIZED HEALTH CARE EDUCATION/TRAINING PROGRAM

## 2020-09-25 PROCEDURE — 36430 TRANSFUSION BLD/BLD COMPNT: CPT

## 2020-09-25 PROCEDURE — 82436 ASSAY OF URINE CHLORIDE: CPT

## 2020-09-25 RX ORDER — SODIUM CHLORIDE 0.9 % (FLUSH) 0.9 %
10 SYRINGE (ML) INJECTION PRN
Status: DISCONTINUED | OUTPATIENT
Start: 2020-09-25 | End: 2020-09-27 | Stop reason: HOSPADM

## 2020-09-25 RX ORDER — DOCUSATE SODIUM 100 MG/1
100 CAPSULE, LIQUID FILLED ORAL DAILY
Status: DISCONTINUED | OUTPATIENT
Start: 2020-09-25 | End: 2020-09-27 | Stop reason: HOSPADM

## 2020-09-25 RX ORDER — SODIUM CHLORIDE 0.9 % (FLUSH) 0.9 %
10 SYRINGE (ML) INJECTION EVERY 12 HOURS SCHEDULED
Status: DISCONTINUED | OUTPATIENT
Start: 2020-09-25 | End: 2020-09-27 | Stop reason: HOSPADM

## 2020-09-25 RX ORDER — ISOSORBIDE DINITRATE 10 MG/1
5 TABLET ORAL 3 TIMES DAILY
Status: DISCONTINUED | OUTPATIENT
Start: 2020-09-25 | End: 2020-09-27 | Stop reason: HOSPADM

## 2020-09-25 RX ORDER — ACETAMINOPHEN 650 MG/1
650 SUPPOSITORY RECTAL EVERY 6 HOURS PRN
Status: DISCONTINUED | OUTPATIENT
Start: 2020-09-25 | End: 2020-09-27 | Stop reason: HOSPADM

## 2020-09-25 RX ORDER — OXYCODONE HYDROCHLORIDE 5 MG/1
5 TABLET ORAL EVERY 4 HOURS PRN
Status: DISCONTINUED | OUTPATIENT
Start: 2020-09-25 | End: 2020-09-27 | Stop reason: HOSPADM

## 2020-09-25 RX ORDER — ALBUTEROL SULFATE 2.5 MG/3ML
2.5 SOLUTION RESPIRATORY (INHALATION) EVERY 6 HOURS PRN
Status: DISCONTINUED | OUTPATIENT
Start: 2020-09-25 | End: 2020-09-27 | Stop reason: HOSPADM

## 2020-09-25 RX ORDER — GLUCOSAMINE HCL/CHONDROITIN SU 500-400 MG
1 CAPSULE ORAL 3 TIMES DAILY
Status: DISCONTINUED | OUTPATIENT
Start: 2020-09-25 | End: 2020-09-25 | Stop reason: CLARIF

## 2020-09-25 RX ORDER — PREGABALIN 50 MG/1
50 CAPSULE ORAL 2 TIMES DAILY
Status: DISCONTINUED | OUTPATIENT
Start: 2020-09-25 | End: 2020-09-27 | Stop reason: HOSPADM

## 2020-09-25 RX ORDER — 0.9 % SODIUM CHLORIDE 0.9 %
20 INTRAVENOUS SOLUTION INTRAVENOUS ONCE
Status: COMPLETED | OUTPATIENT
Start: 2020-09-25 | End: 2020-09-25

## 2020-09-25 RX ORDER — M-VIT,TX,IRON,MINS/CALC/FOLIC 27MG-0.4MG
1 TABLET ORAL DAILY
Status: DISCONTINUED | OUTPATIENT
Start: 2020-09-25 | End: 2020-09-27 | Stop reason: HOSPADM

## 2020-09-25 RX ORDER — PETROLATUM 42 G/100G
OINTMENT TOPICAL 3 TIMES DAILY PRN
Status: DISCONTINUED | OUTPATIENT
Start: 2020-09-25 | End: 2020-09-27 | Stop reason: HOSPADM

## 2020-09-25 RX ORDER — SODIUM BICARBONATE 650 MG/1
650 TABLET ORAL 2 TIMES DAILY
Status: DISCONTINUED | OUTPATIENT
Start: 2020-09-25 | End: 2020-09-27 | Stop reason: HOSPADM

## 2020-09-25 RX ORDER — PHYTONADIONE 5 MG/1
2.5 TABLET ORAL ONCE
Status: COMPLETED | OUTPATIENT
Start: 2020-09-25 | End: 2020-09-25

## 2020-09-25 RX ORDER — METOPROLOL SUCCINATE 25 MG/1
12.5 TABLET, EXTENDED RELEASE ORAL DAILY
Status: DISCONTINUED | OUTPATIENT
Start: 2020-09-25 | End: 2020-09-27 | Stop reason: HOSPADM

## 2020-09-25 RX ORDER — DEXTROSE MONOHYDRATE 50 MG/ML
100 INJECTION, SOLUTION INTRAVENOUS PRN
Status: DISCONTINUED | OUTPATIENT
Start: 2020-09-25 | End: 2020-09-27 | Stop reason: HOSPADM

## 2020-09-25 RX ORDER — PANTOPRAZOLE SODIUM 40 MG/1
40 TABLET, DELAYED RELEASE ORAL
Status: DISCONTINUED | OUTPATIENT
Start: 2020-09-25 | End: 2020-09-27 | Stop reason: HOSPADM

## 2020-09-25 RX ORDER — 0.9 % SODIUM CHLORIDE 0.9 %
20 INTRAVENOUS SOLUTION INTRAVENOUS ONCE
Status: DISCONTINUED | OUTPATIENT
Start: 2020-09-25 | End: 2020-09-27 | Stop reason: HOSPADM

## 2020-09-25 RX ORDER — DEXTROSE MONOHYDRATE 25 G/50ML
12.5 INJECTION, SOLUTION INTRAVENOUS PRN
Status: DISCONTINUED | OUTPATIENT
Start: 2020-09-25 | End: 2020-09-27 | Stop reason: HOSPADM

## 2020-09-25 RX ORDER — NICOTINE POLACRILEX 4 MG
15 LOZENGE BUCCAL PRN
Status: DISCONTINUED | OUTPATIENT
Start: 2020-09-25 | End: 2020-09-27 | Stop reason: HOSPADM

## 2020-09-25 RX ORDER — SODIUM CHLORIDE 9 MG/ML
INJECTION, SOLUTION INTRAVENOUS CONTINUOUS
Status: DISCONTINUED | OUTPATIENT
Start: 2020-09-25 | End: 2020-09-25

## 2020-09-25 RX ORDER — POTASSIUM CHLORIDE 7.45 MG/ML
10 INJECTION INTRAVENOUS PRN
Status: DISCONTINUED | OUTPATIENT
Start: 2020-09-25 | End: 2020-09-27 | Stop reason: HOSPADM

## 2020-09-25 RX ORDER — MAGNESIUM SULFATE IN WATER 40 MG/ML
2 INJECTION, SOLUTION INTRAVENOUS PRN
Status: DISCONTINUED | OUTPATIENT
Start: 2020-09-25 | End: 2020-09-27 | Stop reason: HOSPADM

## 2020-09-25 RX ORDER — ALLOPURINOL 100 MG/1
100 TABLET ORAL DAILY
Status: DISCONTINUED | OUTPATIENT
Start: 2020-09-25 | End: 2020-09-27 | Stop reason: HOSPADM

## 2020-09-25 RX ORDER — SODIUM CHLORIDE 9 MG/ML
INJECTION, SOLUTION INTRAVENOUS CONTINUOUS
Status: DISCONTINUED | OUTPATIENT
Start: 2020-09-25 | End: 2020-09-27 | Stop reason: HOSPADM

## 2020-09-25 RX ORDER — ACETAMINOPHEN 325 MG/1
650 TABLET ORAL EVERY 6 HOURS PRN
Status: DISCONTINUED | OUTPATIENT
Start: 2020-09-25 | End: 2020-09-27 | Stop reason: HOSPADM

## 2020-09-25 RX ORDER — PREGABALIN 50 MG/1
50 CAPSULE ORAL 3 TIMES DAILY
Status: DISCONTINUED | OUTPATIENT
Start: 2020-09-25 | End: 2020-09-25

## 2020-09-25 RX ORDER — PETROLATUM 42 G/100G
OINTMENT TOPICAL 2 TIMES DAILY
Status: DISCONTINUED | OUTPATIENT
Start: 2020-09-25 | End: 2020-09-27 | Stop reason: HOSPADM

## 2020-09-25 RX ORDER — INSULIN GLARGINE 100 [IU]/ML
10 INJECTION, SOLUTION SUBCUTANEOUS DAILY
Status: DISCONTINUED | OUTPATIENT
Start: 2020-09-25 | End: 2020-09-27 | Stop reason: HOSPADM

## 2020-09-25 RX ORDER — OXYCODONE HYDROCHLORIDE AND ACETAMINOPHEN 5; 325 MG/1; MG/1
1 TABLET ORAL ONCE
Status: COMPLETED | OUTPATIENT
Start: 2020-09-25 | End: 2020-09-25

## 2020-09-25 RX ADMIN — SODIUM BICARBONATE 650 MG: 650 TABLET ORAL at 20:17

## 2020-09-25 RX ADMIN — ISOSORBIDE DINITRATE 5 MG: 10 TABLET ORAL at 15:01

## 2020-09-25 RX ADMIN — PREGABALIN 50 MG: 50 CAPSULE ORAL at 20:19

## 2020-09-25 RX ADMIN — OXYCODONE AND ACETAMINOPHEN 1 TABLET: 5; 325 TABLET ORAL at 00:33

## 2020-09-25 RX ADMIN — ISOSORBIDE DINITRATE 5 MG: 10 TABLET ORAL at 08:01

## 2020-09-25 RX ADMIN — OXYCODONE 5 MG: 5 TABLET ORAL at 04:00

## 2020-09-25 RX ADMIN — INSULIN LISPRO 2 UNITS: 100 INJECTION, SOLUTION INTRAVENOUS; SUBCUTANEOUS at 07:28

## 2020-09-25 RX ADMIN — PREGABALIN 50 MG: 50 CAPSULE ORAL at 08:02

## 2020-09-25 RX ADMIN — SODIUM CHLORIDE 20 ML: 9 INJECTION, SOLUTION INTRAVENOUS at 12:03

## 2020-09-25 RX ADMIN — PHYTONADIONE 5 MG: 5 TABLET ORAL at 00:33

## 2020-09-25 RX ADMIN — MAGNESIUM GLUCONATE 500 MG ORAL TABLET 400 MG: 500 TABLET ORAL at 08:01

## 2020-09-25 RX ADMIN — PETROLATUM: 42 OINTMENT TOPICAL at 13:32

## 2020-09-25 RX ADMIN — SODIUM BICARBONATE 650 MG: 650 TABLET ORAL at 08:01

## 2020-09-25 RX ADMIN — PANTOPRAZOLE SODIUM 40 MG: 40 TABLET, DELAYED RELEASE ORAL at 05:50

## 2020-09-25 RX ADMIN — INSULIN GLARGINE 10 UNITS: 100 INJECTION, SOLUTION SUBCUTANEOUS at 07:28

## 2020-09-25 RX ADMIN — DOCUSATE SODIUM 100 MG: 100 CAPSULE, LIQUID FILLED ORAL at 08:02

## 2020-09-25 RX ADMIN — MULTIPLE VITAMINS W/ MINERALS TAB 1 TABLET: TAB at 08:01

## 2020-09-25 RX ADMIN — ISOSORBIDE DINITRATE 5 MG: 10 TABLET ORAL at 17:42

## 2020-09-25 RX ADMIN — INSULIN LISPRO 1 UNITS: 100 INJECTION, SOLUTION INTRAVENOUS; SUBCUTANEOUS at 20:17

## 2020-09-25 RX ADMIN — ALLOPURINOL 100 MG: 100 TABLET ORAL at 08:02

## 2020-09-25 RX ADMIN — SODIUM CHLORIDE: 9 INJECTION, SOLUTION INTRAVENOUS at 07:30

## 2020-09-25 RX ADMIN — PHYTONADIONE 2.5 MG: 5 TABLET ORAL at 05:50

## 2020-09-25 RX ADMIN — SODIUM CHLORIDE: 9 INJECTION, SOLUTION INTRAVENOUS at 15:54

## 2020-09-25 RX ADMIN — OXYCODONE 5 MG: 5 TABLET ORAL at 17:44

## 2020-09-25 ASSESSMENT — PAIN DESCRIPTION - FREQUENCY: FREQUENCY: CONTINUOUS

## 2020-09-25 ASSESSMENT — PAIN SCALES - GENERAL
PAINLEVEL_OUTOF10: 0
PAINLEVEL_OUTOF10: 8
PAINLEVEL_OUTOF10: 8
PAINLEVEL_OUTOF10: 5
PAINLEVEL_OUTOF10: 9
PAINLEVEL_OUTOF10: 0
PAINLEVEL_OUTOF10: 7

## 2020-09-25 ASSESSMENT — PAIN DESCRIPTION - LOCATION
LOCATION: HIP
LOCATION: BUTTOCKS;BACK

## 2020-09-25 ASSESSMENT — PAIN DESCRIPTION - PAIN TYPE
TYPE: ACUTE PAIN
TYPE: ACUTE PAIN

## 2020-09-25 ASSESSMENT — PAIN DESCRIPTION - ORIENTATION: ORIENTATION: RIGHT

## 2020-09-25 ASSESSMENT — PAIN DESCRIPTION - ONSET: ONSET: ON-GOING

## 2020-09-25 ASSESSMENT — PAIN DESCRIPTION - DESCRIPTORS
DESCRIPTORS: ACHING;BURNING;CONSTANT;DISCOMFORT
DESCRIPTORS: ACHING;DISCOMFORT;DULL

## 2020-09-25 ASSESSMENT — PAIN DESCRIPTION - PROGRESSION: CLINICAL_PROGRESSION: GRADUALLY WORSENING

## 2020-09-25 NOTE — ED PROVIDER NOTES
Department of Emergency Medicine   ED  Provider Note  Admit Date/RoomTime: 9/24/2020 10:17 PM  ED Room: 7401/7401-A  MRN: [id number]  Chief Complaint: Abnormal Lab (sent in from Dr. Paulino Kebede liver enzymes and low hgbm, hx breast CA, bone CA, currently taking Ibrance for CA)       History of Present Illness   Source of history provided by:  patient. History/Exam Limitations: none. Stefania Lemos is a 76 y.o. female who has a past medical history of:   Past Medical History:   Diagnosis Date    Abscess of abdominal wall     Anemia     Iron deficiency and chronic disease.  Anesthesia     DIFFICULTY WAKING UP    Arthritis     Arthritis, hip     Breast cancer ([address] 2005    S/P Left Lumpectomy with Lymph Node Dissection, Chemo/Rad. Follows with Dr. Chuckie Cao. No recurrence to date. Surgeon was Dr. Marzena Mcconnell.  CAD (coronary artery disease) 5/2008    s/p CABG. Follows with [address] [place] [address].  CHF (congestive heart failure) (HCC)     Chronic venous insufficiency 11/7/2018    Class 2 severe obesity due to excess calories with serious comorbidity and body mass index (BMI) of 35.0 to 35.9 in adult ([address] 8/1/2019    Decreased dorsalis pedis pulse 11/7/2018    Degenerative disc disease at L5-S1 level 7/10/2020    Diabetic neuropathy (HCC)     Diabetic neuropathy (HCC)     DVT (deep venous thrombosis) (HCC)     Recurrent. On lifelong coumadin.  DVT of upper extremity (deep vein thrombosis) ([address] 4/18/2012    History of deep vein thrombosis 11/7/2018    Humerus fracture     Chronic, on the Left.  Hyperlipidemia 8/29/2011    Hypertension     Left leg pain 7/9/2020    Leg swelling 11/7/2018    Lymph node enlargement     Lymphedema of both lower extremities 11/7/2018    Lymphopenia 7/9/2020    MI (myocardial infarction) ([address]     Nephrolithiasis     Follows with Dr. Sally Silver.  Pericardial effusion     Pulmonary nodule     With mediastinal lymphadenopathy. Stable. Follows with Dr. Chestine Dubin.     Rib lesion 11/28/11    expansile lesion in one of the right ribs laterally    Sarcoidosis 07/26/11    per transbronchial needle aspiration    Subclavian vein occlusion, bilateral ([address] 4/18/2012    Type II or unspecified type diabetes mellitus without mention of complication, not stated as uncontrolled      Patient presents to the emergency department for evaluation of multiple lab abnormalities. She has history of metastatic breast cancer. She was sent by her PCP who unfortunate was unable to direct admit her secondary to COVID-19 restrictions. Patient states that she has felt fatigued but denies any other focal symptoms. She is currently under treatment for metastatic breast cancer. She was found on lab studies to have a supratherapeutic INR despite stopping her Coumadin as well as transaminitis, elevated kidney function and elevated liver enzymes. ROS    Pertinent positives and negatives are stated within HPI, all other systems reviewed and are negative. Past Surgical History:   Procedure Laterality Date    APPENDECTOMY      ARTERIAL BYPASS SURGRY      BREAST LUMPECTOMY  9/27/2005    LEFT    CARDIAC SURGERY      CHOLECYSTECTOMY      COLONOSCOPY  12/2007    cecal arteriovenous malformation with hyperplastic polyps    COLONOSCOPY  [id number]    CORONARY ARTERY BYPASS GRAFT  05/2008    triple bypass    ECHO COMPL W DOP COLOR FLOW  10/30/2013         EYE SURGERY      clarissa cataracts    HYSTERECTOMY  1975, 1985    MAYNOR prolapse, benign conditions; BSO later for scar tissues, no CA.      OTHER SURGICAL HISTORY  11/18/11    port removal right chest wall    PARACENTESIS      TONSILLECTOMY      TOOTH EXTRACTION      Full Dental Extraction.  TUNNELED VENOUS PORT PLACEMENT      removal of port Dec. 2011- [facility] [place] [address]  [id number]    RIGHT CHEST   Social History:  reports that she has never smoked.  She has never used smokeless tobacco. She reports that she does not drink alcohol or use drugs. Family History: family history is not on file. She was adopted. Allergies: Macrobid [nitrofurantoin monohydrate macrocrystals]    Physical Exam   Oxygen Saturation Interpretation: Normal.   ED Triage Vitals   BP Temp Temp Source Pulse Resp SpO2 Height Weight   09/24/20 2207 09/24/20 2153 09/24/20 2153 09/24/20 2153 09/24/20 2207 09/24/20 2153 09/24/20 2207 09/24/20 2207   (!) 104/36 97.7 °F (36.5 °C) Temporal 83 18 96 % 5' 4\" (1.626 m) 161 lb (73 kg)       Physical Exam  · Constitutional/General: Alert and oriented x3, frail appearing  · HEENT:  NC/NT. PERRLA,  Airway patent. · Neck: Supple, full ROM, non tender to palpation in the midline, no stridor, no crepitus, no meningeal signs  · Respiratory: Lungs clear to auscultation bilaterally, no wheezes, rales, or rhonchi. Not in respiratory distress  · CV:  Regular rate. Regular rhythm. No murmurs, gallops, or rubs. 2+ distal pulses  · Chest: No chest wall tenderness  · GI:  Abdomen Soft, Non tender, Non distended. +BS. No rebound, guarding, or rigidity. No pulsatile masses. · Musculoskeletal: Moves all extremities x 4. Warm and well perfused, no clubbing, cyanosis, or edema. Capillary refill <3 seconds  · Integument: skin warm and dry. No rashes.    · Lymphatic: no lymphadenopathy noted  · Neurologic: GCS 15, no focal deficits, symmetric strength 5/5 in the upper and lower extremities bilaterally  · Psychiatric: Normal Affect    Lab / Imaging Results   (All laboratory and radiology results have been personally reviewed by myself)  Labs:  Results for orders placed or performed during the hospital encounter of 09/24/20   CBC Auto Differential   Result Value Ref Range    WBC 8.6 4.5 - 11.5 E9/L    RBC 1.97 (L) 3.50 - 5.50 E12/L    Hemoglobin 7.2 (L) 11.5 - 15.5 g/dL    Hematocrit 21.9 (L) 34.0 - 48.0 %    .2 (H) 80.0 - 99.9 fL    MCH 36.5 (H) 26.0 - 35.0 pg    MCHC 32.9 32.0 - 34.5 %    RDW NOT CALC 11.5 - 15.0 fL    Platelets 68 (L) 130 - 450 E9/L    MPV 12.0 7.0 - 12.0 fL    Neutrophils % 85.2 (H) 43.0 - 80.0 %    Lymphocytes % 9.6 (L) 20.0 - 42.0 %    Monocytes % 0.9 (L) 2.0 - 12.0 %    Eosinophils % 3.5 0.0 - 6.0 %    Basophils % 0.3 0.0 - 2.0 %    Neutrophils Absolute 7.40 (H) 1.80 - 7.30 E9/L    Lymphocytes Absolute 0.86 (L) 1.50 - 4.00 E9/L    Monocytes Absolute 0.09 (L) 0.10 - 0.95 E9/L    Eosinophils Absolute 0.30 0.05 - 0.50 E9/L    Basophils Absolute 0.00 0.00 - 0.20 E9/L    Myelocyte Percent 0.9 0 - 0 %    nRBC 1.7 /100 WBC    Anisocytosis 3+     Polychromasia 1+    Comprehensive Metabolic Panel   Result Value Ref Range    Sodium 137 132 - 146 mmol/L    Potassium 3.9 3.5 - 5.0 mmol/L    Chloride 101 98 - 107 mmol/L    CO2 23 22 - 29 mmol/L    Anion Gap 13 7 - 16 mmol/L    Glucose 223 (H) 74 - 99 mg/dL    BUN 70 (H) 8 - 23 mg/dL    CREATININE 2.6 (H) 0.5 - 1.0 mg/dL    GFR Non-African American 18 >=60 mL/min/1.73    GFR African American 22     Calcium 8.7 8.6 - 10.2 mg/dL    Total Protein 6.1 (L) 6.4 - 8.3 g/dL    Alb 3.0 (L) 3.5 - 5.2 g/dL    Total Bilirubin 0.8 0.0 - 1.2 mg/dL    Alkaline Phosphatase 181 (H) 35 - 104 U/L    ALT 35 (H) 0 - 32 U/L    AST 30 0 - 31 U/L   Troponin   Result Value Ref Range    Troponin 0.28 (H) 0.00 - 0.03 ng/mL   Protime-INR   Result Value Ref Range    Protime 105.8 (H) 9.3 - 12.4 sec    INR 9.0 (HH)    Platelet Confirmation   Result Value Ref Range    Platelet Confirmation CONFIRMED    Comprehensive Metabolic Panel w/ Reflex to MG   Result Value Ref Range    Sodium 140 132 - 146 mmol/L    Potassium reflex Magnesium 4.2 3.5 - 5.0 mmol/L    Chloride 102 98 - 107 mmol/L    CO2 22 22 - 29 mmol/L    Anion Gap 16 7 - 16 mmol/L    Glucose 232 (H) 74 - 99 mg/dL    BUN 70 (H) 8 - 23 mg/dL    CREATININE 2.4 (H) 0.5 - 1.0 mg/dL    GFR Non-African American 20 >=60 mL/min/1.73    GFR African American 24     Calcium 8.9 8.6 - 10.2 mg/dL    Total Protein 6.1 (L) 6.4 - 8.3 g/dL    Alb 2.7 (L) 3.5 - 5.2 g/dL Total Bilirubin 0.8 0.0 - 1.2 mg/dL    Alkaline Phosphatase 175 (H) 35 - 104 U/L    ALT 35 (H) 0 - 32 U/L    AST 34 (H) 0 - 31 U/L   Blood gas, arterial   Result Value Ref Range    Date Analyzed [id number]     Time Analyzed 0516     Source: Blood Arterial     pH, Blood Gas 7.388 7.350 - 7.450    PCO2 39.8 35.0 - 45.0 mmHg    PO2 71.4 (L) 75.0 - 100.0 mmHg    HCO3 23.4 22.0 - 26.0 mmol/L    B.E. -1.4 -3.0 - 3.0 mmol/L    O2 Sat 93.7 92.0 - 98.5 %    O2Hb 92.6 (L) 94.0 - 97.0 %    COHb 0.8 0.0 - 1.5 %    MetHb 0.4 0.0 - 1.5 %    O2 Content 14.6 mL/dL    HHb 6.2 (H) 0.0 - 5.0 %    tHb (est) 11.2 (L) 11.5 - 16.5 g/dL    Mode RA     Date Of Collection      Time Collected      Pt Temp 37.0 C     ID [id number]     Lab [id number]     Critical(s) Notified .  No Critical Values    URINE ELECTROLYTES   Result Value Ref Range    Sodium, Ur 35 Not Established mmol/L    Potassium, Ur 22.9 Not Established mmol/L    Chloride <20 Not Established mmol/L   Protime-INR   Result Value Ref Range    Protime 98.1 (H) 9.3 - 12.4 sec    INR 8.4 (HH)    CBC Auto Differential   Result Value Ref Range    WBC 8.0 4.5 - 11.5 E9/L    RBC 1.90 (L) 3.50 - 5.50 E12/L    Hemoglobin 6.8 (L) 11.5 - 15.5 g/dL    Hematocrit 21.3 (L) 34.0 - 48.0 %    .1 (H) 80.0 - 99.9 fL    MCH 35.8 (H) 26.0 - 35.0 pg    MCHC 31.9 (L) 32.0 - 34.5 %    RDW 25.3 (H) 11.5 - 15.0 fL    Platelets 58 (L) 185 - 450 E9/L    MPV 11.7 7.0 - 12.0 fL    Neutrophils % 79.8 43.0 - 80.0 %    Lymphocytes % 10.5 (L) 20.0 - 42.0 %    Monocytes % 5.3 2.0 - 12.0 %    Eosinophils % 2.6 0.0 - 6.0 %    Basophils % 1.8 0.0 - 2.0 %    Neutrophils Absolute 6.40 1.80 - 7.30 E9/L    Lymphocytes Absolute 0.88 (L) 1.50 - 4.00 E9/L    Monocytes Absolute 0.40 0.10 - 0.95 E9/L    Eosinophils Absolute 0.21 0.05 - 0.50 E9/L    Basophils Absolute 0.14 0.00 - 0.20 E9/L    nRBC 0.9 /100 WBC    Anisocytosis 2+     Polychromasia 1+     Poikilocytes 1+     Ovalocytes 1+     Target Cells 1+    Platelet Confirmation   Result Value Ref Range    Platelet Confirmation CONFIRMED    Creatinine, urine, random   Result Value Ref Range    Creatinine, Ur 97 29 - 226 mg/dL   UREA NITROGEN, URINE   Result Value Ref Range    Urea Nitrogen, Ur 613 (L) 800 - 1666 mg/dL   POCT Glucose   Result Value Ref Range    Meter Glucose 220 (H) 74 - 99 mg/dL   EKG 12 Lead   Result Value Ref Range    Ventricular Rate 85 BPM    Atrial Rate 85 BPM    P-R Interval 186 ms    QRS Duration 148 ms    Q-T Interval 404 ms    QTc Calculation (Bazett) 480 ms    P Axis 47 degrees    R Axis -87 degrees    T Axis 86 degrees   TYPE AND SCREEN   Result Value Ref Range    ABO/Rh O NEG     Antibody Screen NEG    PREPARE RBC (CROSSMATCH), 1 Units   Result Value Ref Range    Product Code Blood Bank [id number]     Description Blood Bank Red Blood Cells, Leuko-reduced     Unit Number [id number]     Dispense Status Blood Bank selected    PREPARE RBC (CROSSMATCH), 1 Units   Result Value Ref Range    Product Code Blood Bank [id number]     Description Blood Bank Red Blood Cells, Leuko-reduced     Unit Number [id number]     Dispense Status Blood Bank issued      Imaging: All Radiology results interpreted by Radiologist unless otherwise noted. US RETROPERITONEAL COMPLETE   Final Result   No change since the exam dated 7/8/2020   IMPRESSION:         XR CHEST PORTABLE   Final Result   No acute cardiopulmonary process.           ED Course / Medical Decision Making     Medications   albuterol (PROVENTIL) nebulizer solution 2.5 mg (has no administration in time range)   allopurinol (ZYLOPRIM) tablet 100 mg (100 mg Oral Given 9/25/20 0802)   docusate sodium (COLACE) capsule 100 mg (100 mg Oral Given 9/25/20 0802)   insulin glargine (LANTUS) injection vial 10 Units (10 Units Subcutaneous Given 9/25/20 0728)   insulin lispro (HUMALOG) injection vial 0-3 Units (has no administration in time range)   isosorbide dinitrate (ISORDIL) tablet 5 mg (5 mg Oral Given 9/25/20 1501)

## 2020-09-25 NOTE — ED NOTES
Bed: 20  Expected date:   Expected time:   Means of arrival:   Comments:  Kassidy Koehler RN  09/24/20 1732

## 2020-09-25 NOTE — H&P
infarction) ([facility] [address] Nephrolithiasis, Pericardial effusion, Pulmonary nodule, Rib lesion, Sarcoidosis, Subclavian vein occlusion, bilateral ([facility] [address] and Type II or unspecified type diabetes mellitus without mention of complication, not stated as uncontrolled. PSH:  has a past surgical history that includes Tooth Extraction; Hysterectomy (1975, 1985); Cholecystectomy; Appendectomy; other surgical history (11/18/11); Arterial bypass surgry; Coronary artery bypass graft (05/2008); Tunneled venous port placement; Cardiac surgery; eye surgery; Tunneled venous port placement ([id number]); Breast lumpectomy (9/27/2005); ECHO Compl W Dop Color Flow (10/30/2013); Colonoscopy (12/2007); Colonoscopy ([id number]); Tonsillectomy; and Paracentesis. FH: family history is not on file. She was adopted. Social:  reports that she has never smoked. She has never used smokeless tobacco. She reports that she does not drink alcohol or use drugs. Allergies: Allergies   Allergen Reactions    Macrobid [Nitrofurantoin Monohydrate Macrocrystals] Hives        Home Medications:   No current facility-administered medications on file prior to encounter. Current Outpatient Medications on File Prior to Encounter   Medication Sig Dispense Refill    oxyCODONE (ROXICODONE) 5 MG immediate release tablet Take 1 tablet by mouth every 4 hours as needed for Pain for up to 5 days. 30 tablet 0    allopurinol (ZYLOPRIM) 100 MG tablet Take 1 tablet by mouth daily 90 tablet 1    magnesium oxide (MAG-OX) 400 MG tablet Take 1 tablet by mouth daily 90 tablet 0    warfarin (COUMADIN) 1 MG tablet Take daily dose as directed by a physician.  180 tablet 1    insulin glargine (LANTUS SOLOSTAR) 100 UNIT/ML injection pen Inject 10 Units into the skin daily 5 pen 0    lidocaine (LIDODERM) 5 % Apply 1 patch topically daily      acetaminophen (TYLENOL) 325 MG tablet Take 650 mg by mouth every 6 hours as needed      Cyanocobalamin (VITAMIN B 12) 500 MCG NEEDLES) 29G X 12MM MISC 1 each by Does not apply route daily 100 each 3    nitroGLYCERIN (NITROSTAT) 0.4 MG SL tablet up to max of 3 total doses. If no relief after 1 dose, call 911. 25 tablet 3    sodium chloride (OCEAN, BABY AYR) 0.65 % nasal spray 1 spray by Nasal route as needed for Congestion (dryness) 60 mL 0       ROS:   Const: fatigue, no fever, chills. HEENT: No blurred vision, double vision; no URI symptoms  Resp: No cough, no sputum, no pleuritic chest pain, no sob  Cardio: No chest pain, no exertional dyspnea, no PND, no orthopnea, no palpitation, no leg swelling. GI: No dysphagia, no reflux; no abdominal pain, no n/v; no c/d. No hematochezia    : No dysuria, no frequency, hesitancy; no hematuria  MSK: no joint pain, no myalgia, no change in ROM  Neuro: no focal weakness, no slurred speech, no double vision, no numbness or tingling in extremities  Endo: no heat/cold intolerance, no polyphagia, polydipsia or polyuria  Hem: no increased bleeding, no bruising, no lymphadenopathy  Skin: no skin changes  Psych: no depressed mood, no suicidal ideation    PE:  Blood pressure (!) 111/48, pulse 88, temperature 97 °F (36.1 °C), temperature source Temporal, resp. rate 18, height 5' 4\" (1.626 m), weight 167 lb 6.4 oz (75.9 kg), SpO2 100 %, not currently breastfeeding. General: Alert, cooperative, no acute distress. HEENT: Normocephalic, atraumatic. No pallor or icterus  Neck: Supple, symmetrical, trachea midline, no JVD  Chest: No tenderness or deformity, full & symmetric excursion  Lung: Clear to auscultation bilaterally,  respirations unlabored. No rales/wheezing/rubs  Heart: RRR, S1 and S2 normal, no murmur, rub, gallop DP pulses 2/4  Abdomen: SNTND  Extremities:  2+ pitting edema bilaterally, chronic venous stasis. Skin: chronic venous stasis. Bilateral decubitus ulcers- clean dry dressed. Musculoskeletal: +TTP right area.   Neurologic: Alert & Oriented; Normal and symmetric strength in UEs and LEs    Labs:   Results for orders placed or performed during the hospital encounter of 09/24/20   CBC Auto Differential   Result Value Ref Range    WBC 8.6 4.5 - 11.5 E9/L    RBC 1.97 (L) 3.50 - 5.50 E12/L    Hemoglobin 7.2 (L) 11.5 - 15.5 g/dL    Hematocrit 21.9 (L) 34.0 - 48.0 %    .2 (H) 80.0 - 99.9 fL    MCH 36.5 (H) 26.0 - 35.0 pg    MCHC 32.9 32.0 - 34.5 %    RDW NOT CALC 11.5 - 15.0 fL    Platelets 68 (L) 733 - 450 E9/L    MPV 12.0 7.0 - 12.0 fL    Neutrophils % 85.2 (H) 43.0 - 80.0 %    Lymphocytes % 9.6 (L) 20.0 - 42.0 %    Monocytes % 0.9 (L) 2.0 - 12.0 %    Eosinophils % 3.5 0.0 - 6.0 %    Basophils % 0.3 0.0 - 2.0 %    Neutrophils Absolute 7.40 (H) 1.80 - 7.30 E9/L    Lymphocytes Absolute 0.86 (L) 1.50 - 4.00 E9/L    Monocytes Absolute 0.09 (L) 0.10 - 0.95 E9/L    Eosinophils Absolute 0.30 0.05 - 0.50 E9/L    Basophils Absolute 0.00 0.00 - 0.20 E9/L    Myelocyte Percent 0.9 0 - 0 %    nRBC 1.7 /100 WBC    Anisocytosis 3+     Polychromasia 1+    Comprehensive Metabolic Panel   Result Value Ref Range    Sodium 137 132 - 146 mmol/L    Potassium 3.9 3.5 - 5.0 mmol/L    Chloride 101 98 - 107 mmol/L    CO2 23 22 - 29 mmol/L    Anion Gap 13 7 - 16 mmol/L    Glucose 223 (H) 74 - 99 mg/dL    BUN 70 (H) 8 - 23 mg/dL    CREATININE 2.6 (H) 0.5 - 1.0 mg/dL    GFR Non-African American 18 >=60 mL/min/1.73    GFR African American 22     Calcium 8.7 8.6 - 10.2 mg/dL    Total Protein 6.1 (L) 6.4 - 8.3 g/dL    Alb 3.0 (L) 3.5 - 5.2 g/dL    Total Bilirubin 0.8 0.0 - 1.2 mg/dL    Alkaline Phosphatase 181 (H) 35 - 104 U/L    ALT 35 (H) 0 - 32 U/L    AST 30 0 - 31 U/L   Troponin   Result Value Ref Range    Troponin 0.28 (H) 0.00 - 0.03 ng/mL   Protime-INR   Result Value Ref Range    Protime 105.8 (H) 9.3 - 12.4 sec    INR 9.0 (HH)    Platelet Confirmation   Result Value Ref Range    Platelet Confirmation CONFIRMED    TYPE AND SCREEN   Result Value Ref Range    ABO/Rh O NEG     Antibody Screen NEG        Imaging:  Xr Chest Portable    Result Date: 2020  Patient MRN:  [id number] : 1945 Age: 76 years Gender: Female Order Date:  2020 12:09 AM TECHNIQUE/NUMBER OF IMAGES/COMPARISON/CLINICAL HISTORY: Chest AP 1 image one view Comparison . History chest pain. FINDINGS: Right internal jugular central venous catheter tip in the right atrium. Patient previous midsternotomy. Heart has mild increased size. No acute infiltrates, consolidations or pleural effusions are seen. The There is a old fracture likely of the right rib cage. The patient previous surgery in the left axillary region. There is old fracture deformity of the proximal left humerus at the left shoulder level. No acute cardiopulmonary process. Assessment and Plan  Active Problems:    Supratherapeutic INR    KARISHMA (acute kidney injury) ([address]    Metastatic breast cancer (HCC)    Anemia    Decubitus ulcer  Resolved Problems:    * No resolved hospital problems. *    KARISHMA- likely pre-renal  Cr: 2.4  Calculate FeNa, urine electrolytes ordered  Hx of CKD stage IV  Held home bumex 2/2 to KARISHMA  Monitor I&Os    Supratherapeutic INR (INR: 9.0)  5 mg vitamin K given in ER  Continue to monitor INR  Warfarin held    Anemia- likely multifactorial related to metastatic breast cancer, Ibrance, CKD stage IV  Hb: 6.8 morning today transfuse 1 unit PRBC  S/p transfusion two days ago but Hb still low  Held home dose Ibrance  Continue to monitor. Anu Covarrubias- metastatic  Hematology Oncology- Dr. Chance Kaye is consulted    Right Leg Pain likely 2/2 DDD  Previous admits pain 2/2 to decubitus ulcer in hips causing nerve-related pain (was seen NSG, Heme-Onc)  C/W roxicodone home med q 4 PRN [employer] Pre-Cert .  Lyrica 50 mg TID  Out-patient pain management follow-up   MRI Lumbar Spine - Degen changes/ L1-L5 broad-base disc herniation, mild central canal stenosis.     Decubitus Ulcer Hips- Bilateral  Wound care consult placed  Previous admits grew +staph aureus. No concerning findings at this time will monitor    Type 2 Diabetes A1C: 8.1 (Aug 2020)  Continued home med Lantus 10 U qd. LDSS   Will monitor and change if needed.     HTN/CAD s/p CABG/HFrEF  Held Bumex secondary to KARISHMA  Continued other home meds    DVT / GI prophylaxis: Protonix  Dispo -       Electronically signed by Lucio Katz MD on 9/25/20 at 4:09 AM EDT  This case was discussed with attending physician: Dr. Princess Chandra

## 2020-09-25 NOTE — CARE COORDINATION
Met with pt at bedside to discuss discharge / transition of care plan. Pt reports, from home alone, apartment with elevator, no steps; uses a rollator and has wheelchair, tub chair, and toilet riser; denies further DME needs; not an active , relies on her children for transportation; verified PCP and pharmacy. Pt states she has [facility] and also has almost family for aide services on Monday, Tuesday, and Friday for 2 hours a day. I have a call out to Linnea with [facility] to verify if pt is currently active. Update, received call back from Linnea with [facility]. Pt is active with their [facility] will need an order for [facility] upon discharge. Pt reports that one of her children or grand-children will provide transportation at discharge.

## 2020-09-25 NOTE — PROGRESS NOTES
[facility] Attending    S: 76 y.o. 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 Admitted for further evaluation and management of acute concerns. Today, reports no new symptoms. No dyspnea at rest.  Does have bilateral hip pain, unchanged, as well as some right hip tenderness of the skin overlying the lower abdomen. No recent changes at home. Has not seen new bleeding or bruising. Chronic edema. States her diet has not changed. O: VS- Blood pressure (!) 111/51, pulse 87, temperature 97.5 °F (36.4 °C), temperature source Temporal, resp. rate 18, height 5' 4\" (1.626 m), weight 167 lb 6.4 oz (75.9 kg), SpO2 98 %, not currently breastfeeding. Exam is as noted by resident with the following changes, additions or corrections:  Gen NAD, A&A, pallor noted  CV RRR, systolic murmur  Resp decreased, currently CTA  Abd distended, likely ascites. Skin over lower abdomen somewhat edematous, tender, without palpable hernia or induration, no erythema   Ext stasis changes, 2-3+ edema of bilateral LE to mid shin. Pulses intact but decreased. Skin as documented by wound care. Right hip and buttocks wounds noted without significant surrounding erythema, currently with dressing and ointment in place and protective bandage. Impressions: Active Problems:    Metastatic breast cancer (HCC)    Anemia    Supratherapeutic INR    Decubitus ulcer    KARISHMA (acute kidney injury) ([facility] [address]  Resolved Problems:    * No resolved hospital problems. *      Plan:   Received Vitamin K, coumadin held, watch INR. Transfuse, Hgb 6.8 this AM, additional prbcs.   Given gently IV hydration, bumex held, follow closely, consider

## 2020-09-25 NOTE — ED NOTES
sbar faxed,floor notified, spoke with Dami Castro, patient placed in transport.       Charles Stanley RN  09/25/20 [id number]

## 2020-09-25 NOTE — CONSULTS
[facility]  Hematology/Oncology  Consult      Patient Name: Sierra Acharya  YOB: 1945  PCP: Zully Alejandra DO   Referring Provider:      Reason for Consultation:   Chief Complaint   Patient presents with    Abnormal Lab     sent in from Dr. Juan Esposito liver enzymes and low hgbm, hx breast CA, bone CA, currently taking Ibrance for CA        History of Present Illness: This is a 80-year-old patient of Dr. Mikayla Mahoney with a past medical history of CHF ejection fraction 50 to 55%, recurrent DVTs, hyperlipidemia, diabetes mellitus type II, CAD s/p a CAB, neuropathy and history of breast cancer dating back to 2005. She was diagnosed with T1C N1aM0, grade 2 with invasive ductal carcinoma, positive ER/OR receptors and amplified Her-2-Nova by FISH. She received adjuvant chemo with AC x6 followed by Taxol x6 as meme SWOG [id number] study in 1 year and Herceptin. Her treatment was complicated with peripheral neuropathy. She has not completed 5 years of adjuvant hormonal therapy with Arimidex. She had recurrence with expansile margin recurrent metastasis. PET scan in 2012 showed disease progression with right cervical node and mediastinal lymphadenopathy. She had adequate LVEF. She responded well to Herceptin and had a follow-up PET scan that was done in July 2013 which showed complete resolution of the lymphadenopathy with no evidence of any visceral metastasis. She was maintained on single agent Herceptin along with AI. In 2019 she developed new onset ascites and peritoneal signs paracentesis was done which was negative for malignant sounds she had changes suggestive of liver cirrhosis. Repeat imaging in 2018 showed interval development of multifocal liver metastasis as well as pulmonary metastasis. A repeat liver biopsy was consistent with metastatic adenocarcinoma of breast primary but it was different from her original tumor and this time it was estrogen positive and HER-2 negative.   She was initiated on Ibrance and Faslodex, a combination which she has been tolerating very well with stable disease. She was hospitalized in July with peripheral edema, erythema, cellulitis. She presented to the emergency room after being [facility] housecall called to inform her that her INR was 9.0, she has been holding Coumadin at home. She is status post a blood transfusion for hemoglobin of 7.2. She reported feeling lightheaded and more fatigued than usual.  She also reports a right hip and lower extremity pain where her decubitus ulcers are. Denies fatigue, chest pain, SOB, abdominal pain, decrease appetite or fluid intake. Diagnostic Data:     Past Medical History:   Diagnosis Date    Abscess of abdominal wall     Anemia     Iron deficiency and chronic disease.  Anesthesia     DIFFICULTY WAKING UP    Arthritis     Arthritis, hip     Breast cancer ([facility] [address] 2005    S/P Left Lumpectomy with Lymph Node Dissection, Chemo/Rad. Follows with Dr. Sherron Prader. No recurrence to date. Surgeon was Dr. William Meza.  CAD (coronary artery disease) 5/2008    s/p CABG. Follows with [address] [place] [address].  CHF (congestive heart failure) (HCC)     Chronic venous insufficiency 11/7/2018    Class 2 severe obesity due to excess calories with serious comorbidity and body mass index (BMI) of 35.0 to 35.9 in adult ([facility] [address] 8/1/2019    Decreased dorsalis pedis pulse 11/7/2018    Degenerative disc disease at L5-S1 level 7/10/2020    Diabetic neuropathy (HCC)     Diabetic neuropathy (HCC)     DVT (deep venous thrombosis) (HCC)     Recurrent. On lifelong coumadin.  DVT of upper extremity (deep vein thrombosis) ([facility] [address] 4/18/2012    History of deep vein thrombosis 11/7/2018    Humerus fracture     Chronic, on the Left.       Hyperlipidemia 8/29/2011    Hypertension     Left leg pain 7/9/2020    Leg swelling 11/7/2018    Lymph node enlargement     Lymphedema of both lower extremities 11/7/2018    Lymphopenia 7/9/2020    MI (myocardial infarction) ([address]     Nephrolithiasis     Follows with Dr. Delgado Lund.  Pericardial effusion     Pulmonary nodule     With mediastinal lymphadenopathy. Stable. Follows with Dr. Christina Blanco.     Rib lesion 11/28/11    expansile lesion in one of the right ribs laterally    Sarcoidosis 07/26/11    per transbronchial needle aspiration    Subclavian vein occlusion, bilateral ([address] 4/18/2012    Type II or unspecified type diabetes mellitus without mention of complication, not stated as uncontrolled        Patient Active Problem List    Diagnosis Date Noted    KARISHMA (acute kidney injury) ([address] 09/25/2020    Degenerative disc disease at L5-S1 level 07/10/2020    Left leg pain 07/09/2020    Bony metastasis ([address] left leg 07/08/2020    Decubitus ulcer 07/08/2020    Cellulitis and abscess of leg 06/18/2020    Pleural effusion 09/17/2019    Precordial pain     Chronic systolic heart failure (HCC)     Moderate protein-calorie malnutrition ([address] 08/15/2019    Metastatic disease ([address]     Palliative care by specialist     Altered mental status     Goals of care, counseling/discussion     Acute hypercapnic respiratory failure ([address] 88/99/7235    Acute systolic heart failure ([address] 08/01/2019    Nonischemic cardiomyopathy ([address] 08/01/2019    Status post coronary artery bypass graft 08/01/2019    Class 2 severe obesity due to excess calories with serious comorbidity and body mass index (BMI) of 35.0 to 35.9 in adult ([address] 08/01/2019    Type 2 diabetes mellitus with hyperglycemia ([address] 08/01/2019    HAP (hospital-acquired pneumonia) 07/21/2019    Palliative care encounter     Cancer associated pain     Ascites 06/18/2019    Supratherapeutic INR 06/10/2019    Charcot's joint of foot in type 2 diabetes mellitus ([address] 04/17/2019    CKD (chronic kidney disease) stage 3, GFR 30-59 ml/min ([address] 04/16/2019    Ambulatory dysfunction 04/15/2019    Leg swelling 11/07/2018    History of deep vein thrombosis 11/07/2018  Chronic venous insufficiency 11/07/2018    Lymphedema of both lower extremities 11/07/2018    Diabetic polyneuropathy associated with type 2 diabetes mellitus ([address] 09/07/2018    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 00/66/8126    Complicated UTI (urinary tract infection) 10/24/2017    Diarrhea with dehydration 10/24/2017    Chronic anticoagulation 10/24/2017    Peripheral polyneuropathy 01/03/2017    Bilateral edema of lower extremity 10/03/2016    Chronic deep vein thrombosis (DVT) of proximal vein of right lower extremity ([address] 09/30/2016    Type 2 diabetes mellitus without complication, with long-term current use of insulin ([address] 09/01/2016    Anemia of chronic disease 09/01/2016    Anemia 09/01/2016    Ventral hernia 05/14/2015    Rectal bleeding 02/23/2015    Anesthesia     Pericardial effusion     MI (myocardial infarction) ([address]     Arthritis     Lymph node enlargement     Subclavian vein occlusion, bilateral ([address] 04/18/2012    Rib lesion 02/07/2012    Hyperlipidemia 08/29/2011    Sarcoidosis 07/26/2011    B12 deficiency 06/22/2011    Shoulder pain, left 03/02/2011    Metastatic breast cancer ([address]     Essential hypertension     Coronary artery disease involving native coronary artery of native heart without angina pectoris     Nephrolithiasis     CHF (congestive heart failure) ([address]     Humerus fracture         Past Surgical History:   Procedure Laterality Date    APPENDECTOMY      ARTERIAL BYPASS SURGRY      BREAST LUMPECTOMY  9/27/2005    LEFT    CARDIAC SURGERY      CHOLECYSTECTOMY      COLONOSCOPY  12/2007    cecal arteriovenous malformation with hyperplastic polyps    COLONOSCOPY  [id number]    CORONARY ARTERY BYPASS GRAFT  05/2008    triple bypass    ECHO COMPL W DOP COLOR FLOW  10/30/2013         EYE SURGERY      clarissa cataracts    HYSTERECTOMY  1975, 1985    MAYNOR prolapse, benign conditions; BSO later for scar tissues, no CA.       by mouth daily 4/8/20  Yes Madison Lay, DO   bumetanide (BUMEX) 1 MG tablet Take 1 tablet by mouth daily 3/16/20  Yes Madison Notch, DO   pregabalin (LYRICA) 50 MG capsule Take 1 capsule by mouth 3 times daily for 120 days. 2/4/20 9/25/20 Yes Madison Notch, DO   docusate (COLACE, DULCOLAX) 100 MG CAPS Take 100 mg by mouth daily 2/4/20  Yes Madison Notch, DO   isosorbide dinitrate (ISORDIL) 5 MG tablet Take 1 tablet by mouth 3 times daily 1/14/20  Yes Sabrina Juárez MD   Calcium Citrate-Vitamin D (RA CALCIUM CIT-VIT D-3 PETITES) 200-250 MG-UNIT TABS take 1 tablet by mouth twice a day 1/7/20  Yes Madison Lay, DO   Multiple Vitamins-Minerals (THERAPEUTIC MULTIVITAMIN-MINERALS) tablet Take 1 tablet by mouth daily 9/17/19  Yes Sangeetha Tsang MD   [facility]) 100 MG capsule Take 100 mg by mouth 3 weeks on and 1 week off also receives injections every 3 weeks per Dr Jean-Claude Paulson Provider, MD   [facility] [place] KIT Glucometer for blood sugar testing. Please provide brand covered by insurance. Diagnosis code E11.65 8/13/20   Madison Chun, DO   blood glucose monitor strips 1 strip by Other route 3 times daily Test 3 times a day & as needed for symptoms of irregular blood glucose. Please provide brand covered by insurance. Diagnosis code E11.65 8/13/20   Madison Chun, DO   Lancets MISC 1 each by Does not apply route 3 times daily Please provide brand covered by insurance.  Diagnosis code E11.65 8/13/20   Madison Lay, DO   acetaminophen (TYLENOL) 650 MG suppository 650 mg every 4 hours as needed 7/8/20   Historical Provider, MD   albuterol sulfate (PROAIR RESPICLICK) 614 (90 Base) MCG/ACT aerosol powder inhalation Inhale 2 puffs into the lungs every 6 hours as needed for Wheezing or Shortness of Breath 1/7/20   Madison Chun, DO   Insulin Pen Needle (MEIJER PEN NEEDLES) 29G X 12MM MISC 1 each by Does not apply route daily 9/10/19   Sangeetha Tsang MD   nitroGLYCERIN (NITROSTAT) 0.4 MG SL tablet up to max of 3 total doses. If no relief after 1 dose, call 911. 8/19/19   Юлия Navarro MD   sodium chloride (OCEAN, BABY AYR) 0.65 % nasal spray 1 spray by Nasal route as needed for Congestion (dryness) 8/19/19   Юлия Navarro MD       Allergies  Allergies   Allergen Reactions    Macrobid [Nitrofurantoin Monohydrate Macrocrystals] Hives       Review of Systems: relevant for fatigue, weakness, lightheadedness, though all improved this afternoon. 12 point ROS otherwise negative        Objective  BP (!) 90/41   Pulse 80   Temp 97.5 °F (36.4 °C) (Temporal)   Resp 18   Ht 5' 4\" (1.626 m)   Wt 167 lb 6.4 oz (75.9 kg)   SpO2 99%   BMI 28.73 kg/m²     Physical Exam:   Performance Status:  General: AAO to person, place, time, in no acute distress,   Head and neck : PERRLA, EOMI . Sclera non icteric. Oropharynx : Clear  Neck: no JVD,  no adenopathy  Heart: Regular rate and regular rhythm, no murmur  Lungs: Clear to auscultation   Extremities: Statsis noted +2 BL lower ext edema,no cyanosis, no clubbing. Abdomen: Abdominal distention but no tenderness;no masses, no organomegaly  Skin:  No rash. Left distal hip wound. Right hip wound. Bilateral inner buttocks wounds. Hip wounds are covered with dressings buttocks wounds are open to air. Wound descriptions noted in wound care note  Neurologic:Cranial nerves grossly intact. No focal motor or sensory deficits .     Recent Laboratory Data-   Lab Results   Component Value Date    WBC 8.0 09/25/2020    HGB 6.8 (L) 09/25/2020    HCT 21.3 (L) 09/25/2020    .1 (H) 09/25/2020    PLT 58 (L) 09/25/2020    LYMPHOPCT 10.5 (L) 09/25/2020    RBC 1.90 (L) 09/25/2020    MCH 35.8 (H) 09/25/2020    MCHC 31.9 (L) 09/25/2020    RDW 25.3 (H) 09/25/2020    NEUTOPHILPCT 79.8 09/25/2020    MONOPCT 5.3 09/25/2020    BASOPCT 1.8 09/25/2020    NEUTROABS 6.40 09/25/2020    LYMPHSABS 0.88 (L) 09/25/2020    MONOSABS 0.40 09/25/2020    EOSABS 0.21 09/25/2020    BASOSABS 0.14 2020       Lab Results   Component Value Date     2020    K 4.2 2020     2020    CO2 22 2020    BUN 70 (H) 2020    CREATININE 2.4 (H) 2020    GLUCOSE 232 (H) 2020    CALCIUM 8.9 2020    PROT 6.1 (L) 2020    LABALBU 2.7 (L) 2020    BILITOT 0.8 2020    ALKPHOS 175 (H) 2020    AST 34 (H) 2020    ALT 35 (H) 2020    LABGLOM 20 2020    GFRAA 24 2020       Lab Results   Component Value Date    IRON 89 2020    TIBC 158 (L) 2020    FERRITIN 1,961 2020           Radiology-    Xr Chest Portable    Result Date: 2020  Patient MRN:  [id number] : 1945 Age: 76 years Gender: Female Order Date:  2020 12:09 AM TECHNIQUE/NUMBER OF IMAGES/COMPARISON/CLINICAL HISTORY: Chest AP 1 image one view Comparison . History chest pain. FINDINGS: Right internal jugular central venous catheter tip in the right atrium. Patient previous midsternotomy. Heart has mild increased size. No acute infiltrates, consolidations or pleural effusions are seen. The There is a old fracture likely of the right rib cage. The patient previous surgery in the left axillary region. There is old fracture deformity of the proximal left humerus at the left shoulder level. No acute cardiopulmonary process. ASSESSMENT/PLAN :    77 yo female  Stage IV IDC  Currently on Faslodex and Ibrance  Supratherapeutic INR  Anemia    - Hgb 6.8 today s/p pRBC  - Goal Hgb >7  - Anemia due to CKD, and potential blood loss with INR 9.0  - Platelets 58, likely combination of previous chemotherapy exposure, side effect of Ibrance and potential consumption with bleeding given her anemia and supratherapeutic INR  - 5 mg Vit K given, trend coagulation profile. INR 8.4 this AM  - US RP negative  - Ibrance on hold, no evidence of infection and WBC/ANC WNL.  Can resume per her normal schedule  - Will follow      FORD Godinez -

## 2020-09-25 NOTE — FLOWSHEET NOTE
Inpatient Wound Care(initial evaluation)  [id number]    Admit Date: 9/24/2020 10:17 PM    Reason for consult:  Hips, buttocks    Significant history:    CC: Abnormal Lab (sent in from Dr. Marybelle Cheadle liver enzymes and low Hgb, hx breast CA, bone CA, currently taking Ibrance for CA)  history of breast cancer status post lumpectomy with bony and liver mets, coronary artery disease, CHF ejection fraction 50 to 55%, recurrent DVTs, and recent admission for hip wounds presents for supratherapeutic INR (INR:9.0), KARISHMA (Cr: 2.6) and anemia (Hb: 7.2). [facility] called evening prior to admit to inform patients INR was 9.0, had been holding coumadin at home. She is s/p blood transfusion a day ago and Hb is 7.2.  Feels lightheaded and more fatigued than usual.    Wound history:  Admitted with wounds from home    Findings:     09/25/20 1030   Skin Integrity   Skin Integrity Bruising   Location BUE,  right thigh   Skin Integrity Site 2   Skin Integrity Location 2   (vascular discoloration)   Location 2 BLE   Skin Integrity Site 3   Skin Integrity Location 3   (old healed areas)    Location 3 bilateral buttocks   Skin Integrity Site 4   Skin Integrity Location 4   (black discolored areas)   Location 4   (left first toe)   Wound 06/18/20 Hip Left;Proximal   Date First Assessed/Time First Assessed: 06/18/20 2102   Present on Hospital Admission: Yes  Primary Wound Type: Pressure Injury  Location: Hip  Wound Location Orientation: Left;Proximal   Wound Image   (see distal)   Wound Pressure Stage  3   Dressing/Treatment Alginate;Hydrating gel;Medipore   Wound Length (cm) 4.6 cm   Wound Width (cm) 3.4 cm   Wound Depth (cm) 1 cm   Wound Surface Area (cm^2) 15.64 cm^2   Change in Wound Size % (l*w) 25.52   Wound Volume (cm^3) 15.64 cm^3   Wound Healing % -645   Wound Assessment Red;Yellow   Drainage Amount Scant   Drainage Description Serosanguinous   Odor None   Ana Lilia-wound Assessment Painful   Red%Wound Bed 80   Yellow%Wound Bed 20   Wound 09/25/20 Hip Left;Distal   Date First Assessed/Time First Assessed: 09/25/20 0200   Present on Hospital Admission: Yes  Location: Hip  Wound Location Orientation: Left;Distal   Wound Image    Wound Pressure Unstageable   Dressing/Treatment Hydrating gel;Alginate;Medipore   Wound Length (cm) 5 cm   Wound Width (cm) 1.6 cm   Wound Depth (cm)   (unable to determine)   Wound Surface Area (cm^2) 8 cm^2   Change in Wound Size % (l*w) -33.33   Wound Assessment Black   Drainage Amount Scant   Drainage Description Yellow   Odor None   Ana Lilia-wound Assessment Painful   Yellow%Wound Bed 100  (black)   Wound 06/18/20 Hip Right   Date First Assessed/Time First Assessed: 06/18/20 2102   Present on Hospital Admission: Yes  Primary Wound Type: Pressure Injury  Location: Hip  Wound Location Orientation: Right   Wound Image    Wound Pressure Unstageable   Dressing/Treatment Alginate;Hydrating gel;Medipore   Wound Length (cm) 2.6 cm   Wound Width (cm) 4.6 cm   Wound Depth (cm)   (unable to determine)   Wound Surface Area (cm^2) 11.96 cm^2   Change in Wound Size % (l*w) -19.6   Drainage Amount Scant   Drainage Description Yellow   Odor None   Ana Lilia-wound Assessment Painful   Yellow%Wound Bed 100  (black)   Wound 07/08/20 Buttocks Right   Date First Assessed/Time First Assessed: 07/08/20 0700   Present on Hospital Admission: Yes  Location: Buttocks  Wound Location Orientation: Right   Wound Image   (see left)   Wound Pressure Unstageable   Dressing/Treatment Pharmaceutical agent (see MAR)   Wound Length (cm) 4 cm   Wound Width (cm) 1 cm   Wound Depth (cm)   (unable to determine)   Wound Surface Area (cm^2) 4 cm^2   Change in Wound Size % (l*w) -900   Wound Assessment Yellow   Drainage Amount None   Ana Lilia-wound Assessment Intact   Yellow%Wound Bed 100   Wound 09/25/20 Buttocks Left;Proximal   Date First Assessed/Time First Assessed: 09/25/20 0200   Present on Hospital Admission: Yes  Location: Buttocks  Wound Location Orientation: Left;Proximal   Wound Image    Wound Pressure Unstageable   Dressing/Treatment Pharmaceutical agent (see MAR)   Wound Length (cm) 5 cm   Wound Width (cm) 1.6 cm   Wound Depth (cm)   (unable to determine)   Wound Surface Area (cm^2) 8 cm^2   Change in Wound Size % (l*w) -700   Wound Assessment Yellow   Drainage Amount None   Ana Lilia-wound Assessment Intact   Yellow%Wound Bed 100     **Informed Consent**    The patient has given verbal consent to have photos taken of wounds and inserted into their chart as part of their permanent medical record for purposes of documentation, treatment management and/or medical review. All Images taken on 9/25/20 of patient name: Rachael Hickman were transmitted and stored on secured tweetTV located within [employer] by a registered [employer] Mobile Application Device.      Impression:  Bilateral inner buttocks- unstageable  Right hip unstageable  Left hip stage 3 & unstageable    Plan:  Wound gel and opticell hip wounds  Aquaphor to inner buttocks  Will need continued preventative care    Parker Lynch 9/25/2020 11:26 AM

## 2020-09-25 NOTE — PROGRESS NOTES
[facility] Inpatient   Resident Progress Note    S:  Hospital day: 0   Brief Synopsis: Grecia Mar is a 76 y.o. female with a PMH of metastatic breast cancer to liver and bone, CHF with EF~55%, and multiple DVTs who presented with KARISHMA, anemia, and supratherapeutic INR. She was given 5mg vitamin K on admission. No acute events overnight. Patient was seen and examined this morning. She is feeling well and is tolerating her diet well. She has chronic hip pain with her decubitus ulcers. She has not seen any blood in her stool or urine. She denies any CP, SOB, n/v, diarrhea. Cont meds:    dextrose       Scheduled meds:    allopurinol  100 mg Oral Daily    docusate sodium  100 mg Oral Daily    insulin glargine  10 Units Subcutaneous Daily    insulin lispro  0-3 Units Subcutaneous Nightly    isosorbide dinitrate  5 mg Oral TID    magnesium oxide  400 mg Oral Daily    metoprolol succinate  12.5 mg Oral Daily    therapeutic multivitamin-minerals  1 tablet Oral Daily    pantoprazole  40 mg Oral QAM AC    sodium bicarbonate  650 mg Oral BID    sodium chloride flush  10 mL Intravenous 2 times per day    sodium chloride  20 mL Intravenous Once    mineral oil-hydrophilic petrolatum   Topical BID    pregabalin  50 mg Oral BID     PRN meds: albuterol, oxyCODONE, sodium chloride, sodium chloride flush, acetaminophen **OR** acetaminophen, potassium chloride, magnesium sulfate, glucose, dextrose, glucagon (rDNA), dextrose, mineral oil-hydrophilic petrolatum **AND** mineral oil-hydrophilic petrolatum     I reviewed the patient's past medical and surgical history, Medications and Allergies. O:  BP (!) 110/56   Pulse 79   Temp 97.2 °F (36.2 °C) (Temporal)   Resp 18   Ht 5' 4\" (1.626 m)   Wt 167 lb 6.4 oz (75.9 kg)   SpO2 98%   BMI 28.73 kg/m²   24 hour I&O:       GENERAL: Alert, cooperative, no acute distress.   HEENT: Normocephalic, atraumatic. conjunctiva/corneas clear, EOM's intact, no pallor or icterus. NECK: Supple, symmetrical, trachea midline, no cervical LAD. No carotid bruit or JVD  CHEST: No tenderness or deformity, full & symmetric excursion  LUNG: Clear to auscultation bilaterally,  respirations unlabored. No rales/wheezing/rubs  HEART: RRR, S1 and S2 normal, systolic murmur present, rub or gallop. DP pulses 2/4  ABDOMEN: SNTND, no masses, no organomegaly, no guarding, rebound or rigidity. GENITOURINARY: Urinary cath not present   EXTREMITIES:  B/l 3+ pitting edema of lower extremeties, atraumatic, no cyanosis. Distal pulses equal bilaterally  SKIN: Venous stasis changes of lower extremities and b/l decubitus ulcers over hips. Skin color, texture, turgor otherwise normal, no rashes  MUSCULOSKELETAL: No joint swelling, no muscle tenderness. Normal ROM in extremities. LYMPH NODES: no lymph node enlargement appreciated  NEUROLOGIC: Alert & Oriented      Labs:  Na/K/Cl/CO2:  140/4.2/102/22 (428)  BUN/Cr/glu/ALT/AST/amyl/lip:  70/2.4/--/35/34/--/-- (428)  WBC/Hgb/Hct/Plts:  8.0/6.8/21.3/58 (540)  estimated creatinine clearance is 20 mL/min (A) (based on SCr of 2.4 mg/dL (H)). Other pertinent labs as noted below    New Imaging:  Xr Chest Portable    Result Date: 2020  Patient MRN:  [id number] : 1945 Age: 76 years Gender: Female Order Date:  2020 12:09 AM TECHNIQUE/NUMBER OF IMAGES/COMPARISON/CLINICAL HISTORY: Chest AP 1 image one view Comparison . History chest pain. FINDINGS: Right internal jugular central venous catheter tip in the right atrium. Patient previous midsternotomy. Heart has mild increased size. No acute infiltrates, consolidations or pleural effusions are seen. The There is a old fracture likely of the right rib cage. The patient previous surgery in the left axillary region. There is old fracture deformity of the proximal left humerus at the left shoulder level. No acute cardiopulmonary process.     Us Retroperitoneal Complete    Result Date: 2020  Patient MRN:  [id number] : 1945 Age: 76 years Gender: Female Order Date:  2020 8:48 AM EXAM: US RETROPERITONEAL COMPLETE Multiple real-time sonographic images of the kidneys were obtained. NUMBER OF IMAGES:  43 INDICATION:  KARISHMA KARISHMA COMPARISON: 2020 and 2019 FINDINGS: The right kidney measures 10.3 cm  while that of the left measures 10 cm. There is no evidence for hydronephrosis, renal masses or perinephric fluid collections. Simple 16 mm cyst upper pole right kidney again incidentally noted. Limited scans of the pelvis demonstrate normal appearing bladder. No change since the exam dated 2020 IMPRESSION:       A/P:  Active Problems:    Metastatic breast cancer (HCC)    Anemia    Supratherapeutic INR    Decubitus ulcer    KARISHMA (acute kidney injury) ([facility] [address]  Resolved Problems:    * No resolved hospital problems. *      KARISHMA   Holding home bumex   Creatinine elevated to 2.5 on admission   BUN elecated to 71 on admission   Urine lytes  o FeUrea 21.7% indicating pre-renal cause  · Trend BMP    Supratherapeutic INR  Anemia   INR 9.0 at home   Given 5mg vitamin K   Hold coumadin   Hemoglobin 7.2 at home even after 1UPRBCs as outpatient  o Dropped to 6.8 this am, transfused 1 more U prbcs  · Trend H&H    Metastatic breast cancer  Thrombocytopenia   Dr. Reji Rodriguez consulted   To hold home ibrance for now   Platelets low at 58, 2/2 to chemo?     Decubitus ulcers   Stable   Turn patient in bed q2 hours   Wound care consulted    T2DM  · Continue home lantus 10 U with LDSS    HTN/CAD/HFmEF  · Continue home meds aside from bumex      DVT / GI prophylaxis: not indicated until INR improved and GI prophylaxis not indicated    Electronically signed by Callie Harris MD on 2020 at 1:34 PM  This case was discussed with senior resident and attending physician: Dr. Alem Bay

## 2020-09-26 LAB
ALBUMIN SERPL-MCNC: 2.6 G/DL (ref 3.5–5.2)
ALP BLD-CCNC: 167 U/L (ref 35–104)
ALT SERPL-CCNC: 30 U/L (ref 0–32)
ANION GAP SERPL CALCULATED.3IONS-SCNC: 14 MMOL/L (ref 7–16)
ANISOCYTOSIS: ABNORMAL
AST SERPL-CCNC: 26 U/L (ref 0–31)
BASOPHILS ABSOLUTE: 0 E9/L (ref 0–0.2)
BASOPHILS RELATIVE PERCENT: 0.4 % (ref 0–2)
BILIRUB SERPL-MCNC: 1 MG/DL (ref 0–1.2)
BUN BLDV-MCNC: 65 MG/DL (ref 8–23)
CALCIUM SERPL-MCNC: 8.1 MG/DL (ref 8.6–10.2)
CHLORIDE BLD-SCNC: 104 MMOL/L (ref 98–107)
CO2: 22 MMOL/L (ref 22–29)
CREAT SERPL-MCNC: 2 MG/DL (ref 0.5–1)
EOSINOPHILS ABSOLUTE: 0.04 E9/L (ref 0.05–0.5)
EOSINOPHILS RELATIVE PERCENT: 0.9 % (ref 0–6)
GFR AFRICAN AMERICAN: 29
GFR NON-AFRICAN AMERICAN: 24 ML/MIN/1.73
GLUCOSE BLD-MCNC: 157 MG/DL (ref 74–99)
HCT VFR BLD CALC: 25.1 % (ref 34–48)
HEMOGLOBIN: 8 G/DL (ref 11.5–15.5)
INR BLD: 3
LYMPHOCYTES ABSOLUTE: 0.59 E9/L (ref 1.5–4)
LYMPHOCYTES RELATIVE PERCENT: 12.2 % (ref 20–42)
MCH RBC QN AUTO: 33.9 PG (ref 26–35)
MCHC RBC AUTO-ENTMCNC: 31.9 % (ref 32–34.5)
MCV RBC AUTO: 106.4 FL (ref 80–99.9)
METER GLUCOSE: 146 MG/DL (ref 74–99)
METER GLUCOSE: 191 MG/DL (ref 74–99)
METER GLUCOSE: 238 MG/DL (ref 74–99)
METER GLUCOSE: 241 MG/DL (ref 74–99)
MONOCYTES ABSOLUTE: 0.29 E9/L (ref 0.1–0.95)
MONOCYTES RELATIVE PERCENT: 6.1 % (ref 2–12)
NEUTROPHILS ABSOLUTE: 3.97 E9/L (ref 1.8–7.3)
NEUTROPHILS RELATIVE PERCENT: 80.9 % (ref 43–80)
NUCLEATED RED BLOOD CELLS: 0.9 /100 WBC
OVALOCYTES: ABNORMAL
PDW BLD-RTO: 24.9 FL (ref 11.5–15)
PLATELET # BLD: 58 E9/L (ref 130–450)
PLATELET CONFIRMATION: NORMAL
PMV BLD AUTO: 11.7 FL (ref 7–12)
POIKILOCYTES: ABNORMAL
POLYCHROMASIA: ABNORMAL
POTASSIUM REFLEX MAGNESIUM: 4.2 MMOL/L (ref 3.5–5)
PROTHROMBIN TIME: 34.3 SEC (ref 9.3–12.4)
RBC # BLD: 2.36 E12/L (ref 3.5–5.5)
SODIUM BLD-SCNC: 140 MMOL/L (ref 132–146)
TOTAL PROTEIN: 5.7 G/DL (ref 6.4–8.3)
WBC # BLD: 4.9 E9/L (ref 4.5–11.5)

## 2020-09-26 PROCEDURE — 85610 PROTHROMBIN TIME: CPT

## 2020-09-26 PROCEDURE — 2060000000 HC ICU INTERMEDIATE R&B

## 2020-09-26 PROCEDURE — 80053 COMPREHEN METABOLIC PANEL: CPT

## 2020-09-26 PROCEDURE — 2580000003 HC RX 258: Performed by: STUDENT IN AN ORGANIZED HEALTH CARE EDUCATION/TRAINING PROGRAM

## 2020-09-26 PROCEDURE — 6370000000 HC RX 637 (ALT 250 FOR IP): Performed by: FAMILY MEDICINE

## 2020-09-26 PROCEDURE — 99232 SBSQ HOSP IP/OBS MODERATE 35: CPT | Performed by: FAMILY MEDICINE

## 2020-09-26 PROCEDURE — 6370000000 HC RX 637 (ALT 250 FOR IP): Performed by: STUDENT IN AN ORGANIZED HEALTH CARE EDUCATION/TRAINING PROGRAM

## 2020-09-26 PROCEDURE — G0378 HOSPITAL OBSERVATION PER HR: HCPCS

## 2020-09-26 PROCEDURE — 82962 GLUCOSE BLOOD TEST: CPT

## 2020-09-26 PROCEDURE — 36415 COLL VENOUS BLD VENIPUNCTURE: CPT

## 2020-09-26 PROCEDURE — 85025 COMPLETE CBC W/AUTO DIFF WBC: CPT

## 2020-09-26 RX ORDER — WARFARIN SODIUM 1 MG/1
1 TABLET ORAL
Status: COMPLETED | OUTPATIENT
Start: 2020-09-26 | End: 2020-09-26

## 2020-09-26 RX ADMIN — INSULIN GLARGINE 10 UNITS: 100 INJECTION, SOLUTION SUBCUTANEOUS at 06:06

## 2020-09-26 RX ADMIN — MULTIPLE VITAMINS W/ MINERALS TAB 1 TABLET: TAB at 08:30

## 2020-09-26 RX ADMIN — PETROLATUM: 42 OINTMENT TOPICAL at 21:18

## 2020-09-26 RX ADMIN — SODIUM BICARBONATE 650 MG: 650 TABLET ORAL at 08:30

## 2020-09-26 RX ADMIN — PETROLATUM: 42 OINTMENT TOPICAL at 11:40

## 2020-09-26 RX ADMIN — ISOSORBIDE DINITRATE 5 MG: 10 TABLET ORAL at 12:04

## 2020-09-26 RX ADMIN — PREGABALIN 50 MG: 50 CAPSULE ORAL at 21:17

## 2020-09-26 RX ADMIN — WARFARIN SODIUM 1 MG: 1 TABLET ORAL at 12:03

## 2020-09-26 RX ADMIN — DOCUSATE SODIUM 100 MG: 100 CAPSULE, LIQUID FILLED ORAL at 08:29

## 2020-09-26 RX ADMIN — ALLOPURINOL 100 MG: 100 TABLET ORAL at 08:30

## 2020-09-26 RX ADMIN — PREGABALIN 50 MG: 50 CAPSULE ORAL at 08:29

## 2020-09-26 RX ADMIN — OXYCODONE 5 MG: 5 TABLET ORAL at 00:42

## 2020-09-26 RX ADMIN — SODIUM CHLORIDE: 9 INJECTION, SOLUTION INTRAVENOUS at 00:36

## 2020-09-26 RX ADMIN — MAGNESIUM GLUCONATE 500 MG ORAL TABLET 400 MG: 500 TABLET ORAL at 08:30

## 2020-09-26 RX ADMIN — INSULIN LISPRO 1 UNITS: 100 INJECTION, SOLUTION INTRAVENOUS; SUBCUTANEOUS at 21:17

## 2020-09-26 RX ADMIN — PANTOPRAZOLE SODIUM 40 MG: 40 TABLET, DELAYED RELEASE ORAL at 06:05

## 2020-09-26 RX ADMIN — METOPROLOL SUCCINATE 12.5 MG: 25 TABLET, EXTENDED RELEASE ORAL at 08:29

## 2020-09-26 RX ADMIN — ISOSORBIDE DINITRATE 5 MG: 10 TABLET ORAL at 08:29

## 2020-09-26 RX ADMIN — OXYCODONE 5 MG: 5 TABLET ORAL at 08:29

## 2020-09-26 RX ADMIN — ISOSORBIDE DINITRATE 5 MG: 10 TABLET ORAL at 17:19

## 2020-09-26 RX ADMIN — SODIUM CHLORIDE: 9 INJECTION, SOLUTION INTRAVENOUS at 13:07

## 2020-09-26 RX ADMIN — SODIUM BICARBONATE 650 MG: 650 TABLET ORAL at 21:17

## 2020-09-26 ASSESSMENT — PAIN SCALES - GENERAL
PAINLEVEL_OUTOF10: 8
PAINLEVEL_OUTOF10: 0
PAINLEVEL_OUTOF10: 8
PAINLEVEL_OUTOF10: 0
PAINLEVEL_OUTOF10: 0
PAINLEVEL_OUTOF10: 8
PAINLEVEL_OUTOF10: 8

## 2020-09-26 ASSESSMENT — PAIN DESCRIPTION - ONSET: ONSET: ON-GOING

## 2020-09-26 ASSESSMENT — PAIN DESCRIPTION - LOCATION: LOCATION: BACK;BUTTOCKS

## 2020-09-26 ASSESSMENT — PAIN DESCRIPTION - FREQUENCY: FREQUENCY: CONTINUOUS

## 2020-09-26 ASSESSMENT — PAIN DESCRIPTION - DESCRIPTORS: DESCRIPTORS: ACHING;BURNING;CONSTANT;DISCOMFORT

## 2020-09-26 ASSESSMENT — PAIN DESCRIPTION - PROGRESSION: CLINICAL_PROGRESSION: NOT CHANGED

## 2020-09-26 ASSESSMENT — PAIN DESCRIPTION - PAIN TYPE: TYPE: ACUTE PAIN

## 2020-09-26 NOTE — PROGRESS NOTES
[facility]  Hematology/Oncology        Patient Name: Emeli Oneil  YOB: 1945  PCP: Antonette Sims DO   Referring Provider:      Reason for Consultation:   Chief Complaint   Patient presents with    Abnormal Lab     sent in from Dr. Hattie Trejo liver enzymes and low hgbm, hx breast CA, bone CA, currently taking Ibrance for CA      Subjective : Feeling fine. No bleeding. History of Present Illness: This is a 51-year-old patient of Dr. Miller Arceo with a past medical history of CHF ejection fraction 50 to 55%, recurrent DVTs, hyperlipidemia, diabetes mellitus type II, CAD s/p a CAB, neuropathy and history of breast cancer dating back to 2005. She was diagnosed with T1C N1aM0, grade 2 with invasive ductal carcinoma, positive ER/LA receptors and amplified Her-2-Nova by FISH. She received adjuvant chemo with AC x6 followed by Taxol x6 as meme SWOG [id number] study in 1 year and Herceptin. Her treatment was complicated with peripheral neuropathy. She has not completed 5 years of adjuvant hormonal therapy with Arimidex. She had recurrence with expansile margin recurrent metastasis. PET scan in 2012 showed disease progression with right cervical node and mediastinal lymphadenopathy. She had adequate LVEF. She responded well to Herceptin and had a follow-up PET scan that was done in July 2013 which showed complete resolution of the lymphadenopathy with no evidence of any visceral metastasis. She was maintained on single agent Herceptin along with AI. In 2019 she developed new onset ascites and peritoneal signs paracentesis was done which was negative for malignant sounds she had changes suggestive of liver cirrhosis. Repeat imaging in 2018 showed interval development of multifocal liver metastasis as well as pulmonary metastasis.   A repeat liver biopsy was consistent with metastatic adenocarcinoma of breast primary but it was different from her original tumor and this time it was estrogen Lymphopenia 7/9/2020    MI (myocardial infarction) ([address]     Nephrolithiasis     Follows with Dr. Del Pereira.  Pericardial effusion     Pulmonary nodule     With mediastinal lymphadenopathy. Stable. Follows with Dr. Marisela Sinclair.     Rib lesion 11/28/11    expansile lesion in one of the right ribs laterally    Sarcoidosis 07/26/11    per transbronchial needle aspiration    Subclavian vein occlusion, bilateral ([address] 4/18/2012    Type II or unspecified type diabetes mellitus without mention of complication, not stated as uncontrolled        Patient Active Problem List    Diagnosis Date Noted    KARISHMA (acute kidney injury) ([address] 09/25/2020    Degenerative disc disease at L5-S1 level 07/10/2020    Left leg pain 07/09/2020    Bony metastasis ([address] left leg 07/08/2020    Decubitus ulcer 07/08/2020    Cellulitis and abscess of leg 06/18/2020    Pleural effusion 09/17/2019    Precordial pain     Chronic systolic heart failure (HCC)     Moderate protein-calorie malnutrition ([address] 08/15/2019    Metastatic disease ([address]     Palliative care by specialist     Altered mental status     Goals of care, counseling/discussion     Acute hypercapnic respiratory failure ([address] 00/96/3786    Acute systolic heart failure ([address] 08/01/2019    Nonischemic cardiomyopathy ([address] 08/01/2019    Status post coronary artery bypass graft 08/01/2019    Class 2 severe obesity due to excess calories with serious comorbidity and body mass index (BMI) of 35.0 to 35.9 in adult ([address] 08/01/2019    Type 2 diabetes mellitus with hyperglycemia ([address] 08/01/2019    HAP (hospital-acquired pneumonia) 07/21/2019    Palliative care encounter     Cancer associated pain     Ascites 06/18/2019    Supratherapeutic INR 06/10/2019    Charcot's joint of foot in type 2 diabetes mellitus ([address] 04/17/2019    CKD (chronic kidney disease) stage 3, GFR 30-59 ml/min ([address] 04/16/2019    Ambulatory dysfunction 04/15/2019    Leg swelling 11/07/2018    History of deep vein thrombosis 11/07/2018    Chronic venous insufficiency 11/07/2018    Lymphedema of both lower extremities 11/07/2018    Diabetic polyneuropathy associated with type 2 diabetes mellitus ([address] 09/07/2018    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 88/43/9246    Complicated UTI (urinary tract infection) 10/24/2017    Diarrhea with dehydration 10/24/2017    Chronic anticoagulation 10/24/2017    Peripheral polyneuropathy 01/03/2017    Bilateral edema of lower extremity 10/03/2016    Chronic deep vein thrombosis (DVT) of proximal vein of right lower extremity ([address] 09/30/2016    Type 2 diabetes mellitus without complication, with long-term current use of insulin ([address] 09/01/2016    Anemia of chronic disease 09/01/2016    Anemia 09/01/2016    Ventral hernia 05/14/2015    Rectal bleeding 02/23/2015    Anesthesia     Pericardial effusion     MI (myocardial infarction) ([address]     Arthritis     Lymph node enlargement     Subclavian vein occlusion, bilateral ([address] 04/18/2012    Rib lesion 02/07/2012    Hyperlipidemia 08/29/2011    Sarcoidosis 07/26/2011    B12 deficiency 06/22/2011    Shoulder pain, left 03/02/2011    Metastatic breast cancer ([address]     Essential hypertension     Coronary artery disease involving native coronary artery of native heart without angina pectoris     Nephrolithiasis     CHF (congestive heart failure) ([address]     Humerus fracture         Past Surgical History:   Procedure Laterality Date    APPENDECTOMY      ARTERIAL BYPASS SURGRY      BREAST LUMPECTOMY  9/27/2005    LEFT    CARDIAC SURGERY      CHOLECYSTECTOMY      COLONOSCOPY  12/2007    cecal arteriovenous malformation with hyperplastic polyps    COLONOSCOPY  [id number]    CORONARY ARTERY BYPASS GRAFT  05/2008    triple bypass    ECHO COMPL W DOP COLOR FLOW  10/30/2013         EYE SURGERY      clarissa cataracts    HYSTERECTOMY  1975, 1985    MAYNOR prolapse, benign conditions; BSO later for scar tissues, no CA.      OTHER SURGICAL HISTORY  11/18/11    port removal right chest wall    PARACENTESIS      TONSILLECTOMY      TOOTH EXTRACTION      Full Dental Extraction.  TUNNELED VENOUS PORT PLACEMENT      removal of port Dec. 2011- Dr. Medellin Lips TUNNELED [address]  [id number]    RIGHT CHEST       Family History  Family History   Adopted: Yes       Social History    TOBACCO:   reports that she has never smoked. She has never used smokeless tobacco.  ETOH:   reports no history of alcohol use. Home Medications  Prior to Admission medications    Medication Sig Start Date End Date Taking? Authorizing Provider   oxyCODONE (ROXICODONE) 5 MG immediate release tablet Take 1 tablet by mouth every 4 hours as needed for Pain for up to 5 days. 9/21/20 9/26/20 Yes Vishal Buenrostro DO   allopurinol (ZYLOPRIM) 100 MG tablet Take 1 tablet by mouth daily 9/14/20  Yes Vishal Buenrostro DO   magnesium oxide (MAG-OX) 400 MG tablet Take 1 tablet by mouth daily 9/14/20  Yes Vishal Buenrostro DO   warfarin (COUMADIN) 1 MG tablet Take daily dose as directed by a physician.  8/18/20  Yes Vishal Buenrostro DO   insulin glargine (LANTUS SOLOSTAR) 100 UNIT/ML injection pen Inject 10 Units into the skin daily 8/13/20  Yes Mirian Huang MD   lidocaine (LIDODERM) 5 % Apply 1 patch topically daily 7/13/20  Yes Historical Provider, MD   acetaminophen (TYLENOL) 325 MG tablet Take 650 mg by mouth every 6 hours as needed 7/8/20  Yes Historical Provider, MD   Cyanocobalamin (VITAMIN B 12) 500 MCG TABS Take 1 tablet by mouth daily    Yes Historical Provider, MD   metoprolol succinate (TOPROL XL) 25 MG extended release tablet Take 0.5 tablets by mouth daily 8/11/20  Yes Viridiana Sorto MD   insulin lispro (HUMALOG) 100 UNIT/ML injection vial Inject 0-3 Units into the skin nightly 7/15/20  Yes Viridiana Sorto MD   sodium bicarbonate 650 MG tablet Take 650 mg by mouth 2 times daily   Yes Historical Provider, MD omeprazole 20 MG EC tablet Take 1 tablet by mouth daily 4/8/20  Yes Erasmo Younger DO   bumetanide (BUMEX) 1 MG tablet Take 1 tablet by mouth daily 3/16/20  Yes Erasmo Younger DO   pregabalin (LYRICA) 50 MG capsule Take 1 capsule by mouth 3 times daily for 120 days. 2/4/20 9/25/20 Yes Erasmo Younger DO   docusate (COLACE, DULCOLAX) 100 MG CAPS Take 100 mg by mouth daily 2/4/20  Yes Erasmo Younger DO   isosorbide dinitrate (ISORDIL) 5 MG tablet Take 1 tablet by mouth 3 times daily 1/14/20  Yes Jere Ashraf MD   Calcium Citrate-Vitamin D (RA CALCIUM CIT-VIT D-3 PETITES) 200-250 MG-UNIT TABS take 1 tablet by mouth twice a day 1/7/20  Yes Erasmo oYunger DO   Multiple Vitamins-Minerals (THERAPEUTIC MULTIVITAMIN-MINERALS) tablet Take 1 tablet by mouth daily 9/17/19  Yes Zoe Myers MD   palbociclib [facility] NO 5) 100 MG capsule Take 100 mg by mouth 3 weeks on and 1 week off also receives injections every 3 weeks per Dr Jose L Wheat Provider, MD   Misc. Devices KIT Glucometer for blood sugar testing. Please provide brand covered by insurance. Diagnosis code E11.65 8/13/20   Erasmo Younger, DO   blood glucose monitor strips 1 strip by Other route 3 times daily Test 3 times a day & as needed for symptoms of irregular blood glucose. Please provide brand covered by insurance. Diagnosis code E11.65 8/13/20   Erasmo Younger, DO   Lancets MISC 1 each by Does not apply route 3 times daily Please provide brand covered by insurance.  Diagnosis code E11.65 8/13/20   Erasmo Younger, DO   acetaminophen (TYLENOL) 650 MG suppository 650 mg every 4 hours as needed 7/8/20   Historical Provider, MD   albuterol sulfate (PROAIR RESPICLICK) 058 (90 Base) MCG/ACT aerosol powder inhalation Inhale 2 puffs into the lungs every 6 hours as needed for Wheezing or Shortness of Breath 1/7/20   Erasmo Younger DO   Insulin Pen Needle (MEIJER PEN NEEDLES) 29G X 12MM MISC 1 each by Does not apply route daily 9/10/19   Zoe Myers MD nitroGLYCERIN (NITROSTAT) 0.4 MG SL tablet up to max of 3 total doses. If no relief after 1 dose, call 911. 8/19/19   Kavita Lawson MD   sodium chloride (OCEAN, BABY AYR) 0.65 % nasal spray 1 spray by Nasal route as needed for Congestion (dryness) 8/19/19   Kavita Lawson MD       Allergies  Allergies   Allergen Reactions    Macrobid [Nitrofurantoin Monohydrate Macrocrystals] Hives       Review of Systems: relevant for fatigue, weakness, lightheadedness, though all improved this afternoon. 12 point ROS otherwise negative        Objective  BP (!) 106/50   Pulse 89   Temp 97 °F (36.1 °C) (Temporal)   Resp 16   Ht 5' 4\" (1.626 m)   Wt 162 lb 1.6 oz (73.5 kg)   SpO2 97%   BMI 27.82 kg/m²     Physical Exam:   Performance Status:  General: AAO to person, place, time, in no acute distress,   Head and neck : PERRLA, EOMI . Sclera non icteric. Oropharynx : Clear  Neck: no JVD,  no adenopathy  Heart: Regular rate and regular rhythm, no murmur  Lungs: Clear to auscultation   Extremities: Statsis noted +2 BL lower ext edema,no cyanosis, no clubbing. Abdomen: Abdominal distention but no tenderness;no masses, no organomegaly  Skin:  No rash. Left distal hip wound. Right hip wound. Bilateral inner buttocks wounds. Hip wounds are covered with dressings buttocks wounds are open to air. Wound descriptions noted in wound care note  Neurologic:Cranial nerves grossly intact. No focal motor or sensory deficits .     Recent Laboratory Data-   Lab Results   Component Value Date    WBC 4.9 09/26/2020    HGB 8.0 (L) 09/26/2020    HCT 25.1 (L) 09/26/2020    .4 (H) 09/26/2020    PLT 58 (L) 09/26/2020    LYMPHOPCT 12.2 (L) 09/26/2020    RBC 2.36 (L) 09/26/2020    MCH 33.9 09/26/2020    MCHC 31.9 (L) 09/26/2020    RDW 24.9 (H) 09/26/2020    NEUTOPHILPCT 80.9 (H) 09/26/2020    MONOPCT 6.1 09/26/2020    BASOPCT 0.4 09/26/2020    NEUTROABS 3.97 09/26/2020    LYMPHSABS 0.59 (L) 09/26/2020    MONOSABS 0.29 09/26/2020    EOSABS 0.04 (L) 2020    BASOSABS 0.00 2020       Lab Results   Component Value Date     2020    K 4.2 2020     2020    CO2 22 2020    BUN 65 (H) 2020    CREATININE 2.0 (H) 2020    GLUCOSE 157 (H) 2020    CALCIUM 8.1 (L) 2020    PROT 5.7 (L) 2020    LABALBU 2.6 (L) 2020    BILITOT 1.0 2020    ALKPHOS 167 (H) 2020    AST 26 2020    ALT 30 2020    LABGLOM 24 2020    GFRAA 29 2020       Lab Results   Component Value Date    IRON 89 2020    TIBC 158 (L) 2020    FERRITIN 1,961 2020           Radiology-    Xr Chest Portable    Result Date: 2020  Patient MRN:  [id number] : 1945 Age: 76 years Gender: Female Order Date:  2020 12:09 AM TECHNIQUE/NUMBER OF IMAGES/COMPARISON/CLINICAL HISTORY: Chest AP 1 image one view Comparison . History chest pain. FINDINGS: Right internal jugular central venous catheter tip in the right atrium. Patient previous midsternotomy. Heart has mild increased size. No acute infiltrates, consolidations or pleural effusions are seen. The There is a old fracture likely of the right rib cage. The patient previous surgery in the left axillary region. There is old fracture deformity of the proximal left humerus at the left shoulder level. No acute cardiopulmonary process. ASSESSMENT/PLAN :    77 yo female  Stage IV IDC  Currently on Faslodex and Ibrance  Supratherapeutic INR  Anemia    - Hgb 6.8 today s/p pRBC  - Goal Hgb >7  - Anemia due to CKD, and potential blood loss with INR 9.0  - Platelets 58, likely combination of previous chemotherapy exposure, side effect of Ibrance and potential consumption with bleeding given her anemia and supratherapeutic INR  - 5 mg Vit K given, trend coagulation profile. INR 8.4 this AM  - US RP negative  - Ibrance on hold, no evidence of infection and WBC/ANC WNL.  Can resume per her normal schedule  - Will follow    9/26/20  INR 3.0  To resume Warfarin      Timmy Mccray MD  Electronically signed 9/26/2020 at 12:22 PM

## 2020-09-26 NOTE — PROGRESS NOTES
2020  Patient MRN:  [id number] : 1945 Age: 76 years Gender: Female Order Date:  2020 8:48 AM EXAM: US RETROPERITONEAL COMPLETE Multiple real-time sonographic images of the kidneys were obtained. NUMBER OF IMAGES:  43 INDICATION:  KARISHMA KARISHMA COMPARISON: 2020 and 2019 FINDINGS: The right kidney measures 10.3 cm  while that of the left measures 10 cm. There is no evidence for hydronephrosis, renal masses or perinephric fluid collections. Simple 16 mm cyst upper pole right kidney again incidentally noted. Limited scans of the pelvis demonstrate normal appearing bladder. No change since the exam dated 2020 IMPRESSION:       A/P:  Active Problems:    Metastatic breast cancer (HCC)    Anemia    Supratherapeutic INR    Decubitus ulcer    KARISHMA (acute kidney injury) ([facility] [address]  Resolved Problems:    * No resolved hospital problems. *      KARISHMA   Holding home bumex   Creatinine elevated to 2.5 on admission   BUN elecated to 71 on admission   Urine lytes  o FeUrea 21.7% indicating pre-renal cause  · Trend BMP    Supratherapeutic INR  Anemia   INR 9.0 at home   Given 5mg vitamin K   Hold coumadin   Hemoglobin 7.2 at home even after 1UPRBCs as outpatient   1 U PRBC transfused yesterday, Hgb 7.7 this AM    Metastatic breast cancer  Thrombocytopenia   Dr. Francisco Javier Lewis consulted   To hold home ibrance for now   Platelets low at 58, 2/2 to chemo?     Decubitus ulcers   Stable   Turn patient in bed q2 hours   Wound care following    T2DM  · Continue home lantus 10 U with LDSS    HTN/CAD/HFmEF  · Continue home meds aside from bumex      DVT / GI prophylaxis: not indicated until INR improved and GI prophylaxis not indicated    Electronically signed by Diego Donohue DO on 2020 at 7:14 AM  This case was discussed with senior resident and attending physician: Dr. Richardson Lopez

## 2020-09-26 NOTE — PROGRESS NOTES
[facility] Attending    S: 76 y.o. 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 Admitted for further evaluation and management of acute concerns. She has received vitamin K and transfused. Today, she is feeling somewhat better. She denies any new symptoms today. O: VS- Blood pressure (!) 105/56, pulse 84, temperature 97.4 °F (36.3 °C), temperature source Temporal, resp. rate 16, height 5' 4\" (1.626 m), weight 162 lb 1.6 oz (73.5 kg), SpO2 96 %, not currently breastfeeding. Exam is as noted by resident with the following changes, additions or corrections:  Gen NAD, A&A, pallor noted  CV RRR, systolic murmur  Resp decreased, currently CTA  Abd distended, likely ascites. Skin over lower abdomen somewhat edematous, tender, without palpable hernia or induration, no erythema   Ext stasis changes, 2-3+ edema of bilateral LE to mid shin. Pulses intact but decreased. Impressions: Active Problems:    Metastatic breast cancer (HCC)    Anemia    Supratherapeutic INR    Decubitus ulcer    KARISHMA (acute kidney injury) ([facility] [address]  Resolved Problems:    * No resolved hospital problems. *      Plan:   Her kidney functions are improving. Her INR today is 3.0. We will restart her Coumadin and monitor her labs. Possible discharge in the next 24 to 48 hours. Attending Physician Statement  I have reviewed the chart and seen the patient with the resident(s). I personally reviewed images, EKG's and similar tests, if present. I personally reviewed and performed key elements of the history and exam.  I have reviewed and confirmed student and/or resident history and exam with changes as indicated above.   I agree with the assessment, plan and orders as documented by the resident. Please refer to the resident and/or student note for additional information.       Elizabeth Haji

## 2020-09-26 NOTE — PROGRESS NOTES
Comprehensive Nutrition Assessment    Type and Reason for Visit:  Initial, Positive Nutrition Screen    Nutrition Recommendations/Plan: Recommend and start Ensure High Protein supplement BID and Robin wound healing supplement BID to help meet increased nutritional needs from wound healing. Nutrition Assessment:  Patient at nutritional risk d/t increased needs from wound healing ; hx of breast CA w/ mets to liver/bone ; hx of chemotherapy ; will start ONS    Malnutrition Assessment:  Malnutrition Status: At risk for malnutrition (Comment)    Context:  Acute Illness     Findings of the 6 clinical characteristics of malnutrition:  Energy Intake:  Mild decrease in energy intake (Comment)(x 2 days since adm)  Weight Loss:  No significant weight loss     Body Fat Loss:  Unable to assess(data not available to assess at this time)     Muscle Mass Loss:  Unable to assess(data not available to assess at this time)    Fluid Accumulation:  No significant fluid accumulation     Strength:  Not Performed    Estimated Daily Nutrient Needs:  Energy (kcal):  1807-0862 (REE 1216 x 1.3 SF); Weight Used for Energy Requirements:  Current     Protein (g):   (1.5-1.8g/kg IBW);  Weight Used for Protein Requirements:  Ideal        Fluid (ml/day):  4642-5572; Weight Used for Fluid Requirements:  [place]      Nutrition Related Findings:  +I&Os (+1.8 L), 3+ pitting edema, active BS, rounded abd, edentulous (dentures at home), A&O x 4, boggy heels, redness to buttocks      Wounds:  Multiple, Open Wounds, Stage III, Unstageable(wounds x 5 noted)       Current Nutrition Therapies:    DIET GENERAL; Carb Control: 3 carb choices (45 gms)/meal    Anthropometric Measures:  · Height: 5' 4\" (162.6 cm)  · Current Body Weight: 162 lb (73.5 kg)(9/26/20, standing scale)   · Admission Body Weight: 161 lb (73 kg)(9/24/20, stated)    · Usual Body Weight: 170 lb (77.1 kg)(7/7/20, bedscale ; EMR also shows past weight of 170# no method on 9/30/19) · Ideal Body Weight: 120 lbs; % Ideal Body Weight 135 %   · BMI: 27.8  · BMI Categories: Overweight (BMI 25.0-29. 9)       Nutrition Diagnosis:   · Increased nutrient needs related to increase demand for energy/nutrients as evidenced by wounds      Nutrition Interventions:   Food and/or Nutrient Delivery:  Continue Current Diet, Start Oral Nutrition Supplement  Nutrition Education/Counseling:  Education not indicated   Coordination of Nutrition Care:  Continued Inpatient Monitoring    Goals:  Patient will consume ~75% of meals served       Nutrition Monitoring and Evaluation:   Behavioral-Environmental Outcomes:  Knowledge or Skill   Food/Nutrient Intake Outcomes:  Food and Nutrient Intake, Supplement Intake  Physical Signs/Symptoms Outcomes:  Biochemical Data, Chewing or Swallowing, GI Status, Fluid Status or Edema, Hemodynamic Status, Meal Time Behavior, Nutrition Focused Physical Findings, Skin, Weight     Discharge Planning:    Continue current diet     Electronically signed by Wale Panchal RD, LD on 9/26/20 at 12:49 PM EDT    Contact: [phone]

## 2020-09-27 VITALS
DIASTOLIC BLOOD PRESSURE: 53 MMHG | TEMPERATURE: 96.9 F | RESPIRATION RATE: 18 BRPM | BODY MASS INDEX: 28.15 KG/M2 | SYSTOLIC BLOOD PRESSURE: 105 MMHG | WEIGHT: 164.9 LBS | HEIGHT: 64 IN | OXYGEN SATURATION: 100 % | HEART RATE: 83 BPM

## 2020-09-27 PROBLEM — R79.1 SUPRATHERAPEUTIC INR: Status: RESOLVED | Noted: 2019-06-10 | Resolved: 2020-09-27

## 2020-09-27 LAB
ALBUMIN SERPL-MCNC: 2.3 G/DL (ref 3.5–5.2)
ALP BLD-CCNC: 138 U/L (ref 35–104)
ALT SERPL-CCNC: 26 U/L (ref 0–32)
ANION GAP SERPL CALCULATED.3IONS-SCNC: 14 MMOL/L (ref 7–16)
ANISOCYTOSIS: ABNORMAL
AST SERPL-CCNC: 24 U/L (ref 0–31)
BASOPHILIC STIPPLING: ABNORMAL
BASOPHILS ABSOLUTE: 0 E9/L (ref 0–0.2)
BASOPHILS RELATIVE PERCENT: 0.9 % (ref 0–2)
BILIRUB SERPL-MCNC: 1 MG/DL (ref 0–1.2)
BUN BLDV-MCNC: 66 MG/DL (ref 8–23)
CALCIUM SERPL-MCNC: 7.6 MG/DL (ref 8.6–10.2)
CHLORIDE BLD-SCNC: 107 MMOL/L (ref 98–107)
CO2: 20 MMOL/L (ref 22–29)
CREAT SERPL-MCNC: 2 MG/DL (ref 0.5–1)
DOHLE BODIES: ABNORMAL
EOSINOPHILS ABSOLUTE: 0.08 E9/L (ref 0.05–0.5)
EOSINOPHILS RELATIVE PERCENT: 0.9 % (ref 0–6)
GFR AFRICAN AMERICAN: 29
GFR NON-AFRICAN AMERICAN: 24 ML/MIN/1.73
GLUCOSE BLD-MCNC: 139 MG/DL (ref 74–99)
HCT VFR BLD CALC: 23.1 % (ref 34–48)
HEMOGLOBIN: 7.5 G/DL (ref 11.5–15.5)
INR BLD: 2.4
LYMPHOCYTES ABSOLUTE: 0.61 E9/L (ref 1.5–4)
LYMPHOCYTES RELATIVE PERCENT: 7 % (ref 20–42)
MCH RBC QN AUTO: 34.7 PG (ref 26–35)
MCHC RBC AUTO-ENTMCNC: 32.5 % (ref 32–34.5)
MCV RBC AUTO: 106.9 FL (ref 80–99.9)
METER GLUCOSE: 142 MG/DL (ref 74–99)
METER GLUCOSE: 177 MG/DL (ref 74–99)
METER GLUCOSE: 198 MG/DL (ref 74–99)
MONOCYTES ABSOLUTE: 0.52 E9/L (ref 0.1–0.95)
MONOCYTES RELATIVE PERCENT: 6.1 % (ref 2–12)
MYELOCYTE PERCENT: 0.9 % (ref 0–0)
NEUTROPHILS ABSOLUTE: 7.48 E9/L (ref 1.8–7.3)
NEUTROPHILS RELATIVE PERCENT: 85.2 % (ref 43–80)
PDW BLD-RTO: 24.4 FL (ref 11.5–15)
PLATELET # BLD: 58 E9/L (ref 130–450)
PLATELET CONFIRMATION: NORMAL
PMV BLD AUTO: 12.2 FL (ref 7–12)
POLYCHROMASIA: ABNORMAL
POTASSIUM REFLEX MAGNESIUM: 3.9 MMOL/L (ref 3.5–5)
PROTHROMBIN TIME: 27.2 SEC (ref 9.3–12.4)
RBC # BLD: 2.16 E12/L (ref 3.5–5.5)
SODIUM BLD-SCNC: 141 MMOL/L (ref 132–146)
TOTAL PROTEIN: 5.1 G/DL (ref 6.4–8.3)
TOXIC GRANULATION: ABNORMAL
WBC # BLD: 8.7 E9/L (ref 4.5–11.5)

## 2020-09-27 PROCEDURE — 80053 COMPREHEN METABOLIC PANEL: CPT

## 2020-09-27 PROCEDURE — 6360000002 HC RX W HCPCS

## 2020-09-27 PROCEDURE — 85610 PROTHROMBIN TIME: CPT

## 2020-09-27 PROCEDURE — 85025 COMPLETE CBC W/AUTO DIFF WBC: CPT

## 2020-09-27 PROCEDURE — 99239 HOSP IP/OBS DSCHRG MGMT >30: CPT | Performed by: FAMILY MEDICINE

## 2020-09-27 PROCEDURE — 6370000000 HC RX 637 (ALT 250 FOR IP): Performed by: STUDENT IN AN ORGANIZED HEALTH CARE EDUCATION/TRAINING PROGRAM

## 2020-09-27 PROCEDURE — 2580000003 HC RX 258: Performed by: STUDENT IN AN ORGANIZED HEALTH CARE EDUCATION/TRAINING PROGRAM

## 2020-09-27 PROCEDURE — G0378 HOSPITAL OBSERVATION PER HR: HCPCS

## 2020-09-27 PROCEDURE — 82962 GLUCOSE BLOOD TEST: CPT

## 2020-09-27 PROCEDURE — 6370000000 HC RX 637 (ALT 250 FOR IP): Performed by: FAMILY MEDICINE

## 2020-09-27 PROCEDURE — 36415 COLL VENOUS BLD VENIPUNCTURE: CPT

## 2020-09-27 RX ORDER — HEPARIN SODIUM (PORCINE) LOCK FLUSH IV SOLN 100 UNIT/ML 100 UNIT/ML
SOLUTION INTRAVENOUS
Status: COMPLETED
Start: 2020-09-27 | End: 2020-09-27

## 2020-09-27 RX ORDER — OXYCODONE HYDROCHLORIDE 5 MG/1
5 TABLET ORAL ONCE
Status: COMPLETED | OUTPATIENT
Start: 2020-09-27 | End: 2020-09-27

## 2020-09-27 RX ORDER — OXYCODONE HYDROCHLORIDE 5 MG/1
5 TABLET ORAL EVERY 4 HOURS PRN
Qty: 30 TABLET | Refills: 0 | Status: SHIPPED | OUTPATIENT
Start: 2020-09-27 | End: 2020-09-30 | Stop reason: SDUPTHER

## 2020-09-27 RX ADMIN — OXYCODONE 5 MG: 5 TABLET ORAL at 08:25

## 2020-09-27 RX ADMIN — SODIUM CHLORIDE, PRESERVATIVE FREE: 5 INJECTION INTRAVENOUS at 15:58

## 2020-09-27 RX ADMIN — ALLOPURINOL 100 MG: 100 TABLET ORAL at 08:17

## 2020-09-27 RX ADMIN — ACETAMINOPHEN 650 MG: 325 TABLET, FILM COATED ORAL at 08:21

## 2020-09-27 RX ADMIN — ACETAMINOPHEN 650 MG: 325 TABLET, FILM COATED ORAL at 00:42

## 2020-09-27 RX ADMIN — PANTOPRAZOLE SODIUM 40 MG: 40 TABLET, DELAYED RELEASE ORAL at 06:21

## 2020-09-27 RX ADMIN — MAGNESIUM GLUCONATE 500 MG ORAL TABLET 400 MG: 500 TABLET ORAL at 08:17

## 2020-09-27 RX ADMIN — TRIMETHOBENZAMIDE HYDROCHLORIDE 300 MG: 300 CAPSULE ORAL at 13:34

## 2020-09-27 RX ADMIN — DOCUSATE SODIUM 100 MG: 100 CAPSULE, LIQUID FILLED ORAL at 08:17

## 2020-09-27 RX ADMIN — METOPROLOL SUCCINATE 12.5 MG: 25 TABLET, EXTENDED RELEASE ORAL at 08:18

## 2020-09-27 RX ADMIN — ISOSORBIDE DINITRATE 5 MG: 10 TABLET ORAL at 08:17

## 2020-09-27 RX ADMIN — SODIUM BICARBONATE 650 MG: 650 TABLET ORAL at 08:17

## 2020-09-27 RX ADMIN — PREGABALIN 50 MG: 50 CAPSULE ORAL at 08:17

## 2020-09-27 RX ADMIN — PETROLATUM: 42 OINTMENT TOPICAL at 08:22

## 2020-09-27 RX ADMIN — SODIUM CHLORIDE: 9 INJECTION, SOLUTION INTRAVENOUS at 02:37

## 2020-09-27 RX ADMIN — MULTIPLE VITAMINS W/ MINERALS TAB 1 TABLET: TAB at 08:17

## 2020-09-27 RX ADMIN — INSULIN GLARGINE 10 UNITS: 100 INJECTION, SOLUTION SUBCUTANEOUS at 06:21

## 2020-09-27 RX ADMIN — ISOSORBIDE DINITRATE 5 MG: 10 TABLET ORAL at 13:33

## 2020-09-27 ASSESSMENT — PAIN DESCRIPTION - DESCRIPTORS: DESCRIPTORS: CONSTANT;DISCOMFORT;NAGGING

## 2020-09-27 ASSESSMENT — PAIN DESCRIPTION - ONSET: ONSET: ON-GOING

## 2020-09-27 ASSESSMENT — PAIN SCALES - GENERAL
PAINLEVEL_OUTOF10: 0
PAINLEVEL_OUTOF10: 0
PAINLEVEL_OUTOF10: 9
PAINLEVEL_OUTOF10: 7

## 2020-09-27 ASSESSMENT — PAIN DESCRIPTION - PROGRESSION: CLINICAL_PROGRESSION: NOT CHANGED

## 2020-09-27 ASSESSMENT — PAIN DESCRIPTION - PAIN TYPE: TYPE: CHRONIC PAIN

## 2020-09-27 ASSESSMENT — PAIN DESCRIPTION - LOCATION: LOCATION: GENERALIZED

## 2020-09-27 ASSESSMENT — PAIN DESCRIPTION - FREQUENCY: FREQUENCY: CONTINUOUS

## 2020-09-27 NOTE — PROGRESS NOTES
[facility] Attending    S: 76 y.o. 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 Admitted for further evaluation and management of acute concerns. She has received vitamin K and transfused. Today, feeling ok    O: VS- Blood pressure (!) 105/51, pulse 83, temperature 97.1 °F (36.2 °C), temperature source Temporal, resp. rate 18, height 5' 4\" (1.626 m), weight 164 lb 14.4 oz (74.8 kg), SpO2 98 %, not currently breastfeeding. Exam is as noted by resident with the following changes, additions or corrections:  Gen NAD, A&A, pallor noted  CV RRR, systolic murmur  Resp decreased, currently CTA  Abd distended, likely ascites. Skin over lower abdomen somewhat edematous, tender, without palpable hernia or induration, no erythema   Ext stasis changes, 2-3+ edema of bilateral LE to mid shin. Pulses intact but decreased. INR now 2.4    Impressions: Active Problems:    Metastatic breast cancer (HCC)    Anemia    Supratherapeutic INR    Decubitus ulcer    KARISHMA (acute kidney injury) ([facility] [address]  Resolved Problems:    * No resolved hospital problems. *      Plan:   Discharge today. H/H, INR now acceptable   She will need BMP, CBC, INR in next 24-48 hours   Coumadin 1 mg daily for now. Attending Physician Statement  I have reviewed the chart and seen the patient with the resident(s). I personally reviewed images, EKG's and similar tests, if present. I personally reviewed and performed key elements of the history and exam.  I have reviewed and confirmed student and/or resident history and exam with changes as indicated above. I agree with the assessment, plan and orders as documented by the resident.   Please refer to the resident and/or student note for additional information.       Shannan Kwon

## 2020-09-27 NOTE — PROGRESS NOTES
Dr. Ai Agarwal via perfect serve patients bp of 82/50. Patient resting in bed without complaints. Waiting for response.      Madonna Ernst RN

## 2020-09-28 ENCOUNTER — CARE COORDINATION (OUTPATIENT)
Dept: CASE MANAGEMENT | Age: 75
End: 2020-09-28

## 2020-09-28 ENCOUNTER — HOSPITAL ENCOUNTER (OUTPATIENT)
Age: 75
Discharge: HOME | End: 2020-09-30
Payer: COMMERCIAL

## 2020-09-28 ENCOUNTER — ANTI-COAG VISIT (OUTPATIENT)
Dept: FAMILY MEDICINE CLINIC | Age: 75
End: 2020-09-28

## 2020-09-28 ENCOUNTER — OFFICE VISIT (OUTPATIENT)
Dept: FAMILY MEDICINE CLINIC | Age: 75
End: 2020-09-28
Payer: COMMERCIAL

## 2020-09-28 VITALS
HEIGHT: 64 IN | WEIGHT: 175 LBS | SYSTOLIC BLOOD PRESSURE: 107 MMHG | DIASTOLIC BLOOD PRESSURE: 42 MMHG | BODY MASS INDEX: 29.88 KG/M2 | OXYGEN SATURATION: 99 % | TEMPERATURE: 99.5 F | HEART RATE: 100 BPM

## 2020-09-28 LAB
ALBUMIN SERPL-MCNC: 2.8 G/DL (ref 3.5–5.2)
ALP BLD-CCNC: 158 U/L (ref 35–104)
ALT SERPL-CCNC: 29 U/L (ref 0–32)
ANION GAP SERPL CALCULATED.3IONS-SCNC: 17 MMOL/L (ref 7–16)
AST SERPL-CCNC: 30 U/L (ref 0–31)
BILIRUB SERPL-MCNC: 1.1 MG/DL (ref 0–1.2)
BLOOD BANK DISPENSE STATUS: NORMAL
BLOOD BANK PRODUCT CODE: NORMAL
BPU ID: NORMAL
BUN BLDV-MCNC: 70 MG/DL (ref 8–23)
CALCIUM SERPL-MCNC: 8.3 MG/DL (ref 8.6–10.2)
CHLORIDE BLD-SCNC: 105 MMOL/L (ref 98–107)
CO2: 19 MMOL/L (ref 22–29)
CREAT SERPL-MCNC: 2 MG/DL (ref 0.5–1)
DESCRIPTION BLOOD BANK: NORMAL
GFR AFRICAN AMERICAN: 29
GFR NON-AFRICAN AMERICAN: 24 ML/MIN/1.73
GLUCOSE BLD-MCNC: 130 MG/DL (ref 74–99)
HCT VFR BLD CALC: 26.6 % (ref 34–48)
HEMOGLOBIN: 8.6 G/DL (ref 11.5–15.5)
INR BLD: NORMAL
INTERNATIONAL NORMALIZATION RATIO, POC: 2.4
MCH RBC QN AUTO: 35.1 PG (ref 26–35)
MCHC RBC AUTO-ENTMCNC: 32.3 % (ref 32–34.5)
MCV RBC AUTO: 108.6 FL (ref 80–99.9)
PDW BLD-RTO: 25 FL (ref 11.5–15)
PLATELET # BLD: 72 E9/L (ref 130–450)
PLATELET CONFIRMATION: NORMAL
PMV BLD AUTO: 12.3 FL (ref 7–12)
POTASSIUM SERPL-SCNC: 4.1 MMOL/L (ref 3.5–5)
PROTIME: 2.4 SECONDS
RBC # BLD: 2.45 E12/L (ref 3.5–5.5)
SODIUM BLD-SCNC: 141 MMOL/L (ref 132–146)
TOTAL PROTEIN: 6.1 G/DL (ref 6.4–8.3)
WBC # BLD: 17.6 E9/L (ref 4.5–11.5)

## 2020-09-28 PROCEDURE — 85027 COMPLETE CBC AUTOMATED: CPT

## 2020-09-28 PROCEDURE — 1090F PRES/ABSN URINE INCON ASSESS: CPT | Performed by: FAMILY MEDICINE

## 2020-09-28 PROCEDURE — 99212 OFFICE O/P EST SF 10 MIN: CPT | Performed by: FAMILY MEDICINE

## 2020-09-28 PROCEDURE — 3017F COLORECTAL CA SCREEN DOC REV: CPT | Performed by: FAMILY MEDICINE

## 2020-09-28 PROCEDURE — G8417 CALC BMI ABV UP PARAM F/U: HCPCS | Performed by: FAMILY MEDICINE

## 2020-09-28 PROCEDURE — 4040F PNEUMOC VAC/ADMIN/RCVD: CPT | Performed by: FAMILY MEDICINE

## 2020-09-28 PROCEDURE — 36415 COLL VENOUS BLD VENIPUNCTURE: CPT

## 2020-09-28 PROCEDURE — 36415 COLL VENOUS BLD VENIPUNCTURE: CPT | Performed by: FAMILY MEDICINE

## 2020-09-28 PROCEDURE — 99213 OFFICE O/P EST LOW 20 MIN: CPT | Performed by: FAMILY MEDICINE

## 2020-09-28 PROCEDURE — 80053 COMPREHEN METABOLIC PANEL: CPT

## 2020-09-28 PROCEDURE — 1123F ACP DISCUSS/DSCN MKR DOCD: CPT | Performed by: FAMILY MEDICINE

## 2020-09-28 PROCEDURE — 1036F TOBACCO NON-USER: CPT | Performed by: FAMILY MEDICINE

## 2020-09-28 PROCEDURE — G8427 DOCREV CUR MEDS BY ELIG CLIN: HCPCS | Performed by: FAMILY MEDICINE

## 2020-09-28 PROCEDURE — 1111F DSCHRG MED/CURRENT MED MERGE: CPT | Performed by: FAMILY MEDICINE

## 2020-09-28 PROCEDURE — G8399 PT W/DXA RESULTS DOCUMENT: HCPCS | Performed by: FAMILY MEDICINE

## 2020-09-28 NOTE — CARE COORDINATION
Danilo [facility] Transitions Initial Follow Up Call    Call within 2 business days of discharge: Yes    Patient: Arturo Rasheed Patient : 1945   MRN: [id number]  Reason for Admission: KARISHMA  Discharge Date: 20 RARS: Readmission Risk Score: 39      Last Discharge [facility]       Complaint Diagnosis Description Type Department Provider    20 Abnormal Lab KARISHMA (acute kidney injury) ([facility] [address] . .. ED to Hosp-Admission (Discharged) (ADMITTED) SEYZ 7WE Jackeline 26, DO; Dariusz Veliz,... Spoke with: Arturo Rasheed, patient    Facility: [facility]    Patient contacted regarding recent discharge and COVID-19 risk. Discussed COVID-19 related testing which was not done at this time. Test results were negative. Patient informed of results, if available? Testing not done     Care Transition Nurse/ Ambulatory Care Manager contacted the patient by telephone to perform post discharge assessment. Verified name and  with patient as identifiers. Patient has following risk factors of: heart failure, diabetes, chronic kidney disease and sarcoidosis. CTN/ACM reviewed discharge instructions, medical action plan and red flags related to discharge diagnosis. Reviewed and educated them on any new and changed medications related to discharge diagnosis. Advised obtaining a 90-day supply of all daily and as-needed medications. Education provided regarding infection prevention, and signs and symptoms of COVID-19 and when to seek medical attention with patient who verbalized understanding. Discussed exposure protocols and quarantine from [address] you at higher risk for severe illness  and given an opportunity for questions and concerns. The patient agrees to contact the COVID-19 hotline [phone] or PCP office for questions related to their healthcare. CTN/ACM provided contact information for future reference. From CDC: Are you at higher risk for severe illness?      Wash your hands often.   Avoid close contact (6 feet, which is about two arm lengths) with people who are sick.  Put distance between yourself and other people if COVID-19 is spreading in your community.  Clean and disinfect frequently touched surfaces.  Avoid all cruise travel and non-essential air travel.  Call your healthcare professional if you have concerns about COVID-19 and your underlying condition or if you are sick. For more information on steps you can take to protect yourself, see [facility]'s How to [address] [place] [address] for follow-up call in 7-14 days based on severity of symptoms and risk factors. Patient denies any complaints at this time, states she has been resting and napping most of the day. Patient denies sob and swelling. Patient states [address] performs wound care twice weekly and aides assist patient 3 days per week, 2 hours per day. Patient states she doesn't check her blood sugar nor does she follow a low carb diet. Per patient, Dr. Luz Maria Glynn instructed patient she does not need to follow low carb diet. Patient's hemoglobin A1C on 6/19/20 8.1%. HF zones reviewed and encouraged patient to notify the physician of any weight gain of 3lbs in one day or 5lbs in one week, increased shortness of breath, coughing, or increase in edema. Patient instructed on importance of low Na (less than 2000 mg sodium/daily) diet. Patient verbalized understanding. Patient instructed to read labels and weigh self daily. Due to Coumadin, bleeding precautions reviewed with patient. Patient denies any abnormal or uncontrolled bleeding. Patient instructed to report any abnormal bleeding and to call 911 for any uncontrolled bleeding. Patient verbalized understanding. Patient reports she discontinued Protonix. Patient reports she uses a rollator in the home, but should use a wheelchair in the community. [facility] [place]. Patient had follow up with Dr. Luz Maria Glynn today, with INR monitoring.   Patient

## 2020-09-28 NOTE — PROGRESS NOTES
Post-Discharge Transitional Care Management Services or Hospital Follow Up      Remer Kawasaki   YOB: 1945    Date of Office Visit:  9/28/2020  Date of Hospital Admission: 9/24/20  Date of Hospital Discharge: 9/27/20  Readmission Risk Score(high >=14%.  Medium >=10%):Readmission Risk Score: 36      Care management risk score Rising risk (score 2-5) and Complex Care (Scores >=6): 4     Non face to face  following discharge, date last encounter closed (first attempt may have been earlier): *No documented post hospital discharge outreach found in the last 14 days *No documented post hospital discharge outreach found in the last 14 days    Call initiated 2 business days of discharge: *No response recorded in the last 14 days     Patient Active Problem List   Diagnosis    Metastatic breast cancer ([address]    Essential hypertension    Coronary artery disease involving native coronary artery of native heart without angina pectoris    Nephrolithiasis    CHF (congestive heart failure) ([address]    Humerus fracture    Shoulder pain, left    B12 deficiency    Hyperlipidemia    Rib lesion    Subclavian vein occlusion, bilateral ([address]    Pericardial effusion    MI (myocardial infarction) ([address]    Arthritis    Sarcoidosis    Lymph node enlargement    Anesthesia    Rectal bleeding    Ventral hernia    Type 2 diabetes mellitus without complication, with long-term current use of insulin ([address]   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    Diabetic polyneuropathy associated with type 2 diabetes mellitus (HCC)    Leg swelling    History of deep vein thrombosis    Chronic venous insufficiency    Lymphedema of both lower extremities    Ambulatory dysfunction    CKD (chronic kidney disease) stage 3, GFR 30-59 ml/min (HCC)    Charcot's joint of foot in type 2 diabetes mellitus ([facility] [address]    Ascites    Palliative care encounter    Cancer associated pain    HAP (hospital-acquired pneumonia)    Acute systolic heart failure (HCC)    Nonischemic cardiomyopathy (HCC)    Status post coronary artery bypass graft    Class 2 severe obesity due to excess calories with serious comorbidity and body mass index (BMI) of 35.0 to 35.9 in adult [facility])    Type 2 diabetes mellitus with hyperglycemia (HCC)    Acute hypercapnic respiratory failure (HCC)    Altered mental status    Goals of care, counseling/discussion    Palliative care by specialist    Metastatic disease ([facility] [address] [facility]    Moderate protein-calorie malnutrition ([facility] [address]    Precordial pain    Chronic systolic heart failure (HCC)    Pleural effusion    Cellulitis and abscess of leg    Bony metastasis (HCC) left leg    Decubitus ulcer    Left leg pain    Degenerative disc disease at L5-S1 level    KARISHMA (acute kidney injury) ([facility])       Allergies   Allergen Reactions    Macrobid [Nitrofurantoin Monohydrate Macrocrystals] Hives       Medications listed as ordered at the time of discharge from [facility] Medication Instructions YUT:[id number]    Printed on:09/28/20 1152   Medication Information                      acetaminophen (TYLENOL) 325 MG tablet  Take 650 mg by mouth every 6 hours as needed             albuterol sulfate (PROAIR RESPICLICK) 496 (90 Base) MCG/ACT aerosol powder inhalation  Inhale 2 puffs into the lungs every 6 hours as needed for Wheezing or Shortness of Breath             allopurinol (ZYLOPRIM) 100 MG tablet  Take 1 tablet by mouth daily             blood glucose monitor strips  1 strip by Other route 3 times daily Test 3 times a day & as needed for symptoms of irregular blood glucose.  Please provide brand covered by insurance. Diagnosis code E11.65             Calcium Citrate-Vitamin D (RA CALCIUM CIT-VIT D-3 PETITES) 200-250 MG-UNIT TABS  take 1 tablet by mouth twice a day             Cyanocobalamin (VITAMIN B 12) 500 MCG TABS  Take 1 tablet by mouth daily              docusate (COLACE, DULCOLAX) 100 MG CAPS  Take 100 mg by mouth daily             insulin glargine (LANTUS SOLOSTAR) 100 UNIT/ML injection pen  Inject 10 Units into the skin daily             insulin lispro (HUMALOG) 100 UNIT/ML injection vial  Inject 0-3 Units into the skin nightly             Insulin Pen Needle (MEIJER PEN NEEDLES) 29G X 12MM MISC  1 each by Does not apply route daily             isosorbide dinitrate (ISORDIL) 5 MG tablet  Take 1 tablet by mouth 3 times daily             Lancets MISC  1 each by Does not apply route 3 times daily Please provide brand covered by insurance. Diagnosis code E11.65             lidocaine (LIDODERM) 5 %  Apply 1 patch topically daily             magnesium oxide (MAG-OX) 400 MG tablet  Take 1 tablet by mouth daily             metoprolol succinate (TOPROL XL) 25 MG extended release tablet  Take 0.5 tablets by mouth daily             Misc. Devices KIT  Glucometer for blood sugar testing. Please provide brand covered by insurance. Diagnosis code E11.65             Multiple Vitamins-Minerals (THERAPEUTIC MULTIVITAMIN-MINERALS) tablet  Take 1 tablet by mouth daily             nitroGLYCERIN (NITROSTAT) 0.4 MG SL tablet  up to max of 3 total doses. If no relief after 1 dose, call 911. omeprazole 20 MG EC tablet  Take 1 tablet by mouth daily             oxyCODONE (ROXICODONE) 5 MG immediate release tablet  Take 1 tablet by mouth every 4 hours as needed for Pain for up to 5 days.              palbociclib (IBRANCE) 100 MG capsule  Take 100 mg by mouth 3 weeks on and 1 week off also receives injections every 3 weeks per Dr Reji Rodriguez             pregabalin (LYRICA) 50 MG capsule  Take 1 capsule by mouth 3 times daily for 120 days. sodium bicarbonate 650 MG tablet  Take 650 mg by mouth 2 times daily             sodium chloride (OCEAN, BABY AYR) 0.65 % nasal spray  1 spray by Nasal route as needed for Congestion (dryness)             warfarin (COUMADIN) 1 MG tablet  Take daily dose as directed by a physician. Medications marked \"taking\" at this time  Outpatient Medications Marked as Taking for the 9/28/20 encounter (Office Visit) with Stas Olson,    Medication Sig Dispense Refill    oxyCODONE (ROXICODONE) 5 MG immediate release tablet Take 1 tablet by mouth every 4 hours as needed for Pain for up to 5 days. 30 tablet 0    allopurinol (ZYLOPRIM) 100 MG tablet Take 1 tablet by mouth daily 90 tablet 1    magnesium oxide (MAG-OX) 400 MG tablet Take 1 tablet by mouth daily 90 tablet 0    warfarin (COUMADIN) 1 MG tablet Take daily dose as directed by a physician. 180 tablet 1    Misc. Devices KIT Glucometer for blood sugar testing. Please provide brand covered by insurance. Diagnosis code E11.65 1 kit 0    blood glucose monitor strips 1 strip by Other route 3 times daily Test 3 times a day & as needed for symptoms of irregular blood glucose. Please provide brand covered by insurance. Diagnosis code E11.65 100 strip 3    Lancets MISC 1 each by Does not apply route 3 times daily Please provide brand covered by insurance.  Diagnosis code E11.65 100 each 3    insulin glargine (LANTUS SOLOSTAR) 100 UNIT/ML injection pen Inject 10 Units into the skin daily 5 pen 0    lidocaine (LIDODERM) 5 % Apply 1 patch topically daily      acetaminophen (TYLENOL) 325 MG tablet Take 650 mg by mouth every 6 hours as needed      Cyanocobalamin (VITAMIN B 12) 500 MCG TABS Take 1 tablet by mouth daily       metoprolol succinate (TOPROL XL) 25 MG extended release tablet Take 0.5 tablets by mouth daily 60 tablet 3    insulin lispro (HUMALOG) 100 UNIT/ML injection vial Inject 0-3 Units into the Home care for nursing and wound care.          Medical Decision Making: moderate complexity

## 2020-09-29 RX ORDER — SODIUM BICARBONATE 650 MG/1
650 TABLET ORAL 2 TIMES DAILY
Qty: 180 TABLET | Refills: 1 | Status: SHIPPED | OUTPATIENT
Start: 2020-09-29

## 2020-09-29 RX ORDER — NICOTINE POLACRILEX 4 MG/1
20 GUM, CHEWING ORAL DAILY
Qty: 90 TABLET | Refills: 1 | Status: SHIPPED
Start: 2020-09-29 | End: 2020-10-27

## 2020-09-29 NOTE — TELEPHONE ENCOUNTER
Last Appointment:  9/28/2020  Future Appointments   Date Time Provider Nick Ritchieisti   10/13/2020  9:45 AM FORD Thorne - CNP [facility]   10/19/2020 11:00 AM DO Najma Pierre [facility]   10/30/2020  1:00 PM Cesar Arizmendi MD [id number]

## 2020-09-30 ENCOUNTER — ANTI-COAG VISIT (OUTPATIENT)
Dept: FAMILY MEDICINE CLINIC | Age: 75
End: 2020-09-30

## 2020-09-30 ENCOUNTER — TELEPHONE (OUTPATIENT)
Dept: FAMILY MEDICINE CLINIC | Age: 75
End: 2020-09-30

## 2020-09-30 LAB
BLOOD CULTURE, ROUTINE: NORMAL
INR BLD: 2.4
INR BLD: 2.4

## 2020-09-30 RX ORDER — OXYCODONE HYDROCHLORIDE 5 MG/1
5 TABLET ORAL EVERY 4 HOURS PRN
Qty: 20 TABLET | Refills: 0 | Status: SHIPPED
Start: 2020-09-30 | End: 2020-10-14 | Stop reason: SDUPTHER

## 2020-09-30 NOTE — TELEPHONE ENCOUNTER
The patient called to state she cannot locate her Oxycodone that was prescribed on 9/27/20. She states she has looked high and low and cannot locate the medicine. Patient does not know what she needs to do /as her aide is not scheduled to come back until Friday. I advised she look around house some more but she has looked 3 times.   Please advise

## 2020-09-30 NOTE — TELEPHONE ENCOUNTER
I called patient to discuss. I advised her to call both [facility], as this is concerning, and she has had people in her home to help with her meds. Please also call [facility] home care services to let them know that a controlled medication has gone missing from Ms. Cory Garza home, and we are keeping a close watch on the situation. I called the pharmacy to verify that the prescription dated 9/27 had not yet been filled. Ms. Sindi Thibodeaux could not find the prescription, so I asked the pharmacy to flag and call with any paper prescription dated 9/27 for oxycodone in Ms. Cory Garza name. In the meantime, I refilled oxycodone 5 mg for only 20 tablets. She uses this sparingly. Advised that she please call with questions or concerns, and she understands. Advised to keep her medication safe and guarded (lock it up from others if need be), and she expressed understanding. Medication has been ordered to [address] [place] [address]. Please call [facility] so that they are aware. Ms. Sindi Thibodeaux gave permission for this, and she was planning to call as welll. There is no current concern for or knowledge for theft at this point, but this is a serious situation with loss of controlled substance, so we would appreciate their vigilance in this. Thank you!

## 2020-09-30 NOTE — DISCHARGE SUMMARY
Discharge Summary    Leighton Lane  :  1945  MRN:  [id number]    Admit date:  2020  Discharge date:  2020    Admitting Physician:  Martha Beltran DO    Discharge Diagnoses:    Patient Active Problem List   Diagnosis    Metastatic breast cancer [facility])    Essential hypertension    Coronary artery disease involving native coronary artery of native heart without angina pectoris    Nephrolithiasis    CH[facility] (congestive heart failure) ([facility]    Humerus fracture    Shoulder pain, left    B12 deficiency    Hyperlipidemia    Rib lesion    Subclavian vein occlusion, bilateral (HCC)    Pericardial effusion    [facility] infarction) [facility]    Arthritis    Sarcoidosis    Lymph node enlargement    Anesthesia    Rectal bleeding    Ventral hernia    Type 2 diabetes mellitus without complication, with long-term current use of insulin ([facility])    Anemia of chronic disease    Anemia    Chronic deep vein thrombosis (DVT) of proximal vein of right lower extremity ([facility])    Bilateral edema of lower extremity    Peripheral polyneuropathy    Complicated UTI (urinary tract infection)    Diarrhea with dehydration    Chronic anticoagulation    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    Diabetic polyneuropathy associated with type 2 diabetes mellitus ([facility])    Leg swelling    History of deep vein thrombosis    Chronic venous insufficiency    Lymphedema of both lower extremities    Ambulatory dysfunction    CKD (chronic kidney disease) stage 3, GFR 30-59 ml/min ([facility])    Charcot's joint of foot in type 2 diabetes mellitus ([facility]    Ascites    Palliative care encounter    Cancer associated pain    HAP (hospital-acquired pneumonia)    Acute systolic heart failure (HCC)    Nonischemic cardiomyopathy ([facility]    Status post coronary artery bypass graft    Class 2 severe obesity due to excess calories with serious comorbidity and body mass index (BMI) of 35.0 to 35.9 in adult ([facility]    Type 2 diabetes mellitus with hyperglycemia (HCC)    Acute hypercapnic respiratory failure (HCC)    Altered mental status    Goals of care, counseling/discussion    Palliative care by specialist    Metastatic disease ([facility]    Moderate protein-calorie malnutrition ([facility]    Precordial pain    Chronic systolic heart failure (HCC)    Pleural effusion    Cellulitis and abscess of leg    Bony metastasis (HCC) left leg    Decubitus ulcer    Left leg pain    Degenerative disc disease at L5-S1 level    KARISHMA (acute kidney injury) ([facility]       Admission Condition:  fair    Discharged Condition:  good    Hospital Course:  Connie Lopez is a 76 y.o. female with PMH of breast cancer status post lumpectomy with bony and liver mets, coronary artery disease, CHF ejection fraction 50 to 55%, recurrent DVTs, and recent admission for hip wounds, presented to ED from home with INR 9.0 and Hgb 7.2. Patient had received blood transfusion day prior. Workup revealed supra-therapeutic INR, anemia, and KARISHMA. Patient was admitted for above problems, treated with blood transfusions and vitamin K. Home warfarin, bumex, Inbrance. Hematology was consulted and recommended holding warfarin. They were following up. Patient remained stable, recovered and was in a suitable condition to be discharged to home. Discharge plan:   1. D/C to home  2. Resume warfarin at 1 mg daily until told otherwise  3. Resume home Inbrance      Discharge Medications:         Medication List      CHANGE how you take these medications    acetaminophen 325 MG tablet  Commonly known as:  TYLENOL  What changed:  Another medication with the same name was removed. Continue taking this medication, and follow the directions you see here.         CONTINUE taking these medications    albuterol sulfate 108 (90 Base) MCG/ACT aerosol powder inhalation  Commonly known as:  PROAIR RESPICLICK  Inhale 2 puffs into the lungs every 6 hours as needed for Wheezing or extremities and b/l decubitus ulcers over hips. Skin color, texture, turgor otherwise normal, no rashes  MUSCULOSKELETAL: No joint swelling, no muscle tenderness. Normal ROM in extremities. NEUROLOGIC: Alert & Oriented; no focal deficits appreciated    Disposition:   home    Future Appointments   Date Time Provider Nick Castillo   10/13/2020  9:45 AM FORD Hightower - CNP [facility]   10/19/2020 11:00 AM DO Ajit Collier [facility]   10/30/2020  1:00 PM Jenn Crews MD [address]       More than 30 minutes was spent in preparation of this patient's discharge including, but not limited to, examination, preparation of documents, prescription preparation, counseling and coordination.     Signed:  Cyn Cain DO  9/30/2020, 6:57 PM

## 2020-10-01 ENCOUNTER — TELEPHONE (OUTPATIENT)
Dept: FAMILY MEDICINE CLINIC | Age: 75
End: 2020-10-01

## 2020-10-01 NOTE — TELEPHONE ENCOUNTER
I called [facility] today and made them aware of pt medications missing and also that her pcp ordered a few tablets for pt to get by until the missing meds are found hopefully.

## 2020-10-01 NOTE — TELEPHONE ENCOUNTER
Pt daughter Yolie Delgado called in stating that pt was called yesterday regarding her lab results and was told her white blood cells are high which is a sign of infection per Yolie Delgado. Does pt need to be seen in our office in the next few days or the hospital or does pt need to see her Oncologists soon. Yolie Delgado stated pt does not see the Oncologist for another month.  A call to pt daughter Yolie Delgado would be great since she would like to speak to pt  pcp regarding her labs and the way pt has been acting and feeling since she was discharged from the hospital.

## 2020-10-01 NOTE — TELEPHONE ENCOUNTER
Spoke with Royer  from [facility]. He states that Jeanne's nurse  is scheduled to go out tomorrow and will be able to access her apartment. I faxed the order to him so you will not robyn a verbal order to sign.

## 2020-10-01 NOTE — TELEPHONE ENCOUNTER
I called patient's daughter back. We discussed the elevated white blood cell count and the potential causes, including infection and treatment with corticosteroids, which the patient had received recently from Heme/Onc. She states the Ms. Mey Cifuentes had been in a lot of pain and had misplaced her pills. She was tearful about this. She has received a refill of pain medications and is more comfortable. No new symptoms or concerns, no evidence for new infection noted. However, she had not \"been herself\" for the past few days. We discussed the risks of infection and possible need for further testing, supportive care, and imaging, which would need to be done at the hospital.  They decline for now, but patient and daughter will seek care urgently at ED if need be for concerns. In the meantime, we will plan to recheck labs by home health care today. Please call [facility]. Please order a CBC with differential and CMP today. (Please let them know that, if Ms. Mey Cifuentes does not answer the door, please call the office, who can let them into the building, and they will be able to access Ms. Chitra Mccurdy [facility].)      Will place orders in Epic. Thank you!

## 2020-10-06 ENCOUNTER — CARE COORDINATION (OUTPATIENT)
Dept: CASE MANAGEMENT | Age: 75
End: 2020-10-06

## 2020-10-06 ENCOUNTER — TELEPHONE (OUTPATIENT)
Dept: FAMILY MEDICINE CLINIC | Age: 75
End: 2020-10-06

## 2020-10-06 NOTE — TELEPHONE ENCOUNTER
[facility] nurse called re patient    She has new would on rt leg  With blistering around wound    It is 2.5 x 0.9 x 0.1  Been thre x 2 days    Also she has a 5 pound weight gain since 9/29/20. Lung sounds are no crackles or wheezing.      [address]

## 2020-10-06 NOTE — TELEPHONE ENCOUNTER
Thank you! Can she come to our office to be seen in the next day or two? We need to see the wound and know where on the leg it is. Also, we need to consider giving her a diuretic for the weight gain, but we should assess her in person. Please ask her to avoid salty foods. Also, we need to check an INR. Can she get to our office in the next day or so? Thank you!

## 2020-10-06 NOTE — CARE COORDINATION
[facility] Transitions Follow Up Call    10/6/2020    Patient: Jb Villavicencio  Patient : 1945   MRN: [id number]  Reason for Admission: KARISHMA  Discharge Date: 20 RARS: Readmission Risk Score: 36       Attempted to contact patient today 10/6/20 for hospital discharge/COVID-19 risk follow up. Left message requesting a return call back to [facility] and provided contact information.      Ky Hannah, APRN

## 2020-10-07 NOTE — TELEPHONE ENCOUNTER
Pt was called today and according to pt she has had these wounds for about 6 months and they look the same per pt. Pt really does not want to come in again in the office since he home care nurse will be back by Friday of this week and pt will be going to wound care on Tuesday 10-13-20 to get her wounds checked out. Pt also stated that she has gained weight and I advised her of what doctor would like to do for that but she needs to be here in person for another check up. Pt would just like her pcp to call her since pt does not want to come in right now.

## 2020-10-07 NOTE — TELEPHONE ENCOUNTER
I called her back. From her perspective, her symptoms have not worsened or changed. She declines a discussion about Bumex right now. She declines an appointment. She is receiving wound care at home, and she states she already has an appointment with ID coming up to assess her wounds within the next week. We discussed her use of pain medication; she is pretty certain that she accidentally threw away her pills before. She does not need refills currently. She is using the pain pills about once to twice daily; we discussed that while it is good to use these sparingly, she can use the medication three times daily with caution if need be for severe pain, and please call us if she has questions, concerns, or needs refills. I asked her to please call us with an INR reading in the next 1-2 days, and please call with any new or worsening symptoms or if she is willing to come in for an appointment. Thank you!

## 2020-10-14 ENCOUNTER — TELEPHONE (OUTPATIENT)
Dept: FAMILY MEDICINE CLINIC | Age: 75
End: 2020-10-14

## 2020-10-14 RX ORDER — OXYCODONE HYDROCHLORIDE 5 MG/1
5 TABLET ORAL EVERY 6 HOURS PRN
Qty: 60 TABLET | Refills: 0 | Status: SHIPPED
Start: 2020-10-14 | End: 2020-10-27

## 2020-10-14 NOTE — TELEPHONE ENCOUNTER
Patient would like a refill on her pain medication  She is also stating she has had diarrhea since Saturday and still occurring every time she drinks   Vomited over the weekend. and has not vomited since but still nauseated . No fever. Eating little to none. Drinking water but caused diarrhea.   No nurse until THursday or Friday to change her dressing   Please advise

## 2020-10-19 ENCOUNTER — HOSPITAL ENCOUNTER (OUTPATIENT)
Age: 75
Discharge: HOME | End: 2020-10-21
Payer: COMMERCIAL

## 2020-10-19 ENCOUNTER — ANTI-COAG VISIT (OUTPATIENT)
Dept: FAMILY MEDICINE CLINIC | Age: 75
End: 2020-10-19

## 2020-10-19 ENCOUNTER — OFFICE VISIT (OUTPATIENT)
Dept: FAMILY MEDICINE CLINIC | Age: 75
End: 2020-10-19
Payer: COMMERCIAL

## 2020-10-19 VITALS
HEIGHT: 64 IN | SYSTOLIC BLOOD PRESSURE: 113 MMHG | DIASTOLIC BLOOD PRESSURE: 71 MMHG | TEMPERATURE: 98.2 F | BODY MASS INDEX: 29.19 KG/M2 | WEIGHT: 171 LBS | RESPIRATION RATE: 18 BRPM

## 2020-10-19 PROBLEM — L97.909 STASIS ULCER (HCC): Status: ACTIVE | Noted: 2020-10-19

## 2020-10-19 PROBLEM — I83.009 STASIS ULCER (HCC): Status: ACTIVE | Noted: 2020-10-19

## 2020-10-19 LAB
ALBUMIN SERPL-MCNC: 3 G/DL (ref 3.5–5.2)
ALP BLD-CCNC: 140 U/L (ref 35–104)
ALT SERPL-CCNC: 41 U/L (ref 0–32)
ANION GAP SERPL CALCULATED.3IONS-SCNC: 21 MMOL/L (ref 7–16)
ANISOCYTOSIS: ABNORMAL
AST SERPL-CCNC: 37 U/L (ref 0–31)
BASOPHILS ABSOLUTE: 0.02 E9/L (ref 0–0.2)
BASOPHILS RELATIVE PERCENT: 0.4 % (ref 0–2)
BILIRUB SERPL-MCNC: 1 MG/DL (ref 0–1.2)
BILIRUBIN DIRECT: 0.5 MG/DL (ref 0–0.3)
BILIRUBIN, INDIRECT: 0.5 MG/DL (ref 0–1)
BUN BLDV-MCNC: 58 MG/DL (ref 8–23)
BURR CELLS: ABNORMAL
CALCIUM SERPL-MCNC: 8.9 MG/DL (ref 8.6–10.2)
CHLORIDE BLD-SCNC: 103 MMOL/L (ref 98–107)
CO2: 17 MMOL/L (ref 22–29)
CREAT SERPL-MCNC: 1.9 MG/DL (ref 0.5–1)
EOSINOPHILS ABSOLUTE: 0.04 E9/L (ref 0.05–0.5)
EOSINOPHILS RELATIVE PERCENT: 0.7 % (ref 0–6)
GFR AFRICAN AMERICAN: 31
GFR NON-AFRICAN AMERICAN: 26 ML/MIN/1.73
GLUCOSE BLD-MCNC: 96 MG/DL (ref 74–99)
HCT VFR BLD CALC: 27 % (ref 34–48)
HEMOGLOBIN: 8.4 G/DL (ref 11.5–15.5)
IMMATURE GRANULOCYTES #: 0.02 E9/L
IMMATURE GRANULOCYTES %: 0.4 % (ref 0–5)
INTERNATIONAL NORMALIZATION RATIO, POC: 6.6
LYMPHOCYTES ABSOLUTE: 0.32 E9/L (ref 1.5–4)
LYMPHOCYTES RELATIVE PERCENT: 5.9 % (ref 20–42)
MCH RBC QN AUTO: 37.2 PG (ref 26–35)
MCHC RBC AUTO-ENTMCNC: 31.1 % (ref 32–34.5)
MCV RBC AUTO: 119.5 FL (ref 80–99.9)
MONOCYTES ABSOLUTE: 0.57 E9/L (ref 0.1–0.95)
MONOCYTES RELATIVE PERCENT: 10.5 % (ref 2–12)
NEUTROPHILS ABSOLUTE: 4.44 E9/L (ref 1.8–7.3)
NEUTROPHILS RELATIVE PERCENT: 82.1 % (ref 43–80)
PDW BLD-RTO: 25.5 FL (ref 11.5–15)
PLATELET # BLD: 98 E9/L (ref 130–450)
PLATELET CONFIRMATION: NORMAL
PMV BLD AUTO: 11.8 FL (ref 7–12)
POIKILOCYTES: ABNORMAL
POLYCHROMASIA: ABNORMAL
POTASSIUM SERPL-SCNC: 4.4 MMOL/L (ref 3.5–5)
PROTHROMBIN TIME, POC: 79.1
RBC # BLD: 2.26 E12/L (ref 3.5–5.5)
SCHISTOCYTES: ABNORMAL
SODIUM BLD-SCNC: 141 MMOL/L (ref 132–146)
TOTAL PROTEIN: 6.5 G/DL (ref 6.4–8.3)
WBC # BLD: 5.4 E9/L (ref 4.5–11.5)

## 2020-10-19 PROCEDURE — 80076 HEPATIC FUNCTION PANEL: CPT

## 2020-10-19 PROCEDURE — 4040F PNEUMOC VAC/ADMIN/RCVD: CPT | Performed by: FAMILY MEDICINE

## 2020-10-19 PROCEDURE — G8428 CUR MEDS NOT DOCUMENT: HCPCS | Performed by: FAMILY MEDICINE

## 2020-10-19 PROCEDURE — 99214 OFFICE O/P EST MOD 30 MIN: CPT | Performed by: FAMILY MEDICINE

## 2020-10-19 PROCEDURE — 36415 COLL VENOUS BLD VENIPUNCTURE: CPT | Performed by: FAMILY MEDICINE

## 2020-10-19 PROCEDURE — 99212 OFFICE O/P EST SF 10 MIN: CPT | Performed by: FAMILY MEDICINE

## 2020-10-19 PROCEDURE — 80048 BASIC METABOLIC PNL TOTAL CA: CPT

## 2020-10-19 PROCEDURE — G8417 CALC BMI ABV UP PARAM F/U: HCPCS | Performed by: FAMILY MEDICINE

## 2020-10-19 PROCEDURE — G8484 FLU IMMUNIZE NO ADMIN: HCPCS | Performed by: FAMILY MEDICINE

## 2020-10-19 PROCEDURE — 3017F COLORECTAL CA SCREEN DOC REV: CPT | Performed by: FAMILY MEDICINE

## 2020-10-19 PROCEDURE — 1123F ACP DISCUSS/DSCN MKR DOCD: CPT | Performed by: FAMILY MEDICINE

## 2020-10-19 PROCEDURE — G8399 PT W/DXA RESULTS DOCUMENT: HCPCS | Performed by: FAMILY MEDICINE

## 2020-10-19 PROCEDURE — 85025 COMPLETE CBC W/AUTO DIFF WBC: CPT

## 2020-10-19 PROCEDURE — 1036F TOBACCO NON-USER: CPT | Performed by: FAMILY MEDICINE

## 2020-10-19 PROCEDURE — 1090F PRES/ABSN URINE INCON ASSESS: CPT | Performed by: FAMILY MEDICINE

## 2020-10-19 NOTE — PROGRESS NOTES
Patient was already advised about coumadin dosing instructions during her visit today. Thank you! Supratherapeutic INR without bleeding or bruising. Holding coumadin today. Checking labs. Recheck INR at home tomorrow and call with results before taking any doses of coumadin. Thank you!

## 2020-10-20 ENCOUNTER — HOSPITAL ENCOUNTER (OUTPATIENT)
Age: 75
Discharge: HOME | End: 2020-10-22
Payer: COMMERCIAL

## 2020-10-20 LAB
ABO/RH: NORMAL
ANTIBODY SCREEN: NORMAL

## 2020-10-20 PROCEDURE — 86901 BLOOD TYPING SEROLOGIC RH(D): CPT

## 2020-10-20 PROCEDURE — 86850 RBC ANTIBODY SCREEN: CPT

## 2020-10-20 PROCEDURE — 86923 COMPATIBILITY TEST ELECTRIC: CPT

## 2020-10-20 PROCEDURE — 86900 BLOOD TYPING SEROLOGIC ABO: CPT

## 2020-10-20 RX ORDER — EPINEPHRINE 1 MG/ML
0.3 INJECTION, SOLUTION, CONCENTRATE INTRAVENOUS PRN
Status: CANCELLED | OUTPATIENT
Start: 2020-10-22

## 2020-10-20 RX ORDER — SODIUM CHLORIDE 0.9 % (FLUSH) 0.9 %
20 SYRINGE (ML) INJECTION PRN
Status: CANCELLED | OUTPATIENT
Start: 2020-10-22

## 2020-10-20 RX ORDER — FUROSEMIDE 10 MG/ML
20 INJECTION INTRAMUSCULAR; INTRAVENOUS ONCE
Status: CANCELLED | OUTPATIENT
Start: 2020-10-22

## 2020-10-20 RX ORDER — METHYLPREDNISOLONE SODIUM SUCCINATE 125 MG/2ML
125 INJECTION, POWDER, LYOPHILIZED, FOR SOLUTION INTRAMUSCULAR; INTRAVENOUS ONCE
Status: CANCELLED | OUTPATIENT
Start: 2020-10-22

## 2020-10-20 RX ORDER — ACETAMINOPHEN 325 MG/1
650 TABLET ORAL ONCE
Status: CANCELLED | OUTPATIENT
Start: 2020-10-22

## 2020-10-20 RX ORDER — SODIUM CHLORIDE 9 MG/ML
INJECTION, SOLUTION INTRAVENOUS CONTINUOUS
Status: CANCELLED | OUTPATIENT
Start: 2020-10-22

## 2020-10-20 RX ORDER — 0.9 % SODIUM CHLORIDE 0.9 %
250 INTRAVENOUS SOLUTION INTRAVENOUS ONCE
Status: CANCELLED | OUTPATIENT
Start: 2020-10-22

## 2020-10-20 RX ORDER — DIPHENHYDRAMINE HCL 25 MG
25 TABLET ORAL ONCE
Status: CANCELLED | OUTPATIENT
Start: 2020-10-22

## 2020-10-20 RX ORDER — DIPHENHYDRAMINE HYDROCHLORIDE 50 MG/ML
50 INJECTION INTRAMUSCULAR; INTRAVENOUS ONCE
Status: CANCELLED | OUTPATIENT
Start: 2020-10-22

## 2020-10-21 RX ORDER — DIPHENHYDRAMINE HCL 25 MG
25 TABLET ORAL ONCE
Status: CANCELLED | OUTPATIENT
Start: 2020-10-21

## 2020-10-21 RX ORDER — DIPHENHYDRAMINE HYDROCHLORIDE 50 MG/ML
50 INJECTION INTRAMUSCULAR; INTRAVENOUS ONCE
Status: CANCELLED | OUTPATIENT
Start: 2020-10-21

## 2020-10-21 RX ORDER — SODIUM CHLORIDE 9 MG/ML
INJECTION, SOLUTION INTRAVENOUS CONTINUOUS
Status: CANCELLED | OUTPATIENT
Start: 2020-10-21

## 2020-10-21 RX ORDER — EPINEPHRINE 1 MG/ML
0.3 INJECTION, SOLUTION, CONCENTRATE INTRAVENOUS PRN
Status: CANCELLED | OUTPATIENT
Start: 2020-10-21

## 2020-10-21 RX ORDER — METHYLPREDNISOLONE SODIUM SUCCINATE 125 MG/2ML
125 INJECTION, POWDER, LYOPHILIZED, FOR SOLUTION INTRAMUSCULAR; INTRAVENOUS ONCE
Status: CANCELLED | OUTPATIENT
Start: 2020-10-21

## 2020-10-21 RX ORDER — FUROSEMIDE 10 MG/ML
20 INJECTION INTRAMUSCULAR; INTRAVENOUS ONCE
Status: CANCELLED | OUTPATIENT
Start: 2020-10-21

## 2020-10-21 RX ORDER — 0.9 % SODIUM CHLORIDE 0.9 %
250 INTRAVENOUS SOLUTION INTRAVENOUS ONCE
Status: CANCELLED | OUTPATIENT
Start: 2020-10-21

## 2020-10-21 RX ORDER — ACETAMINOPHEN 325 MG/1
650 TABLET ORAL ONCE
Status: CANCELLED | OUTPATIENT
Start: 2020-10-21

## 2020-10-21 RX ORDER — SODIUM CHLORIDE 0.9 % (FLUSH) 0.9 %
20 SYRINGE (ML) INJECTION PRN
Status: CANCELLED | OUTPATIENT
Start: 2020-10-21

## 2020-10-22 ENCOUNTER — APPOINTMENT (OUTPATIENT)
Dept: GENERAL RADIOLOGY | Age: 75
End: 2020-10-22
Payer: COMMERCIAL

## 2020-10-22 ENCOUNTER — HOSPITAL ENCOUNTER (EMERGENCY)
Age: 75
Discharge: HOME | End: 2020-10-22
Attending: EMERGENCY MEDICINE
Payer: COMMERCIAL

## 2020-10-22 ENCOUNTER — HOSPITAL ENCOUNTER (OUTPATIENT)
Dept: INFUSION THERAPY | Age: 75
Discharge: HOME | End: 2020-10-22
Payer: COMMERCIAL

## 2020-10-22 VITALS
TEMPERATURE: 97.4 F | HEART RATE: 73 BPM | RESPIRATION RATE: 5 BRPM | DIASTOLIC BLOOD PRESSURE: 57 MMHG | SYSTOLIC BLOOD PRESSURE: 109 MMHG | OXYGEN SATURATION: 92 %

## 2020-10-22 DIAGNOSIS — D63.8 ANEMIA OF CHRONIC DISEASE: Primary | ICD-10-CM

## 2020-10-22 DIAGNOSIS — C50.919 METASTATIC BREAST CANCER (HCC): ICD-10-CM

## 2020-10-22 DIAGNOSIS — D50.0 IRON DEFICIENCY ANEMIA DUE TO CHRONIC BLOOD LOSS: ICD-10-CM

## 2020-10-22 LAB
ABO/RH: NORMAL
ALBUMIN SERPL-MCNC: 2.7 G/DL (ref 3.5–5.2)
ALP BLD-CCNC: 132 U/L (ref 35–104)
ALT SERPL-CCNC: 42 U/L (ref 0–32)
ANION GAP SERPL CALCULATED.3IONS-SCNC: 15 MMOL/L (ref 7–16)
ANISOCYTOSIS: ABNORMAL
ANTIBODY SCREEN: NORMAL
APTT: 65.9 SEC (ref 24.5–35.1)
AST SERPL-CCNC: 35 U/L (ref 0–31)
BASOPHILS ABSOLUTE: 0.02 E9/L (ref 0–0.2)
BASOPHILS RELATIVE PERCENT: 0.5 % (ref 0–2)
BILIRUB SERPL-MCNC: 1.1 MG/DL (ref 0–1.2)
BLOOD BANK DISPENSE STATUS: NORMAL
BLOOD BANK DISPENSE STATUS: NORMAL
BLOOD BANK PRODUCT CODE: NORMAL
BLOOD BANK PRODUCT CODE: NORMAL
BPU ID: NORMAL
BPU ID: NORMAL
BUN BLDV-MCNC: 64 MG/DL (ref 8–23)
BURR CELLS: ABNORMAL
CALCIUM SERPL-MCNC: 8.9 MG/DL (ref 8.6–10.2)
CHLORIDE BLD-SCNC: 102 MMOL/L (ref 98–107)
CO2: 23 MMOL/L (ref 22–29)
CREAT SERPL-MCNC: 2.2 MG/DL (ref 0.5–1)
DESCRIPTION BLOOD BANK: NORMAL
DESCRIPTION BLOOD BANK: NORMAL
EKG ATRIAL RATE: 90 BPM
EKG P AXIS: 72 DEGREES
EKG P-R INTERVAL: 192 MS
EKG Q-T INTERVAL: 410 MS
EKG QRS DURATION: 152 MS
EKG QTC CALCULATION (BAZETT): 501 MS
EKG R AXIS: -82 DEGREES
EKG T AXIS: 97 DEGREES
EKG VENTRICULAR RATE: 90 BPM
EOSINOPHILS ABSOLUTE: 0.07 E9/L (ref 0.05–0.5)
EOSINOPHILS RELATIVE PERCENT: 1.8 % (ref 0–6)
GFR AFRICAN AMERICAN: 26
GFR NON-AFRICAN AMERICAN: 22 ML/MIN/1.73
GLUCOSE BLD-MCNC: 139 MG/DL (ref 74–99)
HCT VFR BLD CALC: 22.5 % (ref 34–48)
HEMOGLOBIN: 7.6 G/DL (ref 11.5–15.5)
IMMATURE GRANULOCYTES #: 0.01 E9/L
IMMATURE GRANULOCYTES %: 0.3 % (ref 0–5)
INR BLD: 2.8
LACTIC ACID: 1.8 MMOL/L (ref 0.5–2.2)
LIPASE: 23 U/L (ref 13–60)
LYMPHOCYTES ABSOLUTE: 0.61 E9/L (ref 1.5–4)
LYMPHOCYTES RELATIVE PERCENT: 15.4 % (ref 20–42)
MCH RBC QN AUTO: 38 PG (ref 26–35)
MCHC RBC AUTO-ENTMCNC: 33.8 % (ref 32–34.5)
MCV RBC AUTO: 112.5 FL (ref 80–99.9)
MONOCYTES ABSOLUTE: 0.67 E9/L (ref 0.1–0.95)
MONOCYTES RELATIVE PERCENT: 17 % (ref 2–12)
NEUTROPHILS ABSOLUTE: 2.57 E9/L (ref 1.8–7.3)
NEUTROPHILS RELATIVE PERCENT: 65 % (ref 43–80)
OVALOCYTES: ABNORMAL
PDW BLD-RTO: 25.1 FL (ref 11.5–15)
PLATELET # BLD: 105 E9/L (ref 130–450)
PMV BLD AUTO: 12 FL (ref 7–12)
POIKILOCYTES: ABNORMAL
POLYCHROMASIA: ABNORMAL
POTASSIUM SERPL-SCNC: 4.3 MMOL/L (ref 3.5–5)
PROTHROMBIN TIME: 32.5 SEC (ref 9.3–12.4)
RBC # BLD: 2 E12/L (ref 3.5–5.5)
SODIUM BLD-SCNC: 140 MMOL/L (ref 132–146)
TARGET CELLS: ABNORMAL
TOTAL PROTEIN: 6.3 G/DL (ref 6.4–8.3)
TROPONIN: 0.05 NG/ML (ref 0–0.03)
WBC # BLD: 4 E9/L (ref 4.5–11.5)

## 2020-10-22 PROCEDURE — 85025 COMPLETE CBC W/AUTO DIFF WBC: CPT

## 2020-10-22 PROCEDURE — P9016 RBC LEUKOCYTES REDUCED: HCPCS

## 2020-10-22 PROCEDURE — 86850 RBC ANTIBODY SCREEN: CPT

## 2020-10-22 PROCEDURE — 83690 ASSAY OF LIPASE: CPT

## 2020-10-22 PROCEDURE — 84484 ASSAY OF TROPONIN QUANT: CPT

## 2020-10-22 PROCEDURE — 86900 BLOOD TYPING SEROLOGIC ABO: CPT

## 2020-10-22 PROCEDURE — 85610 PROTHROMBIN TIME: CPT

## 2020-10-22 PROCEDURE — 83605 ASSAY OF LACTIC ACID: CPT

## 2020-10-22 PROCEDURE — 99285 EMERGENCY DEPT VISIT HI MDM: CPT

## 2020-10-22 PROCEDURE — 80053 COMPREHEN METABOLIC PANEL: CPT

## 2020-10-22 PROCEDURE — 85730 THROMBOPLASTIN TIME PARTIAL: CPT

## 2020-10-22 PROCEDURE — 36430 TRANSFUSION BLD/BLD COMPNT: CPT

## 2020-10-22 PROCEDURE — 71045 X-RAY EXAM CHEST 1 VIEW: CPT

## 2020-10-22 PROCEDURE — 86901 BLOOD TYPING SEROLOGIC RH(D): CPT

## 2020-10-22 PROCEDURE — 93005 ELECTROCARDIOGRAM TRACING: CPT | Performed by: EMERGENCY MEDICINE

## 2020-10-22 PROCEDURE — 86923 COMPATIBILITY TEST ELECTRIC: CPT

## 2020-10-22 PROCEDURE — 93010 ELECTROCARDIOGRAM REPORT: CPT | Performed by: INTERNAL MEDICINE

## 2020-10-22 RX ORDER — 0.9 % SODIUM CHLORIDE 0.9 %
20 INTRAVENOUS SOLUTION INTRAVENOUS ONCE
Status: DISCONTINUED | OUTPATIENT
Start: 2020-10-22 | End: 2020-10-22 | Stop reason: HOSPADM

## 2020-10-22 RX ORDER — NITROGLYCERIN 0.4 MG/1
0.4 TABLET SUBLINGUAL EVERY 5 MIN PRN
COMMUNITY

## 2020-10-22 RX ORDER — WARFARIN SODIUM 1 MG/1
1 TABLET ORAL NIGHTLY
Status: ON HOLD | COMMUNITY
End: 2020-11-04 | Stop reason: HOSPADM

## 2020-10-22 RX ORDER — ALBUTEROL SULFATE 90 UG/1
2 AEROSOL, METERED RESPIRATORY (INHALATION) EVERY 6 HOURS PRN
COMMUNITY

## 2020-10-22 RX ORDER — SODIUM CHLORIDE 9 MG/ML
INJECTION, SOLUTION INTRAVENOUS CONTINUOUS
Status: DISCONTINUED | OUTPATIENT
Start: 2020-10-22 | End: 2020-10-22 | Stop reason: HOSPADM

## 2020-10-22 RX ORDER — HEPARIN SODIUM (PORCINE) LOCK FLUSH IV SOLN 100 UNIT/ML 100 UNIT/ML
SOLUTION INTRAVENOUS
Status: DISCONTINUED
Start: 2020-10-22 | End: 2020-10-22 | Stop reason: HOSPADM

## 2020-10-22 NOTE — ED PROVIDER NOTES
Department of Emergency Medicine   ED  Provider Note  Admit Date/RoomTime: 10/22/2020  8:21 AM  ED Room: 11/11          History of Present Illness:  10/22/20, Time: 10:47 AM EDT  Chief Complaint   Patient presents with    Fatigue     pt was going in for a blood transfusion today and daughter wanted he sent in for eval due to weakness                Remy Lu is a 76 y.o. female presenting to the ED for fatigue. Patient does have a history of cancer. She was scheduled for blood transfusion today. Daughter presented to her home today, felt the patient was more fatigued than usual.  She states she was fine yesterday. She states that she looks tired. Patient denies this. She has no symptoms or complaints at this time. She states she is at her baseline, she only came in as to the request of her daughter. She denies any fever, chills, nausea, vomiting, change in bowel or bladder, neck pain or stiffness, cough, sputum, lethargy, or any other symptoms or complaints.     Review of Systems:   Pertinent positives and negatives are stated within HPI, all other systems reviewed and are negative.        --------------------------------------------- PAST HISTORY ---------------------------------------------  Past Medical History:  has a past medical history of Abscess of abdominal wall, Anemia, Anesthesia, Arthritis, Arthritis, hip, Breast cancer ([address] CAD (coronary artery disease), CHF (congestive heart failure) ([address] Chronic venous insufficiency, Class 2 severe obesity due to excess calories with serious comorbidity and body mass index (BMI) of 35.0 to 35.9 in adult ([address] Decreased dorsalis pedis pulse, Degenerative disc disease at L5-S1 level, Diabetic neuropathy ([address] Diabetic neuropathy ([address] DVT (deep venous thrombosis) ([address] DVT of upper extremity (deep vein thrombosis) ([address] History of deep vein thrombosis, Humerus fracture, Hyperlipidemia, Hypertension, Left leg pain, Leg swelling, Lymph node enlargement, Lymphedema of both lower extremities, Lymphopenia, MI (myocardial infarction) ([facility] Nephrolithiasis, Pericardial effusion, Pulmonary nodule, Rib lesion, Sarcoidosis, Subclavian vein occlusion, bilateral ([facility] and Type II or unspecified type diabetes mellitus without mention of complication, not stated as uncontrolled. Past Surgical History:  has a past surgical history that includes Tooth Extraction; Hysterectomy (1975, 1985); Cholecystectomy; Appendectomy; other surgical history (11/18/11); Arterial bypass surgry; Coronary artery bypass graft (05/2008); Tunneled venous port placement; Cardiac surgery; eye surgery; Tunneled venous port placement ([id number]); Breast lumpectomy (9/27/2005); ECHO Compl W Dop Color Flow (10/30/2013); Colonoscopy (12/2007); Colonoscopy ([id number]); Tonsillectomy; and Paracentesis. Social History:  reports that she has never smoked. She has never used smokeless tobacco. She reports that she does not drink alcohol or use drugs. Family History: family history is not on file. She was adopted. . Unless otherwise noted, family history is non contributory    The patients home medications have been reviewed. Allergies: Macrobid [nitrofurantoin monohydrate macrocrystals]        ---------------------------------------------------PHYSICAL EXAM--------------------------------------    Constitutional/General: Alert and oriented x3  Head: Normocephalic and atraumatic  Eyes: PERRL, EOMI, sclera non icteric  Mouth: Oropharynx clear, handling secretions, no trismus, no asymmetry of the posterior oropharynx or uvular edema  Neck: Supple, full ROM, no stridor, no meningeal signs  Respiratory: Lungs clear to auscultation bilaterally, no wheezes, rales, or rhonchi. Not in respiratory distress  Cardiovascular:  Regular rate. Regular rhythm. 2+ distal pulses. Equal extremity pulses. Chest: No chest wall tenderness  GI:  Abdomen Soft, Non tender, Non distended.  No rebound, guarding, or rigidity. No pulsatile masses. Musculoskeletal: Moves all extremities x 4. 2+ edema to the bilateral lower extremities Integument  Skin: warm and dry. No rashes. Neurologic: GCS 15, no focal deficits, symmetric strength 5/5 in the upper and lower extremities bilaterally  Psychiatric: Normal Affect          -------------------------------------------------- RESULTS -------------------------------------------------  I have personally reviewed all laboratory and imaging results for this patient. Results are listed below.      LABS: (Lab results interpreted by me)  Results for orders placed or performed during the hospital encounter of 10/22/20   CBC Auto Differential   Result Value Ref Range    WBC 4.0 (L) 4.5 - 11.5 E9/L    RBC 2.00 (L) 3.50 - 5.50 E12/L    Hemoglobin 7.6 (L) 11.5 - 15.5 g/dL    Hematocrit 22.5 (L) 34.0 - 48.0 %    .5 (H) 80.0 - 99.9 fL    MCH 38.0 (H) 26.0 - 35.0 pg    MCHC 33.8 32.0 - 34.5 %    RDW 25.1 (H) 11.5 - 15.0 fL    Platelets 682 (L) 729 - 450 E9/L    MPV 12.0 7.0 - 12.0 fL    Neutrophils % 65.0 43.0 - 80.0 %    Immature Granulocytes % 0.3 0.0 - 5.0 %    Lymphocytes % 15.4 (L) 20.0 - 42.0 %    Monocytes % 17.0 (H) 2.0 - 12.0 %    Eosinophils % 1.8 0.0 - 6.0 %    Basophils % 0.5 0.0 - 2.0 %    Neutrophils Absolute 2.57 1.80 - 7.30 E9/L    Immature Granulocytes # 0.01 E9/L    Lymphocytes Absolute 0.61 (L) 1.50 - 4.00 E9/L    Monocytes Absolute 0.67 0.10 - 0.95 E9/L    Eosinophils Absolute 0.07 0.05 - 0.50 E9/L    Basophils Absolute 0.02 0.00 - 0.20 E9/L    Anisocytosis 3+     Polychromasia 2+     Poikilocytes 2+     [place] Cells 2+     Ovalocytes 1+     Target Cells 1+    Comprehensive Metabolic Panel   Result Value Ref Range    Sodium 140 132 - 146 mmol/L    Potassium 4.3 3.5 - 5.0 mmol/L    Chloride 102 98 - 107 mmol/L    CO2 23 22 - 29 mmol/L    Anion Gap 15 7 - 16 mmol/L    Glucose 139 (H) 74 - 99 mg/dL    BUN 64 (H) 8 - 23 mg/dL    CREATININE 2.2 (H) 0.5 - 1.0 mg/dL    GFR Non-African American 22 >=60 mL/min/1.73    GFR African American 26     Calcium 8.9 8.6 - 10.2 mg/dL    Total Protein 6.3 (L) 6.4 - 8.3 g/dL    Alb 2.7 (L) 3.5 - 5.2 g/dL    Total Bilirubin 1.1 0.0 - 1.2 mg/dL    Alkaline Phosphatase 132 (H) 35 - 104 U/L    ALT 42 (H) 0 - 32 U/L    AST 35 (H) 0 - 31 U/L   Lipase   Result Value Ref Range    Lipase 23 13 - 60 U/L   Lactic Acid, Plasma   Result Value Ref Range    Lactic Acid 1.8 0.5 - 2.2 mmol/L   APTT   Result Value Ref Range    aPTT 65.9 (H) 24.5 - 35.1 sec   Protime-INR   Result Value Ref Range    Protime 32.5 (H) 9.3 - 12.4 sec    INR 2.8    Troponin   Result Value Ref Range    Troponin 0.05 (H) 0.00 - 0.03 ng/mL   EKG 12 Lead   Result Value Ref Range    Ventricular Rate 90 BPM    Atrial Rate 90 BPM    P-R Interval 192 ms    QRS Duration 152 ms    Q-T Interval 410 ms    QTc Calculation (Bazett) 501 ms    P Axis 72 degrees    R Axis -82 degrees    T Axis 97 degrees   TYPE AND SCREEN   Result Value Ref Range    ABO/Rh O NEG     Antibody Screen NEG    ,       RADIOLOGY:  Interpreted by Radiologist unless otherwise specified  XR CHEST PORTABLE   Final Result   Atelectasis in right minor fissure. Prominent interstitial markings may represent interstitial edema. EKG Interpretation  Interpreted by emergency department physician, Dr. Judy Daniel, rate 90, no STEMI        ------------------------- NURSING NOTES AND VITALS REVIEWED ---------------------------   The nursing notes within the ED encounter and vital signs as below have been reviewed by myself  BP (!) 119/59   Pulse 84   Temp 96.4 °F (35.8 °C)   Resp 17   SpO2 100%     Oxygen Saturation Interpretation: Normal    The patients available past medical records and past encounters were reviewed.         ------------------------------ ED COURSE/MEDICAL DECISION MAKING----------------------  Medications   0.9 % sodium chloride infusion (has no administration in time range)           The cardiac monitor revealed sinus with a heart rate in the 70s as interpreted by me. The cardiac monitor was ordered secondary to the patient's fatigue and to monitor the patient for dysrhythmia. CPT 65905         Medical Decision Making:    Labs and imaging reviewed. Reevaluation: Patient's resting, remains hemodynamically stable, afebrile, no symptoms or complaints. Discussed the findings with her and her daughter. Patient would like to go home, daughter is comfortable with this. Patient be transfused her blood though she is scheduled in the outpatient today, she is to follow-up with her PCP, she is educated on signs and symptoms that require emergent evaluation. Counseling: The emergency provider has spoken with the patient and discussed todays results, in addition to providing specific details for the plan of care and counseling regarding the diagnosis and prognosis. Questions are answered at this time and they are agreeable with the plan.       --------------------------------- IMPRESSION AND DISPOSITION ---------------------------------    IMPRESSION  1. Other fatigue    2. Anemia, unspecified type        DISPOSITION  Disposition: Discharge to home  Patient condition is stable        NOTE: This report was transcribed using voice recognition software.  Every effort was made to ensure accuracy; however, inadvertent computerized transcription errors may be present        Ladan Rivera MD  10/22/20 [id number]

## 2020-10-22 NOTE — ED NOTES
Pt's daughter at bedside for transport home. Pt alert and oriented. Speech clear. Respirations easy/unlabored. Skin warm/dry. Appropriate color. No signs of acute distress noted. Pt/family teaching provided; verbalized understanding. Pt stable for discharge.      Whitney Petersen RN  10/22/20 1922

## 2020-10-23 ENCOUNTER — TELEPHONE (OUTPATIENT)
Dept: FAMILY MEDICINE CLINIC | Age: 75
End: 2020-10-23

## 2020-10-23 ENCOUNTER — CARE COORDINATION (OUTPATIENT)
Dept: CASE MANAGEMENT | Age: 75
End: 2020-10-23

## 2020-10-23 NOTE — CARE COORDINATION
[facility] ED Follow Up Call    10/23/2020    Patient: Kyung Mota Patient : 1945   MRN: [id number]  Reason for Admission: Other Fatigue  Discharge Date: 10/22/20    First attempt to reach the patient for Covid-19 Monitoring at risk Care Transition call post hospital discharge. Unable to leave  as mailbox is full.

## 2020-10-23 NOTE — TELEPHONE ENCOUNTER
I called Ms. Elizabeth Ortega. Her daughter was there, and I spoke to both of them by telephone. Ms. Elizabeth Ortega had been to ED yesterday and received a blood transfusion. She is feeling a little better, but she has been having bad tremors diffusely. Her daughter reports worsening confusion. I will order an MRI of her brain to evaluate for abnormalities in the setting of metastatic breast cancer. I advised that they can call our physicians on call over the weekend with any concerns. Please let us know if they need to seek care in the meantime; we can discuss hospital admission. They declined acute needs/concerns, and declined evaluation at this time, opting for outpatient evaluation, but will seek care with any new/worsening symptoms or concerns. Ms. Antonio Sahni INR was 2.8 yesterday (Thursday). She had been holding coumadin since Monday (3 doses). We will cautiously resume at a much lower dose, 1 mg every other day. They expressed understanding. Station B:      Please call Ms. [facility]) and ask that they perform an INR for her on Monday, 10/26. Her home machine has not been working. Please also schedule for MRI as ordered. Thank you!

## 2020-10-24 ENCOUNTER — CARE COORDINATION (OUTPATIENT)
Dept: CASE MANAGEMENT | Age: 75
End: 2020-10-24

## 2020-10-24 NOTE — CARE COORDINATION
[facility] ED Follow Up Call    10/24/2020    Patient: Murali Robert Patient : 1945   MRN: [id number]  Reason for Admission: Other fatigue  Discharge Date: 10/22/20    Covid-19 Monitoring at risk episode to auto resolve on 10/27/20    Patient/family has been provided the following resources and education related to COVID-19:                         Signs, symptoms and red flags related to COVID-19            CDC exposure and quarantine guidelines            Conduit exposure contact - [phone]            Contact for their local Department of Health               Patient presented to the emergency department on 10/22/20 for fatigue and weakness. Patient continues to complain about same. Patient denies sob. Patient unsure if skin coloring is wnl. Patient reports she is forgetful. Patient reports pain to bilateral hips. Patient states her daughters don't want her to take pain meds because they make her sleepy. Patient reports lymphedema ble. Per patient, [facility] nurse performs wound care and wraps legs. Patient notified per EMR, [address] is to check INR on 10/26/20. Patient offered dietician regarding low carb, low sodium diet. Patient declined. Patient instructed on foods high in salt including but not limited to frozen meals, restaurant foods, fast foods, lunch meat, smoked meat, cheeses, canned foods. Patient reports she did not check her blood sugar this morning and is only eating one meal per day. Patient instructed she should eat three well balanced meals. Verified appts with Dr. Zaynab Escobedo on 10/30/20 and FORD Machado on 11/3/20 at 1000. Patient's daughters transport to [facility]. Patient instructed to make follow up with Dr. Marco العلي. This CTN reviewed and discussed care coordination services. Patient is receptive and agreeable to future care coordination. This CTN to make referral to Jacy Brown, Care Coordination Director.     No further outreach

## 2020-10-26 ENCOUNTER — APPOINTMENT (OUTPATIENT)
Dept: GENERAL RADIOLOGY | Age: 75
DRG: 981 | End: 2020-10-26
Payer: COMMERCIAL

## 2020-10-26 ENCOUNTER — HOSPITAL ENCOUNTER (INPATIENT)
Age: 75
LOS: 8 days | Discharge: NURSING, REHAB OR LONG-TERM CARE | DRG: 981 | End: 2020-11-04
Attending: EMERGENCY MEDICINE | Admitting: FAMILY MEDICINE
Payer: COMMERCIAL

## 2020-10-26 ENCOUNTER — APPOINTMENT (OUTPATIENT)
Dept: CT IMAGING | Age: 75
DRG: 981 | End: 2020-10-26
Payer: COMMERCIAL

## 2020-10-26 LAB
ABO/RH: NORMAL
ANTIBODY SCREEN: NORMAL
BACTERIA: ABNORMAL /HPF
BILIRUBIN URINE: NEGATIVE
BLOOD, URINE: ABNORMAL
CLARITY: ABNORMAL
COLOR: YELLOW
GLUCOSE URINE: NEGATIVE MG/DL
HCT VFR BLD CALC: 24.4 % (ref 34–48)
HEMOGLOBIN: 8.2 G/DL (ref 11.5–15.5)
KETONES, URINE: NEGATIVE MG/DL
LACTIC ACID: 3 MMOL/L (ref 0.5–2.2)
LEUKOCYTE ESTERASE, URINE: ABNORMAL
MCH RBC QN AUTO: 36.9 PG (ref 26–35)
MCHC RBC AUTO-ENTMCNC: 33.6 % (ref 32–34.5)
MCV RBC AUTO: 109.9 FL (ref 80–99.9)
NITRITE, URINE: POSITIVE
PDW BLD-RTO: 23.6 FL (ref 11.5–15)
PH UA: 8.5 (ref 5–9)
PLATELET # BLD: 86 E9/L (ref 130–450)
PMV BLD AUTO: 12.3 FL (ref 7–12)
PROTEIN UA: 100 MG/DL
RBC # BLD: 2.22 E12/L (ref 3.5–5.5)
RBC UA: >20 /HPF (ref 0–2)
REASON FOR REJECTION: NORMAL
REJECTED TEST: NORMAL
SPECIFIC GRAVITY UA: 1.01 (ref 1–1.03)
UROBILINOGEN, URINE: 0.2 E.U./DL
WBC # BLD: 3.6 E9/L (ref 4.5–11.5)
WBC UA: >20 /HPF (ref 0–5)

## 2020-10-26 PROCEDURE — 71045 X-RAY EXAM CHEST 1 VIEW: CPT

## 2020-10-26 PROCEDURE — 81001 URINALYSIS AUTO W/SCOPE: CPT

## 2020-10-26 PROCEDURE — 74176 CT ABD & PELVIS W/O CONTRAST: CPT

## 2020-10-26 PROCEDURE — 80076 HEPATIC FUNCTION PANEL: CPT

## 2020-10-26 PROCEDURE — 86850 RBC ANTIBODY SCREEN: CPT

## 2020-10-26 PROCEDURE — 82150 ASSAY OF AMYLASE: CPT

## 2020-10-26 PROCEDURE — 84443 ASSAY THYROID STIM HORMONE: CPT

## 2020-10-26 PROCEDURE — 2580000003 HC RX 258: Performed by: EMERGENCY MEDICINE

## 2020-10-26 PROCEDURE — 99285 EMERGENCY DEPT VISIT HI MDM: CPT

## 2020-10-26 PROCEDURE — 83880 ASSAY OF NATRIURETIC PEPTIDE: CPT

## 2020-10-26 PROCEDURE — 82550 ASSAY OF CK (CPK): CPT

## 2020-10-26 PROCEDURE — 71250 CT THORAX DX C-: CPT

## 2020-10-26 PROCEDURE — 83605 ASSAY OF LACTIC ACID: CPT

## 2020-10-26 PROCEDURE — 83735 ASSAY OF MAGNESIUM: CPT

## 2020-10-26 PROCEDURE — 72125 CT NECK SPINE W/O DYE: CPT

## 2020-10-26 PROCEDURE — 86901 BLOOD TYPING SEROLOGIC RH(D): CPT

## 2020-10-26 PROCEDURE — 93005 ELECTROCARDIOGRAM TRACING: CPT | Performed by: EMERGENCY MEDICINE

## 2020-10-26 PROCEDURE — 80048 BASIC METABOLIC PNL TOTAL CA: CPT

## 2020-10-26 PROCEDURE — 83690 ASSAY OF LIPASE: CPT

## 2020-10-26 PROCEDURE — 36415 COLL VENOUS BLD VENIPUNCTURE: CPT

## 2020-10-26 PROCEDURE — 85027 COMPLETE CBC AUTOMATED: CPT

## 2020-10-26 PROCEDURE — 84484 ASSAY OF TROPONIN QUANT: CPT

## 2020-10-26 PROCEDURE — 86900 BLOOD TYPING SEROLOGIC ABO: CPT

## 2020-10-26 PROCEDURE — 70450 CT HEAD/BRAIN W/O DYE: CPT

## 2020-10-26 PROCEDURE — 82553 CREATINE MB FRACTION: CPT

## 2020-10-26 PROCEDURE — 96361 HYDRATE IV INFUSION ADD-ON: CPT

## 2020-10-26 PROCEDURE — 87040 BLOOD CULTURE FOR BACTERIA: CPT

## 2020-10-26 RX ORDER — 0.9 % SODIUM CHLORIDE 0.9 %
1000 INTRAVENOUS SOLUTION INTRAVENOUS ONCE
Status: COMPLETED | OUTPATIENT
Start: 2020-10-26 | End: 2020-10-27

## 2020-10-26 RX ADMIN — SODIUM CHLORIDE 1000 ML: 9 INJECTION, SOLUTION INTRAVENOUS at 22:36

## 2020-10-26 ASSESSMENT — ENCOUNTER SYMPTOMS
COUGH: 0
NAUSEA: 0
ABDOMINAL PAIN: 0
BACK PAIN: 0
BLOOD IN STOOL: 0
SHORTNESS OF BREATH: 0
VOMITING: 0

## 2020-10-26 NOTE — TELEPHONE ENCOUNTER
Spoke with [facility] clinical manager Ronni Srivastava They will obtain the INR at patient visit tomorrow and assist patient and daughter with scheduling the MRI, I provided scheduling's phone number and faxed the order to assist.

## 2020-10-26 NOTE — TELEPHONE ENCOUNTER
Please call Ms. [facility]) and ask that they perform an INR for her on Monday, 10/26. Her home machine has not been working.      Please also schedule for MRI as ordered.       Thank you!

## 2020-10-27 ENCOUNTER — APPOINTMENT (OUTPATIENT)
Dept: MRI IMAGING | Age: 75
DRG: 981 | End: 2020-10-27
Payer: COMMERCIAL
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
## 2020-10-27 PROBLEM — E86.0 DEHYDRATION: Status: ACTIVE | Noted: 2020-10-27

## 2020-10-27 PROBLEM — N30.01 ACUTE CYSTITIS WITH HEMATURIA: Status: ACTIVE | Noted: 2020-10-27

## 2020-10-27 PROBLEM — N30.00 ACUTE CYSTITIS WITHOUT HEMATURIA: Status: ACTIVE | Noted: 2020-10-27

## 2020-10-27 PROBLEM — N18.9 ACUTE KIDNEY INJURY SUPERIMPOSED ON CKD (HCC): Status: ACTIVE | Noted: 2020-10-27

## 2020-10-27 PROBLEM — N17.9 ACUTE KIDNEY INJURY SUPERIMPOSED ON CKD (HCC): Status: ACTIVE | Noted: 2020-10-27

## 2020-10-27 PROBLEM — R53.1 GENERAL WEAKNESS: Status: ACTIVE | Noted: 2020-10-27

## 2020-10-27 LAB
ACINETOBACTER BAUMANNII BY PCR: NOT DETECTED
ALBUMIN SERPL-MCNC: 1.6 G/DL (ref 3.5–5.2)
ALBUMIN SERPL-MCNC: 2.1 G/DL (ref 3.5–5.2)
ALBUMIN SERPL-MCNC: 2.6 G/DL (ref 3.5–5.2)
ALP BLD-CCNC: 119 U/L (ref 35–104)
ALP BLD-CCNC: 137 U/L (ref 35–104)
ALP BLD-CCNC: 71 U/L (ref 35–104)
ALT SERPL-CCNC: 24 U/L (ref 0–32)
ALT SERPL-CCNC: 47 U/L (ref 0–32)
ALT SERPL-CCNC: 49 U/L (ref 0–32)
AMMONIA: 61 UMOL/L (ref 11–51)
AMYLASE: 18 U/L (ref 20–100)
AMYLASE: 9 U/L (ref 20–100)
ANION GAP SERPL CALCULATED.3IONS-SCNC: 10 MMOL/L (ref 7–16)
ANION GAP SERPL CALCULATED.3IONS-SCNC: 10 MMOL/L (ref 7–16)
ANION GAP SERPL CALCULATED.3IONS-SCNC: 13 MMOL/L (ref 7–16)
ANION GAP SERPL CALCULATED.3IONS-SCNC: 8 MMOL/L (ref 7–16)
AST SERPL-CCNC: 17 U/L (ref 0–31)
AST SERPL-CCNC: 34 U/L (ref 0–31)
AST SERPL-CCNC: 51 U/L (ref 0–31)
BILIRUB SERPL-MCNC: 1.1 MG/DL (ref 0–1.2)
BILIRUB SERPL-MCNC: 1.2 MG/DL (ref 0–1.2)
BILIRUB SERPL-MCNC: 1.5 MG/DL (ref 0–1.2)
BILIRUBIN DIRECT: 0.3 MG/DL (ref 0–0.3)
BILIRUBIN DIRECT: 0.5 MG/DL (ref 0–0.3)
BILIRUBIN, INDIRECT: 0.9 MG/DL (ref 0–1)
BILIRUBIN, INDIRECT: 1 MG/DL (ref 0–1)
BOTTLE TYPE: ABNORMAL
BUN BLDV-MCNC: 45 MG/DL (ref 8–23)
BUN BLDV-MCNC: 67 MG/DL (ref 8–23)
BUN BLDV-MCNC: 68 MG/DL (ref 8–23)
BUN BLDV-MCNC: 74 MG/DL (ref 8–23)
CALCIUM SERPL-MCNC: 4.9 MG/DL (ref 8.6–10.2)
CALCIUM SERPL-MCNC: 8.1 MG/DL (ref 8.6–10.2)
CALCIUM SERPL-MCNC: 8.8 MG/DL (ref 8.6–10.2)
CALCIUM SERPL-MCNC: 9 MG/DL (ref 8.6–10.2)
CANDIDA ALBICANS BY PCR: NOT DETECTED
CANDIDA GLABRATA BY PCR: NOT DETECTED
CANDIDA KRUSEI BY PCR: NOT DETECTED
CANDIDA PARAPSILOSIS BY PCR: NOT DETECTED
CANDIDA TROPICALIS BY PCR: NOT DETECTED
CHLORIDE BLD-SCNC: 102 MMOL/L (ref 98–107)
CHLORIDE BLD-SCNC: 103 MMOL/L (ref 98–107)
CHLORIDE BLD-SCNC: 105 MMOL/L (ref 98–107)
CHLORIDE BLD-SCNC: 121 MMOL/L (ref 98–107)
CHLORIDE URINE RANDOM: 32 MMOL/L
CK MB: 1.7 NG/ML (ref 0–4.3)
CK MB: 3.3 NG/ML (ref 0–4.3)
CO2: 14 MMOL/L (ref 22–29)
CO2: 22 MMOL/L (ref 22–29)
CO2: 23 MMOL/L (ref 22–29)
CO2: 23 MMOL/L (ref 22–29)
CREAT SERPL-MCNC: 1.2 MG/DL (ref 0.5–1)
CREAT SERPL-MCNC: 2 MG/DL (ref 0.5–1)
CREAT SERPL-MCNC: 2 MG/DL (ref 0.5–1)
CREAT SERPL-MCNC: 2.1 MG/DL (ref 0.5–1)
CREATININE URINE: 82 MG/DL (ref 29–226)
EKG ATRIAL RATE: 87 BPM
EKG P AXIS: 66 DEGREES
EKG P-R INTERVAL: 186 MS
EKG Q-T INTERVAL: 432 MS
EKG QRS DURATION: 152 MS
EKG QTC CALCULATION (BAZETT): 519 MS
EKG R AXIS: -90 DEGREES
EKG T AXIS: 20 DEGREES
EKG VENTRICULAR RATE: 87 BPM
ENTEROBACTER CLOACAE COMPLEX BY PCR: NOT DETECTED
ENTEROBACTERALES BY PCR: NOT DETECTED
ENTEROCOCCUS BY PCR: NOT DETECTED
ESCHERICHIA COLI BY PCR: NOT DETECTED
GFR AFRICAN AMERICAN: 28
GFR AFRICAN AMERICAN: 29
GFR AFRICAN AMERICAN: 29
GFR AFRICAN AMERICAN: 53
GFR NON-AFRICAN AMERICAN: 23 ML/MIN/1.73
GFR NON-AFRICAN AMERICAN: 24 ML/MIN/1.73
GFR NON-AFRICAN AMERICAN: 24 ML/MIN/1.73
GFR NON-AFRICAN AMERICAN: 44 ML/MIN/1.73
GLUCOSE BLD-MCNC: 134 MG/DL (ref 74–99)
GLUCOSE BLD-MCNC: 182 MG/DL (ref 74–99)
GLUCOSE BLD-MCNC: 199 MG/DL (ref 74–99)
GLUCOSE BLD-MCNC: 221 MG/DL (ref 74–99)
HAEMOPHILUS INFLUENZAE BY PCR: NOT DETECTED
INR BLD: 2.3
KLEBSIELLA OXYTOCA BY PCR: NOT DETECTED
KLEBSIELLA PNEUMONIAE GROUP BY PCR: NOT DETECTED
LACTIC ACID: 1.8 MMOL/L (ref 0.5–2.2)
LACTIC ACID: 2 MMOL/L (ref 0.5–2.2)
LACTIC ACID: 2.2 MMOL/L (ref 0.5–2.2)
LACTIC ACID: 2.4 MMOL/L (ref 0.5–2.2)
LIPASE: 16 U/L (ref 13–60)
LIPASE: 28 U/L (ref 13–60)
LISTERIA MONOCYTOGENES BY PCR: NOT DETECTED
MAGNESIUM: 1.2 MG/DL (ref 1.6–2.6)
MAGNESIUM: 2.1 MG/DL (ref 1.6–2.6)
MAGNESIUM: 2.2 MG/DL (ref 1.6–2.6)
METER GLUCOSE: 193 MG/DL (ref 74–99)
NEISSERIA MENINGITIDIS BY PCR: NOT DETECTED
ORDER NUMBER: ABNORMAL
PLATELET CONFIRMATION: NORMAL
POTASSIUM REFLEX MAGNESIUM: 4.1 MMOL/L (ref 3.5–5)
POTASSIUM REFLEX MAGNESIUM: 6.2 MMOL/L (ref 3.5–5)
POTASSIUM SERPL-SCNC: 2.3 MMOL/L (ref 3.5–5)
POTASSIUM SERPL-SCNC: 4.2 MMOL/L (ref 3.5–5)
POTASSIUM, UR: 14.9 MMOL/L
PRO-BNP: ABNORMAL PG/ML (ref 0–450)
PRO-BNP: ABNORMAL PG/ML (ref 0–450)
PROCALCITONIN: 2.49 NG/ML (ref 0–0.08)
PROCALCITONIN: 2.82 NG/ML (ref 0–0.08)
PROTEUS BY PCR: NOT DETECTED
PROTHROMBIN TIME: 26.3 SEC (ref 9.3–12.4)
PSEUDOMONAS AERUGINOSA BY PCR: NOT DETECTED
SERRATIA MARCESCENS BY PCR: NOT DETECTED
SODIUM BLD-SCNC: 133 MMOL/L (ref 132–146)
SODIUM BLD-SCNC: 138 MMOL/L (ref 132–146)
SODIUM BLD-SCNC: 138 MMOL/L (ref 132–146)
SODIUM BLD-SCNC: 145 MMOL/L (ref 132–146)
SODIUM URINE: 24 MMOL/L
SOURCE OF BLOOD CULTURE: ABNORMAL
STAPHYLOCOCCUS AUREUS BY PCR: NOT DETECTED
STAPHYLOCOCCUS SPECIES BY PCR: NOT DETECTED
STREPTOCOCCUS AGALACTIAE BY PCR: NOT DETECTED
STREPTOCOCCUS PNEUMONIAE BY PCR: NOT DETECTED
STREPTOCOCCUS PYOGENES  BY PCR: NOT DETECTED
STREPTOCOCCUS SPECIES BY PCR: DETECTED
TOTAL CK: 49 U/L (ref 20–180)
TOTAL CK: 95 U/L (ref 20–180)
TOTAL PROTEIN: 3.2 G/DL (ref 6.4–8.3)
TOTAL PROTEIN: 5.6 G/DL (ref 6.4–8.3)
TOTAL PROTEIN: 5.9 G/DL (ref 6.4–8.3)
TROPONIN: 0.02 NG/ML (ref 0–0.03)
TROPONIN: 0.04 NG/ML (ref 0–0.03)
TSH SERPL DL<=0.05 MIU/L-ACNC: 2.94 UIU/ML (ref 0.27–4.2)
TSH SERPL DL<=0.05 MIU/L-ACNC: 5.3 UIU/ML (ref 0.27–4.2)

## 2020-10-27 PROCEDURE — 83690 ASSAY OF LIPASE: CPT

## 2020-10-27 PROCEDURE — 97530 THERAPEUTIC ACTIVITIES: CPT

## 2020-10-27 PROCEDURE — 82550 ASSAY OF CK (CPK): CPT

## 2020-10-27 PROCEDURE — 96367 TX/PROPH/DG ADDL SEQ IV INF: CPT

## 2020-10-27 PROCEDURE — 6360000002 HC RX W HCPCS: Performed by: EMERGENCY MEDICINE

## 2020-10-27 PROCEDURE — 83605 ASSAY OF LACTIC ACID: CPT

## 2020-10-27 PROCEDURE — 99222 1ST HOSP IP/OBS MODERATE 55: CPT | Performed by: FAMILY MEDICINE

## 2020-10-27 PROCEDURE — 84443 ASSAY THYROID STIM HORMONE: CPT

## 2020-10-27 PROCEDURE — 36415 COLL VENOUS BLD VENIPUNCTURE: CPT

## 2020-10-27 PROCEDURE — 6370000000 HC RX 637 (ALT 250 FOR IP): Performed by: FAMILY MEDICINE

## 2020-10-27 PROCEDURE — 93005 ELECTROCARDIOGRAM TRACING: CPT | Performed by: FAMILY MEDICINE

## 2020-10-27 PROCEDURE — 2580000003 HC RX 258: Performed by: EMERGENCY MEDICINE

## 2020-10-27 PROCEDURE — 2580000003 HC RX 258: Performed by: FAMILY MEDICINE

## 2020-10-27 PROCEDURE — 82570 ASSAY OF URINE CREATININE: CPT

## 2020-10-27 PROCEDURE — 2060000000 HC ICU INTERMEDIATE R&B

## 2020-10-27 PROCEDURE — 83735 ASSAY OF MAGNESIUM: CPT

## 2020-10-27 PROCEDURE — 2500000003 HC RX 250 WO HCPCS: Performed by: STUDENT IN AN ORGANIZED HEALTH CARE EDUCATION/TRAINING PROGRAM

## 2020-10-27 PROCEDURE — 84484 ASSAY OF TROPONIN QUANT: CPT

## 2020-10-27 PROCEDURE — 80053 COMPREHEN METABOLIC PANEL: CPT

## 2020-10-27 PROCEDURE — 82553 CREATINE MB FRACTION: CPT

## 2020-10-27 PROCEDURE — 82436 ASSAY OF URINE CHLORIDE: CPT

## 2020-10-27 PROCEDURE — 97162 PT EVAL MOD COMPLEX 30 MIN: CPT

## 2020-10-27 PROCEDURE — 87040 BLOOD CULTURE FOR BACTERIA: CPT

## 2020-10-27 PROCEDURE — 87077 CULTURE AEROBIC IDENTIFY: CPT

## 2020-10-27 PROCEDURE — 84133 ASSAY OF URINE POTASSIUM: CPT

## 2020-10-27 PROCEDURE — 87088 URINE BACTERIA CULTURE: CPT

## 2020-10-27 PROCEDURE — C9113 INJ PANTOPRAZOLE SODIUM, VIA: HCPCS | Performed by: FAMILY MEDICINE

## 2020-10-27 PROCEDURE — 85610 PROTHROMBIN TIME: CPT

## 2020-10-27 PROCEDURE — 96365 THER/PROPH/DIAG IV INF INIT: CPT

## 2020-10-27 PROCEDURE — 70551 MRI BRAIN STEM W/O DYE: CPT

## 2020-10-27 PROCEDURE — 87070 CULTURE OTHR SPECIMN AEROBIC: CPT

## 2020-10-27 PROCEDURE — 83880 ASSAY OF NATRIURETIC PEPTIDE: CPT

## 2020-10-27 PROCEDURE — 6360000002 HC RX W HCPCS: Performed by: FAMILY MEDICINE

## 2020-10-27 PROCEDURE — 82140 ASSAY OF AMMONIA: CPT

## 2020-10-27 PROCEDURE — 80076 HEPATIC FUNCTION PANEL: CPT

## 2020-10-27 PROCEDURE — 93010 ELECTROCARDIOGRAM REPORT: CPT | Performed by: INTERNAL MEDICINE

## 2020-10-27 PROCEDURE — 84145 PROCALCITONIN (PCT): CPT

## 2020-10-27 PROCEDURE — 82150 ASSAY OF AMYLASE: CPT

## 2020-10-27 PROCEDURE — 82962 GLUCOSE BLOOD TEST: CPT

## 2020-10-27 PROCEDURE — 84300 ASSAY OF URINE SODIUM: CPT

## 2020-10-27 PROCEDURE — 80048 BASIC METABOLIC PNL TOTAL CA: CPT

## 2020-10-27 PROCEDURE — 87186 SC STD MICRODIL/AGAR DIL: CPT

## 2020-10-27 RX ORDER — SODIUM CHLORIDE 0.9 % (FLUSH) 0.9 %
10 SYRINGE (ML) INJECTION PRN
Status: DISCONTINUED | OUTPATIENT
Start: 2020-10-27 | End: 2020-11-04 | Stop reason: HOSPADM

## 2020-10-27 RX ORDER — SODIUM CHLORIDE 9 MG/ML
1000 INJECTION, SOLUTION INTRAVENOUS CONTINUOUS
Status: DISCONTINUED | OUTPATIENT
Start: 2020-10-27 | End: 2020-10-28

## 2020-10-27 RX ORDER — DEXTROSE MONOHYDRATE 50 MG/ML
100 INJECTION, SOLUTION INTRAVENOUS PRN
Status: DISCONTINUED | OUTPATIENT
Start: 2020-10-27 | End: 2020-11-04 | Stop reason: HOSPADM

## 2020-10-27 RX ORDER — SODIUM BICARBONATE 650 MG/1
650 TABLET ORAL 2 TIMES DAILY
Status: DISCONTINUED | OUTPATIENT
Start: 2020-10-27 | End: 2020-11-04 | Stop reason: HOSPADM

## 2020-10-27 RX ORDER — INSULIN GLARGINE 100 [IU]/ML
10 INJECTION, SOLUTION SUBCUTANEOUS DAILY
Status: DISCONTINUED | OUTPATIENT
Start: 2020-10-27 | End: 2020-10-28

## 2020-10-27 RX ORDER — OXYCODONE HYDROCHLORIDE 5 MG/1
5 TABLET ORAL EVERY 6 HOURS PRN
Status: DISCONTINUED | OUTPATIENT
Start: 2020-10-27 | End: 2020-11-04 | Stop reason: HOSPADM

## 2020-10-27 RX ORDER — POLYETHYLENE GLYCOL 3350 17 G/17G
17 POWDER, FOR SOLUTION ORAL DAILY PRN
Status: DISCONTINUED | OUTPATIENT
Start: 2020-10-27 | End: 2020-11-04 | Stop reason: HOSPADM

## 2020-10-27 RX ORDER — NICOTINE POLACRILEX 4 MG
15 LOZENGE BUCCAL PRN
Status: DISCONTINUED | OUTPATIENT
Start: 2020-10-27 | End: 2020-11-04 | Stop reason: HOSPADM

## 2020-10-27 RX ORDER — CALCIUM CITRATE/VITAMIN D3 200MG-6.25
1 TABLET ORAL 2 TIMES DAILY
COMMUNITY

## 2020-10-27 RX ORDER — OMEPRAZOLE 20 MG/1
20 CAPSULE, DELAYED RELEASE ORAL DAILY
COMMUNITY

## 2020-10-27 RX ORDER — BUMETANIDE 1 MG/1
1 TABLET ORAL DAILY
Status: ON HOLD | COMMUNITY
End: 2020-11-04 | Stop reason: SDUPTHER

## 2020-10-27 RX ORDER — OXYCODONE HYDROCHLORIDE 5 MG/1
5 TABLET ORAL EVERY 6 HOURS PRN
Status: ON HOLD | COMMUNITY
Start: 2020-10-14 | End: 2020-11-04

## 2020-10-27 RX ORDER — PANTOPRAZOLE SODIUM 40 MG/10ML
40 INJECTION, POWDER, LYOPHILIZED, FOR SOLUTION INTRAVENOUS DAILY
Status: DISCONTINUED | OUTPATIENT
Start: 2020-10-27 | End: 2020-11-04 | Stop reason: HOSPADM

## 2020-10-27 RX ORDER — PALBOCICLIB 100 MG/1
100 TABLET, FILM COATED ORAL DAILY
COMMUNITY

## 2020-10-27 RX ORDER — DEXTROSE MONOHYDRATE 25 G/50ML
12.5 INJECTION, SOLUTION INTRAVENOUS PRN
Status: DISCONTINUED | OUTPATIENT
Start: 2020-10-27 | End: 2020-11-04 | Stop reason: HOSPADM

## 2020-10-27 RX ORDER — LANOLIN ALCOHOL/MO/W.PET/CERES
1000 CREAM (GRAM) TOPICAL DAILY
Status: DISCONTINUED | OUTPATIENT
Start: 2020-10-27 | End: 2020-11-04 | Stop reason: HOSPADM

## 2020-10-27 RX ORDER — SODIUM CHLORIDE 0.9 % (FLUSH) 0.9 %
10 SYRINGE (ML) INJECTION EVERY 12 HOURS SCHEDULED
Status: DISCONTINUED | OUTPATIENT
Start: 2020-10-27 | End: 2020-11-04 | Stop reason: HOSPADM

## 2020-10-27 RX ORDER — DOCUSATE SODIUM 100 MG/1
100 CAPSULE, LIQUID FILLED ORAL DAILY
Status: DISCONTINUED | OUTPATIENT
Start: 2020-10-27 | End: 2020-11-01

## 2020-10-27 RX ADMIN — PIPERACILLIN SODIUM AND TAZOBACTAM SODIUM 4.5 G: 4; .5 INJECTION, POWDER, LYOPHILIZED, FOR SOLUTION INTRAVENOUS at 00:46

## 2020-10-27 RX ADMIN — SODIUM BICARBONATE 650 MG: 650 TABLET ORAL at 21:28

## 2020-10-27 RX ADMIN — SODIUM CHLORIDE, PRESERVATIVE FREE 10 ML: 5 INJECTION INTRAVENOUS at 21:29

## 2020-10-27 RX ADMIN — VANCOMYCIN HYDROCHLORIDE 1.5 G: 1 INJECTION, POWDER, LYOPHILIZED, FOR SOLUTION INTRAVENOUS at 01:55

## 2020-10-27 RX ADMIN — Medication 1 G: at 09:04

## 2020-10-27 RX ADMIN — SODIUM CHLORIDE, PRESERVATIVE FREE 10 ML: 5 INJECTION INTRAVENOUS at 09:05

## 2020-10-27 RX ADMIN — INSULIN LISPRO 2 UNITS: 100 INJECTION, SOLUTION INTRAVENOUS; SUBCUTANEOUS at 21:29

## 2020-10-27 RX ADMIN — SODIUM CHLORIDE 1000 ML: 9 INJECTION, SOLUTION INTRAVENOUS at 05:27

## 2020-10-27 RX ADMIN — OXYCODONE 5 MG: 5 TABLET ORAL at 15:49

## 2020-10-27 RX ADMIN — PANTOPRAZOLE SODIUM 40 MG: 40 INJECTION, POWDER, FOR SOLUTION INTRAVENOUS at 09:04

## 2020-10-27 RX ADMIN — SODIUM CHLORIDE 1000 ML: 9 INJECTION, SOLUTION INTRAVENOUS at 16:30

## 2020-10-27 RX ADMIN — CEFEPIME 2 G: 2 INJECTION, POWDER, FOR SOLUTION INTRAVENOUS at 09:04

## 2020-10-27 ASSESSMENT — PAIN SCALES - GENERAL
PAINLEVEL_OUTOF10: 9
PAINLEVEL_OUTOF10: 7
PAINLEVEL_OUTOF10: 9

## 2020-10-27 ASSESSMENT — PAIN DESCRIPTION - FREQUENCY: FREQUENCY: CONTINUOUS

## 2020-10-27 ASSESSMENT — PAIN DESCRIPTION - ONSET: ONSET: ON-GOING

## 2020-10-27 ASSESSMENT — PAIN DESCRIPTION - DESCRIPTORS: DESCRIPTORS: ACHING;DISCOMFORT

## 2020-10-27 ASSESSMENT — PAIN DESCRIPTION - LOCATION: LOCATION: HIP

## 2020-10-27 ASSESSMENT — PAIN DESCRIPTION - PROGRESSION: CLINICAL_PROGRESSION: NOT CHANGED

## 2020-10-27 ASSESSMENT — PAIN DESCRIPTION - PAIN TYPE: TYPE: CHRONIC PAIN

## 2020-10-27 ASSESSMENT — PAIN DESCRIPTION - ORIENTATION: ORIENTATION: LEFT;RIGHT

## 2020-10-27 ASSESSMENT — PAIN - FUNCTIONAL ASSESSMENT: PAIN_FUNCTIONAL_ASSESSMENT: PREVENTS OR INTERFERES SOME ACTIVE ACTIVITIES AND ADLS

## 2020-10-27 NOTE — PLAN OF CARE
Problem: Falls - Risk of:  Goal: Will remain free from falls  Description: Will remain free from falls  Outcome: Met This Shift  Goal: Absence of physical injury  Description: Absence of physical injury  Outcome: Met This Shift     Problem: Pain:  Goal: Control of acute pain  Description: Control of acute pain  Outcome: Met This Shift  Goal: Control of chronic pain  Description: Control of chronic pain  Outcome: Met This Shift     Problem: Skin Integrity:  Goal: Will show no infection signs and symptoms  Description: Will show no infection signs and symptoms  Outcome: Not Met This Shift

## 2020-10-27 NOTE — PROGRESS NOTES
Nurse to nurse report called to [facility] for patient to transfer to [address] [place] [address]. Patient's daughter at the bedside, updated on patient trransferring.      Tsering Nelson RN, BSN

## 2020-10-27 NOTE — PROGRESS NOTES
Pharmacy Consultation Note  (Antibiotic Dosing and Monitoring)    Initial consult date: 10-17-20  Consulting physician: Sahil Zendejas  Drug: Vancomycin  Indication: Diabetic with decubitus ulcer    Age/  Gender Height Weight IBW Dosing weight  Allergy Information   75 y.o./female @FLOW(11:first:1)@ @FLOW[14:FIRST:1@     Patient weight not recorded  77.56 kg  Macrobid [nitrofurantoin monohydrate macrocrystals]      @TMAX(24)@        Date  WBC BUN SCr CrCl  (mL/min) Drug/Dose Time   Given Level(s)   (Time) Comments   10/27 3.6 45 2.1  28.27   Vancomycin 1500 mg IV                                              Intake/Output Summary (Last 24 hours) at 10/27/2020 0418  Last data filed at 10/27/2020 0057  Gross per 24 hour   Intake 1000 ml   Output --   Net 1000 ml       Historical Cultures:  Organism   Date Value Ref Range Status   07/29/2020 Staphylococcus aureus (A)  Final   07/29/2020 Staphylococcus aureus (A)  Final   07/29/2020 Corynebacterium species (A)  Final     No results for input(s): BC in the last 72 hours. Cultures:  available culture and sensitivity results were reviewed in EPIC    Assessment:  76 y.o. female has been initiated Vancomycin. Estimated CrCl = 28.27 mL/min  Goal trough level = 15-20 mcg/mL    Plan:   Will initiate vancomycin at a dose of 1500 mg  Monitor renal function   Clinical pharmacy to follow      Karen Reyes [facility] 10/27/2020 4:18 AM Progress Notes by Charisse Valero MD at 17 10:39 AM     Author:  Charisse Valero MD Service:  (none) Author Type:  Physician     Filed:  17 12:34 PM Encounter Date:  2017 Status:  Signed     :  Charisse Valero MD (Physician)            SUBJECTIVE:   Mary Hastings is a 36 year old female for annual well woman exam.   Patient's last menstrual period was 2017.   OB History                    Para  Term     AB  Living     5   3  2  1   1  3     SAB   TAB  Ectopic  Multiple   Live Births       1   0  0  0                           # Outcome Date  GA  Lbr Hollis/2nd  Weight Sex  Delivery Anes PTL Lv   5 Term 14  40w0d     M     ASHA   4  12  31w0d     F     ASHA      Birth Comments: System Generated. Please review and update pregnancy details.                 3 Term 08/25/10  39w0d     M     ASHA   2                 1 SAB                                 Obstetric Comments                 1st SAB                 2nd: Vag                 3rd: : placental abruption at 31 weeks                      Menstrual history:[CE1.1T] regular[CE1.1M]  GYN History:[CE1.1T] Current Contraception:  vasectomy    Pap History:  ASCUS HPV +  Date: [CE1.1M]  Date of last mammogram:[CE1.1T] never[CE1.1M]  Result of mammogram:[CE1.1T] N/A[CE1.1M]    Does Mary desire HPV DNA testing in addition to pap?[CE1.1T] Yes[CE1.1M]  Does Mary desire Tdap immunization today:[CE1.1T] No[CE1.1M]    Does patient exercise?[CE1.1T] Yes - Type: Aerobics Frequency: 3 times a week[CE1.1M]  Was counseling given:[CE1.1T] Yes[CE1.1M]    Depression Screening:  Over the past 2 weeks, has patient felt down, depressed or hopeless?[CE1.1T] No[CE1.1M]  Over the past 2 weeks, has patient felt little interest or pleasure in doing things?[CE1.1T] No[CE1.1M]    On the basis of the above screen, the following is initiated:[CE1.1T]  Normal screen, continue to monitor  for symptoms over time[CE1.1M]     Social History:   Social History     Social History      • Marital status:       Spouse name: N/A   • Number of children:  3   • Years of education:  N/A     Occupational History     •       Social History Main Topics        • Smoking status:   Former Smoker      Packs/day:  0.50    • Smokeless tobacco:   Former User      Quit date:  2013    • Alcohol use   0.0 oz/week     0 Standard drinks or equivalent per week        Comment: Wine every night     • Drug use:   No    • Sexual activity:   Yes      Partners:  Male      Birth control/ protection:  None      Other Topics  Concern   • Not on file      Social History Narrative     2013: single     Past Medical History:     Diagnosis  Date   • Abnormal Pap smear of cervix 13, 14    ASCUS, HPV Positive    • Abnormal Pap smear of cervix 2015    LGSIL, (+)HPV    • ASCUS of cervix with negative high risk HPV 2016   • Human papilloma virus 9/9/15     Past Surgical History:      Procedure  Laterality Date   •  SECTION     • COLPOSCOPY  14, 10/7/15   • TONSILLECTOMY  4 yrs old     Family History:   Family History       Problem   Relation Age of Onset   • * Healthy  Mother    • * Healthy  Father    • Cancer  Maternal Grandfather      throat     • Cancer  Maternal Grandmother      ovarian         Allergies: Review of patient's allergies indicates no known allergies.     No current outpatient prescriptions on file.        ROS   No headaches, no unexplained chest pain, dyspnea, SOB, abdominal pain, bowel or bladder complaints.   All other systems reviewed are negative.    GYNE ROS:   Genitourinary:[CE1.1T] denies urgency, frequency, dysuria, incontinence and denies pregnancy[CE1.1M]  Vaginal symptoms:[CE1.1T] none[CE1.1M]  Discharge described as:[CE1.1T] normal and physiologic[CE1.1M].  Other associated symptoms:[CE1.1T] n/a[CE1.1M]    All other systems reviewed are  negative    PREVENTATIVE MEDICINE:  Calcium servings per day[CE1.1T] 3[CE1.1M].  Immunizations:[CE1.1T] Influenzae[CE1.1M]  Last Lipids:[CE1.1T] Never been checked[CE1.1M]    OBJECTIVE  Physical Exam  /60  Ht 5' 5\" (1.651 m)  Wt 117 lb 4 oz (53.2 kg)  LMP 08/07/2017  BMI 19.51 kg/m2  [place] BMI is 19.51, which is[CE1.1T] within normal parameters.(Ages 18-64 >/= 18.5 and <25; Over age 65 >/= 23 and <30)[CE1.1M]     CONSTITUTIONAL:    Appearance: well-nourished, well developed, alert, in no acute distress    HENT   Head: normocephalic, atraumatic   Face: face within normal limits, no sinus tenderness on palpation, no hirsutism present, parotid glands within normal limits   Ears: external ears within normal limits   Nose: external nose normal in appearance, nares patent, nasal discharge absent   Mouth: appearance normal    NECK    Thyroid: gland size normal, nontender, no nodules or masses present on palpation    CHEST   Respiratory effort: breathing unlabored   Auscultation: normal breath sounds, no rales, no rhonchi    CARDIOVASCULAR   Heart: regular rate, normal rhythm, no murmurs present    BREASTS   Inspection of Breasts: breasts symmetrical, no skin changes, no discharge present   Palpation of Breasts and Axillae: no masses present on palpation, no breast tenderness   Axillary Lymph Nodes: no lymphadenopathy present     GASTROINTESTINAL   Abdominal Examination: abdomen nontender to palpation, normal bowel sounds, tone normal without rigidity or guarding, no masses present, umbilicus without lesions   Liver and Spleen: no hepatomegaly present, liver nontender to palpation   Hernias: no hernias present    GENITOURINARY   External Genitalia: normal appearance for age, no discharge present, no tenderness present, no inflammatory lesions present   Vagina: normal vaginal vault without central or paravaginal defects, no discharge present, no inflammatory lesions present, no masses present   Bladder:  nontender to palpation   Urethral body: urethra palpation normal, urethra structural support normal   Cervix: appearance healthy, no lesions present, nontender to palpation, no bleeding present   Uterus: nontender to palpation, no masses present, position midline/midplane, mobility: normal   Adnexa: no adnexal tenderness present, no adnexal masses present   Perineum: perineum within normal limits, no evidence of trauma, no rashes or skin lesions present   Anus: anus within normal limits, no hemorrhoids present   Inguinal Lymph Nodes: no lymphadenopathy present    LYMPHATIC   Lymph Nodes: no other lymphadenopathy present    SKIN   General Inspection: no rashes present, no lesions present, no areas of discoloration    NEUROLOGIC/PSYCHIATRIC   Orientation: grossly oriented to person, place and time   Judgment and Insight: judgment and insight intact   Mood and Affect: mood normal, affect appropriate    ASSESSMENT  Well women Annual.  Pap smear was collected with[CE1.1T] broom[CE1.1M], prepared, and sent to lab for processing.[CE1.1T]   Contraceptive counseling[CE1.2M]    PLAN[CE1.1T]  Recommended calcium fortified diet of at least 3 servings a day  Appropriate STD screening done : pt declined all screening  Cardiovascular health being followed by PCP  Condom use and safe sex discussed  Contraceptive counseling oral contraceptives, Mirena IUD and Nexplanon  Informed patient of immunizations recommended: Influenzae  Pap results in 7-10 days, patient will be notified, patient instructed to call in 2 wks if no notification.  Return for annual GYN exam in one year or earlier with any additional concerns.   discus[CE1.2M]sed zoloft for pms sxs  also discussed progesterone only options for period control[CE1.1M]  pt will call if she wants to start anything[CE1.2M]    Mary repeated all of the instructions and states she understands the plan of care.[CE1.1T]        Revision History        User Key Date/Time User Provider  Type Action    > CE1.2 09/13/17 12:34 PM Charisse Valero MD Physician Sign     CE1.1 09/13/17 10:39 AM Charisse Valero MD Physician     M - Manual, T - Template

## 2020-10-27 NOTE — PROGRESS NOTES
Pharmacy Consultation Note  (Antibiotic Dosing and Monitoring)    Initial consult date: 10/27/2020  Consulting physician: Dr Keith Santa  Drug(s): Vancomycin  Indication: Empiric antibiotics/UTI    Ht Readings from Last 1 Encounters:   10/27/20 5' 4\" (1.626 m)     Wt Readings from Last 1 Encounters:   10/27/20 171 lb (77.6 kg)       Age/  Gender Actual BW IBW Adj BW  Allergy Information   76 y.o. female 77.6 kg 54.7 kg 63.9 kg  Macrobid [nitrofurantoin monohydrate macrocrystals]               Date  WBC BUN/CR Drug/Dose Time   Given Level(s)   (Time) Comments   10/27/20  Day #1 3.6  (10/26) 68/2 Vancomycin 1500 mg IV x1 dose 0155  Procalcitonin 2.82  Lactic acid 2.4      Vancomycin 1000 mg IV q36h <0600>                         Estimated Creatinine Clearance: 25 mL/min (A) (based on SCr of 2 mg/dL (H)). Intake/Output Summary (Last 24 hours) at 10/27/2020 1146  Last data filed at 10/27/2020 0057  Gross per 24 hour   Intake 1000 ml   Output --   Net 1000 ml     Urine output over the last 24 hours: incomplete documentation    Diuretics ordered in the last 24 hours: N/A      Temp max: Temp (24hrs), Av.4 °F (36.3 °C), Min:97 °F (36.1 °C), Max:97.8 °F (36.6 °C)      Cultures:  available culture and sensitivity results were reviewed in [facility]  10/26 Blood cx's - 1/2 gram positive cocci in chains  10/26 Urine cx - Pending  10/26 Wound cx (buttock) - Pending    IV lines:  10/26 Peripheral IV 18 g R wrist  Implanted venous port    Assessment:  · 75 yo F with metastatic breast cancer admitted with dizziness/fall  · Empiric antibiotics initiated - Cefepime and Vancomycin day #1  · Consulted by Dr. Brittni Arzola to dose/monitor Vancomycin  · Goal trough level:  15-20 mcg/mL  · Pt with KARISHMA on CKD - SCr 2 today (baseline ~1.6)    Plan:  · Pt received Vancomycin 1500 mg IV x1 dose overnight.   Will order Vancomycin 1000 mg IV q36h to begin tomorrow  · Will order a Vancomycin trough level once pt reaches steady state and will adjust dose as needed  · Will monitor renal function closely    Will continue to follow. Thank you for the consult.     Kristopher Kingston PharmD, BCPS, BCCCP  10/27/2020  12:11 PM  Pager: [phone]

## 2020-10-27 NOTE — PROGRESS NOTES
Unable to successfully perform nurse bedside swallow evaluation due to patient's lethargy at this time.      Naya Malik RN, BSN

## 2020-10-27 NOTE — PROGRESS NOTES
Patient complaining of abdominal pain, perfect serve message sent to [facility] resident requesting medication.      July Morales RN, BSN

## 2020-10-27 NOTE — ED PROVIDER NOTES
Dizzy and fell tonight recent blood transfusion bilateral decubitus on blood thinners no chest pain no sob laid on floor for 4 hours urinating ok no fever    The history is provided by the patient. Review of Systems   Constitutional: Negative for chills and fever. Respiratory: Negative for cough and shortness of breath. Cardiovascular: Negative for chest pain. Gastrointestinal: Negative for abdominal pain, blood in stool, nausea and vomiting. Genitourinary: Negative for dysuria and frequency. Musculoskeletal: Negative for back pain. Skin: Positive for rash and wound. Neurological: Negative for weakness and headaches. All other systems reviewed and are negative. Physical Exam  Vitals signs and nursing note reviewed. Constitutional:       Appearance: She is well-developed. HENT:      Head: Normocephalic and atraumatic. Eyes:      Conjunctiva/sclera: Conjunctivae normal.   Neck:      Musculoskeletal: Normal range of motion and neck supple. Cardiovascular:      Rate and Rhythm: Normal rate and regular rhythm. Heart sounds: Normal heart sounds. No murmur. Pulmonary:      Effort: Pulmonary effort is normal. No respiratory distress. Breath sounds: Normal breath sounds. No wheezing or rales. Abdominal:      General: Bowel sounds are normal.      Palpations: Abdomen is soft. Tenderness: There is no abdominal tenderness. There is no guarding or rebound. Skin:     General: Skin is warm and dry. Findings: Erythema, lesion and rash present. Comments: BILATERAL SUPERFICIAL DECUB   Neurological:      Mental Status: She is alert and oriented to person, place, and time. Cranial Nerves: No cranial nerve deficit.       Coordination: Coordination normal.          Procedures     MDM         --------------------------------------------- PAST HISTORY ---------------------------------------------  Past Medical History:  has a past medical history of Abscess of -------------------------------------------------    LABS:  Results for orders placed or performed during the hospital encounter of 10/26/20   CBC   Result Value Ref Range    WBC 3.6 (L) 4.5 - 11.5 E9/L    RBC 2.22 (L) 3.50 - 5.50 E12/L    Hemoglobin 8.2 (L) 11.5 - 15.5 g/dL    Hematocrit 24.4 (L) 34.0 - 48.0 %    .9 (H) 80.0 - 99.9 fL    MCH 36.9 (H) 26.0 - 35.0 pg    MCHC 33.6 32.0 - 34.5 %    RDW 23.6 (H) 11.5 - 15.0 fL    Platelets 86 (L) 264 - 450 E9/L    MPV 12.3 (H) 7.0 - 12.0 fL   Lactic Acid, Plasma   Result Value Ref Range    Lactic Acid 3.0 (H) 0.5 - 2.2 mmol/L   Urinalysis   Result Value Ref Range    Color, UA Yellow Straw/Yellow    Clarity, UA CLOUDY (A) Clear    Glucose, Ur Negative Negative mg/dL    Bilirubin Urine Negative Negative    Ketones, Urine Negative Negative mg/dL    Specific Gravity, UA 1.010 1.005 - 1.030    Blood, Urine LARGE (A) Negative    pH, UA 8.5 5.0 - 9.0    Protein,  (A) Negative mg/dL    Urobilinogen, Urine 0.2 <2.0 E.U./dL    Nitrite, Urine POSITIVE (A) Negative    Leukocyte Esterase, Urine LARGE (A) Negative   Microscopic Urinalysis   Result Value Ref Range    WBC, UA >20 (A) 0 - 5 /HPF    RBC, UA >20 0 - 2 /HPF    Bacteria, UA MANY (A) None Seen /HPF   SPECIMEN REJECTION   Result Value Ref Range    Rejected Test PT,PTT     Reason for Rejection see below    Platelet Confirmation   Result Value Ref Range    Platelet Confirmation CONFIRMED    Basic Metabolic Panel   Result Value Ref Range    Sodium 145 132 - 146 mmol/L    Potassium 2.3 (LL) 3.5 - 5.0 mmol/L    Chloride 121 (H) 98 - 107 mmol/L    CO2 14 (L) 22 - 29 mmol/L    Anion Gap 10 7 - 16 mmol/L    Glucose 134 (H) 74 - 99 mg/dL    BUN 45 (H) 8 - 23 mg/dL    CREATININE 1.2 (H) 0.5 - 1.0 mg/dL    GFR Non-African American 44 >=60 mL/min/1.73    GFR African American 53     Calcium 4.9 (LL) 8.6 - 10.2 mg/dL   Brain Natriuretic Peptide   Result Value Ref Range    Pro-BNP 20,768 (H) 0 - 450 pg/mL   Amylase   Result Value Ref Range    Amylase 9 (L) 20 - 100 U/L   CK   Result Value Ref Range    Total CK 49 20 - 180 U/L   CKMB   Result Value Ref Range    CK-MB 1.7 0.0 - 4.3 ng/mL   Hepatic function panel   Result Value Ref Range    Total Protein 3.2 (L) 6.4 - 8.3 g/dL    Alb 1.6 (L) 3.5 - 5.2 g/dL    Alkaline Phosphatase 71 35 - 104 U/L    ALT 24 0 - 32 U/L    AST 17 0 - 31 U/L    Total Bilirubin 1.2 0.0 - 1.2 mg/dL    Bilirubin, Direct 0.3 0.0 - 0.3 mg/dL    Bilirubin, Indirect 0.9 0.0 - 1.0 mg/dL   Lipase   Result Value Ref Range    Lipase 16 13 - 60 U/L   Magnesium   Result Value Ref Range    Magnesium 1.2 (L) 1.6 - 2.6 mg/dL   Troponin   Result Value Ref Range    Troponin 0.02 0.00 - 0.03 ng/mL   TSH without Reflex   Result Value Ref Range    TSH 2.940 0.270 - 4.200 uIU/mL   Basic Metabolic Panel   Result Value Ref Range    Sodium 138 132 - 146 mmol/L    Potassium 4.2 3.5 - 5.0 mmol/L    Chloride 103 98 - 107 mmol/L    CO2 22 22 - 29 mmol/L    Anion Gap 13 7 - 16 mmol/L    Glucose 221 (H) 74 - 99 mg/dL    BUN 74 (H) 8 - 23 mg/dL    CREATININE 2.1 (H) 0.5 - 1.0 mg/dL    GFR Non-African American 23 >=60 mL/min/1.73    GFR African American 28     Calcium 9.0 8.6 - 10.2 mg/dL   Brain Natriuretic Peptide   Result Value Ref Range    Pro-BNP 34,317 (H) 0 - 450 pg/mL   Amylase   Result Value Ref Range    Amylase 18 (L) 20 - 100 U/L   Lipase   Result Value Ref Range    Lipase 28 13 - 60 U/L   CK   Result Value Ref Range    Total CK 95 20 - 180 U/L   CKMB   Result Value Ref Range    CK-MB 3.3 0.0 - 4.3 ng/mL   Hepatic function panel   Result Value Ref Range    Total Protein 5.9 (L) 6.4 - 8.3 g/dL    Alb 2.6 (L) 3.5 - 5.2 g/dL    Alkaline Phosphatase 137 (H) 35 - 104 U/L    ALT 47 (H) 0 - 32 U/L    AST 34 (H) 0 - 31 U/L    Total Bilirubin 1.5 (H) 0.0 - 1.2 mg/dL    Bilirubin, Direct 0.5 (H) 0.0 - 0.3 mg/dL    Bilirubin, Indirect 1.0 0.0 - 1.0 mg/dL   Magnesium   Result Value Ref Range    Magnesium 2.1 1.6 - 2.6 mg/dL   Troponin   Result Value Ref Range    Troponin 0.04 (H) 0.00 - 0.03 ng/mL   TSH without Reflex   Result Value Ref Range    TSH 5.300 (H) 0.270 - 4.200 uIU/mL   EKG 12 Lead   Result Value Ref Range    Ventricular Rate 87 BPM    Atrial Rate 87 BPM    P-R Interval 186 ms    QRS Duration 152 ms    Q-T Interval 432 ms    QTc Calculation (Bazett) 519 ms    P Axis 66 degrees    R Axis -90 degrees    T Axis 20 degrees   TYPE AND SCREEN   Result Value Ref Range    ABO/Rh O NEG     Antibody Screen NEG        RADIOLOGY:  CT HEAD WO CONTRAST   Final Result   No acute intracranial abnormality. CT CERVICAL SPINE WO CONTRAST   Final Result   No acute cervical spine fracture or malalignment. Degenerative changes of the cervical spine. CT CHEST WO CONTRAST   Final Result   1. No acute traumatic injury in the chest, abdomen or pelvis. 2. Unknown history of probable malignancy with progression of findings from   prior CT imaging. This includes infiltrative lesion in the liver, omental   thickening, ascites and mesenteric edema, mediastinal lymph node enlargement   and right axillary lymph node enlargement. Sclerotic changes in the ribs and   pelvis also of potential concern for metastasis. 3. Bilateral pleural effusions with interstitial changes developing in the   lungs. A nodular density along the right minor fissure may represent   loculated pleural fluid or a pulmonary nodule. 4. Recommend correlation with history and any prior treatment for malignancy. CT ABDOMEN PELVIS WO CONTRAST Additional Contrast? None   Final Result   1. No acute traumatic injury in the chest, abdomen or pelvis. 2. Unknown history of probable malignancy with progression of findings from   prior CT imaging. This includes infiltrative lesion in the liver, omental   thickening, ascites and mesenteric edema, mediastinal lymph node enlargement   and right axillary lymph node enlargement.   Sclerotic changes in the ribs and   pelvis also of potential concern for metastasis. 3. Bilateral pleural effusions with interstitial changes developing in the   lungs. A nodular density along the right minor fissure may represent   loculated pleural fluid or a pulmonary nodule. 4. Recommend correlation with history and any prior treatment for malignancy. XR CHEST PORTABLE   Final Result   1. Stable exam.  Persistent elevation of the right hemidiaphragm and small   right pleural effusion. Coarse interstitial markings are unchanged. 2.  Atherosclerotic disease, cardiomegaly, mild central pulmonary vascular   congestion, and postsurgical changes consistent with prior CABG. 3.  Unchanged complete left humeral neck fracture. EKG:  This EKG is signed and interpreted by me. Rate:normal  Rhythm: Sinus  Interpretation: non-specific EKG  Comparison: no previous EKG available      ------------------------- NURSING NOTES AND VITALS REVIEWED ---------------------------   The nursing notes within the ED encounter and vital signs as below have been reviewed. BP (!) 118/50   Pulse 79   Temp 97 °F (36.1 °C)   Resp 14   SpO2 96%   Oxygen Saturation Interpretation: Normal      ------------------------------------------ PROGRESS NOTES ------------------------------------------     Consultations:      Counseling:   I have spoken with the patient and discussed todays results, in addition to providing specific details for the plan of care and counseling regarding the diagnosis and prognosis.   Their questions are answered at this time and they are agreeable with the plan.      --------------------------------- ADDITIONAL PROVIDER NOTES ---------------------------------         This patient's ED course included: a personal history and physicial examination, re-evaluation prior to disposition, multiple bedside re-evaluations, IV medications, cardiac monitoring, continuous pulse oximetry and complex medical decision making and emergency management    This patient has remained hemodynamically stable during their ED course. Critical Care:          --------------------------------- IMPRESSION AND DISPOSITION ---------------------------------    IMPRESSION  1. Acute cystitis with hematuria    2. Pressure injury of skin, unspecified injury stage, unspecified location    3. Dehydration    4.  General weakness        DISPOSITION  Disposition: Admit to telemetry  Patient condition is stable                Jarrod Salcedo MD  10/27/20 0797

## 2020-10-27 NOTE — PROGRESS NOTES
Physical Therapy  Physical Therapy Initial Assessment   Name: Jb Flowers  : 1945  MRN: [id number]    Referring Provider:  Tova Dawkins MD    Date of Service: 10/27/2020    Evaluating PT:  Norma Nathalie PT, DPT [id number]    Room #:  2503/9043-W  Diagnosis:  Dehydration  PMHx/PSHx:  HTN, CAD, DM 2, Nephrolithiasis, Diabetic neuropathy, DVT, pericardial effusion, anemia, chronic humerus fx L, HLD, MI, arthritis, sarcoidosis, CHF, breast CA, chronic venous insufficiency, lymphedema, lymphopenia, DDD, L breast lumpectomy  Precautions:  Falls, bilateral hip buttock and leg wounds, confusion  Equipment Needs:  TBD at rehab    SUBJECTIVE:  Pt is questionable historian but reports the following. Pt lives alone in a 7th floor apartment with 0 stairs to enter and 0 rail. Elevator access to unit. Pt ambulated with front [employer] but also uses wheelchair [employer]ey PTA. Pt reports she has an aide that comes in 3x/week to assist with ADLs and grocery shopping. Pt reports she does not drive. OBJECTIVE:   Initial Evaluation  Date: 10/27/2020 Treatment Short Term/ Long Term   Goals   AM-PAC 6 Clicks 15/92     Was pt agreeable to Eval/treatment? yes     Does pt have pain? Pain at bilateral hip wound sites     Bed Mobility  Rolling: maxA  Supine to sit: maxA  Sit to supine: maxA  Scooting: maxA  Rolling: Julisa  Supine to sit: Julisa  Sit to supine: Julisa  Scooting: Julisa   Transfers Sit to stand: Julisa  Stand to sit: Julisa  Stand pivot: NT  Sit to stand: SBA  Stand to sit: SBA  Stand pivot: SBA front [employer]   Ambulation    side steps at EOB d/t pt reports of feeling dizzy front [employer] Julisa  50 feet with front [employer] SBA   Stair negotiation: ascended and descended  NT  NA   ROM BUE:  Per OT note  BLE:  WNL     Strength BUE:  Per OT note  BLE:  WNL     Balance Sitting EOB:  SBA  Dynamic Standing:  Julisa front [employer]  Sitting EOB:  Independent  Dynamic Standing:  SBA front [employer]     Pt is A & O x 3 (self, place, remembers she fell at home.   Pt occasionally perseverates on answers to questions regarding social history and required multiple prompts to answer other questions. Pt following commands but seems mildly confused throughout session.)  Sensation:  Pt denies numbness and tingling to extremities  Edema:  unremarkable    Vitals:  BP monitored:  Seated: 129/62  Standin/58  (Blood pressures were taken on second bout of standing activity after pt reported dizziness with first bout.)    Patient education  Pt educated on role of PT intervention. Pt educated on safety in room with utilization of call light for assistance with mobility. Patient response to education:   Pt verbalized understanding Pt demonstrated skill Pt requires further education in this area   yes Yes with cues yes     ASSESSMENT:    Comments:  RN cleared pt for activity prior to session. Pt received supine in bed and agreeable to PT intervention at this time. Pt performed all functional mobility as noted above. Pt arrives after a fall at home. Pt presents with multiple skin wounds in bilateral hips, buttock, and BLE. Pt mildly confused throughout session and mobility is limited by dizziness upon standing and pain at this time. RN was called to acknowledge wounds before pt was returned to bed. At end of session, pt returned to supine and left with all needs met and call light in reach. Pt requires continued skilled PT intervention for the purposes of maximizing functional mobility and independence prior to return to home. Treatment:  Patient practiced and was instructed in the following treatment:     Therapeutic Activities Completed:  o Functional mobility as noted above:   - Bed mobility: maxA all aspects. Pt limited by pain at this time. D/t hip wounds rolling was kept to a minimum as to not shear bilateral hip wounds. At EOB pt demonstrated good seated balance at SBA level. - Transfer training: sit<>stand x 3 reps Julisa.   After first rep pt reported light headedness and quickly sat down. BP was monitored after this but was WNL. Pt continued to report dizziness with standing activity. - Ambulation: side steps at EOB 5 feet x 2 reps each direction with front [employer] Julisa. Further ambulation limited by pt dizziness. o Skilled repositioning in supine with HOB elevated for comfort. Nurse to obtain TAPs system for wounds. o Pt education as noted above.  o Skilled vitals monitoring as noted above. Pt's/ family goals   1. Return home. Patient and or family understand(s) diagnosis, prognosis, and plan of care. yes    PLAN OF CARE:    Current Treatment Recommendations     [x] Strengthening     [x] ROM   [x] Balance Training   [x] Endurance Training   [x] Transfer Training   [x] Gait Training   [x] Stair Training   [x] Positioning   [x] Safety and Education Training   [x] Patient/Caregiver Education   [] HEP  [] Other     PT care will be provided in accordance with the objectives noted above. The above treatment recommendations will be utilized to address deficits described above in order to restore pt's prior level of function and/or achieve modified functional independence with adaptive strategies. Frequency of treatments: 2-5x/week x 1-2 weeks. Time in  1515  Time out  1540    Total Treatment Time  15 minutes     Evaluation Time includes thorough review of current medical information, gathering information on past medical history/social history and prior level of function, completion of standardized testing/informal observation of tasks, assessment of data and education on plan of care and goals.     CPT codes:  [] Low Complexity PT evaluation 69377  [x] Moderate Complexity PT evaluation 64546  [] High Complexity PT evaluation 81141  [] PT Re-evaluation 44189  [] Gait training 17606 0 minutes  [] Manual therapy 05552 0 minutes  [x] Therapeutic activities 73794 15 minutes  [] Therapeutic exercises 16348 0 minutes  [] Neuromuscular reeducation 43698 0 minutes     Venkatesh Booker, PT, DPT  [id number]

## 2020-10-27 NOTE — H&P
[facility] - [facility] Resident Inpatient  History and Physical      CC: Dizziness/fall    HPI: History obtained from patient, electronic medical record. Patient is oriented to time, place, person. Srinivas Muniz is a 76 y.o. female with a PMH of metastatic breast cancer, heart failure last EF 50 to 55%, type 2 diabetes requiring insulin, chronic DVT, CKD stage III who presents to ED for Fall (pt fell earlier d/t dizziness, was on the floor for 4 hours)    States that she fell this afternoon. Felt dizzy and lightheaded prior to, denied any chest pain or shortness of breath or palpitations prior to. States that she did hit her head, did not lose consciousness. Is on Coumadin chronically for chronic DVT. Patient reporting no complaints or pain in the ER. Of note, patient recently seen in the ER for weakness, was transfused and discharged home in stable condition. Patient denies any fevers, chills, chest pain, shortness of breath, abdominal pain. Found to have UTI on urinalysis, denies any dysuria or frequency. ED Course: The patient was mildly hypotensive and remained with borderline blood pressure. Labs creatinine 2.1, blood glucose 221, AST/ALT 38/47, hemoglobin 8.2, BNP 34,000, troponin 0 0.04, lactic acid 3.0, UA with positive nitrites, leuk esterase, bacteria  Imaging was stable chest x-ray, CT head negative for acute process  EKG: normal sinus rhythm, RBBB, unchanged from previous tracings. Patient was given Vanco/Zosyn, 1 L bolus, started on 100 cc/h.    Pt admitted for KARISHMA on CKD, UTI, hypotension    ED orders :   Orders Placed This Encounter   Procedures    Culture, Blood 2    Culture, Blood 1    Culture, Urine    XR CHEST PORTABLE    CT HEAD WO CONTRAST    CT CERVICAL SPINE WO CONTRAST    CT CHEST WO CONTRAST    CT ABDOMEN PELVIS WO CONTRAST Additional Contrast? None    CBC    Lactic Acid, Plasma    Urinalysis    Blood occult stool screen #1    Microscopic Urinalysis    SPECIMEN REJECTION    Platelet Confirmation    Basic Metabolic Panel    Brain Natriuretic Peptide    Amylase    CK    CKMB    Hepatic function panel    Lipase    Magnesium    Troponin    TSH without Reflex    Basic Metabolic Panel    Brain Natriuretic Peptide    Amylase    Lipase    CK    CKMB    Hepatic function panel    Magnesium    Troponin    TSH without Reflex    Protime-INR    Comprehensive Metabolic Panel w/ Reflex to MG    CBC auto differential    Creatinine, urine, random    URINE ELECTROLYTES    Lactic acid, plasma    Troponin    Ammonia    Diet NPO Effective Now    Maurer / urology care    Insert indwelling urinary catheter    Discontinue indwelling urinary catheter when documented indications no longer apply per hospital policy    Orthostatic blood pressure and pulse    Vital signs per unit routine    Notify physician    Up with assistance    Place intermittent pneumatic compression device    DNR comfort care - arrest    Inpatient consult to [facility]    Inpatient consult to [employer]   [address] [place] [address] Avenue to Dose: Vancomycin    OT eval and treat    PT evaluation and treat    Initiate Oxygen Therapy Protocol    EKG 12 Lead    EKG 12 Lead    TYPE AND SCREEN    Notify Inpatient Wound Care Nurse to eval and treat    PATIENT STATUS (FROM ED OR OR/PROCEDURAL) Inpatient       PMH:  has a past medical history of Abscess of abdominal wall, Anemia, Anesthesia, Arthritis, Arthritis, hip, Breast cancer ([facility]), CAD (coronary artery disease), CHF (congestive heart failure) ([facility]), Chronic venous insufficiency, Class 2 severe obesity due to excess calories with serious comorbidity and body mass index (BMI) of 35.0 to 35.9 in adult (HCC), Decreased dorsalis pedis pulse, Degenerative disc disease at L5-S1 level, Diabetic neuropathy (HCC), Diabetic neuropathy (HCC), DVT (deep venous thrombosis) ([address] DVT of upper extremity (deep vein thrombosis) ([address] History of deep vein thrombosis, Humerus fracture, Hyperlipidemia, Hypertension, Left leg pain, Leg swelling, Lymph node enlargement, Lymphedema of both lower extremities, Lymphopenia, MI (myocardial infarction) ([facility] Nephrolithiasis, Pericardial effusion, Pulmonary nodule, Rib lesion, Sarcoidosis, Subclavian vein occlusion, bilateral ([facility] [address] and Type II or unspecified type diabetes mellitus without mention of complication, not stated as uncontrolled. PSH:  has a past surgical history that includes Tooth Extraction; Hysterectomy (1975, 1985); Cholecystectomy; Appendectomy; other surgical history (11/18/11); Arterial bypass surgry; Coronary artery bypass graft (05/2008); Tunneled venous port placement; Cardiac surgery; eye surgery; Tunneled venous port placement ([id number]); Breast lumpectomy (9/27/2005); ECHO Compl W Dop Color Flow (10/30/2013); Colonoscopy (12/2007); Colonoscopy ([id number]); Tonsillectomy; and Paracentesis. FH: family history is not on file. She was adopted. Social:  reports that she has never smoked. She has never used smokeless tobacco. She reports that she does not drink alcohol or use drugs. Allergies: Allergies   Allergen Reactions    Macrobid [Nitrofurantoin Monohydrate Macrocrystals] Hives        Home Medications:   No current facility-administered medications on file prior to encounter. Current Outpatient Medications on File Prior to Encounter   Medication Sig Dispense Refill    albuterol sulfate HFA (VENTOLIN HFA) 108 (90 Base) MCG/ACT inhaler Inhale 2 puffs into the lungs every 6 hours as needed for Wheezing      cyanocobalamin 1000 MCG tablet Take 1,000 mcg by mouth daily      nitroGLYCERIN (NITROSTAT) 0.4 MG SL tablet Place 0.4 mg under the tongue every 5 minutes as needed for Chest pain      warfarin (COUMADIN) 1 MG tablet Take 1 mg by mouth daily      oxyCODONE (ROXICODONE) 5 MG immediate release tablet Take 1 tablet by mouth every 6 hours as needed for Pain for up to 15 days.  60 tablet 0    sodium bicarbonate 650 MG tablet Take 1 tablet by mouth 2 times daily 180 tablet 1    omeprazole 20 MG EC tablet Take 1 tablet by mouth daily 90 tablet 1    allopurinol (ZYLOPRIM) 100 MG tablet Take 1 tablet by mouth daily 90 tablet 1    magnesium oxide (MAG-OX) 400 MG tablet Take 1 tablet by mouth daily 90 tablet 0    insulin glargine (LANTUS SOLOSTAR) 100 UNIT/ML injection pen Inject 10 Units into the skin daily 5 pen 0    lidocaine (LIDODERM) 5 % Apply 1 patch topically daily      acetaminophen (TYLENOL) 325 MG tablet Take 650 mg by mouth every 6 hours as needed      metoprolol succinate (TOPROL XL) 25 MG extended release tablet Take 0.5 tablets by mouth daily 60 tablet 3    insulin lispro (HUMALOG) 100 UNIT/ML injection vial Inject 0-3 Units into the skin nightly 1 vial 3    docusate (COLACE, DULCOLAX) 100 MG CAPS Take 100 mg by mouth daily 90 capsule 3    isosorbide dinitrate (ISORDIL) 5 MG tablet Take 1 tablet by mouth 3 times daily 90 tablet 3       ROS:   Const: No fever, chills, night sweats, no recent unexplained weight gain/loss  HEENT: No blurred vision, double vision; no URI symptoms  Resp: No cough, no sputum, no pleuritic chest pain, no sob  Cardio: No chest pain, no exertional dyspnea, no PND, no orthopnea, no palpitation, no leg swelling. GI: No dysphagia, no reflux; no abdominal pain, no n/v; no c/d. No hematochezia    : No dysuria, no frequency, hesitancy; no hematuria  MSK: no joint pain, no myalgia, no change in ROM  Neuro: no focal weakness, no slurred speech, no double vision, no numbness or tingling in extremities  Endo: no heat/cold intolerance, no polyphagia, polydipsia or polyuria  Hem: no increased bleeding, no bruising, no lymphadenopathy  Skin: no skin changes  Psych: no depressed mood, no suicidal ideation    PE:  Blood pressure (!) 118/50, pulse 79, temperature 97 °F (36.1 °C), resp. rate 14, SpO2 96 %, not currently breastfeeding.     General: Alert, cooperative, no acute distress. HEENT: Normocephalic, atraumatic. PERRLA, conjunctiva/corneas clear, EOM's intact, no pallor or icterus. Oropharynx clear. Oral mucosa dry  Neck: Supple, symmetrical, trachea midline, no JVD. Thyroid non tender, no obvious masses. No cervical lymphadenopathy. Chest: No tenderness or deformity, full & symmetric excursion  Lung: globally diminished, poor effort,  respirations unlabored. No rales/wheezing/rubs  Heart: RRR, S1 and S2 normal, + systolic murmur, rub or gallop. DP pulses 2/4  Abdomen: SNTND, no masses, no organomegaly, no guarding, rebound or rigidity. Genital/Rectal: deferred  Extremities:  Extremities normal, atraumatic, no cyanosis or edema. Distal pulses equal bilaterally  Skin: chronic decub ulcers, L buttock with erythema and drainage  Musculoskeletal: No joint swelling, no muscle tenderness. Normal ROM in extremities. 2-3+ pitting edema to knees B/l  Neurologic: groggy, oriented x4; CNII-XII intact; Normal and symmetric strength in UEs and LEs; Sensation intact. Tremor noted in hands, previously documented by PCP  Psychiatric: appropriate affect. Intact judgment and insight.      Labs:   Results for orders placed or performed during the hospital encounter of 10/26/20   CBC   Result Value Ref Range    WBC 3.6 (L) 4.5 - 11.5 E9/L    RBC 2.22 (L) 3.50 - 5.50 E12/L    Hemoglobin 8.2 (L) 11.5 - 15.5 g/dL    Hematocrit 24.4 (L) 34.0 - 48.0 %    .9 (H) 80.0 - 99.9 fL    MCH 36.9 (H) 26.0 - 35.0 pg    MCHC 33.6 32.0 - 34.5 %    RDW 23.6 (H) 11.5 - 15.0 fL    Platelets 86 (L) 961 - 450 E9/L    MPV 12.3 (H) 7.0 - 12.0 fL   Lactic Acid, Plasma   Result Value Ref Range    Lactic Acid 3.0 (H) 0.5 - 2.2 mmol/L   Urinalysis   Result Value Ref Range    Color, UA Yellow Straw/Yellow    Clarity, UA CLOUDY (A) Clear    Glucose, Ur Negative Negative mg/dL    Bilirubin Urine Negative Negative    Ketones, Urine Negative Negative mg/dL    Specific Gravity, UA 1.010 1.005 - 1.030    Blood, Urine LARGE (A) Negative    pH, UA 8.5 5.0 - 9.0    Protein,  (A) Negative mg/dL    Urobilinogen, Urine 0.2 <2.0 E.U./dL    Nitrite, Urine POSITIVE (A) Negative    Leukocyte Esterase, Urine LARGE (A) Negative   Microscopic Urinalysis   Result Value Ref Range    WBC, UA >20 (A) 0 - 5 /HPF    RBC, UA >20 0 - 2 /HPF    Bacteria, UA MANY (A) None Seen /HPF   SPECIMEN REJECTION   Result Value Ref Range    Rejected Test PT,PTT     Reason for Rejection see below    Platelet Confirmation   Result Value Ref Range    Platelet Confirmation CONFIRMED    Basic Metabolic Panel   Result Value Ref Range    Sodium 145 132 - 146 mmol/L    Potassium 2.3 (LL) 3.5 - 5.0 mmol/L    Chloride 121 (H) 98 - 107 mmol/L    CO2 14 (L) 22 - 29 mmol/L    Anion Gap 10 7 - 16 mmol/L    Glucose 134 (H) 74 - 99 mg/dL    BUN 45 (H) 8 - 23 mg/dL    CREATININE 1.2 (H) 0.5 - 1.0 mg/dL    GFR Non-African American 44 >=60 mL/min/1.73    GFR African American 53     Calcium 4.9 (LL) 8.6 - 10.2 mg/dL   Brain Natriuretic Peptide   Result Value Ref Range    Pro-BNP 20,768 (H) 0 - 450 pg/mL   Amylase   Result Value Ref Range    Amylase 9 (L) 20 - 100 U/L   CK   Result Value Ref Range    Total CK 49 20 - 180 U/L   CKMB   Result Value Ref Range    CK-MB 1.7 0.0 - 4.3 ng/mL   Hepatic function panel   Result Value Ref Range    Total Protein 3.2 (L) 6.4 - 8.3 g/dL    Alb 1.6 (L) 3.5 - 5.2 g/dL    Alkaline Phosphatase 71 35 - 104 U/L    ALT 24 0 - 32 U/L    AST 17 0 - 31 U/L    Total Bilirubin 1.2 0.0 - 1.2 mg/dL    Bilirubin, Direct 0.3 0.0 - 0.3 mg/dL    Bilirubin, Indirect 0.9 0.0 - 1.0 mg/dL   Lipase   Result Value Ref Range    Lipase 16 13 - 60 U/L   Magnesium   Result Value Ref Range    Magnesium 1.2 (L) 1.6 - 2.6 mg/dL   Troponin   Result Value Ref Range    Troponin 0.02 0.00 - 0.03 ng/mL   TSH without Reflex   Result Value Ref Range    TSH 2.940 0.270 - 4.200 uIU/mL   Basic Metabolic Panel   Result Value Ref Range    Sodium 138 132 - 146 mmol/L    Potassium 4.2 malignancy with progression of findings from   prior CT imaging. This includes infiltrative lesion in the liver, omental   thickening, ascites and mesenteric edema, mediastinal lymph node enlargement   and right axillary lymph node enlargement. Sclerotic changes in the ribs and   pelvis also of potential concern for metastasis. 3. Bilateral pleural effusions with interstitial changes developing in the   lungs. A nodular density along the right minor fissure may represent   loculated pleural fluid or a pulmonary nodule. 4. Recommend correlation with history and any prior treatment for malignancy. CT ABDOMEN PELVIS WO CONTRAST Additional Contrast? None   Final Result   1. No acute traumatic injury in the chest, abdomen or pelvis. 2. Unknown history of probable malignancy with progression of findings from   prior CT imaging. This includes infiltrative lesion in the liver, omental   thickening, ascites and mesenteric edema, mediastinal lymph node enlargement   and right axillary lymph node enlargement. Sclerotic changes in the ribs and   pelvis also of potential concern for metastasis. 3. Bilateral pleural effusions with interstitial changes developing in the   lungs. A nodular density along the right minor fissure may represent   loculated pleural fluid or a pulmonary nodule. 4. Recommend correlation with history and any prior treatment for malignancy. XR CHEST PORTABLE   Final Result   1. Stable exam.  Persistent elevation of the right hemidiaphragm and small   right pleural effusion. Coarse interstitial markings are unchanged. 2.  Atherosclerotic disease, cardiomegaly, mild central pulmonary vascular   congestion, and postsurgical changes consistent with prior CABG. 3.  Unchanged complete left humeral neck fracture.              Assessment and Plan  Active Problems:    Metastatic breast cancer ([facility] [address]    Type 2 diabetes mellitus without complication, with long-term current use of insulin ([facility] [address]    Anemia of chronic disease    Chronic deep vein thrombosis (DVT) of proximal vein of right lower extremity (HCC)    Decubitus ulcer    Dehydration    Acute kidney injury superimposed on CKD ([facility] [address]    Acute cystitis without hematuria  Resolved Problems:    * No resolved hospital problems.  *    KARISHMA on CKD  Likely prerenal given low blood pressure on admission  Creatinine 2.1, baseline appears to be 1.5-1.6 however last few creatinines have been 1.9-2  Check urine studies  Normal saline at 100 cc/h  Monitor fluid status closely in light of BNP    Hypotension  2/2 dehydration vs ?sepsis  Low blood pressures in the ER on arrival, reported dizziness before falling  Received 1 L bolus in the ER, started on fluids 100 cc/h  Hold metoprolol and isosorbide  Check orthostatics  In light of UTI and decubitus ulcer, blood cultures obtained, follow  Monitor fluid status closely in light of BNP    UTI  UA positive for leuk esterase, nitrates, bacteria  Patient reports no symptoms, afebrile, blood pressure borderline  Started on Vanco and Zosyn in the ER, switch Zosyn to cefepime  Pharmacy to dose Vanco    Decubitus ulcers  Chronic issue, left buttock with erythema and some drainage  Patient afebrile, blood pressure borderline  Started on Vanco and Zosyn in the ER, switch Zosyn to cefepime, blood cultures obtained, follow and tailor antibiotics as necessary  Wound culture  Wound care consult    Chronic encephalopathy  Reported in PCPs note, MRI ordered without contrast for increased encephalopathy and tremor  Patient somewhat groggy during exam, however was oriented x4 and answering most questions appropriately  Tremor noted in hands  Check ammonia  N.p.o. pending bedside swallow  Consider inpatient MRI if mentation does not improve    Fall  Deconditioning  Patient with increasing dizziness and weakness resulting in fall, no loss of consciousness  Borderline blood pressure in the ER  Check orthostatics, started on fluids  PT/OT/social work for disposition and discharge planning    Elevated troponin  Likely due to chronic kidney disease, troponin 0 0.04 on admission, appears to be around baseline  Reports no chest pain or shortness of breath, EKG unchanged  Cycle troponins  EKG in the morning    Metastatic breast cancer  With known mets to liver, LFTs elevated  Has had worsening encephalopathy and tremor, MRI brain ordered outpatient  Check ammonia  Consider inpatient MRI if mentation does not improve with improvement in blood pressure    Anemia of chronic disease  Hemoglobin 8.2 on admission  Monitor counts closely    Chronic DVT on Coumadin  With recent fall, CT head negative for acute process  INR hemolyzed in the ER  Recheck INR, hold Coumadin until this results        DVT / GI prophylaxis: coumadin and Protonix    Dispo - admit      Electronically signed by Yesenia Thibodeaux MD PGY-2 on 10/27/20 at 2:52 AM EDT  This case was discussed with attending physician: Dr. Danilo Beckford

## 2020-10-27 NOTE — CONSULTS
[address] [place] [address] [facility]    Consultation Note     Admit Date: 10/26/2020  9:28 PM    Reason for Consult:       Attending Physician:  Mary Porter MD     Chief Complaint: Dizzy and weak    HISTORY OF PRESENT ILLNESS:   The patient is a 76 y.o.  woman known to the Infectious Diseases service. The patient is a [facility] emergency room because of weakness and generalized failure to thrive. Recently had a blood transfusion and is known to have ongoing chronic wounds. In the work-up through the emergency room one blood culture turned positive for strep ID has been asked to evaluate. Patient has a Mediport for access  She has had MRSA abscesses on the abdominal wall and skin and soft tissue problems. She has metastatic breast cancer is also known to have sarcoidosis. Wounds taken on 9/25/2020                Microbiology:  7/29/2020 staph aureus MRSA leg wounds along with corynebacterium  7/8/2020 ulcer of the right hip MRSA and corynebacterium  6/9/2020 abscess staph aureus MRSA corynebacterium  8/13/2019 urine Klebsiella oxytoca  8/9/2019 micrococcus        July 2020: Evaluated for bilateral superficial hip wounds Dr. Latrell Minor  June 2020: 5 by Dr. Brian Vivas bilateral hip and coccyx wounds  August 2019: Seen by Dr. Ramirez Current for bacteremia    Past Medical History:        Diagnosis Date    Abscess of abdominal wall     Anemia     Iron deficiency and chronic disease.  Anesthesia     DIFFICULTY WAKING UP    Arthritis     Arthritis, hip     Breast cancer ([facility] [address] 2005    S/P Left Lumpectomy with Lymph Node Dissection, Chemo/Rad. Follows with Dr. Iris Farmer. No recurrence to date. Surgeon was Dr. Giselle Arreaga.  CAD (coronary artery disease) 5/2008    s/p CABG. Follows with [address] [place] [address].     CHF (congestive heart failure) (HCC)     Chronic venous insufficiency 11/7/2018    Class 2 severe obesity due to excess calories with serious comorbidity and body mass index (BMI) of 35.0 to 35.9 in adult [facility]) 8/1/2019    Decreased dorsalis pedis pulse 11/7/2018    Degenerative disc disease at L5-S1 level 7/10/2020    Diabetic neuropathy (HCC)     Diabetic neuropathy (HCC)     DVT (deep venous thrombosis) (HCC)     Recurrent. On lifelong coumadin.  DVT of upper extremity (deep vein thrombosis) ([address] 4/18/2012    History of deep vein thrombosis 11/7/2018    Humerus fracture     Chronic, on the Left.  Hyperlipidemia 8/29/2011    Hypertension     Left leg pain 7/9/2020    Leg swelling 11/7/2018    Lymph node enlargement     Lymphedema of both lower extremities 11/7/2018    Lymphopenia 7/9/2020    MI (myocardial infarction) ([address]     Nephrolithiasis     Follows with Dr. Nichole Palacios.  Pericardial effusion     Pulmonary nodule     With mediastinal lymphadenopathy. Stable. Follows with Dr. Sixto Romero.  Rib lesion 11/28/11    expansile lesion in one of the right ribs laterally    Sarcoidosis 07/26/11    per transbronchial needle aspiration    Subclavian vein occlusion, bilateral ([address] 4/18/2012    Type II or unspecified type diabetes mellitus without mention of complication, not stated as uncontrolled      Past Surgical History:        Procedure Laterality Date    APPENDECTOMY      ARTERIAL BYPASS SURGRY      BREAST LUMPECTOMY  9/27/2005    LEFT    CARDIAC SURGERY      CHOLECYSTECTOMY      COLONOSCOPY  12/2007    cecal arteriovenous malformation with hyperplastic polyps    COLONOSCOPY  [id number]    CORONARY ARTERY BYPASS GRAFT  05/2008    triple bypass    ECHO COMPL W DOP COLOR FLOW  10/30/2013         EYE SURGERY      clarissa cataracts    HYSTERECTOMY  1975, 1985    MAYNOR prolapse, benign conditions; BSO later for scar tissues, no CA.      OTHER SURGICAL HISTORY  11/18/11    port removal right chest wall    PARACENTESIS      TONSILLECTOMY      TOOTH EXTRACTION      Full Dental Extraction.     TUNNELED VENOUS PORT PLACEMENT      removal of port Dec. 2011- Dr. Tito Frazier TUNNELED VENOUS PORT PLACEMENT  [id number]    RIGHT CHEST     Current Medications:   Scheduled Meds:   cyanocobalamin  1,000 mcg Oral Daily    docusate sodium  100 mg Oral Daily    insulin glargine  10 Units Subcutaneous Daily    insulin lispro  0-3 Units Subcutaneous Nightly    magnesium oxide  400 mg Oral Daily    sodium bicarbonate  650 mg Oral BID    sodium chloride flush  10 mL Intravenous 2 times per day    sodium chloride   Intravenous Q12H    pantoprazole  40 mg Intravenous Daily    [START ON 10/28/2020] vancomycin  1,000 mg Intravenous Q36H     Continuous Infusions:   sodium chloride 1,000 mL (10/27/20 1630)    dextrose       PRN Meds:sodium chloride flush, polyethylene glycol, oxyCODONE, glucose, dextrose, glucagon (rDNA), dextrose    Allergies:  Macrobid [nitrofurantoin monohydrate macrocrystals]    Social History:   Social History     Socioeconomic History    Marital status:       Spouse name: None    Number of children: None    Years of education: None    Highest education level: None   Occupational History    None   Social Needs    Financial resource strain: None    Food insecurity     Worry: None     Inability: None    Transportation needs     Medical: None     Non-medical: None   Tobacco Use    Smoking status: Never Smoker    Smokeless tobacco: Never Used   Substance and Sexual Activity    Alcohol use: No    Drug use: No    Sexual activity: Never   Lifestyle    Physical activity     Days per week: None     Minutes per session: None    Stress: None   Relationships    Social connections     Talks on phone: None     Gets together: None     Attends Jewish service: None     Active member of club or organization: None     Attends meetings of clubs or organizations: None     Relationship status: None    Intimate partner violence     Fear of current or ex partner: None     Emotionally abused: None     Physically abused: None     Forced sexual activity: None   Other Topics Concern  None   Social History Narrative    None     Tobacco: No  Alcohol: No  Pets: No  Travel: No    Family History:       Adopted: Yes   . Otherwise non-pertinent to the chief complaint. REVIEW OF SYSTEMS:    CONSTITUTIONAL:  No chills, fevers or night sweats. No loss of weight. EYES:  No double vision or drainage from eyes, ears or throat. HEENT:  No neck stiffness. No dysphagia. No drainage from eyes, ears or throat  RESPIRATORY:  No cough, productive sputum or hemoptysis. CARDIOVASCULAR:  No chest pain, palpitations, orthopnea or dyspnea on exertion. GASTROINTESTINAL:  No nausea, vomiting, diarrhea or constipation or hematochezia   GENITOURINARY:  No frequency burning dysuria or hematuria. INTEGUMENT/BREAST:  No rash or breast masses. HEMATOLOGIC/LYMPHATIC:  No lymphadenopathy or blood dyscrasics. ALLERGIC/IMMUNOLOGIC:  No anaphylaxis. ENDOCRINE:  No polyuria or polydipsia or temperature intolerance. MUSCULOSKELETAL:  No myalgia or arthralgia. Decreased ROM. NEUROLOGICAL:  No focal motor sensory deficit. Generalized weakness upper and lower extremities  BEHAVIOR/PSYCH:  No psychosis. PHYSICAL EXAM:    Vitals:    BP (!) 139/94   Pulse 93   Temp 97.5 °F (36.4 °C) (Temporal)   Resp 18   Ht 5' 4\" (1.626 m) Comment: taken from 10/19 documentation  Wt 171 lb (77.6 kg) Comment: taken from 10/19 documentation  SpO2 99%   BMI 29.35 kg/m²   Constitutional: The patient is awake, alert, and oriented. Skin: Warm and dry. No rashes were noted. No jaundice. Decubitus in the hip areas and sacrum  HEENT: Eyes show round, and reactive pupils. Moist mucous membranes, no ulcerations, no thrush. Neck: Supple to movements. No lymphadenopathy. Chest: No use of accessory muscles to breathe. Symmetrical expansion. Auscultation reveals no wheezing, crackles, or rhonchi. Creased breath sounds at bases Mediport anterior chest wall  Cardiovascular: S1 and S2 are rhythmic and regular.  No murmurs appreciated. Abdomen: Positive bowel sounds to auscultation. Benign to palpation. No masses felt. No hepatosplenomegaly. Ascites  Genitourinary: Female  Extremities: No clubbing, no cyanosis, 2+ lower extremities edema.   Musculoskeletal: Equal symmetrical  Neurological: No focal however weak 2 out of 5 upper and lower extremities  Lines: peripheral  Mediport    CBC+dif:  Recent Labs     10/26/20  2222   WBC 3.6*   HGB 8.2*   HCT 24.4*   .9*   PLT 86*     Lab Results   Component Value Date    CRP 4.9 (H) 07/29/2020    CRP 16.2 (H) 07/07/2020    CRP 4.2 (H) 06/19/2020     No results found for: CRPHS  Lab Results   Component Value Date    SEDRATE 90 (H) 07/29/2020    SEDRATE 134 (H) 07/07/2020    SEDRATE 102 (H) 06/21/2020     Lab Results   Component Value Date    ALT 49 (H) 10/27/2020    AST 51 (H) 10/27/2020    ALKPHOS 119 (H) 10/27/2020    BILITOT 1.1 10/27/2020     Lab Results   Component Value Date     10/27/2020    K 4.1 10/27/2020     10/27/2020    CO2 23 10/27/2020    BUN 68 10/27/2020    CREATININE 2.0 10/27/2020    GFRAA 29 10/27/2020    LABGLOM 24 10/27/2020    GLUCOSE 182 10/27/2020    GLUCOSE 109 04/20/2012    PROT 5.6 10/27/2020    LABALBU 2.1 10/27/2020    LABALBU 4.4 03/25/2012    CALCIUM 8.8 10/27/2020    BILITOT 1.1 10/27/2020    ALKPHOS 119 10/27/2020    AST 51 10/27/2020    ALT 49 10/27/2020       Lab Results   Component Value Date    PROTIME 26.3 10/27/2020    PROTIME 79.1 10/19/2020    INR 2.3 10/27/2020       Lab Results   Component Value Date    TSH 5.300 10/27/2020       Lab Results   Component Value Date    NITRITE negative 05/16/2016    COLORU Yellow 10/26/2020    PHUR 8.5 10/26/2020    WBCUA >20 10/26/2020    WBCUA 0-1 12/05/2011    RBCUA >20 10/26/2020    RBCUA 5-10 05/01/2013    BACTERIA MANY 10/26/2020    CLARITYU CLOUDY 10/26/2020    SPECGRAV 1.010 10/26/2020    LEUKOCYTESUR LARGE 10/26/2020    UROBILINOGEN 0.2 10/26/2020    BILIRUBINUR Negative 10/26/2020 BILIRUBINUR small 05/16/2016    BILIRUBINUR NEGATIVE 12/05/2011    BLOODU LARGE 10/26/2020    GLUCOSEU Negative 10/26/2020    GLUCOSEU NEGATIVE 12/05/2011    AMORPHOUS FEW 08/13/2019       No results found for: MIM6CHC, BEART, Z7HHXYVB, PHART, THGBART, AEP1HBK, PO2ART, ZVX6QBY  Radiology:  MRI BRAIN WO CONTRAST   Final Result   1. No acute intracranial abnormality. 2. No mass effect, edema or hemorrhage. CT HEAD WO CONTRAST   Final Result   No acute intracranial abnormality. CT CERVICAL SPINE WO CONTRAST   Final Result   No acute cervical spine fracture or malalignment. Degenerative changes of the cervical spine. CT CHEST WO CONTRAST   Final Result   1. No acute traumatic injury in the chest, abdomen or pelvis. 2. Unknown history of probable malignancy with progression of findings from   prior CT imaging. This includes infiltrative lesion in the liver, omental   thickening, ascites and mesenteric edema, mediastinal lymph node enlargement   and right axillary lymph node enlargement. Sclerotic changes in the ribs and   pelvis also of potential concern for metastasis. 3. Bilateral pleural effusions with interstitial changes developing in the   lungs. A nodular density along the right minor fissure may represent   loculated pleural fluid or a pulmonary nodule. 4. Recommend correlation with history and any prior treatment for malignancy. CT ABDOMEN PELVIS WO CONTRAST Additional Contrast? None   Final Result   1. No acute traumatic injury in the chest, abdomen or pelvis. 2. Unknown history of probable malignancy with progression of findings from   prior CT imaging. This includes infiltrative lesion in the liver, omental   thickening, ascites and mesenteric edema, mediastinal lymph node enlargement   and right axillary lymph node enlargement. Sclerotic changes in the ribs and   pelvis also of potential concern for metastasis.    3. Bilateral pleural effusions with interstitial changes developing in the   lungs. A nodular density along the right minor fissure may represent   loculated pleural fluid or a pulmonary nodule. 4. Recommend correlation with history and any prior treatment for malignancy. XR CHEST PORTABLE   Final Result   1. Stable exam.  Persistent elevation of the right hemidiaphragm and small   right pleural effusion. Coarse interstitial markings are unchanged. 2.  Atherosclerotic disease, cardiomegaly, mild central pulmonary vascular   congestion, and postsurgical changes consistent with prior CABG. 3.  Unchanged complete left humeral neck fracture. Microbiology:  Pending  No results for input(s): BC in the last 72 hours. No results for input(s): ORG in the last 72 hours. Recent Labs     10/26/20  2223   BLOODCULT2 Gram stain performed from blood culture bottle media  Gram positive cocci in chains  *     No results for input(s): STREPNEUMAGU in the last 72 hours. No results for input(s): LP1UAG in the last 72 hours. No results for input(s): ASO in the last 72 hours. No results for input(s): CULTRESP in the last 72 hours. Assessment:  · 1 cytopenia-immunocompromised host  · Bacteremia possibly from the decubiti  · Rule out Mediport infection    Plan:    · Cont Vanco for now  · Blood culture from medical marijuana accessed  · Check cultures  · Baseline ESR, CRP  · Monitor labs  · Will follow with you    Thank you for having us see this patient in consultation. I will be discussing this case with the treating physicians.       Electronically signed by Krysta Sylvester MD on 10/27/2020 at 7:42 PM

## 2020-10-28 ENCOUNTER — APPOINTMENT (OUTPATIENT)
Dept: GENERAL RADIOLOGY | Age: 75
DRG: 981 | End: 2020-10-28
Payer: COMMERCIAL

## 2020-10-28 LAB
ALBUMIN SERPL-MCNC: 2.1 G/DL (ref 3.5–5.2)
ALP BLD-CCNC: 124 U/L (ref 35–104)
ALT SERPL-CCNC: 45 U/L (ref 0–32)
ANION GAP SERPL CALCULATED.3IONS-SCNC: 11 MMOL/L (ref 7–16)
ANION GAP SERPL CALCULATED.3IONS-SCNC: 14 MMOL/L (ref 7–16)
ANISOCYTOSIS: ABNORMAL
AST SERPL-CCNC: 29 U/L (ref 0–31)
BASOPHILS ABSOLUTE: 0.04 E9/L (ref 0–0.2)
BASOPHILS RELATIVE PERCENT: 1.1 % (ref 0–2)
BILIRUB SERPL-MCNC: 0.9 MG/DL (ref 0–1.2)
BUN BLDV-MCNC: 63 MG/DL (ref 8–23)
BUN BLDV-MCNC: 68 MG/DL (ref 8–23)
CALCIUM SERPL-MCNC: 7.9 MG/DL (ref 8.6–10.2)
CALCIUM SERPL-MCNC: 8 MG/DL (ref 8.6–10.2)
CHLORIDE BLD-SCNC: 104 MMOL/L (ref 98–107)
CHLORIDE BLD-SCNC: 105 MMOL/L (ref 98–107)
CHLORIDE URINE RANDOM: 29 MMOL/L
CO2: 20 MMOL/L (ref 22–29)
CO2: 20 MMOL/L (ref 22–29)
CREAT SERPL-MCNC: 1.9 MG/DL (ref 0.5–1)
CREAT SERPL-MCNC: 2 MG/DL (ref 0.5–1)
CREATININE URINE: 83 MG/DL (ref 29–226)
EKG ATRIAL RATE: 81 BPM
EKG P AXIS: 73 DEGREES
EKG P-R INTERVAL: 182 MS
EKG Q-T INTERVAL: 442 MS
EKG QRS DURATION: 148 MS
EKG QTC CALCULATION (BAZETT): 513 MS
EKG R AXIS: -81 DEGREES
EKG T AXIS: 35 DEGREES
EKG VENTRICULAR RATE: 81 BPM
EOSINOPHILS ABSOLUTE: 0.06 E9/L (ref 0.05–0.5)
EOSINOPHILS RELATIVE PERCENT: 1.6 % (ref 0–6)
GFR AFRICAN AMERICAN: 29
GFR AFRICAN AMERICAN: 31
GFR NON-AFRICAN AMERICAN: 24 ML/MIN/1.73
GFR NON-AFRICAN AMERICAN: 26 ML/MIN/1.73
GLUCOSE BLD-MCNC: 202 MG/DL (ref 74–99)
GLUCOSE BLD-MCNC: 281 MG/DL (ref 74–99)
HCT VFR BLD CALC: 22.7 % (ref 34–48)
HEMOGLOBIN: 7.4 G/DL (ref 11.5–15.5)
HYPOCHROMIA: ABNORMAL
IMMATURE GRANULOCYTES #: 0.02 E9/L
IMMATURE GRANULOCYTES %: 0.5 % (ref 0–5)
LACTIC ACID: 2.1 MMOL/L (ref 0.5–2.2)
LACTIC ACID: 3.2 MMOL/L (ref 0.5–2.2)
LACTIC ACID: 3.6 MMOL/L (ref 0.5–2.2)
LYMPHOCYTES ABSOLUTE: 0.31 E9/L (ref 1.5–4)
LYMPHOCYTES RELATIVE PERCENT: 8.5 % (ref 20–42)
MCH RBC QN AUTO: 36.1 PG (ref 26–35)
MCHC RBC AUTO-ENTMCNC: 32.6 % (ref 32–34.5)
MCV RBC AUTO: 110.7 FL (ref 80–99.9)
METER GLUCOSE: 221 MG/DL (ref 74–99)
METER GLUCOSE: 240 MG/DL (ref 74–99)
METER GLUCOSE: 270 MG/DL (ref 74–99)
METER GLUCOSE: 275 MG/DL (ref 74–99)
MONOCYTES ABSOLUTE: 0.17 E9/L (ref 0.1–0.95)
MONOCYTES RELATIVE PERCENT: 4.7 % (ref 2–12)
NEUTROPHILS ABSOLUTE: 3.04 E9/L (ref 1.8–7.3)
NEUTROPHILS RELATIVE PERCENT: 83.6 % (ref 43–80)
OVALOCYTES: ABNORMAL
PDW BLD-RTO: 23.7 FL (ref 11.5–15)
PLATELET # BLD: 83 E9/L (ref 130–450)
PLATELET CONFIRMATION: NORMAL
PMV BLD AUTO: 11.9 FL (ref 7–12)
POIKILOCYTES: ABNORMAL
POLYCHROMASIA: ABNORMAL
POTASSIUM REFLEX MAGNESIUM: 4.1 MMOL/L (ref 3.5–5)
POTASSIUM REFLEX MAGNESIUM: 4.1 MMOL/L (ref 3.5–5)
POTASSIUM, UR: 21.7 MMOL/L
RBC # BLD: 2.05 E12/L (ref 3.5–5.5)
SODIUM BLD-SCNC: 136 MMOL/L (ref 132–146)
SODIUM BLD-SCNC: 138 MMOL/L (ref 132–146)
SODIUM URINE: 35 MMOL/L
TARGET CELLS: ABNORMAL
TOTAL PROTEIN: 5.2 G/DL (ref 6.4–8.3)
UREA NITROGEN, UR: 562 MG/DL (ref 800–1666)
WBC # BLD: 3.6 E9/L (ref 4.5–11.5)

## 2020-10-28 PROCEDURE — 82570 ASSAY OF URINE CREATININE: CPT

## 2020-10-28 PROCEDURE — 6370000000 HC RX 637 (ALT 250 FOR IP): Performed by: STUDENT IN AN ORGANIZED HEALTH CARE EDUCATION/TRAINING PROGRAM

## 2020-10-28 PROCEDURE — 82962 GLUCOSE BLOOD TEST: CPT

## 2020-10-28 PROCEDURE — 97530 THERAPEUTIC ACTIVITIES: CPT

## 2020-10-28 PROCEDURE — 71046 X-RAY EXAM CHEST 2 VIEWS: CPT

## 2020-10-28 PROCEDURE — 83605 ASSAY OF LACTIC ACID: CPT

## 2020-10-28 PROCEDURE — 6370000000 HC RX 637 (ALT 250 FOR IP): Performed by: FAMILY MEDICINE

## 2020-10-28 PROCEDURE — 80053 COMPREHEN METABOLIC PANEL: CPT

## 2020-10-28 PROCEDURE — 36592 COLLECT BLOOD FROM PICC: CPT

## 2020-10-28 PROCEDURE — 2060000000 HC ICU INTERMEDIATE R&B

## 2020-10-28 PROCEDURE — U0003 INFECTIOUS AGENT DETECTION BY NUCLEIC ACID (DNA OR RNA); SEVERE ACUTE RESPIRATORY SYNDROME CORONAVIRUS 2 (SARS-COV-2) (CORONAVIRUS DISEASE [COVID-19]), AMPLIFIED PROBE TECHNIQUE, MAKING USE OF HIGH THROUGHPUT TECHNOLOGIES AS DESCRIBED BY CMS-2020-01-R: HCPCS

## 2020-10-28 PROCEDURE — 2580000003 HC RX 258: Performed by: EMERGENCY MEDICINE

## 2020-10-28 PROCEDURE — 85025 COMPLETE CBC W/AUTO DIFF WBC: CPT

## 2020-10-28 PROCEDURE — 05H533Z INSERTION OF INFUSION DEVICE INTO RIGHT SUBCLAVIAN VEIN, PERCUTANEOUS APPROACH: ICD-10-PCS | Performed by: FAMILY MEDICINE

## 2020-10-28 PROCEDURE — 84300 ASSAY OF URINE SODIUM: CPT

## 2020-10-28 PROCEDURE — 99222 1ST HOSP IP/OBS MODERATE 55: CPT | Performed by: NURSE PRACTITIONER

## 2020-10-28 PROCEDURE — 97165 OT EVAL LOW COMPLEX 30 MIN: CPT

## 2020-10-28 PROCEDURE — 36415 COLL VENOUS BLD VENIPUNCTURE: CPT

## 2020-10-28 PROCEDURE — 2580000003 HC RX 258: Performed by: STUDENT IN AN ORGANIZED HEALTH CARE EDUCATION/TRAINING PROGRAM

## 2020-10-28 PROCEDURE — 84133 ASSAY OF URINE POTASSIUM: CPT

## 2020-10-28 PROCEDURE — 82436 ASSAY OF URINE CHLORIDE: CPT

## 2020-10-28 PROCEDURE — 99232 SBSQ HOSP IP/OBS MODERATE 35: CPT | Performed by: FAMILY MEDICINE

## 2020-10-28 PROCEDURE — 2580000003 HC RX 258: Performed by: FAMILY MEDICINE

## 2020-10-28 PROCEDURE — 80048 BASIC METABOLIC PNL TOTAL CA: CPT

## 2020-10-28 PROCEDURE — 6360000002 HC RX W HCPCS: Performed by: FAMILY MEDICINE

## 2020-10-28 PROCEDURE — 93010 ELECTROCARDIOGRAM REPORT: CPT | Performed by: INTERNAL MEDICINE

## 2020-10-28 PROCEDURE — C9113 INJ PANTOPRAZOLE SODIUM, VIA: HCPCS | Performed by: FAMILY MEDICINE

## 2020-10-28 PROCEDURE — 84540 ASSAY OF URINE/UREA-N: CPT

## 2020-10-28 PROCEDURE — 6360000002 HC RX W HCPCS: Performed by: STUDENT IN AN ORGANIZED HEALTH CARE EDUCATION/TRAINING PROGRAM

## 2020-10-28 PROCEDURE — 87040 BLOOD CULTURE FOR BACTERIA: CPT

## 2020-10-28 RX ORDER — PETROLATUM 42 G/100G
OINTMENT TOPICAL 3 TIMES DAILY PRN
Status: DISCONTINUED | OUTPATIENT
Start: 2020-10-28 | End: 2020-11-04 | Stop reason: HOSPADM

## 2020-10-28 RX ORDER — OXYCODONE HYDROCHLORIDE 5 MG/1
5 TABLET ORAL ONCE
Status: COMPLETED | OUTPATIENT
Start: 2020-10-28 | End: 2020-10-28

## 2020-10-28 RX ORDER — INSULIN GLARGINE 100 [IU]/ML
10 INJECTION, SOLUTION SUBCUTANEOUS DAILY
Status: DISCONTINUED | OUTPATIENT
Start: 2020-10-29 | End: 2020-11-04 | Stop reason: HOSPADM

## 2020-10-28 RX ORDER — HEPARIN SODIUM (PORCINE) LOCK FLUSH IV SOLN 100 UNIT/ML 100 UNIT/ML
100 SOLUTION INTRAVENOUS DAILY
Status: DISCONTINUED | OUTPATIENT
Start: 2020-10-28 | End: 2020-11-04 | Stop reason: HOSPADM

## 2020-10-28 RX ORDER — HEPARIN SODIUM (PORCINE) LOCK FLUSH IV SOLN 100 UNIT/ML 100 UNIT/ML
100 SOLUTION INTRAVENOUS PRN
Status: DISCONTINUED | OUTPATIENT
Start: 2020-10-28 | End: 2020-11-04 | Stop reason: HOSPADM

## 2020-10-28 RX ORDER — PETROLATUM 42 G/100G
OINTMENT TOPICAL 2 TIMES DAILY
Status: DISCONTINUED | OUTPATIENT
Start: 2020-10-28 | End: 2020-11-04 | Stop reason: HOSPADM

## 2020-10-28 RX ORDER — SODIUM CHLORIDE 9 MG/ML
INJECTION, SOLUTION INTRAVENOUS CONTINUOUS
Status: DISCONTINUED | OUTPATIENT
Start: 2020-10-28 | End: 2020-10-29

## 2020-10-28 RX ORDER — INSULIN GLARGINE 100 [IU]/ML
15 INJECTION, SOLUTION SUBCUTANEOUS DAILY
Status: DISCONTINUED | OUTPATIENT
Start: 2020-10-29 | End: 2020-10-28

## 2020-10-28 RX ADMIN — SKIN PROTECTANT: 44 OINTMENT TOPICAL at 23:38

## 2020-10-28 RX ADMIN — INSULIN GLARGINE 10 UNITS: 100 INJECTION, SOLUTION SUBCUTANEOUS at 09:11

## 2020-10-28 RX ADMIN — SODIUM BICARBONATE 650 MG: 650 TABLET ORAL at 23:37

## 2020-10-28 RX ADMIN — Medication 1000 MCG: at 09:00

## 2020-10-28 RX ADMIN — VANCOMYCIN HYDROCHLORIDE 1000 MG: 1 INJECTION, POWDER, LYOPHILIZED, FOR SOLUTION INTRAVENOUS at 08:59

## 2020-10-28 RX ADMIN — PANTOPRAZOLE SODIUM 40 MG: 40 INJECTION, POWDER, FOR SOLUTION INTRAVENOUS at 09:00

## 2020-10-28 RX ADMIN — DOCUSATE SODIUM 100 MG: 100 CAPSULE, LIQUID FILLED ORAL at 09:00

## 2020-10-28 RX ADMIN — MAGNESIUM GLUCONATE 500 MG ORAL TABLET 400 MG: 500 TABLET ORAL at 09:00

## 2020-10-28 RX ADMIN — SODIUM CHLORIDE 1000 ML: 9 INJECTION, SOLUTION INTRAVENOUS at 02:57

## 2020-10-28 RX ADMIN — SODIUM CHLORIDE: 9 INJECTION, SOLUTION INTRAVENOUS at 17:33

## 2020-10-28 RX ADMIN — SODIUM CHLORIDE, PRESERVATIVE FREE 10 ML: 5 INJECTION INTRAVENOUS at 09:00

## 2020-10-28 RX ADMIN — OXYCODONE 5 MG: 5 TABLET ORAL at 12:53

## 2020-10-28 RX ADMIN — SODIUM CHLORIDE, PRESERVATIVE FREE 10 ML: 5 INJECTION INTRAVENOUS at 18:22

## 2020-10-28 RX ADMIN — SODIUM BICARBONATE 650 MG: 650 TABLET ORAL at 09:00

## 2020-10-28 RX ADMIN — HEPARIN 100 UNITS: 100 SYRINGE at 12:57

## 2020-10-28 RX ADMIN — SKIN PROTECTANT: 44 OINTMENT TOPICAL at 16:39

## 2020-10-28 RX ADMIN — INSULIN LISPRO 1 UNITS: 100 INJECTION, SOLUTION INTRAVENOUS; SUBCUTANEOUS at 23:37

## 2020-10-28 RX ADMIN — INSULIN LISPRO 3 UNITS: 100 INJECTION, SOLUTION INTRAVENOUS; SUBCUTANEOUS at 17:33

## 2020-10-28 RX ADMIN — SODIUM CHLORIDE, PRESERVATIVE FREE 10 ML: 5 INJECTION INTRAVENOUS at 23:37

## 2020-10-28 RX ADMIN — OXYCODONE 5 MG: 5 TABLET ORAL at 23:58

## 2020-10-28 RX ADMIN — SODIUM CHLORIDE, PRESERVATIVE FREE 10 ML: 5 INJECTION INTRAVENOUS at 12:58

## 2020-10-28 RX ADMIN — OXYCODONE 5 MG: 5 TABLET ORAL at 09:00

## 2020-10-28 ASSESSMENT — PAIN SCALES - GENERAL
PAINLEVEL_OUTOF10: 0
PAINLEVEL_OUTOF10: 8
PAINLEVEL_OUTOF10: 7
PAINLEVEL_OUTOF10: 6
PAINLEVEL_OUTOF10: 8
PAINLEVEL_OUTOF10: 10
PAINLEVEL_OUTOF10: 6
PAINLEVEL_OUTOF10: 7

## 2020-10-28 ASSESSMENT — PAIN DESCRIPTION - ORIENTATION
ORIENTATION: MID;RIGHT;LEFT
ORIENTATION: RIGHT;LEFT;MID
ORIENTATION: LEFT;MID;RIGHT
ORIENTATION: MID;LEFT;RIGHT

## 2020-10-28 ASSESSMENT — PAIN DESCRIPTION - LOCATION
LOCATION: BUTTOCKS;HIP

## 2020-10-28 ASSESSMENT — PAIN DESCRIPTION - FREQUENCY
FREQUENCY: CONTINUOUS

## 2020-10-28 ASSESSMENT — PAIN - FUNCTIONAL ASSESSMENT: PAIN_FUNCTIONAL_ASSESSMENT: PREVENTS OR INTERFERES SOME ACTIVE ACTIVITIES AND ADLS

## 2020-10-28 ASSESSMENT — PAIN DESCRIPTION - DESCRIPTORS
DESCRIPTORS: ACHING;CONSTANT;DISCOMFORT

## 2020-10-28 ASSESSMENT — PAIN DESCRIPTION - ONSET
ONSET: ON-GOING

## 2020-10-28 ASSESSMENT — PAIN DESCRIPTION - PROGRESSION
CLINICAL_PROGRESSION: GRADUALLY IMPROVING

## 2020-10-28 ASSESSMENT — PAIN DESCRIPTION - PAIN TYPE
TYPE: ACUTE PAIN;CHRONIC PAIN
TYPE: CHRONIC PAIN
TYPE: CHRONIC PAIN;ACUTE PAIN
TYPE: CHRONIC PAIN;ACUTE PAIN

## 2020-10-28 NOTE — PROGRESS NOTES
Perfect serve message sent to family medical resident regarding patient's elevated blood sugars. Lunch time check - blood sugar 240. Only coverage currently ordered is morning lantus and Humalog at night. Currently on a regular diet as well. See new orders.

## 2020-10-28 NOTE — PROGRESS NOTES
Notified family medical resident of patient's low urine output for day shift - only 75ml out. Awaiting response.

## 2020-10-28 NOTE — PROGRESS NOTES
Wt 171 lb (77.6 kg) Comment: taken from 10/19 documentation  SpO2 95%   BMI 29.35 kg/m²   24 hour I&O: I/O last 3 completed shifts: In: 1000 [IV Piggyback:1000]  Out: 575 [Urine:575]  I/O this shift:  In: 1537 [P.O.:250; I.V.:1287]  Out: 200 [Urine:200]        Physical Exam   Constitutional: No distress. HENT:   Head: Normocephalic and atraumatic. Cardiovascular: Normal rate and regular rhythm. Murmur heard. Pulmonary/Chest: Effort normal.   Bibasilar crackles   Abdominal: Soft. She exhibits distension. There is abdominal tenderness. Mild distension and generalized TTP   Musculoskeletal:         General: Edema present. Comments: 3+ pitting edema BLE   Neurological: She is alert. Skin: There is pallor. Labs:  Na/K/Cl/CO2:  138/4.1/105/23 (10/27 7240)  BUN/Cr/glu/ALT/AST/amyl/lip:  68/2.0/--/--/--/--/-- (10/27 2205)  WBC/Hgb/Hct/Plts:  3.6/8.2/24.4/86 (10/26 2222)  estimated creatinine clearance is 25 mL/min (A) (based on SCr of 2 mg/dL (H)). Other pertinent labs as noted below    Radiology:  MRI BRAIN WO CONTRAST   Final Result   1. No acute intracranial abnormality. 2. No mass effect, edema or hemorrhage. CT HEAD WO CONTRAST   Final Result   No acute intracranial abnormality. CT CERVICAL SPINE WO CONTRAST   Final Result   No acute cervical spine fracture or malalignment. Degenerative changes of the cervical spine. CT CHEST WO CONTRAST   Final Result   1. No acute traumatic injury in the chest, abdomen or pelvis. 2. Unknown history of probable malignancy with progression of findings from   prior CT imaging. This includes infiltrative lesion in the liver, omental   thickening, ascites and mesenteric edema, mediastinal lymph node enlargement   and right axillary lymph node enlargement. Sclerotic changes in the ribs and   pelvis also of potential concern for metastasis. 3. Bilateral pleural effusions with interstitial changes developing in the   lungs. A nodular density along the right minor fissure may represent   loculated pleural fluid or a pulmonary nodule. 4. Recommend correlation with history and any prior treatment for malignancy. CT ABDOMEN PELVIS WO CONTRAST Additional Contrast? None   Final Result   1. No acute traumatic injury in the chest, abdomen or pelvis. 2. Unknown history of probable malignancy with progression of findings from   prior CT imaging. This includes infiltrative lesion in the liver, omental   thickening, ascites and mesenteric edema, mediastinal lymph node enlargement   and right axillary lymph node enlargement. Sclerotic changes in the ribs and   pelvis also of potential concern for metastasis. 3. Bilateral pleural effusions with interstitial changes developing in the   lungs. A nodular density along the right minor fissure may represent   loculated pleural fluid or a pulmonary nodule. 4. Recommend correlation with history and any prior treatment for malignancy. XR CHEST PORTABLE   Final Result   1. Stable exam.  Persistent elevation of the right hemidiaphragm and small   right pleural effusion. Coarse interstitial markings are unchanged. 2.  Atherosclerotic disease, cardiomegaly, mild central pulmonary vascular   congestion, and postsurgical changes consistent with prior CABG. 3.  Unchanged complete left humeral neck fracture. A/P:  Active Problems:    Metastatic breast cancer ([address]    Type 2 diabetes mellitus without complication, with long-term current use of insulin (HCC)    Anemia of chronic disease    Chronic deep vein thrombosis (DVT) of proximal vein of right lower extremity (HCC)    Decubitus ulcer    Dehydration    Acute kidney injury superimposed on CKD ([address]    Acute cystitis with hematuria    Fall    General weakness  Resolved Problems:    * No resolved hospital problems.  *    KARISHMA on CKD  Likely prerenal given low blood pressure on admission  Creatinine 2.1, baseline appears to be 1.5-1.6 however last few creatinines have been 1.9-2  Check urine studies  Normal saline at 100 cc/h  Monitor fluid status closely in light of BNP     Hypotension  2/2 dehydration vs ?sepsis  Low blood pressures in the ER on arrival, reported dizziness before falling  Received 1 L bolus in the ER, started on fluids 100 cc/h  Hold metoprolol and isosorbide  Check orthostatics  In light of UTI and decubitus ulcer, blood cultures obtained, follow  Monitor fluid status closely in light of BNP     UTI  UA positive for leuk esterase, nitrates, bacteria  Patient reports no symptoms, afebrile, blood pressure borderline  Started on Vanco and Zosyn in the ER, zosyn dc'd  Pharmacy to dose Vanco     Decubitus ulcers  Chronic issue, left buttock with erythema and some drainage  Patient afebrile, blood pressure borderline  Continue vanc, blood cultures obtained, positive for strep  Wound culture- results pending  Wound care consult     Chronic encephalopathy  Reported in PCPs note, MRI ordered without contrast for increased encephalopathy and tremor  Patient somewhat groggy during exam, however was oriented x4 and answering most questions appropriately  Tremor noted in hands  Check ammonia: slightly elevated, 61  Bedside swallow successful, diet ordered  MRI brain done, negative for masses, bleeds     Fall  Deconditioning  Patient with increasing dizziness and weakness resulting in fall, no loss of consciousness  Borderline blood pressure in the ER  Check orthostatics, started on fluids  PT/OT/social work for disposition and discharge planning     Elevated troponin  Likely due to chronic kidney disease, troponin 0 0.04 on admission, appears to be around baseline  Reports no chest pain or shortness of breath, EKG unchanged    Metastatic breast cancer  With known mets to liver, LFTs elevated  Has had worsening encephalopathy and tremor, MRI brain ordered outpatient  Check ammonia  Consider inpatient MRI if mentation does not improve with improvement in blood pressure     Anemia of chronic disease  Hemoglobin 8.2 on admission  Monitor counts closely     Chronic DVT on Coumadin  With recent fall, CT head negative for acute process  INR hemolyzed in the ER  Coumadin held, INR checked, 2.3 .     GI/DVT ppx: protonix  Diet: general  Antibiotics: Vancomycin day 2      Electronically signed by Ewa Kessler  PGY-1 on 10/28/2020 at 6:15 AM  This case was discussed with attending physician: Dr. Estrellita Yañez

## 2020-10-28 NOTE — PROGRESS NOTES
Occupational Therapy  OCCUPATIONAL THERAPY INITIAL EVALUATION        Date:10/28/2020  Patient Name: Mendez Newberry  MRN: [id number]  : 1945  Room: [address] [place] [address]    Referring Physician:  Raina Soto MD    Evaluating OT:  Mahdavi Sands MOT, OTR/L #[id number]    AM-PAC Daily Activity Raw Score:  1424  Recommended Adaptive Equipment:  TBD as pt progresses     Reason for Admission:  Pt was admitted after becoming dizzy and falling at home. Remained on the ground for ~ 4 hours    Diagnosis:     1. Acute cystitis with hematuria    2. Pressure injury of skin, unspecified injury stage, unspecified location    3. Dehydration    4. General weakness      Procedures this admission:  None     Pertinent Medical History:  Abdominal Wall Abscess, Anemia, Metastatic Breast CA w/ mets to Liver, CAD, CABG, CHF, DDD, DM, Diabetic Neuropathy, Lymphedema    Past Surgical History:   Procedure Laterality Date    APPENDECTOMY      ARTERIAL BYPASS SURGRY      BREAST LUMPECTOMY  2005    LEFT    CARDIAC SURGERY      CHOLECYSTECTOMY      COLONOSCOPY  2007    cecal arteriovenous malformation with hyperplastic polyps    COLONOSCOPY  [id number]    CORONARY ARTERY BYPASS GRAFT  2008    triple bypass    ECHO COMPL W DOP COLOR FLOW  10/30/2013         EYE SURGERY      clarissa cataracts    HYSTERECTOMY  ,     MAYNOR prolapse, benign conditions; BSO later for scar tissues, no CA.      OTHER SURGICAL HISTORY  11    port removal right chest wall    PARACENTESIS      TONSILLECTOMY      TOOTH EXTRACTION      Full Dental Extraction.  TUNNELED VENOUS PORT PLACEMENT      removal of port Dec. 2011- Dr. Sixto WINGED Lacey [address]  [id number]    RIGHT CHEST       Precautions:  Falls  Multiple Chronic Wounds - Clarissa Hips/Buttocks/Abdomen    Home Living: Pt lives alone in a single-level apartment. Level entry, elevator access.      Bathroom setup:  Tub-Shower, Standard-height Commode   Equipment owned:  W/C, [employer], Rollator, Shower Chair, Reacher, Sock Aid    Available Family Assist:  Adult Children live locally - assist PRN. Aides 3x/week to assist PRN    Prior Level of Function:  IND with ADLs, Light IADLs, Transfers and Mobility using [address] for household ambulation. Driving:  No  Occupation:  None reported    Pain Level:  8/10 after wound care;  Nsg Notified   Additional Complaints:  Fatigue    Vitals/Lab Values:  WFL room air, Hgb 7.4    Cognition: A & O x 4   Able to Follow Multi-step Commands w/ occasional Min VCs   Memory:  good (-)   Sequencing:  good (-)   Problem solving:  good (-)   Judgement/safety:  good (-)  Additional Comments:  Pt was pleasant, cooperative but very limited by severity of pain       Functional Assessment:   Initial Eval Status  Date: 10-28-20 Treatment Status  Date: Short Term/Long Term Goals  Treatment frequency: PRN 2-4 x/week  5-7 days   Feeding Set up    Able to feed self after set up of tray  NA   Grooming Min A/Set up    Required Min A after set up to complete simple tasks seated - unable to tolerate ambulating to or standing at sink  Min A/Set up  Standing At The Sink   UB Dressing Mod A/Set up    Required Mod A to wrap garment around back seated in chair after set up    Min A   LB Dressing Max A/Set up    Required Max A to don socks only despite pt ed for adaptive techs, unable to tolerate attempt to don pants  Mod A   Bathing NT      Mod A   Toileting NT      Mod A   Bed Mobility  Rolling: Mod A  Repositioning:  Max A toward HOB in supine   Supine to Sit:  Mod A   Sit to Supine:  Mod A     VCs for safety   Min A   Functional Transfers Sit to stand:  Min A  Stand to sit:  Min A      EOB  Min VCs/Pt ed re: safety/hand placement    SUP   Functional Mobility NT    Unable to tolerate d/t pain    Min A   Balance Sitting:      Static:  SUP EOB    Dynamic:  Close SUP EOB w/ functional ax     Standing:      Static:  Min A w/ [address]    Dynamic:  Min A w/ functional ax/mobility w/ [address]       Activity Tolerance Fair  Limited by Pain, general weakness, fatigue, wounds    Tolerated Sitting:  EOB ~ 5 mins w/ functional ax     Tolerated Standing:  ~ 1-2 mins w/ functional ax w/ [employer]       Visual/  Perceptual WFL  Glasses:  None at b/s      Hearing WFL  Hearing Aids  None at b/s       Hand dominance: Right    UE ROM: RUE:  WFL      LUE:  WFL    Strength: RUE: grossly 4-/5     LUE: grossly 4-/5     Strength:  WFL Vj UEs    Fine Motor Coordination:  WFL Vj UEs    Sensation:  Denies numbness or tingling Vj UEs  Tone:  WFL Vj UEs  Edema:  None Noted                            Comments: Upon arrival, patient was found in supine. She was agreeable to participate in therapeutic ax. No Family present during session. Received permission from RN prior to engaging pt in OT services. At the end of the session, patient was properly positioned in Semi-Supine slightly turned toward Left Side w/ use of TAPS system. Call light and phone within reach, all lines and tubes intact. Oriented pt to call bell. Made all appropriate Environmental Modifications to facilitate pt's level of IND and safety. All needs met. Bed Alarm activated. Overall patient demonstrated decreased independence and safety during completion of ADL/functional transfer/mobility tasks. Pt would benefit from continued skilled OT to increase safety and independence with completion of ADL/IADL tasks for functional independence and quality of life. Treatment:      Provided Skilled SUP/Assist w/ Pt safety, Proper Positioning, ADLs, Functional Transfers and Functional Mobility as noted above, as well as set up and clean up for session. Skilled monitoring of Vitals and pts response to treatment.   Consulted RN    Education:      Provided Pt/Family ed re: Purpose of OT services;  OT Plan of Care;     ADL-  Instruction/training on use of DME/AD/Adaptive equip/techs to improve safety/IND with Functional Ax as noted above    Mobility- Instruction/training on safety and improved independence with bed mobility, functional transfers and functional mobility, walker safety as noted above   Sitting EOB - to improve dynamic sitting balance and activity tolerance during ADLs as noted above   Activity tolerance - Instruction/training on energy conservation/work simplification, techs to increase endurance for completion of ADLs    Neuromuscular Reeducation to facilitate balance/righting reactions for increased function with ADLs tasks   Cognitive retraining -  Oriented pt to current Date, Place and Situation; Cues for safety during Functional Ax for safety, sequencing, problem solving, improved safety awareness   Skilled positioning/alignment for Pain Mgmt, Skin Integrity, Edema Control   Skilled monitoring of Vitals during session and pt's response   Techs for improved Safety/Safety Awareness w/ Functional Activity/Mobility   Energy Conservation Techs/Pursed-Lip Breathing (PLB)   Recommendations for Continued Participation in OT services during Hospitalization and at D/C - SNF    Pt and/or Family verbalized/demonstrated a Good understanding of education provided. Will Review PRN.        Assessment of current deficits   Functional mobility [x]  ADLs [x] Strength [x]  Cognition []  Functional transfers  [x] IADLs [x] Safety Awareness [x]  Endurance [x]  Fine Motor Coordination [] Balance [x] Vision/perception [] Sensation [x]   Gross Motor Coordination [] ROM [] Delirium []                  Motor Control []      Plan of Care: OT 2--4 x/week for 5-7 days PRN   [x] ADL retraining/AE, Equipment Needs/Recommendations   [x] Energy Conservation Techniques/Strategies      [] Neuromuscular Re-Education      [x] Functional Transfer Training         [x] Functional Mobility Training          [] Cognitive Re-Training          [x] Splinting/Positioning Needs           [x] Therapeutic Activity   [x]Therapeutic Exercise   [] Visual/Perceptual   [] Delirium Prevention/Treatment   [x] Positioning to Improve Functional [place], Safety, and Skin Integrity   [x] Patient and/or Family Education to Increase Safety and Functional [place]   [x] Environmental Modifications  [x] Compensatory techniques for ADLs   [x] Other:       Pt would benefit from continued skilled OT services to increase safety and independence with completion of ADL/IADL tasks for functional independence and quality of life. Pt/Family actively participated in the establishment of goals. Rehab Potential:  Fair(+) for established goals    Patient / Family Goal:  Decrease Pain - RN aware     Patient and/or Family were instructed on Functional Diagnosis, Prognosis/Goals and OT Plan of Care. Demonstrated Good understanding. Evaluation Time includes thorough review of current medical information, gathering information on past medical history/social history and prior level of function, completion of standardized testing/informal observation of tasks, assessment of data and education on plan of care and goals.      Eval Complexity: Low  Profile and History - Mod  Assessment of Occupational Performance and Identification of Deficits - Mod  Clinical Decision Making - Low     Time In:  7358              Time Out: 0614  Total Treatment Time:  5 mins      Treatment Charges: Mins Units   OT Eval Low 97165 X 1   OT Eval Medium 95737     OT Eval High 20496     OT Re-Eval [id number]     Therapeutic Ex  93144     Therapeutic Activities 54094     ADL/Self Care 72034 5 0   Neuro Re-ed 10409     Orthotic manage/training  70811     Non-Billable Time     Total Timed Treatment 5 0       Claudia [place], [place], OTR/L  # [id number]

## 2020-10-28 NOTE — PROGRESS NOTES
shearing wounds. - Transfer training: sit<>stand and stand pivot transfer with front [address] Cues for proper hand placement and proper sequencing with front [employer] when turning to sit. - Ambulation: 15 feet with front [employer] Julisa x 3 reps. Standing rest breaks taken between bouts. Further distances limited by reports of light headedness and dizziness. o Skilled repositioning in seated position for comfort and pillow under buttock for pressure relief. o Pt education as noted above. PLAN:    Patient is making fair progress towards established goals. Will continue with current POC.       Time in  0956  Time out  1020    Total Treatment Time  24 minutes     CPT codes:  [] Gait training 91929 0 minutes  [] Manual therapy 31825 0 minutes  [x] Therapeutic activities 44131 24 minutes  [] Therapeutic exercises 60913 0 minutes  [] Neuromuscular reeducation 93955 0 minutes    Norma Zelaya, PT, DPT  [id number]

## 2020-10-28 NOTE — PROGRESS NOTES
Perfect serve message sent to family medical resident regarding patient's daughter bringing her home chemo pill Ibrance for her. The medication is listed in her med rec but not currently ordered. Verified that patient's daughter did not give medication to the patient and she stated that she did not. Per resident they would like to hold off on ordering the medication right now. Patient's daughter no longer at bedside. Pharmacy called to see if they can lock medication up - they advised it remain at the patients bedside until it can be taken home to decrease the chance of misplacing it. Medication placed in plastic bag with personal belongings sticker placed on it - placed in patients personal pink bag at bedside and notified her of this. Patient's daughter Desiree Bang called and notified - she or her sister will take it back home tomorrow. Also notified resident of patient of some SOB, slight wheezing. Her oxygen saturation is currently 97% on RA. However she and her daughter state that she gets this way when she needs \"fluid tapped\" from her abdomen and they wanted this to be relayed to the physician. See new orders.

## 2020-10-28 NOTE — PROGRESS NOTES
[address] [place] [address] [facility]  Progress Note      Chief Complaint   Patient presents with    Fall     pt fell earlier d/t dizziness, was on the floor for 4 hours       SUBJECTIVE:  Patient is tolerating medications. No reported adverse drug reactions. No nausea, vomiting, diarrhea. Urine is draining cloudy at this time  Review of systems:  As stated above in the chief complaint, otherwise negative. Medications:  Scheduled Meds:   cyanocobalamin  1,000 mcg Oral Daily    docusate sodium  100 mg Oral Daily    insulin glargine  10 Units Subcutaneous Daily    insulin lispro  0-3 Units Subcutaneous Nightly    magnesium oxide  400 mg Oral Daily    sodium bicarbonate  650 mg Oral BID    sodium chloride flush  10 mL Intravenous 2 times per day    sodium chloride   Intravenous Q12H    pantoprazole  40 mg Intravenous Daily    vancomycin  1,000 mg Intravenous Q36H     Continuous Infusions:   dextrose       PRN Meds:sodium chloride flush, polyethylene glycol, oxyCODONE, glucose, dextrose, glucagon (rDNA), dextrose    OBJECTIVE:  /63   Pulse 93   Temp 97.8 °F (36.6 °C) (Temporal)   Resp 16   Ht 5' 4\" (1.626 m) Comment: taken from 10/19 documentation  Wt 171 lb (77.6 kg) Comment: taken from 10/19 documentation  SpO2 95%   BMI 29.35 kg/m²   Temp  Av.4 °F (36.3 °C)  Min: 96.4 °F (35.8 °C)  Max: 97.8 °F (36.6 °C)  Constitutional: The patient is awake, alert, and oriented. Skin: Warm and dry. No rashes were noted. These were reviewed with wound care and the wounds do need debridement but are improved from 2020  HEENT: Round and reactive pupils. Moist mucous membranes. No ulcerations or thrush. Neck: Supple to movements. Chest: No use of accessory muscles to breathe. Symmetrical expansion. No wheezing, crackles or rhonchi. Cardiovascular: S1 and S2 are rhythmic and regular. No murmurs appreciated. Abdomen: Positive bowel sounds to auscultation. Benign to palpation. No masses felt. No hepatosplenomegaly. Genitourinary: Suprapubic and cloudy  Extremities: No clubbing, no cyanosis, no edema.   Lines: peripheral    Laboratory and Tests Review:  Lab Results   Component Value Date    WBC 3.6 (L) 10/28/2020    WBC 3.6 (L) 10/26/2020    WBC 4.0 (L) 10/22/2020    HGB 7.4 (L) 10/28/2020    HCT 22.7 (L) 10/28/2020    .7 (H) 10/28/2020    PLT 83 (L) 10/28/2020     Lab Results   Component Value Date    NEUTROABS 3.04 10/28/2020    NEUTROABS 2.57 10/22/2020    NEUTROABS 4.44 10/19/2020     No results found for: [facility] [[email or web address]]  Lab Results   Component Value Date    ALT 45 (H) 10/28/2020    AST 29 10/28/2020    ALKPHOS 124 (H) 10/28/2020    BILITOT 0.9 10/28/2020     Lab Results   Component Value Date     10/28/2020    K 4.1 10/28/2020     10/28/2020    CO2 20 10/28/2020    BUN 68 10/28/2020    CREATININE 2.0 10/28/2020    CREATININE 2.0 10/27/2020    CREATININE 2.0 10/27/2020    GFRAA 29 10/28/2020    LABGLOM 24 10/28/2020    GLUCOSE 202 10/28/2020    GLUCOSE 109 04/20/2012    PROT 5.2 10/28/2020    LABALBU 2.1 10/28/2020    LABALBU 4.4 03/25/2012    CALCIUM 8.0 10/28/2020    BILITOT 0.9 10/28/2020    ALKPHOS 124 10/28/2020    AST 29 10/28/2020    ALT 45 10/28/2020     Lab Results   Component Value Date    CRP 4.9 (H) 07/29/2020    CRP 16.2 (H) 07/07/2020    CRP 4.2 (H) 06/19/2020     Lab Results   Component Value Date    SEDRATE 90 (H) 07/29/2020    SEDRATE 134 (H) 07/07/2020    SEDRATE 102 (H) 06/21/2020     Radiology:  Reviewed  Microbiology:   Lab Results   Component Value Date    BC 24 Hours no growth 10/26/2020    BC 5 Days no growth 09/25/2020    BC 5 Days no growth 07/07/2020    ORG Staphylococcus aureus 07/29/2020    ORG Staphylococcus aureus 07/29/2020    ORG Corynebacterium species 07/29/2020     Lab Results   Component Value Date    BLOODCULT2  10/26/2020     Gram stain performed from blood culture bottle media  Gram positive cocci in chains  evaluating for alpha hemolytic strep      BLOODCULT2 5 Days no growth 07/07/2020    BLOODCULT2 5 Days- no growth 08/12/2019    ORG Staphylococcus aureus 07/29/2020    ORG Staphylococcus aureus 07/29/2020    ORG Corynebacterium species 07/29/2020     WOUND/ABSCESS   Date Value Ref Range Status   10/27/2020   Preliminary    Growth present, evaluating for:  Mixed Gram positive organisms  Proteus species     10/27/2020   Preliminary    Growth present, evaluating for:  Mixed Gram negative rods  Proteus species  Mixed Gram positive organisms     07/29/2020   Final    Light growth  Methicillin resistant Staph aureus isolated. Most Methicillin  resistant Staphylococcus are usually resistant to multiple  antibiotics including other B-Lactams, Aminoglycosides,  Macrolides, Clindamycin and Tetracycline. Contact isolation  is indicated.      07/29/2020   Final    Rare growth  Refer to previous wound culture (Leg, Right) collected  same date/time for susceptibility results     07/29/2020 Rare growth  Final     No results found for: RESPSMEAR  No results found for: MPNEUMO, CLAMYDCU, LABLEGI, AFBCX, FUNGSM, LABFUNG  No results found for: CULTRESP  No results found for: CXCATHTIP  Body Fluid Culture, Sterile   Date Value Ref Range Status   07/25/2019 Growth not present  Final     No results found for: CXSURG  Urine Culture, Routine   Date Value Ref Range Status   10/27/2020   Preliminary    Growth present, evaluating for:  Gram positive cocci  Proteus species     07/07/2020 Growth not present  Final   08/13/2019 >100,000 CFU/ml  Final     MRSA Culture Only   Date Value Ref Range Status   08/09/2019 Methicillin resistant Staph aureus not isolated  Final       ASSESSMENT:  · Pancytopenia  · Chronic ischial and sacral decubiti improved although they do need debridement   · Alpha strep bacteremia possibly from the wounds rule out a Mediport issue  · History of MRSA colonizing/infecting decubiti    PLAN:  · Continue Vanco for now  · Check final cultures  · Monitor labs    Anastasiia Thompson  12:18 PM  10/28/2020

## 2020-10-28 NOTE — PROGRESS NOTES
Pharmacy Consultation Note  (Antibiotic Dosing and Monitoring)    Initial consult date: 10/27/2020  Consulting physician: Dr Christian Dickinson  Drug(s): Vancomycin  Indication: Strep bacteremia    Ht Readings from Last 1 Encounters:   10/27/20 5' 4\" (1.626 m)     Wt Readings from Last 1 Encounters:   10/27/20 171 lb (77.6 kg)       Age/  Gender Actual BW IBW Adj BW  Allergy Information   76 y.o. female 77.6 kg 54.7 kg 63.9 kg  Macrobid [nitrofurantoin monohydrate macrocrystals]               Date  WBC BUN/CR Drug/Dose Time   Given Level(s)   (Time) Comments   10/27/20  Day #1 3.6  (10/26) 68/2 Vancomycin 1500 mg IV x1 dose 0155  Procalcitonin 2.82  Lactic acid 2.4   10/28  #2 3.6 68/2 Vancomycin 1000 mg IV q36h <0600>  Lactic acid 2.1                       Estimated Creatinine Clearance: 25 mL/min (A) (based on SCr of 2 mg/dL (H)).       Intake/Output Summary (Last 24 hours) at 10/28/2020 0754  Last data filed at 10/28/2020 0258  Gross per 24 hour   Intake 1537 ml   Output 775 ml   Net 762 ml     Urine output over the last 24 hours: 0.4 mL/kg/hr (775 mL total)    Diuretics ordered in the last 24 hours: N/A      Temp max: Temp (24hrs), Av.4 °F (36.3 °C), Min:96.4 °F (35.8 °C), Max:97.8 °F (36.6 °C)      Cultures:  available culture and sensitivity results were reviewed in [facility]  10/26 Blood cx's -  gram positive cocci in chains; Streptococcus per PCR  10/26 Urine cx - Pending  10/26 Wound cx (buttock) - Pending    IV lines:  10/26 Peripheral IV 18 g R wrist  Implanted venous port    Assessment:  · 77 yo F with metastatic breast cancer admitted with dizziness/fall  · Pt found to have bacteremia with gram positive cocci in chains; Streptococcus per PCR - Pt currently on Vancomycin day #2  · ID following  · Consulted by Dr. Jeromy Burciaga to dose/monitor Vancomycin  · Goal trough level:  15-20 mcg/mL  · Pt with KARISHMA on CKD - SCr 2 again today (baseline ~1.6)    Plan:  · Continue Vancomycin 1000 mg IV q36h to begin today  · Will order a Vancomycin trough level once pt reaches steady state and will adjust dose as needed  · Will monitor renal function closely    Will continue to follow.       Rohith Campbell PharmD, BCPS, BCCCP  10/28/2020  8:30 AM  Pager: [phone]

## 2020-10-28 NOTE — PROGRESS NOTES
[facility] Attending    S: 76 y.o. female with metastatic breast cancer with liver mets and ascites; DM; CAD; chronic left humerus fracture; chronic bilateral pressure wounds of lateral hips, present on admission; chronic and recurrent DVT of RLE on coumadin; history of sarcoidosis; history of nephrolithiasis; bilateral LE lymphedema. Had fall at home, found on the ground. Today, feeling a little better. More awake and alert. Pain controlled. Tolerating diet. Does express sadness that she is unable to care for herself and that her family members are unable to care for her at home; discussed considerations for [place] vs SNF, which she has declined in the past.  She has concerns about COVID at facilities. O: VS- Blood pressure 131/63, pulse 93, temperature 97.8 °F (36.6 °C), temperature source Temporal, resp. rate 16, height 5' 4\" (1.626 m), weight 171 lb (77.6 kg), SpO2 95 %, not currently breastfeeding. Exam is as noted by resident with the following changes, additions or corrections:  Gen NAD, pale, stable. Awake and alert; intermittent moments of mild confusion or forgetfulness, but oriented overall. CV RRR, systolic murmur  Resp decreased breath sounds, few crackles bibasilar   Abd firm, ascites, stable overall   Ext edema and stasis changes, wounds with scabs in place   Ulcers as previously documented      Impressions: Active Problems:    Metastatic breast cancer ([facility] [address]    Type 2 diabetes mellitus without complication, with long-term current use of insulin (HCC)    Anemia of chronic disease    Chronic deep vein thrombosis (DVT) of proximal vein of right lower extremity (HCC)    Decubitus ulcer    Dehydration    Acute kidney injury superimposed on CKD ([facility] [address]    Acute cystitis with hematuria    Fall    General weakness  Resolved Problems:    * No resolved hospital problems. *      Plan:   Concerns for infection/sepsis. Antibiotics. Cultures pending.  ID management appreciated. Encephalopathy, possibly infectious/metabolic, now improved. Pain control with caution. Follow closely. Dental soft diet. Nutrition evaluation. Wound care for ulcerations. She declines consideration for therapeutic paracentesis at this time, but we discussed that as an option. Disposition planning. Attending Physician Statement  I have reviewed the chart and seen the patient with the resident(s). I personally reviewed images, EKG's and similar tests, if present. I personally reviewed and performed key elements of the history and exam.  I have reviewed and confirmed student and/or resident history and exam with changes as indicated above. I agree with the assessment, plan and orders as documented by the resident. Please refer to the resident and/or student note for additional information.       Davina Jaime

## 2020-10-28 NOTE — CONSULTS
you.          OBJECTIVE:   Prognosis: depends upon goals and unknown    Physical Exam:  /63   Pulse 93   Temp 97.8 °F (36.6 °C) (Temporal)   Resp 16   Ht 5' 4\" (1.626 m) Comment: taken from 10/19 documentation  Wt 171 lb (77.6 kg) Comment: taken from 10/19 documentation  SpO2 95%   BMI 29.35 kg/m²      Constitutional:  Elderly, NAD, awake, alert  Eyes: No scleral icterus, normal lids, no discharge  ENMT:  Normocephalic, atraumatic, mucosa moist, EOMI  Neck:  Trachea midline, no JVD  Lungs:  Room air, easy and unlabored respirations  Heart[de-identified]  RRR  Abd:  Soft, non tender, non distended  Ext:  Moving all extremities, + edema, pulses present  Skin:  Warm and dry, vascular discoloration noted to BLE  Neuro:  Alert, oriented x 4, follows commands    Objective data reviewed: labs, images, records, medication use, vitals and chart    Discussed patient and the plan of care with the other IDT members: Palliative Medicine IDT Team    Time/Communication  Greater than 50% of time spent, total 50 minutes in counseling and coordination of care at the bedside regarding goals of care, diagnosis and prognosis and see above. Thank you for allowing Palliative Medicine to participate in the care of [facility]. Note: This report was completed using computerize voiced recognition software. Every effort has been made to ensure accuracy; however, inadvertent computerized transcription errors may be present.

## 2020-10-29 LAB
ALBUMIN SERPL-MCNC: 2.5 G/DL (ref 3.5–5.2)
ALP BLD-CCNC: 143 U/L (ref 35–104)
ALT SERPL-CCNC: 48 U/L (ref 0–32)
ANION GAP SERPL CALCULATED.3IONS-SCNC: 11 MMOL/L (ref 7–16)
ANISOCYTOSIS: ABNORMAL
AST SERPL-CCNC: 30 U/L (ref 0–31)
BASOPHILS ABSOLUTE: 0 E9/L (ref 0–0.2)
BASOPHILS RELATIVE PERCENT: 0.5 % (ref 0–2)
BILIRUB SERPL-MCNC: 0.8 MG/DL (ref 0–1.2)
BUN BLDV-MCNC: 66 MG/DL (ref 8–23)
CALCIUM SERPL-MCNC: 8.1 MG/DL (ref 8.6–10.2)
CHLORIDE BLD-SCNC: 102 MMOL/L (ref 98–107)
CO2: 23 MMOL/L (ref 22–29)
CREAT SERPL-MCNC: 2 MG/DL (ref 0.5–1)
EOSINOPHILS ABSOLUTE: 0.04 E9/L (ref 0.05–0.5)
EOSINOPHILS RELATIVE PERCENT: 0.9 % (ref 0–6)
GFR AFRICAN AMERICAN: 29
GFR NON-AFRICAN AMERICAN: 24 ML/MIN/1.73
GLUCOSE BLD-MCNC: 123 MG/DL (ref 74–99)
HCT VFR BLD CALC: 23 % (ref 34–48)
HEMOGLOBIN: 7.4 G/DL (ref 11.5–15.5)
HYPOCHROMIA: ABNORMAL
INR BLD: 2.4
LACTIC ACID: 2.1 MMOL/L (ref 0.5–2.2)
LACTIC ACID: 2.5 MMOL/L (ref 0.5–2.2)
LYMPHOCYTES ABSOLUTE: 0.42 E9/L (ref 1.5–4)
LYMPHOCYTES RELATIVE PERCENT: 9.6 % (ref 20–42)
MCH RBC QN AUTO: 36.3 PG (ref 26–35)
MCHC RBC AUTO-ENTMCNC: 32.2 % (ref 32–34.5)
MCV RBC AUTO: 112.7 FL (ref 80–99.9)
METER GLUCOSE: 112 MG/DL (ref 74–99)
METER GLUCOSE: 127 MG/DL (ref 74–99)
METER GLUCOSE: 158 MG/DL (ref 74–99)
METER GLUCOSE: 99 MG/DL (ref 74–99)
MONOCYTES ABSOLUTE: 0.17 E9/L (ref 0.1–0.95)
MONOCYTES RELATIVE PERCENT: 4.4 % (ref 2–12)
NEUTROPHILS ABSOLUTE: 3.57 E9/L (ref 1.8–7.3)
NEUTROPHILS RELATIVE PERCENT: 85.1 % (ref 43–80)
PDW BLD-RTO: 23.9 FL (ref 11.5–15)
PLATELET # BLD: 77 E9/L (ref 130–450)
PLATELET CONFIRMATION: NORMAL
PMV BLD AUTO: 11.2 FL (ref 7–12)
POIKILOCYTES: ABNORMAL
POLYCHROMASIA: ABNORMAL
POTASSIUM REFLEX MAGNESIUM: 3.9 MMOL/L (ref 3.5–5)
PROTHROMBIN TIME: 27.8 SEC (ref 9.3–12.4)
RBC # BLD: 2.04 E12/L (ref 3.5–5.5)
SODIUM BLD-SCNC: 136 MMOL/L (ref 132–146)
TARGET CELLS: ABNORMAL
TOTAL PROTEIN: 6.1 G/DL (ref 6.4–8.3)
WBC # BLD: 4.2 E9/L (ref 4.5–11.5)

## 2020-10-29 PROCEDURE — 2500000003 HC RX 250 WO HCPCS: Performed by: STUDENT IN AN ORGANIZED HEALTH CARE EDUCATION/TRAINING PROGRAM

## 2020-10-29 PROCEDURE — 6370000000 HC RX 637 (ALT 250 FOR IP): Performed by: FAMILY MEDICINE

## 2020-10-29 PROCEDURE — 2580000003 HC RX 258: Performed by: SPECIALIST

## 2020-10-29 PROCEDURE — C9113 INJ PANTOPRAZOLE SODIUM, VIA: HCPCS | Performed by: FAMILY MEDICINE

## 2020-10-29 PROCEDURE — 6370000000 HC RX 637 (ALT 250 FOR IP): Performed by: STUDENT IN AN ORGANIZED HEALTH CARE EDUCATION/TRAINING PROGRAM

## 2020-10-29 PROCEDURE — 2580000003 HC RX 258: Performed by: FAMILY MEDICINE

## 2020-10-29 PROCEDURE — 99232 SBSQ HOSP IP/OBS MODERATE 35: CPT | Performed by: FAMILY MEDICINE

## 2020-10-29 PROCEDURE — 2500000003 HC RX 250 WO HCPCS: Performed by: FAMILY MEDICINE

## 2020-10-29 PROCEDURE — 6360000002 HC RX W HCPCS: Performed by: STUDENT IN AN ORGANIZED HEALTH CARE EDUCATION/TRAINING PROGRAM

## 2020-10-29 PROCEDURE — 36591 DRAW BLOOD OFF VENOUS DEVICE: CPT

## 2020-10-29 PROCEDURE — 82962 GLUCOSE BLOOD TEST: CPT

## 2020-10-29 PROCEDURE — 2580000003 HC RX 258: Performed by: STUDENT IN AN ORGANIZED HEALTH CARE EDUCATION/TRAINING PROGRAM

## 2020-10-29 PROCEDURE — 36415 COLL VENOUS BLD VENIPUNCTURE: CPT

## 2020-10-29 PROCEDURE — 85610 PROTHROMBIN TIME: CPT

## 2020-10-29 PROCEDURE — 83605 ASSAY OF LACTIC ACID: CPT

## 2020-10-29 PROCEDURE — 85025 COMPLETE CBC W/AUTO DIFF WBC: CPT

## 2020-10-29 PROCEDURE — 6360000002 HC RX W HCPCS: Performed by: SPECIALIST

## 2020-10-29 PROCEDURE — 80053 COMPREHEN METABOLIC PANEL: CPT

## 2020-10-29 PROCEDURE — 6360000002 HC RX W HCPCS: Performed by: FAMILY MEDICINE

## 2020-10-29 PROCEDURE — 2060000000 HC ICU INTERMEDIATE R&B

## 2020-10-29 RX ORDER — WARFARIN SODIUM 1 MG/1
1 TABLET ORAL EVERY OTHER DAY
Status: DISCONTINUED | OUTPATIENT
Start: 2020-10-29 | End: 2020-10-31

## 2020-10-29 RX ORDER — 0.9 % SODIUM CHLORIDE 0.9 %
500 INTRAVENOUS SOLUTION INTRAVENOUS ONCE
Status: COMPLETED | OUTPATIENT
Start: 2020-10-29 | End: 2020-10-29

## 2020-10-29 RX ORDER — BUMETANIDE 0.25 MG/ML
0.5 INJECTION, SOLUTION INTRAMUSCULAR; INTRAVENOUS ONCE
Status: COMPLETED | OUTPATIENT
Start: 2020-10-29 | End: 2020-10-29

## 2020-10-29 RX ORDER — METOPROLOL TARTRATE 5 MG/5ML
5 INJECTION INTRAVENOUS ONCE
Status: COMPLETED | OUTPATIENT
Start: 2020-10-29 | End: 2020-10-29

## 2020-10-29 RX ADMIN — BUMETANIDE 0.5 MG: 0.25 INJECTION INTRAMUSCULAR; INTRAVENOUS at 08:17

## 2020-10-29 RX ADMIN — SODIUM BICARBONATE 650 MG: 650 TABLET ORAL at 08:16

## 2020-10-29 RX ADMIN — HEPARIN 100 UNITS: 100 SYRINGE at 08:18

## 2020-10-29 RX ADMIN — SODIUM CHLORIDE, PRESERVATIVE FREE 10 ML: 5 INJECTION INTRAVENOUS at 08:18

## 2020-10-29 RX ADMIN — DOCUSATE SODIUM 100 MG: 100 CAPSULE, LIQUID FILLED ORAL at 08:16

## 2020-10-29 RX ADMIN — VANCOMYCIN HYDROCHLORIDE 1000 MG: 1 INJECTION, POWDER, LYOPHILIZED, FOR SOLUTION INTRAVENOUS at 20:30

## 2020-10-29 RX ADMIN — SKIN PROTECTANT: 44 OINTMENT TOPICAL at 11:07

## 2020-10-29 RX ADMIN — SODIUM CHLORIDE 500 ML: 9 INJECTION, SOLUTION INTRAVENOUS at 19:07

## 2020-10-29 RX ADMIN — SKIN PROTECTANT: 44 OINTMENT TOPICAL at 20:20

## 2020-10-29 RX ADMIN — PANTOPRAZOLE SODIUM 40 MG: 40 INJECTION, POWDER, FOR SOLUTION INTRAVENOUS at 08:16

## 2020-10-29 RX ADMIN — METOPROLOL TARTRATE 5 MG: 5 INJECTION INTRAVENOUS at 19:06

## 2020-10-29 RX ADMIN — SODIUM CHLORIDE: 9 INJECTION, SOLUTION INTRAVENOUS at 06:04

## 2020-10-29 RX ADMIN — SODIUM CHLORIDE, PRESERVATIVE FREE 10 ML: 5 INJECTION INTRAVENOUS at 20:20

## 2020-10-29 RX ADMIN — SODIUM BICARBONATE 650 MG: 650 TABLET ORAL at 20:19

## 2020-10-29 RX ADMIN — OXYCODONE 5 MG: 5 TABLET ORAL at 20:19

## 2020-10-29 RX ADMIN — OXYCODONE 5 MG: 5 TABLET ORAL at 06:01

## 2020-10-29 RX ADMIN — MAGNESIUM GLUCONATE 500 MG ORAL TABLET 400 MG: 500 TABLET ORAL at 08:16

## 2020-10-29 RX ADMIN — WARFARIN SODIUM 1 MG: 1 TABLET ORAL at 17:10

## 2020-10-29 RX ADMIN — INSULIN LISPRO 1 UNITS: 100 INJECTION, SOLUTION INTRAVENOUS; SUBCUTANEOUS at 20:26

## 2020-10-29 RX ADMIN — INSULIN GLARGINE 10 UNITS: 100 INJECTION, SOLUTION SUBCUTANEOUS at 08:17

## 2020-10-29 RX ADMIN — Medication 1000 MCG: at 08:16

## 2020-10-29 RX ADMIN — CEFTRIAXONE SODIUM 1 G: 1 INJECTION, POWDER, FOR SOLUTION INTRAMUSCULAR; INTRAVENOUS at 16:33

## 2020-10-29 RX ADMIN — OXYCODONE 5 MG: 5 TABLET ORAL at 12:35

## 2020-10-29 ASSESSMENT — PAIN SCALES - GENERAL
PAINLEVEL_OUTOF10: 3
PAINLEVEL_OUTOF10: 7
PAINLEVEL_OUTOF10: 8
PAINLEVEL_OUTOF10: 7
PAINLEVEL_OUTOF10: 5
PAINLEVEL_OUTOF10: 0
PAINLEVEL_OUTOF10: 8
PAINLEVEL_OUTOF10: 3

## 2020-10-29 ASSESSMENT — PAIN DESCRIPTION - FREQUENCY
FREQUENCY: CONTINUOUS
FREQUENCY: CONTINUOUS

## 2020-10-29 ASSESSMENT — PAIN DESCRIPTION - PROGRESSION
CLINICAL_PROGRESSION: GRADUALLY IMPROVING
CLINICAL_PROGRESSION: GRADUALLY IMPROVING

## 2020-10-29 ASSESSMENT — PAIN DESCRIPTION - ORIENTATION
ORIENTATION: RIGHT;MID;LEFT
ORIENTATION: RIGHT;MID;LEFT
ORIENTATION: RIGHT;LEFT

## 2020-10-29 ASSESSMENT — PAIN DESCRIPTION - ONSET
ONSET: ON-GOING
ONSET: ON-GOING

## 2020-10-29 ASSESSMENT — PAIN DESCRIPTION - LOCATION
LOCATION: BUTTOCKS
LOCATION: BUTTOCKS;HIP
LOCATION: HIP

## 2020-10-29 ASSESSMENT — PAIN DESCRIPTION - DESCRIPTORS
DESCRIPTORS: ACHING;CONSTANT;DISCOMFORT

## 2020-10-29 ASSESSMENT — PAIN DESCRIPTION - PAIN TYPE
TYPE: CHRONIC PAIN
TYPE: CHRONIC PAIN

## 2020-10-29 NOTE — PROGRESS NOTES
Patient sustaining HR 140s -150s. Pressure 88/52.  FM resident Melina Lynn notified via perfect serve

## 2020-10-29 NOTE — CARE COORDINATION
Referral pending to [facility]. Spoke with daughter, Keyla Sánchez today and did explain that criteria for there is limited and she may not be accepted. She is aware next choices are 2. [facility], not accepting admissions, 3. [facility], referral pending. COVID pending since 10/28. HENS and ambulette on soft chart. For questions I can be reached at [phone]. [place], [place]    The Plan for Transition of Care is related to the following treatment goals: discharge planning when stable     The Patient and/or patient representative Geovanna Mora was provided with a choice of provider and agrees   with the discharge plan. [x] Yes [] No    Freedom of choice list was provided with basic dialogue that supports the patient's individualized plan of care/goals, treatment preferences and shares the quality data associated with the providers.  [x] Yes [] No

## 2020-10-29 NOTE — FLOWSHEET NOTE
cm   Wound Depth (cm) 0.1 cm   Wound Surface Area (cm^2) 2 cm^2   Change in Wound Size % (l*w) -100   Wound Volume (cm^3) 0.2 cm^3   Wound Healing % -100   Wound Assessment   (2 sites in close proximity, pink, yellow)   Drainage Amount None   Ana Lilia-wound Assessment Blanchable erythema   Wound 07/08/20 Buttocks Right;Proximal   Date First Assessed/Time First Assessed: 07/08/20 0700   Present on Hospital Admission: Yes  Location: Buttocks  Wound Location Orientation: Right;Proximal   Wound Image   (see left)   Wound Etiology Pressure Stage  3   Dressing/Treatment Pharmaceutical agent (see MAR)   Wound Length (cm) 2 cm   Wound Width (cm) 1 cm   Wound Depth (cm) 0.2 cm   Wound Surface Area (cm^2) 2 cm^2   Change in Wound Size % (l*w) -400   Wound Volume (cm^3) 0.4 cm^3   Wound Healing % -900   Wound Assessment   (red, yellow; 2 sites in close proximitry)   Drainage Amount None   Ana Lilia-wound Assessment Blanchable erythema   Wound 06/18/20 Hip Right   Date First Assessed/Time First Assessed: 06/18/20 2102   Present on Hospital Admission: Yes  Primary Wound Type: Pressure Injury  Location: Hip  Wound Location Orientation: Right   Wound Image    Wound Etiology Pressure Unstageable   Dressing Status New dressing applied   Wound Cleansed Cleansed with saline   Dressing/Treatment Alginate;Hydrating gel  (stratasorb)   Wound Length (cm) 15 cm   Wound Width (cm) 5 cm   Wound Depth (cm)   (unable to determine)   Wound Surface Area (cm^2) 75 cm^2   Change in Wound Size % (l*w) -650   Wound Assessment Slough  (yellow)   Drainage Amount Scant   Drainage Description Yellow   Odor None   Wound 10/27/20 Leg Right; Lower   Date First Assessed/Time First Assessed: 10/27/20 1634   Present on Hospital Admission: Yes  Primary Wound Type: Venous Ulcer  Location: Leg  Wound Location Orientation: Right; Lower   Wound Image    Wound Etiology Venous   Dressing/Treatment Alginate  (stratasorb)   Wound Length (cm) 1.8 cm   Wound Width (cm) 1 cm Wound Depth (cm) 0.1 cm   Wound Surface Area (cm^2) 1.8 cm^2   Change in Wound Size % (l*w) 21.74   Wound Volume (cm^3) 0.18 cm^3   Wound Assessment   (pink)   Drainage Amount Scant   Drainage Description Serosanguinous   Odor None   Ana Lilia-wound Assessment   (red)   Wound 06/18/20 Hip Left;Proximal   Date First Assessed/Time First Assessed: 06/18/20 2102   Present on Hospital Admission: Yes  Primary Wound Type: Pressure Injury  Location: Hip  Wound Location Orientation: Left;Proximal   Wound Image   (see distal)   Wound Etiology Pressure Stage  3   Dressing/Treatment Alginate;Hydrating gel  (stratasorb)   Wound Length (cm) 5 cm   Wound Width (cm) 4 cm   Wound Depth (cm) 0.6 cm   Wound Surface Area (cm^2) 20 cm^2   Change in Wound Size % (l*w) 4.76   Wound Volume (cm^3) 12 cm^3   Wound Healing % -471   Wound Assessment   (red, yellow)   Drainage Amount Scant   Drainage Description Serosanguinous   Odor None   Ana Lilia-wound Assessment Blanchable erythema   Maurer in place  **Informed Consent**    The patient has given verbal consent to have photos taken of wounds and inserted into their chart as part of their permanent medical record for purposes of documentation, treatment management and/or medical review. All Images taken on 10/28/20 of patient name: Dionicio Harp were transmitted and stored on secured [employer] located within [facility] by a registered [employer] Mobile Application Device.      Dr. Oro Lines in room to assess wounds    Impression:  Bilateral buttocks stage 3  Left hip proximal- stage 3; distal unstageable  Right hip unstageable    Plan:  Opticell with stratasorb to hips; with application of wound gel patient voiced burning  Aquaphor to dry skin on legs, buttocks  Low air loss module due to pain  Opticell RLE  Will need continued preventative care    Bela Ayoub 10/28/2020 8:06 PM

## 2020-10-29 NOTE — PLAN OF CARE
Problem: Skin Integrity:  Goal: Will show no infection signs and symptoms  Description: Will show no infection signs and symptoms  10/28/2020 2351 by Shakir Kenney RN  Outcome: Met This Shift  10/28/2020 1903 by Kaur Leon RN  Outcome: Met This Shift  Goal: Absence of new skin breakdown  Description: Absence of new skin breakdown  10/28/2020 2351 by Shakir Kenney RN  Outcome: Met This Shift  10/28/2020 1903 by Kaur Leon RN  Outcome: Met This Shift     Problem: Falls - Risk of:  Goal: Will remain free from falls  Description: Will remain free from falls  10/28/2020 2351 by Shakir Kenney RN  Outcome: Met This Shift  10/28/2020 1903 by Kaur Leon RN  Outcome: Met This Shift  Goal: Absence of physical injury  Description: Absence of physical injury  10/28/2020 2351 by Shakir Kenney RN  Outcome: Met This Shift  10/28/2020 1903 by Kaur Leon RN  Outcome: Met This Shift     Problem: Pain:  Goal: Pain level will decrease  Description: Pain level will decrease  10/28/2020 2351 by Shakir Kenney RN  Outcome: Met This Shift  10/28/2020 1903 by Kaur Leon RN  Outcome: Met This Shift  Goal: Control of acute pain  Description: Control of acute pain  10/28/2020 2351 by Shakir Kenney RN  Outcome: Met This Shift  10/28/2020 1903 by Kaur Leon RN  Outcome: Met This Shift  Goal: Control of chronic pain  Description: Control of chronic pain  Outcome: Met This Shift

## 2020-10-29 NOTE — PROGRESS NOTES
glucagon (rDNA), dextrose     I reviewed the patient's past medical and surgical history, Medications and Allergies. O:  BP (!) 107/53   Pulse 83   Temp 96.9 °F (36.1 °C) (Temporal)   Resp 16   Ht 5' 4\" (1.626 m) Comment: taken from 10/19 documentation  Wt 171 lb (77.6 kg) Comment: taken from 10/19 documentation  SpO2 99%   BMI 29.35 kg/m²   24 hour I&O: I/O last 3 completed shifts: In: 2652 [P.O.:730; I.V.:1649; IV Piggyback:273]  Out: 725 [Urine:725]  I/O this shift: In: 432 [I.V.:432]  Out: 350 [Urine:350]        Physical Exam   Constitutional: No distress. HENT:   Head: Normocephalic and atraumatic. Cardiovascular: Normal rate and regular rhythm. Murmur heard. Pulmonary/Chest: Effort normal.   Bibasilar crackles   Abdominal: Soft. She exhibits distension. There is no abdominal tenderness. Mild distension   Musculoskeletal:         General: Edema present. Comments: 3+ pitting edema BLE   Neurological: She is alert. Skin: There is pallor. Labs:  Na/K/Cl/CO2:  136/3.9/102/23 (10/29 6902)  BUN/Cr/glu/ALT/AST/amyl/lip:  66/2.0/--/48/30/--/-- (10/29 7555)  WBC/Hgb/Hct/Plts:  4.2/7.4/23.0/77 (10/29 0455)  estimated creatinine clearance is 25 mL/min (A) (based on SCr of 2 mg/dL (H)). Other pertinent labs as noted below    Radiology:  XR CHEST (2 VW)   Final Result   Increasing vascular congestion versus mild pulmonary edema. MRI BRAIN WO CONTRAST   Final Result   1. No acute intracranial abnormality. 2. No mass effect, edema or hemorrhage. CT HEAD WO CONTRAST   Final Result   No acute intracranial abnormality. CT CERVICAL SPINE WO CONTRAST   Final Result   No acute cervical spine fracture or malalignment. Degenerative changes of the cervical spine. CT CHEST WO CONTRAST   Final Result   1. No acute traumatic injury in the chest, abdomen or pelvis. 2. Unknown history of probable malignancy with progression of findings from   prior CT imaging. (DVT) of proximal vein of right lower extremity (HCC)    Decubitus ulcer    Dehydration    Acute kidney injury superimposed on CKD ([facility] [address]    Acute cystitis with hematuria    Fall    General weakness  Resolved Problems:    * No resolved hospital problems.  *    KARISHMA on CKD  Likely prerenal given low blood pressure on admission  Creatinine 2.1, baseline appears to be 1.5-1.6 however last few creatinines have been 1.9-2  Check urine studies  Fluids stopped due to volume overload    Hypervolemia  Fluids dc'd  bumex 0.5 IV today for one dose     Hypotension  2/2 dehydration vs ?sepsis  Low blood pressures in the ER on arrival, reported dizziness before falling  Received 1 L bolus in the ER, started on fluids 100 cc/h  Hold metoprolol and isosorbide  Check orthostatics  In light of UTI and decubitus ulcer, blood cultures obtained, follow  Monitor fluid status closely in light of BNP     UTI  UA positive for leuk esterase, nitrates, bacteria  Patient reports no symptoms, afebrile, blood pressure borderline  Started on Vanco and Zosyn in the ER, zosyn dc'd  Pharmacy to dose Vanco     Decubitus ulcers  Chronic issue, left buttock with erythema and some drainage  Patient afebrile, blood pressure borderline  Continue vanc, blood cultures obtained, positive for strep  Wound culture- results pending, preliminary results showed mixed GP and GN spp  Wound care consult     Chronic encephalopathy  Reported in PCPs note, MRI ordered without contrast for increased encephalopathy and tremor  Patient somewhat groggy during exam, however was oriented x4 and answering most questions appropriately  Tremor noted in hands  Check ammonia: slightly elevated, 61  Bedside swallow successful, diet ordered  MRI brain done, negative for masses, bleeds     Fall  Deconditioning  Patient with increasing dizziness and weakness resulting in fall, no loss of consciousness  Borderline blood pressure in the ER  Check orthostatics, started on fluids  PT/OT/social work for disposition and discharge planning     Elevated troponin  Likely due to chronic kidney disease, troponin 0 0.04 on admission, appears to be around baseline  Reports no chest pain or shortness of breath, EKG unchanged    Metastatic breast cancer  With known mets to liver, LFTs elevated  Has had worsening encephalopathy and tremor, MRI brain ordered outpatient  Check ammonia  Consider inpatient MRI if mentation does not improve with improvement in blood pressure     Anemia of chronic disease  Hemoglobin 8.2 on admission  7.4 today  Monitor counts closely, transfuse at 7.0     Chronic DVT on Coumadin  With recent fall, CT head negative for acute process  INR hemolyzed in the ER  Coumadin held, INR checked, 2.3 .     GI/DVT ppx: protonix  Diet: general  Antibiotics: Vancomycin day 3      Electronically signed by Ewa Kessler  PGY-1 on 10/29/2020 at 6:56 AM  This case was discussed with attending physician: Dr. Estrellita Yañez

## 2020-10-29 NOTE — PROGRESS NOTES
Comprehensive Nutrition Assessment    Type and Reason for Visit:  Initial, Wound    Nutrition Recommendations/Plan: Pt at risk dt multiple wounds. Recommend continuing dental soft diet w/ addition of ONS BID. (Ensure HP)    Nutrition Assessment:  Pt admin for fall w/ pmh of DM, breast ca w/ mets, & multiple wounds. Will order ONS for healing. Malnutrition Assessment:  Malnutrition Status: At risk for malnutrition (Comment)    Context:  Acute Illness     Findings of the 6 clinical characteristics of malnutrition:  Energy Intake:  Mild decrease in energy intake (Comment)  Weight Loss:  No significant weight loss     Body Fat Loss:  Unable to assess     Muscle Mass Loss:  Unable to assess    Fluid Accumulation:  Unable to assess     Strength:  Not Performed    Estimated Daily Nutrient Needs:  Energy (kcal):  MSJ;1632; kcal/d; Weight Used for Energy Requirements:  Current     Protein (g):   g/d; Weight Used for Protein Requirements:  Ideal(1.5-2g/kg IBW)        Fluid (ml/day):  1700 ml/d; Method Used for Fluid Requirements:  1 ml/kcal      Nutrition Related Findings:  Pt A/Ox4, edentulous, abd wdl, +BS, +2 edema, +I/O's 2.3 L since admin. Wounds:  Venous Stasis, Multiple, Stage III, Pressure Injury       Current Nutrition Therapies:    DIET GENERAL; Dental Soft    Anthropometric Measures:  · Height: 5' 4\" (162.6 cm)  · Current Body Weight: 171 lb (77.6 kg)(10/19)   · Admission Body Weight:      · Usual Body Weight: 150 lb (68 kg)(06/18 per EMR)     · Ideal Body Weight: 120 lbs; % Ideal Body Weight 142.5 %   · BMI: 29.3  · BMI Categories: Overweight (BMI 25.0-29. 9)       Nutrition Diagnosis:   · Increased nutrient needs related to increase demand for energy/nutrients as evidenced by wounds      Nutrition Interventions:   Food and/or Nutrient Delivery:  Continue Current Diet, Start Oral Nutrition Supplement(Ensure HP& Robin BID)  Nutrition Education/Counseling:  Education not indicated Coordination of Nutrition Care:  Continue to monitor while inpatient    Goals:  Pt to consume >76% of all meals/ONS       Nutrition Monitoring and Evaluation:   Food/Nutrient Intake Outcomes:  Food and Nutrient Intake, Supplement Intake  Physical Signs/Symptoms Outcomes:  Biochemical Data, Chewing or Swallowing, GI Status, Fluid Status or Edema, Nutrition Focused Physical Findings, Weight, Skin     Discharge Planning:     Too soon to determine     Electronically signed by Reece Solorio RD, STEFANY on 10/29/20 at 9:52 AM EDT

## 2020-10-29 NOTE — PROGRESS NOTES
[facility] Attending    S: 76 y.o. female with metastatic breast cancer with liver mets and ascites; DM; CAD; chronic left humerus fracture; chronic bilateral pressure wounds of lateral hips, present on admission; chronic and recurrent DVT of RLE on coumadin; history of sarcoidosis; history of nephrolithiasis; bilateral LE lymphedema. Had fall at home, found on the ground. Today, symptoms are stable. Pain is controlled at this time. No dyspnea at rest.  No new symptoms or concerns. O: VS- Blood pressure (!) 101/48, pulse 85, temperature 98.4 °F (36.9 °C), temperature source Temporal, resp. rate 18, height 5' 4\" (1.626 m), weight 171 lb (77.6 kg), SpO2 98 %, not currently breastfeeding. Exam is as noted by resident with the following changes, additions or corrections:  Gen NAD, pale, stable. Awake and alert; oriented throughout discussion. CV Irreg, mildly tachy, systolic murmur  Resp decreased breath sounds, few crackles bibasilar   Abd little softer today, diffusely mildly tender   Ext edema and stasis changes, wounds are dressed, protective padding in place   Ulcers as previously documented      Impressions: Active Problems:    Metastatic breast cancer ([address]    Type 2 diabetes mellitus without complication, with long-term current use of insulin (HCC)    Anemia of chronic disease    Chronic deep vein thrombosis (DVT) of proximal vein of right lower extremity (HCC)    Decubitus ulcer    Dehydration    Acute kidney injury superimposed on CKD ([address]    Acute cystitis with hematuria    Fall    General weakness  Resolved Problems:    * No resolved hospital problems. *      Plan:   Concerns for infection/sepsis. Antibiotics. Cultures pending. ID management appreciated. Encephalopathy, possibly infectious/metabolic, now improved. Pain control with caution. Follow closely. Dental soft diet. Nutrition evaluation. Wound care for ulcerations. Disposition planning. Check INR today; plan to dose coumadin after this. Discuss home Ibrance. Consideration for debridement of wounds. Check EKG due to irregular rhythm today. Patient asked that I call her daughter to discuss her care. Watch UO and hydration status, lactate levels, off of fluids. Nutrition support. TTE had been ordered; follow. Called patient's daughter; we only had telephone number for Stevie Tubbs in her chart, but Janelle Camarillo will discuss with her sister Rosa Palmer today. Provided updates; they had no questions. Attending Physician Statement  I have reviewed the chart and seen the patient with the resident(s). I personally reviewed images, EKG's and similar tests, if present. I personally reviewed and performed key elements of the history and exam.  I have reviewed and confirmed student and/or resident history and exam with changes as indicated above. I agree with the assessment, plan and orders as documented by the resident. Please refer to the resident and/or student note for additional information.       Hay Bain

## 2020-10-29 NOTE — PROGRESS NOTES
[address] [place] [address] [facility]  Progress Note      Chief Complaint   Patient presents with    Fall     pt fell earlier d/t dizziness, was on the floor for 4 hours       SUBJECTIVE:  Patient is tolerating medications. No reported adverse drug reactions. No nausea, vomiting, diarrhea. Urine is draining cloudy at this time  Review of systems:  As stated above in the chief complaint, otherwise negative. Medications:  Scheduled Meds:   warfarin  1 mg Oral Every Other Day    mineral oil-hydrophilic petrolatum   Topical BID    heparin flush  100 Units Intracatheter Daily    insulin glargine  10 Units Subcutaneous Daily    insulin lispro  0-6 Units Subcutaneous TID WC    cyanocobalamin  1,000 mcg Oral Daily    docusate sodium  100 mg Oral Daily    insulin lispro  0-3 Units Subcutaneous Nightly    magnesium oxide  400 mg Oral Daily    sodium bicarbonate  650 mg Oral BID    sodium chloride flush  10 mL Intravenous 2 times per day    sodium chloride   Intravenous Q12H    pantoprazole  40 mg Intravenous Daily    vancomycin  1,000 mg Intravenous Q36H     Continuous Infusions:   dextrose       PRN Meds:mineral oil-hydrophilic petrolatum **AND** mineral oil-hydrophilic petrolatum, heparin flush, perflutren lipid microspheres, sodium chloride flush, polyethylene glycol, oxyCODONE, glucose, dextrose, glucagon (rDNA), dextrose    OBJECTIVE:  BP (!) 106/51   Pulse 85   Temp 98.1 °F (36.7 °C) (Oral)   Resp 18   Ht 5' 4\" (1.626 m)   Wt 171 lb (77.6 kg) Comment: taken from 10/19 documentation  SpO2 96%   BMI 29.35 kg/m²   Temp  Av.5 °F (36.4 °C)  Min: 96.8 °F (36 °C)  Max: 98.4 °F (36.9 °C)  Constitutional: The patient is awake, alert, and oriented. Skin: Warm and dry. No rashes were noted. These were reviewed with wound care and the wounds do need debridement but are improved from 2020  HEENT: Round and reactive pupils. Moist mucous membranes.   No ulcerations or thrush. Neck: Supple to movements. Chest: No use of accessory muscles to breathe. Symmetrical expansion. No wheezing, crackles or rhonchi. Cardiovascular: S1 and S2 are rhythmic and regular. No murmurs appreciated. Abdomen: Positive bowel sounds to auscultation. Benign to palpation. No masses felt. No hepatosplenomegaly. Genitourinary: Suprapubic and cloudy  Extremities: No clubbing, no cyanosis, no edema.   Lines: peripheral    Laboratory and Tests Review:  Lab Results   Component Value Date    WBC 4.2 (L) 10/29/2020    WBC 3.6 (L) 10/28/2020    WBC 3.6 (L) 10/26/2020    HGB 7.4 (L) 10/29/2020    HCT 23.0 (L) 10/29/2020    .7 (H) 10/29/2020    PLT 77 (L) 10/29/2020     Lab Results   Component Value Date    NEUTROABS 3.57 10/29/2020    NEUTROABS 3.04 10/28/2020    NEUTROABS 2.57 10/22/2020     No results found for: [facility]  Lab Results   Component Value Date    ALT 48 (H) 10/29/2020    AST 30 10/29/2020    ALKPHOS 143 (H) 10/29/2020    BILITOT 0.8 10/29/2020     Lab Results   Component Value Date     10/29/2020    K 3.9 10/29/2020     10/29/2020    CO2 23 10/29/2020    BUN 66 10/29/2020    CREATININE 2.0 10/29/2020    CREATININE 1.9 10/28/2020    CREATININE 2.0 10/28/2020    GFRAA 29 10/29/2020    LABGLOM 24 10/29/2020    GLUCOSE 123 10/29/2020    GLUCOSE 109 04/20/2012    PROT 6.1 10/29/2020    LABALBU 2.5 10/29/2020    LABALBU 4.4 03/25/2012    CALCIUM 8.1 10/29/2020    BILITOT 0.8 10/29/2020    ALKPHOS 143 10/29/2020    AST 30 10/29/2020    ALT 48 10/29/2020     Lab Results   Component Value Date    CRP 4.9 (H) 07/29/2020    CRP 16.2 (H) 07/07/2020    CRP 4.2 (H) 06/19/2020     Lab Results   Component Value Date    SEDRATE 90 (H) 07/29/2020    SEDRATE 134 (H) 07/07/2020    SEDRATE 102 (H) 06/21/2020     Radiology:  Reviewed  Microbiology:   Lab Results   Component Value Date    BC 24 Hours no growth 10/27/2020    BC 24 Hours no growth 10/26/2020    BC 5 Days no growth 09/25/2020    ORG Proteus mirabilis 10/27/2020    ORG Gram positive cocci 10/27/2020    ORG Streptococcus viridans group 10/26/2020     Lab Results   Component Value Date    BLOODCULT2 24 Hours no growth 10/28/2020    BLOODCULT2  10/26/2020     Gram stain performed from blood culture bottle media  Gram positive cocci in chains  evaluating for alpha hemolytic strep      BLOODCULT2  10/26/2020     This organism is only isolated from one set. Susceptibility testing is not routinely performed. Additional testing can be ordered by calling the  Microbiology Department. Additional blood cultures may be obtained if  clinical suspicion of septicemia is high. ORG Proteus mirabilis 10/27/2020    ORG Gram positive cocci 10/27/2020    ORG Streptococcus viridans group 10/26/2020     WOUND/ABSCESS   Date Value Ref Range Status   10/27/2020   Preliminary    Growth present, evaluating for:  Mixed Gram positive organisms  Proteus species     10/27/2020   Preliminary    Growth present, evaluating for:  Mixed Gram negative rods  Proteus species  Mixed Gram positive organisms     07/29/2020   Final    Light growth  Methicillin resistant Staph aureus isolated. Most Methicillin  resistant Staphylococcus are usually resistant to multiple  antibiotics including other B-Lactams, Aminoglycosides,  Macrolides, Clindamycin and Tetracycline. Contact isolation  is indicated.      07/29/2020   Final    Rare growth  Refer to previous wound culture (Leg, Right) collected  same date/time for susceptibility results     07/29/2020 Rare growth  Final     No results found for: RESPSMEAR  No results found for: MPNEUMO, CLAMYDCU, LABLEGI, AFBCX, FUNGSM, LABFUNG  No results found for: CULTRESP  No results found for: CXCATHTIP  Body Fluid Culture, Sterile   Date Value Ref Range Status   07/25/2019 Growth not present  Final     No results found for: CXSURG  Urine Culture, Routine   Date Value Ref Range Status   10/27/2020 (A)  Preliminary    Growth present, evaluating for:  Gram positive cocci  Proteus species     10/27/2020 >100,000 CFU/ml  Sensitivity to follow    Preliminary   10/27/2020   Preliminary    >100,000 CFU/ml  Identification and sensitivity to follow       [facility] Culture Only   Date Value Ref Range Status   08/09/2019 Methicillin resistant Staph aureus not isolated  Final       ASSESSMENT:  · Pancytopenia  · Chronic ischial and sacral decubiti improved although they do need debridement   · Alpha strep bacteremia possibly from the wounds rule out a Mediport issue  · History of MRSA colonizing/infecting decubiti    PLAN:  · Continue Vanco for now and add Rocephin  · Check final cultures  · Monitor labs    Latia Robison  3:05 PM  10/29/2020

## 2020-10-29 NOTE — CONSULTS
GENERAL SURGERY  CONSULT NOTE  10/29/2020    Physician Consulted: Dr. Pravin Ewing   Reason for Consult: Chronic ischial and sacral decubiti   Referring Physician: Dr. Lesly TAYLOR  Gretchen Bateman is a 76 y.o. female who presents for evaluation of chronic ischial and sacral wounds. When I went to evaluate patient wounds and hip had signs of necrosis as well as a black eschar over 2 small wounds. Patient had a hard time moving and was in a significant amount of pain. She stated that she would not handle any procedure at bedside. She has an extensive past medical history of metastatic breast cancer, heart failure, type 2 diabetes, CKD stage III, and chronic DVT. Past Medical History:   Diagnosis Date    Abscess of abdominal wall     Anemia     Iron deficiency and chronic disease.  Anesthesia     DIFFICULTY WAKING UP    Arthritis     Arthritis, hip     Breast cancer ([facility] 2005    S/P Left Lumpectomy with Lymph Node Dissection, Chemo/Rad. Follows with Dr. Debora Pastor. No recurrence to date. Surgeon was Dr. Merline Ambrosia.  CAD (coronary artery disease) 5/2008    s/p CABG. Follows with [address] [place] [address].  CHF (congestive heart failure) (HCC)     Chronic venous insufficiency 11/7/2018    Class 2 severe obesity due to excess calories with serious comorbidity and body mass index (BMI) of 35.0 to 35.9 in adult ([facility] 8/1/2019    Decreased dorsalis pedis pulse 11/7/2018    Degenerative disc disease at L5-S1 level 7/10/2020    Diabetic neuropathy (HCC)     Diabetic neuropathy (HCC)     DVT (deep venous thrombosis) (HCC)     Recurrent. On lifelong coumadin.  DVT of upper extremity (deep vein thrombosis) ([facility] 4/18/2012    History of deep vein thrombosis 11/7/2018    Humerus fracture     Chronic, on the Left.       Hyperlipidemia 8/29/2011    Hypertension     Left leg pain 7/9/2020    Leg swelling 11/7/2018    Lymph node enlargement     Lymphedema of both lower extremities 11/7/2018    Lymphopenia 7/9/2020    MI (myocardial infarction) ([facility] [address]     Nephrolithiasis     Follows with Dr. Melissa Astorga.  Pericardial effusion     Pulmonary nodule     With mediastinal lymphadenopathy. Stable. Follows with Dr. Camacho Hdz.  Rib lesion 11/28/11    expansile lesion in one of the right ribs laterally    Sarcoidosis 07/26/11    per transbronchial needle aspiration    Subclavian vein occlusion, bilateral ([address] 4/18/2012    Type II or unspecified type diabetes mellitus without mention of complication, not stated as uncontrolled        Past Surgical History:   Procedure Laterality Date    APPENDECTOMY      ARTERIAL BYPASS SURGRY      BREAST LUMPECTOMY  9/27/2005    LEFT    CARDIAC SURGERY      CHOLECYSTECTOMY      COLONOSCOPY  12/2007    cecal arteriovenous malformation with hyperplastic polyps    COLONOSCOPY  [id number]    CORONARY ARTERY BYPASS GRAFT  05/2008    triple bypass    ECHO COMPL W DOP COLOR FLOW  10/30/2013         EYE SURGERY      clarissa cataracts    HYSTERECTOMY  1975, 1985    MAYNOR prolapse, benign conditions; BSO later for scar tissues, no CA.      OTHER SURGICAL HISTORY  11/18/11    port removal right chest wall    PARACENTESIS      TONSILLECTOMY      TOOTH EXTRACTION      Full Dental Extraction.  TUNNELED VENOUS PORT PLACEMENT      removal of port Dec. 2011- Dr. Randall [place] [id number]  [id number]    RIGHT CHEST       Medications Prior to Admission:    Prior to Admission medications    Medication Sig Start Date End Date Taking? Authorizing Provider   omeprazole (PRILOSEC) 20 MG delayed release capsule Take 20 mg by mouth daily   Yes Historical Provider, MD   oxyCODONE (ROXICODONE) 5 MG immediate release tablet Take 5 mg by mouth every 6 hours as needed for Pain.  10/14/20 10/29/20 Yes Historical Provider, MD   bumetanide (BUMEX) 1 MG tablet Take 1 mg by mouth daily   Yes Historical Provider, MD   palbociclib (IBRANCE) 100 MG tablet Take 100 mg by mouth daily Given three weeks on and one week off Yes Historical Provider, MD   Calcium Citrate-Vitamin D (CITRACAL PETITES/VITAMIN D) 200-250 MG-UNIT TABS Take 1 tablet by mouth 2 times daily   Yes Historical Provider, MD   cyanocobalamin 1000 MCG tablet Take 1,000 mcg by mouth daily   Yes Historical Provider, MD   nitroGLYCERIN (NITROSTAT) 0.4 MG SL tablet Place 0.4 mg under the tongue every 5 minutes as needed for Chest pain   Yes Historical Provider, MD   warfarin (COUMADIN) 1 MG tablet Take 1 mg by mouth nightly    Yes Historical Provider, MD   sodium bicarbonate 650 MG tablet Take 1 tablet by mouth 2 times daily 9/29/20  Yes Esmer Centeno, DO   allopurinol (ZYLOPRIM) 100 MG tablet Take 1 tablet by mouth daily 9/14/20  Yes Esmer Centeno, DO   magnesium oxide (MAG-OX) 400 MG tablet Take 1 tablet by mouth daily 9/14/20  Yes Esmer Centeno DO   insulin glargine (LANTUS SOLOSTAR) 100 UNIT/ML injection pen Inject 10 Units into the skin daily 8/13/20  Yes Vitaly Rowe MD   lidocaine (LIDODERM) 5 % Apply 1 patch topically every 24 hours  7/13/20  Yes Historical Provider, MD   metoprolol succinate (TOPROL XL) 25 MG extended release tablet Take 0.5 tablets by mouth daily 8/11/20  Yes Rivera Dumont MD   insulin lispro (HUMALOG) 100 UNIT/ML injection vial Inject 0-3 Units into the skin nightly 7/15/20  Yes Rivera Dumont MD   docusate (COLACE, DULCOLAX) 100 MG CAPS Take 100 mg by mouth daily 2/4/20  Yes Esmer Centeno DO   isosorbide dinitrate (ISORDIL) 5 MG tablet Take 1 tablet by mouth 3 times daily 1/14/20  Yes Cortez Faye MD   albuterol sulfate HFA (VENTOLIN HFA) 108 (90 Base) MCG/ACT inhaler Inhale 2 puffs into the lungs every 6 hours as needed for Wheezing    Historical Provider, MD       Allergies   Allergen Reactions    Macrobid [Nitrofurantoin Monohydrate Macrocrystals] Hives       Family History   Adopted: Yes       Social History     Tobacco Use    Smoking status: Never Smoker    Smokeless tobacco: Never Used   Substance Use Topics    Alcohol use: No    Drug use: No         Review of Systems   Chart was reviewed all relevant review of systems see HPI  General ROS: negative  Respiratory ROS: no cough, shortness of breath, or wheezing  Cardiovascular ROS: negative  Gastrointestinal ROS: negative  Musculoskeletal ROS: negative      PHYSICAL EXAM:    Vitals:    10/29/20 1451   BP: (!) 106/51   Pulse: 85   Resp: 18   Temp: 98.1 °F (36.7 °C)   SpO2: 96%       General Appearance:  awake, alert, oriented, in no acute distress  Skin:  Skin color, texture, turgor normal. No rashes or lesions. Head/face:  NCAT  Lungs:  No chest wall tenderness. Heart:  Heart regular rate and rhythm  Abdomen:  Soft, non-tender, normal bowel sounds. No bruits, organomegaly or masses. Extremities: pulses present in all extremities    LABS:    CBC  Recent Labs     10/29/20  0455   WBC 4.2*   HGB 7.4*   HCT 23.0*   PLT 77*     BMP  Recent Labs     10/29/20  0455      K 3.9      CO2 23   BUN 66*   CREATININE 2.0*   CALCIUM 8.1*     Liver Function  Recent Labs     10/27/20  0027  10/29/20  0455   AMYLASE 18*  --   --    LIPASE 28  --   --    BILITOT 1.5*   < > 0.8   BILIDIR 0.5*  --   --    AST 34*   < > 30   ALT 47*   < > 48*   ALKPHOS 137*   < > 143*   PROT 5.9*   < > 6.1*   LABALBU 2.6*   < > 2.5*    < > = values in this interval not displayed. No results for input(s): LACTATE in the last 72 hours. Recent Labs     10/29/20  1245   INR 2.4       RADIOLOGY    Ct Abdomen Pelvis Wo Contrast Additional Contrast? None    Result Date: 10/27/2020  EXAMINATION: CT OF THE ABDOMEN AND PELVIS WITHOUT CONTRAST; CT OF THE CHEST WITHOUT CONTRAST 10/27/2020 12:15 am TECHNIQUE: CT of the chest, abdomen and pelvis was performed without the administration of intravenous contrast. Multiplanar reformatted images are provided for review.  Dose modulation, iterative reconstruction, and/or weight based adjustment of the mA/kV was utilized to reduce the radiation dose to as low as reasonably achievable. COMPARISON: 06/19/2019 and 08/08/2019 CT studies HISTORY: ORDERING SYSTEM PROVIDED HISTORY: trauma TECHNOLOGIST PROVIDED HISTORY: Reason for exam:->trauma Additional Contrast?->None What reading provider will be dictating this exam?->CRC; ORDERING SYSTEM PROVIDED HISTORY: trauma TECHNOLOGIST PROVIDED HISTORY: Reason for exam:->trauma What reading provider will be dictating this exam?->CRC FINDINGS: Chest: Mediastinum: The heart is enlarged. Evidence of prior CABG. Aorta demonstrates advanced atherosclerotic calcification with no aneurysm. There is dilation of the main pulmonary arteries suggesting underlying pulmonary hypertension. Mediastinal lymphadenopathy is present. Index precarinal node measures 12 mm. Lungs/pleura: The airways are patent. Small pleural effusions are present bilaterally. Diffuse reticular and ground-glass opacities in the lungs that have progressed from prior imaging. Several pulmonary nodules observed on prior CT are obscured on current exam.  2.6 x 2.2 cm oval-shaped nodule along the right major fissure demonstrates fluid attenuation and may represent loculated fluid. A low-density nodule can not be excluded. Soft Tissues/Bones: Degenerative change and chronic deformity of the left shoulder. No acute skeletal abnormalities within the chest.  Sclerotic change to the right posterior 10th rib. Abdomen/Pelvis: Organs: Diffuse infiltrative pattern within the liver may represent metastatic disease or underlying pattern of hepatocellular carcinoma. The gallbladder is not demonstrated. Biliary ducts normal.  Atrophy of the pancreas. Spleen appears normal.  The adrenal glands are normal.  Mild right and moderate left hydronephrosis. No evidence of nephrolithiasis. GI/Bowel: The stomach is decompressed. Diffuse mucosal thickening is apparent. Duodenum appears normal.  Small-bowel shows no obvious abnormality.   Evaluation limited due to ascites and mesenteric edema.  The appendix is not definitively identified. There is moderate stool within the colon. Pelvis: The bladder is decompressed around a Maurer catheter. The uterus is not visualized. Peritoneum/Retroperitoneum: As described above, there is ascites and mesenteric edema. Ill-defined omental thickening and increased density/ossification of mental soft tissue that appears chronic. There is a small ventral hernia above the umbilicus containing peritoneal fat only. The aorta tapers normally. Bones/Soft Tissues: No acute skeletal findings throughout the abdomen or pelvis. There is heterogeneous sclerotic change of the left pelvis and ischium. 1. No acute traumatic injury in the chest, abdomen or pelvis. 2. Unknown history of probable malignancy with progression of findings from prior CT imaging. This includes infiltrative lesion in the liver, omental thickening, ascites and mesenteric edema, mediastinal lymph node enlargement and right axillary lymph node enlargement. Sclerotic changes in the ribs and pelvis also of potential concern for metastasis. 3. Bilateral pleural effusions with interstitial changes developing in the lungs. A nodular density along the right minor fissure may represent loculated pleural fluid or a pulmonary nodule. 4. Recommend correlation with history and any prior treatment for malignancy. Ct Head Wo Contrast    Result Date: 10/27/2020  EXAMINATION: CT OF THE HEAD WITHOUT CONTRAST  10/27/2020 12:15 am TECHNIQUE: CT of the head was performed without the administration of intravenous contrast. Dose modulation, iterative reconstruction, and/or weight based adjustment of the mA/kV was utilized to reduce the radiation dose to as low as reasonably achievable. COMPARISON: 08/08/2019 CT HISTORY: ORDERING SYSTEM PROVIDED HISTORY: trauma TECHNOLOGIST PROVIDED HISTORY: Has a \"code stroke\" or \"stroke alert\" been called? ->No Reason for exam:->trauma What reading provider will be dictating this exam?->CRC FINDINGS: BRAIN/VENTRICLES: The ventricles are normal in size. The sulci are normally formed over the convexities bilaterally. No extra-axial fluid collections. No sign of recent intracranial hemorrhage. Brain parenchymal attenuation is normal. No mass lesions on this noncontrast study. ORBITS: The visualized portion of the orbits demonstrate no acute abnormality. SINUSES: The visualized paranasal sinuses and mastoid air cells demonstrate no acute abnormality. SOFT TISSUES/SKULL:  No acute abnormality of the visualized skull or soft tissues. No acute intracranial abnormality. Ct Chest Wo Contrast    Result Date: 10/27/2020  EXAMINATION: CT OF THE ABDOMEN AND PELVIS WITHOUT CONTRAST; CT OF THE CHEST WITHOUT CONTRAST 10/27/2020 12:15 am TECHNIQUE: CT of the chest, abdomen and pelvis was performed without the administration of intravenous contrast. Multiplanar reformatted images are provided for review. Dose modulation, iterative reconstruction, and/or weight based adjustment of the mA/kV was utilized to reduce the radiation dose to as low as reasonably achievable. COMPARISON: 06/19/2019 and 08/08/2019 CT studies HISTORY: ORDERING SYSTEM PROVIDED HISTORY: trauma TECHNOLOGIST PROVIDED HISTORY: Reason for exam:->trauma Additional Contrast?->None What reading provider will be dictating this exam?->CRC; ORDERING SYSTEM PROVIDED HISTORY: trauma TECHNOLOGIST PROVIDED HISTORY: Reason for exam:->trauma What reading provider will be dictating this exam?->CRC FINDINGS: Chest: Mediastinum: The heart is enlarged. Evidence of prior CABG. Aorta demonstrates advanced atherosclerotic calcification with no aneurysm. There is dilation of the main pulmonary arteries suggesting underlying pulmonary hypertension. Mediastinal lymphadenopathy is present. Index precarinal node measures 12 mm. Lungs/pleura: The airways are patent. Small pleural effusions are present bilaterally.   Diffuse reticular and ground-glass opacities in the lungs that have progressed from prior imaging. Several pulmonary nodules observed on prior CT are obscured on current exam.  2.6 x 2.2 cm oval-shaped nodule along the right major fissure demonstrates fluid attenuation and may represent loculated fluid. A low-density nodule can not be excluded. Soft Tissues/Bones: Degenerative change and chronic deformity of the left shoulder. No acute skeletal abnormalities within the chest.  Sclerotic change to the right posterior 10th rib. Abdomen/Pelvis: Organs: Diffuse infiltrative pattern within the liver may represent metastatic disease or underlying pattern of hepatocellular carcinoma. The gallbladder is not demonstrated. Biliary ducts normal.  Atrophy of the pancreas. Spleen appears normal.  The adrenal glands are normal.  Mild right and moderate left hydronephrosis. No evidence of nephrolithiasis. GI/Bowel: The stomach is decompressed. Diffuse mucosal thickening is apparent. Duodenum appears normal.  Small-bowel shows no obvious abnormality. Evaluation limited due to ascites and mesenteric edema. The appendix is not definitively identified. There is moderate stool within the colon. Pelvis: The bladder is decompressed around a Maurer catheter. The uterus is not visualized. Peritoneum/Retroperitoneum: As described above, there is ascites and mesenteric edema. Ill-defined omental thickening and increased density/ossification of mental soft tissue that appears chronic. There is a small ventral hernia above the umbilicus containing peritoneal fat only. The aorta tapers normally. Bones/Soft Tissues: No acute skeletal findings throughout the abdomen or pelvis. There is heterogeneous sclerotic change of the left pelvis and ischium. 1. No acute traumatic injury in the chest, abdomen or pelvis. 2. Unknown history of probable malignancy with progression of findings from prior CT imaging.   This includes infiltrative lesion in the liver, in the vicinity of the superior cavoatrial junction. Midline sternotomy hardware is present. Postsurgical changes suggestive of prior CABG. Surgical clips in the left axilla. Coarse interstitial markings bilaterally. Stable minimal fluid in the lateral aspect of the right fissure. Small right pleural effusion. Stable elevation of the right hemidiaphragm. No obvious pneumothorax. Left lung remains clear. Atherosclerotic disease in the thoracic aorta. Cardiac silhouette is enlarged. Mild central pulmonary vascular congestion. Osseous structures imaged demonstrate no change. Persistent complete fracture of the left humeral neck. No acute soft tissue changes. 1.  Stable exam.  Persistent elevation of the right hemidiaphragm and small right pleural effusion. Coarse interstitial markings are unchanged. 2.  Atherosclerotic disease, cardiomegaly, mild central pulmonary vascular congestion, and postsurgical changes consistent with prior CABG. 3.  Unchanged complete left humeral neck fracture. Mri Brain Wo Contrast    Result Date: 10/27/2020  EXAMINATION: MRI OF THE BRAIN WITHOUT CONTRAST  10/27/2020 12:36 pm TECHNIQUE: Multiplanar multisequence MRI of the brain was performed without the administration of intravenous contrast. COMPARISON: None. HISTORY: ORDERING SYSTEM PROVIDED HISTORY: encephalopathy TECHNOLOGIST PROVIDED HISTORY: Reason for exam:->encephalopathy What reading provider will be dictating this exam?->CRC FINDINGS: INTRACRANIAL STRUCTURES/VENTRICLES: There is no acute infarct. No mass effect or midline shift. No evidence of an acute intracranial hemorrhage. The ventricles and sulci are normal in size and configuration. The sellar/suprasellar regions appear unremarkable. The normal signal voids within the major intracranial vessels appear maintained. ORBITS: The visualized portion of the orbits demonstrate no acute abnormality.  SINUSES: The visualized paranasal sinuses and mastoid air cells are well aerated. BONES/SOFT TISSUES: The bone marrow signal intensity appears normal. The soft tissues demonstrate no acute abnormality. 1. No acute intracranial abnormality. 2. No mass effect, edema or hemorrhage. ASSESSMENT:  76 y.o. female with chronic ulcers on bilateral hips, present with black eschar and necrotic tissue.     PLAN:  We will take to the OR for debridement  N.p.o. at midnight  Discussed with Dr. Osmany Dejesus    Electronically signed by Iliana Dhillon DO on 10/29/20 at 5:04 PM EDT

## 2020-10-29 NOTE — PROGRESS NOTES
Pharmacy Consultation Note  (Antibiotic Dosing and Monitoring)    Initial consult date: 10/27/2020  Consulting physician: Dr Kerry Guzman  Drug(s): Vancomycin  Indication: Strep bacteremia    Ht Readings from Last 1 Encounters:   10/27/20 5' 4\" (1.626 m)     Wt Readings from Last 1 Encounters:   10/27/20 171 lb (77.6 kg)       Age/  Gender Actual BW IBW Adj BW  Allergy Information   76 y.o. female 77.6 kg 54.7 kg 63.9 kg  Macrobid [nitrofurantoin monohydrate macrocrystals]               Date  WBC BUN/CR Drug/Dose Time   Given Level(s)   (Time) Comments   10/27/20  Day #1 3.6  (10/26) 68/2 Vancomycin 1500 mg IV x1 dose 0155  Procalcitonin 2.82  Lactic acid 2.4   10/28  #2 3.6 68/2 Vancomycin 1000 mg IV q36h 0859  Lactic acid 2.1   10/29  #3 4.2 66/2 Vancomycin 1000 mg IV q36h <2100>  Lactic acid 2.1              Estimated Creatinine Clearance: 25 mL/min (A) (based on SCr of 2 mg/dL (H)).       Intake/Output Summary (Last 24 hours) at 10/29/2020 0820  Last data filed at 10/29/2020 0604  Gross per 24 hour   Intake 1547 ml   Output 875 ml   Net 672 ml     Urine output over the last 24 hours: 0.5 mL/kg/hr (875 mL total)     Diuretics ordered in the last 24 hours: N/A      Temp max: Temp (24hrs), Av.1 °F (36.2 °C), Min:96.8 °F (36 °C), Max:97.8 °F (36.6 °C)      Cultures:  available culture and sensitivity results were reviewed in [facility]  10/26 Blood cx's - 1/2 gram positive cocci in chains; Streptococcus per PCR  10/26 Urine cx - Growth present, evaluating for Gram positive cocci ; Proteus sp  10/26 Wound cx (L hip) - Growth present, evaluating for mixed Gram positive organisms; Proteus sp  10/27 Wound cx (R hip) -  Growth present, evaluating for mixed Gram neg rods, Proteus sp,       Mixed Gram positive organisms   10/28 Blood cx's - Pending    IV lines:  10/26 Peripheral IV 18 g R wrist  Implanted venous port    Assessment:  · 77 yo F with metastatic breast cancer admitted with dizziness/fall  · Pt found to have

## 2020-10-30 PROBLEM — I42.9 CARDIOMYOPATHY (HCC): Status: ACTIVE | Noted: 2019-08-01

## 2020-10-30 LAB
ABO/RH: NORMAL
ALBUMIN SERPL-MCNC: 2.2 G/DL (ref 3.5–5.2)
ALP BLD-CCNC: 145 U/L (ref 35–104)
ALT SERPL-CCNC: 47 U/L (ref 0–32)
ANION GAP SERPL CALCULATED.3IONS-SCNC: 11 MMOL/L (ref 7–16)
ANISOCYTOSIS: ABNORMAL
ANTIBODY SCREEN: NORMAL
AST SERPL-CCNC: 33 U/L (ref 0–31)
BASOPHILS ABSOLUTE: 0.02 E9/L (ref 0–0.2)
BASOPHILS RELATIVE PERCENT: 0.6 % (ref 0–2)
BILIRUB SERPL-MCNC: 0.7 MG/DL (ref 0–1.2)
BLOOD BANK DISPENSE STATUS: NORMAL
BLOOD BANK PRODUCT CODE: NORMAL
BPU ID: NORMAL
BUN BLDV-MCNC: 60 MG/DL (ref 8–23)
CALCIUM SERPL-MCNC: 7.3 MG/DL (ref 8.6–10.2)
CHLORIDE BLD-SCNC: 104 MMOL/L (ref 98–107)
CO2: 22 MMOL/L (ref 22–29)
CREAT SERPL-MCNC: 1.8 MG/DL (ref 0.5–1)
DESCRIPTION BLOOD BANK: NORMAL
EOSINOPHILS ABSOLUTE: 0.07 E9/L (ref 0.05–0.5)
EOSINOPHILS RELATIVE PERCENT: 2.1 % (ref 0–6)
GFR AFRICAN AMERICAN: 33
GFR NON-AFRICAN AMERICAN: 27 ML/MIN/1.73
GLUCOSE BLD-MCNC: 161 MG/DL (ref 74–99)
HCT VFR BLD CALC: 21.3 % (ref 34–48)
HCT VFR BLD CALC: 25.6 % (ref 34–48)
HEMOGLOBIN: 6.8 G/DL (ref 11.5–15.5)
HEMOGLOBIN: 8.3 G/DL (ref 11.5–15.5)
IMMATURE GRANULOCYTES #: 0.03 E9/L
IMMATURE GRANULOCYTES %: 0.9 % (ref 0–5)
INR BLD: 2.3
LV EF: 40 %
LVEF MODALITY: NORMAL
LYMPHOCYTES ABSOLUTE: 0.34 E9/L (ref 1.5–4)
LYMPHOCYTES RELATIVE PERCENT: 10.2 % (ref 20–42)
MCH RBC QN AUTO: 36.6 PG (ref 26–35)
MCHC RBC AUTO-ENTMCNC: 31.9 % (ref 32–34.5)
MCV RBC AUTO: 114.5 FL (ref 80–99.9)
METER GLUCOSE: 135 MG/DL (ref 74–99)
METER GLUCOSE: 176 MG/DL (ref 74–99)
METER GLUCOSE: 188 MG/DL (ref 74–99)
MONOCYTES ABSOLUTE: 0.2 E9/L (ref 0.1–0.95)
MONOCYTES RELATIVE PERCENT: 6 % (ref 2–12)
NEUTROPHILS ABSOLUTE: 2.66 E9/L (ref 1.8–7.3)
NEUTROPHILS RELATIVE PERCENT: 80.2 % (ref 43–80)
ORGANISM: ABNORMAL
ORGANISM: ABNORMAL
OVALOCYTES: ABNORMAL
PDW BLD-RTO: 23.7 FL (ref 11.5–15)
PLATELET # BLD: 67 E9/L (ref 130–450)
PLATELET CONFIRMATION: NORMAL
PMV BLD AUTO: 11.3 FL (ref 7–12)
POIKILOCYTES: ABNORMAL
POLYCHROMASIA: ABNORMAL
POTASSIUM REFLEX MAGNESIUM: 4.4 MMOL/L (ref 3.5–5)
PROTHROMBIN TIME: 26 SEC (ref 9.3–12.4)
RBC # BLD: 1.86 E12/L (ref 3.5–5.5)
SARS-COV-2: NOT DETECTED
SODIUM BLD-SCNC: 137 MMOL/L (ref 132–146)
SOURCE: NORMAL
TOTAL PROTEIN: 5.1 G/DL (ref 6.4–8.3)
URINE CULTURE, ROUTINE: ABNORMAL
URINE CULTURE, ROUTINE: ABNORMAL
WBC # BLD: 3.3 E9/L (ref 4.5–11.5)
WOUND/ABSCESS: NORMAL

## 2020-10-30 PROCEDURE — 86900 BLOOD TYPING SEROLOGIC ABO: CPT

## 2020-10-30 PROCEDURE — 2500000003 HC RX 250 WO HCPCS: Performed by: STUDENT IN AN ORGANIZED HEALTH CARE EDUCATION/TRAINING PROGRAM

## 2020-10-30 PROCEDURE — 85018 HEMOGLOBIN: CPT

## 2020-10-30 PROCEDURE — 86850 RBC ANTIBODY SCREEN: CPT

## 2020-10-30 PROCEDURE — 85610 PROTHROMBIN TIME: CPT

## 2020-10-30 PROCEDURE — 36415 COLL VENOUS BLD VENIPUNCTURE: CPT

## 2020-10-30 PROCEDURE — 85025 COMPLETE CBC W/AUTO DIFF WBC: CPT

## 2020-10-30 PROCEDURE — P9016 RBC LEUKOCYTES REDUCED: HCPCS

## 2020-10-30 PROCEDURE — 82962 GLUCOSE BLOOD TEST: CPT

## 2020-10-30 PROCEDURE — 6370000000 HC RX 637 (ALT 250 FOR IP): Performed by: FAMILY MEDICINE

## 2020-10-30 PROCEDURE — 6360000002 HC RX W HCPCS: Performed by: STUDENT IN AN ORGANIZED HEALTH CARE EDUCATION/TRAINING PROGRAM

## 2020-10-30 PROCEDURE — P9040 RBC LEUKOREDUCED IRRADIATED: HCPCS

## 2020-10-30 PROCEDURE — 6370000000 HC RX 637 (ALT 250 FOR IP): Performed by: INTERNAL MEDICINE

## 2020-10-30 PROCEDURE — APPSS180 APP SPLIT SHARED TIME > 60 MINUTES: Performed by: CLINICAL NURSE SPECIALIST

## 2020-10-30 PROCEDURE — 2580000003 HC RX 258: Performed by: FAMILY MEDICINE

## 2020-10-30 PROCEDURE — 99232 SBSQ HOSP IP/OBS MODERATE 35: CPT | Performed by: FAMILY MEDICINE

## 2020-10-30 PROCEDURE — 6360000002 HC RX W HCPCS: Performed by: SPECIALIST

## 2020-10-30 PROCEDURE — 86901 BLOOD TYPING SEROLOGIC RH(D): CPT

## 2020-10-30 PROCEDURE — 99232 SBSQ HOSP IP/OBS MODERATE 35: CPT | Performed by: SURGERY

## 2020-10-30 PROCEDURE — 0JDL0ZZ EXTRACTION OF RIGHT UPPER LEG SUBCUTANEOUS TISSUE AND FASCIA, OPEN APPROACH: ICD-10-PCS | Performed by: STUDENT IN AN ORGANIZED HEALTH CARE EDUCATION/TRAINING PROGRAM

## 2020-10-30 PROCEDURE — 86923 COMPATIBILITY TEST ELECTRIC: CPT

## 2020-10-30 PROCEDURE — 36430 TRANSFUSION BLD/BLD COMPNT: CPT

## 2020-10-30 PROCEDURE — 99223 1ST HOSP IP/OBS HIGH 75: CPT | Performed by: INTERNAL MEDICINE

## 2020-10-30 PROCEDURE — 2060000000 HC ICU INTERMEDIATE R&B

## 2020-10-30 PROCEDURE — 2580000003 HC RX 258: Performed by: SPECIALIST

## 2020-10-30 PROCEDURE — 0JDM0ZZ EXTRACTION OF LEFT UPPER LEG SUBCUTANEOUS TISSUE AND FASCIA, OPEN APPROACH: ICD-10-PCS | Performed by: STUDENT IN AN ORGANIZED HEALTH CARE EDUCATION/TRAINING PROGRAM

## 2020-10-30 PROCEDURE — 80053 COMPREHEN METABOLIC PANEL: CPT

## 2020-10-30 PROCEDURE — 85014 HEMATOCRIT: CPT

## 2020-10-30 PROCEDURE — 6370000000 HC RX 637 (ALT 250 FOR IP): Performed by: STUDENT IN AN ORGANIZED HEALTH CARE EDUCATION/TRAINING PROGRAM

## 2020-10-30 PROCEDURE — 93306 TTE W/DOPPLER COMPLETE: CPT

## 2020-10-30 RX ORDER — BUMETANIDE 0.25 MG/ML
0.5 INJECTION, SOLUTION INTRAMUSCULAR; INTRAVENOUS ONCE
Status: COMPLETED | OUTPATIENT
Start: 2020-10-30 | End: 2020-10-30

## 2020-10-30 RX ORDER — FENTANYL CITRATE 50 UG/ML
50 INJECTION, SOLUTION INTRAMUSCULAR; INTRAVENOUS ONCE
Status: COMPLETED | OUTPATIENT
Start: 2020-10-30 | End: 2020-10-30

## 2020-10-30 RX ORDER — 0.9 % SODIUM CHLORIDE 0.9 %
20 INTRAVENOUS SOLUTION INTRAVENOUS ONCE
Status: DISCONTINUED | OUTPATIENT
Start: 2020-10-30 | End: 2020-11-04 | Stop reason: HOSPADM

## 2020-10-30 RX ORDER — LIDOCAINE HYDROCHLORIDE AND EPINEPHRINE 10; 10 MG/ML; UG/ML
20 INJECTION, SOLUTION INFILTRATION; PERINEURAL ONCE
Status: COMPLETED | OUTPATIENT
Start: 2020-10-30 | End: 2020-10-30

## 2020-10-30 RX ORDER — METOPROLOL SUCCINATE 25 MG/1
12.5 TABLET, EXTENDED RELEASE ORAL DAILY
Status: DISCONTINUED | OUTPATIENT
Start: 2020-10-30 | End: 2020-10-31

## 2020-10-30 RX ORDER — FOLIC ACID 1 MG/1
1 TABLET ORAL DAILY
Status: DISCONTINUED | OUTPATIENT
Start: 2020-10-30 | End: 2020-11-04 | Stop reason: HOSPADM

## 2020-10-30 RX ADMIN — OXYCODONE 5 MG: 5 TABLET ORAL at 15:23

## 2020-10-30 RX ADMIN — CEFTRIAXONE SODIUM 1 G: 1 INJECTION, POWDER, FOR SOLUTION INTRAMUSCULAR; INTRAVENOUS at 17:26

## 2020-10-30 RX ADMIN — INSULIN LISPRO 1 UNITS: 100 INJECTION, SOLUTION INTRAVENOUS; SUBCUTANEOUS at 21:12

## 2020-10-30 RX ADMIN — SODIUM BICARBONATE 650 MG: 650 TABLET ORAL at 21:11

## 2020-10-30 RX ADMIN — SODIUM CHLORIDE, PRESERVATIVE FREE 10 ML: 5 INJECTION INTRAVENOUS at 08:36

## 2020-10-30 RX ADMIN — SKIN PROTECTANT: 44 OINTMENT TOPICAL at 21:12

## 2020-10-30 RX ADMIN — SKIN PROTECTANT: 44 OINTMENT TOPICAL at 08:51

## 2020-10-30 RX ADMIN — METOPROLOL SUCCINATE 12.5 MG: 25 TABLET, EXTENDED RELEASE ORAL at 17:26

## 2020-10-30 RX ADMIN — FENTANYL CITRATE 50 MCG: 0.05 INJECTION, SOLUTION INTRAMUSCULAR; INTRAVENOUS at 16:00

## 2020-10-30 RX ADMIN — LIDOCAINE HYDROCHLORIDE AND EPINEPHRINE 20 ML: 10; 10 INJECTION, SOLUTION INFILTRATION; PERINEURAL at 16:51

## 2020-10-30 RX ADMIN — SODIUM CHLORIDE, PRESERVATIVE FREE 10 ML: 5 INJECTION INTRAVENOUS at 21:11

## 2020-10-30 RX ADMIN — BUMETANIDE 0.5 MG: 0.25 INJECTION INTRAMUSCULAR; INTRAVENOUS at 08:36

## 2020-10-30 RX ADMIN — LIDOCAINE HYDROCHLORIDE AND EPINEPHRINE 20 ML: 10; 10 INJECTION, SOLUTION INFILTRATION; PERINEURAL at 16:52

## 2020-10-30 RX ADMIN — HEPARIN 100 UNITS: 100 SYRINGE at 08:37

## 2020-10-30 RX ADMIN — INSULIN GLARGINE 10 UNITS: 100 INJECTION, SOLUTION SUBCUTANEOUS at 08:51

## 2020-10-30 ASSESSMENT — PAIN SCALES - GENERAL
PAINLEVEL_OUTOF10: 0
PAINLEVEL_OUTOF10: 5
PAINLEVEL_OUTOF10: 8

## 2020-10-30 NOTE — PROGRESS NOTES
Results   Component Value Date    BC 24 Hours no growth 10/27/2020    BC 24 Hours no growth 10/26/2020    BC 5 Days no growth 09/25/2020    ORG Proteus mirabilis 10/27/2020    ORG Enterococcus faecalis 10/27/2020    ORG Streptococcus viridans group 10/26/2020     Lab Results   Component Value Date    BLOODCULT2 24 Hours no growth 10/28/2020    BLOODCULT2  10/26/2020     Gram stain performed from blood culture bottle media  Gram positive cocci in chains  evaluating for alpha hemolytic strep      BLOODCULT2  10/26/2020     This organism is only isolated from one set. Susceptibility testing is not routinely performed. Additional testing can be ordered by calling the  Microbiology Department. Additional blood cultures may be obtained if  clinical suspicion of septicemia is high. ORG Proteus mirabilis 10/27/2020    ORG Enterococcus faecalis 10/27/2020    ORG Streptococcus viridans group 10/26/2020     WOUND/ABSCESS   Date Value Ref Range Status   10/27/2020   Preliminary    Growth present, evaluating for:  Mixed Gram positive organisms  Proteus species     10/27/2020   Preliminary    Growth present, evaluating for:  Mixed Gram negative rods  Proteus species  Mixed Gram positive organisms     07/29/2020   Final    Light growth  Methicillin resistant Staph aureus isolated. Most Methicillin  resistant Staphylococcus are usually resistant to multiple  antibiotics including other B-Lactams, Aminoglycosides,  Macrolides, Clindamycin and Tetracycline. Contact isolation  is indicated.      07/29/2020   Final    Rare growth  Refer to previous wound culture (Leg, Right) collected  same date/time for susceptibility results     07/29/2020 Rare growth  Final     No results found for: RESPSMEAR  No results found for: MPNEUMO, CLAMYDCU, LABLEGI, AFBCX, FUNGSM, LABFUNG  No results found for: CULTRESP  No results found for: CXCATHTIP  Body Fluid Culture, Sterile   Date Value Ref Range Status   07/25/2019 Growth not present  Final No results found for: CXSURG  Urine Culture, Routine   Date Value Ref Range Status   10/27/2020 >100,000 CFU/ml  Final   10/27/2020 >100,000 CFU/ml  Final   07/07/2020 Growth not present  Final     MRSA Culture Only   Date Value Ref Range Status   08/09/2019 Methicillin resistant Staph aureus not isolated  Final   wound culture-mixed GPO and proteus    ASSESSMENT:  · Pancytopenia  · Chronic ischial and sacral decubiti improved although they do need debridement   · Alpha strep virdians bacteremia possibly from the wounds rule out a Mediport issue  · History of MRSA colonizing/infecting decubiti    PLAN:  · Continue Vanco and Rocephin  · Check final cultures  · Monitor labs  · Echo did not show vegetation  · For wound debridement      Jeannie Mobley CNP  1:51 PM  10/30/2020     Pt seen and examined. Above discussed agree with advanced practice nurse. Labs, cultures, and radiographs reviewed. Face to Face encounter occurred. Changes made as necessary.      Rohith Adrian MD

## 2020-10-30 NOTE — PROGRESS NOTES
[facility] - [facility] Inpatient   Resident Progress Note    S:  Hospital day: 3   Brief Synopsis: Ed Rodriguez is a 76 y.o. female with pmhx of metastatic breast cancer, heart failure last EF 50 to 55%, type 2 diabetes requiring insulin, chronic DVT, CKD stage III who presents to ED for Fall. States she fell d/t dizziness, was on the floor for 4 hours. She denied any chest pain or shortness of breath or palpitations prior to. States that she did hit her head, did not lose consciousness. Is on Coumadin chronically for chronic DVT. Patient reporting no complaints or pain in the ER. Of note, patient recently seen in the ER for weakness, was transfused and discharged home in stable condition. Urinalysis was positive for UTI. She was also found to have KARISHMA on her CKD, b/l decubitus ulcers, and she was hypotensive. Pt admitted and started on vanc and zosyn, ID, dietary, palliative, SW were consulted.     Overnight/interim:    Sustained HR in 140s last night, bolused and given lopressor   Denies CP, SOB, lightheadedness          Cont meds:    dextrose       Scheduled meds:    warfarin  1 mg Oral Every Other Day    cefTRIAXone (ROCEPHIN) IV  1 g Intravenous Q24H    mineral oil-hydrophilic petrolatum   Topical BID    heparin flush  100 Units Intracatheter Daily    insulin glargine  10 Units Subcutaneous Daily    insulin lispro  0-6 Units Subcutaneous TID WC    cyanocobalamin  1,000 mcg Oral Daily    docusate sodium  100 mg Oral Daily    insulin lispro  0-3 Units Subcutaneous Nightly    magnesium oxide  400 mg Oral Daily    sodium bicarbonate  650 mg Oral BID    sodium chloride flush  10 mL Intravenous 2 times per day    sodium chloride   Intravenous Q12H    pantoprazole  40 mg Intravenous Daily    vancomycin  1,000 mg Intravenous Q36H     PRN meds: mineral oil-hydrophilic petrolatum **AND** mineral oil-hydrophilic petrolatum, heparin flush, perflutren lipid microspheres, sodium chloride flush, polyethylene glycol, oxyCODONE, glucose, dextrose, glucagon (rDNA), dextrose     I reviewed the patient's past medical and surgical history, Medications and Allergies. O:  BP (!) 98/58   Pulse 84   Temp 97.4 °F (36.3 °C) (Temporal)   Resp 16   Ht 5' 4\" (1.626 m)   Wt 171 lb (77.6 kg) Comment: taken from 10/19 documentation  SpO2 94%   BMI 29.35 kg/m²   24 hour I&O: I/O last 3 completed shifts: In: 700 [P.O.:240; I.V.:497]  Out: 850 [Urine:850]  I/O this shift:  In: -   Out: 250 [Urine:250]        Physical Exam   Constitutional: No distress. HENT:   Head: Normocephalic and atraumatic. Cardiovascular: Normal rate, regular rhythm and normal heart sounds. Pulmonary/Chest: Effort normal. She has wheezes in the right upper field. Mild R sided exp wheeze   Abdominal: Soft. She exhibits distension. There is no abdominal tenderness. Mild distension   Musculoskeletal:         General: Edema present. Comments: 3+ pitting edema BLE   Neurological: She is alert. Skin: There is pallor. Labs:  Na/K/Cl/CO2:  137/4.4/104/22 (10/30 1311)  BUN/Cr/glu/ALT/AST/amyl/lip:  60/1.8/--/47/33/--/-- (10/30 7577)  WBC/Hgb/Hct/Plts:  3.3/6.8/21.3/67 (10/30 5374)  estimated creatinine clearance is 27 mL/min (A) (based on SCr of 1.8 mg/dL (H)). Other pertinent labs as noted below    Radiology:  XR CHEST (2 VW)   Final Result   Increasing vascular congestion versus mild pulmonary edema. MRI BRAIN WO CONTRAST   Final Result   1. No acute intracranial abnormality. 2. No mass effect, edema or hemorrhage. CT HEAD WO CONTRAST   Final Result   No acute intracranial abnormality. CT CERVICAL SPINE WO CONTRAST   Final Result   No acute cervical spine fracture or malalignment. Degenerative changes of the cervical spine. CT CHEST WO CONTRAST   Final Result   1. No acute traumatic injury in the chest, abdomen or pelvis.    2. Unknown history of probable malignancy with progression of findings from   prior CT imaging. This includes infiltrative lesion in the liver, omental   thickening, ascites and mesenteric edema, mediastinal lymph node enlargement   and right axillary lymph node enlargement. Sclerotic changes in the ribs and   pelvis also of potential concern for metastasis. 3. Bilateral pleural effusions with interstitial changes developing in the   lungs. A nodular density along the right minor fissure may represent   loculated pleural fluid or a pulmonary nodule. 4. Recommend correlation with history and any prior treatment for malignancy. CT ABDOMEN PELVIS WO CONTRAST Additional Contrast? None   Final Result   1. No acute traumatic injury in the chest, abdomen or pelvis. 2. Unknown history of probable malignancy with progression of findings from   prior CT imaging. This includes infiltrative lesion in the liver, omental   thickening, ascites and mesenteric edema, mediastinal lymph node enlargement   and right axillary lymph node enlargement. Sclerotic changes in the ribs and   pelvis also of potential concern for metastasis. 3. Bilateral pleural effusions with interstitial changes developing in the   lungs. A nodular density along the right minor fissure may represent   loculated pleural fluid or a pulmonary nodule. 4. Recommend correlation with history and any prior treatment for malignancy. XR CHEST PORTABLE   Final Result   1. Stable exam.  Persistent elevation of the right hemidiaphragm and small   right pleural effusion. Coarse interstitial markings are unchanged. 2.  Atherosclerotic disease, cardiomegaly, mild central pulmonary vascular   congestion, and postsurgical changes consistent with prior CABG. 3.  Unchanged complete left humeral neck fracture.              A/P:  Active Problems:    Metastatic breast cancer ([facility] [address]    Type 2 diabetes mellitus without complication, with long-term current use of insulin (HCC)    Anemia of chronic planning     Elevated troponin  Likely due to chronic kidney disease, troponin 0 0.04 on admission, appears to be around baseline  Reports no chest pain or shortness of breath, EKG unchanged    Metastatic breast cancer  With known mets to liver, LFTs elevated  Has had worsening encephalopathy and tremor, MRI brain showed no acute change     Anemia of chronic disease  Hemoglobin 8.2 on admission  6.8 today, consider transfusion       Chronic DVT on Coumadin  With recent fall, CT head negative for acute process  INR hemolyzed in the ER  Restart coumadin, 1 mg QOD  May need held for surgery, will consult    GI/DVT ppx: protonix  Diet: general  Antibiotics: Vancomycin day 4, Rocephin day 2      Electronically signed by Mortimer Fries  PGY-1 on 10/30/2020 at 6:03 AM  This case was discussed with attending physician: Dr. Eb Huynh

## 2020-10-30 NOTE — PROGRESS NOTES
[facility] Attending    S: 76 y.o. female with metastatic breast cancer with liver mets and ascites; DM; CAD; chronic left humerus fracture; chronic bilateral pressure wounds of lateral hips, present on admission; chronic and recurrent DVT of RLE on coumadin; history of sarcoidosis; history of nephrolithiasis; bilateral LE lymphedema. Had fall at home, found on the ground. Today, symptoms are stable. Pain is controlled at this time. No dyspnea at rest.  No new symptoms or concerns. Does feel as though she needs to have a BM; no BM in past several days. Would like a bedside commode; would like to sit in a chair. Overnight, had some tachycardia as well as hypotension, responded to fluids. Prbcs planned today; debridement planned. O: VS- Blood pressure (!) 104/52, pulse 88, temperature 98.1 °F (36.7 °C), temperature source Oral, resp. rate 18, height 5' 4\" (1.626 m), weight 171 lb (77.6 kg), SpO2 93 %, not currently breastfeeding. Exam is as noted by resident with the following changes, additions or corrections:  Gen NAD, pale, stable. Awake and alert; oriented throughout discussion. CV Currently regular, systolic murmur   Resp decreased breath sounds, few crackles bibasilar   Abd diffusely tender, firm   Ext edema and stasis changes, wounds are dressed, protective padding in place   Ulcers as previously documented      Impressions: Active Problems:    Metastatic breast cancer ([facility] [address]    Type 2 diabetes mellitus without complication, with long-term current use of insulin (HCC)    Anemia of chronic disease    Chronic deep vein thrombosis (DVT) of proximal vein of right lower extremity (HCC)    Decubitus ulcer    Dehydration    Acute kidney injury superimposed on CKD ([facility] [address]    Acute cystitis with hematuria    Fall    General weakness  Resolved Problems:    * No resolved hospital problems. *      Plan:   ID management appreciated.  Urine with Proteus growth; one blood culture with Strep viridans. Encephalopathy, possibly infectious/metabolic, now resolved, back to baseline. Pain control with caution. Follow closely. Dental soft diet. Nutrition evaluation; currently NPO for procedure, support nutrition and hydration following. Wound care for ulcerations. General surgery management and wound care appreciated. Disposition planning; patient agreeable to [place]. Check INR; plan to dose coumadin approximately 1 mg every other day for now. Discuss home Ibrance, follow H&H and PLTs. Check EKG due to irregular rhythm today; PACs on monitor, currently stable. Consider cardiology - concerns for valve disease in setting of infection, malignancy, with bouts of tachycardia. Watch UO and hydration status, lactate levels, off of fluids. Nutrition support. TTE had been ordered; follow. Attending Physician Statement  I have reviewed the chart and seen the patient with the resident(s). I personally reviewed images, EKG's and similar tests, if present. I personally reviewed and performed key elements of the history and exam.  I have reviewed and confirmed student and/or resident history and exam with changes as indicated above. I agree with the assessment, plan and orders as documented by the resident. Please refer to the resident and/or student note for additional information.       Soila Mckeon

## 2020-10-30 NOTE — PROCEDURES
Berta Omer is a 76 y.o. female patient. 1. Acute cystitis with hematuria    2. Pressure injury of skin, unspecified injury stage, unspecified location    3. Dehydration    4. General weakness      Past Medical History:   Diagnosis Date    Abscess of abdominal wall     Anemia     Iron deficiency and chronic disease.  Anesthesia     DIFFICULTY WAKING UP    Arthritis     Arthritis, hip     Breast cancer ([address] 2005    S/P Left Lumpectomy with Lymph Node Dissection, Chemo/Rad. Follows with Dr. Kacy Peter. No recurrence to date. Surgeon was Dr. Rivero Buda.  CAD (coronary artery disease) 5/2008    s/p CABG. Follows with [address] [place] [address].  CHF (congestive heart failure) (HCC)     Chronic venous insufficiency 11/7/2018    Class 2 severe obesity due to excess calories with serious comorbidity and body mass index (BMI) of 35.0 to 35.9 in adult ([address] 8/1/2019    Decreased dorsalis pedis pulse 11/7/2018    Degenerative disc disease at L5-S1 level 7/10/2020    Diabetic neuropathy (HCC)     Diabetic neuropathy (HCC)     DVT (deep venous thrombosis) ([facility])     Recurrent. On lifelong coumadin.  DVT of upper extremity (deep vein thrombosis) ([address] 4/18/2012    History of deep vein thrombosis 11/7/2018    Humerus fracture     Chronic, on the Left.  Hyperlipidemia 8/29/2011    Hypertension     Left leg pain 7/9/2020    Leg swelling 11/7/2018    Lymph node enlargement     Lymphedema of both lower extremities 11/7/2018    Lymphopenia 7/9/2020    MI (myocardial infarction) ([address]     Nephrolithiasis     Follows with Dr. Kalen Garza.  Pericardial effusion     Pulmonary nodule     With mediastinal lymphadenopathy. Stable. Follows with Dr. Juan Osei.     Rib lesion 11/28/11    expansile lesion in one of the right ribs laterally    Sarcoidosis 07/26/11    per transbronchial needle aspiration    Subclavian vein occlusion, bilateral ([address] 4/18/2012    Type II or unspecified type diabetes mellitus without mention of complication, not stated as uncontrolled      Blood pressure (!) 107/52, pulse 87, temperature 98 °F (36.7 °C), temperature source Temporal, resp. rate 18, height 5' 4\" (1.626 m), weight 171 lb (77.6 kg), SpO2 95 %, not currently breastfeeding. Procedures      Pt was was prepped and draped in the sterile fashion. She was placed in the right lateral recumbent position. Two ischial wounds were identified. 1% lidocaine with epinephrine was used as a local anesthetic. Regional debridement using scissors and curettes were used to debride the wounds and remove necrotic skin and subcutaneous tissue. The areas were then vigorously irrigated with normal saline. Dressed with wet 4 x 4's and ABD pad. The patient was then switched over to the left lateral recumbent position where the same process went forth on two additon ischial wounds. For all of these wounds, 100% percent of the necrotic tissue was debrided. Patient tolerated the procedure well. Continue daily wound dressing changes with wound care.     Erika Mar DO  10/30/2020     Electronically signed by Erika Mar DO on 10/30/2020 at 5:16 PM

## 2020-10-30 NOTE — PROGRESS NOTES
IV 18 g R wrist  Implanted venous port    Assessment:  · 75 yo F with metastatic breast cancer admitted with dizziness/fall  · Pt found to have Strep viridans bacteremia - Pt currently on Vancomycin day #4 and Ceftriaxone day #2  · ID following  · Consulted by Dr. Cher Velez to dose/monitor Vancomycin  · Goal trough level:  15-20 mcg/mL  · Pt with KARISHMA on CKD - SCr 1.8 again today (baseline ~1.6)    Plan:  · Continue Vancomycin 1000 mg IV q36h  · Will order a Vancomycin trough level with the 9am dose tomorrow and will adjust dose as needed  · Will monitor renal function closely    Will continue to follow.       Rachael Denise, PharmD, BCPS, BCCCP  10/30/2020  7:25 AM  Pager: [phone]

## 2020-10-30 NOTE — CONSULTS
Inpatient Cardiology Consultation      Reason for Consult:  Intermittent tachycardia, systolic murmur, may need KAMRON    Consulting Physician: Dr. Luisito Muller    Requesting Physician:  Dr. Sahil Marie    Date of Consultation: 10/30/2020    History of Present Illness:    Ms. Mio Hargrove is a 76year old lady who is followed at our practice by Dr. Bertha Marin; she has a lengthy past medical history including coronary artery disease s/p CABG; chronic HFrEF (ischemic vs chemotherapy induced); and stage IV metastatic breast cancer. She presented to the ER on October 26 after suffering a mechanical fall at home, with associated fatigue and wakness. She was found to have Yana on CKD, as well as UTI. Blood cultures drawn in the ER are positive for strep viridens and she is being followed by infectious disease. Last night, telemetry showed what appears to be atrial flutter with heart rates in the 150s, and she was given lopressor and IV fluids. Currently, she is resting in bed and reports having \"faint\" discomfort in her chest last evening; she denies palpitations or chest discomfort prior to admission. She has chronic exertional dyspnea, if she walks too fast or hurries, but feels this has been no worse than usual. She is now in SR and H&H today is 6.8 & 21.3; she is being transfused with PRBCs. Echocardiogram performed today showed reduced ejection fraction (calculated  47%, (visually estimated at 40%), with reduced RV function and trivial anterior pericardial effusion. Past Medical History:    1. Lifelong non smoker  2. Appendectomy, tonsillectomy, cholecystectomy, MAYNOR/BSO, bilateral cataract surgery  3. Hypertension. More recent hypotension limiting GDMT for heart failure   4. Hyperlipidemia   5. Chronic HFrEF: ACC/AHA stage C.,  NYHA functional class III  6. L breast carcinoma in 2005 (grade 2 ductal) s/p lumpectomy six cycles of AC followed by Taxol x 6. 5 years of Arimidex  7. 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  Recurrent LE DVT on 2 separate occasions (2007 and 8/2008) on chronic coumadin therapy  9. 8/13/2008 Lexiscan MPS: reversible inferolateral/apical decrease with moderate to severe decrease in counts. EF 61%  10. TTE 8/12/2011 Dr Juvencio Dixon: EF 65%. Stage II DD. Mild CLVH. Trace MR  11. 9/2011 patient cancelled sleep study  12. Lung sarcoidosis diagnosed per transbronchial needle aspiration 7/26/2010 per Dr Earline Barrera (patient denies)  15. LHC 5/16/2008: Normal LV contractility, 50-55% proximal circumflex; long, diffuse disease in mid portion of the last OM, 85-90%; 95% proximal/ostial right coronary artery; 90% ostial lesion first acute marginal branch. 14. 5/19/2008 [facility] Dr Christian Mccabe: SVG to OM1, RCA and acute marginal branch    15. Chronic BLE lymphedema  16. T2DM   17. Bilateral Charcot feet  18. GERD  19. HLD  20. CKD stage IV  21. 4/20/2012 placement of R infusa port  22. 2012 PET scan showed metastatic disease to ribs and new right cervical hilar and mediastinal  23. Adenopathy. Placed on [place]aven and Herceptin. Xgeva added   24. 10/5/2012 MUGA The left ventricular ejection fraction is 56% and normal. Previously, this was reported as 62% and no significant interval change. 25. TTE 10/2013 Dr Lynette Ochoa: Borderline concentric left ventricular hypertrophy. Otherwise, normal study  26. PET 2013 complete resolution of her lymphadenopathy with no evidence of visceral or marissa disease. 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 6/2015 Colonoscopy with polypectomy (Benign)  28. 4/8/2016 TTE Dr Anniece Baumgarten: Normal left ventricle size and systolic function. EF visually estimated at 65%. Physiologic and/or trace mitral regurgitation is present. 29. 5/2016 Developed suspicious metastatic lesions to the left hemipelvis and acetabulum.  She was placed on Afinitor along with Herceptin and Xgeva. 30. 5/2016 developed a skin rash associated with Afinitor and this was permanently discontinued.   31. 3/18/2019 TTE Dr Elise Das: EF 60%. NWM. Mildly dilated LV.  Severe mitral annular calcification. Mild MR. The aortic valve appears mildly sclerotic. Moderate TR.   32. 6/10/2019 TTE Dr Roselyn Anaya: EF visually estimated at 50%.  Mild MR/AI. Moderate TR.  33. 6/12/2019 Ascites s/p paracentesis 4500 cc removed--> no malignant cells  34. 6/24/2019 Liver lesions s/p liver biopsy   35. 7/20/2019 Limited TTE Dr Marius Burkitt: EF 30-35%. Severe global hypokinesis. Mild CLVH. Abnormal diastolic function. Moderate L pleural effusion. 36. Chemotherapy changed to Ibrance   37. 7/22/2019 Ascites s/p paracentesis 3.5 liters  38. 7/25/2019 R thoracentesis 1.1 liters  39. Anxiety with history of panic attacks   40. Pancytopenia most likely secondary to chemotherapy   41. 8/14/2019 R PleurX Catheter placement  42. TTE 1/3/2020: LVEF 50-55%, inferolateral wall is hypokinetic, global longitudinal strain rate - 16%, RV global systolic function normal, RVSP 30 (EF improved from 30-35%)  43. Chronic bilateral hip and coccyx wounds  44. Admitted 6/2020 due to wound infection. Cultures grew corynebacterium and S. Aureus   45. Admitted 7/2020 with Yana on CKD : felt to be prerenal due to use of diuretics and poor oral intake   46. Admitted 9/2020 with anemia (Hgb 7.2) and coagulopathy (INR of 9.0). She was treated with Vitamin K and warfarin was resumed at lower dose. Marcio Fernández 47. DNR-CCA      Social History:    Lifelong non smoker  Denies ETOH/Illicict Drugs  Caffeine: Diet Coke  Activity; Currently in rehab facility. Ambulates with walker. No CO or SOB with ADL's  Code Status; DNR-CCA    Family History:  She is adopted and does not know her birth family's history     Medications Prior to Admit:  Prior to Admission medications    Medication Sig Start Date End Date Taking?  Authorizing Provider   omeprazole (PRILOSEC) 20 MG delayed release capsule Take 20 mg by mouth daily   Yes Historical Provider, MD   bumetanide (BUMEX) 1 MG tablet Take 1 mg by mouth daily   Yes Historical Provider, MD   palbociclib (IBRANCE) 100 MG tablet Take 100 mg by mouth daily Given three weeks on and one week off   Yes Historical Provider, MD   Calcium Citrate-Vitamin D (CITRACAL PETITES/VITAMIN D) 200-250 MG-UNIT TABS Take 1 tablet by mouth 2 times daily   Yes Historical Provider, MD   cyanocobalamin 1000 MCG tablet Take 1,000 mcg by mouth daily   Yes Historical Provider, MD   nitroGLYCERIN (NITROSTAT) 0.4 MG SL tablet Place 0.4 mg under the tongue every 5 minutes as needed for Chest pain   Yes Historical Provider, MD   warfarin (COUMADIN) 1 MG tablet Take 1 mg by mouth nightly    Yes Historical Provider, MD   sodium bicarbonate 650 MG tablet Take 1 tablet by mouth 2 times daily 9/29/20  Yes Edson Zavaleta DO   allopurinol (ZYLOPRIM) 100 MG tablet Take 1 tablet by mouth daily 9/14/20  Yes Edson Zavaleta DO   magnesium oxide (MAG-OX) 400 MG tablet Take 1 tablet by mouth daily 9/14/20  Yes Edson Zavaleta DO   insulin glargine (LANTUS SOLOSTAR) 100 UNIT/ML injection pen Inject 10 Units into the skin daily 8/13/20  Yes Verna Scott MD   lidocaine (LIDODERM) 5 % Apply 1 patch topically every 24 hours  7/13/20  Yes Historical Provider, MD   metoprolol succinate (TOPROL XL) 25 MG extended release tablet Take 0.5 tablets by mouth daily 8/11/20  Yes Loc Head MD   insulin lispro (HUMALOG) 100 UNIT/ML injection vial Inject 0-3 Units into the skin nightly 7/15/20  Yes Loc Head MD   docusate (COLACE, DULCOLAX) 100 MG CAPS Take 100 mg by mouth daily 2/4/20  Yes Edson Zavaleta DO   isosorbide dinitrate (ISORDIL) 5 MG tablet Take 1 tablet by mouth 3 times daily 1/14/20  Yes Sunny Ferrer MD   albuterol sulfate HFA (VENTOLIN HFA) 108 (90 Base) MCG/ACT inhaler Inhale 2 puffs into the lungs every 6 hours as needed for Wheezing    Historical Provider, MD       Current Medications:    Current Facility-Administered Medications: 0.9 % sodium chloride bolus, 20 mL, Intravenous, Once  folic headaches, bleeding gums, or epistaxis   · Cardiovascular: Chest discomfort as above. Denies palpitations. She has bilateral lower extremity lymphedema, and thinks her swelling has become worse over the past few months. · Respiratory: Dyspnea as above. She denies cough, orthopnea, or PND. · Gastrointestinal: Denies nausea, vomiting, diarrhea, abdominal pain or dark/bloody stools  · Genitourinary: Positive for dysuria and hematuria. · Musculoskeletal: Fall as above. She complains of weakness in her legs. · Integumentary: Denies rash. She has bilateral hip ulcers which are very painful. · Neurological: Denies dizziness, syncope, numbness or tingling  · Psychiatric: Positive for depression  · Endocrine: Denies temperature intolerance. She has lost some weight recently   · Hematologic/Lymphatic: Denies abnormal bruising or bleeding. Physical Exam:   BP (!) 104/51   Pulse 88   Temp 98.2 °F (36.8 °C) (Temporal)   Resp 16   Ht 5' 4\" (1.626 m)   Wt 171 lb (77.6 kg) Comment: taken from 10/19 documentation  SpO2 96%   BMI 29.35 kg/m²   CONST:  Elderly  lady who appears chronically ill but in no acute distress; appears of stated age. Awake, alert and cooperative. HEENT:   Head- Normocephalic, atraumatic   Throat- Oral mucosa pink and moist. Edentulous. Neck-  No stridor, JVD, or carotid bruit (difficult to assess due to thick neck). CHEST: Chest symmetrical and non-tender to palpation. Respirations unlabored. RESPIRATORY: Breath sounds diminished with few scattered rales  CARDIOVASCULAR:     Heart Ausculation- Regular rate and rhythm, grade 2/6 SM. No s3, s4 or rub   PV: Feet warm to touch. 3+ bilateral lower extremity edema to knees. ABDOMEN: Soft, non-tender to light palpation. Bowel sounds present. No palpable masses  MS: Moves all extremities   : Maurer catheter draining dark mya urine  SKIN: Warm and dry . Dressing to right shin.    NEURO / PSYCH: Oriented to person, place and time. Speech clear and appropriate. Follows all commands. Pleasant affect     Telemetry: currently SR in 80s; ST at times; episode of 2:1 atrial flutter last evening     ECG 10/29/2020: SR with fusion beats. Left axis deviation. Right BBB    Echocardiogram 10/30/2020:  Left ventricle is normal in size . Borderline concentric left ventricular hypertrophy. Abnormal (paradoxical) motion consistent with conduction abnormality. Mild-moderately reduced left ventricular systolic dysfunction. The left atrium is mildly dilated. Right ventricle global systolic function is reduced . Structurally normal mitral valve with restricted leaflet motion. Moderate mitral annular calcification consistent with possible repair. The aortic valve appears mildly sclerotic. Moderate tricuspid regurgitation. Pulmonary hypertension is mild . There is a trivial anterior pericardial effusion noted. Intake/Output Summary (Last 24 hours) at 10/30/2020 1211  Last data filed at 10/30/2020 0836  Gross per 24 hour   Intake 315 ml   Output 1000 ml   Net -685 ml       Labs:   CBC:   Recent Labs     10/29/20  0455 10/30/20  0415   WBC 4.2* 3.3*   HGB 7.4* 6.8*   HCT 23.0* 21.3*   PLT 77* 67*     BMP:   Recent Labs     10/29/20  0455 10/30/20  0415    137   K 3.9 4.4   CO2 23 22   BUN 66* 60*   CREATININE 2.0* 1.8*   LABGLOM 24 27   CALCIUM 8.1* 7.3*     Mag: No results for input(s): MG in the last 72 hours. Phos: No results for input(s): PHOS in the last 72 hours. TSH: No results for input(s): TSH in the last 72 hours. HgA1c:   Lab Results   Component Value Date    LABA1C 8.1 (H) 06/19/2020     No results found for: EAG  proBNP: No results for input(s): PROBNP in the last 72 hours. PT/INR:   Recent Labs     10/29/20  1245 10/30/20  0415   PROTIME 27.8* 26.0*   INR 2.4 2.3     APTT:No results for input(s): APTT in the last 72 hours.   CARDIAC ENZYMES:  Recent Labs     10/27/20  1336 10/27/20  1635   TROPONINI 0.04* 0.04* FASTING LIPID PANEL:  Lab Results   Component Value Date    CHOL 80 06/10/2019    HDL 31 06/10/2019    LDLCALC 28 06/10/2019    TRIG 104 06/10/2019     LIVER PROFILE:  Recent Labs     10/29/20  0455 10/30/20  0415   AST 30 33*   ALT 48* 47*   LABALBU 2.5* 2.2*     CXR 10/28/2020: Impression:      Increasing vascular congestion versus mild pulmonary edema. CT chest without conrrast 10/27/2020: Impression:      1. No acute traumatic injury in the chest, abdomen or pelvis. 2. Unknown history of probable malignancy with progression of findings from  prior CT imaging.  This includes infiltrative lesion in the liver, omental thickening, ascites and mesenteric edema, mediastinal lymph node enlargement and right axillary lymph node enlargement.  Sclerotic changes in the ribs and pelvis also of potential concern for metastasis. 3. Bilateral pleural effusions with interstitial changes developing in the   lungs.  A nodular density along the right minor fissure may represent   loculated pleural fluid or a pulmonary nodule. 4. Recommend correlation with history and any prior treatment for malignancy. CXR 10/26/2020: Impression:       1.  Stable exam.  Persistent elevation of the right hemidiaphragm and small right pleural effusion.  Coarse interstitial markings are unchanged. 2.  Atherosclerotic disease, cardiomegaly, mild central pulmonary vascular congestion, and postsurgical changes consistent with prior CABG. 3.  Unchanged complete left humeral neck fracture. Assessment and Recommendations to follow by Dr. Randy Rider. Electronically signed by FORD Guillaume on 10/30/2020 at 12:11 PM     The above documentation has been prepared under my direction and personally reviewed by me in its entirety. I confirm that the note above accurately reflects all work, treatment, procedures, and medical decision making performed by me. The patient's history was independently obtained.  The patient was independently examined. Electrocardiogram, prior and present cardiovascular assessment, and laboratory studies were reviewed. The patient is a 77-year-old white female known to [facility] with primary cardiovascular care provided by [facility]. He has a known extensive cardiovascular disease history occlusive of coronary atherosclerosis with coronary angiography in May, 2008 demonstrating evidence of multivessel disease predominately involving the circumflex and right coronary distributions with surgical revascularization of the circumflex marginal as well as that of the distal right coronary and right ventricular marginal branch. She additionally has a history of hypertension, Beatties with associated stage IV chronic kidney disease hyperlipidemia and the subsequent development of a likely nonischemic cardiomyopathy felt secondary to potential effects of sarcoidosis as well as that of chemotherapy in the face of carcinoma of the breast.  Her ability to tolerate optimal medical therapy has been limited by relative hypotension with a most recent echocardiographic assessment in January, 2020 demonstrating inferolateral hypokinesis with left ventricular systolic function at the lower limits of normal and estimated ejection fraction of 50 to 55%. She has subsequently been admitted with evidence of sepsis, acute kidney injury and anemia with an associated coagulopathy and presently is functionally limited with a functional capacity of approximately 4 METs. Her present hospitalization was apparently prompted initially by a mechanical fall with subsequent findings of acute kidney injury and bacteremia as well as that of an anemia in the absence of a presently documented bleeding source.   On the night prior to consultation while on telemetry an episode of tachycardia was noted with review of admission monitoring demonstrating evidence of atrial flutter with mean rates of approximately 150 bpm normal with no carotid bruits. Lung fields demonstrate bibasilar rales. Cardiac examination is notable for a regular rate and rhythm with no gallop rhythm and a systolic murmur at the left sternal border. A benign abdominal examination is present with chronic lymphedema present. Diagnostic As sessment and Plan: On a clinical basis, the patient from a cardiovascular standpoint presents with paroxysmal atrial flutter in the face of coronary atherosclerosis and moderate left ventricular systolic dysfunction with a clinical picture presently indicative of volume overload and decompensated combined systolic and diastolic heart failure. On this basis, fluid mobilization will be necessary with needs of nutritional support to assist additional fluid mobilization and careful monitoring of her renal function and electrolytes. Her decreasing hemoglobin and prerenal as team with suggests a potential recurrent upper gastrointestinal hemorrhage so in the face of therapeutic anticoagulation which may be contraindicated in the face of potential bleeding and thrombocytopenia in spite of the embolic risk of her atrial arrhythmias. Presently her blood pressure would appear to permit resumption of low-dose beta-blocker therapy both for ischemic stabilization and arrhythmia protection with need of careful monitoring of her blood pressure. Ongoing management of her bacteremia will be deferred to the infectious disease service with the patient unlikely to tolerate intervention should endocarditis be considered limiting the benefit of the consideration of a transesophageal echocardiogram.  Continued management of her multiple noncardiovascular issues will be deferred to primary care. Thank you for allowing me to participate in your patient's care. Please feel free to contact me if you have any questions or concerns. Chrissie Ahmadi.  Mariely Bergeron, [facility]

## 2020-10-30 NOTE — CARE COORDINATION
Select re evaluating today after bedside debridement for criteria. If insufficient will move on to [facility]. Facility has accepted and will initiate precert when appropriate. COVID (-) 10/28. For questions I can be reached at [phone].  Bre Waters Hind This is a 51 year old Female No PMHx BIBA for psych eval. Patient reports speaking to a police office regarding the eviction from her home and the police suggest she come here to speak to someone about her housing situation. 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 Seen in 2017 requesting  Seen in 2016 for calling police and making false claims. Patient has not medical complaints and denies medical history. Denies chest pain, SOB, N/V/D and fevers, Denies palpitations or diaphoresis. Denies Numbness, Tingling, Blurry Vision and HA.  Denies suicidal/homicidal thoughts. Denies visual/auditory hallucinations. Denies ETOH/Illicit drug use. Denies recent falls, trauma and injuries. Denies pain or any other medical complaints.

## 2020-10-30 NOTE — PROGRESS NOTES
[facility] - WHITE TEAM  TRAUMA/GENERAL SURGERY  ATTENDING PROGRESS NOTE  _____________________________________________________    Patient:    Geovanna Mora   Room:    4502/4502-A  Date of service:   10/30/2020   LOS:     3  _____________________________________________________      CC:   Necrotic wounds of lower extremities    Subjective:  10/30-patient complains of pain at her wounds over both hips and of the buttock    Objective:  Vitals:    10/29/20 2303 10/30/20 0821 10/30/20 1201 10/30/20 1225   BP: (!) 98/58 (!) 104/52 (!) 104/51 (!) 104/51   Pulse: 84 88 88 86   Resp: 16 18 16 16   Temp: 97.4 °F (36.3 °C) 98.1 °F (36.7 °C) 98.2 °F (36.8 °C) 98.2 °F (36.8 °C)   TempSrc: Temporal Oral Temporal Temporal   SpO2: 94% 93% 96% 96%   Weight:       Height:            I/O last 3 completed shifts: In: 305 [P.O.:240; I.V.:65]  Out: 1000 [Urine:1000]    General -appears uncomfortable   Neuro - Awake, alert, attentive     Ext -   wounds over both trochanteric areas have some area of dry brown eschar and moist yellow eschar. The areas over the buttock appear the same. Assessment:    Necrotic soft tissue wounds    Plan:  Sharp debridement at bedside    NOTE: This report was transcribed using voice recognition software. Every effort was made to ensure accuracy; however, inadvertent computerized transcription errors may be present.

## 2020-10-31 LAB
ALBUMIN SERPL-MCNC: 2.1 G/DL (ref 3.5–5.2)
ALP BLD-CCNC: 132 U/L (ref 35–104)
ALT SERPL-CCNC: 45 U/L (ref 0–32)
ANION GAP SERPL CALCULATED.3IONS-SCNC: 9 MMOL/L (ref 7–16)
ANISOCYTOSIS: ABNORMAL
AST SERPL-CCNC: 31 U/L (ref 0–31)
BASOPHILS ABSOLUTE: 0 E9/L (ref 0–0.2)
BASOPHILS RELATIVE PERCENT: 0.6 % (ref 0–2)
BILIRUB SERPL-MCNC: 0.9 MG/DL (ref 0–1.2)
BLOOD CULTURE, ROUTINE: NORMAL
BUN BLDV-MCNC: 63 MG/DL (ref 8–23)
CALCIUM SERPL-MCNC: 7.5 MG/DL (ref 8.6–10.2)
CHLORIDE BLD-SCNC: 105 MMOL/L (ref 98–107)
CO2: 22 MMOL/L (ref 22–29)
CREAT SERPL-MCNC: 1.7 MG/DL (ref 0.5–1)
EOSINOPHILS ABSOLUTE: 0.03 E9/L (ref 0.05–0.5)
EOSINOPHILS RELATIVE PERCENT: 0.9 % (ref 0–6)
GFR AFRICAN AMERICAN: 35
GFR NON-AFRICAN AMERICAN: 29 ML/MIN/1.73
GLUCOSE BLD-MCNC: 199 MG/DL (ref 74–99)
HCT VFR BLD CALC: 24.9 % (ref 34–48)
HCT VFR BLD CALC: 25 % (ref 34–48)
HEMOGLOBIN: 8 G/DL (ref 11.5–15.5)
HEMOGLOBIN: 8 G/DL (ref 11.5–15.5)
INR BLD: 2.1
LYMPHOCYTES ABSOLUTE: 0.27 E9/L (ref 1.5–4)
LYMPHOCYTES RELATIVE PERCENT: 7.8 % (ref 20–42)
MCH RBC QN AUTO: 35.1 PG (ref 26–35)
MCHC RBC AUTO-ENTMCNC: 32 % (ref 32–34.5)
MCV RBC AUTO: 109.6 FL (ref 80–99.9)
METER GLUCOSE: 201 MG/DL (ref 74–99)
METER GLUCOSE: 217 MG/DL (ref 74–99)
METER GLUCOSE: 218 MG/DL (ref 74–99)
METER GLUCOSE: 239 MG/DL (ref 74–99)
MONOCYTES ABSOLUTE: 0.14 E9/L (ref 0.1–0.95)
MONOCYTES RELATIVE PERCENT: 3.5 % (ref 2–12)
NEUTROPHILS ABSOLUTE: 2.99 E9/L (ref 1.8–7.3)
NEUTROPHILS RELATIVE PERCENT: 87.8 % (ref 43–80)
OCCULT BLOOD SCREENING: NORMAL
PDW BLD-RTO: 25.8 FL (ref 11.5–15)
PLATELET # BLD: 59 E9/L (ref 130–450)
PLATELET CONFIRMATION: NORMAL
PMV BLD AUTO: 11.7 FL (ref 7–12)
POIKILOCYTES: ABNORMAL
POTASSIUM REFLEX MAGNESIUM: 4.1 MMOL/L (ref 3.5–5)
PROTHROMBIN TIME: 23.7 SEC (ref 9.3–12.4)
RBC # BLD: 2.28 E12/L (ref 3.5–5.5)
SODIUM BLD-SCNC: 136 MMOL/L (ref 132–146)
TARGET CELLS: ABNORMAL
TOTAL PROTEIN: 5.3 G/DL (ref 6.4–8.3)
VANCOMYCIN TROUGH: 17.4 MCG/ML (ref 5–16)
WBC # BLD: 3.4 E9/L (ref 4.5–11.5)
WOUND/ABSCESS: NORMAL

## 2020-10-31 PROCEDURE — 2580000003 HC RX 258: Performed by: FAMILY MEDICINE

## 2020-10-31 PROCEDURE — 36591 DRAW BLOOD OFF VENOUS DEVICE: CPT

## 2020-10-31 PROCEDURE — 85018 HEMOGLOBIN: CPT

## 2020-10-31 PROCEDURE — 6360000002 HC RX W HCPCS: Performed by: SPECIALIST

## 2020-10-31 PROCEDURE — 82962 GLUCOSE BLOOD TEST: CPT

## 2020-10-31 PROCEDURE — 6370000000 HC RX 637 (ALT 250 FOR IP): Performed by: INTERNAL MEDICINE

## 2020-10-31 PROCEDURE — 6360000002 HC RX W HCPCS: Performed by: FAMILY MEDICINE

## 2020-10-31 PROCEDURE — 99232 SBSQ HOSP IP/OBS MODERATE 35: CPT | Performed by: FAMILY MEDICINE

## 2020-10-31 PROCEDURE — G0328 FECAL BLOOD SCRN IMMUNOASSAY: HCPCS

## 2020-10-31 PROCEDURE — 99233 SBSQ HOSP IP/OBS HIGH 50: CPT | Performed by: INTERNAL MEDICINE

## 2020-10-31 PROCEDURE — 6370000000 HC RX 637 (ALT 250 FOR IP): Performed by: FAMILY MEDICINE

## 2020-10-31 PROCEDURE — 2060000000 HC ICU INTERMEDIATE R&B

## 2020-10-31 PROCEDURE — 85014 HEMATOCRIT: CPT

## 2020-10-31 PROCEDURE — 6370000000 HC RX 637 (ALT 250 FOR IP): Performed by: STUDENT IN AN ORGANIZED HEALTH CARE EDUCATION/TRAINING PROGRAM

## 2020-10-31 PROCEDURE — 36415 COLL VENOUS BLD VENIPUNCTURE: CPT

## 2020-10-31 PROCEDURE — C9113 INJ PANTOPRAZOLE SODIUM, VIA: HCPCS | Performed by: FAMILY MEDICINE

## 2020-10-31 PROCEDURE — 85610 PROTHROMBIN TIME: CPT

## 2020-10-31 PROCEDURE — 80202 ASSAY OF VANCOMYCIN: CPT

## 2020-10-31 PROCEDURE — 2580000003 HC RX 258: Performed by: SPECIALIST

## 2020-10-31 PROCEDURE — 80053 COMPREHEN METABOLIC PANEL: CPT

## 2020-10-31 PROCEDURE — 6360000002 HC RX W HCPCS: Performed by: STUDENT IN AN ORGANIZED HEALTH CARE EDUCATION/TRAINING PROGRAM

## 2020-10-31 PROCEDURE — 2500000003 HC RX 250 WO HCPCS: Performed by: INTERNAL MEDICINE

## 2020-10-31 PROCEDURE — 85025 COMPLETE CBC W/AUTO DIFF WBC: CPT

## 2020-10-31 RX ORDER — DIPHENHYDRAMINE HYDROCHLORIDE 50 MG/ML
25 INJECTION INTRAMUSCULAR; INTRAVENOUS ONCE
Status: COMPLETED | OUTPATIENT
Start: 2020-10-31 | End: 2020-10-31

## 2020-10-31 RX ORDER — BUMETANIDE 0.25 MG/ML
1 INJECTION, SOLUTION INTRAMUSCULAR; INTRAVENOUS ONCE
Status: COMPLETED | OUTPATIENT
Start: 2020-10-31 | End: 2020-10-31

## 2020-10-31 RX ORDER — METOPROLOL SUCCINATE 25 MG/1
12.5 TABLET, EXTENDED RELEASE ORAL 2 TIMES DAILY
Status: DISCONTINUED | OUTPATIENT
Start: 2020-10-31 | End: 2020-11-04 | Stop reason: HOSPADM

## 2020-10-31 RX ORDER — BUMETANIDE 0.25 MG/ML
INJECTION, SOLUTION INTRAMUSCULAR; INTRAVENOUS
Status: DISPENSED
Start: 2020-10-31 | End: 2020-10-31

## 2020-10-31 RX ADMIN — VANCOMYCIN HYDROCHLORIDE 1000 MG: 1 INJECTION, POWDER, LYOPHILIZED, FOR SOLUTION INTRAVENOUS at 11:31

## 2020-10-31 RX ADMIN — HEPARIN 100 UNITS: 100 SYRINGE at 09:51

## 2020-10-31 RX ADMIN — METOPROLOL SUCCINATE 12.5 MG: 25 TABLET, EXTENDED RELEASE ORAL at 09:52

## 2020-10-31 RX ADMIN — OXYCODONE 5 MG: 5 TABLET ORAL at 06:19

## 2020-10-31 RX ADMIN — INSULIN LISPRO 2 UNITS: 100 INJECTION, SOLUTION INTRAVENOUS; SUBCUTANEOUS at 17:44

## 2020-10-31 RX ADMIN — SODIUM BICARBONATE 650 MG: 650 TABLET ORAL at 21:00

## 2020-10-31 RX ADMIN — SODIUM CHLORIDE, PRESERVATIVE FREE 10 ML: 5 INJECTION INTRAVENOUS at 09:51

## 2020-10-31 RX ADMIN — SKIN PROTECTANT: 44 OINTMENT TOPICAL at 21:04

## 2020-10-31 RX ADMIN — MAGNESIUM GLUCONATE 500 MG ORAL TABLET 400 MG: 500 TABLET ORAL at 09:52

## 2020-10-31 RX ADMIN — SODIUM CHLORIDE, PRESERVATIVE FREE 10 ML: 5 INJECTION INTRAVENOUS at 09:53

## 2020-10-31 RX ADMIN — OXYCODONE 5 MG: 5 TABLET ORAL at 21:03

## 2020-10-31 RX ADMIN — SODIUM CHLORIDE, PRESERVATIVE FREE 10 ML: 5 INJECTION INTRAVENOUS at 21:00

## 2020-10-31 RX ADMIN — DIPHENHYDRAMINE HYDROCHLORIDE 25 MG: 50 INJECTION, SOLUTION INTRAMUSCULAR; INTRAVENOUS at 01:18

## 2020-10-31 RX ADMIN — INSULIN GLARGINE 10 UNITS: 100 INJECTION, SOLUTION SUBCUTANEOUS at 09:53

## 2020-10-31 RX ADMIN — SODIUM BICARBONATE 650 MG: 650 TABLET ORAL at 09:51

## 2020-10-31 RX ADMIN — OXYCODONE 5 MG: 5 TABLET ORAL at 12:36

## 2020-10-31 RX ADMIN — INSULIN LISPRO 2 UNITS: 100 INJECTION, SOLUTION INTRAVENOUS; SUBCUTANEOUS at 12:36

## 2020-10-31 RX ADMIN — SODIUM CHLORIDE, PRESERVATIVE FREE 10 ML: 5 INJECTION INTRAVENOUS at 16:59

## 2020-10-31 RX ADMIN — DOCUSATE SODIUM 100 MG: 100 CAPSULE, LIQUID FILLED ORAL at 09:52

## 2020-10-31 RX ADMIN — OXYCODONE 5 MG: 5 TABLET ORAL at 00:02

## 2020-10-31 RX ADMIN — SKIN PROTECTANT: 44 OINTMENT TOPICAL at 09:53

## 2020-10-31 RX ADMIN — FOLIC ACID 1 MG: 1 TABLET ORAL at 09:52

## 2020-10-31 RX ADMIN — BUMETANIDE 1 MG: 0.25 INJECTION INTRAMUSCULAR; INTRAVENOUS at 09:51

## 2020-10-31 RX ADMIN — CEFTRIAXONE SODIUM 1 G: 1 INJECTION, POWDER, FOR SOLUTION INTRAMUSCULAR; INTRAVENOUS at 16:49

## 2020-10-31 RX ADMIN — SODIUM CHLORIDE, PRESERVATIVE FREE 10 ML: 5 INJECTION INTRAVENOUS at 11:32

## 2020-10-31 RX ADMIN — SODIUM CHLORIDE, PRESERVATIVE FREE 10 ML: 5 INJECTION INTRAVENOUS at 16:49

## 2020-10-31 RX ADMIN — INSULIN LISPRO 1 UNITS: 100 INJECTION, SOLUTION INTRAVENOUS; SUBCUTANEOUS at 21:05

## 2020-10-31 RX ADMIN — PANTOPRAZOLE SODIUM 40 MG: 40 INJECTION, POWDER, FOR SOLUTION INTRAVENOUS at 09:50

## 2020-10-31 RX ADMIN — INSULIN LISPRO 2 UNITS: 100 INJECTION, SOLUTION INTRAVENOUS; SUBCUTANEOUS at 09:54

## 2020-10-31 RX ADMIN — Medication 1000 MCG: at 09:52

## 2020-10-31 RX ADMIN — METOPROLOL SUCCINATE 12.5 MG: 25 TABLET, EXTENDED RELEASE ORAL at 20:59

## 2020-10-31 ASSESSMENT — PAIN - FUNCTIONAL ASSESSMENT
PAIN_FUNCTIONAL_ASSESSMENT: PREVENTS OR INTERFERES SOME ACTIVE ACTIVITIES AND ADLS

## 2020-10-31 ASSESSMENT — PAIN DESCRIPTION - PAIN TYPE
TYPE: ACUTE PAIN
TYPE: CHRONIC PAIN
TYPE: ACUTE PAIN

## 2020-10-31 ASSESSMENT — PAIN SCALES - GENERAL
PAINLEVEL_OUTOF10: 8
PAINLEVEL_OUTOF10: 7
PAINLEVEL_OUTOF10: 3
PAINLEVEL_OUTOF10: 6
PAINLEVEL_OUTOF10: 8
PAINLEVEL_OUTOF10: 9
PAINLEVEL_OUTOF10: 0
PAINLEVEL_OUTOF10: 7

## 2020-10-31 ASSESSMENT — PAIN DESCRIPTION - ORIENTATION
ORIENTATION: RIGHT;LEFT

## 2020-10-31 ASSESSMENT — PAIN DESCRIPTION - ONSET
ONSET: ON-GOING

## 2020-10-31 ASSESSMENT — PAIN DESCRIPTION - PROGRESSION
CLINICAL_PROGRESSION: NOT CHANGED

## 2020-10-31 ASSESSMENT — PAIN DESCRIPTION - DESCRIPTORS
DESCRIPTORS: ACHING;CONSTANT;DISCOMFORT;SORE
DESCRIPTORS: ACHING;CONSTANT;DISCOMFORT;SORE
DESCRIPTORS: ACHING;DISCOMFORT
DESCRIPTORS: ACHING;DISCOMFORT;SORE;CONSTANT
DESCRIPTORS: ACHING;CONSTANT;DISCOMFORT;SORE

## 2020-10-31 ASSESSMENT — PAIN DESCRIPTION - FREQUENCY
FREQUENCY: CONTINUOUS

## 2020-10-31 ASSESSMENT — PAIN DESCRIPTION - LOCATION
LOCATION: BACK;HIP
LOCATION: GENERALIZED

## 2020-10-31 NOTE — PROGRESS NOTES
[facility] - [facility] Inpatient   Resident Progress Note    S:  Hospital day: 4   Brief Synopsis: Kaur Rico is a 76 y.o. female with pmhx of metastatic breast cancer, heart failure last EF 50 to 55%, type 2 diabetes requiring insulin, chronic DVT, CKD stage III who presents to ED for Fall. States she fell d/t dizziness, was on the floor for 4 hours. She denied any chest pain or shortness of breath or palpitations prior to. States that she did hit her head, did not lose consciousness. Is on Coumadin chronically for chronic DVT. Patient reporting no complaints or pain in the ER. Of note, patient recently seen in the ER for weakness, was transfused and discharged home in stable condition. Urinalysis was positive for UTI. She was also found to have KARISHMA on her CKD, b/l decubitus ulcers, and she was hypotensive. Pt admitted and started on vanc and zosyn, ID, dietary, palliative, SW were consulted. Doing well today, tolerated debridement yesterday. Denies chest pain or shortness of breath. Tolerating diet. Some blood noted in tony this morning.          Cont meds:    dextrose       Scheduled meds:    bumetanide  1 mg Intravenous Once    metoprolol succinate  12.5 mg Oral BID    sodium chloride  20 mL Intravenous Once    folic acid  1 mg Oral Daily    warfarin  1 mg Oral Every Other Day    cefTRIAXone (ROCEPHIN) IV  1 g Intravenous Q24H    mineral oil-hydrophilic petrolatum   Topical BID    heparin flush  100 Units Intracatheter Daily    insulin glargine  10 Units Subcutaneous Daily    insulin lispro  0-6 Units Subcutaneous TID WC    cyanocobalamin  1,000 mcg Oral Daily    docusate sodium  100 mg Oral Daily    insulin lispro  0-3 Units Subcutaneous Nightly    magnesium oxide  400 mg Oral Daily    sodium bicarbonate  650 mg Oral BID    sodium chloride flush  10 mL Intravenous 2 times per day    pantoprazole  40 mg Intravenous Daily    vancomycin  1,000 mg Intravenous Q36H     PRN meds: mineral oil-hydrophilic petrolatum **AND** mineral oil-hydrophilic petrolatum, heparin flush, perflutren lipid microspheres, sodium chloride flush, polyethylene glycol, oxyCODONE, glucose, dextrose, glucagon (rDNA), dextrose     I reviewed the patient's past medical and surgical history, Medications and Allergies. O:  BP (!) 115/51   Pulse 86   Temp 97 °F (36.1 °C) (Temporal)   Resp 18   Ht 5' 4\" (1.626 m)   Wt 171 lb (77.6 kg) Comment: taken from 10/19 documentation  SpO2 98%   BMI 29.35 kg/m²   24 hour I&O: I/O last 3 completed shifts: In: 471.7 [P.O.:100; I.V.:10; Blood:361.7]  Out: 1200 [Urine:1200]  I/O this shift:  In: -   Out: 300 [Urine:300]        Physical Exam   Constitutional: No distress. HENT:   Head: Normocephalic and atraumatic. Cardiovascular: Normal rate, regular rhythm and normal heart sounds. Pulmonary/Chest: Effort normal. She has wheezes in the right upper field. Abdominal: Soft. She exhibits distension. There is no abdominal tenderness. Mild distension   Musculoskeletal:         General: Edema present. Comments: 3+ pitting edema BLE   Neurological: She is alert. Skin: There is pallor. Labs:  Na/K/Cl/CO2:  136/4.1/105/22 (10/31 1758)  BUN/Cr/glu/ALT/AST/amyl/lip:  63/1.7/--/45/31/--/-- (10/31 0515)  WBC/Hgb/Hct/Plts:  3.4/8.0/25.0/59 (10/31 0515)  estimated creatinine clearance is 29 mL/min (A) (based on SCr of 1.7 mg/dL (H)). Other pertinent labs as noted below    Radiology:  XR CHEST (2 VW)   Final Result   Increasing vascular congestion versus mild pulmonary edema. MRI BRAIN WO CONTRAST   Final Result   1. No acute intracranial abnormality. 2. No mass effect, edema or hemorrhage. CT HEAD WO CONTRAST   Final Result   No acute intracranial abnormality. CT CERVICAL SPINE WO CONTRAST   Final Result   No acute cervical spine fracture or malalignment. Degenerative changes of the cervical spine.          CT CHEST WO CONTRAST Final Result   1. No acute traumatic injury in the chest, abdomen or pelvis. 2. Unknown history of probable malignancy with progression of findings from   prior CT imaging. This includes infiltrative lesion in the liver, omental   thickening, ascites and mesenteric edema, mediastinal lymph node enlargement   and right axillary lymph node enlargement. Sclerotic changes in the ribs and   pelvis also of potential concern for metastasis. 3. Bilateral pleural effusions with interstitial changes developing in the   lungs. A nodular density along the right minor fissure may represent   loculated pleural fluid or a pulmonary nodule. 4. Recommend correlation with history and any prior treatment for malignancy. CT ABDOMEN PELVIS WO CONTRAST Additional Contrast? None   Final Result   1. No acute traumatic injury in the chest, abdomen or pelvis. 2. Unknown history of probable malignancy with progression of findings from   prior CT imaging. This includes infiltrative lesion in the liver, omental   thickening, ascites and mesenteric edema, mediastinal lymph node enlargement   and right axillary lymph node enlargement. Sclerotic changes in the ribs and   pelvis also of potential concern for metastasis. 3. Bilateral pleural effusions with interstitial changes developing in the   lungs. A nodular density along the right minor fissure may represent   loculated pleural fluid or a pulmonary nodule. 4. Recommend correlation with history and any prior treatment for malignancy. XR CHEST PORTABLE   Final Result   1. Stable exam.  Persistent elevation of the right hemidiaphragm and small   right pleural effusion. Coarse interstitial markings are unchanged. 2.  Atherosclerotic disease, cardiomegaly, mild central pulmonary vascular   congestion, and postsurgical changes consistent with prior CABG. 3.  Unchanged complete left humeral neck fracture.              A/P:  Active Problems:    Metastatic breast cancer ([facility]    Type 2 diabetes mellitus with stage 3b chronic kidney disease, with long-term current use of insulin (HCC)    Type 2 diabetes mellitus without complication, with long-term current use of insulin (HCC)    Anemia of chronic disease    Chronic deep vein thrombosis (DVT) of proximal vein of right lower extremity (HCC)    Cardiomyopathy ([facility]    Decubitus ulcer    Dehydration    Acute kidney injury superimposed on CKD ([facility]    Acute cystitis with hematuria    Fall    General weakness    Typical atrial flutter (HCC)    Carcinoma of female breast ([facility]  Resolved Problems:    * No resolved hospital problems.  *    Hypotension, improving  2/2 dehydration vs ?sepsis  Hold metoprolol and isosorbide  In light of UTI and decubitus ulcer, blood cultures obtained, 1 out of 4 bottles grew strep viridens  Monitor fluid status closely in light of BNP     UTI  UA positive for leuk esterase, nitrates, bacteria  Patient reports no symptoms, afebrile, blood pressure borderline  Urine cx growing Proteus mirabilis  On vanc and rocephin per ID, appreciate ongoing recs  Pharmacy to dose Vanco     Decubitus ulcers  Chronic issue, left buttock with erythema and some drainage  Patient afebrile, blood pressure borderline  Continue vanc, blood cultures obtained, positive for strep  ID following, added rocephin  Wound culture- results pending, preliminary results showed mixed GP and GN spp  Wound care consult  Tolerated debridement yesterday      KARISHMA on CKD  Likely prerenal given low blood pressure on admission  Creatinine 1.8, baseline appears to be 1.5-1.6, Cr 1.7 today   Fluids stopped due to volume overload    Hypervolemia  Fluids dc'd  Got one dose IV bumex 0.5 yesterday        Chronic encephalopathy  Reported in PCPs note, MRI ordered without contrast for increased encephalopathy and tremor  Patient somewhat groggy during exam, however was oriented x4 and answering most questions appropriately  Tremor noted in hands  Check ammonia: slightly elevated, 61  Bedside swallow successful, diet ordered  MRI brain done, negative for masses, bleeds     Fall  Deconditioning  Patient with increasing dizziness and weakness resulting in fall, no loss of consciousness  Borderline blood pressure in the ER  Check orthostatics, started on fluids  PT/OT/social work for disposition and discharge planning     Elevated troponin  Likely due to chronic kidney disease, troponin 0 0.04 on admission, appears to be around baseline  Reports no chest pain or shortness of breath, EKG unchanged    Metastatic breast cancer  With known mets to liver, LFTs elevated  Has had worsening encephalopathy and tremor, MRI brain showed no acute change     Anemia of chronic disease  Hemoglobin 8.2 on admission  6.8 today, consider transfusion       Chronic DVT on Coumadin  With recent fall, CT head negative for acute process  INR hemolyzed in the ER  Restart coumadin, 1 mg QOD- held for decreased plt and hematuria, resume when able  INR 2.1 today    GI/DVT ppx: protonix  Diet: general  Antibiotics: Vancomycin day 5, Rocephin day 3      Electronically signed by Lady Geller  PGY-2 on 10/31/2020 at 6:47 AM     This case was discussed with attending physician: Dr. Marlin Rodriguez

## 2020-10-31 NOTE — PROGRESS NOTES
[address] [place] [address] [facility]  [facility]  Progress Note      Chief Complaint   Patient presents with    Fall     pt fell earlier d/t dizziness, was on the floor for 4 hours       SUBJECTIVE:      Patient is tolerating medications. No reported adverse drug reactions. No problems overnight. Review of systems:    As stated above in the chief complaint, otherwise negative. Medications:    Scheduled Meds:   bumetanide        metoprolol succinate  12.5 mg Oral BID    sodium chloride  20 mL Intravenous Once    folic acid  1 mg Oral Daily    cefTRIAXone (ROCEPHIN) IV  1 g Intravenous Q24H    mineral oil-hydrophilic petrolatum   Topical BID    heparin flush  100 Units Intracatheter Daily    insulin glargine  10 Units Subcutaneous Daily    insulin lispro  0-6 Units Subcutaneous TID WC    cyanocobalamin  1,000 mcg Oral Daily    docusate sodium  100 mg Oral Daily    insulin lispro  0-3 Units Subcutaneous Nightly    magnesium oxide  400 mg Oral Daily    sodium bicarbonate  650 mg Oral BID    sodium chloride flush  10 mL Intravenous 2 times per day    pantoprazole  40 mg Intravenous Daily    vancomycin  1,000 mg Intravenous Q36H     Continuous Infusions:   dextrose       PRN Meds:mineral oil-hydrophilic petrolatum **AND** mineral oil-hydrophilic petrolatum, heparin flush, perflutren lipid microspheres, sodium chloride flush, polyethylene glycol, oxyCODONE, glucose, dextrose, glucagon (rDNA), dextrose  Prior to Admission medications    Medication Sig Start Date End Date Taking?  Authorizing Provider   omeprazole (PRILOSEC) 20 MG delayed release capsule Take 20 mg by mouth daily   Yes Historical Provider, MD   bumetanide (BUMEX) 1 MG tablet Take 1 mg by mouth daily   Yes Historical Provider, MD   palbociclib (IBRANCE) 100 MG tablet Take 100 mg by mouth daily Given three weeks on and one week off   Yes Historical Provider, MD   Calcium Citrate-Vitamin D (CITRACAL PETITES/VITAMIN D) 200-250 MG-UNIT TABS Take 1 tablet by mouth 2 times daily   Yes Historical Provider, MD   cyanocobalamin 1000 MCG tablet Take 1,000 mcg by mouth daily   Yes Historical Provider, MD   nitroGLYCERIN (NITROSTAT) 0.4 MG SL tablet Place 0.4 mg under the tongue every 5 minutes as needed for Chest pain   Yes Historical Provider, MD   warfarin (COUMADIN) 1 MG tablet Take 1 mg by mouth nightly    Yes Historical Provider, MD   sodium bicarbonate 650 MG tablet Take 1 tablet by mouth 2 times daily 20  Yes Laya Epperson DO   allopurinol (ZYLOPRIM) 100 MG tablet Take 1 tablet by mouth daily 20  Yes Laya Epperson DO   magnesium oxide (MAG-OX) 400 MG tablet Take 1 tablet by mouth daily 20  Yes Laya Epperson DO   insulin glargine (LANTUS SOLOSTAR) 100 UNIT/ML injection pen Inject 10 Units into the skin daily 20  Yes Josette Gentile MD   lidocaine (LIDODERM) 5 % Apply 1 patch topically every 24 hours  20  Yes Historical Provider, MD   metoprolol succinate (TOPROL XL) 25 MG extended release tablet Take 0.5 tablets by mouth daily 20  Yes Wyatt Calderon MD   insulin lispro (HUMALOG) 100 UNIT/ML injection vial Inject 0-3 Units into the skin nightly 7/15/20  Yes Wyatt Calderon MD   docusate (COLACE, DULCOLAX) 100 MG CAPS Take 100 mg by mouth daily 20  Yes Laya Epperson DO   isosorbide dinitrate (ISORDIL) 5 MG tablet Take 1 tablet by mouth 3 times daily 20  Yes Tang Rodrigues MD   albuterol sulfate HFA (VENTOLIN HFA) 108 (90 Base) MCG/ACT inhaler Inhale 2 puffs into the lungs every 6 hours as needed for Wheezing    Historical Provider, MD       OBJECTIVE:  BP (!) 95/54   Pulse 82   Temp 98.5 °F (36.9 °C) (Oral)   Resp 16   Ht 5' 4\" (1.626 m)   Wt 171 lb (77.6 kg) Comment: taken from 10/19 documentation  SpO2 98%   BMI 29.35 kg/m²   Temp  Av.9 °F (36.6 °C)  Min: 97 °F (36.1 °C)  Max: 98.5 °F (36.9 °C)  General appearance: Resting in bed in no apparent distress.   Skin: Warm and dry. No rashes were noted. HEENT: Round and reactive pupils. Moist mucous membranes. No ulcerations or thrush. Neck: Supple to movements. Chest: No use of accessory muscles to breathe. Symmetrical expansion. No wheezing, crackles or rhonchi. Cardiovascular: Reguar rate and rhythm. No murmurs gallops, or rubs appreciated. Abdomen: Bowel sounds present, nontender, nondistended, no masses or hepatosplenomegaly. Extremities: No clubbing, no cyanosis, no edema. Lines: peripheral    I/O last 3 completed shifts: In: 471.7 [P.O.:100;  I.V.:10; Blood:361.7]  Out: 1000 [Urine:1000]      Laboratory and Tests Review:      Lab Results   Component Value Date    WBC 3.4 (L) 10/31/2020    WBC 3.3 (L) 10/30/2020    WBC 4.2 (L) 10/29/2020    HGB 8.0 (L) 10/31/2020    HCT 25.0 (L) 10/31/2020    .6 (H) 10/31/2020    PLT 59 (L) 10/31/2020     Lab Results   Component Value Date    NEUTROABS 2.99 10/31/2020    NEUTROABS 2.66 10/30/2020    NEUTROABS 3.57 10/29/2020     No results found for: [facility]  Lab Results   Component Value Date    ALT 45 (H) 10/31/2020    AST 31 10/31/2020    ALKPHOS 132 (H) 10/31/2020    BILITOT 0.9 10/31/2020     Lab Results   Component Value Date     10/31/2020    K 4.1 10/31/2020     10/31/2020    CO2 22 10/31/2020    BUN 63 10/31/2020    CREATININE 1.7 10/31/2020    CREATININE 1.8 10/30/2020    CREATININE 2.0 10/29/2020    GFRAA 35 10/31/2020    LABGLOM 29 10/31/2020    GLUCOSE 199 10/31/2020    GLUCOSE 109 04/20/2012    PROT 5.3 10/31/2020    LABALBU 2.1 10/31/2020    LABALBU 4.4 03/25/2012    CALCIUM 7.5 10/31/2020    BILITOT 0.9 10/31/2020    ALKPHOS 132 10/31/2020    AST 31 10/31/2020    ALT 45 10/31/2020     Lab Results   Component Value Date    CRP 4.9 (H) 07/29/2020    CRP 16.2 (H) 07/07/2020    CRP 4.2 (H) 06/19/2020     Lab Results   Component Value Date    SEDRATE 90 (H) 07/29/2020    SEDRATE 134 (H) 07/07/2020    SEDRATE 102 (H) 06/21/2020       Radiology:    XR CHEST (2 prior treatment for malignancy. XR CHEST PORTABLE   Final Result   1. Stable exam.  Persistent elevation of the right hemidiaphragm and small   right pleural effusion. Coarse interstitial markings are unchanged. 2.  Atherosclerotic disease, cardiomegaly, mild central pulmonary vascular   congestion, and postsurgical changes consistent with prior CABG. 3.  Unchanged complete left humeral neck fracture. Microbiology:   Lab Results   Component Value Date    BC 24 Hours no growth 10/27/2020    BC 24 Hours no growth 10/26/2020    BC 5 Days no growth 09/25/2020    ORG Proteus mirabilis 10/27/2020    ORG Enterococcus faecalis 10/27/2020    ORG Streptococcus viridans group 10/26/2020     Lab Results   Component Value Date    BLOODCULT2 24 Hours no growth 10/28/2020    BLOODCULT2  10/26/2020     Gram stain performed from blood culture bottle media  Gram positive cocci in chains  evaluating for alpha hemolytic strep      BLOODCULT2  10/26/2020     This organism is only isolated from one set. Susceptibility testing is not routinely performed. Additional testing can be ordered by calling the  Microbiology Department. Additional blood cultures may be obtained if  clinical suspicion of septicemia is high. ORG Proteus mirabilis 10/27/2020    ORG Enterococcus faecalis 10/27/2020    ORG Streptococcus viridans group 10/26/2020     WOUND/ABSCESS   Date Value Ref Range Status   10/27/2020   Final    Mixed marck isolated. Further workup and sensitivity testing  is not routinely indicated and will not be performed. Mixed marck isolated includes:  Nonhemolytic Strep species  Coagulase negative Staph species  Mixed Gram negative rods  Proteus species     10/27/2020   Final    Mixed marck isolated. Further workup and sensitivity testing  is not routinely indicated and will not be performed.   Mixed marck isolated includes:  Mixed Gram negative rods  Proteus species  Nonhemolytic Strep species  Corynebacteria     07/29/2020   Final    Light growth  Methicillin resistant Staph aureus isolated. Most Methicillin  resistant Staphylococcus are usually resistant to multiple  antibiotics including other B-Lactams, Aminoglycosides,  Macrolides, Clindamycin and Tetracycline. Contact isolation  is indicated. 07/29/2020   Final    Rare growth  Refer to previous wound culture (Leg, Right) collected  same date/time for susceptibility results     07/29/2020 Rare growth  Final     No results found for: RESPSMEAR  No results found for: MPNEUMO, CLAMYDCU, LABLEGI, AFBCX, FUNGSM, LABFUNG  No results found for: CULTRESP  No results found for: CXCATHTIP  Body Fluid Culture, Sterile   Date Value Ref Range Status   07/25/2019 Growth not present  Final     No results found for: CXSURG  Urine Culture, Routine   Date Value Ref Range Status   10/27/2020 >100,000 CFU/ml  Final   10/27/2020 >100,000 CFU/ml  Final   07/07/2020 Growth not present  Final     MRSA Culture Only   Date Value Ref Range Status   08/09/2019 Methicillin resistant Staph aureus not isolated  Final       Problem list:    Active Problems:    Metastatic breast cancer ([facility]    Type 2 diabetes mellitus with stage 3b chronic kidney disease, with long-term current use of insulin ([address]    Type 2 diabetes mellitus without complication, with long-term current use of insulin (HCC)    Anemia of chronic disease    Chronic deep vein thrombosis (DVT) of proximal vein of right lower extremity (HCC)    Cardiomyopathy ([address]    Decubitus ulcer    Dehydration    Acute kidney injury superimposed on CKD ([address]    Acute cystitis with hematuria    Fall    General weakness    Typical atrial flutter ([address]    Carcinoma of female breast ([facility]  Resolved Problems:    * No resolved hospital problems.  *      ASSESSMENT:  Pancytopenia   Unchanged   Chronic ischial and sacral decubiti improved although they do need debridement   Alpha strep virdians bacteremia possibly from the

## 2020-10-31 NOTE — PROGRESS NOTES
[address] [facility] Attending    S: 76 y.o. female with metastatic breast cancer with liver mets and ascites; DM; CAD; chronic left humerus fracture; chronic bilateral pressure wounds of lateral hips, present on admission; chronic and recurrent DVT of RLE on coumadin; history of sarcoidosis; history of nephrolithiasis; bilateral LE lymphedema. Had fall at home, found on the ground. Today, she reports no new symptoms or concerns. She has had BM with blood noted (stable and chronic per patient; has hemorrhoidal bleeding, has had this evaluated in the past). Also has had persistent hematuria. Tolerated debridement yesterday, pain controlled overall. Patient wishes to continue taking Ibrance, her home medication, and understands risks and adverse reactions. She has her home medication in the hospital with her, as it is not on formulary. O: VS- Blood pressure (!) 95/54, pulse 82, temperature 98.5 °F (36.9 °C), temperature source Oral, resp. rate 16, height 5' 4\" (1.626 m), weight 171 lb (77.6 kg), SpO2 98 %, not currently breastfeeding. Exam is as noted by resident with the following changes, additions or corrections:  Gen NAD, pale, stable. Awake and alert; oriented throughout discussion. CV Currently regular, systolic murmur   Resp decreased breath sounds, few crackles bibasilar, unlabored   Abd diffusely tender, firm but softer today   Ext edema and stasis changes, wounds are dressed, protective padding in place     Impressions:    Active Problems:    Metastatic breast cancer ([facility]    Type 2 diabetes mellitus with stage 3b chronic kidney disease, with long-term current use of insulin (HCC)    Type 2 diabetes mellitus without complication, with long-term current use of insulin (HCC)    Anemia of chronic disease    Chronic deep vein thrombosis (DVT) of proximal vein of right lower extremity (HCC)    Cardiomyopathy ([address]    Decubitus ulcer    Dehydration    Acute kidney injury superimposed on CKD ([facility]    Acute cystitis with hematuria    Fall    General weakness    Typical atrial flutter (HCC)    Carcinoma of female breast ([facility]  Resolved Problems:    * No resolved hospital problems. *      Plan:   ID management appreciated. Antibiotics as per ID guidance. Urine with Proteus growth; one blood culture with Strep viridans. Encephalopathy, possibly infectious/metabolic, now resolved, back to baseline. Pain control with caution. Follow closely. Dental soft diet. Nutrition evaluation appreciated; support nutrition and hydration, following closely. Wound care for ulcerations. General surgery management and wound care appreciated. Disposition planning; patient agreeable to placement. Cardiology input appreciated. Hold coumadin for now; PLT lower today, and ongoing sources of blood loss. Watch symptoms closely, follow platelet counts. Watch UO and hydration status off of fluids. Nutrition support. Attending Physician Statement  I have reviewed the chart and seen the patient with the resident(s). I personally reviewed images, EKG's and similar tests, if present. I personally reviewed and performed key elements of the history and exam.  I have reviewed and confirmed student and/or resident history and exam with changes as indicated above. I agree with the assessment, plan and orders as documented by the resident. Please refer to the resident and/or student note for additional information.       Socrates Silvestre

## 2020-10-31 NOTE — PROGRESS NOTES
Patient's tony catheter tubing noted to have an increased amount of bloody urine with some clots. Manually irrigated catheter with some resistance. Irrigated x2 times. Tony draining clear pink at this time.

## 2020-10-31 NOTE — PROGRESS NOTES
Pharmacy Consultation Note  (Antibiotic Dosing and Monitoring)    Initial consult date: 10/27/2020  Consulting physician: Dr Enzo Bence  Drug(s): Vancomycin  Indication: Strep bacteremia    Ht Readings from Last 1 Encounters:   10/29/20 5' 4\" (1.626 m)     Wt Readings from Last 1 Encounters:   10/27/20 171 lb (77.6 kg)       Age/  Gender Actual BW IBW Adj BW  Allergy Information   76 y.o. female 77.6 kg 54.7 kg 63.9 kg  Macrobid [nitrofurantoin monohydrate macrocrystals]               Date  WBC BUN/CR Drug/Dose Time   Given Level(s)   (Time) Comments   10/27/20  Day #1 3.6  (10/26) 68/2 Vancomycin 1500 mg IV x1 dose 0155  Procalcitonin 2.82  Lactic acid 2.4   10/28  #2 3.6 68/2 Vancomycin 1000 mg IV q36h 0859  Lactic acid 2.1   10/29  #3 4.2 66/2 Vancomycin 1000 mg IV q36h 2030  Lactic acid 2.1   10/30  #4 3.3 60/1.8 Vancomycin 1000 mg IV q36h --     10/31  #5 3.4 63/1.7 Vancomycin 1000 mg IV q36h 1131 Vanco trough 17.4 at 0955      Estimated Creatinine Clearance: 29 mL/min (A) (based on SCr of 1.7 mg/dL (H)).       Intake/Output Summary (Last 24 hours) at 10/31/2020 1158  Last data filed at 10/31/2020 0622  Gross per 24 hour   Intake 461.67 ml   Output 1000 ml   Net -538.33 ml     Urine output over the last 24 hours: 0.5 mL/kg/hr (1000 mL total)     Diuretics ordered in the last 24 hours: N/A      Temp max: Temp (24hrs), Av.9 °F (36.6 °C), Min:97 °F (36.1 °C), Max:98.5 °F (36.9 °C)      Cultures:  available culture and sensitivity results were reviewed in [facility]  10/26 Blood cx's - 1/ Strep viridans group  10/26 Urine cx - >100,000 col Proteus mirabilis (S pending);    >100,000 col gram positive cocci (ID/S pending)  10/26 Wound cx (L hip) - Growth present, evaluating for Gram positive organisms; Proteus sp  10/27 Wound cx (R hip) -  Growth present, evaluating for mixed Gram neg rods, Proteus sp,       Mixed Gram positive organisms   10/27 Blood cx - Prelim no growth  10/28 Blood cx's - Prelim no growth  10/28

## 2020-10-31 NOTE — PROGRESS NOTES
Patient assisted to commode. Patient had bowel movement and when standing up a large amount of bright red blood was coming from patient's large hemorrhoids. Will continue to monitor.

## 2020-10-31 NOTE — PROGRESS NOTES
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    Diabetic polyneuropathy associated with type 2 diabetes mellitus (HCC)    Leg swelling    History of deep vein thrombosis    Chronic venous insufficiency    Lymphedema of both lower extremities    Ambulatory dysfunction    CKD (chronic kidney disease) stage 3, GFR 30-59 ml/min    Charcot's joint of foot in type 2 diabetes mellitus ([address]    Ascites    Palliative care encounter    Cancer associated pain    HAP (hospital-acquired pneumonia)    Acute systolic heart failure [facility] [address]    Cardiomyopathy ([facility] [address]    Status post coronary artery bypass graft    Class 2 severe obesity due to excess calories with serious comorbidity and body mass index (BMI) of 35.0 to 35.9 in adult [facility])    Type 2 diabetes mellitus with hyperglycemia (HCC)    Acute hypercapnic respiratory failure (HCC)    Altered mental status    Goals of care, counseling/discussion    Palliative care by specialist    Metastatic disease ([facility] [address]    Moderate protein-calorie malnutrition (HCC)    Precordial pain    Chronic systolic heart failure (HCC)    Pleural effusion    Cellulitis and abscess of leg    Bony metastasis (HCC) left leg    Decubitus ulcer    Left leg pain    Degenerative disc disease at L5-S1 level    KARISHMA (acute kidney injury) (HCC)    Stasis ulcer (HCC)    Dehydration    Acute kidney injury superimposed on CKD ([address]    Acute cystitis with hematuria    Fall    General weakness    Typical atrial flutter (HCC)    Carcinoma of female breast ([facility] [address]       Allergies:   Allergies   Allergen Reactions    Macrobid [Nitrofurantoin Monohydrate Macrocrystals] Hives Current Medications:  Current Facility-Administered Medications   Medication Dose Route Frequency Provider Last Rate Last Dose    bumetanide (BUMEX) injection 1 mg  1 mg Intravenous Once Nya Carroll MD        metoprolol succinate (TOPROL XL) extended release tablet 12.5 mg  12.5 mg Oral BID Nya Carroll MD        0.9 % sodium chloride bolus  20 mL Intravenous Once Ciera Kahn MD        folic acid (FOLVITE) tablet 1 mg  1 mg Oral Daily Susana Navas MD        warfarin (COUMADIN) tablet 1 mg  1 mg Oral Every Other Day Susana Navas MD   1 mg at 10/29/20 1710    cefTRIAXone (ROCEPHIN) 1 g in sterile water 10 mL IV syringe  1 g Intravenous Q24H Nabeel Cordoab MD   1 g at 10/30/20 1726    mineral oil-hydrophilic petrolatum (HYDROPHOR) ointment   Topical BID Cipriano Maurice MD        And    mineral oil-hydrophilic petrolatum (HYDROPHOR) ointment   Topical TID PRN Cipriano Maurice MD        heparin flush 100 UNIT/ML injection 100 Units  100 Units Intracatheter Daily Junnie Leah, DO   100 Units at 10/30/20 0837    heparin flush 100 UNIT/ML injection 100 Units  100 Units Intracatheter PRN Junnie Leah, DO        insulin glargine (LANTUS) injection vial 10 Units  10 Units Subcutaneous Daily Carolina A Rech, DO   10 Units at 10/30/20 0851    insulin lispro (HUMALOG) injection vial 0-6 Units  0-6 Units Subcutaneous TID WC Carolina A Rech, DO   3 Units at 10/28/20 1733    perflutren lipid microspheres (DEFINITY) injection 1.65 mg  1.5 mL Intravenous ONCE PRN Jordi Smith MD        vitamin B-12 (CYANOCOBALAMIN) tablet 1,000 mcg  1,000 mcg Oral Daily Hayder Pendleton MD   1,000 mcg at 10/29/20 0816    docusate sodium (COLACE) capsule 100 mg  100 mg Oral Daily Hayder Pendleton MD   100 mg at 10/29/20 0816    insulin lispro (HUMALOG) injection vial 0-3 Units  0-3 Units Subcutaneous Nightly Hayder Pendleton MD   1 Units at 10/30/20 2112    magnesium oxide (MAG-OX) tablet 400 mg  400 mg Oral Daily Julianna Ramirez MD   400 mg at 10/29/20 0816    sodium bicarbonate tablet 650 mg  650 mg Oral BID Julianna Ramirez MD   650 mg at 10/30/20 2111    sodium chloride flush 0.9 % injection 10 mL  10 mL Intravenous 2 times per day Julianna Ramirez MD   10 mL at 10/30/20 2111    sodium chloride flush 0.9 % injection 10 mL  10 mL Intravenous PRN Julianna Ramirez MD   10 mL at 10/28/20 1822    polyethylene glycol (GLYCOLAX) packet 17 g  17 g Oral Daily PRN Julianna Ramirez MD        pantoprazole (PROTONIX) injection 40 mg  40 mg Intravenous Daily Julianna Ramirez MD   40 mg at 10/29/20 0816    vancomycin 1000 mg IVPB in 250 mL D5W addavial  1,000 mg Intravenous Q36H Julianna Ramirez MD   Stopped at 10/29/20 2130    oxyCODONE (ROXICODONE) immediate release tablet 5 mg  5 mg Oral Q6H PRN Geanie Benjamin, DO   5 mg at 10/31/20 5529    glucose (GLUTOSE) 40 % oral gel 15 g  15 g Oral PRN Navjot Robinsons, DO        dextrose 50 % IV solution  12.5 g Intravenous PRN Navjot Robinsons, DO        glucagon (rDNA) injection 1 mg  1 mg Intramuscular PRN Navjot Robinsons, DO        dextrose 5 % solution  100 mL/hr Intravenous PRN Navjot Robinsons, DO          dextrose         Physical Exam:  BP (!) 115/51   Pulse 86   Temp 97 °F (36.1 °C) (Temporal)   Resp 18   Ht 5' 4\" (1.626 m)   Wt 171 lb (77.6 kg) Comment: taken from 10/19 documentation  SpO2 98%   BMI 29.35 kg/m²   Weight change: Wt Readings from Last 3 Encounters:   10/27/20 171 lb (77.6 kg)   10/19/20 171 lb (77.6 kg)   09/28/20 175 lb (79.4 kg)     The patient is awake, alert and in no discomfort or distress. She continues to appear chronically ill and severely debilitated. No gross musculoskeletal deformity is present. No significant skin or nail changes are present. Gross examination of head, eyes, nose and throat are negative. Jugular venous pressure is normal and no carotid bruits are present. Normal respiratory effort is noted with no accessory muscle usage present.  Lung fields are clear to ascultation with poor inspiratory effort and diminished breath sounds in both lung bases. Cardiac examination is notable for a regular rate and rhythm with no palpable thrill. No gallop rhythm a soft systolic murmur at the left sternal border are identified. A benign abdominal examination is present with no masses or organomegaly. Intact pulses are present throughout all extremities and chronic lymphedema is present. No focal neurologic deficits are present. Intake/Output:    Intake/Output Summary (Last 24 hours) at 10/31/2020 0645  Last data filed at 10/31/2020 0622  Gross per 24 hour   Intake 471.67 ml   Output 1000 ml   Net -528.33 ml     I/O this shift:  In: -   Out: 300 [Urine:300]    Laboratory Tests:  Lab Results   Component Value Date    CREATININE 1.7 (H) 10/31/2020    BUN 63 (H) 10/31/2020     10/31/2020    K 4.1 10/31/2020     10/31/2020    CO2 22 10/31/2020     No results for input(s): CKTOTAL, CKMB in the last 72 hours.     Invalid input(s): TROPONONI  No results found for: BNP  Lab Results   Component Value Date    WBC 3.4 10/31/2020    RBC 2.28 10/31/2020    HGB 8.0 10/31/2020    HCT 25.0 10/31/2020    .6 10/31/2020    MCH 35.1 10/31/2020    MCHC 32.0 10/31/2020    RDW 25.8 10/31/2020    PLT 59 10/31/2020    MPV 11.7 10/31/2020     Recent Labs     10/29/20  0455 10/30/20  0415 10/31/20  0515   ALKPHOS 143* 145* 132*   ALT 48* 47* 45*   AST 30 33* 31   PROT 6.1* 5.1* 5.3*   BILITOT 0.8 0.7 0.9   LABALBU 2.5* 2.2* 2.1*     Lab Results   Component Value Date    MG 2.2 10/27/2020     Lab Results   Component Value Date    PROTIME 23.7 10/31/2020    PROTIME 79.1 10/19/2020    INR 2.1 10/31/2020     Lab Results   Component Value Date    TSH 5.300 10/27/2020     No components found for: CHLPL  Lab Results   Component Value Date    TRIG 104 06/10/2019    TRIG 84 11/30/2018    TRIG 117 05/16/2018     Lab Results   Component Value Date    HDL 31 06/10/2019    HDL 37 11/30/2018 HDL 32 05/16/2018     Lab Results   Component Value Date    LDLCALC 28 06/10/2019    LDLCALC 37 11/30/2018    LDLCALC 44 05/16/2018       Cardiac Tests:  Telemetry findings reviewed: sinus rhythm with recurrent paroxysmal atrial fibrillation/flutter and heart rates of approximately 120-130 bpm, no new tachy/bradyarrhythmias overnight      ASSESSMENT / PLAN: On a clinical basis, the patient remains asymptomatic from a cardiovascular standpoint in spite of a recurrent episode of paroxysmal atrial arrhythmias. She tolerated initial dosing of beta-blocker therapy with plans for dose modification and careful monitoring of blood pressures. Hemoglobin levels have stabilized posttransfusion with a persistent prerenal azotemia and stable renal function and electrolytes. Therapeutic anticoagulation is presently noted with persistent thrombocytopenia and need of careful monitoring with the potential needs of withholding of anticoagulation if persistent reduce risk of further bleeding albeit in the face of a significant embolic risk and ongoing needs of maintenance of prothrombin times ideally in the range of 2.0-2.5 times INR control. Continued fluid mobilization will be necessary with continued careful monitoring of her volume status and needs of nutritional support to further assist fluid mobilization as well as ongoing monitoring of her renal function and electrolytes. Additional management of noncardiovascular issues will be deferred to the infectious disease and surgical services. Continued aggressive risk factor modification blood pressure, diabetes and serum lipids and attempt to reduce risk of future atherosclerotic development. In the face of her multisystem disease, her prognosis remains guarded. Note: This report was completed utilizing computer voice recognition software. Every effort has been made to ensure accuracy, however; inadvertent computerized transcription errors may be present. Theron Gaston. Erick Rocha, [id number] [facility]

## 2020-11-01 ENCOUNTER — APPOINTMENT (OUTPATIENT)
Dept: GENERAL RADIOLOGY | Age: 75
DRG: 981 | End: 2020-11-01
Payer: COMMERCIAL

## 2020-11-01 LAB
ALBUMIN SERPL-MCNC: 2.1 G/DL (ref 3.5–5.2)
ALP BLD-CCNC: 148 U/L (ref 35–104)
ALT SERPL-CCNC: 48 U/L (ref 0–32)
ANION GAP SERPL CALCULATED.3IONS-SCNC: 10 MMOL/L (ref 7–16)
ANION GAP SERPL CALCULATED.3IONS-SCNC: 11 MMOL/L (ref 7–16)
ANISOCYTOSIS: ABNORMAL
AST SERPL-CCNC: 39 U/L (ref 0–31)
BASOPHILS ABSOLUTE: 0 E9/L (ref 0–0.2)
BASOPHILS RELATIVE PERCENT: 0.6 % (ref 0–2)
BILIRUB SERPL-MCNC: 0.7 MG/DL (ref 0–1.2)
BLOOD CULTURE, ROUTINE: NORMAL
BUN BLDV-MCNC: 66 MG/DL (ref 8–23)
BUN BLDV-MCNC: 69 MG/DL (ref 8–23)
CALCIUM SERPL-MCNC: 7.4 MG/DL (ref 8.6–10.2)
CALCIUM SERPL-MCNC: 7.7 MG/DL (ref 8.6–10.2)
CHLORIDE BLD-SCNC: 103 MMOL/L (ref 98–107)
CHLORIDE BLD-SCNC: 105 MMOL/L (ref 98–107)
CO2: 21 MMOL/L (ref 22–29)
CO2: 23 MMOL/L (ref 22–29)
CREAT SERPL-MCNC: 1.8 MG/DL (ref 0.5–1)
CREAT SERPL-MCNC: 1.9 MG/DL (ref 0.5–1)
CULTURE, BLOOD 2: ABNORMAL
EOSINOPHILS ABSOLUTE: 0.14 E9/L (ref 0.05–0.5)
EOSINOPHILS RELATIVE PERCENT: 4.4 % (ref 0–6)
GFR AFRICAN AMERICAN: 31
GFR AFRICAN AMERICAN: 33
GFR NON-AFRICAN AMERICAN: 26 ML/MIN/1.73
GFR NON-AFRICAN AMERICAN: 27 ML/MIN/1.73
GLUCOSE BLD-MCNC: 170 MG/DL (ref 74–99)
GLUCOSE BLD-MCNC: 221 MG/DL (ref 74–99)
HCT VFR BLD CALC: 25.3 % (ref 34–48)
HEMOGLOBIN: 8.2 G/DL (ref 11.5–15.5)
INR BLD: 2
LYMPHOCYTES ABSOLUTE: 0.22 E9/L (ref 1.5–4)
LYMPHOCYTES RELATIVE PERCENT: 7 % (ref 20–42)
MCH RBC QN AUTO: 35.5 PG (ref 26–35)
MCHC RBC AUTO-ENTMCNC: 32.4 % (ref 32–34.5)
MCV RBC AUTO: 109.5 FL (ref 80–99.9)
METER GLUCOSE: 178 MG/DL (ref 74–99)
METER GLUCOSE: 223 MG/DL (ref 74–99)
METER GLUCOSE: 239 MG/DL (ref 74–99)
MONOCYTES ABSOLUTE: 0.16 E9/L (ref 0.1–0.95)
MONOCYTES RELATIVE PERCENT: 5.3 % (ref 2–12)
NEUTROPHILS ABSOLUTE: 2.57 E9/L (ref 1.8–7.3)
NEUTROPHILS RELATIVE PERCENT: 83.3 % (ref 43–80)
ORGANISM: ABNORMAL
OVALOCYTES: ABNORMAL
PDW BLD-RTO: 25 FL (ref 11.5–15)
PLATELET # BLD: 66 E9/L (ref 130–450)
PLATELET CONFIRMATION: NORMAL
PMV BLD AUTO: 11.6 FL (ref 7–12)
POIKILOCYTES: ABNORMAL
POTASSIUM REFLEX MAGNESIUM: 5.1 MMOL/L (ref 3.5–5)
POTASSIUM SERPL-SCNC: 4.8 MMOL/L (ref 3.5–5)
PROTHROMBIN TIME: 23.1 SEC (ref 9.3–12.4)
RBC # BLD: 2.31 E12/L (ref 3.5–5.5)
SODIUM BLD-SCNC: 136 MMOL/L (ref 132–146)
SODIUM BLD-SCNC: 137 MMOL/L (ref 132–146)
TARGET CELLS: ABNORMAL
TOTAL PROTEIN: 5.4 G/DL (ref 6.4–8.3)
WBC # BLD: 3.1 E9/L (ref 4.5–11.5)

## 2020-11-01 PROCEDURE — 6370000000 HC RX 637 (ALT 250 FOR IP): Performed by: FAMILY MEDICINE

## 2020-11-01 PROCEDURE — C9113 INJ PANTOPRAZOLE SODIUM, VIA: HCPCS | Performed by: FAMILY MEDICINE

## 2020-11-01 PROCEDURE — 6360000002 HC RX W HCPCS: Performed by: INTERNAL MEDICINE

## 2020-11-01 PROCEDURE — 36415 COLL VENOUS BLD VENIPUNCTURE: CPT

## 2020-11-01 PROCEDURE — 71045 X-RAY EXAM CHEST 1 VIEW: CPT

## 2020-11-01 PROCEDURE — 36591 DRAW BLOOD OFF VENOUS DEVICE: CPT

## 2020-11-01 PROCEDURE — 6370000000 HC RX 637 (ALT 250 FOR IP): Performed by: STUDENT IN AN ORGANIZED HEALTH CARE EDUCATION/TRAINING PROGRAM

## 2020-11-01 PROCEDURE — 2500000003 HC RX 250 WO HCPCS: Performed by: INTERNAL MEDICINE

## 2020-11-01 PROCEDURE — 2580000003 HC RX 258: Performed by: SPECIALIST

## 2020-11-01 PROCEDURE — 2060000000 HC ICU INTERMEDIATE R&B

## 2020-11-01 PROCEDURE — 6360000002 HC RX W HCPCS: Performed by: SPECIALIST

## 2020-11-01 PROCEDURE — 80053 COMPREHEN METABOLIC PANEL: CPT

## 2020-11-01 PROCEDURE — 99233 SBSQ HOSP IP/OBS HIGH 50: CPT | Performed by: INTERNAL MEDICINE

## 2020-11-01 PROCEDURE — 6370000000 HC RX 637 (ALT 250 FOR IP): Performed by: INTERNAL MEDICINE

## 2020-11-01 PROCEDURE — 97535 SELF CARE MNGMENT TRAINING: CPT

## 2020-11-01 PROCEDURE — 99232 SBSQ HOSP IP/OBS MODERATE 35: CPT | Performed by: FAMILY MEDICINE

## 2020-11-01 PROCEDURE — 2580000003 HC RX 258: Performed by: FAMILY MEDICINE

## 2020-11-01 PROCEDURE — 80048 BASIC METABOLIC PNL TOTAL CA: CPT

## 2020-11-01 PROCEDURE — 97530 THERAPEUTIC ACTIVITIES: CPT

## 2020-11-01 PROCEDURE — 85025 COMPLETE CBC W/AUTO DIFF WBC: CPT

## 2020-11-01 PROCEDURE — 6360000002 HC RX W HCPCS: Performed by: FAMILY MEDICINE

## 2020-11-01 PROCEDURE — 82962 GLUCOSE BLOOD TEST: CPT

## 2020-11-01 PROCEDURE — 85610 PROTHROMBIN TIME: CPT

## 2020-11-01 PROCEDURE — P9047 ALBUMIN (HUMAN), 25%, 50ML: HCPCS | Performed by: INTERNAL MEDICINE

## 2020-11-01 RX ORDER — DOCUSATE SODIUM 100 MG/1
100 CAPSULE, LIQUID FILLED ORAL 2 TIMES DAILY
Status: DISCONTINUED | OUTPATIENT
Start: 2020-11-01 | End: 2020-11-04 | Stop reason: HOSPADM

## 2020-11-01 RX ORDER — BUMETANIDE 0.25 MG/ML
1 INJECTION, SOLUTION INTRAMUSCULAR; INTRAVENOUS EVERY 8 HOURS SCHEDULED
Status: DISCONTINUED | OUTPATIENT
Start: 2020-11-01 | End: 2020-11-03

## 2020-11-01 RX ORDER — ALBUMIN (HUMAN) 12.5 G/50ML
25 SOLUTION INTRAVENOUS EVERY 8 HOURS
Status: COMPLETED | OUTPATIENT
Start: 2020-11-01 | End: 2020-11-03

## 2020-11-01 RX ADMIN — ALBUMIN (HUMAN) 25 G: 0.25 INJECTION, SOLUTION INTRAVENOUS at 14:46

## 2020-11-01 RX ADMIN — INSULIN LISPRO 1 UNITS: 100 INJECTION, SOLUTION INTRAVENOUS; SUBCUTANEOUS at 20:50

## 2020-11-01 RX ADMIN — OXYCODONE 5 MG: 5 TABLET ORAL at 22:02

## 2020-11-01 RX ADMIN — SODIUM BICARBONATE 650 MG: 650 TABLET ORAL at 20:47

## 2020-11-01 RX ADMIN — MAGNESIUM GLUCONATE 500 MG ORAL TABLET 400 MG: 500 TABLET ORAL at 10:17

## 2020-11-01 RX ADMIN — Medication 1000 MCG: at 10:17

## 2020-11-01 RX ADMIN — BUMETANIDE 1 MG: 0.25 INJECTION INTRAMUSCULAR; INTRAVENOUS at 15:52

## 2020-11-01 RX ADMIN — SKIN PROTECTANT: 44 OINTMENT TOPICAL at 10:19

## 2020-11-01 RX ADMIN — INSULIN GLARGINE 10 UNITS: 100 INJECTION, SOLUTION SUBCUTANEOUS at 10:14

## 2020-11-01 RX ADMIN — OXYCODONE 5 MG: 5 TABLET ORAL at 05:18

## 2020-11-01 RX ADMIN — INSULIN LISPRO 2 UNITS: 100 INJECTION, SOLUTION INTRAVENOUS; SUBCUTANEOUS at 13:14

## 2020-11-01 RX ADMIN — DOCUSATE SODIUM 100 MG: 100 CAPSULE, LIQUID FILLED ORAL at 10:19

## 2020-11-01 RX ADMIN — INSULIN LISPRO 2 UNITS: 100 INJECTION, SOLUTION INTRAVENOUS; SUBCUTANEOUS at 17:54

## 2020-11-01 RX ADMIN — PANTOPRAZOLE SODIUM 40 MG: 40 INJECTION, POWDER, FOR SOLUTION INTRAVENOUS at 10:18

## 2020-11-01 RX ADMIN — METOPROLOL SUCCINATE 12.5 MG: 25 TABLET, EXTENDED RELEASE ORAL at 10:18

## 2020-11-01 RX ADMIN — METOPROLOL SUCCINATE 12.5 MG: 25 TABLET, EXTENDED RELEASE ORAL at 20:46

## 2020-11-01 RX ADMIN — SODIUM CHLORIDE, PRESERVATIVE FREE 10 ML: 5 INJECTION INTRAVENOUS at 10:20

## 2020-11-01 RX ADMIN — SODIUM CHLORIDE, PRESERVATIVE FREE 10 ML: 5 INJECTION INTRAVENOUS at 15:52

## 2020-11-01 RX ADMIN — DOCUSATE SODIUM 100 MG: 100 CAPSULE, LIQUID FILLED ORAL at 20:47

## 2020-11-01 RX ADMIN — BUMETANIDE 1 MG: 0.25 INJECTION INTRAMUSCULAR; INTRAVENOUS at 21:40

## 2020-11-01 RX ADMIN — FOLIC ACID 1 MG: 1 TABLET ORAL at 10:17

## 2020-11-01 RX ADMIN — CEFTRIAXONE SODIUM 1 G: 1 INJECTION, POWDER, FOR SOLUTION INTRAMUSCULAR; INTRAVENOUS at 15:52

## 2020-11-01 RX ADMIN — SKIN PROTECTANT: 44 OINTMENT TOPICAL at 20:47

## 2020-11-01 RX ADMIN — SODIUM BICARBONATE 650 MG: 650 TABLET ORAL at 10:17

## 2020-11-01 RX ADMIN — ALBUMIN (HUMAN) 25 G: 0.25 INJECTION, SOLUTION INTRAVENOUS at 20:46

## 2020-11-01 RX ADMIN — VANCOMYCIN HYDROCHLORIDE 1000 MG: 1 INJECTION, POWDER, LYOPHILIZED, FOR SOLUTION INTRAVENOUS at 21:42

## 2020-11-01 RX ADMIN — OXYCODONE 5 MG: 5 TABLET ORAL at 14:52

## 2020-11-01 RX ADMIN — SODIUM CHLORIDE, PRESERVATIVE FREE 10 ML: 5 INJECTION INTRAVENOUS at 14:47

## 2020-11-01 RX ADMIN — INSULIN LISPRO 1 UNITS: 100 INJECTION, SOLUTION INTRAVENOUS; SUBCUTANEOUS at 10:13

## 2020-11-01 RX ADMIN — SODIUM CHLORIDE, PRESERVATIVE FREE 10 ML: 5 INJECTION INTRAVENOUS at 10:18

## 2020-11-01 RX ADMIN — SODIUM CHLORIDE, PRESERVATIVE FREE 10 ML: 5 INJECTION INTRAVENOUS at 20:47

## 2020-11-01 ASSESSMENT — PAIN DESCRIPTION - PROGRESSION
CLINICAL_PROGRESSION: NOT CHANGED
CLINICAL_PROGRESSION: NOT CHANGED

## 2020-11-01 ASSESSMENT — PAIN DESCRIPTION - ONSET
ONSET: ON-GOING

## 2020-11-01 ASSESSMENT — PAIN DESCRIPTION - LOCATION
LOCATION: HIP

## 2020-11-01 ASSESSMENT — PAIN DESCRIPTION - PAIN TYPE
TYPE: CHRONIC PAIN

## 2020-11-01 ASSESSMENT — PAIN DESCRIPTION - FREQUENCY
FREQUENCY: CONTINUOUS

## 2020-11-01 ASSESSMENT — PAIN DESCRIPTION - ORIENTATION
ORIENTATION: RIGHT;LEFT

## 2020-11-01 ASSESSMENT — PAIN SCALES - GENERAL
PAINLEVEL_OUTOF10: 9
PAINLEVEL_OUTOF10: 4
PAINLEVEL_OUTOF10: 8
PAINLEVEL_OUTOF10: 8
PAINLEVEL_OUTOF10: 0
PAINLEVEL_OUTOF10: 4
PAINLEVEL_OUTOF10: 0

## 2020-11-01 ASSESSMENT — PAIN DESCRIPTION - DESCRIPTORS
DESCRIPTORS: ACHING;CONSTANT;DISCOMFORT;SORE
DESCRIPTORS: ACHING;CONSTANT;DISCOMFORT
DESCRIPTORS: ACHING;CONSTANT;DISCOMFORT;SORE

## 2020-11-01 NOTE — CONSULTS
Nephrology Consult Note  Patient's Name: Neftali Maldonado  12:23 PM  11/1/2020        Reason for Consult: Chronic kidney disease  Requesting Physician:  Vidal Cerna DO    Chief Complaint: Fall  History Obtained From: Patient; EHR    History of Present Ilness:    Neftali Maldonado is a 76 y.o. female with a history of chronic kidney disease stage IV (baseline creatinine 1.5-2), breast cancer with bone mets on active chemotherapy, CAD, CHF, type II DM, and several other medical problems. Presents to ED with complaints of a fall. She states that she has been feeling dizzy for some time now. On the day of admission she had a fall and could not support herself to get up. She thought she was on the floor for a prolonged 2 to 3 hours. EMS was called and she was subsequently brought to ED to be evaluated. In the ED her vitals were unremarkable. Laboratory data was significant for a creatinine of 2.1, blood glucose of 221, hemoglobin 8.2 and BNP in excess of 34,000. Potassium level was also noted to be 3. Blood cultures obtained at the time showed strep viridans. She is currently being followed by ID and currently is on ceftriaxone. Of note patient reports a 30 pound weight gain over the course of the past month or so with increasing lower extremity edema extending to the thighs and lower abdomen. She however denies shortness of breath. No cough. No fever or chills reported. Renal is consulted for her CKD. Past Medical History:   Diagnosis Date    Abscess of abdominal wall     Anemia     Iron deficiency and chronic disease.  Anesthesia     DIFFICULTY WAKING UP    Arthritis     Arthritis, hip     Breast cancer ([facility] [address] 2005    S/P Left Lumpectomy with Lymph Node Dissection, Chemo/Rad. Follows with Dr. Hamlet Juares. No recurrence to date. Surgeon was Dr. Leighann Crowder.  CAD (coronary artery disease) 5/2008    s/p CABG. Follows with [address] [place] [address].     CHF (congestive heart failure) (HCC)     Chronic venous insufficiency 11/7/2018    Class 2 severe obesity due to excess calories with serious comorbidity and body mass index (BMI) of 35.0 to 35.9 in adult [facility]) 8/1/2019    Decreased dorsalis pedis pulse 11/7/2018    Degenerative disc disease at L5-S1 level 7/10/2020    Diabetic neuropathy (HCC)     Diabetic neuropathy (HCC)     DVT (deep venous thrombosis) (HCC)     Recurrent. On lifelong coumadin.  DVT of upper extremity (deep vein thrombosis) ([address] 4/18/2012    History of deep vein thrombosis 11/7/2018    Humerus fracture     Chronic, on the Left.  Hyperlipidemia 8/29/2011    Hypertension     Left leg pain 7/9/2020    Leg swelling 11/7/2018    Lymph node enlargement     Lymphedema of both lower extremities 11/7/2018    Lymphopenia 7/9/2020    MI (myocardial infarction) ([address]     Nephrolithiasis     Follows with Dr. Lady Minor.  Pericardial effusion     Pulmonary nodule     With mediastinal lymphadenopathy. Stable. Follows with Dr. Michelle Reeder.     Rib lesion 11/28/11    expansile lesion in one of the right ribs laterally    Sarcoidosis 07/26/11    per transbronchial needle aspiration    Subclavian vein occlusion, bilateral ([address] 4/18/2012    Type II or unspecified type diabetes mellitus without mention of complication, not stated as uncontrolled        Past Surgical History:   Procedure Laterality Date    APPENDECTOMY      ARTERIAL BYPASS SURGRY      BREAST LUMPECTOMY  9/27/2005    LEFT    CARDIAC SURGERY      CHOLECYSTECTOMY      COLONOSCOPY  12/2007    cecal arteriovenous malformation with hyperplastic polyps    COLONOSCOPY  [id number]    CORONARY ARTERY BYPASS GRAFT  05/2008    triple bypass    ECHO COMPL W DOP COLOR FLOW  10/30/2013         EYE SURGERY      clarissa cataracts    HYSTERECTOMY  1975, 1985    MAYNOR prolapse, benign conditions; BSO later for scar tissues, no CA.      OTHER SURGICAL HISTORY  11/18/11    port removal right chest wall    PARACENTESIS      TONSILLECTOMY      TOOTH EXTRACTION      Full Dental Extraction.  TUNNELED VENOUS PORT PLACEMENT      removal of port Dec. 2011- Dr. Abdi Agarwal TUNNELED [address]  [id number]    RIGHT CHEST       Family History   Adopted: Yes        reports that she has never smoked. She has never used smokeless tobacco. She reports that she does not drink alcohol or use drugs. Allergies:  Macrobid [nitrofurantoin monohydrate macrocrystals]    Current Medications:    docusate sodium (COLACE) capsule 100 mg, BID  palbociclib (IBRANCE) tablet TABS 100 mg, Daily  metoprolol succinate (TOPROL XL) extended release tablet 12.5 mg, BID  0.9 % sodium chloride bolus, Once  folic acid (FOLVITE) tablet 1 mg, Daily  cefTRIAXone (ROCEPHIN) 1 g in sterile water 10 mL IV syringe, Q24H  mineral oil-hydrophilic petrolatum (HYDROPHOR) ointment, BID    And  mineral oil-hydrophilic petrolatum (HYDROPHOR) ointment, TID PRN  heparin flush 100 UNIT/ML injection 100 Units, Daily  heparin flush 100 UNIT/ML injection 100 Units, PRN  insulin glargine (LANTUS) injection vial 10 Units, Daily  insulin lispro (HUMALOG) injection vial 0-6 Units, TID WC  perflutren lipid microspheres (DEFINITY) injection 1.65 mg, ONCE PRN  vitamin B-12 (CYANOCOBALAMIN) tablet 1,000 mcg, Daily  insulin lispro (HUMALOG) injection vial 0-3 Units, Nightly  magnesium oxide (MAG-OX) tablet 400 mg, Daily  sodium bicarbonate tablet 650 mg, BID  sodium chloride flush 0.9 % injection 10 mL, 2 times per day  sodium chloride flush 0.9 % injection 10 mL, PRN  polyethylene glycol (GLYCOLAX) packet 17 g, Daily PRN  pantoprazole (PROTONIX) injection 40 mg, Daily  vancomycin 1000 mg IVPB in 250 mL D5W addavial, Q36H  oxyCODONE (ROXICODONE) immediate release tablet 5 mg, Q6H PRN  glucose (GLUTOSE) 40 % oral gel 15 g, PRN  dextrose 50 % IV solution, PRN  glucagon (rDNA) injection 1 mg, PRN  dextrose 5 % solution, PRN        Review of Systems:   Pertinent items are noted in HPI.     Physical exam:  Vitals: 11/01/20 0850   BP: (!) 101/47   Pulse: 80   Resp: 16   Temp: 97.9 °F (36.6 °C)   SpO2: 95%           General: No distress. Alert. Eyes: PERRL. No sclera icterus. No conjunctival injection. ENT: No discharge. Pharynx clear. Neck: Trachea midline. Normal thyroid. Lungs: Decreased breath sounds in both bases  CV: Regular rate. Regular rhythm. No murmur or rub. .   Abd: Distended, soft, moderate ascites  Skin: Warm and dry. No nodule on exposed extremities. No rash on exposed extremities. Ext: 3+ pitting bilateral lower extremity edema to both thighs  Neuro: Awake. Follows commands. Positive pupils/gag/corneals. Normal pain response. Data:   Labs:  Lab Results   Component Value Date     11/01/2020     10/31/2020     10/30/2020    K 5.1 (H) 11/01/2020    K 4.1 10/31/2020    K 4.4 10/30/2020     11/01/2020    CO2 21 (L) 11/01/2020    CO2 22 10/31/2020    CO2 22 10/30/2020    CREATININE 1.8 (H) 11/01/2020    CREATININE 1.7 (H) 10/31/2020    CREATININE 1.8 (H) 10/30/2020    BUN 66 (H) 11/01/2020    BUN 63 (H) 10/31/2020    BUN 60 (H) 10/30/2020    GLUCOSE 170 (H) 11/01/2020    GLUCOSE 199 (H) 10/31/2020    GLUCOSE 161 (H) 10/30/2020    PHOS 4.6 (H) 08/07/2020    PHOS 3.7 08/05/2020    PHOS 4.2 08/03/2020    WBC 3.1 (L) 11/01/2020    WBC 3.4 (L) 10/31/2020    WBC 3.3 (L) 10/30/2020    HGB 8.2 (L) 11/01/2020    HGB 8.0 (L) 10/31/2020    HGB 8.0 (L) 10/31/2020    HCT 25.3 (L) 11/01/2020    HCT 24.9 (L) 10/31/2020    HCT 25.0 (L) 10/31/2020    .5 (H) 11/01/2020    PLT 66 (L) 11/01/2020         Imaging:  Ct Abdomen Pelvis Wo Contrast Additional Contrast? None    Result Date: 10/27/2020  EXAMINATION: CT OF THE ABDOMEN AND PELVIS WITHOUT CONTRAST; CT OF THE CHEST WITHOUT CONTRAST 10/27/2020 12:15 am TECHNIQUE: CT of the chest, abdomen and pelvis was performed without the administration of intravenous contrast. Multiplanar reformatted images are provided for review.  Dose modulation, iterative reconstruction, and/or weight based adjustment of the mA/kV was utilized to reduce the radiation dose to as low as reasonably achievable. COMPARISON: 06/19/2019 and 08/08/2019 CT studies HISTORY: ORDERING SYSTEM PROVIDED HISTORY: trauma TECHNOLOGIST PROVIDED HISTORY: Reason for exam:->trauma Additional Contrast?->None What reading provider will be dictating this exam?->CRC; ORDERING SYSTEM PROVIDED HISTORY: trauma TECHNOLOGIST PROVIDED HISTORY: Reason for exam:->trauma What reading provider will be dictating this exam?->CRC FINDINGS: Chest: Mediastinum: The heart is enlarged. Evidence of prior CABG. Aorta demonstrates advanced atherosclerotic calcification with no aneurysm. There is dilation of the main pulmonary arteries suggesting underlying pulmonary hypertension. Mediastinal lymphadenopathy is present. Index precarinal node measures 12 mm. Lungs/pleura: The airways are patent. Small pleural effusions are present bilaterally. Diffuse reticular and ground-glass opacities in the lungs that have progressed from prior imaging. Several pulmonary nodules observed on prior CT are obscured on current exam.  2.6 x 2.2 cm oval-shaped nodule along the right major fissure demonstrates fluid attenuation and may represent loculated fluid. A low-density nodule can not be excluded. Soft Tissues/Bones: Degenerative change and chronic deformity of the left shoulder. No acute skeletal abnormalities within the chest.  Sclerotic change to the right posterior 10th rib. Abdomen/Pelvis: Organs: Diffuse infiltrative pattern within the liver may represent metastatic disease or underlying pattern of hepatocellular carcinoma. The gallbladder is not demonstrated. Biliary ducts normal.  Atrophy of the pancreas. Spleen appears normal.  The adrenal glands are normal.  Mild right and moderate left hydronephrosis. No evidence of nephrolithiasis. GI/Bowel: The stomach is decompressed.   Diffuse mucosal thickening is apparent. Duodenum appears normal.  Small-bowel shows no obvious abnormality. Evaluation limited due to ascites and mesenteric edema. The appendix is not definitively identified. There is moderate stool within the colon. Pelvis: The bladder is decompressed around a Maurer catheter. The uterus is not visualized. Peritoneum/Retroperitoneum: As described above, there is ascites and mesenteric edema. Ill-defined omental thickening and increased density/ossification of mental soft tissue that appears chronic. There is a small ventral hernia above the umbilicus containing peritoneal fat only. The aorta tapers normally. Bones/Soft Tissues: No acute skeletal findings throughout the abdomen or pelvis. There is heterogeneous sclerotic change of the left pelvis and ischium. 1. No acute traumatic injury in the chest, abdomen or pelvis. 2. Unknown history of probable malignancy with progression of findings from prior CT imaging. This includes infiltrative lesion in the liver, omental thickening, ascites and mesenteric edema, mediastinal lymph node enlargement and right axillary lymph node enlargement. Sclerotic changes in the ribs and pelvis also of potential concern for metastasis. 3. Bilateral pleural effusions with interstitial changes developing in the lungs. A nodular density along the right minor fissure may represent loculated pleural fluid or a pulmonary nodule. 4. Recommend correlation with history and any prior treatment for malignancy. Ct Head Wo Contrast    Result Date: 10/27/2020  EXAMINATION: CT OF THE HEAD WITHOUT CONTRAST  10/27/2020 12:15 am TECHNIQUE: CT of the head was performed without the administration of intravenous contrast. Dose modulation, iterative reconstruction, and/or weight based adjustment of the mA/kV was utilized to reduce the radiation dose to as low as reasonably achievable.  COMPARISON: 08/08/2019 CT HISTORY: ORDERING SYSTEM PROVIDED HISTORY: trauma TECHNOLOGIST PROVIDED HISTORY: Has a \"code stroke\" or \"stroke alert\" been called? ->No Reason for exam:->trauma What reading provider will be dictating this exam?->CRC FINDINGS: BRAIN/VENTRICLES: The ventricles are normal in size. The sulci are normally formed over the convexities bilaterally. No extra-axial fluid collections. No sign of recent intracranial hemorrhage. Brain parenchymal attenuation is normal. No mass lesions on this noncontrast study. ORBITS: The visualized portion of the orbits demonstrate no acute abnormality. SINUSES: The visualized paranasal sinuses and mastoid air cells demonstrate no acute abnormality. SOFT TISSUES/SKULL:  No acute abnormality of the visualized skull or soft tissues. No acute intracranial abnormality. Assessment  1. Chronic kidney disease stage IV most likely on the basis of diabetic and hypertensive nephropathy. Her current creatinine is within the range of her usual baseline  2. Strep viridans bacteremia  3. Volume overload from severe hypoalbuminemia. Her serum albumin level on average has been between 2-2.5.  4. Urinary tract infection  5. Mild hyperkalemia due to her renal failure  6. Type 2 diabetes mellitus, uncontrolled  7. Sacral decubitus ulcer    Plan  1. Trial of albumin and diuretic combination to mobilize her severe third space edema  2. Adjust all meds for level of kidney function  3. Monitor labs and urine output  4. Monitor weights  5.  No aggressive work-up planned for her CKD as she is currently at her usual baseline        Thank you Dr. Sal Zaidi DO for allowing us to participate in care of [facility], MD  12:23 PM  11/1/2020

## 2020-11-01 NOTE — PROGRESS NOTES
OT BEDSIDE TREATMENT NOTE      Date:2020  Patient Name: Caprice Rodríguez  MRN: [id number]  : 1945  [facility]     Per OT Eval:    Referring Physician:  Arcenio Chan MD     Evaluating OT:  MICHAEL Rachel Junior, OTR/L #[id number]     AM-PAC Daily Activity Raw Score:  1424  Recommended Adaptive Equipment:  TBD as pt progresses      Reason for Admission:  Pt was admitted after becoming dizzy and falling at home. Remained on the ground for ~ 4 hours     Diagnosis:     1. Acute cystitis with hematuria    2. Pressure injury of skin, unspecified injury stage, unspecified location    3. Dehydration    4. General weakness       Procedures this admission:  None      Pertinent Medical History:  Abdominal Wall Abscess, Anemia, Metastatic Breast CA w/ mets to Liver, CAD, CABG, CHF, DDD, DM, Diabetic Neuropathy, Lymphedema     Past Surgical History         Past Surgical History:   Procedure Laterality Date    APPENDECTOMY        ARTERIAL BYPASS SURGRY        BREAST LUMPECTOMY   2005     LEFT    CARDIAC SURGERY        CHOLECYSTECTOMY        COLONOSCOPY   2007     cecal arteriovenous malformation with hyperplastic polyps    COLONOSCOPY   [id number]    CORONARY ARTERY BYPASS GRAFT   2008     triple bypass    ECHO COMPL W DOP COLOR FLOW   10/30/2013          EYE SURGERY         clarissa cataracts    HYSTERECTOMY   ,      MAYNOR prolapse, benign conditions; BSO later for scar tissues, no CA.      OTHER SURGICAL HISTORY   11     port removal right chest wall    PARACENTESIS        TONSILLECTOMY        TOOTH EXTRACTION         Full Dental Extraction.  TUNNELED VENOUS PORT PLACEMENT         removal of port Dec. 2011- Dr. Gómez Dural TUNNELED VENOUS PORT PLACEMENT   [id number]     RIGHT CHEST        Precautions:  Falls  Multiple Chronic Wounds - Clarissa Hips/Buttocks/Abdomen     Home Living: Pt lives alone in a single-level apartment. Level entry, elevator access.      Bathroom setup:  Tub-Shower, Standard-height Commode   Equipment owned:  W/C, [address], Rollator, Shower Chair, [address], [employer]     Available Family Assist:  Adult Children live locally - assist PRN. Aides 3x/week to assist PRN     Prior Level of Function:  IND with ADLs, Light IADLs, Transfers and Mobility using [address] for household ambulation. Driving:  No  Occupation:  None reported     Pain Level:  no pain reported this date      Cognition: A & O x 4, pleasant & cooperative  Able to Follow Multi-step Commands w/ occasional Min VCs              Memory:  good (-)              Sequencing:  good (-)              Problem solving:  good (-)              Judgement/safety:  good (-)                Functional Assessment:    Initial Eval Status  Date: 10-28-20 Treatment Status  Date:  11/1/20 Short Term/Long Term Goals  Treatment frequency: PRN 2-4 x/week  5-7 days   Feeding Set up     Able to feed self after set up of tray  Mod Indep  With breakfast tray, pt able to cut up food  Requested pt get OOB for breakfast & sit in chair, with pt declining, returned later for therapy NA   Grooming Min A/Set up     Required Min A after set up to complete simple tasks seated - unable to tolerate ambulating to or standing at sink SBA  Seated in chair  Washing off face & hands, rinsing out mouth while seated in chair   Min A/Set up  Standing At The Sink   UB Dressing Mod A/Set up     Required Mod A to wrap garment around back seated in chair after set up     Mod A  Tony 2nd gown, good recall of technique due to limited L UE AROM, assist needed around back  Min A   LB Dressing Max A/Set up     Required Max A to don socks only despite pt ed for adaptive techs, unable to tolerate attempt to don pants  Max A  With socks seated  Limited functional reach & moderate edema in B LE Mod A   Bathing NT       Max A  Simulated   Mod A   Toileting NT        Max/Dep  Maurer present, BSC Mod A   Bed Mobility  Rolling:   Mod A  Repositioning:  Max A toward HOB in supine   Supine to Sit: Mod A   Sit to Supine: Mod A      VCs for safety   Mod A  Supine to sit cues for technique  Caution used due to multiple wounds, no shearing  Min A   Functional Transfers Sit to stand:  Min A  Stand to sit:  Min A       EOB  Min VCs/Pt ed re: safety/hand placement     Min A  Cues for hand placement & technique  SUP   Functional Mobility NT     Unable to tolerate d/t pain     Min A  Short distance with walker  Min A   Balance Sitting:      Static:  SUP EOB    Dynamic:  Close SUP EOB w/ functional ax      Standing:      Static:  Min A w/ [employer]    Dynamic:  Min A w/ functional ax/mobility w/ [employer]     Sitting: CGA/SBA  Standing: Min A  With walker      Activity Tolerance Fair  Limited by Pain, general weakness, fatigue, wounds     Tolerated Sitting:  EOB ~ 5 mins w/ functional ax      Tolerated Standing:  ~ 1-2 mins w/ functional ax w/ [employer]     Fair       Visual/  Perceptual WFL  Glasses:  None at b/s        Hearing WFL  Hearing Aids  None at b/s         Education:  Pt was educated through out treatment regarding proper technique & safety with bed mobility, functional transfers & ADL compensatory strategies during simple ADL tasks to ease tasks to improve safety & prevent falls and allow pt to return home safely. Comments: Upon arrival pt was in bed & agreeable to therapy. At end of session pt was seated in chair, waffle cushion present, all lines and tubes intact, call light within reach. · Pt has made Fair progress towards set goals. · Continue with current plan of care    Treatment Time In: 9:38            Treatment Time Out:  10:02          Treatment Charges: Mins Units   Ther Ex  40229     Manual Therapy Jermaine Aguilera [phone]     ADL/Home Mgt [id number] 24 2   Neuro Re-ed 24809     Group Therapy      Orthotic manage/training  66033     Non-Billable Time     Total Timed Treatment 24 2       Za MAJANO  [address] [place] [address], [address] [place] [address]

## 2020-11-01 NOTE — PROGRESS NOTES
Physical Therapy  Treatment Note   Name: Nickolas Eller  : 1945  MRN: [id number]    Referring Provider:  Alem Pitt MD    Date of Service: 2020    Evaluating PT:  Rebeka Crump PT, DPT [id number]    Room #:  5648/2493-J  Diagnosis:  Dehydration  PMHx/PSHx:  HTN, CAD, DM 2, Nephrolithiasis, Diabetic neuropathy, DVT, pericardial effusion, anemia, chronic humerus fx L, HLD, MI, arthritis, sarcoidosis, CHF, breast CA, chronic venous insufficiency, lymphedema, lymphopenia, DDD, L breast lumpectomy  Precautions:  Falls, bilateral hip buttock and leg wounds, confusion  Equipment Needs:  TBD at rehab    SUBJECTIVE:  Pt is questionable historian but reports the following. Pt lives alone in a 7th floor apartment with 0 stairs to enter and 0 rail. Elevator access to unit. Pt ambulated with front [address] but also uses wheelchair Ney PTA. Pt reports she has an aide that comes in 3x/week to assist with ADLs and grocery shopping. Pt reports she does not drive. OBJECTIVE:   Initial Evaluation  Date: 10/27/2020 Treatment  Date: 2020 Short Term/ Long Term   Goals   AM-PAC 6 Clicks     Was pt agreeable to Eval/treatment? yes yes    Does pt have pain? Pain at bilateral hip wound sites Pain in bilateral hip wound sites, notes it has improved some    Bed Mobility  Rolling: maxA  Supine to sit: maxA  Sit to supine: maxA  Scooting: maxA NT, pt in bedside chair at beginning and end of session. Rolling: Julisa  Supine to sit: Julisa  Sit to supine: Julisa  Scooting: Julisa   Transfers Sit to stand: Julisa  Stand to sit: Julisa  Stand pivot: NT Sit<>stand: Julisa  Stand pivot: Julisa front [address] Sit to stand: SBA  Stand to sit: SBA  Stand pivot: SBA front [address]   Ambulation    side steps at EOB d/t pt reports of feeling dizzy front [address] Julisa 20 feet with front [address] Julisa x 2 reps.   Standing rest breaks between bouts d/t light headedness/dizziness 50 feet with front [address] SBA   Stair negotiation: ascended and descended  NT NT NA   ROM BUE:  Per OT note  BLE:  WNL     Strength BUE:  Per OT note  BLE:  WNL     Balance Sitting EOB:  SBA  Dynamic Standing:  Julisa front [employer] Sitting EOB: SBA  Dynamic standing: Julisa front [employer] Sitting EOB:  Independent  Dynamic Standing:  SBA front [employer]     Pt is A & O x 4  Edema:  unremarkable      Patient education  Pt educated on role of PT intervention. Pt educated on safety in room with utilization of call light for assistance with mobility. Pt educated on importance of independent performance of therapeutic exercises for improved strength, activity tolerance, and ROM. Patient response to education:   Pt verbalized understanding Pt demonstrated skill Pt requires further education in this area   yes yes yes     ASSESSMENT:    Comments:  RN cleared pt for activity prior to session. Pt received seated in chair and agreeable to PT intervention at this time. Pt performed all functional mobility as noted above. Pt continues to present with generalized weakness and is experiencing light headedness with ambulation limiting her activity tolerance. At end of session, pt returned to beside chair and was placed on waffle cushion for pressure relief. Pt left with all needs met and call light in reach. Pt requires continued skilled PT intervention for the purposes of maximizing functional mobility and independence. Treatment:  Patient practiced and was instructed in the following treatment:     Therapeutic Activities Completed:  o Functional mobility as noted above:   - Transfer training: sit<>stand and stand pivot transfer with front [address] Cues for proper hand placement and proper sequencing with front [employer] when turning to sit. - Ambulation: 20 feet with front [employer] Julisa x 2 reps. Standing rest breaks taken between bouts. Further distances limited by reports of light headedness and dizziness.   Pt with slow nancy and decreased step length  o Skilled repositioning in seated position for comfort and waffle cushion under buttock for pressure relief from wounds. o Pt education as noted above. PLAN:    Patient is making fair progress towards established goals. Will continue with current POC.       Time in  1021  Time out  1034    Total Treatment Time  13 minutes     CPT codes:  [] Gait training 37872 0 minutes  [] Manual therapy 06933 0 minutes  [x] Therapeutic activities 81990 13 minutes  [] Therapeutic exercises 53303 0 minutes  [] Neuromuscular reeducation 71181 0 minutes    Justina Tavarez, PT, DPT  [id number]

## 2020-11-01 NOTE — PROGRESS NOTES
INPATIENT CARDIOLOGY FOLLOW-UP    Name: Dayna Lewis    Age: 76 y.o. Date of Admission: 10/26/2020  9:28 PM    Date of Service: 11/1/2020    Chief Complaint: Follow-up for coronary atherosclerosis, probable nonischemic cardiomyopathy, acute superimposed upon chronic combined systolic and diastolic heart failure, paroxysmal atrial fibrillation/flutter, carcinoma of the breast, resolving acute kidney injury, gastrointestinal hemorrhage with no associated anemia, thrombocytopenia, bacteremia, moderate obesity    Interim History: The patient denies any significant active cardiovascular symptomatology and has tolerated further modification of her beta-blocker with further control of her atrial arrhythmias documented. Continued limitations of fluid mobilization are noted with a chest x-ray pending and present stable renal function with borderline hyperkalemia. Significant nutritional deficiency persists. Her anemia and thrombocytopenia are stable. Review of Systems: The remainder of a complete multisystem review including consitutional, central nervous, respiratory, circulatory, gastrointestinal, genitourinary, endocrinologic, hematologic, musculoskeletal and psychiatric are negative.     Problem List:  Patient Active Problem List   Diagnosis    [facility] cancer ([facility] [facility]    Essential hypertension    Coronary artery disease involving native coronary artery of native heart without angina pectoris    Nephrolithiasis    CHF (congestive heart failure) (HCC)    Humerus fracture    Shoulder pain, left    B12 deficiency    Hyperlipidemia    Rib lesion    Subclavian vein occlusion, bilateral (HCC)    Pericardial effusion    MI (myocardial infarction) ([address]    Arthritis    Sarcoidosis    Lymph node enlargement    Anesthesia    Type 2 diabetes mellitus with stage 3b chronic kidney disease, with long-term current use of insulin (HCC)    Rectal bleeding    Ventral hernia    Type 2 diabetes mellitus without complication, with long-term current use of insulin (HCC)   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    Diabetic polyneuropathy associated with type 2 diabetes mellitus (HCC)    Leg swelling    History of deep vein thrombosis    Chronic venous insufficiency    Lymphedema of both lower extremities    Ambulatory dysfunction    CKD (chronic kidney disease) stage 3, GFR 30-59 ml/min    Charcot's joint of foot in type 2 diabetes mellitus ([address]    Ascites    Palliative care encounter    Cancer associated pain    HAP (hospital-acquired pneumonia)    Acute systolic heart failure ([address]    Cardiomyopathy ([address]    Status post coronary artery bypass graft    Class 2 severe obesity due to excess calories with serious comorbidity and body mass index (BMI) of 35.0 to 35.9 in [facility])    Type 2 diabetes mellitus with hyperglycemia (HCC)    Acute hypercapnic respiratory failure (HCC)    Altered mental status    Goals of care, counseling/discussion    Palliative care by specialist    Metastatic disease ([address]    Moderate protein-calorie malnutrition (HCC)    Precordial pain    Chronic systolic heart failure (HCC)    Pleural effusion    Cellulitis and abscess of leg    Bony metastasis (HCC) left leg    Decubitus ulcer    Left leg pain    Degenerative disc disease at L5-S1 level    KARISHMA (acute kidney injury) (HCC)    Stasis ulcer (HCC)    Dehydration    Acute kidney injury superimposed on CKD ([address]    Acute cystitis with hematuria    Fall    General weakness    Typical atrial flutter (HCC)    Carcinoma of female breast ([address]       Allergies:   Allergies Allergen Reactions    Macrobid [Nitrofurantoin Monohydrate Macrocrystals] Hives       Current Medications:  Current Facility-Administered Medications   Medication Dose Route Frequency Provider Last Rate Last Dose    palbociclib (IBRANCE) tablet TABS 100 mg  100 mg Oral Daily Jordi Smith MD        metoprolol succinate (TOPROL XL) extended release tablet 12.5 mg  12.5 mg Oral BID Nya Carroll MD   12.5 mg at 10/31/20 2059    0.9 % sodium chloride bolus  20 mL Intravenous Once Ciera Kahn MD        folic acid (FOLVITE) tablet 1 mg  1 mg Oral Daily Susana Navas MD   1 mg at 10/31/20 0644    cefTRIAXone (ROCEPHIN) 1 g in sterile water 10 mL IV syringe  1 g Intravenous Q24H Nabeel Cordoba MD   1 g at 10/31/20 1649    mineral oil-hydrophilic petrolatum (HYDROPHOR) ointment   Topical BID Cipriano Maurice MD        And    mineral oil-hydrophilic petrolatum (HYDROPHOR) ointment   Topical TID PRN Cipriano Maurice MD        heparin flush 100 UNIT/ML injection 100 Units  100 Units Intracatheter Daily Junnie Leah, DO   100 Units at 10/31/20 0951    heparin flush 100 UNIT/ML injection 100 Units  100 Units Intracatheter PRN Junnie Leah, DO        insulin glargine (LANTUS) injection vial 10 Units  10 Units Subcutaneous Daily Carolina A Rech, DO   10 Units at 10/31/20 0953    insulin lispro (HUMALOG) injection vial 0-6 Units  0-6 Units Subcutaneous TID WC Carolina A Rech, DO   2 Units at 10/31/20 1744    perflutren lipid microspheres (DEFINITY) injection 1.65 mg  1.5 mL Intravenous ONCE PRN Jordi Smith MD        vitamin B-12 (CYANOCOBALAMIN) tablet 1,000 mcg  1,000 mcg Oral Daily Hayder Pendleton MD   1,000 mcg at 10/31/20 2926    docusate sodium (COLACE) capsule 100 mg  100 mg Oral Daily Hayder Pendleton MD   100 mg at 10/31/20 2601    insulin lispro (HUMALOG) injection vial 0-3 Units  0-3 Units Subcutaneous Nightly Hayder Pendleton MD   1 Units at 10/31/20 2105    magnesium oxide (MAG-OX) tablet 400 mg  400 mg Oral Daily Julianna Ramirez MD   400 mg at 10/31/20 2196    sodium bicarbonate tablet 650 mg  650 mg Oral BID Julianna Ramirez MD   650 mg at 10/31/20 2100    sodium chloride flush 0.9 % injection 10 mL  10 mL Intravenous 2 times per day Julianna Ramirez MD   10 mL at 10/31/20 2100    sodium chloride flush 0.9 % injection 10 mL  10 mL Intravenous PRN Julianna Ramirez MD   10 mL at 10/31/20 1659    polyethylene glycol (GLYCOLAX) packet 17 g  17 g Oral Daily PRN Julianna Ramirez MD        pantoprazole (PROTONIX) injection 40 mg  40 mg Intravenous Daily Julianna Ramirez MD   40 mg at 10/31/20 0950    vancomycin 1000 mg IVPB in 250 mL D5W addavial  1,000 mg Intravenous Q36H Julianna Ramirez MD   Stopped at 10/31/20 1235    oxyCODONE (ROXICODONE) immediate release tablet 5 mg  5 mg Oral Q6H PRN Rodriguee Benjamin, DO   5 mg at 11/01/20 0518    glucose (GLUTOSE) 40 % oral gel 15 g  15 g Oral PRN Navjot Robinsons, DO        dextrose 50 % IV solution  12.5 g Intravenous PRN Navjot Robinsons, DO        glucagon (rDNA) injection 1 mg  1 mg Intramuscular PRN Navjot Robinsons, DO        dextrose 5 % solution  100 mL/hr Intravenous PRN Navjot Robinsons, DO          dextrose         Physical Exam:  BP (!) 97/49   Pulse 78   Temp 98.1 °F (36.7 °C) (Temporal)   Resp 18   Ht 5' 4\" (1.626 m)   Wt 171 lb (77.6 kg) Comment: taken from 10/19 documentation  SpO2 99%   BMI 29.35 kg/m²   Weight change: Wt Readings from Last 3 Encounters:   10/27/20 171 lb (77.6 kg)   10/19/20 171 lb (77.6 kg)   09/28/20 175 lb (79.4 kg)     The patient is awake, alert and in no discomfort or distress. She appears extremely debilitated and chronically ill. No gross musculoskeletal deformity is present. No significant skin or nail changes are present. Gross examination of head, eyes, nose and throat are negative with the exception of poor dentition. Jugular venous pressure is normal and no carotid bruits are present.  Normal respiratory effort is noted with no accessory muscle usage present. Lung fields are clear to ascultation with diminished breath sounds in both lung bases. Cardiac examination is notable for a regular rate and rhythm with no palpable thrill. No gallop rhythm or cardiac murmur are identified. A benign abdominal examination is present with no masses or organomegaly. Intact pulses are present throughout all extremities and chronic lymphedema is present. No focal neurologic deficits are present. Intake/Output:    Intake/Output Summary (Last 24 hours) at 11/1/2020 0706  Last data filed at 10/31/2020 2121  Gross per 24 hour   Intake 1150 ml   Output 400 ml   Net 750 ml     No intake/output data recorded. Laboratory Tests:  Lab Results   Component Value Date    CREATININE 1.8 (H) 11/01/2020    BUN 66 (H) 11/01/2020     11/01/2020    K 5.1 (H) 11/01/2020     11/01/2020    CO2 21 (L) 11/01/2020     No results for input(s): CKTOTAL, CKMB in the last 72 hours.     Invalid input(s): TROPONONI  No results found for: BNP  Lab Results   Component Value Date    WBC 3.1 11/01/2020    RBC 2.31 11/01/2020    HGB 8.2 11/01/2020    HCT 25.3 11/01/2020    .5 11/01/2020    MCH 35.5 11/01/2020    MCHC 32.4 11/01/2020    RDW 25.0 11/01/2020    PLT 66 11/01/2020    MPV 11.6 11/01/2020     Recent Labs     10/30/20  0415 10/31/20  0515 11/01/20  0548   ALKPHOS 145* 132* 148*   ALT 47* 45* 48*   AST 33* 31 39*   PROT 5.1* 5.3* 5.4*   BILITOT 0.7 0.9 0.7   LABALBU 2.2* 2.1* 2.1*     Lab Results   Component Value Date    MG 2.2 10/27/2020     Lab Results   Component Value Date    PROTIME 23.1 11/01/2020    PROTIME 79.1 10/19/2020    INR 2.0 11/01/2020     Lab Results   Component Value Date    TSH 5.300 10/27/2020     No components found for: CHLPL  Lab Results   Component Value Date    TRIG 104 06/10/2019    TRIG 84 11/30/2018    TRIG 117 05/16/2018     Lab Results   Component Value Date    HDL 31 06/10/2019    HDL 37 11/30/2018    HDL 32 05/16/2018 Lab Results   Component Value Date    LDLCALC 28 06/10/2019    [address] 37 11/30/2018    [address] 44 05/16/2018       Cardiac Tests:  Telemetry findings reviewed: sinus rhythm with transient paroxysmal atrial fibrillation, no new tachy/bradyarrhythmias overnight  Chest X-ray: pending      ASSESSMENT / PLAN: On a clinical basis, the patient remains stable from a cardiovascular standpoint with a significant reduction in her atrial arrhythmias. Her anemia presently appears stable with no active bleeding in the face of therapeutic anticoagulation and a persistent and presently stable thrombocytopenia with need of careful monitoring of her anticoagulation and goals of maintaining prothrombin times in a range of 2.0-2.5 times INR control. Continued evidence of volume overload is suggested with needs of ongoing gradual fluid mobilization assisted by nutrition. Relative hypotension will limit further attempts of optimization of medical management at the present standpoint. Additional multiple noncardiovascular issues will be deferred to primary care as well as that of the surgical service and infectious disease. Ongoing appropriate risk factor modification of blood pressure, diabetes and serum lipids will be essential to reducing risk of future atherosclerotic development. Note: This report was completed utilizing computer voice recognition software. Every effort has been made to ensure accuracy, however; inadvertent computerized transcription errors may be present. Rachelle Garsia.  [facility], [facility]

## 2020-11-01 NOTE — PROGRESS NOTES
Pharmacy Consultation Note  (Antibiotic Dosing and Monitoring)    Initial consult date: 10/27/2020  Consulting physician: Dr Addis Brown  Drug(s): Vancomycin  Indication: Strep bacteremia    Ht Readings from Last 1 Encounters:   10/29/20 5' 4\" (1.626 m)     Wt Readings from Last 1 Encounters:   10/27/20 171 lb (77.6 kg)       Age/  Gender Actual BW IBW Adj BW  Allergy Information   76 y.o. female 77.6 kg 54.7 kg 63.9 kg  Macrobid [nitrofurantoin monohydrate macrocrystals]               Date  WBC BUN/CR Drug/Dose Time   Given Level(s)   (Time) Comments   10/27/20  Day #1 3.6  (10/26) 68/2 Vancomycin 1500 mg IV x1 dose 0155  Procalcitonin 2.82  Lactic acid 2.4   10/28  #2 3.6 68/2 Vancomycin 1000 mg IV q36h 0859  Lactic acid 2.1   10/29  #3 4.2 66/2 Vancomycin 1000 mg IV q36h 2030  Lactic acid 2.1   10/30  #4 3.3 60/1.8 Vancomycin 1000 mg IV q36h --     10/31  #5 3.4 63/1.7 Vancomycin 1000 mg IV q36h 1131 Vanco trough 17.4 at 0955      #6 3.1 66/1.8 Vancomycin 1000 mg IV q36h (2100)       Estimated Creatinine Clearance: 27 mL/min (A) (based on SCr of 1.8 mg/dL (H)).       Intake/Output Summary (Last 24 hours) at 2020 8199  Last data filed at 10/31/2020 2121  Gross per 24 hour   Intake 1150 ml   Output 400 ml   Net 750 ml     Urine output over the last 24 hours: 0.5 mL/kg/hr (1000 mL total)     Diuretics ordered in the last 24 hours: N/A      Temp max: Temp (24hrs), Av.2 °F (36.8 °C), Min:97.9 °F (36.6 °C), Max:98.5 °F (36.9 °C)      Cultures:  available culture and sensitivity results were reviewed in [facility]  10/26 Blood cx's -  Strep viridans group  10/26 Urine cx - >100,000 col Proteus mirabilis (S pending);    >100,000 col gram positive cocci (ID/S pending)  10/26 Wound cx (L hip) - Growth present, evaluating for Gram positive organisms; Proteus sp  10/27 Wound cx (R hip) -  Growth present, evaluating for mixed Gram neg rods, Proteus sp,       Mixed Gram positive organisms   10/27 Blood cx - Prelim no growth  10/28 Blood cx's - Prelim no growth  10/28 COVID-19 - Not detected    IV lines:  10/26 Peripheral IV 18 g R wrist  Implanted venous port    Assessment:  · 77 yo F with metastatic breast cancer admitted with dizziness/fall  · Pt found to have Strep viridans bacteremia - Pt currently on Vancomycin day #5 and Ceftriaxone day #3  · ID following  · Consulted by Dr. Brittni Arzola to dose/monitor Vancomycin  · Goal trough level:  15-20 mcg/mL  · Pt with KARISHMA on CKD - SCr 1.8 today (baseline ~1.6). Plan:  · Continue Vancomycin 1000 mg IV q36h  · Will obtain vanco trough as needed to appropriately monitor   · Will monitor renal function closely    Will continue to follow.       Barb Luu, PharmD, BCPS 11/1/2020 8:11 AM

## 2020-11-01 NOTE — PROGRESS NOTES
[facility] Attending    S: 76 y.o. female with metastatic breast cancer with liver mets and ascites; DM; CAD; chronic left humerus fracture; chronic bilateral pressure wounds of lateral hips, present on admission; chronic and recurrent DVT of RLE on coumadin; history of sarcoidosis; history of nephrolithiasis; bilateral LE lymphedema. Had fall at home, found on the ground. 10/31/2020, she reports no new symptoms or concerns. She has had BM with blood noted (stable and chronic per patient; has hemorrhoidal bleeding, has had this evaluated in the past). Also has had persistent hematuria. Tolerated debridement yesterday, pain controlled overall. Patient wishes to continue taking Ibrance, her home medication, and understands risks and adverse reactions. She has her home medication in the hospital with her, as it is not on formulary. 11/1/2020, No new symptoms. Pain is controlled overall with oxycodone. No further BM; had been straining some but does not want a laxative. No dyspnea at rest.  Reviewing records, has been oliguric overnight. O: VS- Blood pressure (!) 101/47, pulse 80, temperature 97.9 °F (36.6 °C), temperature source Oral, resp. rate 16, height 5' 4\" (1.626 m), weight 171 lb (77.6 kg), SpO2 95 %, not currently breastfeeding. Exam is as noted by resident with the following changes, additions or corrections:  Gen NAD, pale, stable. Awake and alert; oriented throughout discussion. CV Currently regularly irregular, systolic murmur - monitor with PACs   Resp decreased breath sounds, few crackles bibasilar, unlabored   Abd diffusely tender, firm but softer today   Ext edema and stasis changes, wounds are dressed, protective padding in place     Impressions:    Active Problems:    Metastatic breast cancer ([facility] [address]    Type 2 diabetes mellitus with stage 3b chronic kidney disease, with long-term current use of insulin (HCC)    Type 2 diabetes mellitus without complication, with long-term current use of insulin (HCC)    Anemia of chronic disease    Chronic deep vein thrombosis (DVT) of proximal vein of right lower extremity (HCC)    Cardiomyopathy ([address]    Decubitus ulcer    Dehydration    Acute kidney injury superimposed on CKD ([address]    Acute cystitis with hematuria    Fall    General weakness    Typical atrial flutter (HCC)    Carcinoma of female breast ([address]  Resolved Problems:    * No resolved hospital problems. *      Plan:   ID management appreciated. Antibiotics as per ID guidance. Urine with Proteus growth; one blood culture with Strep viridans. Encephalopathy, possibly infectious/metabolic, now resolved, back to baseline. Pain control with caution. Follow closely. Dental soft diet. Nutrition evaluation appreciated; support nutrition and hydration, following closely. Wound care for ulcerations. General surgery management and wound care appreciated. Disposition planning; patient agreeable to placement, considerations for LTAC. Cardiology input appreciated. Hold coumadin for now; PLT low but stable, and ongoing sources of blood loss. Watch symptoms closely, follow platelet counts. Follow INR. Nephrology consult; oliguria. Cr is stable overall. Ongoing nutrition and hydration support. Also at risk for pulmonary edema, has generalized edema. Attending Physician Statement  I have reviewed the chart and seen the patient with the resident(s). I personally reviewed images, EKG's and similar tests, if present. I personally reviewed and performed key elements of the history and exam.  I have reviewed and confirmed student and/or resident history and exam with changes as indicated above. I agree with the assessment, plan and orders as documented by the resident. Please refer to the resident and/or student note for additional information.       Vidal Cerna

## 2020-11-01 NOTE — PROGRESS NOTES
[address] [place] [address] [facility]  Progress Note      Chief Complaint   Patient presents with    Fall     pt fell earlier d/t dizziness, was on the floor for 4 hours       SUBJECTIVE:      Patient is tolerating medications. No reported adverse drug reactions. No problems overnight. Review of systems:    As stated above in the chief complaint, otherwise negative. Medications:    Scheduled Meds:   docusate sodium  100 mg Oral BID    palbociclib  100 mg Oral Daily    metoprolol succinate  12.5 mg Oral BID    sodium chloride  20 mL Intravenous Once    folic acid  1 mg Oral Daily    cefTRIAXone (ROCEPHIN) IV  1 g Intravenous Q24H    mineral oil-hydrophilic petrolatum   Topical BID    heparin flush  100 Units Intracatheter Daily    insulin glargine  10 Units Subcutaneous Daily    insulin lispro  0-6 Units Subcutaneous TID WC    cyanocobalamin  1,000 mcg Oral Daily    insulin lispro  0-3 Units Subcutaneous Nightly    magnesium oxide  400 mg Oral Daily    sodium bicarbonate  650 mg Oral BID    sodium chloride flush  10 mL Intravenous 2 times per day    pantoprazole  40 mg Intravenous Daily    vancomycin  1,000 mg Intravenous Q36H     Continuous Infusions:   dextrose       PRN Meds:mineral oil-hydrophilic petrolatum **AND** mineral oil-hydrophilic petrolatum, heparin flush, perflutren lipid microspheres, sodium chloride flush, polyethylene glycol, oxyCODONE, glucose, dextrose, glucagon (rDNA), dextrose  Prior to Admission medications    Medication Sig Start Date End Date Taking?  Authorizing Provider   omeprazole (PRILOSEC) 20 MG delayed release capsule Take 20 mg by mouth daily   Yes Historical Provider, MD   bumetanide (BUMEX) 1 MG tablet Take 1 mg by mouth daily   Yes Historical Provider, MD   palbociclib (IBRANCE) 100 MG tablet Take 100 mg by mouth daily Given three weeks on and one week off   Yes Historical Provider, MD   Calcium Citrate-Vitamin D (CITRACAL PETITES/VITAMIN D) 200-250 MG-UNIT TABS Take 1 tablet by mouth 2 times daily   Yes Historical Provider, MD   cyanocobalamin 1000 MCG tablet Take 1,000 mcg by mouth daily   Yes Historical Provider, MD   nitroGLYCERIN (NITROSTAT) 0.4 MG SL tablet Place 0.4 mg under the tongue every 5 minutes as needed for Chest pain   Yes Historical Provider, MD   warfarin (COUMADIN) 1 MG tablet Take 1 mg by mouth nightly    Yes Historical Provider, MD   sodium bicarbonate 650 MG tablet Take 1 tablet by mouth 2 times daily 20  Yes Yari Estrella, DO   allopurinol (ZYLOPRIM) 100 MG tablet Take 1 tablet by mouth daily 20  Yes Yari Estrella DO   magnesium oxide (MAG-OX) 400 MG tablet Take 1 tablet by mouth daily 20  Yes Yari Estrella DO   insulin glargine (LANTUS SOLOSTAR) 100 UNIT/ML injection pen Inject 10 Units into the skin daily 20  Yes Fantasma Grajeda MD   lidocaine (LIDODERM) 5 % Apply 1 patch topically every 24 hours  20  Yes Historical Provider, MD   metoprolol succinate (TOPROL XL) 25 MG extended release tablet Take 0.5 tablets by mouth daily 20  Yes Montrell Ordonez MD   insulin lispro (HUMALOG) 100 UNIT/ML injection vial Inject 0-3 Units into the skin nightly 7/15/20  Yes Montrell Ordonez MD   docusate (COLACE, DULCOLAX) 100 MG CAPS Take 100 mg by mouth daily 20  Yes Yari Estrella DO   isosorbide dinitrate (ISORDIL) 5 MG tablet Take 1 tablet by mouth 3 times daily 20  Yes Yonny Mcmullen MD   albuterol sulfate HFA (VENTOLIN HFA) 108 (90 Base) MCG/ACT inhaler Inhale 2 puffs into the lungs every 6 hours as needed for Wheezing    Historical Provider, MD       OBJECTIVE:  BP (!) 101/47   Pulse 80   Temp 97.9 °F (36.6 °C) (Oral)   Resp 16   Ht 5' 4\" (1.626 m)   Wt 171 lb (77.6 kg) Comment: taken from 10/19 documentation  SpO2 95%   BMI 29.35 kg/m²   Temp  Av °F (36.7 °C)  Min: 97.9 °F (36.6 °C)  Max: 98.1 °F (36.7 °C)  General appearance: Resting in bed in no apparent distress.   Skin: Warm and dry. No rashes were noted. HEENT: Round and reactive pupils. Moist mucous membranes. No ulcerations or thrush. Neck: Supple to movements. Chest: No use of accessory muscles to breathe. Symmetrical expansion. No wheezing, crackles or rhonchi. Cardiovascular: Reguar rate and rhythm. No murmurs gallops, or rubs appreciated. Abdomen: Bowel sounds present, nontender, nondistended, no masses or hepatosplenomegaly. Extremities: No clubbing, no cyanosis, no edema. Lines: peripheral    I/O last 3 completed shifts:   In: 1150 [P.O.:900; IV Piggyback:250]  Out: 400 [Urine:400]      Laboratory and Tests Review:      Lab Results   Component Value Date    WBC 3.1 (L) 11/01/2020    WBC 3.4 (L) 10/31/2020    WBC 3.3 (L) 10/30/2020    HGB 8.2 (L) 11/01/2020    HCT 25.3 (L) 11/01/2020    .5 (H) 11/01/2020    PLT 66 (L) 11/01/2020     Lab Results   Component Value Date    NEUTROABS 2.57 11/01/2020    NEUTROABS 2.99 10/31/2020    NEUTROABS 2.66 10/30/2020     No results found for: [facility]  Lab Results   Component Value Date    ALT 48 (H) 11/01/2020    AST 39 (H) 11/01/2020    ALKPHOS 148 (H) 11/01/2020    BILITOT 0.7 11/01/2020     Lab Results   Component Value Date     11/01/2020    K 5.1 11/01/2020     11/01/2020    CO2 21 11/01/2020    BUN 66 11/01/2020    CREATININE 1.8 11/01/2020    CREATININE 1.7 10/31/2020    CREATININE 1.8 10/30/2020    GFRAA 33 11/01/2020    LABGLOM 27 11/01/2020    GLUCOSE 170 11/01/2020    GLUCOSE 109 04/20/2012    PROT 5.4 11/01/2020    LABALBU 2.1 11/01/2020    LABALBU 4.4 03/25/2012    CALCIUM 7.4 11/01/2020    BILITOT 0.7 11/01/2020    ALKPHOS 148 11/01/2020    AST 39 11/01/2020    ALT 48 11/01/2020     Lab Results   Component Value Date    CRP 4.9 (H) 07/29/2020    CRP 16.2 (H) 07/07/2020    CRP 4.2 (H) 06/19/2020     Lab Results   Component Value Date    SEDRATE 90 (H) 07/29/2020    SEDRATE 134 (H) 07/07/2020    SEDRATE 102 (H) 06/21/2020       Radiology:    XR CHEST (2 VW)   Final Result   Increasing vascular congestion versus mild pulmonary edema. MRI BRAIN WO CONTRAST   Final Result   1. No acute intracranial abnormality. 2. No mass effect, edema or hemorrhage. CT HEAD WO CONTRAST   Final Result   No acute intracranial abnormality. CT CERVICAL SPINE WO CONTRAST   Final Result   No acute cervical spine fracture or malalignment. Degenerative changes of the cervical spine. CT CHEST WO CONTRAST   Final Result   1. No acute traumatic injury in the chest, abdomen or pelvis. 2. Unknown history of probable malignancy with progression of findings from   prior CT imaging. This includes infiltrative lesion in the liver, omental   thickening, ascites and mesenteric edema, mediastinal lymph node enlargement   and right axillary lymph node enlargement. Sclerotic changes in the ribs and   pelvis also of potential concern for metastasis. 3. Bilateral pleural effusions with interstitial changes developing in the   lungs. A nodular density along the right minor fissure may represent   loculated pleural fluid or a pulmonary nodule. 4. Recommend correlation with history and any prior treatment for malignancy. CT ABDOMEN PELVIS WO CONTRAST Additional Contrast? None   Final Result   1. No acute traumatic injury in the chest, abdomen or pelvis. 2. Unknown history of probable malignancy with progression of findings from   prior CT imaging. This includes infiltrative lesion in the liver, omental   thickening, ascites and mesenteric edema, mediastinal lymph node enlargement   and right axillary lymph node enlargement. Sclerotic changes in the ribs and   pelvis also of potential concern for metastasis. 3. Bilateral pleural effusions with interstitial changes developing in the   lungs. A nodular density along the right minor fissure may represent   loculated pleural fluid or a pulmonary nodule.    4. Recommend correlation with history rods  Proteus species  Nonhemolytic Strep species  Corynebacteria     07/29/2020   Final    Light growth  Methicillin resistant Staph aureus isolated. Most Methicillin  resistant Staphylococcus are usually resistant to multiple  antibiotics including other B-Lactams, Aminoglycosides,  Macrolides, Clindamycin and Tetracycline. Contact isolation  is indicated. 07/29/2020   Final    Rare growth  Refer to previous wound culture (Leg, Right) collected  same date/time for susceptibility results     07/29/2020 Rare growth  Final     No results found for: RESPSMEAR  No results found for: MPNEUMO, CLAMYDCU, LABLEGI, AFBCX, FUNGSM, LABFUNG  No results found for: CULTRESP  No results found for: CXCATHTIP  Body Fluid Culture, Sterile   Date Value Ref Range Status   07/25/2019 Growth not present  Final     No results found for: CXSURG  Urine Culture, Routine   Date Value Ref Range Status   10/27/2020 >100,000 CFU/ml  Final   10/27/2020 >100,000 CFU/ml  Final   07/07/2020 Growth not present  Final     MRSA Culture Only   Date Value Ref Range Status   08/09/2019 Methicillin resistant Staph aureus not isolated  Final       Problem list:    Active Problems:    Metastatic breast cancer ([facility] [address]    Type 2 diabetes mellitus with stage 3b chronic kidney disease, with long-term current use of insulin ([address]    Type 2 diabetes mellitus without complication, with long-term current use of insulin ([facility])    Anemia of chronic disease    Chronic deep vein thrombosis (DVT) of proximal vein of right lower extremity (HCC)    Cardiomyopathy ([address]    Decubitus ulcer    Dehydration    Acute kidney injury superimposed on CKD ([address]    Acute cystitis with hematuria    Fall    General weakness    Typical atrial flutter ([address]    Carcinoma of female breast ([address]  Resolved Problems:    * No resolved hospital problems.  *      ASSESSMENT:  Pancytopenia Unchanged   Chronic ischial and sacral decubiti improved although they do need debridement   Alpha strep virdians bacteremia possibly from the wounds rule out a Mediport issue   History of MRSA colonizing/infecting decubiti   No new cultures   No complaints today   Sitting in chair   PLAN:   Continue Vanco and Rocephin   Check final cultures   Monitor labs   Echo did not show vegetation   For wound debridement          Jewels Sheikh    10:21 AM  11/1/2020

## 2020-11-01 NOTE — PROGRESS NOTES
[facility] Inpatient   Resident Progress Note    S:  Hospital day: 5   Brief Synopsis: Neftali Maldonado is a 76 y.o. female with pmhx of metastatic breast cancer, heart failure last EF 50 to 55%, type 2 diabetes requiring insulin, chronic DVT, CKD stage III who presents to ED for Fall. States she fell d/t dizziness, was on the floor for 4 hours. She denied any chest pain or shortness of breath or palpitations prior to. States that she did hit her head, did not lose consciousness. Is on Coumadin chronically for chronic DVT. Patient reporting no complaints or pain in the ER. Of note, patient recently seen in the ER for weakness, was transfused and discharged home in stable condition. Urinalysis was positive for UTI. She was also found to have KARISHMA on her CKD, b/l decubitus ulcers, and she was hypotensive. Pt admitted and started on vanc and zosyn, ID, dietary, palliative, SW were consulted. Doing well today, reporting some itching around site of debridement. Denies chest pain or shortness of breath. Tolerating diet.          Cont meds:    dextrose       Scheduled meds:    palbociclib  100 mg Oral Daily    metoprolol succinate  12.5 mg Oral BID    sodium chloride  20 mL Intravenous Once    folic acid  1 mg Oral Daily    cefTRIAXone (ROCEPHIN) IV  1 g Intravenous Q24H    mineral oil-hydrophilic petrolatum   Topical BID    heparin flush  100 Units Intracatheter Daily    insulin glargine  10 Units Subcutaneous Daily    insulin lispro  0-6 Units Subcutaneous TID WC    cyanocobalamin  1,000 mcg Oral Daily    docusate sodium  100 mg Oral Daily    insulin lispro  0-3 Units Subcutaneous Nightly    magnesium oxide  400 mg Oral Daily    sodium bicarbonate  650 mg Oral BID    sodium chloride flush  10 mL Intravenous 2 times per day    pantoprazole  40 mg Intravenous Daily    vancomycin  1,000 mg Intravenous Q36H     PRN meds: mineral oil-hydrophilic petrolatum **AND** mineral oil-hydrophilic petrolatum, heparin flush, perflutren lipid microspheres, sodium chloride flush, polyethylene glycol, oxyCODONE, glucose, dextrose, glucagon (rDNA), dextrose     I reviewed the patient's past medical and surgical history, Medications and Allergies. O:  BP (!) 97/49   Pulse 78   Temp 98.1 °F (36.7 °C) (Temporal)   Resp 18   Ht 5' 4\" (1.626 m)   Wt 171 lb (77.6 kg) Comment: taken from 10/19 documentation  SpO2 99%   BMI 29.35 kg/m²   24 hour I&O: I/O last 3 completed shifts: In: 1150 [P.O.:900; IV Piggyback:250]  Out: 400 [Urine:400]  No intake/output data recorded. Physical Exam   Constitutional: No distress. HENT:   Head: Normocephalic and atraumatic. Cardiovascular: Normal rate, regular rhythm and normal heart sounds. Pulmonary/Chest: Effort normal. She has wheezes in the right upper field. Abdominal: Soft. She exhibits distension. There is no abdominal tenderness. Mild distension   Musculoskeletal:         General: Edema present. Comments: 3+ pitting edema BLE   Neurological: She is alert. Skin: There is pallor. Labs:  Na/K/Cl/CO2:  137/5.1/105/21 (11/01 1149)  BUN/Cr/glu/ALT/AST/amyl/lip:  66/1.8/--/48/39/--/-- (11/01 1881)  WBC/Hgb/Hct/Plts:  3.1/8.2/25.3/66 (11/01 0548)  estimated creatinine clearance is 27 mL/min (A) (based on SCr of 1.8 mg/dL (H)). Other pertinent labs as noted below    Radiology:  XR CHEST (2 VW)   Final Result   Increasing vascular congestion versus mild pulmonary edema. MRI BRAIN WO CONTRAST   Final Result   1. No acute intracranial abnormality. 2. No mass effect, edema or hemorrhage. CT HEAD WO CONTRAST   Final Result   No acute intracranial abnormality. CT CERVICAL SPINE WO CONTRAST   Final Result   No acute cervical spine fracture or malalignment. Degenerative changes of the cervical spine. CT CHEST WO CONTRAST   Final Result   1. No acute traumatic injury in the chest, abdomen or pelvis.    2. Unknown long-term current use of insulin (HCC)    Type 2 diabetes mellitus without complication, with long-term current use of insulin (HCC)    Anemia of chronic disease    Chronic deep vein thrombosis (DVT) of proximal vein of right lower extremity (HCC)    Cardiomyopathy ([facility] [address]    Decubitus ulcer    Dehydration    Acute kidney injury superimposed on CKD ([facility] [address]    Acute cystitis with hematuria    Fall    General weakness    Typical atrial flutter (HCC)    Carcinoma of female breast ([facility] [address]  Resolved Problems:    * No resolved hospital problems.  *    Hypotension, improving  2/2 dehydration vs ?sepsis  Hold metoprolol and isosorbide  In light of UTI and decubitus ulcer, blood cultures obtained, 1 out of 4 bottles grew strep viridens  Monitor fluid status closely in light of BNP     UTI  UA positive for leuk esterase, nitrates, bacteria  Patient reports no symptoms, afebrile, blood pressure borderline  Urine cx growing Proteus mirabilis  On vanc and rocephin per ID, appreciate ongoing recs  Pharmacy to dose Vanco     Decubitus ulcers  Chronic issue, left buttock with erythema and some drainage  Patient afebrile, blood pressure borderline  Continue vanc, blood cultures obtained, positive for strep  ID following, added rocephin, appreciate ongoing management and recs  Wound culture- results pending, preliminary results showed mixed GP and GN spp  Wound care consult  Tolerated debridement 10/30      KARISHMA on CKD  Oliguria   Likely prerenal given low blood pressure on admission  Creatinine 1.8 on admission, baseline appears to be 1.5-1.6, Cr 1.8 today   Fluids stopped due to volume overload  Decreased UOP yesterday  Consult nephrology, appreciate recommendations     Hypervolemia  Fluids dc'd  Diuresed intermittently with bumex  Appreciate cardiology recommendations         Chronic encephalopathy, improved  Reported in PCPs note, MRI ordered without contrast for increased encephalopathy and tremor  Patient somewhat groggy during exam on admission, however was oriented x4 and answering most questions appropriately- now at baseline  Tremor noted in hands  Check ammonia: slightly elevated, 61  Bedside swallow successful, diet ordered  MRI brain done, negative for masses, bleeds     Fall  Deconditioning  Patient with increasing dizziness and weakness resulting in fall, no loss of consciousness  Borderline blood pressure in the ER  Check orthostatics, started on fluids  PT/OT/social work for disposition and discharge planning     Elevated troponin  Likely due to chronic kidney disease, troponin 0 0.04 on admission, appears to be around baseline  Reports no chest pain or shortness of breath, EKG unchanged    Metastatic breast cancer  With known mets to liver, LFTs elevated  Has had worsening encephalopathy and tremor, MRI brain showed no acute change     Anemia of chronic disease  Hemoglobin 8.2 on admission  Hgb 8.2 today, continue to monitor counts    Chronic DVT on Coumadin  With recent fall, CT head negative for acute process  INR hemolyzed in the ER  Restart coumadin, 1 mg QOD- held for decreased plt and hematuria, resume when able  INR 2.0 today    GI/DVT ppx: protonix  Diet: general, dental soft  Antibiotics: Vancomycin day 5, Rocephin day 4      Electronically signed by Ralph Quinn  PGY-2 on 11/1/2020 at 7:10 AM     This case was discussed with attending physician: Dr. Rachel Flores

## 2020-11-02 LAB
ALBUMIN SERPL-MCNC: 2.6 G/DL (ref 3.5–5.2)
ALP BLD-CCNC: 136 U/L (ref 35–104)
ALT SERPL-CCNC: 41 U/L (ref 0–32)
ANION GAP SERPL CALCULATED.3IONS-SCNC: 14 MMOL/L (ref 7–16)
ANISOCYTOSIS: ABNORMAL
AST SERPL-CCNC: 33 U/L (ref 0–31)
BASOPHILS ABSOLUTE: 0 E9/L (ref 0–0.2)
BASOPHILS RELATIVE PERCENT: 0 % (ref 0–2)
BILIRUB SERPL-MCNC: 0.7 MG/DL (ref 0–1.2)
BLOOD BANK DISPENSE STATUS: NORMAL
BLOOD BANK PRODUCT CODE: NORMAL
BPU ID: NORMAL
BUN BLDV-MCNC: 69 MG/DL (ref 8–23)
CALCIUM SERPL-MCNC: 7.4 MG/DL (ref 8.6–10.2)
CHLORIDE BLD-SCNC: 102 MMOL/L (ref 98–107)
CO2: 21 MMOL/L (ref 22–29)
CREAT SERPL-MCNC: 1.8 MG/DL (ref 0.5–1)
CULTURE, BLOOD 2: NORMAL
DESCRIPTION BLOOD BANK: NORMAL
EOSINOPHILS ABSOLUTE: 0.05 E9/L (ref 0.05–0.5)
EOSINOPHILS RELATIVE PERCENT: 2 % (ref 0–6)
GFR AFRICAN AMERICAN: 33
GFR NON-AFRICAN AMERICAN: 27 ML/MIN/1.73
GLUCOSE BLD-MCNC: 181 MG/DL (ref 74–99)
HCT VFR BLD CALC: 20.6 % (ref 34–48)
HCT VFR BLD CALC: 21.1 % (ref 34–48)
HCT VFR BLD CALC: 25.8 % (ref 34–48)
HEMOGLOBIN: 6.6 G/DL (ref 11.5–15.5)
HEMOGLOBIN: 6.6 G/DL (ref 11.5–15.5)
HEMOGLOBIN: 8.3 G/DL (ref 11.5–15.5)
INR BLD: 1.9
LYMPHOCYTES ABSOLUTE: 0.2 E9/L (ref 1.5–4)
LYMPHOCYTES RELATIVE PERCENT: 8 % (ref 20–42)
MCH RBC QN AUTO: 35.5 PG (ref 26–35)
MCHC RBC AUTO-ENTMCNC: 31.3 % (ref 32–34.5)
MCV RBC AUTO: 113.4 FL (ref 80–99.9)
METER GLUCOSE: 175 MG/DL (ref 74–99)
METER GLUCOSE: 191 MG/DL (ref 74–99)
METER GLUCOSE: 213 MG/DL (ref 74–99)
METER GLUCOSE: 216 MG/DL (ref 74–99)
MONOCYTES ABSOLUTE: 0.2 E9/L (ref 0.1–0.95)
MONOCYTES RELATIVE PERCENT: 8 % (ref 2–12)
NEUTROPHILS ABSOLUTE: 2.05 E9/L (ref 1.8–7.3)
NEUTROPHILS RELATIVE PERCENT: 82 % (ref 43–80)
OVALOCYTES: ABNORMAL
PDW BLD-RTO: 24.5 FL (ref 11.5–15)
PLATELET # BLD: 49 E9/L (ref 130–450)
PLATELET CONFIRMATION: NORMAL
PMV BLD AUTO: 12.6 FL (ref 7–12)
POIKILOCYTES: ABNORMAL
POLYCHROMASIA: ABNORMAL
POTASSIUM REFLEX MAGNESIUM: 4.6 MMOL/L (ref 3.5–5)
PROTHROMBIN TIME: 21.4 SEC (ref 9.3–12.4)
RBC # BLD: 1.86 E12/L (ref 3.5–5.5)
SCHISTOCYTES: ABNORMAL
SODIUM BLD-SCNC: 137 MMOL/L (ref 132–146)
TOTAL PROTEIN: 5.2 G/DL (ref 6.4–8.3)
WBC # BLD: 2.5 E9/L (ref 4.5–11.5)

## 2020-11-02 PROCEDURE — 36415 COLL VENOUS BLD VENIPUNCTURE: CPT

## 2020-11-02 PROCEDURE — 6370000000 HC RX 637 (ALT 250 FOR IP): Performed by: FAMILY MEDICINE

## 2020-11-02 PROCEDURE — 36591 DRAW BLOOD OFF VENOUS DEVICE: CPT

## 2020-11-02 PROCEDURE — 6360000002 HC RX W HCPCS: Performed by: FAMILY MEDICINE

## 2020-11-02 PROCEDURE — 36430 TRANSFUSION BLD/BLD COMPNT: CPT

## 2020-11-02 PROCEDURE — 6370000000 HC RX 637 (ALT 250 FOR IP): Performed by: SPECIALIST

## 2020-11-02 PROCEDURE — 6360000002 HC RX W HCPCS: Performed by: INTERNAL MEDICINE

## 2020-11-02 PROCEDURE — 6370000000 HC RX 637 (ALT 250 FOR IP): Performed by: STUDENT IN AN ORGANIZED HEALTH CARE EDUCATION/TRAINING PROGRAM

## 2020-11-02 PROCEDURE — 2500000003 HC RX 250 WO HCPCS: Performed by: INTERNAL MEDICINE

## 2020-11-02 PROCEDURE — 2580000003 HC RX 258: Performed by: FAMILY MEDICINE

## 2020-11-02 PROCEDURE — APPSS30 APP SPLIT SHARED TIME 16-30 MINUTES: Performed by: NURSE PRACTITIONER

## 2020-11-02 PROCEDURE — 6370000000 HC RX 637 (ALT 250 FOR IP): Performed by: INTERNAL MEDICINE

## 2020-11-02 PROCEDURE — 80053 COMPREHEN METABOLIC PANEL: CPT

## 2020-11-02 PROCEDURE — 99233 SBSQ HOSP IP/OBS HIGH 50: CPT | Performed by: INTERNAL MEDICINE

## 2020-11-02 PROCEDURE — 85610 PROTHROMBIN TIME: CPT

## 2020-11-02 PROCEDURE — 85018 HEMOGLOBIN: CPT

## 2020-11-02 PROCEDURE — 2060000000 HC ICU INTERMEDIATE R&B

## 2020-11-02 PROCEDURE — 6360000002 HC RX W HCPCS: Performed by: STUDENT IN AN ORGANIZED HEALTH CARE EDUCATION/TRAINING PROGRAM

## 2020-11-02 PROCEDURE — 85025 COMPLETE CBC W/AUTO DIFF WBC: CPT

## 2020-11-02 PROCEDURE — 82962 GLUCOSE BLOOD TEST: CPT

## 2020-11-02 PROCEDURE — P9016 RBC LEUKOCYTES REDUCED: HCPCS

## 2020-11-02 PROCEDURE — C9113 INJ PANTOPRAZOLE SODIUM, VIA: HCPCS | Performed by: FAMILY MEDICINE

## 2020-11-02 PROCEDURE — P9047 ALBUMIN (HUMAN), 25%, 50ML: HCPCS | Performed by: INTERNAL MEDICINE

## 2020-11-02 PROCEDURE — 2580000003 HC RX 258: Performed by: STUDENT IN AN ORGANIZED HEALTH CARE EDUCATION/TRAINING PROGRAM

## 2020-11-02 PROCEDURE — 99232 SBSQ HOSP IP/OBS MODERATE 35: CPT | Performed by: FAMILY MEDICINE

## 2020-11-02 PROCEDURE — 85014 HEMATOCRIT: CPT

## 2020-11-02 RX ORDER — AMOXICILLIN AND CLAVULANATE POTASSIUM 600; 42.9 MG/5ML; MG/5ML
600 POWDER, FOR SUSPENSION ORAL EVERY 12 HOURS SCHEDULED
Status: DISCONTINUED | OUTPATIENT
Start: 2020-11-02 | End: 2020-11-04 | Stop reason: HOSPADM

## 2020-11-02 RX ORDER — 0.9 % SODIUM CHLORIDE 0.9 %
20 INTRAVENOUS SOLUTION INTRAVENOUS ONCE
Status: COMPLETED | OUTPATIENT
Start: 2020-11-02 | End: 2020-11-02

## 2020-11-02 RX ADMIN — OXYCODONE 5 MG: 5 TABLET ORAL at 21:36

## 2020-11-02 RX ADMIN — SODIUM BICARBONATE 650 MG: 650 TABLET ORAL at 20:23

## 2020-11-02 RX ADMIN — SODIUM CHLORIDE, PRESERVATIVE FREE 10 ML: 5 INJECTION INTRAVENOUS at 21:34

## 2020-11-02 RX ADMIN — OXYCODONE 5 MG: 5 TABLET ORAL at 14:05

## 2020-11-02 RX ADMIN — BUMETANIDE 1 MG: 0.25 INJECTION INTRAMUSCULAR; INTRAVENOUS at 13:37

## 2020-11-02 RX ADMIN — ALBUMIN (HUMAN) 25 G: 0.25 INJECTION, SOLUTION INTRAVENOUS at 05:19

## 2020-11-02 RX ADMIN — SODIUM BICARBONATE 650 MG: 650 TABLET ORAL at 08:04

## 2020-11-02 RX ADMIN — AMOXICILLIN AND CLAVULANATE POTASSIUM 600 MG: 600; 42.9 POWDER, FOR SUSPENSION ORAL at 20:30

## 2020-11-02 RX ADMIN — DOCUSATE SODIUM 100 MG: 100 CAPSULE, LIQUID FILLED ORAL at 08:04

## 2020-11-02 RX ADMIN — FOLIC ACID 1 MG: 1 TABLET ORAL at 08:05

## 2020-11-02 RX ADMIN — SKIN PROTECTANT: 44 OINTMENT TOPICAL at 20:25

## 2020-11-02 RX ADMIN — SODIUM CHLORIDE, PRESERVATIVE FREE 10 ML: 5 INJECTION INTRAVENOUS at 08:04

## 2020-11-02 RX ADMIN — SODIUM CHLORIDE, PRESERVATIVE FREE 10 ML: 5 INJECTION INTRAVENOUS at 20:24

## 2020-11-02 RX ADMIN — PANTOPRAZOLE SODIUM 40 MG: 40 INJECTION, POWDER, FOR SOLUTION INTRAVENOUS at 08:05

## 2020-11-02 RX ADMIN — ALBUMIN (HUMAN) 25 G: 0.25 INJECTION, SOLUTION INTRAVENOUS at 13:37

## 2020-11-02 RX ADMIN — BUMETANIDE 1 MG: 0.25 INJECTION INTRAMUSCULAR; INTRAVENOUS at 05:53

## 2020-11-02 RX ADMIN — OXYCODONE 5 MG: 5 TABLET ORAL at 08:05

## 2020-11-02 RX ADMIN — ALBUMIN (HUMAN) 25 G: 0.25 INJECTION, SOLUTION INTRAVENOUS at 20:23

## 2020-11-02 RX ADMIN — INSULIN LISPRO 1 UNITS: 100 INJECTION, SOLUTION INTRAVENOUS; SUBCUTANEOUS at 08:06

## 2020-11-02 RX ADMIN — HEPARIN 100 UNITS: 100 SYRINGE at 08:03

## 2020-11-02 RX ADMIN — SODIUM CHLORIDE 20 ML: 9 INJECTION, SOLUTION INTRAVENOUS at 09:45

## 2020-11-02 RX ADMIN — DOCUSATE SODIUM 100 MG: 100 CAPSULE, LIQUID FILLED ORAL at 20:23

## 2020-11-02 RX ADMIN — INSULIN GLARGINE 10 UNITS: 100 INJECTION, SOLUTION SUBCUTANEOUS at 08:06

## 2020-11-02 RX ADMIN — MAGNESIUM GLUCONATE 500 MG ORAL TABLET 400 MG: 500 TABLET ORAL at 08:04

## 2020-11-02 RX ADMIN — INSULIN LISPRO 1 UNITS: 100 INJECTION, SOLUTION INTRAVENOUS; SUBCUTANEOUS at 20:26

## 2020-11-02 RX ADMIN — SKIN PROTECTANT: 44 OINTMENT TOPICAL at 08:10

## 2020-11-02 RX ADMIN — METOPROLOL SUCCINATE 12.5 MG: 25 TABLET, EXTENDED RELEASE ORAL at 20:24

## 2020-11-02 RX ADMIN — BUMETANIDE 1 MG: 0.25 INJECTION INTRAMUSCULAR; INTRAVENOUS at 21:33

## 2020-11-02 RX ADMIN — Medication 1000 MCG: at 08:04

## 2020-11-02 RX ADMIN — INSULIN LISPRO 2 UNITS: 100 INJECTION, SOLUTION INTRAVENOUS; SUBCUTANEOUS at 17:15

## 2020-11-02 RX ADMIN — INSULIN LISPRO 1 UNITS: 100 INJECTION, SOLUTION INTRAVENOUS; SUBCUTANEOUS at 11:53

## 2020-11-02 ASSESSMENT — PAIN DESCRIPTION - FREQUENCY
FREQUENCY: CONTINUOUS
FREQUENCY: CONTINUOUS

## 2020-11-02 ASSESSMENT — PAIN SCALES - GENERAL
PAINLEVEL_OUTOF10: 9
PAINLEVEL_OUTOF10: 8
PAINLEVEL_OUTOF10: 7
PAINLEVEL_OUTOF10: 7
PAINLEVEL_OUTOF10: 6
PAINLEVEL_OUTOF10: 6

## 2020-11-02 ASSESSMENT — PAIN DESCRIPTION - LOCATION
LOCATION: HIP
LOCATION: HIP

## 2020-11-02 ASSESSMENT — PAIN DESCRIPTION - PAIN TYPE
TYPE: CHRONIC PAIN
TYPE: CHRONIC PAIN

## 2020-11-02 ASSESSMENT — PAIN DESCRIPTION - ONSET
ONSET: ON-GOING
ONSET: ON-GOING

## 2020-11-02 ASSESSMENT — PAIN DESCRIPTION - PROGRESSION
CLINICAL_PROGRESSION: NOT CHANGED
CLINICAL_PROGRESSION: NOT CHANGED

## 2020-11-02 ASSESSMENT — PAIN DESCRIPTION - DESCRIPTORS
DESCRIPTORS: ACHING;CONSTANT
DESCRIPTORS: ACHING;CONSTANT

## 2020-11-02 ASSESSMENT — PAIN DESCRIPTION - ORIENTATION
ORIENTATION: RIGHT;LEFT
ORIENTATION: RIGHT;LEFT

## 2020-11-02 NOTE — PROGRESS NOTES
[facility]  Nephrology Attending Progress Note  Dorota Lauren. Maren England MD        SUBJECTIVE:     11/1: Ed Rodriguez is a 76 y.o. female with a history of chronic kidney disease stage IV (baseline creatinine 1.5-2), breast cancer with bone mets on active chemotherapy, CAD, CHF, type II DM, and several other medical problems. Presents to ED with complaints of a fall. She states that she has been feeling dizzy for some time now. On the day of admission she had a fall and could not support herself to get up. She thought she was on the floor for a prolonged 2 to 3 hours. EMS was called and she was subsequently brought to ED to be evaluated. In the ED her vitals were unremarkable. Laboratory data was significant for a creatinine of 2.1, blood glucose of 221, hemoglobin 8.2 and BNP in excess of 34,000. Potassium level was also noted to be 3. Blood cultures obtained at the time showed strep viridans. She is currently being followed by ID and currently is on ceftriaxone. Of note patient reports a 30 pound weight gain over the course of the past month or so with increasing lower extremity edema extending to the thighs and lower abdomen. She however denies shortness of breath. No cough. No fever or chills reported.   Renal is consulted for her CKD.     11/2: pt seen in room,     PROBLEM LIST:    Patient Active Problem List   Diagnosis    Metastatic breast cancer ([address]    Essential hypertension    Coronary artery disease involving native coronary artery of native heart without angina pectoris    Nephrolithiasis    CHF (congestive heart failure) ([address]    Humerus fracture    Shoulder pain, left    B12 deficiency    Hyperlipidemia    Rib lesion    Subclavian vein occlusion, bilateral (HCC)    Pericardial effusion    MI (myocardial infarction) ([address]    Arthritis    Sarcoidosis    Lymph node enlargement    Anesthesia    Type 2 diabetes mellitus with stage 3b chronic kidney disease, with long-term current use of insulin (HCC)    Rectal bleeding    Ventral hernia    Type 2 diabetes mellitus without complication, with long-term current use of insulin (HCC)   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    Diabetic polyneuropathy associated with type 2 diabetes mellitus (HCC)    Leg swelling    History of deep vein thrombosis    Chronic venous insufficiency    Lymphedema of both lower extremities    Ambulatory dysfunction    CKD (chronic kidney disease) stage 3, GFR 30-59 ml/min    Charcot's joint of foot in type 2 diabetes mellitus ([address]    Ascites    Palliative care encounter    Cancer associated pain    HAP (hospital-acquired pneumonia)    Acute systolic heart failure ([address]    Cardiomyopathy ([address]    Status post coronary artery bypass graft    Class 2 severe obesity due to excess calories with serious comorbidity and body mass index (BMI) of 35.0 to 35.9 in adult [facility])    Type 2 diabetes mellitus with hyperglycemia (HCC)    Acute hypercapnic respiratory failure (HCC)    Altered mental status    Goals of care, counseling/discussion    Palliative care by specialist    Metastatic disease ([address]    Moderate protein-calorie malnutrition (HCC)    Precordial pain    Chronic systolic heart failure (HCC)    Pleural effusion    Cellulitis and abscess of leg    Bony metastasis (HCC) left leg    Decubitus ulcer    Left leg pain    Degenerative disc disease at L5-S1 level    KARISHMA (acute kidney injury) (HCC)    Stasis ulcer (HCC)    Dehydration    Acute kidney injury superimposed on CKD ([address]    Acute cystitis with hematuria    Fall    General weakness    Typical atrial hawa [facility]    Carcinoma of female breast [facility])        PAST MEDICAL HISTORY:    Past Medical History:   Diagnosis Date    Abscess of abdominal wall     Anemia     Iron deficiency and chronic disease.  Anesthesia     DIFFICULTY WAKING UP    Arthritis     Arthritis, hip     Breast cancer ([address] 2005    S/P Left Lumpectomy with Lymph Node Dissection, Chemo/Rad. Follows with Dr. Iris Farmer. No recurrence to date. Surgeon was Dr. Giselle Arreaga.  CAD (coronary artery disease) 5/2008    s/p CABG. Follows with Sachi Ortega.  CHF (congestive heart failure) (HCC)     Chronic venous insufficiency 11/7/2018    Class 2 severe obesity due to excess calories with serious comorbidity and body mass index (BMI) of 35.0 to 35.9 in adult ([address] 8/1/2019    Decreased dorsalis pedis pulse 11/7/2018    Degenerative disc disease at L5-S1 level 7/10/2020    Diabetic neuropathy (HCC)     Diabetic neuropathy (HCC)     DVT (deep venous thrombosis) (HCC)     Recurrent. On lifelong coumadin.  DVT of upper extremity (deep vein thrombosis) ([address] 4/18/2012    History of deep vein thrombosis 11/7/2018    Humerus fracture     Chronic, on the Left.  Hyperlipidemia 8/29/2011    Hypertension     Left leg pain 7/9/2020    Leg swelling 11/7/2018    Lymph node enlargement     Lymphedema of both lower extremities 11/7/2018    Lymphopenia 7/9/2020    MI (myocardial infarction) ([address]     Nephrolithiasis     Follows with Dr. Bogdan Dye.  Pericardial effusion     Pulmonary nodule     With mediastinal lymphadenopathy. Stable. Follows with Dr. Maxene Carrel.     Rib lesion 11/28/11    expansile lesion in one of the right ribs laterally    Sarcoidosis 07/26/11    per transbronchial needle aspiration    Subclavian vein occlusion, bilateral ([address] 4/18/2012    Type II or unspecified type diabetes mellitus without mention of complication, not stated as uncontrolled        DIET:    DIET GENERAL; Dental Soft  Dietary Nutrition Supplements: Low Calorie High Protein Supplement  Dietary Nutrition Supplements: Wound Healing Oral Supplement     PHYSICAL EXAM:     Patient Vitals for the past 24 hrs:   BP Temp Temp src Pulse Resp SpO2   11/02/20 1020 (!) 90/40 97 °F (36.1 °C) -- 73 16 98 %   11/02/20 0941 (!) 89/40 97.5 °F (36.4 °C) -- 71 14 97 %   11/02/20 0802 (!) 96/50 97.7 °F (36.5 °C) -- 71 16 --   11/01/20 1930 (!) 92/42 97.7 °F (36.5 °C) Temporal 77 18 98 %   11/01/20 1605 (!) 102/50 97.5 °F (36.4 °C) Temporal 76 18 99 %   @      Intake/Output Summary (Last 24 hours) at 11/2/2020 1125  Last data filed at 11/2/2020 0553  Gross per 24 hour   Intake 700 ml   Output 800 ml   Net -100 ml         Wt Readings from Last 3 Encounters:   10/27/20 171 lb (77.6 kg)   10/19/20 171 lb (77.6 kg)   09/28/20 175 lb (79.4 kg)       Constitutional:  Pt is in no acute distress  Head: normocephalic, atraumatic  Neck: no JVD  Cardiovascular: regular rate and rhythm, no murmurs, gallops, or rubs  Respiratory:  No rales, rhochi, or wheezes  Gastrointestinal:  Soft, nontender, nondistended, bowel sounds x 4  Ext: edema  Skin: dry, no rash  Neuro: aaox3    MEDS (scheduled):    sodium chloride  20 mL Intravenous Once    docusate sodium  100 mg Oral BID    albumin human  25 g Intravenous Q8H    bumetanide  1 mg Intravenous 3 times per day    palbociclib  100 mg Oral Daily    metoprolol succinate  12.5 mg Oral BID    sodium chloride  20 mL Intravenous Once    folic acid  1 mg Oral Daily    cefTRIAXone (ROCEPHIN) IV  1 g Intravenous Q24H    mineral oil-hydrophilic petrolatum   Topical BID    heparin flush  100 Units Intracatheter Daily    insulin glargine  10 Units Subcutaneous Daily    insulin lispro  0-6 Units Subcutaneous TID WC    cyanocobalamin  1,000 mcg Oral Daily    insulin lispro  0-3 Units Subcutaneous Nightly    magnesium oxide  400 mg Oral Daily    sodium bicarbonate  650 mg Oral BID    sodium chloride flush  10 mL Intravenous 2 times per day    pantoprazole  40 mg Intravenous Daily    vancomycin  1,000 mg Intravenous Q36H       MEDS (infusions):   dextrose         MEDS (prn):  mineral oil-hydrophilic petrolatum **AND** mineral oil-hydrophilic petrolatum, heparin flush, perflutren lipid microspheres, sodium chloride flush, polyethylene glycol, oxyCODONE, glucose, dextrose, glucagon (rDNA), dextrose    DATA:    Recent Labs     10/31/20  0515  11/01/20  0548 11/02/20  0315 11/02/20  0555   WBC 3.4*  --  3.1* 2.5*  --    HGB 8.0*   < > 8.2* 6.6* 6.6*   HCT 25.0*   < > 25.3* 21.1* 20.6*   .6*  --  109.5* 113.4*  --    PLT 59*  --  66* 49*  --     < > = values in this interval not displayed. Recent Labs     10/31/20  0515 11/01/20  0548 11/01/20  1145 11/02/20  0315    137 136 137   K 4.1 5.1* 4.8 4.6    105 103 102   CO2 22 21* 23 21*   BUN 63* 66* 69* 69*   CREATININE 1.7* 1.8* 1.9* 1.8*   LABGLOM 29 27 26 27   GLUCOSE 199* 170* 221* 181*   CALCIUM 7.5* 7.4* 7.7* 7.4*   ALT 45* 48*  --  41*   AST 31 39*  --  33*   BILITOT 0.9 0.7  --  0.7   ALKPHOS 132* 148*  --  136*       Lab Results   Component Value Date    LABALBU 2.6 (L) 11/02/2020    LABALBU 2.1 (L) 11/01/2020    LABALBU 2.1 (L) 10/31/2020     Lab Results   Component Value Date    TSH 5.300 (H) 10/27/2020       Iron Studies  Lab Results   Component Value Date    IRON 89 07/29/2020    TIBC 158 (L) 07/29/2020    FERRITIN 1,961 07/29/2020     Vitamin B-12   Date Value Ref Range Status   07/08/2020 >2000 (H) 211 - 946 pg/mL Final     Folate   Date Value Ref Range Status   07/08/2020 >20.0 4.8 - 24.2 ng/mL Final       Vit D, 25-Hydroxy   Date Value Ref Range Status   10/23/2017 21 (L) 30 - 100 ng/mL Final     Comment:     <20 ng/mL. ........... Crystal Iris Deficient  20-30 ng/mL. ......... Crystal Iris Insufficient   ng/mL. ........ Crystal Iris Sufficient  >100 ng/mL. .......... Crystal Iris Toxic       No results found for: PTH    No components found for: URIC    Lab Results   Component Value Date    COLORU Yellow 10/26/2020 NITRU POSITIVE 10/26/2020    GLUCOSEU Negative 10/26/2020    GLUCOSEU NEGATIVE 12/05/2011    KETUA Negative 10/26/2020    UROBILINOGEN 0.2 10/26/2020    BILIRUBINUR Negative 10/26/2020    BILIRUBINUR small 05/16/2016    BILIRUBINUR NEGATIVE 12/05/2011       No results found for: Sathya Alfonzo      IMPRESSION/RECOMMENDATIONS:      1. Chronic kidney disease stage IV   most likely on the basis of diabetic and hypertensive nephropathy. Her current creatinine is within the range of her usual baseline    2. Strep viridans bacteremia    3. Volume overload  HFrEF  from severe hypoalbuminemia as well  Her serum albumin level on average has been between 2-2.5. Diuretic/albumin    4. Urinary tract infection  On rocephin    5. Mild hyperkalemia due to her renal failure  improved    6. Type 2 diabetes mellitus    7. Sacral decubitus ulcer        Wan Russell.  Salvador Browning MD

## 2020-11-02 NOTE — CARE COORDINATION
Select to initiate precert today. If denied next choice is [facility], patient accepted there and they will submit for auth if needed. COVID (-) 10/28. HENS and ambulance on soft chart. For questions I can be reached at [phone].  [place], [place]

## 2020-11-02 NOTE — PROGRESS NOTES
[facility] Attending    S: 76 y.o. female with metastatic breast cancer with liver mets and ascites; DM; CAD; chronic left humerus fracture; chronic bilateral pressure wounds of lateral hips, present on admission; chronic and recurrent DVT of RLE on coumadin; history of sarcoidosis; history of nephrolithiasis; bilateral LE lymphedema. Had fall at home, found on the ground. Today, no new symptoms or concerns. Has chronic orthopnea, unchanged overall. No dyspnea at rest sitting up. No CP or pressure. No further BM, no bleeding noted. O: VS- Blood pressure (!) 96/50, pulse 71, temperature 97.7 °F (36.5 °C), resp. rate 16, height 5' 4\" (1.626 m), weight 171 lb (77.6 kg), SpO2 98 %, not currently breastfeeding. Exam is as noted by resident with the following changes, additions or corrections:  Gen NAD, pale, stable. Awake and alert; oriented throughout discussion. CV Currently regular, systolic murmur   Resp decreased, few rales at bases   Abd diffusely tender to palpation, distended but softer today    Ext edema and stasis changes, wounds are dressed, protective padding in place     Impressions: Active Problems:    Metastatic [facility] cancer ([facility]    Type 2 diabetes mellitus with stage 3b chronic kidney disease, with long-term current use of insulin (HCC)    Type 2 diabetes mellitus without complication, with long-term current use of insulin (HCC)    Anemia of chronic disease    Chronic deep vein thrombosis (DVT) of proximal vein of right lower extremity (HCC)    Cardiomyopathy ([address]    Decubitus ulcer    Dehydration    Acute kidney injury superimposed on CKD ([address]    Acute cystitis with hematuria    Fall    General weakness    Typical atrial flutter (HCC)    Carcinoma of female breast ([address]  Resolved Problems:    * No resolved hospital problems. *      Plan:   ID management appreciated. Antibiotics as per ID guidance.   Urine with Proteus growth; one blood culture with Strep viridans. Encephalopathy, possibly infectious/metabolic, now resolved, back to baseline. Pain control with caution. Follow closely. Dental soft diet. Nutrition evaluation appreciated; support nutrition and hydration, following closely. Wound care for ulcerations. General surgery management and wound care appreciated. Disposition planning; patient agreeable to a facility. Cardiology input appreciated. Nephrology management appreciated. Hgb low; will transfuse 1 unit prbc today, follow closely. Hold coumadin for now; PLT lower today. Watch symptoms closely, follow for evidence of bleeding. Discuss with SW/case management; consideration for LTAC. Attending Physician Statement  I have reviewed the chart and seen the patient with the resident(s). I personally reviewed images, EKG's and similar tests, if present. I personally reviewed and performed key elements of the history and exam.  I have reviewed and confirmed student and/or resident history and exam with changes as indicated above. I agree with the assessment, plan and orders as documented by the resident. Please refer to the resident and/or student note for additional information.       Fareed Donohue

## 2020-11-02 NOTE — FLOWSHEET NOTE
Inpatient Wound Care (follow up)  Date: 10/26/2020  9:28 PM    Reason for consult:  Wounds     Findings:      11/02/20 1345   Wound 09/25/20 Hip Left;Distal   Date First Assessed/Time First Assessed: 09/25/20 0200   Present on Hospital Admission: Yes  Location: Hip  Wound Location Orientation: Left;Distal   Wound Image    Wound Etiology Pressure Stage  3   Dressing/Treatment Moist to dry   Wound Length (cm) 1.8 cm   Wound Width (cm) 4 cm   Wound Depth (cm) 0.8 cm   Wound Surface Area (cm^2) 7.2 cm^2   Change in Wound Size % (l*w) -20   Wound Volume (cm^3) 5.76 cm^3   Wound Healing % -92   Wound Assessment   (red, yellow)   Drainage Amount Small   Drainage Description Serosanguinous   Odor None   Ana Lilia-wound Assessment Intact   Wound 06/18/20 Hip Right   Date First Assessed/Time First Assessed: 06/18/20 2102   Present on Hospital Admission: Yes  Primary Wound Type: Pressure Injury  Location: Hip  Wound Location Orientation: Right   Wound Image    Wound Etiology Pressure Stage  3   Dressing/Treatment Moist to dry   Wound Length (cm) 2 cm   Wound Width (cm) 4 cm   Wound Depth (cm) 1 cm   Wound Surface Area (cm^2) 8 cm^2   Change in Wound Size % (l*w) 20   Wound Volume (cm^3) 8 cm^3   Wound Healing % -700   Wound Assessment   (red,yellow)   Drainage Amount Scant   Drainage Description Serosanguinous   Odor None   Ana Lilia-wound Assessment Intact   Wound 06/18/20 Hip Left;Proximal   Date First Assessed/Time First Assessed: 06/18/20 2102   Present on Hospital Admission: Yes  Primary Wound Type: Pressure Injury  Location: Hip  Wound Location Orientation: Left;Proximal   Wound Image    Wound Etiology Pressure Stage  4   Dressing/Treatment Moist to dry   Wound Length (cm) 3.8 cm   Wound Width (cm) 3.8 cm   Wound Depth (cm) 1 cm   Wound Surface Area (cm^2) 14.44 cm^2   Change in Wound Size % (l*w) 31.24   Wound Volume (cm^3) 14.44 cm^3   Wound Healing % -588   Wound Assessment   (red, yellow)   Drainage Amount Small Drainage Description Serosanguinous   Odor None   Ana Lilia-wound Assessment Intact   Wound 11/02/20 Hip Right;Medial   Date First Assessed/Time First Assessed: 11/02/20 1433   Present on Hospital Admission: Yes  Location: Hip  Wound Location Orientation: Right;Medial   Wound Image    Wound Etiology Pressure Stage  3   Wound Length (cm) 1.9 cm   Wound Width (cm) 4 cm   Wound Depth (cm) 0.5 cm   Wound Surface Area (cm^2) 7.6 cm^2   Wound Volume (cm^3) 3.8 cm^3   Wound Assessment   (red,yellow)   Drainage Amount Scant   Drainage Description Serosanguinous   Odor None   Ana Lilia-wound Assessment Intact      **Informed Consent**    The patient has given verbal consent to have photos taken of wounds and inserted into their chart as part of their permanent medical record for purposes of documentation, treatment management and/or medical review. All Images taken on 11/2/20 of patient name: Dana Fischer were transmitted and stored on secured [employer] located within [facility] by a registered [employer] Mobile Application Device. Impression:  Right medial hip: stage 3   Right hip: stage 3  Left hip proximal: stage 3  Left hip distal: stage 3    Plan: santyl, opticell, stratasorb  Will follow.        Najma Tyler 11/2/2020 2:35 PM

## 2020-11-02 NOTE — PROGRESS NOTES
[facility] Inpatient   Resident Progress Note    S:  Hospital day: 6   Brief Synopsis: Merly Forrest is a 76 y.o. female with pmhx of metastatic breast cancer, heart failure last EF 50 to 55%, type 2 diabetes requiring insulin, chronic DVT, CKD stage III who presents to ED for Fall. States she fell d/t dizziness, was on the floor for 4 hours. She denied any chest pain or shortness of breath or palpitations prior to. States that she did hit her head, did not lose consciousness. Is on Coumadin chronically for chronic DVT. Patient reporting no complaints or pain in the ER. Of note, patient recently seen in the ER for weakness, was transfused and discharged home in stable condition. Urinalysis was positive for UTI. She was also found to have KARISHMA on her CKD, b/l decubitus ulcers, and she was hypotensive. Pt admitted and started on vanc and zosyn, ID, dietary, palliative, SW were consulted. Patient doing well today. She denies chest pain, palpitations, SOB, fever, chills, dizziness, and headaches. She reports abdominal pain and burning with urination, and occasional lightheadedness and weakness with standing. She also reports fatigue \"all the time. \" Hgb was 6.6 this am.        Cont meds:    dextrose       Scheduled meds:    sodium chloride  20 mL Intravenous Once    docusate sodium  100 mg Oral BID    albumin human  25 g Intravenous Q8H    bumetanide  1 mg Intravenous 3 times per day    palbociclib  100 mg Oral Daily    metoprolol succinate  12.5 mg Oral BID    sodium chloride  20 mL Intravenous Once    folic acid  1 mg Oral Daily    cefTRIAXone (ROCEPHIN) IV  1 g Intravenous Q24H    mineral oil-hydrophilic petrolatum   Topical BID    heparin flush  100 Units Intracatheter Daily    insulin glargine  10 Units Subcutaneous Daily    insulin lispro  0-6 Units Subcutaneous TID WC    cyanocobalamin  1,000 mcg Oral Daily    insulin lispro  0-3 Units Subcutaneous Nightly    magnesium oxide 400 mg Oral Daily    sodium bicarbonate  650 mg Oral BID    sodium chloride flush  10 mL Intravenous 2 times per day    pantoprazole  40 mg Intravenous Daily    vancomycin  1,000 mg Intravenous Q36H     PRN meds: mineral oil-hydrophilic petrolatum **AND** mineral oil-hydrophilic petrolatum, heparin flush, perflutren lipid microspheres, sodium chloride flush, polyethylene glycol, oxyCODONE, glucose, dextrose, glucagon (rDNA), dextrose     I reviewed the patient's past medical and surgical history, Medications and Allergies. O:  BP (!) 92/42   Pulse 77   Temp 97.7 °F (36.5 °C) (Temporal)   Resp 18   Ht 5' 4\" (1.626 m)   Wt 171 lb (77.6 kg) Comment: taken from 10/19 documentation  SpO2 98%   BMI 29.35 kg/m²   24 hour I&O: I/O last 3 completed shifts: In: 940 [P.O.:840; IV Piggyback:100]  Out: 400 [Urine:400]  I/O this shift:  In: -   Out: 400 [Urine:400]        Physical Exam   Constitutional: No distress. HENT:   Head: Normocephalic and atraumatic. Eyes: EOM are normal.   Cardiovascular: Normal rate, regular rhythm and normal heart sounds. 1/6 systolic murmur appreciated   Pulmonary/Chest: Effort normal. No stridor. No respiratory distress. She has no wheezes. Abdominal: Soft. She exhibits no distension. Mild diffuse tenderenss   Musculoskeletal:         General: Edema present. Comments: 2+ pitting edema BLE   Neurological: She is alert. Skin: No rash noted. Psychiatric: She has a normal mood and affect. Her behavior is normal.           Labs:  Na/K/Cl/CO2:  137/4.6/102/21 (11/02 0315)  BUN/Cr/glu/ALT/AST/amyl/lip:  69/1.8/--/41/33/--/-- (11/02 0315)  WBC/Hgb/Hct/Plts:  --/6.6/20.6/-- (11/02 0555)  estimated creatinine clearance is 27 mL/min (A) (based on SCr of 1.8 mg/dL (H)). Other pertinent labs as noted below    Radiology:  XR CHEST PORTABLE   Final Result   Stable CHF. Pneumonia is less likely.          XR CHEST (2 VW)   Final Result   Increasing vascular congestion versus mild pulmonary edema. MRI BRAIN WO CONTRAST   Final Result   1. No acute intracranial abnormality. 2. No mass effect, edema or hemorrhage. CT HEAD WO CONTRAST   Final Result   No acute intracranial abnormality. CT CERVICAL SPINE WO CONTRAST   Final Result   No acute cervical spine fracture or malalignment. Degenerative changes of the cervical spine. CT CHEST WO CONTRAST   Final Result   1. No acute traumatic injury in the chest, abdomen or pelvis. 2. Unknown history of probable malignancy with progression of findings from   prior CT imaging. This includes infiltrative lesion in the liver, omental   thickening, ascites and mesenteric edema, mediastinal lymph node enlargement   and right axillary lymph node enlargement. Sclerotic changes in the ribs and   pelvis also of potential concern for metastasis. 3. Bilateral pleural effusions with interstitial changes developing in the   lungs. A nodular density along the right minor fissure may represent   loculated pleural fluid or a pulmonary nodule. 4. Recommend correlation with history and any prior treatment for malignancy. CT ABDOMEN PELVIS WO CONTRAST Additional Contrast? None   Final Result   1. No acute traumatic injury in the chest, abdomen or pelvis. 2. Unknown history of probable malignancy with progression of findings from   prior CT imaging. This includes infiltrative lesion in the liver, omental   thickening, ascites and mesenteric edema, mediastinal lymph node enlargement   and right axillary lymph node enlargement. Sclerotic changes in the ribs and   pelvis also of potential concern for metastasis. 3. Bilateral pleural effusions with interstitial changes developing in the   lungs. A nodular density along the right minor fissure may represent   loculated pleural fluid or a pulmonary nodule. 4. Recommend correlation with history and any prior treatment for malignancy.          XR CHEST PORTABLE   Final Result   1. Stable exam.  Persistent elevation of the right hemidiaphragm and small   right pleural effusion. Coarse interstitial markings are unchanged. 2.  Atherosclerotic disease, cardiomegaly, mild central pulmonary vascular   congestion, and postsurgical changes consistent with prior CABG. 3.  Unchanged complete left humeral neck fracture. A/P:  Active Problems:    Metastatic breast cancer ([facility] [address]    Type 2 diabetes mellitus with stage 3b chronic kidney disease, with long-term current use of insulin ([facility])    Type 2 diabetes mellitus without complication, with long-term current use of insulin ([facility])    Anemia of chronic disease    Chronic deep vein thrombosis (DVT) of proximal vein of right lower extremity (HCC)    Cardiomyopathy ([facility] [address]    Decubitus ulcer    Dehydration    Acute kidney injury superimposed on CKD ([facility] [address]    Acute cystitis with hematuria    Fall    General weakness    Typical atrial flutter ([facility])    Carcinoma of female breast ([facility] [address]  Resolved Problems:    * No resolved hospital problems.  *    Hypotension, improving  2/2 dehydration vs ?sepsis  Hold home isosorbide  Home metoprolol restarted and increased from 12.5 mg daily to BID  In light of UTI and decubitus ulcer, blood cultures obtained, 1 out of 4 bottles grew strep viridens  Monitor fluid status closely in light of Pro-BNP 34,317     UTI  UA positive for leuk esterase, nitrates, bacteria  Patient reports no symptoms, afebrile, blood pressure borderline  Urine cx growing Proteus mirabilis  -Vanc and rocephin discontinue   -Augmentin-ES started as per ID     Decubitus ulcers  Chronic issue, left buttock with erythema and some drainage  Patient afebrile, blood pressure borderline  ID following, discontinued vanc and rocephin, started Augmentin  Wound culture- results pending, preliminary results showed mixed GP and GN spp  Wound care following  Tolerated debridement 10/30    KARISHMA on CKD  Oliguria   Likely prerenal given low blood pressure on admission  Creatinine 1.8 on admission, baseline appears to be 1.5-1.6, Cr 1.8 today   Fluids stopped due to volume overload  Decreased UOP yesterday  Consult nephrology, appreciate recommendations     Hypervolemia  Fluids dc'd  Diuresed intermittently with bumex  Cardiology following     Chronic encephalopathy, improved  Reported in PCPs note, MRI ordered without contrast for increased encephalopathy and tremor  Patient somewhat groggy during exam on admission, however was oriented x4 and answering most questions appropriately- now at baseline  Tremor noted in hands  Check ammonia: slightly elevated, 61  Bedside swallow successful, diet ordered  MRI brain done, negative for masses, bleeds     Fall  Deconditioning  Patient with increasing dizziness and weakness resulting in fall, no loss of consciousness  Borderline blood pressure in the ER  Check orthostatics, started on fluids  PT/OT/social work for disposition and discharge planning-precert for [employer] started     Elevated troponin  Likely due to chronic kidney disease, troponin 0 0.04 on admission, appears to be around baseline  Reports no chest pain or shortness of breath, EKG unchanged    Metastatic breast cancer  With known mets to liver, LFTs elevated  Has had worsening encephalopathy and tremor, MRI brain showed no acute change  Continue home Ibrance     Anemia of chronic disease  Hemoglobin 8.2 on admission  Hgb 6.6 today, 1U PRBC ordered for transfusion, transfusion protocol in place    Chronic DVT on Coumadin  With recent fall, CT head negative for acute process  INR hemolyzed in the ER  Coumadin, 1 mg QOD- held for decreased plt and hematuria, resume when able  INR 2.0 today    GI/DVT ppx: protonix  Diet: general, dental soft  Antibiotics: Augmentin--42.9 mg q12h      Electronically signed by Wiliam Farmer  PGY-2 on 11/2/2020 at 6:34 AM     This case was discussed with attending physician: Dr. Alla Elam

## 2020-11-02 NOTE — PROGRESS NOTES
including mixed gram negative rods, Proteus sp,       Nonhemolytic Strep species, Corynebacteria    10/27 Blood cx - No growth final  10/28 Blood cx's - Prelim no growth  10/28 COVID-19 - Not detected    IV lines:  Implanted venous port    Assessment:  · 75 yo F with metastatic breast cancer admitted with dizziness/fall  · Pt found to have Strep viridans bacteremia - Pt currently on Vancomycin day #7 and Ceftriaxone day #5  · ID following  · Consulted by Dr. Sahil Zendejas to dose/monitor Vancomycin  · Vanco trough 17.4 mcg/mL on 10/31; Goal trough level:  15-20 mcg/mL  · Pt with KARISHMA on CKD - SCr 1.8 again today (baseline ~1.6). Plan:  · Continue Vancomycin 1000 mg IV q36h  · Will continue to order Vanco trough levels and will adjust dose as needed  · Will monitor renal function closely    Will continue to follow. Kristopher Kingston PharmD, BCPS, DEANCP  11/2/2020  10:03 AM  Pager: [phone]      Addendum:    Vancomycin was discontinued today per ID. Therefore, will sign off at this time. Please re-consult if needed.     Kristopher Kingston PharmD, BCPS, BCCCP  11/2/2020  1:40 PM  Pager: [phone]

## 2020-11-02 NOTE — PROGRESS NOTES
[address] [place] [address] [facility]  Progress Note      Chief Complaint   Patient presents with    Fall     pt fell earlier d/t dizziness, was on the floor for 4 hours       SUBJECTIVE:      Patient is tolerating medications. No reported adverse drug reactions. No problems overnight. Tearful today. Review of systems:    As stated above in the chief complaint, otherwise negative. Medications:    Scheduled Meds:   sodium chloride  20 mL Intravenous Once    docusate sodium  100 mg Oral BID    albumin human  25 g Intravenous Q8H    bumetanide  1 mg Intravenous 3 times per day    palbociclib  100 mg Oral Daily    metoprolol succinate  12.5 mg Oral BID    sodium chloride  20 mL Intravenous Once    folic acid  1 mg Oral Daily    cefTRIAXone (ROCEPHIN) IV  1 g Intravenous Q24H    mineral oil-hydrophilic petrolatum   Topical BID    heparin flush  100 Units Intracatheter Daily    insulin glargine  10 Units Subcutaneous Daily    insulin lispro  0-6 Units Subcutaneous TID WC    cyanocobalamin  1,000 mcg Oral Daily    insulin lispro  0-3 Units Subcutaneous Nightly    magnesium oxide  400 mg Oral Daily    sodium bicarbonate  650 mg Oral BID    sodium chloride flush  10 mL Intravenous 2 times per day    pantoprazole  40 mg Intravenous Daily    vancomycin  1,000 mg Intravenous Q36H     Continuous Infusions:   dextrose       PRN Meds:mineral oil-hydrophilic petrolatum **AND** mineral oil-hydrophilic petrolatum, heparin flush, perflutren lipid microspheres, sodium chloride flush, polyethylene glycol, oxyCODONE, glucose, dextrose, glucagon (rDNA), dextrose  Prior to Admission medications    Medication Sig Start Date End Date Taking?  Authorizing Provider   omeprazole (PRILOSEC) 20 MG delayed release capsule Take 20 mg by mouth daily   Yes Historical Provider, MD   bumetanide (BUMEX) 1 MG tablet Take 1 mg by mouth daily   Yes Historical Provider, MD   palbociclib (IBRANCE) 100 MG tablet Take 100 mg by mouth daily Given three weeks on and one week off   Yes Historical Provider, MD   Calcium Citrate-Vitamin D (CITRACAL PETITES/VITAMIN D) 200-250 MG-UNIT TABS Take 1 tablet by mouth 2 times daily   Yes Historical Provider, MD   cyanocobalamin 1000 MCG tablet Take 1,000 mcg by mouth daily   Yes Historical Provider, MD   nitroGLYCERIN (NITROSTAT) 0.4 MG SL tablet Place 0.4 mg under the tongue every 5 minutes as needed for Chest pain   Yes Historical Provider, MD   warfarin (COUMADIN) 1 MG tablet Take 1 mg by mouth nightly    Yes Historical Provider, MD   sodium bicarbonate 650 MG tablet Take 1 tablet by mouth 2 times daily 9/29/20  Yes Carla Vuong DO   allopurinol (ZYLOPRIM) 100 MG tablet Take 1 tablet by mouth daily 9/14/20  Yes Carla Vuong DO   magnesium oxide (MAG-OX) 400 MG tablet Take 1 tablet by mouth daily 9/14/20  Yes Carla Vuong DO   insulin glargine (LANTUS SOLOSTAR) 100 UNIT/ML injection pen Inject 10 Units into the skin daily 8/13/20  Yes Mauro Khan MD   lidocaine (LIDODERM) 5 % Apply 1 patch topically every 24 hours  7/13/20  Yes Historical Provider, MD   metoprolol succinate (TOPROL XL) 25 MG extended release tablet Take 0.5 tablets by mouth daily 8/11/20  Yes Chata Holland MD   insulin lispro (HUMALOG) 100 UNIT/ML injection vial Inject 0-3 Units into the skin nightly 7/15/20  Yes Chata Holland MD   docusate (COLACE, DULCOLAX) 100 MG CAPS Take 100 mg by mouth daily 2/4/20  Yes Carla Vuong DO   isosorbide dinitrate (ISORDIL) 5 MG tablet Take 1 tablet by mouth 3 times daily 1/14/20  Yes Marco Dyer MD   albuterol sulfate HFA (VENTOLIN HFA) 108 (90 Base) MCG/ACT inhaler Inhale 2 puffs into the lungs every 6 hours as needed for Wheezing    Historical Provider, MD       OBJECTIVE:  BP (!) 90/40   Pulse 73   Temp 97 °F (36.1 °C)   Resp 16   Ht 5' 4\" (1.626 m)   Wt 171 lb (77.6 kg) Comment: taken from 10/19 documentation  SpO2 98%   BMI 29.35 kg/m²   Temp Av.5 °F (36.4 °C)  Min: 97 °F (36.1 °C)  Max: 97.7 °F (36.5 °C)  General appearance: Resting in bed in no apparent distress. Tearful  Skin: Warm and dry. No rashes were noted. HEENT: Round and reactive pupils. Moist mucous membranes. No ulcerations or thrush. Neck: Supple to movements. Chest: No use of accessory muscles to breathe. Symmetrical expansion. No wheezing, crackles or rhonchi. Cardiovascular: Reguar rate and rhythm. No murmurs gallops, or rubs appreciated. Abdomen: Bowel sounds present, nontender, nondistended, no masses or hepatosplenomegaly. Extremities: No clubbing, no cyanosis, + edema LUE and ble  Lines: peripheral    I/O last 3 completed shifts:   In: 940 [P.O.:840; IV Piggyback:100]  Out: 800 [Urine:800]      Laboratory and Tests Review:      Lab Results   Component Value Date    WBC 2.5 (L) 2020    WBC 3.1 (L) 2020    WBC 3.4 (L) 10/31/2020    HGB 6.6 (L) 2020    HCT 20.6 (L) 2020    .4 (H) 2020    PLT 49 (L) 2020     Lab Results   Component Value Date    NEUTROABS 2.05 2020    NEUTROABS 2.57 2020    NEUTROABS 2.99 10/31/2020     No results found for: [facility]  Lab Results   Component Value Date    ALT 41 (H) 2020    AST 33 (H) 2020    ALKPHOS 136 (H) 2020    BILITOT 0.7 2020     Lab Results   Component Value Date     2020    K 4.6 2020     2020    CO2 21 2020    BUN 69 2020    CREATININE 1.8 2020    CREATININE 1.9 2020    CREATININE 1.8 2020    GFRAA 33 2020    LABGLOM 27 2020    GLUCOSE 181 2020    GLUCOSE 109 2012    PROT 5.2 2020    LABALBU 2.6 2020    LABALBU 4.4 2012    CALCIUM 7.4 2020    BILITOT 0.7 2020    ALKPHOS 136 2020    AST 33 2020    ALT 41 2020     Lab Results   Component Value Date    CRP 4.9 (H) 2020    CRP 16.2 (H) 2020    CRP 4.2 (H) 2020 Bilateral pleural effusions with interstitial changes developing in the   lungs. A nodular density along the right minor fissure may represent   loculated pleural fluid or a pulmonary nodule. 4. Recommend correlation with history and any prior treatment for malignancy. XR CHEST PORTABLE   Final Result   1. Stable exam.  Persistent elevation of the right hemidiaphragm and small   right pleural effusion. Coarse interstitial markings are unchanged. 2.  Atherosclerotic disease, cardiomegaly, mild central pulmonary vascular   congestion, and postsurgical changes consistent with prior CABG. 3.  Unchanged complete left humeral neck fracture. Microbiology:   Lab Results   Component Value Date    BC 5 Days no growth 10/27/2020    BC 5 Days no growth 10/26/2020    BC 5 Days no growth 09/25/2020    ORG Proteus mirabilis 10/27/2020    ORG Enterococcus faecalis 10/27/2020    ORG Streptococcus viridans group 10/26/2020     Lab Results   Component Value Date    BLOODCULT2 24 Hours no growth 10/28/2020    BLOODCULT2  10/26/2020     This organism is only isolated from one set. Susceptibility testing is not routinely performed. Additional testing can be ordered by calling the  Microbiology Department. Additional blood cultures may be obtained if  clinical suspicion of septicemia is high. BLOODCULT2 5 Days no growth 07/07/2020    ORG Proteus mirabilis 10/27/2020    ORG Enterococcus faecalis 10/27/2020    ORG Streptococcus viridans group 10/26/2020     WOUND/ABSCESS   Date Value Ref Range Status   10/27/2020   Final    Mixed marck isolated. Further workup and sensitivity testing  is not routinely indicated and will not be performed. Mixed marck isolated includes:  Nonhemolytic Strep species  Coagulase negative Staph species  Mixed Gram negative rods  Proteus species     10/27/2020   Final    Mixed marck isolated.  Further workup and sensitivity testing  is not routinely indicated and will not be performed. Mixed marck isolated includes:  Mixed Gram negative rods  Proteus species  Nonhemolytic Strep species  Corynebacteria     07/29/2020   Final    Light growth  Methicillin resistant Staph aureus isolated. Most Methicillin  resistant Staphylococcus are usually resistant to multiple  antibiotics including other B-Lactams, Aminoglycosides,  Macrolides, Clindamycin and Tetracycline. Contact isolation  is indicated. 07/29/2020   Final    Rare growth  Refer to previous wound culture (Leg, Right) collected  same date/time for susceptibility results     07/29/2020 Rare growth  Final     No results found for: RESPSMEAR  No results found for: MPNEUMO, CLAMYDCU, LABLEGI, AFBCX, FUNGSM, LABFUNG  No results found for: CULTRESP  No results found for: CXCATHTIP  Body Fluid Culture, Sterile   Date Value Ref Range Status   07/25/2019 Growth not present  Final     No results found for: CXSURG  Urine Culture, Routine   Date Value Ref Range Status   10/27/2020 >100,000 CFU/ml  Final   10/27/2020 >100,000 CFU/ml  Final   07/07/2020 Growth not present  Final     MRSA Culture Only   Date Value Ref Range Status   08/09/2019 Methicillin resistant Staph aureus not isolated  Final       Problem list:    Active Problems:    Metastatic breast cancer ([facility] [address]    Type 2 diabetes mellitus with stage 3b chronic kidney disease, with long-term current use of insulin ([address]    Type 2 diabetes mellitus without complication, with long-term current use of insulin (HCC)    Anemia of chronic disease    Chronic deep vein thrombosis (DVT) of proximal vein of right lower extremity (HCC)    Cardiomyopathy ([address]    Decubitus ulcer    Dehydration    Acute kidney injury superimposed on CKD ([address]    Acute cystitis with hematuria    Fall    General weakness    Typical atrial flutter ([address]    Carcinoma of female breast ([address]  Resolved Problems:    * No resolved hospital problems.  *      ASSESSMENT:  Pancytopenia Unchanged   Chronic ischial and sacral decubiti improved although they do need debridement (polymicrobial)   Alpha strep virdians bacteremia possibly from the wounds rule out a Mediport issue   History of MRSA colonizing/infecting decubiti     PLAN:   Continue Augmentin for eventual discharge   check final cultures   Monitor labs   Echo did not show vegetation   s/p wound debridement 10/30/2020  Repeat blood cultures 10/27/202 no growth final          Ziggy Velazquez CNP    11:13 AM  11/2/2020      Pt seen and examined. Above discussed agree with advanced practice nurse. Labs, cultures, and radiographs reviewed. Face to Face encounter occurred. Changes made as necessary.      Apple Hdz MD

## 2020-11-02 NOTE — PROGRESS NOTES
[facility] Progress Note    Date of Service: 11/2/2020    Reason for Follow-up: Intermittent tachycardia, systolic murmur, questionable need for KAMRON    Patient is known to Dr. Marius Burkitt. PMH: Coronary artery disease s/p CABG; chronic HFrEF (ischemic vs chemotherapy induced); and stage IV metastatic breast cancer. Patient was seen in consultation on 10/30/2020 with Dr. Elise Das. Patient originally presented to the ER (10/26/2020) after suffering a mechanical fall associated with fatigue and weakness. She was found to have acute on chronic renal insufficiency and was being treated for UTI. Blood cultures positive for strep viridens and she is being followed by infectious disease. On 10/29/2020 patient was noted to be in atrial flutter with heart rates in the 150s on telemetry. She was given IV Lopressor and IV fluids. Asymptomatic. Patient spontaneously converted to sinus rhythm. proBNP 34,000. Potassium 3.0, creatinine 2.1. On 10/30/2020 Hgb 6.8. She was transfused with 2 units packed red blood cells. Echocardiogram showed reduced RV function, trivial anterior pericardial effusion, calculated ejection fraction 47%, visually estimated at 40%). Anticoagulation was not initiated due to thrombocytopenia and anemia. 11/1/2020 patient was started on Coumadin /thrombocytopenia and anemia stabilized. · Renal consulted for chronic kidney disease stage IV (baseline creatinine 1.5-2.0) /severe hypoalbuminemia and volume overload. Plans for albumin and diuretic combination to mobilize her third spacing. 11/2/2020: Coumadin held for thrombocytopenia per primary. SUBJECTIVE: Denies chest pain. Abdominal bloating with some SOB. HOME MEDICATIONS:  Prior to Admission medications    Medication Sig Start Date End Date Taking?  Authorizing Provider   omeprazole (PRILOSEC) 20 MG delayed release capsule Take 20 mg by mouth daily   Yes Historical Provider, MD   bumetanide (BUMEX) 1 MG tablet Take 1 mg by mouth daily   Yes Historical Provider, MD   palbociclib (IBRANCE) 100 MG tablet Take 100 mg by mouth daily Given three weeks on and one week off   Yes Historical Provider, MD   Calcium Citrate-Vitamin D (CITRACAL PETITES/VITAMIN D) 200-250 MG-UNIT TABS Take 1 tablet by mouth 2 times daily   Yes Historical Provider, MD   cyanocobalamin 1000 MCG tablet Take 1,000 mcg by mouth daily   Yes Historical Provider, MD   nitroGLYCERIN (NITROSTAT) 0.4 MG SL tablet Place 0.4 mg under the tongue every 5 minutes as needed for Chest pain   Yes Historical Provider, MD   warfarin (COUMADIN) 1 MG tablet Take 1 mg by mouth nightly    Yes Historical Provider, MD   sodium bicarbonate 650 MG tablet Take 1 tablet by mouth 2 times daily 9/29/20  Yes Virgen Moody DO   allopurinol (ZYLOPRIM) 100 MG tablet Take 1 tablet by mouth daily 9/14/20  Yes Virgen Moody DO   magnesium oxide (MAG-OX) 400 MG tablet Take 1 tablet by mouth daily 9/14/20  Yes Virgen Moody DO   insulin glargine (LANTUS SOLOSTAR) 100 UNIT/ML injection pen Inject 10 Units into the skin daily 8/13/20  Yes Surinder Stone MD   lidocaine (LIDODERM) 5 % Apply 1 patch topically every 24 hours  7/13/20  Yes Historical Provider, MD   metoprolol succinate (TOPROL XL) 25 MG extended release tablet Take 0.5 tablets by mouth daily 8/11/20  Yes Lynnette Mason MD   insulin lispro (HUMALOG) 100 UNIT/ML injection vial Inject 0-3 Units into the skin nightly 7/15/20  Yes Lynnette Mason MD   docusate (COLACE, DULCOLAX) 100 MG CAPS Take 100 mg by mouth daily 2/4/20  Yes Virgen Moody DO   isosorbide dinitrate (ISORDIL) 5 MG tablet Take 1 tablet by mouth 3 times daily 1/14/20  Yes Selena Corrigan MD   albuterol sulfate HFA (VENTOLIN HFA) 108 (90 Base) MCG/ACT inhaler Inhale 2 puffs into the lungs every 6 hours as needed for Wheezing    Historical Provider, MD       CURRENT MEDICATIONS:      Current Facility-Administered Medications:     0.9 % sodium chloride bolus, 20 mL, Intravenous, Once, Danielle Longest, DO    docusate sodium (COLACE) capsule 100 mg, 100 mg, Oral, BID, Ashleigh Paiz MD, 100 mg at 11/02/20 0804    albumin human 25 % IV solution 25 g, 25 g, Intravenous, Q8H, Linden Crigler, MD, Stopped at 11/02/20 0553    bumetanide (BUMEX) injection 1 mg, 1 mg, Intravenous, 3 times per day, Linden Crigler, MD, 1 mg at 11/02/20 0553    palbociclib (IBRANCE) tablet TABS 100 mg, 100 mg, Oral, Daily, Vicky Antonio MD    metoprolol succinate (TOPROL XL) extended release tablet 12.5 mg, 12.5 mg, Oral, BID, Michael Prieto MD, Stopped at 11/02/20 0804    0.9 % sodium chloride bolus, 20 mL, Intravenous, Once, Mohit Talbot MD    folic acid (FOLVITE) tablet 1 mg, 1 mg, Oral, Daily, Sreedhar Dorsey MD, 1 mg at 11/02/20 0805    cefTRIAXone (ROCEPHIN) 1 g in sterile water 10 mL IV syringe, 1 g, Intravenous, Q24H, Paul Jimenez MD, 1 g at 11/01/20 1552    mineral oil-hydrophilic petrolatum (HYDROPHOR) ointment, , Topical, BID **AND** mineral oil-hydrophilic petrolatum (HYDROPHOR) ointment, , Topical, TID PRN, Verna Scott MD    heparin flush 100 UNIT/ML injection 100 Units, 100 Units, Intracatheter, Daily, Carolina A Rech, DO, 100 Units at 11/02/20 0803    heparin flush 100 UNIT/ML injection 100 Units, 100 Units, Intracatheter, PRN, Danielle Longest, DO    insulin glargine (LANTUS) injection vial 10 Units, 10 Units, Subcutaneous, Daily, Carolina A Rech, DO, 10 Units at 11/02/20 0806    insulin lispro (HUMALOG) injection vial 0-6 Units, 0-6 Units, Subcutaneous, TID WC, Carolina A Rech, DO, 1 Units at 11/02/20 0806    perflutren lipid microspheres (DEFINITY) injection 1.65 mg, 1.5 mL, Intravenous, ONCE PRN, Vicky Antonio MD    vitamin B-12 (CYANOCOBALAMIN) tablet 1,000 mcg, 1,000 mcg, Oral, Daily, Ashleigh Paiz MD, 1,000 mcg at 11/02/20 0804    insulin lispro (HUMALOG) injection vial 0-3 Units, 0-3 Units, Subcutaneous, Nightly, Ashleigh Paiz MD, 1 Units at 11/01/20 2050    magnesium oxide (MAG-OX) tablet 400 mg, 400 mg, Oral, Daily, Yesenia Thibodeaux MD, 400 mg at 11/02/20 0804    sodium bicarbonate tablet 650 mg, 650 mg, Oral, BID, Yesenia Thibodeaux MD, 650 mg at 11/02/20 0804    sodium chloride flush 0.9 % injection 10 mL, 10 mL, Intravenous, 2 times per day, Yesenia Thibodeaux MD, 10 mL at 11/02/20 0804    sodium chloride flush 0.9 % injection 10 mL, 10 mL, Intravenous, PRN, Yesenia Thibodeaux MD, 10 mL at 11/01/20 1552    polyethylene glycol (GLYCOLAX) packet 17 g, 17 g, Oral, Daily PRN, Yesenia Thibodeaux MD    pantoprazole (PROTONIX) injection 40 mg, 40 mg, Intravenous, Daily, Yesenia Thibodeaux MD, 40 mg at 11/02/20 0805    vancomycin 1000 mg IVPB in 250 mL D5W addavial, 1,000 mg, Intravenous, Q36H, Yesenia Thibodeaux MD, Stopped at 11/01/20 2242    oxyCODONE (ROXICODONE) immediate release tablet 5 mg, 5 mg, Oral, Q6H PRN, Tolu Rabago DO, 5 mg at 11/02/20 0805    glucose (GLUTOSE) 40 % oral gel 15 g, 15 g, Oral, PRN, Sudhirabby Spring,     dextrose 50 % IV solution, 12.5 g, Intravenous, PRN, Sudhir Sirkelly,     glucagon (rDNA) injection 1 mg, 1 mg, Intramuscular, PRN, Sudhir Clementine,     dextrose 5 % solution, 100 mL/hr, Intravenous, PRN, Sudhir Clementine,       ALLERGIES:  Macrobid [nitrofurantoin monohydrate macrocrystals]        REVIEW OF SYSTEMS:     · Constitutional: Denies fevers, chills, night sweats, and generalized fatigue. Denies significant weight loss or weight gain. · HEENT: Denies headaches, nose bleeds, rhinorrhea, sore throat. Denies blurred vision. Denies dysphagia, odynophagia. · Musculoskeletal: Denies falls, pain to BLE with ambulation. Denies muscle weakness. · Neurological: Denies dizziness and lightheadedness, numbness and tingling. Denies focal neurological deficits. · Cardiovascular: Denies chest pain, palpitations, diaphoresis. Denies dizziness, syncope. Denies PND, orthopnea, peripheral edema.   · Respiratory: Denies shortness of breath at rest or with exertion. Denies cough, hemoptysis. · Gastrointestinal: Denies heartburn, nausea/vomiting, diarrhea and constipation, black/bloody, and tarry stools. PHYSICAL EXAM:   BP (!) 96/50   Pulse 71   Temp 97.7 °F (36.5 °C)   Resp 16   Ht 5' 4\" (1.626 m)   Wt 171 lb (77.6 kg) Comment: taken from 10/19 documentation  SpO2 98%   BMI 29.35 kg/m²   CONST:  Well developed, well nourished who appears stated age. Awake, alert, cooperative, no apparent distress. HEENT:   Head- Normocephalic, atraumatic. Eyes- Conjunctivae pink, anicteric. Throat- Oral mucosa pink and moist.  Neck-  No stridor, trachea midline, no jugular venous distention. CHEST: Chest symmetrical and non-tender to palpation. No accessory muscle use or intercostal retractions. RESPIRATORY: Lung sounds - clear throughout fields. No wheezing, rales or rhonchi. CARDIOVASCULAR:     No carotid bruit. Heart Inspection- shows no noted pulsations. Heart Palpation- no heaves or thrills; PMI is non-displaced. Heart Ausculation- Regular rate and rhythm, no murmur. No s3, s4 or rub. PV: No lower extremity edema. No varicosities. Pedal pulses palpable, no clubbing or cyanosis. ABDOMEN: Soft, non-tender to light palpation. Bowel sounds present. MS: Good muscle strength and tone. No atrophy or abnormal movements. SKIN: Warm and dry. No statis dermatitis or ulcers. NEURO / PSYCH: Oriented to person, place and time. Speech clear and appropriate. Follows all commands. Pleasant affect.       DATA:    Telemetry: SR.     Diagnostic:      Intake/Output Summary (Last 24 hours) at 11/2/2020 0931  Last data filed at 11/2/2020 0553  Gross per 24 hour   Intake 700 ml   Output 800 ml   Net -100 ml       Labs:   CBC:   Recent Labs     11/01/20  0548 11/02/20  0315 11/02/20  0555   WBC 3.1* 2.5*  --    HGB 8.2* 6.6* 6.6*   HCT 25.3* 21.1* 20.6*   PLT 66* 49*  --      BMP:   Recent Labs     11/01/20  1145 11/02/20 0315    137   K 4.8 4.6   CO2 23 21* BUN 69* 69*   CREATININE 1.9* 1.8*   LABGLOM 26 27   CALCIUM 7.7* 7.4*     Mag: No results for input(s): MG in the last 72 hours. Phos: No results for input(s): PHOS in the last 72 hours. TSH: No results for input(s): TSH in the last 72 hours. HgA1c:   Lab Results   Component Value Date    LABA1C 8.1 (H) 06/19/2020     No results found for: EAG  BNP: No results for input(s): BNP in the last 72 hours. PT/INR:   Recent Labs     11/01/20  0548 11/02/20  0315   PROTIME 23.1* 21.4*   INR 2.0 1.9     APTT:No results for input(s): APTT in the last 72 hours. CARDIAC ENZYMES:No results for input(s): CKTOTAL, CKMB, CKMBINDEX, TROPONINI in the last 72 hours. FASTING LIPID PANEL:  Lab Results   Component Value Date    CHOL 80 06/10/2019    HDL 31 06/10/2019    LDLCALC 28 06/10/2019    TRIG 104 06/10/2019     LIVER PROFILE:  Recent Labs     11/01/20  0548 11/02/20  0315   AST 39* 33*   ALT 48* 41*   LABALBU 2.1* 2.6*     Chest x-ray: 11/1/2020  Stable CHF.  Pneumonia is less likely. Chest x-ray: 10/20/2020  Increasing vascular congestion versus mild pulmonary edema. MRI brain without contrast: 10/27/2020  1. No acute intracranial abnormality. 2. No mass effect, edema or hemorrhage. TTE 10/30/2020 (Dr. Zenobia Shabazz):   Left ventricle is normal in size . Borderline concentric left ventricular hypertrophy. Abnormal (paradoxical) motion consistent with conduction abnormality. Mild-moderately reduced left ventricular systolic dysfunction. The left atrium is mildly dilated. Right ventricle global systolic function is reduced . Structurally normal mitral valve with restricted leaflet motion. Moderate mitral annular calcification. Mild-moderate anteriorly directed mitral regurgitation. The aortic valve appears mildly sclerotic. Moderate tricuspid regurgitation. Pulmonary hypertension is mild . There is a trivial anterior pericardial effusion noted.   Ejection fraction is visually estimated at 40% (calculated 47%). TTE: 1/3/2020  Left ventricle size is normal.   Ejection fraction is visually estimated at 50-55%. Overall ejection fraction is borderline normal .   Inferolateral wall is hypokinetic. Normal left ventricle wall thickness. Abnormal LV diastolic function ( IVRT < 60 msec). Could not be graded due   to moderate MAC. Abnormal global longitudinal strain peak strain rate -15.6% (< -16 -18%   normal)   The left atrium is moderately dilated. Borderline dilated right ventricle. Right ventricle global systolic function is normal . TAPSE 20 mm. Physiologic and/or trace mitral regurgitation is present. No hemodynamically significant aortic stenosis is present. RVSP is 30 mmHg. Normal PA systolic pressure. No evidence for hemodynamically significant pericardial effusion. Compared to prior echo of 7/23/2019, changes noted. LVEF has improved from   30-35% to 50-55%. ASSESSMENT:  1.  New onset of atrial flutter with RVR: Spontaneously converted to sinus rhythm-->Maintaining sinus rhythm. · Coumadin resumed (previously on [facility] for Hx of DVT) -- >held in the setting of pancytopenia  2. Acute on chronic HFrEF: LVEF 30-35% on TTE 7/2019. TTE 1/2020 EF 50-55%. TTE 10/30/2020 LVEF 40% (calculated 47%). Output +2 L since admission. 3.  CAD status post CABG (SVG to OM1, RCA and acute marginal branch) -- 2008. 4.  Chronically elevated troponin in the setting of renal insufficiency and severe anemia. No evidence of ACS. 5.  Diabetes mellitus  6. Hypotension. History of hypertension  7. Hyperlipidemia  8. Acute on CKD  9. Pancytopenia -- with worsening platelet count today (46K)  10. Alpha strep viridans bacteremia --on antibiotics /ID following  11. Chronic ulcers on bilateral hips --status post debridement  12. Metastatic breast cancer with known mets to liver /elevated LFTs /worsening encephalopathy and tremor /MRI brain showing no acute change.   13. DNR-CCA    RECOMMENDATIONS:  1.  Volume management as per renal  2. Transfuse for hemoglobin < 8. Resume Coumadin when hemoglobin and platelets improve -- defer to primary   3. Continue Toprol-XL 12.5 mg twice daily; due to soft blood pressures unable to titrate. 4.  Would recommend starting lisinopril/Aldactone when/if renal function improves. 5.  Rest as per primary and other consultants  6. Will follow       The above case and recommendations to be discussed with Dr. Naveed Kinsey. [facility]    Electronically signed by FORD Rasheed CNP on 11/2/2020 at 9:45 AM   ______________________________________________________________________  Cardiology attending attestation:  I have independently interviewed and examined the patient. I personally reviewed pertinent laboratory values and diagnostic testing (including, if applicable, chest xray, electrocardiogram, telemetry, echocardiogram, stress testing, and coronary angiography). I have reviewed the above documentation completed by FORD Ortega CNP and agree with the findings, assessment and plan except where noted. Plan formulated under my direct supervision. I participated in all aspects of the medical decision making. Please see my additional contributions to the HPI, physical exam, and assessment / medical decision making:  _______________________________________________________________________    Consider heme/oc consult. Remainder as above.     Raisa Ferris MD, [facility]

## 2020-11-03 LAB
ABO/RH: NORMAL
ALBUMIN SERPL-MCNC: 3.5 G/DL (ref 3.5–5.2)
ALP BLD-CCNC: 176 U/L (ref 35–104)
ALT SERPL-CCNC: 43 U/L (ref 0–32)
ANION GAP SERPL CALCULATED.3IONS-SCNC: 11 MMOL/L (ref 7–16)
ANISOCYTOSIS: ABNORMAL
ANTIBODY SCREEN: NORMAL
AST SERPL-CCNC: 35 U/L (ref 0–31)
BASOPHILS ABSOLUTE: 0.05 E9/L (ref 0–0.2)
BASOPHILS RELATIVE PERCENT: 1.8 % (ref 0–2)
BILIRUB SERPL-MCNC: 1.2 MG/DL (ref 0–1.2)
BUN BLDV-MCNC: 75 MG/DL (ref 8–23)
CALCIUM SERPL-MCNC: 8.2 MG/DL (ref 8.6–10.2)
CHLORIDE BLD-SCNC: 99 MMOL/L (ref 98–107)
CO2: 23 MMOL/L (ref 22–29)
CREAT SERPL-MCNC: 1.9 MG/DL (ref 0.5–1)
CULTURE, BLOOD 3: NORMAL
EOSINOPHILS ABSOLUTE: 0.03 E9/L (ref 0.05–0.5)
EOSINOPHILS RELATIVE PERCENT: 0.9 % (ref 0–6)
GFR AFRICAN AMERICAN: 31
GFR NON-AFRICAN AMERICAN: 26 ML/MIN/1.73
GLUCOSE BLD-MCNC: 199 MG/DL (ref 74–99)
HCT VFR BLD CALC: 26.5 % (ref 34–48)
HEMOGLOBIN: 8.4 G/DL (ref 11.5–15.5)
INR BLD: 1.7
LYMPHOCYTES ABSOLUTE: 0.28 E9/L (ref 1.5–4)
LYMPHOCYTES RELATIVE PERCENT: 9.8 % (ref 20–42)
MCH RBC QN AUTO: 34.6 PG (ref 26–35)
MCHC RBC AUTO-ENTMCNC: 31.7 % (ref 32–34.5)
MCV RBC AUTO: 109.1 FL (ref 80–99.9)
METER GLUCOSE: 179 MG/DL (ref 74–99)
METER GLUCOSE: 199 MG/DL (ref 74–99)
METER GLUCOSE: 207 MG/DL (ref 74–99)
METER GLUCOSE: 253 MG/DL (ref 74–99)
MONOCYTES ABSOLUTE: 0.03 E9/L (ref 0.1–0.95)
MONOCYTES RELATIVE PERCENT: 0.9 % (ref 2–12)
NEUTROPHILS ABSOLUTE: 2.44 E9/L (ref 1.8–7.3)
NEUTROPHILS RELATIVE PERCENT: 86.6 % (ref 43–80)
PDW BLD-RTO: 24.8 FL (ref 11.5–15)
PLATELET # BLD: 49 E9/L (ref 130–450)
PLATELET CONFIRMATION: NORMAL
PMV BLD AUTO: 12.6 FL (ref 7–12)
POIKILOCYTES: ABNORMAL
POLYCHROMASIA: ABNORMAL
POTASSIUM REFLEX MAGNESIUM: 4.7 MMOL/L (ref 3.5–5)
PROTHROMBIN TIME: 19 SEC (ref 9.3–12.4)
RBC # BLD: 2.43 E12/L (ref 3.5–5.5)
SODIUM BLD-SCNC: 133 MMOL/L (ref 132–146)
TARGET CELLS: ABNORMAL
TOTAL PROTEIN: 6.2 G/DL (ref 6.4–8.3)
WBC # BLD: 2.8 E9/L (ref 4.5–11.5)

## 2020-11-03 PROCEDURE — 85610 PROTHROMBIN TIME: CPT

## 2020-11-03 PROCEDURE — 6360000002 HC RX W HCPCS: Performed by: STUDENT IN AN ORGANIZED HEALTH CARE EDUCATION/TRAINING PROGRAM

## 2020-11-03 PROCEDURE — P9047 ALBUMIN (HUMAN), 25%, 50ML: HCPCS | Performed by: INTERNAL MEDICINE

## 2020-11-03 PROCEDURE — 36415 COLL VENOUS BLD VENIPUNCTURE: CPT

## 2020-11-03 PROCEDURE — 6370000000 HC RX 637 (ALT 250 FOR IP): Performed by: FAMILY MEDICINE

## 2020-11-03 PROCEDURE — 2060000000 HC ICU INTERMEDIATE R&B

## 2020-11-03 PROCEDURE — APPSS30 APP SPLIT SHARED TIME 16-30 MINUTES: Performed by: NURSE PRACTITIONER

## 2020-11-03 PROCEDURE — 99232 SBSQ HOSP IP/OBS MODERATE 35: CPT | Performed by: FAMILY MEDICINE

## 2020-11-03 PROCEDURE — 6360000002 HC RX W HCPCS: Performed by: FAMILY MEDICINE

## 2020-11-03 PROCEDURE — 2500000003 HC RX 250 WO HCPCS: Performed by: INTERNAL MEDICINE

## 2020-11-03 PROCEDURE — 6370000000 HC RX 637 (ALT 250 FOR IP): Performed by: INTERNAL MEDICINE

## 2020-11-03 PROCEDURE — 2580000003 HC RX 258: Performed by: FAMILY MEDICINE

## 2020-11-03 PROCEDURE — 86900 BLOOD TYPING SEROLOGIC ABO: CPT

## 2020-11-03 PROCEDURE — 99232 SBSQ HOSP IP/OBS MODERATE 35: CPT | Performed by: INTERNAL MEDICINE

## 2020-11-03 PROCEDURE — 6370000000 HC RX 637 (ALT 250 FOR IP): Performed by: STUDENT IN AN ORGANIZED HEALTH CARE EDUCATION/TRAINING PROGRAM

## 2020-11-03 PROCEDURE — 86850 RBC ANTIBODY SCREEN: CPT

## 2020-11-03 PROCEDURE — 86901 BLOOD TYPING SEROLOGIC RH(D): CPT

## 2020-11-03 PROCEDURE — 97530 THERAPEUTIC ACTIVITIES: CPT

## 2020-11-03 PROCEDURE — 6360000002 HC RX W HCPCS: Performed by: INTERNAL MEDICINE

## 2020-11-03 PROCEDURE — 82962 GLUCOSE BLOOD TEST: CPT

## 2020-11-03 PROCEDURE — 85025 COMPLETE CBC W/AUTO DIFF WBC: CPT

## 2020-11-03 PROCEDURE — 80053 COMPREHEN METABOLIC PANEL: CPT

## 2020-11-03 PROCEDURE — C9113 INJ PANTOPRAZOLE SODIUM, VIA: HCPCS | Performed by: FAMILY MEDICINE

## 2020-11-03 RX ORDER — BUMETANIDE 0.25 MG/ML
1 INJECTION, SOLUTION INTRAMUSCULAR; INTRAVENOUS DAILY
Status: DISCONTINUED | OUTPATIENT
Start: 2020-11-04 | End: 2020-11-04

## 2020-11-03 RX ADMIN — BUMETANIDE 1 MG: 0.25 INJECTION INTRAMUSCULAR; INTRAVENOUS at 06:20

## 2020-11-03 RX ADMIN — SODIUM BICARBONATE 650 MG: 650 TABLET ORAL at 08:17

## 2020-11-03 RX ADMIN — ALBUMIN (HUMAN) 25 G: 0.25 INJECTION, SOLUTION INTRAVENOUS at 05:00

## 2020-11-03 RX ADMIN — SKIN PROTECTANT: 44 OINTMENT TOPICAL at 21:05

## 2020-11-03 RX ADMIN — INSULIN LISPRO 1 UNITS: 100 INJECTION, SOLUTION INTRAVENOUS; SUBCUTANEOUS at 21:05

## 2020-11-03 RX ADMIN — AMOXICILLIN AND CLAVULANATE POTASSIUM 600 MG: 600; 42.9 POWDER, FOR SUSPENSION ORAL at 21:04

## 2020-11-03 RX ADMIN — INSULIN LISPRO 1 UNITS: 100 INJECTION, SOLUTION INTRAVENOUS; SUBCUTANEOUS at 11:50

## 2020-11-03 RX ADMIN — COLLAGENASE SANTYL: 250 OINTMENT TOPICAL at 06:20

## 2020-11-03 RX ADMIN — MAGNESIUM GLUCONATE 500 MG ORAL TABLET 400 MG: 500 TABLET ORAL at 08:16

## 2020-11-03 RX ADMIN — SODIUM CHLORIDE, PRESERVATIVE FREE 10 ML: 5 INJECTION INTRAVENOUS at 08:16

## 2020-11-03 RX ADMIN — HEPARIN 100 UNITS: 100 SYRINGE at 08:16

## 2020-11-03 RX ADMIN — METOPROLOL SUCCINATE 12.5 MG: 25 TABLET, EXTENDED RELEASE ORAL at 21:03

## 2020-11-03 RX ADMIN — SODIUM BICARBONATE 650 MG: 650 TABLET ORAL at 21:03

## 2020-11-03 RX ADMIN — DOCUSATE SODIUM 100 MG: 100 CAPSULE, LIQUID FILLED ORAL at 21:03

## 2020-11-03 RX ADMIN — OXYCODONE 5 MG: 5 TABLET ORAL at 21:03

## 2020-11-03 RX ADMIN — SODIUM CHLORIDE, PRESERVATIVE FREE 10 ML: 5 INJECTION INTRAVENOUS at 06:20

## 2020-11-03 RX ADMIN — Medication 1000 MCG: at 08:17

## 2020-11-03 RX ADMIN — SODIUM CHLORIDE, PRESERVATIVE FREE 10 ML: 5 INJECTION INTRAVENOUS at 21:04

## 2020-11-03 RX ADMIN — INSULIN LISPRO 3 UNITS: 100 INJECTION, SOLUTION INTRAVENOUS; SUBCUTANEOUS at 17:21

## 2020-11-03 RX ADMIN — SKIN PROTECTANT: 44 OINTMENT TOPICAL at 08:17

## 2020-11-03 RX ADMIN — PANTOPRAZOLE SODIUM 40 MG: 40 INJECTION, POWDER, FOR SOLUTION INTRAVENOUS at 08:16

## 2020-11-03 RX ADMIN — OXYCODONE 5 MG: 5 TABLET ORAL at 05:24

## 2020-11-03 RX ADMIN — INSULIN GLARGINE 10 UNITS: 100 INJECTION, SOLUTION SUBCUTANEOUS at 08:26

## 2020-11-03 RX ADMIN — FOLIC ACID 1 MG: 1 TABLET ORAL at 08:16

## 2020-11-03 RX ADMIN — INSULIN LISPRO 2 UNITS: 100 INJECTION, SOLUTION INTRAVENOUS; SUBCUTANEOUS at 08:16

## 2020-11-03 RX ADMIN — DOCUSATE SODIUM 100 MG: 100 CAPSULE, LIQUID FILLED ORAL at 08:16

## 2020-11-03 RX ADMIN — AMOXICILLIN AND CLAVULANATE POTASSIUM 600 MG: 600; 42.9 POWDER, FOR SUSPENSION ORAL at 08:15

## 2020-11-03 ASSESSMENT — PAIN SCALES - GENERAL
PAINLEVEL_OUTOF10: 7
PAINLEVEL_OUTOF10: 0
PAINLEVEL_OUTOF10: 8

## 2020-11-03 NOTE — PROGRESS NOTES
Inpatient [id number] [facility] Cardiology Progress Note    Date of Service: 11/3/2020    Reason for Follow-up: Intermittent tachycardia, systolic murmur, questionable need for KAMRON    Patient is known to Dr. Marius Burkitt. PMH: Coronary artery disease s/p CABG; chronic HFrEF (ischemic vs chemotherapy induced); and stage IV metastatic breast cancer. Patient was seen in consultation on 10/30/2020 with Dr. Elise Das. Patient originally presented to the ER (10/26/2020) after suffering a mechanical fall associated with fatigue and weakness. She was found to have acute on chronic renal insufficiency and was being treated for UTI. Blood cultures positive for strep viridens and she is being followed by infectious disease. On 10/29/2020 patient was noted to be in atrial flutter with heart rates in the 150s on telemetry. She was given IV Lopressor and IV fluids. Asymptomatic. Patient spontaneously converted to sinus rhythm. proBNP 34,000. Potassium 3.0, creatinine 2.1. On 10/30/2020 Hgb 6.8. She was transfused with 2 units packed red blood cells. Echocardiogram showed reduced RV function, trivial anterior pericardial effusion, calculated ejection fraction 47%, visually estimated at 40%). Anticoagulation was not initiated due to thrombocytopenia and anemia. 11/1/2020 patient was started on Coumadin /thrombocytopenia and anemia stabilized. · Renal consulted for chronic kidney disease stage IV (baseline creatinine 1.5-2.0) /severe hypoalbuminemia and volume overload. Plans for albumin and diuretic combination to mobilize her third spacing. 11/2/2020: Coumadin held for thrombocytopenia / ?  Pneumaturia Per primary. 11/3/2020: Coumadin remains held due to thrombocytopenia. BP remains soft. Blood pressure 84//58. SUBJECTIVE: Denies chest pain. Abdominal bloating with some SOB. HOME MEDICATIONS:  Prior to Admission medications    Medication Sig Start Date End Date Taking?  Authorizing Provider   omeprazole (PRILOSEC) 20 MG delayed release capsule Take 20 mg by mouth daily   Yes Historical Provider, MD   bumetanide (BUMEX) 1 MG tablet Take 1 mg by mouth daily   Yes Historical Provider, MD   palbociclib (IBRANCE) 100 MG tablet Take 100 mg by mouth daily Given three weeks on and one week off   Yes Historical Provider, MD   Calcium Citrate-Vitamin D (CITRACAL PETITES/VITAMIN D) 200-250 MG-UNIT TABS Take 1 tablet by mouth 2 times daily   Yes Historical Provider, MD   cyanocobalamin 1000 MCG tablet Take 1,000 mcg by mouth daily   Yes Historical Provider, MD   nitroGLYCERIN (NITROSTAT) 0.4 MG SL tablet Place 0.4 mg under the tongue every 5 minutes as needed for Chest pain   Yes Historical Provider, MD   warfarin (COUMADIN) 1 MG tablet Take 1 mg by mouth nightly    Yes Historical Provider, MD   sodium bicarbonate 650 MG tablet Take 1 tablet by mouth 2 times daily 9/29/20  Yes Edson Zavaleta,    allopurinol (ZYLOPRIM) 100 MG tablet Take 1 tablet by mouth daily 9/14/20  Yes Edson Zavaleta DO   magnesium oxide (MAG-OX) 400 MG tablet Take 1 tablet by mouth daily 9/14/20  Yes Edson Zavaleta DO   insulin glargine (LANTUS SOLOSTAR) 100 UNIT/ML injection pen Inject 10 Units into the skin daily 8/13/20  Yes Verna Scott MD   lidocaine (LIDODERM) 5 % Apply 1 patch topically every 24 hours  7/13/20  Yes Historical Provider, MD   metoprolol succinate (TOPROL XL) 25 MG extended release tablet Take 0.5 tablets by mouth daily 8/11/20  Yes Loc Head MD   insulin lispro (HUMALOG) 100 UNIT/ML injection vial Inject 0-3 Units into the skin nightly 7/15/20  Yes Loc Head MD   docusate (COLACE, DULCOLAX) 100 MG CAPS Take 100 mg by mouth daily 2/4/20  Yes Edson Zavaleta DO   isosorbide dinitrate (ISORDIL) 5 MG tablet Take 1 tablet by mouth 3 times daily 1/14/20  Yes Sunny Ferrer MD   albuterol sulfate HFA (VENTOLIN HFA) 108 (90 Base) MCG/ACT inhaler Inhale 2 puffs into the lungs every 6 hours as needed for Wheezing Laina Phillips MD       CURRENT MEDICATIONS:      Current Facility-Administered Medications:     [START ON 11/4/2020] bumetanide (BUMEX) injection 1 mg, 1 mg, Intravenous, Daily, Brianda Saba MD    amoxicillin-clavulanate (AUGMENTIN-ES) 600-42.9 MG/5ML suspension 600 mg, 600 mg, Oral, 2 times per day, Aubrey Swann MD, 600 mg at 11/03/20 0815    collagenase ointment, , Topical, Daily, Vitaly Rowe MD    docusate sodium (COLACE) capsule 100 mg, 100 mg, Oral, BID, Adriane Vick MD, 100 mg at 11/03/20 0816    palbociclib (IBRANCE) tablet TABS 100 mg, 100 mg, Oral, Daily, Tariq Peguero MD, 100 mg at 11/03/20 0817    metoprolol succinate (TOPROL XL) extended release tablet 12.5 mg, 12.5 mg, Oral, BID, Oliver Gee MD, 12.5 mg at 11/02/20 2024    0.9 % sodium chloride bolus, 20 mL, Intravenous, Once, Kelvin Boss MD    folic acid (FOLVITE) tablet 1 mg, 1 mg, Oral, Daily, Parag Bernal MD, 1 mg at 11/03/20 9769    mineral oil-hydrophilic petrolatum (HYDROPHOR) ointment, , Topical, BID **AND** mineral oil-hydrophilic petrolatum (HYDROPHOR) ointment, , Topical, TID PRN, Vitaly Rowe MD    heparin flush 100 UNIT/ML injection 100 Units, 100 Units, Intracatheter, Daily, Carolina GARCÍA Rech, DO, 100 Units at 11/03/20 0816    heparin flush 100 UNIT/ML injection 100 Units, 100 Units, Intracatheter, PRN, Salud Pérez DO    insulin glargine (LANTUS) injection vial 10 Units, 10 Units, Subcutaneous, Daily, Carolina A Rech, DO, 10 Units at 11/03/20 0826    insulin lispro (HUMALOG) injection vial 0-6 Units, 0-6 Units, Subcutaneous, TID WC, Carolina A Rech, DO, 2 Units at 11/03/20 0816    perflutren lipid microspheres (DEFINITY) injection 1.65 mg, 1.5 mL, Intravenous, ONCE PRN, Tariq Peguero MD    vitamin B-12 (CYANOCOBALAMIN) tablet 1,000 mcg, 1,000 mcg, Oral, Daily, Adriane Vick MD, 1,000 mcg at 11/03/20 0817    insulin lispro (HUMALOG) injection vial 0-3 Units, 0-3 Units, Subcutaneous, Nightly, Julianna Ramirez MD, 1 Units at 11/02/20 2026    magnesium oxide (MAG-OX) tablet 400 mg, 400 mg, Oral, Daily, Julianna Ramirez MD, 400 mg at 11/03/20 0816    sodium bicarbonate tablet 650 mg, 650 mg, Oral, BID, Julianna Ramirez MD, 650 mg at 11/03/20 0817    sodium chloride flush 0.9 % injection 10 mL, 10 mL, Intravenous, 2 times per day, Julianna Ramirez MD, 10 mL at 11/03/20 0816    sodium chloride flush 0.9 % injection 10 mL, 10 mL, Intravenous, PRN, Julianna Ramirez MD, 10 mL at 11/03/20 0620    polyethylene glycol (GLYCOLAX) packet 17 g, 17 g, Oral, Daily PRN, Julianna Ramirez MD    pantoprazole (PROTONIX) injection 40 mg, 40 mg, Intravenous, Daily, Julianna Ramirez MD, 40 mg at 11/03/20 0816    oxyCODONE (ROXICODONE) immediate release tablet 5 mg, 5 mg, Oral, Q6H PRN, Chandrakant Alexander DO, 5 mg at 11/03/20 0524    glucose (GLUTOSE) 40 % oral gel 15 g, 15 g, Oral, PRN, Navjot Robinsons, DO    dextrose 50 % IV solution, 12.5 g, Intravenous, PRN, Navjot Robinsons, DO    glucagon (rDNA) injection 1 mg, 1 mg, Intramuscular, PRN, Navjotluz Jaegers, DO    dextrose 5 % solution, 100 mL/hr, Intravenous, PRN, Navjotluz Jaegers, DO      ALLERGIES:  Macrobid [nitrofurantoin monohydrate macrocrystals]        REVIEW OF SYSTEMS:     · Constitutional: Denies fevers, chills, night sweats, and generalized fatigue. Denies significant weight loss or weight gain. · HEENT: Denies headaches, nose bleeds, rhinorrhea, sore throat. Denies blurred vision. Denies dysphagia, odynophagia. · Musculoskeletal: Denies falls, pain to BLE with ambulation. Denies muscle weakness. · Neurological: Denies dizziness and lightheadedness, numbness and tingling. Denies focal neurological deficits. · Cardiovascular: Denies chest pain, palpitations, diaphoresis. Denies dizziness, syncope. Denies PND, orthopnea, peripheral edema. · Respiratory: Denies shortness of breath at rest or with exertion. Denies cough, hemoptysis.   · Gastrointestinal: Denies heartburn, nausea/vomiting, diarrhea and constipation, black/bloody, and tarry stools. PHYSICAL EXAM:   BP (!) 84/40   Pulse 72   Temp 97 °F (36.1 °C) (Temporal)   Resp 12   Ht 5' 4\" (1.626 m)   Wt 171 lb (77.6 kg) Comment: taken from 10/19 documentation  SpO2 97%   BMI 29.35 kg/m²   CONST:  Well developed, well nourished elderly  female who appears stated age. Awake, alert, cooperative, no apparent distress. HEENT:   Head- Normocephalic, atraumatic. Eyes- Conjunctivae pink, anicteric. Throat- Oral mucosa pink and moist.  Neck-  No stridor, trachea midline, no jugular venous distention. CHEST: Chest symmetrical and non-tender to palpation. No accessory muscle use or intercostal retractions. RESPIRATORY: Lung sounds - clear throughout fields. No wheezing, rales or rhonchi. CARDIOVASCULAR:     No carotid bruit. Heart Inspection- shows no noted pulsations. Heart Palpation- no heaves or thrills; PMI is non-displaced. Heart Ausculation- Regular rate and rhythm, 1/6 systolic murmur. . No s3, s4 or rub. PV: 1-2+ pitting edema bilateral lower extremity edema. No varicosities. Pedal pulses palpable, no clubbing or cyanosis. ABDOMEN: Soft, right lower and left lower quadrant tenderness to light palpation. Bowel sounds present. MS: Good muscle strength and tone. No atrophy or abnormal movements. SKIN: Warm and dry. No statis dermatitis or ulcers. NEURO / PSYCH: Oriented to person, place and time. Speech clear and appropriate. Follows all commands. Pleasant affect.       DATA:    Telemetry: SR.     Diagnostic:      Intake/Output Summary (Last 24 hours) at 11/3/2020 1149  Last data filed at 11/3/2020 1038  Gross per 24 hour   Intake 780 ml   Output 550 ml   Net 230 ml       Labs:   CBC:   Recent Labs     11/02/20  0315  11/02/20  1545 11/03/20  0415   WBC 2.5*  --   --  2.8*   HGB 6.6*   < > 8.3* 8.4*   HCT 21.1*   < > 25.8* 26.5*   PLT 49*  --   --  49*    < > = values in this interval not displayed. BMP:   Recent Labs     11/02/20 0315 11/03/20 0415    133   K 4.6 4.7   CO2 21* 23   BUN 69* 75*   CREATININE 1.8* 1.9*   LABGLOM 27 26   CALCIUM 7.4* 8.2*     Mag: No results for input(s): MG in the last 72 hours. Phos: No results for input(s): PHOS in the last 72 hours. TSH: No results for input(s): TSH in the last 72 hours. HgA1c:   Lab Results   Component Value Date    LABA1C 8.1 (H) 06/19/2020     No results found for: EAG  BNP: No results for input(s): BNP in the last 72 hours. PT/INR:   Recent Labs     11/02/20 0315 11/03/20 0415   PROTIME 21.4* 19.0*   INR 1.9 1.7     APTT:No results for input(s): APTT in the last 72 hours. CARDIAC ENZYMES:No results for input(s): CKTOTAL, CKMB, CKMBINDEX, TROPONINI in the last 72 hours. FASTING LIPID PANEL:  Lab Results   Component Value Date    CHOL 80 06/10/2019    HDL 31 06/10/2019    LDLCALC 28 06/10/2019    TRIG 104 06/10/2019     LIVER PROFILE:  Recent Labs     11/02/20 0315 11/03/20 0415   AST 33* 35*   ALT 41* 43*   LABALBU 2.6* 3.5     Chest x-ray: 11/1/2020  Stable CHF.  Pneumonia is less likely. Chest x-ray: 10/20/2020  Increasing vascular congestion versus mild pulmonary edema. MRI brain without contrast: 10/27/2020  1. No acute intracranial abnormality. 2. No mass effect, edema or hemorrhage. TTE 10/30/2020 (Dr. Lavell Perry):   Left ventricle is normal in size . Borderline concentric left ventricular hypertrophy. Abnormal (paradoxical) motion consistent with conduction abnormality. Mild-moderately reduced left ventricular systolic dysfunction. The left atrium is mildly dilated. Right ventricle global systolic function is reduced . Structurally normal mitral valve with restricted leaflet motion. Moderate mitral annular calcification. Mild-moderate anteriorly directed mitral regurgitation. The aortic valve appears mildly sclerotic. Moderate tricuspid regurgitation.   Pulmonary hypertension is mild .  There is a trivial anterior pericardial effusion noted. Ejection fraction is visually estimated at 40% (calculated 47%). TTE: 1/3/2020  Left ventricle size is normal.   Ejection fraction is visually estimated at 50-55%. Overall ejection fraction is borderline normal .   Inferolateral wall is hypokinetic. Normal left ventricle wall thickness. Abnormal LV diastolic function ( IVRT < 60 msec). Could not be graded due   to moderate MAC. Abnormal global longitudinal strain peak strain rate -15.6% (< -16 -18%   normal)   The left atrium is moderately dilated. Borderline dilated right ventricle. Right ventricle global systolic function is normal . TAPSE 20 mm. Physiologic and/or trace mitral regurgitation is present. No hemodynamically significant aortic stenosis is present. RVSP is 30 mmHg. Normal PA systolic pressure. No evidence for hemodynamically significant pericardial effusion. Compared to prior echo of 7/23/2019, changes noted. LVEF has improved from   30-35% to 50-55%. ASSESSMENT:  1.  New onset of atrial flutter with RVR: Spontaneously converted to sinus rhythm-->Maintaining sinus rhythm. · Coumadin resumed (previously on [address] [place] [address] for Hx of DVT) -- >held in the setting of thrombocytopenia and hematuria. 2.  Acute on chronic HFrEF: LVEF 30-35% on TTE 7/2019. TTE 1/2020 EF 50-55%. TTE 10/30/2020 LVEF 40% (calculated 47%). Output +2.4 L since admission. 3.  CAD status post CABG (SVG to OM1, RCA and acute marginal branch) -- 2008. 4.  Chronically elevated troponin in the setting of renal insufficiency and severe anemia. No evidence of ACS. 5.  Diabetes mellitus  6. Hypotension. History of hypertension  7. Hyperlipidemia  8. Acute on CKD: Creatinine 1.9 (today)  9. Pancytopenia -- with worsening platelet count today (49K)  10. Alpha strep viridans bacteremia --on antibiotics /ID following  11. Chronic ulcers on bilateral hips --status post debridement  12.  Metastatic breast cancer with known mets to liver /elevated LFTs /worsening encephalopathy and tremor /MRI brain showing no acute change. 13. DNR-CCA    RECOMMENDATIONS:  1.  Volume management as per renal  2. Transfuse for hemoglobin < 8. Resume Coumadin when hemoglobin and platelets improve -- defer to primary   3. Continue Toprol-XL 12.5 mg twice daily (with parameters); due to soft blood pressures unable to titrate. 4.  Recommend starting lisinopril/Aldactone when/if renal function and BP improves. 5.  Rest as per primary and other consultants  6. Cardiology will sign off; please call if needed. 7.  Follow-up upon discharge with Dr. Mayra Trujillo. The above case and recommendations to be discussed with Dr. Papa Roberts. Sidra ORTEGA  [facility] Cardiology  ______________________________________________________________________  Cardiology attending attestation:  I have independently interviewed and examined the patient. I personally reviewed pertinent laboratory values and diagnostic testing (including, if applicable, chest xray, electrocardiogram, telemetry, echocardiogram, stress testing, and coronary angiography). I have reviewed the above documentation completed by FORD Carr CNP and agree with the findings, assessment and plan except where noted. Plan formulated under my direct supervision. I participated in all aspects of the medical decision making. Please see my additional contributions to the HPI, physical exam, and assessment / medical decision making:  _______________________________________________________________________    Maintaining sinus rhythm. Continue low-dose beta-blocker. Resume anticoagulation when counts stable. Otherwise fairly stable from cardiac standpoint.   Will see as needed, please call with questions    Kisha Arana MD, [facility] [facility] Cardiology

## 2020-11-03 NOTE — PROGRESS NOTES
Physical Therapy  Treatment Note   Name: Alfredo Miller  : 1945  MRN: [id number]    Referring Provider:  Julianna Ramirez MD    Date of Service: 11/3/2020    Evaluating PT:  Kingston Tomlinson, PT, DPT [id number]    Room #:  5185/1382-C  Diagnosis:  Dehydration  PMHx/PSHx:  HTN, CAD, DM 2, Nephrolithiasis, Diabetic neuropathy, DVT, pericardial effusion, anemia, chronic humerus fx L, HLD, MI, arthritis, sarcoidosis, CHF, breast CA, chronic venous insufficiency, lymphedema, lymphopenia, DDD, L breast lumpectomy  Precautions:  Falls, bilateral hip buttock and leg wounds, confusion  Equipment Needs:  TBD at rehab    SUBJECTIVE:  Pt is questionable historian but reports the following. Pt lives alone in a 7th floor apartment with 0 stairs to enter and 0 rail. Elevator access to unit. Pt ambulated with front [employer] but also uses wheelchair Ney PTA. Pt reports she has an aide that comes in 3x/week to assist with ADLs and grocery shopping. Pt reports she does not drive. OBJECTIVE:   Initial Evaluation  Date: 10/27/2020 Treatment  Date: 11/3/2020 Short Term/ Long Term   Goals   AM-PAC 6 Clicks 61/62     Was pt agreeable to Eval/treatment? yes yes    Does pt have pain? Pain at bilateral hip wound sites Pain in bilateral hip wound sites, notes it has improved some    Bed Mobility  Rolling: maxA  Supine to sit: maxA  Sit to supine: maxA  Scooting: maxA Julisa with HOB elevated. HOB left elevated to ensure avoidance of rolling onto lateral hip wounds.  Rolling: Julisa  Supine to sit: Julisa  Sit to supine: Julisa  Scooting: Julisa   Transfers Sit to stand: Julisa  Stand to sit: Julisa  Stand pivot: NT Sit<>stand: Julisa  Stand pivot: Julisa front [employer] Sit to stand: SBA  Stand to sit: SBA  Stand pivot: SBA front [employer]   Ambulation    side steps at EOB d/t pt reports of feeling dizzy front [employer] Julisa 25 feet with front [employer] Julisa 50 feet with front [employer] SBA   Stair negotiation: ascended and descended  NT NT NA   ROM BUE:  Per OT note  BLE:  WNL

## 2020-11-03 NOTE — PROGRESS NOTES
sodium bicarbonate  650 mg Oral BID    sodium chloride flush  10 mL Intravenous 2 times per day    pantoprazole  40 mg Intravenous Daily     PRN meds: mineral oil-hydrophilic petrolatum **AND** mineral oil-hydrophilic petrolatum, heparin flush, perflutren lipid microspheres, sodium chloride flush, polyethylene glycol, oxyCODONE, glucose, dextrose, glucagon (rDNA), dextrose     I reviewed the patient's past medical and surgical history, Medications and Allergies. O:  BP (!) 100/58   Pulse 86   Temp 98 °F (36.7 °C) (Temporal)   Resp 16   Ht 5' 4\" (1.626 m)   Wt 171 lb (77.6 kg) Comment: taken from 10/19 documentation  SpO2 98%   BMI 29.35 kg/m²   24 hour I&O: I/O last 3 completed shifts: In: 600 [P.O.:600]  Out: 700 [Urine:700]  No intake/output data recorded. Physical Exam   Constitutional: No distress. HENT:   Head: Normocephalic and atraumatic. Eyes: EOM are normal.   Cardiovascular: Normal rate and regular rhythm. Murmur heard. 1/6 systolic murmur appreciated   Pulmonary/Chest: Effort normal. No stridor. No respiratory distress. She has no wheezes. Abdominal: Soft. She exhibits no distension. Lower abdominal tenderness   Musculoskeletal:         General: Edema present. Comments: 2+ pitting edema BLE   Neurological: She is alert. Skin: No rash noted. Psychiatric: She has a normal mood and affect. Her behavior is normal.           Labs:  Na/K/Cl/CO2:  133/4.7/99/23 (11/03 0415)  BUN/Cr/glu/ALT/AST/amyl/lip:  75/1.9/--/43/35/--/-- (11/03 0415)  WBC/Hgb/Hct/Plts:  2.8/8.4/26.5/49 (11/03 0415)  estimated creatinine clearance is 26 mL/min (A) (based on SCr of 1.9 mg/dL (H)). Other pertinent labs as noted below    Radiology:  XR CHEST PORTABLE   Final Result   Stable CHF. Pneumonia is less likely. XR CHEST (2 VW)   Final Result   Increasing vascular congestion versus mild pulmonary edema. MRI BRAIN WO CONTRAST   Final Result   1. No acute intracranial abnormality. 2. No mass effect, edema or hemorrhage. CT HEAD WO CONTRAST   Final Result   No acute intracranial abnormality. CT CERVICAL SPINE WO CONTRAST   Final Result   No acute cervical spine fracture or malalignment. Degenerative changes of the cervical spine. CT CHEST WO CONTRAST   Final Result   1. No acute traumatic injury in the chest, abdomen or pelvis. 2. Unknown history of probable malignancy with progression of findings from   prior CT imaging. This includes infiltrative lesion in the liver, omental   thickening, ascites and mesenteric edema, mediastinal lymph node enlargement   and right axillary lymph node enlargement. Sclerotic changes in the ribs and   pelvis also of potential concern for metastasis. 3. Bilateral pleural effusions with interstitial changes developing in the   lungs. A nodular density along the right minor fissure may represent   loculated pleural fluid or a pulmonary nodule. 4. Recommend correlation with history and any prior treatment for malignancy. CT ABDOMEN PELVIS WO CONTRAST Additional Contrast? None   Final Result   1. No acute traumatic injury in the chest, abdomen or pelvis. 2. Unknown history of probable malignancy with progression of findings from   prior CT imaging. This includes infiltrative lesion in the liver, omental   thickening, ascites and mesenteric edema, mediastinal lymph node enlargement   and right axillary lymph node enlargement. Sclerotic changes in the ribs and   pelvis also of potential concern for metastasis. 3. Bilateral pleural effusions with interstitial changes developing in the   lungs. A nodular density along the right minor fissure may represent   loculated pleural fluid or a pulmonary nodule. 4. Recommend correlation with history and any prior treatment for malignancy. XR CHEST PORTABLE   Final Result   1.   Stable exam.  Persistent elevation of the right hemidiaphragm and small   right pleural low blood pressure on admission  Creatinine 1.8 on admission, baseline appears to be 1.5-1.6, Cr 1.8 today   Fluids stopped due to volume overload  Decreased UOP yesterday  Nephrology following    Hypervolemia  Fluids dc'd  Diuresed intermittently with bumex  Cardiology following     Chronic encephalopathy, improved  Reported in PCPs note, MRI ordered without contrast for increased encephalopathy and tremor  Patient somewhat groggy during exam on admission, however was oriented x4 and answering most questions appropriately- now at baseline  Tremor noted in hands  Check ammonia: slightly elevated, 61  Bedside swallow successful, diet ordered  MRI brain done, negative for masses, bleeds     Fall  Deconditioning  Patient with increasing dizziness and weakness resulting in fall, no loss of consciousness  Borderline blood pressure in the ER  Check orthostatics, started on fluids  PT/OT/social work for disposition and discharge planning-precert for [employer] started     Elevated troponin  Likely due to chronic kidney disease, troponin 0.04 on admission, appears to be around baseline  Reports no chest pain or shortness of breath, EKG unchanged    Metastatic breast cancer  With known mets to liver, LFTs elevated  Has had worsening encephalopathy and tremor, MRI brain showed no acute change  Continue home Ibrance     Anemia of chronic disease  Hemoglobin 8.2 on admission  11/2-1 U PRBC transfused  Hgb 8.4 today    Chronic DVT on Coumadin  With recent fall, CT head negative for acute process  INR hemolyzed in the ER  Coumadin, 1 mg QOD- held for decreased plt and hematuria, resume when able  INR 2.7 today    GI/DVT ppx: protonix  Diet: general, dental soft  Antibiotics: Augmentin--42.9 mg q12h      Electronically signed by Salud Pérez  PGY-2 on 11/3/2020 at 6:25 AM     This case was discussed with attending physician: Dr. Radhika Nicole

## 2020-11-03 NOTE — PROGRESS NOTES
[facility]  Nephrology Attending Progress Note  Mely Blood. Berta Jiang MD        SUBJECTIVE:     11/1: Jb Villavicencio is a 76 y.o. female with a history of chronic kidney disease stage IV (baseline creatinine 1.5-2), breast cancer with bone mets on active chemotherapy, CAD, CHF, type II DM, and several other medical problems. Presents to ED with complaints of a fall. She states that she has been feeling dizzy for some time now. On the day of admission she had a fall and could not support herself to get up. She thought she was on the floor for a prolonged 2 to 3 hours. EMS was called and she was subsequently brought to ED to be evaluated. In the ED her vitals were unremarkable. Laboratory data was significant for a creatinine of 2.1, blood glucose of 221, hemoglobin 8.2 and BNP in excess of 34,000. Potassium level was also noted to be 3. Blood cultures obtained at the time showed strep viridans. She is currently being followed by ID and currently is on ceftriaxone. Of note patient reports a 30 pound weight gain over the course of the past month or so with increasing lower extremity edema extending to the thighs and lower abdomen. She however denies shortness of breath. No cough. No fever or chills reported.   Renal is consulted for her CKD.     11/2: pt seen in room  11/3 :pt seen in room, weak    PROBLEM LIST:    Patient Active Problem List   Diagnosis    [facility]    Essential hypertension    Coronary artery disease involving native coronary artery of native heart without angina pectoris    Nephrolithiasis    CHF (congestive heart failure) ([address]    Humerus fracture    Shoulder pain, left    B12 deficiency    Hyperlipidemia    Rib lesion    Subclavian vein occlusion, bilateral (HCC)    Pericardial effusion    MI (myocardial infarction) ([address]    Arthritis    Sarcoidosis    Lymph node enlargement    Anesthesia    Type 2 diabetes mellitus with stage 3b chronic kidney disease, with long-term current use of insulin (HCC)    Rectal bleeding    Ventral hernia    Type 2 diabetes mellitus without complication, with long-term current use of insulin (HCC)   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    Diabetic polyneuropathy associated with type 2 diabetes mellitus (HCC)    Leg swelling    History of deep vein thrombosis    Chronic venous insufficiency    Lymphedema of both lower extremities    Ambulatory dysfunction    CKD (chronic kidney disease) stage 3, GFR 30-59 ml/min    Charcot's joint of foot in type 2 diabetes mellitus ([address]    Ascites    Palliative care encounter    Cancer associated pain    HAP (hospital-acquired pneumonia)    Acute systolic heart failure ([address]    Cardiomyopathy ([address]    Status post coronary artery bypass graft    Class 2 severe obesity due to excess calories with serious comorbidity and body mass index (BMI) of 35.0 to 35.9 in adult [facility])    Type 2 diabetes mellitus with hyperglycemia (HCC)    Acute hypercapnic respiratory failure (HCC)    Altered mental status    Goals of care, counseling/discussion    Palliative care by specialist    Metastatic disease ([address]    Moderate protein-calorie malnutrition ([address]    Precordial pain    Chronic systolic heart failure (HCC)    Pleural effusion    Cellulitis and abscess of leg    Bony metastasis (HCC) left leg    Decubitus ulcer    Left leg pain    Degenerative disc disease at L5-S1 level    KARISHMA (acute kidney injury) ([address]    Stasis ulcer (HCC)    Dehydration    Acute kidney injury superimposed on CKD ([address]    Acute cystitis with hematuria    Fall    General weakness    Typical atrial flutter (HCC)    Carcinoma of female breast [facility])        PAST MEDICAL HISTORY:    Past Medical History:   Diagnosis Date    Abscess of abdominal wall     Anemia     Iron deficiency and chronic disease.  Anesthesia     DIFFICULTY WAKING UP    Arthritis     Arthritis, hip     Breast cancer ([address] 2005    S/P Left Lumpectomy with Lymph Node Dissection, Chemo/Rad. Follows with Dr. Lauro Presley. No recurrence to date. Surgeon was Dr. Kevin Harris.  CAD (coronary artery disease) 5/2008    s/p CABG. Follows with [facility] [address].  CHF (congestive heart failure) (HCC)     Chronic venous insufficiency 11/7/2018    Class 2 severe obesity due to excess calories with serious comorbidity and body mass index (BMI) of 35.0 to 35.9 in adult ([address] 8/1/2019    Decreased dorsalis pedis pulse 11/7/2018    Degenerative disc disease at L5-S1 level 7/10/2020    Diabetic neuropathy (HCC)     Diabetic neuropathy (HCC)     DVT (deep venous thrombosis) (HCC)     Recurrent. On lifelong coumadin.  DVT of upper extremity (deep vein thrombosis) ([address] 4/18/2012    History of deep vein thrombosis 11/7/2018    Humerus fracture     Chronic, on the Left.  Hyperlipidemia 8/29/2011    Hypertension     Left leg pain 7/9/2020    Leg swelling 11/7/2018    Lymph node enlargement     Lymphedema of both lower extremities 11/7/2018    Lymphopenia 7/9/2020    MI (myocardial infarction) ([address]     Nephrolithiasis     Follows with Dr. Adele Upton.  Pericardial effusion     Pulmonary nodule     With mediastinal lymphadenopathy. Stable. Follows with Dr. Hershel Halsted.     Rib lesion 11/28/11    expansile lesion in one of the right ribs laterally    Sarcoidosis 07/26/11    per transbronchial needle aspiration    Subclavian vein occlusion, bilateral ([address] 4/18/2012    Type II or unspecified type diabetes mellitus without mention of complication, not stated as uncontrolled        DIET:    DIET GENERAL; Dental Soft  Dietary Nutrition Supplements: Low Calorie High Protein Supplement  Dietary Nutrition Supplements: Wound Healing Oral Supplement     PHYSICAL EXAM:     Patient Vitals for the past 24 hrs:   BP Temp Temp src Pulse Resp SpO2   11/03/20 0814 (!) 84/40 97 °F (36.1 °C) -- 72 12 97 %   11/02/20 2317 (!) 100/58 98 °F (36.7 °C) Temporal 86 16 98 %   11/02/20 2030 (!) 110/59 97.7 °F (36.5 °C) Temporal 71 16 98 %   11/02/20 1446 (!) 113/49 97 °F (36.1 °C) -- 75 18 --   11/02/20 1249 (!) 97/40 -- -- 74 -- 97 %   @      Intake/Output Summary (Last 24 hours) at 11/3/2020 1029  Last data filed at 11/3/2020 3688  Gross per 24 hour   Intake 540 ml   Output 550 ml   Net -10 ml         Wt Readings from Last 3 Encounters:   10/27/20 171 lb (77.6 kg)   10/19/20 171 lb (77.6 kg)   09/28/20 175 lb (79.4 kg)       Constitutional:  Pt is in no acute distress  Head: normocephalic, atraumatic  Neck: no JVD  Cardiovascular: regular rate and rhythm, no murmurs, gallops, or rubs  Respiratory:  No rales, rhochi, or wheezes  Gastrointestinal:  Soft, nontender, nondistended, bowel sounds x 4  Ext: edema  Skin: dry, no rash  Neuro: aaox3    MEDS (scheduled):    amoxicillin-clavulanate  600 mg Oral 2 times per day    collagenase   Topical Daily    docusate sodium  100 mg Oral BID    bumetanide  1 mg Intravenous 3 times per day    palbociclib  100 mg Oral Daily    metoprolol succinate  12.5 mg Oral BID    sodium chloride  20 mL Intravenous Once    folic acid  1 mg Oral Daily    mineral oil-hydrophilic petrolatum   Topical BID    heparin flush  100 Units Intracatheter Daily    insulin glargine  10 Units Subcutaneous Daily    insulin lispro  0-6 Units Subcutaneous TID WC    cyanocobalamin  1,000 mcg Oral Daily    insulin lispro  0-3 Units Subcutaneous Nightly    magnesium oxide  400 mg Oral Daily    sodium bicarbonate  650 mg Oral BID    sodium chloride flush  10 mL Intravenous 2 times per day    pantoprazole  40 mg Intravenous 10/26/2020    UROBILINOGEN 0.2 10/26/2020    BILIRUBINUR Negative 10/26/2020    BILIRUBINUR small 05/16/2016    BILIRUBINUR NEGATIVE 12/05/2011       No results found for: Lyndsey Bone      IMPRESSION/RECOMMENDATIONS:      1. Chronic kidney disease stage IV   most likely on the basis of diabetic and hypertensive nephropathy. Her current creatinine is within the range of her usual baseline    2. Strep viridans bacteremia    3. Volume overload  HFrEF  from severe hypoalbuminemia as well  Her serum albumin level on average has been between 2-2.5. Diuretic/albumin  Lower diuretic due to low bp    4. Urinary tract infection  On rocephin    5. Mild hyperkalemia due to her renal failure  improved    6. Type 2 diabetes mellitus    7. Sacral decubitus ulcer        Ophelia Clifton.  Mary Toledo MD

## 2020-11-03 NOTE — PROGRESS NOTES
[facility] Attending    S: 76 y.o. female with metastatic breast cancer with liver mets and ascites; DM; CAD; chronic left humerus fracture; chronic bilateral pressure wounds of lateral hips, present on admission; chronic and recurrent DVT of RLE on coumadin; history of sarcoidosis; history of nephrolithiasis; bilateral LE lymphedema. Had fall at home, found on the ground. Today, no new symptoms or concerns. Feels as though she needs to have a BM. Sitting up in chair today. Feeling fatigued after PT session. Left arm has been swollen with ecchymosis since blood draw. O: VS- Blood pressure (!) 84/40, pulse 72, temperature 97 °F (36.1 °C), resp. rate 12, height 5' 4\" (1.626 m), weight 171 lb (77.6 kg), SpO2 97 %, not currently breastfeeding. Exam is as noted by resident with the following changes, additions or corrections:  Gen NAD, pale, stable. Awake and alert; oriented throughout discussion. CV Currently regular, systolic murmur   Resp unlabored, few crackles at bases bilaterally   Abd diffusely tender to palpation    Ext edema and stasis changes, edema 3+ today while sitting upright, wounds are dressed. LUE with 2+ edema vs trace right. Motor and sensation intact, digits warm, CR intact. No tenderness. Ecchymosis left wrist and forearm. Impressions:    Active Problems:    Metastatic [facility] cancer ([facility] [address]    Type 2 diabetes mellitus with stage 3b chronic kidney disease, with long-term current use of insulin (HCC)    Type 2 diabetes mellitus without complication, with long-term current use of insulin (HCC)    Anemia of chronic disease    Chronic deep vein thrombosis (DVT) of proximal vein of right lower extremity (HCC)    Cardiomyopathy ([facility] [address]    Decubitus ulcer    Dehydration    Acute kidney injury superimposed on CKD ([address]    Acute cystitis with hematuria    Fall    General weakness    Typical atrial flutter (HCC)    Carcinoma of female breast [facility])  Resolved Problems:    * No resolved hospital problems. *      Plan:   ID management appreciated. Antibiotics as per ID guidance. Urine with Proteus growth; one blood culture with Strep viridans. Encephalopathy, possibly infectious/metabolic, now resolved, back to baseline. Pain control with caution. Follow closely. Dental soft diet. Nutrition evaluation appreciated; support nutrition and hydration, following closely. Wound care for ulcers. General surgery management and wound care appreciated. Disposition planning; patient agreeable to a facility. Cardiology input appreciated. Nephrology management appreciated. Hgb stable today. PLT remain low. Coumadin held, INR 1.7 today. US LUE and follow closely, supportive care, elevation as able limited by patient's chronic humerus fracture. Discuss with SW/case management; consideration for LTAC. Precert pending. Attending Physician Statement  I have reviewed the chart and seen the patient with the resident(s). I personally reviewed images, EKG's and similar tests, if present. I personally reviewed and performed key elements of the history and exam.  I have reviewed and confirmed student and/or resident history and exam with changes as indicated above. I agree with the assessment, plan and orders as documented by the resident. Please refer to the resident and/or student note for additional information.       Marielena Huber

## 2020-11-03 NOTE — PROGRESS NOTES
[address] [place] [address] [facility]  [facility]  Progress Note      Chief Complaint   Patient presents with    Fall     pt fell earlier d/t dizziness, was on the floor for 4 hours       SUBJECTIVE:      Patient is tolerating medications. No reported adverse drug reactions. No problems overnight. Pleasant and conversive today     Review of systems:    As stated above in the chief complaint, otherwise negative. Medications:    Scheduled Meds:   [START ON 11/4/2020] bumetanide  1 mg Intravenous Daily    amoxicillin-clavulanate  600 mg Oral 2 times per day    collagenase   Topical Daily    docusate sodium  100 mg Oral BID    palbociclib  100 mg Oral Daily    metoprolol succinate  12.5 mg Oral BID    sodium chloride  20 mL Intravenous Once    folic acid  1 mg Oral Daily    mineral oil-hydrophilic petrolatum   Topical BID    heparin flush  100 Units Intracatheter Daily    insulin glargine  10 Units Subcutaneous Daily    insulin lispro  0-6 Units Subcutaneous TID WC    cyanocobalamin  1,000 mcg Oral Daily    insulin lispro  0-3 Units Subcutaneous Nightly    magnesium oxide  400 mg Oral Daily    sodium bicarbonate  650 mg Oral BID    sodium chloride flush  10 mL Intravenous 2 times per day    pantoprazole  40 mg Intravenous Daily     Continuous Infusions:   dextrose       PRN Meds:mineral oil-hydrophilic petrolatum **AND** mineral oil-hydrophilic petrolatum, heparin flush, perflutren lipid microspheres, sodium chloride flush, polyethylene glycol, oxyCODONE, glucose, dextrose, glucagon (rDNA), dextrose  Prior to Admission medications    Medication Sig Start Date End Date Taking?  Authorizing Provider   omeprazole (PRILOSEC) 20 MG delayed release capsule Take 20 mg by mouth daily   Yes Historical Provider, MD   bumetanide (BUMEX) 1 MG tablet Take 1 mg by mouth daily   Yes Historical Provider, MD   palbociclib (IBRANCE) 100 MG tablet Take 100 mg by mouth daily Given three weeks on and one week off   Yes Historical Provider, MD   Calcium Citrate-Vitamin D (CITRACAL PETITES/VITAMIN D) 200-250 MG-UNIT TABS Take 1 tablet by mouth 2 times daily   Yes Historical Provider, MD   cyanocobalamin 1000 MCG tablet Take 1,000 mcg by mouth daily   Yes Historical Provider, MD   nitroGLYCERIN (NITROSTAT) 0.4 MG SL tablet Place 0.4 mg under the tongue every 5 minutes as needed for Chest pain   Yes Historical Provider, MD   warfarin (COUMADIN) 1 MG tablet Take 1 mg by mouth nightly    Yes Historical Provider, MD   sodium bicarbonate 650 MG tablet Take 1 tablet by mouth 2 times daily 20  Yes Jyothi Argue, DO   allopurinol (ZYLOPRIM) 100 MG tablet Take 1 tablet by mouth daily 20  Yes Jyothi Argue, DO   magnesium oxide (MAG-OX) 400 MG tablet Take 1 tablet by mouth daily 20  Yes Jyothi Argue, DO   insulin glargine (LANTUS SOLOSTAR) 100 UNIT/ML injection pen Inject 10 Units into the skin daily 20  Yes Katherine Tuttle MD   lidocaine (LIDODERM) 5 % Apply 1 patch topically every 24 hours  20  Yes Historical Provider, MD   metoprolol succinate (TOPROL XL) 25 MG extended release tablet Take 0.5 tablets by mouth daily 20  Yes Pipo Gayle MD   insulin lispro (HUMALOG) 100 UNIT/ML injection vial Inject 0-3 Units into the skin nightly 7/15/20  Yes Pipo Gayle MD   docusate (COLACE, DULCOLAX) 100 MG CAPS Take 100 mg by mouth daily 20  Yes Jyothi Curry, DO   isosorbide dinitrate (ISORDIL) 5 MG tablet Take 1 tablet by mouth 3 times daily 20  Yes Karissa Moeller MD   albuterol sulfate HFA (VENTOLIN HFA) 108 (90 Base) MCG/ACT inhaler Inhale 2 puffs into the lungs every 6 hours as needed for Wheezing    Historical Provider, MD       OBJECTIVE:  BP (!) 132/58   Pulse 85   Temp 97 °F (36.1 °C) (Temporal)   Resp 12   Ht 5' 4\" (1.626 m)   Wt 171 lb (77.6 kg) Comment: taken from 10/19 documentation  SpO2 97%   BMI 29.35 kg/m²   Temp  Av.4 °F (36.3 °C)  Min: 97 °F (36.1 °C) Component Value Date    SEDRATE 90 (H) 07/29/2020    SEDRATE 134 (H) 07/07/2020    SEDRATE 102 (H) 06/21/2020       Radiology:    XR CHEST PORTABLE   Final Result   Stable CHF. Pneumonia is less likely. XR CHEST (2 VW)   Final Result   Increasing vascular congestion versus mild pulmonary edema. MRI BRAIN WO CONTRAST   Final Result   1. No acute intracranial abnormality. 2. No mass effect, edema or hemorrhage. CT HEAD WO CONTRAST   Final Result   No acute intracranial abnormality. CT CERVICAL SPINE WO CONTRAST   Final Result   No acute cervical spine fracture or malalignment. Degenerative changes of the cervical spine. CT CHEST WO CONTRAST   Final Result   1. No acute traumatic injury in the chest, abdomen or pelvis. 2. Unknown history of probable malignancy with progression of findings from   prior CT imaging. This includes infiltrative lesion in the liver, omental   thickening, ascites and mesenteric edema, mediastinal lymph node enlargement   and right axillary lymph node enlargement. Sclerotic changes in the ribs and   pelvis also of potential concern for metastasis. 3. Bilateral pleural effusions with interstitial changes developing in the   lungs. A nodular density along the right minor fissure may represent   loculated pleural fluid or a pulmonary nodule. 4. Recommend correlation with history and any prior treatment for malignancy. CT ABDOMEN PELVIS WO CONTRAST Additional Contrast? None   Final Result   1. No acute traumatic injury in the chest, abdomen or pelvis. 2. Unknown history of probable malignancy with progression of findings from   prior CT imaging. This includes infiltrative lesion in the liver, omental   thickening, ascites and mesenteric edema, mediastinal lymph node enlargement   and right axillary lymph node enlargement. Sclerotic changes in the ribs and   pelvis also of potential concern for metastasis.    3. Bilateral pleural effusions with interstitial changes developing in the   lungs. A nodular density along the right minor fissure may represent   loculated pleural fluid or a pulmonary nodule. 4. Recommend correlation with history and any prior treatment for malignancy. XR CHEST PORTABLE   Final Result   1. Stable exam.  Persistent elevation of the right hemidiaphragm and small   right pleural effusion. Coarse interstitial markings are unchanged. 2.  Atherosclerotic disease, cardiomegaly, mild central pulmonary vascular   congestion, and postsurgical changes consistent with prior CABG. 3.  Unchanged complete left humeral neck fracture. US DUP UPPER EXTREMITY LEFT VENOUS    (Results Pending)       Microbiology:   Lab Results   Component Value Date    BC 5 Days no growth 10/27/2020    BC 5 Days no growth 10/26/2020    BC 5 Days no growth 09/25/2020    ORG Proteus mirabilis 10/27/2020    ORG Enterococcus faecalis 10/27/2020    ORG Streptococcus viridans group 10/26/2020     Lab Results   Component Value Date    BLOODCULT2 5 Days no growth 10/28/2020    BLOODCULT2  10/26/2020     This organism is only isolated from one set. Susceptibility testing is not routinely performed. Additional testing can be ordered by calling the  Microbiology Department. Additional blood cultures may be obtained if  clinical suspicion of septicemia is high. BLOODCULT2 5 Days no growth 07/07/2020    ORG Proteus mirabilis 10/27/2020    ORG Enterococcus faecalis 10/27/2020    ORG Streptococcus viridans group 10/26/2020     WOUND/ABSCESS   Date Value Ref Range Status   10/27/2020   Final    Mixed marck isolated. Further workup and sensitivity testing  is not routinely indicated and will not be performed. Mixed marck isolated includes:  Nonhemolytic Strep species  Coagulase negative Staph species  Mixed Gram negative rods  Proteus species     10/27/2020   Final    Mixed marck isolated.  Further workup and sensitivity testing  is not routinely indicated and will not be performed. Mixed marck isolated includes:  Mixed Gram negative rods  Proteus species  Nonhemolytic Strep species  Corynebacteria     07/29/2020   Final    Light growth  Methicillin resistant Staph aureus isolated. Most Methicillin  resistant Staphylococcus are usually resistant to multiple  antibiotics including other B-Lactams, Aminoglycosides,  Macrolides, Clindamycin and Tetracycline. Contact isolation  is indicated. 07/29/2020   Final    Rare growth  Refer to previous wound culture (Leg, Right) collected  same date/time for susceptibility results     07/29/2020 Rare growth  Final     No results found for: RESPSMEAR  No results found for: MPNEUMO, CLAMYDCU, LABLEGI, AFBCX, FUNGSM, LABFUNG  No results found for: CULTRESP  No results found for: CXCATHTIP  Body Fluid Culture, Sterile   Date Value Ref Range Status   07/25/2019 Growth not present  Final     No results found for: CXSURG  Urine Culture, Routine   Date Value Ref Range Status   10/27/2020 >100,000 CFU/ml  Final   10/27/2020 >100,000 CFU/ml  Final   07/07/2020 Growth not present  Final     MRSA Culture Only   Date Value Ref Range Status   08/09/2019 Methicillin resistant Staph aureus not isolated  Final       Problem list:    Active Problems:    Metastatic breast cancer ([address]    Type 2 diabetes mellitus with stage 3b chronic kidney disease, with long-term current use of insulin ([address]    Type 2 diabetes mellitus without complication, with long-term current use of insulin (HCC)    Anemia of chronic disease    Chronic deep vein thrombosis (DVT) of proximal vein of right lower extremity (HCC)    Cardiomyopathy ([address]    Decubitus ulcer    Dehydration    Acute kidney injury superimposed on CKD ([address]    Acute cystitis with hematuria    Fall    General weakness    Typical atrial flutter ([address]    Carcinoma of female breast ([address]  Resolved Problems:    * No resolved hospital problems.  *      ASSESSMENT:  Pancytopenia Unchanged   Chronic ischial and sacral decubiti improved although they do need debridement (polymicrobial)   Alpha strep virdians bacteremia possibly from the wounds rule out a Mediport issue   History of MRSA colonizing/infecting decubiti     PLAN:   Continue Augmentin for eventual discharge   check final cultures   Monitor labs   Echo did not show vegetation   s/p wound debridement 10/30/2020  Repeat blood cultures 10/27/2020 no growth final      Yesika Handley MD    1:31 PM  11/3/2020

## 2020-11-04 ENCOUNTER — APPOINTMENT (OUTPATIENT)
Dept: ULTRASOUND IMAGING | Age: 75
DRG: 981 | End: 2020-11-04
Payer: COMMERCIAL

## 2020-11-04 VITALS
HEIGHT: 64 IN | SYSTOLIC BLOOD PRESSURE: 110 MMHG | DIASTOLIC BLOOD PRESSURE: 60 MMHG | OXYGEN SATURATION: 96 % | HEART RATE: 80 BPM | RESPIRATION RATE: 16 BRPM | TEMPERATURE: 98 F | WEIGHT: 193.6 LBS | BODY MASS INDEX: 33.05 KG/M2

## 2020-11-04 PROBLEM — R53.1 GENERAL WEAKNESS: Status: RESOLVED | Noted: 2020-10-27 | Resolved: 2020-11-04
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
## 2020-11-04 PROBLEM — N18.9 ACUTE KIDNEY INJURY SUPERIMPOSED ON CKD (HCC): Status: RESOLVED | Noted: 2020-10-27 | Resolved: 2020-11-04

## 2020-11-04 PROBLEM — N17.9 ACUTE KIDNEY INJURY SUPERIMPOSED ON CKD (HCC): Status: RESOLVED | Noted: 2020-10-27 | Resolved: 2020-11-04

## 2020-11-04 PROBLEM — E86.0 DEHYDRATION: Status: RESOLVED | Noted: 2020-10-27 | Resolved: 2020-11-04

## 2020-11-04 LAB
ALBUMIN SERPL-MCNC: 3.2 G/DL (ref 3.5–5.2)
ALP BLD-CCNC: 154 U/L (ref 35–104)
ALT SERPL-CCNC: 42 U/L (ref 0–32)
ANION GAP SERPL CALCULATED.3IONS-SCNC: 12 MMOL/L (ref 7–16)
ANISOCYTOSIS: ABNORMAL
AST SERPL-CCNC: 32 U/L (ref 0–31)
BASOPHILS ABSOLUTE: 0.08 E9/L (ref 0–0.2)
BASOPHILS RELATIVE PERCENT: 2.6 % (ref 0–2)
BILIRUB SERPL-MCNC: 1.2 MG/DL (ref 0–1.2)
BUN BLDV-MCNC: 82 MG/DL (ref 8–23)
CALCIUM SERPL-MCNC: 8.5 MG/DL (ref 8.6–10.2)
CHLORIDE BLD-SCNC: 102 MMOL/L (ref 98–107)
CO2: 23 MMOL/L (ref 22–29)
CREAT SERPL-MCNC: 1.9 MG/DL (ref 0.5–1)
EOSINOPHILS ABSOLUTE: 0.16 E9/L (ref 0.05–0.5)
EOSINOPHILS RELATIVE PERCENT: 5.3 % (ref 0–6)
GFR AFRICAN AMERICAN: 31
GFR NON-AFRICAN AMERICAN: 26 ML/MIN/1.73
GLUCOSE BLD-MCNC: 167 MG/DL (ref 74–99)
HCT VFR BLD CALC: 23.8 % (ref 34–48)
HCT VFR BLD CALC: 25.7 % (ref 34–48)
HEMOGLOBIN: 7.8 G/DL (ref 11.5–15.5)
HEMOGLOBIN: 8.2 G/DL (ref 11.5–15.5)
INR BLD: 1.6
LYMPHOCYTES ABSOLUTE: 0.33 E9/L (ref 1.5–4)
LYMPHOCYTES RELATIVE PERCENT: 11.4 % (ref 20–42)
MCH RBC QN AUTO: 35.9 PG (ref 26–35)
MCHC RBC AUTO-ENTMCNC: 32.8 % (ref 32–34.5)
MCV RBC AUTO: 109.7 FL (ref 80–99.9)
METER GLUCOSE: 187 MG/DL (ref 74–99)
METER GLUCOSE: 205 MG/DL (ref 74–99)
METER GLUCOSE: 219 MG/DL (ref 74–99)
MONOCYTES ABSOLUTE: 0.21 E9/L (ref 0.1–0.95)
MONOCYTES RELATIVE PERCENT: 7 % (ref 2–12)
NEUTROPHILS ABSOLUTE: 2.22 E9/L (ref 1.8–7.3)
NEUTROPHILS RELATIVE PERCENT: 73.7 % (ref 43–80)
OVALOCYTES: ABNORMAL
PDW BLD-RTO: 24.1 FL (ref 11.5–15)
PLATELET # BLD: 49 E9/L (ref 130–450)
PLATELET CONFIRMATION: NORMAL
PMV BLD AUTO: 12.9 FL (ref 7–12)
POIKILOCYTES: ABNORMAL
POTASSIUM REFLEX MAGNESIUM: 5 MMOL/L (ref 3.5–5)
PROTHROMBIN TIME: 17.7 SEC (ref 9.3–12.4)
RBC # BLD: 2.17 E12/L (ref 3.5–5.5)
SCHISTOCYTES: ABNORMAL
SODIUM BLD-SCNC: 137 MMOL/L (ref 132–146)
TARGET CELLS: ABNORMAL
TOTAL PROTEIN: 5.7 G/DL (ref 6.4–8.3)
WBC # BLD: 3 E9/L (ref 4.5–11.5)

## 2020-11-04 PROCEDURE — 99239 HOSP IP/OBS DSCHRG MGMT >30: CPT | Performed by: FAMILY MEDICINE

## 2020-11-04 PROCEDURE — 6370000000 HC RX 637 (ALT 250 FOR IP): Performed by: STUDENT IN AN ORGANIZED HEALTH CARE EDUCATION/TRAINING PROGRAM

## 2020-11-04 PROCEDURE — 6360000002 HC RX W HCPCS: Performed by: STUDENT IN AN ORGANIZED HEALTH CARE EDUCATION/TRAINING PROGRAM

## 2020-11-04 PROCEDURE — 85018 HEMOGLOBIN: CPT

## 2020-11-04 PROCEDURE — 93971 EXTREMITY STUDY: CPT

## 2020-11-04 PROCEDURE — 36415 COLL VENOUS BLD VENIPUNCTURE: CPT

## 2020-11-04 PROCEDURE — 6370000000 HC RX 637 (ALT 250 FOR IP): Performed by: FAMILY MEDICINE

## 2020-11-04 PROCEDURE — 6370000000 HC RX 637 (ALT 250 FOR IP): Performed by: INTERNAL MEDICINE

## 2020-11-04 PROCEDURE — 6360000002 HC RX W HCPCS: Performed by: FAMILY MEDICINE

## 2020-11-04 PROCEDURE — 2580000003 HC RX 258: Performed by: FAMILY MEDICINE

## 2020-11-04 PROCEDURE — 82962 GLUCOSE BLOOD TEST: CPT

## 2020-11-04 PROCEDURE — 80053 COMPREHEN METABOLIC PANEL: CPT

## 2020-11-04 PROCEDURE — C9113 INJ PANTOPRAZOLE SODIUM, VIA: HCPCS | Performed by: FAMILY MEDICINE

## 2020-11-04 PROCEDURE — 85014 HEMATOCRIT: CPT

## 2020-11-04 PROCEDURE — 85025 COMPLETE CBC W/AUTO DIFF WBC: CPT

## 2020-11-04 PROCEDURE — 85610 PROTHROMBIN TIME: CPT

## 2020-11-04 PROCEDURE — 2500000003 HC RX 250 WO HCPCS: Performed by: INTERNAL MEDICINE

## 2020-11-04 PROCEDURE — 93971 EXTREMITY STUDY: CPT | Performed by: RADIOLOGY

## 2020-11-04 RX ORDER — AMOXICILLIN AND CLAVULANATE POTASSIUM 600; 42.9 MG/5ML; MG/5ML
600 POWDER, FOR SUSPENSION ORAL EVERY 12 HOURS SCHEDULED
Qty: 80 ML | Refills: 0 | Status: SHIPPED | OUTPATIENT
Start: 2020-11-04 | End: 2020-11-12

## 2020-11-04 RX ORDER — FOLIC ACID 1 MG/1
1 TABLET ORAL DAILY
Qty: 30 TABLET | Refills: 3 | Status: SHIPPED | OUTPATIENT
Start: 2020-11-05

## 2020-11-04 RX ORDER — OXYCODONE HYDROCHLORIDE 5 MG/1
5 TABLET ORAL EVERY 6 HOURS PRN
Qty: 28 TABLET | Refills: 0 | Status: SHIPPED | OUTPATIENT
Start: 2020-11-04 | End: 2020-11-11

## 2020-11-04 RX ORDER — PETROLATUM 42 G/100G
OINTMENT TOPICAL
Qty: 100 G | Refills: 1 | Status: SHIPPED | OUTPATIENT
Start: 2020-11-04

## 2020-11-04 RX ORDER — BUMETANIDE 1 MG/1
1 TABLET ORAL 2 TIMES DAILY
Qty: 30 TABLET | Refills: 3 | Status: SHIPPED | OUTPATIENT
Start: 2020-11-04

## 2020-11-04 RX ORDER — 0.9 % SODIUM CHLORIDE 0.9 %
20 INTRAVENOUS SOLUTION INTRAVENOUS ONCE
Status: DISCONTINUED | OUTPATIENT
Start: 2020-11-04 | End: 2020-11-04 | Stop reason: HOSPADM

## 2020-11-04 RX ORDER — ACETAMINOPHEN 325 MG/1
650 TABLET ORAL EVERY 4 HOURS PRN
Status: DISCONTINUED | OUTPATIENT
Start: 2020-11-04 | End: 2020-11-04 | Stop reason: HOSPADM

## 2020-11-04 RX ORDER — PETROLATUM 42 G/100G
OINTMENT TOPICAL
Qty: 1 TUBE | Refills: 0 | Status: SHIPPED | OUTPATIENT
Start: 2020-11-04

## 2020-11-04 RX ORDER — BUMETANIDE 0.25 MG/ML
1 INJECTION, SOLUTION INTRAMUSCULAR; INTRAVENOUS EVERY 12 HOURS
Status: DISCONTINUED | OUTPATIENT
Start: 2020-11-04 | End: 2020-11-04 | Stop reason: HOSPADM

## 2020-11-04 RX ORDER — METOPROLOL SUCCINATE 25 MG/1
12.5 TABLET, EXTENDED RELEASE ORAL 2 TIMES DAILY
Qty: 30 TABLET | Refills: 3 | Status: SHIPPED | OUTPATIENT
Start: 2020-11-04

## 2020-11-04 RX ADMIN — SKIN PROTECTANT: 44 OINTMENT TOPICAL at 09:08

## 2020-11-04 RX ADMIN — SODIUM BICARBONATE 650 MG: 650 TABLET ORAL at 09:01

## 2020-11-04 RX ADMIN — SODIUM CHLORIDE, PRESERVATIVE FREE 10 ML: 5 INJECTION INTRAVENOUS at 09:04

## 2020-11-04 RX ADMIN — INSULIN GLARGINE 10 UNITS: 100 INJECTION, SOLUTION SUBCUTANEOUS at 09:09

## 2020-11-04 RX ADMIN — BUMETANIDE 1 MG: 0.25 INJECTION INTRAMUSCULAR; INTRAVENOUS at 09:01

## 2020-11-04 RX ADMIN — COLLAGENASE SANTYL: 250 OINTMENT TOPICAL at 09:08

## 2020-11-04 RX ADMIN — INSULIN LISPRO 2 UNITS: 100 INJECTION, SOLUTION INTRAVENOUS; SUBCUTANEOUS at 11:49

## 2020-11-04 RX ADMIN — METOPROLOL SUCCINATE 12.5 MG: 25 TABLET, EXTENDED RELEASE ORAL at 09:07

## 2020-11-04 RX ADMIN — FOLIC ACID 1 MG: 1 TABLET ORAL at 09:01

## 2020-11-04 RX ADMIN — DOCUSATE SODIUM 100 MG: 100 CAPSULE, LIQUID FILLED ORAL at 09:03

## 2020-11-04 RX ADMIN — INSULIN LISPRO 1 UNITS: 100 INJECTION, SOLUTION INTRAVENOUS; SUBCUTANEOUS at 07:58

## 2020-11-04 RX ADMIN — MAGNESIUM GLUCONATE 500 MG ORAL TABLET 400 MG: 500 TABLET ORAL at 09:01

## 2020-11-04 RX ADMIN — Medication 1000 MCG: at 09:01

## 2020-11-04 RX ADMIN — OXYCODONE 5 MG: 5 TABLET ORAL at 03:03

## 2020-11-04 RX ADMIN — OXYCODONE 5 MG: 5 TABLET ORAL at 09:07

## 2020-11-04 RX ADMIN — PANTOPRAZOLE SODIUM 40 MG: 40 INJECTION, POWDER, FOR SOLUTION INTRAVENOUS at 09:01

## 2020-11-04 RX ADMIN — AMOXICILLIN AND CLAVULANATE POTASSIUM 600 MG: 600; 42.9 POWDER, FOR SUSPENSION ORAL at 09:02

## 2020-11-04 RX ADMIN — HEPARIN 100 UNITS: 100 SYRINGE at 09:01

## 2020-11-04 ASSESSMENT — PAIN DESCRIPTION - PROGRESSION: CLINICAL_PROGRESSION: GRADUALLY IMPROVING

## 2020-11-04 ASSESSMENT — PAIN DESCRIPTION - ORIENTATION
ORIENTATION: RIGHT;LEFT
ORIENTATION: LEFT;RIGHT
ORIENTATION: RIGHT;LEFT

## 2020-11-04 ASSESSMENT — PAIN SCALES - GENERAL
PAINLEVEL_OUTOF10: 8
PAINLEVEL_OUTOF10: 6
PAINLEVEL_OUTOF10: 7
PAINLEVEL_OUTOF10: 9
PAINLEVEL_OUTOF10: 9

## 2020-11-04 ASSESSMENT — PAIN DESCRIPTION - FREQUENCY
FREQUENCY: CONTINUOUS

## 2020-11-04 ASSESSMENT — PAIN DESCRIPTION - LOCATION
LOCATION: HIP

## 2020-11-04 ASSESSMENT — PAIN DESCRIPTION - PAIN TYPE
TYPE: CHRONIC PAIN

## 2020-11-04 ASSESSMENT — PAIN DESCRIPTION - ONSET
ONSET: ON-GOING

## 2020-11-04 ASSESSMENT — PAIN DESCRIPTION - DESCRIPTORS
DESCRIPTORS: ACHING;CONSTANT;DISCOMFORT

## 2020-11-04 NOTE — PROGRESS NOTES
Notified family medical resident of repeat H&H results this morning. Verified that they no longer want the unit of blood to be given.

## 2020-11-04 NOTE — PROGRESS NOTES
Physician Progress Note      Jesus Vogel  ALEX #:                  [id number]  :                       1945  ADMIT DATE:       10/26/2020 9:28 PM  [facility], [place], operated by [facility] DATE:  RESPONDING  PROVIDER #:        Corey Watters DO          QUERY TEXT:    Dear Dr. Rodney Muir,    Pt admitted with Acute cystitis with hematuria and  and has CHF documented. If   possible, please document in progress notes and discharge summary further   specificity regarding the type and acuity of CHF:      The medical record reflects the following:  Risk Factors: Long standing cardiac history  Clinical Indicators: TTE showing \"left ventricular hypertrophy. ..paradoxical   motion consistent with conduction abnormality. ...reduced left ventricular   systolic dysfunction. Marlen Manisha Howellbie Manisha Ejection fraction estimated at 40%, increasing vascular   congestion versus Pulmonary edema, CXR shows small right pleural effusion  Treatment: Bumex, Metoprolol, Close monitoring, strict I&O    Thank you,  Carlus Dandy BSN, RN  Clinical Documentation Improvement  [email or web address]  [phone] (Office number)  [phone] (personal cell)  Options provided:  -- Acute on Chronic Systolic CHF/HFrEF  -- Acute Systolic CHF/HFrEF  -- Chronic Systolic CHF/HFrEF  -- Other - I will add my own diagnosis  -- Disagree - Not applicable / Not valid  -- Disagree - Clinically unable to determine / Unknown  -- Refer to Clinical Documentation Reviewer    PROVIDER RESPONSE TEXT:    This patient has chronic systolic CHF/HFrEF. Query created by: Navjot Lopes on 2020 3:13 PM      QUERY TEXT:    Dear Dr. Rodney Muir,    Pt admitted with Acute cystitis with hematuria. If possible, please document   in the progress notes and discharge summary if you are evaluating and /or   treating any of the following:     The medical record reflects the following:  Risk Factors: Admitted with Cystitis  Clinical Indicators: URI, Multiple Decubiti, Hypotension, KARISHMA on CKD, In H&P   it is stated as hypotension 2/2 dehydration vs sepsis, culture bottle 2   growing Streptococcus viridans, urine culture with Proteus Mirabilis  Treatment: IVF bolus, Maxipime, Rocephin, Zosyn    Thank you,  Sara LUCAS, RN  Clinical Documentation Improvement  [email or web address]  [phone] (office number)  [phone] (personal cell)  Options provided:  -- Sepsis, present on admission  -- Sepsis, not present on admission,  -- Sepsis was ruled out  -- Other - I will add my own diagnosis  -- Disagree - Not applicable / Not valid  -- Disagree - Clinically unable to determine / Unknown  -- Refer to Clinical Documentation Reviewer    PROVIDER RESPONSE TEXT:    This patient has sepsis which was present on admission.     Query created by: Kimberly Mckeon on 11/2/2020 3:25 PM      Electronically signed by:  Doneta Dubin DO 11/4/2020 9:17 AM

## 2020-11-04 NOTE — PLAN OF CARE
Problem: Skin Integrity:  Goal: Will show no infection signs and symptoms  Description: Will show no infection signs and symptoms  Outcome: Completed  Goal: Absence of new skin breakdown  Description: Absence of new skin breakdown  Outcome: Completed     Problem: Falls - Risk of:  Goal: Will remain free from falls  Description: Will remain free from falls  Outcome: Completed  Goal: Absence of physical injury  Description: Absence of physical injury  Outcome: Completed     Problem: Pain:  Goal: Pain level will decrease  Description: Pain level will decrease  Outcome: Completed  Goal: Control of acute pain  Description: Control of acute pain  Outcome: Completed  Goal: Control of chronic pain  Description: Control of chronic pain  Outcome: Completed

## 2020-11-04 NOTE — PROGRESS NOTES
[address] [place] [address] [facility]  Progress Note      Chief Complaint   Patient presents with    Fall     pt fell earlier d/t dizziness, was on the floor for 4 hours       SUBJECTIVE:      Patient is tolerating medications. No reported adverse drug reactions. No problems overnight. Sitting up in chair  Possible dc to select today      Review of systems:    As stated above in the chief complaint, otherwise negative. Medications:    Scheduled Meds:   sodium chloride  20 mL Intravenous Once    bumetanide  1 mg Intravenous Daily    amoxicillin-clavulanate  600 mg Oral 2 times per day    collagenase   Topical Daily    docusate sodium  100 mg Oral BID    palbociclib  100 mg Oral Daily    metoprolol succinate  12.5 mg Oral BID    sodium chloride  20 mL Intravenous Once    folic acid  1 mg Oral Daily    mineral oil-hydrophilic petrolatum   Topical BID    heparin flush  100 Units Intracatheter Daily    insulin glargine  10 Units Subcutaneous Daily    insulin lispro  0-6 Units Subcutaneous TID WC    cyanocobalamin  1,000 mcg Oral Daily    insulin lispro  0-3 Units Subcutaneous Nightly    magnesium oxide  400 mg Oral Daily    sodium bicarbonate  650 mg Oral BID    sodium chloride flush  10 mL Intravenous 2 times per day    pantoprazole  40 mg Intravenous Daily     Continuous Infusions:   dextrose       PRN Meds:acetaminophen, mineral oil-hydrophilic petrolatum **AND** mineral oil-hydrophilic petrolatum, heparin flush, perflutren lipid microspheres, sodium chloride flush, polyethylene glycol, oxyCODONE, glucose, dextrose, glucagon (rDNA), dextrose  Prior to Admission medications    Medication Sig Start Date End Date Taking?  Authorizing Provider   omeprazole (PRILOSEC) 20 MG delayed release capsule Take 20 mg by mouth daily   Yes Historical Provider, MD   bumetanide (BUMEX) 1 MG tablet Take 1 mg by mouth daily   Yes Historical Provider, MD   palbociclib (IBRANCE) 100 MG tablet Take Min: 97 °F (36.1 °C)  Max: 98.2 °F (36.8 °C)  General appearance: sitting up in chair. Pleasant, alert, oriented   Skin: Warm and dry. No rashes were noted. HEENT: Round and reactive pupils. Moist mucous membranes. No ulcerations or thrush. Neck: Supple to movements. Chest: No use of accessory muscles to breathe. Symmetrical expansion. No wheezing, crackles or rhonchi. Cardiovascular: Reguar rate and rhythm. No murmurs gallops, or rubs appreciated. Abdomen: Bowel sounds present, nontender, nondistended, no masses or hepatosplenomegaly. Extremities: No clubbing, no cyanosis, + edema LUE and ble  Lines: right chest mediport 10/28/2020   Maurer 10/26/2020     I/O last 3 completed shifts:   In: 240 [P.O.:240]  Out: 400 [Urine:400]      Laboratory and Tests Review:      Lab Results   Component Value Date    WBC 3.0 (L) 11/04/2020    WBC 2.8 (L) 11/03/2020    WBC 2.5 (L) 11/02/2020    HGB 8.2 (L) 11/04/2020    HCT 25.7 (L) 11/04/2020    .7 (H) 11/04/2020    PLT 49 (L) 11/04/2020     Lab Results   Component Value Date    NEUTROABS 2.22 11/04/2020    NEUTROABS 2.44 11/03/2020    NEUTROABS 2.05 11/02/2020     No results found for: [facility]  Lab Results   Component Value Date    ALT 42 (H) 11/04/2020    AST 32 (H) 11/04/2020    ALKPHOS 154 (H) 11/04/2020    BILITOT 1.2 11/04/2020     Lab Results   Component Value Date     11/04/2020    K 5.0 11/04/2020     11/04/2020    CO2 23 11/04/2020    BUN 82 11/04/2020    CREATININE 1.9 11/04/2020    CREATININE 1.9 11/03/2020    CREATININE 1.8 11/02/2020    GFRAA 31 11/04/2020    LABGLOM 26 11/04/2020    GLUCOSE 167 11/04/2020    GLUCOSE 109 04/20/2012    PROT 5.7 11/04/2020    LABALBU 3.2 11/04/2020    LABALBU 4.4 03/25/2012    CALCIUM 8.5 11/04/2020    BILITOT 1.2 11/04/2020    ALKPHOS 154 11/04/2020    AST 32 11/04/2020    ALT 42 11/04/2020     Lab Results   Component Value Date    CRP 4.9 (H) 07/29/2020    CRP 16.2 (H) 07/07/2020    CRP 4.2 (H) 06/19/2020 Lab Results   Component Value Date    SEDRATE 90 (H) 07/29/2020    SEDRATE 134 (H) 07/07/2020    SEDRATE 102 (H) 06/21/2020       Radiology:    US DUP UPPER EXTREMITY LEFT VENOUS   Final Result   Within the visualized vessels there is no evidence for deep venous   thrombosis               XR CHEST PORTABLE   Final Result   Stable CHF. Pneumonia is less likely. XR CHEST (2 VW)   Final Result   Increasing vascular congestion versus mild pulmonary edema. MRI BRAIN WO CONTRAST   Final Result   1. No acute intracranial abnormality. 2. No mass effect, edema or hemorrhage. CT HEAD WO CONTRAST   Final Result   No acute intracranial abnormality. CT CERVICAL SPINE WO CONTRAST   Final Result   No acute cervical spine fracture or malalignment. Degenerative changes of the cervical spine. CT CHEST WO CONTRAST   Final Result   1. No acute traumatic injury in the chest, abdomen or pelvis. 2. Unknown history of probable malignancy with progression of findings from   prior CT imaging. This includes infiltrative lesion in the liver, omental   thickening, ascites and mesenteric edema, mediastinal lymph node enlargement   and right axillary lymph node enlargement. Sclerotic changes in the ribs and   pelvis also of potential concern for metastasis. 3. Bilateral pleural effusions with interstitial changes developing in the   lungs. A nodular density along the right minor fissure may represent   loculated pleural fluid or a pulmonary nodule. 4. Recommend correlation with history and any prior treatment for malignancy. CT ABDOMEN PELVIS WO CONTRAST Additional Contrast? None   Final Result   1. No acute traumatic injury in the chest, abdomen or pelvis. 2. Unknown history of probable malignancy with progression of findings from   prior CT imaging.   This includes infiltrative lesion in the liver, omental   thickening, ascites and mesenteric edema, mediastinal lymph node enlargement   and right axillary lymph node enlargement. Sclerotic changes in the ribs and   pelvis also of potential concern for metastasis. 3. Bilateral pleural effusions with interstitial changes developing in the   lungs. A nodular density along the right minor fissure may represent   loculated pleural fluid or a pulmonary nodule. 4. Recommend correlation with history and any prior treatment for malignancy. XR CHEST PORTABLE   Final Result   1. Stable exam.  Persistent elevation of the right hemidiaphragm and small   right pleural effusion. Coarse interstitial markings are unchanged. 2.  Atherosclerotic disease, cardiomegaly, mild central pulmonary vascular   congestion, and postsurgical changes consistent with prior CABG. 3.  Unchanged complete left humeral neck fracture. Microbiology:   Lab Results   Component Value Date    BC 5 Days no growth 10/27/2020    BC 5 Days no growth 10/26/2020    BC 5 Days no growth 09/25/2020    ORG Proteus mirabilis 10/27/2020    ORG Enterococcus faecalis 10/27/2020    ORG Streptococcus viridans group 10/26/2020     Lab Results   Component Value Date    BLOODCULT2 5 Days no growth 10/28/2020    BLOODCULT2  10/26/2020     This organism is only isolated from one set. Susceptibility testing is not routinely performed. Additional testing can be ordered by calling the  Microbiology Department. Additional blood cultures may be obtained if  clinical suspicion of septicemia is high. BLOODCULT2 5 Days no growth 07/07/2020    ORG Proteus mirabilis 10/27/2020    ORG Enterococcus faecalis 10/27/2020    ORG Streptococcus viridans group 10/26/2020     WOUND/ABSCESS   Date Value Ref Range Status   10/27/2020   Final    Mixed marck isolated. Further workup and sensitivity testing  is not routinely indicated and will not be performed.   Mixed marck isolated includes:  Nonhemolytic Strep species  Coagulase negative Staph species  Mixed Gram negative rods  Proteus species     10/27/2020   Final    Mixed marck isolated. Further workup and sensitivity testing  is not routinely indicated and will not be performed. Mixed marck isolated includes:  Mixed Gram negative rods  Proteus species  Nonhemolytic Strep species  Corynebacteria     07/29/2020   Final    Light growth  Methicillin resistant Staph aureus isolated. Most Methicillin  resistant Staphylococcus are usually resistant to multiple  antibiotics including other B-Lactams, Aminoglycosides,  Macrolides, Clindamycin and Tetracycline. Contact isolation  is indicated.      07/29/2020   Final    Rare growth  Refer to previous wound culture (Leg, Right) collected  same date/time for susceptibility results     07/29/2020 Rare growth  Final     No results found for: RESPSMEAR  No results found for: MPNEUMO, CLAMYDCU, LABLEGI, AFBCX, FUNGSM, LABFUNG  No results found for: CULTRESP  No results found for: CXCATHTIP  Body Fluid Culture, Sterile   Date Value Ref Range Status   07/25/2019 Growth not present  Final     No results found for: CXSURG  Urine Culture, Routine   Date Value Ref Range Status   10/27/2020 >100,000 CFU/ml  Final   10/27/2020 >100,000 CFU/ml  Final   07/07/2020 Growth not present  Final     MRSA Culture Only   Date Value Ref Range Status   08/09/2019 Methicillin resistant Staph aureus not isolated  Final       Problem list:    Active Problems:    Metastatic breast cancer ([address]    Type 2 diabetes mellitus with stage 3b chronic kidney disease, with long-term current use of insulin ([address]    Type 2 diabetes mellitus without complication, with long-term current use of insulin ([facility])    Anemia of chronic disease    Chronic deep vein thrombosis (DVT) of proximal vein of right lower extremity ([address]    Cardiomyopathy ([address]    Decubitus ulcer    Acute cystitis with hematuria    Typical atrial flutter ([address]    Carcinoma of female breast ([address]  Resolved Problems:    Dehydration    Acute kidney injury superimposed on CKD [facility])    Fall    General weakness      ASSESSMENT:  Pancytopenia Unchanged   Chronic ischial and sacral decubiti improved although they do need debridement (polymicrobial)   Alpha strep virdians bacteremia possibly from the wounds rule out a Mediport issue   History of MRSA colonizing/infecting decubiti     PLAN:   Continue Augmentin for 8 more days - 10 days total   Med rec complete/script on chart  Ok to dc to select from ID POV  check final cultures   Monitor labs   Ultrasound of left arm negative for DVT  Echo did not show vegetation   s/p wound debridement 10/30/2020  Repeat blood cultures 10/27/2020 no growth final      Robles Pettit CNP    1:50 PM  11/4/2020    Pt seen and examined. Above discussed agree with advanced practice nurse. Labs, cultures, and radiographs reviewed. Face to Face encounter occurred. Changes made as necessary.      Darryn Tejeda MD

## 2020-11-04 NOTE — PROGRESS NOTES
[facility]  Nephrology Attending Progress Note  Dickson Cedillo. Fransisco Antonio MD        SUBJECTIVE:     11/1: Elvin Mahmood is a 76 y.o. female with a history of chronic kidney disease stage IV (baseline creatinine 1.5-2), breast cancer with bone mets on active chemotherapy, CAD, CHF, type II DM, and several other medical problems. Presents to ED with complaints of a fall. She states that she has been feeling dizzy for some time now. On the day of admission she had a fall and could not support herself to get up. She thought she was on the floor for a prolonged 2 to 3 hours. EMS was called and she was subsequently brought to ED to be evaluated. In the ED her vitals were unremarkable. Laboratory data was significant for a creatinine of 2.1, blood glucose of 221, hemoglobin 8.2 and BNP in excess of 34,000. Potassium level was also noted to be 3. Blood cultures obtained at the time showed strep viridans. She is currently being followed by ID and currently is on ceftriaxone. Of note patient reports a 30 pound weight gain over the course of the past month or so with increasing lower extremity edema extending to the thighs and lower abdomen. She however denies shortness of breath. No cough. No fever or chills reported.   Renal is consulted for her CKD.     11/2: pt seen in room  11/3 :pt seen in room, weak  11/4: pt seen in room, no cp or sob, remains weak    PROBLEM LIST:    Patient Active Problem List   Diagnosis    Metastatic breast cancer ([address]    Essential hypertension    Coronary artery disease involving native coronary artery of native heart without angina pectoris    Nephrolithiasis    CHF (congestive heart failure) ([address]    Humerus fracture    Shoulder pain, left    B12 deficiency    Hyperlipidemia    Rib lesion    Subclavian vein occlusion, bilateral (HCC)    Pericardial effusion    MI (myocardial infarction) ([address]    Arthritis    Sarcoidosis    Lymph node enlargement    Anesthesia    Type 2 diabetes mellitus with stage 3b chronic kidney disease, with long-term current use of insulin (HCC)    Rectal bleeding    Ventral hernia    Type 2 diabetes mellitus without complication, with long-term current use of insulin (HCC)   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    Diabetic polyneuropathy associated with type 2 diabetes mellitus (HCC)    Leg swelling    History of deep vein thrombosis    Chronic venous insufficiency    Lymphedema of both lower extremities    Ambulatory dysfunction    CKD (chronic kidney disease) stage 3, GFR 30-59 ml/min    Charcot's joint of foot in type 2 diabetes mellitus ([address]    Ascites    Palliative care encounter    Cancer associated pain    HAP (hospital-acquired pneumonia)    Acute systolic heart failure ([address]    Cardiomyopathy ([address]    Status post coronary artery bypass graft    Class 2 severe obesity due to excess calories with serious comorbidity and body mass index (BMI) of 35.0 to 35.9 in adult [facility])    Type 2 diabetes mellitus with hyperglycemia (HCC)    Acute hypercapnic respiratory failure (HCC)    Altered mental status    Goals of care, counseling/discussion    Palliative care by specialist    Metastatic disease ([address]    Moderate protein-calorie malnutrition (HCC)    Precordial pain    Chronic systolic heart failure (HCC)    Pleural effusion    Cellulitis and abscess of leg    Bony metastasis (HCC) left leg    Decubitus ulcer    Left leg pain    Degenerative disc disease at L5-S1 level    KARISHMA (acute kidney injury) ([address]    Stasis ulcer (HCC)    Dehydration    Acute kidney injury superimposed on CKD ([address]    Acute cystitis with hematuria    Fall    General weakness    Typical atrial flutter (HCC)    Carcinoma of female breast [facility])        PAST MEDICAL HISTORY:    Past Medical History:   Diagnosis Date    Abscess of abdominal wall     Anemia     Iron deficiency and chronic disease.  Anesthesia     DIFFICULTY WAKING UP    Arthritis     Arthritis, hip     Breast cancer ([address] 2005    S/P Left Lumpectomy with Lymph Node Dissection, Chemo/Rad. Follows with Dr. Zohra Marr. No recurrence to date. Surgeon was Dr. Dung Gordillo.  CAD (coronary artery disease) 5/2008    s/p CABG. Follows with [address] [place] [address].  CHF (congestive heart failure) (HCC)     Chronic venous insufficiency 11/7/2018    Class 2 severe obesity due to excess calories with serious comorbidity and body mass index (BMI) of 35.0 to 35.9 in adult ([address] 8/1/2019    Decreased dorsalis pedis pulse 11/7/2018    Degenerative disc disease at L5-S1 level 7/10/2020    Diabetic neuropathy (HCC)     Diabetic neuropathy (HCC)     DVT (deep venous thrombosis) (HCC)     Recurrent. On lifelong coumadin.  DVT of upper extremity (deep vein thrombosis) ([address] 4/18/2012    History of deep vein thrombosis 11/7/2018    Humerus fracture     Chronic, on the Left.  Hyperlipidemia 8/29/2011    Hypertension     Left leg pain 7/9/2020    Leg swelling 11/7/2018    Lymph node enlargement     Lymphedema of both lower extremities 11/7/2018    Lymphopenia 7/9/2020    MI (myocardial infarction) ([address]     Nephrolithiasis     Follows with Dr. Lady Minor.  Pericardial effusion     Pulmonary nodule     With mediastinal lymphadenopathy. Stable. Follows with Dr. Michelle Reeder.     Rib lesion 11/28/11    expansile lesion in one of the right ribs laterally    Sarcoidosis 07/26/11    per transbronchial needle aspiration    Subclavian vein occlusion, bilateral ([address] 4/18/2012    Type II or unspecified type diabetes mellitus without mention of complication, not stated as uncontrolled DIET:    DIET GENERAL; Dental Soft  Dietary Nutrition Supplements: Low Calorie High Protein Supplement  Dietary Nutrition Supplements: Wound Healing Oral Supplement     PHYSICAL EXAM:     Patient Vitals for the past 24 hrs:   BP Temp Temp src Pulse Resp SpO2 Weight   11/04/20 0746 110/60 -- -- 80 16 96 % --   11/04/20 0433 100/65 97.3 °F (36.3 °C) Temporal 80 16 94 % --   11/03/20 1950 (!) 93/55 98.2 °F (36.8 °C) Temporal 103 16 95 % --   11/03/20 1436 (!) 101/57 97 °F (36.1 °C) Temporal 77 18 96 % --   11/03/20 1432 -- -- -- -- -- -- 193 lb 9.6 oz (87.8 kg)   11/03/20 1316 (!) 132/58 -- -- 85 -- -- --   @      Intake/Output Summary (Last 24 hours) at 11/4/2020 1116  Last data filed at 11/3/2020 2117  Gross per 24 hour   Intake --   Output 400 ml   Net -400 ml         Wt Readings from Last 3 Encounters:   11/03/20 193 lb 9.6 oz (87.8 kg)   10/19/20 171 lb (77.6 kg)   09/28/20 175 lb (79.4 kg)       Constitutional:  Pt is in no acute distress  Head: normocephalic, atraumatic  Neck: no JVD  Cardiovascular: regular rate and rhythm, no murmurs, gallops, or rubs  Respiratory:  No rales, rhochi, or wheezes  Gastrointestinal:  Soft, nontender, nondistended, bowel sounds x 4  Ext: edema  Skin: dry, no rash  Neuro: aaox3    MEDS (scheduled):    sodium chloride  20 mL Intravenous Once    bumetanide  1 mg Intravenous Daily    amoxicillin-clavulanate  600 mg Oral 2 times per day    collagenase   Topical Daily    docusate sodium  100 mg Oral BID    palbociclib  100 mg Oral Daily    metoprolol succinate  12.5 mg Oral BID    sodium chloride  20 mL Intravenous Once    folic acid  1 mg Oral Daily    mineral oil-hydrophilic petrolatum   Topical BID    heparin flush  100 Units Intracatheter Daily    insulin glargine  10 Units Subcutaneous Daily    insulin lispro  0-6 Units Subcutaneous TID WC    cyanocobalamin  1,000 mcg Oral Daily    insulin lispro  0-3 Units Subcutaneous Nightly    magnesium oxide  400 mg Oral Daily    sodium bicarbonate  650 mg Oral BID    sodium chloride flush  10 mL Intravenous 2 times per day    pantoprazole  40 mg Intravenous Daily       MEDS (infusions):   dextrose         MEDS (prn):  acetaminophen, mineral oil-hydrophilic petrolatum **AND** mineral oil-hydrophilic petrolatum, heparin flush, perflutren lipid microspheres, sodium chloride flush, polyethylene glycol, oxyCODONE, glucose, dextrose, glucagon (rDNA), dextrose    DATA:    Recent Labs     11/02/20 0315 11/02/20  1545 11/03/20  0415 11/04/20  0530   WBC 2.5*  --   --  2.8* 3.0*   HGB 6.6*   < > 8.3* 8.4* 7.8*   HCT 21.1*   < > 25.8* 26.5* 23.8*   .4*  --   --  109.1* 109.7*   PLT 49*  --   --  49* 49*    < > = values in this interval not displayed. Recent Labs     11/02/20 0315 11/03/20  0415 11/04/20  0530    133 137   K 4.6 4.7 5.0    99 102   CO2 21* 23 23   BUN 69* 75* 82*   CREATININE 1.8* 1.9* 1.9*   LABGLOM 27 26 26   GLUCOSE 181* 199* 167*   CALCIUM 7.4* 8.2* 8.5*   ALT 41* 43* 42*   AST 33* 35* 32*   BILITOT 0.7 1.2 1.2   ALKPHOS 136* 176* 154*       Lab Results   Component Value Date    LABALBU 3.2 (L) 11/04/2020    LABALBU 3.5 11/03/2020    LABALBU 2.6 (L) 11/02/2020     Lab Results   Component Value Date    TSH 5.300 (H) 10/27/2020       Iron Studies  Lab Results   Component Value Date    IRON 89 07/29/2020    TIBC 158 (L) 07/29/2020    FERRITIN 1,961 07/29/2020     Vitamin B-12   Date Value Ref Range Status   07/08/2020 >2000 (H) 211 - 946 pg/mL Final     Folate   Date Value Ref Range Status   07/08/2020 >20.0 4.8 - 24.2 ng/mL Final       Vit D, 25-Hydroxy   Date Value Ref Range Status   10/23/2017 21 (L) 30 - 100 ng/mL Final     Comment:     <20 ng/mL. ........... Lorean Snare Deficient  20-30 ng/mL. ......... Lorean Snare Insufficient   ng/mL. ........ Lorean Snare Sufficient  >100 ng/mL. .......... Lorean Snare Toxic       No results found for: PTH    No components found for: URIC    Lab Results   Component Value Date    COLORU Yellow 10/26/2020    NITRU POSITIVE 10/26/2020    GLUCOSEU Negative 10/26/2020    GLUCOSEU NEGATIVE 12/05/2011    KETUA Negative 10/26/2020    UROBILINOGEN 0.2 10/26/2020    BILIRUBINUR Negative 10/26/2020    BILIRUBINUR small 05/16/2016    BILIRUBINUR NEGATIVE 12/05/2011       No results found for: Lani Gilman      IMPRESSION/RECOMMENDATIONS:      1. Chronic kidney disease stage IV  most likely on the basis of diabetes and hypertension  creatinine is within the range of her usual baseline    2. Strep viridans bacteremia    3. Volume overload  HFrEF  from severe hypoalbuminemia as well  Her serum albumin level on average has been between 2-2.5. Diuretic/albumin  Lower diuretic due to low bp ,increase again today  Needs I/o     4. Urinary tract infection  On rocephin    5. Mild hyperkalemia due to her renal failure  improved    6. Type 2 diabetes mellitus    7. Sacral decubitus ulcer        Mley Blood.  Berta Jiang MD

## 2020-11-04 NOTE — PROGRESS NOTES
Occupational Therapy     Date:2020  Patient Name: Jb Flowers  MRN: [id number]  : 1945  Room: [address] [place] [address]-A       Reviewed chart and attempted to treat pt however pt with staff on both attempts this AM. Will attempt at later date/time.        [address] [facility]

## 2020-11-04 NOTE — PROGRESS NOTES
[facility] Attending    S: 76 y.o. female with metastatic breast cancer with liver mets and ascites; DM; CAD; chronic left humerus fracture; chronic bilateral pressure wounds of lateral hips, present on admission; chronic and recurrent DVT of RLE on coumadin; history of sarcoidosis; history of nephrolithiasis; bilateral LE lymphedema. Had fall at home, found on the ground. Today, no new symptoms or concerns. No CP or SOB. Sitting up at bedside commode. Still with LUE swelling, US pending. H&H lower today, will recheck and plan to transfuse. No bleeding seen. Having regular BMs. Has occasional dyspnea overnight, but declines BiPAP, which she has used in the past.  Feels somewhat anxious and sad about her medical condition. We discussed treatment options, and she prefers not to start medication at this time. O: VS- Blood pressure 110/60, pulse 80, temperature 97.3 °F (36.3 °C), temperature source Temporal, resp. rate 16, height 5' 4\" (1.626 m), weight 193 lb 9.6 oz (87.8 kg), SpO2 96 %, not currently breastfeeding. Exam is as noted by resident with the following changes, additions or corrections:  Gen NAD, pale, stable. Awake and alert; oriented throughout discussion. CV RRR, systolic murmur    Resp unlabored, few rales, decreased at bases   Abd distended  Ext edema and stasis changes, edema 3+ today while sitting upright, wounds are dressed. LUE with 2+ edema vs trace right. Motor and sensation intact, digits warm, CR intact. No tenderness. Ecchymosis left wrist and forearm. Impressions:    Active Problems:    Metastatic [facility] cancer ([facility]    Type 2 diabetes mellitus with stage 3b chronic kidney disease, with long-term current use of insulin (HCC)    Type 2 diabetes mellitus without complication, with long-term current use of insulin (HCC)    Anemia of chronic disease    Chronic deep vein thrombosis (DVT) of proximal vein of right lower extremity ([facility]    Cardiomyopathy ([facility]    Decubitus ulcer    Dehydration    Acute kidney injury superimposed on CKD ([facility]    Acute cystitis with hematuria    Fall    General weakness    Typical atrial flutter (HCC)    Carcinoma of female breast ([facility]  Resolved Problems:    * No resolved hospital problems. *      Plan:   ID management appreciated. Antibiotics as per ID guidance. Plans for Augmentin going forward. Encephalopathy, possibly infectious/metabolic, now resolved, back to baseline. Pain control with caution. Follow closely. Dental soft diet. Nutrition evaluation appreciated; support nutrition and hydration, following closely. Wound care for ulcers. General surgery management and wound care appreciated. Disposition planning; patient agreeable to a facility. Cardiology input appreciated. Needs ongoing wound care. Follow I/Os, low U/O yesterday. Nephrology management appreciated. Hgb low today; plan for 1 unit prbcs. PLT remain low, 49K. Coumadin held, INR 1.6 today. US LUE and follow closely, supportive care, elevation as able limited by patient's chronic humerus fracture. Discuss with SW/case management; consideration for LTAC. Plans for [facility] today; needs for ongoing acute care longer term. Discussed treatment options to help with depression symptoms and further evaluation of respiratory status overnight. She declines intervention for now but will consider and let me know if she changes her mind. Attending Physician Statement  I have reviewed the chart and seen the patient with the resident(s). I personally reviewed images, EKG's and similar tests, if present. I personally reviewed and performed key elements of the history and exam.  I have reviewed and confirmed student and/or resident history and exam with changes as indicated above. I agree with the assessment, plan and orders as documented by the resident.   Please refer to the resident and/or student note for

## 2020-11-04 NOTE — PROGRESS NOTES
Notified family medical resident of patient's hemoglobin level this morning via perfect serve. See new orders.

## 2020-11-04 NOTE — PROGRESS NOTES
[facility] Inpatient   Resident Progress Note    S:  Hospital day: 8   Brief Synopsis: Mendez Newberry is a 76 y.o. female with pmhx of metastatic breast cancer, heart failure last EF 50 to 55%, type 2 diabetes requiring insulin, chronic DVT, CKD stage III who presents to ED for Fall. States she fell d/t dizziness, was on the floor for 4 hours. She denied any chest pain or shortness of breath or palpitations prior to. States that she did hit her head, did not lose consciousness. Is on Coumadin chronically for chronic DVT. Patient reporting no complaints or pain in the ER. Of note, patient recently seen in the ER for weakness, was transfused and discharged home in stable condition. Urinalysis was positive for UTI. She was also found to have KARISHMA on her CKD, b/l decubitus ulcers, and she was hypotensive. Pt admitted and started on vanc and zosyn, ID, dietary, palliative, SW were consulted. Patient sen and examined at bedside. She states she is \"not good\" this morning. She is having a lot of bilateral hip pain, but also feels she hurts \"everywhere,\" and that she feels overwhelmed. She denies fever, chills, chest pain, palpitations, dizziness, and lightheadedness. She reports SOB with exertion, lower abdominal pain when she sits to eat, and a little bit of headache last night. She reports her abdominal pain and dysuria have improved.         Cont meds:    dextrose       Scheduled meds:    bumetanide  1 mg Intravenous Daily    amoxicillin-clavulanate  600 mg Oral 2 times per day    collagenase   Topical Daily    docusate sodium  100 mg Oral BID    palbociclib  100 mg Oral Daily    metoprolol succinate  12.5 mg Oral BID    sodium chloride  20 mL Intravenous Once    folic acid  1 mg Oral Daily    mineral oil-hydrophilic petrolatum   Topical BID    heparin flush  100 Units Intracatheter Daily    insulin glargine  10 Units Subcutaneous Daily    insulin lispro  0-6 Units Subcutaneous TID WC    cyanocobalamin  1,000 mcg Oral Daily    insulin lispro  0-3 Units Subcutaneous Nightly    magnesium oxide  400 mg Oral Daily    sodium bicarbonate  650 mg Oral BID    sodium chloride flush  10 mL Intravenous 2 times per day    pantoprazole  40 mg Intravenous Daily     PRN meds: mineral oil-hydrophilic petrolatum **AND** mineral oil-hydrophilic petrolatum, heparin flush, perflutren lipid microspheres, sodium chloride flush, polyethylene glycol, oxyCODONE, glucose, dextrose, glucagon (rDNA), dextrose     I reviewed the patient's past medical and surgical history, Medications and Allergies. O:  /65   Pulse 80   Temp 97.3 °F (36.3 °C) (Temporal)   Resp 16   Ht 5' 4\" (1.626 m)   Wt 193 lb 9.6 oz (87.8 kg)   SpO2 94%   BMI 33.23 kg/m²   24 hour I&O: I/O last 3 completed shifts: In: 300 [P.O.:300]  Out: 650 [Urine:650]  No intake/output data recorded. Physical Exam   Constitutional:   Tearful this morning   HENT:   Head: Normocephalic and atraumatic. Eyes: EOM are normal.   Cardiovascular: Normal rate and regular rhythm. Murmur heard. 1/6 systolic murmur appreciated   Pulmonary/Chest: Effort normal. No stridor. No respiratory distress. She has no wheezes. Abdominal: Soft. She exhibits no distension. Mid epigastric tenderness   Musculoskeletal:         General: Edema present. Comments: 2+ pitting edema BLE   Neurological: She is alert. Skin: No rash noted. Psychiatric: Her behavior is normal.   Patient is tearful this morning and reports being overwhelmed by repeated medical problems           Labs:  Na/K/Cl/CO2:  137/5.0/102/23 (11/04 0530)  BUN/Cr/glu/ALT/AST/amyl/lip:  82/1.9/--/42/32/--/-- (11/04 0530)  WBC/Hgb/Hct/Plts:  3.0/7.8/23.8/49 (11/04 0530)  estimated creatinine clearance is 27 mL/min (A) (based on SCr of 1.9 mg/dL (H)). Other pertinent labs as noted below    Radiology:  XR CHEST PORTABLE   Final Result   Stable CHF. Pneumonia is less likely.          XR CHEST (2 VW)   Final Result   Increasing vascular congestion versus mild pulmonary edema. MRI BRAIN WO CONTRAST   Final Result   1. No acute intracranial abnormality. 2. No mass effect, edema or hemorrhage. CT HEAD WO CONTRAST   Final Result   No acute intracranial abnormality. CT CERVICAL SPINE WO CONTRAST   Final Result   No acute cervical spine fracture or malalignment. Degenerative changes of the cervical spine. CT CHEST WO CONTRAST   Final Result   1. No acute traumatic injury in the chest, abdomen or pelvis. 2. Unknown history of probable malignancy with progression of findings from   prior CT imaging. This includes infiltrative lesion in the liver, omental   thickening, ascites and mesenteric edema, mediastinal lymph node enlargement   and right axillary lymph node enlargement. Sclerotic changes in the ribs and   pelvis also of potential concern for metastasis. 3. Bilateral pleural effusions with interstitial changes developing in the   lungs. A nodular density along the right minor fissure may represent   loculated pleural fluid or a pulmonary nodule. 4. Recommend correlation with history and any prior treatment for malignancy. CT ABDOMEN PELVIS WO CONTRAST Additional Contrast? None   Final Result   1. No acute traumatic injury in the chest, abdomen or pelvis. 2. Unknown history of probable malignancy with progression of findings from   prior CT imaging. This includes infiltrative lesion in the liver, omental   thickening, ascites and mesenteric edema, mediastinal lymph node enlargement   and right axillary lymph node enlargement. Sclerotic changes in the ribs and   pelvis also of potential concern for metastasis. 3. Bilateral pleural effusions with interstitial changes developing in the   lungs. A nodular density along the right minor fissure may represent   loculated pleural fluid or a pulmonary nodule.    4. Recommend correlation with history and any prior treatment for malignancy. XR CHEST PORTABLE   Final Result   1. Stable exam.  Persistent elevation of the right hemidiaphragm and small   right pleural effusion. Coarse interstitial markings are unchanged. 2.  Atherosclerotic disease, cardiomegaly, mild central pulmonary vascular   congestion, and postsurgical changes consistent with prior CABG. 3.  Unchanged complete left humeral neck fracture. US DUP UPPER EXTREMITY LEFT VENOUS    (Results Pending)       A/P:  Active Problems:    Metastatic breast cancer ([facility]    Type 2 diabetes mellitus with stage 3b chronic kidney disease, with long-term current use of insulin (HCC)    Type 2 diabetes mellitus without complication, with long-term current use of insulin (HCC)    Anemia of chronic disease    Chronic deep vein thrombosis (DVT) of proximal vein of right lower extremity (HCC)    Cardiomyopathy ([address]    Decubitus ulcer    Dehydration    Acute kidney injury superimposed on CKD ([facility]    Acute cystitis with hematuria    Fall    General weakness    Typical atrial flutter (HCC)    Carcinoma of female breast ([facility]  Resolved Problems:    * No resolved hospital problems.  *    Hypotension, improving  2/2 dehydration vs ?sepsis  Hold home isosorbide  Home metoprolol restarted and increased from 12.5 mg daily to BID  In light of UTI and decubitus ulcer, blood cultures obtained, 1 out of 4 bottles grew strep viridens  Monitor fluid status closely in light of Pro-BNP 34,317     UTI  UA positive for leuk esterase, nitrates, bacteria  Patient reports no symptoms, afebrile, blood pressure borderline  Urine cx growing Proteus mirabilis  -Vanc and rocephin discontinued per ID  -Augmentin-ES started as per ID  -lower abdominal pain noted with urination, improved today  -patient declined tony replacement yesterday     Decubitus ulcers  Chronic issue, left buttock with erythema and some drainage  Patient afebrile, blood pressure borderline  ID following,

## 2020-11-04 NOTE — PROGRESS NOTES
Perfect serve message sent to family medical resident for discharge orders. Plan for patient to go to [facility] [address] this afternoon. Perfect serve message sent to Dr. Blaine Moritz to notify of plans to discharge patient this afternoon and any orders she may want for discharge.

## 2020-11-06 LAB
EKG ATRIAL RATE: 85 BPM
EKG P AXIS: 58 DEGREES
EKG P-R INTERVAL: 170 MS
EKG Q-T INTERVAL: 422 MS
EKG QRS DURATION: 146 MS
EKG QTC CALCULATION (BAZETT): 502 MS
EKG R AXIS: -82 DEGREES
EKG T AXIS: 42 DEGREES
EKG VENTRICULAR RATE: 85 BPM

## 2020-11-08 NOTE — DISCHARGE SUMMARY
Physician Discharge Summary     Patient ID:  Mendez Newberry  [id number]  71 y.o.  1945    Admit date: 10/26/2020    Discharge date and time: 11/4/2020  4:21 PM     Admitting Physician: Tamika Christianson MD     Admission Diagnoses: Dehydration [E86.0]  Dehydration [E86.0]    Discharge Diagnoses: Active Problems:    Metastatic breast cancer ([facility]    Type 2 diabetes mellitus with stage 3b chronic kidney disease, with long-term current use of insulin (HCC)    Type 2 diabetes mellitus without complication, with long-term current use of insulin (HCC)    Anemia of chronic disease    Chronic deep vein thrombosis (DVT) of proximal vein of right lower extremity (HCC)    Cardiomyopathy ([facility]    Decubitus ulcer    Acute cystitis with hematuria    Typical atrial flutter (HCC)    Carcinoma of female breast ([facility]  Resolved Problems:    Dehydration    Acute kidney injury superimposed on CKD ([facility]    Fall    General weakness      Admission Condition: fair    Discharged Condition: stable    Indication for Admission: Hypotension, KARISHMA on CKD, UTI    Hospital Course/Procedures/Operation/treatments:   Patient presented to ED on 10/26 after becoming dizzy, falling, and then staying down for four hours. She was admitted for hypotension, KARISHMA on CKD, and UTI. Chronic conditions were also managed. Fluids were given, and home antihypertensives were held. Home coumadin was also held due to low hemoglobin. She was transfused 2U PRBC total during admission. Infectious disease, nephrology, general surgery, palliative care, hematology and cardiology were consulted. Patient was also treated with antibiotics for UTI along with concern for sepsis. Blood cultures were positive in only one tube and sepsis was ruled out. Chronic wounds were also negative for growth. Patient improved and was stable for discharge to [facility].         Consults:   IP CONSULT TO FAMILY MEDICINE  IP CONSULT TO SOCIAL WORK  PHARMACY TO DOSE VANCOMYCIN  IP CONSULT TO INFECTIOUS DISEASES  IP CONSULT TO PALLIATIVE CARE  IP CONSULT TO DIETITIAN  IP CONSULT TO DIETITIAN  IP CONSULT TO GENERAL SURGERY  IP CONSULT TO CARDIOLOGY  IP CONSULT TO NEPHROLOGY  IP CONSULT TO CASE MANAGEMENT    Significant Diagnostic Studies:   Xr Chest Portable    Result Date: 2020  EXAMINATION: ONE XRAY VIEW OF THE CHEST 2020 3:54 pm COMPARISON: 2020 HISTORY: ORDERING SYSTEM PROVIDED HISTORY: sob TECHNOLOGIST PROVIDED HISTORY: Reason for Exam:->sob Portable? ->Yes Height:162.6cm WBYFEM:19.776PG What reading provider will be dictating this exam?->CRC FINDINGS: Stable central venous catheter and postoperative changes. Slightly increasing opacity at the right base which may relate to infiltrate and/or atelectasis. Heart is enlarged. There is an expansile lesion of the right 4th rib compatible with osseous metastasis. There is a faint right lung mass which may relate to neoplasm. There is a small right pleural effusion which is not appreciably changed. Mildly increasing infiltrate and/or atelectasis on the right. Right lung mass as before. Osseous metastatic disease. Cardiomegaly. Us Dup Upper Extremity Left Venous    Result Date: 2020  Patient MRN:  [id number] : 1945 Age: 76 years Gender: Female Order Date:  2020 10:28 AM EXAM: US DUP UPPER EXTREMITY LEFT VENOUS NUMBER OF IMAGES:  36 INDICATION:  Left upper extremity swelling, patient with hypercoagulable state Left upper extremity swelling, patient with hypercoagulable state What reading provider will be dictating this exam?->[facility] COMPARISON: None Within the visualized vessels, there is no evidence for deep venous thrombosis There is good compressibility, there is good augmentation, there is good color flow. Within the visualized vessels there is no evidence for deep venous thrombosis       Discharge Exam:  Constitutional:   Tearful this morning   HENT:   Head: Normocephalic and atraumatic.    Eyes: EOM are normal.   Cardiovascular: Normal rate and regular rhythm. Murmur heard. 1/6 systolic murmur appreciated   Pulmonary/Chest: Effort normal. No stridor. No respiratory distress. She has no wheezes. Abdominal: Soft. She exhibits no distension. Mid epigastric tenderness   Musculoskeletal:         General: Edema present. Comments: 2+ pitting edema BLE   Neurological: She is alert. Skin: No rash noted. Psychiatric: Her behavior is normal.     Disposition: to [facility]    In process/preliminary results:  Outstanding Order Results     Date and Time Order Name Status Description    10/27/2020 0027 Lactic Acid, Plasma In process           Patient Instructions:   Discharge Medication List as of 11/4/2020  2:44 PM           Details   collagenase 250 UNIT/GM ointment Apply topically daily. , Disp-1 Tube,R-3, Normal      !! mineral oil-hydrophilic petrolatum (HYDROPHOR) ointment Apply topically as needed. , Disp-1 Tube,R-0, Normal      !! mineral oil-hydrophilic petrolatum (HYDROPHOR) ointment Apply topically as needed. , Disp-100 g,R-1, Normal      folic acid (FOLVITE) 1 MG tablet Take 1 tablet by mouth daily, Disp-30 tablet,R-3Normal      amoxicillin-clavulanate (AUGMENTIN-ES) 600-42.9 MG/5ML suspension Take 5 mLs by mouth every 12 hours for 8 days, Disp-80 mL,R-0Normal       !! - Potential duplicate medications found. Please discuss with provider. Details   oxyCODONE (ROXICODONE) 5 MG immediate release tablet Take 1 tablet by mouth every 6 hours as needed for Pain for up to 7 days. , Disp-28 tablet,R-0Normal      metoprolol succinate (TOPROL XL) 25 MG extended release tablet Take 0.5 tablets by mouth 2 times daily, Disp-30 tablet,R-3Normal      bumetanide (BUMEX) 1 MG tablet Take 1 tablet by mouth 2 times daily, Disp-30 tablet,R-3Normal              Details   omeprazole (PRILOSEC) 20 MG delayed release capsule Take 20 mg by mouth dailyHistorical Med      palbociclib (IBRANCE) 100 MG tablet Take 100 mg by mouth daily Given three weeks on and one week offHistorical Med      Calcium Citrate-Vitamin D (CITRACAL PETITES/VITAMIN D) 200-250 MG-UNIT TABS Take 1 tablet by mouth 2 times dailyHistorical Med      cyanocobalamin 1000 MCG tablet Take 1,000 mcg by mouth dailyHistorical Med      nitroGLYCERIN (NITROSTAT) 0.4 MG SL tablet Place 0.4 mg under the tongue every 5 minutes as needed for Chest painHistorical Med      sodium bicarbonate 650 MG tablet Take 1 tablet by mouth 2 times daily, Disp-180 tablet,R-1Normal      allopurinol (ZYLOPRIM) 100 MG tablet Take 1 tablet by mouth daily, Disp-90 tablet,R-1Normal      magnesium oxide (MAG-OX) 400 MG tablet Take 1 tablet by mouth daily, Disp-90 tablet,R-0Normal      insulin glargine (LANTUS SOLOSTAR) 100 UNIT/ML injection pen Inject 10 Units into the skin daily, Disp-5 pen,R-0Please dispense 1 box of pen needles 2 rf. Thank you. Normal      lidocaine (LIDODERM) 5 % Apply 1 patch topically every 24 hours Historical Med      insulin lispro (HUMALOG) 100 UNIT/ML injection vial Inject 0-3 Units into the skin nightly, Disp-1 vial,R-3Normal      docusate (COLACE, DULCOLAX) 100 MG CAPS Take 100 mg by mouth daily, Disp-90 capsule,R-3Normal      albuterol sulfate HFA (VENTOLIN HFA) 108 (90 Base) MCG/ACT inhaler Inhale 2 puffs into the lungs every 6 hours as needed for WheezingHistorical Med             Follow up:   DO Jose Garg [address]    Go to  Hospital follow up Nov 23, 2020 at 11:00 AM    Lolly Penn MD  [address] [facility]  hospitals [address]    Schedule an appointment as soon as possible for a visit in 1 week  to make your 7day follow up appointment & concerns    Elliott Esparza MD  [address] [facility]  hospitals [address]    Schedule an appointment as soon as possible for a visit in 1 week  to make your 7day follow up appointment & concerns    Susan Jordan MD  [address] [facility] [address] [place] [place] [address]  [phone]    Schedule an appointment as soon as possible for a visit in 1 week  to make your 7day follow up appointment & concerns       Signed:  Rosie Rudolph DO  11/8/2020  9:04 AM

## 2020-11-10 ENCOUNTER — TELEPHONE (OUTPATIENT)
Dept: FAMILY MEDICINE CLINIC | Age: 75
End: 2020-11-10

## 2020-11-10 NOTE — TELEPHONE ENCOUNTER
I called her back. She was able to speak to someone at [facility], and they had a plan to address the concerns for Ms. Deana Estrella. We discussed some of the difficult considerations in giving coumadin vs risks for bleeding with low platelets, etc.  Questions answered, no further concerns at this time. Thank you!

## 2020-11-11 NOTE — PROGRESS NOTES
Physician Progress Note      PATIENT:               [facility]  CSN #:                  [id number]  :                       1945  ADMIT DATE:       10/26/2020 9:28 PM  Komal Gonzalez DATE:        2020 4:21 PM  RESPONDING  PROVIDER #:        Barbra Liriano DO          QUERY TEXT:    Dear Dr. Deborah Sol,    Patient admitted with Acute cystitis with hematuria. Noted documentation of   Sepsis present on admission in query response dated  by Dr Deborah Sol and then   Sepsis ruled out was documented on  in discharge summary by Dr. Deborah Sol. If   possible, please document in progress notes and discharge summary if you are   evaluating and /or treating any of the following: The medical record reflects the following:  Risk Factors: Known infectious process on admission  Clinical Indicators: Acute cystitis with hematuria on admission, KARISHMA on CKD,   pancytopenia POA, Receiving chemotherapy for CA diagnosis and is   immunocompromised, WBC on admission 3.6, Lactic acid 3 on admission as high as   3.6 and 2.1 on discharge, Procalcitonin 2.82 on discharge  Treatment: Multiple NS fluid bolus, Serial labs, close pt monitoring,   Ceftriaxone, Maxipime, Vanc, Home on Augmentin    Thank you,  Hi RODRIGUEZN, RN  Clinical Documentation Improvement  [email or web address]  Office #: [phone]  Cell #: [phone]  Options provided:  -- Sepsis is ruled out  -- Sepsis is present on admission  -- Sepsis is confirmed present on admission and resolved at discharge  -- Other - I will add my own diagnosis  -- Disagree - Not applicable / Not valid  -- Disagree - Clinically unable to determine / Unknown  -- Refer to Clinical Documentation Reviewer    PROVIDER RESPONSE TEXT:    After study, Sepsis is ruled out.     Query created by: Yuliet Bañuelos on 2020 8:32 AM      Electronically signed by:  Barbra Liriano DO 2020 3:22 PM

## 2020-11-13 ENCOUNTER — HOSPITAL ENCOUNTER (OUTPATIENT)
Age: 75
Setting detail: OUTPATIENT SURGERY
Discharge: OTHER INSTITUTION | End: 2020-11-13
Attending: SURGERY | Admitting: SURGERY
Payer: COMMERCIAL

## 2020-11-13 ENCOUNTER — ANESTHESIA (OUTPATIENT)
Dept: OPERATING ROOM | Age: 75
End: 2020-11-13
Payer: COMMERCIAL

## 2020-11-13 ENCOUNTER — ANESTHESIA EVENT (OUTPATIENT)
Dept: OPERATING ROOM | Age: 75
End: 2020-11-13
Payer: COMMERCIAL

## 2020-11-13 VITALS
OXYGEN SATURATION: 100 % | RESPIRATION RATE: 11 BRPM | SYSTOLIC BLOOD PRESSURE: 105 MMHG | DIASTOLIC BLOOD PRESSURE: 38 MMHG

## 2020-11-13 VITALS
DIASTOLIC BLOOD PRESSURE: 45 MMHG | OXYGEN SATURATION: 100 % | TEMPERATURE: 97 F | RESPIRATION RATE: 3 BRPM | HEART RATE: 51 BPM | SYSTOLIC BLOOD PRESSURE: 114 MMHG

## 2020-11-13 LAB — METER GLUCOSE: 81 MG/DL (ref 74–99)

## 2020-11-13 PROCEDURE — 6360000002 HC RX W HCPCS: Performed by: SURGERY

## 2020-11-13 PROCEDURE — 7100000001 HC PACU RECOVERY - ADDTL 15 MIN: Performed by: SURGERY

## 2020-11-13 PROCEDURE — 6360000002 HC RX W HCPCS

## 2020-11-13 PROCEDURE — 2709999900 HC NON-CHARGEABLE SUPPLY: Performed by: SURGERY

## 2020-11-13 PROCEDURE — 2500000003 HC RX 250 WO HCPCS: Performed by: SURGERY

## 2020-11-13 PROCEDURE — 36556 INSERT NON-TUNNEL CV CATH: CPT | Performed by: SURGERY

## 2020-11-13 PROCEDURE — 82962 GLUCOSE BLOOD TEST: CPT

## 2020-11-13 PROCEDURE — 3600000013 HC SURGERY LEVEL 3 ADDTL 15MIN: Performed by: SURGERY

## 2020-11-13 PROCEDURE — 99212 OFFICE O/P EST SF 10 MIN: CPT | Performed by: SURGERY

## 2020-11-13 PROCEDURE — 2500000003 HC RX 250 WO HCPCS

## 2020-11-13 PROCEDURE — 76937 US GUIDE VASCULAR ACCESS: CPT | Performed by: SURGERY

## 2020-11-13 PROCEDURE — 3600000003 HC SURGERY LEVEL 3 BASE: Performed by: SURGERY

## 2020-11-13 PROCEDURE — 7100000000 HC PACU RECOVERY - FIRST 15 MIN: Performed by: SURGERY

## 2020-11-13 PROCEDURE — 2580000003 HC RX 258: Performed by: SURGERY

## 2020-11-13 PROCEDURE — 2580000003 HC RX 258

## 2020-11-13 PROCEDURE — 3700000000 HC ANESTHESIA ATTENDED CARE: Performed by: SURGERY

## 2020-11-13 PROCEDURE — 3700000001 HC ADD 15 MINUTES (ANESTHESIA): Performed by: SURGERY

## 2020-11-13 RX ORDER — FENTANYL CITRATE 50 UG/ML
25 INJECTION, SOLUTION INTRAMUSCULAR; INTRAVENOUS EVERY 5 MIN PRN
Status: DISCONTINUED | OUTPATIENT
Start: 2020-11-13 | End: 2020-11-13 | Stop reason: HOSPADM

## 2020-11-13 RX ORDER — SODIUM CHLORIDE 9 MG/ML
INJECTION, SOLUTION INTRAVENOUS CONTINUOUS PRN
Status: DISCONTINUED | OUTPATIENT
Start: 2020-11-13 | End: 2020-11-13 | Stop reason: SDUPTHER

## 2020-11-13 RX ORDER — ETOMIDATE 2 MG/ML
INJECTION INTRAVENOUS PRN
Status: DISCONTINUED | OUTPATIENT
Start: 2020-11-13 | End: 2020-11-13 | Stop reason: SDUPTHER

## 2020-11-13 RX ORDER — PROPOFOL 10 MG/ML
INJECTION, EMULSION INTRAVENOUS PRN
Status: DISCONTINUED | OUTPATIENT
Start: 2020-11-13 | End: 2020-11-13

## 2020-11-13 RX ORDER — SODIUM CHLORIDE 0.9 % (FLUSH) 0.9 %
10 SYRINGE (ML) INJECTION PRN
Status: DISCONTINUED | OUTPATIENT
Start: 2020-11-13 | End: 2020-11-13 | Stop reason: HOSPADM

## 2020-11-13 RX ORDER — SODIUM CHLORIDE 0.9 % (FLUSH) 0.9 %
10 SYRINGE (ML) INJECTION EVERY 12 HOURS SCHEDULED
Status: DISCONTINUED | OUTPATIENT
Start: 2020-11-13 | End: 2020-11-13 | Stop reason: HOSPADM

## 2020-11-13 RX ORDER — PROMETHAZINE HYDROCHLORIDE 25 MG/ML
6.25 INJECTION, SOLUTION INTRAMUSCULAR; INTRAVENOUS
Status: DISCONTINUED | OUTPATIENT
Start: 2020-11-13 | End: 2020-11-13 | Stop reason: HOSPADM

## 2020-11-13 RX ORDER — FENTANYL CITRATE 50 UG/ML
50 INJECTION, SOLUTION INTRAMUSCULAR; INTRAVENOUS EVERY 5 MIN PRN
Status: DISCONTINUED | OUTPATIENT
Start: 2020-11-13 | End: 2020-11-13 | Stop reason: HOSPADM

## 2020-11-13 RX ORDER — FENTANYL CITRATE 50 UG/ML
INJECTION, SOLUTION INTRAMUSCULAR; INTRAVENOUS PRN
Status: DISCONTINUED | OUTPATIENT
Start: 2020-11-13 | End: 2020-11-13 | Stop reason: SDUPTHER

## 2020-11-13 RX ORDER — HEPARIN SODIUM (PORCINE) LOCK FLUSH IV SOLN 100 UNIT/ML 100 UNIT/ML
SOLUTION INTRAVENOUS PRN
Status: DISCONTINUED | OUTPATIENT
Start: 2020-11-13 | End: 2020-11-13 | Stop reason: ALTCHOICE

## 2020-11-13 RX ORDER — ONDANSETRON 2 MG/ML
4 INJECTION INTRAMUSCULAR; INTRAVENOUS
Status: DISCONTINUED | OUTPATIENT
Start: 2020-11-13 | End: 2020-11-13 | Stop reason: HOSPADM

## 2020-11-13 RX ADMIN — SODIUM CHLORIDE: 9 INJECTION, SOLUTION INTRAVENOUS at 14:51

## 2020-11-13 RX ADMIN — FENTANYL CITRATE 25 MCG: 50 INJECTION, SOLUTION INTRAMUSCULAR; INTRAVENOUS at 14:48

## 2020-11-13 RX ADMIN — ETOMIDATE 4 MG: 2 INJECTION, SOLUTION INTRAVENOUS at 14:48

## 2020-11-13 RX ADMIN — ETOMIDATE 2 MG: 2 INJECTION, SOLUTION INTRAVENOUS at 14:58

## 2020-11-13 ASSESSMENT — PAIN SCALES - GENERAL
PAINLEVEL_OUTOF10: 0

## 2020-11-13 ASSESSMENT — PULMONARY FUNCTION TESTS
PIF_VALUE: 0
PIF_VALUE: 3
PIF_VALUE: 0
PIF_VALUE: 1
PIF_VALUE: 0
PIF_VALUE: 0
PIF_VALUE: 1
PIF_VALUE: 0
PIF_VALUE: 3
PIF_VALUE: 0
PIF_VALUE: 1
PIF_VALUE: 0
PIF_VALUE: 0
PIF_VALUE: 1
PIF_VALUE: 0

## 2020-11-13 ASSESSMENT — PAIN DESCRIPTION - PAIN TYPE: TYPE: SURGICAL PAIN

## 2020-11-13 ASSESSMENT — PAIN DESCRIPTION - LOCATION: LOCATION: GROIN

## 2020-11-13 ASSESSMENT — ENCOUNTER SYMPTOMS: SHORTNESS OF BREATH: 1

## 2020-11-13 ASSESSMENT — PAIN DESCRIPTION - ORIENTATION: ORIENTATION: LEFT

## 2020-11-13 NOTE — OP NOTE
Operative Note      Patient: Levon Weiss  YOB: 1945  MRN: [id number]    Date of Procedure: 11/13/2020    Pre-Op Diagnosis: DIALYSIS    Post-Op Diagnosis: Same       Procedure(s):  TEMPORARY CATHETER INSERTION HEMODIALYSIS left common femoral vein  US guided access left common femoral arabella    Surgeon(s):  Rafy Servin MD    Assistant:   Dr. Ni Toledo  Anesthesia: Monitor Anesthesia Care    Estimated Blood Loss (mL): less than 50     Complications: None    Specimens:   * No specimens in log *    Implants:  * No implants in log *      Drains:   Urethral Catheter 16 fr (Active)   Catheter Indications Need for fluid management in critically ill patients in a critical care setting not able to be managed by other means such as BSC with hat, bedpan, urinal, condom catheter, or short term intermittent urethral catherization 11/04/20 0800   Site Assessment Pink 10/28/20 1459   Urine Color Tea 11/04/20 0800   Urine Appearance Cloudy 11/04/20 0800   Output (mL) 300 mL 11/04/20 1348       Findings:     Detailed Description of Procedure:     Patients left groin prepped and draped in sterile fashion. Left common femoral vein was identified under US noted to be patent and was percutaneously entered under US guidance. Image documented. A guidewire was advanced. A small incision was made around the wire. Dilators and than temporary HD catheter was inserted. Catheter eileen and flushed without problem. Sutured into place using silk suture and caps placed. Dressing applied. Needle, sponge, and instrument counts were reported as correct x2. The patient tolerated the procedure and was transferred to the recovery area in satisfactory condition.     [address], DO    Electronically signed by Rafy Servin MD on 11/13/2020 at 3:05 PM

## 2020-11-13 NOTE — H&P
Vascular Surgery History & Physical Exam      Chief Complaint: ESRD    HISTORY OF PRESENT ILLNESS:                The patient is a 76 y.o. female who presents to the hospital for elective insertion of tunneled hd catheter. [facility] [place] [place] [facility] [place] [facility]. IMPRESSION:  ESRD    PLAN:  Insertion of temporary hd catheter    Original plan was for placement tunneled hd catheter but due to resp status will place temp hd catheter. She understands she is at risk for needing intubation. I reviewed the procedure with the patient and family as available. I discussed the procedure, risks, benefits, complications, and alternatives of the procedure. They understand and consent. All questions were answered. ROS : All others Negative if blank [], Positive if [x]  General   [] Fevers   [] Chills   [] Weight Loss   Skin   [] Tissue Loss   Eyes   [x] Wears Glasses/Contacts   [] Vision Changes   Respiratory    [x] Shortness of breath   Cardiovascular   [] Chest Pain   [x] Shortness of breath with exertion   Gastrointestinal   [] Abdominal Pain     Past Medical History:   Diagnosis Date    Abscess of abdominal wall     Anemia     Iron deficiency and chronic disease.  Anesthesia     DIFFICULTY WAKING UP    Arthritis     Arthritis, hip     Breast cancer ([facility] 2005    S/P Left Lumpectomy with Lymph Node Dissection, Chemo/Rad. Follows with Dr. Lauro Presley. No recurrence to date. Surgeon was Dr. Kevin Harris.  CAD (coronary artery disease) 5/2008    s/p CABG. Follows with [address] [place] [address].     CHF (congestive heart failure) (HCC)     Chronic venous insufficiency 11/7/2018    Class 2 severe obesity due to excess calories with serious comorbidity and body mass index (BMI) of 35.0 to 35.9 in adult [facility]) 8/1/2019    Decreased dorsalis pedis pulse 11/7/2018    Degenerative disc disease at L5-S1 level 7/10/2020    Diabetic neuropathy (HCC)     Diabetic neuropathy ([address]     DVT (deep venous thrombosis) (HCC)     Recurrent. On lifelong coumadin.  DVT of upper extremity (deep vein thrombosis) ([address] 4/18/2012    History of deep vein thrombosis 11/7/2018    Humerus fracture     Chronic, on the Left.  Hyperlipidemia 8/29/2011    Hypertension     Left leg pain 7/9/2020    Leg swelling 11/7/2018    Lymph node enlargement     Lymphedema of both lower extremities 11/7/2018    Lymphopenia 7/9/2020    MI (myocardial infarction) ([address]     Nephrolithiasis     Follows with Dr. Bogdan Dye.  Pericardial effusion     Pulmonary nodule     With mediastinal lymphadenopathy. Stable. Follows with Dr. Maxene Carrel.  Rib lesion 11/28/11    expansile lesion in one of the right ribs laterally    Sarcoidosis 07/26/11    per transbronchial needle aspiration    Subclavian vein occlusion, bilateral ([address] 4/18/2012    Type II or unspecified type diabetes mellitus without mention of complication, not stated as uncontrolled      Past Surgical History:   Procedure Laterality Date    APPENDECTOMY      ARTERIAL BYPASS SURGRY      BREAST LUMPECTOMY  9/27/2005    LEFT    CARDIAC SURGERY      CHOLECYSTECTOMY      COLONOSCOPY  12/2007    cecal arteriovenous malformation with hyperplastic polyps    COLONOSCOPY  [id number]    CORONARY ARTERY BYPASS GRAFT  05/2008    triple bypass    ECHO COMPL W DOP COLOR FLOW  10/30/2013         EYE SURGERY      clarissa cataracts    HYSTERECTOMY  1975, 1985    MAYNOR prolapse, benign conditions; BSO later for scar tissues, no CA.      OTHER SURGICAL HISTORY  11/18/11    port removal right chest wall    PARACENTESIS      TONSILLECTOMY      TOOTH EXTRACTION      Full Dental Extraction.  TUNNELED VENOUS PORT PLACEMENT      [facility] 2011[facility] [place] [address]  [id number]    RIGHT CHEST     Current Medications:   No current facility-administered medications for this encounter.    No current outpatient medications on file. Allergies:  Macrobid [nitrofurantoin monohydrate macrocrystals]  Social History     Socioeconomic History    Marital status:      Spouse name: Not on file    Number of children: Not on file    Years of education: Not on file    Highest education level: Not on file   Occupational History    Not on file   Social Needs    Financial resource strain: Not on file    Food insecurity     Worry: Not on file     Inability: Not on file    Transportation needs     Medical: Not on file     Non-medical: Not on file   Tobacco Use    Smoking status: Never Smoker    Smokeless tobacco: Never Used   Substance and Sexual Activity    Alcohol use: No    Drug use: No    Sexual activity: Never   Lifestyle    Physical activity     Days per week: Not on file     Minutes per session: Not on file    Stress: Not on file   Relationships    Social connections     Talks on phone: Not on file     Gets together: Not on file     Attends Yazidi service: Not on file     Active member of club or organization: Not on file     Attends meetings of clubs or organizations: Not on file     Relationship status: Not on file    Intimate partner violence     Fear of current or ex partner: Not on file     Emotionally abused: Not on file     Physically abused: Not on file     Forced sexual activity: Not on file   Other Topics Concern    Not on file   Social History Narrative    Not on file     Family History   Adopted: Yes       REVIEW OF SYSTEMS:  The chart was reviewed. PHYSICAL EXAM:    There were no vitals filed for this visit.   CONSTITUTIONAL:  awake, alert, cooperative, no apparent distress, and appears stated age  LUNGS:  tachypneic, + resp distress  CARDIOVASCULAR:  tachycardic   ABDOMEN:  soft, non-distended and non-tender  + edema bilaterally    LABS:    Lab Results   Component Value Date    WBC 2.4 (L) 11/13/2020    HGB 8.2 (L) 11/13/2020    HCT 26.7 (L) 11/13/2020    PLT 68 (L) 11/13/2020    PROTIME 20.2 (H) 11/13/2020    INR 1.8 11/13/2020    APTT 65.9 (H) 10/22/2020    K 6.2 (H) 11/13/2020     (H) 11/13/2020    CREATININE 3.9 (H) 11/13/2020       Salena Jackson

## 2020-11-13 NOTE — PROGRESS NOTES
Floor Called, nurse to nurse given. Spoke with Yvonne Art RN . Patients test results review, VS reported to receiving nurse. Any and all important information regarding patient disclosed.

## 2020-11-13 NOTE — ANESTHESIA PRE PROCEDURE
Department of Anesthesiology  Preprocedure Note       Name:  Murali Robert   Age:  76 y.o.  :  1945                                          MRN:  [id number]         Date:  2020      Surgeon: Lashaun Morgan):  Galileo Kenney MD    Procedure: Procedure(s):  TUNNELED CATHETER INSERTION HEMODIALYSIS    Medications prior to admission:   Prior to Admission medications    Medication Sig Start Date End Date Taking? Authorizing Provider   metoprolol succinate (TOPROL XL) 25 MG extended release tablet Take 0.5 tablets by mouth 2 times daily 20   Carolina A Rech, DO   collagenase 250 UNIT/GM ointment Apply topically daily. 20  Carolina A Rech, DO   mineral oil-hydrophilic petrolatum (HYDROPHOR) ointment Apply topically as needed. 20   Carolina A Rech, DO   mineral oil-hydrophilic petrolatum (HYDROPHOR) ointment Apply topically as needed.  20   Irina Granado, DO   folic acid (FOLVITE) 1 MG tablet Take 1 tablet by mouth daily 20   Rosio Damian, DO   bumetanide (BUMEX) 1 MG tablet Take 1 tablet by mouth 2 times daily 20   Rosio Munoz A Rech, DO   omeprazole (PRILOSEC) 20 MG delayed release capsule Take 20 mg by mouth daily    Historical Provider, MD   palbociclib (IBRANCE) 100 MG tablet Take 100 mg by mouth daily Given three weeks on and one week off    Historical Provider, MD   Calcium Citrate-Vitamin D (CITRACAL PETITES/VITAMIN D) 200-250 MG-UNIT TABS Take 1 tablet by mouth 2 times daily    Historical Provider, MD   albuterol sulfate HFA (VENTOLIN HFA) 108 (90 Base) MCG/ACT inhaler Inhale 2 puffs into the lungs every 6 hours as needed for Wheezing    Historical Provider, MD   cyanocobalamin 1000 MCG tablet Take 1,000 mcg by mouth daily    Historical Provider, MD   nitroGLYCERIN (NITROSTAT) 0.4 MG SL tablet Place 0.4 mg under the tongue every 5 minutes as needed for Chest pain    Historical Provider, MD   sodium bicarbonate 650 MG tablet Take 1 tablet by mouth 2 times daily 20 Nicole Punch, DO   allopurinol (ZYLOPRIM) 100 MG tablet Take 1 tablet by mouth daily 9/14/20   Nicole Punch, DO   magnesium oxide (MAG-OX) 400 MG tablet Take 1 tablet by mouth daily 9/14/20   Nicole Punch, DO   insulin glargine (LANTUS SOLOSTAR) 100 UNIT/ML injection pen Inject 10 Units into the skin daily 8/13/20   Yazmin Bhardwaj MD   lidocaine (LIDODERM) 5 % Apply 1 patch topically every 24 hours  7/13/20   Historical Provider, MD   insulin lispro (HUMALOG) 100 UNIT/ML injection vial Inject 0-3 Units into the skin nightly 7/15/20   Maureen Boss MD   docusate (COLACE, DULCOLAX) 100 MG CAPS Take 100 mg by mouth daily 2/4/20   Nicole Corey, DO       Current medications:    No current facility-administered medications for this encounter. Allergies: Allergies   Allergen Reactions    Macrobid [Nitrofurantoin Monohydrate Macrocrystals] Hives       Problem List:    Patient Active Problem List   Diagnosis Code    Metastatic breast cancer ([facility] C50.919    Essential hypertension I10    Coronary artery disease involving native coronary artery of native heart without angina pectoris I25.10    Nephrolithiasis N20.0    CHF (congestive heart failure) ([facility]) I50.9    Humerus fracture S42.309A    Shoulder pain, left M25.512    B12 deficiency E53.8    Hyperlipidemia E78.5    Rib lesion M89.9    Subclavian vein occlusion, bilateral ([facility] I82. B13    Pericardial effusion I31.3    MI (myocardial infarction) ([facility] I21.9    Arthritis M19.90    Sarcoidosis D86.9    Lymph node enlargement R59.9    Anesthesia R20.0    Type 2 diabetes mellitus with stage 3b chronic kidney disease, with long-term current use of insulin ([facility]) E11.21, N18.32, Z79.4    Rectal bleeding K62.5    Ventral hernia K43.9    Type 2 diabetes mellitus without complication, with long-term current use of insulin ([facility]) E11.9, Z79.4    Anemia of chronic disease D63.8    Anemia D64.9    Chronic deep vein thrombosis (DVT) of proximal vein of right lower extremity (HCC) I82.5Y1    Bilateral edema of lower extremity R60.0    Peripheral polyneuropathy U87.2    Complicated UTI (urinary tract infection) N39.0    Diarrhea with dehydration R19.7    Chronic anticoagulation Z79.01    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 S42.202K    Diabetic polyneuropathy associated with type 2 diabetes mellitus (HCC) E11.42    Leg swelling M79.89    History of deep vein thrombosis Z86.718    Chronic venous insufficiency I87.2    Lymphedema of both lower extremities I89.0    Ambulatory dysfunction R26.2    CKD (chronic kidney disease) stage 3, GFR 30-59 ml/min N18.30    Charcot's joint of foot in type 2 diabetes mellitus ([facility] E11.610    Ascites R18.8    Palliative care encounter Z51.5    Cancer associated pain G89.3    HAP (hospital-acquired pneumonia) J18.9, V97    Acute systolic heart failure (HCC) I50.21    Cardiomyopathy (HCC) I42.9    Status post coronary artery bypass graft Z95.1    Class 2 severe obesity due to excess calories with serious comorbidity and body mass index (BMI) of 35.0 to 35.9 in adult (HCC) E66.01, Z68.35    Type 2 diabetes mellitus with hyperglycemia (HCC) E11.65    Acute hypercapnic respiratory failure (HCC) J96.02    Altered mental status R41.82    Goals of care, counseling/discussion Z71.89    Palliative care by specialist Z51.5    Metast[facility] disease [facility] C79.9    Moderate protein-calorie malnutrition (HCC) E44.0    Precordial pain R07.2    Chronic systolic heart failure (HCC) I50.22    Pleural effusion J90    Cellulitis and abscess of leg L03.119, L02.419    Bony metastasis (HCC) left leg C79.51    Decubitus ulcer L89.90    Left leg pain M79.605    Degenerative disc disease at L5-S1 level M51.36    KARISHMA (acute kidney injury) (HCC) N17.9    Stasis ulcer (HCC) I83.009, L97.909    Acute cystitis with hematuria N30.01    Typical atrial flutter (HCC) I48.3    Carcinoma of female breast ([facility] C50.919       Past Medical History:        Diagnosis Date    Abscess of abdominal wall     Anemia     Iron deficiency and chronic disease.  Anesthesia     DIFFICULTY WAKING UP    Arthritis     Arthritis, hip     Breast cancer ([address] 2005    S/P Left Lumpectomy with Lymph Node Dissection, Chemo/Rad. Follows with Dr. Severa Halon. No recurrence to date. Surgeon was Dr. Austin Del Angel.  CAD (coronary artery disease) 5/2008    s/p CABG. Follows with [address] [place] [address].  CHF (congestive heart failure) (HCC)     Chronic venous insufficiency 11/7/2018    Class 2 severe obesity due to excess calories with serious comorbidity and body mass index (BMI) of 35.0 to 35.9 in adult ([address] 8/1/2019    Decreased dorsalis pedis pulse 11/7/2018    Degenerative disc disease at L5-S1 level 7/10/2020    Diabetic neuropathy (HCC)     Diabetic neuropathy (HCC)     DVT (deep venous thrombosis) (HCC)     Recurrent. On lifelong coumadin.  DVT of upper extremity (deep vein thrombosis) ([address] 4/18/2012    History of deep vein thrombosis 11/7/2018    Humerus fracture     Chronic, on the Left.  Hyperlipidemia 8/29/2011    Hypertension     Left leg pain 7/9/2020    Leg swelling 11/7/2018    Lymph node enlargement     Lymphedema of both lower extremities 11/7/2018    Lymphopenia 7/9/2020    MI (myocardial infarction) ([address]     Nephrolithiasis     Follows with Dr. Prisca Garcia.  Pericardial effusion     Pulmonary nodule     With mediastinal lymphadenopathy. Stable. Follows with Dr. Christen Gonzalez.     Rib lesion 11/28/11    expansile lesion in one of the right ribs laterally    Sarcoidosis 07/26/11    per transbronchial needle aspiration    Subclavian vein occlusion, bilateral ([address] 4/18/2012    Type II or unspecified type diabetes mellitus without mention of complication, not stated as uncontrolled        Past Surgical History:        Procedure Laterality Date    APPENDECTOMY      ARTERIAL BYPASS SURGRY      BREAST LUMPECTOMY  9/27/2005    LEFT    CARDIAC SURGERY      CHOLECYSTECTOMY      COLONOSCOPY  12/2007    cecal arteriovenous malformation with hyperplastic polyps    COLONOSCOPY  [id number]    CORONARY ARTERY BYPASS GRAFT  05/2008    triple bypass    ECHO COMPL W DOP COLOR FLOW  10/30/2013         EYE SURGERY      clarissa cataracts    HYSTERECTOMY  1975, 1985    MAYNOR prolapse, benign conditions; BSO later for scar tissues, no CA.      OTHER SURGICAL HISTORY  11/18/11    port removal right chest wall    PARACENTESIS      TONSILLECTOMY      TOOTH EXTRACTION      Full Dental Extraction.  TUNNELED VENOUS PORT PLACEMENT      removal of port Dec. 2011- Dr. Lexi Schulte TUNNELED [facility]  [id number]    RIGHT CHEST       Social History:    Social History     Tobacco Use    Smoking status: Never Smoker    Smokeless tobacco: Never Used   Substance Use Topics    Alcohol use: No                                Counseling given: Not Answered      Vital Signs (Current): There were no vitals filed for this visit.                                            BP Readings from Last 3 Encounters:   11/04/20 110/60   10/22/20 (!) 109/57   10/19/20 113/71       NPO Status:                                                                                 BMI:   Wt Readings from Last 3 Encounters:   11/03/20 193 lb 9.6 oz (87.8 kg)   10/19/20 171 lb (77.6 kg)   09/28/20 175 lb (79.4 kg)     There is no height or weight on file to calculate BMI.    CBC:   Lab Results   Component Value Date    WBC 2.4 11/13/2020    RBC 2.39 11/13/2020    HGB 8.2 11/13/2020    HCT 26.7 11/13/2020    .7 11/13/2020    RDW 22.8 11/13/2020    PLT 68 11/13/2020       CMP:   Lab Results   Component Value Date     11/13/2020    K 6.2 11/13/2020    K 5.0 11/04/2020    CL 98 11/13/2020    CO2 22 11/13/2020     11/13/2020    CREATININE 3.9 11/13/2020    GFRAA 14 11/13/2020    LABGLOM 11 11/13/2020    GLUCOSE 68 11/13/2020    GLUCOSE 109 04/20/2012    PROT 6.0 11/08/2020    CALCIUM 8.6 11/13/2020    BILITOT 1.2 11/08/2020    ALKPHOS 148 11/08/2020    AST 34 11/08/2020    ALT 53 11/08/2020       POC Tests: No results for input(s): POCGLU, POCNA, POCK, POCCL, POCBUN, POCHEMO, POCHCT in the last 72 hours. Coags:   Lab Results   Component Value Date    PROTIME 20.2 11/13/2020    PROTIME 79.1 10/19/2020    INR 1.8 11/13/2020    APTT 65.9 10/22/2020       HCG (If Applicable): No results found for: PREGTESTUR, PREGSERUM, HCG, HCGQUANT     ABGs: No results found for: PHART, PO2ART, VTH2FZR, EIZ5IUL, BEART, P6NKODOL     Type & Screen (If Applicable):  Lab Results   Component Value Date    LABABO O 04/20/2012    [address] NEG 04/20/2012       Drug/Infectious Status (If Applicable):  No results found for: HIV, HEPCAB    COVID-19 Screening (If Applicable):   Lab Results   Component Value Date    COVID19 Not Detected 10/28/2020         Anesthesia Evaluation  Patient summary reviewed and Nursing notes reviewed  Airway: Mallampati: Unable to assess / NA       Comment: Unable to cooperate   Dental:      Comment: BERNY    Pulmonary:   (+) pneumonia:  shortness of breath:  decreased breath sounds,                             Cardiovascular:    (+) hypertension:, past MI:, CAD:, CABG/stent (CABG):, CHF:,         Rhythm: regular  Rate: normal                    Neuro/Psych:                ROS comment: Peripheral neuropathy GI/Hepatic/Renal:   (+) renal disease: ESRD,           Endo/Other:    (+) Diabetes, . Abdominal:           Vascular: negative vascular ROS. Anesthesia Plan      MAC     ASA 4                 Plan discussed with PRASHANTH. Darrin Enamorado DO   11/13/2020        Pt. Examined, chart reviewed. No family present to speak with. Pt. Is unable to provide a medical history and does not follow commands. Neurologic status cannot be assessed.   Pt. Cannot lay flat and surgeon says he will do a temporary femoral dialysis catheter    Yanna Krueger DO  November 13, 2020  2:07 PM

## 2020-11-14 NOTE — ANESTHESIA POSTPROCEDURE EVALUATION
Department of Anesthesiology  Postprocedure Note    Patient: Geovanna Mora  MRN: [id number]  YOB: 1945  Date of evaluation: 11/14/2020  Time:  6:18 AM     Procedure Summary     Date:  11/13/20 Room / Location:  [facility] / [facility]    Anesthesia Start:  3596 Anesthesia Stop:  1519    Procedure:  TEMPORARY CATHETER INSERTION HEMODIALYSIS (N/A ) Diagnosis:  (DIALYSIS)    Surgeon:  Carla Swann MD Responsible Provider:  Reynolds Bosworth, DO    Anesthesia Type:  MAC ASA Status:  4          Anesthesia Type: MAC    Ursula Phase I: Ursula Score: 8    Ursula Phase II:      Last vitals: Reviewed and per EMR flowsheets.        Anesthesia Post Evaluation    Patient location during evaluation: PACU  Patient participation: complete - patient participated  Level of consciousness: awake and alert  Pain score: 1  Airway patency: patent  Nausea & Vomiting: no nausea and no vomiting  Complications: no  Cardiovascular status: hemodynamically stable  Respiratory status: acceptable  Hydration status: euvolemic

## 2021-01-20 NOTE — PROGRESS NOTES
CC:  Follow up metastatic breast cancer, recurrent DVT, wounds    HPI:  76 y.o. female presents for follow up. Metastatic breast cancer on chemotherapy, following with oncology. Symptoms stable overall. However, when she was anxious today, she became confused. Her daughter is with her and reports some hallucinations, which the patient denies, but states she was seeing things inaccurately. States her vision is stable overall, but she thought a building was a passing car (briefly), and thought her finger was bleeding after a finger stick when it was not. Has been sleeping more, but she has also been using the pain medication more often. Her pain is finally controlled at this point. Chronic wounds bilateral hips, stable. Wound care from home nurse. Has had increased weeping and wounds on her right leg, now also followed by wound care at home. Her wounds have been seeping today from her lower right leg. Has had stable edema of RLE. No recent trauma. No purulence, no F/C. No increase in redness. Has had watery drainage. Recurrent DVT on coumadin. Has been using only 1 mg daily; INR had been controlled. No bleeding or bruising. Today, INR is elevated to 6.6. Had one day of missed coumadin dose last week, and had 2 days of N/V with decreased appetite. Since, her symptoms have resolved. No new symptoms or concerns. Bowel/bladder function has been stable. Stable abdominal distention; she declines paracentesis at this time.       Patient Active Problem List    Diagnosis Date Noted    KARISHMA (acute kidney injury) ([address] 09/25/2020    Degenerative disc disease at L5-S1 level 07/10/2020    Left leg pain 07/09/2020    Bony metastasis ([address] left leg 07/08/2020    Decubitus ulcer 07/08/2020    Cellulitis and abscess of leg 06/18/2020    Pleural effusion 09/17/2019    Precordial pain     Chronic systolic heart failure (HCC)     Moderate protein-calorie malnutrition ([address] 08/15/2019    Goal Outcome Evaluation:  Plan of Care Reviewed With: patient  Progress: improving  Outcome Summary: Pt. demonstrates progress w/ LUE TSA HEP this date w/ up to 130* flexion PROM and pendulum exercise w/ mod VCs to correct pt. trech. Pt. and spouse eudacted on donning/doffing sling, shirt, and ice. Pt. progress limited secondary to impacts to AROM and ADL independence. Recommend OP therapy at d/c to address aforementioned deficits.    PPE: gloves, mask, face shield    Metastatic disease ([address]     Palliative care by specialist     Altered mental status     Goals of care, counseling/discussion     Acute hypercapnic respiratory failure ([address] 10/61/4846    Acute systolic heart failure ([address] 08/01/2019    Nonischemic cardiomyopathy ([address] 08/01/2019    Status post coronary artery bypass graft 08/01/2019    Class 2 severe obesity due to excess calories with serious comorbidity and body mass index (BMI) of 35.0 to 35.9 in adult [facility]) 08/01/2019    Type 2 diabetes mellitus with hyperglycemia ([address] 08/01/2019    HAP (hospital-acquired pneumonia) 07/21/2019    Palliative care encounter     Cancer associated pain     Ascites 06/18/2019    Charcot's joint of foot in type 2 diabetes mellitus ([address] 04/17/2019    CKD (chronic kidney disease) stage 3, GFR 30-59 ml/min 04/16/2019    Ambulatory dysfunction 04/15/2019    Leg swelling 11/07/2018    History of deep vein thrombosis 11/07/2018    Chronic venous insufficiency 11/07/2018    Lymphedema of both lower extremities 11/07/2018    Diabetic polyneuropathy associated with type 2 diabetes mellitus ([address] 09/07/2018    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 02/66/2010    Complicated UTI (urinary tract infection) 10/24/2017    Diarrhea with dehydration 10/24/2017    Chronic anticoagulation 10/24/2017    Peripheral polyneuropathy 01/03/2017    Bilateral edema of lower extremity 10/03/2016    Chronic deep vein thrombosis (DVT) of proximal vein of right lower extremity ([address] 09/30/2016    Type 2 diabetes mellitus without complication, with long-term current use of insulin ([address] 09/01/2016    Anemia of chronic disease 09/01/2016    Anemia 09/01/2016    Ventral hernia 05/14/2015    Rectal bleeding 02/23/2015    Anesthesia     Pericardial effusion     MI (myocardial infarction) ([address]     Arthritis     Lymph node enlargement     Subclavian vein occlusion, bilateral ([address] 04/18/2012    Rib lesion 02/07/2012    Hyperlipidemia 08/29/2011    Sarcoidosis 07/26/2011    B12 deficiency 06/22/2011    Shoulder pain, left 03/02/2011    [facility]     Essential hypertension     Coronary artery disease involving native coronary artery of native heart without angina pectoris     Nephrolithiasis     CHF (congestive heart failure) (HCC)     Humerus fracture        Current Outpatient Medications on File Prior to Visit   Medication Sig Dispense Refill    oxyCODONE (ROXICODONE) 5 MG immediate release tablet Take 1 tablet by mouth every 6 hours as needed for Pain for up to 15 days. 60 tablet 0    sodium bicarbonate 650 MG tablet Take 1 tablet by mouth 2 times daily 180 tablet 1    omeprazole 20 MG EC tablet Take 1 tablet by mouth daily 90 tablet 1    allopurinol (ZYLOPRIM) 100 MG tablet Take 1 tablet by mouth daily 90 tablet 1    magnesium oxide (MAG-OX) 400 MG tablet Take 1 tablet by mouth daily 90 tablet 0    warfarin (COUMADIN) 1 MG tablet Take daily dose as directed by a physician. 180 tablet 1    Misc. Devices KIT Glucometer for blood sugar testing. Please provide brand covered by insurance. Diagnosis code E11.65 1 kit 0    blood glucose monitor strips 1 strip by Other route 3 times daily Test 3 times a day & as needed for symptoms of irregular blood glucose. Please provide brand covered by insurance. Diagnosis code E11.65 100 strip 3    Lancets MISC 1 each by Does not apply route 3 times daily Please provide brand covered by insurance.  Diagnosis code E11.65 100 each 3    insulin glargine (LANTUS SOLOSTAR) 100 UNIT/ML injection pen Inject 10 Units into the skin daily 5 pen 0    lidocaine (LIDODERM) 5 % Apply 1 patch topically daily      acetaminophen (TYLENOL) 325 MG tablet Take 650 mg by mouth every 6 hours as needed      Cyanocobalamin (VITAMIN B 12) 500 MCG TABS Take 1 tablet by mouth daily       metoprolol succinate (TOPROL XL) 25 MG extended release tablet Take 0.5 tablets by mouth daily 60 tablet 3    insulin lispro (HUMALOG) 100 UNIT/ML injection vial Inject 0-3 Units into the skin nightly 1 vial 3    pregabalin (LYRICA) 50 MG capsule Take 1 capsule by mouth 3 times daily for 120 days. 90 capsule 3    docusate (COLACE, DULCOLAX) 100 MG CAPS Take 100 mg by mouth daily 90 capsule 3    isosorbide dinitrate (ISORDIL) 5 MG tablet Take 1 tablet by mouth 3 times daily 90 tablet 3    Calcium Citrate-Vitamin D (RA CALCIUM CIT-VIT D-3 PETITES) 200-250 MG-UNIT TABS take 1 tablet by mouth twice a day 180 tablet 3    albuterol sulfate (PROAIR RESPICLICK) 487 (90 Base) MCG/ACT aerosol powder inhalation Inhale 2 puffs into the lungs every 6 hours as needed for Wheezing or Shortness of Breath 1 Inhaler 2    Multiple Vitamins-Minerals (THERAPEUTIC MULTIVITAMIN-MINERALS) tablet Take 1 tablet by mouth daily 90 tablet 3    Insulin Pen Needle (MEIJER PEN NEEDLES) 29G X 12MM MISC 1 each by Does not apply route daily 100 each 3    nitroGLYCERIN (NITROSTAT) 0.4 MG SL tablet up to max of 3 total doses. If no relief after 1 dose, call 911. 25 tablet 3    sodium chloride (OCEAN, BABY AYR) 0.65 % nasal spray 1 spray by Nasal route as needed for Congestion (dryness) 60 mL 0    palbociclib (IBRANCE) 100 MG capsule Take 100 mg by mouth 3 weeks on and 1 week off also receives injections every 3 weeks per Dr Mayte Huggins       No current facility-administered medications on file prior to visit.         Allergies   Allergen Reactions    Macrobid [Nitrofurantoin Monohydrate Macrocrystals] Hives       Social History     Tobacco Use    Smoking status: Never Smoker    Smokeless tobacco: Never Used   Substance Use Topics    Alcohol use: No    Drug use: No       ROS:   Review of Systems - General ROS: negative for - chills or fever  Respiratory ROS: no cough, shortness of breath, or wheezing  Cardiovascular ROS: no chest pain or dyspnea on exertion  Gastrointestinal ROS: no change in bowel habits, no D/C Genito-Urinary ROS: no dysuria, trouble voiding, or hematuria    Physical Exam:    VS:  Blood pressure 113/71, temperature 98.2 °F (36.8 °C), resp. rate 18, height 5' 4\" (1.626 m), weight 171 lb (77.6 kg), not currently breastfeeding. General Appearance:  awake, alert, oriented, in no acute distress, appears pale, some muscle wasting about her face; oriented to person, place, and time; no hallucinations at the time of visit. Head/face:  NCAT  Eyes:  EOMI and Sclera nonicteric  Neck:  neck- supple, no mass, non-tender  Lungs:  Normal expansion. Clear to auscultation. No rales, rhonchi, or wheezing. Heart:  Heart regular rate and rhythm with occasional ectopy; HR approximately 100 at time of auscultation. Systolic murmur, stable. Abdomen:  Firm with ascites, minimally tender   Extremities: 2-3+ edema bilateral LE, right more than left. Right leg with anterior and lateral shallow ulcerations and stasis changes. Areas of ulceration are pink to red at base without surrounding erythema or induration, no purulence. Serous drainage noted, clear, watery. Each of three areas approximately 3 cm in diameter; grouped near distal shin above ankle. Psych: no delusions or hallucinations present at the time of exam.      Most recent labs and imaging reviewed. Findings include:     Lab Results   Component Value Date    INR 6.6 10/19/2020    INR 2.40 09/30/2020    INR 2.40 09/30/2020    PROTIME 79.1 10/19/2020    PROTIME 2.4 09/28/2020    PROTIME 27.2 (H) 09/27/2020         Assessments:      Diagnosis Orders   1. Metastatic breast cancer (HCC)  CBC WITH AUTO DIFFERENTIAL    HEPATIC FUNCTION PANEL    Basic Metabolic Panel   2. Chronic deep vein thrombosis (DVT) of proximal vein of right lower extremity (HCC)  POCT INR   3. Supratherapeutic INR     4. Venous stasis ulcer of other part of right lower leg limited to breakdown of skin without varicose veins (HCC)         Plans:    As Above.       Please see Patient Instructions for further counseling and information given. RTO 4 weeks or sooner prn. Discussed that change in mentation this morning, though transient, could be due to structural changes in the brain; cannot exclude metastases. However, patient declines inpatient admission for further evaluation at this time. She also declines imaging. Offered further evaluation, but she declines. She does not believe that her change in mentation was concerning. Advised on risks, including risks of falls from confusion, and risks of drowsiness that can come from pain medications. However, she has metastatic breast cancer, and her pain is appropriately controlled. She will continue to use the oxycodone with caution and only when needed for significant pain symptoms. Advised caution and close supervision at home. For INR, hold all coumadin today. Advised to check INR at home tomorrow, and please call with readings. Will also check basic labs to further evaluate; suspect deteriorating hepatic and/or renal function contributing to labile INR. Follow closely. Seek care with any bleeding or injury. For wounds, in the office, the wounds were dressed with Telfa dressings and then wrapped with Kerlix, and then an ACE bandage was applied for support. Discussed the need for ongoing wound care with compression, which is provided through home health care, and follow for any signs of infection. Again, I offered further evaluation and management of symptoms and concerns and resources for care, but she declines for now. She will let me know if she changes her mind. Advised to please be adherent to the treatment plans discussed today, and please call with any questions or concerns, letting the office know of any reasons that the plans may not be followed. The risks of untreated conditions include worsening illness, injury, disability, and possibly, death.  Please call if symptoms change in any way, worsen, or fail to completely resolve, as this could necessitate a change to treatment plans. Patient and/or caregiver expressed understanding. Indications and proper use of medication(s) reviewed. Potential side-effects and risks of medication(s) also explained. Patient and/or caregiver was instructed to call if any new symptoms develop prior to next visit.

## 2021-10-19 NOTE — CONSULTS
[facility]  Hematology/Oncology  Consult      Patient Name: Naomie Osullivan  YOB: 1945  PCP: Natalia Melendez DO   Referring Provider:      Reason for Consultation:   Chief Complaint   Patient presents with    Fatigue     pt was going in for a blood transfusion today and daughter wanted he sent in for eval due to weakness        History of Present Illness: This is a 30-year-old patient of Dr. Mariann Martinez with a past medical history of CHF ejection fraction 50 to 55%, recurrent DVTs, hyperlipidemia, diabetes mellitus type II, CAD s/p a CAB, neuropathy and history of breast cancer dating back to 2005. She was diagnosed with T1C N1aM0, grade 2 with invasive ductal carcinoma, positive ER/WV receptors and amplified Her-2-Nova by FISH. She received adjuvant chemo with AC x6 followed by Taxol x6 as meme SWOG [id number] study in 1 year and Herceptin. Her treatment was complicated with peripheral neuropathy. She has not completed 5 years of adjuvant hormonal therapy with Arimidex. She had recurrence with expansile margin recurrent metastasis. PET scan in 2012 showed disease progression with right cervical node and mediastinal lymphadenopathy. She had adequate LVEF. She responded well to Herceptin and had a follow-up PET scan that was done in July 2013 which showed complete resolution of the lymphadenopathy with no evidence of any visceral metastasis. She was maintained on single agent Herceptin along with AI. In 2019 she developed new onset ascites and peritoneal signs paracentesis was done which was negative for malignant sounds she had changes suggestive of liver cirrhosis. Repeat imaging in 2018 showed interval development of multifocal liver metastasis as well as pulmonary metastasis. A repeat liver biopsy was consistent with metastatic adenocarcinoma of breast primary but it was different from her original tumor and this time it was estrogen positive and HER-2 negative.   She was initiated on infarction) ([address]     Nephrolithiasis     Follows with Dr. Lexis Coombs.  Pericardial effusion     Pulmonary nodule     With mediastinal lymphadenopathy. Stable. Follows with Dr. Alexander Lopez.     Rib lesion 11/28/11    expansile lesion in one of the right ribs laterally    Sarcoidosis 07/26/11    per transbronchial needle aspiration    Subclavian vein occlusion, bilateral ([address] 4/18/2012    Type II or unspecified type diabetes mellitus without mention of complication, not stated as uncontrolled        Patient Active Problem List    Diagnosis Date Noted    Stasis ulcer ([address] 10/19/2020    KARISHMA (acute kidney injury) ([address] 09/25/2020    Degenerative disc disease at L5-S1 level 07/10/2020    Left leg pain 07/09/2020    Bony metastasis ([address] left leg 07/08/2020    Decubitus ulcer 07/08/2020    Cellulitis and abscess of leg 06/18/2020    Pleural effusion 09/17/2019    Precordial pain     Chronic systolic heart failure (HCC)     Moderate protein-calorie malnutrition ([address] 08/15/2019    Metastatic disease ([address]     Palliative care by specialist     Altered mental status     Goals of care, counseling/discussion     Acute hypercapnic respiratory failure ([address] 66/14/2814    Acute systolic heart failure ([address] 08/01/2019    Nonischemic cardiomyopathy ([address] 08/01/2019    Status post coronary artery bypass graft 08/01/2019    Class 2 severe obesity due to excess calories with serious comorbidity and body mass index (BMI) of 35.0 to 35.9 in adult ([address] 08/01/2019    Type 2 diabetes mellitus with hyperglycemia ([address] 08/01/2019    HAP (hospital-acquired pneumonia) 07/21/2019    Palliative care encounter     Cancer associated pain     Ascites 06/18/2019    Charcot's joint of foot in type 2 diabetes mellitus ([address] 04/17/2019    CKD (chronic kidney disease) stage 3, GFR 30-59 ml/min 04/16/2019    Ambulatory dysfunction 04/15/2019    Leg swelling 11/07/2018    History of deep vein thrombosis 11/07/2018    Chronic venous insufficiency 11/07/2018    Lymphedema of both lower extremities 11/07/2018    Diabetic polyneuropathy associated with type 2 diabetes mellitus ([address] 09/07/2018    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 03/66/1281    Complicated UTI (urinary tract infection) 10/24/2017    Diarrhea with dehydration 10/24/2017    Chronic anticoagulation 10/24/2017    Peripheral polyneuropathy 01/03/2017    Bilateral edema of lower extremity 10/03/2016    Chronic deep vein thrombosis (DVT) of proximal vein of right lower extremity ([address] 09/30/2016    Type 2 diabetes mellitus without complication, with long-term current use of insulin ([address] 09/01/2016    Anemia of chronic disease 09/01/2016    Anemia 09/01/2016    Ventral hernia 05/14/2015    Rectal bleeding 02/23/2015    Anesthesia     Pericardial effusion     MI (myocardial infarction) ([address]     Arthritis     Lymph node enlargement     Subclavian vein occlusion, bilateral ([address] 04/18/2012    Rib lesion 02/07/2012    Hyperlipidemia 08/29/2011    Sarcoidosis 07/26/2011    B12 deficiency 06/22/2011    Shoulder pain, left 03/02/2011    Metastatic breast cancer ([address]     Essential hypertension     Coronary artery disease involving native coronary artery of native heart without angina pectoris     Nephrolithiasis     CHF (congestive heart failure) ([address]     Humerus fracture         Past Surgical History:   Procedure Laterality Date    APPENDECTOMY      ARTERIAL BYPASS SURGRY      BREAST LUMPECTOMY  9/27/2005    LEFT    CARDIAC SURGERY      CHOLECYSTECTOMY      COLONOSCOPY  12/2007    cecal arteriovenous malformation with hyperplastic polyps    COLONOSCOPY  [id number]    CORONARY ARTERY BYPASS GRAFT  05/2008    triple bypass    ECHO COMPL W DOP COLOR FLOW  10/30/2013         EYE SURGERY      clarissa cataracts    HYSTERECTOMY  1975, 1985    MAYNOR prolapse, benign conditions; BSO later for scar tissues, no CA.      OTHER SURGICAL HISTORY  11/18/11    port removal right chest wall    PARACENTESIS      TONSILLECTOMY      TOOTH EXTRACTION      Full Dental Extraction.  TUNNELED VENOUS PORT PLACEMENT      removal of port Dec. 2011- Dr. Kamilla Saleem TUNNELED [facility]  [id number]    RIGHT CHEST       Family History  Family History   Adopted: Yes       Social History    TOBACCO:   reports that she has never smoked. She has never used smokeless tobacco.  ETOH:   reports no history of alcohol use. Home Medications  Prior to Admission medications    Medication Sig Start Date End Date Taking? Authorizing Provider   albuterol sulfate HFA (VENTOLIN HFA) 108 (90 Base) MCG/ACT inhaler Inhale 2 puffs into the lungs every 6 hours as needed for Wheezing   Yes Historical Provider, MD   cyanocobalamin 1000 MCG tablet Take 1,000 mcg by mouth daily   Yes Historical Provider, MD   nitroGLYCERIN (NITROSTAT) 0.4 MG SL tablet Place 0.4 mg under the tongue every 5 minutes as needed for Chest pain   Yes Historical Provider, MD   warfarin (COUMADIN) 1 MG tablet Take 1 mg by mouth daily   Yes Historical Provider, MD   oxyCODONE (ROXICODONE) 5 MG immediate release tablet Take 1 tablet by mouth every 6 hours as needed for Pain for up to 15 days.  10/14/20 10/29/20 Yes Shelagh Moritz, DO   sodium bicarbonate 650 MG tablet Take 1 tablet by mouth 2 times daily 9/29/20  Yes Shelagh Moritz, DO   omeprazole 20 MG EC tablet Take 1 tablet by mouth daily 9/29/20  Yes Shelagh Moritz, DO   allopurinol (ZYLOPRIM) 100 MG tablet Take 1 tablet by mouth daily 9/14/20  Yes Shelagh Moritz, DO   magnesium oxide (MAG-OX) 400 MG tablet Take 1 tablet by mouth daily 9/14/20  Yes Shelagh Moritz, DO   insulin glargine (LANTUS SOLOSTAR) 100 UNIT/ML injection pen Inject 10 Units into the skin daily 8/13/20  Yes Leonard Christensen MD   lidocaine (LIDODERM) 5 % Apply 1 patch topically daily 7/13/20  Yes Historical Provider, MD   metoprolol succinate (TOPROL XL) 25 MG extended release tablet Take 0.5 tablets by mouth daily 8/11/20  Yes Liborio Rodriguez MD   insulin lispro (HUMALOG) 100 UNIT/ML injection vial Inject 0-3 Units into the skin nightly 7/15/20  Yes Liborio Rodriguez MD   docusate (COLACE, DULCOLAX) 100 MG CAPS Take 100 mg by mouth daily 2/4/20  Yes Sudhir Spring,    isosorbide dinitrate (ISORDIL) 5 MG tablet Take 1 tablet by mouth 3 times daily 1/14/20  Yes Lubna Castañeda MD   acetaminophen (TYLENOL) 325 MG tablet Take 650 mg by mouth every 6 hours as needed 7/8/20   Historical Provider, MD       Allergies  Allergies   Allergen Reactions    Macrobid [Nitrofurantoin Monohydrate Macrocrystals] Hives       Review of Systems:     Constitutional:  No fever chills or rigors.  Eyes: No changes in vision, discharge, or pain   ENT: No Headaches, hearing loss or vertigo. No mouth sores or sore throat. No change in taste or smell.  Cardiovascular: No chest discomfort, dyspnea on exertion, palpitations or loss of consciousness. or phlebitis.  Respiratory: Has no cough or wheezing, Has no sputum production. Has no hemoptysis, Has no pleuritic pain, .  Gastrointestinal: No abdominal pain, appetite loss, blood in stools. No change in bowel habits. No hematemesis    Genitourinary: Patient acknowledges no dysuria, trouble voiding, or hematuria. No nocturia or increased frequency.  Musculoskeletal: No gait disturbance, weakness or joint complaints.  Integumentary: No rash or pruritis.  Neurological: No headache, diplopia, change in muscle strength, numbness or tingling. No change in gait, balance, coordination, mood, affect, memory, mentation, behavior.  Psychiatric: No anxiety, or depression.  Endocrine: No temperature intolerance. No excessive thirst, fluid intake, or urination. No tremor.  Hematologic/Lymphatic: No abnormal bruising or bleeding, blood clots or swollen lymph nodes.  Allergic/Immunologic: No nasal congestion or hives.       Objective  /63 Pulse 78   Temp 97.2 °F (36.2 °C)   Resp 12   SpO2 99%     Physical Exam:   Performance Status:  General: AAO to person, place, time, in no acute distress,   Head and neck : PERRLA, EOMI . Sclera non icteric. Oropharynx : Clear  Neck: no JVD,  no adenopathy, no carotid bruit  Heart: Regular rate and regular rhythm, no murmur  Lungs: Clear to auscultation   Extremities: No edema,no cyanosis, no clubbing. Abdomen: Soft, non-tender;no masses, no organomegaly  Skin:  No rash. Neurologic:Cranial nerves grossly intact. No focal motor or sensory deficits .     Recent Laboratory Data-   Lab Results   Component Value Date    WBC 4.0 (L) 10/22/2020    HGB 7.6 (L) 10/22/2020    HCT 22.5 (L) 10/22/2020    .5 (H) 10/22/2020     (L) 10/22/2020    LYMPHOPCT 15.4 (L) 10/22/2020    RBC 2.00 (L) 10/22/2020    MCH 38.0 (H) 10/22/2020    MCHC 33.8 10/22/2020    RDW 25.1 (H) 10/22/2020    NEUTOPHILPCT 65.0 10/22/2020    MONOPCT 17.0 (H) 10/22/2020    BASOPCT 0.5 10/22/2020    NEUTROABS 2.57 10/22/2020    LYMPHSABS 0.61 (L) 10/22/2020    MONOSABS 0.67 10/22/2020    EOSABS 0.07 10/22/2020    BASOSABS 0.02 10/22/2020       Lab Results   Component Value Date     10/22/2020    K 4.3 10/22/2020     10/22/2020    CO2 23 10/22/2020    BUN 64 (H) 10/22/2020    CREATININE 2.2 (H) 10/22/2020    GLUCOSE 139 (H) 10/22/2020    CALCIUM 8.9 10/22/2020    PROT 6.3 (L) 10/22/2020    LABALBU 2.7 (L) 10/22/2020    BILITOT 1.1 10/22/2020    ALKPHOS 132 (H) 10/22/2020    AST 35 (H) 10/22/2020    ALT 42 (H) 10/22/2020    LABGLOM 22 10/22/2020    GFRAA 26 10/22/2020       Lab Results   Component Value Date    IRON 89 07/29/2020    TIBC 158 (L) 07/29/2020    FERRITIN 1,961 07/29/2020           Radiology-    Xr Chest Portable    Result Date: 10/22/2020  EXAMINATION: ONE XRAY VIEW OF THE CHEST 10/22/2020 8:44 am COMPARISON: Chest radiograph September 24, 2020 HISTORY: ORDERING SYSTEM PROVIDED HISTORY: Shortness of breath TECHNOLOGIST PROVIDED HISTORY: Reason for exam:->Shortness of breath What reading provider will be dictating this exam?->CRC FINDINGS: Right IJ central venous catheter terminates in the cavoatrial junction. The cardiomediastinal silhouette is stable in size. There is opacities identified in the right lower lung and minor fissure. The interstitial markings are prominent. No pneumothorax. No sizable pleural effusions. No interval change in bony deformity of left humerus. Atelectasis in right minor fissure. Prominent interstitial markings may represent interstitial edema. Xr Chest Portable    Result Date: 2020  Patient MRN:  [id number] : 1945 Age: 76 years Gender: Female Order Date:  2020 12:09 AM TECHNIQUE/NUMBER OF IMAGES/COMPARISON/CLINICAL HISTORY: Chest AP 1 image one view Comparison . History chest pain. FINDINGS: Right internal jugular central venous catheter tip in the right atrium. Patient previous midsternotomy. Heart has mild increased size. No acute infiltrates, consolidations or pleural effusions are seen. The There is a old fracture likely of the right rib cage. The patient previous surgery in the left axillary region. There is old fracture deformity of the proximal left humerus at the left shoulder level. No acute cardiopulmonary process. Us Retroperitoneal Complete    Result Date: 2020  Patient MRN:  [id number] : 1945 Age: 76 years Gender: Female Order Date:  2020 8:48 AM EXAM: US RETROPERITONEAL COMPLETE Multiple real-time sonographic images of the kidneys were obtained. NUMBER OF IMAGES:  43 INDICATION:  KARISHMA KARISHMA COMPARISON: 2020 and 2019 FINDINGS: The right kidney measures 10.3 cm  while that of the left measures 10 cm. There is no evidence for hydronephrosis, renal masses or perinephric fluid collections. Simple 16 mm cyst upper pole right kidney again incidentally noted.  Limited scans of the pelvis demonstrate normal appearing bladder.      No change since the exam dated 7/8/2020 IMPRESSION:         ASSESSMENT/PLAN :    77 yo female  Stage IV IDC  Currently on Faslodex and Ibrance  Supratherapeutic INR  Anemia      **PT DC'D FROM ED PRIOR TO BEING SEEN**        FORD Diaz CNP  Electronically signed 10/22/2020 at 1:51 PM   Jennifer Scott MD Hemostasis: Electrocautery

## 2022-04-05 NOTE — PROGRESS NOTES
Family Medicine resident, Dr. Christel Mckeon notified of 2View CXR results. Patient not visibly SOB and on RA. Patient states she feels her breathing is better today than it was yesterday. No

## 2025-01-23 NOTE — PLAN OF CARE
Patient Seen in: [facility]      History     Chief Complaint   Patient presents with    Sore Throat     Entered by patient     Stated Complaint: Sore Throat    Subjective:   41 y/o female with medical conditions as noted below presents with complaints of c/o sore throat with painful swallowing onset yesterday. No fever/chills, body aches, cough, nasal congestion, runny nose. Has history of tonsil stones              Objective:     Past Medical History:    Anxiety    Family history of breast cancer in mother    age 53    History of use of contraceptive intrauterine device (IUD)    Litetta IUD    Lichen sclerosus              Past Surgical History:   Procedure Laterality Date      2021    Insert intrauterine device      Liletta placed 6-weeks postpartum                Social History     Socioeconomic History    Marital status: Legally    Tobacco Use    Smoking status: Never    Smokeless tobacco: Never   Vaping Use    Vaping status: Never Used   Substance and Sexual Activity    Alcohol use: Never    Drug use: Never   Social History Narrative    ** Merged History Encounter **          Social Drivers of Health      Received from [facility], [facility]    Social Connections    Received from [facility], [facility]    Housing Stability              Review of Systems   Constitutional:  Negative for chills and fever.   HENT:  Positive for sore throat. Negative for congestion, rhinorrhea and trouble swallowing.    Respiratory:  Negative for cough and shortness of breath.    Gastrointestinal:  Negative for abdominal pain, diarrhea, nausea and vomiting.   Neurological:  Negative for headaches.   All other systems reviewed and are negative.      Positive for stated complaint: Sore Throat  Other systems are as noted in HPI.  Constitutional and vital signs reviewed.      All other systems reviewed and negative  Problem: Falls - Risk of:  Goal: Will remain free from falls  Description: Will remain free from falls  9/25/2020 1611 by Mara Hays RN  Outcome: Met This Shift     Problem: Falls - Risk of:  Goal: Absence of physical injury  Description: Absence of physical injury  9/25/2020 1611 by Mara Hays RN  Outcome: Met This Shift     Problem: Skin Integrity:  Goal: Will show no infection signs and symptoms  Description: Will show no infection signs and symptoms  9/25/2020 1611 by Mara Hays RN  Outcome: Met This Shift     Problem: Skin Integrity:  Goal: Absence of new skin breakdown  Description: Absence of new skin breakdown  9/25/2020 1611 by Mara Hays RN  Outcome: Met This Shift except as noted above.    Physical Exam     ED Triage Vitals [01/23/25 0916]   /75   Pulse 87   Resp 20   Temp 98 °F (36.7 °C)   Temp src Oral   SpO2 98 %   O2 Device None (Room air)       Current Vitals:   Vital Signs  BP: 123/75  Pulse: 87  Resp: 20  Temp: 98 °F (36.7 °C)  Temp src: Oral    Oxygen Therapy  SpO2: 98 %  O2 Device: None (Room air)        Physical Exam  Vitals and nursing note reviewed.   Constitutional:       General: She is not in acute distress.     Appearance: Normal appearance. She is not ill-appearing.   HENT:      Head: Normocephalic.      Right Ear: Tympanic membrane and external ear normal.      Left Ear: Tympanic membrane and external ear normal.      Nose: Nose normal.      Mouth/Throat:      Mouth: Mucous membranes are moist.      Pharynx: Oropharynx is clear. Uvula midline. Posterior oropharyngeal erythema present.      Tonsils: No tonsillar exudate. 1+ on the right. 1+ on the left.        Comments: Right tonsil with small white tonsil stone  Cardiovascular:      Rate and Rhythm: Normal rate and regular rhythm.   Pulmonary:      Effort: Pulmonary effort is normal.      Breath sounds: Normal breath sounds.   Musculoskeletal:         General: Normal range of motion.      Cervical back: Normal range of motion and neck supple.   Skin:     General: Skin is warm.   Neurological:      Mental Status: She is alert and oriented to person, place, and time.   Psychiatric:         Behavior: Behavior is cooperative.             ED Course     Labs Reviewed   POCT RAPID STREP - Normal   GRP A STREP CULT, THROAT                   MDM              Medical Decision Making  Patient is well-appearing.  No drooling  I discussed differentials with patient including but not limited to viral tonsillitis, Strep tonsillitis, tonsil stones, canker sore  Rapid strep negative.  Strep culture pending  Push fluids, cool mist humidifier, warm salt water gargles, good hand washing  otc meds prn  Fu with PCP. Return/  ED precautions discussed      Problems Addressed:  Acute tonsillitis, unspecified etiology: acute illness or injury    Amount and/or Complexity of Data Reviewed  Labs: ordered. Decision-making details documented in ED Course.    Risk  OTC drugs.        Disposition and Plan     Clinical Impression:  1. Acute tonsillitis, unspecified etiology         Disposition:  Discharge  1/23/2025  9:47 am    Follow-up:  Purnima Simon MD  [address] [address] [phone]      or follow up with your Primary Doctor          Medications Prescribed:  Current Discharge Medication List              Supplementary Documentation:

## (undated) DEVICE — 14F X 20CM SLX DOUBLE LUMEN FULL TRAY: Brand: SLX HEMO-CATH®

## (undated) DEVICE — NEEDLE HYPO 25GA L1.5IN BLU POLYPR HUB S STL REG BVL STR

## (undated) DEVICE — 18 GA N.G. KIT, 10 PACK: Brand: SITE-RITE

## (undated) DEVICE — 3M™ MEDIPORE™ + PAD 3564: Brand: 3M™ MEDIPORE™

## (undated) DEVICE — BLADE CLIPPER GEN PURP NS

## (undated) DEVICE — DOUBLE BASIN SET: Brand: MEDLINE INDUSTRIES, INC.

## (undated) DEVICE — LABEL MED 4 IN SURG PANEL W/ PEN STRL

## (undated) DEVICE — Z DUP USE 2257490 ADHESIVE SKIN CLSRE 036ML TPCL 2CTL CNCRLTE HIGH VSCSTY DRMB

## (undated) DEVICE — GLOVE ORANGE PI 7 1/2   MSG9075

## (undated) DEVICE — SYRINGE MED 10ML POLYPR LUERSLIP TIP FLAT TOP W/O SFTY DISP

## (undated) DEVICE — GOWN,SIRUS,FABRNF,XL,20/CS: Brand: MEDLINE

## (undated) DEVICE — CLOTH SURG PREP PREOPERATIVE CHLORHEXIDINE GLUC 2% READYPREP

## (undated) DEVICE — SURGICAL PROCEDURE PACK VASC MAJ CUST

## (undated) DEVICE — TRAY VCF/TESIO TRAY  REUSABLE

## (undated) DEVICE — SYRINGE MED 10ML LUERLOCK TIP W/O SFTY DISP

## (undated) DEVICE — TOWEL,OR,DSP,ST,BLUE,STD,6/PK,12PK/CS: Brand: MEDLINE

## (undated) DEVICE — COVER,LIGHT HANDLE,FLX,2/PK: Brand: MEDLINE INDUSTRIES, INC.

## (undated) DEVICE — DRAPE SHEET: Brand: UNBRANDED

## (undated) DEVICE — SHEET, T, LAPAROTOMY, STERILE: Brand: MEDLINE